# Patient Record
Sex: FEMALE | Race: ASIAN | Employment: OTHER | URBAN - METROPOLITAN AREA
[De-identification: names, ages, dates, MRNs, and addresses within clinical notes are randomized per-mention and may not be internally consistent; named-entity substitution may affect disease eponyms.]

---

## 2017-07-18 ENCOUNTER — HOSPITAL ENCOUNTER (OUTPATIENT)
Dept: MAMMOGRAPHY | Facility: CLINIC | Age: 67
Discharge: HOME | End: 2017-07-18
Attending: OBSTETRICS & GYNECOLOGY | Admitting: OBSTETRICS & GYNECOLOGY
Payer: MEDICARE

## 2017-07-18 DIAGNOSIS — Z12.31 VISIT FOR SCREENING MAMMOGRAM: ICD-10-CM

## 2017-07-18 PROCEDURE — 77063 BREAST TOMOSYNTHESIS BI: CPT

## 2017-07-18 PROCEDURE — G0202 SCR MAMMO BI INCL CAD: HCPCS

## 2017-07-26 ENCOUNTER — DOCUMENTATION ONLY (OUTPATIENT)
Dept: LAB | Facility: CLINIC | Age: 67
End: 2017-07-26

## 2017-07-26 DIAGNOSIS — E55.9 VITAMIN D DEFICIENCY: ICD-10-CM

## 2017-07-26 DIAGNOSIS — E78.5 HYPERLIPIDEMIA LDL GOAL <130: ICD-10-CM

## 2017-07-26 DIAGNOSIS — Z00.00 ROUTINE GENERAL MEDICAL EXAMINATION AT A HEALTH CARE FACILITY: Primary | ICD-10-CM

## 2017-07-26 DIAGNOSIS — R73.9 ELEVATED BLOOD SUGAR: ICD-10-CM

## 2017-07-26 DIAGNOSIS — D56.3 HETEROZYGOUS THALASSEMIA: ICD-10-CM

## 2017-07-26 NOTE — PROGRESS NOTES
Please review, associate diagnosis, and sign pending pre physical lab orders for patient's upcoming 8/2/17 lab appointment.     Thank you,  Lab

## 2017-08-02 DIAGNOSIS — E78.5 HYPERLIPIDEMIA LDL GOAL <130: ICD-10-CM

## 2017-08-02 DIAGNOSIS — D56.3 HETEROZYGOUS THALASSEMIA: ICD-10-CM

## 2017-08-02 DIAGNOSIS — Z00.00 ROUTINE GENERAL MEDICAL EXAMINATION AT A HEALTH CARE FACILITY: ICD-10-CM

## 2017-08-02 DIAGNOSIS — E55.9 VITAMIN D DEFICIENCY: ICD-10-CM

## 2017-08-02 DIAGNOSIS — R73.9 ELEVATED BLOOD SUGAR: ICD-10-CM

## 2017-08-02 LAB
ALBUMIN SERPL-MCNC: 3.9 G/DL (ref 3.4–5)
ALP SERPL-CCNC: 103 U/L (ref 40–150)
ALT SERPL W P-5'-P-CCNC: 36 U/L (ref 0–50)
ANION GAP SERPL CALCULATED.3IONS-SCNC: 8 MMOL/L (ref 3–14)
AST SERPL W P-5'-P-CCNC: 16 U/L (ref 0–45)
BASOPHILS # BLD AUTO: 0 10E9/L (ref 0–0.2)
BASOPHILS NFR BLD AUTO: 0.5 %
BILIRUB SERPL-MCNC: 1.1 MG/DL (ref 0.2–1.3)
BUN SERPL-MCNC: 22 MG/DL (ref 7–30)
CALCIUM SERPL-MCNC: 10.4 MG/DL (ref 8.5–10.1)
CHLORIDE SERPL-SCNC: 111 MMOL/L (ref 94–109)
CHOLEST SERPL-MCNC: 190 MG/DL
CO2 SERPL-SCNC: 26 MMOL/L (ref 20–32)
CREAT SERPL-MCNC: 1.05 MG/DL (ref 0.52–1.04)
DIFFERENTIAL METHOD BLD: ABNORMAL
EOSINOPHIL # BLD AUTO: 0.3 10E9/L (ref 0–0.7)
EOSINOPHIL NFR BLD AUTO: 4.5 %
ERYTHROCYTE [DISTWIDTH] IN BLOOD BY AUTOMATED COUNT: 17.2 % (ref 10–15)
GFR SERPL CREATININE-BSD FRML MDRD: 52 ML/MIN/1.7M2
GLUCOSE SERPL-MCNC: 114 MG/DL (ref 70–99)
HBA1C MFR BLD: 6 % (ref 4.3–6)
HCT VFR BLD AUTO: 39.6 % (ref 35–47)
HDLC SERPL-MCNC: 52 MG/DL
HGB BLD-MCNC: 12.3 G/DL (ref 11.7–15.7)
LDLC SERPL CALC-MCNC: 86 MG/DL
LYMPHOCYTES # BLD AUTO: 1.5 10E9/L (ref 0.8–5.3)
LYMPHOCYTES NFR BLD AUTO: 19.6 %
MCH RBC QN AUTO: 21.8 PG (ref 26.5–33)
MCHC RBC AUTO-ENTMCNC: 31.1 G/DL (ref 31.5–36.5)
MCV RBC AUTO: 70 FL (ref 78–100)
MONOCYTES # BLD AUTO: 0.7 10E9/L (ref 0–1.3)
MONOCYTES NFR BLD AUTO: 9.6 %
NEUTROPHILS # BLD AUTO: 4.9 10E9/L (ref 1.6–8.3)
NEUTROPHILS NFR BLD AUTO: 65.8 %
NONHDLC SERPL-MCNC: 138 MG/DL
PLATELET # BLD AUTO: 308 10E9/L (ref 150–450)
POTASSIUM SERPL-SCNC: 4.3 MMOL/L (ref 3.4–5.3)
PROT SERPL-MCNC: 7.7 G/DL (ref 6.8–8.8)
RBC # BLD AUTO: 5.64 10E12/L (ref 3.8–5.2)
SODIUM SERPL-SCNC: 145 MMOL/L (ref 133–144)
TRIGL SERPL-MCNC: 259 MG/DL
WBC # BLD AUTO: 7.5 10E9/L (ref 4–11)

## 2017-08-02 PROCEDURE — 36415 COLL VENOUS BLD VENIPUNCTURE: CPT | Performed by: INTERNAL MEDICINE

## 2017-08-02 PROCEDURE — 82306 VITAMIN D 25 HYDROXY: CPT | Performed by: INTERNAL MEDICINE

## 2017-08-02 PROCEDURE — 80061 LIPID PANEL: CPT | Performed by: INTERNAL MEDICINE

## 2017-08-02 PROCEDURE — 85025 COMPLETE CBC W/AUTO DIFF WBC: CPT | Performed by: INTERNAL MEDICINE

## 2017-08-02 PROCEDURE — 83036 HEMOGLOBIN GLYCOSYLATED A1C: CPT | Performed by: INTERNAL MEDICINE

## 2017-08-02 PROCEDURE — 80053 COMPREHEN METABOLIC PANEL: CPT | Performed by: INTERNAL MEDICINE

## 2017-08-03 LAB — DEPRECATED CALCIDIOL+CALCIFEROL SERPL-MC: 40 UG/L (ref 20–75)

## 2017-08-08 ENCOUNTER — OFFICE VISIT (OUTPATIENT)
Dept: FAMILY MEDICINE | Facility: CLINIC | Age: 67
End: 2017-08-08
Payer: MEDICARE

## 2017-08-08 VITALS
WEIGHT: 145 LBS | OXYGEN SATURATION: 97 % | TEMPERATURE: 97.8 F | SYSTOLIC BLOOD PRESSURE: 150 MMHG | HEART RATE: 74 BPM | BODY MASS INDEX: 25.69 KG/M2 | DIASTOLIC BLOOD PRESSURE: 94 MMHG | HEIGHT: 63 IN

## 2017-08-08 DIAGNOSIS — R03.0 ELEVATED BLOOD PRESSURE READING WITHOUT DIAGNOSIS OF HYPERTENSION: ICD-10-CM

## 2017-08-08 DIAGNOSIS — F31.9 BIPOLAR AFFECTIVE DISORDER, REMISSION STATUS UNSPECIFIED (H): ICD-10-CM

## 2017-08-08 DIAGNOSIS — D56.3 HETEROZYGOUS THALASSEMIA: ICD-10-CM

## 2017-08-08 DIAGNOSIS — E83.52 SERUM CALCIUM ELEVATED: ICD-10-CM

## 2017-08-08 DIAGNOSIS — R73.9 ELEVATED BLOOD SUGAR: ICD-10-CM

## 2017-08-08 DIAGNOSIS — E78.5 HYPERLIPIDEMIA LDL GOAL <130: ICD-10-CM

## 2017-08-08 DIAGNOSIS — D05.10 DUCTAL CARCINOMA IN SITU (DCIS) OF BREAST, UNSPECIFIED LATERALITY: ICD-10-CM

## 2017-08-08 DIAGNOSIS — Z00.00 ROUTINE GENERAL MEDICAL EXAMINATION AT A HEALTH CARE FACILITY: Primary | ICD-10-CM

## 2017-08-08 DIAGNOSIS — Z23 ENCOUNTER FOR IMMUNIZATION: ICD-10-CM

## 2017-08-08 DIAGNOSIS — E55.9 VITAMIN D DEFICIENCY: ICD-10-CM

## 2017-08-08 DIAGNOSIS — E78.1 HYPERTRIGLYCERIDEMIA: ICD-10-CM

## 2017-08-08 LAB
CA-I SERPL ISE-MCNC: 5.6 MG/DL (ref 4.4–5.2)
LITHIUM SERPL-SCNC: 1.1 MMOL/L (ref 0.6–1.2)

## 2017-08-08 PROCEDURE — 90472 IMMUNIZATION ADMIN EACH ADD: CPT | Performed by: INTERNAL MEDICINE

## 2017-08-08 PROCEDURE — 80178 ASSAY OF LITHIUM: CPT | Performed by: INTERNAL MEDICINE

## 2017-08-08 PROCEDURE — G0438 PPPS, INITIAL VISIT: HCPCS | Performed by: INTERNAL MEDICINE

## 2017-08-08 PROCEDURE — 90732 PPSV23 VACC 2 YRS+ SUBQ/IM: CPT | Performed by: INTERNAL MEDICINE

## 2017-08-08 PROCEDURE — G0009 ADMIN PNEUMOCOCCAL VACCINE: HCPCS | Performed by: INTERNAL MEDICINE

## 2017-08-08 PROCEDURE — 90714 TD VACC NO PRESV 7 YRS+ IM: CPT | Mod: GY | Performed by: INTERNAL MEDICINE

## 2017-08-08 PROCEDURE — 82330 ASSAY OF CALCIUM: CPT | Performed by: INTERNAL MEDICINE

## 2017-08-08 PROCEDURE — 36415 COLL VENOUS BLD VENIPUNCTURE: CPT | Performed by: INTERNAL MEDICINE

## 2017-08-08 RX ORDER — SIMVASTATIN 40 MG
40 TABLET ORAL AT BEDTIME
Qty: 90 TABLET | Refills: 3 | Status: SHIPPED | OUTPATIENT
Start: 2017-08-08 | End: 2018-02-28

## 2017-08-08 NOTE — NURSING NOTE
"Chief Complaint   Patient presents with     Wellness Visit        Initial /90 (BP Location: Right arm, Patient Position: Chair, Cuff Size: Adult Regular)  Pulse 74  Temp 97.8  F (36.6  C) (Tympanic)  Ht 5' 3.25\" (1.607 m)  Wt 145 lb (65.8 kg)  SpO2 97%  Breastfeeding? No  BMI 25.48 kg/m2 Estimated body mass index is 25.48 kg/(m^2) as calculated from the following:    Height as of this encounter: 5' 3.25\" (1.607 m).    Weight as of this encounter: 145 lb (65.8 kg)..    BP completed using cuff size: regular  MEDICATIONS REVIEWED  SOCIAL AND FAMILY HX REVIEWED  Olya Delcid CMA  "

## 2017-08-08 NOTE — MR AVS SNAPSHOT
After Visit Summary   8/8/2017    Diana Santiago    MRN: [id number]           Patient Information     Date Of Birth          1950        Visit Information        Provider Department      8/8/2017 9:30 AM Suleiman Miller MD [facility]        Today's Diagnoses     Bipolar affective disorder, remission status unspecified (H)    -  1    Routine general medical examination at a health care facility        Hypertriglyceridemia        Vitamin D deficiency        Heterozygous thalassemia        Ductal carcinoma in situ (DCIS) of breast, unspecified laterality        Elevated blood sugar        Hyperlipidemia LDL goal <130          Care Instructions    If your blood pressure is staying above 140/90 let me know.    Suleiman Miller M.D.    Preventive Health Recommendations    Female Ages 65 +    Yearly exam:     See your health care provider every year in order to  o Review health changes.   o Discuss preventive care.    o Review your medicines if your doctor has prescribed any.      You no longer need a yearly Pap test unless you've had an abnormal Pap test in the past 10 years. If you have vaginal symptoms, such as bleeding or discharge, be sure to talk with your provider about a Pap test.      Every 1 to 2 years, have a mammogram.  If you are over 69, talk with your health care provider about whether or not you want to continue having screening mammograms.      Every 10 years, have a colonoscopy. Or, have a yearly FIT test (stool test). These exams will check for colon cancer.       Have a cholesterol test every 5 years, or more often if your doctor advises it.       Have a diabetes test (fasting glucose) every three years. If you are at risk for diabetes, you should have this test more often.       At age 65, have a bone density scan (DEXA) to check for osteoporosis (brittle bone disease).    Shots:    Get a flu shot each year.    Get a tetanus shot every 10 years.    Talk to your doctor  about your pneumonia vaccines. There are now two you should receive - Pneumovax (PPSV 23) and Prevnar (PCV 13).    Talk to your doctor about the shingles vaccine.    Talk to your doctor about the hepatitis B vaccine.    Nutrition:     Eat at least 5 servings of fruits and vegetables each day.      Eat whole-grain bread, whole-wheat pasta and brown rice instead of white grains and rice.      Talk to your provider about Calcium and Vitamin D.     Lifestyle    Exercise at least 150 minutes a week (30 minutes a day, 5 days a week). This will help you control your weight and prevent disease.      Limit alcohol to one drink per day.      No smoking.       Wear sunscreen to prevent skin cancer.       See your dentist twice a year for an exam and cleaning.      See your eye doctor every 1 to 2 years to screen for conditions such as glaucoma, macular degeneration and cataracts.          Follow-ups after your visit        Who to contact     If you have questions or need follow up information about today's clinic visit or your schedule please contact [facility] directly at [phone].  Normal or non-critical lab and imaging results will be communicated to you by clypdhart, letter or phone within 4 business days after the clinic has received the results. If you do not hear from us within 7 days, please contact the clinic through Quick TVt or phone. If you have a critical or abnormal lab result, we will notify you by phone as soon as possible.  Submit refill requests through Familonet or call your pharmacy and they will forward the refill request to us. Please allow 3 business days for your refill to be completed.          Additional Information About Your Visit        clypdhart Information     Familonet gives you secure access to your electronic health record. If you see a primary care provider, you can also send messages to your care team and make appointments. If you have questions, please call your primary care clinic.   "If you do not have a primary care provider, please call [phone] and they will assist you.        Care EveryWhere ID     This is your Care EveryWhere ID. This could be used by other organizations to access your [place] medical records  [id number]        Your Vitals Were     Pulse Temperature Height Pulse Oximetry Breastfeeding? BMI (Body Mass Index)    74 97.8  F (36.6  C) (Tympanic) 5' 3.25\" (1.607 m) 97% No 25.48 kg/m2       Blood Pressure from Last 3 Encounters:   08/08/17 (!) 150/94   09/29/16 (!) 157/111   08/19/16 (!) 141/94    Weight from Last 3 Encounters:   08/08/17 145 lb (65.8 kg)   09/29/16 139 lb 12.8 oz (63.4 kg)   08/19/16 141 lb 3.2 oz (64 kg)              Today, you had the following     No orders found for display         Today's Medication Changes          These changes are accurate as of: 8/8/17  9:52 AM.  If you have any questions, ask your nurse or doctor.               These medicines have changed or have updated prescriptions.        Dose/Directions    vitamin D3 1000 UNITS Caps   This may have changed:  how much to take   Used for:  Routine general medical examination at a health care facility   Changed by:  Suleiman Miller MD        Dose:  3 capsule   Take 3 capsules by mouth daily   Quantity:  30 capsule   Refills:  0                Primary Care Provider Office Phone # Fax #    Suleiman Miller [phone]       [facility] [address] [facility] [address]  [place] [address]        Equal Access to Services     [facility] AH: Hadii aad ku hadasho Soomaali, waaxda luqadaha, qaybta kaalmada susi singh. So [facility] [phone].    ATENCIÓN: Si habla español, tiene a christensen disposición servicios gratuitos de asistencia lingüística. Llame al [phone].    We comply with applicable federal civil rights laws and [place] laws. We do not discriminate on the basis of race, color, national origin, age, disability sex, sexual " orientation or gender identity.            Thank you!     Thank you for choosing [facility]  for your care. Our goal is always to provide you with excellent care. Hearing back from our patients is one way we can continue to improve our services. Please take a few minutes to complete the written survey that you may receive in the mail after your visit with us. Thank you!             Your Updated Medication List - Protect others around you: Learn how to safely use, store and throw away your medicines at www.disposemymeds.org.          This list is accurate as of: 8/8/17  9:52 AM.  Always use your most recent med list.                   Brand Name Dispense Instructions for use Diagnosis    amantadine 50 MG/5ML syrup    SYMMETREL     Take 2.5 mLs by mouth daily.        CENTRUM SILVER per tablet      Take 1 tablet by mouth daily.    Routine general medical examination at a health care facility       fexofenadine 180 MG tablet    ALLEGRA    90 tablet    Take 1 tablet by mouth daily.        haloperidol 0.5 MG tablet    HALDOL     Take 1 mg by mouth daily        lithium 300 MG tablet      Take 300 mg by mouth 3 times daily.        simvastatin 40 MG tablet    ZOCOR    90 tablet    Take 1 tablet (40 mg) by mouth At Bedtime    Hypertriglyceridemia       UNABLE TO FIND      MEDICATION NAME: focus select capsule, 2 daily        vitamin D3 1000 UNITS Caps     30 capsule    Take 3 capsules by mouth daily    Routine general medical examination at a health care facility

## 2017-08-08 NOTE — PATIENT INSTRUCTIONS
If your blood pressure is staying above 140/90 let me know.    Suleiman Miller M.D.    Preventive Health Recommendations    Female Ages 65 +    Yearly exam:     See your health care provider every year in order to  o Review health changes.   o Discuss preventive care.    o Review your medicines if your doctor has prescribed any.      You no longer need a yearly Pap test unless you've had an abnormal Pap test in the past 10 years. If you have vaginal symptoms, such as bleeding or discharge, be sure to talk with your provider about a Pap test.      Every 1 to 2 years, have a mammogram.  If you are over 69, talk with your health care provider about whether or not you want to continue having screening mammograms.      Every 10 years, have a colonoscopy. Or, have a yearly FIT test (stool test). These exams will check for colon cancer.       Have a cholesterol test every 5 years, or more often if your doctor advises it.       Have a diabetes test (fasting glucose) every three years. If you are at risk for diabetes, you should have this test more often.       At age 65, have a bone density scan (DEXA) to check for osteoporosis (brittle bone disease).    Shots:    Get a flu shot each year.    Get a tetanus shot every 10 years.    Talk to your doctor about your pneumonia vaccines. There are now two you should receive - Pneumovax (PPSV 23) and Prevnar (PCV 13).    Talk to your doctor about the shingles vaccine.    Talk to your doctor about the hepatitis B vaccine.    Nutrition:     Eat at least 5 servings of fruits and vegetables each day.      Eat whole-grain bread, whole-wheat pasta and brown rice instead of white grains and rice.      Talk to your provider about Calcium and Vitamin D.     Lifestyle    Exercise at least 150 minutes a week (30 minutes a day, 5 days a week). This will help you control your weight and prevent disease.      Limit alcohol to one drink per day.      No smoking.       Wear sunscreen to prevent skin  cancer.       See your dentist twice a year for an exam and cleaning.      See your eye doctor every 1 to 2 years to screen for conditions such as glaucoma, macular degeneration and cataracts.

## 2017-08-08 NOTE — PROGRESS NOTES
SUBJECTIVE:   Diana Santiago is a 66 year old female who presents for Preventive Visit.    The patient feels fine and is walking reg now.  To see gyn soon  Up to date with psyche and no issues there.  Blood pressure higher today, no h/o this but she will monitor it.  Seen by sleep clinic and no emily.               Past Medical History:      Past Medical History:   Diagnosis Date     Bipolar affective disorder (H)     hosp 1993, Dr. Aftab Deal     DCIS (ductal carcinoma in situ) 1996    xrt and lumpectomy x 4     Elevated blood sugar      Fractured femoral neck (H) 1992     Hx of colonoscopy 2010    tics and hem     Hypercholesteremia      Thalassaemia trait      Vitamin D deficiency              Past Surgical History:      Past Surgical History:   Procedure Laterality Date     BREAST SURGERY      lumpectomy x 4     left hand surgery      last 1974             Social History:     Social History     Social History     Marital status: Single     Spouse name: N/A     Number of children: 0     Years of education: N/A     Occupational History     , retired      Social History Main Topics     Smoking status: Never Smoker     Smokeless tobacco: Never Used     Alcohol use No     Drug use: No     Sexual activity: Yes     Partners: Male     Other Topics Concern     Not on file     Social History Narrative             Family History:   reviewed         Allergies:   No Known Allergies          Medications:     Current Outpatient Prescriptions   Medication Sig Dispense Refill     Cholecalciferol (VITAMIN D3) 1000 UNITS CAPS Take 3 capsules by mouth daily 30 capsule      simvastatin (ZOCOR) 40 MG tablet Take 1 tablet (40 mg) by mouth At Bedtime 90 tablet 3     UNABLE TO FIND MEDICATION NAME: focus select capsule, 2 daily       Multiple Vitamins-Minerals (CENTRUM SILVER) per tablet Take 1 tablet by mouth daily.       fexofenadine (ALLEGRA) 180 MG tablet Take 1 tablet by mouth daily. 90 tablet 0      "amantadine (SYMMETREL) 50 MG/5ML solution Take 2.5 mLs by mouth daily.       haloperidol (HALDOL) 0.5 MG tablet Take 1 mg by mouth daily        lithium 300 MG tablet Take 300 mg by mouth 3 times daily.       [DISCONTINUED] simvastatin (ZOCOR) 40 MG tablet Take 1 tablet (40 mg) by mouth At Bedtime 90 tablet 3               Review of Systems:   The 10 point Review of Systems is negative other than noted in the HPI           Physical Exam:   Blood pressure (!) 150/94, pulse 74, temperature 97.8  F (36.6  C), temperature source Tympanic, height 5' 3.25\" (1.607 m), weight 145 lb (65.8 kg), SpO2 97 %, not currently breastfeeding.    Exam:  Constitutional: healthy appearing, alert and in no distress  Heent: Normocephalic. Head without obvious masses or lesions. PERRLDC, EOMI. Mouth exam within normal limits: tongue, mucous membranes, posterior pharynx all normal, no lesions or abnormalities seen.  Tm's and canals within normal limits bilaterally. Neck supple, no nuchal rigidity or masses. No supraclavicular, or cervical adenopathy. Thyroid symmetric, no masses.  Cardiovascular: Regular rate and rhythm, no murmer, rub or gallops.  JVP not elevated, no edema.  Carotids within normal limits bilaterally, no bruits.  Respiratory: Normal respiratory effort.  Lungs clear, normal flow, no wheezing or crackles.  Gastrointestinal: Normal active bowel sounds.   Soft, not tender, no masses, guarding or rebound.  No hepatosplenomegaly.   Musculoskeletal: extremities normal, no gross deformities noted.  Skin: no suspicious lesions or rashes   Neurologic: Mental status within normal limits.  Speech fluent.  No gross motor abnormalities and gait intact.  Psychiatric: mentation appears normal and affect normal.         Data:   Labs reviewed with patient, others to be done today        Assessment:   1. Normal cpx  2. Bipolar, stable, follow up psyche  3. Elevated cholesterol on statin, exercise, diet for this and trig  4. Elevated sugar, " exercise, diet  5. Dcis, no issues  6. heteozygous thal  7. Elevated calcium, doubt significant, follow up today  8. Elevated blood pressure, doubt significant, she will check it reg and call if up  9. Vit d defic, follow up lab fine  10. hcm         Plan:   Up to date mammogram, colon, breast exam  Td and pneumovax today  Exercise, diet  Monitor blood pressure and if up call  Other labs today      Suleiman Miller M.D.                Are you in the first 12 months of your Medicare Part B coverage?  No    Healthy Habits:  Answers for HPI/ROS submitted by the patient on 8/7/2017   Annual Exam:  Getting at least 3 servings of Calcium per day:: Yes  Bi-annual eye exam:: Yes  Dental care twice a year:: Yes  Sleep apnea or symptoms of sleep apnea:: Daytime drowsiness  Diet:: Regular (no restrictions)  Frequency of exercise:: 6-7 days/week  Taking medications regularly:: Yes  Medication side effects:: None  Additional concerns today:: No  PHQ-2 Score: 0  Duration of exercise:: 15-30 minutes    COGNITIVE SCREEN  1) Repeat 3 items (Banana, Sunrise, Chair)    2) Clock draw: NORMAL  3) 3 item recall: Recalls 3 objects  Results: 3 items recalled: COGNITIVE IMPAIRMENT LESS LIKELY    Mini-CogTM Copyright S [employer]. Licensed by the author for use in [facility]; reprinted with permission (nela@.[facility]). All rights reserved.                    Reviewed and updated as needed this visit by clinical staff         Reviewed and updated as needed this visit by Provider      Social History   Substance Use Topics     Smoking status: Never Smoker     Smokeless tobacco: Never Used     Alcohol use No       The patient does not drink >3 drinks per day nor >7 drinks per week.    Today's PHQ-2 Score:   PHQ-2 ( 1999 Pfizer) 8/7/2017 7/22/2014   Q1: Little interest or pleasure in doing things 0 0   Q2: Feeling down, depressed or hopeless 0 0   PHQ-2 Score 0 0   Q1: Little interest or pleasure in doing things Not at all -   Q2: Feeling  "down, depressed or hopeless Not at all -   PHQ-2 Score 0 -         Do you feel safe in your environment - Yes    Do you have a Health Care Directive?: Yes: Patient states has Advance Directive and will bring in a copy to clinic.    Current providers sharing in care for this patient include: Patient Care Team:  Suleiman Miller MD as PCP - General (Internal Medicine)      Hearing impairment: No    Ability to successfully perform activities of daily living: Yes, no assistance needed     Fall risk:  Fallen 2 or more times in the past year?: No  Any fall with injury in the past year?: No      Home safety:  none identified      The following health maintenance items are reviewed in Epic and correct as of today:Health Maintenance   Topic Date Due     DEXA SCAN SCREENING (SYSTEM ASSIGNED)  11/23/2015     TETANUS IMMUNIZATION (SYSTEM ASSIGNED)  06/20/2017     FALL RISK ASSESSMENT  08/02/2017     PNEUMOCOCCAL (2 of 2 - PPSV23) 08/02/2017     NELDA QUESTIONNAIRE 1 YEAR  08/02/2017     PHQ-9 Q1YR  08/02/2017     INFLUENZA VACCINE (SYSTEM ASSIGNED)  09/01/2017     MAMMO SCREEN Q2 YR (SYSTEM ASSIGNED)  07/18/2019     ADVANCE DIRECTIVE PLANNING Q5 YRS  07/31/2020     COLON CANCER SCREEN (SYSTEM ASSIGNED)  10/15/2020     LIPID SCREEN Q5 YR FEMALE (SYSTEM ASSIGNED)  08/02/2022     HEPATITIS C SCREENING  Completed       End of Life Planning:  Patient currently has an advanced directive: yes    COUNSELING:  Reviewed preventive health counseling, as reflected in patient instructions       Regular exercise       Healthy diet/nutrition        Estimated body mass index is 23.98 kg/(m^2) as calculated from the following:    Height as of 9/29/16: 5' 4.02\" (1.626 m).    Weight as of 9/29/16: 139 lb 12.8 oz (63.4 kg).     reports that she has never smoked. She has never used smokeless tobacco.        Appropriate preventive services were discussed with this patient, including applicable screening as appropriate for cardiovascular disease, " diabetes, osteopenia/osteoporosis, and glaucoma.  As appropriate for age/gender, discussed screening for colorectal cancer, prostate cancer, breast cancer, and cervical cancer. Checklist reviewing preventive services available has been given to the patient.    Reviewed patients plan of care and provided an AVS. The Basic Care Plan (routine screening as documented in Health Maintenance) for Diana meets the Care Plan requirement. This Care Plan has been established and reviewed with the Patient.    Counseling Resources:  ATP IV Guidelines  Pooled Cohorts Equation Calculator  Breast Cancer Risk Calculator  FRAX Risk Assessment  ICSI Preventive Guidelines  Dietary Guidelines for Americans, 2010  USDA's MyPlate  ASA Prophylaxis  Lung CA Screening    Suleiman Miller MD  [facility]

## 2017-08-08 NOTE — NURSING NOTE
Td and Pneumovax given today per Dr. Miller.  Olya Delcid CMA  Screening Questionnaire for Adult Immunization    Are you sick today?   No   Do you have allergies to medications, food, a vaccine component or latex?   No   Have you ever had a serious reaction after receiving a vaccination?   No   Do you have a long-term health problem with heart disease, lung disease, asthma, kidney disease, metabolic disease (e.g. diabetes), anemia, or other blood disorder?   No   Do you have cancer, leukemia, HIV/AIDS, or any other immune system problem?   No   In the past 3 months, have you taken medications that affect  your immune system, such as prednisone, other steroids, or anticancer drugs; drugs for the treatment of rheumatoid arthritis, Crohn s disease, or psoriasis; or have you had radiation treatments?   No   Have you had a seizure, or a brain or other nervous system problem?   No   During the past year, have you received a transfusion of blood or blood     products, or been given immune (gamma) globulin or antiviral drug?   No   For women: Are you pregnant or is there a chance you could become        pregnant during the next month?   No   Have you received any vaccinations in the past 4 weeks?   No     Immunization questionnaire answers were all negative.      MNVFC doesn't apply on this patient    Per orders of Dr. Miller, injection of Td and Pneumovax given by Olya Delcid. Patient instructed to remain in clinic for 15 minutes afterwards, and to report any adverse reaction to me immediately.       Screening performed by Olya Delcid on 8/8/2017 at 10:25 AM.

## 2017-08-14 DIAGNOSIS — E83.52 HYPERCALCEMIA: ICD-10-CM

## 2017-08-14 LAB
CALCIUM SERPL-MCNC: 10.1 MG/DL (ref 8.5–10.1)
PTH-INTACT SERPL-MCNC: 77 PG/ML (ref 12–72)

## 2017-08-14 PROCEDURE — 83970 ASSAY OF PARATHORMONE: CPT | Performed by: PHYSICIAN ASSISTANT

## 2017-08-14 PROCEDURE — 82310 ASSAY OF CALCIUM: CPT | Performed by: PHYSICIAN ASSISTANT

## 2017-08-14 PROCEDURE — 36415 COLL VENOUS BLD VENIPUNCTURE: CPT | Performed by: PHYSICIAN ASSISTANT

## 2017-08-15 PROBLEM — E21.3 HYPERPARATHYROIDISM (H): Status: ACTIVE | Noted: 2017-08-01

## 2017-08-15 NOTE — PROGRESS NOTES
Hello again,    Your parathyroid hormone level is on the upper end of normal.  I believe your elevated calcium is due to this and also the lithium.  I am not worried but will want to keep monitoring it.  Over time this can affect your bone density but should really not have a great impact on other things.  There is nothing you need to do about it.    I would ask that we repeat your labs in 6 months so please remember to come back then.  If you have any questions please call me.    Suleiman Miller M.D.

## 2017-10-12 ENCOUNTER — TELEPHONE (OUTPATIENT)
Dept: FAMILY MEDICINE | Facility: CLINIC | Age: 67
End: 2017-10-12

## 2017-10-12 NOTE — TELEPHONE ENCOUNTER
Reason for Call:  Other FYI    Detailed comments: patient wanted to let clinic know that she received her FLU Shot this morning. 10/12/17.        Call taken on 10/12/2017 at 10:31 AM by Leonila Peralta    .

## 2018-01-08 ENCOUNTER — TRANSFERRED RECORDS (OUTPATIENT)
Dept: HEALTH INFORMATION MANAGEMENT | Facility: CLINIC | Age: 68
End: 2018-01-08

## 2018-02-20 ENCOUNTER — TRANSFERRED RECORDS (OUTPATIENT)
Dept: HEALTH INFORMATION MANAGEMENT | Facility: CLINIC | Age: 68
End: 2018-02-20

## 2018-02-25 DIAGNOSIS — E78.1 HYPERTRIGLYCERIDEMIA: ICD-10-CM

## 2018-02-26 NOTE — TELEPHONE ENCOUNTER
"simvastatin (ZOCOR) 40 MG tablet 90 tablet 3 8/8/2017       Last Written Prescription Date:  08/08/2017  Last Fill Quantity: 90,  # refills: 3   Last office visit: 8/8/2017 with prescribing provider:  Angela   Future Office Visit:  unknown  Requested Prescriptions   Pending Prescriptions Disp Refills     simvastatin (ZOCOR) 40 MG tablet [Pharmacy Med Name: SIMVASTATIN  TAB 40MG] 90 tablet 0     Sig: TAKE 1 TABLET AT BEDTIME    Statins Protocol Failed    2/25/2018  6:02 PM       Failed - No positive pregnancy test in past 12 months       Passed - LDL on file in past 12 months    Recent Labs   Lab Test  08/02/17   0730   LDL  86            Passed - No abnormal creatine kinase in past 12 months    No lab results found.         Passed - Recent or future visit with authorizing provider    Patient had office visit in the last year or has a visit in the next 30 days with authorizing provider.  See \"Patient Info\" tab in inbasket, or \"Choose Columns\" in Meds & Orders section of the refill encounter.            Passed - Patient is age 18 or older       Passed - No active pregnancy on record          "

## 2018-02-27 RX ORDER — SIMVASTATIN 40 MG
TABLET ORAL
Refills: 0
Start: 2018-02-27

## 2018-02-28 ENCOUNTER — MYC MEDICAL ADVICE (OUTPATIENT)
Dept: FAMILY MEDICINE | Facility: CLINIC | Age: 68
End: 2018-02-28

## 2018-02-28 DIAGNOSIS — E78.1 HYPERTRIGLYCERIDEMIA: ICD-10-CM

## 2018-02-28 RX ORDER — SIMVASTATIN 40 MG
40 TABLET ORAL AT BEDTIME
Qty: 90 TABLET | Refills: 0 | Status: SHIPPED | OUTPATIENT
Start: 2018-02-28 | End: 2018-06-12

## 2018-03-22 ENCOUNTER — OFFICE VISIT (OUTPATIENT)
Dept: FAMILY MEDICINE | Facility: CLINIC | Age: 68
End: 2018-03-22
Payer: MEDICARE

## 2018-03-22 ENCOUNTER — RADIANT APPOINTMENT (OUTPATIENT)
Dept: GENERAL RADIOLOGY | Facility: CLINIC | Age: 68
End: 2018-03-22
Attending: INTERNAL MEDICINE
Payer: MEDICARE

## 2018-03-22 VITALS
DIASTOLIC BLOOD PRESSURE: 82 MMHG | TEMPERATURE: 97.8 F | HEIGHT: 64 IN | SYSTOLIC BLOOD PRESSURE: 144 MMHG | WEIGHT: 132 LBS | HEART RATE: 87 BPM | BODY MASS INDEX: 22.53 KG/M2 | OXYGEN SATURATION: 97 %

## 2018-03-22 DIAGNOSIS — E83.52 HYPERCALCEMIA: ICD-10-CM

## 2018-03-22 DIAGNOSIS — M25.561 ACUTE PAIN OF RIGHT KNEE: ICD-10-CM

## 2018-03-22 DIAGNOSIS — M25.561 ACUTE PAIN OF RIGHT KNEE: Primary | ICD-10-CM

## 2018-03-22 DIAGNOSIS — E21.3 HYPERPARATHYROIDISM (H): ICD-10-CM

## 2018-03-22 DIAGNOSIS — E55.9 VITAMIN D DEFICIENCY: ICD-10-CM

## 2018-03-22 LAB
CA-I SERPL ISE-MCNC: 5.6 MG/DL (ref 4.4–5.2)
PTH-INTACT SERPL-MCNC: 64 PG/ML (ref 18–80)

## 2018-03-22 PROCEDURE — 82306 VITAMIN D 25 HYDROXY: CPT | Performed by: INTERNAL MEDICINE

## 2018-03-22 PROCEDURE — 73562 X-RAY EXAM OF KNEE 3: CPT | Mod: RT

## 2018-03-22 PROCEDURE — 36415 COLL VENOUS BLD VENIPUNCTURE: CPT | Performed by: INTERNAL MEDICINE

## 2018-03-22 PROCEDURE — 82330 ASSAY OF CALCIUM: CPT | Performed by: INTERNAL MEDICINE

## 2018-03-22 PROCEDURE — 83970 ASSAY OF PARATHORMONE: CPT | Performed by: INTERNAL MEDICINE

## 2018-03-22 PROCEDURE — 99213 OFFICE O/P EST LOW 20 MIN: CPT | Performed by: INTERNAL MEDICINE

## 2018-03-22 NOTE — NURSING NOTE
"Chief Complaint   Patient presents with     Musculoskeletal Problem     Fall       Initial BP (!) 169/104  Pulse 87  Temp 97.8  F (36.6  C) (Oral)  Ht 5' 4\" (1.626 m)  Wt 132 lb (59.9 kg)  SpO2 97%  Breastfeeding? No  BMI 22.66 kg/m2 Estimated body mass index is 22.66 kg/(m^2) as calculated from the following:    Height as of this encounter: 5' 4\" (1.626 m).    Weight as of this encounter: 132 lb (59.9 kg).  Medication Reconciliation: complete   Rachana Cameron CMA      "

## 2018-03-22 NOTE — MR AVS SNAPSHOT
"              After Visit Summary   3/22/2018    Diana Santiago    MRN: [id number]           Patient Information     Date Of Birth          1950        Visit Information        Provider Department      3/22/2018 11:00 AM Suleiman Miller MD [facility]        Today's Diagnoses     Acute pain of right knee    -  1    Hyperparathyroidism (H)        Hypercalcemia        Vitamin D deficiency           Follow-ups after your visit        Who to contact     If you have questions or need follow up information about today's clinic visit or your schedule please contact [facility] directly at [phone].  Normal or non-critical lab and imaging results will be communicated to you by Biorasishart, letter or phone within 4 business days after the clinic has received the results. If you do not hear from us within 7 days, please contact the clinic through Sandvinet or phone. If you have a critical or abnormal lab result, we will notify you by phone as soon as possible.  Submit refill requests through Landingi or call your pharmacy and they will forward the refill request to us. Please allow 3 business days for your refill to be completed.          Additional Information About Your Visit        MyChart Information     Landingi gives you secure access to your electronic health record. If you see a primary care provider, you can also send messages to your care team and make appointments. If you have questions, please call your primary care clinic.  If you do not have a primary care provider, please call [phone] and they will assist you.        Care EveryWhere ID     This is your Care EveryWhere ID. This could be used by other organizations to access your [facility] medical records  [id number]        Your Vitals Were     Pulse Temperature Height Pulse Oximetry Breastfeeding? BMI (Body Mass Index)    87 97.8  F (36.6  C) (Oral) 5' 4\" (1.626 m) 97% No 22.66 kg/m2       Blood Pressure from Last 3 Encounters: "   03/22/18 144/82   08/08/17 (!) 150/94   09/29/16 (!) 157/111    Weight from Last 3 Encounters:   03/22/18 132 lb (59.9 kg)   08/08/17 145 lb (65.8 kg)   09/29/16 139 lb 12.8 oz (63.4 kg)              We Performed the Following     Calcium ionized     Parathyroid Hormone Intact     Vitamin D Deficiency        Primary Care Provider Office Phone # Fax #    Suleiman Miller [phone] [address]  [facility] [address]        Equal Access to Services     [facility]: Hadii aad ku hadasho Sozoila, waaxda luqadaha, qaybta kaalmada adeabbeyyaeh, susi eugene . So [facility] [phone].    ATENCIÓN: Si habla español, tiene a christensen disposición servicios gratuitos de asistencia lingüística. [facility] [phone].    We comply with applicable federal civil rights laws and [place] laws. We do not discriminate on the basis of race, color, national origin, age, disability, sex, sexual orientation, or gender identity.            Thank you!     Thank you for choosing [facility]  for your care. Our goal is always to provide you with excellent care. Hearing back from our patients is one way we can continue to improve our services. Please take a few minutes to complete the written survey that you may receive in the mail after your visit with us. Thank you!             Your Updated Medication List - Protect others around you: Learn how to safely use, store and throw away your medicines at www.disposemymeds.org.          This list is accurate as of 3/22/18 11:40 AM.  Always use your most recent med list.                   Brand Name Dispense Instructions for use Diagnosis    amantadine 50 MG/5ML syrup    SYMMETREL     Take 2.5 mLs by mouth daily.        CENTRUM SILVER per tablet      Take 1 tablet by mouth daily.    Routine general medical examination at a health care facility       fexofenadine 180 MG tablet    ALLEGRA    90 tablet    Take 1 tablet by mouth daily.         haloperidol 0.5 MG tablet    HALDOL     Take 1 mg by mouth daily        lithium 300 MG tablet      Take 300 mg by mouth 3 times daily.        simvastatin 40 MG tablet    ZOCOR    90 tablet    Take 1 tablet (40 mg) by mouth At Bedtime    Hypertriglyceridemia       UNABLE TO FIND      MEDICATION NAME: focus select capsule, 2 daily        vitamin D3 1000 UNITS Caps     30 capsule    Take 3 capsules by mouth daily    Routine general medical examination at a health care facility

## 2018-03-22 NOTE — PROGRESS NOTES
Patient is here for right knee pain.  Last Sunday she tripped and landed on her right knee.  It is been swollen and somewhat painful since, although not terribly painful.  Her left knee is fine.  She has not been putting anything on it and does not use NSAIDs because of her lithium usage.    The patient's blood pressure initially was quite high.  At home she checks it in general he is around 140/90.  Her father has a history of hypertension.  She does not use NSAIDs, alcohol and minimal caffeine.  She does not have headaches, dizziness, chest pain or breathing difficulty.  She does have a history of hypercalcemia.    The patient has had prior high calcium with elevated pth and is due for follow up labs today.    Past Medical History:   Diagnosis Date     Bipolar affective disorder (H)     hosp 1993, Dr. Aftab Deal     DCIS (ductal carcinoma in situ) 1996    xrt and lumpectomy x 4     Elevated blood sugar      Fractured femoral neck (H) 1992     Hx of colonoscopy 2010    tics and hem     Hypercalcemia 08/2017     Hypercholesteremia      Hyperparathyroidism (H) 08/2017     Thalassaemia trait      Vitamin D deficiency      Past Surgical History:   Procedure Laterality Date     BREAST SURGERY      lumpectomy x 4     left hand surgery      last 1974     Social History     Social History     Marital status: Single     Spouse name: N/A     Number of children: 0     Years of education: N/A     Occupational History     , retired      Social History Main Topics     Smoking status: Never Smoker     Smokeless tobacco: Never Used     Alcohol use No     Drug use: No     Sexual activity: Yes     Partners: Male     Other Topics Concern     Not on file     Social History Narrative     Current Outpatient Prescriptions   Medication Sig Dispense Refill     simvastatin (ZOCOR) 40 MG tablet Take 1 tablet (40 mg) by mouth At Bedtime 90 tablet 0     Cholecalciferol (VITAMIN D3) 1000 UNITS CAPS Take 3 capsules by  "mouth daily 30 capsule      UNABLE TO FIND MEDICATION NAME: focus select capsule, 2 daily       Multiple Vitamins-Minerals (CENTRUM SILVER) per tablet Take 1 tablet by mouth daily.       fexofenadine (ALLEGRA) 180 MG tablet Take 1 tablet by mouth daily. 90 tablet 0     amantadine (SYMMETREL) 50 MG/5ML solution Take 2.5 mLs by mouth daily.       haloperidol (HALDOL) 0.5 MG tablet Take 1 mg by mouth daily        lithium 300 MG tablet Take 300 mg by mouth 3 times daily.       No Known Allergies  FAMILY HISTORY NOTED AND REVIEWED    REVIEW OF SYSTEMS: above    PHYSICAL EXAM    /82  Pulse 87  Temp 97.8  F (36.6  C) (Oral)  Ht 5' 4\" (1.626 m)  Wt 132 lb (59.9 kg)  SpO2 97%  Breastfeeding? No  BMI 22.66 kg/m2    Patient appears non toxic  Lungs - clear, normal flow  Cardiovascular - regular rate and rhythm, no murmer, rub or gallop, no jvp or edema, carotids within normal limits, no bruits.  Abdomen - normal active bowel sounds, soft, non tender, no masses, guarding or rebound, no hepatosplenomegaly  Right knee with swelling on top of patella, some bruising as well and bruising prox and distally with slight amt of lower leg edema.  Dec range of motion right knee due to the swelling and some pain, range of motion hard to test    Labs xray neg    ASSESSMENT:  1. Knee pain, contussion, due to fall  2. High blood pressure, she will monitor it  3. Elevated calcium and pth    PLAN:  For the knee use ice and gentle range of motion, call if not resolving soon  Labs  Monitor blood pressure       Suleiman Miller M.D.            "

## 2018-03-23 LAB — DEPRECATED CALCIDIOL+CALCIFEROL SERPL-MC: 46 UG/L (ref 20–75)

## 2018-03-27 NOTE — PROGRESS NOTES
It was a please seeing you.  You should be able to view your labs.    Your vitamin D level is normal but your calcium level remains high.  Your parathyroid hormone level is normal but on the high side.  I suspect the elevated calcium is due to the lithium but I would suggest having you seen by a endocrinologist who specializes in this just to be safe.  We have a very good one here by the name of Dr. Jonathan Razo and if it is ok with you I would like to refer you to him.  Please send me a note back.    Suleiman Miller M.D.

## 2018-05-22 ENCOUNTER — TRANSFERRED RECORDS (OUTPATIENT)
Dept: HEALTH INFORMATION MANAGEMENT | Facility: CLINIC | Age: 68
End: 2018-05-22

## 2018-06-12 DIAGNOSIS — E78.1 HYPERTRIGLYCERIDEMIA: ICD-10-CM

## 2018-06-13 RX ORDER — SIMVASTATIN 40 MG
TABLET ORAL
Qty: 90 TABLET | Refills: 0 | Status: SHIPPED | OUTPATIENT
Start: 2018-06-13 | End: 2018-09-01

## 2018-06-13 NOTE — TELEPHONE ENCOUNTER
"Simvastatin 40 mg    Last Written Prescription Date:  02/28/18  Last Fill Quantity: 90 tablets,  # refills: 0   Last office visit: 3/22/2018 with prescribing provider:  Angela   Future Office Visit:      Requested Prescriptions   Pending Prescriptions Disp Refills     simvastatin (ZOCOR) 40 MG tablet [Pharmacy Med Name: SIMVASTATIN  TAB 40MG] 90 tablet 0     Sig: TAKE 1 TABLET AT BEDTIME    Statins Protocol Passed    6/12/2018  7:24 PM       Passed - LDL on file in past 12 months    Recent Labs   Lab Test  08/02/17   0730   LDL  86            Passed - No abnormal creatine kinase in past 12 months    No lab results found.            Passed - Recent (12 mo) or future (30 days) visit within the authorizing provider's specialty    Patient had office visit in the last 12 months or has a visit in the next 30 days with authorizing provider or within the authorizing provider's specialty.  See \"Patient Info\" tab in inbasket, or \"Choose Columns\" in Meds & Orders section of the refill encounter.           Passed - Patient is age 18 or older       Passed - No active pregnancy on record       Passed - No positive pregnancy test in past 12 months          "

## 2018-06-13 NOTE — TELEPHONE ENCOUNTER
Prescription approved per [facility] [place] [facility].  Sylvia VIERA RN

## 2018-07-24 ENCOUNTER — HOSPITAL ENCOUNTER (OUTPATIENT)
Dept: MAMMOGRAPHY | Facility: CLINIC | Age: 68
Discharge: HOME | End: 2018-07-24
Attending: OBSTETRICS & GYNECOLOGY | Admitting: OBSTETRICS & GYNECOLOGY
Payer: MEDICARE

## 2018-07-24 DIAGNOSIS — Z12.31 VISIT FOR SCREENING MAMMOGRAM: ICD-10-CM

## 2018-07-24 PROCEDURE — 77067 SCR MAMMO BI INCL CAD: CPT

## 2018-08-03 DIAGNOSIS — Z00.00 ROUTINE GENERAL MEDICAL EXAMINATION AT A HEALTH CARE FACILITY: ICD-10-CM

## 2018-08-03 DIAGNOSIS — R73.9 ELEVATED BLOOD SUGAR: ICD-10-CM

## 2018-08-03 DIAGNOSIS — E21.3 HYPERPARATHYROIDISM (H): ICD-10-CM

## 2018-08-03 DIAGNOSIS — E78.5 HYPERLIPIDEMIA LDL GOAL <130: ICD-10-CM

## 2018-08-03 DIAGNOSIS — E83.52 HYPERCALCEMIA: ICD-10-CM

## 2018-08-03 DIAGNOSIS — E55.9 VITAMIN D DEFICIENCY: ICD-10-CM

## 2018-08-03 LAB
ALBUMIN SERPL-MCNC: 3.8 G/DL (ref 3.4–5)
ALP SERPL-CCNC: 115 U/L (ref 40–150)
ALT SERPL W P-5'-P-CCNC: 34 U/L (ref 0–50)
ANION GAP SERPL CALCULATED.3IONS-SCNC: 10 MMOL/L (ref 3–14)
AST SERPL W P-5'-P-CCNC: 15 U/L (ref 0–45)
BILIRUB SERPL-MCNC: 0.7 MG/DL (ref 0.2–1.3)
BUN SERPL-MCNC: 26 MG/DL (ref 7–30)
CALCIUM SERPL-MCNC: 10 MG/DL (ref 8.5–10.1)
CHLORIDE SERPL-SCNC: 116 MMOL/L (ref 94–109)
CHOLEST SERPL-MCNC: 166 MG/DL
CO2 SERPL-SCNC: 24 MMOL/L (ref 20–32)
CREAT SERPL-MCNC: 1.15 MG/DL (ref 0.52–1.04)
ERYTHROCYTE [DISTWIDTH] IN BLOOD BY AUTOMATED COUNT: 18.9 % (ref 10–15)
GFR SERPL CREATININE-BSD FRML MDRD: 47 ML/MIN/1.7M2
GLUCOSE SERPL-MCNC: 118 MG/DL (ref 70–99)
HBA1C MFR BLD: 5.8 % (ref 0–5.6)
HCT VFR BLD AUTO: 38.7 % (ref 35–47)
HDLC SERPL-MCNC: 57 MG/DL
HGB BLD-MCNC: 12 G/DL (ref 11.7–15.7)
LDLC SERPL CALC-MCNC: 81 MG/DL
MCH RBC QN AUTO: 21.4 PG (ref 26.5–33)
MCHC RBC AUTO-ENTMCNC: 31 G/DL (ref 31.5–36.5)
MCV RBC AUTO: 69 FL (ref 78–100)
NONHDLC SERPL-MCNC: 109 MG/DL
PLATELET # BLD AUTO: 298 10E9/L (ref 150–450)
POTASSIUM SERPL-SCNC: 4.2 MMOL/L (ref 3.4–5.3)
PROT SERPL-MCNC: 7.8 G/DL (ref 6.8–8.8)
RBC # BLD AUTO: 5.61 10E12/L (ref 3.8–5.2)
SODIUM SERPL-SCNC: 150 MMOL/L (ref 133–144)
TRIGL SERPL-MCNC: 140 MG/DL
WBC # BLD AUTO: 6.6 10E9/L (ref 4–11)

## 2018-08-03 PROCEDURE — 83036 HEMOGLOBIN GLYCOSYLATED A1C: CPT | Performed by: INTERNAL MEDICINE

## 2018-08-03 PROCEDURE — 82306 VITAMIN D 25 HYDROXY: CPT | Performed by: INTERNAL MEDICINE

## 2018-08-03 PROCEDURE — 36415 COLL VENOUS BLD VENIPUNCTURE: CPT | Performed by: INTERNAL MEDICINE

## 2018-08-03 PROCEDURE — 80061 LIPID PANEL: CPT | Performed by: INTERNAL MEDICINE

## 2018-08-03 PROCEDURE — 85027 COMPLETE CBC AUTOMATED: CPT | Performed by: INTERNAL MEDICINE

## 2018-08-03 PROCEDURE — 80053 COMPREHEN METABOLIC PANEL: CPT | Performed by: INTERNAL MEDICINE

## 2018-08-05 LAB — DEPRECATED CALCIDIOL+CALCIFEROL SERPL-MC: 42 UG/L (ref 20–75)

## 2018-08-15 NOTE — PROGRESS NOTES
So sorry I had to cancel the appointment.  You should be able to view your labs.  There are a few things off but nothing serious and we can go over them at your appointment.  If you have questions let me know.    Suleiman Miller M.D.

## 2018-09-01 DIAGNOSIS — E78.1 HYPERTRIGLYCERIDEMIA: ICD-10-CM

## 2018-09-04 RX ORDER — SIMVASTATIN 40 MG
TABLET ORAL
Qty: 90 TABLET | Refills: 3 | Status: SHIPPED | OUTPATIENT
Start: 2018-09-04 | End: 2018-09-12

## 2018-09-04 NOTE — TELEPHONE ENCOUNTER
"Simvastatin 40 mg    Last Written Prescription Date:  06/13/18  Last Fill Quantity: 90 tablets,  # refills: 0   Last office visit: 3/22/2018 with prescribing provider:  Angela   Future Office Visit:   Next 5 appointments (look out 90 days)     Sep 11, 2018  8:30 AM CDT   PHYSICAL with Suleiman Miller MD   [facility] ([facility]    [address] [facility] [id number] [phone]                 Requested Prescriptions   Pending Prescriptions Disp Refills     simvastatin (ZOCOR) 40 MG tablet [Pharmacy Med Name: SIMVASTATIN  TAB 40MG] 90 tablet 0     Sig: TAKE 1 TABLET AT BEDTIME    Statins Protocol Passed    9/1/2018  5:06 PM       Passed - LDL on file in past 12 months    Recent Labs   Lab Test  08/03/18   0751   LDL  81            Passed - No abnormal creatine kinase in past 12 months    No lab results found.            Passed - Recent (12 mo) or future (30 days) visit within the authorizing provider's specialty    Patient had office visit in the last 12 months or has a visit in the next 30 days with authorizing provider or within the authorizing provider's specialty.  See \"Patient Info\" tab in inbasket, or \"Choose Columns\" in Meds & Orders section of the refill encounter.           Passed - Patient is age 18 or older       Passed - No active pregnancy on record       Passed - No positive pregnancy test in past 12 months          "

## 2018-09-10 ASSESSMENT — ACTIVITIES OF DAILY LIVING (ADL)
I_NEED_ASSISTANCE_FOR_THE_FOLLOWING_DAILY_ACTIVITIES:: NO ASSISTANCE IS NEEDED
CURRENT_FUNCTION: NO ASSISTANCE NEEDED

## 2018-09-12 ENCOUNTER — OFFICE VISIT (OUTPATIENT)
Dept: FAMILY MEDICINE | Facility: CLINIC | Age: 68
End: 2018-09-12
Payer: MEDICARE

## 2018-09-12 VITALS
TEMPERATURE: 99.7 F | HEIGHT: 64 IN | OXYGEN SATURATION: 97 % | DIASTOLIC BLOOD PRESSURE: 92 MMHG | BODY MASS INDEX: 23.05 KG/M2 | WEIGHT: 135 LBS | HEART RATE: 72 BPM | SYSTOLIC BLOOD PRESSURE: 144 MMHG

## 2018-09-12 DIAGNOSIS — E78.5 HYPERLIPIDEMIA LDL GOAL <130: ICD-10-CM

## 2018-09-12 DIAGNOSIS — E78.1 HYPERTRIGLYCERIDEMIA: ICD-10-CM

## 2018-09-12 DIAGNOSIS — E55.9 VITAMIN D DEFICIENCY: ICD-10-CM

## 2018-09-12 DIAGNOSIS — Z00.00 ROUTINE GENERAL MEDICAL EXAMINATION AT A HEALTH CARE FACILITY: Primary | ICD-10-CM

## 2018-09-12 DIAGNOSIS — F31.9 BIPOLAR AFFECTIVE DISORDER, REMISSION STATUS UNSPECIFIED (H): ICD-10-CM

## 2018-09-12 DIAGNOSIS — D05.10 DUCTAL CARCINOMA IN SITU (DCIS) OF BREAST, UNSPECIFIED LATERALITY: ICD-10-CM

## 2018-09-12 DIAGNOSIS — E21.3 HYPERPARATHYROIDISM (H): ICD-10-CM

## 2018-09-12 DIAGNOSIS — E23.2 DIABETES INSIPIDUS (H): ICD-10-CM

## 2018-09-12 DIAGNOSIS — D56.3 HETEROZYGOUS THALASSEMIA: ICD-10-CM

## 2018-09-12 DIAGNOSIS — E83.52 HYPERCALCEMIA: ICD-10-CM

## 2018-09-12 DIAGNOSIS — R73.9 ELEVATED BLOOD SUGAR: ICD-10-CM

## 2018-09-12 DIAGNOSIS — I10 BENIGN ESSENTIAL HYPERTENSION: ICD-10-CM

## 2018-09-12 DIAGNOSIS — E87.0 HYPERNATREMIA: ICD-10-CM

## 2018-09-12 LAB — LITHIUM SERPL-SCNC: 0.93 MMOL/L (ref 0.6–1.2)

## 2018-09-12 PROCEDURE — 99213 OFFICE O/P EST LOW 20 MIN: CPT | Mod: 25 | Performed by: INTERNAL MEDICINE

## 2018-09-12 PROCEDURE — 80178 ASSAY OF LITHIUM: CPT | Performed by: INTERNAL MEDICINE

## 2018-09-12 PROCEDURE — 36415 COLL VENOUS BLD VENIPUNCTURE: CPT | Performed by: INTERNAL MEDICINE

## 2018-09-12 PROCEDURE — G0439 PPPS, SUBSEQ VISIT: HCPCS | Performed by: INTERNAL MEDICINE

## 2018-09-12 RX ORDER — SIMVASTATIN 40 MG
40 TABLET ORAL AT BEDTIME
Qty: 90 TABLET | Refills: 3 | Status: SHIPPED | OUTPATIENT
Start: 2018-09-12 | End: 2019-09-10

## 2018-09-12 RX ORDER — AMLODIPINE BESYLATE 5 MG/1
5 TABLET ORAL DAILY
Qty: 90 TABLET | Refills: 3 | Status: SHIPPED | OUTPATIENT
Start: 2018-09-12 | End: 2019-09-10

## 2018-09-12 ASSESSMENT — ACTIVITIES OF DAILY LIVING (ADL): CURRENT_FUNCTION: NO ASSISTANCE NEEDED

## 2018-09-12 NOTE — PROGRESS NOTES
SUBJECTIVE:   Diana Santiago is a 67 year old female who presents for Preventive Visit.    The patient feels fine and does work out reg, seeing gyn and psyche soon, no bipolar issues. Up to date mammogram.  She feels fine but blood pressure high on her checks, up to 160, father had hypertension.  No other c/o.               Past Medical History:      Past Medical History:   Diagnosis Date     Benign essential hypertension 09/2018    added norvasc 9/18     Bipolar affective disorder (H)     hosp 1993, Dr. Aftab Deal     DCIS (ductal carcinoma in situ) 1996    xrt and lumpectomy x 4     Diabetes insipidus (H) 09/2018    elev sodium, likely due to lithium     Elevated blood sugar      Fractured femoral neck (H) 1992     Hx of colonoscopy 2010    tics and hem     Hypercalcemia 08/2017     Hypercholesteremia      Hyperparathyroidism (H) 08/2017     Thalassaemia trait      Vitamin D deficiency              Past Surgical History:      Past Surgical History:   Procedure Laterality Date     BREAST SURGERY      lumpectomy x 4     left hand surgery      last 1974             Social History:     Social History     Social History     Marital status: Single     Spouse name: N/A     Number of children: 0     Years of education: N/A     Occupational History     , retired      Social History Main Topics     Smoking status: Never Smoker     Smokeless tobacco: Never Used     Alcohol use No     Drug use: No     Sexual activity: Yes     Partners: Male     Other Topics Concern     Not on file     Social History Narrative             Family History:   reviewed         Allergies:   No Known Allergies          Medications:     Current Outpatient Prescriptions   Medication Sig Dispense Refill     amantadine (SYMMETREL) 50 MG/5ML solution Take 2.5 mLs by mouth daily.       amLODIPine (NORVASC) 5 MG tablet Take 1 tablet (5 mg) by mouth daily 90 tablet 3     Cholecalciferol (VITAMIN D3) 1000 UNITS CAPS Take 3 capsules by  "mouth daily 30 capsule      fexofenadine (ALLEGRA) 180 MG tablet Take 1 tablet by mouth daily. 90 tablet 0     haloperidol (HALDOL) 0.5 MG tablet Take 1 mg by mouth daily        lithium 300 MG tablet Take 300 mg by mouth 3 times daily.       Multiple Vitamins-Minerals (CENTRUM SILVER) per tablet Take 1 tablet by mouth daily.       simvastatin (ZOCOR) 40 MG tablet Take 1 tablet (40 mg) by mouth At Bedtime 90 tablet 3     UNABLE TO FIND MEDICATION NAME: focus select capsule, 2 daily       [DISCONTINUED] simvastatin (ZOCOR) 40 MG tablet TAKE 1 TABLET AT BEDTIME 90 tablet 3               Review of Systems:   The 10 point Review of Systems is negative other than noted in the HPI           Physical Exam:   Blood pressure (!) 144/92, pulse 72, temperature 99.7  F (37.6  C), temperature source Oral, height 5' 4\" (1.626 m), weight 135 lb (61.2 kg), SpO2 97 %, not currently breastfeeding.    Exam:  Constitutional: healthy appearing, alert and in no distress  Heent: Normocephalic. Head without obvious masses or lesions. PERRLDC, EOMI. Mouth exam within normal limits: tongue, mucous membranes, posterior pharynx all normal, no lesions or abnormalities seen.  Tm's and canals within normal limits bilaterally. Neck supple, no nuchal rigidity or masses. No supraclavicular, or cervical adenopathy. Thyroid symmetric, no masses.  Cardiovascular: Regular rate and rhythm, no murmer, rub or gallops.  JVP not elevated, no edema.  Carotids within normal limits bilaterally, no bruits.  Respiratory: Normal respiratory effort.  Lungs clear, normal flow, no wheezing or crackles.  Gastrointestinal: Normal active bowel sounds.   Soft, not tender, no masses, guarding or rebound.  No hepatosplenomegaly.   Musculoskeletal: extremities normal, no gross deformities noted.  Skin: no suspicious lesions or rashes   Neurologic: Mental status within normal limits.  Speech fluent.  No gross motor abnormalities and gait intact.  Psychiatric: mentation " appears normal and affect normal.         Data:   Labs reviewed with patient         Assessment:   1. Normal complete physical exam  2. Hypertension, needs treatment, doubt 2nd cause  3. Bipolar, doing fine  4. Probable d.i with elevated sodium  5. Hyperpara, suspect due to lithium  6. Elevated creat, follow up next office visit  7. Dcis, becka  8. Heterozygous thal  9. Elevated sugar, exercise, diet  10. Elevated cholesterol on statin  11. Vit d defic, fine now  12. hcm         Plan:   shingrix and flu at pharm  Up to date mammogram, colon  Exercise, diet  norvasc 5mg  Follow up 4 to 6 weeks, labs then      Suleiman Miller M.D.                Are you in the first 12 months of your Medicare coverage?  No    Physical   Annual:     Getting at least 3 servings of Calcium per day:  Yes    Bi-annual eye exam:  Yes    Dental care twice a year:  Yes    Sleep apnea or symptoms of sleep apnea:  Daytime drowsiness    Frequency of exercise:  6-7 days/week    Duration of exercise:  15-30 minutes    Taking medications regularly:  Yes    Medication side effects:  None    Additional concerns today:  YES    Ability to successfully perform activities of daily living: no assistance needed    Home Safety:  No safety concerns identified    Hearing Impairment: no hearing concerns        Fall risk:  Fallen 2 or more times in the past year?: Yes  Any fall with injury in the past year?: Yes  Timed Up and Go Test (>13.5 is fall risk; contact physician) : 11    COGNITIVE SCREEN  1) Repeat 3 items (Leader, Season, Table)    2) Clock draw: NORMAL  3) 3 item recall: Recalls 3 objects  Results: 3 items recalled: COGNITIVE IMPAIRMENT LESS LIKELY    Mini-CogTM Copyright JUDD Menjivar. Licensed by the author for use in [facility]; reprinted with permission (nela@.[facility]). All rights reserved.        Reviewed and updated as needed this visit by clinical staff         Reviewed and updated as needed this visit by Provider        Social History  "  Substance Use Topics     Smoking status: Never Smoker     Smokeless tobacco: Never Used     Alcohol use No       Alcohol Use 9/10/2018   If you drink alcohol do you typically have greater than 3 drinks per day OR greater than 7 drinks per week? Not Applicable               Today's PHQ-2 Score:   PHQ-2 ( 1999 [employer]) 9/10/2018   Q1: Little interest or pleasure in doing things 0   Q2: Feeling down, depressed or hopeless 0   PHQ-2 Score 0   Q1: Little interest or pleasure in doing things Not at all   Q2: Feeling down, depressed or hopeless Not at all   PHQ-2 Score 0       Do you feel safe in your environment - Yes    Do you have a Health Care Directive?: Yes: Advance Directive has been received and scanned.    Current providers sharing in care for this patient include:   Patient Care Team:  Suleiman Miller MD as PCP - General (Internal Medicine)    The following health maintenance items are reviewed in Epic and correct as of today:  Health Maintenance   Topic Date Due     DEXA SCAN SCREENING (SYSTEM ASSIGNED)  11/23/2015     NELDA QUESTIONNAIRE 1 YEAR  08/02/2017     PHQ-9 Q1YR  08/02/2017     FALL RISK ASSESSMENT  08/08/2018     INFLUENZA VACCINE (1) 09/01/2018     MAMMO SCREEN Q2 YR (SYSTEM ASSIGNED)  07/24/2020     ADVANCE DIRECTIVE PLANNING Q5 YRS  07/31/2020     COLON CANCER SCREEN (SYSTEM ASSIGNED)  10/15/2020     LIPID SCREEN Q5 YR FEMALE (SYSTEM ASSIGNED)  08/03/2023     TETANUS IMMUNIZATION (SYSTEM ASSIGNED)  08/08/2027     PNEUMOCOCCAL  Completed     HEPATITIS C SCREENING  Completed             Review of Systems      OBJECTIVE:   There were no vitals taken for this visit. Estimated body mass index is 22.66 kg/(m^2) as calculated from the following:    Height as of 3/22/18: 5' 4\" (1.626 m).    Weight as of 3/22/18: 132 lb (59.9 kg).  Physical Exam          ASSESSMENT / PLAN:       End of Life Planning:  Patient currently has an advanced directive: yes    COUNSELING:  Reviewed preventive health " "counseling, as reflected in patient instructions       Regular exercise       Healthy diet/nutrition    BP Readings from Last 1 Encounters:   03/22/18 144/82     Estimated body mass index is 22.66 kg/(m^2) as calculated from the following:    Height as of 3/22/18: 5' 4\" (1.626 m).    Weight as of 3/22/18: 132 lb (59.9 kg).           reports that she has never smoked. She has never used smokeless tobacco.      Appropriate preventive services were discussed with this patient, including applicable screening as appropriate for cardiovascular disease, diabetes, osteopenia/osteoporosis, and glaucoma.  As appropriate for age/gender, discussed screening for colorectal cancer, prostate cancer, breast cancer, and cervical cancer. Checklist reviewing preventive services available has been given to the patient.    Reviewed patients plan of care and provided an AVS. The Basic Care Plan (routine screening as documented in Health Maintenance) for Diana meets the Care Plan requirement. This Care Plan has been established and reviewed with the Patient.    Counseling Resources:  ATP IV Guidelines  Pooled Cohorts Equation Calculator  Breast Cancer Risk Calculator  FRAX Risk Assessment  ICSI Preventive Guidelines  Dietary Guidelines for Americans, 2010  [employer]'s MyPlate  ASA Prophylaxis  Lung CA Screening    Suleiman Miller MD  [facility]  Answers for HPI/ROS submitted by the patient on 9/10/2018   PHQ-2 Score: 0    "

## 2018-09-12 NOTE — MR AVS SNAPSHOT
After Visit Summary   9/12/2018    Diana Santiago    MRN: [id number]           Patient Information     Date Of Birth          1950        Visit Information        Provider Department      9/12/2018 8:30 AM Suleiman Miller MD [facility]        Today's Diagnoses     Routine general medical examination at a health care facility    -  1    Bipolar affective disorder, remission status unspecified (H)        Hyperparathyroidism (H)        Heterozygous thalassemia        Ductal carcinoma in situ (DCIS) of breast, unspecified laterality        Elevated blood sugar        Hyperlipidemia LDL goal <130        Vitamin D deficiency        Hypercalcemia        Hypertriglyceridemia        Benign essential hypertension          Care Instructions    Start the new blood pressure medication, amlodipine, take one daily.  I want to see you back in 4 to 6 weeks.    I would recommend getting the new shingles shot called shingrix, but I would do it at your pharmacy as they can check with the insurance company to see if it is paid for.    Suleiman Miller M.D.          Preventive Health Recommendations    Female Ages 65 +    Yearly exam:     See your health care provider every year in order to  o Review health changes.   o Discuss preventive care.    o Review your medicines if your doctor has prescribed any.      You no longer need a yearly Pap test unless you've had an abnormal Pap test in the past 10 years. If you have vaginal symptoms, such as bleeding or discharge, be sure to talk with your provider about a Pap test.      Every 1 to 2 years, have a mammogram.  If you are over 69, talk with your health care provider about whether or not you want to continue having screening mammograms.      Every 10 years, have a colonoscopy. Or, have a yearly FIT test (stool test). These exams will check for colon cancer.       Have a cholesterol test every 5 years, or more often if your doctor advises it.       Have a  diabetes test (fasting glucose) every three years. If you are at risk for diabetes, you should have this test more often.       At age 65, have a bone density scan (DEXA) to check for osteoporosis (brittle bone disease).    Shots:    Get a flu shot each year.    Get a tetanus shot every 10 years.    Talk to your doctor about your pneumonia vaccines. There are now two you should receive - Pneumovax (PPSV 23) and Prevnar (PCV 13).    Talk to your pharmacist about the shingles vaccine.    Talk to your doctor about the hepatitis B vaccine.    Nutrition:     Eat at least 5 servings of fruits and vegetables each day.      Eat whole-grain bread, whole-wheat pasta and brown rice instead of white grains and rice.      Get adequate Calcium and Vitamin D.     Lifestyle    Exercise at least 150 minutes a week (30 minutes a day, 5 days a week). This will help you control your weight and prevent disease.      Limit alcohol to one drink per day.      No smoking.       Wear sunscreen to prevent skin cancer.       See your dentist twice a year for an exam and cleaning.      See your eye doctor every 1 to 2 years to screen for conditions such as glaucoma, macular degeneration and cataracts.    Preventive Health Recommendations    Female Ages 65 +    Yearly exam:     See your health care provider every year in order to  o Review health changes.   o Discuss preventive care.    o Review your medicines if your doctor has prescribed any.      You no longer need a yearly Pap test unless you've had an abnormal Pap test in the past 10 years. If you have vaginal symptoms, such as bleeding or discharge, be sure to talk with your provider about a Pap test.      Every 1 to 2 years, have a mammogram.  If you are over 69, talk with your health care provider about whether or not you want to continue having screening mammograms.      Every 10 years, have a colonoscopy. Or, have a yearly FIT test (stool test). These exams will check for colon cancer.        Have a cholesterol test every 5 years, or more often if your doctor advises it.       Have a diabetes test (fasting glucose) every three years. If you are at risk for diabetes, you should have this test more often.       At age 65, have a bone density scan (DEXA) to check for osteoporosis (brittle bone disease).    Shots:    Get a flu shot each year.    Get a tetanus shot every 10 years.    Talk to your doctor about your pneumonia vaccines. There are now two you should receive - Pneumovax (PPSV 23) and Prevnar (PCV 13).    Talk to your pharmacist about the shingles vaccine.    Talk to your doctor about the hepatitis B vaccine.    Nutrition:     Eat at least 5 servings of fruits and vegetables each day.      Eat whole-grain bread, whole-wheat pasta and brown rice instead of white grains and rice.      Get adequate Calcium and Vitamin D.     Lifestyle    Exercise at least 150 minutes a week (30 minutes a day, 5 days a week). This will help you control your weight and prevent disease.      Limit alcohol to one drink per day.      No smoking.       Wear sunscreen to prevent skin cancer.       See your dentist twice a year for an exam and cleaning.      See your eye doctor every 1 to 2 years to screen for conditions such as glaucoma, macular degeneration and cataracts.          Follow-ups after your visit        Who to contact     If you have questions or need follow up information about today's clinic visit or your schedule please contact [facility] directly at [phone].  Normal or non-critical lab and imaging results will be communicated to you by MyChart, letter or phone within 4 business days after the clinic has received the results. If you do not hear from us within 7 days, please contact the clinic through MyChart or phone. If you have a critical or abnormal lab result, we will notify you by phone as soon as possible.  Submit refill requests through [employer] or call your pharmacy and they will  "forward the refill request to us. Please allow 3 business days for your refill to be completed.          Additional Information About Your Visit        "[employer] Information     [employer] gives you secure access to your electronic health record. If you see a primary care provider, you can also send messages to your care team and make appointments. If you have questions, please call your primary care clinic.  If you do not have a primary care provider, please call [phone] and they will assist you.        Care EveryWhere ID     This is your Care EveryWhere ID. This could be used by other organizations to access your [facility] medical records  [id number]        Your Vitals Were     Pulse Temperature Height Pulse Oximetry BMI (Body Mass Index)       72 99.7  F (37.6  C) (Oral) 5' 4\" (1.626 m) 97% 23.17 kg/m2        Blood Pressure from Last 3 Encounters:   09/12/18 (!) 144/92   03/22/18 144/82   08/08/17 (!) 150/94    Weight from Last 3 Encounters:   09/12/18 135 lb (61.2 kg)   03/22/18 132 lb (59.9 kg)   08/08/17 145 lb (65.8 kg)              Today, you had the following     No orders found for display         Today's Medication Changes          These changes are accurate as of 9/12/18  8:49 AM.  If you have any questions, ask your nurse or doctor.               Start taking these medicines.        Dose/Directions    amLODIPine 5 MG tablet   Commonly known as:  NORVASC   Used for:  Benign essential hypertension   Started by:  Suleiman Miller MD        Dose:  5 mg   Take 1 tablet (5 mg) by mouth daily   Quantity:  90 tablet   Refills:  3         These medicines have changed or have updated prescriptions.        Dose/Directions    simvastatin 40 MG tablet   Commonly known as:  ZOCOR   This may have changed:  See the new instructions.   Used for:  Hypertriglyceridemia   Changed by:  Suleiman Miller MD        Dose:  40 mg   Take 1 tablet (40 mg) by mouth At Bedtime   Quantity:  90 tablet   Refills:  3          "   Where to get your medicines      [facility] [place] [place] - [address] AT [facility] to [facility] Sites  [address], [facility] [address]     Phone:  [phone]     amLODIPine 5 MG tablet    simvastatin 40 MG tablet                Primary Care Provider Office Phone # Fax #    Suleiman Jai Miller [phone] [address] [address]        Equal Access to Services     [facility]: Hadii aad ku hadasho Soomaali, waaxda luqadaha, qaybta kaalmada adeegyada, waxay idiin hayaan adeeg kharario eugene . So [facility] [phone].    ATENCIÓN: Si habla español, tiene a christensen disposición servicios gratuitos de asistencia lingüística. [facility] [phone].    We comply with applicable federal civil rights laws and [place] laws. We do not discriminate on the basis of race, color, national origin, age, disability, sex, sexual orientation, or gender identity.            Thank you!     Thank you for choosing [facility]  for your care. Our goal is always to provide you with excellent care. Hearing back from our patients is one way we can continue to improve our services. Please take a few minutes to complete the written survey that you may receive in the mail after your visit with us. Thank you!             Your Updated Medication List - Protect others around you: Learn how to safely use, store and throw away your medicines at www.disposemymeds.org.          This list is accurate as of 9/12/18  8:49 AM.  Always use your most recent med list.                   Brand Name Dispense Instructions for use Diagnosis    amantadine 50 MG/5ML syrup    SYMMETREL     Take 2.5 mLs by mouth daily.        amLODIPine 5 MG tablet    NORVASC    90 tablet    Take 1 tablet (5 mg) by mouth daily    Benign essential hypertension       CENTRUM SILVER per tablet      Take 1 tablet by mouth daily.    Routine general medical  examination at a health care facility       fexofenadine 180 MG tablet    ALLEGRA    90 tablet    Take 1 tablet by mouth daily.        haloperidol 0.5 MG tablet    HALDOL     Take 1 mg by mouth daily        lithium 300 MG tablet      Take 300 mg by mouth 3 times daily.        simvastatin 40 MG tablet    ZOCOR    90 tablet    Take 1 tablet (40 mg) by mouth At Bedtime    Hypertriglyceridemia       UNABLE TO FIND      MEDICATION NAME: focus select capsule, 2 daily        vitamin D3 1000 units Caps     30 capsule    Take 3 capsules by mouth daily    Routine general medical examination at a health care facility

## 2018-09-12 NOTE — PROGRESS NOTES
"  SUBJECTIVE:   Diana Santiago is a 67 year old female who presents for Preventive Visit.  {PVP to remind patient that this is not necessarily a physical exam; physical exam may or may not be done:439949::\"click delete button to remove this line now\"}  {PVP to inform patient that additional E&M charge may apply, if additional problems addressed:458766::\"click delete button to remove this line now\"}  Are you in the first 12 months of your Medicare Part B coverage?  {No Yes:611481::\"No\"}    Healthy Habits:    Do you get at least three servings of calcium containing foods daily (dairy, green leafy vegetables, etc.)? {YES/NO, DAIRY INTAKE:702703::\"yes\"}    Amount of exercise or daily activities, outside of work: {AMOUNT EXERCISE:352153}    Problems taking medications regularly {Yes /No default:835946::\"No\"}    Medication side effects: {Yes /No default.:432632::\"No\"}    Have you had an eye exam in the past two years? {YESNOBLANK:387452}    Do you see a dentist twice per year? {YESNOBLANK:660664}    Do you have sleep apnea, excessive snoring or daytime drowsiness?{YESNOBLANK:508595}      Ability to successfully perform activities of daily living: {YES/NO (MEDICARE):597935::\"Yes, no assistance needed\"}    Home safety:  {IPPE SAFETY CONCERNS:430280::\"none identified\"}     Hearing impairment: {NO/YES:323820}    Fall risk:  {Document Fall Risk in the Assessments Section of the Navigator:325109}    {If any of the above assessments are answered yes, consider ordering appropriate referrals (Optional):529044::\"click delete button to remove this line now\"}    {AWV Cognitive Screenin}    {Outside tests to abstract? :350750}    {additional problems to add (Optional):421499}    Reviewed and updated as needed this visit by clinical staff         Reviewed and updated as needed this visit by Provider        Social History   Substance Use Topics     Smoking status: Never Smoker     Smokeless tobacco: Never Used     Alcohol use No " "      If you drink alcohol do you typically have >3 drinks per day or >7 drinks per week? {ETOH :051036}                        Today's PHQ-2 Score:   PHQ-2 ( 1999 [employer]) 9/10/2018 3/22/2018   Q1: Little interest or pleasure in doing things 0 0   Q2: Feeling down, depressed or hopeless 0 0   PHQ-2 Score 0 0   Q1: Little interest or pleasure in doing things Not at all -   Q2: Feeling down, depressed or hopeless Not at all -   PHQ-2 Score 0 -     {PHQ-2 LOOK IN ASSESSMENTS (Optional) :122580}  Do you feel safe in your environment - {YES/NO/NA:747511}    Do you have a Health Care Directive?: {HEALTHCARE DIRECTIVE STATUS:989050}    Current providers sharing in care for this patient include:   Patient Care Team:  Suleiman Miller MD as PCP - General (Internal Medicine)    The following health maintenance items are reviewed in Epic and correct as of today:  Health Maintenance   Topic Date Due     DEXA SCAN SCREENING (SYSTEM ASSIGNED)  11/23/2015     NELDA QUESTIONNAIRE 1 YEAR  08/02/2017     PHQ-9 Q1YR  08/02/2017     FALL RISK ASSESSMENT  08/08/2018     INFLUENZA VACCINE (1) 09/01/2018     MAMMO SCREEN Q2 YR (SYSTEM ASSIGNED)  07/24/2020     ADVANCE DIRECTIVE PLANNING Q5 YRS  07/31/2020     COLON CANCER SCREEN (SYSTEM ASSIGNED)  10/15/2020     LIPID SCREEN Q5 YR FEMALE (SYSTEM ASSIGNED)  08/03/2023     TETANUS IMMUNIZATION (SYSTEM ASSIGNED)  08/08/2027     PNEUMOCOCCAL  Completed     HEPATITIS C SCREENING  Completed     {Chronicprobdata (Optional):738274}    {Decision Support (Optional):586394}    ROS:  {ROS COMP:426590}    OBJECTIVE:   There were no vitals taken for this visit. Estimated body mass index is 22.66 kg/(m^2) as calculated from the following:    Height as of 3/22/18: 5' 4\" (1.626 m).    Weight as of 3/22/18: 132 lb (59.9 kg).  EXAM:   {Exam :369381}    {Diagnostic Test Results (Optional):427689::\"Diagnostic Test Results:\",\"none \"}    ASSESSMENT / PLAN:   {Diag Picklist:360586}    End of Life " "Planning:  Patient currently has an advanced directive: { :224425}    COUNSELING:  {Medicare Counselin}    BP Readings from Last 1 Encounters:   18 144/82     Estimated body mass index is 22.66 kg/(m^2) as calculated from the following:    Height as of 3/22/18: 5' 4\" (1.626 m).    Weight as of 3/22/18: 132 lb (59.9 kg).    {BP Counseling- Complete if BP >= 120/80  (Optional):803087}  {Weight Management Plan (ACO) Complete if BMI is abnormal-  Ages 18-64  BMI >24.9.  Age 65+ with BMI <23 or >30 (Optional):130279}     reports that she has never smoked. She has never used smokeless tobacco.  {Tobacco Cessation -- Complete if patient is a smoker (Optional):669877}    Appropriate preventive services were discussed with this patient, including applicable screening as appropriate for cardiovascular disease, diabetes, osteopenia/osteoporosis, and glaucoma.  As appropriate for age/gender, discussed screening for colorectal cancer, prostate cancer, breast cancer, and cervical cancer. Checklist reviewing preventive services available has been given to the patient.    Reviewed patients plan of care and provided an AVS. The {CarePlan:318349} for Diana meets the Care Plan requirement. This Care Plan has been established and reviewed with the {PATIENT, FAMILY MEMBER, CAREGIVER:548941}.    Counseling Resources:  ATP IV Guidelines  Pooled Cohorts Equation Calculator  Breast Cancer Risk Calculator  FRAX Risk Assessment  ICSI Preventive Guidelines  Dietary Guidelines for Americans,   USDA's MyPlate  ASA Prophylaxis  Lung CA Screening    Suleiman Miller MD  [facility]  "

## 2018-09-12 NOTE — PROGRESS NOTES
SUBJECTIVE:   Diana Santiago is a 67 year old female who presents for Preventive Visit.    The patient is doing well and feels fine, to see gyn soon, up to date mammogram.  No chest pain or shortness of breath.  Blood pressure high and on her checks up to 160.  Works out some.  Up to date with psyche and needs lithium level, no issues.                 Past Medical History:      Past Medical History:   Diagnosis Date     Benign essential hypertension 09/2018    added norvasc 9/18     Bipolar affective disorder (H)     hosp 1993, Dr. Aftab Deal     DCIS (ductal carcinoma in situ) 1996    xrt and lumpectomy x 4     Diabetes insipidus (H) 09/2018    elev sodium, likely due to lithium     Elevated blood sugar      Fractured femoral neck (H) 1992     Hx of colonoscopy 2010    tics and hem     Hypercalcemia 08/2017     Hypercholesteremia      Hyperparathyroidism (H) 08/2017     Thalassaemia trait      Vitamin D deficiency              Past Surgical History:      Past Surgical History:   Procedure Laterality Date     BREAST SURGERY      lumpectomy x 4     left hand surgery      last 1974             Social History:     Social History     Social History     Marital status: Single     Spouse name: N/A     Number of children: 0     Years of education: N/A     Occupational History     , retired      Social History Main Topics     Smoking status: Never Smoker     Smokeless tobacco: Never Used     Alcohol use No     Drug use: No     Sexual activity: Yes     Partners: Male     Other Topics Concern     Not on file     Social History Narrative             Family History:   reviewed         Allergies:   No Known Allergies          Medications:     Current Outpatient Prescriptions   Medication Sig Dispense Refill     amantadine (SYMMETREL) 50 MG/5ML solution Take 2.5 mLs by mouth daily.       amLODIPine (NORVASC) 5 MG tablet Take 1 tablet (5 mg) by mouth daily 90 tablet 3     Cholecalciferol (VITAMIN D3) 1000  "UNITS CAPS Take 3 capsules by mouth daily 30 capsule      fexofenadine (ALLEGRA) 180 MG tablet Take 1 tablet by mouth daily. 90 tablet 0     haloperidol (HALDOL) 0.5 MG tablet Take 1 mg by mouth daily        lithium 300 MG tablet Take 300 mg by mouth 3 times daily.       Multiple Vitamins-Minerals (CENTRUM SILVER) per tablet Take 1 tablet by mouth daily.       simvastatin (ZOCOR) 40 MG tablet Take 1 tablet (40 mg) by mouth At Bedtime 90 tablet 3     UNABLE TO FIND MEDICATION NAME: focus select capsule, 2 daily       [DISCONTINUED] simvastatin (ZOCOR) 40 MG tablet TAKE 1 TABLET AT BEDTIME 90 tablet 3               Review of Systems:   The 10 point Review of Systems is negative other than noted in the HPI           Physical Exam:   Blood pressure (!) 144/92, pulse 72, temperature 99.7  F (37.6  C), temperature source Oral, height 5' 4\" (1.626 m), weight 135 lb (61.2 kg), SpO2 97 %, not currently breastfeeding.    Exam:  Constitutional: healthy appearing, alert and in no distress  Heent: Normocephalic. Head without obvious masses or lesions. PERRLDC, EOMI. Mouth exam within normal limits: tongue, mucous membranes, posterior pharynx all normal, no lesions or abnormalities seen.  Tm's and canals within normal limits bilaterally. Neck supple, no nuchal rigidity or masses. No supraclavicular, or cervical adenopathy. Thyroid symmetric, no masses.  Cardiovascular: Regular rate and rhythm, no murmer, rub or gallops.  JVP not elevated, no edema.  Carotids within normal limits bilaterally, no bruits.  Respiratory: Normal respiratory effort.  Lungs clear, normal flow, no wheezing or crackles.  Gastrointestinal: Normal active bowel sounds.   Soft, not tender, no masses, guarding or rebound.  No hepatosplenomegaly.   Musculoskeletal: extremities normal, no gross deformities noted.  Skin: no suspicious lesions or rashes   Neurologic: Mental status within normal limits.  Speech fluent.  No gross motor abnormalities and gait " "intact.  Psychiatric: mentation appears normal and affect normal.         Data:   Labs reviewed with patient         Assessment:   1. Normal complete physical exam  2. Hypertension, needs med, doubt 2nd cause  3. Hyperpara, suspect due to lithium  4. Probable D. I. Due to lithium  5. Elevated sodium  6. Dcis, becka  7. Bipolar, stable, follow up psyche  8. Elevated cholesterol, on statin and fine  9. thal  10. Vit d defic, fine now  11. Elevated sugar, exercise, diet  12. hcm         Plan:   Flu shot at pharm  shingrix at pharm  norvasc 5mg  Follow up 4 to 6 weeks, labs then  Follow up gyn, psyche  Exercise, diet  Up to date mammogram and colon  Call if problems      Suleiman Miller M.D.            Are you in the first 12 months of your Medicare coverage?  {No Yes:272522::\"No\"}    Physical   Annual:     Getting at least 3 servings of Calcium per day:  Yes    Bi-annual eye exam:  Yes    Dental care twice a year:  Yes    Sleep apnea or symptoms of sleep apnea:  Daytime drowsiness    Frequency of exercise:  6-7 days/week    Duration of exercise:  15-30 minutes    Taking medications regularly:  Yes    Medication side effects:  None    Additional concerns today:  YES    Ability to successfully perform activities of daily living: no assistance needed    Home Safety:  No safety concerns identified    Hearing Impairment: no hearing concerns    {Hearing Test Done (Optional):620639}  {Add if <65 person on Medicare  - Required Questions (Optional):412397}  Fall risk:  {Document Fall Risk in the Assessments Section of the Navigator:418739}  {If any of the above assessments are answered yes, consider ordering appropriate referrals (Optional):097749::\"click delete button to remove this line now\"}  {AWV Cognitive Screenin}    Reviewed and updated as needed this visit by clinical staff  Meds  Med Hx         Reviewed and updated as needed this visit by Provider        Social History   Substance Use Topics     Smoking status: " Never Smoker     Smokeless tobacco: Never Used     Alcohol use No       Alcohol Use 9/10/2018   If you drink alcohol do you typically have greater than 3 drinks per day OR greater than 7 drinks per week? Not Applicable   {add AUDIT responses (Optional) (A score of 7 for adult men is an indication of hazardous drinking; a score of 8 or more is an indication of an alcohol use disorder.  A score of 7 or more for adult women is an indication of hazardous drinking or an alchohol use disorder):545951}    {Outside tests to abstract? :887596}    {additional problems to add (Optional):097533}    Today's PHQ-2 Score:   PHQ-2 ( 1999 Pfizer) 9/10/2018   Q1: Little interest or pleasure in doing things 0   Q2: Feeling down, depressed or hopeless 0   PHQ-2 Score 0   Q1: Little interest or pleasure in doing things Not at all   Q2: Feeling down, depressed or hopeless Not at all   PHQ-2 Score 0       Do you feel safe in your environment - {YES/NO/NA:690601}    Do you have a Health Care Directive?: {HEALTHCARE DIRECTIVE STATUS:101612}    Current providers sharing in care for this patient include:   Patient Care Team:  Suleiman Miller MD as PCP - General (Internal Medicine)    The following health maintenance items are reviewed in Epic and correct as of today:  Health Maintenance   Topic Date Due     DEXA SCAN SCREENING (SYSTEM ASSIGNED)  11/23/2015     NELDA QUESTIONNAIRE 1 YEAR  08/02/2017     PHQ-9 Q1YR  08/02/2017     FALL RISK ASSESSMENT  08/08/2018     INFLUENZA VACCINE (1) 09/01/2018     MAMMO SCREEN Q2 YR (SYSTEM ASSIGNED)  07/24/2020     ADVANCE DIRECTIVE PLANNING Q5 YRS  07/31/2020     COLON CANCER SCREEN (SYSTEM ASSIGNED)  10/15/2020     LIPID SCREEN Q5 YR FEMALE (SYSTEM ASSIGNED)  08/03/2023     TETANUS IMMUNIZATION (SYSTEM ASSIGNED)  08/08/2027     PNEUMOCOCCAL  Completed     HEPATITIS C SCREENING  Completed     {Chronicprobdata (Optional):195372}    {Decision Support (Optional):398612}    Review of Systems  {ROS COMP  "(Optional):767934}    OBJECTIVE:   There were no vitals taken for this visit. Estimated body mass index is 22.66 kg/(m^2) as calculated from the following:    Height as of 3/22/18: 5' 4\" (1.626 m).    Weight as of 3/22/18: 132 lb (59.9 kg).  Physical Exam  {Exam (Optional) :095005}    {Diagnostic Test Results (Optional):723015::\"Diagnostic Test Results:\",\"none \"}    ASSESSMENT / PLAN:   {Diag Picklist:828721}    End of Life Planning:  Patient currently has an advanced directive: { :373212}    COUNSELING:  {Medicare Counselin}    BP Readings from Last 1 Encounters:   18 144/82     Estimated body mass index is 22.66 kg/(m^2) as calculated from the following:    Height as of 3/22/18: 5' 4\" (1.626 m).    Weight as of 3/22/18: 132 lb (59.9 kg).    {BP Counseling- Complete if BP >= 120/80  (Optional):192996}  {Weight Management Plan (ACO) Complete if BMI is abnormal-  Ages 18-64  BMI >24.9.  Age 65+ with BMI <23 or >30 (Optional):335065}     reports that she has never smoked. She has never used smokeless tobacco.  {Tobacco Cessation -- Complete if patient is a smoker (Optional):730511}    Appropriate preventive services were discussed with this patient, including applicable screening as appropriate for cardiovascular disease, diabetes, osteopenia/osteoporosis, and glaucoma.  As appropriate for age/gender, discussed screening for colorectal cancer, prostate cancer, breast cancer, and cervical cancer. Checklist reviewing preventive services available has been given to the patient.    Reviewed patients plan of care and provided an AVS. The {CarePlan:539692} for Diana meets the Care Plan requirement. This Care Plan has been established and reviewed with the {PATIENT, FAMILY MEMBER, CAREGIVER:813538}.    Counseling Resources:  ATP IV Guidelines  Pooled Cohorts Equation Calculator  Breast Cancer Risk Calculator  FRAX Risk Assessment  ICSI Preventive Guidelines  Dietary Guidelines for Americans, 2010  USDA's " MyPlate  ASA Prophylaxis  Lung CA Screening    Suleiman Miller MD  [facility]

## 2018-09-12 NOTE — PATIENT INSTRUCTIONS
Start the new blood pressure medication, amlodipine, take one daily.  I want to see you back in 4 to 6 weeks.    I would recommend getting the new shingles shot called shingrix, but I would do it at your pharmacy as they can check with the insurance company to see if it is paid for.    Suleiman Miller M.D.          Preventive Health Recommendations    Female Ages 65 +    Yearly exam:     See your health care provider every year in order to  o Review health changes.   o Discuss preventive care.    o Review your medicines if your doctor has prescribed any.      You no longer need a yearly Pap test unless you've had an abnormal Pap test in the past 10 years. If you have vaginal symptoms, such as bleeding or discharge, be sure to talk with your provider about a Pap test.      Every 1 to 2 years, have a mammogram.  If you are over 69, talk with your health care provider about whether or not you want to continue having screening mammograms.      Every 10 years, have a colonoscopy. Or, have a yearly FIT test (stool test). These exams will check for colon cancer.       Have a cholesterol test every 5 years, or more often if your doctor advises it.       Have a diabetes test (fasting glucose) every three years. If you are at risk for diabetes, you should have this test more often.       At age 65, have a bone density scan (DEXA) to check for osteoporosis (brittle bone disease).    Shots:    Get a flu shot each year.    Get a tetanus shot every 10 years.    Talk to your doctor about your pneumonia vaccines. There are now two you should receive - Pneumovax (PPSV 23) and Prevnar (PCV 13).    Talk to your pharmacist about the shingles vaccine.    Talk to your doctor about the hepatitis B vaccine.    Nutrition:     Eat at least 5 servings of fruits and vegetables each day.      Eat whole-grain bread, whole-wheat pasta and brown rice instead of white grains and rice.      Get adequate Calcium and Vitamin D.     Lifestyle    Exercise  at least 150 minutes a week (30 minutes a day, 5 days a week). This will help you control your weight and prevent disease.      Limit alcohol to one drink per day.      No smoking.       Wear sunscreen to prevent skin cancer.       See your dentist twice a year for an exam and cleaning.      See your eye doctor every 1 to 2 years to screen for conditions such as glaucoma, macular degeneration and cataracts.    Preventive Health Recommendations    Female Ages 65 +    Yearly exam:     See your health care provider every year in order to  o Review health changes.   o Discuss preventive care.    o Review your medicines if your doctor has prescribed any.      You no longer need a yearly Pap test unless you've had an abnormal Pap test in the past 10 years. If you have vaginal symptoms, such as bleeding or discharge, be sure to talk with your provider about a Pap test.      Every 1 to 2 years, have a mammogram.  If you are over 69, talk with your health care provider about whether or not you want to continue having screening mammograms.      Every 10 years, have a colonoscopy. Or, have a yearly FIT test (stool test). These exams will check for colon cancer.       Have a cholesterol test every 5 years, or more often if your doctor advises it.       Have a diabetes test (fasting glucose) every three years. If you are at risk for diabetes, you should have this test more often.       At age 65, have a bone density scan (DEXA) to check for osteoporosis (brittle bone disease).    Shots:    Get a flu shot each year.    Get a tetanus shot every 10 years.    Talk to your doctor about your pneumonia vaccines. There are now two you should receive - Pneumovax (PPSV 23) and Prevnar (PCV 13).    Talk to your pharmacist about the shingles vaccine.    Talk to your doctor about the hepatitis B vaccine.    Nutrition:     Eat at least 5 servings of fruits and vegetables each day.      Eat whole-grain bread, whole-wheat pasta and brown rice  instead of white grains and rice.      Get adequate Calcium and Vitamin D.     Lifestyle    Exercise at least 150 minutes a week (30 minutes a day, 5 days a week). This will help you control your weight and prevent disease.      Limit alcohol to one drink per day.      No smoking.       Wear sunscreen to prevent skin cancer.       See your dentist twice a year for an exam and cleaning.      See your eye doctor every 1 to 2 years to screen for conditions such as glaucoma, macular degeneration and cataracts.

## 2018-09-12 NOTE — PROGRESS NOTES
"  SUBJECTIVE:   Diana Santiago is a 67 year old female who presents for Preventive Visit.  {PVP to remind patient that this is not necessarily a physical exam; physical exam may or may not be done:161846::\"click delete button to remove this line now\"}  {PVP to inform patient that additional E&M charge may apply, if additional problems addressed:765569::\"click delete button to remove this line now\"}  Are you in the first 12 months of your Medicare Part B coverage?  {No Yes:294689::\"No\"}    Healthy Habits:    Do you get at least three servings of calcium containing foods daily (dairy, green leafy vegetables, etc.)? {YES/NO, DAIRY INTAKE:906617::\"yes\"}    Amount of exercise or daily activities, outside of work: {AMOUNT EXERCISE:652179}    Problems taking medications regularly {Yes /No default:801342::\"No\"}    Medication side effects: {Yes /No default.:224923::\"No\"}    Have you had an eye exam in the past two years? {YESNOBLANK:814615}    Do you see a dentist twice per year? {YESNOBLANK:548901}    Do you have sleep apnea, excessive snoring or daytime drowsiness?{YESNOBLANK:895972}      Ability to successfully perform activities of daily living: {YES/NO (MEDICARE):272617::\"Yes, no assistance needed\"}    Home safety:  {IPPE SAFETY CONCERNS:256251::\"none identified\"}     Hearing impairment: {NO/YES:174972}    Fall risk:  {Document Fall Risk in the Assessments Section of the Navigator:626507}    {If any of the above assessments are answered yes, consider ordering appropriate referrals (Optional):712338::\"click delete button to remove this line now\"}    {AWV Cognitive Screenin}    {Outside tests to abstract? :744561}    {additional problems to add (Optional):693382}    Reviewed and updated as needed this visit by clinical staff  Meds  Med Hx         Reviewed and updated as needed this visit by Provider        Social History   Substance Use Topics     Smoking status: Never Smoker     Smokeless tobacco: Never Used     " "Alcohol use No       If you drink alcohol do you typically have >3 drinks per day or >7 drinks per week? {ETOH :489096}                        Today's PHQ-2 Score:   PHQ-2 ( 1999 Pfizer) 9/10/2018 3/22/2018   Q1: Little interest or pleasure in doing things 0 0   Q2: Feeling down, depressed or hopeless 0 0   PHQ-2 Score 0 0   Q1: Little interest or pleasure in doing things Not at all -   Q2: Feeling down, depressed or hopeless Not at all -   PHQ-2 Score 0 -     {PHQ-2 LOOK IN ASSESSMENTS (Optional) :992127}  Do you feel safe in your environment - {YES/NO/NA:797054}    Do you have a Health Care Directive?: {HEALTHCARE DIRECTIVE STATUS:504031}    Current providers sharing in care for this patient include:   Patient Care Team:  Suleiman Miller MD as PCP - General (Internal Medicine)    The following health maintenance items are reviewed in Epic and correct as of today:  Health Maintenance   Topic Date Due     DEXA SCAN SCREENING (SYSTEM ASSIGNED)  11/23/2015     NELDA QUESTIONNAIRE 1 YEAR  08/02/2017     PHQ-9 Q1YR  08/02/2017     FALL RISK ASSESSMENT  08/08/2018     INFLUENZA VACCINE (1) 09/01/2018     MAMMO SCREEN Q2 YR (SYSTEM ASSIGNED)  07/24/2020     ADVANCE DIRECTIVE PLANNING Q5 YRS  07/31/2020     COLON CANCER SCREEN (SYSTEM ASSIGNED)  10/15/2020     LIPID SCREEN Q5 YR FEMALE (SYSTEM ASSIGNED)  08/03/2023     TETANUS IMMUNIZATION (SYSTEM ASSIGNED)  08/08/2027     PNEUMOCOCCAL  Completed     HEPATITIS C SCREENING  Completed     {Chronicprobdata (Optional):274588}    {Decision Support (Optional):346887}    ROS:  {ROS COMP:027006}    OBJECTIVE:   There were no vitals taken for this visit. Estimated body mass index is 22.66 kg/(m^2) as calculated from the following:    Height as of 3/22/18: 5' 4\" (1.626 m).    Weight as of 3/22/18: 132 lb (59.9 kg).  EXAM:   {Exam :480230}    {Diagnostic Test Results (Optional):547063::\"Diagnostic Test Results:\",\"none \"}    ASSESSMENT / PLAN:   {Diag Picklist:473436}    End of " "Life Planning:  Patient currently has an advanced directive: { :497626}    COUNSELING:  {Medicare Counselin}    BP Readings from Last 1 Encounters:   18 144/82     Estimated body mass index is 22.66 kg/(m^2) as calculated from the following:    Height as of 3/22/18: 5' 4\" (1.626 m).    Weight as of 3/22/18: 132 lb (59.9 kg).    {BP Counseling- Complete if BP >= 120/80  (Optional):242064}  {Weight Management Plan (ACO) Complete if BMI is abnormal-  Ages 18-64  BMI >24.9.  Age 65+ with BMI <23 or >30 (Optional):650940}     reports that she has never smoked. She has never used smokeless tobacco.  {Tobacco Cessation -- Complete if patient is a smoker (Optional):762798}    Appropriate preventive services were discussed with this patient, including applicable screening as appropriate for cardiovascular disease, diabetes, osteopenia/osteoporosis, and glaucoma.  As appropriate for age/gender, discussed screening for colorectal cancer, prostate cancer, breast cancer, and cervical cancer. Checklist reviewing preventive services available has been given to the patient.    Reviewed patients plan of care and provided an AVS. The {CarePlan:908761} for Diana meets the Care Plan requirement. This Care Plan has been established and reviewed with the {PATIENT, FAMILY MEMBER, CAREGIVER:938094}.    Counseling Resources:  ATP IV Guidelines  Pooled Cohorts Equation Calculator  Breast Cancer Risk Calculator  FRAX Risk Assessment  ICSI Preventive Guidelines  Dietary Guidelines for Americans, 2010  USDA's MyPlate  ASA Prophylaxis  Lung CA Screening    Suleiman Miller MD  [facility]  "

## 2018-09-13 NOTE — PROGRESS NOTES
It was nice to see you today.  Your lithium level is normal.    If you have any questions please call me.    Suleiman Miller M.D.

## 2018-10-23 ENCOUNTER — OFFICE VISIT (OUTPATIENT)
Dept: FAMILY MEDICINE | Facility: CLINIC | Age: 68
End: 2018-10-23
Payer: MEDICARE

## 2018-10-23 VITALS
SYSTOLIC BLOOD PRESSURE: 126 MMHG | DIASTOLIC BLOOD PRESSURE: 78 MMHG | OXYGEN SATURATION: 96 % | BODY MASS INDEX: 23.05 KG/M2 | HEART RATE: 90 BPM | TEMPERATURE: 98.3 F | WEIGHT: 135 LBS | HEIGHT: 64 IN

## 2018-10-23 DIAGNOSIS — E21.3 HYPERPARATHYROIDISM (H): ICD-10-CM

## 2018-10-23 DIAGNOSIS — E87.0 HYPERNATREMIA: ICD-10-CM

## 2018-10-23 DIAGNOSIS — I10 BENIGN ESSENTIAL HYPERTENSION: Primary | ICD-10-CM

## 2018-10-23 LAB — CA-I SERPL ISE-MCNC: 5.1 MG/DL (ref 4.4–5.2)

## 2018-10-23 PROCEDURE — 36415 COLL VENOUS BLD VENIPUNCTURE: CPT | Performed by: INTERNAL MEDICINE

## 2018-10-23 PROCEDURE — 80048 BASIC METABOLIC PNL TOTAL CA: CPT | Performed by: INTERNAL MEDICINE

## 2018-10-23 PROCEDURE — 82330 ASSAY OF CALCIUM: CPT | Performed by: INTERNAL MEDICINE

## 2018-10-23 PROCEDURE — 99213 OFFICE O/P EST LOW 20 MIN: CPT | Performed by: INTERNAL MEDICINE

## 2018-10-23 NOTE — MR AVS SNAPSHOT
After Visit Summary   10/23/2018    Diana Santiago    MRN: [id number]           Patient Information     Date Of Birth          1950        Visit Information        Provider Department      10/23/2018 2:00 PM Suleiman Miller MD [facility]        Today's Diagnoses     Benign essential hypertension    -  1    Hyperparathyroidism (H)        Hypernatremia          Care Instructions    Please monitor the blood pressure and if it is not around 130/80 let me know.    Suleiman Miller M.D.            Follow-ups after your visit        Follow-up notes from your care team     Return in about 1 year (around 10/23/2019) for Physical Exam.      Your next 10 appointments already scheduled     Oct 23, 2018  2:00 PM CDT   Office Visit with Suleiman Miller MD   [facility] ([facility]    [address] [address] [phone]           Bring a current list of meds and any records pertaining to this visit. For Physicals, please bring immunization records and any forms needing to be filled out. Please arrive 10 minutes early to complete paperwork.              Who to contact     If you have questions or need follow up information about today's clinic visit or your schedule please contact [facility] directly at [phone].  Normal or non-critical lab and imaging results will be communicated to you by [employer], letter or phone within 4 business days after the clinic has received the results. If you do not hear from us within 7 days, please contact the clinic through [employer] or phone. If you have a critical or abnormal lab result, we will notify you by phone as soon as possible.  Submit refill requests through [employer] or call your pharmacy and they will forward the refill request to us. Please allow 3 business days for your refill to be completed.          Additional Information About Your Visit        [employer] Information     [employer] gives you  "secure access to your electronic health record. If you see a primary care provider, you can also send messages to your care team and make appointments. If you have questions, please call your primary care clinic.  If you do not have a primary care provider, please call [phone] and they will assist you.        Care EveryWhere ID     This is your Care EveryWhere ID. This could be used by other organizations to access your [place] medical records  [id number]        Your Vitals Were     Pulse Temperature Height Pulse Oximetry Breastfeeding? BMI (Body Mass Index)    90 98.3  F (36.8  C) (Oral) 5' 4\" (1.626 m) 96% No 23.17 kg/m2       Blood Pressure from Last 3 Encounters:   10/23/18 126/78   09/12/18 (!) 144/92   03/22/18 144/82    Weight from Last 3 Encounters:   10/23/18 135 lb (61.2 kg)   09/12/18 135 lb (61.2 kg)   03/22/18 132 lb (59.9 kg)              We Performed the Following     Basic metabolic panel     Calcium ionized        Primary Care Provider Office Phone # Fax #    Suleiman Miller [phone] [address] [facility] [address]  [place] [address]        Equal Access to Services     [facility]FFER [facility] : Hadii aad ku hadasho Soomaali, waaxda luqadaha, qaybta kaalmada adeegyada, waxay idiin hayreeman netta richardson laharleen . So [facility] [phone].    ATENCIÓN: Si habla español, tiene a christensen disposición servicios gratuitos de asistencia lingüística. Llame al [phone].    We comply with applicable federal civil rights laws and [place] laws. We do not discriminate on the basis of race, color, national origin, age, disability, sex, sexual orientation, or gender identity.            Thank you!     Thank you for choosing [facility]  for your care. Our goal is always to provide you with excellent care. Hearing back from our patients is one way we can continue to improve our services. Please take a few minutes to complete the written survey that you may receive in the mail after your " visit with us. Thank you!             Your Updated Medication List - Protect others around you: Learn how to safely use, store and throw away your medicines at www.disposemymeds.org.          This list is accurate as of 10/23/18  1:57 PM.  Always use your most recent med list.                   Brand Name Dispense Instructions for use Diagnosis    amantadine 50 MG/5ML syrup    SYMMETREL     Take 2.5 mLs by mouth daily.        amLODIPine 5 MG tablet    NORVASC    90 tablet    Take 1 tablet (5 mg) by mouth daily    Benign essential hypertension       CENTRUM SILVER per tablet      Take 1 tablet by mouth daily.    Routine general medical examination at a health care facility       fexofenadine 180 MG tablet    ALLEGRA    90 tablet    Take 1 tablet by mouth daily.        haloperidol 0.5 MG tablet    HALDOL     Take 1 mg by mouth daily        lithium 300 MG tablet      Take 300 mg by mouth 3 times daily.        simvastatin 40 MG tablet    ZOCOR    90 tablet    Take 1 tablet (40 mg) by mouth At Bedtime    Hypertriglyceridemia       UNABLE TO FIND      MEDICATION NAME: focus select capsule, 2 daily        vitamin D3 1000 units Caps     30 capsule    Take 3 capsules by mouth daily    Routine general medical examination at a health care facility

## 2018-10-23 NOTE — PATIENT INSTRUCTIONS
Please monitor the blood pressure and if it is not around 130/80 let me know.    Suleiman Miller M.D.

## 2018-10-23 NOTE — PROGRESS NOTES
The patient is here for follow-up for a couple of issues.    As noted, at the last office visit I had a Norvasc for her hypertension.  She does not smoke or drink significantly, minimal caffeine and no nonsteroidals.  Low-salt diet.  She thinks her father had hypertension.  She has been taking the Norvasc and has not had any side effects.  No headaches or dizziness, chest pain or breathing trouble.  Her blood pressures been variable on her checks.    The patient also has hyperparathyroidism which I believe is likely due to the lithium.  She also has CKD.  Her sodium level is been elevated likely due to diabetes insipidus from her lithium use due to her bipolar disorder.  We will get follow-up labs today.    Past Medical History:   Diagnosis Date     Benign essential hypertension 09/2018    added norvasc 9/18     Bipolar affective disorder (H)     hosp 1993, Dr. Aftab Deal     DCIS (ductal carcinoma in situ) 1996    xrt and lumpectomy x 4     Diabetes insipidus (H) 09/2018    elev sodium, likely due to lithium     Elevated blood sugar      Fractured femoral neck (H) 1992     Hx of colonoscopy 2010    tics and hem     Hypercalcemia 08/2017     Hypercholesteremia      Hyperparathyroidism (H) 08/2017     Thalassaemia trait      Vitamin D deficiency      Past Surgical History:   Procedure Laterality Date     BREAST SURGERY      lumpectomy x 4     left hand surgery      last 1974     Social History     Social History     Marital status: Single     Spouse name: N/A     Number of children: 0     Years of education: N/A     Occupational History     , retired      Social History Main Topics     Smoking status: Never Smoker     Smokeless tobacco: Never Used     Alcohol use No     Drug use: No     Sexual activity: Yes     Partners: Male     Other Topics Concern     Not on file     Social History Narrative     Current Outpatient Prescriptions   Medication Sig Dispense Refill     amantadine (SYMMETREL) 50  "MG/5ML solution Take 2.5 mLs by mouth daily.       amLODIPine (NORVASC) 5 MG tablet Take 1 tablet (5 mg) by mouth daily 90 tablet 3     Cholecalciferol (VITAMIN D3) 1000 UNITS CAPS Take 3 capsules by mouth daily 30 capsule      fexofenadine (ALLEGRA) 180 MG tablet Take 1 tablet by mouth daily. 90 tablet 0     haloperidol (HALDOL) 0.5 MG tablet Take 1 mg by mouth daily        lithium 300 MG tablet Take 300 mg by mouth 3 times daily.       Multiple Vitamins-Minerals (CENTRUM SILVER) per tablet Take 1 tablet by mouth daily.       simvastatin (ZOCOR) 40 MG tablet Take 1 tablet (40 mg) by mouth At Bedtime 90 tablet 3     UNABLE TO FIND MEDICATION NAME: focus select capsule, 2 daily       No Known Allergies  FAMILY HISTORY NOTED AND REVIEWED    REVIEW OF SYSTEMS: above    PHYSICAL EXAM    /78  Pulse 90  Temp 98.3  F (36.8  C) (Oral)  Ht 5' 4\" (1.626 m)  Wt 135 lb (61.2 kg)  SpO2 96%  Breastfeeding? No  BMI 23.17 kg/m2    Patient appears non toxic  cv reglar rate and rhythm     Labs sent    ASSESSMENT:  1. Hypertension, controlled by my readings, her machine did read 10 points higher today.  She will get new batteries and call if remains up  2. Hyperpara, follow up labs, up to date dexa at gyn  3. Elevated sodiium, likely d.i.  Follow up labs    PLAN:  Labs today  Up to date flu shot  Monitor blood pressure and call if up    Suleiman Miller M.D.        "

## 2018-10-24 LAB
ANION GAP SERPL CALCULATED.3IONS-SCNC: 7 MMOL/L (ref 3–14)
BUN SERPL-MCNC: 29 MG/DL (ref 7–30)
CALCIUM SERPL-MCNC: 9.3 MG/DL (ref 8.5–10.1)
CHLORIDE SERPL-SCNC: 110 MMOL/L (ref 94–109)
CO2 SERPL-SCNC: 24 MMOL/L (ref 20–32)
CREAT SERPL-MCNC: 1.12 MG/DL (ref 0.52–1.04)
GFR SERPL CREATININE-BSD FRML MDRD: 48 ML/MIN/1.7M2
GLUCOSE SERPL-MCNC: 156 MG/DL (ref 70–99)
POTASSIUM SERPL-SCNC: 3.8 MMOL/L (ref 3.4–5.3)
SODIUM SERPL-SCNC: 141 MMOL/L (ref 133–144)

## 2018-10-24 NOTE — PROGRESS NOTES
It was a please seeing you.  You should be able to view your labs.    Your labs are all fine, the sodium and calcium are both improved.    If you have any questions please call me.    Suleiman Mliler M.D.

## 2018-10-30 ENCOUNTER — TRANSFERRED RECORDS (OUTPATIENT)
Dept: HEALTH INFORMATION MANAGEMENT | Facility: CLINIC | Age: 68
End: 2018-10-30

## 2018-12-12 ENCOUNTER — TRANSFERRED RECORDS (OUTPATIENT)
Dept: HEALTH INFORMATION MANAGEMENT | Facility: CLINIC | Age: 68
End: 2018-12-12

## 2019-06-12 ENCOUNTER — TELEPHONE (OUTPATIENT)
Dept: FAMILY MEDICINE | Facility: CLINIC | Age: 69
End: 2019-06-12

## 2019-06-12 NOTE — TELEPHONE ENCOUNTER
Reason for Call:  Other     Detailed comments: pt would like to report that her bp has been lowered the past few weeks since she started drinking pure leaf diego hibiscuses tea.  She wanted this documented.    Phone Number Patient can be reached at: Home number on file [phone] (home)    Best Time: any    Can we leave a detailed message on this number? YES    Call taken on 6/12/2019 at 3:01 PM by Jeannine Mchugh

## 2019-09-10 DIAGNOSIS — I10 BENIGN ESSENTIAL HYPERTENSION: ICD-10-CM

## 2019-09-10 DIAGNOSIS — E78.1 HYPERTRIGLYCERIDEMIA: ICD-10-CM

## 2019-09-10 NOTE — TELEPHONE ENCOUNTER
"simvastatin (ZOCOR) 40 MG tablet 90 tablet 3 9/12/2018  No   Sig - Route: Take 1 tablet (40 mg) by mouth At Bedtime        amLODIPine (NORVASC) 5 MG tablet 90 tablet 3 9/12/2018  --   Sig - Route: Take 1 tablet (5 mg) by mouth daily          Last Written Prescription Date:  09/12/2018  Last Fill Quantity: 90,  # refills: 3   Last office visit: 10/23/2018 with prescribing provider:     Future Office Visit:      Requested Prescriptions   Pending Prescriptions Disp Refills     amLODIPine (NORVASC) 5 MG tablet 90 tablet 0     Sig: Take 1 tablet (5 mg) by mouth daily - Due NOW for fasting office visit with Dr Miller, call to schedule       Calcium Channel Blockers Protocol  Failed - 9/10/2019  2:52 PM        Failed - Normal serum creatinine on file in past 12 months     Recent Labs   Lab Test 10/23/18  1403   CR 1.12*             Passed - Blood pressure under 140/90 in past 12 months     BP Readings from Last 3 Encounters:   10/23/18 126/78   09/12/18 (!) 144/92   03/22/18 144/82                 Passed - Recent (12 mo) or future (30 days) visit within the authorizing provider's specialty     Patient had office visit in the last 12 months or has a visit in the next 30 days with authorizing provider or within the authorizing provider's specialty.  See \"Patient Info\" tab in inbasket, or \"Choose Columns\" in Meds & Orders section of the refill encounter.              Passed - Medication is active on med list        Passed - Patient is age 18 or older        Passed - No active pregnancy on record        Passed - No positive pregnancy test in past 12 months        simvastatin (ZOCOR) 40 MG tablet 90 tablet 0     Sig: Take 1 tablet (40 mg) by mouth At Bedtime - Due NOW for fasting office visit with Dr Miller, call to schedule       Statins Protocol Failed - 9/10/2019  2:52 PM        Failed - LDL on file in past 12 months     Recent Labs   Lab Test 08/03/18  0751   LDL 81             Passed - No abnormal creatine kinase in past " "12 months     No lab results found.             Passed - Recent (12 mo) or future (30 days) visit within the authorizing provider's specialty     Patient had office visit in the last 12 months or has a visit in the next 30 days with authorizing provider or within the authorizing provider's specialty.  See \"Patient Info\" tab in inbasket, or \"Choose Columns\" in Meds & Orders section of the refill encounter.              Passed - Medication is active on med list        Passed - Patient is age 18 or older        Passed - No active pregnancy on record        Passed - No positive pregnancy test in past 12 months            "

## 2019-09-11 RX ORDER — AMLODIPINE BESYLATE 5 MG/1
5 TABLET ORAL DAILY
Qty: 30 TABLET | Refills: 0 | Status: SHIPPED | OUTPATIENT
Start: 2019-09-11 | End: 2019-10-18

## 2019-09-11 RX ORDER — SIMVASTATIN 40 MG
40 TABLET ORAL AT BEDTIME
Qty: 30 TABLET | Refills: 0 | Status: SHIPPED | OUTPATIENT
Start: 2019-09-11 | End: 2019-10-18

## 2019-09-11 NOTE — TELEPHONE ENCOUNTER
Medication is being filled for 1 time refill only due to:  Patient needs to be seen because due for fasting physical.   Kendra FERNANDEZ RN

## 2019-10-08 ENCOUNTER — HOSPITAL ENCOUNTER (OUTPATIENT)
Dept: MAMMOGRAPHY | Facility: CLINIC | Age: 69
Discharge: HOME | End: 2019-10-08
Attending: OBSTETRICS & GYNECOLOGY | Admitting: OBSTETRICS & GYNECOLOGY
Payer: MEDICARE

## 2019-10-08 DIAGNOSIS — Z12.31 VISIT FOR SCREENING MAMMOGRAM: ICD-10-CM

## 2019-10-08 PROCEDURE — 77063 BREAST TOMOSYNTHESIS BI: CPT

## 2019-10-10 ENCOUNTER — DOCUMENTATION ONLY (OUTPATIENT)
Dept: LAB | Facility: CLINIC | Age: 69
End: 2019-10-10

## 2019-10-10 DIAGNOSIS — E78.5 HYPERLIPIDEMIA LDL GOAL <130: ICD-10-CM

## 2019-10-10 DIAGNOSIS — Z00.00 ROUTINE GENERAL MEDICAL EXAMINATION AT A HEALTH CARE FACILITY: Primary | ICD-10-CM

## 2019-10-10 DIAGNOSIS — E55.9 VITAMIN D DEFICIENCY: ICD-10-CM

## 2019-10-10 DIAGNOSIS — E21.3 HYPERPARATHYROIDISM (H): ICD-10-CM

## 2019-10-10 DIAGNOSIS — R73.9 ELEVATED BLOOD SUGAR: ICD-10-CM

## 2019-10-10 NOTE — PROGRESS NOTES
Patient has a lab appointment on tuesday, but no orders are in.  Please place orders for lab, if needed.  Thank you lab,        ROS      Physical Exam

## 2019-10-15 DIAGNOSIS — E78.5 HYPERLIPIDEMIA LDL GOAL <130: ICD-10-CM

## 2019-10-15 DIAGNOSIS — Z00.00 ROUTINE GENERAL MEDICAL EXAMINATION AT A HEALTH CARE FACILITY: ICD-10-CM

## 2019-10-15 DIAGNOSIS — R73.9 ELEVATED BLOOD SUGAR: ICD-10-CM

## 2019-10-15 DIAGNOSIS — E55.9 VITAMIN D DEFICIENCY: ICD-10-CM

## 2019-10-15 DIAGNOSIS — E21.3 HYPERPARATHYROIDISM (H): ICD-10-CM

## 2019-10-15 DIAGNOSIS — Z79.899 ENCOUNTER FOR LONG-TERM (CURRENT) USE OF OTHER MEDICATIONS: ICD-10-CM

## 2019-10-15 LAB
ALBUMIN SERPL-MCNC: 3.8 G/DL (ref 3.4–5)
ALP SERPL-CCNC: 117 U/L (ref 40–150)
ALT SERPL W P-5'-P-CCNC: 27 U/L (ref 0–50)
ANION GAP SERPL CALCULATED.3IONS-SCNC: 8 MMOL/L (ref 3–14)
AST SERPL W P-5'-P-CCNC: 13 U/L (ref 0–45)
BASOPHILS # BLD AUTO: 0 10E9/L (ref 0–0.2)
BASOPHILS NFR BLD AUTO: 0.5 %
BILIRUB SERPL-MCNC: 0.9 MG/DL (ref 0.2–1.3)
BUN SERPL-MCNC: 31 MG/DL (ref 7–30)
CALCIUM SERPL-MCNC: 9.8 MG/DL (ref 8.5–10.1)
CHLORIDE SERPL-SCNC: 115 MMOL/L (ref 94–109)
CHOLEST SERPL-MCNC: 163 MG/DL
CO2 SERPL-SCNC: 19 MMOL/L (ref 20–32)
CREAT SERPL-MCNC: 1.16 MG/DL (ref 0.52–1.04)
DIFFERENTIAL METHOD BLD: ABNORMAL
EOSINOPHIL # BLD AUTO: 0.3 10E9/L (ref 0–0.7)
EOSINOPHIL NFR BLD AUTO: 4.3 %
ERYTHROCYTE [DISTWIDTH] IN BLOOD BY AUTOMATED COUNT: 17.8 % (ref 10–15)
GFR SERPL CREATININE-BSD FRML MDRD: 48 ML/MIN/{1.73_M2}
GLUCOSE SERPL-MCNC: 111 MG/DL (ref 70–99)
HBA1C MFR BLD: 5.7 % (ref 0–5.6)
HCT VFR BLD AUTO: 34.6 % (ref 35–47)
HDLC SERPL-MCNC: 58 MG/DL
HGB BLD-MCNC: 10.8 G/DL (ref 11.7–15.7)
LDLC SERPL CALC-MCNC: 70 MG/DL
LYMPHOCYTES # BLD AUTO: 1.3 10E9/L (ref 0.8–5.3)
LYMPHOCYTES NFR BLD AUTO: 16.6 %
MCH RBC QN AUTO: 22.1 PG (ref 26.5–33)
MCHC RBC AUTO-ENTMCNC: 31.2 G/DL (ref 31.5–36.5)
MCV RBC AUTO: 71 FL (ref 78–100)
MONOCYTES # BLD AUTO: 0.7 10E9/L (ref 0–1.3)
MONOCYTES NFR BLD AUTO: 9.2 %
NEUTROPHILS # BLD AUTO: 5.4 10E9/L (ref 1.6–8.3)
NEUTROPHILS NFR BLD AUTO: 69.4 %
NONHDLC SERPL-MCNC: 105 MG/DL
PLATELET # BLD AUTO: 312 10E9/L (ref 150–450)
POTASSIUM SERPL-SCNC: 4 MMOL/L (ref 3.4–5.3)
PROT SERPL-MCNC: 7.5 G/DL (ref 6.8–8.8)
RBC # BLD AUTO: 4.88 10E12/L (ref 3.8–5.2)
SODIUM SERPL-SCNC: 142 MMOL/L (ref 133–144)
TRIGL SERPL-MCNC: 174 MG/DL
WBC # BLD AUTO: 7.7 10E9/L (ref 4–11)

## 2019-10-15 PROCEDURE — 80061 LIPID PANEL: CPT | Performed by: INTERNAL MEDICINE

## 2019-10-15 PROCEDURE — 80053 COMPREHEN METABOLIC PANEL: CPT | Performed by: INTERNAL MEDICINE

## 2019-10-15 PROCEDURE — 85025 COMPLETE CBC W/AUTO DIFF WBC: CPT | Performed by: INTERNAL MEDICINE

## 2019-10-15 PROCEDURE — 83036 HEMOGLOBIN GLYCOSYLATED A1C: CPT | Performed by: INTERNAL MEDICINE

## 2019-10-15 PROCEDURE — 82306 VITAMIN D 25 HYDROXY: CPT | Performed by: INTERNAL MEDICINE

## 2019-10-15 PROCEDURE — 36415 COLL VENOUS BLD VENIPUNCTURE: CPT | Performed by: INTERNAL MEDICINE

## 2019-10-15 PROCEDURE — 80178 ASSAY OF LITHIUM: CPT | Performed by: PSYCHIATRY & NEUROLOGY

## 2019-10-16 DIAGNOSIS — Z79.899 ENCOUNTER FOR LONG-TERM (CURRENT) USE OF OTHER MEDICATIONS: Primary | ICD-10-CM

## 2019-10-16 LAB
DEPRECATED CALCIDIOL+CALCIFEROL SERPL-MC: 41 UG/L (ref 20–75)
LITHIUM SERPL-SCNC: 1.08 MMOL/L (ref 0.6–1.2)

## 2019-10-16 NOTE — ADDENDUM NOTE
Addended by: ELGIN OBRIEN on: 10/16/2019 10:10 AM     Modules accepted: Orders     62y M w/ PMHx JOCELYNN on CPAP at home, presenting with b/l substernal chest pressure w/ associated dizziness, nausea, diaphoresis, and sob that began while patient was at work around 2pm. Patient stated chest pressure began while walking to get lunch. Patient states chest pressure comes on in waves but has been improving since onset. Also endorsing previous hx of b/l chest pressure associated with exertion over the past 10 days. 62y M w/ PMHx JOCELYNN on CPAP at home, presenting with b/l substernal chest pressure w/ associated dizziness, nausea, diaphoresis, and sob that began while patient was at work around 2pm. Patient stated chest pressure began while walking to get lunch. Patient states chest pressure comes on in waves but has been improving since onset. Also endorsing previous hx of b/l chest pressure associated with exertion over the past 10 days, but not at rest, relieves when patient stops and rests. Endorsing relief of chest pressure after Nitro spray by EMS. Denies previous hx MI, headache/vomiting.

## 2019-10-18 ENCOUNTER — OFFICE VISIT (OUTPATIENT)
Dept: FAMILY MEDICINE | Facility: CLINIC | Age: 69
End: 2019-10-18
Payer: MEDICARE

## 2019-10-18 VITALS
DIASTOLIC BLOOD PRESSURE: 70 MMHG | SYSTOLIC BLOOD PRESSURE: 115 MMHG | HEART RATE: 78 BPM | TEMPERATURE: 98.7 F | OXYGEN SATURATION: 99 % | WEIGHT: 140 LBS | HEIGHT: 63 IN | BODY MASS INDEX: 24.8 KG/M2

## 2019-10-18 DIAGNOSIS — E55.9 VITAMIN D DEFICIENCY: ICD-10-CM

## 2019-10-18 DIAGNOSIS — E78.1 HYPERTRIGLYCERIDEMIA: ICD-10-CM

## 2019-10-18 DIAGNOSIS — E21.3 HYPERPARATHYROIDISM (H): ICD-10-CM

## 2019-10-18 DIAGNOSIS — D05.10 DUCTAL CARCINOMA IN SITU (DCIS) OF BREAST, UNSPECIFIED LATERALITY: ICD-10-CM

## 2019-10-18 DIAGNOSIS — E83.52 HYPERCALCEMIA: ICD-10-CM

## 2019-10-18 DIAGNOSIS — D56.3 HETEROZYGOUS THALASSEMIA: ICD-10-CM

## 2019-10-18 DIAGNOSIS — R73.9 ELEVATED BLOOD SUGAR: ICD-10-CM

## 2019-10-18 DIAGNOSIS — R09.81 NASAL CONGESTION: ICD-10-CM

## 2019-10-18 DIAGNOSIS — E78.5 HYPERLIPIDEMIA LDL GOAL <130: ICD-10-CM

## 2019-10-18 DIAGNOSIS — D64.9 LOW HEMOGLOBIN: ICD-10-CM

## 2019-10-18 DIAGNOSIS — I10 BENIGN ESSENTIAL HYPERTENSION: ICD-10-CM

## 2019-10-18 DIAGNOSIS — N18.30 CKD (CHRONIC KIDNEY DISEASE) STAGE 3, GFR 30-59 ML/MIN (H): ICD-10-CM

## 2019-10-18 DIAGNOSIS — F31.9 BIPOLAR AFFECTIVE DISORDER, REMISSION STATUS UNSPECIFIED (H): ICD-10-CM

## 2019-10-18 DIAGNOSIS — Z00.00 ROUTINE GENERAL MEDICAL EXAMINATION AT A HEALTH CARE FACILITY: Primary | ICD-10-CM

## 2019-10-18 DIAGNOSIS — R32 URINARY INCONTINENCE, UNSPECIFIED TYPE: ICD-10-CM

## 2019-10-18 LAB
FERRITIN SERPL-MCNC: 32 NG/ML (ref 8–252)
FOLATE SERPL-MCNC: 59.7 NG/ML
HGB BLD-MCNC: 10.3 G/DL (ref 11.7–15.7)
IRON SATN MFR SERPL: 38 % (ref 15–46)
IRON SERPL-MCNC: 146 UG/DL (ref 35–180)
TIBC SERPL-MCNC: 389 UG/DL (ref 240–430)
VIT B12 SERPL-MCNC: 1439 PG/ML (ref 193–986)

## 2019-10-18 PROCEDURE — 85018 HEMOGLOBIN: CPT | Performed by: INTERNAL MEDICINE

## 2019-10-18 PROCEDURE — 99213 OFFICE O/P EST LOW 20 MIN: CPT | Mod: 25 | Performed by: INTERNAL MEDICINE

## 2019-10-18 PROCEDURE — 82728 ASSAY OF FERRITIN: CPT | Performed by: INTERNAL MEDICINE

## 2019-10-18 PROCEDURE — 82746 ASSAY OF FOLIC ACID SERUM: CPT | Performed by: INTERNAL MEDICINE

## 2019-10-18 PROCEDURE — 36415 COLL VENOUS BLD VENIPUNCTURE: CPT | Performed by: INTERNAL MEDICINE

## 2019-10-18 PROCEDURE — 00000402 ZZHCL STATISTIC TOTAL PROTEIN: Performed by: INTERNAL MEDICINE

## 2019-10-18 PROCEDURE — 83550 IRON BINDING TEST: CPT | Performed by: INTERNAL MEDICINE

## 2019-10-18 PROCEDURE — 84165 PROTEIN E-PHORESIS SERUM: CPT | Performed by: INTERNAL MEDICINE

## 2019-10-18 PROCEDURE — G0439 PPPS, SUBSEQ VISIT: HCPCS | Performed by: INTERNAL MEDICINE

## 2019-10-18 PROCEDURE — 83540 ASSAY OF IRON: CPT | Performed by: INTERNAL MEDICINE

## 2019-10-18 PROCEDURE — 82607 VITAMIN B-12: CPT | Performed by: INTERNAL MEDICINE

## 2019-10-18 RX ORDER — SIMVASTATIN 40 MG
40 TABLET ORAL AT BEDTIME
Qty: 90 TABLET | Refills: 3 | Status: SHIPPED | OUTPATIENT
Start: 2019-10-18 | End: 2020-08-11

## 2019-10-18 RX ORDER — AMLODIPINE BESYLATE 5 MG/1
5 TABLET ORAL DAILY
Qty: 90 TABLET | Refills: 3 | Status: SHIPPED | OUTPATIENT
Start: 2019-10-18 | End: 2020-09-29

## 2019-10-18 ASSESSMENT — MIFFLIN-ST. JEOR: SCORE: 1137.34

## 2019-10-18 ASSESSMENT — ACTIVITIES OF DAILY LIVING (ADL): CURRENT_FUNCTION: NO ASSISTANCE NEEDED

## 2019-10-18 NOTE — PROGRESS NOTES
SUBJECTIVE:   Diana Santiago is a 68 year old female who presents for Preventive Visit.    Overall she is doing well but has a few issues.    She has a mole on her face on the right side.  Is been there for years.  To me it appears to be a benign seborrheic keratosis.    She is had head congestion for years.  Clear drainage.    For years she is had  urinary leakage and urge incontinence.  No vaginal symptoms.  Very bowel incontinence.  It is increased over the last year.  No urinary burning or blood.    She otherwise feels well.  She works out regularly.  Her blood pressure is super on amlodipine.  She is up-to-date in terms of her mammogram.               Past Medical History:      Past Medical History:   Diagnosis Date     Benign essential hypertension 09/2018    added norvasc 9/18     Bipolar affective disorder (H)     hosp 1993, Dr. Aftab Deal     DCIS (ductal carcinoma in situ) 1996    xrt and lumpectomy x 4     Diabetes insipidus (H) 09/2018    elev sodium, likely due to lithium     Elevated blood sugar      Fractured femoral neck (H) 1992     Hx of colonoscopy 2010    tics and hem     Hypercalcemia 08/2017     Hypercholesteremia      Hyperparathyroidism (H) 08/2017     Thalassaemia trait      Vitamin D deficiency              Past Surgical History:      Past Surgical History:   Procedure Laterality Date     BREAST SURGERY      lumpectomy x 4     left hand surgery      last 1974             Social History:     Social History     Socioeconomic History     Marital status: Single     Spouse name: Not on file     Number of children: 0     Years of education: Not on file     Highest education level: Not on file   Occupational History     Occupation: , retired   Social Needs     Financial resource strain: Not on file     Food insecurity:     Worry: Not on file     Inability: Not on file     Transportation needs:     Medical: Not on file     Non-medical: Not on file   Tobacco Use     Smoking  status: Never Smoker     Smokeless tobacco: Never Used   Substance and Sexual Activity     Alcohol use: No     Alcohol/week: 0.0 standard drinks     Drug use: No     Sexual activity: Yes     Partners: Male   Lifestyle     Physical activity:     Days per week: Not on file     Minutes per session: Not on file     Stress: Not on file   Relationships     Social connections:     Talks on phone: Not on file     Gets together: Not on file     Attends Mu-ism service: Not on file     Active member of club or organization: Not on file     Attends meetings of clubs or organizations: Not on file     Relationship status: Not on file     Intimate partner violence:     Fear of current or ex partner: Not on file     Emotionally abused: Not on file     Physically abused: Not on file     Forced sexual activity: Not on file   Other Topics Concern     Not on file   Social History Narrative     Not on file             Family History:   reviewed         Allergies:   No Known Allergies          Medications:     Current Outpatient Medications   Medication Sig Dispense Refill     amantadine (SYMMETREL) 50 MG/5ML solution Take 2.5 mLs by mouth daily.       amLODIPine (NORVASC) 5 MG tablet Take 1 tablet (5 mg) by mouth daily 90 tablet 3     ARIPiprazole, sensor, 2 MG TABS Take 1 tablet by mouth daily       Cholecalciferol (VITAMIN D3) 1000 UNITS CAPS Take 3 capsules by mouth daily 30 capsule      fexofenadine (ALLEGRA) 180 MG tablet Take 1 tablet by mouth daily. 90 tablet 0     lithium 300 MG tablet Take 300 mg by mouth 3 times daily.       Multiple Vitamins-Minerals (CENTRUM SILVER) per tablet Take 1 tablet by mouth daily.       simvastatin (ZOCOR) 40 MG tablet Take 1 tablet (40 mg) by mouth At Bedtime 90 tablet 3     UNABLE TO FIND MEDICATION NAME: focus select capsule, 2 daily                 Review of Systems:   The 10 point Review of Systems is negative other than noted in the HPI           Physical Exam:   Blood pressure 115/70,  "pulse 78, temperature 98.7  F (37.1  C), temperature source Oral, height 1.605 m (5' 3.2\"), weight 63.5 kg (140 lb), SpO2 99 %, not currently breastfeeding.    Exam:  Constitutional: healthy appearing, alert and in no distress  Heent: Normocephalic. Head without obvious masses or lesions. PERRLDC, EOMI. Mouth exam within normal limits: tongue, mucous membranes, posterior pharynx all normal, no lesions or abnormalities seen.  Tm's and canals within normal limits bilaterally. Neck supple, no nuchal rigidity or masses. No supraclavicular, or cervical adenopathy. Thyroid symmetric, no masses.  Cardiovascular: Regular rate and rhythm, no murmer, rub or gallops.  JVP not elevated, no edema.  Carotids within normal limits bilaterally, no bruits.  Respiratory: Normal respiratory effort.  Lungs clear, normal flow, no wheezing or crackles.  Breasts: Normal bilaterally.  No masses or lesions.  Nipples within normal limits.  No axillary lesions or nodes.  My M.A. Was present during this part of the examination.  Gastrointestinal: Normal active bowel sounds.   Soft, not tender, no masses, guarding or rebound.  No hepatosplenomegaly.   Musculoskeletal: extremities normal, no gross deformities noted.  Skin: no suspicious lesions or rashes   Neurologic: Mental status within normal limits.  Speech fluent.  No gross motor abnormalities and gait intact.  Psychiatric: mentation appears normal and affect normal.         Data:   Labs reviewed with patient, others sent        Assessment:   1. Normal complete physical exam  2. Low hemoglobin, check other labs, no gi c/o  3. Nasal daniel, try flonase, to ent if not better soon  4. Urinary incont, to gyn  5. Benign mole  6. Bipolar, stable  7. ckd  8. Hyperpara, fine now  9. Hypernatremia, fine now  10. Dcis, no issues  11. Hypertension, controlled  12. Elevated sugar, exercise, diet  13. thal  14. Vit d defic, fine now  15. hcm         Plan:   Up to date immunizations, mammogram  Exercise, " "diet  Labs today  To try flonase  To gyn      Suleiman Miller M.D.                Are you in the first 12 months of your Medicare coverage?  No    Healthy Habits:     In general, how would you rate your overall health?  Very good    Frequency of exercise:  6-7 days/week    Duration of exercise:: walking daily 20mins.    Do you usually eat at least 4 servings of fruit and vegetables a day, include whole grains    & fiber and avoid regularly eating high fat or \"junk\" foods?  No (2)    Taking medications regularly:  Yes    Barriers to taking medications:  None    Medication side effects:  None    Ability to successfully perform activities of daily living:  No assistance needed    Home Safety:  No safety concerns identified    Hearing Impairment:  No hearing concerns    In the past 6 months, have you been bothered by leaking of urine? Yes    In general, how would you rate your overall mental or emotional health?  Excellent      PHQ-2 Total Score: 0    Additional concerns today:  No       Do you feel safe in your environment? YES    Do you have a Health Care Directive? Yes: Advance Directive has been received and scanned.      Fall risk  Fallen 2 or more times in the past year?: No  Any fall with injury in the past year?: No    Cognitive Screening   1) Repeat 3 items (Leader, Season, Table)    2) Clock draw: NORMAL  3) 3 item recall: Recalls 3 objects  Results: 3 items recalled: COGNITIVE IMPAIRMENT LESS LIKELY    Mini-CogTM Copyright JUDD Menjivar. Licensed by the author for use in [facility]; reprinted with permission (nela@.[email or web address]). All rights reserved.      Do you have sleep apnea, excessive snoring or daytime drowsiness?: no    Reviewed and updated as needed this visit by clinical staff  Tobacco  Allergies  Meds         Reviewed and updated as needed this visit by Provider  Allergies  Meds        Social History     Tobacco Use     Smoking status: Never Smoker     Smokeless tobacco: Never Used   Substance " "Use Topics     Alcohol use: No     Alcohol/week: 0.0 standard drinks     If you drink alcohol do you typically have >3 drinks per day or >7 drinks per week? No    Alcohol Use 10/18/2019   Prescreen: >3 drinks/day or >7 drinks/week? -   Prescreen: >3 drinks/day or >7 drinks/week? No       Current providers sharing in care for this patient include:   Patient Care Team:  Suleiman Miller MD as PCP - General (Internal Medicine)  Suleiman Miller MD as Assigned PCP    The following health maintenance items are reviewed in Epic and correct as of today:  Health Maintenance   Topic Date Due     NELDA ASSESSMENT  08/02/2017     PHQ-9  08/02/2017     INFLUENZA VACCINE (1) 09/01/2019     MEDICARE ANNUAL WELLNESS VISIT  09/12/2019     ADVANCE CARE PLANNING  07/31/2020     COLONOSCOPY  10/15/2020     FALL RISK ASSESSMENT  10/18/2020     MAMMO SCREENING  10/08/2021     LIPID  10/15/2024     DTAP/TDAP/TD IMMUNIZATION (3 - Td) 08/08/2027     DEXA  Completed     HEPATITIS C SCREENING  Completed     PNEUMOCOCCAL IMMUNIZATION 65+ HIGH/HIGHEST RISK  Completed     ZOSTER IMMUNIZATION  Completed     IPV IMMUNIZATION  Aged Out     MENINGITIS IMMUNIZATION  Aged Out           Review of Systems      OBJECTIVE:   /70 (BP Location: Right arm, Patient Position: Sitting, Cuff Size: Adult Regular)   Pulse 78   Temp 98.7  F (37.1  C) (Oral)   Ht 1.605 m (5' 3.2\")   Wt 63.5 kg (140 lb)   SpO2 99%   Breastfeeding? No   BMI 24.64 kg/m   Estimated body mass index is 24.64 kg/m  as calculated from the following:    Height as of this encounter: 1.605 m (5' 3.2\").    Weight as of this encounter: 63.5 kg (140 lb).  Physical Exam          ASSESSMENT / PLAN:       End of Life Planning:  Patient currently has an advanced directive: yes    COUNSELING:  Reviewed preventive health counseling, as reflected in patient instructions       Healthy diet/nutrition       Vision screening    Estimated body mass index is 24.64 kg/m  as calculated " "from the following:    Height as of this encounter: 1.605 m (5' 3.2\").    Weight as of this encounter: 63.5 kg (140 lb).         reports that she has never smoked. She has never used smokeless tobacco.      Appropriate preventive services were discussed with this patient, including applicable screening as appropriate for cardiovascular disease, diabetes, osteopenia/osteoporosis, and glaucoma.  As appropriate for age/gender, discussed screening for colorectal cancer, prostate cancer, breast cancer, and cervical cancer. Checklist reviewing preventive services available has been given to the patient.    Reviewed patients plan of care and provided an AVS. The Basic Care Plan (routine screening as documented in Health Maintenance) for Diana meets the Care Plan requirement. This Care Plan has been established and reviewed with the Patient.    Counseling Resources:  ATP IV Guidelines  Pooled Cohorts Equation Calculator  Breast Cancer Risk Calculator  FRAX Risk Assessment  ICSI Preventive Guidelines  Dietary Guidelines for Americans, 2010  USDA's MyPlate  ASA Prophylaxis  Lung CA Screening    Suleiman Miller MD  [facility]    Identified Health Risks:  "

## 2019-10-18 NOTE — PATIENT INSTRUCTIONS
For the incontinence Dr. Hortensia Ford, gyn, is good.    Try flonase for the nasal congestion    Suleiman Miller M.D.

## 2019-10-21 NOTE — RESULT ENCOUNTER NOTE
It was a please seeing you.  You should be able to view your labs.    Your hemoglobin remains slightly low but the other labs are normal so nothing obvious to cause the low hemoglobin.  I think we should follow this and repeat the hemoglobin in 6 to 8 weeks to be safe.  Please remember to come back and do this then, you do nmot need to fast or see me but call ahead.    If you have any questions please call me.      Suleiman Miller M.D.

## 2019-10-22 LAB
ALBUMIN SERPL ELPH-MCNC: 4.1 G/DL (ref 3.7–5.1)
ALPHA1 GLOB SERPL ELPH-MCNC: 0.3 G/DL (ref 0.2–0.4)
ALPHA2 GLOB SERPL ELPH-MCNC: 0.8 G/DL (ref 0.5–0.9)
B-GLOBULIN SERPL ELPH-MCNC: 0.9 G/DL (ref 0.6–1)
GAMMA GLOB SERPL ELPH-MCNC: 1 G/DL (ref 0.7–1.6)
M PROTEIN SERPL ELPH-MCNC: 0 G/DL
PROT PATTERN SERPL ELPH-IMP: NORMAL

## 2019-11-08 ENCOUNTER — HEALTH MAINTENANCE LETTER (OUTPATIENT)
Age: 69
End: 2019-11-08

## 2020-04-30 DIAGNOSIS — I10 BENIGN ESSENTIAL HYPERTENSION: ICD-10-CM

## 2020-05-01 RX ORDER — AMLODIPINE BESYLATE 5 MG/1
TABLET ORAL
Qty: 30 TABLET | Refills: 0 | OUTPATIENT
Start: 2020-05-01

## 2020-08-09 DIAGNOSIS — E78.1 HYPERTRIGLYCERIDEMIA: ICD-10-CM

## 2020-08-11 RX ORDER — SIMVASTATIN 40 MG
TABLET ORAL
Qty: 30 TABLET | Refills: 0 | Status: SHIPPED | OUTPATIENT
Start: 2020-08-11 | End: 2020-12-21

## 2020-08-11 NOTE — TELEPHONE ENCOUNTER
Medication is being filled for 1 time refill only due to:  Patient needs to be seen because due for fasting physical.

## 2020-09-30 DIAGNOSIS — I10 BENIGN ESSENTIAL HYPERTENSION: ICD-10-CM

## 2020-09-30 RX ORDER — AMLODIPINE BESYLATE 5 MG/1
5 TABLET ORAL DAILY
Qty: 90 TABLET | Refills: 0 | Status: CANCELLED | OUTPATIENT
Start: 2020-09-30

## 2020-09-30 NOTE — TELEPHONE ENCOUNTER
Reason for Call:  Medication or medication refill:    Do you use a [facility]?  Name of the pharmacy and phone number for the current request:   [facility] - [place], [place] - [address] AT PORTAL TO REGISTERED [facility] SITES    Name of the medication requested: amLODIPine (NORVASC) 5 MG tablet    Other request:     Can we leave a detailed message on this number? YES    Phone number patient can be reached at: Home number on file [phone] (home)    Best Time: ANY    Call taken on 9/30/2020 at 4:24 PM by Olya Palmer

## 2020-10-26 DIAGNOSIS — Z79.899 DRUG THERAPY: Primary | ICD-10-CM

## 2020-11-06 ENCOUNTER — HOSPITAL ENCOUNTER (OUTPATIENT)
Dept: MAMMOGRAPHY | Facility: CLINIC | Age: 70
Discharge: HOME | End: 2020-11-06
Attending: INTERNAL MEDICINE | Admitting: INTERNAL MEDICINE
Payer: MEDICARE

## 2020-11-06 DIAGNOSIS — Z12.31 VISIT FOR SCREENING MAMMOGRAM: ICD-10-CM

## 2020-11-06 PROCEDURE — 77067 SCR MAMMO BI INCL CAD: CPT

## 2020-11-22 DIAGNOSIS — I10 BENIGN ESSENTIAL HYPERTENSION: ICD-10-CM

## 2020-11-24 RX ORDER — AMLODIPINE BESYLATE 5 MG/1
TABLET ORAL
Qty: 90 TABLET | Refills: 0 | Status: SHIPPED | OUTPATIENT
Start: 2020-11-24 | End: 2020-12-21

## 2020-12-06 ENCOUNTER — HEALTH MAINTENANCE LETTER (OUTPATIENT)
Age: 70
End: 2020-12-06

## 2020-12-18 NOTE — PROGRESS NOTES
SUBJECTIVE:   Diana Santiago is a 70 year old female who presents for Preventive Visit.    She audra doing well, up to date with gyn, no c/o on review of systems, blood pressure has been super, was working out, less lately.               Past Medical History:      Past Medical History:   Diagnosis Date     Benign essential hypertension 09/2018    added norvasc 9/18     Bipolar affective disorder (H)     hosp 1993, Dr. Aftab Deal     DCIS (ductal carcinoma in situ) 1996    xrt and lumpectomy x 4     Diabetes insipidus (H) 09/2018    elev sodium, likely due to lithium     Elevated blood sugar      Fractured femoral neck (H) 1992     Hx of colonoscopy 2010    tics and hem     Hypercalcemia 08/2017     Hypercholesteremia      Hyperparathyroidism (H) 08/2017     Thalassaemia trait      Vitamin D deficiency              Past Surgical History:      Past Surgical History:   Procedure Laterality Date     BREAST SURGERY      lumpectomy x 4     left hand surgery      last 1974             Social History:     Social History     Socioeconomic History     Marital status: Single     Spouse name: Not on file     Number of children: 0     Years of education: Not on file     Highest education level: Not on file   Occupational History     Occupation: , retired   Social Needs     Financial resource strain: Not on file     Food insecurity     Worry: Not on file     Inability: Not on file     Transportation needs     Medical: Not on file     Non-medical: Not on file   Tobacco Use     Smoking status: Never Smoker     Smokeless tobacco: Never Used   Substance and Sexual Activity     Alcohol use: No     Alcohol/week: 0.0 standard drinks     Drug use: No     Sexual activity: Yes     Partners: Male   Lifestyle     Physical activity     Days per week: Not on file     Minutes per session: Not on file     Stress: Not on file   Relationships     Social connections     Talks on phone: Not on file     Gets together: Not on file  "    Attends [employer] service: Not on file     Active member of club or organization: Not on file     Attends meetings of clubs or organizations: Not on file     Relationship status: Not on file     Intimate partner violence     Fear of current or ex partner: Not on file     Emotionally abused: Not on file     Physically abused: Not on file     Forced sexual activity: Not on file   Other Topics Concern     Not on file   Social History Narrative     Not on file             Family History:   reviewed         Allergies:   No Known Allergies          Medications:     Current Outpatient Medications   Medication Sig Dispense Refill     amLODIPine (NORVASC) 5 MG tablet Take 1 tablet (5 mg) by mouth daily 90 tablet 0     simvastatin (ZOCOR) 40 MG tablet TAKE 1 TABLET AT BEDTIME.  MAKE AN APPOINTMENT FOR    FURTHER REFILLS. 90 tablet 3     amantadine (SYMMETREL) 50 MG/5ML solution Take 2.5 mLs by mouth daily.       ARIPiprazole, sensor, 2 MG TABS Take 1 tablet by mouth daily       Cholecalciferol (VITAMIN D3) 1000 UNITS CAPS Take 3 capsules by mouth daily 30 capsule      fexofenadine (ALLEGRA) 180 MG tablet Take 1 tablet by mouth daily. 90 tablet 0     lithium 300 MG tablet Take 300 mg by mouth 3 times daily.       Multiple Vitamins-Minerals (CENTRUM SILVER) per tablet Take 1 tablet by mouth daily.       UNABLE TO FIND MEDICATION NAME: focus select capsule, 2 daily                 Review of Systems:   The 10 point Review of Systems is negative other than noted in the HPI           Physical Exam:   Blood pressure 132/76, pulse 79, temperature 97  F (36.1  C), temperature source Oral, height 1.626 m (5' 4\"), weight 64 kg (141 lb), SpO2 97 %, not currently breastfeeding.    Exam:  Constitutional: healthy appearing, alert and in no distress  Heent: Normocephalic. Head without obvious masses or lesions. PERRLDC, EOMI. Mouth exam within normal limits: tongue, mucous membranes, posterior pharynx all normal, no lesions or " "abnormalities seen.  Tm's and canals within normal limits bilaterally. Neck supple, no nuchal rigidity or masses. No supraclavicular, or cervical adenopathy. Thyroid symmetric, no masses.  Cardiovascular: Regular rate and rhythm, no murmer, rub or gallops.  JVP not elevated, no edema.  Carotids within normal limits bilaterally, no bruits.  Respiratory: Normal respiratory effort.  Lungs clear, normal flow, no wheezing or crackles.  Gastrointestinal: Normal active bowel sounds.   Soft, not tender, no masses, guarding or rebound.  No hepatosplenomegaly.   Musculoskeletal: extremities normal, no gross deformities noted.  Skin: no suspicious lesions or rashes   Neurologic: Mental status within normal limits.  Speech fluent.  No gross motor abnormalities and gait intact.  Psychiatric: mentation appears normal and affect normal.         Data:   Labs sent        Assessment:   1. Normal complete physical exam  2. Bipolar, good control  3. Ckd, follow up labs  4. Low hemoglobin, thal, follow up labs  5. Elevated cholesterol, on statin  6. Hypertension, controlled  7. Hyperpara, high calcium, follow up labs  8. Low vit d, follow up labs  9. Dcis, becka  10. hcm         Plan:   Exercise, diet  Letter with labs  Up to date immunizations  Routine colon  Up to date mammogram      Suleiman Miller M.D.                Patient has been advised of split billing requirements and indicates understanding: Yes   Are you in the first 12 months of your Medicare coverage?  No    Healthy Habits:    In general, how would you rate your overall health?  Very good    Frequency of exercise:  2-3 days/week    Duration of exercise:  15-30 minutes    Do you usually eat at least 4 servings of fruit and vegetables a day, include whole grains    & fiber and avoid regularly eating high fat or \"junk\" foods?  Yes    Taking medications regularly:  No    Barriers to taking medications:  None    Medication side effects:  None    Ability to successfully perform " activities of daily living:  No assistance needed    Home Safety:  No safety concerns identified    Hearing Impairment:  No hearing concerns    In the past 6 months, have you been bothered by leaking of urine?  No    In general, how would you rate your overall mental or emotional health?  Good      PHQ-2 Total Score:    Additional concerns today:  No    Do you feel safe in your environment? Yes    Have you ever done Advance Care Planning? (For example, a Health Directive, POLST, or a discussion with a medical provider or your loved ones about your wishes): No, advance care planning information given to patient to review.  Patient plans to discuss their wishes with loved ones or provider.        Fall risk       Cognitive Screening   1) Repeat 3 items (Leader, Season, Table)    2) Clock draw: NORMAL  3) 3 item recall: Recalls 3 objects  Results: 3 items recalled: COGNITIVE IMPAIRMENT LESS LIKELY    Mini-CogTM Copyright S [employer]. Licensed by the author for use in [facility]; reprinted with permission (nela@[email or web address]). All rights reserved.      Do you have sleep apnea, excessive snoring or daytime drowsiness?: no    Reviewed and updated as needed this visit by clinical staff                 Reviewed and updated as needed this visit by Provider                Social History     Tobacco Use     Smoking status: Never Smoker     Smokeless tobacco: Never Used   Substance Use Topics     Alcohol use: No     Alcohol/week: 0.0 standard drinks     If you drink alcohol do you typically have >3 drinks per day or >7 drinks per week? No    Alcohol Use 10/18/2019   Prescreen: >3 drinks/day or >7 drinks/week? -   Prescreen: >3 drinks/day or >7 drinks/week? No               Current providers sharing in care for this patient include:   Patient Care Team:  Suleiman Miller MD as PCP - General (Internal Medicine)  Suleiman Miller MD as Assigned PCP    The following health maintenance items are reviewed in Epic and  "correct as of today:  Health Maintenance   Topic Date Due     PHQ-2  01/01/2020     ADVANCE CARE PLANNING  07/31/2020     INFLUENZA VACCINE (1) 09/01/2020     COLORECTAL CANCER SCREENING  10/15/2020     MEDICARE ANNUAL WELLNESS VISIT  10/18/2020     FALL RISK ASSESSMENT  10/18/2020     MAMMO SCREENING  11/06/2022     LIPID  10/15/2024     DTAP/TDAP/TD IMMUNIZATION (3 - Td) 08/08/2027     DEXA  12/12/2033     HEPATITIS C SCREENING  Completed     Pneumococcal Vaccine: Pediatrics (0 to 5 Years) and At-Risk Patients (6 to 64 Years)  Completed     Pneumococcal Vaccine: 65+ Years  Completed     ZOSTER IMMUNIZATION  Completed     IPV IMMUNIZATION  Aged Out     MENINGITIS IMMUNIZATION  Aged Out     HEPATITIS B IMMUNIZATION  Aged Out           Review of Systems      OBJECTIVE:   There were no vitals taken for this visit. Estimated body mass index is 24.64 kg/m  as calculated from the following:    Height as of 10/18/19: 1.605 m (5' 3.2\").    Weight as of 10/18/19: 63.5 kg (140 lb).  Physical Exam          ASSESSMENT / PLAN:       Patient has been advised of split billing requirements and indicates understanding: No  COUNSELING:  Reviewed preventive health counseling, as reflected in patient instructions       Regular exercise       Healthy diet/nutrition    Estimated body mass index is 24.64 kg/m  as calculated from the following:    Height as of 10/18/19: 1.605 m (5' 3.2\").    Weight as of 10/18/19: 63.5 kg (140 lb).        She reports that she has never smoked. She has never used smokeless tobacco.      Appropriate preventive services were discussed with this patient, including applicable screening as appropriate for cardiovascular disease, diabetes, osteopenia/osteoporosis, and glaucoma.  As appropriate for age/gender, discussed screening for colorectal cancer, prostate cancer, breast cancer, and cervical cancer. Checklist reviewing preventive services available has been given to the patient.    Reviewed patients plan of " care and provided an AVS. The Basic Care Plan (routine screening as documented in Health Maintenance) for Diana meets the Care Plan requirement. This Care Plan has been established and reviewed with the Patient.    Counseling Resources:  ATP IV Guidelines  Pooled Cohorts Equation Calculator  Breast Cancer Risk Calculator  Breast Cancer: Medication to Reduce Risk  FRAX Risk Assessment  ICSI Preventive Guidelines  Dietary Guidelines for Americans, 2010  USDA's MyPlate  ASA Prophylaxis  Lung CA Screening    Suleiman Miller MD  [facility]    Identified Health Risks:

## 2020-12-21 ENCOUNTER — OFFICE VISIT (OUTPATIENT)
Dept: FAMILY MEDICINE | Facility: CLINIC | Age: 70
End: 2020-12-21
Payer: MEDICARE

## 2020-12-21 VITALS
BODY MASS INDEX: 24.07 KG/M2 | DIASTOLIC BLOOD PRESSURE: 76 MMHG | OXYGEN SATURATION: 97 % | HEART RATE: 79 BPM | TEMPERATURE: 97 F | WEIGHT: 141 LBS | SYSTOLIC BLOOD PRESSURE: 132 MMHG | HEIGHT: 64 IN

## 2020-12-21 DIAGNOSIS — D56.3 HETEROZYGOUS THALASSEMIA: ICD-10-CM

## 2020-12-21 DIAGNOSIS — E55.9 VITAMIN D DEFICIENCY: ICD-10-CM

## 2020-12-21 DIAGNOSIS — D05.10 DUCTAL CARCINOMA IN SITU (DCIS) OF BREAST, UNSPECIFIED LATERALITY: ICD-10-CM

## 2020-12-21 DIAGNOSIS — Z79.899 DRUG THERAPY: ICD-10-CM

## 2020-12-21 DIAGNOSIS — Z00.00 ROUTINE GENERAL MEDICAL EXAMINATION AT A HEALTH CARE FACILITY: Primary | ICD-10-CM

## 2020-12-21 DIAGNOSIS — E83.52 HYPERCALCEMIA: ICD-10-CM

## 2020-12-21 DIAGNOSIS — R73.9 ELEVATED BLOOD SUGAR: ICD-10-CM

## 2020-12-21 DIAGNOSIS — N18.31 STAGE 3A CHRONIC KIDNEY DISEASE (H): ICD-10-CM

## 2020-12-21 DIAGNOSIS — I10 BENIGN ESSENTIAL HYPERTENSION: ICD-10-CM

## 2020-12-21 DIAGNOSIS — F31.9 BIPOLAR AFFECTIVE DISORDER, REMISSION STATUS UNSPECIFIED (H): ICD-10-CM

## 2020-12-21 DIAGNOSIS — Z12.11 SPECIAL SCREENING FOR MALIGNANT NEOPLASMS, COLON: ICD-10-CM

## 2020-12-21 DIAGNOSIS — E78.5 HYPERLIPIDEMIA LDL GOAL <130: ICD-10-CM

## 2020-12-21 DIAGNOSIS — E21.3 HYPERPARATHYROIDISM (H): ICD-10-CM

## 2020-12-21 DIAGNOSIS — E78.1 HYPERTRIGLYCERIDEMIA: ICD-10-CM

## 2020-12-21 LAB
BASOPHILS # BLD AUTO: 0.1 10E9/L (ref 0–0.2)
BASOPHILS NFR BLD AUTO: 0.5 %
DIFFERENTIAL METHOD BLD: ABNORMAL
EOSINOPHIL # BLD AUTO: 0.3 10E9/L (ref 0–0.7)
EOSINOPHIL NFR BLD AUTO: 3.5 %
ERYTHROCYTE [DISTWIDTH] IN BLOOD BY AUTOMATED COUNT: 16.6 % (ref 10–15)
HBA1C MFR BLD: 6.1 % (ref 0–5.6)
HCT VFR BLD AUTO: 31.6 % (ref 35–47)
HGB BLD-MCNC: 9.7 G/DL (ref 11.7–15.7)
LYMPHOCYTES # BLD AUTO: 1.6 10E9/L (ref 0.8–5.3)
LYMPHOCYTES NFR BLD AUTO: 17.3 %
MCH RBC QN AUTO: 21.9 PG (ref 26.5–33)
MCHC RBC AUTO-ENTMCNC: 30.7 G/DL (ref 31.5–36.5)
MCV RBC AUTO: 72 FL (ref 78–100)
MONOCYTES # BLD AUTO: 0.9 10E9/L (ref 0–1.3)
MONOCYTES NFR BLD AUTO: 9.1 %
NEUTROPHILS # BLD AUTO: 6.5 10E9/L (ref 1.6–8.3)
NEUTROPHILS NFR BLD AUTO: 69.6 %
PLATELET # BLD AUTO: 308 10E9/L (ref 150–450)
RBC # BLD AUTO: 4.42 10E12/L (ref 3.8–5.2)
WBC # BLD AUTO: 9.3 10E9/L (ref 4–11)

## 2020-12-21 PROCEDURE — 84443 ASSAY THYROID STIM HORMONE: CPT | Performed by: INTERNAL MEDICINE

## 2020-12-21 PROCEDURE — 80053 COMPREHEN METABOLIC PANEL: CPT | Performed by: INTERNAL MEDICINE

## 2020-12-21 PROCEDURE — 80178 ASSAY OF LITHIUM: CPT | Performed by: INTERNAL MEDICINE

## 2020-12-21 PROCEDURE — 36415 COLL VENOUS BLD VENIPUNCTURE: CPT | Performed by: INTERNAL MEDICINE

## 2020-12-21 PROCEDURE — 85025 COMPLETE CBC W/AUTO DIFF WBC: CPT | Performed by: INTERNAL MEDICINE

## 2020-12-21 PROCEDURE — 82306 VITAMIN D 25 HYDROXY: CPT | Performed by: INTERNAL MEDICINE

## 2020-12-21 PROCEDURE — G0439 PPPS, SUBSEQ VISIT: HCPCS | Performed by: INTERNAL MEDICINE

## 2020-12-21 PROCEDURE — 80061 LIPID PANEL: CPT | Performed by: INTERNAL MEDICINE

## 2020-12-21 PROCEDURE — 83036 HEMOGLOBIN GLYCOSYLATED A1C: CPT | Performed by: INTERNAL MEDICINE

## 2020-12-21 PROCEDURE — 82330 ASSAY OF CALCIUM: CPT | Performed by: INTERNAL MEDICINE

## 2020-12-21 RX ORDER — SIMVASTATIN 40 MG
TABLET ORAL
Qty: 90 TABLET | Refills: 3 | Status: SHIPPED | OUTPATIENT
Start: 2020-12-21 | End: 2022-02-03

## 2020-12-21 RX ORDER — AMLODIPINE BESYLATE 5 MG/1
5 TABLET ORAL DAILY
Qty: 90 TABLET | Refills: 0 | Status: SHIPPED | OUTPATIENT
Start: 2020-12-21 | End: 2021-06-02

## 2020-12-21 ASSESSMENT — ACTIVITIES OF DAILY LIVING (ADL): CURRENT_FUNCTION: NO ASSISTANCE NEEDED

## 2020-12-21 ASSESSMENT — MIFFLIN-ST. JEOR: SCORE: 1144.57

## 2020-12-22 LAB
ALBUMIN SERPL-MCNC: 3.8 G/DL (ref 3.4–5)
ALP SERPL-CCNC: 121 U/L (ref 40–150)
ALT SERPL W P-5'-P-CCNC: 29 U/L (ref 0–50)
ANION GAP SERPL CALCULATED.3IONS-SCNC: 7 MMOL/L (ref 3–14)
AST SERPL W P-5'-P-CCNC: 16 U/L (ref 0–45)
BILIRUB SERPL-MCNC: 0.5 MG/DL (ref 0.2–1.3)
BUN SERPL-MCNC: 34 MG/DL (ref 7–30)
CA-I SERPL ISE-MCNC: 5.4 MG/DL (ref 4.4–5.2)
CALCIUM SERPL-MCNC: 9.6 MG/DL (ref 8.5–10.1)
CHLORIDE SERPL-SCNC: 114 MMOL/L (ref 94–109)
CHOLEST SERPL-MCNC: 173 MG/DL
CO2 SERPL-SCNC: 19 MMOL/L (ref 20–32)
CREAT SERPL-MCNC: 1.8 MG/DL (ref 0.52–1.04)
DEPRECATED CALCIDIOL+CALCIFEROL SERPL-MC: 42 UG/L (ref 20–75)
GFR SERPL CREATININE-BSD FRML MDRD: 28 ML/MIN/{1.73_M2}
GLUCOSE SERPL-MCNC: 107 MG/DL (ref 70–99)
HDLC SERPL-MCNC: 56 MG/DL
LDLC SERPL CALC-MCNC: 82 MG/DL
LITHIUM SERPL-SCNC: 1 MMOL/L (ref 0.6–1.2)
NONHDLC SERPL-MCNC: 117 MG/DL
POTASSIUM SERPL-SCNC: 3.9 MMOL/L (ref 3.4–5.3)
PROT SERPL-MCNC: 7.5 G/DL (ref 6.8–8.8)
SODIUM SERPL-SCNC: 140 MMOL/L (ref 133–144)
TRIGL SERPL-MCNC: 176 MG/DL
TSH SERPL DL<=0.005 MIU/L-ACNC: 1.53 MU/L (ref 0.4–4)

## 2020-12-27 PROBLEM — N18.31 CHRONIC KIDNEY DISEASE, STAGE 3A (H): Status: ACTIVE | Noted: 2019-10-18

## 2020-12-28 NOTE — RESULT ENCOUNTER NOTE
It was nice to see you at your physical.  I apologize for the delay in reporting the results.  You should be able to view them all.    Your cbc or complete blood count shows the thalessemia which is causing the anemia, but nothing new.  We should check this yearly.  Your chemistry panel shows a slightly high sugar and the second diabetes test called the hemoglobin a1c which looks back to average your sugars over the last 3 months and is slightly high.  Please be sure to exercise and eat a healthy diet for this.    Your total cholesterol is good at 173.  Your hdl or good cholesterol is very good at 56 and the ldl or bad is also very good at 82.    Your thyroid test is normal and the vitamin D level is also normal.    A couple of things that are slightly off are the ionized calcium level and the kidney test or creatinine.  I suspect the latter is just lab variance but we need to follow up on this to make sure.  I would like to have you come back for a non fasting lab in the next 2 to 3 weeks to check the creatinine and also check your urine.  You do not need to see me for this or fast but please schedule a lab appointment on [facility].  The calcium level is high likely due to hyperparathyroidism.  Nothing needs to be done but we should follow this.    As you can see overall the tests are fine but please be sure to follow up in the next 2 to 3 weeks for the repeat labs.    If you have any questions please call me.    Suleiman Miller M.D.

## 2021-01-05 DIAGNOSIS — N18.31 STAGE 3A CHRONIC KIDNEY DISEASE (H): ICD-10-CM

## 2021-01-05 DIAGNOSIS — D56.3 HETEROZYGOUS THALASSEMIA: ICD-10-CM

## 2021-01-05 LAB
ALBUMIN UR-MCNC: 30 MG/DL
APPEARANCE UR: CLEAR
BACTERIA #/AREA URNS HPF: ABNORMAL /HPF
BILIRUB UR QL STRIP: NEGATIVE
COLOR UR AUTO: YELLOW
CREAT SERPL-MCNC: 1.64 MG/DL (ref 0.52–1.04)
GFR SERPL CREATININE-BSD FRML MDRD: 31 ML/MIN/{1.73_M2}
GLUCOSE UR STRIP-MCNC: NEGATIVE MG/DL
HGB BLD-MCNC: 10.4 G/DL (ref 11.7–15.7)
HGB UR QL STRIP: NEGATIVE
KETONES UR STRIP-MCNC: NEGATIVE MG/DL
LEUKOCYTE ESTERASE UR QL STRIP: ABNORMAL
NITRATE UR QL: NEGATIVE
NON-SQ EPI CELLS #/AREA URNS LPF: ABNORMAL /LPF
PH UR STRIP: 6 PH (ref 5–7)
RBC #/AREA URNS AUTO: ABNORMAL /HPF
SOURCE: ABNORMAL
SP GR UR STRIP: 1.01 (ref 1–1.03)
UROBILINOGEN UR STRIP-ACNC: 0.2 EU/DL (ref 0.2–1)
WBC #/AREA URNS AUTO: ABNORMAL /HPF

## 2021-01-05 PROCEDURE — 85018 HEMOGLOBIN: CPT | Performed by: INTERNAL MEDICINE

## 2021-01-05 PROCEDURE — 81001 URINALYSIS AUTO W/SCOPE: CPT | Performed by: INTERNAL MEDICINE

## 2021-01-05 PROCEDURE — 36415 COLL VENOUS BLD VENIPUNCTURE: CPT | Performed by: INTERNAL MEDICINE

## 2021-01-05 PROCEDURE — 82565 ASSAY OF CREATININE: CPT | Performed by: INTERNAL MEDICINE

## 2021-01-07 NOTE — RESULT ENCOUNTER NOTE
As you can see the kidney test and hemoglobin are both improved.  To be safe I would like to repeat them in 4 weeks.  Please schedule that via [facility].  You do not need to fast or see me for this.    If you have any questions please call me.    Suleiman Miller M.D.

## 2021-03-05 ENCOUNTER — IMMUNIZATION (OUTPATIENT)
Dept: NURSING | Facility: CLINIC | Age: 71
End: 2021-03-05
Payer: MEDICARE

## 2021-03-05 PROCEDURE — 91300 PR COVID VAC PFIZER DIL RECON 30 MCG/0.3 ML IM: CPT

## 2021-03-05 PROCEDURE — 0001A PR COVID VAC PFIZER DIL RECON 30 MCG/0.3 ML IM: CPT

## 2021-03-13 DIAGNOSIS — N18.31 STAGE 3A CHRONIC KIDNEY DISEASE (H): ICD-10-CM

## 2021-03-13 DIAGNOSIS — D56.3 HETEROZYGOUS THALASSEMIA: ICD-10-CM

## 2021-03-13 LAB
ALBUMIN UR-MCNC: ABNORMAL MG/DL
APPEARANCE UR: CLEAR
BILIRUB UR QL STRIP: NEGATIVE
COLOR UR AUTO: YELLOW
CREAT SERPL-MCNC: 1.41 MG/DL (ref 0.52–1.04)
GFR SERPL CREATININE-BSD FRML MDRD: 38 ML/MIN/{1.73_M2}
GLUCOSE UR STRIP-MCNC: NEGATIVE MG/DL
HGB UR QL STRIP: NEGATIVE
KETONES UR STRIP-MCNC: NEGATIVE MG/DL
LEUKOCYTE ESTERASE UR QL STRIP: ABNORMAL
NITRATE UR QL: NEGATIVE
PH UR STRIP: 6 PH (ref 5–7)
RBC #/AREA URNS AUTO: NORMAL /HPF
SOURCE: ABNORMAL
SP GR UR STRIP: 1.01 (ref 1–1.03)
UROBILINOGEN UR STRIP-ACNC: 0.2 EU/DL (ref 0.2–1)
WBC #/AREA URNS AUTO: NORMAL /HPF

## 2021-03-13 PROCEDURE — 99000 SPECIMEN HANDLING OFFICE-LAB: CPT | Performed by: INTERNAL MEDICINE

## 2021-03-13 PROCEDURE — 36415 COLL VENOUS BLD VENIPUNCTURE: CPT | Performed by: INTERNAL MEDICINE

## 2021-03-13 PROCEDURE — 83021 HEMOGLOBIN CHROMOTOGRAPHY: CPT | Mod: 90 | Performed by: INTERNAL MEDICINE

## 2021-03-13 PROCEDURE — 82565 ASSAY OF CREATININE: CPT | Performed by: INTERNAL MEDICINE

## 2021-03-13 PROCEDURE — 81001 URINALYSIS AUTO W/SCOPE: CPT | Performed by: INTERNAL MEDICINE

## 2021-03-17 LAB
HGB A1 MFR BLD: 97.4 % (ref 95–97.9)
HGB A2 MFR BLD: 2.3 % (ref 2–3.5)
HGB C MFR BLD: 0 % (ref 0–0)
HGB E MFR BLD: 0 % (ref 0–0)
HGB F MFR BLD: 0.3 % (ref 0–2.1)
HGB FRACT BLD ELPH-IMP: NORMAL
HGB OTHER MFR BLD: 0 % (ref 0–0)
HGB S BLD QL SOLY: NORMAL
HGB S MFR BLD: 0 % (ref 0–0)
PATH INTERP BLD-IMP: NORMAL

## 2021-03-26 ENCOUNTER — IMMUNIZATION (OUTPATIENT)
Dept: NURSING | Facility: CLINIC | Age: 71
End: 2021-03-26
Attending: INTERNAL MEDICINE
Payer: MEDICARE

## 2021-03-26 PROCEDURE — 91300 PR COVID VAC PFIZER DIL RECON 30 MCG/0.3 ML IM: CPT

## 2021-03-26 PROCEDURE — 0002A PR COVID VAC PFIZER DIL RECON 30 MCG/0.3 ML IM: CPT

## 2021-05-30 DIAGNOSIS — I10 BENIGN ESSENTIAL HYPERTENSION: ICD-10-CM

## 2021-06-02 RX ORDER — AMLODIPINE BESYLATE 5 MG/1
TABLET ORAL
Qty: 90 TABLET | Refills: 1 | Status: SHIPPED | OUTPATIENT
Start: 2021-06-02 | End: 2022-02-03

## 2021-06-02 NOTE — TELEPHONE ENCOUNTER
Routing refill request to provider for review/approval because:  Labs out of range:    Creatinine   Date Value Ref Range Status   03/13/2021 1.41 (H) 0.52 - 1.04 mg/dL Final   01/05/2021 1.64 (H) 0.52 - 1.04 mg/dL Final   12/21/2020 1.80 (H) 0.52 - 1.04 mg/dL Final   10/15/2019 1.16 (H) 0.52 - 1.04 mg/dL Final   10/23/2018 1.12 (H) 0.52 - 1.04 mg/dL Final   08/03/2018 1.15 (H) 0.52 - 1.04 mg/dL Final       Brianne Jordan RN

## 2021-06-10 ENCOUNTER — MYC MEDICAL ADVICE (OUTPATIENT)
Dept: FAMILY MEDICINE | Facility: CLINIC | Age: 71
End: 2021-06-10

## 2021-06-10 DIAGNOSIS — M79.621 PAIN OF RIGHT UPPER ARM: Primary | ICD-10-CM

## 2021-06-17 ENCOUNTER — THERAPY VISIT (OUTPATIENT)
Dept: PHYSICAL THERAPY | Facility: CLINIC | Age: 71
End: 2021-06-17
Attending: INTERNAL MEDICINE
Payer: MEDICARE

## 2021-06-17 DIAGNOSIS — M79.621 PAIN OF RIGHT UPPER ARM: ICD-10-CM

## 2021-06-17 DIAGNOSIS — M25.511 CHRONIC RIGHT SHOULDER PAIN: ICD-10-CM

## 2021-06-17 DIAGNOSIS — G89.29 CHRONIC RIGHT SHOULDER PAIN: ICD-10-CM

## 2021-06-17 PROCEDURE — 97161 PT EVAL LOW COMPLEX 20 MIN: CPT | Mod: GP | Performed by: PHYSICAL THERAPIST

## 2021-06-17 PROCEDURE — 97035 APP MDLTY 1+ULTRASOUND EA 15: CPT | Mod: GP | Performed by: PHYSICAL THERAPIST

## 2021-06-17 PROCEDURE — 97110 THERAPEUTIC EXERCISES: CPT | Mod: GP | Performed by: PHYSICAL THERAPIST

## 2021-06-17 NOTE — LETTER
DEPARTMENT OF HEALTH AND HUMAN SERVICES  CENTERS FOR MEDICARE & MEDICAID SERVICES    PLAN/UPDATED PLAN OF PROGRESS FOR OUTPATIENT REHABILITATION      PATIENTS NAME:  Diana Santiago   : 1950    PROVIDER NUMBER:    [id number]    [id number]     PROVIDER NAME: [facility]    MEDICAL RECORD NUMBER: [id number]     START OF CARE DATE:  SOC Date: 21   TYPE:  PT    PRIMARY/TREATMENT DIAGNOSIS: (Pertinent Medical Diagnosis)     Pain of right upper arm  Chronic right shoulder pain    VISITS FROM START OF CARE:  Rxs Used: 1     Physical Therapy Initial Examination/Evaluation  2021  Diana Santiago  is a 70 year old  female referred to physical therapy by Dr. Suleiman Goldsmith for treatment of R shoulder pain.  Pertinent medical history includes: cancer, high blood pressure, incontinence and mental illness.   Red flags:  Pain at rest/night.   Surgeries include:  Cancer surgery.    Current medications:  High blood pressure medication (Anti pschotic).    Current occupation is None.   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     DOI/onset 3-17-21  Mechanism of injury Patient reports onset of R upper arm pain after receiving her first covid vaccination in March.  DOS n/a  Prior treatment none.     Chief Complaint:   Ongoing pain.   Pain location: R deltoid area  Quality: aching  Constant/Intermittent: intermittent  Time of day: no time pattern   Symptoms have remained about the same since onset.    Current pain 3/10.  Pain at best 1/10.  Pain at worst 5/10.    Symptoms aggravated by reaching overhead and behind back. Lifting with R arm    Symptoms improved with rest.     Social history:  Lives alone in an apartment.    Occupation: none.     Patient having difficulty with ADLs: laundry, grocery shopping, dressing.    Patient's goals are to reduce pain.      PATIENTS NAME:  Diana Santiago   : 1950    Patient reports general health as good.  Related medical  history hx of R non surgical sh fx in . Has no use of her L hand due to a C2 fx in   Surgical History:  none.    Imaging: none.    Medications:  none.    Return to MD:  As needed.      Clinical Impression: Patient will hopefully benefit from continued PT intervention for her R shoulder/upper arm pain. PT will include manual therapy, exercise, and ultrasound as indicated.    Subjective:  HPI                 Objective:  Standing Alignment:    Shoulder/UE:  Rounded shoulders  Flexibility/Screens:   Positive screens:  Shoulder  Shoulder Evaluation:  ROM:  AROM:    Flexion:  Left:  140    Right:  135  Abduction:  Left: 145   Right:  145  Internal Rotation:  Right:  35  External Rotation:  Right:  70  Strength:    Flexion: Right: 4/5     Pain:   Abduction:  Right: 4-/5      Pain:+  Internal Rotation:  Right: 4/5     Pain:  External Rotation:   Right:4/5     Pain:    Stability Testing:  normal  Special Tests:    Right shoulder negative for the following special tests:Labral; Impingement and Rotator cuff tear  Palpation:    Right shoulder tenderness present at: Deltoid  Mobility Tests:  normal  General   ROS    Assessment/Plan:    Patient is a 70 year old female with right side shoulder complaints.    Patient has the following significant findings with corresponding treatment plan.                Diagnosis 1:  R shoulder pain  Pain -  US, manual therapy, self management and education  Decreased ROM/flexibility - manual therapy and therapeutic exercise  Decreased strength - therapeutic exercise and therapeutic activities  Impaired muscle performance - neuro re-education  Decreased function - therapeutic activities  PATIENTS NAME:  Diana Santiago   : 1950    Therapy Evaluation Codes:   1) History comprised of:   Personal factors that impact the plan of care:      None.    Comorbidity factors that impact the plan of care are:      None.     Medications impacting care: None.  2) Examination of Body Systems  "comprised of:   Body structures and functions that impact the plan of care:      Shoulder.   Activity limitations that impact the plan of care are:      Dressing and Lifting.  3) Clinical presentation characteristics are:   Stable/Uncomplicated.  4) Decision-Making    Low complexity using standardized patient assessment instrument and/or measureable assessment of functional outcome.  Cumulative Therapy Evaluation is: Low complexity.    Communication ability:  Patient appears to be able to clearly communicate and understand verbal and written communication and follow directions correctly.  Treatment Explanation - The following has been discussed with the patient:   RX ordered/plan of care  Anticipated outcomes  Possible risks and side effects  This patient would benefit from PT intervention to resume normal activities.   Rehab potential is good.  Frequency:  1 X week, once daily  Duration:  for 6 weeks  Discharge Plan:  Achieve all LTG.  Independent in home treatment program.  Reach maximal therapeutic benefit.      Caregiver Signature/Credentials _____________________________ Date ________       Treating Provider: Selina Long PT   I have reviewed and certified the need for these services and plan of treatment while under my care.      PHYSICIAN'S SIGNATURE:   ____________________________________  Date___________                   Suleiman Miller MD    Certification period:  Beginning of Cert date period: 06/17/21 to  End of Cert period date: 09/14/21     Functional Level Progress Report: Please see attached \"Goal Flow sheet for Functional level.\"    ____X____ Continue Services or ________ DC Services                Service dates: From  SOC Date: 06/17/21 date to present                         "

## 2021-06-17 NOTE — PROGRESS NOTES
Physical Therapy Initial Evaluation    Physical Therapy Initial Examination/Evaluation  June 17, 2021    Diana Santiago  is a 70 year old  female referred to physical therapy by Dr. Suleiman Goldsmith for treatment of R shoulder pain.      DOI/onset 3-17-21  Mechanism of injury Patient reports onset of R upper arm pain after receiving her first covid vaccination in March.  DOS n/a  Prior treatment none.     Chief Complaint:   Ongoing pain.   Pain location: R deltoid area  Quality: aching  Constant/Intermittent: intermittent  Time of day: no time pattern   Symptoms have remained about the same since onset.    Current pain 3/10.  Pain at best 1/10.  Pain at worst 5/10.    Symptoms aggravated by reaching overhead and behind back. Lifting with R arm    Symptoms improved with rest.     Social history:  Lives alone in an apartment.    Occupation: none.     Patient having difficulty with ADLs: laundry, grocery shopping, dressing.    Patient's goals are to reduce pain.    Patient reports general health as good.  Related medical history hx of R non surgical sh fx in 2006. Has no use of her L hand due to a C2 fx in 1972  Surgical History:  none.    Imaging: none.    Medications:  none.       Return to MD:  As needed.      Clinical Impression: Patient will hopefully benefit from continued PT intervention for her R shoulder/upper arm pain. PT will include manual therapy, exercise, and ultrasound as indicated.    Subjective:  HPI                    Objective:  Standing Alignment:      Shoulder/UE:  Rounded shoulders                  Flexibility/Screens:   Positive screens:  Shoulder                             Shoulder Evaluation:  ROM:  AROM:    Flexion:  Left:  140    Right:  135    Abduction:  Left: 145   Right:  145    Internal Rotation:  Right:  35  External Rotation:  Right:  70                      Strength:    Flexion: Right: 4/5     Pain:     Abduction:  Right: 4-/5      Pain:+    Internal Rotation:  Right: 4/5      Pain:  External Rotation:   Right:4/5     Pain:            Stability Testing:  normal      Special Tests:        Right shoulder negative for the following special tests:Labral; Impingement and Rotator cuff tear  Palpation:      Right shoulder tenderness present at: Deltoid  Mobility Tests:  normal                                                 General     ROS    Assessment/Plan:    Patient is a 70 year old female with right side shoulder complaints.    Patient has the following significant findings with corresponding treatment plan.                Diagnosis 1:  R shoulder pain  Pain -  US, manual therapy, self management and education  Decreased ROM/flexibility - manual therapy and therapeutic exercise  Decreased strength - therapeutic exercise and therapeutic activities  Impaired muscle performance - neuro re-education  Decreased function - therapeutic activities    Therapy Evaluation Codes:   1) History comprised of:   Personal factors that impact the plan of care:      None.    Comorbidity factors that impact the plan of care are:      None.     Medications impacting care: None.  2) Examination of Body Systems comprised of:   Body structures and functions that impact the plan of care:      Shoulder.   Activity limitations that impact the plan of care are:      Dressing and Lifting.  3) Clinical presentation characteristics are:   Stable/Uncomplicated.  4) Decision-Making    Low complexity using standardized patient assessment instrument and/or measureable assessment of functional outcome.  Cumulative Therapy Evaluation is: Low complexity.    Previous and current functional limitations:  (See Goal Flow Sheet for this information)    Short term and Long term goals: (See Goal Flow Sheet for this information)     Communication ability:  Patient appears to be able to clearly communicate and understand verbal and written communication and follow directions correctly.  Treatment Explanation - The following has been  discussed with the patient:   RX ordered/plan of care  Anticipated outcomes  Possible risks and side effects  This patient would benefit from PT intervention to resume normal activities.   Rehab potential is good.    Frequency:  1 X week, once daily  Duration:  for 6 weeks  Discharge Plan:  Achieve all LTG.  Independent in home treatment program.  Reach maximal therapeutic benefit.    Please refer to the daily flowsheet for treatment today, total treatment time and time spent performing 1:1 timed codes.

## 2021-06-18 NOTE — PROGRESS NOTES
Physical Therapy Initial Evaluation  Subjective:    Patient Health History             Pertinent medical history includes: cancer, high blood pressure, incontinence and mental illness.   Red flags:  Pain at rest/night.     Surgeries include:  Cancer surgery.    Current medications:  High blood pressure medication (Anti pschotic).    Current occupation is None.   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                                    Objective:  System    Physical Exam    General     ROS    Assessment/Plan:

## 2021-06-23 ENCOUNTER — THERAPY VISIT (OUTPATIENT)
Dept: PHYSICAL THERAPY | Facility: CLINIC | Age: 71
End: 2021-06-23
Payer: MEDICARE

## 2021-06-23 DIAGNOSIS — M25.511 CHRONIC RIGHT SHOULDER PAIN: Primary | ICD-10-CM

## 2021-06-23 DIAGNOSIS — G89.29 CHRONIC RIGHT SHOULDER PAIN: Primary | ICD-10-CM

## 2021-06-23 PROCEDURE — 97140 MANUAL THERAPY 1/> REGIONS: CPT | Mod: GP | Performed by: PHYSICAL THERAPIST

## 2021-06-23 PROCEDURE — 97035 APP MDLTY 1+ULTRASOUND EA 15: CPT | Mod: GP | Performed by: PHYSICAL THERAPIST

## 2021-06-23 PROCEDURE — 97110 THERAPEUTIC EXERCISES: CPT | Mod: GP | Performed by: PHYSICAL THERAPIST

## 2021-06-30 ENCOUNTER — THERAPY VISIT (OUTPATIENT)
Dept: PHYSICAL THERAPY | Facility: CLINIC | Age: 71
End: 2021-06-30
Payer: MEDICARE

## 2021-06-30 DIAGNOSIS — M25.511 CHRONIC RIGHT SHOULDER PAIN: ICD-10-CM

## 2021-06-30 DIAGNOSIS — G89.29 CHRONIC RIGHT SHOULDER PAIN: ICD-10-CM

## 2021-06-30 PROCEDURE — 97110 THERAPEUTIC EXERCISES: CPT | Mod: GP | Performed by: PHYSICAL THERAPIST

## 2021-06-30 PROCEDURE — 97035 APP MDLTY 1+ULTRASOUND EA 15: CPT | Mod: GP | Performed by: PHYSICAL THERAPIST

## 2021-06-30 PROCEDURE — 97140 MANUAL THERAPY 1/> REGIONS: CPT | Mod: GP | Performed by: PHYSICAL THERAPIST

## 2021-07-22 ENCOUNTER — THERAPY VISIT (OUTPATIENT)
Dept: PHYSICAL THERAPY | Facility: CLINIC | Age: 71
End: 2021-07-22
Payer: MEDICARE

## 2021-07-22 DIAGNOSIS — G89.29 CHRONIC RIGHT SHOULDER PAIN: ICD-10-CM

## 2021-07-22 DIAGNOSIS — M25.511 CHRONIC RIGHT SHOULDER PAIN: ICD-10-CM

## 2021-07-22 PROCEDURE — 97140 MANUAL THERAPY 1/> REGIONS: CPT | Mod: GP | Performed by: PHYSICAL THERAPIST

## 2021-07-22 PROCEDURE — 97110 THERAPEUTIC EXERCISES: CPT | Mod: GP | Performed by: PHYSICAL THERAPIST

## 2021-07-22 PROCEDURE — 97035 APP MDLTY 1+ULTRASOUND EA 15: CPT | Mod: GP | Performed by: PHYSICAL THERAPIST

## 2021-07-22 NOTE — PROGRESS NOTES
Subjective:  HPI  Physical Exam                    Objective:  System    Physical Exam    General     ROS    Assessment/Plan:    DISCHARGE REPORT    Progress reporting period is from 6-17-21 to 7-22-21.       SUBJECTIVE  Subjective changes noted by patient:   Pt. has made good progress in PT with a moderate decrease in R shoulder pain and improved ROM and strength. Pt. can now perform all of her normal daily activities with minimal pain. Pt. will continue her HEP with no further PT visits planned unless increased problems arise.       Current pain level is 2/10  .     Previous pain level was  5/10  .   Changes in function:  Yes (See Goal flowsheet attached for changes in current functional level)  Adverse reaction to treatment or activity: None    OBJECTIVE  Changes noted in objective findings:  AROM R sh flex 146; abd 150. Strength R sh flex 4+/5; abd 4/5; ER 4+/5     ASSESSMENT/PLAN  Updated problem list and treatment plan: Diagnosis 1:  R shoulder pain  Pain -  US, manual therapy, self management and education  Decreased ROM/flexibility - manual therapy and therapeutic exercise  Decreased strength - therapeutic exercise and therapeutic activities  Impaired muscle performance - neuro re-education  Decreased function - therapeutic activities  STG/LTGs have been met or progress has been made towards goals:  Yes (See Goal flow sheet completed today.)  Assessment of Progress: Patient is meeting short term goals and is progressing towards long term goals.  Self Management Plans:  Patient is independent in a home treatment program.  Patient is independent in self management of symptoms.  I have re-evaluated this patient and find that the nature, scope, duration and intensity of the therapy is appropriate for the medical condition of the patient.  Diana continues to require the following intervention to meet STG and LTG's:  PT intervention is no longer required to meet STG/LTG.    Recommendations:  This patient is ready  to be discharged from therapy and continue their home treatment program.    Please refer to the daily flowsheet for treatment today, total treatment time and time spent performing 1:1 timed codes.

## 2021-09-25 ENCOUNTER — HEALTH MAINTENANCE LETTER (OUTPATIENT)
Age: 71
End: 2021-09-25

## 2021-11-08 ENCOUNTER — APPOINTMENT (OUTPATIENT)
Dept: GENERAL RADIOLOGY | Facility: CLINIC | Age: 71
DRG: 917 | End: 2021-11-08
Attending: EMERGENCY MEDICINE
Payer: MEDICARE

## 2021-11-08 ENCOUNTER — HOSPITAL ENCOUNTER (INPATIENT)
Facility: CLINIC | Age: 71
LOS: 4 days | Discharge: HOME HEALTH | DRG: 917 | End: 2021-11-12
Attending: EMERGENCY MEDICINE | Admitting: STUDENT IN AN ORGANIZED HEALTH CARE EDUCATION/TRAINING PROGRAM
Payer: MEDICARE

## 2021-11-08 DIAGNOSIS — T56.891A LITHIUM TOXICITY, ACCIDENTAL OR UNINTENTIONAL, INITIAL ENCOUNTER: ICD-10-CM

## 2021-11-08 DIAGNOSIS — D64.9 CHRONIC ANEMIA: ICD-10-CM

## 2021-11-08 DIAGNOSIS — N17.9 ACUTE KIDNEY INJURY (H): ICD-10-CM

## 2021-11-08 DIAGNOSIS — F31.9 BIPOLAR AFFECTIVE DISORDER, REMISSION STATUS UNSPECIFIED (H): Primary | ICD-10-CM

## 2021-11-08 DIAGNOSIS — Z76.89 HEALTH CARE HOME: ICD-10-CM

## 2021-11-08 LAB
ALBUMIN UR-MCNC: 10 MG/DL
ANION GAP SERPL CALCULATED.3IONS-SCNC: 5 MMOL/L (ref 3–14)
APPEARANCE UR: CLEAR
BACTERIA #/AREA URNS HPF: ABNORMAL /HPF
BASOPHILS # BLD AUTO: 0.1 10E3/UL (ref 0–0.2)
BASOPHILS NFR BLD AUTO: 1 %
BILIRUB UR QL STRIP: NEGATIVE
BUN SERPL-MCNC: 24 MG/DL (ref 7–30)
CALCIUM SERPL-MCNC: 10.4 MG/DL (ref 8.5–10.1)
CHLORIDE BLD-SCNC: 112 MMOL/L (ref 94–109)
CO2 SERPL-SCNC: 21 MMOL/L (ref 20–32)
COLOR UR AUTO: ABNORMAL
CREAT SERPL-MCNC: 2.22 MG/DL (ref 0.52–1.04)
EOSINOPHIL # BLD AUTO: 0.1 10E3/UL (ref 0–0.7)
EOSINOPHIL NFR BLD AUTO: 1 %
ERYTHROCYTE [DISTWIDTH] IN BLOOD BY AUTOMATED COUNT: 15.4 % (ref 10–15)
GFR SERPL CREATININE-BSD FRML MDRD: 22 ML/MIN/1.73M2
GLUCOSE BLD-MCNC: 113 MG/DL (ref 70–99)
GLUCOSE UR STRIP-MCNC: NEGATIVE MG/DL
HCT VFR BLD AUTO: 33 % (ref 35–47)
HGB BLD-MCNC: 9.2 G/DL (ref 11.7–15.7)
HGB UR QL STRIP: NEGATIVE
IMM GRANULOCYTES # BLD: 0 10E3/UL
IMM GRANULOCYTES NFR BLD: 0 %
KETONES UR STRIP-MCNC: NEGATIVE MG/DL
LEUKOCYTE ESTERASE UR QL STRIP: ABNORMAL
LITHIUM SERPL-SCNC: 1.9 MMOL/L
LYMPHOCYTES # BLD AUTO: 1.1 10E3/UL (ref 0.8–5.3)
LYMPHOCYTES NFR BLD AUTO: 11 %
MCH RBC QN AUTO: 21 PG (ref 26.5–33)
MCHC RBC AUTO-ENTMCNC: 27.9 G/DL (ref 31.5–36.5)
MCV RBC AUTO: 75 FL (ref 78–100)
MONOCYTES # BLD AUTO: 0.7 10E3/UL (ref 0–1.3)
MONOCYTES NFR BLD AUTO: 7 %
NEUTROPHILS # BLD AUTO: 7.8 10E3/UL (ref 1.6–8.3)
NEUTROPHILS NFR BLD AUTO: 80 %
NITRATE UR QL: NEGATIVE
NRBC # BLD AUTO: 0 10E3/UL
NRBC BLD AUTO-RTO: 0 /100
PH UR STRIP: 6 [PH] (ref 5–7)
PLATELET # BLD AUTO: 419 10E3/UL (ref 150–450)
POTASSIUM BLD-SCNC: 4.3 MMOL/L (ref 3.4–5.3)
RBC # BLD AUTO: 4.38 10E6/UL (ref 3.8–5.2)
RBC URINE: 6 /HPF
SARS-COV-2 RNA RESP QL NAA+PROBE: NEGATIVE
SODIUM SERPL-SCNC: 138 MMOL/L (ref 133–144)
SP GR UR STRIP: 1.01 (ref 1–1.03)
SQUAMOUS EPITHELIAL: 11 /HPF
TSH SERPL DL<=0.005 MIU/L-ACNC: 0.81 MU/L (ref 0.4–4)
UROBILINOGEN UR STRIP-MCNC: NORMAL MG/DL
WBC # BLD AUTO: 9.8 10E3/UL (ref 4–11)
WBC URINE: 24 /HPF

## 2021-11-08 PROCEDURE — 80178 ASSAY OF LITHIUM: CPT | Performed by: EMERGENCY MEDICINE

## 2021-11-08 PROCEDURE — 258N000003 HC RX IP 258 OP 636: Performed by: EMERGENCY MEDICINE

## 2021-11-08 PROCEDURE — 120N000001 HC R&B MED SURG/OB

## 2021-11-08 PROCEDURE — C9803 HOPD COVID-19 SPEC COLLECT: HCPCS

## 2021-11-08 PROCEDURE — 82374 ASSAY BLOOD CARBON DIOXIDE: CPT | Performed by: EMERGENCY MEDICINE

## 2021-11-08 PROCEDURE — 93005 ELECTROCARDIOGRAM TRACING: CPT

## 2021-11-08 PROCEDURE — 250N000011 HC RX IP 250 OP 636: Performed by: EMERGENCY MEDICINE

## 2021-11-08 PROCEDURE — 71046 X-RAY EXAM CHEST 2 VIEWS: CPT

## 2021-11-08 PROCEDURE — 96365 THER/PROPH/DIAG IV INF INIT: CPT

## 2021-11-08 PROCEDURE — 87086 URINE CULTURE/COLONY COUNT: CPT | Performed by: EMERGENCY MEDICINE

## 2021-11-08 PROCEDURE — 81001 URINALYSIS AUTO W/SCOPE: CPT | Performed by: EMERGENCY MEDICINE

## 2021-11-08 PROCEDURE — 999N000128 HC STATISTIC PERIPHERAL IV START W/O US GUIDANCE

## 2021-11-08 PROCEDURE — 36415 COLL VENOUS BLD VENIPUNCTURE: CPT | Performed by: EMERGENCY MEDICINE

## 2021-11-08 PROCEDURE — 250N000013 HC RX MED GY IP 250 OP 250 PS 637: Performed by: STUDENT IN AN ORGANIZED HEALTH CARE EDUCATION/TRAINING PROGRAM

## 2021-11-08 PROCEDURE — 96361 HYDRATE IV INFUSION ADD-ON: CPT

## 2021-11-08 PROCEDURE — 84443 ASSAY THYROID STIM HORMONE: CPT | Performed by: EMERGENCY MEDICINE

## 2021-11-08 PROCEDURE — 99223 1ST HOSP IP/OBS HIGH 75: CPT | Mod: AI | Performed by: STUDENT IN AN ORGANIZED HEALTH CARE EDUCATION/TRAINING PROGRAM

## 2021-11-08 PROCEDURE — U0003 INFECTIOUS AGENT DETECTION BY NUCLEIC ACID (DNA OR RNA); SEVERE ACUTE RESPIRATORY SYNDROME CORONAVIRUS 2 (SARS-COV-2) (CORONAVIRUS DISEASE [COVID-19]), AMPLIFIED PROBE TECHNIQUE, MAKING USE OF HIGH THROUGHPUT TECHNOLOGIES AS DESCRIBED BY CMS-2020-01-R: HCPCS | Performed by: EMERGENCY MEDICINE

## 2021-11-08 PROCEDURE — 258N000003 HC RX IP 258 OP 636: Performed by: STUDENT IN AN ORGANIZED HEALTH CARE EDUCATION/TRAINING PROGRAM

## 2021-11-08 PROCEDURE — 85004 AUTOMATED DIFF WBC COUNT: CPT | Performed by: EMERGENCY MEDICINE

## 2021-11-08 PROCEDURE — 99285 EMERGENCY DEPT VISIT HI MDM: CPT | Mod: 25

## 2021-11-08 RX ORDER — AMOXICILLIN 250 MG
2 CAPSULE ORAL 2 TIMES DAILY PRN
Status: DISCONTINUED | OUTPATIENT
Start: 2021-11-08 | End: 2021-11-12 | Stop reason: HOSPADM

## 2021-11-08 RX ORDER — PROCHLORPERAZINE MALEATE 5 MG
5 TABLET ORAL EVERY 6 HOURS PRN
Status: DISCONTINUED | OUTPATIENT
Start: 2021-11-08 | End: 2021-11-12 | Stop reason: HOSPADM

## 2021-11-08 RX ORDER — SODIUM CHLORIDE 9 MG/ML
INJECTION, SOLUTION INTRAVENOUS CONTINUOUS
Status: ACTIVE | OUTPATIENT
Start: 2021-11-08 | End: 2021-11-08

## 2021-11-08 RX ORDER — PROCHLORPERAZINE 25 MG
12.5 SUPPOSITORY, RECTAL RECTAL EVERY 12 HOURS PRN
Status: DISCONTINUED | OUTPATIENT
Start: 2021-11-08 | End: 2021-11-12 | Stop reason: HOSPADM

## 2021-11-08 RX ORDER — BISACODYL 10 MG
10 SUPPOSITORY, RECTAL RECTAL DAILY PRN
Status: DISCONTINUED | OUTPATIENT
Start: 2021-11-08 | End: 2021-11-12 | Stop reason: HOSPADM

## 2021-11-08 RX ORDER — SODIUM CHLORIDE 9 MG/ML
INJECTION, SOLUTION INTRAVENOUS CONTINUOUS
Status: ACTIVE | OUTPATIENT
Start: 2021-11-08 | End: 2021-11-09

## 2021-11-08 RX ORDER — AMANTADINE HYDROCHLORIDE 50 MG/5ML
25 SOLUTION ORAL DAILY
Status: DISCONTINUED | OUTPATIENT
Start: 2021-11-08 | End: 2021-11-12 | Stop reason: HOSPADM

## 2021-11-08 RX ORDER — AMLODIPINE BESYLATE 5 MG/1
5 TABLET ORAL DAILY
Status: DISCONTINUED | OUTPATIENT
Start: 2021-11-08 | End: 2021-11-12 | Stop reason: HOSPADM

## 2021-11-08 RX ORDER — SIMVASTATIN 40 MG
40 TABLET ORAL AT BEDTIME
Status: DISCONTINUED | OUTPATIENT
Start: 2021-11-08 | End: 2021-11-12 | Stop reason: HOSPADM

## 2021-11-08 RX ORDER — ACETAMINOPHEN 650 MG/1
650 SUPPOSITORY RECTAL EVERY 6 HOURS PRN
Status: DISCONTINUED | OUTPATIENT
Start: 2021-11-08 | End: 2021-11-12 | Stop reason: HOSPADM

## 2021-11-08 RX ORDER — POLYETHYLENE GLYCOL 3350 17 G/17G
17 POWDER, FOR SOLUTION ORAL DAILY PRN
Status: DISCONTINUED | OUTPATIENT
Start: 2021-11-08 | End: 2021-11-12 | Stop reason: HOSPADM

## 2021-11-08 RX ORDER — ACETAMINOPHEN 325 MG/1
650 TABLET ORAL EVERY 6 HOURS PRN
Status: DISCONTINUED | OUTPATIENT
Start: 2021-11-08 | End: 2021-11-12 | Stop reason: HOSPADM

## 2021-11-08 RX ORDER — CEFTRIAXONE 2 G/1
2 INJECTION, POWDER, FOR SOLUTION INTRAMUSCULAR; INTRAVENOUS EVERY 24 HOURS
Status: DISCONTINUED | OUTPATIENT
Start: 2021-11-09 | End: 2021-11-11

## 2021-11-08 RX ORDER — ONDANSETRON 2 MG/ML
4 INJECTION INTRAMUSCULAR; INTRAVENOUS EVERY 6 HOURS PRN
Status: DISCONTINUED | OUTPATIENT
Start: 2021-11-08 | End: 2021-11-12 | Stop reason: HOSPADM

## 2021-11-08 RX ORDER — ONDANSETRON 4 MG/1
4 TABLET, ORALLY DISINTEGRATING ORAL EVERY 6 HOURS PRN
Status: DISCONTINUED | OUTPATIENT
Start: 2021-11-08 | End: 2021-11-12 | Stop reason: HOSPADM

## 2021-11-08 RX ORDER — LIDOCAINE 40 MG/G
CREAM TOPICAL
Status: DISCONTINUED | OUTPATIENT
Start: 2021-11-08 | End: 2021-11-12 | Stop reason: HOSPADM

## 2021-11-08 RX ORDER — FEXOFENADINE HCL 180 MG/1
180 TABLET ORAL DAILY
Status: DISCONTINUED | OUTPATIENT
Start: 2021-11-08 | End: 2021-11-12 | Stop reason: HOSPADM

## 2021-11-08 RX ORDER — AMOXICILLIN 250 MG
1 CAPSULE ORAL 2 TIMES DAILY PRN
Status: DISCONTINUED | OUTPATIENT
Start: 2021-11-08 | End: 2021-11-12 | Stop reason: HOSPADM

## 2021-11-08 RX ORDER — MULTIPLE VITAMINS W/ MINERALS TAB 9MG-400MCG
1 TAB ORAL DAILY
Status: DISCONTINUED | OUTPATIENT
Start: 2021-11-08 | End: 2021-11-12 | Stop reason: HOSPADM

## 2021-11-08 RX ORDER — CEFTRIAXONE 1 G/1
1 INJECTION, POWDER, FOR SOLUTION INTRAMUSCULAR; INTRAVENOUS ONCE
Status: COMPLETED | OUTPATIENT
Start: 2021-11-08 | End: 2021-11-08

## 2021-11-08 RX ADMIN — SIMVASTATIN 40 MG: 40 TABLET, FILM COATED ORAL at 21:48

## 2021-11-08 RX ADMIN — ARIPIPRAZOLE 1 MG: 2 TABLET ORAL at 21:48

## 2021-11-08 RX ADMIN — MULTIPLE VITAMINS W/ MINERALS TAB 1 TABLET: TAB at 21:48

## 2021-11-08 RX ADMIN — AMLODIPINE BESYLATE 5 MG: 5 TABLET ORAL at 21:45

## 2021-11-08 RX ADMIN — AMANTADINE HYDROCHLORIDE 25 MG: 50 SOLUTION ORAL at 21:50

## 2021-11-08 RX ADMIN — SODIUM CHLORIDE 1000 ML: 9 INJECTION, SOLUTION INTRAVENOUS at 10:41

## 2021-11-08 RX ADMIN — FEXOFENADINE HYDROCHLORIDE 180 MG: 180 TABLET ORAL at 21:49

## 2021-11-08 RX ADMIN — CEFTRIAXONE SODIUM 1 G: 1 INJECTION, POWDER, FOR SOLUTION INTRAMUSCULAR; INTRAVENOUS at 12:50

## 2021-11-08 RX ADMIN — SODIUM CHLORIDE: 9 INJECTION, SOLUTION INTRAVENOUS at 13:55

## 2021-11-08 RX ADMIN — SODIUM CHLORIDE: 9 INJECTION, SOLUTION INTRAVENOUS at 23:10

## 2021-11-08 ASSESSMENT — ENCOUNTER SYMPTOMS
HEADACHES: 0
APPETITE CHANGE: 0
FEVER: 0
WEAKNESS: 1
VOMITING: 0
SORE THROAT: 0
ABDOMINAL PAIN: 0
DIARRHEA: 1
COUGH: 0
CHILLS: 0

## 2021-11-08 ASSESSMENT — ACTIVITIES OF DAILY LIVING (ADL)
ADLS_ACUITY_SCORE: 8
WEAR_GLASSES_OR_BLIND: YES
CONCENTRATING,_REMEMBERING_OR_MAKING_DECISIONS_DIFFICULTY: YES
EQUIPMENT_CURRENTLY_USED_AT_HOME: WALKER, ROLLING
FALL_HISTORY_WITHIN_LAST_SIX_MONTHS: NO
ADLS_ACUITY_SCORE: 8
TOILETING_ISSUES: YES
ADLS_ACUITY_SCORE: 17
PATIENT_/_FAMILY_COMMUNICATION_STYLE: SPOKEN LANGUAGE (ENGLISH OR BILINGUAL)
ADLS_ACUITY_SCORE: 17
ADLS_ACUITY_SCORE: 8
WHICH_OF_THE_ABOVE_FUNCTIONAL_RISKS_HAD_A_RECENT_ONSET_OR_CHANGE?: AMBULATION
WALKING_OR_CLIMBING_STAIRS_DIFFICULTY: YES
HEARING_DIFFICULTY_OR_DEAF: NO
DIFFICULTY_COMMUNICATING: NO
ADLS_ACUITY_SCORE: 8
DIFFICULTY_EATING/SWALLOWING: NO
ADLS_ACUITY_SCORE: 17
WALKING_OR_CLIMBING_STAIRS: AMBULATION DIFFICULTY, REQUIRES EQUIPMENT
TOILETING_ASSISTANCE: TOILETING DIFFICULTY, REQUIRES EQUIPMENT
DOING_ERRANDS_INDEPENDENTLY_DIFFICULTY: NO
ADLS_ACUITY_SCORE: 17
TOILETING_MANAGEMENT: INCONTINENT
ADLS_ACUITY_SCORE: 8
DRESSING/BATHING_DIFFICULTY: NO
VISION_MANAGEMENT: GLASSES
ADLS_ACUITY_SCORE: 8
ADLS_ACUITY_SCORE: 17

## 2021-11-08 ASSESSMENT — MIFFLIN-ST. JEOR: SCORE: 1115

## 2021-11-08 NOTE — PROGRESS NOTES
RECEIVING UNIT ED HANDOFF REVIEW    ED Nurse Handoff Report was reviewed by: Kasia Corbin RN on November 8, 2021 at 5:04 PM

## 2021-11-08 NOTE — ED NOTES
[facility]  ED Nurse Handoff Report    ED Chief complaint: Generalized Weakness and Altered Mental Status      ED Diagnosis:   Final diagnoses:   Chronic anemia   Acute kidney injury (H)   Lithium toxicity, accidental or unintentional, initial encounter       Code Status: To be addressed by admitting MD.     Allergies: No Known Allergies    Patient Story: Pt reports generalized weakness and confusion for 3 days.   Focused Assessment:  Pt is alert and confused to situation. Elevated creatinine at 2.22 and possible UTI seen in ED. Vitally stable. Able to ambulate with walker independently.     Treatments and/or interventions provided:   Labs Ordered and Resulted from Time of ED Arrival to Time of ED Departure   BASIC METABOLIC PANEL - Abnormal       Result Value    Sodium 138      Potassium 4.3      Chloride 112 (*)     Carbon Dioxide (CO2) 21      Anion Gap 5      Urea Nitrogen 24      Creatinine 2.22 (*)     Calcium 10.4 (*)     Glucose 113 (*)     GFR Estimate 22 (*)    ROUTINE UA WITH MICROSCOPIC REFLEX TO CULTURE - Abnormal    Color Urine Light Yellow      Appearance Urine Clear      Glucose Urine Negative      Bilirubin Urine Negative      Ketones Urine Negative      Specific Gravity Urine 1.006      Blood Urine Negative      pH Urine 6.0      Protein Albumin Urine 10  (*)     Urobilinogen Urine Normal      Nitrite Urine Negative      Leukocyte Esterase Urine Moderate (*)     Bacteria Urine Few (*)     RBC Urine 6 (*)     WBC Urine 24 (*)     Squamous Epithelials Urine 11 (*)    CBC WITH PLATELETS AND DIFFERENTIAL - Abnormal    WBC Count 9.8      RBC Count 4.38      Hemoglobin 9.2 (*)     Hematocrit 33.0 (*)     MCV 75 (*)     MCH 21.0 (*)     MCHC 27.9 (*)     RDW 15.4 (*)     Platelet Count 419      % Neutrophils 80      % Lymphocytes 11      % Monocytes 7      % Eosinophils 1      % Basophils 1      % Immature Granulocytes 0      NRBCs per 100 WBC 0      Absolute Neutrophils 7.8      Absolute  Lymphocytes 1.1      Absolute Monocytes 0.7      Absolute Eosinophils 0.1      Absolute Basophils 0.1      Absolute Immature Granulocytes 0.0      Absolute NRBCs 0.0     LITHIUM LEVEL - Normal    Lithium 1.9     TSH WITH FREE T4 REFLEX - Normal    TSH 0.81     COVID-19 VIRUS (CORONAVIRUS) BY PCR   URINE CULTURE     Medications   sodium chloride 0.9% infusion (has no administration in time range)     Followed by   sodium chloride 0.9% infusion (has no administration in time range)   0.9% sodium chloride BOLUS (0 mLs Intravenous Stopped 11/8/21 1241)   cefTRIAXone (ROCEPHIN) 1 g vial to attach to  mL bag for ADULTS or NS 50 mL bag for PEDS (0 g Intravenous Stopped 11/8/21 1341)       Patient's response to treatments and/or interventions: Tolerated well     To be done/followed up on inpatient unit:  Further evaluation     Does this patient have any cognitive concerns?: Disoriented to situation    Activity level - Baseline/Home:  Independent  Activity Level - Current:   Independent and Walker    Patient's Preferred language: English   Needed?: No    Isolation: None  Infection: Not Applicable  Patient tested for COVID 19 prior to admission: YES  Bariatric?: No    Vital Signs:   Vitals:    11/08/21 1139 11/08/21 1140 11/08/21 1145 11/08/21 1205   BP:  (!) 147/87     Pulse:  67     Resp:       Temp: 98.6  F (37  C)      TempSrc: Oral      SpO2: 97% 98% 96% 95%       Cardiac Rhythm:     Was the PSS-3 completed:   Yes  What interventions are required if any?               Family Comments: Brother at bedside in ED.   OBS brochure/video discussed/provided to patient/family: N/A              Name of person given brochure if not patient: NA              Relationship to patient: NA    For the majority of the shift this patient's behavior was Green.   Behavioral interventions performed were frequent rounding in ED.    ED NURSE PHONE NUMBER: [id number]

## 2021-11-08 NOTE — PHARMACY-ADMISSION MEDICATION HISTORY
Pharmacy Medication History  Admission medication history interview status for the 11/8/2021  admission is complete. See EPIC admission navigator for prior to admission medications     Location of Interview: Patient room  Medication history sources: Patient and Surescripts    Significant changes made to the medication list:  1) Patient states that Abilify 2mg every evening is prescribed for her but she splits medication in half and takes 1mg every evening.     In the past week, patient estimated taking medication this percent of the time: greater than 90%        Medication reconciliation completed by provider prior to medication history? No    Time spent in this activity: 15 minutes    Prior to Admission medications    Medication Sig Last Dose Taking? Auth Provider   amantadine (SYMMETREL) 50 MG/5ML solution Take 2.5 mLs by mouth daily. 11/7/2021 at pm Yes Reported, Patient   amLODIPine (NORVASC) 5 MG tablet TAKE 1 TABLET DAILY 11/7/2021 at pm Yes Suleiman Miller MD   ARIPiprazole, sensor, 2 MG TABS Take 1 mg by mouth daily  11/7/2021 at pm Yes Reported, Patient   Cholecalciferol (VITAMIN D3) 1000 UNITS CAPS Take 3 capsules by mouth daily 11/7/2021 at Unknown time Yes Suleiman Miller MD   fexofenadine (ALLEGRA) 180 MG tablet Take 1 tablet by mouth daily. 11/7/2021 at Unknown time Yes Suleiman Miller MD   lithium 300 MG tablet Take 300 mg by mouth 3 times daily. 11/7/2021 at Unknown time Yes Reported, Patient   Multiple Vitamins-Minerals (CENTRUM SILVER) per tablet Take 1 tablet by mouth daily. 11/7/2021 at Unknown time Yes Reported, Patient   Multiple Vitamins-Minerals (EYE VITAMINS & MINERALS PO) Take 1 tablet by mouth 2 times daily Focus Select 11/7/2021 at Unknown time Yes Unknown, Entered By History   simvastatin (ZOCOR) 40 MG tablet TAKE 1 TABLET AT BEDTIME.  MAKE AN APPOINTMENT FOR    FURTHER REFILLS. 11/7/2021 at pm Yes Suleiman Miller MD       The information provided in this note is  only as accurate as the sources available at the time of update(s)

## 2021-11-08 NOTE — ED PROVIDER NOTES
History   Chief Complaint:  Generalized Weakness and Altered Mental Status       The history is provided by the patient.      Diana Santiago is a 70 year old female with history of bipolar, hypertension, CKD, and diabetes insipidus who presents with generalized weakness and altered mental status. Symptoms began a few days ago and nothing seemed to have triggered this episode. She also mentions associated diarrhea one week ago that has since resolved. She denies falling or injuring herself, headaches, chest pain, abdominal pain, loss of appetite, vomiting, fever, chills, cough, and sore throat. She has not eaten breakfast today. She denies taking new medications and states she is still on lithium, and the dose has remained unchanged. She has not seen her primary care recently. She denies past medical history of UTI (urinary tract infection). She is COVID-19 vaccinated.    Review of Systems   Constitutional: Negative for appetite change, chills and fever.   HENT: Negative for sore throat.    Respiratory: Negative for cough.    Cardiovascular: Negative for chest pain.   Gastrointestinal: Positive for diarrhea (resolved). Negative for abdominal pain and vomiting.   Neurological: Positive for weakness. Negative for headaches.   Psychiatric/Behavioral:        Altered Mental Status   All other systems reviewed and are negative.      Allergies:  No Known Allergies    Medications:  amantadine   amlodipine   Aripiprazole  Vitamin D  Allegra   lithium   simvastatin     Past Medical History:     Hypertension   Bipolar affective disorder   Chronic kidney disease stage 3a   DCIS (ductal carcinoma in situ)   Diabetes insipidus  Fractured femoral neck   Hypercalcemia   Hypercholesteremia   Hyperparathyroidism   Vitamin D deficiency   Thalassanemia trait   Hyperlipidemia   Chronic right shoulder pain      Past Surgical History:    Lumpectomy x4   Left hand surgery     Family History:    Father: hypertension   Brother: Sleep apnea      Social History:  Presents with her brother.  PCP: Suleiman Miller    Physical Exam     Patient Vitals for the past 24 hrs:   BP Temp Temp src Pulse Resp SpO2   11/08/21 1730 -- -- -- -- -- 95 %   11/08/21 1715 -- -- -- -- -- 96 %   11/08/21 1630 -- -- -- -- -- 94 %   11/08/21 1545 -- -- -- -- -- 93 %   11/08/21 1535 -- -- -- -- -- 91 %   11/08/21 1530 -- -- -- -- -- 90 %   11/08/21 1425 -- -- -- -- -- 91 %   11/08/21 1355 -- -- -- -- -- 96 %   11/08/21 1350 139/85 -- -- 69 -- --   11/08/21 1205 -- -- -- -- -- 95 %   11/08/21 1145 -- -- -- -- -- 96 %   11/08/21 1140 (!) 147/87 -- -- 67 -- 98 %   11/08/21 1139 -- 98.6  F (37  C) Oral -- -- 97 %   11/08/21 1043 -- -- -- -- -- 96 %   11/08/21 1008 (!) 143/75 -- -- 72 22 100 %       Physical Exam      Physical Exam   Constitutional:  Patient is oriented to person, place, and time. They appear well-developed and well-nourished. Mild distress secondary to generalized weakness   HENT:   Mouth/Throat:   Oropharynx is clear and moist.   Eyes:    Conjunctivae normal and EOM are normal. Pupils are equal, round, and reactive to light.   Neck:    Normal range of motion.   Cardiovascular: Normal rate, regular rhythm and normal heart sounds.  Exam reveals no gallop and no friction rub.  No murmur heard.  Pulmonary/Chest:  Effort normal and breath sounds normal. Patient has no wheezes. Patient has no rales.   Abdominal:   Soft. Bowel sounds are normal. Patient exhibits no mass. There is no tenderness. There is no rebound and no guarding.   Musculoskeletal:  Normal range of motion. Patient exhibits no edema.   Neurological:   Patient is alert and oriented to person, place, and time. Patient is generally weak. No cranial nerve deficit or sensory deficit. GCS 15  Skin:   Skin is warm and dry. No rash noted. No erythema.   Psychiatric:   Patient has a normal mood flat affect.    Emergency Department Course   ECG:  ECG taken at 1035, ECG read at 1045  Normal sinus rhythm  Normal  ECG  Rate 67 bpm. NJ interval 202 ms. QRS duration 116 ms. QT/QTc 442/467 ms. P-R-T axes 46 43 54.    Imaging:  XR Chest 2 Views   Final Result   IMPRESSION: No acute cardiopulmonary disease.      ABIMAEL ZAPATA MD            SYSTEM ID:  [id number]        Report per radiology    Laboratory:  Labs Ordered and Resulted from Time of ED Arrival to Time of ED Departure   BASIC METABOLIC PANEL - Abnormal       Result Value    Sodium 138      Potassium 4.3      Chloride 112 (*)     Carbon Dioxide (CO2) 21      Anion Gap 5      Urea Nitrogen 24      Creatinine 2.22 (*)     Calcium 10.4 (*)     Glucose 113 (*)     GFR Estimate 22 (*)    ROUTINE UA WITH MICROSCOPIC REFLEX TO CULTURE - Abnormal    Color Urine Light Yellow      Appearance Urine Clear      Glucose Urine Negative      Bilirubin Urine Negative      Ketones Urine Negative      Specific Gravity Urine 1.006      Blood Urine Negative      pH Urine 6.0      Protein Albumin Urine 10  (*)     Urobilinogen Urine Normal      Nitrite Urine Negative      Leukocyte Esterase Urine Moderate (*)     Bacteria Urine Few (*)     RBC Urine 6 (*)     WBC Urine 24 (*)     Squamous Epithelials Urine 11 (*)    CBC WITH PLATELETS AND DIFFERENTIAL - Abnormal    WBC Count 9.8      RBC Count 4.38      Hemoglobin 9.2 (*)     Hematocrit 33.0 (*)     MCV 75 (*)     MCH 21.0 (*)     MCHC 27.9 (*)     RDW 15.4 (*)     Platelet Count 419      % Neutrophils 80      % Lymphocytes 11      % Monocytes 7      % Eosinophils 1      % Basophils 1      % Immature Granulocytes 0      NRBCs per 100 WBC 0      Absolute Neutrophils 7.8      Absolute Lymphocytes 1.1      Absolute Monocytes 0.7      Absolute Eosinophils 0.1      Absolute Basophils 0.1      Absolute Immature Granulocytes 0.0      Absolute NRBCs 0.0     LITHIUM LEVEL - Normal    Lithium 1.9     TSH WITH FREE T4 REFLEX - Normal    TSH 0.81     COVID-19 VIRUS (CORONAVIRUS) BY PCR - Normal    SARS CoV2 PCR Negative     URINE CULTURE      Emergency  Department Course:  Reviewed:  I reviewed nursing notes, vitals and past medical history    Assessments:  1020 I obtained history and examined the patient as noted above.   1025 I spoke privately with her brother and he states she was generally weak. He tried to bring her to Urgent Care but he was promoted to come the ER. To him, she seemed more confused than normal.  1220 I rechecked the patient and explained findings.     Consults:  1237 : I spoke with Dr. Tay of the Hospitalist service from [facility] regarding patient's presentation, findings, and plan of care.    Interventions:  Medications   sodium chloride 0.9% infusion (0 mLs Intravenous ED Infusing on Admission/transfer 11/8/21 1804)     Followed by   sodium chloride 0.9% infusion (has no administration in time range)   lidocaine 1 % 0.1-1 mL (has no administration in time range)   lidocaine (LMX4) cream (has no administration in time range)   sodium chloride (PF) 0.9% PF flush 3 mL (3 mLs Intracatheter Not Given 11/8/21 1000)   sodium chloride (PF) 0.9% PF flush 3 mL (has no administration in time range)   melatonin tablet 1 mg (has no administration in time range)   acetaminophen (TYLENOL) tablet 650 mg (has no administration in time range)     Or   acetaminophen (TYLENOL) Suppository 650 mg (has no administration in time range)   senna-docusate (SENOKOT-S/PERICOLACE) 8.6-50 MG per tablet 1 tablet (has no administration in time range)     Or   senna-docusate (SENOKOT-S/PERICOLACE) 8.6-50 MG per tablet 2 tablet (has no administration in time range)   polyethylene glycol (MIRALAX) Packet 17 g (has no administration in time range)   bisacodyl (DULCOLAX) Suppository 10 mg (has no administration in time range)   prochlorperazine (COMPAZINE) injection 5 mg (has no administration in time range)     Or   prochlorperazine (COMPAZINE) tablet 5 mg (has no administration in time range)     Or   prochlorperazine (COMPAZINE) suppository 12.5 mg (has no  administration in time range)   ondansetron (ZOFRAN-ODT) ODT tab 4 mg (has no administration in time range)     Or   ondansetron (ZOFRAN) injection 4 mg (has no administration in time range)   cefTRIAXone (ROCEPHIN) 2 g vial to attach to  ml bag for ADULTS or NS 50 ml bag for PEDS (has no administration in time range)   0.9% sodium chloride BOLUS (0 mLs Intravenous Stopped 11/8/21 1241)   cefTRIAXone (ROCEPHIN) 1 g vial to attach to  mL bag for ADULTS or NS 50 mL bag for PEDS (0 g Intravenous Stopped 11/8/21 1341)       Disposition:  The patient was admitted to the hospital under the care of Dr. Tay.     Impression & Plan     Medical Decision Making:     Diana Santiago is a 70 year old female presenting to the emergency department with weakness and confusion. On her exam, she had no focal deficits. She was generally weak. No fevers. No rigidity of her extremities. Blood work was obtained as well as urine. Her urine is suggestive of a mild UTI. She also has REI with worsening creatinine at 2.2. Her baseline appears for the most part to be at 1.4 to 1.6. Her lithium level is 1.9 which is potentially toxic and this may be accounting for some confusion as well as the dehydration and UTI. Her TSH is unremarkable. She is chronically anemic. She is receiving IV fluids as well as rocephin. I will admit her to Dr. Tay. COVID-19 screen was performed.         Diagnosis:    ICD-10-CM    1. Chronic anemia  D64.9    2. Acute kidney injury (H)  N17.9    3. Lithium toxicity, accidental or unintentional, initial encounter  T56.891A        Scribe Disclosure:  Tanisha CORONADO, am serving as a scribe at 10:12 AM on 11/8/2021 to document services personally performed by Mariana Kolb MD based on my observations and the provider's statements to me.            Mariana Kolb MD  11/08/21 9568

## 2021-11-08 NOTE — H&P
[facility]    History and Physical - Hospitalist Service       Date of Admission:  11/8/2021    Assessment & Plan      Diana Santiago is a 70 year old female admitted on 11/8/2021. She presents with weakness and fatigue.    Probable chronic lithium toxicity  Acute toxic encephalopathy  Patient presents with history of recent diarrhea, followed by progressive weakness, fatigue, confusion since that time.  On presentation has possible UTI which could attribute to some of her symptoms, but as she is seemingly without new symptoms from her urinary system, this seems less likely as an etiology.  Likely developed REI from diarrhea and has had progressive buildup of her lithium level is now causing symptoms.  *Lithium 1.9 on arrival  -Admit inpatient  -Every morning lithium  -Hold lithium  -IVF with normal saline.  150/h x 10 hours followed by 75 an hour  -Consult to psychiatry for assistance with management of lithium  -Treat possible UTI as below  - PT    UTI, possible  Has chronic urinary urgency and thinks that this may have worsened in the weeks prior to presentation but can't be sure. No other clear symptoms.  - rocephin  - follow urine culture    Bipolar disorder  Has been on lithium for decades, recently started Abilify.  -Consult to psychiatry for assistance with management of lithium  -Hold lithium  -Continue Abilify, amantadine pending pharmacy consult    REI  CKD 3B  Presents with creatinine 2.2 up from baseline of approximately 1.2-1.8.  Likely CKD due to lithium toxicity.  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     The patient's care was discussed with the Bedside Nurse, Patient, Patient's Family and ED MD Team. Discussed with brothlucian Shane via phone.    Salvador Tay MD  [facility]  Securely message with the Vocera Web Console (learn more here)  Text page via [employer] Paging/Directory        ______________________________________________________________________    Chief Complaint   Confusion.    History is obtained from the patient, electronic health record and emergency department physician    History of Present Illness   Diana Santiago is a 70 year old female who has a history of bipolar, diabetes insipidus, chronic lithium use, CKD 3B who presents to the hospital on 11/8/2021 with her brother Acosta.  Reportedly the patient had several weeks of diarrhea and a change in her diet.  Diarrhea self resolved approximately 3 to 10 days prior to presentation.  She reports that since the diarrhea resolved she has had progressive weakness, fatigue, and confusion.  She does report that her diet has been somewhat normal but she thinks that she may have been eating less than normal since the time of the diarrhea.  She reports chronic urinary frequency and incontinence.  She reports that she does not feel like this is changed significantly in the last week though.  She otherwise denies dysuria, fevers, chills, rigors, abdominal pain, nausea, vomiting, suprapubic discomfort, vaginal discomfort, or any other associated urinary symptoms.    Note, patient is somewhat confused and therefore history gathering is somewhat impaired.    Review of Systems    The 10 point Review of Systems is negative other than noted in the HPI or here.     Past Medical History    I have reviewed this patient's medical history and updated it with pertinent  information if needed.   Past Medical History:   Diagnosis Date     Benign essential hypertension 09/2018    added norvasc 9/18     Bipolar affective disorder (H)     hosp 1993, Dr. Aftab Deal     Chronic kidney disease, stage 3a      DCIS (ductal carcinoma in situ) 1996    xrt and lumpectomy x 4     Diabetes insipidus (H) 09/2018    elev sodium, likely due to lithium     Elevated blood sugar      Fractured femoral neck (H) 1992     Hx of colonoscopy 2010    tics and hem     Hypercalcemia 08/2017     Hypercholesteremia      Hyperparathyroidism (H) 08/2017     Thalassaemia trait      Vitamin D deficiency        Past Surgical History   I have reviewed this patient's surgical history and updated it with pertinent information if needed.  Past Surgical History:   Procedure Laterality Date     BREAST SURGERY      lumpectomy x 4     left hand surgery      last 1974       Social History   I have reviewed this patient's social history and updated it with pertinent information if needed.  Social History     Tobacco Use     Smoking status: Never Smoker     Smokeless tobacco: Never Used   Substance Use Topics     Alcohol use: No     Alcohol/week: 0.0 standard drinks     Drug use: No       Family History   I have reviewed this patient's family history and updated it with pertinent information if needed.  Family History   Problem Relation Age of Onset     Hypertension Father      Sleep Apnea Brother      Breast Cancer Maternal Aunt         x 2     Breast Cancer Other         Maternal first cousin       Prior to Admission Medications   Prior to Admission Medications   Prescriptions Last Dose Informant Patient Reported? Taking?   ARIPiprazole, sensor, 2 MG TABS   Yes No   Sig: Take 1 tablet by mouth daily   Cholecalciferol (VITAMIN D3) 1000 UNITS CAPS   Yes No   Sig: Take 3 capsules by mouth daily   Multiple Vitamins-Minerals (CENTRUM SILVER) per tablet   Yes No   Sig: Take 1 tablet by mouth daily.   UNABLE TO FIND   Yes No    Sig: MEDICATION NAME: focus select capsule, 2 daily   amLODIPine (NORVASC) 5 MG tablet   No No   Sig: TAKE 1 TABLET DAILY   amantadine (SYMMETREL) 50 MG/5ML solution   Yes No   Sig: Take 2.5 mLs by mouth daily.   fexofenadine (ALLEGRA) 180 MG tablet   Yes No   Sig: Take 1 tablet by mouth daily.   lithium 300 MG tablet   Yes No   Sig: Take 300 mg by mouth 3 times daily.   simvastatin (ZOCOR) 40 MG tablet   No No   Sig: TAKE 1 TABLET AT BEDTIME.  MAKE AN APPOINTMENT FOR    FURTHER REFILLS.      Facility-Administered Medications: None     Allergies   No Known Allergies    Physical Exam   Vital Signs: Temp: 98.6  F (37  C) Temp src: Oral BP: (!) 147/87 Pulse: 67   Resp: 22 SpO2: 96 % O2 Device: None (Room air)    Weight: 0 lbs 0 oz    Constitutional: Awake, alert, cooperative, no apparent cardiopulmonary distress.  Eyes: Conjunctiva and pupils examined and normal.  HEENT: Moist mucous membranes, normal dentition.  Respiratory: Clear to auscultation bilaterally, no crackles or wheezing.  Cardiovascular: Regular rate and rhythm, normal S1 and S2, and no murmur noted.  GI: Soft, non-distended, non-tender, normal bowel sounds.  Lymph/Hematologic: No anterior cervical or supraclavicular adenopathy.  Skin: No rashes, no cyanosis, no edema noted on exposed skin.  Musculoskeletal: left arm contractured.   Neurologic: Cranial nerves 2-12 grossly intact, normal strength and sensation.  Psychiatric: Alert, oriented to person, place and time, no obvious anxiety or depression. Somewhat slow in response at times. Appears mildly confused, asking similar questions multiple times, repeating story      Data   Data reviewed today: I reviewed all medications, new labs and imaging results over the last 24 hours. I personally reviewed the EKG tracing showing NSR and the chest x-ray image(s) showing clear.    Recent Labs   Lab 11/08/21  1036   WBC 9.8   HGB 9.2*   MCV 75*         POTASSIUM 4.3   CHLORIDE 112*   CO2 21   BUN 24    CR 2.22*   ANIONGAP 5   ISSA 10.4*   *     Recent Results (from the past 24 hour(s))   XR Chest 2 Views    Narrative    CHEST TWO VIEWS   11/8/2021 10:55 AM     HISTORY: Weakness.    COMPARISON: None.      Impression    IMPRESSION: No acute cardiopulmonary disease.    ABIMAEL ZAPATA MD         SYSTEM ID:  [id number]

## 2021-11-08 NOTE — ED TRIAGE NOTES
Pt reports generalized weakness and confusion for 3 days. Denies fevers, dysuria, abdominal pain, chest pain. Pt reports vaccinated for covid.

## 2021-11-08 NOTE — PROGRESS NOTES
RECEIVING UNIT ED HANDOFF REVIEW    ED Nurse Handoff Report was reviewed by: Lissa Black RN on November 8, 2021 at 5:50 PM

## 2021-11-09 ENCOUNTER — APPOINTMENT (OUTPATIENT)
Dept: PHYSICAL THERAPY | Facility: CLINIC | Age: 71
DRG: 917 | End: 2021-11-09
Attending: STUDENT IN AN ORGANIZED HEALTH CARE EDUCATION/TRAINING PROGRAM
Payer: MEDICARE

## 2021-11-09 LAB
ANION GAP SERPL CALCULATED.3IONS-SCNC: 4 MMOL/L (ref 3–14)
BACTERIA UR CULT: NORMAL
BUN SERPL-MCNC: 17 MG/DL (ref 7–30)
CALCIUM SERPL-MCNC: 9.7 MG/DL (ref 8.5–10.1)
CHLORIDE BLD-SCNC: 124 MMOL/L (ref 94–109)
CO2 SERPL-SCNC: 21 MMOL/L (ref 20–32)
CREAT SERPL-MCNC: 1.63 MG/DL (ref 0.52–1.04)
ERYTHROCYTE [DISTWIDTH] IN BLOOD BY AUTOMATED COUNT: 16.1 % (ref 10–15)
GFR SERPL CREATININE-BSD FRML MDRD: 32 ML/MIN/1.73M2
GLUCOSE BLD-MCNC: 113 MG/DL (ref 70–99)
HCT VFR BLD AUTO: 36.8 % (ref 35–47)
HGB BLD-MCNC: 10.2 G/DL (ref 11.7–15.7)
LITHIUM SERPL-SCNC: 1 MMOL/L
MCH RBC QN AUTO: 21.1 PG (ref 26.5–33)
MCHC RBC AUTO-ENTMCNC: 27.7 G/DL (ref 31.5–36.5)
MCV RBC AUTO: 76 FL (ref 78–100)
PLATELET # BLD AUTO: 314 10E3/UL (ref 150–450)
POTASSIUM BLD-SCNC: 4.6 MMOL/L (ref 3.4–5.3)
RBC # BLD AUTO: 4.83 10E6/UL (ref 3.8–5.2)
SODIUM SERPL-SCNC: 149 MMOL/L (ref 133–144)
WBC # BLD AUTO: 9.9 10E3/UL (ref 4–11)

## 2021-11-09 PROCEDURE — 80178 ASSAY OF LITHIUM: CPT | Performed by: STUDENT IN AN ORGANIZED HEALTH CARE EDUCATION/TRAINING PROGRAM

## 2021-11-09 PROCEDURE — 250N000013 HC RX MED GY IP 250 OP 250 PS 637: Performed by: STUDENT IN AN ORGANIZED HEALTH CARE EDUCATION/TRAINING PROGRAM

## 2021-11-09 PROCEDURE — 258N000003 HC RX IP 258 OP 636: Performed by: STUDENT IN AN ORGANIZED HEALTH CARE EDUCATION/TRAINING PROGRAM

## 2021-11-09 PROCEDURE — 99207 PR CONSULT E&M CHANGED TO INITIAL LEVEL: CPT | Performed by: PSYCHIATRY & NEUROLOGY

## 2021-11-09 PROCEDURE — 80048 BASIC METABOLIC PNL TOTAL CA: CPT | Performed by: STUDENT IN AN ORGANIZED HEALTH CARE EDUCATION/TRAINING PROGRAM

## 2021-11-09 PROCEDURE — 85027 COMPLETE CBC AUTOMATED: CPT | Performed by: STUDENT IN AN ORGANIZED HEALTH CARE EDUCATION/TRAINING PROGRAM

## 2021-11-09 PROCEDURE — 97116 GAIT TRAINING THERAPY: CPT | Mod: GP

## 2021-11-09 PROCEDURE — 99221 1ST HOSP IP/OBS SF/LOW 40: CPT | Performed by: PSYCHIATRY & NEUROLOGY

## 2021-11-09 PROCEDURE — 97530 THERAPEUTIC ACTIVITIES: CPT | Mod: GP

## 2021-11-09 PROCEDURE — 99232 SBSQ HOSP IP/OBS MODERATE 35: CPT | Performed by: INTERNAL MEDICINE

## 2021-11-09 PROCEDURE — 97161 PT EVAL LOW COMPLEX 20 MIN: CPT | Mod: GP

## 2021-11-09 PROCEDURE — 36415 COLL VENOUS BLD VENIPUNCTURE: CPT | Performed by: STUDENT IN AN ORGANIZED HEALTH CARE EDUCATION/TRAINING PROGRAM

## 2021-11-09 PROCEDURE — 250N000011 HC RX IP 250 OP 636: Performed by: STUDENT IN AN ORGANIZED HEALTH CARE EDUCATION/TRAINING PROGRAM

## 2021-11-09 PROCEDURE — 120N000001 HC R&B MED SURG/OB

## 2021-11-09 RX ADMIN — CEFTRIAXONE SODIUM 2 G: 2 INJECTION, POWDER, FOR SOLUTION INTRAMUSCULAR; INTRAVENOUS at 12:52

## 2021-11-09 RX ADMIN — SIMVASTATIN 40 MG: 40 TABLET, FILM COATED ORAL at 21:21

## 2021-11-09 RX ADMIN — ARIPIPRAZOLE 1 MG: 2 TABLET ORAL at 21:20

## 2021-11-09 RX ADMIN — AMLODIPINE BESYLATE 5 MG: 5 TABLET ORAL at 21:20

## 2021-11-09 RX ADMIN — MULTIPLE VITAMINS W/ MINERALS TAB 1 TABLET: TAB at 21:21

## 2021-11-09 RX ADMIN — SODIUM CHLORIDE: 9 INJECTION, SOLUTION INTRAVENOUS at 12:53

## 2021-11-09 RX ADMIN — FEXOFENADINE HYDROCHLORIDE 180 MG: 180 TABLET ORAL at 21:21

## 2021-11-09 RX ADMIN — AMANTADINE HYDROCHLORIDE 25 MG: 50 SOLUTION ORAL at 21:22

## 2021-11-09 ASSESSMENT — ACTIVITIES OF DAILY LIVING (ADL)
ADLS_ACUITY_SCORE: 17
ADLS_ACUITY_SCORE: 20
ADLS_ACUITY_SCORE: 17
ADLS_ACUITY_SCORE: 17
ADLS_ACUITY_SCORE: 20
ADLS_ACUITY_SCORE: 19
ADLS_ACUITY_SCORE: 20
ADLS_ACUITY_SCORE: 17
ADLS_ACUITY_SCORE: 20
ADLS_ACUITY_SCORE: 17
ADLS_ACUITY_SCORE: 20
ADLS_ACUITY_SCORE: 17
ADLS_ACUITY_SCORE: 17
ADLS_ACUITY_SCORE: 20
ADLS_ACUITY_SCORE: 17

## 2021-11-09 NOTE — PLAN OF CARE
DATE & TIME: 11/9/2021 [id number]   Cognitive Concerns/ Orientation : Pt A/Ox4, forgetful at times  BEHAVIOR & AGGRESSION TOOL COLOR: Green   ABNL VS/O2: VSS on RA  MOBILITY: Assist x1 with gait belt and walker.  PAIN MANAGMENT: Denies  DIET: Regular, Good appetite    BOWEL/BLADDER: Continent Up Ax1 to BR. Purewick in place overnight only.   ABNL LAB/BG: Cr 1.63. Abnormal UA.   DRAIN/DEVICES: IVF infusing NS 75mL/hr  SKIN: Intact  TESTS/PROCEDURES: Urine culture pending  D/C DATE: Discharge 1-2days per MD  OTHER IMPORTANT INFO: Psych saw today.  Up PT this afternoon. Up in Chair, is able to make needs known. Continues on IV abx for possible UTI. Nephology following. No acute changes during shift.

## 2021-11-09 NOTE — PROGRESS NOTES
[facility]    Hospitalist Progress Note    Interval History   - Patient reports diarrhea has resolved since admission. Feels stronger and appetite improved today. Reports she feels less confused. A&Ox3. Encouraged patient drink more water  - Psychiatry saw patient this morning, their note is still pending  - Possible discharge tomorrow with further improvement    Assessment & Plan   Summary: Diana Santiago is a 70 year old female with PMH bipolar disorder, CKD, diabetes insipidus, hypertension, HLD, who was admitted on 11/8/2021 with acute kidney injury and weakness.    Suspect chronic lithium toxicity  Acute toxic encephalopathy  Patient presents with history of recent diarrhea, followed by progressive weakness, fatigue, confusion since that time.  On presentation has possible UTI which could attribute to some of her symptoms, but as she is seemingly without new symptoms from her urinary system, this seems less likely as an etiology.   Likely developed REI from diarrhea and has had progressive buildup of her lithium level is now causing symptoms.   Lithium 1.9 on arrival, lithium 1.0 on recheck 11/9.  - Every morning lithium  - Hold lithium--resume per Psychiatry  - Reconsult Psychiatry tomorrow  - Continue NS @ 75ml/hr  - Treat possible UTI as below  - PT    UTI, possible  Has chronic urinary urgency and thinks that this may have worsened in the weeks prior to presentation but can't be sure. No other clear symptoms.  - Continue rocephin  - Follow urine culture--still pending    Bipolar disorder  Has been on lithium for decades, recently started Abilify.  - Reconsult psychiatry for assistance with management of lithium  - Hold lithium  - Continue Abilify, amantadine pending pharmacy consult    REI  CKD 3B  Presents with creatinine 2.2 up from baseline of approximately 1.2-1.4.  Likely CKD due to lithium toxicity.  Has follow-up/establish care with nephrology in November Creatinine improve to  1.6 on 11/9.    Nephrogenic DI  Hypernatremia  Related to chronic Lithium use. Sodium increased to 149 on 11/9.  - Continue IVF as above  - Monitor I&O closely    DVT Prophylaxis: Pneumatic Compression Devices  Code Status: Full Code  PT/OT: ordered  Diet: Combination Diet Regular Diet Adult      Disposition: Expected discharge 1-2 days    Jai Neal MD  Text Page  (7am to 6pm)  -Data reviewed today: I reviewed all new labs and imaging results over the last 24 hours.    Physical Exam   Temp: 98.4  F (36.9  C) Temp src: Oral BP: 129/78 Pulse: 58   Resp: 20 SpO2: 100 % O2 Device: None (Room air)    Vitals:    11/08/21 1853   Weight: 61 kg (134 lb 7.7 oz)     Vital Signs with Ranges  Temp:  [98.4  F (36.9  C)-98.6  F (37  C)] 98.4  F (36.9  C)  Pulse:  [58-64] 58  Resp:  [20] 20  BP: (123-145)/(77-90) 129/78  SpO2:  [90 %-100 %] 100 %  I/O last 3 completed shifts:  In: 2595 [P.O.:1000; I.V.:495; IV Piggyback:1100]  Out: 1100 [Urine:1100]  O2 requirements: none    Constitutional: Female in NAD  HEENT: Eyes nonicteric, oral mucosa moist  Cardiovascular: RRR, normal S1/2, no m/r/g  Respiratory: CTAB, no wheezing or crackles  Vascular: Trace LE pitting edema  GI: Normoactive bowel sounds, nontender  Skin/Integumen: No rashes  Neuro/Psych: Odd affect and mood. A&Ox3, moves all extremities    Medications     sodium chloride 75 mL/hr at 11/09/21 1253       amantadine  25 mg Oral Daily     amLODIPine  5 mg Oral Daily     ARIPiprazole  1 mg Oral Daily     cefTRIAXone  2 g Intravenous Q24H     fexofenadine  180 mg Oral Daily     multivitamin w/minerals  1 tablet Oral Daily     simvastatin  40 mg Oral At Bedtime     sodium chloride (PF)  3 mL Intracatheter Q8H       Data   Recent Labs   Lab 11/09/21  1141 11/09/21  0900 11/08/21  1036   WBC  --  9.9 9.8   HGB  --  10.2* 9.2*   MCV  --  76* 75*   PLT  --  314 419   *  --  138   POTASSIUM 4.6  --  4.3   CHLORIDE 124*  --  112*   CO2 21  --  21   BUN 17  --  24   CR  1.63*  --  2.22*   ANIONGAP 4  --  5   ISSA 9.7  --  10.4*   *  --  113*       Imaging:   No results found for this or any previous visit (from the past 24 hour(s)).

## 2021-11-09 NOTE — PLAN OF CARE
DATE & TIME: 1500-1930  Cognitive Concerns/ Orientation : A&Ox4, forgetful   BEHAVIOR & AGGRESSION TOOL COLOR: green   CIWA SCORE: n/a   ABNL VS/O2: VSS on RA   MOBILITY: Ax1 gb/w to BR   PAIN MANAGMENT: Denies pain intervention. Does have chronic LUE pain per pt   DIET: Regular   BOWEL/BLADDER: Incontinent of urine at times. Last BM yesterday   ABNL LAB/BG: Creat 2.22, UA abnormal   DRAIN/DEVICES: NS infusing at 150 mL/hr   TELEMETRY RHYTHM: n/a   SKIN: WNL   TESTS/PROCEDURES: UC pending   D/C DAY/GOALS/PLACE: Pending antibiotic plan and medical improvement.   OTHER IMPORTANT INFO:

## 2021-11-09 NOTE — PLAN OF CARE
DATE & TIME: 11/8/21 3508-5491    Cognitive Concerns/ Orientation : Pt A/Ox4, forgetful   BEHAVIOR & AGGRESSION TOOL COLOR: Green   ABNL VS/O2: VSS on RA  MOBILITY: Assist x1 with gait belt and walker, fall risk  PAIN MANAGMENT: Denies  DIET: Regular  BOWEL/BLADDER: Incontinent of urine at times. Purewick in place overnight.  ABNL LAB/BG: Cr 2.22. Abnormal UA.  DRAIN/DEVICES: IVF infusing  SKIN: Intact  TESTS/PROCEDURES: Urine culture pending  D/C DATE: Discharge pending progress  OTHER IMPORTANT INFO: Psych/PT consult for today.

## 2021-11-09 NOTE — CONSULTS
Consult Date: 11/09/2021    REASON FOR CONSULTATION:  Lithium toxicity.    REFERRING PHYSICIAN:  Dr. Tay    HISTORY OF PRESENT ILLNESS:  Ms. Diana Santiago is a very pleasant 70-year-old single woman, retired, previously was in computer programming, residing in [place], [place].  She has a history of bipolar 1 disorder, multiple prior admissions and 1 prior suicide attempt in 1973.  She has not been hospitalized in decades per her report.  She has been stable from a mental health standpoint for many decades on lithium.  She was admitted to [facility] with concerns about lithium toxicity with cognitive impairments, possibly a UTI, lithium level up to 1.9, as well as progressive weakness, fatigue.  Lithium has been held so far.  Lithium level has now dropped down to 1.0.  Psychiatry is asked to assess in the above setting.    The patient reports this morning that she is feeling substantially better.  She is no longer feeling quite as confused.  She still does feel a bit foggy with recalling details about her mental health history, but does a reasonable job feeling in some gaps today.  She reports that she does have a history of bipolar 1 disorder.  Describes that in the past, she had psychosis symptoms when she was manic with paranoia and also other mich phase symptoms.  When she was younger, she was diagnosed with schizophrenia, but that was later adjusted to bipolar disorder based on her having mood cycling of depression and then psychosis with her manic phases.  Fortunately, she has been quite stable on a regimen of lithium for decades.  She has not been hospitalized perhaps since the 1980s, if not even before that.  Reports that she has not had any psychosis symptoms.  She was started on Abilify in recent years, which has worked well with the lithium in her opinion.  She has not had any overt depression phases or mich phases.  She denies any concerns about her anxiety management including excessive  worries, tension, fear, panic attacks.  She denies any history of OCD-type symptom concerns, eating disorder symptoms, prior learning disorders or ADHD symptoms, prior trauma, abuse or PTSD type symptoms or concerns.    The patient does report that she had about a 1 month history of recent worsening diarrhea.  She was trying to manage it with adjusting her diet, including increasing bananas and eating a lot of applesauce.  She also started drinking a lot of grape juice recently to help with some insomnia she has been having where she wakes up every few hours to go to the bathroom.  She does report that she has had a history of excessive thirst and frequent urination, but that it does seem to be worse recently.  She has wondered if the grape juice as a diuretic might have contributed to her excessive lithium level as well as she did not understand that was diuretic before.  She denies any other medication adjustments recently.  She follows with Dr. Deal in [place] for outpatient psychiatry whom she has worked with for many years.    PAST PSYCHIATRIC HISTORY:  Prior diagnosis of bipolar 1 disorder.  She has been on lithium for decades, very stable with lithium.  Abilify was added a few years ago.  Reports that is a good combination.  Three prior psychiatric admissions, but none perhaps since the 1980s.  The last and only suicide attempt was in 1972 when she jumped from a bridge.    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    PAST SURGICAL HISTORY:  Reviewed from the EMR.    SOCIAL HISTORY:  The patient lives alone in [place] in an apartment complex.  Brother is supportive, helped her come into the hospital yesterday.  Does not have any children.  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  She has never smoked.  No alcohol or drug use.    FAMILY HISTORY:  No family history of psychiatric disorders noted in the EMR.    PRIOR TO ADMISSION MEDICATIONS:    1.  Aripiprazole 2 mg daily.  2.  Cholecalciferol.  3.  Multivitamin.  4.  Amlodipine.  5.  Amantadine.  6.  Fexofenadine.  7.  Lithium 300 mg 3 times a day.  8.  Simvastatin 40 mg daily.    ALLERGIES:  NO KNOWN ALLERGIES.    PHYSICAL EXAMINATION:    VITAL SIGNS:  Blood pressure 129/78, temperature 98.4, pulse 58, respiratory rate 20, SpO2 is 100% on room air.    MENTAL STATUS EXAMINATION:  She is dressed in a hospital gown, seated upright.  Maintains good eye contact.  She is pleasant, cooperative.  No involuntary movements.  No tremulousness.  Motor movements appear grossly without any evidence of unusual muscle tone.  She is slightly obese.  She is in a hospital gown.  Speech is nonpressured, normal volume, tone, and prosody.  Language is without aphasia.  Thought form linear, logical, goal directed.  No flight of ideas.  Not responding to internal stimuli.  Denies perceptual disturbances.  Denies suicidal or homicidal ideation.  Attention and concentration intact.  She is able to maintain complex conversation without difficulty.  Short and long-term memory are slightly impaired, perhaps related to recent lithium toxicity, although it is seemingly much improved from what was described yesterday and the patient subjectively also is feeling much improved from yesterday.  Insight and judgment intact.  She is collaborative and cooperative.  Denies any suicidal or homicidal ideation.    IMPRESSION AND PLAN:  1.  Lithium toxicity.  3.  Bipolar disorder.    FORMULATION:  This is a 70-year-old woman with a history of bipolar 1 disorder, on chronic lithium treatment, who presented with lithium toxicity with a level up to 1.9 in the setting of recent worsening diarrhea and possibly other dietary changes including frequent grape juice consumption  (which can have a diuretic effect) and possible urinary tract infection that might have contributed to a gradual increase in her lithium level.  She is doing much better today compared to yesterday as her lithium has been held and she is getting medical treatment and stabilization.  The patient's bipolar disorder used to be quite severe prior to going on lithium.  Lithium can have substantial risk with attempting to fully taper off and discontinue in terms of risks of worsening mood disordered symptoms.  Ideally, it would be preferable to return to lithium once she is no longer in the toxic range, likely to start her out at a lower dosing with relatively frequent monitoring over the next several months.  She does follow up with Dr. Deal in [place] for outpatient psychiatry and has an appointment coming up within a week or so per her report.    RISK LEVEL:  Risk of intentional harm to self or others is assessed to be low.  She is without suicidal thoughts, thoughts of harming others, and     history of violent behavior toward self or others, other than 1 remote history of a suicide attempt when having decompensated mood episode in 1973.  No agitation, no aggressive behavior.  No thoughts of suicide or thoughts of harming others.  She is future oriented, willing to seek help in a crisis.  Thus risk of violence is assessed to be low.  Access to firearms not reviewed given low risk assessment.    RESULTS REVIEW:  Lithium level 1.0, it was 1.9 yesterday.  Creatinine was 2.22 yesterday, now it is at 1.63.  Sodium is 149 today, was 138 yesterday.  Potassium 4.6.  UA showed 24 WBC, 6 RBC, moderate leukocyte esterase.  Urine culture is in progress.    PLAN:  Continue to hold lithium.  Given her age and recent lithium toxicity, would prefer allowing the level to drift down to the 0.4 to 0.8 range.  Lower levels are typically better tolerated as one ages, so we might not even necessarily need to target a typical lab value  of therapeutic range.  I did leave a message for Dr. Deal to give me a call to review the situation and see if he has any recommendations as well.  I would start back at 150 mg 3 times a day to start and then recheck a level within 3-5 days.  Please reconsult Psychiatry as needed.  If Dr. Deal calls back and has further recommendations, I'll add an addendum to my dictation.    Danielito Alvarez MD        D: 2021   T: 2021   MT: md/SCOOTER    Name:     JUANITO KANMiriam  MRN:      [id number]        Account:      [id number]   :      1950           Consult Date: 2021     Document: [id number]

## 2021-11-09 NOTE — PROGRESS NOTES
11/09/21 1400   Quick Adds   Type of Visit Initial PT Evaluation       Present no   Living Environment   People in home alone   Current Living Arrangements apartment   Home Accessibility no concerns   Transportation Anticipated family or friend will provide   Living Environment Comments Elevator access   Self-Care   Usual Activity Tolerance moderate   Current Activity Tolerance fair   Equipment Currently Used at Home walker, rolling  (4WW)   Activity/Exercise/Self-Care Comment Independent with 4WW at baseline. Orders door dash for food, instacart for groceries. Reports she also drives but has not in a couple weeks. She occasionally goes grocery shopping and holds onto a cart. Her son will pick her up and go out to lunch occasionally.   Disability/Function   Hearing Difficulty or Deaf no   Wear Glasses or Blind yes   Vision Management Glasses   Fall history within last six months no   General Information   Onset of Illness/Injury or Date of Surgery 11/08/21   Referring Physician Salvador Tay MD   Patient/Family Therapy Goals Statement (PT) Return home, PT for leg strengthening   Pertinent History of Current Problem (include personal factors and/or comorbidities that impact the POC) 70 year old female with PMH bipolar disorder, CKD, diabetes insipidus, hypertension, HLD, who was admitted on 11/8/2021 with acute kidney injury and weakness. Chronic lithium toxicity, acute toxic encephalopathy.   Existing Precautions/Restrictions fall   General Observations Pt had some questions for PT regarding leg strengthening.   Cognition   Orientation Status (Cognition) oriented x 4   Affect/Mental Status (Cognition) WFL   Follows Commands (Cognition) WNL   Cognitive Status Comments Repetition of phrases a couple times throughout session, may be due to confusion. Unsure of patient's baseline cognition.   Pain Assessment   Patient Currently in Pain No   Posture    Posture Forward head  position;Protracted shoulders   Range of Motion (ROM)   ROM Comment L hand AROM limitations. Pt reports is from a fall in the 1970s. Minimal use of L hand at baselinee.   Strength   Strength Comments Generalized LE weakness, grossly 4/5   Bed Mobility   Comment (Bed Mobility) Supine<>sit independent   Transfers   Transfer Safety Comments Sit<>stand SBA to 4WW, toilet transfer SBA, use of grab bar   Gait/Stairs (Locomotion)   Comment (Gait/Stairs) Amb in room with 4WW and SBA   Balance   Balance Comments Requires UE support on 4WW for standing balance   Sensory Examination   Sensory Perception Comments Reports numbness to L hand from prior injury   Clinical Impression   Criteria for Skilled Therapeutic Intervention yes, treatment indicated   PT Diagnosis (PT) Impaired mobility   Influenced by the following impairments Generalized weakness, impaired activity tolerance andd balance   Functional limitations due to impairments Decreased functional independence   Clinical Presentation Stable/Uncomplicated   Clinical Presentation Rationale See MR per clinical judgement   Clinical Decision Making (Complexity) low complexity   Therapy Frequency (PT) Daily   Predicted Duration of Therapy Intervention (days/wks) 5 days   Planned Therapy Interventions (PT) balance training;bed mobility training;gait training;home exercise program;neuromuscular re-education;patient/family education;strengthening;transfer training;progressive activity/exercise   Anticipated Equipment Needs at Discharge (PT)   (Has 4WW)   Risk & Benefits of therapy have been explained evaluation/treatment results reviewed;care plan/treatment goals reviewed;risks/benefits reviewed;current/potential barriers reviewed;participants voiced agreement with care plan;participants included;patient   PT Discharge Planning    PT Discharge Recommendation (DC Rec) home with home care physical therapy;Transitional Care Facility   PT Rationale for DC Rec Anticipate patient will  improve to modified independence with 4WW for all mobility and will be able to return to apartment with home health PT to continue improving LE strength and overall safety and independence with mobility. Home health recommended due to requiring an AD and taxing effort to leave her apartment at this time. If she does not progress or regresses and requires assistance for mobility, she may need TCU due to living alone.   PT Brief overview of current status  SBA bed mobility, transfers and ambulation in [place] with 4WW   Total Evaluation Time   Total Evaluation Time (Minutes) 10

## 2021-11-09 NOTE — PROGRESS NOTES
Called brother Acosta and updated him on patient on transfer from ED and gave him her new room number. Answered all questions and he stated he would pick her up when she discharges

## 2021-11-09 NOTE — PROVIDER NOTIFICATION
"MD Notification    Notified Person: MD    Notified Person Name: Maggi     Notification Date/Time: 11/8/21 at 1747    Notification Interaction: GoGuide Messaging     Purpose of Notification: \"618-2 GS - Can you order PTA meds? pt takes all medications in evening except lithium TID. Thx!\"    Orders Received: PTA meds ordered     Comments:    "

## 2021-11-09 NOTE — UTILIZATION REVIEW
Admission Status; Secondary Review Determination       Under the authority of the Utilization Management Committee, the utilization review process indicated a secondary review on the above patient. The review outcome is based on review of the medical records, discussions with staff, and applying clinical experience noted on the date of the review.     (x) Inpatient Status Appropriate - This patient's medical care is consistent with medical management for inpatient care and reasonable inpatient medical practice.     RATIONALE FOR DETERMINATION    70-year-old woman presented with acute encephalopathy and concern for lithium toxicity patient is admitted as inpatient with expected length of stay more than two midnights for the following reason: Very complex past medical history, significant encephalopathy at the time of admission, concern for acute kidney injury, urinary tract infection, in the setting of lithium toxicity which can be contributing to her kidney injury.But also lithium toxicity can be worsened by renal insufficiency.  The expected length of stay at the time of admission was more than 2 nights because of the severity of illness, intensity of service provided, and risk for adverse outcome. Inpatient admission is appropriate.     This document was produced using voice recognition software       The information on this document is developed by the utilization review team in order for the business office to ensure compliance. This only denotes the appropriateness of proper admission status and does not reflect the quality of care rendered.   The definitions of Inpatient Status and Observation Status used in making the determination above are those provided in the CMS Coverage Manual, Chapter 1 and Chapter 6, section 70.4.   Sincerely,   MARGARITA SAMPOSN MD   System Medical Director   Utilization Management   [facility].

## 2021-11-10 ENCOUNTER — APPOINTMENT (OUTPATIENT)
Dept: PHYSICAL THERAPY | Facility: CLINIC | Age: 71
DRG: 917 | End: 2021-11-10
Payer: MEDICARE

## 2021-11-10 LAB
ANION GAP SERPL CALCULATED.3IONS-SCNC: 7 MMOL/L (ref 3–14)
BUN SERPL-MCNC: 16 MG/DL (ref 7–30)
CALCIUM SERPL-MCNC: 9.9 MG/DL (ref 8.5–10.1)
CHLORIDE BLD-SCNC: 119 MMOL/L (ref 94–109)
CO2 SERPL-SCNC: 18 MMOL/L (ref 20–32)
CREAT SERPL-MCNC: 1.54 MG/DL (ref 0.52–1.04)
GFR SERPL CREATININE-BSD FRML MDRD: 34 ML/MIN/1.73M2
GLUCOSE BLD-MCNC: 235 MG/DL (ref 70–99)
GLUCOSE BLDC GLUCOMTR-MCNC: 78 MG/DL (ref 70–99)
POTASSIUM BLD-SCNC: 4.2 MMOL/L (ref 3.4–5.3)
SODIUM SERPL-SCNC: 144 MMOL/L (ref 133–144)

## 2021-11-10 PROCEDURE — 250N000013 HC RX MED GY IP 250 OP 250 PS 637: Performed by: STUDENT IN AN ORGANIZED HEALTH CARE EDUCATION/TRAINING PROGRAM

## 2021-11-10 PROCEDURE — 99232 SBSQ HOSP IP/OBS MODERATE 35: CPT | Performed by: PSYCHIATRY & NEUROLOGY

## 2021-11-10 PROCEDURE — 99233 SBSQ HOSP IP/OBS HIGH 50: CPT | Performed by: INTERNAL MEDICINE

## 2021-11-10 PROCEDURE — 120N000001 HC R&B MED SURG/OB

## 2021-11-10 PROCEDURE — 80048 BASIC METABOLIC PNL TOTAL CA: CPT | Performed by: INTERNAL MEDICINE

## 2021-11-10 PROCEDURE — 97530 THERAPEUTIC ACTIVITIES: CPT | Mod: GP

## 2021-11-10 PROCEDURE — 97116 GAIT TRAINING THERAPY: CPT | Mod: GP

## 2021-11-10 PROCEDURE — 250N000011 HC RX IP 250 OP 636: Performed by: STUDENT IN AN ORGANIZED HEALTH CARE EDUCATION/TRAINING PROGRAM

## 2021-11-10 PROCEDURE — 258N000003 HC RX IP 258 OP 636: Performed by: INTERNAL MEDICINE

## 2021-11-10 PROCEDURE — 36415 COLL VENOUS BLD VENIPUNCTURE: CPT | Performed by: INTERNAL MEDICINE

## 2021-11-10 RX ORDER — DEXTROSE MONOHYDRATE 50 MG/ML
INJECTION, SOLUTION INTRAVENOUS CONTINUOUS
Status: ACTIVE | OUTPATIENT
Start: 2021-11-10 | End: 2021-11-10

## 2021-11-10 RX ORDER — DEXTROSE MONOHYDRATE 50 MG/ML
INJECTION, SOLUTION INTRAVENOUS CONTINUOUS
Status: DISCONTINUED | OUTPATIENT
Start: 2021-11-10 | End: 2021-11-10

## 2021-11-10 RX ADMIN — MULTIPLE VITAMINS W/ MINERALS TAB 1 TABLET: TAB at 21:34

## 2021-11-10 RX ADMIN — AMLODIPINE BESYLATE 5 MG: 5 TABLET ORAL at 21:34

## 2021-11-10 RX ADMIN — FEXOFENADINE HYDROCHLORIDE 180 MG: 180 TABLET ORAL at 21:34

## 2021-11-10 RX ADMIN — SIMVASTATIN 40 MG: 40 TABLET, FILM COATED ORAL at 21:34

## 2021-11-10 RX ADMIN — DEXTROSE MONOHYDRATE: 50 INJECTION, SOLUTION INTRAVENOUS at 08:51

## 2021-11-10 RX ADMIN — AMANTADINE HYDROCHLORIDE 25 MG: 50 SOLUTION ORAL at 21:39

## 2021-11-10 RX ADMIN — CEFTRIAXONE SODIUM 2 G: 2 INJECTION, POWDER, FOR SOLUTION INTRAMUSCULAR; INTRAVENOUS at 14:13

## 2021-11-10 RX ADMIN — ARIPIPRAZOLE 1 MG: 2 TABLET ORAL at 21:34

## 2021-11-10 ASSESSMENT — ACTIVITIES OF DAILY LIVING (ADL)
ADLS_ACUITY_SCORE: 22
ADLS_ACUITY_SCORE: 22
ADLS_ACUITY_SCORE: 20
ADLS_ACUITY_SCORE: 22
ADLS_ACUITY_SCORE: 20
ADLS_ACUITY_SCORE: 22
ADLS_ACUITY_SCORE: 20
ADLS_ACUITY_SCORE: 22
ADLS_ACUITY_SCORE: 20
ADLS_ACUITY_SCORE: 20
ADLS_ACUITY_SCORE: 22
ADLS_ACUITY_SCORE: 22
ADLS_ACUITY_SCORE: 20
ADLS_ACUITY_SCORE: 20
ADLS_ACUITY_SCORE: 22
ADLS_ACUITY_SCORE: 20
ADLS_ACUITY_SCORE: 20

## 2021-11-10 NOTE — PLAN OF CARE
DATE & TIME: 11/9/2021 8924-7321  Cognitive Concerns/ Orientation: A/Ox4, forgetful, pleasant, calm and coop.   BEHAVIOR & AGGRESSION TOOL COLOR: Green            ABNL VS/O2: VSS on RA  MOBILITY: SBA with GB/W, baseline  L hand AROM limitations   PAIN MANAGMENT: Denies  DIET: Regular  BOWEL/BLADDER: Continent - at times incontinent with bladder. Using Purewick overnight only.   ABNL LAB/BG: Na 149, Cr 1.63, Lithium 1.0.  DRAIN/DEVICES: R wrist PIV SL at midnight  SKIN: Intact  TESTS/PROCEDURES: NA  D/C DATE: Discharge 1-2 days  OTHER IMPORTANT INFO: Psych and PT following. Q24h Rocephin for UTI

## 2021-11-10 NOTE — PLAN OF CARE
"DATE & TIME: 11/10/2021 5019-9479  Cognitive Concerns/ Orientation: A/Ox4, forgetful, pleasant, calm and coop.   BEHAVIOR & AGGRESSION TOOL COLOR: Green            ABNL VS/O2: VSS on RA  MOBILITY: SBA with GB/W, baseline  L hand AROM limitations   PAIN MANAGMENT: Denies  DIET: Regular  BOWEL/BLADDER: Continent - at times incontinent with bladder. Using Purewick overnight only.   ABNL LAB/BG: Na 144, Cr 1.54, Lithium 1.0.  DRAIN/DEVICES: R wrist PIV SL at midnight  SKIN: Intact.  Trace periorbital edema noted  TESTS/PROCEDURES: NA  D/C DATE: Discharge 1-2 days  OTHER IMPORTANT INFO: Pt reported she has blurry vision when having a \"bipolar episode\"  States she is having that today.  MD assessed.  Neuros intact.  Cont to monitor.  Psych and PT following. Q24h Rocephin for UTI    "

## 2021-11-10 NOTE — CONSULTS
[facility] Psychiatric Consult Progress Note    Interval History:   Pt seen, chart reviewed, case discussed with nursing staff and treating clinicians.  I met with the patient on station [id number], reviewed Dr. Alvarez's excellent consult note.  Diana has an established bipolar diagnosis with long-term lithium treatment.  She presented with signs of lithium toxicity and I can see from the labs over the last year that she has had declining renal function.  At this juncture I would repeat labs tomorrow morning.  If her kidney function is showing improvement and lithium levels continue to drop then restarting a small dose of lithium 150 mg 3 times daily would be reasonable.  I think she should be observed in the hospital another day because her total body load of lithium is going to be high.  She has not had a level done for almost a year.  There do not appear to be any other aggravating circumstances such as use of a diuretic or an ACE inhibitor.  There may be rationale for changing her primary mood stabilizer at some point, but this needs to be done gradually on an outpatient basis.  She has been on Depakote in the past but did not feel very well on this.  She is still on a small dose of Abilify which may have some protective benefits in terms of relapse.  If we take her off lithium abruptly the risk of relapse is quite high.  Currently she is oriented, pleasant, has a little bit of tremor.  Her mental status is improving.  Her mood is remaining stable.     Review of systems:   10 point Review of Systems completed by Dr. Solares, and is  is negative other than noted in the HPI     Medications:       amantadine  25 mg Oral Daily     amLODIPine  5 mg Oral Daily     ARIPiprazole  1 mg Oral Daily     cefTRIAXone  2 g Intravenous Q24H     fexofenadine  180 mg Oral Daily     multivitamin w/minerals  1 tablet Oral Daily     simvastatin  40 mg Oral At Bedtime     sodium chloride (PF)  3 mL Intracatheter Q8H      acetaminophen **OR** acetaminophen, bisacodyl, lidocaine 4%, lidocaine (buffered or not buffered), melatonin, ondansetron **OR** ondansetron, polyethylene glycol, prochlorperazine **OR** prochlorperazine **OR** prochlorperazine, senna-docusate **OR** senna-docusate, sodium chloride (PF)    Mental Status Examination:     Appearance:  awake, alert, adequately groomed, dressed in hospital scrubs and appeared as age stated  Eye Contact:  good  Speech:  clear, coherent  Language:Normal  Psychomotor Behavior:  no evidence of tardive dyskinesia, dystonia, or tics and Mild tremor  Mood:  good  Affect:  appropriate and in normal range  Thought Process:  logical, linear and goal oriented no loose associations  Thought Content:  no evidence of suicidal ideation or homicidal ideation and no evidence of psychotic thought  Oriented to:  time, person, and place  Attention Span and Concentration:  intact  Recent and Remote Memory:  intact  Fund of Knowledge: appropriate  Muscle Strength and Tone: normal  Gait and Station: Normal  Insight:  good  Judgment:  intact        Labs/Vitals:     Recent Results (from the past 24 hour(s))   CBC with platelets    Collection Time: 11/09/21  9:00 AM   Result Value Ref Range    WBC Count 9.9 4.0 - 11.0 10e3/uL    RBC Count 4.83 3.80 - 5.20 10e6/uL    Hemoglobin 10.2 (L) 11.7 - 15.7 g/dL    Hematocrit 36.8 35.0 - 47.0 %    MCV 76 (L) 78 - 100 fL    MCH 21.1 (L) 26.5 - 33.0 pg    MCHC 27.7 (L) 31.5 - 36.5 g/dL    RDW 16.1 (H) 10.0 - 15.0 %    Platelet Count 314 150 - 450 10e3/uL   Lithium level    Collection Time: 11/09/21 11:41 AM   Result Value Ref Range    Lithium 1.0   mmol/L   Basic metabolic panel    Collection Time: 11/09/21 11:41 AM   Result Value Ref Range    Sodium 149 (H) 133 - 144 mmol/L    Potassium 4.6 3.4 - 5.3 mmol/L    Chloride 124 (H) 94 - 109 mmol/L    Carbon Dioxide (CO2) 21 20 - 32 mmol/L    Anion Gap 4 3 - 14 mmol/L    Urea Nitrogen 17 7 - 30 mg/dL    Creatinine 1.63 (H)  0.52 - 1.04 mg/dL    Calcium 9.7 8.5 - 10.1 mg/dL    Glucose 113 (H) 70 - 99 mg/dL    GFR Estimate 32 (L) >60 mL/min/1.73m2     B/P: 133/75, T: 97.5, P: 61, R: 16    Impression:   Diana is a pleasant 70-year-old woman with known bipolar disorder presenting with lithium toxicity.  This is undoubtedly related to declining renal function which led to a spiral of increasing lithium levels and eventual toxicity/lithium accumulation.  I would suggest at least an additional day of hydration, repeating labs tomorrow morning.  Restarting a small dose of lithium then would be appropriate if the lithium levels continue to drop and renal function improves.  With that said, long-term, she may need to move away from lithium and gradually be transitioned to a different mood stabilizer.  This should be done gradually on an outpatient basis with careful monitoring of lithium levels.      DIagnoses:   1.  Bipolar disorder, type I, in remission  2.  Lithium toxicity related to declining renal function  3.  Chronic kidney disease from long-term lithium exposure         Plan:   1. Written information given on medications. Side effects, risks, benefits reviewed.  2.  We will check a lithium level and basic metabolic panel tomorrow, continue IV hydration, hold lithium  3.  If lab values show improvement then restarting 150 mg of lithium 3 times daily tomorrow would be reasonable with plans to check a level and basic metabolic panel within 1 week  4.  She has outpatient psychiatric follow-up, Dr. Alvarez did try to connect with her outpatient psychiatrist and is waiting for a call back  5.  Long-term it may make sense to transition her to a different primary mood stabilizer depending on the trajectory of her kidney function and lab values in the future      Attestation:  Patient has been seen and evaluated by me,  Suleiman Solares MD

## 2021-11-10 NOTE — PROGRESS NOTES
[facility]    Medicine Progress Note - Hospitalist Service       Date of Admission:  11/8/2021    Assessment & Plan         Diana Santiago is a 70 year old female with PMH bipolar disorder, CKD, diabetes insipidus, hypertension, HLD, who was admitted on 11/8/2021 with acute kidney injury and weakness.     Suspect chronic lithium toxicity  Acute toxic encephalopathy  Patient presents with history of recent diarrhea, followed by progressive weakness, fatigue, confusion since that time.  On presentation has possible UTI which could attribute to some of her symptoms, but as she is seemingly without new symptoms from her urinary system, this seems less likely as an etiology. Likely developed REI from diarrhea and has had progressive buildup of her lithium level is now causing symptoms.  - Lithium 1.9 on arrival, lithium 1.0 on recheck 11/9.  - Lithium on hold  - repeat lithium level and BMP tomorrow  - if  Numbers improving, psych recommends starting Lithium 150mg TID  - appreciate psych consult  - Treat possible UTI as below  - IVF as below     UTI, possible  Has chronic urinary urgency and thinks that this may have worsened in the weeks prior to presentation but can't be sure. No other clear symptoms.  - Continue rocephin  - Follow urine culture--still no growth to date. If continues to be negative, will stop antibiotics tomorrow     Bipolar disorder  Has been on lithium for decades, recently started Abilify.  - appreciate psychiatry assistance with management of lithium  - Hold lithium today, possible resumption tomorrow as above  - Continue Abilify, amantadine     REI  CKD 3B  Presents with creatinine 2.2 up from baseline of approximately 1.2-1.4.  Likely CKD due to lithium toxicity.  Has follow-up/establish care with nephrology in November Creatinine improve to 1.6 on 11/9.  - BMP pending this morning  --Addendum Na 144, cr 1.54. stop IVF after 1 hour, encourage PO hydration     Nephrogenic  DI  Hypernatremia  Related to chronic Lithium use. Sodium increased fr om 138 to 149 on 11/9.  Has been receiving isotonic IVF until last night. Currently off IVF.   - start D5W at 75cc/hr  - bmp pending this morning, may need to adjust fluids if Na higher  - Monitor I&O closely          Diet: Combination Diet Regular Diet Adult    DVT Prophylaxis: Pneumatic Compression Devices  Suresh Catheter: Not present  Central Lines: None  Code Status: Full Code      Disposition Plan   Expected discharge: 11/12/2021   recommended to prior living arrangement once improved renal function, sodium and improving lithium level.     The patient's care was discussed with the Bedside Nurse and Patient.    Katia Armendariz MD  Hospitalist Service  [facility]  Securely message with the Vocera Web Console (learn more here)  Text page via [employer] Paging/Directory        Clinically Significant Risk Factors Present on Admission               ______________________________________________________________________    Interval History   States that she can't read the TV this morning and states that it usually happens with bipolar episode. She is moving all her extremities (has chronic LUE weakness from prior fall.) she is alert and oriented. Denies headache, nausea or vomiting. Diarrhea has resolved. She is afebrile.       Data reviewed today: I reviewed all medications, new labs and imaging results over the last 24 hours. I personally reviewed no images or EKG's today.    Physical Exam   Vital Signs: Temp: 98.3  F (36.8  C) Temp src: Oral BP: 138/82 Pulse: 62   Resp: 16 SpO2: 99 % O2 Device: None (Room air)    Weight: 134 lbs 7.69 oz  General Appearance: Alert, awake and oriented  Respiratory: clear to auscultation bilaterally, no wheezing  Cardiovascular: regular rate and rhythm  GI: soft and non-tender  Skin: warm and dry      Data   Recent Labs   Lab 11/09/21  1141 11/09/21  0900 11/08/21  1036   WBC  --  9.9 9.8    HGB  --  10.2* 9.2*   MCV  --  76* 75*   PLT  --  314 419   *  --  138   POTASSIUM 4.6  --  4.3   CHLORIDE 124*  --  112*   CO2 21  --  21   BUN 17  --  24   CR 1.63*  --  2.22*   ANIONGAP 4  --  5   ISSA 9.7  --  10.4*   *  --  113*     No results found for this or any previous visit (from the past 24 hour(s)).  Medications     D5W 75 mL/hr at 11/10/21 0851       amantadine  25 mg Oral Daily     amLODIPine  5 mg Oral Daily     ARIPiprazole  1 mg Oral Daily     cefTRIAXone  2 g Intravenous Q24H     fexofenadine  180 mg Oral Daily     multivitamin w/minerals  1 tablet Oral Daily     simvastatin  40 mg Oral At Bedtime     sodium chloride (PF)  3 mL Intracatheter Q8H

## 2021-11-10 NOTE — PLAN OF CARE
DATE & TIME: 11/9/2021   Cognitive Concerns/ Orientation:A/Ox4, forgetful, pleasant, calm and coop.   BEHAVIOR & AGGRESSION TOOL COLOR: Green             ABNL VS/O2: VSS on RA  MOBILITY: SBA with GB/walker, amb to BR, chair for dinner.   PAIN MANAGMENT: Denies  DIET: Regular, good appetite    BOWEL/BLADDER: Continent during the day. Using Purewick overnight only.   ABNL LAB/BG: Na 149 Cr 1.63. Lithium 1.0.  DRAIN/DEVICES: IVF infusing NS 75mL/hr  SKIN: Intact  TESTS/PROCEDURES: NA  D/C DATE: Discharge 1-2days per MD note.  OTHER IMPORTANT INFO: Psych saw, see note, re consulted for tomorrow.  UC pending, receiving IV rocephin. PT following.

## 2021-11-11 ENCOUNTER — APPOINTMENT (OUTPATIENT)
Dept: PHYSICAL THERAPY | Facility: CLINIC | Age: 71
DRG: 917 | End: 2021-11-11
Payer: MEDICARE

## 2021-11-11 LAB
ANION GAP SERPL CALCULATED.3IONS-SCNC: 6 MMOL/L (ref 3–14)
BUN SERPL-MCNC: 20 MG/DL (ref 7–30)
CALCIUM SERPL-MCNC: 9.6 MG/DL (ref 8.5–10.1)
CHLORIDE BLD-SCNC: 119 MMOL/L (ref 94–109)
CO2 SERPL-SCNC: 18 MMOL/L (ref 20–32)
CREAT SERPL-MCNC: 1.65 MG/DL (ref 0.52–1.04)
GFR SERPL CREATININE-BSD FRML MDRD: 31 ML/MIN/1.73M2
GLUCOSE BLD-MCNC: 134 MG/DL (ref 70–99)
LITHIUM SERPL-SCNC: 0.5 MMOL/L
POTASSIUM BLD-SCNC: 4.5 MMOL/L (ref 3.4–5.3)
SODIUM SERPL-SCNC: 143 MMOL/L (ref 133–144)

## 2021-11-11 PROCEDURE — 36415 COLL VENOUS BLD VENIPUNCTURE: CPT | Performed by: INTERNAL MEDICINE

## 2021-11-11 PROCEDURE — 97116 GAIT TRAINING THERAPY: CPT | Mod: GP

## 2021-11-11 PROCEDURE — 80178 ASSAY OF LITHIUM: CPT | Performed by: INTERNAL MEDICINE

## 2021-11-11 PROCEDURE — 250N000013 HC RX MED GY IP 250 OP 250 PS 637: Performed by: INTERNAL MEDICINE

## 2021-11-11 PROCEDURE — 99232 SBSQ HOSP IP/OBS MODERATE 35: CPT | Performed by: INTERNAL MEDICINE

## 2021-11-11 PROCEDURE — 120N000001 HC R&B MED SURG/OB

## 2021-11-11 PROCEDURE — 80048 BASIC METABOLIC PNL TOTAL CA: CPT | Performed by: INTERNAL MEDICINE

## 2021-11-11 PROCEDURE — 250N000013 HC RX MED GY IP 250 OP 250 PS 637: Performed by: STUDENT IN AN ORGANIZED HEALTH CARE EDUCATION/TRAINING PROGRAM

## 2021-11-11 PROCEDURE — 36415 COLL VENOUS BLD VENIPUNCTURE: CPT | Performed by: PSYCHIATRY & NEUROLOGY

## 2021-11-11 PROCEDURE — 97110 THERAPEUTIC EXERCISES: CPT | Mod: GP

## 2021-11-11 RX ORDER — LITHIUM CARBONATE 150 MG/1
150 CAPSULE ORAL
Status: DISCONTINUED | OUTPATIENT
Start: 2021-11-11 | End: 2021-11-12 | Stop reason: HOSPADM

## 2021-11-11 RX ADMIN — LITHIUM CARBONATE 150 MG: 150 CAPSULE, GELATIN COATED ORAL at 14:28

## 2021-11-11 RX ADMIN — AMANTADINE HYDROCHLORIDE 25 MG: 50 SOLUTION ORAL at 21:09

## 2021-11-11 RX ADMIN — LITHIUM CARBONATE 150 MG: 150 CAPSULE, GELATIN COATED ORAL at 17:47

## 2021-11-11 RX ADMIN — SIMVASTATIN 40 MG: 40 TABLET, FILM COATED ORAL at 21:09

## 2021-11-11 RX ADMIN — AMLODIPINE BESYLATE 5 MG: 5 TABLET ORAL at 21:08

## 2021-11-11 RX ADMIN — MULTIPLE VITAMINS W/ MINERALS TAB 1 TABLET: TAB at 21:08

## 2021-11-11 RX ADMIN — ARIPIPRAZOLE 1 MG: 2 TABLET ORAL at 21:08

## 2021-11-11 RX ADMIN — FEXOFENADINE HYDROCHLORIDE 180 MG: 180 TABLET ORAL at 21:08

## 2021-11-11 ASSESSMENT — ACTIVITIES OF DAILY LIVING (ADL)
ADLS_ACUITY_SCORE: 22
ADLS_ACUITY_SCORE: 20
ADLS_ACUITY_SCORE: 22
ADLS_ACUITY_SCORE: 20
DEPENDENT_IADLS:: INDEPENDENT
ADLS_ACUITY_SCORE: 20
ADLS_ACUITY_SCORE: 22
ADLS_ACUITY_SCORE: 22
ADLS_ACUITY_SCORE: 20
ADLS_ACUITY_SCORE: 22
ADLS_ACUITY_SCORE: 20

## 2021-11-11 NOTE — DISCHARGE INSTRUCTIONS
A referral has been sent to [facility] for home Physical Therapy.  They will call you for scheduling.  Their phone number is [phone]

## 2021-11-11 NOTE — PLAN OF CARE
DATE & TIME: 11/10/21 [id number]    Cognitive Concerns/ Orientation : Pt A/Ox4   BEHAVIOR & AGGRESSION TOOL COLOR: Green   ABNL VS/O2: VSS on RA  MOBILITY: SBA with gait belt, fall risk  PAIN MANAGMENT: Denies  DIET: Regular  BOWEL/BLADDER: Continent. Purewick at bedtime.  DRAIN/DEVICES: IV SL  SKIN: Intact  D/C DATE: Discharge pending progress  OTHER IMPORTANT INFO: Psych and PT following. Continues on IV Rocephin. Strict I&Os.

## 2021-11-11 NOTE — PROGRESS NOTES
[facility]    Medicine Progress Note - Hospitalist Service       Date of Admission:  11/8/2021    Assessment & Plan         Diana Santiago is a 70 year old female with PMH bipolar disorder, CKD, diabetes insipidus, hypertension, HLD, who was admitted on 11/8/2021 with acute kidney injury and weakness.     Suspect chronic lithium toxicity  Acute toxic encephalopathy-improved  Patient presents with history of recent diarrhea, followed by progressive weakness, fatigue, confusion since that time.  less likely UTI (see below.)Likely developed REI from diarrhea and has had progressive buildup of her lithium level is now causing symptoms.  - Lithium 1.9 on arrival, lithium 0.5 on 11/11  - restarting Lithium 150mg TID per psych recommendations  - appreciate psych consult  - states she has blurry vision and usually happens with her bipolar episode and no other focal neuro symptoms, will monitor with resumption of Lithium. If does not improve, consider further imaging    Bipolar disorder  Has been on lithium for decades, recently started Abilify.  - appreciate psychiatry assistance with management of lithium  - lithium restarted today at 100mg TID as above  - will need lithium check in 1 week  - Continue Abilify, amantadine       Pyuria  Has chronic urinary urgency and thinks that this may have worsened in the weeks prior to presentation but can't be sure. No other clear symptoms.UA with wbc of 24 and has 11 subcutaneous cells. Started on ceftriaxone on admission which was continued. Ceftriaxone stopped and culture showing mixed urogenital kimberley.      REI  CKD 3B  Presents with creatinine 2.2 up from baseline of approximately 1.2-1.4.  Likely CKD due to lithium toxicity.  Has follow-up/establish care with nephrology in November   Creatinine improve to 1.63--1.54-1.65  - off IVF. Encourage PO intake  - check BMP in AM     Nephrogenic DI  Hypernatremia-resolved  Related to chronic Lithium use. Sodium  increased fr om 138 to 149 on 11/9.  Has been receiving isotonic IVF on admission. Also small amount of D5W which has been discontinued.   - Na remains stable in last 24 hrs          Diet: Combination Diet Regular Diet Adult    DVT Prophylaxis: Pneumatic Compression Devices  Suresh Catheter: Not present  Central Lines: None  Code Status: Full Code      Disposition Plan   Expected discharge: 11/12/2021   recommended to prior living arrangement once stable renal function, monitor blurry vision with resumption of lithium.     The patient's care was discussed with the Bedside Nurse, Care Coordinator/ and Patient.    Katia Armendariz MD  Hospitalist Service  [facility]  Securely message with the Vocera Web Console (learn more here)  Text page via [employer] Paging/Directory        Clinically Significant Risk Factors Present on Admission               ______________________________________________________________________    Interval History   Patient reports that her vision is blurry when she looks at the TV (started yesterday) and usually happens with her bipolar episode. Felt depressed yesterday but better today. Denies new weakness, headache, difficulty with swallowing. She is alert and oriented.     Data reviewed today: I reviewed all medications, new labs and imaging results over the last 24 hours. I personally reviewed no images or EKG's today.    Physical Exam   Vital Signs: Temp: 98.1  F (36.7  C) Temp src: Rectal BP: (!) 138/90 Pulse: 67   Resp: 16 SpO2: 99 % O2 Device: None (Room air)    Weight: 134 lbs 7.69 oz  General Appearance: Alert, awake and oriented  Respiratory: clear to auscultation bilaterally, no wheezing  Cardiovascular: regular rate and rhythm  GI: soft and non-tender  Skin: warm and dry      Data   Recent Labs   Lab 11/11/21  1124 11/10/21  0858 11/10/21  0853 11/09/21  1141 11/09/21  0900 11/08/21  1036   WBC  --   --   --   --  9.9 9.8   HGB  --   --   --    --  10.2* 9.2*   MCV  --   --   --   --  76* 75*   PLT  --   --   --   --  314 419     --  144 149*  --  138   POTASSIUM 4.5  --  4.2 4.6  --  4.3   CHLORIDE 119*  --  119* 124*  --  112*   CO2 18*  --  18* 21  --  21   BUN 20  --  16 17  --  24   CR 1.65*  --  1.54* 1.63*  --  2.22*   ANIONGAP 6  --  7 4  --  5   ISSA 9.6  --  9.9 9.7  --  10.4*   * 78 235* 113*  --  113*     No results found for this or any previous visit (from the past 24 hour(s)).  Medications       amantadine  25 mg Oral Daily     amLODIPine  5 mg Oral Daily     ARIPiprazole  1 mg Oral Daily     fexofenadine  180 mg Oral Daily     lithium  150 mg Oral TID w/meals     multivitamin w/minerals  1 tablet Oral Daily     simvastatin  40 mg Oral At Bedtime     sodium chloride (PF)  3 mL Intracatheter Q8H

## 2021-11-11 NOTE — PLAN OF CARE
DATE & TIME: 11/11/21 2323-4001    Cognitive Concerns/ Orientation : Pt A/Ox4   BEHAVIOR & AGGRESSION TOOL COLOR: Green   ABNL VS/O2: VSS on RA  MOBILITY: SBA with gait belt, walker  PAIN MANAGMENT: Denies  DIET: Regular  BOWEL/BLADDER: Continent. Purewick at bedtime.  DRAIN/DEVICES: IV SL  SKIN: Intact  D/C DATE: Discharge pending progress  OTHER IMPORTANT INFO: Psych and PT following. Lithium level 0.5.  Restart Lithium today.  Possible discharge to home tomorrow

## 2021-11-11 NOTE — PLAN OF CARE
DATE & TIME: 11/10/2021 3-11PM  Cognitive Concerns/ Orientation: A/Ox4.   BEHAVIOR & AGGRESSION TOOL COLOR: Green            ABNL VS/O2: VSS on RA  MOBILITY: SBA with GB/W.     PAIN MANAGMENT: Denies  DIET: Regular  BOWEL/BLADDER: Continent. Purewick at bedtime.  ABNL LAB/BG: Cr 1.54  DRAIN/DEVICES: R wrist PIV SL.  SKIN: Intact.  TESTS/PROCEDURES: NA  D/C DATE: Discharge on 11/12/21 once improved renal function, Na and improving lithium level.  OTHER IMPORTANT INFO: Psych and PT following. Q24h Rocephin for UTI.

## 2021-11-11 NOTE — CONSULTS
Care Management Initial Consult  Planning hospital discharge tomorrow.  Patient is feeling her bipolar is acting up as she has the typical symptoms of blurry vision and somewhat depressed mood.  Her Lithium is being restarted today, so vision will hopefully improve.  Pt lives alone and does drive some.  She is using a 4 wheeled with seat walker and it is too heavy for pt to lift, so she has her brother help her with errands.  She goes out to lunch every Saturday with her brother.  She is currently weaker than her baseline, PT is recommended.  Pt is in agreement.  She does not feel she needs a home care nurse.  [facility] [place] has accepted patient.  A PCP follow-up appointment has been scheduled for 11/18/21.  Pt's Psychiatrist is Dr Gallo in [place].  Pt reports she has a follow-up scheduled soon with Dr Gallo.  Pt would like to see a Dietician.  She reports having diarrhea with many foods.  Dietician was paged.        General Information  Assessment completed with: Patient,    Type of CM/SW Visit: Initial Assessment    Primary Care Provider verified and updated as needed: Yes   Readmission within the last 30 days: no previous admission in last 30 days      Reason for Consult: discharge planning  Advance Care Planning: Advance Care Planning Reviewed: present on chart          Communication Assessment  Patient's communication style: spoken language (English or Bilingual)    Hearing Difficulty or Deaf: no   Wear Glasses or Blind: yes    Cognitive  Cognitive/Neuro/Behavioral: WDL                      Living Environment:   People in home: alone     Current living Arrangements: apartment      Able to return to prior arrangements: yes  Living Arrangement Comments: [address] floor    Family/Social Support:  Care provided by: self  Provides care for: no one  Marital Status: Single  Sibling(s)          Description of Support System: Supportive    Support Assessment: Adequate family and caregiver  support    Current Resources:   Patient receiving home care services: No     Community Resources: Other (see comment) (Pt has groceries delivered at times and uses [employer] )  Equipment currently used at home: walker, rolling  Supplies currently used at home: None    Employment/Financial:  Employment Status: retired     Employment/ Comments: Pt had volunteerd at [employer]  Financial Concerns: No concerns identified           Lifestyle & Psychosocial Needs:  Social Determinants of Health     Tobacco Use: Low Risk      Smoking Tobacco Use: Never Smoker     Smokeless Tobacco Use: Never Used   Alcohol Use: Not on file   Financial Resource Strain: Not on file   Food Insecurity: Not on file   Transportation Needs: Not on file   Physical Activity: Not on file   Stress: Not on file   Social Connections: Not on file   Intimate Partner Violence: Not on file   Depression: Not at risk     PHQ-2 Score: 0   Housing Stability: Not on file       Functional Status:  Prior to admission patient needed assistance:   Dependent ADLs:: Independent  Dependent IADLs:: Independent  Assesssment of Functional Status: Not at baseline with mobility    Mental Health Status:  Mental Health Status: Current Concern  Mental Health Management: Medication,Psychiatrist    Chemical Dependency Status:  Chemical Dependency Status: No Current Concerns             Values/Beliefs:  Spiritual, Cultural Beliefs, Latter day Practices, Values that affect care:     JOSE E Hawkins, RN   Inpatient Care management  [phone]

## 2021-11-12 VITALS
RESPIRATION RATE: 16 BRPM | HEART RATE: 73 BPM | SYSTOLIC BLOOD PRESSURE: 136 MMHG | HEIGHT: 64 IN | DIASTOLIC BLOOD PRESSURE: 79 MMHG | OXYGEN SATURATION: 96 % | WEIGHT: 134.48 LBS | BODY MASS INDEX: 22.96 KG/M2 | TEMPERATURE: 98 F

## 2021-11-12 LAB
ANION GAP SERPL CALCULATED.3IONS-SCNC: 4 MMOL/L (ref 3–14)
ATRIAL RATE - MUSE: 67 BPM
BUN SERPL-MCNC: 26 MG/DL (ref 7–30)
CALCIUM SERPL-MCNC: 10.3 MG/DL (ref 8.5–10.1)
CHLORIDE BLD-SCNC: 118 MMOL/L (ref 94–109)
CO2 SERPL-SCNC: 19 MMOL/L (ref 20–32)
CREAT SERPL-MCNC: 1.67 MG/DL (ref 0.52–1.04)
DIASTOLIC BLOOD PRESSURE - MUSE: NORMAL MMHG
GFR SERPL CREATININE-BSD FRML MDRD: 31 ML/MIN/1.73M2
GLUCOSE BLD-MCNC: 106 MG/DL (ref 70–99)
INTERPRETATION ECG - MUSE: NORMAL
P AXIS - MUSE: 46 DEGREES
POTASSIUM BLD-SCNC: 5 MMOL/L (ref 3.4–5.3)
PR INTERVAL - MUSE: 202 MS
QRS DURATION - MUSE: 116 MS
QT - MUSE: 442 MS
QTC - MUSE: 467 MS
R AXIS - MUSE: 43 DEGREES
SODIUM SERPL-SCNC: 141 MMOL/L (ref 133–144)
SYSTOLIC BLOOD PRESSURE - MUSE: NORMAL MMHG
T AXIS - MUSE: 54 DEGREES
VENTRICULAR RATE- MUSE: 67 BPM

## 2021-11-12 PROCEDURE — 99239 HOSP IP/OBS DSCHRG MGMT >30: CPT | Performed by: INTERNAL MEDICINE

## 2021-11-12 PROCEDURE — 36415 COLL VENOUS BLD VENIPUNCTURE: CPT | Performed by: INTERNAL MEDICINE

## 2021-11-12 PROCEDURE — 80048 BASIC METABOLIC PNL TOTAL CA: CPT | Performed by: INTERNAL MEDICINE

## 2021-11-12 PROCEDURE — 250N000013 HC RX MED GY IP 250 OP 250 PS 637: Performed by: INTERNAL MEDICINE

## 2021-11-12 RX ORDER — LITHIUM CARBONATE 150 MG/1
150 CAPSULE ORAL
Qty: 42 CAPSULE | Refills: 0 | Status: SHIPPED | OUTPATIENT
Start: 2021-11-12 | End: 2022-03-11

## 2021-11-12 RX ADMIN — LITHIUM CARBONATE 150 MG: 150 CAPSULE, GELATIN COATED ORAL at 08:03

## 2021-11-12 ASSESSMENT — ACTIVITIES OF DAILY LIVING (ADL)
ADLS_ACUITY_SCORE: 20
ADLS_ACUITY_SCORE: 21
ADLS_ACUITY_SCORE: 20
ADLS_ACUITY_SCORE: 21
ADLS_ACUITY_SCORE: 20
ADLS_ACUITY_SCORE: 21
ADLS_ACUITY_SCORE: 20

## 2021-11-12 NOTE — CONSULTS
"CLINICAL NUTRITION SERVICES - BRIEF NOTE    Consult received for RN Consult - \"patient is having diarrhea wants to know food choices\".     Met with pt at bedside this morning who states she has been having ongoing diarrhea for several months now. She states it is triggered by the following foods: prime rib, beef, tomato sauce, hot dogs, cherries, watermelon, grapes, OJ, grape juice and cheesecake. Also noted that pt takes lithium medication daily at baseline which may be contributing to unclear etiology of ongoing diarrhea.     Discussed use of banatrol supplement that contains soluble fiber and prebiotics which can be purchased outside the hospital as noted pt is discharging today. Additionally provided education on nutrition therapy for diarrhea including intake of soluble fiber and avoiding foods that contain insoluble fiber. Provided a handout on list of foods recommended vs foods not recommended while experiencing diarrhea. All questions were answered and discussed during visit.     No follow up planned, noted pt to be discharged today. Pt can request outpatient RD appointment upon discharge as desired/needed.     Jenn Wadsworth RD, LD  Unit RD Pager: [phone]    "

## 2021-11-12 NOTE — PLAN OF CARE
DATE & TIME: 11/12/2021 4195-0745   Cognitive Concerns/ Orientation : A&O x4, calm and cooperative   BEHAVIOR & AGGRESSION TOOL COLOR: Green  CIWA SCORE: N/A  ABNL VS/O2: VSS on RA  MOBILITY: SBA with gait belt and walker  PAIN MANAGMENT: Denies  DIET: Regular, good appetite   BOWEL/BLADDER: Incontinent at times, having diarrhea  ABNL LAB/BG: Creat 1.67  DRAIN/DEVICES: IV removed (discharging)  TELEMETRY RHYTHM: N/A  SKIN: Pale, bruised, intact  TESTS/PROCEDURES: None  D/C DATE: Discharging today home via brother @ 1100  OTHER IMPORTANT INFO: LS clear, BS active x4. Up to chair for breakfast. Pt reports blurred vision (happens with flare up of mich). L hand contracted from previous fall in past years. Pt reports L arm numbness/hand.

## 2021-11-12 NOTE — PLAN OF CARE
Physical Therapy Discharge Summary    Reason for therapy discharge:    Discharged to home with home therapy.    Progress towards therapy goal(s). See goals on Care Plan in [facility] electronic health record for goal details.  Goals partially met.  Barriers to achieving goals:   discharge from facility.    Therapy recommendation(s):    Continued therapy is recommended.  Rationale/Recommendations:  Patient to begin HH PT upon discharge home to address strength and balance deficits to increase independence with functional mobility tasks and progress activity tolerance.

## 2021-11-12 NOTE — PROGRESS NOTES
DATE & TIME: 11/11/2021 Night    Cognitive Concerns/ Orientation : Alert/Oriented x 4   BEHAVIOR & AGGRESSION TOOL COLOR: Green   ABNL VS/O2: VSS, room air  MOBILITY: Assist-1 gait belt/walker  PAIN MANAGMENT: denies; stretches L hand (slight contraction) when it is in pain  DIET: Regular  BOWEL/BLADDER: Incontinent, Purewick in place (uses overnight/bedtime)  ABNL LAB/BG: Creat 1.65; Lithium 0.5  DRAIN/DEVICES: R PIV saline locked  SKIN: Bruise to R wrist; L hand slightly contracted after surgeries following previous fall  TESTS/PROCEDURES: n/a  D/C DATE: Possibly 11/12 if renal function stable  OTHER IMPORTANT INFO: Psych/PT following; Pt reports blurry vision with bipolar episode per MD note

## 2021-11-12 NOTE — PROGRESS NOTES
Discharge    Patient discharged to Home via car with brother      Discharge instructions and medications given to patient. All questions answered.     Listed belongings gathered and given to patient (including from security/pharmacy). Yes  Care Plan and Patient education resolved: Yes  Prescriptions if needed, hard copies sent with patient  Yes  Medication Bin checked and emptied on discharge Yes  SW/care coordinator/charge RN aware of discharge: Yes

## 2021-11-12 NOTE — DISCHARGE SUMMARY
[facility]  Hospitalist Discharge Summary      Date of Admission:  11/8/2021  Date of Discharge:  11/12/2021  Discharging Provider: Katia Armendariz MD      Discharge Diagnoses   Suspect chronic lithium toxicity  Acute toxic encephalopathy-improved  Bipolar disorder  REI  CKD 3B  Nephrogenic DI  Hypernatremia-resolved    Follow-ups Needed After Discharge   Follow-up Appointments     Follow-up and recommended labs and tests       Follow up with primary care provider, Suleiman Miller, as scheduled. for   hospital follow- up.  The following labs/tests are recommended: Basic   metabolic panel and llithium level within a week at your visit.    Follow up with kidney specialist.   Follow up with your psychiatrist.             Unresulted Labs Ordered in the Past 30 Days of this Admission     No orders found from 10/9/2021 to 11/9/2021.          Discharge Disposition   Discharged to home  Condition at discharge: Stable    Hospital Course         Diana Santiago is a 70 year old female with PMH bipolar disorder, CKD, diabetes insipidus, hypertension, HLD, who was admitted on 11/8/2021 with acute kidney injury and weakness.     Suspect chronic lithium toxicity  Acute toxic encephalopathy-improved  Patient presents with history of recent diarrhea, followed by progressive weakness, fatigue, confusion since that time. less likely UTI (see below.)Likely developed REI from diarrhea and has had progressive buildup of her lithium level is now causing symptoms. Lithium 1.9 on arrival.  Lithium was held on admission. Lithium 0.5 on 11/11   - restarting Lithium 150mg TID per psych recommendations  - will need follow up with her psychiatry for further medication management. This is discussed with patient and states has a follow up appointment scheduled.   -- will need BMP and lithium level with in a week.     Bipolar disorder  Has been on lithium for decades, recently started Abilify. States she has blurry vision  when reading the board intermittently with her bipolar and states she is having that on and off.  She is able to read her menu and fine prints. She states this is typical for her and does not want any further work up. She does not have other focal deficit.    - has been seen by psych as above  - lithium restarted at 150mg TID as above  - will need lithium level check in 1 week  - Continue Abilify, amantadine       Pyuria  Has chronic urinary urgency and thinks that this may have worsened in the weeks prior to presentation but can't be sure. No other clear symptoms.UA with wbc of 24 and has 11 subcutaneous cells. Started on ceftriaxone on admission which was continued. Ceftriaxone stopped and culture showing mixed urogenital kimberley.      REI  CKD 3B  Presents with creatinine 2.2 up from baseline of approximately 1.2-1.4.  Likely CKD due to lithium toxicity.  Has follow-up/establish care with nephrology in December.   Creatinine improved from admission, but remains elevated above baseline and stable at 1.63--1.54-1.65-1.67  - PO intake encouraged  - repeat BMP in 1 week with PCP  - will refer to internal medicine consultants to get her sooner appointment given her renal dysfunction and lithium use    Nephrogenic DI  Hypernatremia-resolved  Related to chronic Lithium use. Sodium increased fr om 138 to 149 on 11/9.  Has been receiving isotonic IVF on admission. Also small amount of D5W which has been discontinued.   - Na remains stable in last 24 hrs     Generalized weakness/physical deconditioning  - PT evaluation completed, will discharge home with home PT      Consultations This Hospital Stay   PHYSICAL THERAPY ADULT IP CONSULT  PSYCHIATRY IP CONSULT  PSYCHIATRY IP CONSULT  CARE MANAGEMENT / SOCIAL WORK IP CONSULT  NUTRITION SERVICES ADULT IP CONSULT    Code Status   Full Code    Time Spent on this Encounter   Katia CORONADO MD, personally saw the patient today and spent 30 minutes discharging this patient.        Katia Armendariz MD  [place] [address] [facility]  [address] [place] [address]  Phone: [phone]  ______________________________________________________________________    Physical Exam   Vital Signs: Temp: 98  F (36.7  C) Temp src: Oral BP: 136/79 Pulse: 73   Resp: 16 SpO2: 96 % O2 Device: None (Room air)    Weight: 134 lbs 7.69 oz  General Appearance: Alert, awake and no apparent distress  Respiratory: clear to auscultation bilaterally, no wheezing  Cardiovascular: regular rate and rhythm  GI: soft and non-tender  Skin: warm and dry         Primary Care Physician   Suleiman Miller    Discharge Orders      Home Care PT Referral for Hospital Discharge      Reason for your hospital stay    You were admitted for decreased kidney function from your baseline likely related to diarrhea. Lithium level was also high in your blood and dose of your lithium is decreased.     Follow-up and recommended labs and tests     Follow up with primary care provider, Suleiman Miller, as scheduled. for hospital follow- up.  The following labs/tests are recommended: Basic metabolic panel and llithium level within a week at your visit.    Follow up with kidney specialist.   Follow up with your psychiatrist.     Activity    Your activity upon discharge: activity as tolerated     MD face to face encounter    Documentation of Face to Face and Certification for Home Health Services    I certify that patient: Diana Santiago is under my care and that I, or a nurse practitioner or physician's assistant working with me, had a face-to-face encounter that meets the physician face-to-face encounter requirements with this patient on: 11/12/2021.    This encounter with the patient was in whole, or in part, for the following medical condition, which is the primary reason for home health care: generalized weakness.    I certify that, based on my findings, the following services are medically necessary home health  services: Physical Therapy.    My clinical findings support the need for the above services because: Physical Therapy Services are needed to assess and treat the following functional impairments: to improve functional mobility due to generalized weakness and deconditioning.    Further, I certify that my clinical findings support that this patient is homebound (i.e. absences from home require considerable and taxing effort and are for medical reasons or [employer] services or infrequently or of short duration when for other reasons) because: needs assistance to leave home due to generalized weakness.    Based on the above findings. I certify that this patient is confined to the home and needs intermittent skilled nursing care, physical therapy and/or speech therapy.  The patient is under my care, and I have initiated the establishment of the plan of care.  This patient will be followed by a physician who will periodically review the plan of care.  Physician/Provider to provide follow up care: Suleiman Miller    Attending [facility] physician (the Medicare certified PEC provider): Katia Armendariz MD  Physician Signature: See electronic signature associated with these discharge orders.  Date: 11/12/2021     Diet    Follow this diet upon discharge: Orders Placed This Encounter      Combination Diet Regular Diet Adult       Significant Results and Procedures   Most Recent 3 CBC's:Recent Labs   Lab Test 11/09/21  0900 11/08/21  1036 01/05/21  0913 12/21/20  1436   WBC 9.9 9.8  --  9.3   HGB 10.2* 9.2* 10.4* 9.7*   MCV 76* 75*  --  72*    419  --  308     Most Recent 3 BMP's:Recent Labs   Lab Test 11/12/21  0726 11/11/21  1124 11/10/21  0858 11/10/21  0853    143  --  144   POTASSIUM 5.0 4.5  --  4.2   CHLORIDE 118* 119*  --  119*   CO2 19* 18*  --  18*   BUN 26 20  --  16   CR 1.67* 1.65*  --  1.54*   ANIONGAP 4 6  --  7   ISSA 10.3* 9.6  --  9.9   * 134* 78 235*   ,   Results for orders placed  or performed during the hospital encounter of 11/08/21   XR Chest 2 Views    Narrative    CHEST TWO VIEWS   11/8/2021 10:55 AM     HISTORY: Weakness.    COMPARISON: None.      Impression    IMPRESSION: No acute cardiopulmonary disease.    ABIMAEL ZPAATA MD         SYSTEM ID:  [id number]       Discharge Medications   Current Discharge Medication List      START taking these medications    Details   lithium (ESKALITH) 150 MG capsule Take 1 capsule (150 mg) by mouth 3 times daily (with meals) for 14 days  Qty: 42 capsule, Refills: 0    Comments: Future refills by PCP Dr. Suleiman Miller with phone number [phone].  Associated Diagnoses: Bipolar affective disorder, remission status unspecified (H)         CONTINUE these medications which have NOT CHANGED    Details   amantadine (SYMMETREL) 50 MG/5ML solution Take 2.5 mLs by mouth daily.      amLODIPine (NORVASC) 5 MG tablet TAKE 1 TABLET DAILY  Qty: 90 tablet, Refills: 1    Associated Diagnoses: Benign essential hypertension      ARIPiprazole, sensor, 2 MG TABS Take 1 mg by mouth daily       fexofenadine (ALLEGRA) 180 MG tablet Take 1 tablet by mouth daily.  Qty: 90 tablet, Refills: 0      !! Multiple Vitamins-Minerals (CENTRUM SILVER) per tablet Take 1 tablet by mouth daily.    Associated Diagnoses: Routine general medical examination at a health care facility      !! Multiple Vitamins-Minerals (EYE VITAMINS & MINERALS PO) Take 1 tablet by mouth 2 times daily Focus Select      simvastatin (ZOCOR) 40 MG tablet TAKE 1 TABLET AT BEDTIME.  MAKE AN APPOINTMENT FOR    FURTHER REFILLS.  Qty: 90 tablet, Refills: 3    Comments: For profile only for future fill  Associated Diagnoses: Hypertriglyceridemia       !! - Potential duplicate medications found. Please discuss with provider.      STOP taking these medications       Cholecalciferol (VITAMIN D3) 1000 UNITS CAPS Comments:   Reason for Stopping:         lithium 300 MG tablet Comments:   Reason for Stopping:             Allergies    No Known Allergies

## 2021-11-12 NOTE — PLAN OF CARE
DATE & TIME: 11/11/21 3-11PM    Cognitive Concerns/ Orientation : Pt A/Ox4   BEHAVIOR & AGGRESSION TOOL COLOR: Green   ABNL VS/O2: VSS on RA  MOBILITY: SBA with gait belt, fall risk  PAIN MANAGMENT: Denies  DIET: Regular  BOWEL/BLADDER:  Incontinent of bladder. Purewick at bedtime. Had bm. Continent of bowel.  DRAIN/DEVICES: IV SL. Iv Rocephin discontinued  SKIN: Right wrist bruise  D/C DATE: Discharge home 11/12/21 once stable renal function. Cr 1.65.  OTHER IMPORTANT INFO: Psych and PT following. Strict I&Os. Lithium restarted today. Patient has blurry vision which usually happens with her bipolar episode; per Dr, monitor with resumption of Lithium.

## 2021-11-15 ENCOUNTER — TELEPHONE (OUTPATIENT)
Dept: FAMILY MEDICINE | Facility: CLINIC | Age: 71
End: 2021-11-15
Payer: MEDICARE

## 2021-11-15 ENCOUNTER — PATIENT OUTREACH (OUTPATIENT)
Dept: FAMILY MEDICINE | Facility: CLINIC | Age: 71
End: 2021-11-15
Payer: MEDICARE

## 2021-11-15 DIAGNOSIS — Z53.9 DIAGNOSIS NOT YET DEFINED: Primary | ICD-10-CM

## 2021-11-15 PROCEDURE — G0180 MD CERTIFICATION HHA PATIENT: HCPCS | Performed by: INTERNAL MEDICINE

## 2021-11-15 NOTE — TELEPHONE ENCOUNTER
What type of discharge? Inpatient  Risk of Hospital admission or ED visit: 57%  Is a TCM episode required? Yes  When should the patient follow up with PCP? 7 days of discharge.    Yani Drew RN  [facility]     Agustin Paris  (RN)  2021 05:55:16

## 2021-11-15 NOTE — TELEPHONE ENCOUNTER
"Patient Contact    Attempt # 1    Was call answered?  Yes     ED/Discharge Protocol    \"Hi, my name is Sylvia Guerrero RN, a registered nurse, and I am calling on behalf of Dr. Miller's office at [facility].  I am calling to follow up and see how things are going for you after your recent visit.\"    \"I see that you were in the (ER/UC/IP) on 11/8-11/12.    How are you doing now that you are home?\" feeling weak, is in a bipolar episode of depression where she is having blurred vision.   In the hospital could read the white board but eyesight was blurred, related to bipolar disorder     Is patient experiencing symptoms that may require a hospital visit?  No     Discharge Instructions    \"Let's review your discharge instructions.  What is/are the follow-up recommendations?  Pt. Response: follow up with psych and PCP     \"Were you instructed to make a follow-up appointment?\"  Pt. Response: Yes.  Has appointment been made?   Yes      \"When you see the provider, I would recommend that you bring your discharge instructions with you.    Medications    \"How many new medications are you on since your hospitalization/ED visit?\"    0-1  \"How many of your current medicines changed (dose, timing, name, etc.) while you were in the hospital/ED visit?\"   0-1  \"Do you have questions about your medications?\"   No  \"Were you newly diagnosed with heart failure, COPD, diabetes or did you have a heart attack?\"   No  For patients on insulin: \"Did you start on insulin in the hospital or did you have your insulin dose changed?\"   No  Post Discharge Medication Reconciliation Status: discharge medications reconciled, continue medications without change.  Was MTM referral placed (*Make sure to put transitions as reason for referral)?   No  Call Summary    \"Do you have any questions or concerns about your condition or care plan at the moment?\"    No  Triage nurse advice given    Patient was in ER 1x in the past year (assess appropriateness of ER " "visits.)      \"If you have questions or things don't continue to improve, we encourage you contact us through the main clinic number,  ([phone]).  Even if the clinic is not open, triage nurses are available 24/7 to help you.     We would like you to know that our clinic has extended hours (provide information).  We also have urgent care (provide details on closest location and hours/contact info)\"  \"Thank you for your time and take care!\"    Sylvia VIERA Triage RN  [facility]       "

## 2021-11-15 NOTE — TELEPHONE ENCOUNTER
FYI PCP:     Home care called for orders:     OT evaluation     PT 1x/3 weeks - then drop down to every other     HHA 1x/week for 4 weeks     Skilled nurse margi (nurse with psych based specialty)     Pt is bipolar and had recent hospitalization related to lithium dosage     Verbal given on home care orders - pt has OV this week with you     Sylvia VIERA, Triage RN  [facility]

## 2021-11-16 ENCOUNTER — PATIENT OUTREACH (OUTPATIENT)
Dept: FAMILY MEDICINE | Facility: CLINIC | Age: 71
End: 2021-11-16
Payer: MEDICARE

## 2021-11-17 ENCOUNTER — MEDICAL CORRESPONDENCE (OUTPATIENT)
Dept: HEALTH INFORMATION MANAGEMENT | Facility: CLINIC | Age: 71
End: 2021-11-17
Payer: MEDICARE

## 2021-11-17 ENCOUNTER — TELEPHONE (OUTPATIENT)
Dept: FAMILY MEDICINE | Facility: CLINIC | Age: 71
End: 2021-11-17
Payer: MEDICARE

## 2021-11-17 NOTE — PROGRESS NOTES
Leigh SOLOMON is a 70 year old who presents for the following health issues     HPI       Hospital Follow-up Visit:    Hospital/Nursing Home/IP Rehab Facility: [facility]  Date of Admission: 11/8/21  Date of Discharge: 11/12/21  Reason(s) for Admission: Suspect chronic lithium toxicity  Acute toxic encephalopathy-improved  Bipolar disorder  REI  CKD 3B  Nephrogenic DI  Hypernatremia-resolved      Was your hospitalization related to COVID-19? No   Problems taking medications regularly:  None  Medication changes since discharge: None  Problems adhering to non-medication therapy:  None    Summary of hospitalization:  [facility] discharge summary reviewed  Diagnostic Tests/Treatments reviewed.  Follow up needed: labs today  Other Healthcare Providers Involved in Patient s Care:         Specialist appointment - psyche and renal  Update since discharge: improved.       Post Discharge Medication Reconciliation: discharge medications reconciled, continue medications without change.  Plan of care communicated with patient              This is a very pleasant 70-year old patient here for hospital follow-up.  This was for lithium toxicity with acute toxic encephalopathy, bipolar disorder, acute kidney injury on top of CKD with nephrogenic diabetes insipidus and hypernatremia.  The patient presented with diarrhea followed by progressive weakness, fatigue, and confusion.  She had buildup of her lithium level with a lithium level of 1.9 on admission.  This was held.  She was seen by psychiatry and it was restarted at 150 mg 3 times daily with close psychiatry follow-up.  She has been on lithium for decades and recently started Abilify.    She did have a possible UTI and was started on ceftriaxone which was continued but culture was negative.    Her creatinine admission was up to 2.2 from her baseline.  It was felt likely due to lithium toxicity.  It improved but still remains  somewhat high on discharge.  She does have chronic kidney disease and in the last year it did go up some so I referred her to renal although she has not seen them.  She does have an appointment tomorrow now.    She had hyponatremia felt likely nephrogenic diabetes insipidus due to lithium use and this did improve.  Her generalized weakness also improved.    The patient is doing better but still somewhat weak.  No more confusion.  No bowel issues.  No chest pain or shortness of breath or urine symptoms.  She saw psychiatry yesterday.  She has follow-up with him as well.    Past Medical History:   Diagnosis Date     Benign essential hypertension 09/2018    added norvasc 9/18     Bipolar affective disorder (H)     hosp 1993, Dr. Aftab Deal     Chronic kidney disease, stage 3a (H)      DCIS (ductal carcinoma in situ) 1996    xrt and lumpectomy x 4     Diabetes insipidus (H) 09/2018    elev sodium, likely due to lithium     Elevated blood sugar      Fractured femoral neck (H) 1992     Hx of colonoscopy 2010    tics and hem     Hypercalcemia 08/2017     Hypercholesteremia      Hyperparathyroidism (H) 08/2017     Lithium toxicity 11/2021    hosp fsd     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    felt due to lithium     Thalassaemia trait      Vitamin D deficiency      Past Surgical History:   Procedure Laterality Date     BREAST SURGERY      lumpectomy x 4     left hand surgery      last 1974     Social History     Socioeconomic History     Marital status: Single     Spouse name: Not on file     Number of children: 0     Years of education: Not on file     Highest education level: Not on file   Occupational History     Occupation: , retired   Tobacco Use     Smoking status: Never Smoker     Smokeless tobacco: Never Used   Substance and Sexual Activity     Alcohol use: No     Alcohol/week: 0.0 standard drinks     Drug use: No     Sexual activity: Yes     Partners: Male   Other Topics Concern      "Not on file   Social History Narrative     Not on file     Social Determinants of Health     Financial Resource Strain: Not on file   Food Insecurity: Not on file   Transportation Needs: Not on file   Physical Activity: Not on file   Stress: Not on file   Social Connections: Not on file   Intimate Partner Violence: Not on file   Housing Stability: Not on file     Current Outpatient Medications   Medication Sig Dispense Refill     amantadine (SYMMETREL) 50 MG/5ML solution Take 2.5 mLs by mouth daily.       amLODIPine (NORVASC) 5 MG tablet TAKE 1 TABLET DAILY 90 tablet 1     ARIPiprazole, sensor, 2 MG TABS Take 1 mg by mouth daily        fexofenadine (ALLEGRA) 180 MG tablet Take 1 tablet by mouth daily. 90 tablet 0     lithium (ESKALITH) 150 MG capsule Take 1 capsule (150 mg) by mouth 3 times daily (with meals) for 14 days 42 capsule 0     Multiple Vitamins-Minerals (CENTRUM SILVER) per tablet Take 1 tablet by mouth daily.       Multiple Vitamins-Minerals (EYE VITAMINS & MINERALS PO) Take 1 tablet by mouth 2 times daily Focus Select       simvastatin (ZOCOR) 40 MG tablet TAKE 1 TABLET AT BEDTIME.  MAKE AN APPOINTMENT FOR    FURTHER REFILLS. 90 tablet 3     No Known Allergies  FAMILY HISTORY NOTED AND REVIEWED    REVIEW OF SYSTEMS: above    PHYSICAL EXAM    /86 (BP Location: Right arm, Patient Position: Chair, Cuff Size: Adult Large)   Pulse 84   Temp 98  F (36.7  C) (Tympanic)   Resp 16   Ht 1.626 m (5' 4\")   Wt 60.8 kg (134 lb)   SpO2 97%   Breastfeeding No   BMI 23.00 kg/m      Patient appears non toxic  Lungs - clear, normal flow  Cardiovascular - regular rate and rhythm, no murmer, rub or gallop, no jvp or edema, carotids within normal limits, no bruits.  Abdomen - normal active bowel sounds, soft, non tender, no masses, guarding or rebound, no hepatosplenomegaly    Labs sent    ASSESSMENT:  1. Lithium toxicity, doing well now  2. Confusion, due to above  3. Orestes, due to above  4. nephro d.i., " resolved, due to lithium  5. Bipolar, stable  6. Ckd, follow up renal  7. Hyperpara, follow up labs    PLAN:  Labs now  Follow up renal and psyche  See me 2 months    Suleiman Miller M.D.      PLAN:        Suleiman Miller M.D.

## 2021-11-17 NOTE — TELEPHONE ENCOUNTER
[facility] Calling to request orders  1 x a week for 4 weeks  1 PRN visit    Writer noted that OV with 12/21/2020  Next OV:  NOV 18 2021 02:30 PM - ED/Hospital Follow Up  [facility] Suleiman Miller MD     Writer gave verbal approval for orders.    Callback: [phone]- okay to leave detailed VM    Thanh Medina RN  [facility]

## 2021-11-18 ENCOUNTER — OFFICE VISIT (OUTPATIENT)
Dept: FAMILY MEDICINE | Facility: CLINIC | Age: 71
End: 2021-11-18
Payer: MEDICARE

## 2021-11-18 VITALS
BODY MASS INDEX: 22.88 KG/M2 | RESPIRATION RATE: 16 BRPM | DIASTOLIC BLOOD PRESSURE: 86 MMHG | OXYGEN SATURATION: 97 % | WEIGHT: 134 LBS | HEART RATE: 84 BPM | SYSTOLIC BLOOD PRESSURE: 136 MMHG | TEMPERATURE: 98 F | HEIGHT: 64 IN

## 2021-11-18 DIAGNOSIS — Z79.899 NEED FOR PROPHYLACTIC CHEMOTHERAPY: Primary | ICD-10-CM

## 2021-11-18 DIAGNOSIS — N17.9 ACUTE KIDNEY INJURY (H): ICD-10-CM

## 2021-11-18 DIAGNOSIS — N25.1 NEPHROGENIC DIABETES INSIPIDUS (H): ICD-10-CM

## 2021-11-18 DIAGNOSIS — N18.31 CHRONIC KIDNEY DISEASE, STAGE 3A (H): ICD-10-CM

## 2021-11-18 DIAGNOSIS — E21.3 HYPERPARATHYROIDISM (H): ICD-10-CM

## 2021-11-18 DIAGNOSIS — T56.891A LITHIUM TOXICITY, ACCIDENTAL OR UNINTENTIONAL, INITIAL ENCOUNTER: Primary | ICD-10-CM

## 2021-11-18 DIAGNOSIS — F31.9 BIPOLAR AFFECTIVE DISORDER, REMISSION STATUS UNSPECIFIED (H): ICD-10-CM

## 2021-11-18 DIAGNOSIS — Z79.899 NEED FOR PROPHYLACTIC CHEMOTHERAPY: ICD-10-CM

## 2021-11-18 LAB
CA-I BLD-MCNC: 5.4 MG/DL (ref 4.4–5.2)
LITHIUM SERPL-SCNC: 0.8 MMOL/L

## 2021-11-18 PROCEDURE — 99495 TRANSJ CARE MGMT MOD F2F 14D: CPT | Performed by: INTERNAL MEDICINE

## 2021-11-18 PROCEDURE — 36415 COLL VENOUS BLD VENIPUNCTURE: CPT | Performed by: INTERNAL MEDICINE

## 2021-11-18 PROCEDURE — 82330 ASSAY OF CALCIUM: CPT | Performed by: INTERNAL MEDICINE

## 2021-11-18 PROCEDURE — 80178 ASSAY OF LITHIUM: CPT | Performed by: INTERNAL MEDICINE

## 2021-11-18 PROCEDURE — 80048 BASIC METABOLIC PNL TOTAL CA: CPT | Performed by: INTERNAL MEDICINE

## 2021-11-18 ASSESSMENT — MIFFLIN-ST. JEOR: SCORE: 1112.82

## 2021-11-19 ENCOUNTER — TELEPHONE (OUTPATIENT)
Dept: FAMILY MEDICINE | Facility: CLINIC | Age: 71
End: 2021-11-19
Payer: MEDICARE

## 2021-11-19 ENCOUNTER — TRANSFERRED RECORDS (OUTPATIENT)
Dept: HEALTH INFORMATION MANAGEMENT | Facility: CLINIC | Age: 71
End: 2021-11-19
Payer: MEDICARE

## 2021-11-19 LAB
ANION GAP SERPL CALCULATED.3IONS-SCNC: 5 MMOL/L (ref 3–14)
BUN SERPL-MCNC: 40 MG/DL (ref 7–30)
CALCIUM SERPL-MCNC: 9.9 MG/DL (ref 8.5–10.1)
CHLORIDE BLD-SCNC: 111 MMOL/L (ref 94–109)
CO2 SERPL-SCNC: 24 MMOL/L (ref 20–32)
CREAT SERPL-MCNC: 2.37 MG/DL (ref 0.52–1.04)
GFR SERPL CREATININE-BSD FRML MDRD: 20 ML/MIN/1.73M2
GLUCOSE BLD-MCNC: 146 MG/DL (ref 70–99)
POTASSIUM BLD-SCNC: 4.6 MMOL/L (ref 3.4–5.3)
SODIUM SERPL-SCNC: 140 MMOL/L (ref 133–144)

## 2021-11-19 NOTE — TELEPHONE ENCOUNTER
Diana, with [facility]  calling for verbal ok for homecare orders.     Requested additional OT orders:   OT  1/wk x 2 weeks  1 every other/wk x 4 weeks      OK'd requested orders.  Orders will be faxed to PCP for review and signature.   Zaira Le RN

## 2021-11-20 PROBLEM — M25.511 CHRONIC RIGHT SHOULDER PAIN: Status: RESOLVED | Noted: 2021-06-17 | Resolved: 2021-11-20

## 2021-11-20 PROBLEM — G89.29 CHRONIC RIGHT SHOULDER PAIN: Status: RESOLVED | Noted: 2021-06-17 | Resolved: 2021-11-20

## 2021-12-02 ENCOUNTER — MEDICAL CORRESPONDENCE (OUTPATIENT)
Dept: HEALTH INFORMATION MANAGEMENT | Facility: CLINIC | Age: 71
End: 2021-12-02
Payer: MEDICARE

## 2021-12-06 ENCOUNTER — TELEPHONE (OUTPATIENT)
Dept: FAMILY MEDICINE | Facility: CLINIC | Age: 71
End: 2021-12-06
Payer: MEDICARE

## 2021-12-06 ENCOUNTER — MEDICAL CORRESPONDENCE (OUTPATIENT)
Dept: HEALTH INFORMATION MANAGEMENT | Facility: CLINIC | Age: 71
End: 2021-12-06
Payer: MEDICARE

## 2021-12-06 NOTE — TELEPHONE ENCOUNTER
Verbal approval given for the homecare request below. Homecare/Hospice agency to fax orders for provider signature.      Continue with skilled nursing.       Gerda Rodriguez RN  -[facility]

## 2021-12-07 ENCOUNTER — HOSPITAL ENCOUNTER (OUTPATIENT)
Dept: ULTRASOUND IMAGING | Facility: CLINIC | Age: 71
Discharge: HOME | End: 2021-12-07
Attending: INTERNAL MEDICINE | Admitting: INTERNAL MEDICINE
Payer: MEDICARE

## 2021-12-07 DIAGNOSIS — N18.32 CHRONIC KIDNEY DISEASE (CKD) STAGE G3B/A1, MODERATELY DECREASED GLOMERULAR FILTRATION RATE (GFR) BETWEEN 30-44 ML/MIN/1.73 SQUARE METER AND ALBUMINURIA CREATININE RATIO LESS THAN 30 MG/G (H): ICD-10-CM

## 2021-12-07 PROCEDURE — 76770 US EXAM ABDO BACK WALL COMP: CPT

## 2021-12-08 ENCOUNTER — MEDICAL CORRESPONDENCE (OUTPATIENT)
Dept: HEALTH INFORMATION MANAGEMENT | Facility: CLINIC | Age: 71
End: 2021-12-08

## 2021-12-14 ENCOUNTER — TELEPHONE (OUTPATIENT)
Dept: FAMILY MEDICINE | Facility: CLINIC | Age: 71
End: 2021-12-14
Payer: MEDICARE

## 2021-12-14 NOTE — TELEPHONE ENCOUNTER
Minda OT at [facility] calling to request verbal orders for home health aide to assist patient with showering.    1x a week for 3 weeks    Callback: [phone]- okay to leave detailed VM    Thanh Medina RN  [facility]

## 2021-12-17 ENCOUNTER — TRANSCRIBE ORDERS (OUTPATIENT)
Dept: PHARMACY | Facility: CLINIC | Age: 71
End: 2021-12-17
Payer: MEDICARE

## 2021-12-17 DIAGNOSIS — N18.32 STAGE 3B CHRONIC KIDNEY DISEASE (H): ICD-10-CM

## 2021-12-17 DIAGNOSIS — N18.9 ANEMIA IN CKD (CHRONIC KIDNEY DISEASE): Primary | ICD-10-CM

## 2021-12-17 DIAGNOSIS — D63.1 ANEMIA IN CKD (CHRONIC KIDNEY DISEASE): Primary | ICD-10-CM

## 2021-12-17 RX ORDER — NALOXONE HYDROCHLORIDE 0.4 MG/ML
0.2 INJECTION, SOLUTION INTRAMUSCULAR; INTRAVENOUS; SUBCUTANEOUS
Status: CANCELLED | OUTPATIENT
Start: 2022-01-07

## 2021-12-17 RX ORDER — DIPHENHYDRAMINE HYDROCHLORIDE 50 MG/ML
50 INJECTION INTRAMUSCULAR; INTRAVENOUS
Status: CANCELLED
Start: 2022-01-07

## 2021-12-17 RX ORDER — MEPERIDINE HYDROCHLORIDE 25 MG/ML
25 INJECTION INTRAMUSCULAR; INTRAVENOUS; SUBCUTANEOUS EVERY 30 MIN PRN
Status: CANCELLED | OUTPATIENT
Start: 2022-01-07

## 2021-12-17 RX ORDER — METHYLPREDNISOLONE SODIUM SUCCINATE 125 MG/2ML
125 INJECTION, POWDER, LYOPHILIZED, FOR SOLUTION INTRAMUSCULAR; INTRAVENOUS
Status: CANCELLED
Start: 2022-01-07

## 2021-12-17 RX ORDER — ALBUTEROL SULFATE 0.83 MG/ML
2.5 SOLUTION RESPIRATORY (INHALATION)
Status: CANCELLED | OUTPATIENT
Start: 2022-01-07

## 2021-12-17 RX ORDER — EPINEPHRINE 1 MG/ML
0.3 INJECTION, SOLUTION INTRAMUSCULAR; SUBCUTANEOUS EVERY 5 MIN PRN
Status: CANCELLED | OUTPATIENT
Start: 2022-01-07

## 2021-12-17 RX ORDER — ALBUTEROL SULFATE 90 UG/1
1-2 AEROSOL, METERED RESPIRATORY (INHALATION)
Status: CANCELLED
Start: 2022-01-07

## 2021-12-21 ENCOUNTER — MEDICAL CORRESPONDENCE (OUTPATIENT)
Dept: HEALTH INFORMATION MANAGEMENT | Facility: CLINIC | Age: 71
End: 2021-12-21
Payer: MEDICARE

## 2021-12-21 ENCOUNTER — MYC MEDICAL ADVICE (OUTPATIENT)
Dept: FAMILY MEDICINE | Facility: CLINIC | Age: 71
End: 2021-12-21
Payer: MEDICARE

## 2021-12-21 ENCOUNTER — TELEPHONE (OUTPATIENT)
Dept: FAMILY MEDICINE | Facility: CLINIC | Age: 71
End: 2021-12-21
Payer: MEDICARE

## 2021-12-21 NOTE — TELEPHONE ENCOUNTER
I am afraid she does not have any qualifying diagnosis that I know of to get her one.    Suleiman Miller M.D.

## 2021-12-21 NOTE — TELEPHONE ENCOUNTER
Reason for Call:  Form, our goal is to have forms completed with 72 hours, however, some forms may require a visit or additional information.    Type of letter, form or note:  handicap    Who is the form from?: Patient    Where did the form come from: form was mailed in    What clinic location was the form placed at?: [facility]    Where the form was placed: PCP's inbox near desk Box/Folder    What number is listed as a contact on the form?: [phone]       Additional comments: Pt mailed in with no indication of how they would like form back (mail, , etc)    Call taken on 12/21/2021 at 8:33 AM by Elaine Mai

## 2021-12-22 NOTE — TELEPHONE ENCOUNTER
S-(situation): Patient has noticed increase swelling in both legs. No redness or pain in extremities.   Her vital signs are good according to the patient. [facility] care is checking her vitals on the home care visits. They will call us if her vital signs are out of range.   No SOB.   Patient is following advice drinking water and   Elevation of her legs.     B-(background): Patient is working with [facility] care  for  post hospital 11/8/2021.     A-(assessment): Continue with elevating extremities and keeping up with fluid intake.     R-(recommendations): Has a follow up with nephrologist in February.     Patient will have [facility] care call us if they have noticed edema or increase swelling in her legs.     Gerda Rodriguez RN  -[facility]

## 2021-12-23 NOTE — TELEPHONE ENCOUNTER
FYI-  Pt reports she used treadmill on March and started having left side hip pain. She took Tylenol and had pain relieve. Hip pain returned and she started using a walker. Notes weakness of her legs. Denies other acute neurological symptoms. Declines appt before 01/03/2021.

## 2021-12-28 NOTE — TELEPHONE ENCOUNTER
Please leave for Mahnaz.  Mahnaz did you fax this or call patient?  There was no copy of it in the accordion file and no documentation.    Rachelle Jaquez MA

## 2022-01-03 ENCOUNTER — MEDICAL CORRESPONDENCE (OUTPATIENT)
Dept: HEALTH INFORMATION MANAGEMENT | Facility: CLINIC | Age: 72
End: 2022-01-03
Payer: MEDICARE

## 2022-01-07 ENCOUNTER — LAB (OUTPATIENT)
Dept: INFUSION THERAPY | Facility: CLINIC | Age: 72
End: 2022-01-07
Attending: INTERNAL MEDICINE
Payer: MEDICARE

## 2022-01-07 VITALS
SYSTOLIC BLOOD PRESSURE: 152 MMHG | OXYGEN SATURATION: 100 % | DIASTOLIC BLOOD PRESSURE: 86 MMHG | TEMPERATURE: 98 F | RESPIRATION RATE: 22 BRPM | HEART RATE: 81 BPM

## 2022-01-07 DIAGNOSIS — N18.32 STAGE 3B CHRONIC KIDNEY DISEASE (H): ICD-10-CM

## 2022-01-07 DIAGNOSIS — D63.1 ANEMIA IN CKD (CHRONIC KIDNEY DISEASE): Primary | ICD-10-CM

## 2022-01-07 DIAGNOSIS — N18.9 ANEMIA IN CKD (CHRONIC KIDNEY DISEASE): Primary | ICD-10-CM

## 2022-01-07 LAB
FERRITIN SERPL-MCNC: 46 NG/ML (ref 8–252)
HCT VFR BLD AUTO: 30.1 % (ref 35–47)
HGB BLD-MCNC: 8.8 G/DL (ref 11.7–15.7)
IRON SATN MFR SERPL: 28 % (ref 15–46)
IRON SERPL-MCNC: 107 UG/DL (ref 35–180)
TIBC SERPL-MCNC: 387 UG/DL (ref 240–430)

## 2022-01-07 PROCEDURE — 85018 HEMOGLOBIN: CPT | Performed by: INTERNAL MEDICINE

## 2022-01-07 PROCEDURE — 36415 COLL VENOUS BLD VENIPUNCTURE: CPT | Performed by: INTERNAL MEDICINE

## 2022-01-07 PROCEDURE — 82728 ASSAY OF FERRITIN: CPT | Performed by: INTERNAL MEDICINE

## 2022-01-07 PROCEDURE — 250N000011 HC RX IP 250 OP 636: Mod: EC | Performed by: INTERNAL MEDICINE

## 2022-01-07 PROCEDURE — 96372 THER/PROPH/DIAG INJ SC/IM: CPT | Performed by: INTERNAL MEDICINE

## 2022-01-07 PROCEDURE — 83550 IRON BINDING TEST: CPT | Performed by: INTERNAL MEDICINE

## 2022-01-07 PROCEDURE — 85014 HEMATOCRIT: CPT | Performed by: INTERNAL MEDICINE

## 2022-01-07 RX ORDER — NALOXONE HYDROCHLORIDE 0.4 MG/ML
0.2 INJECTION, SOLUTION INTRAMUSCULAR; INTRAVENOUS; SUBCUTANEOUS
Status: CANCELLED | OUTPATIENT
Start: 2022-04-01

## 2022-01-07 RX ORDER — ALBUTEROL SULFATE 90 UG/1
1-2 AEROSOL, METERED RESPIRATORY (INHALATION)
Status: CANCELLED
Start: 2022-04-01

## 2022-01-07 RX ORDER — ALBUTEROL SULFATE 0.83 MG/ML
2.5 SOLUTION RESPIRATORY (INHALATION)
Status: CANCELLED | OUTPATIENT
Start: 2022-04-01

## 2022-01-07 RX ORDER — EPINEPHRINE 1 MG/ML
0.3 INJECTION, SOLUTION INTRAMUSCULAR; SUBCUTANEOUS EVERY 5 MIN PRN
Status: CANCELLED | OUTPATIENT
Start: 2022-04-01

## 2022-01-07 RX ORDER — MEPERIDINE HYDROCHLORIDE 25 MG/ML
25 INJECTION INTRAMUSCULAR; INTRAVENOUS; SUBCUTANEOUS EVERY 30 MIN PRN
Status: CANCELLED | OUTPATIENT
Start: 2022-04-01

## 2022-01-07 RX ORDER — DIPHENHYDRAMINE HYDROCHLORIDE 50 MG/ML
50 INJECTION INTRAMUSCULAR; INTRAVENOUS
Status: CANCELLED
Start: 2022-04-01

## 2022-01-07 RX ORDER — METHYLPREDNISOLONE SODIUM SUCCINATE 125 MG/2ML
125 INJECTION, POWDER, LYOPHILIZED, FOR SOLUTION INTRAMUSCULAR; INTRAVENOUS
Status: CANCELLED
Start: 2022-04-01

## 2022-01-07 RX ADMIN — DARBEPOETIN ALFA 100 MCG: 100 INJECTION, SOLUTION INTRAVENOUS; SUBCUTANEOUS at 10:37

## 2022-01-07 ASSESSMENT — PAIN SCALES - GENERAL: PAINLEVEL: SEVERE PAIN (7)

## 2022-01-07 NOTE — PROGRESS NOTES
Medical Assistant Note:  Diana Santiago presents today for blood draw.    Patient seen by provider today: No.   present during visit today: Not Applicable.    Concerns: No Concerns.    Procedure:  Lab draw site: rac, Needle type: bf, Gauge: 23.    Post Assessment:  Labs drawn without difficulty: Yes.    Discharge Plan:  Departure Mode: Ambulatory.    Face to Face Time: 5 min.    Shayy Hoang, CMA

## 2022-01-07 NOTE — PROGRESS NOTES
Infusion Nursing Note:  Diana ROPER Santiago presents today for aranesp.    Patient seen by provider today: No   present during visit today: Not Applicable.    Note: N/A.      Intravenous Access:  No Intravenous access/labs at this visit.    Treatment Conditions:  Lab Results   Component Value Date    HGB 8.8 (L) 01/07/2022    WBC 9.9 11/09/2021    ANEU 6.5 12/21/2020    ANEUTAUTO 7.8 11/08/2021     11/09/2021      Results reviewed, labs MET treatment parameters, ok to proceed with treatment.      Post Infusion Assessment:  Patient tolerated injection without incident.  Site patent and intact, free from redness, edema or discomfort.       Discharge Plan:   AVS to patient via MYCHART.  Patient will return as [facility] in 4 weeks for next appointment.   Patient discharged in stable condition accompanied by: self.  Departure Mode: Ambulatory with walker.      Zion Agarwal RN

## 2022-01-10 ENCOUNTER — MEDICAL CORRESPONDENCE (OUTPATIENT)
Dept: HEALTH INFORMATION MANAGEMENT | Facility: CLINIC | Age: 72
End: 2022-01-10
Payer: MEDICARE

## 2022-01-22 ENCOUNTER — MEDICAL CORRESPONDENCE (OUTPATIENT)
Dept: HEALTH INFORMATION MANAGEMENT | Facility: CLINIC | Age: 72
End: 2022-01-22
Payer: MEDICARE

## 2022-02-04 ENCOUNTER — ALLIED HEALTH/NURSE VISIT (OUTPATIENT)
Dept: INFUSION THERAPY | Facility: CLINIC | Age: 72
End: 2022-02-04
Attending: INTERNAL MEDICINE
Payer: MEDICARE

## 2022-02-04 DIAGNOSIS — N18.32 STAGE 3B CHRONIC KIDNEY DISEASE (H): ICD-10-CM

## 2022-02-04 DIAGNOSIS — D63.1 ANEMIA IN CKD (CHRONIC KIDNEY DISEASE): Primary | ICD-10-CM

## 2022-02-04 DIAGNOSIS — N18.9 ANEMIA IN CKD (CHRONIC KIDNEY DISEASE): Primary | ICD-10-CM

## 2022-02-04 LAB
FERRITIN SERPL-MCNC: 37 NG/ML (ref 8–252)
HCT VFR BLD AUTO: 35.3 % (ref 35–47)
HGB BLD-MCNC: 10 G/DL (ref 11.7–15.7)

## 2022-02-04 PROCEDURE — 85014 HEMATOCRIT: CPT | Performed by: INTERNAL MEDICINE

## 2022-02-04 PROCEDURE — 85018 HEMOGLOBIN: CPT | Performed by: INTERNAL MEDICINE

## 2022-02-04 PROCEDURE — 82728 ASSAY OF FERRITIN: CPT | Performed by: INTERNAL MEDICINE

## 2022-02-04 PROCEDURE — 36415 COLL VENOUS BLD VENIPUNCTURE: CPT | Performed by: INTERNAL MEDICINE

## 2022-02-04 RX ORDER — ALBUTEROL SULFATE 0.83 MG/ML
2.5 SOLUTION RESPIRATORY (INHALATION)
Status: CANCELLED | OUTPATIENT
Start: 2022-07-01

## 2022-02-04 RX ORDER — METHYLPREDNISOLONE SODIUM SUCCINATE 125 MG/2ML
125 INJECTION, POWDER, LYOPHILIZED, FOR SOLUTION INTRAMUSCULAR; INTRAVENOUS
Status: CANCELLED
Start: 2022-07-01

## 2022-02-04 RX ORDER — EPINEPHRINE 1 MG/ML
0.3 INJECTION, SOLUTION INTRAMUSCULAR; SUBCUTANEOUS EVERY 5 MIN PRN
Status: CANCELLED | OUTPATIENT
Start: 2022-04-01

## 2022-02-04 RX ORDER — ALBUTEROL SULFATE 90 UG/1
1-2 AEROSOL, METERED RESPIRATORY (INHALATION)
Status: CANCELLED
Start: 2022-07-01

## 2022-02-04 RX ORDER — ALBUTEROL SULFATE 0.83 MG/ML
2.5 SOLUTION RESPIRATORY (INHALATION)
Status: CANCELLED | OUTPATIENT
Start: 2022-04-01

## 2022-02-04 RX ORDER — DIPHENHYDRAMINE HYDROCHLORIDE 50 MG/ML
50 INJECTION INTRAMUSCULAR; INTRAVENOUS
Status: CANCELLED
Start: 2022-04-01

## 2022-02-04 RX ORDER — MEPERIDINE HYDROCHLORIDE 25 MG/ML
25 INJECTION INTRAMUSCULAR; INTRAVENOUS; SUBCUTANEOUS EVERY 30 MIN PRN
Status: CANCELLED | OUTPATIENT
Start: 2022-04-01

## 2022-02-04 RX ORDER — EPINEPHRINE 1 MG/ML
0.3 INJECTION, SOLUTION INTRAMUSCULAR; SUBCUTANEOUS EVERY 5 MIN PRN
Status: CANCELLED | OUTPATIENT
Start: 2022-07-01

## 2022-02-04 RX ORDER — MEPERIDINE HYDROCHLORIDE 25 MG/ML
25 INJECTION INTRAMUSCULAR; INTRAVENOUS; SUBCUTANEOUS EVERY 30 MIN PRN
Status: CANCELLED | OUTPATIENT
Start: 2022-07-01

## 2022-02-04 RX ORDER — NALOXONE HYDROCHLORIDE 0.4 MG/ML
0.2 INJECTION, SOLUTION INTRAMUSCULAR; INTRAVENOUS; SUBCUTANEOUS
Status: CANCELLED | OUTPATIENT
Start: 2022-04-01

## 2022-02-04 RX ORDER — ALBUTEROL SULFATE 90 UG/1
1-2 AEROSOL, METERED RESPIRATORY (INHALATION)
Status: CANCELLED
Start: 2022-04-01

## 2022-02-04 RX ORDER — METHYLPREDNISOLONE SODIUM SUCCINATE 125 MG/2ML
125 INJECTION, POWDER, LYOPHILIZED, FOR SOLUTION INTRAMUSCULAR; INTRAVENOUS
Status: CANCELLED
Start: 2022-04-01

## 2022-02-04 RX ORDER — DIPHENHYDRAMINE HYDROCHLORIDE 50 MG/ML
50 INJECTION INTRAMUSCULAR; INTRAVENOUS
Status: CANCELLED
Start: 2022-07-01

## 2022-02-04 RX ORDER — NALOXONE HYDROCHLORIDE 0.4 MG/ML
0.2 INJECTION, SOLUTION INTRAMUSCULAR; INTRAVENOUS; SUBCUTANEOUS
Status: CANCELLED | OUTPATIENT
Start: 2022-07-01

## 2022-02-04 NOTE — PROGRESS NOTES
Infusion Nursing Note:  Diana Santiago presents today for Aranesp.    Patient seen by provider today: No   present during visit today: Not Applicable.    Note: Hgb today 10, parameter for Aranesp <10. Called to [facility].  TORB : Shanti Frey RN with Dr. Wilian Centeno  and Aleyda Guerrero RN at 10:40am   -OK to give Aranesp with Hgb of 10    Update at 11am- unable to give Aranesp due to insurance coverage.      Faby Guerrero RN

## 2022-02-04 NOTE — PROGRESS NOTES
Medical Assistant Note:  Diana Santiago presents today for lab draw.    Patient seen by provider today: No.   present during visit today: Not Applicable.    Concerns: No Concerns.    Procedure:  Lab draw site: RAC, Needle type: BF, Gauge: 21. Gauze and coban applied    Post Assessment:  Labs drawn without difficulty: Yes.    Discharge Plan:  Departure Mode: Ambulatory.    Face to Face Time: 5.    Marietta De Luna CMA

## 2022-02-28 ENCOUNTER — NURSE TRIAGE (OUTPATIENT)
Dept: FAMILY MEDICINE | Facility: CLINIC | Age: 72
End: 2022-02-28
Payer: MEDICARE

## 2022-02-28 ENCOUNTER — MYC MEDICAL ADVICE (OUTPATIENT)
Dept: FAMILY MEDICINE | Facility: CLINIC | Age: 72
End: 2022-02-28
Payer: MEDICARE

## 2022-02-28 NOTE — TELEPHONE ENCOUNTER
"Patient called c/o abdominal distension ongoing since last Thur/Friday - noticed it when her elastic on pants slid down \"look pregnant\"     Denies: abdominal pain, bleeding concerns, constipation, fever, nausea, vomiting, breathing concerns, early satiety    Was previously dx with cellulitis -  swelling in feet and legs but that seems to be better, she's been propping up her legs     Whole abdomen from breast downward looks bloated     Does have CKD but reports checking daily weights and weight has been steady     Change in diet? Not really just adding beef a while ago     Recommended visit soon, scheduled for first available with team:     Appointments in Next Year    Mar 02, 2022 10:00 AM  (Arrive by 9:40 AM)  Provider Visit with KRUNAL Caban CNP  [facility] ([facility] ) [phone]   Mar 11, 2022 10:00 AM  Lab Peripheral with SH PIV LAB  [facility] ) [phone]   Mar 11, 2022 10:45 AM  Injection with SH FAST TRACK LAB  [facility] ([facility] ) [phone]   Apr 29, 2022 10:00 AM  (Arrive by 9:40 AM)  Provider Visit with Suleiman Miller MD  [facility] ([facility] ) [phone]        Steve Quevedo RN  [facility]     Reason for Disposition    Nursing judgment    Additional Information    Negative: Nursing judgment    Negative: Information only call; adult is not ill or injured    Negative: Nursing judgment    Negative: Nursing judgment    Negative: Nursing judgment    Negative: Nursing judgment    Negative: Nursing judgment    Negative: Nursing judgment    Negative: Nursing judgment    Negative: Nursing judgment    Negative: Nursing judgment    Protocols used: NO GUIDELINE OR REFERENCE [id number]      "

## 2022-03-02 ENCOUNTER — APPOINTMENT (OUTPATIENT)
Dept: GENERAL RADIOLOGY | Facility: CLINIC | Age: 72
End: 2022-03-02
Attending: EMERGENCY MEDICINE
Payer: MEDICARE

## 2022-03-02 ENCOUNTER — OFFICE VISIT (OUTPATIENT)
Dept: FAMILY MEDICINE | Facility: CLINIC | Age: 72
End: 2022-03-02
Payer: MEDICARE

## 2022-03-02 ENCOUNTER — HOSPITAL ENCOUNTER (EMERGENCY)
Facility: CLINIC | Age: 72
Discharge: HOME | End: 2022-03-02
Attending: EMERGENCY MEDICINE | Admitting: EMERGENCY MEDICINE
Payer: MEDICARE

## 2022-03-02 VITALS
RESPIRATION RATE: 16 BRPM | OXYGEN SATURATION: 98 % | SYSTOLIC BLOOD PRESSURE: 136 MMHG | HEIGHT: 64 IN | DIASTOLIC BLOOD PRESSURE: 85 MMHG | WEIGHT: 146 LBS | BODY MASS INDEX: 24.92 KG/M2 | HEART RATE: 89 BPM | TEMPERATURE: 97.5 F

## 2022-03-02 VITALS
DIASTOLIC BLOOD PRESSURE: 91 MMHG | HEIGHT: 64 IN | HEART RATE: 88 BPM | WEIGHT: 145 LBS | TEMPERATURE: 99.6 F | RESPIRATION RATE: 21 BRPM | SYSTOLIC BLOOD PRESSURE: 143 MMHG | BODY MASS INDEX: 24.75 KG/M2 | OXYGEN SATURATION: 100 %

## 2022-03-02 DIAGNOSIS — N18.4 CHRONIC KIDNEY DISEASE, STAGE 4 (SEVERE) (H): ICD-10-CM

## 2022-03-02 DIAGNOSIS — R14.0 BLOATING: ICD-10-CM

## 2022-03-02 DIAGNOSIS — R60.0 BILATERAL LEG EDEMA: ICD-10-CM

## 2022-03-02 DIAGNOSIS — R06.00 DYSPNEA, UNSPECIFIED TYPE: ICD-10-CM

## 2022-03-02 DIAGNOSIS — R60.0 BILATERAL LOWER EXTREMITY EDEMA: ICD-10-CM

## 2022-03-02 DIAGNOSIS — R14.0 ABDOMINAL DISTENSION: Primary | ICD-10-CM

## 2022-03-02 LAB
ALBUMIN SERPL-MCNC: 3.5 G/DL (ref 3.4–5)
ALBUMIN UR-MCNC: NEGATIVE MG/DL
ALP SERPL-CCNC: 109 U/L (ref 40–150)
ALT SERPL W P-5'-P-CCNC: 24 U/L (ref 0–50)
ANION GAP SERPL CALCULATED.3IONS-SCNC: 5 MMOL/L (ref 3–14)
APPEARANCE UR: CLEAR
AST SERPL W P-5'-P-CCNC: 16 U/L (ref 0–45)
ATRIAL RATE - MUSE: 88 BPM
BASOPHILS # BLD AUTO: 0 10E3/UL (ref 0–0.2)
BASOPHILS NFR BLD AUTO: 1 %
BILIRUB SERPL-MCNC: 0.4 MG/DL (ref 0.2–1.3)
BILIRUB UR QL STRIP: NEGATIVE
BUN SERPL-MCNC: 43 MG/DL (ref 7–30)
CALCIUM SERPL-MCNC: 10.1 MG/DL (ref 8.5–10.1)
CHLORIDE BLD-SCNC: 111 MMOL/L (ref 94–109)
CO2 SERPL-SCNC: 21 MMOL/L (ref 20–32)
COLOR UR AUTO: ABNORMAL
CREAT SERPL-MCNC: 2.11 MG/DL (ref 0.52–1.04)
DIASTOLIC BLOOD PRESSURE - MUSE: NORMAL MMHG
EOSINOPHIL # BLD AUTO: 0.3 10E3/UL (ref 0–0.7)
EOSINOPHIL NFR BLD AUTO: 4 %
ERYTHROCYTE [DISTWIDTH] IN BLOOD BY AUTOMATED COUNT: 14.9 % (ref 10–15)
GFR SERPL CREATININE-BSD FRML MDRD: 24 ML/MIN/1.73M2
GLUCOSE BLD-MCNC: 120 MG/DL (ref 70–99)
GLUCOSE UR STRIP-MCNC: NEGATIVE MG/DL
HCT VFR BLD AUTO: 33 % (ref 35–47)
HGB BLD-MCNC: 9.5 G/DL (ref 11.7–15.7)
HGB UR QL STRIP: NEGATIVE
IMM GRANULOCYTES # BLD: 0 10E3/UL
IMM GRANULOCYTES NFR BLD: 0 %
INTERPRETATION ECG - MUSE: NORMAL
KETONES UR STRIP-MCNC: NEGATIVE MG/DL
LEUKOCYTE ESTERASE UR QL STRIP: NEGATIVE
LITHIUM SERPL-SCNC: 0.8 MMOL/L
LYMPHOCYTES # BLD AUTO: 1.4 10E3/UL (ref 0.8–5.3)
LYMPHOCYTES NFR BLD AUTO: 17 %
MCH RBC QN AUTO: 21.3 PG (ref 26.5–33)
MCHC RBC AUTO-ENTMCNC: 28.8 G/DL (ref 31.5–36.5)
MCV RBC AUTO: 74 FL (ref 78–100)
MONOCYTES # BLD AUTO: 0.7 10E3/UL (ref 0–1.3)
MONOCYTES NFR BLD AUTO: 9 %
MUCOUS THREADS #/AREA URNS LPF: PRESENT /LPF
NEUTROPHILS # BLD AUTO: 5.6 10E3/UL (ref 1.6–8.3)
NEUTROPHILS NFR BLD AUTO: 69 %
NITRATE UR QL: NEGATIVE
NRBC # BLD AUTO: 0 10E3/UL
NRBC BLD AUTO-RTO: 0 /100
NT-PROBNP SERPL-MCNC: 70 PG/ML (ref 0–900)
P AXIS - MUSE: 53 DEGREES
PH UR STRIP: 6.5 [PH] (ref 5–7)
PLATELET # BLD AUTO: 299 10E3/UL (ref 150–450)
POTASSIUM BLD-SCNC: 4.7 MMOL/L (ref 3.4–5.3)
PR INTERVAL - MUSE: 210 MS
PROT SERPL-MCNC: 7.3 G/DL (ref 6.8–8.8)
QRS DURATION - MUSE: 104 MS
QT - MUSE: 378 MS
QTC - MUSE: 457 MS
R AXIS - MUSE: 88 DEGREES
RBC # BLD AUTO: 4.46 10E6/UL (ref 3.8–5.2)
RBC URINE: 0 /HPF
SODIUM SERPL-SCNC: 137 MMOL/L (ref 133–144)
SP GR UR STRIP: 1 (ref 1–1.03)
SYSTOLIC BLOOD PRESSURE - MUSE: NORMAL MMHG
T AXIS - MUSE: 64 DEGREES
TROPONIN I SERPL HS-MCNC: 9 NG/L
UROBILINOGEN UR STRIP-MCNC: NORMAL MG/DL
VENTRICULAR RATE- MUSE: 88 BPM
WBC # BLD AUTO: 8.1 10E3/UL (ref 4–11)
WBC URINE: 0 /HPF

## 2022-03-02 PROCEDURE — 84484 ASSAY OF TROPONIN QUANT: CPT | Performed by: EMERGENCY MEDICINE

## 2022-03-02 PROCEDURE — 80053 COMPREHEN METABOLIC PANEL: CPT | Performed by: EMERGENCY MEDICINE

## 2022-03-02 PROCEDURE — 71045 X-RAY EXAM CHEST 1 VIEW: CPT

## 2022-03-02 PROCEDURE — 93005 ELECTROCARDIOGRAM TRACING: CPT

## 2022-03-02 PROCEDURE — 82040 ASSAY OF SERUM ALBUMIN: CPT | Performed by: EMERGENCY MEDICINE

## 2022-03-02 PROCEDURE — 36415 COLL VENOUS BLD VENIPUNCTURE: CPT | Performed by: EMERGENCY MEDICINE

## 2022-03-02 PROCEDURE — 99213 OFFICE O/P EST LOW 20 MIN: CPT | Performed by: INTERNAL MEDICINE

## 2022-03-02 PROCEDURE — 85004 AUTOMATED DIFF WBC COUNT: CPT | Performed by: EMERGENCY MEDICINE

## 2022-03-02 PROCEDURE — 99285 EMERGENCY DEPT VISIT HI MDM: CPT | Mod: 25

## 2022-03-02 PROCEDURE — 81001 URINALYSIS AUTO W/SCOPE: CPT | Performed by: EMERGENCY MEDICINE

## 2022-03-02 PROCEDURE — 83880 ASSAY OF NATRIURETIC PEPTIDE: CPT | Performed by: EMERGENCY MEDICINE

## 2022-03-02 PROCEDURE — 80178 ASSAY OF LITHIUM: CPT | Performed by: EMERGENCY MEDICINE

## 2022-03-02 RX ORDER — FUROSEMIDE 20 MG
20 TABLET ORAL DAILY
Qty: 4 TABLET | Refills: 0 | Status: SHIPPED | OUTPATIENT
Start: 2022-03-02 | End: 2022-03-08

## 2022-03-02 ASSESSMENT — PAIN SCALES - GENERAL: PAINLEVEL: NO PAIN (0)

## 2022-03-02 NOTE — ED TRIAGE NOTES
Pt was at PCP for bloating of abdomen and has some shortness of breath and bilat lower leg edema to be evaluated by ED.

## 2022-03-02 NOTE — ED PROVIDER NOTES
History     Chief Complaint:  Bloated       HPI   Diana Santiago is a 71 year old female who presents with bilateral leg swelling and abdominal bloating.  Leg swelling started in both legs 2 months ago.  She has tried elevation without clear effect.  Over the past week, she has noticed increased bloating in the abdomen but no pain or vomiting or constipation/diarrhea.  She denies shortness of breath.  No chest pain.  She reports history of CKD and anemia.    ROS:  Review of Systems  A 10 point ROS was obtained and negative except as noted here and in HPI         Allergies:  No Known Allergies     Medications:    furosemide (LASIX) 20 MG tablet  amantadine (SYMMETREL) 50 MG/5ML solution  amLODIPine (NORVASC) 5 MG tablet  ARIPiprazole, sensor, 2 MG TABS  fexofenadine (ALLEGRA) 180 MG tablet  lithium (ESKALITH) 150 MG capsule  Multiple Vitamins-Minerals (CENTRUM SILVER) per tablet  Multiple Vitamins-Minerals (EYE VITAMINS & MINERALS PO)  simvastatin (ZOCOR) 40 MG tablet        Past Medical History:    Past Medical History:   Diagnosis Date     Benign essential hypertension 09/2018     Bipolar affective disorder (H)      Chronic kidney disease, stage 3a (H)      DCIS (ductal carcinoma in situ) 1996     Diabetes insipidus (H) 09/2018     Elevated blood sugar      Fractured femoral neck (H) 1992     Hx of colonoscopy 2010     Hypercalcemia 08/2017     Hypercholesteremia      Hyperparathyroidism (H) 08/2017     Lithium toxicity 11/2021     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     Thalassaemia trait      Vitamin D deficiency      Patient Active Problem List   Diagnosis     Advance Care Planning     DCIS (ductal carcinoma in situ)     Heterozygous thalassemia     Bipolar affective disorder (H)     Elevated blood sugar     Hyperlipidemia LDL goal <130     Vitamin D deficiency     Hypercalcemia     Hyperparathyroidism (H)     Benign essential hypertension     Chronic kidney disease, stage 3a     Chronic anemia     Acute  "kidney injury (H)     Lithium toxicity, accidental or unintentional, initial encounter     Nephrogenic diabetes insipidus (H)     Anemia in CKD (chronic kidney disease)     Stage 3b chronic kidney disease (H)     Chronic kidney disease, stage 4 (severe) (H)        Past Surgical History:    Past Surgical History:   Procedure Laterality Date     BREAST SURGERY      lumpectomy x 4     left hand surgery      last 1974        Family History:    family history includes Breast Cancer in her maternal aunt and another family member; Hypertension in her father; Sleep Apnea in her brother.    Social History:   reports that she has never smoked. She has never used smokeless tobacco. She reports that she does not drink alcohol and does not use drugs.  PCP: Suleiman Miller     Physical Exam     Patient Vitals for the past 24 hrs:   BP Temp Temp src Pulse Resp SpO2 Height Weight   03/02/22 1645 (!) 143/91 -- -- 88 21 100 % -- --   03/02/22 1335 -- -- -- -- -- 98 % -- --   03/02/22 1334 (!) 151/86 99.6  F (37.6  C) Temporal 90 20 -- 1.626 m (5' 4\") 65.8 kg (145 lb)        Physical Exam  VS: Reviewed per above  HENT: normal speech  EYES: sclera anicteric  CV: Rate as noted, regular rhythm.   RESP: Effort normal. Breath sounds are normal bilaterally.  GI: no tenderness/rebound/guarding, not distended.  NEURO: Alert, moving all extremities  MSK: No deformity of the extremities, 2+ symmetric pitting edema in BL lower legs  SKIN: Warm and dry    Emergency Department Course   ECG:  Sinus rhythm with first-degree AV block. Otherwise normal ECG. Ventricular 88 bpm. NY interval 1 to 10 ms. QRS duration 104 ms. QT/QTc 370/457 ms. P-R-T axes: 53, 88, 64. Agree with computer interpretation. No significant change compared to ECG dated 11/8/2021.    Imaging:  XR Chest Port 1 View   Final Result   IMPRESSION: No focal infiltrate, pleural effusion or pneumothorax.   Normal heart size. Retrocardiac rounded density is stable, possibly "   representing a tortuous aorta. Right axillary surgical clips are new.   Stable left breast surgical clips.      TAM PRESTON MD            SYSTEM ID:  [id number]         Report per radiology    Laboratory:  Labs Ordered and Resulted from Time of ED Arrival to Time of ED Departure   COMPREHENSIVE METABOLIC PANEL - Abnormal       Result Value    Sodium 137      Potassium 4.7      Chloride 111 (*)     Carbon Dioxide (CO2) 21      Anion Gap 5      Urea Nitrogen 43 (*)     Creatinine 2.11 (*)     Calcium 10.1      Glucose 120 (*)     Alkaline Phosphatase 109      AST 16      ALT 24      Protein Total 7.3      Albumin 3.5      Bilirubin Total 0.4      GFR Estimate 24 (*)    ROUTINE UA WITH MICROSCOPIC REFLEX TO CULTURE - Abnormal    Color Urine Straw      Appearance Urine Clear      Glucose Urine Negative      Bilirubin Urine Negative      Ketones Urine Negative      Specific Gravity Urine 1.005      Blood Urine Negative      pH Urine 6.5      Protein Albumin Urine Negative      Urobilinogen Urine Normal      Nitrite Urine Negative      Leukocyte Esterase Urine Negative      Mucus Urine Present (*)     RBC Urine 0      WBC Urine 0     CBC WITH PLATELETS AND DIFFERENTIAL - Abnormal    WBC Count 8.1      RBC Count 4.46      Hemoglobin 9.5 (*)     Hematocrit 33.0 (*)     MCV 74 (*)     MCH 21.3 (*)     MCHC 28.8 (*)     RDW 14.9      Platelet Count 299      % Neutrophils 69      % Lymphocytes 17      % Monocytes 9      % Eosinophils 4      % Basophils 1      % Immature Granulocytes 0      NRBCs per 100 WBC 0      Absolute Neutrophils 5.6      Absolute Lymphocytes 1.4      Absolute Monocytes 0.7      Absolute Eosinophils 0.3      Absolute Basophils 0.0      Absolute Immature Granulocytes 0.0      Absolute NRBCs 0.0     TROPONIN I - Normal    Troponin I High Sensitivity 9     NT PROBNP INPATIENT - Normal    N terminal Pro BNP Inpatient 70     LITHIUM LEVEL - Normal    Lithium 0.8          Emergency Department Course:              Reviewed:  I reviewed nursing notes, vitals and past medical history    Assessments:   I obtained history and examined the patient as noted above.    I rechecked the patient and explained findings.     Consults: I spoke with Dr. Izquierdo of nephrology    Interventions:  Medications - No data to display     Disposition:  The patient was discharged to home.     Impression & Plan        Medical Decision Making:  Patient presents to the ER for evaluation of progressive bilateral lower extremity edema as well as abdominal bloating.  On arrival vital signs are reassuring.  Abdominal exam is benign.  She has 2+ pitting edema in the bilateral lower extremities.  Progressive and symmetric nature of swelling is concerning for volume overload.  No obvious signs of heart failure today.  Lower suspicion for occult DVT.  Renal function is slightly worse than recent value although within range of prior tests.  I spoke with her nephrology team who felt it would be reasonable to trial a couple days of diuretic if concern for volume overload on exam.  Patient apparently has history of chronic lithium toxicity although lithium level today is therapeutic.  She will be discharged with a few days of Lasix with plans for close monitoring of renal function.  Return precautions discussed prior to discharge.    Diagnosis:    ICD-10-CM    1. Bloating  R14.0    2. Bilateral lower extremity edema  R60.0         Discharge Medications:  Discharge Medication List as of 3/2/2022  5:33 PM      START taking these medications    Details   furosemide (LASIX) 20 MG tablet Take 1 tablet (20 mg) by mouth daily for 4 days, Disp-4 tablet, R-0, Local Print              3/2/2022   Osmany Gresham MD Lindenbaum, Elan, MD  03/02/22 1910

## 2022-03-02 NOTE — DISCHARGE INSTRUCTIONS
There was concern that you may have increased fluid retention and so we will trial a few days of a water pill that helps you pee out extra fluids.  It is important that you get your kidney function rechecked in a couple days to make sure it is not getting worse.  Return for any new concerns.

## 2022-03-02 NOTE — PROGRESS NOTES
"  Assessment & Plan     Abdominal distension  Bilateral leg edema  Dyspnea, unspecified type  Chronic kidney disease, stage 4 (severe) (H)  Ddx broad including acute CHF, acute kidney failure, liver etiology, abdominal pathology, etc. Discussed. Recommend ER, she is agreeable. She has a family member who will drive her.    Return in about 4 weeks (around 3/30/2022) for with PCP, sooner if symptoms worsen or do not improve.    Lucille Quigley DO  [facility] SARAH Ortiz is a 71 year old who presents for the following health issues     HPI     CC: abd distention  PCP: Angela    Swelling in abd since 6 days ago. Thinks gained about 5 pounds since then.   States when bending has difficulty breathing but otherwise no SOB at rest or with walking. This is new for her, not usually sob. Does have CKD. Recalls end of 2021 was developing bilateral leg edema, states this is getting worse, never resolved. She denies cp. Denies palpitations.        Review of Systems   Constitutional, HEENT, cardiovascular, pulmonary, gi and gu systems are negative, except as otherwise noted.      Objective    /85 (BP Location: Right arm, Patient Position: Chair, Cuff Size: Adult Large)   Pulse 89   Temp 97.5  F (36.4  C) (Tympanic)   Resp 16   Ht 1.626 m (5' 4\")   Wt 66.2 kg (146 lb)   SpO2 98%   Breastfeeding No   BMI 25.06 kg/m    Body mass index is 25.06 kg/m .  Physical Exam     GENERAL APPEARANCE: AAOx3, no distress. Well developed.    RESP: Lungs CTA bilaterally. No w/r/r. No distress     ABD: mild distention, soft, nontender    CV: RRR, S1/S2 present. No m/r/c.     EXT: Bilateral pitting edema (moderate), mild erythema to bilateral LE    PSYCH: appropriate mood and affect.               "

## 2022-03-04 ENCOUNTER — HOSPITAL ENCOUNTER (EMERGENCY)
Facility: CLINIC | Age: 72
Discharge: HOME | End: 2022-03-04
Attending: EMERGENCY MEDICINE | Admitting: EMERGENCY MEDICINE
Payer: MEDICARE

## 2022-03-04 ENCOUNTER — NURSE TRIAGE (OUTPATIENT)
Dept: FAMILY MEDICINE | Facility: CLINIC | Age: 72
End: 2022-03-04
Payer: MEDICARE

## 2022-03-04 ENCOUNTER — APPOINTMENT (OUTPATIENT)
Dept: MRI IMAGING | Facility: CLINIC | Age: 72
End: 2022-03-04
Attending: EMERGENCY MEDICINE
Payer: MEDICARE

## 2022-03-04 VITALS
WEIGHT: 145 LBS | RESPIRATION RATE: 16 BRPM | HEIGHT: 64 IN | BODY MASS INDEX: 24.75 KG/M2 | SYSTOLIC BLOOD PRESSURE: 156 MMHG | OXYGEN SATURATION: 100 % | TEMPERATURE: 98.2 F | HEART RATE: 84 BPM | DIASTOLIC BLOOD PRESSURE: 108 MMHG

## 2022-03-04 DIAGNOSIS — H53.8 BLURRED VISION: ICD-10-CM

## 2022-03-04 DIAGNOSIS — Z78.9 DRUG INTERACTION: ICD-10-CM

## 2022-03-04 DIAGNOSIS — Z79.899 LITHIUM USE: ICD-10-CM

## 2022-03-04 DIAGNOSIS — M79.89 LEG SWELLING: ICD-10-CM

## 2022-03-04 LAB
ANION GAP SERPL CALCULATED.3IONS-SCNC: 4 MMOL/L (ref 3–14)
BASOPHILS # BLD AUTO: 0.1 10E3/UL (ref 0–0.2)
BASOPHILS NFR BLD AUTO: 1 %
BUN SERPL-MCNC: 36 MG/DL (ref 7–30)
CALCIUM SERPL-MCNC: 9.9 MG/DL (ref 8.5–10.1)
CHLORIDE BLD-SCNC: 115 MMOL/L (ref 94–109)
CO2 SERPL-SCNC: 22 MMOL/L (ref 20–32)
CREAT SERPL-MCNC: 1.94 MG/DL (ref 0.52–1.04)
EOSINOPHIL # BLD AUTO: 0.3 10E3/UL (ref 0–0.7)
EOSINOPHIL NFR BLD AUTO: 4 %
ERYTHROCYTE [DISTWIDTH] IN BLOOD BY AUTOMATED COUNT: 14.9 % (ref 10–15)
GFR SERPL CREATININE-BSD FRML MDRD: 27 ML/MIN/1.73M2
GLUCOSE BLD-MCNC: 140 MG/DL (ref 70–99)
HCT VFR BLD AUTO: 31.9 % (ref 35–47)
HGB BLD-MCNC: 9.3 G/DL (ref 11.7–15.7)
IMM GRANULOCYTES # BLD: 0 10E3/UL
IMM GRANULOCYTES NFR BLD: 0 %
LITHIUM SERPL-SCNC: 0.5 MMOL/L
LYMPHOCYTES # BLD AUTO: 1.7 10E3/UL (ref 0.8–5.3)
LYMPHOCYTES NFR BLD AUTO: 23 %
MCH RBC QN AUTO: 21.3 PG (ref 26.5–33)
MCHC RBC AUTO-ENTMCNC: 29.2 G/DL (ref 31.5–36.5)
MCV RBC AUTO: 73 FL (ref 78–100)
MONOCYTES # BLD AUTO: 0.7 10E3/UL (ref 0–1.3)
MONOCYTES NFR BLD AUTO: 10 %
NEUTROPHILS # BLD AUTO: 4.6 10E3/UL (ref 1.6–8.3)
NEUTROPHILS NFR BLD AUTO: 62 %
NRBC # BLD AUTO: 0 10E3/UL
NRBC BLD AUTO-RTO: 0 /100
PLATELET # BLD AUTO: 291 10E3/UL (ref 150–450)
POTASSIUM BLD-SCNC: 4.3 MMOL/L (ref 3.4–5.3)
RBC # BLD AUTO: 4.37 10E6/UL (ref 3.8–5.2)
SODIUM SERPL-SCNC: 141 MMOL/L (ref 133–144)
WBC # BLD AUTO: 7.3 10E3/UL (ref 4–11)

## 2022-03-04 PROCEDURE — 36415 COLL VENOUS BLD VENIPUNCTURE: CPT | Performed by: EMERGENCY MEDICINE

## 2022-03-04 PROCEDURE — 80048 BASIC METABOLIC PNL TOTAL CA: CPT | Performed by: EMERGENCY MEDICINE

## 2022-03-04 PROCEDURE — 70553 MRI BRAIN STEM W/O & W/DYE: CPT | Mod: ME

## 2022-03-04 PROCEDURE — 85025 COMPLETE CBC W/AUTO DIFF WBC: CPT | Performed by: EMERGENCY MEDICINE

## 2022-03-04 PROCEDURE — 255N000002 HC RX 255 OP 636: Performed by: EMERGENCY MEDICINE

## 2022-03-04 PROCEDURE — A9585 GADOBUTROL INJECTION: HCPCS | Performed by: EMERGENCY MEDICINE

## 2022-03-04 PROCEDURE — 99285 EMERGENCY DEPT VISIT HI MDM: CPT | Mod: 25

## 2022-03-04 PROCEDURE — 80178 ASSAY OF LITHIUM: CPT | Performed by: EMERGENCY MEDICINE

## 2022-03-04 RX ORDER — GADOBUTROL 604.72 MG/ML
7 INJECTION INTRAVENOUS ONCE
Status: COMPLETED | OUTPATIENT
Start: 2022-03-04 | End: 2022-03-04

## 2022-03-04 RX ADMIN — GADOBUTROL 7 ML: 604.72 INJECTION INTRAVENOUS at 20:56

## 2022-03-04 ASSESSMENT — ENCOUNTER SYMPTOMS
DIARRHEA: 0
VOMITING: 0
ABDOMINAL PAIN: 0
SHORTNESS OF BREATH: 0
BLOOD IN STOOL: 0
CONSTIPATION: 0
NAUSEA: 0
ABDOMINAL DISTENTION: 1

## 2022-03-04 NOTE — ED TRIAGE NOTES
"Patient comes in with blurred vision that started Tuesday, patient was seen in ED Wednesday for bloating as well. Today patient states her prescribed lithium is contraindicated with the Lasix she was prescribed Wednesday. She thinks this may be contributed to the blurry vision, she endorses that her anxiety has complicated things as well. Patient can read, but things at a distance are \"blurry\".  "

## 2022-03-04 NOTE — TELEPHONE ENCOUNTER
Pt reports she was prescribed Lasix at recent ER visit 03/02/2022 and this is contraindicated with Lithium which she takes. Pt consulted with her sister who is a pharmacologist and her psychiatrist who pointed out interaction. She is now experiencing blurry vision. States she usually gets this due to anxiety but it has worsen today. She also read Lasix can have that side effect. Pt plans to stop both Lithium and Lasix.     Routing second level triage to provider for review. Ok to have pt seen at ER given increased blurry vision?     Pt needs acall back at [phone].      Reason for Disposition    Blurred vision or visual changes and present now and sudden onset or new (e.g., minutes, hours, days) (Exception: seeing floaters / black specks OR previously diagnosed migraine headaches with same symptoms)    Additional Information    Negative: Weakness of the face, arm or leg on one side of the body    Negative: Followed getting substance in the eye    Negative: Foreign body or object is or was lodged in the eye    Negative: Followed an eye injury    Negative: Followed sun lamp or sun exposure (UV keratitis)    Negative: Yellow or green discharge (pus) in the eye    Negative: Pregnant    Negative: Severe eye pain    Negative: Complete loss of vision in 1 or both eyes    Negative: Severe headache    Negative: Double vision    Protocols used: VISION LOSS OR CHANGE-A-OH

## 2022-03-04 NOTE — TELEPHONE ENCOUNTER
Triage left a detailed voice message with providers response. Asked that she call 911 if worsening symptoms.

## 2022-03-05 NOTE — ED PROVIDER NOTES
History   Chief Complaint:  Eye Problem       HPI   Diana Santiago is a 71 year old female with history of hypertension and hyperlipidemia who presents with eye problem. The patient reports that she had an onset of blurred vision today. She states that she was seen here 3/2 for stomach bloating and leg swelling and was prescribed lasix. She states stomach bloating is new but leg swelling started in January. After talking to her psychiatrist she was told that she should stop lasix because of interaction with lithium medicine. She states she is still able to read close up but she has been having blurry vision with things at a distance. Has had this before with anxiety but does not have that feeling in association with current symptoms.  She denies hearing or speech difficulty. She denies any shortness of breath, nausea, vomiting, headache, diarrhea, constipation and blood in stool. She denies chest pain as well.     Review of Systems   Eyes: Positive for visual disturbance.   Respiratory: Negative for shortness of breath.    Cardiovascular: Positive for leg swelling. Negative for chest pain.   Gastrointestinal: Positive for abdominal distention. Negative for abdominal pain, blood in stool, constipation, diarrhea, nausea and vomiting.   All other systems reviewed and are negative.      Allergies:  The patient has no known allergies.     Medications:  Symmetrel   Norvasc   Aripiprazole   Lasix   Eskalith  Zocor     Past Medical History:     Hypertension   Bipolar disorder   CKD   DCIS   Diabetes   Fractured femoral neck   Hyperthyroidism   Lithium toxicity   Thalassaemia trait  Hyperlipidemia   Anemia     Past Surgical History:    Breast surgery lumpectomy   Left hand surgery   Retina surgery     Family History:    Father- hypertension   Brother- sleep apnea     Social History:  The patient presents alone.     Physical Exam     Patient Vitals for the past 24 hrs:   BP Temp Pulse Resp SpO2 Height Weight   03/04/22 2120  "-- -- -- -- 100 % -- --   03/04/22 2115 (!) 156/108 -- 84 -- 96 % -- --   03/04/22 1536 (!) 168/84 98.2  F (36.8  C) 90 16 97 % 1.626 m (5' 4\") 65.8 kg (145 lb)       Physical Exam  Eyes:               Sclera white; Pupils are equal and round and reactive; normal vision in near fields, cannot move either eye fully to the left  ENT:                External ears and nares normal  CV:                  Rate as above with regular rhythm                           Bilateral lower extremity pitting edema  Resp:               Breath sounds clear and equal bilaterally                          Non-labored, no retractions or accessory muscle use  GI:                   Abdomen is soft, non-tender, non-distended                          No rebound tenderness or peritoneal features  MS:                  Moves all extremities  Skin:                Warm and dry  Neuro:             Speech is normal and fluent. No apparent deficit.    Emergency Department Course     Imaging:  MR Brain w/o & w Contrast   Final Result   IMPRESSION:   1.  No acute infarct, mass, mass effect, or hemorrhage.   2.  Mild chronic small vessel ischemia.   3.  Mild atrophy.   4.  Presumed meningioma right parietal region, as described.        Report per radiology    Laboratory:  Labs Ordered and Resulted from Time of ED Arrival to Time of ED Departure   BASIC METABOLIC PANEL - Abnormal       Result Value    Sodium 141      Potassium 4.3      Chloride 115 (*)     Carbon Dioxide (CO2) 22      Anion Gap 4      Urea Nitrogen 36 (*)     Creatinine 1.94 (*)     Calcium 9.9      Glucose 140 (*)     GFR Estimate 27 (*)    CBC WITH PLATELETS AND DIFFERENTIAL - Abnormal    WBC Count 7.3      RBC Count 4.37      Hemoglobin 9.3 (*)     Hematocrit 31.9 (*)     MCV 73 (*)     MCH 21.3 (*)     MCHC 29.2 (*)     RDW 14.9      Platelet Count 291      % Neutrophils 62      % Lymphocytes 23      % Monocytes 10      % Eosinophils 4      % Basophils 1      % Immature Granulocytes 0 "      NRBCs per 100 WBC 0      Absolute Neutrophils 4.6      Absolute Lymphocytes 1.7      Absolute Monocytes 0.7      Absolute Eosinophils 0.3      Absolute Basophils 0.1      Absolute Immature Granulocytes 0.0      Absolute NRBCs 0.0     LITHIUM LEVEL - Normal    Lithium 0.5          Procedures      Emergency Department Course:         Reviewed:  I reviewed nursing notes, vitals and past medical history    Assessments:  1850 I obtained history and examined the patient as noted above.   2127 I rechecked the patient and explained findings.     Disposition:  The patient was discharged to home.     Impression & Plan     CMS Diagnoses: None     Medical Decision Making:  Diana Santiago is a 71 year old female that presents to the ED for evaluation of blurry vision. Worsening blurred vision after potential medication interaction with her lithium is concerning for the possibility of toxic doses of lithium, acute renal injury, and acute pathology in the eye.  She does not have full extraocular movements and is not sure if this is her baseline or not.  MRI was obtained to evaluate for stroke as an etiology of these symptoms.  Meningioma was found which is not the source of symptoms and is a benign process.  Blood work shows no worsening of her underlying chronic kidney disease and her lithium level is not elevated.  She will need follow-up with her regular providers including ophthalmology to discuss these symptoms further, and discuss management of her leg swelling.      Diagnosis:    ICD-10-CM    1. Blurred vision  H53.8    2. Lithium use  Z79.899    3. Leg swelling  M79.89    4. Drug interaction  Z78.9        Scribe Disclosure:  I, Yahaira Radha, am serving as a scribe at 6:37 PM on 3/4/2022 to document services personally performed by Shruti Colunga MD based on my observations and the provider's statements to me.          Shruti Colunga MD  03/05/22 0039

## 2022-03-07 NOTE — PROGRESS NOTES
Leigh Ortiz is a 71 year old who presents for the following health issues     HPI     ED/UC Followup:    Facility:  [facility] Emergency Dept   Date of visit: 3/4/2022  Reason for visit:     Blurred Vision  Lithium use  Leg Swelling  Drug interaction    Current Status:      Lower extremity edema   Diana Santiago was recently seen in the emergency department on 03/02 for evaluation of lower extremity edema, and although she was given a prescription for Lasix, her psychiatrist believes that this may interact with lithium and she was advised by psychiatry to stop Lasix.  She then returned to the emergency department for evaluation for possible side effects from Lasix after 2 doses.  During her emergency room visit her labs were checked and they returned within normal limits.  No evidence of Lithium toxicity.  She still has persistent lower extremity edema and abdominal distension.  She has known chronic kidney disease.  She follows with Dr. Cedeno.  After review with her psychiatrist, given the complications posed by lithium she has since stopped Lithium as of March 4.  Plans to switch to Abilify.    Blurred vision   Has been following in ophthalmology   Meningioma (H)   Noted on MRI - no acute concerns.  Benign essential hypertension   Blood pressure is stable.  Taking amlodipine   Bipolar disorder, currently in remission, most recent episode unspecified (H)   Planning to taper of lithium and will follow up in psychiatry.  Continuing to take abilify   Secondary renal hyperparathyroidism (H)   Has follow up scheduled in nephrology    Review of Systems   Constitutional, HEENT, cardiovascular - as above , pulmonary, GI, , musculoskeletal, neuro, skin, endocrine and psych systems are negative, except as otherwise noted.      Objective    /86 (BP Location: Right arm, Patient Position: Sitting, Cuff Size: Adult Regular)   Pulse 78   Temp 97.1  F (36.2  C) (Temporal)   Resp 20   Wt 65.9 kg (145  lb 3.2 oz)   SpO2 98%   Breastfeeding No   BMI 24.92 kg/m    Body mass index is 24.92 kg/m .  Physical Exam   GENERAL: healthy, alert and no distress  EYES: Eyes grossly normal to inspection,    NECK: no adenopathy, no asymmetry,   RESP: lungs clear to auscultation - no rales, rhonchi or wheezes  CV: regular rate and rhythm, normal S1 S2, no S3 or S4, no murmur,  2+ edema  ABDOMEN: + distension, no masses and bowel sounds normal  MS: no gross musculoskeletal defects noted, no edema  SKIN: no suspicious lesions or rashes  NEURO: Normal strength and tone, mentation intact and speech normal  PSYCH: mentation appears normal, affect normal/bright    Recent Results (from the past 240 hour(s))   EKG 12-lead, tracing only    Collection Time: 03/02/22  3:42 PM   Result Value Ref Range    Systolic Blood Pressure  mmHg    Diastolic Blood Pressure  mmHg    Ventricular Rate 88 BPM    Atrial Rate 88 BPM    OH Interval 210 ms    QRS Duration 104 ms     ms    QTc 457 ms    P Axis 53 degrees    R AXIS 88 degrees    T Axis 64 degrees    Interpretation ECG       Sinus rhythm with 1st degree A-V block  Otherwise normal ECG  When compared with ECG of 08-NOV-2021 10:35,  No significant change was found  Confirmed by GENERATED REPORT, COMPUTER ([id number]),  Danielito Rocha ([id number]) on 3/2/2022 11:59:08 PM     Comprehensive metabolic panel    Collection Time: 03/02/22  3:57 PM   Result Value Ref Range    Sodium 137 133 - 144 mmol/L    Potassium 4.7 3.4 - 5.3 mmol/L    Chloride 111 (H) 94 - 109 mmol/L    Carbon Dioxide (CO2) 21 20 - 32 mmol/L    Anion Gap 5 3 - 14 mmol/L    Urea Nitrogen 43 (H) 7 - 30 mg/dL    Creatinine 2.11 (H) 0.52 - 1.04 mg/dL    Calcium 10.1 8.5 - 10.1 mg/dL    Glucose 120 (H) 70 - 99 mg/dL    Alkaline Phosphatase 109 40 - 150 U/L    AST 16 0 - 45 U/L    ALT 24 0 - 50 U/L    Protein Total 7.3 6.8 - 8.8 g/dL    Albumin 3.5 3.4 - 5.0 g/dL    Bilirubin Total 0.4 0.2 - 1.3 mg/dL    GFR Estimate 24 (L) >60 mL/min/1.73m2    Troponin I    Collection Time: 03/02/22  3:57 PM   Result Value Ref Range    Troponin I High Sensitivity 9 <54 ng/L   Nt probnp inpatient (BNP)    Collection Time: 03/02/22  3:57 PM   Result Value Ref Range    N terminal Pro BNP Inpatient 70 0 - 900 pg/mL   UA with Microscopic reflex to Culture    Collection Time: 03/02/22  3:57 PM    Specimen: Urine, Clean Catch   Result Value Ref Range    Color Urine Straw Colorless, Straw, Light Yellow, Yellow    Appearance Urine Clear Clear    Glucose Urine Negative Negative mg/dL    Bilirubin Urine Negative Negative    Ketones Urine Negative Negative mg/dL    Specific Gravity Urine 1.005 1.003 - 1.035    Blood Urine Negative Negative    pH Urine 6.5 5.0 - 7.0    Protein Albumin Urine Negative Negative mg/dL    Urobilinogen Urine Normal Normal, 2.0 mg/dL    Nitrite Urine Negative Negative    Leukocyte Esterase Urine Negative Negative    Mucus Urine Present (A) None Seen /LPF    RBC Urine 0 <=2 /HPF    WBC Urine 0 <=5 /HPF   CBC with platelets and differential    Collection Time: 03/02/22  3:57 PM   Result Value Ref Range    WBC Count 8.1 4.0 - 11.0 10e3/uL    RBC Count 4.46 3.80 - 5.20 10e6/uL    Hemoglobin 9.5 (L) 11.7 - 15.7 g/dL    Hematocrit 33.0 (L) 35.0 - 47.0 %    MCV 74 (L) 78 - 100 fL    MCH 21.3 (L) 26.5 - 33.0 pg    MCHC 28.8 (L) 31.5 - 36.5 g/dL    RDW 14.9 10.0 - 15.0 %    Platelet Count 299 150 - 450 10e3/uL    % Neutrophils 69 %    % Lymphocytes 17 %    % Monocytes 9 %    % Eosinophils 4 %    % Basophils 1 %    % Immature Granulocytes 0 %    NRBCs per 100 WBC 0 <1 /100    Absolute Neutrophils 5.6 1.6 - 8.3 10e3/uL    Absolute Lymphocytes 1.4 0.8 - 5.3 10e3/uL    Absolute Monocytes 0.7 0.0 - 1.3 10e3/uL    Absolute Eosinophils 0.3 0.0 - 0.7 10e3/uL    Absolute Basophils 0.0 0.0 - 0.2 10e3/uL    Absolute Immature Granulocytes 0.0 <=0.4 10e3/uL    Absolute NRBCs 0.0 10e3/uL   Lithium level    Collection Time: 03/02/22  4:48 PM   Result Value Ref Range    Lithium  0.8   mmol/L   Basic metabolic panel    Collection Time: 03/04/22  7:17 PM   Result Value Ref Range    Sodium 141 133 - 144 mmol/L    Potassium 4.3 3.4 - 5.3 mmol/L    Chloride 115 (H) 94 - 109 mmol/L    Carbon Dioxide (CO2) 22 20 - 32 mmol/L    Anion Gap 4 3 - 14 mmol/L    Urea Nitrogen 36 (H) 7 - 30 mg/dL    Creatinine 1.94 (H) 0.52 - 1.04 mg/dL    Calcium 9.9 8.5 - 10.1 mg/dL    Glucose 140 (H) 70 - 99 mg/dL    GFR Estimate 27 (L) >60 mL/min/1.73m2   Lithium level    Collection Time: 03/04/22  7:17 PM   Result Value Ref Range    Lithium 0.5   mmol/L   CBC with platelets and differential    Collection Time: 03/04/22  7:17 PM   Result Value Ref Range    WBC Count 7.3 4.0 - 11.0 10e3/uL    RBC Count 4.37 3.80 - 5.20 10e6/uL    Hemoglobin 9.3 (L) 11.7 - 15.7 g/dL    Hematocrit 31.9 (L) 35.0 - 47.0 %    MCV 73 (L) 78 - 100 fL    MCH 21.3 (L) 26.5 - 33.0 pg    MCHC 29.2 (L) 31.5 - 36.5 g/dL    RDW 14.9 10.0 - 15.0 %    Platelet Count 291 150 - 450 10e3/uL    % Neutrophils 62 %    % Lymphocytes 23 %    % Monocytes 10 %    % Eosinophils 4 %    % Basophils 1 %    % Immature Granulocytes 0 %    NRBCs per 100 WBC 0 <1 /100    Absolute Neutrophils 4.6 1.6 - 8.3 10e3/uL    Absolute Lymphocytes 1.7 0.8 - 5.3 10e3/uL    Absolute Monocytes 0.7 0.0 - 1.3 10e3/uL    Absolute Eosinophils 0.3 0.0 - 0.7 10e3/uL    Absolute Basophils 0.1 0.0 - 0.2 10e3/uL    Absolute Immature Granulocytes 0.0 <=0.4 10e3/uL    Absolute NRBCs 0.0 10e3/uL   Comprehensive metabolic panel (BMP + Alb, Alk Phos, ALT, AST, Total. Bili, TP)    Collection Time: 03/08/22  5:12 PM   Result Value Ref Range    Sodium 144 133 - 144 mmol/L    Potassium 4.4 3.4 - 5.3 mmol/L    Chloride 116 (H) 94 - 109 mmol/L    Carbon Dioxide (CO2) 18 (L) 20 - 32 mmol/L    Anion Gap 10 3 - 14 mmol/L    Urea Nitrogen 36 (H) 7 - 30 mg/dL    Creatinine 2.06 (H) 0.52 - 1.04 mg/dL    Calcium 10.2 (H) 8.5 - 10.1 mg/dL    Glucose 102 (H) 70 - 99 mg/dL    Alkaline Phosphatase 97 40 - 150 U/L     AST 12 0 - 45 U/L    ALT 27 0 - 50 U/L    Protein Total 7.4 6.8 - 8.8 g/dL    Albumin 3.5 3.4 - 5.0 g/dL    Bilirubin Total 0.4 0.2 - 1.3 mg/dL    GFR Estimate 25 (L) >60 mL/min/1.73m2   Lithium level    Collection Time: 03/08/22  5:12 PM   Result Value Ref Range    Lithium <0.2   mmol/L         Patient Instructions   Lower extremity edema  Comment: Recommend continue to hold Lithium and follow up in nephrology to discuss ulterior medication options to replace amlodipine with ace inhibitor or ARB.  Try to limit sodium to 1500 mg/24 hours.  Consider adding lasix.  Weigh yourself every day.  Discuss with Dr. Cedeno about improving albumim - recheck comprehensive metabolic panel today    (H53.8) Blurred vision  (primary encounter diagnosis)  Comment: Recommend follow up in ophthalmology   Plan:     (D32.9) Meningioma (H)  Comment: Noted on MRI - follow up in neurology [facility] [phone]  Plan:     (I10) Benign essential hypertension  Comment: blood pressure is excellent, but could improve with change in blood pressure medications  Plan:     (F31.70) Bipolar disorder, currently in remission, most recent episode unspecified (H)  Comment: Continue current plan to taper of Lithium and follow up in psychiatry   Plan:     (N25.81) Secondary renal hyperparathyroidism (H)  Comment: Also noted secondary to lithium in the past - calcium reviewed and stable  Plan:

## 2022-03-08 ENCOUNTER — OFFICE VISIT (OUTPATIENT)
Dept: FAMILY MEDICINE | Facility: CLINIC | Age: 72
End: 2022-03-08
Payer: MEDICARE

## 2022-03-08 VITALS
DIASTOLIC BLOOD PRESSURE: 86 MMHG | RESPIRATION RATE: 20 BRPM | HEART RATE: 78 BPM | WEIGHT: 145.2 LBS | OXYGEN SATURATION: 98 % | TEMPERATURE: 97.1 F | SYSTOLIC BLOOD PRESSURE: 134 MMHG | BODY MASS INDEX: 24.92 KG/M2

## 2022-03-08 DIAGNOSIS — R60.0 LOWER EXTREMITY EDEMA: ICD-10-CM

## 2022-03-08 DIAGNOSIS — I10 BENIGN ESSENTIAL HYPERTENSION: ICD-10-CM

## 2022-03-08 DIAGNOSIS — N25.81 SECONDARY RENAL HYPERPARATHYROIDISM (H): ICD-10-CM

## 2022-03-08 DIAGNOSIS — N18.31 CHRONIC KIDNEY DISEASE, STAGE 3A (H): Primary | ICD-10-CM

## 2022-03-08 DIAGNOSIS — H53.8 BLURRED VISION: ICD-10-CM

## 2022-03-08 DIAGNOSIS — D32.9 MENINGIOMA (H): ICD-10-CM

## 2022-03-08 DIAGNOSIS — F31.70 BIPOLAR DISORDER, CURRENTLY IN REMISSION, MOST RECENT EPISODE UNSPECIFIED (H): ICD-10-CM

## 2022-03-08 PROCEDURE — 80053 COMPREHEN METABOLIC PANEL: CPT | Performed by: INTERNAL MEDICINE

## 2022-03-08 PROCEDURE — 36415 COLL VENOUS BLD VENIPUNCTURE: CPT | Performed by: INTERNAL MEDICINE

## 2022-03-08 PROCEDURE — 80178 ASSAY OF LITHIUM: CPT | Performed by: INTERNAL MEDICINE

## 2022-03-08 PROCEDURE — 99214 OFFICE O/P EST MOD 30 MIN: CPT | Performed by: INTERNAL MEDICINE

## 2022-03-08 ASSESSMENT — PAIN SCALES - GENERAL: PAINLEVEL: EXTREME PAIN (8)

## 2022-03-08 NOTE — PATIENT INSTRUCTIONS
Lower extremity edema  Comment: Recommend continue to hold Lithium and follow up in nephrology to discuss ulterior medication options to replace amlodipine with ace inhibitor or ARB.  Try to limit sodium to 1500 mg/24 hours.  Consider adding lasix.  Weigh yourself every day.  Discuss with Dr. Cedeno about improving albumim - recheck comprehensive metabolic panel today    (H53.8) Blurred vision  (primary encounter diagnosis)  Comment: Recommend follow up in ophthalmology   Plan:     (D32.9) Meningioma (H)  Comment: Noted on MRI - follow up in neurology [facility] [phone]  Plan:     (I10) Benign essential hypertension  Comment: blood pressure is excellent, but could improve with change in blood pressure medications  Plan:     (F31.70) Bipolar disorder, currently in remission, most recent episode unspecified (H)  Comment: Continue current plan to taper of Lithium and follow up in psychiatry - check Lithium level  Plan:     (N25.81) Secondary renal hyperparathyroidism (H)  Comment: Also noted secondary to lithium in the past - calcium reviewed and stable  Plan:

## 2022-03-09 LAB
ALBUMIN SERPL-MCNC: 3.5 G/DL (ref 3.4–5)
ALP SERPL-CCNC: 97 U/L (ref 40–150)
ALT SERPL W P-5'-P-CCNC: 27 U/L (ref 0–50)
ANION GAP SERPL CALCULATED.3IONS-SCNC: 10 MMOL/L (ref 3–14)
AST SERPL W P-5'-P-CCNC: 12 U/L (ref 0–45)
BILIRUB SERPL-MCNC: 0.4 MG/DL (ref 0.2–1.3)
BUN SERPL-MCNC: 36 MG/DL (ref 7–30)
CALCIUM SERPL-MCNC: 10.2 MG/DL (ref 8.5–10.1)
CHLORIDE BLD-SCNC: 116 MMOL/L (ref 94–109)
CO2 SERPL-SCNC: 18 MMOL/L (ref 20–32)
CREAT SERPL-MCNC: 2.06 MG/DL (ref 0.52–1.04)
GFR SERPL CREATININE-BSD FRML MDRD: 25 ML/MIN/1.73M2
GLUCOSE BLD-MCNC: 102 MG/DL (ref 70–99)
LITHIUM SERPL-SCNC: <0.2 MMOL/L
POTASSIUM BLD-SCNC: 4.4 MMOL/L (ref 3.4–5.3)
PROT SERPL-MCNC: 7.4 G/DL (ref 6.8–8.8)
SODIUM SERPL-SCNC: 144 MMOL/L (ref 133–144)

## 2022-03-10 ENCOUNTER — TELEPHONE (OUTPATIENT)
Dept: INFUSION THERAPY | Facility: CLINIC | Age: 72
End: 2022-03-10
Payer: MEDICARE

## 2022-03-10 NOTE — PROGRESS NOTES
This RN called patient to let her know that she does not need to come in at 10:00 for labs tomorrow.  Labs from 3/4/22 show she meets criteria for her scheduled aranesp injection and those values can be used for tomorrow's injection.  Patient verbalized understanding and will come at 1045 for her injection as scheduled.

## 2022-03-10 NOTE — RESULT ENCOUNTER NOTE
Hi Diana,    I have had the opportunity to review your recent results and an interpretation is as follows:  Your conference metabolic panel shows stable renal insufficiency with findings of hypercalcemia  Your lithium level is now undetectable, and it would be reasonable to consider a diuretic or making adjustment to take an ACE inhibitor such as lisinopril stopping amlodipine.  Please follow-up with nephrology to discuss this    Sincerely,  Trent Valente MD

## 2022-03-11 ENCOUNTER — ALLIED HEALTH/NURSE VISIT (OUTPATIENT)
Dept: INFUSION THERAPY | Facility: CLINIC | Age: 72
End: 2022-03-11
Attending: INTERNAL MEDICINE
Payer: MEDICARE

## 2022-03-11 VITALS
OXYGEN SATURATION: 100 % | SYSTOLIC BLOOD PRESSURE: 156 MMHG | RESPIRATION RATE: 22 BRPM | HEART RATE: 85 BPM | TEMPERATURE: 97.8 F | DIASTOLIC BLOOD PRESSURE: 96 MMHG

## 2022-03-11 DIAGNOSIS — D63.1 ANEMIA IN CKD (CHRONIC KIDNEY DISEASE): Primary | ICD-10-CM

## 2022-03-11 DIAGNOSIS — N18.9 ANEMIA IN CKD (CHRONIC KIDNEY DISEASE): Primary | ICD-10-CM

## 2022-03-11 DIAGNOSIS — N18.32 STAGE 3B CHRONIC KIDNEY DISEASE (H): ICD-10-CM

## 2022-03-11 PROCEDURE — 96372 THER/PROPH/DIAG INJ SC/IM: CPT | Performed by: INTERNAL MEDICINE

## 2022-03-11 PROCEDURE — 250N000011 HC RX IP 250 OP 636: Mod: EC | Performed by: INTERNAL MEDICINE

## 2022-03-11 RX ORDER — ALBUTEROL SULFATE 90 UG/1
1-2 AEROSOL, METERED RESPIRATORY (INHALATION)
Status: CANCELLED
Start: 2022-07-01

## 2022-03-11 RX ORDER — DIPHENHYDRAMINE HYDROCHLORIDE 50 MG/ML
50 INJECTION INTRAMUSCULAR; INTRAVENOUS
Status: CANCELLED
Start: 2022-07-01

## 2022-03-11 RX ORDER — METHYLPREDNISOLONE SODIUM SUCCINATE 125 MG/2ML
125 INJECTION, POWDER, LYOPHILIZED, FOR SOLUTION INTRAMUSCULAR; INTRAVENOUS
Status: CANCELLED
Start: 2022-07-01

## 2022-03-11 RX ORDER — ALBUTEROL SULFATE 0.83 MG/ML
2.5 SOLUTION RESPIRATORY (INHALATION)
Status: CANCELLED | OUTPATIENT
Start: 2022-07-01

## 2022-03-11 RX ORDER — NALOXONE HYDROCHLORIDE 0.4 MG/ML
0.2 INJECTION, SOLUTION INTRAMUSCULAR; INTRAVENOUS; SUBCUTANEOUS
Status: CANCELLED | OUTPATIENT
Start: 2022-07-01

## 2022-03-11 RX ORDER — EPINEPHRINE 1 MG/ML
0.3 INJECTION, SOLUTION INTRAMUSCULAR; SUBCUTANEOUS EVERY 5 MIN PRN
Status: CANCELLED | OUTPATIENT
Start: 2022-07-01

## 2022-03-11 RX ORDER — MEPERIDINE HYDROCHLORIDE 25 MG/ML
25 INJECTION INTRAMUSCULAR; INTRAVENOUS; SUBCUTANEOUS EVERY 30 MIN PRN
Status: CANCELLED | OUTPATIENT
Start: 2022-07-01

## 2022-03-11 RX ADMIN — DARBEPOETIN ALFA 100 MCG: 100 INJECTION, SOLUTION INTRAVENOUS; SUBCUTANEOUS at 11:05

## 2022-03-11 ASSESSMENT — PAIN SCALES - GENERAL: PAINLEVEL: NO PAIN (0)

## 2022-03-11 NOTE — PROGRESS NOTES
Infusion Nursing Note:  Diana Santiago presents today for [facility]  Patient seen by provider today: No   present during visit today: Not Applicable.    Note: N/A.      Intravenous Access:  No Intravenous access/labs at this visit.    Treatment Conditions:  Lab Results   Component Value Date    HGB 9.3 (L) 03/04/2022    WBC 7.3 03/04/2022    ANEU 6.5 12/21/2020    ANEUTAUTO 4.6 03/04/2022     03/04/2022          Post Infusion Assessment:  Patient tolerated injection without incident.  Site patent and intact, free from redness, edema or discomfort.       Discharge Plan:   AVS to patient via MYCHART.  Patient will return as [facility] for next appointment.   Patient discharged in stable condition accompanied by: self.  Departure Mode: Ambulatory with walker.      Zion Agarwal RN

## 2022-03-12 ENCOUNTER — HEALTH MAINTENANCE LETTER (OUTPATIENT)
Age: 72
End: 2022-03-12

## 2022-03-16 ENCOUNTER — MYC MEDICAL ADVICE (OUTPATIENT)
Dept: FAMILY MEDICINE | Facility: CLINIC | Age: 72
End: 2022-03-16
Payer: MEDICARE

## 2022-03-16 NOTE — TELEPHONE ENCOUNTER
Called and spoke with patient, she wanted to schedule OV.  Scheduled for 3/21/2022.  Advised to be seen earlier if swelling increases, pain, fever, redness I leg.    Yani Drew RN

## 2022-03-17 ENCOUNTER — HOSPITAL ENCOUNTER (EMERGENCY)
Facility: CLINIC | Age: 72
Discharge: HOME | End: 2022-03-17
Attending: EMERGENCY MEDICINE | Admitting: EMERGENCY MEDICINE
Payer: MEDICARE

## 2022-03-17 ENCOUNTER — APPOINTMENT (OUTPATIENT)
Dept: ULTRASOUND IMAGING | Facility: CLINIC | Age: 72
End: 2022-03-17
Attending: EMERGENCY MEDICINE
Payer: MEDICARE

## 2022-03-17 VITALS
RESPIRATION RATE: 22 BRPM | HEART RATE: 92 BPM | OXYGEN SATURATION: 98 % | BODY MASS INDEX: 25.27 KG/M2 | DIASTOLIC BLOOD PRESSURE: 89 MMHG | HEIGHT: 64 IN | SYSTOLIC BLOOD PRESSURE: 165 MMHG | TEMPERATURE: 98.7 F | WEIGHT: 148 LBS

## 2022-03-17 DIAGNOSIS — D64.9 ANEMIA, UNSPECIFIED TYPE: ICD-10-CM

## 2022-03-17 DIAGNOSIS — M71.21 SYNOVIAL CYST OF RIGHT POPLITEAL SPACE: ICD-10-CM

## 2022-03-17 DIAGNOSIS — E88.09 HYPOALBUMINEMIA: ICD-10-CM

## 2022-03-17 DIAGNOSIS — N18.9 CHRONIC KIDNEY DISEASE, UNSPECIFIED CKD STAGE: ICD-10-CM

## 2022-03-17 LAB
ALBUMIN SERPL-MCNC: 3.3 G/DL (ref 3.4–5)
ALP SERPL-CCNC: 106 U/L (ref 40–150)
ALT SERPL W P-5'-P-CCNC: 35 U/L (ref 0–50)
ANION GAP SERPL CALCULATED.3IONS-SCNC: 8 MMOL/L (ref 3–14)
AST SERPL W P-5'-P-CCNC: 24 U/L (ref 0–45)
BASOPHILS # BLD AUTO: 0 10E3/UL (ref 0–0.2)
BASOPHILS NFR BLD AUTO: 1 %
BILIRUB SERPL-MCNC: 0.4 MG/DL (ref 0.2–1.3)
BUN SERPL-MCNC: 48 MG/DL (ref 7–30)
CALCIUM SERPL-MCNC: 9.6 MG/DL (ref 8.5–10.1)
CHLORIDE BLD-SCNC: 114 MMOL/L (ref 94–109)
CO2 SERPL-SCNC: 22 MMOL/L (ref 20–32)
CREAT SERPL-MCNC: 2.24 MG/DL (ref 0.52–1.04)
EOSINOPHIL # BLD AUTO: 0.2 10E3/UL (ref 0–0.7)
EOSINOPHIL NFR BLD AUTO: 4 %
ERYTHROCYTE [DISTWIDTH] IN BLOOD BY AUTOMATED COUNT: 15.9 % (ref 10–15)
GFR SERPL CREATININE-BSD FRML MDRD: 23 ML/MIN/1.73M2
GLUCOSE BLD-MCNC: 135 MG/DL (ref 70–99)
HCT VFR BLD AUTO: 32.9 % (ref 35–47)
HGB BLD-MCNC: 9.3 G/DL (ref 11.7–15.7)
HOLD SPECIMEN: NORMAL
IMM GRANULOCYTES # BLD: 0 10E3/UL
IMM GRANULOCYTES NFR BLD: 1 %
LYMPHOCYTES # BLD AUTO: 1.1 10E3/UL (ref 0.8–5.3)
LYMPHOCYTES NFR BLD AUTO: 21 %
MCH RBC QN AUTO: 20.9 PG (ref 26.5–33)
MCHC RBC AUTO-ENTMCNC: 28.3 G/DL (ref 31.5–36.5)
MCV RBC AUTO: 74 FL (ref 78–100)
MONOCYTES # BLD AUTO: 0.8 10E3/UL (ref 0–1.3)
MONOCYTES NFR BLD AUTO: 14 %
NEUTROPHILS # BLD AUTO: 3.4 10E3/UL (ref 1.6–8.3)
NEUTROPHILS NFR BLD AUTO: 59 %
NRBC # BLD AUTO: 0.1 10E3/UL
NRBC BLD AUTO-RTO: 1 /100
PLATELET # BLD AUTO: 333 10E3/UL (ref 150–450)
POTASSIUM BLD-SCNC: 4.4 MMOL/L (ref 3.4–5.3)
PROT SERPL-MCNC: 7.6 G/DL (ref 6.8–8.8)
RBC # BLD AUTO: 4.46 10E6/UL (ref 3.8–5.2)
SODIUM SERPL-SCNC: 144 MMOL/L (ref 133–144)
WBC # BLD AUTO: 5.6 10E3/UL (ref 4–11)

## 2022-03-17 PROCEDURE — 80053 COMPREHEN METABOLIC PANEL: CPT | Performed by: EMERGENCY MEDICINE

## 2022-03-17 PROCEDURE — 36415 COLL VENOUS BLD VENIPUNCTURE: CPT | Performed by: EMERGENCY MEDICINE

## 2022-03-17 PROCEDURE — 85025 COMPLETE CBC W/AUTO DIFF WBC: CPT | Performed by: EMERGENCY MEDICINE

## 2022-03-17 PROCEDURE — 93971 EXTREMITY STUDY: CPT | Mod: RT

## 2022-03-17 PROCEDURE — 250N000011 HC RX IP 250 OP 636: Performed by: EMERGENCY MEDICINE

## 2022-03-17 PROCEDURE — 99285 EMERGENCY DEPT VISIT HI MDM: CPT | Mod: 25

## 2022-03-17 PROCEDURE — 96374 THER/PROPH/DIAG INJ IV PUSH: CPT

## 2022-03-17 RX ORDER — FUROSEMIDE 20 MG
20 TABLET ORAL DAILY
Qty: 10 TABLET | Refills: 0 | Status: SHIPPED | OUTPATIENT
Start: 2022-03-17 | End: 2022-04-22 | Stop reason: ALTCHOICE

## 2022-03-17 RX ORDER — FUROSEMIDE 10 MG/ML
20 INJECTION INTRAMUSCULAR; INTRAVENOUS ONCE
Status: COMPLETED | OUTPATIENT
Start: 2022-03-17 | End: 2022-03-17

## 2022-03-17 RX ADMIN — FUROSEMIDE 20 MG: 10 INJECTION, SOLUTION INTRAVENOUS at 10:38

## 2022-03-17 ASSESSMENT — ENCOUNTER SYMPTOMS
ABDOMINAL DISTENTION: 1
JOINT SWELLING: 1

## 2022-03-17 NOTE — ED PROVIDER NOTES
"  History     Chief Complaint:  Leg Swelling      HPI   Diana Santiago is a 71 year old female who has a history of CKD, nephrogenic diabetes insipidus, and presents with leg swelling. The patient has dealt with bilateral lower leg swelling since December, but the swelling got up to the level of her right knee last week. She also has a distended abdomen. She denies DVT or CHF history. She states that the swelling is not painful, but that it makes her leg feel sore. She is not on any diuretic currently. She was on lithium, but stopped taking it after an ED doctor prescribed furosemide for her previously.    Review of Systems   Gastrointestinal: Positive for abdominal distention.   Musculoskeletal: Positive for joint swelling (leg swelling).   All other systems reviewed and are negative.    Allergies:  No Known Allergies    Medications:    amantadine  amlodipine   aripiprazole  fexofenadine  simvastatin    Past Medical History:    Hypertension  Bipolar affective disorder  CKD  DCIS  Diabetes insipidus  Elevated blood sugar  Fractured femoral neck  Hypercalcemia  Hypercholesteremia  Hyperparathyroidism  Heterozygous thalassaemia trait  Vitamin D deficiency  Chronic anemia  Acute kidney failure    Past Surgical History:    Breast lumpectomy  Left hand surgery  Tracheostomy closure    Family History:    Father: hypertension  Brother: sleep apnea  Maternal aunt: breast cancer  Other: breast cancer    Social History:  Presents alone    Physical Exam     Patient Vitals for the past 24 hrs:   BP Temp Temp src Pulse Resp SpO2 Height Weight   03/17/22 0905 (!) 165/89 98.7  F (37.1  C) Oral 92 22 98 % 1.626 m (5' 4\") 67.1 kg (148 lb)     Physical Exam  General: Resting comfortably on the gurney  Head:  The scalp, face, and head appear normal  Eyes:  The pupils are equal, round, and reactive to light    There is no nystagmus    Extraocular muscles are intact    Conjunctivae and sclerae are normal  ENT:    The nose is " normal    Pinnae are normal    External acoustic canals are normal    Tympanic membranes are normal    The oropharynx is normal    Uvula is in the midline    There is no peritonsillar abscess  Neck:  Normal range of motion    There is no rigidity noted    There is no midline cervical spine pain/tenderness    Trachea is in the midline    No mass is detected  CV:  Regular rate and underlying rhythm     Normal S1 and S2    No S3 or S4    No pathological murmur detected  Resp:  Lungs are clear    There is no tachypnea    Non-labored    No rales    No wheezing   GI:  Abdomen is soft, there is no rigidity    There is probable anterior abdominal wall edema, consistent with fluid overload    No distension    No tympani    No rebound tenderness     Non-surgical without peritoneal features    Individual Quadrant Assessment:    RUQ:    Normal    Epigastrium:  Normal    LUQ:   Normal    Periumbilical: Normal    RLQ:   Normal    Suprapubic:  Normal    LLQ:   Normal  MS:  Extremities-Legs:    Normal muscular tone    Symmetric motor strength    No major joint effusions    There is asymmetric swelling, the right leg is more swollen than the left as noted below   in the photos. There is no danna erythema consistent with cellulitis. There is good   capillary refill.    No calf tenderness    Extremities-Arms:    Normal motor strength    No asymmetric swelling  Skin:  No rash or acute skin lesions noted  Neuro: Speech is normal and fluent  Psych:  Awake. Alert.  Normal affect.  Appropriate interactions.  Lymph: No anterior or posterior cervical lymphadenopathy noted            Emergency Department Course     Imaging:  US Lower Extremity Venous Duplex Right   Final Result   IMPRESSION: No evidence of deep venous thrombosis.  Probable Baker's   cyst in the right popliteal fossa as above.      MONA QUINONES MD            SYSTEM ID:  [id number]        Laboratory:  Labs Ordered and Resulted from Time of ED Arrival to Time of ED Departure    COMPREHENSIVE METABOLIC PANEL - Abnormal       Result Value    Sodium 144      Potassium 4.4      Chloride 114 (*)     Carbon Dioxide (CO2) 22      Anion Gap 8      Urea Nitrogen 48 (*)     Creatinine 2.24 (*)     Calcium 9.6      Glucose 135 (*)     Alkaline Phosphatase 106      AST 24      ALT 35      Protein Total 7.6      Albumin 3.3 (*)     Bilirubin Total 0.4      GFR Estimate 23 (*)    CBC WITH PLATELETS AND DIFFERENTIAL - Abnormal    WBC Count 5.6      RBC Count 4.46      Hemoglobin 9.3 (*)     Hematocrit 32.9 (*)     MCV 74 (*)     MCH 20.9 (*)     MCHC 28.3 (*)     RDW 15.9 (*)     Platelet Count 333      % Neutrophils 59      % Lymphocytes 21      % Monocytes 14      % Eosinophils 4      % Basophils 1      % Immature Granulocytes 1      NRBCs per 100 WBC 1 (*)     Absolute Neutrophils 3.4      Absolute Lymphocytes 1.1      Absolute Monocytes 0.8      Absolute Eosinophils 0.2      Absolute Basophils 0.0      Absolute Immature Granulocytes 0.0      Absolute NRBCs 0.1       Emergency Department Course:  Reviewed:  I reviewed nursing notes, vitals, past history and care everywhere    Assessments:  0931 I obtained history and examined the patient as noted above.   1026 I rechecked the patient and explained findings.   1052 I rechecked the patient.    Interventions:  1038 Furosemide 20 mg IV    Disposition:  The patient was discharged to home.    Impression & Plan      Medical Decision Making:  This patient presents to the emergency department complaining of a slight increase in right leg swelling.  She is being followed by primary care and nephrology for chronic renal insufficiency.  The patient is not currently on diuretic therapy.  She does have a history of mild anemia and hypoalbuminemia.  She is no longer taking lithium.  Patient underwent evaluation for worsening chronic renal insufficiency.  Her BUN and creatinine are slightly higher than recent laboratories.  She does have an appointment with her  nephrologist next week for follow-up.  She underwent duplex ultrasonography of the right leg to exclude deep venous thrombosis, which was excluded.  An incidental Baker's cyst was noted in the popliteal fossa which may be contributing to the patient's asymmetry.  Patient will be started on low-dose furosemide at this juncture.  I will just give her a 10-day supply.  Given her chronic renal insufficiency I am not going to add in supplemental potassium.  Recheck the labs in 7 to 10 days would be helpful.  The patient will discuss continuation or titration of this her other medications with Dr. Cedeno from nephrology.     The patient's generalized leg edema and mild abdominal edema is likely secondary to the patient's fluid overload, chronic renal insufficiency, and hypoalbuminemia.  No other life-threatening etiologies are detected in the emergency department.  Follow-up with primary care and nephrology as appropriate.      Diagnosis:    ICD-10-CM    1. Chronic kidney disease, unspecified CKD stage  N18.9    2. Anemia, unspecified type  D64.9    3. Hypoalbuminemia  E88.09    4. Synovial cyst of right popliteal space  M71.21      Scribe Disclosure:  Jessie CORONADO, am serving as a scribe at 9:21 AM on 3/17/2022 to document services personally performed by Ron Garza MD based on my observations and the provider's statements to me.      Ron Garza MD  03/17/22 [id number]

## 2022-03-17 NOTE — DISCHARGE INSTRUCTIONS
Follow-up with primary care and renal medicine  Keep your appointment with Dr. Cedeno for next week  Start Lasix 20 mg in the morning  Discussed protein supplementation with your nephrologist  Repeat blood draw in 7 to 10 days to reassess your kidney function

## 2022-03-18 ENCOUNTER — PATIENT OUTREACH (OUTPATIENT)
Dept: CARE COORDINATION | Facility: CLINIC | Age: 72
End: 2022-03-18
Payer: MEDICARE

## 2022-03-18 DIAGNOSIS — Z71.89 OTHER SPECIFIED COUNSELING: ICD-10-CM

## 2022-03-18 NOTE — PROGRESS NOTES
Clinic Care Coordination Contact  [facility]: Post-Discharge Note  SITUATION                                                      Admission:    Admission Date: 03/17/22   Reason for Admission: Chronic kidney disease, unspecified CKD stageAnemia, unspecified typeHypoalbuminemiaSynovial cyst of right popliteal space  Discharge:   Discharge Date: 03/17/22  Discharge Diagnosis: Chronic kidney disease, unspecified CKD stageAnemia, unspecified typeHypoalbuminemiaSynovial cyst of right popliteal space    BACKGROUND                                                      Per hospital discharge summary and inpatient provider notes:  This patient presents to the emergency department complaining of a slight increase in right leg swelling.  She is being followed by primary care and nephrology for chronic renal insufficiency.  The patient is not currently on diuretic therapy.  She does have a history of mild anemia and hypoalbuminemia.  She is no longer taking lithium.  Patient underwent evaluation for worsening chronic renal insufficiency.  Her BUN and creatinine are slightly higher than recent laboratories.  She does have an appointment with her nephrologist next week for follow-up.  She underwent duplex ultrasonography of the right leg to exclude deep venous thrombosis, which was excluded.  An incidental Baker's cyst was noted in the popliteal fossa which may be contributing to the patient's asymmetry.  Patient will be started on low-dose furosemide at this juncture.  I will just give her a 10-day supply.  Given her chronic renal insufficiency I am not going to add in supplemental potassium.  Recheck the labs in 7 to 10 days would be helpful.  The patient will discuss continuation or titration of this her other medications with Dr. Cedeno from nephrology.      The patient's generalized leg edema and mild abdominal edema is likely secondary to the patient's fluid overload, chronic renal insufficiency, and hypoalbuminemia.   No other life-threatening etiologies are detected in the emergency department.  Follow-up with primary care and nephrology as appropriate.    ASSESSMENT      Enrollment  Primary Care Care Coordination Status: Declined    Discharge Assessment  How are you doing now that you are home?: Pt reports she is okay. Plans on following upwith her PCP on Monday. No further questions and concerns.  How are your symptoms? (Red Flag symptoms escalate to triage hotline per guidelines): Improved  Do you feel your condition is stable enough to be safe at home until your provider visit?: Yes  Does the patient have their discharge instructions? : Yes  Does the patient have questions regarding their discharge instructions? : No  Were you started on any new medications or were there changes to any of your previous medications? : Yes  Does the patient have all of their medications?: Yes  Do you have questions regarding any of your medications? : No  Do you have all of your needed medical supplies or equipment (DME)?  (i.e. oxygen tank, CPAP, cane, etc.): Yes  Discharge follow-up appointment scheduled within 14 calendar days? : Yes  Discharge Follow Up Appointment Date: 03/21/22  Discharge Follow Up Appointment Scheduled with?: Primary Care Provider              PLAN                                                       Outpatient Plan:  Follow up with Suleiman Miller MD (Internal Medicine); As needed, If symptoms worsen        Future Appointments   Date Time Provider Department Center   3/21/2022  9:30 AM [facility]   4/29/2022 10:00 AM [facility] Miller [facility]         For any urgent concerns, please contact our 24 hour nurse triage line: [phone] ([phone])         LUCERO Casillas  [phone]  [facility]

## 2022-03-21 ENCOUNTER — OFFICE VISIT (OUTPATIENT)
Dept: FAMILY MEDICINE | Facility: CLINIC | Age: 72
End: 2022-03-21
Payer: MEDICARE

## 2022-03-21 VITALS
WEIGHT: 141 LBS | HEART RATE: 76 BPM | SYSTOLIC BLOOD PRESSURE: 135 MMHG | OXYGEN SATURATION: 98 % | BODY MASS INDEX: 24.07 KG/M2 | TEMPERATURE: 97.1 F | DIASTOLIC BLOOD PRESSURE: 85 MMHG | RESPIRATION RATE: 20 BRPM | HEIGHT: 64 IN

## 2022-03-21 DIAGNOSIS — N18.4 CHRONIC KIDNEY DISEASE, STAGE 4 (SEVERE) (H): Primary | ICD-10-CM

## 2022-03-21 DIAGNOSIS — F31.9 BIPOLAR AFFECTIVE DISORDER, REMISSION STATUS UNSPECIFIED (H): ICD-10-CM

## 2022-03-21 DIAGNOSIS — E21.3 HYPERPARATHYROIDISM (H): ICD-10-CM

## 2022-03-21 DIAGNOSIS — E78.5 HYPERLIPIDEMIA LDL GOAL <130: ICD-10-CM

## 2022-03-21 DIAGNOSIS — I10 ESSENTIAL HYPERTENSION: ICD-10-CM

## 2022-03-21 PROCEDURE — 99214 OFFICE O/P EST MOD 30 MIN: CPT | Performed by: INTERNAL MEDICINE

## 2022-03-21 ASSESSMENT — PAIN SCALES - GENERAL: PAINLEVEL: NO PAIN (0)

## 2022-03-21 NOTE — PROGRESS NOTES
Subjective   Diana is a 71 year old who presents for the following health issues    HPI     Chief Complaint:     Follow up of multiple concerns including stage 4 Chronic kidney disease    HPI:   Patient Diana Santiago is a very pleasant 71 year old female with history of bipolar disorder who presents to Internal Medicine clinic today for follow up of multiple concerns including stage 4 Chronic kidney disease. Regarding the patient's stage 4 CKD, the patient is currently followed by nephrology clinic. No chest pain, headaches, fever or chills at this time.      Current Medications:     Current Outpatient Medications   Medication Sig Dispense Refill     ACE/ARB/ARNI NOT PRESCRIBED (INTENTIONAL) Please choose reason not prescribed from choices below.       amantadine (SYMMETREL) 50 MG/5ML solution Take 2.5 mLs by mouth daily.       amLODIPine (NORVASC) 5 MG tablet TAKE 1 TABLET DAILY 90 tablet 0     ARIPiprazole, sensor, 2 MG TABS Take 10 mg by mouth daily        fexofenadine (ALLEGRA) 180 MG tablet Take 1 tablet by mouth daily. 90 tablet 0     furosemide (LASIX) 20 MG tablet Take 1 tablet (20 mg) by mouth daily for 10 doses 10 tablet 0     Multiple Vitamins-Minerals (CENTRUM SILVER) per tablet Take 1 tablet by mouth daily.       Multiple Vitamins-Minerals (EYE VITAMINS & MINERALS PO) Take 1 tablet by mouth 2 times daily Focus Select       simvastatin (ZOCOR) 40 MG tablet TAKE 1 TABLET AT BEDTIME 90 tablet 0         Allergies:      Allergies   Allergen Reactions     No Known Allergies             Past Medical History:     Past Medical History:   Diagnosis Date     Benign essential hypertension 09/2018    added norvasc 9/18     Bipolar affective disorder (H)     hosp 1993, Dr. Aftab Deal     Chronic kidney disease, stage 3a (H)     seen by renal Dr. 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    felt due to lithium     Thalassaemia trait      Vitamin D deficiency          Past Surgical History:     Past Surgical History:   Procedure Laterality Date     BREAST SURGERY      lumpectomy x 4     left hand surgery      last 1974         Family Medical History:     Family History   Problem Relation Age of Onset     Hypertension Father      Sleep Apnea Brother      Breast Cancer Maternal Aunt         x 2     Breast Cancer Other         Maternal first cousin         Social History:     Social History     Socioeconomic History     Marital status: Single     Spouse name: Not on file     Number of children: 0     Years of education: Not on file     Highest education level: Not on file   Occupational History     Occupation: , retired   Tobacco Use     Smoking status: Never Smoker     Smokeless tobacco: Never Used   Substance and Sexual Activity     Alcohol use: No     Alcohol/week: 0.0 standard drinks     Drug use: No     Sexual activity: Yes     Partners: Male   Other Topics Concern     Not on file   Social History Narrative     Not on file     Social Determinants of Health     Financial Resource Strain: Not on file   Food Insecurity: Not on file   Transportation Needs: Not on file   Physical Activity: Not on file   Stress: Not on file   Social Connections: Not on file   Intimate Partner Violence: Not on file   Housing Stability: Not on file           Review of System:     Constitutional: Negative for fever or chills  Skin: Negative for rashes  Ears/Nose/Throat: Negative for nasal congestion, sore throat  Respiratory: No shortness of breath, dyspnea on exertion, cough, or hemoptysis  Cardiovascular: Negative for  "chest pain  Gastrointestinal: Negative for nausea, vomiting  Genitourinary: positive for CKD, stage 4  Musculoskeletal: Negative for myalgias  Neurologic: Negative for headaches  Psychiatric: positive for bipolar affective disorder  Hematologic/Lymphatic/Immunologic: positive for lymphedema  Endocrine: Negative  Behavioral: Negative for tobacco use       Physical Exam:   /85 (BP Location: Right arm, Patient Position: Sitting, Cuff Size: Adult Regular)   Pulse 76   Temp 97.1  F (36.2  C) (Temporal)   Resp 20   Ht 1.626 m (5' 4\")   Wt 64 kg (141 lb)   SpO2 98%   BMI 24.20 kg/m      GENERAL: chronically ill appearing older female, alert and no acute distress  EYES: eyes grossly normal to inspection, and conjunctivae and sclerae normal  HENT: Normocephalic atraumatic. Nose and mouth without ulcers or lesions  NECK: supple  RESP: lungs clear to auscultation   CV: regular rate and rhythm, normal S1 S2  LYMPH: bilateral lower extremity peripheral lymphedema symptoms present  ABDOMEN: nondistended  MS: no gross musculoskeletal defects noted  SKIN: no suspicious lesions or rashes  NEURO: Alert & Oriented x 3.   PSYCH: mentation appears normal, affect normal        Diagnostic Test Results:     Diagnostic Test Results:  Labs reviewed in Epic    ASSESSMENT/PLAN:       Diana was seen today for follow up.    Diagnoses and all orders for this visit:    Essential hypertension  Hyperparathyroidism (H)  Chronic kidney disease, stage 4 (severe) (H)  - continue nephrology clinic follow up going forward.    Bipolar affective disorder, remission status unspecified (H)  - stable, continue current therapy    Other orders  -     REVIEW OF HEALTH MAINTENANCE PROTOCOL ORDERS    Hyperlipidemia LDL goal <130  - stable, continue current therapy        Follow Up Plan:     Patient is instructed to return to Internal Medicine clinic for follow-up visit in 1 month.        Gayle Hernandez MD  Internal Medicine  [facility]    "

## 2022-04-15 ENCOUNTER — LAB (OUTPATIENT)
Dept: INFUSION THERAPY | Facility: CLINIC | Age: 72
End: 2022-04-15
Attending: INTERNAL MEDICINE
Payer: MEDICARE

## 2022-04-15 ENCOUNTER — LAB (OUTPATIENT)
Dept: INFUSION THERAPY | Facility: CLINIC | Age: 72
End: 2022-04-15
Attending: PSYCHOLOGIST
Payer: MEDICARE

## 2022-04-15 DIAGNOSIS — N18.9 ANEMIA IN CKD (CHRONIC KIDNEY DISEASE): Primary | ICD-10-CM

## 2022-04-15 DIAGNOSIS — D63.1 ANEMIA IN CKD (CHRONIC KIDNEY DISEASE): Primary | ICD-10-CM

## 2022-04-15 DIAGNOSIS — N18.32 STAGE 3B CHRONIC KIDNEY DISEASE (H): ICD-10-CM

## 2022-04-15 LAB
HCT VFR BLD AUTO: 36.5 % (ref 35–47)
HGB BLD-MCNC: 10.6 G/DL (ref 11.7–15.7)

## 2022-04-15 PROCEDURE — 36415 COLL VENOUS BLD VENIPUNCTURE: CPT | Performed by: INTERNAL MEDICINE

## 2022-04-15 PROCEDURE — 85014 HEMATOCRIT: CPT | Performed by: INTERNAL MEDICINE

## 2022-04-15 PROCEDURE — 85018 HEMOGLOBIN: CPT | Performed by: INTERNAL MEDICINE

## 2022-04-15 RX ORDER — ALBUTEROL SULFATE 0.83 MG/ML
2.5 SOLUTION RESPIRATORY (INHALATION)
Status: CANCELLED | OUTPATIENT
Start: 2022-07-01

## 2022-04-15 RX ORDER — METHYLPREDNISOLONE SODIUM SUCCINATE 125 MG/2ML
125 INJECTION, POWDER, LYOPHILIZED, FOR SOLUTION INTRAMUSCULAR; INTRAVENOUS
Status: CANCELLED
Start: 2022-07-01

## 2022-04-15 RX ORDER — EPINEPHRINE 1 MG/ML
0.3 INJECTION, SOLUTION INTRAMUSCULAR; SUBCUTANEOUS EVERY 5 MIN PRN
Status: CANCELLED | OUTPATIENT
Start: 2022-07-01

## 2022-04-15 RX ORDER — MEPERIDINE HYDROCHLORIDE 25 MG/ML
25 INJECTION INTRAMUSCULAR; INTRAVENOUS; SUBCUTANEOUS EVERY 30 MIN PRN
Status: CANCELLED | OUTPATIENT
Start: 2022-07-01

## 2022-04-15 RX ORDER — NALOXONE HYDROCHLORIDE 0.4 MG/ML
0.2 INJECTION, SOLUTION INTRAMUSCULAR; INTRAVENOUS; SUBCUTANEOUS
Status: CANCELLED | OUTPATIENT
Start: 2022-07-01

## 2022-04-15 RX ORDER — ALBUTEROL SULFATE 90 UG/1
1-2 AEROSOL, METERED RESPIRATORY (INHALATION)
Status: CANCELLED
Start: 2022-07-01

## 2022-04-15 RX ORDER — DIPHENHYDRAMINE HYDROCHLORIDE 50 MG/ML
50 INJECTION INTRAMUSCULAR; INTRAVENOUS
Status: CANCELLED
Start: 2022-07-01

## 2022-04-15 NOTE — PROGRESS NOTES
Infusion Nursing Note:  Diana ROPER Santiago presents today for poss aranesp.    Patient seen by provider today: No   present during visit today: Not Applicable.    Note: N/A.      Intravenous Access:  No Intravenous access/labs at this visit.    Treatment Conditions:  Lab Results   Component Value Date    HGB 10.6 (L) 04/15/2022    WBC 5.6 03/17/2022    ANEU 6.5 12/21/2020    ANEUTAUTO 3.4 03/17/2022     03/17/2022      Results reviewed, labs did NOT meet treatment parameters.         Eileen Avalos RN

## 2022-04-15 NOTE — PROGRESS NOTES
Medical Assistant Note:  Diana Santiago presents today for blood draw.    Patient seen by provider today: No.   present during visit today: Not Applicable.    Concerns: No Concerns.    Procedure:  Lab draw site: right hand, Needle type: bf, Gauge: 23.    Post Assessment:  Labs drawn without difficulty: Yes.    Discharge Plan:  Departure Mode: Ambulatory.    Face to Face Time: 5 min.    Shayy Hoang, CMA

## 2022-04-22 ENCOUNTER — OFFICE VISIT (OUTPATIENT)
Dept: FAMILY MEDICINE | Facility: CLINIC | Age: 72
End: 2022-04-22
Payer: MEDICARE

## 2022-04-22 VITALS
OXYGEN SATURATION: 96 % | BODY MASS INDEX: 22.53 KG/M2 | DIASTOLIC BLOOD PRESSURE: 86 MMHG | HEIGHT: 64 IN | WEIGHT: 132 LBS | RESPIRATION RATE: 18 BRPM | HEART RATE: 83 BPM | TEMPERATURE: 97.1 F | SYSTOLIC BLOOD PRESSURE: 131 MMHG

## 2022-04-22 DIAGNOSIS — Z12.11 SCREEN FOR COLON CANCER: ICD-10-CM

## 2022-04-22 DIAGNOSIS — I10 BENIGN ESSENTIAL HYPERTENSION: ICD-10-CM

## 2022-04-22 DIAGNOSIS — F31.9 BIPOLAR AFFECTIVE DISORDER, REMISSION STATUS UNSPECIFIED (H): ICD-10-CM

## 2022-04-22 DIAGNOSIS — Z76.0 ENCOUNTER FOR MEDICATION REFILL: Primary | ICD-10-CM

## 2022-04-22 DIAGNOSIS — E78.1 HYPERTRIGLYCERIDEMIA: ICD-10-CM

## 2022-04-22 DIAGNOSIS — N18.4 CHRONIC KIDNEY DISEASE, STAGE 4 (SEVERE) (H): ICD-10-CM

## 2022-04-22 PROCEDURE — 99214 OFFICE O/P EST MOD 30 MIN: CPT | Performed by: INTERNAL MEDICINE

## 2022-04-22 RX ORDER — SIMVASTATIN 40 MG
TABLET ORAL
Qty: 90 TABLET | Refills: 3 | Status: SHIPPED | OUTPATIENT
Start: 2022-04-22 | End: 2023-04-18

## 2022-04-22 RX ORDER — AMLODIPINE BESYLATE 5 MG/1
5 TABLET ORAL DAILY
Qty: 90 TABLET | Refills: 3 | Status: ON HOLD | OUTPATIENT
Start: 2022-04-22 | End: 2023-02-15

## 2022-04-22 ASSESSMENT — PAIN SCALES - GENERAL: PAINLEVEL: NO PAIN (0)

## 2022-04-22 NOTE — PROGRESS NOTES
Leigh Ortiz is a 71 year old who presents for the following health issues  accompanied by her son.      Chief Complaint:   Medication refills  Follow up of multiple concerns including stage 4 Chronic kidney disease    History of Present Illness       CKD: She is not using over the counter pain medicine.     She eats 4 or more servings of fruits and vegetables daily.She consumes 0 sweetened beverage(s) daily.She exercises with enough effort to increase her heart rate 20 to 29 minutes per day.  She exercises with enough effort to increase her heart rate 6 days per week.   She is taking medications regularly.         Current Medications:     Current Outpatient Medications   Medication Sig Dispense Refill     amantadine (SYMMETREL) 50 MG/5ML syrup Take 2.5 mLs (25 mg) by mouth daily Take 2.5 mLs by mouth daily. 473 mL 11     amLODIPine (NORVASC) 5 MG tablet Take 1 tablet (5 mg) by mouth daily 90 tablet 3     simvastatin (ZOCOR) 40 MG tablet TAKE 1 TABLET AT BEDTIME 90 tablet 3     ACE/ARB/ARNI NOT PRESCRIBED (INTENTIONAL) Please choose reason not prescribed from choices below.       fexofenadine (ALLEGRA) 180 MG tablet Take 1 tablet by mouth daily. 90 tablet 0         Allergies:      Allergies   Allergen Reactions     No Known Allergies             Past Medical History:     Past Medical History:   Diagnosis Date     Benign essential hypertension 09/2018    added norvasc 9/18     Bipolar affective disorder (H)     hosp 1993, Dr. Aftab Deal     Cancer (H) 1996    DCIS, left breast     Chronic kidney disease, stage 3a (H)     seen by renal Dr. 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    felt due to lithium     Thalassaemia trait      Vitamin D deficiency          Past Surgical History:     Past Surgical History:   Procedure Laterality Date     BIOPSY  1994    left breast     BREAST SURGERY      lumpectomy x 4     COLONOSCOPY  2021     EYE SURGERY  2018    retina tear     left hand surgery      last 1974         Family Medical History:     Family History   Problem Relation Age of Onset     Cerebrovascular Disease Mother      Hypertension Father      Sleep Apnea Brother      Breast Cancer Maternal Aunt         x 2     Breast Cancer Other         Maternal Aunt     Hyperlipidemia Niece          Social History:     Social History     Socioeconomic History     Marital status: Single     Spouse name: Not on file     Number of children: 0     Years of education: Not on file     Highest education level: Not on file   Occupational History     Occupation: , retired   Tobacco Use     Smoking status: Never Smoker     Smokeless tobacco: Never Used   Substance and Sexual Activity     Alcohol use: No     Drug use: No     Sexual activity: Not Currently     Partners: Male     Birth control/protection: Post-menopausal, None   Other Topics Concern     Parent/sibling w/ CABG, MI or angioplasty before 65F 55M? No   Social History Narrative     Not on file     Social Determinants of Health     Financial Resource Strain: Not on file   Food Insecurity: Not on file   Transportation Needs: Not on file   Physical Activity: Not on file   Stress: Not on file   Social Connections: Not on file   Intimate Partner Violence: Not on file   Housing Stability: Not on file           Review of System:     Constitutional: Negative for fever or chills  Skin: Negative for rashes  Ears/Nose/Throat: Negative for nasal congestion, sore throat  Respiratory: No shortness  "of breath, dyspnea on exertion, cough, or hemoptysis  Cardiovascular: Negative for chest pain  Gastrointestinal: Negative for nausea, vomiting  Genitourinary: positive for CKD, stage 4  Musculoskeletal: Negative for myalgias  Neurologic: Negative for headaches  Psychiatric: positive for bipolar affective disorder  Hematologic/Lymphatic/Immunologic: positive for lymphedema  Endocrine: Negative  Behavioral: Negative for tobacco use       Physical Exam:   /86 (BP Location: Right arm, Patient Position: Sitting, Cuff Size: Adult Regular)   Pulse 83   Temp 97.1  F (36.2  C) (Temporal)   Resp 18   Ht 1.626 m (5' 4\")   Wt 59.9 kg (132 lb)   SpO2 96%   BMI 22.66 kg/m      GENERAL: chronically ill appearing older female, alert and no acute distress  EYES: eyes grossly normal to inspection, and conjunctivae and sclerae normal  HENT: Normocephalic atraumatic. Nose and mouth without ulcers or lesions  NECK: supple  RESP: lungs clear to auscultation   CV: regular rate and rhythm, normal S1 S2  LYMPH: bilateral lower extremity peripheral lymphedema symptoms present  ABDOMEN: nondistended  MS: no gross musculoskeletal defects noted  SKIN: no suspicious lesions or rashes  NEURO: Alert & Oriented x 3.   PSYCH: mentation appears normal, affect normal        Diagnostic Test Results:     Diagnostic Test Results:  Labs reviewed in Epic    ASSESSMENT/PLAN:       Diana was seen today for follow up.    Diagnoses and all orders for this visit:    Encounter for medication refill  Benign essential hypertension  -     amLODIPine (NORVASC) 5 MG tablet; Take 1 tablet (5 mg) by mouth daily    Hypertriglyceridemia  -     simvastatin (ZOCOR) 40 MG tablet; TAKE 1 TABLET AT BEDTIME    Bipolar affective disorder, remission status unspecified (H)  -     amantadine (SYMMETREL) 50 MG/5ML syrup; Take 2.5 mLs (25 mg) by mouth daily Take 2.5 mLs by mouth daily.    Chronic kidney disease, stage 4 (severe) (H)  - continue nephrology clinic follow " up going forward.    Screen for colon cancer  -     Adult Gastro Ref - Procedure Only; Future      Follow Up Plan:     Patient is instructed to return to Internal Medicine clinic for follow-up visit in 1 month.        Gayle Hernandez MD  Internal Medicine  [facility]

## 2022-04-27 ENCOUNTER — TELEPHONE (OUTPATIENT)
Dept: GASTROENTEROLOGY | Facility: CLINIC | Age: 72
End: 2022-04-27
Payer: MEDICARE

## 2022-04-27 NOTE — TELEPHONE ENCOUNTER
Screening Questions  BlueKIND OF PREP RedLOCATION [review exclusion criteria] GreenSEDATION TYPE  1. Have you had a positive covid test in the last 90 days? N     2. Do you have a legal guardian or medical Power of ?  Are you able to give consent for your medical care?Yes (Sedation review/consideration needed)    3. Are you active on mychart? Yes    4. What insurance is in the chart? BCBS/Medicare     3.   Ordering/Referring Provider: Mary    4. BMI 22.7 [BMI OVER 40-EXTENDED PREP]  If greater than 40 review exclusion criteria [PAC APPT IF @ [facility]]        5.  Respiratory Screening :  [If yes to any of the following HOSPITAL setting only]     Do you use daily home oxygen? N    Do you have mod to severe Obstructive Sleep Apnea? N  [facility]]   Do you have Pulmonary Hypertension? N     Do you have UNCONTROLLED asthma? N        6.   Have you had a heart or lung transplant? N      7.   Are you currently on dialysis? N [ If yes, G-PREP & HOSPITAL setting only]     8.   Do you have chronic kidney disease? Yes [ If yes, G-PREP ]    9.   Have you had a stroke or Transient ischemic attack (TIA - aka  mini stroke ) within 6 months?  N (If yes, please review exclusion criteria)    10.   In the past 6 months, have you had any heart related issues including cardiomyopathy or heart attack? N           If yes, did it require cardiac stenting or other implantable device? N      11.   Do you have any implantable devices in your body (pacemaker, defib, LVAD)? N (If yes, please review exclusion criteria)    12.   Do you take nitroglycerin? N           If yes, how often? NA  (if yes, HOSPITAL setting ONLY)    13.   Are you currently taking any blood thinners? N           [IF YES, INFORM PATIENT TO FOLLOW UP W/ ORDERING PROVIDER FOR BRIDGING INSTRUCTIONS]     14.   Do you have a diagnosis of diabetes? No-but does have diabetes insipidus   [ If yes, G-PREP ]    15.   [FEMALES] Are you currently pregnant?  NA  If yes, how many weeks? NA    16.   Are you taking any prescription pain medications on a routine schedule?  N [ If yes, EXTENDED PREP.] [If yes, MAC]    17.   Do you have any chemical dependencies such as alcohol, street drugs, or methadone?  N [If yes, MAC]    18.   Do you have any history of post-traumatic stress syndrome, severe anxiety or history of psychosis?  N  [If yes, MAC]    19.   Do you transfer independently?  Yes- but does use a walker and may need some help transferring    20.  On a regular basis do you go 3-5 days between bowel movements? N  [ If yes, EXTENDED PREP.]    21.   Preferred LOCAL Pharmacy for Pre Prescription        [id number] [place] [address]      Scheduling Details      Caller : Diana  (Please ask for phone number if not scheduled by patient)    Type of Procedure Scheduled: Colon  Which Colonoscopy Prep was Sent?: Alyse RODRIGEZ CF PATIENTS & GROEN'S PATIENTS NEEDS EXTENDED PREP  Surgeon: Riccardo  Date of Procedure: 6/17/22  Location:       Sedation Type: CS  Conscious Sedation- Needs  for 6 hours after the procedure  MAC/General-Needs  for 24 hours after procedure    Pre-op Required at [facility], [place], [place] and OR for MAC sedation: NA  (advise patient they will need a pre-op prior to procedure -)      Informed patient they will need an adult  Yes  Cannot take any type of public or medical transportation alone    Pre-Procedure Covid test to be completed at [facility] or Externally: [place] 6/13/22    Confirmed Nurse will call to complete assessment Yes    Additional comments:

## 2022-05-13 ENCOUNTER — LAB (OUTPATIENT)
Dept: INFUSION THERAPY | Facility: CLINIC | Age: 72
End: 2022-05-13
Attending: INTERNAL MEDICINE
Payer: MEDICARE

## 2022-05-13 DIAGNOSIS — N18.9 ANEMIA IN CKD (CHRONIC KIDNEY DISEASE): Primary | ICD-10-CM

## 2022-05-13 DIAGNOSIS — N18.32 STAGE 3B CHRONIC KIDNEY DISEASE (H): ICD-10-CM

## 2022-05-13 DIAGNOSIS — D63.1 ANEMIA IN CKD (CHRONIC KIDNEY DISEASE): Primary | ICD-10-CM

## 2022-05-13 LAB
FERRITIN SERPL-MCNC: 51 NG/ML (ref 8–252)
HCT VFR BLD AUTO: 35.2 % (ref 35–47)
HGB BLD-MCNC: 10.4 G/DL (ref 11.7–15.7)
IRON SATN MFR SERPL: 19 % (ref 15–46)
IRON SERPL-MCNC: 62 UG/DL (ref 35–180)
TIBC SERPL-MCNC: 327 UG/DL (ref 240–430)

## 2022-05-13 PROCEDURE — 82728 ASSAY OF FERRITIN: CPT | Performed by: INTERNAL MEDICINE

## 2022-05-13 PROCEDURE — 83550 IRON BINDING TEST: CPT | Performed by: INTERNAL MEDICINE

## 2022-05-13 PROCEDURE — 85018 HEMOGLOBIN: CPT | Performed by: INTERNAL MEDICINE

## 2022-05-13 PROCEDURE — 85014 HEMATOCRIT: CPT | Performed by: INTERNAL MEDICINE

## 2022-05-13 PROCEDURE — 36415 COLL VENOUS BLD VENIPUNCTURE: CPT | Performed by: INTERNAL MEDICINE

## 2022-05-13 RX ORDER — NALOXONE HYDROCHLORIDE 0.4 MG/ML
0.2 INJECTION, SOLUTION INTRAMUSCULAR; INTRAVENOUS; SUBCUTANEOUS
Status: CANCELLED | OUTPATIENT
Start: 2022-07-01

## 2022-05-13 RX ORDER — ALBUTEROL SULFATE 90 UG/1
1-2 AEROSOL, METERED RESPIRATORY (INHALATION)
Status: CANCELLED
Start: 2022-07-01

## 2022-05-13 RX ORDER — METHYLPREDNISOLONE SODIUM SUCCINATE 125 MG/2ML
125 INJECTION, POWDER, LYOPHILIZED, FOR SOLUTION INTRAMUSCULAR; INTRAVENOUS
Status: CANCELLED
Start: 2022-07-01

## 2022-05-13 RX ORDER — MEPERIDINE HYDROCHLORIDE 25 MG/ML
25 INJECTION INTRAMUSCULAR; INTRAVENOUS; SUBCUTANEOUS EVERY 30 MIN PRN
Status: CANCELLED | OUTPATIENT
Start: 2022-07-01

## 2022-05-13 RX ORDER — EPINEPHRINE 1 MG/ML
0.3 INJECTION, SOLUTION INTRAMUSCULAR; SUBCUTANEOUS EVERY 5 MIN PRN
Status: CANCELLED | OUTPATIENT
Start: 2022-07-01

## 2022-05-13 RX ORDER — DIPHENHYDRAMINE HYDROCHLORIDE 50 MG/ML
50 INJECTION INTRAMUSCULAR; INTRAVENOUS
Status: CANCELLED
Start: 2022-07-01

## 2022-05-13 RX ORDER — ALBUTEROL SULFATE 0.83 MG/ML
2.5 SOLUTION RESPIRATORY (INHALATION)
Status: CANCELLED | OUTPATIENT
Start: 2022-07-01

## 2022-05-13 NOTE — PROGRESS NOTES
Medical Assistant Note:  Diana Santiago presents today for blood draw.    Patient seen by provider today: No.   present during visit today: Not Applicable.    Concerns: No Concerns.    Procedure:  Lab draw site: Right Hand, Needle type: BF, Gauge: 23g.    Post Assessment:  Labs drawn without difficulty: Yes.    Discharge Plan:  Departure Mode: Ambulatory.    Face to Face Time: 5.    Rachana Montanez, CMA

## 2022-06-10 ENCOUNTER — ALLIED HEALTH/NURSE VISIT (OUTPATIENT)
Dept: INFUSION THERAPY | Facility: CLINIC | Age: 72
End: 2022-06-10
Attending: NURSE PRACTITIONER
Payer: MEDICARE

## 2022-06-10 VITALS
DIASTOLIC BLOOD PRESSURE: 92 MMHG | HEART RATE: 76 BPM | TEMPERATURE: 98.3 F | RESPIRATION RATE: 18 BRPM | SYSTOLIC BLOOD PRESSURE: 141 MMHG

## 2022-06-10 DIAGNOSIS — N18.32 STAGE 3B CHRONIC KIDNEY DISEASE (H): ICD-10-CM

## 2022-06-10 DIAGNOSIS — D63.1 ANEMIA IN CKD (CHRONIC KIDNEY DISEASE): Primary | ICD-10-CM

## 2022-06-10 DIAGNOSIS — N18.9 ANEMIA IN CKD (CHRONIC KIDNEY DISEASE): Primary | ICD-10-CM

## 2022-06-10 LAB
HCT VFR BLD AUTO: 33 % (ref 35–47)
HGB BLD-MCNC: 9.8 G/DL (ref 11.7–15.7)

## 2022-06-10 PROCEDURE — 85018 HEMOGLOBIN: CPT | Performed by: INTERNAL MEDICINE

## 2022-06-10 PROCEDURE — 36415 COLL VENOUS BLD VENIPUNCTURE: CPT | Performed by: INTERNAL MEDICINE

## 2022-06-10 PROCEDURE — 96372 THER/PROPH/DIAG INJ SC/IM: CPT | Performed by: INTERNAL MEDICINE

## 2022-06-10 PROCEDURE — 250N000011 HC RX IP 250 OP 636: Performed by: INTERNAL MEDICINE

## 2022-06-10 PROCEDURE — 85014 HEMATOCRIT: CPT | Performed by: INTERNAL MEDICINE

## 2022-06-10 RX ORDER — NALOXONE HYDROCHLORIDE 0.4 MG/ML
0.2 INJECTION, SOLUTION INTRAMUSCULAR; INTRAVENOUS; SUBCUTANEOUS
Status: CANCELLED | OUTPATIENT
Start: 2022-07-01

## 2022-06-10 RX ORDER — EPINEPHRINE 1 MG/ML
0.3 INJECTION, SOLUTION INTRAMUSCULAR; SUBCUTANEOUS EVERY 5 MIN PRN
Status: CANCELLED | OUTPATIENT
Start: 2022-07-01

## 2022-06-10 RX ORDER — ALBUTEROL SULFATE 90 UG/1
1-2 AEROSOL, METERED RESPIRATORY (INHALATION)
Status: CANCELLED
Start: 2022-07-01

## 2022-06-10 RX ORDER — ALBUTEROL SULFATE 0.83 MG/ML
2.5 SOLUTION RESPIRATORY (INHALATION)
Status: CANCELLED | OUTPATIENT
Start: 2022-07-01

## 2022-06-10 RX ORDER — HALOPERIDOL 2 MG/1
TABLET ORAL
Status: ON HOLD | COMMUNITY
Start: 2022-05-24 | End: 2022-09-14

## 2022-06-10 RX ORDER — DIPHENHYDRAMINE HYDROCHLORIDE 50 MG/ML
50 INJECTION INTRAMUSCULAR; INTRAVENOUS
Status: CANCELLED
Start: 2022-07-01

## 2022-06-10 RX ORDER — METHYLPREDNISOLONE SODIUM SUCCINATE 125 MG/2ML
125 INJECTION, POWDER, LYOPHILIZED, FOR SOLUTION INTRAMUSCULAR; INTRAVENOUS
Status: CANCELLED
Start: 2022-07-01

## 2022-06-10 RX ORDER — MEPERIDINE HYDROCHLORIDE 25 MG/ML
25 INJECTION INTRAMUSCULAR; INTRAVENOUS; SUBCUTANEOUS EVERY 30 MIN PRN
Status: CANCELLED | OUTPATIENT
Start: 2022-07-01

## 2022-06-10 RX ORDER — LAMOTRIGINE 100 MG/1
200 TABLET ORAL DAILY
Status: ON HOLD | COMMUNITY
Start: 2022-05-24 | End: 2023-02-15

## 2022-06-10 RX ADMIN — DARBEPOETIN ALFA 100 MCG: 100 INJECTION, SOLUTION INTRAVENOUS; SUBCUTANEOUS at 11:57

## 2022-06-10 ASSESSMENT — PAIN SCALES - GENERAL: PAINLEVEL: NO PAIN (0)

## 2022-06-10 NOTE — PROGRESS NOTES
Nursing Note:  Diana Santiago presents today for aranesp.    Patient seen by provider today: No   present during visit today: Not Applicable.    Note: N/A.    Intravenous Access:  No Intravenous access/labs at this visit.    Discharge Plan:   Patient was discharged    Hortensia Cho RN

## 2022-06-13 ENCOUNTER — LAB (OUTPATIENT)
Dept: URGENT CARE | Facility: URGENT CARE | Age: 72
End: 2022-06-13
Payer: MEDICARE

## 2022-06-13 DIAGNOSIS — Z20.822 ENCOUNTER FOR LABORATORY TESTING FOR COVID-19 VIRUS: ICD-10-CM

## 2022-06-13 PROCEDURE — U0003 INFECTIOUS AGENT DETECTION BY NUCLEIC ACID (DNA OR RNA); SEVERE ACUTE RESPIRATORY SYNDROME CORONAVIRUS 2 (SARS-COV-2) (CORONAVIRUS DISEASE [COVID-19]), AMPLIFIED PROBE TECHNIQUE, MAKING USE OF HIGH THROUGHPUT TECHNOLOGIES AS DESCRIBED BY CMS-2020-01-R: HCPCS

## 2022-06-13 PROCEDURE — U0005 INFEC AGEN DETEC AMPLI PROBE: HCPCS

## 2022-06-14 LAB — SARS-COV-2 RNA RESP QL NAA+PROBE: NEGATIVE

## 2022-06-17 ENCOUNTER — HOSPITAL ENCOUNTER (OUTPATIENT)
Facility: CLINIC | Age: 72
Discharge: HOME | End: 2022-06-17
Attending: INTERNAL MEDICINE | Admitting: INTERNAL MEDICINE
Payer: MEDICARE

## 2022-06-17 VITALS
OXYGEN SATURATION: 88 % | HEART RATE: 66 BPM | DIASTOLIC BLOOD PRESSURE: 85 MMHG | SYSTOLIC BLOOD PRESSURE: 129 MMHG | RESPIRATION RATE: 11 BRPM

## 2022-06-17 DIAGNOSIS — Z12.11 ENCOUNTER FOR SCREENING COLONOSCOPY FOR NON-HIGH-RISK PATIENT: Primary | ICD-10-CM

## 2022-06-17 LAB — COLONOSCOPY: NORMAL

## 2022-06-17 PROCEDURE — 99153 MOD SED SAME PHYS/QHP EA: CPT | Performed by: INTERNAL MEDICINE

## 2022-06-17 PROCEDURE — 88305 TISSUE EXAM BY PATHOLOGIST: CPT | Mod: TC | Performed by: INTERNAL MEDICINE

## 2022-06-17 PROCEDURE — 250N000011 HC RX IP 250 OP 636: Performed by: INTERNAL MEDICINE

## 2022-06-17 PROCEDURE — G0500 MOD SEDAT ENDO SERVICE >5YRS: HCPCS | Performed by: INTERNAL MEDICINE

## 2022-06-17 PROCEDURE — 45380 COLONOSCOPY AND BIOPSY: CPT | Mod: PT | Performed by: INTERNAL MEDICINE

## 2022-06-17 RX ORDER — NALOXONE HYDROCHLORIDE 0.4 MG/ML
0.2 INJECTION, SOLUTION INTRAMUSCULAR; INTRAVENOUS; SUBCUTANEOUS
Status: CANCELLED | OUTPATIENT
Start: 2022-06-17

## 2022-06-17 RX ORDER — FLUMAZENIL 0.1 MG/ML
0.2 INJECTION, SOLUTION INTRAVENOUS
Status: DISCONTINUED | OUTPATIENT
Start: 2022-06-17 | End: 2022-06-17 | Stop reason: HOSPADM

## 2022-06-17 RX ORDER — DIPHENHYDRAMINE HYDROCHLORIDE 50 MG/ML
25-50 INJECTION INTRAMUSCULAR; INTRAVENOUS
Status: DISCONTINUED | OUTPATIENT
Start: 2022-06-17 | End: 2022-06-17 | Stop reason: HOSPADM

## 2022-06-17 RX ORDER — NALOXONE HYDROCHLORIDE 0.4 MG/ML
0.4 INJECTION, SOLUTION INTRAMUSCULAR; INTRAVENOUS; SUBCUTANEOUS
Status: CANCELLED | OUTPATIENT
Start: 2022-06-17

## 2022-06-17 RX ORDER — ONDANSETRON 2 MG/ML
4 INJECTION INTRAMUSCULAR; INTRAVENOUS EVERY 6 HOURS PRN
Status: CANCELLED | OUTPATIENT
Start: 2022-06-17

## 2022-06-17 RX ORDER — NALOXONE HYDROCHLORIDE 0.4 MG/ML
0.2 INJECTION, SOLUTION INTRAMUSCULAR; INTRAVENOUS; SUBCUTANEOUS
Status: DISCONTINUED | OUTPATIENT
Start: 2022-06-17 | End: 2022-06-17 | Stop reason: HOSPADM

## 2022-06-17 RX ORDER — PROCHLORPERAZINE MALEATE 5 MG
5 TABLET ORAL EVERY 6 HOURS PRN
Status: CANCELLED | OUTPATIENT
Start: 2022-06-17

## 2022-06-17 RX ORDER — EPINEPHRINE 1 MG/ML
0.1 INJECTION, SOLUTION, CONCENTRATE INTRAVENOUS
Status: DISCONTINUED | OUTPATIENT
Start: 2022-06-17 | End: 2022-06-17 | Stop reason: HOSPADM

## 2022-06-17 RX ORDER — ONDANSETRON 4 MG/1
4 TABLET, ORALLY DISINTEGRATING ORAL EVERY 6 HOURS PRN
Status: CANCELLED | OUTPATIENT
Start: 2022-06-17

## 2022-06-17 RX ORDER — SIMETHICONE 40MG/0.6ML
133 SUSPENSION, DROPS(FINAL DOSAGE FORM)(ML) ORAL
Status: DISCONTINUED | OUTPATIENT
Start: 2022-06-17 | End: 2022-06-17 | Stop reason: HOSPADM

## 2022-06-17 RX ORDER — ATROPINE SULFATE 0.1 MG/ML
1 INJECTION INTRAVENOUS
Status: DISCONTINUED | OUTPATIENT
Start: 2022-06-17 | End: 2022-06-17 | Stop reason: HOSPADM

## 2022-06-17 RX ORDER — FENTANYL CITRATE 50 UG/ML
INJECTION, SOLUTION INTRAMUSCULAR; INTRAVENOUS PRN
Status: COMPLETED | OUTPATIENT
Start: 2022-06-17 | End: 2022-06-17

## 2022-06-17 RX ORDER — FENTANYL CITRATE 50 UG/ML
50-100 INJECTION, SOLUTION INTRAMUSCULAR; INTRAVENOUS EVERY 5 MIN PRN
Status: DISCONTINUED | OUTPATIENT
Start: 2022-06-17 | End: 2022-06-17 | Stop reason: HOSPADM

## 2022-06-17 RX ORDER — ONDANSETRON 2 MG/ML
4 INJECTION INTRAMUSCULAR; INTRAVENOUS
Status: CANCELLED | OUTPATIENT
Start: 2022-06-17

## 2022-06-17 RX ORDER — LIDOCAINE 40 MG/G
CREAM TOPICAL
Status: CANCELLED | OUTPATIENT
Start: 2022-06-17

## 2022-06-17 RX ORDER — NALOXONE HYDROCHLORIDE 0.4 MG/ML
0.4 INJECTION, SOLUTION INTRAMUSCULAR; INTRAVENOUS; SUBCUTANEOUS
Status: DISCONTINUED | OUTPATIENT
Start: 2022-06-17 | End: 2022-06-17 | Stop reason: HOSPADM

## 2022-06-17 RX ORDER — FLUMAZENIL 0.1 MG/ML
0.2 INJECTION, SOLUTION INTRAVENOUS
Status: CANCELLED | OUTPATIENT
Start: 2022-06-17 | End: 2022-06-17

## 2022-06-17 RX ADMIN — MIDAZOLAM 2 MG: 1 INJECTION INTRAMUSCULAR; INTRAVENOUS at 10:30

## 2022-06-17 RX ADMIN — FENTANYL CITRATE 50 MCG: 50 INJECTION, SOLUTION INTRAMUSCULAR; INTRAVENOUS at 10:30

## 2022-06-17 RX ADMIN — MIDAZOLAM 2 MG: 1 INJECTION INTRAMUSCULAR; INTRAVENOUS at 10:37

## 2022-06-17 NOTE — H&P
[facility] Anesthesia Pre-op History and Physical    Diana Santiago MRN# [id number]   Age: 71 year old YOB: 1950      Date of Surgery: today Location [facility]      Date of Exam 6/17/2022 Facility (Same day)       Home clinic: unknown  Primary care provider: Gayle Hernandez         Chief Complaint and/or Reason for Procedure:   No chief complaint on file.           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     Priority: Medium     felt due to lithium       Chronic kidney disease, stage 3a 10/18/2019     Priority: Medium     Benign essential hypertension 09/01/2018     Priority: Medium     added norvasc 9/18       Hypercalcemia 08/01/2017     Priority: Medium     Hyperparathyroidism (H) 08/01/2017     Priority: Medium     Vitamin D deficiency      Priority: Medium     Hyperlipidemia LDL goal <130 07/16/2013     Priority: Medium     Elevated blood sugar      Priority: Medium     DCIS (ductal carcinoma in situ)      Priority: Medium     xrt and lumpectomy x 4       Heterozygous thalassemia      Priority: Medium     Diagnosis updated by automated process. Provider to review and confirm.       Bipolar affective disorder (H)      Priority: Medium     hosp 1993       Advance Care Planning 06/20/2012     Priority: Medium     Advance Care Planning 7/31/2015: Receipt of ACP document:  Received: Health Care Directive which was witnessed or notarized on 10-.  Document  not previously scanned.  Validation form completed and sent with document to be scanned.  Code Status needs to be updated to reflect choices in most recent ACP document. Notification sent to Dr. Miller for followup.  Confirmed/documented designated decision maker(s).  Added by Geraldine Mak                    Medications (include herbals and vitamins):   Any Plavix use in the last 7 days? No     Current Facility-Administered Medications   Medication     0.9% sodium chloride BOLUS     atropine injection 1 mg     benzocaine 20% (HURRICAINE/TOPEX) 20 % spray 0.5 mL     diphenhydrAMINE (BENADRYL) injection 25-50 mg     EPINEPHrine PF (ADRENALIN) injection 0.1 mg     fentaNYL (PF) (SUBLIMAZE) injection  mcg     flumazenil (ROMAZICON) injection 0.2 mg     glucagon injection 0.5 mg     midazolam (VERSED) injection 0.5-2 mg     naloxone (NARCAN) injection 0.2 mg    Or     naloxone (NARCAN) injection 0.4 mg    Or     naloxone (NARCAN) injection 0.2 mg    Or     naloxone (NARCAN) injection 0.4 mg     simethicone (MYLICON) suspension 133 mg     sodium chloride (PF) 0.9% PF flush 3 mL             Allergies:      Allergies   Allergen Reactions     No Known Allergies      Allergy to Latex? No  Allergy to tape?   No  Intolerances: NKDA            Physical Exam:   All vitals have been reviewed  Patient Vitals for the past 8 hrs:   BP Pulse Resp SpO2   06/17/22 1001 123/74 76 16 98 %     No intake/output data recorded.  Airway assessment:   Patient is able to open mouth wide  Patient is able to stick out tongue}              Lab / Radiology Results:             Anesthetic risk and/or ASA classification:       Panchito Gu MD

## 2022-06-22 LAB
PATH REPORT.COMMENTS IMP SPEC: NORMAL
PATH REPORT.COMMENTS IMP SPEC: NORMAL
PATH REPORT.FINAL DX SPEC: NORMAL
PATH REPORT.GROSS SPEC: NORMAL
PATH REPORT.MICROSCOPIC SPEC OTHER STN: NORMAL
PATH REPORT.RELEVANT HX SPEC: NORMAL
PHOTO IMAGE: NORMAL

## 2022-06-22 PROCEDURE — 88305 TISSUE EXAM BY PATHOLOGIST: CPT | Mod: 26

## 2022-07-05 ENCOUNTER — NURSE TRIAGE (OUTPATIENT)
Dept: FAMILY MEDICINE | Facility: CLINIC | Age: 72
End: 2022-07-05

## 2022-07-05 NOTE — TELEPHONE ENCOUNTER
"Nurse Triage SBAR    Is this a 2nd Level Triage? YES, LICENSED PRACTITIONER REVIEW IS REQUIRED    Situation:   Pt twisted right ankle 2 weeks ago 6/22/22.   Reporting pain 4/10 with limited range of motion in her toes; able to move them up, not down.   Baseline neuropathy- pt reported.  No bruising, redness noted.  Able to bear weight.     Background:   pt getting out of vehicle, walking from the drivers side to car door to the drivers side passenger door and twisted ankle.     Assessment:   Pt to be seen for eval of ankel injury, swelling, limited range of motion to toes.     Protocol Recommended Disposition:   See Today In Office    Recommendation:   Pt agrees to be seen in UC: no office appointments available today.      Routed to provider    Does the patient meet one of the following criteria for ADS visit consideration? 16+ years old, with an [facility] PCP     TIP  Providers, please consider if this condition is appropriate for management at one of our Acute and Diagnostic Services sites.     If patient is a good candidate, please use dotphrase <dot>triageresponse and select Refer to ADS to document.      1. MECHANISM: \"How did the injury happen?\" (e.g., twisting injury, direct blow)       She was getting out of car, walking from front to back door. Twisted right ankle. Swollen about the size palm, hasn't.   2. ONSET: \"When did the injury happen?\" (Minutes or hours ago)       2 weeks ago  3. LOCATION: \"Where is the injury located?\"       [employer], parking  4. APPEARANCE of INJURY: \"What does the injury look like?\"       Couldn't raise toes, move toes down, can bare weight, baseline neuropathy (pt reported). Swollen. No bruising, no warmth, no redness.   5. WEIGHT-BEARING: \"Can you put weight on that foot?\" \"Can you walk (four steps or more)?\"        Yes, walker at baseline  6. SIZE: For cuts, bruises, or swelling, ask: \"How large is it?\" (e.g., inches or centimeters;  entire joint)       Distal ankle, " "difficult to get socks, shoes are fitting ok. Swelling is limiting range of motion.   7. PAIN: \"Is there pain?\" If so, ask: \"How bad is the pain?\"    (e.g., Scale 1-10; or mild, moderate, severe)      4/10: no PO management, ice applied: no improvement.  8. TETANUS: For any breaks in the skin, ask: \"When was the last tetanus booster?\"      No,   9. OTHER SYMPTOMS: \"Do you have any other symptoms?\"       Cant move toes up, toes not swollen, only ankel.   10. PREGNANCY: \"Is there any chance you are pregnant?\" \"When was your last menstrual period?\"                 Can we leave a detailed message on this number? YES  Phone number patient can be reached at: Home number on file [phone] (home)        Reason for Disposition    Large swelling or bruise and size > palm of person's hand    Additional Information    Negative: Major bleeding (actively dripping or spurting) that can't be stopped    Negative: Amputation or bone sticking through the skin    Negative: Looks like a dislocated joint (crooked or deformed)    Negative: Serious injury with multiple fractures (broken bones)    Negative: Sounds like a life-threatening emergency to the triager    Negative: Wound looks infected    Negative: Caused by an animal bite    Negative: Puncture wound of foot    Negative: Toe injury is the main symptom    Negative: Cast problems or questions    Negative: Bullet, stabbed by knife or other serious penetrating wound    Negative: Can't stand (bear weight) or walk (e.g., 4 steps)    Negative: Skin is split open or gaping (length > 1/2 inch or 12 mm)    Negative: Bleeding won't stop after 10 minutes of direct pressure (using correct technique)    Negative: Dirt in the wound and not removed after 15 minutes of scrubbing    Negative: Numbness (new loss of sensation) of toe(s)    Negative: Looks infected (e.g., spreading redness, pus, red streak)    Negative: Sounds like a serious injury to the triager    Protocols used: ANKLE AND FOOT " INJURY-A-OH    Cecilia Hu RN

## 2022-07-12 ENCOUNTER — TRANSFERRED RECORDS (OUTPATIENT)
Dept: HEALTH INFORMATION MANAGEMENT | Facility: CLINIC | Age: 72
End: 2022-07-12

## 2022-07-15 ENCOUNTER — LAB (OUTPATIENT)
Dept: INFUSION THERAPY | Facility: CLINIC | Age: 72
End: 2022-07-15
Attending: INTERNAL MEDICINE
Payer: MEDICARE

## 2022-07-15 VITALS
RESPIRATION RATE: 16 BRPM | DIASTOLIC BLOOD PRESSURE: 93 MMHG | OXYGEN SATURATION: 100 % | HEART RATE: 64 BPM | TEMPERATURE: 98.1 F | SYSTOLIC BLOOD PRESSURE: 143 MMHG

## 2022-07-15 DIAGNOSIS — N18.9 ANEMIA IN CKD (CHRONIC KIDNEY DISEASE): Primary | ICD-10-CM

## 2022-07-15 DIAGNOSIS — N18.32 STAGE 3B CHRONIC KIDNEY DISEASE (H): ICD-10-CM

## 2022-07-15 DIAGNOSIS — D63.1 ANEMIA IN CKD (CHRONIC KIDNEY DISEASE): Primary | ICD-10-CM

## 2022-07-15 LAB
FERRITIN SERPL-MCNC: 76 NG/ML (ref 8–252)
HCT VFR BLD AUTO: 33 % (ref 35–47)
HGB BLD-MCNC: 9.9 G/DL (ref 11.7–15.7)

## 2022-07-15 PROCEDURE — 85018 HEMOGLOBIN: CPT | Performed by: INTERNAL MEDICINE

## 2022-07-15 PROCEDURE — 36415 COLL VENOUS BLD VENIPUNCTURE: CPT | Performed by: INTERNAL MEDICINE

## 2022-07-15 PROCEDURE — 96372 THER/PROPH/DIAG INJ SC/IM: CPT | Performed by: INTERNAL MEDICINE

## 2022-07-15 PROCEDURE — 82728 ASSAY OF FERRITIN: CPT | Performed by: INTERNAL MEDICINE

## 2022-07-15 PROCEDURE — 250N000011 HC RX IP 250 OP 636: Mod: EC | Performed by: INTERNAL MEDICINE

## 2022-07-15 PROCEDURE — 85014 HEMATOCRIT: CPT | Performed by: INTERNAL MEDICINE

## 2022-07-15 RX ORDER — NALOXONE HYDROCHLORIDE 0.4 MG/ML
0.2 INJECTION, SOLUTION INTRAMUSCULAR; INTRAVENOUS; SUBCUTANEOUS
Status: CANCELLED | OUTPATIENT
Start: 2022-10-01

## 2022-07-15 RX ORDER — ALBUTEROL SULFATE 90 UG/1
1-2 AEROSOL, METERED RESPIRATORY (INHALATION)
Status: CANCELLED
Start: 2022-10-01

## 2022-07-15 RX ORDER — MEPERIDINE HYDROCHLORIDE 25 MG/ML
25 INJECTION INTRAMUSCULAR; INTRAVENOUS; SUBCUTANEOUS EVERY 30 MIN PRN
Status: CANCELLED | OUTPATIENT
Start: 2022-10-01

## 2022-07-15 RX ORDER — ALBUTEROL SULFATE 0.83 MG/ML
2.5 SOLUTION RESPIRATORY (INHALATION)
Status: CANCELLED | OUTPATIENT
Start: 2022-10-01

## 2022-07-15 RX ORDER — EPINEPHRINE 1 MG/ML
0.3 INJECTION, SOLUTION INTRAMUSCULAR; SUBCUTANEOUS EVERY 5 MIN PRN
Status: CANCELLED | OUTPATIENT
Start: 2022-10-01

## 2022-07-15 RX ORDER — DIPHENHYDRAMINE HYDROCHLORIDE 50 MG/ML
50 INJECTION INTRAMUSCULAR; INTRAVENOUS
Status: CANCELLED
Start: 2022-10-01

## 2022-07-15 RX ORDER — METHYLPREDNISOLONE SODIUM SUCCINATE 125 MG/2ML
125 INJECTION, POWDER, LYOPHILIZED, FOR SOLUTION INTRAMUSCULAR; INTRAVENOUS
Status: CANCELLED
Start: 2022-10-01

## 2022-07-15 RX ADMIN — DARBEPOETIN ALFA 100 MCG: 100 INJECTION, SOLUTION INTRAVENOUS; SUBCUTANEOUS at 11:22

## 2022-07-15 ASSESSMENT — PAIN SCALES - GENERAL: PAINLEVEL: SEVERE PAIN (6)

## 2022-07-15 NOTE — PROGRESS NOTES
Infusion Nursing Note:  Diana ROPER Santiago presents today for Aranesp.    Patient seen by provider today: No   present during visit today: Not Applicable.    Note: N/A.    Intravenous Access:  No Intravenous access/labs at this visit.    Treatment Conditions:  Lab Results   Component Value Date    HGB 9.9 (L) 07/15/2022    WBC 5.6 03/17/2022    ANEU 6.5 12/21/2020    ANEUTAUTO 3.4 03/17/2022     03/17/2022      Results reviewed, labs MET treatment parameters, ok to proceed with treatment.    Post Infusion Assessment:  Patient tolerated injection without incident.  Site patent and intact, free from redness, edema or discomfort.     Discharge Plan:   [facility]  Patient will return in 3 months for next appointment.   Patient discharged in stable condition accompanied by: son.  Departure Mode: wheelchair/personal walker.      Ruthann Bettencourt RN

## 2022-07-17 ENCOUNTER — APPOINTMENT (OUTPATIENT)
Dept: GENERAL RADIOLOGY | Facility: CLINIC | Age: 72
End: 2022-07-17
Attending: EMERGENCY MEDICINE
Payer: MEDICARE

## 2022-07-17 ENCOUNTER — HOSPITAL ENCOUNTER (EMERGENCY)
Facility: CLINIC | Age: 72
Discharge: HOME | End: 2022-07-17
Attending: EMERGENCY MEDICINE | Admitting: EMERGENCY MEDICINE
Payer: MEDICARE

## 2022-07-17 VITALS
HEART RATE: 90 BPM | WEIGHT: 122 LBS | BODY MASS INDEX: 20.83 KG/M2 | HEIGHT: 64 IN | RESPIRATION RATE: 16 BRPM | DIASTOLIC BLOOD PRESSURE: 95 MMHG | SYSTOLIC BLOOD PRESSURE: 141 MMHG | OXYGEN SATURATION: 100 % | TEMPERATURE: 99.4 F

## 2022-07-17 DIAGNOSIS — S43.421A SPRAIN OF RIGHT ROTATOR CUFF CAPSULE, INITIAL ENCOUNTER: ICD-10-CM

## 2022-07-17 PROCEDURE — 73030 X-RAY EXAM OF SHOULDER: CPT | Mod: RT

## 2022-07-17 PROCEDURE — 99283 EMERGENCY DEPT VISIT LOW MDM: CPT

## 2022-07-17 PROCEDURE — 250N000013 HC RX MED GY IP 250 OP 250 PS 637: Performed by: EMERGENCY MEDICINE

## 2022-07-17 RX ORDER — HYDROCODONE BITARTRATE AND ACETAMINOPHEN 5; 325 MG/1; MG/1
1 TABLET ORAL ONCE
Status: COMPLETED | OUTPATIENT
Start: 2022-07-17 | End: 2022-07-17

## 2022-07-17 RX ADMIN — HYDROCODONE BITARTRATE AND ACETAMINOPHEN 1 TABLET: 5; 325 TABLET ORAL at 11:35

## 2022-07-17 ASSESSMENT — ENCOUNTER SYMPTOMS
NECK PAIN: 0
BACK PAIN: 0

## 2022-07-17 NOTE — DISCHARGE INSTRUCTIONS
Follow-up with your orthopedic clinic in 1 week.    Return to the ER for worsening pain, numbness or weakness of the hand, or any new concerns.

## 2022-07-17 NOTE — ED PROVIDER NOTES
History   Chief Complaint:  Shoulder Pain    The history is provided by the patient.      Diana Santiago is a 71 year old female with a history of stage 4 CKD, hypertension, chronic anemia, and hyperlipidemia who presents with her son for shoulder pain. Patient had a fall on 7/14, she was in the kitchen at the time and could not get up due to the fall.  She currently wears a cam boot on the right leg as she was recently diagnosed with a foot drop.  She feels that the use of the boot may have contributed to the fall.  She gets around using a walker. However, her shoulder injury pain has worsened since, prompting her to come in. She last had ibuprofen at 0730. Nonethless, denies back pain and neck pain.    Of note, the patient recently saw an orthopedist for her ankle and was diagnosed with drop foot. Her blood type is (B-).    Review of Systems   Musculoskeletal: Negative for back pain and neck pain.        POS: right shoulder pain   All other systems reviewed and are negative.    Allergies:  Patient denies having any allergies.    Medications:  amantadine  amLODIPine  fexofenadine  haloperidol   lamoTRIgine  simvastatin     Past Medical History:     DCIS   Heterozygous thalassemia  Bipolar affective disorder   Elevated blood sugar  Hyperlipidemia   Vitamin D deficiency  Hypercalcemia  Hyperparathyroidism   Benign essential hypertension  Chronic anemia  Acute kidney injury  Lithium toxicity, accidental or unintentional, initial encounter  Nephrogenic diabetes insipidus   Chronic kidney disease, stage 4     Past Surgical History:    Left breast biopsy  Lumpectomy (x4)  Colonoscopy (x2)  Retina tear surgery  Left hand surgery     Family History:    Cerebrovascular Disease - Mother   Hypertension - Father   Sleep Apnea - Brother    Social History:  Patient presents with son.  Patient presents via private vehicle.    Physical Exam     Patient Vitals for the past 24 hrs:   BP Temp Temp src Pulse Resp SpO2 Height Weight  "  07/17/22 1009 (!) 141/95 99.4  F (37.4  C) Temporal 90 16 100 % 1.626 m (5' 4\") 55.3 kg (122 lb)     Physical Exam  Constitutional:       General: She is not in acute distress.     Appearance: She is not diaphoretic.   HENT:      Head: Atraumatic.      Mouth/Throat:      Pharynx: No oropharyngeal exudate.   Eyes:      General: No scleral icterus.     Pupils: Pupils are equal, round, and reactive to light.   Neck:      Comments: No midline c-spine tenderness  Cardiovascular:      Rate and Rhythm: Normal rate and regular rhythm.      Heart sounds: Normal heart sounds.   Pulmonary:      Effort: No respiratory distress.      Breath sounds: Normal breath sounds.   Abdominal:      General: Bowel sounds are normal.      Palpations: Abdomen is soft.      Tenderness: There is no abdominal tenderness.   Musculoskeletal:         General: No tenderness.      Cervical back: No tenderness.      Comments: Tenderness to the anterior shoulder.  She is able to lift her arm over her head.  No external deformity of the shoulder.  No scapular tenderness.  No upper chest wall tenderness peer. Range limited by pain.  No elbow tenderness in right arm  Right foot in a cam boot.   Skin:     General: Skin is warm.      Capillary Refill: Capillary refill takes less than 2 seconds.      Findings: No rash.   Neurological:      Mental Status: She is oriented to person, place, and time.   Psychiatric:         Mood and Affect: Mood normal.         Behavior: Behavior normal.       Emergency Department Course     Imaging:  XR Shoulder Right G/E 3 Views    (Results Pending)   Negative for fracture    Report per myself    Laboratory:  Labs Ordered and Resulted from Time of ED Arrival to Time of ED Departure - No data to display     Emergency Department Course:     Reviewed:  I reviewed nursing notes, vitals, past medical history, Care Everywhere and [facility]    Assessments:  1119 I obtained history and examined the patient as noted above.   1054 I " rechecked the patient and explained findings.    Interventions:  Medications   HYDROcodone-acetaminophen (NORCO) 5-325 MG per tablet 1 tablet (1 tablet Oral Given 7/17/22 1139)     Disposition:  The patient was discharged to home.     Impression & Plan     Medical Decision Making:  This patient is a 71-year-old woman who presents to the emergency department for evaluation of right shoulder pain following a fall 3 days ago.  Most likely this is a rotator cuff sprain.  X-ray does not show fracture or malalignment.  She does have good range of motion although it is somewhat slow due to pain.  She was placed in a shoulder sling.  She will follow-up with her orthopedic clinic.  We discussed return precautions.    Diagnosis:    ICD-10-CM    1. Sprain of right rotator cuff capsule, initial encounter  S43.421A      Scribe Disclosure:  IIsrael, am serving as a scribe at 11:12 AM on 7/17/2022 to document services personally performed by Madan Eisenberg MD based on my observations and the provider's statements to me.     Madan Eisenberg MD  07/17/22 6809       Madan Eisenberg MD  07/17/22 9918

## 2022-07-17 NOTE — ED TRIAGE NOTES
Pt fell on Thursday and injured right shoulder. Can't lift arm without pain.      Triage Assessment     Row Name 07/17/22 1010       Triage Assessment (Adult)    Airway WDL WDL       Respiratory WDL    Respiratory WDL WDL       Skin Circulation/Temperature WDL    Skin Circulation/Temperature WDL WDL       Cardiac WDL    Cardiac WDL WDL       Peripheral/Neurovascular WDL    Peripheral Neurovascular WDL WDL       Cognitive/Neuro/Behavioral WDL    Cognitive/Neuro/Behavioral WDL WDL

## 2022-07-18 ENCOUNTER — PATIENT OUTREACH (OUTPATIENT)
Dept: FAMILY MEDICINE | Facility: CLINIC | Age: 72
End: 2022-07-18

## 2022-07-18 NOTE — TELEPHONE ENCOUNTER
What type of discharge? Emergency Department  Risk of Hospital admission or ED visit: 84%  Is a TCM episode required? No  When should the patient follow up with PCP? within 30 days of discharge.    Francesca Mathis RN  [facility]

## 2022-07-22 NOTE — TELEPHONE ENCOUNTER
"Pt requesting home care/PCA order to help with personal such as bathing and purewick. Pt was recently in ED d/t shoulder pain/falls, see documentation below. She has f/u with [facility] and PT. She is not getting home care at this time. Offered to schedule a f/u with Dr. Hernandez pt did not want to at this time, her family helps her schedule and go to appointments. Informed her to call back to schedule. Can provider place home care referral? Order pended.     Pt also had question regarding handicap sharee will  . Routing to  to assist with this.     ED/Discharge Protocol    \"Hi, my name is Jeny Rodriguez RN, a registered nurse, and I am calling on behalf of Dr. Hernandez's office at [place].  I am calling to follow up and see how things are going for you after your recent visit.\"    \"I see that you were in the (ER/UC/IP) on 22.    How are you doing now that you are home?\" difficulties getting around    Is patient experiencing symptoms that may require a hospital visit?  No, denies any new or worsening symptoms.     Discharge Instructions    \"Let's review your discharge instructions.  What is/are the follow-up recommendations?  Pt. Response: F/u with neurology and PT.     \"Were you instructed to make a follow-up appointment?\"  Pt. Response: No.   Pt does not want to schedule at this time.     \"When you see the provider, I would recommend that you bring your discharge instructions with you.    Medications    \"How many new medications are you on since your hospitalization/ED visit?\"    0-1  \"How many of your current medicines changed (dose, timing, name, etc.) while you were in the hospital/ED visit?\"   0-1  \"Do you have questions about your medications?\"   No  \"Were you newly diagnosed with heart failure, COPD, diabetes or did you have a heart attack?\"   No  For patients on insulin: \"Did you start on insulin in the hospital or did you have your insulin dose changed?\"   No  Post " "Discharge Medication Reconciliation Status: discharge medications reconciled, continue medications without change.    Was MTM referral placed (*Make sure to put transitions as reason for referral)?   No    Call Summary    \"Do you have any questions or concerns about your condition or care plan at the moment?\"    Yes, asking about personal care. See note above.   Triage nurse advice given: If pt has any new or concerning symptoms to call and speak to nurse.     Patient was in ER 6 times in the past year (assess appropriateness of ER visits.)      \"If you have questions or things don't continue to improve, we encourage you contact us through the main clinic number,  [phone].  Even if the clinic is not open, triage nurses are available 24/7 to help you.     We would like you to know that our clinic has extended hours (provide information).  We also have urgent care (provide details on closest location and hours/contact info)\"      \"Thank you for your time and take care!\"    Jeny JEONG RN  [facility]   "

## 2022-07-26 NOTE — TELEPHONE ENCOUNTER
Connected with Pt. Her only question about the handicap placard was about obtaining a new one as her current one expires 10.31.22. Pt has an OV with [facility] 10.03.22 that I added notes to the visit to fill out form. Pt was content with that decision

## 2022-08-12 ENCOUNTER — LAB (OUTPATIENT)
Dept: INFUSION THERAPY | Facility: CLINIC | Age: 72
End: 2022-08-12
Attending: INTERNAL MEDICINE
Payer: MEDICARE

## 2022-08-12 VITALS
TEMPERATURE: 98.1 F | DIASTOLIC BLOOD PRESSURE: 74 MMHG | RESPIRATION RATE: 16 BRPM | SYSTOLIC BLOOD PRESSURE: 120 MMHG | HEART RATE: 75 BPM

## 2022-08-12 DIAGNOSIS — N18.9 ANEMIA IN CKD (CHRONIC KIDNEY DISEASE): Primary | ICD-10-CM

## 2022-08-12 DIAGNOSIS — D63.1 ANEMIA IN CKD (CHRONIC KIDNEY DISEASE): Primary | ICD-10-CM

## 2022-08-12 DIAGNOSIS — N18.32 STAGE 3B CHRONIC KIDNEY DISEASE (H): ICD-10-CM

## 2022-08-12 LAB
HCT VFR BLD AUTO: 31.8 % (ref 35–47)
HGB BLD-MCNC: 9.2 G/DL (ref 11.7–15.7)

## 2022-08-12 PROCEDURE — 250N000011 HC RX IP 250 OP 636: Performed by: INTERNAL MEDICINE

## 2022-08-12 PROCEDURE — 96372 THER/PROPH/DIAG INJ SC/IM: CPT | Performed by: INTERNAL MEDICINE

## 2022-08-12 PROCEDURE — 36415 COLL VENOUS BLD VENIPUNCTURE: CPT

## 2022-08-12 PROCEDURE — 85014 HEMATOCRIT: CPT | Performed by: INTERNAL MEDICINE

## 2022-08-12 PROCEDURE — 85018 HEMOGLOBIN: CPT | Performed by: INTERNAL MEDICINE

## 2022-08-12 RX ORDER — ALBUTEROL SULFATE 90 UG/1
1-2 AEROSOL, METERED RESPIRATORY (INHALATION)
Status: CANCELLED
Start: 2022-10-01

## 2022-08-12 RX ORDER — DIPHENHYDRAMINE HYDROCHLORIDE 50 MG/ML
50 INJECTION INTRAMUSCULAR; INTRAVENOUS
Status: CANCELLED
Start: 2022-10-01

## 2022-08-12 RX ORDER — METHYLPREDNISOLONE SODIUM SUCCINATE 125 MG/2ML
125 INJECTION, POWDER, LYOPHILIZED, FOR SOLUTION INTRAMUSCULAR; INTRAVENOUS
Status: CANCELLED
Start: 2022-10-01

## 2022-08-12 RX ORDER — EPINEPHRINE 1 MG/ML
0.3 INJECTION, SOLUTION INTRAMUSCULAR; SUBCUTANEOUS EVERY 5 MIN PRN
Status: CANCELLED | OUTPATIENT
Start: 2022-10-01

## 2022-08-12 RX ORDER — NALOXONE HYDROCHLORIDE 0.4 MG/ML
0.2 INJECTION, SOLUTION INTRAMUSCULAR; INTRAVENOUS; SUBCUTANEOUS
Status: CANCELLED | OUTPATIENT
Start: 2022-10-01

## 2022-08-12 RX ORDER — MEPERIDINE HYDROCHLORIDE 25 MG/ML
25 INJECTION INTRAMUSCULAR; INTRAVENOUS; SUBCUTANEOUS EVERY 30 MIN PRN
Status: CANCELLED | OUTPATIENT
Start: 2022-10-01

## 2022-08-12 RX ORDER — ALBUTEROL SULFATE 0.83 MG/ML
2.5 SOLUTION RESPIRATORY (INHALATION)
Status: CANCELLED | OUTPATIENT
Start: 2022-10-01

## 2022-08-12 RX ADMIN — DARBEPOETIN ALFA 100 MCG: 100 INJECTION, SOLUTION INTRAVENOUS; SUBCUTANEOUS at 10:52

## 2022-08-12 ASSESSMENT — PAIN SCALES - GENERAL: PAINLEVEL: NO PAIN (0)

## 2022-08-12 NOTE — PROGRESS NOTES
Infusion Nursing Note:  Diana Santiago presents today for Aranesp injection.    Patient seen by provider today: No   present during visit today: Not Applicable.    Note: N/A.    Intravenous Access:  No Intravenous access/labs at this visit.    Treatment Conditions:  Lab Results   Component Value Date    HGB 9.2 (L) 08/12/2022    WBC 5.6 03/17/2022    ANEU 6.5 12/21/2020    ANEUTAUTO 3.4 03/17/2022     03/17/2022      Results reviewed, labs MET treatment parameters, ok to proceed with treatment.    Post Infusion Assessment:  Patient tolerated injection without incident.     Discharge Plan:   Discharge instructions reviewed with: Patient.  Patient and/or family verbalized understanding of discharge instructions and all questions answered.  Patient discharged in stable condition accompanied by: attendant.  Departure Mode: Wheelchair.      Saadia Cunningham RN

## 2022-08-24 ENCOUNTER — ANESTHESIA EVENT (OUTPATIENT)
Dept: SURGERY | Facility: CLINIC | Age: 72
DRG: 853 | End: 2022-08-24
Payer: MEDICARE

## 2022-08-24 ENCOUNTER — HOSPITAL ENCOUNTER (INPATIENT)
Facility: CLINIC | Age: 72
LOS: 21 days | Discharge: NURSING, REHAB OR LONG-TERM CARE | DRG: 853 | End: 2022-09-14
Attending: EMERGENCY MEDICINE | Admitting: SURGERY
Payer: MEDICARE

## 2022-08-24 ENCOUNTER — APPOINTMENT (OUTPATIENT)
Dept: CT IMAGING | Facility: CLINIC | Age: 72
DRG: 853 | End: 2022-08-24
Attending: EMERGENCY MEDICINE
Payer: MEDICARE

## 2022-08-24 ENCOUNTER — APPOINTMENT (OUTPATIENT)
Dept: GENERAL RADIOLOGY | Facility: CLINIC | Age: 72
DRG: 853 | End: 2022-08-24
Payer: MEDICARE

## 2022-08-24 ENCOUNTER — APPOINTMENT (OUTPATIENT)
Dept: GENERAL RADIOLOGY | Facility: CLINIC | Age: 72
DRG: 853 | End: 2022-08-24
Attending: EMERGENCY MEDICINE
Payer: MEDICARE

## 2022-08-24 ENCOUNTER — ANESTHESIA (OUTPATIENT)
Dept: SURGERY | Facility: CLINIC | Age: 72
DRG: 853 | End: 2022-08-24
Payer: MEDICARE

## 2022-08-24 ENCOUNTER — NURSE TRIAGE (OUTPATIENT)
Dept: NURSING | Facility: CLINIC | Age: 72
End: 2022-08-24

## 2022-08-24 DIAGNOSIS — K25.1 ACUTE GASTRIC ULCER WITH PERFORATION (H): ICD-10-CM

## 2022-08-24 DIAGNOSIS — R65.21 SEPTIC SHOCK (H): ICD-10-CM

## 2022-08-24 DIAGNOSIS — N18.31 CHRONIC KIDNEY DISEASE, STAGE 3A (H): ICD-10-CM

## 2022-08-24 DIAGNOSIS — F31.9 BIPOLAR AFFECTIVE DISORDER, REMISSION STATUS UNSPECIFIED (H): Primary | ICD-10-CM

## 2022-08-24 DIAGNOSIS — R19.8 PERFORATED VISCUS: ICD-10-CM

## 2022-08-24 DIAGNOSIS — A41.9 SEPTIC SHOCK (H): ICD-10-CM

## 2022-08-24 LAB
ABO/RH(D): NORMAL
ALBUMIN SERPL-MCNC: 2.4 G/DL (ref 3.4–5)
ALP SERPL-CCNC: 88 U/L (ref 40–150)
ALT SERPL W P-5'-P-CCNC: 29 U/L (ref 0–50)
ANION GAP SERPL CALCULATED.3IONS-SCNC: 11 MMOL/L (ref 3–14)
ANION GAP SERPL CALCULATED.3IONS-SCNC: 14 MMOL/L (ref 3–14)
ANION GAP SERPL CALCULATED.3IONS-SCNC: 19 MMOL/L (ref 3–14)
ANTIBODY SCREEN: NEGATIVE
AST SERPL W P-5'-P-CCNC: 24 U/L (ref 0–45)
ATRIAL RATE - MUSE: 103 BPM
BASE EXCESS BLDA CALC-SCNC: -11.6 MMOL/L (ref -9–1.8)
BASE EXCESS BLDA CALC-SCNC: -8.8 MMOL/L (ref -9–1.8)
BASOPHILS # BLD MANUAL: 0 10E3/UL (ref 0–0.2)
BASOPHILS NFR BLD MANUAL: 0 %
BILIRUB SERPL-MCNC: 0.9 MG/DL (ref 0.2–1.3)
BUN SERPL-MCNC: 65 MG/DL (ref 7–30)
BUN SERPL-MCNC: 69 MG/DL (ref 7–30)
BUN SERPL-MCNC: 75 MG/DL (ref 7–30)
CALCIUM SERPL-MCNC: 10.4 MG/DL (ref 8.5–10.1)
CALCIUM SERPL-MCNC: 7.6 MG/DL (ref 8.5–10.1)
CALCIUM SERPL-MCNC: 8.1 MG/DL (ref 8.5–10.1)
CHLORIDE BLD-SCNC: 105 MMOL/L (ref 94–109)
CHLORIDE BLD-SCNC: 108 MMOL/L (ref 94–109)
CHLORIDE BLD-SCNC: 111 MMOL/L (ref 94–109)
CO2 SERPL-SCNC: 16 MMOL/L (ref 20–32)
CO2 SERPL-SCNC: 17 MMOL/L (ref 20–32)
CO2 SERPL-SCNC: 7 MMOL/L (ref 20–32)
COHGB MFR BLD: 97 % (ref 92–100)
COHGB MFR BLD: 99 % (ref 92–100)
CPB POCT: NO
CPB POCT: NO
CREAT SERPL-MCNC: 2.87 MG/DL (ref 0.52–1.04)
CREAT SERPL-MCNC: 2.88 MG/DL (ref 0.52–1.04)
CREAT SERPL-MCNC: 3.51 MG/DL (ref 0.52–1.04)
DIASTOLIC BLOOD PRESSURE - MUSE: NORMAL MMHG
ELLIPTOCYTES BLD QL SMEAR: SLIGHT
EOSINOPHIL # BLD MANUAL: 0 10E3/UL (ref 0–0.7)
EOSINOPHIL NFR BLD MANUAL: 0 %
ERYTHROCYTE [DISTWIDTH] IN BLOOD BY AUTOMATED COUNT: 17.6 % (ref 10–15)
ERYTHROCYTE [DISTWIDTH] IN BLOOD BY AUTOMATED COUNT: 17.7 % (ref 10–15)
ERYTHROCYTE [DISTWIDTH] IN BLOOD BY AUTOMATED COUNT: 18.7 % (ref 10–15)
FLUAV RNA SPEC QL NAA+PROBE: NEGATIVE
FLUBV RNA RESP QL NAA+PROBE: NEGATIVE
GFR SERPL CREATININE-BSD FRML MDRD: 13 ML/MIN/1.73M2
GFR SERPL CREATININE-BSD FRML MDRD: 17 ML/MIN/1.73M2
GFR SERPL CREATININE-BSD FRML MDRD: 17 ML/MIN/1.73M2
GLUCOSE BLD-MCNC: 110 MG/DL (ref 70–99)
GLUCOSE BLD-MCNC: 153 MG/DL (ref 70–99)
GLUCOSE BLD-MCNC: 278 MG/DL (ref 70–99)
GLUCOSE BLDC GLUCOMTR-MCNC: 198 MG/DL (ref 70–99)
GLUCOSE BLDC GLUCOMTR-MCNC: 248 MG/DL (ref 70–99)
GLUCOSE BLDC GLUCOMTR-MCNC: 80 MG/DL (ref 70–99)
GLUCOSE BLDC GLUCOMTR-MCNC: 94 MG/DL (ref 70–99)
HCO3 BLD-SCNC: 15 MMOL/L (ref 21–28)
HCO3 BLD-SCNC: 16 MMOL/L (ref 21–28)
HCO3 BLDA-SCNC: 13 MMOL/L (ref 21–28)
HCO3 BLDA-SCNC: 17 MMOL/L (ref 21–28)
HCO3 BLDV-SCNC: 6 MMOL/L (ref 21–28)
HCT VFR BLD AUTO: 25.7 % (ref 35–47)
HCT VFR BLD AUTO: 27.5 % (ref 35–47)
HCT VFR BLD AUTO: 39.8 % (ref 35–47)
HCT VFR BLD CALC: 24 % (ref 35–47)
HCT VFR BLD CALC: 28 % (ref 35–47)
HGB BLD-MCNC: 11.6 G/DL (ref 11.7–15.7)
HGB BLD-MCNC: 7.9 G/DL (ref 11.7–15.7)
HGB BLD-MCNC: 8.2 G/DL (ref 11.7–15.7)
HGB BLD-MCNC: 8.4 G/DL (ref 11.7–15.7)
HGB BLD-MCNC: 9.5 G/DL (ref 11.7–15.7)
HOLD SPECIMEN: NORMAL
HOLD SPECIMEN: NORMAL
INR PPP: 1.13 (ref 0.85–1.15)
INTERPRETATION ECG - MUSE: NORMAL
LACTATE BLD-SCNC: 8.6 MMOL/L
LACTATE SERPL-SCNC: 1.6 MMOL/L (ref 0.7–2)
LACTATE SERPL-SCNC: 2.1 MMOL/L (ref 0.7–2)
LACTATE SERPL-SCNC: 9 MMOL/L (ref 0.7–2)
LIPASE SERPL-CCNC: 511 U/L (ref 73–393)
LYMPHOCYTES # BLD MANUAL: 1.7 10E3/UL (ref 0.8–5.3)
LYMPHOCYTES NFR BLD MANUAL: 46 %
MCH RBC QN AUTO: 20.6 PG (ref 26.5–33)
MCH RBC QN AUTO: 21 PG (ref 26.5–33)
MCH RBC QN AUTO: 21.1 PG (ref 26.5–33)
MCHC RBC AUTO-ENTMCNC: 29.1 G/DL (ref 31.5–36.5)
MCHC RBC AUTO-ENTMCNC: 30.5 G/DL (ref 31.5–36.5)
MCHC RBC AUTO-ENTMCNC: 30.7 G/DL (ref 31.5–36.5)
MCV RBC AUTO: 68 FL (ref 78–100)
MCV RBC AUTO: 69 FL (ref 78–100)
MCV RBC AUTO: 71 FL (ref 78–100)
METAMYELOCYTES # BLD MANUAL: 0.2 10E3/UL
METAMYELOCYTES NFR BLD MANUAL: 6 %
MONOCYTES # BLD MANUAL: 0.1 10E3/UL (ref 0–1.3)
MONOCYTES NFR BLD MANUAL: 2 %
MYELOCYTES # BLD MANUAL: 0.1 10E3/UL
MYELOCYTES NFR BLD MANUAL: 4 %
NEUTROPHILS # BLD MANUAL: 1.5 10E3/UL (ref 1.6–8.3)
NEUTROPHILS NFR BLD MANUAL: 42 %
NRBC # BLD AUTO: 0.1 10E3/UL
NRBC BLD MANUAL-RTO: 3 %
O2/TOTAL GAS SETTING VFR VENT: 30 %
O2/TOTAL GAS SETTING VFR VENT: 50 %
P AXIS - MUSE: 61 DEGREES
PCO2 BLD: 29 MM HG (ref 35–45)
PCO2 BLD: 35 MM HG (ref 35–45)
PCO2 BLDA: 37 MM HG (ref 35–45)
PCO2 BLDA: 37 MM HG (ref 35–45)
PCO2 BLDV: 22 MM HG (ref 40–50)
PH BLD: 7.24 [PH] (ref 7.35–7.45)
PH BLD: 7.35 [PH] (ref 7.35–7.45)
PH BLDA: 7.16 [PH] (ref 7.35–7.45)
PH BLDA: 7.26 [PH] (ref 7.35–7.45)
PH BLDV: 7.06 [PH] (ref 7.32–7.43)
PLAT MORPH BLD: ABNORMAL
PLATELET # BLD AUTO: 300 10E3/UL (ref 150–450)
PLATELET # BLD AUTO: 401 10E3/UL (ref 150–450)
PLATELET # BLD AUTO: 546 10E3/UL (ref 150–450)
PO2 BLD: 75 MM HG (ref 80–105)
PO2 BLD: 89 MM HG (ref 80–105)
PO2 BLDA: 117 MM HG (ref 80–105)
PO2 BLDA: 136 MM HG (ref 80–105)
PO2 BLDV: 105 MM HG (ref 25–47)
POLYCHROMASIA BLD QL SMEAR: SLIGHT
POTASSIUM BLD-SCNC: 4.7 MMOL/L (ref 3.4–5.3)
POTASSIUM BLD-SCNC: 5 MMOL/L (ref 3.4–5.3)
POTASSIUM BLD-SCNC: 5.1 MMOL/L (ref 3.4–5.3)
POTASSIUM BLD-SCNC: 5.5 MMOL/L (ref 3.4–5.3)
POTASSIUM BLD-SCNC: 6.2 MMOL/L (ref 3.4–5.3)
PR INTERVAL - MUSE: 182 MS
PROT SERPL-MCNC: 6.6 G/DL (ref 6.8–8.8)
QRS DURATION - MUSE: 100 MS
QT - MUSE: 332 MS
QTC - MUSE: 434 MS
R AXIS - MUSE: 156 DEGREES
RADIOLOGIST FLAGS: ABNORMAL
RBC # BLD AUTO: 3.76 10E6/UL (ref 3.8–5.2)
RBC # BLD AUTO: 3.99 10E6/UL (ref 3.8–5.2)
RBC # BLD AUTO: 5.63 10E6/UL (ref 3.8–5.2)
RBC MORPH BLD: ABNORMAL
RSV RNA SPEC NAA+PROBE: NEGATIVE
SAO2 % BLDV: 95 % (ref 94–100)
SARS-COV-2 RNA RESP QL NAA+PROBE: NEGATIVE
SODIUM BLD-SCNC: 138 MMOL/L (ref 133–144)
SODIUM BLD-SCNC: 141 MMOL/L (ref 133–144)
SODIUM SERPL-SCNC: 134 MMOL/L (ref 133–144)
SODIUM SERPL-SCNC: 136 MMOL/L (ref 133–144)
SODIUM SERPL-SCNC: 138 MMOL/L (ref 133–144)
SPECIMEN EXPIRATION DATE: NORMAL
SYSTOLIC BLOOD PRESSURE - MUSE: NORMAL MMHG
T AXIS - MUSE: 81 DEGREES
TROPONIN I SERPL HS-MCNC: 25 NG/L
VENTRICULAR RATE- MUSE: 103 BPM
WBC # BLD AUTO: 2.3 10E3/UL (ref 4–11)
WBC # BLD AUTO: 3.2 10E3/UL (ref 4–11)
WBC # BLD AUTO: 3.6 10E3/UL (ref 4–11)

## 2022-08-24 PROCEDURE — 272N000007 HC KIT ART LINE INSERTION

## 2022-08-24 PROCEDURE — 5A1955Z RESPIRATORY VENTILATION, GREATER THAN 96 CONSECUTIVE HOURS: ICD-10-PCS | Performed by: EMERGENCY MEDICINE

## 2022-08-24 PROCEDURE — 999N000157 HC STATISTIC RCP TIME EA 10 MIN

## 2022-08-24 PROCEDURE — 250N000011 HC RX IP 250 OP 636: Performed by: NURSE ANESTHETIST, CERTIFIED REGISTERED

## 2022-08-24 PROCEDURE — 370N000017 HC ANESTHESIA TECHNICAL FEE, PER MIN: Performed by: SURGERY

## 2022-08-24 PROCEDURE — 250N000011 HC RX IP 250 OP 636

## 2022-08-24 PROCEDURE — 99291 CRITICAL CARE FIRST HOUR: CPT | Mod: 24 | Performed by: INTERNAL MEDICINE

## 2022-08-24 PROCEDURE — 36556 INSERT NON-TUNNEL CV CATH: CPT

## 2022-08-24 PROCEDURE — 258N000003 HC RX IP 258 OP 636: Performed by: INTERNAL MEDICINE

## 2022-08-24 PROCEDURE — 0DQ60ZZ REPAIR STOMACH, OPEN APPROACH: ICD-10-PCS | Performed by: SURGERY

## 2022-08-24 PROCEDURE — 85027 COMPLETE CBC AUTOMATED: CPT | Performed by: EMERGENCY MEDICINE

## 2022-08-24 PROCEDURE — 85007 BL SMEAR W/DIFF WBC COUNT: CPT | Performed by: EMERGENCY MEDICINE

## 2022-08-24 PROCEDURE — C9803 HOPD COVID-19 SPEC COLLECT: HCPCS

## 2022-08-24 PROCEDURE — 82803 BLOOD GASES ANY COMBINATION: CPT

## 2022-08-24 PROCEDURE — 360N000077 HC SURGERY LEVEL 4, PER MIN: Performed by: SURGERY

## 2022-08-24 PROCEDURE — 99223 1ST HOSP IP/OBS HIGH 75: CPT | Mod: 57 | Performed by: SURGERY

## 2022-08-24 PROCEDURE — 250N000012 HC RX MED GY IP 250 OP 636 PS 637: Performed by: INTERNAL MEDICINE

## 2022-08-24 PROCEDURE — 250N000011 HC RX IP 250 OP 636: Performed by: INTERNAL MEDICINE

## 2022-08-24 PROCEDURE — 43840 GSTRRPHY SUTR DUOL/GSTR ULCR: CPT | Mod: AS | Performed by: PHYSICIAN ASSISTANT

## 2022-08-24 PROCEDURE — 96368 THER/DIAG CONCURRENT INF: CPT

## 2022-08-24 PROCEDURE — C9113 INJ PANTOPRAZOLE SODIUM, VIA: HCPCS | Performed by: INTERNAL MEDICINE

## 2022-08-24 PROCEDURE — 71250 CT THORAX DX C-: CPT | Mod: MA

## 2022-08-24 PROCEDURE — 86923 COMPATIBILITY TEST ELECTRIC: CPT | Performed by: SURGERY

## 2022-08-24 PROCEDURE — 99292 CRITICAL CARE ADDL 30 MIN: CPT

## 2022-08-24 PROCEDURE — 0DU707Z SUPPLEMENT STOMACH, PYLORUS WITH AUTOLOGOUS TISSUE SUBSTITUTE, OPEN APPROACH: ICD-10-PCS | Performed by: SURGERY

## 2022-08-24 PROCEDURE — 71045 X-RAY EXAM CHEST 1 VIEW: CPT

## 2022-08-24 PROCEDURE — 94002 VENT MGMT INPAT INIT DAY: CPT

## 2022-08-24 PROCEDURE — 200N000001 HC R&B ICU

## 2022-08-24 PROCEDURE — 250N000009 HC RX 250: Performed by: ANESTHESIOLOGY

## 2022-08-24 PROCEDURE — 84484 ASSAY OF TROPONIN QUANT: CPT | Performed by: EMERGENCY MEDICINE

## 2022-08-24 PROCEDURE — 250N000011 HC RX IP 250 OP 636: Performed by: REGISTERED NURSE

## 2022-08-24 PROCEDURE — 250N000013 HC RX MED GY IP 250 OP 250 PS 637

## 2022-08-24 PROCEDURE — 96365 THER/PROPH/DIAG IV INF INIT: CPT

## 2022-08-24 PROCEDURE — 99291 CRITICAL CARE FIRST HOUR: CPT | Mod: 25

## 2022-08-24 PROCEDURE — 83605 ASSAY OF LACTIC ACID: CPT | Performed by: INTERNAL MEDICINE

## 2022-08-24 PROCEDURE — 83605 ASSAY OF LACTIC ACID: CPT

## 2022-08-24 PROCEDURE — 86901 BLOOD TYPING SEROLOGIC RH(D): CPT | Performed by: EMERGENCY MEDICINE

## 2022-08-24 PROCEDURE — 36415 COLL VENOUS BLD VENIPUNCTURE: CPT | Performed by: EMERGENCY MEDICINE

## 2022-08-24 PROCEDURE — 86923 COMPATIBILITY TEST ELECTRIC: CPT

## 2022-08-24 PROCEDURE — 93005 ELECTROCARDIOGRAM TRACING: CPT

## 2022-08-24 PROCEDURE — 999N000065 XR ABDOMEN PORT 1 VIEW

## 2022-08-24 PROCEDURE — P9045 ALBUMIN (HUMAN), 5%, 250 ML: HCPCS | Performed by: NURSE ANESTHETIST, CERTIFIED REGISTERED

## 2022-08-24 PROCEDURE — 250N000009 HC RX 250: Performed by: NURSE ANESTHETIST, CERTIFIED REGISTERED

## 2022-08-24 PROCEDURE — 82803 BLOOD GASES ANY COMBINATION: CPT | Performed by: INTERNAL MEDICINE

## 2022-08-24 PROCEDURE — 999N000065 XR CHEST PORT 1 VIEW

## 2022-08-24 PROCEDURE — 43840 GSTRRPHY SUTR DUOL/GSTR ULCR: CPT | Performed by: SURGERY

## 2022-08-24 PROCEDURE — 272N000001 HC OR GENERAL SUPPLY STERILE: Performed by: SURGERY

## 2022-08-24 PROCEDURE — 258N000003 HC RX IP 258 OP 636: Performed by: EMERGENCY MEDICINE

## 2022-08-24 PROCEDURE — 258N000003 HC RX IP 258 OP 636: Performed by: REGISTERED NURSE

## 2022-08-24 PROCEDURE — 80053 COMPREHEN METABOLIC PANEL: CPT | Performed by: EMERGENCY MEDICINE

## 2022-08-24 PROCEDURE — 87040 BLOOD CULTURE FOR BACTERIA: CPT | Performed by: EMERGENCY MEDICINE

## 2022-08-24 PROCEDURE — 250N000009 HC RX 250: Performed by: INTERNAL MEDICINE

## 2022-08-24 PROCEDURE — 258N000003 HC RX IP 258 OP 636: Performed by: PHYSICIAN ASSISTANT

## 2022-08-24 PROCEDURE — 85610 PROTHROMBIN TIME: CPT | Performed by: SURGERY

## 2022-08-24 PROCEDURE — 3E043XZ INTRODUCTION OF VASOPRESSOR INTO CENTRAL VEIN, PERCUTANEOUS APPROACH: ICD-10-PCS | Performed by: EMERGENCY MEDICINE

## 2022-08-24 PROCEDURE — 85027 COMPLETE CBC AUTOMATED: CPT

## 2022-08-24 PROCEDURE — 250N000009 HC RX 250: Performed by: EMERGENCY MEDICINE

## 2022-08-24 PROCEDURE — 83605 ASSAY OF LACTIC ACID: CPT | Performed by: EMERGENCY MEDICINE

## 2022-08-24 PROCEDURE — 250N000011 HC RX IP 250 OP 636: Performed by: EMERGENCY MEDICINE

## 2022-08-24 PROCEDURE — 258N000003 HC RX IP 258 OP 636: Performed by: NURSE ANESTHETIST, CERTIFIED REGISTERED

## 2022-08-24 PROCEDURE — 84295 ASSAY OF SERUM SODIUM: CPT

## 2022-08-24 PROCEDURE — 83690 ASSAY OF LIPASE: CPT | Performed by: EMERGENCY MEDICINE

## 2022-08-24 PROCEDURE — 250N000025 HC SEVOFLURANE, PER MIN: Performed by: SURGERY

## 2022-08-24 PROCEDURE — 87637 SARSCOV2&INF A&B&RSV AMP PRB: CPT | Performed by: EMERGENCY MEDICINE

## 2022-08-24 PROCEDURE — 80048 BASIC METABOLIC PNL TOTAL CA: CPT | Performed by: INTERNAL MEDICINE

## 2022-08-24 PROCEDURE — 258N000001 HC RX 258: Performed by: INTERNAL MEDICINE

## 2022-08-24 PROCEDURE — 85027 COMPLETE CBC AUTOMATED: CPT | Performed by: INTERNAL MEDICINE

## 2022-08-24 RX ORDER — ACETAMINOPHEN 325 MG/1
650 TABLET ORAL EVERY 4 HOURS PRN
Status: DISCONTINUED | OUTPATIENT
Start: 2022-08-27 | End: 2022-08-25

## 2022-08-24 RX ORDER — CHLORHEXIDINE GLUCONATE ORAL RINSE 1.2 MG/ML
15 SOLUTION DENTAL EVERY 12 HOURS
Status: DISCONTINUED | OUTPATIENT
Start: 2022-08-24 | End: 2022-08-30 | Stop reason: CLARIF

## 2022-08-24 RX ORDER — NICOTINE POLACRILEX 4 MG
15-30 LOZENGE BUCCAL
Status: DISCONTINUED | OUTPATIENT
Start: 2022-08-24 | End: 2022-09-09

## 2022-08-24 RX ORDER — LIDOCAINE HYDROCHLORIDE 10 MG/ML
INJECTION, SOLUTION INFILTRATION; PERINEURAL
Status: COMPLETED | OUTPATIENT
Start: 2022-08-24 | End: 2022-08-24

## 2022-08-24 RX ORDER — PROPOFOL 10 MG/ML
5-75 INJECTION, EMULSION INTRAVENOUS CONTINUOUS
Status: DISCONTINUED | OUTPATIENT
Start: 2022-08-24 | End: 2022-08-31

## 2022-08-24 RX ORDER — DIPHENHYDRAMINE HCL 12.5MG/5ML
12.5 LIQUID (ML) ORAL EVERY 6 HOURS PRN
Status: DISCONTINUED | OUTPATIENT
Start: 2022-08-24 | End: 2022-09-02

## 2022-08-24 RX ORDER — NALOXONE HYDROCHLORIDE 0.4 MG/ML
0.2 INJECTION, SOLUTION INTRAMUSCULAR; INTRAVENOUS; SUBCUTANEOUS
Status: DISCONTINUED | OUTPATIENT
Start: 2022-08-24 | End: 2022-09-14 | Stop reason: HOSPADM

## 2022-08-24 RX ORDER — DIPHENHYDRAMINE HYDROCHLORIDE 50 MG/ML
12.5 INJECTION INTRAMUSCULAR; INTRAVENOUS EVERY 6 HOURS PRN
Status: DISCONTINUED | OUTPATIENT
Start: 2022-08-24 | End: 2022-09-02

## 2022-08-24 RX ORDER — NALOXONE HYDROCHLORIDE 0.4 MG/ML
0.4 INJECTION, SOLUTION INTRAMUSCULAR; INTRAVENOUS; SUBCUTANEOUS
Status: DISCONTINUED | OUTPATIENT
Start: 2022-08-24 | End: 2022-09-14 | Stop reason: HOSPADM

## 2022-08-24 RX ORDER — SODIUM CHLORIDE 9 MG/ML
INJECTION, SOLUTION INTRAVENOUS CONTINUOUS
Status: DISCONTINUED | OUTPATIENT
Start: 2022-08-24 | End: 2022-08-29

## 2022-08-24 RX ORDER — SODIUM CHLORIDE, SODIUM LACTATE, POTASSIUM CHLORIDE, CALCIUM CHLORIDE 600; 310; 30; 20 MG/100ML; MG/100ML; MG/100ML; MG/100ML
INJECTION, SOLUTION INTRAVENOUS CONTINUOUS PRN
Status: DISCONTINUED | OUTPATIENT
Start: 2022-08-24 | End: 2022-08-24

## 2022-08-24 RX ORDER — ACETAMINOPHEN 325 MG/1
975 TABLET ORAL EVERY 8 HOURS
Status: DISCONTINUED | OUTPATIENT
Start: 2022-08-24 | End: 2022-08-25

## 2022-08-24 RX ORDER — FAMOTIDINE 20 MG/1
20 TABLET, FILM COATED ORAL
Status: DISCONTINUED | OUTPATIENT
Start: 2022-08-24 | End: 2022-08-24

## 2022-08-24 RX ORDER — HYDROMORPHONE HCL IN WATER/PF 6 MG/30 ML
0.4 PATIENT CONTROLLED ANALGESIA SYRINGE INTRAVENOUS EVERY 5 MIN PRN
Status: DISCONTINUED | OUTPATIENT
Start: 2022-08-24 | End: 2022-08-24

## 2022-08-24 RX ORDER — LIDOCAINE 40 MG/G
CREAM TOPICAL
Status: DISCONTINUED | OUTPATIENT
Start: 2022-08-24 | End: 2022-09-14 | Stop reason: HOSPADM

## 2022-08-24 RX ORDER — AMOXICILLIN 250 MG
1 CAPSULE ORAL 2 TIMES DAILY
Status: DISCONTINUED | OUTPATIENT
Start: 2022-08-24 | End: 2022-08-25

## 2022-08-24 RX ORDER — SODIUM CHLORIDE 9 MG/ML
INJECTION, SOLUTION INTRAVENOUS CONTINUOUS PRN
Status: DISCONTINUED | OUTPATIENT
Start: 2022-08-24 | End: 2022-08-24

## 2022-08-24 RX ORDER — POLYETHYLENE GLYCOL 3350 17 G/17G
17 POWDER, FOR SOLUTION ORAL DAILY
Status: DISCONTINUED | OUTPATIENT
Start: 2022-08-25 | End: 2022-08-25

## 2022-08-24 RX ORDER — ONDANSETRON 4 MG/1
4 TABLET, ORALLY DISINTEGRATING ORAL EVERY 30 MIN PRN
Status: DISCONTINUED | OUTPATIENT
Start: 2022-08-24 | End: 2022-08-24

## 2022-08-24 RX ORDER — FENTANYL CITRATE-0.9 % NACL/PF 10 MCG/ML
PLASTIC BAG, INJECTION (ML) INTRAVENOUS
Status: COMPLETED
Start: 2022-08-24 | End: 2022-08-24

## 2022-08-24 RX ORDER — FENTANYL CITRATE 50 UG/ML
INJECTION, SOLUTION INTRAMUSCULAR; INTRAVENOUS PRN
Status: DISCONTINUED | OUTPATIENT
Start: 2022-08-24 | End: 2022-08-24

## 2022-08-24 RX ORDER — PIPERACILLIN SODIUM, TAZOBACTAM SODIUM 2; .25 G/10ML; G/10ML
2.25 INJECTION, POWDER, LYOPHILIZED, FOR SOLUTION INTRAVENOUS EVERY 6 HOURS
Status: DISCONTINUED | OUTPATIENT
Start: 2022-08-24 | End: 2022-08-29 | Stop reason: DRUGHIGH

## 2022-08-24 RX ORDER — PROCHLORPERAZINE MALEATE 5 MG
5 TABLET ORAL EVERY 6 HOURS PRN
Status: DISCONTINUED | OUTPATIENT
Start: 2022-08-24 | End: 2022-09-14 | Stop reason: HOSPADM

## 2022-08-24 RX ORDER — ENOXAPARIN SODIUM 100 MG/ML
30 INJECTION SUBCUTANEOUS EVERY 24 HOURS
Status: DISCONTINUED | OUTPATIENT
Start: 2022-08-25 | End: 2022-08-25

## 2022-08-24 RX ORDER — BISACODYL 10 MG
10 SUPPOSITORY, RECTAL RECTAL DAILY PRN
Status: DISCONTINUED | OUTPATIENT
Start: 2022-08-24 | End: 2022-09-14 | Stop reason: HOSPADM

## 2022-08-24 RX ORDER — PROPOFOL 10 MG/ML
INJECTION, EMULSION INTRAVENOUS CONTINUOUS PRN
Status: DISCONTINUED | OUTPATIENT
Start: 2022-08-24 | End: 2022-08-24

## 2022-08-24 RX ORDER — PIPERACILLIN SODIUM, TAZOBACTAM SODIUM 4; .5 G/20ML; G/20ML
4.5 INJECTION, POWDER, LYOPHILIZED, FOR SOLUTION INTRAVENOUS ONCE
Status: COMPLETED | OUTPATIENT
Start: 2022-08-24 | End: 2022-08-24

## 2022-08-24 RX ORDER — NICOTINE POLACRILEX 4 MG
15-30 LOZENGE BUCCAL
Status: DISCONTINUED | OUTPATIENT
Start: 2022-08-24 | End: 2022-08-24

## 2022-08-24 RX ORDER — DEXTROSE MONOHYDRATE 25 G/50ML
25-50 INJECTION, SOLUTION INTRAVENOUS
Status: DISCONTINUED | OUTPATIENT
Start: 2022-08-24 | End: 2022-08-24

## 2022-08-24 RX ORDER — SODIUM CHLORIDE, SODIUM LACTATE, POTASSIUM CHLORIDE, CALCIUM CHLORIDE 600; 310; 30; 20 MG/100ML; MG/100ML; MG/100ML; MG/100ML
INJECTION, SOLUTION INTRAVENOUS CONTINUOUS
Status: DISCONTINUED | OUTPATIENT
Start: 2022-08-24 | End: 2022-08-24

## 2022-08-24 RX ORDER — ONDANSETRON 4 MG/1
4 TABLET, ORALLY DISINTEGRATING ORAL EVERY 6 HOURS PRN
Status: DISCONTINUED | OUTPATIENT
Start: 2022-08-24 | End: 2022-09-14 | Stop reason: HOSPADM

## 2022-08-24 RX ORDER — DEXTROSE MONOHYDRATE 25 G/50ML
25-50 INJECTION, SOLUTION INTRAVENOUS
Status: DISCONTINUED | OUTPATIENT
Start: 2022-08-24 | End: 2022-09-09

## 2022-08-24 RX ORDER — DEXTROSE MONOHYDRATE 25 G/50ML
25 INJECTION, SOLUTION INTRAVENOUS ONCE
Status: COMPLETED | OUTPATIENT
Start: 2022-08-24 | End: 2022-08-24

## 2022-08-24 RX ORDER — NOREPINEPHRINE BITARTRATE 0.02 MG/ML
.01-.6 INJECTION, SOLUTION INTRAVENOUS CONTINUOUS
Status: DISCONTINUED | OUTPATIENT
Start: 2022-08-24 | End: 2022-08-31

## 2022-08-24 RX ORDER — FENTANYL CITRATE 50 UG/ML
INJECTION, SOLUTION INTRAMUSCULAR; INTRAVENOUS
Status: COMPLETED
Start: 2022-08-24 | End: 2022-08-24

## 2022-08-24 RX ORDER — HYDROMORPHONE HCL IN WATER/PF 6 MG/30 ML
0.2 PATIENT CONTROLLED ANALGESIA SYRINGE INTRAVENOUS
Status: DISCONTINUED | OUTPATIENT
Start: 2022-08-24 | End: 2022-09-12

## 2022-08-24 RX ORDER — ETOMIDATE 2 MG/ML
INJECTION INTRAVENOUS PRN
Status: DISCONTINUED | OUTPATIENT
Start: 2022-08-24 | End: 2022-08-24

## 2022-08-24 RX ORDER — ONDANSETRON 2 MG/ML
4 INJECTION INTRAMUSCULAR; INTRAVENOUS EVERY 6 HOURS PRN
Status: DISCONTINUED | OUTPATIENT
Start: 2022-08-24 | End: 2022-09-14 | Stop reason: HOSPADM

## 2022-08-24 RX ORDER — OXYCODONE HYDROCHLORIDE 5 MG/1
5 TABLET ORAL EVERY 4 HOURS PRN
Status: DISCONTINUED | OUTPATIENT
Start: 2022-08-24 | End: 2022-09-14 | Stop reason: HOSPADM

## 2022-08-24 RX ORDER — FENTANYL CITRATE 50 UG/ML
50 INJECTION, SOLUTION INTRAMUSCULAR; INTRAVENOUS EVERY 5 MIN PRN
Status: DISCONTINUED | OUTPATIENT
Start: 2022-08-24 | End: 2022-08-24

## 2022-08-24 RX ORDER — OXYCODONE HYDROCHLORIDE 5 MG/1
5 TABLET ORAL EVERY 4 HOURS PRN
Status: DISCONTINUED | OUTPATIENT
Start: 2022-08-24 | End: 2022-08-24

## 2022-08-24 RX ORDER — ONDANSETRON 2 MG/ML
4 INJECTION INTRAMUSCULAR; INTRAVENOUS EVERY 30 MIN PRN
Status: DISCONTINUED | OUTPATIENT
Start: 2022-08-24 | End: 2022-08-24

## 2022-08-24 RX ORDER — CEFAZOLIN SODIUM 2 G/100ML
2 INJECTION, SOLUTION INTRAVENOUS
Status: CANCELLED | OUTPATIENT
Start: 2022-08-24

## 2022-08-24 RX ORDER — CALCIUM GLUCONATE 20 MG/ML
1 INJECTION, SOLUTION INTRAVENOUS
Status: COMPLETED | OUTPATIENT
Start: 2022-08-24 | End: 2022-08-24

## 2022-08-24 RX ORDER — SODIUM CHLORIDE 9 MG/ML
INJECTION, SOLUTION INTRAVENOUS CONTINUOUS
Status: DISCONTINUED | OUTPATIENT
Start: 2022-08-24 | End: 2022-08-24

## 2022-08-24 RX ORDER — ONDANSETRON 2 MG/ML
INJECTION INTRAMUSCULAR; INTRAVENOUS PRN
Status: DISCONTINUED | OUTPATIENT
Start: 2022-08-24 | End: 2022-08-24

## 2022-08-24 RX ORDER — CEFAZOLIN SODIUM 2 G/100ML
2 INJECTION, SOLUTION INTRAVENOUS SEE ADMIN INSTRUCTIONS
Status: CANCELLED | OUTPATIENT
Start: 2022-08-24

## 2022-08-24 RX ADMIN — CALCIUM GLUCONATE 1 G: 20 INJECTION, SOLUTION INTRAVENOUS at 14:07

## 2022-08-24 RX ADMIN — LIDOCAINE HYDROCHLORIDE 1 ML: 10 INJECTION, SOLUTION INFILTRATION; PERINEURAL at 15:15

## 2022-08-24 RX ADMIN — ONDANSETRON 4 MG: 2 INJECTION INTRAMUSCULAR; INTRAVENOUS at 16:43

## 2022-08-24 RX ADMIN — VASOPRESSIN 2.4 UNITS/HR: 20 INJECTION INTRAVENOUS at 17:59

## 2022-08-24 RX ADMIN — SODIUM CHLORIDE 1000 ML: 9 INJECTION, SOLUTION INTRAVENOUS at 13:17

## 2022-08-24 RX ADMIN — DEXTROSE MONOHYDRATE 300 ML: 100 INJECTION, SOLUTION INTRAVENOUS at 19:39

## 2022-08-24 RX ADMIN — SODIUM BICARBONATE: 84 INJECTION, SOLUTION INTRAVENOUS at 21:02

## 2022-08-24 RX ADMIN — SODIUM CHLORIDE 1000 ML: 9 INJECTION, SOLUTION INTRAVENOUS at 14:08

## 2022-08-24 RX ADMIN — PIPERACILLIN SODIUM AND TAZOBACTAM SODIUM 2.25 G: 2; .25 INJECTION, POWDER, LYOPHILIZED, FOR SOLUTION INTRAVENOUS at 19:39

## 2022-08-24 RX ADMIN — FENTANYL CITRATE 100 MCG: 50 INJECTION, SOLUTION INTRAMUSCULAR; INTRAVENOUS at 14:41

## 2022-08-24 RX ADMIN — PIPERACILLIN AND TAZOBACTAM 4.5 G: 4; .5 INJECTION, POWDER, FOR SOLUTION INTRAVENOUS at 14:01

## 2022-08-24 RX ADMIN — SODIUM CHLORIDE, POTASSIUM CHLORIDE, SODIUM LACTATE AND CALCIUM CHLORIDE: 600; 310; 30; 20 INJECTION, SOLUTION INTRAVENOUS at 15:05

## 2022-08-24 RX ADMIN — Medication 25 MCG/HR: at 20:00

## 2022-08-24 RX ADMIN — PHENYLEPHRINE HYDROCHLORIDE 200 MCG: 10 INJECTION INTRAVENOUS at 15:21

## 2022-08-24 RX ADMIN — SODIUM CHLORIDE: 9 INJECTION, SOLUTION INTRAVENOUS at 15:05

## 2022-08-24 RX ADMIN — CALCIUM GLUCONATE 1 G: 20 INJECTION, SOLUTION INTRAVENOUS at 14:14

## 2022-08-24 RX ADMIN — CHLORHEXIDINE GLUCONATE 15 ML: 1.2 SOLUTION ORAL at 19:39

## 2022-08-24 RX ADMIN — Medication 0.1 MCG/KG/MIN: at 14:09

## 2022-08-24 RX ADMIN — SODIUM CHLORIDE: 9 INJECTION, SOLUTION INTRAVENOUS at 15:08

## 2022-08-24 RX ADMIN — SODIUM CHLORIDE: 9 INJECTION, SOLUTION INTRAVENOUS at 15:21

## 2022-08-24 RX ADMIN — VASOPRESSIN 2 UNITS/HR: 20 INJECTION INTRAVENOUS at 15:35

## 2022-08-24 RX ADMIN — Medication 1 UNITS: at 15:41

## 2022-08-24 RX ADMIN — ALBUMIN HUMAN: 0.05 INJECTION, SOLUTION INTRAVENOUS at 15:24

## 2022-08-24 RX ADMIN — SODIUM CHLORIDE: 9 INJECTION, SOLUTION INTRAVENOUS at 18:35

## 2022-08-24 RX ADMIN — ROCURONIUM BROMIDE 50 MG: 50 INJECTION, SOLUTION INTRAVENOUS at 15:21

## 2022-08-24 RX ADMIN — PANTOPRAZOLE SODIUM 40 MG: 40 INJECTION, POWDER, FOR SOLUTION INTRAVENOUS at 19:00

## 2022-08-24 RX ADMIN — ETOMIDATE 10 MG: 2 INJECTION INTRAVENOUS at 15:21

## 2022-08-24 RX ADMIN — Medication 200 MCG: at 13:57

## 2022-08-24 RX ADMIN — SODIUM CHLORIDE: 9 INJECTION, SOLUTION INTRAVENOUS at 15:07

## 2022-08-24 RX ADMIN — SODIUM CHLORIDE 5.44 UNITS: 9 INJECTION, SOLUTION INTRAVENOUS at 19:00

## 2022-08-24 RX ADMIN — FENTANYL CITRATE 50 MCG: 50 INJECTION, SOLUTION INTRAMUSCULAR; INTRAVENOUS at 15:21

## 2022-08-24 RX ADMIN — SODIUM BICARBONATE 50 MEQ: 84 INJECTION, SOLUTION INTRAVENOUS at 15:50

## 2022-08-24 RX ADMIN — DEXTROSE MONOHYDRATE 25 G: 25 INJECTION, SOLUTION INTRAVENOUS at 18:59

## 2022-08-24 RX ADMIN — Medication 0.14 MCG/KG/MIN: at 18:51

## 2022-08-24 RX ADMIN — ALBUMIN HUMAN: 0.05 INJECTION, SOLUTION INTRAVENOUS at 16:07

## 2022-08-24 RX ADMIN — PROPOFOL 25 MCG/KG/MIN: 10 INJECTION, EMULSION INTRAVENOUS at 16:32

## 2022-08-24 RX ADMIN — SODIUM CHLORIDE, SODIUM LACTATE, POTASSIUM CHLORIDE, CALCIUM CHLORIDE: 600; 310; 30; 20 INJECTION, SOLUTION INTRAVENOUS at 16:18

## 2022-08-24 RX ADMIN — ALBUMIN HUMAN: 0.05 INJECTION, SOLUTION INTRAVENOUS at 15:38

## 2022-08-24 ASSESSMENT — ACTIVITIES OF DAILY LIVING (ADL)
ADLS_ACUITY_SCORE: 35
ADLS_ACUITY_SCORE: 33
ADLS_ACUITY_SCORE: 35
ADLS_ACUITY_SCORE: 41
ADLS_ACUITY_SCORE: 35
ADLS_ACUITY_SCORE: 41

## 2022-08-24 ASSESSMENT — ENCOUNTER SYMPTOMS
ABDOMINAL PAIN: 1
BLOOD IN STOOL: 0
SHORTNESS OF BREATH: 1
ORTHOPNEA: 0
SEIZURES: 0
DYSRHYTHMIAS: 0
DIARRHEA: 0

## 2022-08-24 NOTE — ANESTHESIA PROCEDURE NOTES
Airway         Procedure Start/Stop Times: 8/24/2022 3:24 PM  Staff -        CRNA: Chaz Sarkar APRN CRNA       Performed By: CRNA  Consent for Airway        Urgency: emergent       Consent: The procedure was performed in an emergent situation.  Indications and Patient Condition       Indications for airway management: gina-procedural       Induction type:intravenous       Mask difficulty assessment: 0 - not attempted    Final Airway Details       Final airway type: endotracheal airway       Successful airway: ETT - single  Endotracheal Airway Details        ETT size (mm): 7.0       Cuffed: yes       Successful intubation technique: video laryngoscopy       VL Blade Size: Glidescope 3       Grade View of Cords: 1       Adjucts: stylet       Secured at (cm): 22       Bite block used: None    Post intubation assessment        Placement verified by: capnometry, equal breath sounds and chest rise        Number of attempts at approach: 1       Secured with: silk tape       Ease of procedure: easy       Dentition: Intact and Unchanged    Medication(s) Administered   Medication Administration Time: 8/24/2022 3:24 PM

## 2022-08-24 NOTE — ED NOTES
Rapid Assessment Note    History:   Diana Santiago is a 71 year old female who presents with abdominal pain, shortness of breath and chest discomfort. Onset last night. Associated sweatiness. Last BM was 2 days ago. No fever. No blood thinners. NO black or blood in stool.     Exam:   General:  Alert, interactive  Cardiovascular:  Mottled skin  Lungs:  tahcypneic  Neuro:  Moving all 4 extremities  Skin:  Warm, dry  Psych:  Normal affect    Plan of Care:   I evaluated the patient and developed an initial plan of care. I discussed this plan and explained that I, or one of my partners, would be returning to complete the evaluation. Pt to go to STAB room.    08/24/2022  [employer]    Portions of this medical record were completed by a scribe. UPON MY REVIEW AND AUTHENTICATION BY ELECTRONIC SIGNATURE, this confirms (a) I performed the applicable clinical services, and (b) the record is accurate.        Osmany Gresham MD  08/24/22 1300       Osmany Gresham MD  08/24/22 0335

## 2022-08-24 NOTE — ANESTHESIA PROCEDURE NOTES
Arterial Line Procedure Note    Pre-Procedure   Staff -        Anesthesiologist:  Jd Pond MD       Performed By: Anesthesiologist       Location: pre-op       Pre-Anesthestic Checklist: patient identified, IV checked, site marked, risks and benefits discussed, informed consent, monitors and equipment checked, pre-op evaluation and at physician/surgeon's request  Timeout:       Correct Patient: Yes        Correct Procedure: Yes        Correct Site: Yes        Correct Position: Yes   Line Placement:   This line was placed Pre Induction starting at 8/24/2022 3:15 PM and ending at 8/24/2022 3:18 PM  Procedure   Procedure: arterial line       Laterality: right       Insertion Site: radial (Radial).  Sterile Prep        All elements of maximal sterile barrier technique followed       Patient Prep/Sterile Barriers: hand hygiene, sterile gloves, hat, mask, draped, sterile gown, draped       Skin prep: Chloraprep  Insertion/Injection        Technique: Seldinger Technique and ultrasound guided        1. Ultrasound was used to evaluate the access site.       2. Artery evaluated via ultrasound for patency/adequacy.       3. Using real-time ultrasound the needle/catheter was observed entering the artery/vein.       4. Permanent image was captured and entered into the patient's record.       5. The visualized structures were anatomically normal.       6. There were no apparent abnormal pathologic findings.       Catheter Type/Size: 20 gauge, 1.75 in/4.5 cm quick cath (integral wire)  Narrative        Tegaderm dressing used.       Arterial waveform: Yes        IBP within 10% of NIBP: Yes

## 2022-08-24 NOTE — PROGRESS NOTES
[facility] ICU RESPIRATORY NOTE        Date of Admission: 8/24/2022    Date of Intubation (most recent): 8/24/22    Reason for Mechanical Ventilation: Airway Protection.    Number of Days on Mechanical Ventilation: Day 1.     Met Criteria for Spontaneous Breathing Trial: No.    Reason for No Spontaneous Breathing Trial: Recently Intubated.     Significant Events Today: Intubation, exploratory lap.    ABG Results:   Recent Labs   Lab 08/24/22  1612 08/24/22  1541 08/24/22  1311   PH 7.26* 7.16* 7.06*   PCO2 37 37  --    PO2 136* 117*  --    HCO3 17* 13*  --        Current Vent Settings: Vent Mode: CMV/AC  (Continuous Mandatory Ventilation/ Assist Control)  FiO2 (%): 50 %  Resp Rate (Set): 16 breaths/min  Tidal Volume (Set, mL): 480 mL  PEEP (cm H2O): 5 cmH2O  Resp: 23      Skin Assessment: Clean/Dry/Intact.    Plan: Continue to monitor patient on full ventilatory settings.     Jeb Milner, RT on 8/24/2022 at 5:47 PM

## 2022-08-24 NOTE — ED PROVIDER NOTES
History   Chief Complaint:  Shortness of Breath and Chest Pain    The history is provided by a relative (brother).      Diana Santiago is a 71 year old female with history of hypertension, hyperlipidemia, heterozygous thalassemia, CKD, and diabetes insipidus, who presents with shortness of breath, chest discomfort, and upper abdominal pain, worsening since sudden onset last night. She awoke this morning with considerably worsened symptoms so was driven to ED by her brother. While waiting in triage, she become suddenly hypotensive so was transferred to stabilization room. Presently, she is unable to provide good history herself but notes persisting symptoms as noted above along with constipation (normal for her) and denies diarrhea, bloody stool, or urinary changes. She does have history of CKD from lithium use along with diabetes insipidus. She has other history of bipolar disorder. No past abdominal surgeries. She has no significant cardiac history. The patient lives alone and does not drink alcohol, use drugs, or smoke cigarettes. She is not anticoagulated.    Review of Systems   Respiratory: Positive for shortness of breath.    Cardiovascular: Positive for chest pain.   Gastrointestinal: Positive for abdominal pain (upper). Negative for blood in stool and diarrhea.   Genitourinary: Negative.    All other systems reviewed and are negative.    Allergies:  The patient has no known allergies.     Medications:  Norvasc  Allegra  Haldol  Lamictal  Zocor  Symmetrel    Past Medical History:     Anemia in chronic kidney disease  Bipolar disorder  Hypernatremia  Acute kidney failure   Ductal carcinoma in situ  Heterozygous thalassemia  Bipolar affective disorder  Elevated blood sugar  Hyperlipidemia  Hypertension  Meningioma  Hyperparathyroidism  CKD stage 4  Hypertension  Chronic anemia  Lithium toxicity  Nephrogenic diabetes insipidus     Past Surgical History:    Biopsy  Breast surgery  Colonoscopy x 2  Eye  "surgery  Tracheostomy closure  Left hand surgery     Family History:    Hypertension  Heart disease    Social History:  The patient presents to the ED with her brother.  PCP: Gayle Hernandez     Physical Exam     Patient Vitals for the past 24 hrs:   BP Temp Temp src Pulse Resp SpO2 Height Weight   08/24/22 1455 91/63 -- -- 80 23 (!) 83 % -- --   08/24/22 1450 101/66 -- -- 80 23 100 % -- --   08/24/22 1445 95/58 -- -- 80 20 99 % -- --   08/24/22 1440 98/68 -- -- 80 21 (!) 78 % -- --   08/24/22 1435 -- -- -- 82 (!) 35 (!) 78 % -- --   08/24/22 1425 137/71 -- -- 84 29 99 % -- --   08/24/22 1415 (!) 107/24 -- -- 78 24 (!) 89 % -- --   08/24/22 1410 (!) 70/48 -- -- 81 (!) 37 (!) 86 % -- --   08/24/22 1405 (!) 67/46 -- -- 84 (!) 33 -- -- --   08/24/22 1400 (!) 78/57 -- -- 82 (!) 36 -- -- --   08/24/22 1355 (!) 79/54 -- -- -- -- -- -- --   08/24/22 1340 (!) 69/58 -- -- 87 23 (!) 74 % -- --   08/24/22 1330 (!) 70/50 -- -- 86 30 (!) 71 % -- --   08/24/22 1317 -- -- -- -- -- 92 % -- --   08/24/22 1315 101/53 -- -- 90 28 (!) 82 % -- --   08/24/22 1305 103/71 -- -- 96 -- -- -- --   08/24/22 1300 (!) 66/37 -- -- 90 -- 94 % -- --   08/24/22 1245 (!) 110/95 99  F (37.2  C) Temporal 103 16 93 % 1.626 m (5' 4\") 54.4 kg (120 lb)   08/24/22 1243 (!) 88/70 -- -- 90 -- 97 % -- --     Physical Exam  SKIN: Distal limbs with tactile cool temperature.  Skin is mottled.  No jaundice.  No ecchymoses of the torso.  HEMATOLOGIC/IMMUNOLOGIC/LYMPHATIC: Generalized pallor.  No pitting edema of the extremities.  HENT:  Moist oral mucosa.  No jugular venous distention.  EYES:  Conjunctivae normal.  Anicteric.  CARDIOVASCULAR:  Regular rate and rhythm.  No murmur.  No rub.  Weak distal peripheral pulses, radial and dorsal pedal.  RESPIRATORY:  No respiratory distress, breath sounds equal and normal.  GASTROINTESTINAL: Distended diffusely tender abdomen to palpation and percussion, absent bowel sounds, no palpable mass.  MUSCULOSKELETAL: Normal " body habitus.  NEUROLOGIC:  Alert, conversant.  PSYCHIATRIC:  Normal mood.    Emergency Department Course   ECG  ECG taken at 1248, ECG read at 1256  Sinus tachycardia  Right axis deviation  Pulmonary disease pattern  Abnormal ECG   No significant change as compared to prior, dated 3/2/22.  Rate 103 bpm. SD interval 182 ms. QRS duration 100 ms. QT/QTc 332/434 ms. P-R-T axes 61 156 81.     Imaging:  CT Chest Abdomen Pelvis w/o Contrast   Final Result   IMPRESSION:   1.  Large volume pneumoperitoneum, as seen on same-day chest   radiograph, favor source from the anterior distal stomach/proximal   duodenum. Findings could represent perforated ulcer.   2.  Small volume free fluid without walled-off, drainable collection   at this time.   3.  Small bilateral pleural effusions with bibasilar atelectasis.   4.  Incidental indeterminate renal lesions, recommend further   evaluation with renal protocol CT or MRI on a nonemergent basis.   5.  Intrathoracic goiter with distinct 1.8 cm left thyroid nodule,   recommend further evaluation with ultrasound on a nonemergent basis if   not previously performed.      LEONARDO PENNINGTON MD            SYSTEM ID:  [id number]      XR Chest Port 1 View   Final Result   Abnormal   IMPRESSION: Large volume pneumoperitoneum with resultant elevation of   the diaphragm. Low lung volumes with bibasilar opacities, favor   atelectasis. Cardiac silhouette is largely obscured but appears   similar in size to prior CT. No definite pneumothorax. No acute bony   abnormality.      [Critical Result: Unexplained pneumoperitoneum]      Finding was identified on 8/24/2022 1:29 PM.       Dr. Trinh was contacted by me on 8/24/2022 1:30 PM and verbalized   understanding of the critical result.       LEONARDO PENNINGTON MD            SYSTEM ID:  [id number]        Report per radiology    Laboratory:  Labs Ordered and Resulted from Time of ED Arrival to Time of ED Departure   COMPREHENSIVE METABOLIC PANEL - Abnormal        Result Value    Sodium 134      Potassium 6.2 (*)     Chloride 108      Carbon Dioxide (CO2) 7 (*)     Anion Gap 19 (*)     Urea Nitrogen 75 (*)     Creatinine 3.51 (*)     Calcium 10.4 (*)     Glucose 153 (*)     Alkaline Phosphatase 88      AST 24      ALT 29      Protein Total 6.6 (*)     Albumin 2.4 (*)     Bilirubin Total 0.9      GFR Estimate 13 (*)    LIPASE - Abnormal    Lipase 511 (*)    LACTIC ACID WHOLE BLOOD - Abnormal    Lactic Acid 9.0 (*)    CBC WITH PLATELETS AND DIFFERENTIAL - Abnormal    WBC Count 3.6 (*)     RBC Count 5.63 (*)     Hemoglobin 11.6 (*)     Hematocrit 39.8      MCV 71 (*)     MCH 20.6 (*)     MCHC 29.1 (*)     RDW 18.7 (*)     Platelet Count 546 (*)    ISTAT GASES LACTATE VENOUS POCT - Abnormal    Lactic Acid POCT 8.6 (*)     Bicarbonate Venous POCT 6 (*)     O2 Sat, Venous POCT 95      pCO2V Venous POCT 22 (*)     pH Venous POCT 7.06 (*)     pO2 Venous POCT 105 (*)    DIFFERENTIAL - Abnormal    % Neutrophils 42      % Lymphocytes 46      % Monocytes 2      % Eosinophils 0      % Basophils 0      % Metamyelocytes 6      % Myelocytes 4      NRBCs per 100 WBC 3 (*)     Absolute Neutrophils 1.5 (*)     Absolute Lymphocytes 1.7      Absolute Monocytes 0.1      Absolute Eosinophils 0.0      Absolute Basophils 0.0      Absolute Metamyelocytes 0.2 (*)     Absolute Myelocytes 0.1 (*)     Absolute NRBCs 0.1 (*)     RBC Morphology Confirmed RBC Indices      Platelet Assessment Platelets Clumped (*)     Elliptocytes Slight (*)     Polychromasia Slight (*)    TROPONIN I - Normal    Troponin I High Sensitivity 25     INFLUENZA A/B & SARS-COV2 PCR MULTIPLEX - Normal    Influenza A PCR Negative      Influenza B PCR Negative      RSV PCR Negative      SARS CoV2 PCR Negative     ROUTINE UA WITH MICROSCOPIC REFLEX TO CULTURE   TYPE AND SCREEN, ADULT   BLOOD CULTURE   BLOOD CULTURE   ABO/RH TYPE AND SCREEN     Procedures      Central Line Placement     Procedure:  Central Venous Catheter Insertion      Indication: Vasopressor administration    Consent:  Verbal from Patient    Risk Discussed: Arterial puncture & stroke, incorrect placement, failure to place, bleeding or infection, nerve damage and alternative treatment with no central line    Universal Protocol: Universal protocol was followed and time out conducted just prior to starting procedure, confirming patient identity, site/side, procedure, patient position, and availability of correct equipment and implants.     Anesthesia/Sedation: Lidocaine - 1%    Procedure Detail:    Central line bundle elements were complete including preparatory hand leansing, donning full barrier precautions, use of a full body drape and chlorhxidine gluconate skin prep.   The Left internal femoral vein was selected as the optimal location for patient s condition.   Ultrasound was utilized for guidance: Yes, see separate procedural guidance POCUS note   A finder needle was used to enter the vein, through which a guidewire was inserted. The finder needle was then removed and the tract was dilated.   A triple lumen central venous catheter was inserted over the guidewire without difficulty. The guidewire was removed and the catheter was sutured in place and covered with an appropriate dressing.       Patient Status:  The patient tolerated the procedure well: Yes. There were no complications.    Emergency Department Course:     Reviewed:  I reviewed nursing notes, vitals, past medical history and Care Everywhere.    Assessments:  1305 I obtained history and examined the patient as noted above.   1324 I rechecked the patient and explained findings.  1402 I rechecked the patient.  1340 I rechecked the patient and explained findings. I discussed plan for central line placement and all are in agreement.  1424 I rechecked the patient and performed central line placement (see procedure note above).  1458 I rechecked the patient.    Consults:  1331 I spoke with radiology.  1333 I  consulted with Dr. Vidal, general surgery, regarding the patient's history and presentation here in the emergency department.  1410 I spoke with Dr. Vidal, general surgery, who accepted the patient for admission to OR.    Interventions:  Medications   norepinephrine (LEVOPHED) 4 mg in  mL infusion PREMIX (0.3 mcg/kg/min × 54.4 kg Intravenous Rate/Dose Change 8/24/22 1441)   0.9% sodium chloride BOLUS (1,000 mLs Intravenous New Bag 8/24/22 1317)   piperacillin-tazobactam (ZOSYN) 4.5 g vial to attach to  mL bag (0 g Intravenous Stopped 8/24/22 1442)   0.9% sodium chloride BOLUS (1,000 mLs Intravenous New Bag 8/24/22 1408)   phenylephrine (SOURAV-SYNEPHRINE) 100 mcg/mL injection (200 mcg  Given 8/24/22 1357)   calcium gluconate 1 g in 50 mL sodium chloride intermittent infusion (premix) (1 g Intravenous Given 8/24/22 1414)   fentaNYL (PF) (SUBLIMAZE) 100 MCG/2ML injection (100 mcg  Given 8/24/22 1441)     Disposition:  The patient was transferred to the OR under the care of Dr. Vidal.    Impression & Plan     CMS Diagnoses:   The patient has signs of Severe Sepsis        If one the following conditions is present, a 30 mL/kg bolus is recommended as part of the 6 hour bundle (IBW can be used for BMI >30, or document refusal/contraindication):      1.   Initial hypotension  defined as 2 bps < 90 or map < 65 in the 6hrs before or 3hrs after time zero.     2.  Lactate >4.      The patient has signs of Severe Sepsis as evidenced by:    1. 2 SIRS criteria, AND  2. Suspected infection, AND   3. Organ dysfunction:  SBP <90, MAP < 65, or SBP decrease of >40 from baseline due to infection, Lactic Acidosis with value >2.0 and ARF with Cr >2 due to infection    Time severe sepsis diagnosis confirmed: 1307  08/24/22 as this was the time when Lactate resulted, and the level was > 2.0    3 Hour Severe Sepsis Bundle Completion:    1. Initial Lactic Acid Result:   Recent Labs   Lab Test 08/24/22  1311 08/24/22  1307   LACT  "8.6* 9.0*     2. Blood Cultures before Antibiotics: No, antibiotics were started prior to BCx collection b/c waiting for BCx to be collected would have been detrimental to the patient  3. Broad Spectrum Antibiotics Administered:  yes       Anti-infectives (From admission through now)    Start     Dose/Rate Route Frequency Ordered Stop    08/24/22 1320  piperacillin-tazobactam (ZOSYN) 4.5 g vial to attach to  mL bag        Note to Pharmacy: For [facility], [facility] and [facility] system: For Zosyn-naive patients, use the \"Zosyn initial dose + extended infusion\" order panel.    4.5 g  over 30 Minutes Intravenous ONCE 08/24/22 1317 08/24/22 1442          4. Is initial hypotension present?     Yes. (Definition - 2 SBPs <90, MAP <65, or decrease > 40 from baseline due to infection w/in 3 hrs of each other during the time period of 6 hrs before and 3 hrs after time zero)   Full 30 mL/kg bolus given (see amount below).    BMI Readings from Last 1 Encounters:   08/24/22 20.60 kg/m      30 mL/kg fluids based on weight: 1,630 mL  30 mL/kg fluids based on IBW (must be >= 60 inches tall): 1,640 mL                      Severe Sepsis reassessment:  1. Repeat Lactic Acid Level within 6 hours of time zero: Pending  2. MAP>65 after initial IVF bolus, will continue to monitor fluid status and vital signs    I attest to having performed a repeat sepsis exam and assessment of perfusion at 1440 and the results demonstrate no change.    Medical Decision Making:  This patient presents with intense pain at the epigastrium.  Arrived hypotensive.  Considered a host of etiologies.  Single view chest x-ray revealed significant subdiaphragmatic air.  Concerning for perforated viscus.  Noncontrast CT scan performed given the patient's renal impairment.  This suggestive of a upper GI perforation via ulcer of the stomach or duodenum.  Resuscitative efforts were initiated as soon as the patient was roomed including IV access with IV fluids.  Administered push dose of " Romaine-Synephrine to improve her blood pressure for the CT scan.  Broad-spectrum antibiotic administered with concern of septic shock.  Patient's blood pressure was labile despite initial resuscitative efforts so left femoral central line procedure performed in case the patient requires vasopressor support.  Maintain her airway and breathing not declining during her time in the ED. blood pressure stabilized prior to OR transfer.    Critical Care Time: was 60 minutes for this patient excluding procedures    Diagnosis:    ICD-10-CM    1. Perforated viscus  R19.8 Case Request: Exploratory laparotomy, possible bowel resection, possible colostomy     Case Request: Exploratory laparotomy, possible bowel resection, possible colostomy   2. Septic shock (H)  A41.9     R65.21      Scribe Disclosure:  IHowie Sreenandhreddy, am serving as a scribe at 1:06 PM on 8/24/2022 to document services personally performed by Sathya Trinh MD based on my observations and the provider's statements to me.     Rachle CORONADO, am serving as a scribe at 3:05 PM on 8/24/2022 to document services personally performed by Sathya Trinh MD based on my observations and the provider's statements to me.       Sahtya Trinh MD  08/24/22 0766

## 2022-08-24 NOTE — ANESTHESIA CARE TRANSFER NOTE
Patient: Diana Santiago    Procedure: Procedure(s):  Exploratory laparotomy, REPAIR OF PERFORATED ULCER       Diagnosis: Perforated viscus [R19.8]  Diagnosis Additional Information: No value filed.    Anesthesia Type:   No value filed.     Note:    Oropharynx: endotracheal tube in place and nasal gatric tube in place  Level of Consciousness: iatrogenic sedation      Independent Airway: airway patency satisfactory and stable  Dentition: dentition unchanged  Vital Signs Stable: post-procedure vital signs reviewed and stable  Report to RN Given: handoff report given  Patient transferred to: ICU  Comments: Transferred to ICU, ETT in place, ambu bag with 10L oxygen for transport, all monitors and alarms on for transport, IV/lines patent and drips continued per anesthesia record.  Placed on ventilator per RT in ICU. Placed on ICU monitors per ICU RN, alarms on, VSS.  Report to ICU RN.   ICU Handoff: Call for PAUSE to initiate/utilize ICU HANDOFF, Identified Patient, Identified Responsible Provider, Reviewed the Pertinent Medical History, Discussed Surgical Course, Reviewed Intra-OP Anesthesia Management and Issues during Anesthesia, Set Expectations for Post Procedure Period and Allowed Opportunity for Questions and Acknowledgement of Understanding      Vitals:  Vitals Value Taken Time   /95 08/24/22 1702   Temp     Pulse 80 08/24/22 1705   Resp 15 08/24/22 1705   SpO2 31 % 08/24/22 1702   Vitals shown include unvalidated device data.    Electronically Signed By: KRUNAL Evans CRNA  August 24, 2022  5:06 PM

## 2022-08-24 NOTE — ANESTHESIA PREPROCEDURE EVALUATION
Anesthesia Pre-Procedure Evaluation    Patient: Diana Santiago   MRN: [id number] : 1950        Procedure : Procedure(s):  Exploratory laparotomy, REPAIR OF PERFORATED ULCER          Past Medical History:   Diagnosis Date     Benign essential hypertension 2018    added norvasc      Bipolar affective disorder (H)     hosp 1993, Dr. Aftab Deal     Cancer (H)     DCIS, left breast     Chronic kidney disease, stage 3a (H)     seen by renal Dr. Cedeno in , renal us cysts     DCIS (ductal carcinoma in situ)     xrt and lumpectomy x 4     Depressive disorder 1968     Diabetes (H)     Diabetes insipidus     Diabetes insipidus (H) 2018    elev sodium, likely due to lithium     Elevated blood sugar      Fractured femoral neck (H)      Hx of colonoscopy     tics and hem     Hypercalcemia 2017     Hypercholesteremia      Hyperparathyroidism (H) 2017     Lithium toxicity 2021    hosp fsd     Nephrogenic diabetes insipidus (H) 2021    felt due to lithium     Thalassaemia trait      Vitamin D deficiency       Past Surgical History:   Procedure Laterality Date     BIOPSY      left breast     BREAST SURGERY      lumpectomy x 4     COLONOSCOPY       COLONOSCOPY N/A 2022    Procedure: COLONOSCOPY, WITH POLYPECTOMY AND BIOPSY;  Surgeon: Panchito Gu MD;  Location:  GI     EYE SURGERY  2018    retina tear     left hand surgery      last       Allergies   Allergen Reactions     No Known Allergies       Social History     Tobacco Use     Smoking status: Never Smoker     Smokeless tobacco: Never Used   Substance Use Topics     Alcohol use: No      Wt Readings from Last 1 Encounters:   22 54.4 kg (120 lb)        Anesthesia Evaluation   Pt has had prior anesthetic.     No history of anesthetic complications       ROS/MED HX  ENT/Pulmonary:    (-) sleep apnea and recent URI   Neurologic:    (-) no seizures, no CVA and migraines   Cardiovascular:     (+)  Dyslipidemia hypertension----- (-) CHF, orthopnea/PND and arrhythmias   METS/Exercise Tolerance:     Hematologic: Comments: Thalassemia trait    (+) anemia,     Musculoskeletal:       GI/Hepatic: Comment: Presented to ER with abdominal pain/ hypotension. Initial Lactate 9.     Perforated viscus   (-) GERD   Renal/Genitourinary:     (+) renal disease (stage 4 chronic kidney disease), type: CRI,     Endo: Comment: Nephrogenic Diabetes insipidus  Hyperparathyroidism  hypercalcemia   (-) thyroid disease   Psychiatric/Substance Use:     (+) psychiatric history bipolar     Infectious Disease:       Malignancy:   (+) Malignancy, History of Breast.    Other:            Physical Exam    Airway        Mallampati: II   TM distance: > 3 FB   Neck ROM: full   Mouth opening: > 3 cm    Respiratory Devices and Support         Dental  no notable dental history         Cardiovascular   cardiovascular exam normal       Rhythm and rate: regular and normal     Pulmonary   pulmonary exam normal        breath sounds clear to auscultation           OUTSIDE LABS:  CBC:   Lab Results   Component Value Date    WBC 3.6 (L) 08/24/2022    WBC 5.6 03/17/2022    HGB 8.2 (L) 08/24/2022    HGB 9.5 (L) 08/24/2022    HCT 24 (L) 08/24/2022    HCT 28 (L) 08/24/2022     (H) 08/24/2022     03/17/2022     BMP:   Lab Results   Component Value Date     08/24/2022     08/24/2022    POTASSIUM 4.7 08/24/2022    POTASSIUM 5.0 08/24/2022    CHLORIDE 108 08/24/2022    CHLORIDE 114 (H) 03/17/2022    CO2 7 (LL) 08/24/2022    CO2 22 03/17/2022    BUN 75 (H) 08/24/2022    BUN 48 (H) 03/17/2022    CR 3.51 (H) 08/24/2022    CR 2.24 (H) 03/17/2022    GLC 94 08/24/2022    GLC 80 08/24/2022     COAGS:   Lab Results   Component Value Date    INR 1.13 08/24/2022     POC: No results found for: BGM, HCG, HCGS  HEPATIC:   Lab Results   Component Value Date    ALBUMIN 2.4 (L) 08/24/2022    PROTTOTAL 6.6 (L) 08/24/2022    ALT 29 08/24/2022    AST 24  08/24/2022    ALKPHOS 88 08/24/2022    BILITOTAL 0.9 08/24/2022     OTHER:   Lab Results   Component Value Date    PH 7.26 (L) 08/24/2022    LACT 8.6 (HH) 08/24/2022    A1C 6.1 (H) 12/21/2020    ISSA 10.4 (H) 08/24/2022    LIPASE 511 (H) 08/24/2022    TSH 0.81 11/08/2021       Anesthesia Plan    ASA Status:  4, emergent    NPO Status:  ELEVATED Aspiration Risk/Unknown    Anesthesia Type: General.     - Airway: ETT   Induction: Etomidate.   Maintenance: Balanced.   Techniques and Equipment:     - Airway: Video-Laryngoscope     - Lines/Monitors: Arterial Line     Consents    Anesthesia Plan(s) and associated risks, benefits, and realistic alternatives discussed. Questions answered and patient/representative(s) expressed understanding.    - Discussed:     - Discussed with:  Patient, Other (See Comment) (brother)      - Extended Intubation/Ventilatory Support Discussed: Yes.    Use of blood products discussed: Yes.     - Discussed with: Patient.     - Consented: consented to blood products            Reason for refusal: other.     Postoperative Care    Pain management: IV analgesics.        Comments:    Other Comments: Discussed with patient and brother. Patient would go to ICU intubated/ sedated and placed on mechanical ventilation, Discussed with them patient may need dialysis before leaving the ICU.     Patient picked up ER with 2 AC IV's. Left femoral vein triple lumen cathter with levophed running at 0.28 mcg/ kg/min.             Ron Tariq MD

## 2022-08-24 NOTE — H&P
[facility]  History and Physical   General Surgery  Ron Vidal MD, MD       Diana Santiago MRN# [id number]   YOB: 1950 Age: 71 year old      Date of Admission:  8/24/2022         Assessment and Plan:   Critically ill 71-year-old female with probable perforated viscus.  Expect that a perforated duodenal ulcer is the most likely cause but other possibilities would include occult malignancy, perforated diverticulitis.  Plan for emergent exploratory laparotomy.  Patient is consented for bowel resection and possible colostomy if necessary.  Numerous severe lab derangements will make the procedure risky, but I feel surgery is absolutely mandatory.  The situation and the case were discussed with the patient and her brother and they agree to proceed.  Patient is being resuscitated as well as possible, central venous access is being obtained, electrolytes are being optimized, and we will proceed to soon as possible.         Code Status:   Patient is full code during the procedure         Primary Care Physician:   Gayle Hernandez [phone]         Chief Complaint:   Chest and abdominal pain, shortness of breath    History is obtained from the patient         History of Present Illness:     Diana Santiago is a 71 year old female with history of stage IIIb chronic kidney disease who presents with shortness of breath and chest pain. Patient's brother reports that last night the patient had sudden onset of shortness of breath and upper abdominal pain with chest pain and pressure. She was brought into the ER triage room and she had hypotension. She was then brought into stabilization. Patient's brother denies heart problems, lung problems, and abdominal surgeries.  Patient is a non-smoker, nondrinker.  Brother reports that the patient has bipolar disorder. He reports that the patient had sprained her foot and shoulder from a fall months ago. Patient reports that she has no chest pain  right now but she does feel constipated sometimes and trouble breathing. Patient denies bowel movement, diarrhea, and previous abdominal surgeries.           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             Past Surgical History:   Diana Santiago  has a past surgical history that includes Breast surgery; left hand surgery; biopsy (1994); colonoscopy (2021); Eye surgery (2018); and Colonoscopy (N/A, 6/17/2022).         Home Medications:     Prior to Admission medications    Medication Sig Last Dose Taking? Auth Provider Long Term End Date   ACE/ARB/ARNI NOT PRESCRIBED (INTENTIONAL) Please choose reason not prescribed from choices below.   Gayle Hernandez MD Yes    amantadine (SYMMETREL) 50 MG/5ML syrup Take 2.5 mLs (25 mg) by mouth daily Take 2.5 mLs by mouth daily.  Patient not taking: Reported on 7/15/2022   Gayle Hernandez MD     amLODIPine (NORVASC) 5 MG tablet Take 1 tablet (5 mg) by mouth daily   Gayle Hernandez MD Yes    fexofenadine (ALLEGRA) 180 MG tablet Take 1 tablet by mouth daily.   Suleiman Miller MD     haloperidol (HALDOL) 2 MG tablet TAKE 1 TABLET BY MOUTH EVERYDAY AT BEDTIME   Reported, Patient     lamoTRIgine (LAMICTAL) 100 MG tablet Take 100 mg by mouth daily   Reported, Patient Yes    simvastatin (ZOCOR) 40 MG tablet TAKE 1 TABLET AT BEDTIME   Gayle Hernandez MD Yes             Allergies:     Allergies   Allergen Reactions      "No Known Allergies             Social History:   Diana Santiago  reports that she has never smoked. She has never used smokeless tobacco. She reports that she does not drink alcohol and does not use drugs.            Family History:   Diana Santiago family history includes Breast Cancer in her maternal aunt and another family member; Cerebrovascular Disease in her mother; Hyperlipidemia in her niece; Hypertension in her father; Sleep Apnea in her brother.           Review of Systems:   The 10 point Review of Systems is negative other than noted in the HPI.           Physical Exam:   Blood pressure 103/71, pulse 96, temperature 99  F (37.2  C), temperature source Temporal, resp. rate 16, height 1.626 m (5' 4\"), weight 54.4 kg (120 lb), SpO2 92 %, not currently breastfeeding.  120 lbs 0 oz    Critically ill-appearing female in no distress.  Patient h is very anxious but processes information and answers questions appropriately.  Pupils equal round and reactive to light.   No cervical lymphadenopathy or thyromegaly.   Lung fields clear, breathing tachypneic  Heart rhythm consistent with sinus tachycardia.  Blood pressure has stabilized in the low 110s systolic.  Abdomen mildly firm distended, obvious peritoneal signs.  No obvious surgical scars  Skin cool, mottled.  Delayed capillary refill.           Data:   All new lab and imaging data was reviewed.  Chest x-ray and CT scan confirmed pneumoperitoneum.  CT scan suggests upper GI source.  Recent Labs   Lab Test 08/24/22  1307 08/12/22  1010 04/15/22  1003 03/17/22  0927   WBC 3.6*  --   --  5.6   HGB 11.6* 9.2*   < > 9.3*   MCV 71*  --   --  74*   *  --   --  333    < > = values in this interval not displayed.      Recent Labs   Lab Test 08/24/22  1307 03/17/22  0927    144   POTASSIUM 6.2* 4.4   CHLORIDE 108 114*   CO2 7* 22   BUN 75* 48*   CR 3.51* 2.24*   ANIONGAP 19* 8   ISSA 10.4* 9.6   * 135*                      "

## 2022-08-24 NOTE — H&P
Critical Care  Note      08/24/2022    Name: Diana Santiago MRN#: [id number]   Age: 71 year old YOB: 1950     Hsptl Day# 0  ICU DAY #1    MV DAY #1             Problem List:   Active Problems:    Perforated viscus    Septic shock (H)           Summary/Hospital Course:   Diana Santiago is a 71 year old female with history of hypertension, hyperlipidemia, heterozygous thalassemia, CKD, and diabetes insipidus, who presents with shortness of breath, chest discomfort, and upper abdominal pain, worsening since sudden onset last night. She awoke this morning with considerably worsened symptoms so was driven to ED by her brother. While waiting in triage, she become suddenly hypotensive so was transferred to stabilization room. Presently, she is unable to provide good history herself but notes persisting symptoms as noted above along with constipation (normal for her) and denies diarrhea, bloody stool, or urinary changes. She does have history of CKD from lithium use along with diabetes insipidus. She has other history of bipolar disorder. No past abdominal surgeries. She has no significant cardiac history. The patient lives alone and does not drink alcohol, use drugs, or smoke cigarettes. She is not anticoagulated.  She presented today to Ed with shortness of breath, abdominal pain along with chest pain and hypotension diagnosed to have perorated viscus based on the Xray with air under diaphragm nad CT scan that confirmed the  Findings of pneumoperitoneum , She underwent emergency exploratory laparotomy with finding of perforated peptic ulcer that repaired with my Patch and patient shifted to ICU post op intubated for Monitoring and management of septic shock as she is on dual pressors (norepinephrine 0.28 and vasopressin 4) ans sedated with propofol .         Assessment and plan :     Diana Santiago IS a 71 year old female admitted on 8/24/2022 with diagnosis of perforated viscus ad underwent laparotomy with  repair of perforated peptic ulcer then shifted to ICU post op intubated for monitoring of septic shock   I have personally reviewed the daily labs, imaging studies, cultures and discussed the case with referring physician and consulting physicians.     My assessment and plan by system for this patient is as follows:    Neurology/Psychiatry:   1. Sedated   2. Pain/analgesia  Plan  For neurological monitoring      Cardiovascular:   1.Hemodynamics - on dual pressors norepinephrine 0.28 and vasopressin 4   2.Rhythm - sinus   Plan  For hemodynamic monitoring and titration of pressors to keep MAP > 65 mmHg     Pulmonary/Ventilator Management:   1. Airway intubated  2. Oxygenation/ventilation/mechanics  Vent Mode: CMV/AC  (Continuous Mandatory Ventilation/ Assist Control)  FiO2 (%): 50 %  Resp Rate (Set): 16 breaths/min  Tidal Volume (Set, mL): 480 mL  PEEP (cm H2O): 5 cmH2O  Resp: 23    Plan  -  Wean of ventilator as tolerated    GI and Nutrition :   1. NPO  Plan  -Keep npo and keep drain to low intermittent suction     Renal/Fluids/Electrolytes:   1. Chronic kidney disease   2. Electrolyte abnormality  3. Acid/base status  4. Volume status  Plan  - Monitor urine output   - monitor function and electrolytes as needed with replacement per ICU protocols. - generally avoid nephrotoxic agents such as NSAID, IV contrast unless specifically required  - adjust medications as needed for renal clearance    Infectious Disease:   1. Septic shock   2.WBC: 8.1   3.source control with laparotomy and repair of peroration , blood culture sent   4- on Zosyn  Plan  - wean of pressors as tolerated  - Continue on zosyn   - Follow blood culture     Endocrine:   1. Stress induced hyperglycemia  Plan  - ICU insulin protocol, goal sugar <180    Hematology/Oncology:   1. Hgb 9.5  2. Anemia, no signs, symptoms of active blood loss  3. DAVI drain looks serosanguinous   Plan  - Continue Hgb monitoring      IV/Access:   1. Venous access - 2 peripheral  , 1 central    2. Arterial access - arterial line x1   3.  Plan  - central access required and necessary      ICU Prophylaxis:   1. DVT: mechanical  2. VAP: HOB 30 degrees, chlorhexidine rinse  3. Stress Ulcer: PPI/H2 blocker  4. Restraints: Nonviolent soft two point restraints required and necessary for patient safety and continued cares and good effect as patient continues to pull at necessary lines, tubes despite education and distraction. Will readdress daily.   5. Wound care  -  To be assessed daily   6. Feeding - NPO   7. Family Update: contacted , no answer   8. Disposition - ICU         Key goals for next 24 hours:   1. Titrate norepinephrine and vasopressin to keep MAP> 65 mmHg  2. Correct electrolytes and acidosis  3. Wean of sedation as she tolerates   4. Follow blood culture and continue on antibiotics   5- Monitor Urine output                Medical History:     Past Medical History:   Diagnosis Date     Benign essential hypertension 09/2018    added norvasc 9/18     Bipolar affective disorder (H)     hosp 1993, Dr. Aftab Deal     Cancer (H) 1996    DCIS, left breast     Chronic kidney disease, stage 3a (H)     seen by renal Dr. 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    felt due to lithium     Thalassaemia trait      Vitamin D deficiency      Past Surgical History:   Procedure Laterality Date     BIOPSY  1994    left breast     BREAST SURGERY      lumpectomy x 4     COLONOSCOPY  2021     COLONOSCOPY N/A 6/17/2022     Procedure: COLONOSCOPY, WITH POLYPECTOMY AND BIOPSY;  Surgeon: Panchito Gu MD;  Location:  GI     EYE SURGERY  2018    retina tear     left hand surgery      last 1974     Social History     Socioeconomic History     Marital status: Single     Spouse name: Not on file     Number of children: 0     Years of education: Not on file     Highest education level: Not on file   Occupational History     Occupation: , retired   Tobacco Use     Smoking status: Never Smoker     Smokeless tobacco: Never Used   Substance and Sexual Activity     Alcohol use: No     Drug use: No     Sexual activity: Not Currently     Partners: Male     Birth control/protection: Post-menopausal, None   Other Topics Concern     Parent/sibling w/ CABG, MI or angioplasty before 65F 55M? No   Social History Narrative     Not on file     Social Determinants of Health     Financial Resource Strain: Not on file   Food Insecurity: Not on file   Transportation Needs: Not on file   Physical Activity: Not on file   Stress: Not on file   Social Connections: Not on file   Intimate Partner Violence: Not on file   Housing Stability: Not on file        Allergies   Allergen Reactions     No Known Allergies               Key Medications:       acetaminophen  975 mg Oral Q8H     [START ON 8/25/2022] enoxaparin ANTICOAGULANT  30 mg Subcutaneous Q24H     famotidine  20 mg Oral BID    Or     famotidine  20 mg Intravenous BID     [START ON 8/25/2022] polyethylene glycol  17 g Oral Daily     senna-docusate  1 tablet Oral BID     sodium chloride (PF)  3 mL Intracatheter Q8H       lactated ringers       norepinephrine 0.28 mcg/kg/min (08/24/22 1645)     sodium chloride          Home Meds  No current facility-administered medications on file prior to encounter.  amLODIPine (NORVASC) 5 MG tablet, Take 1 tablet (5 mg) by mouth daily  haloperidol (HALDOL) 2 MG tablet, TAKE 1 TABLET BY MOUTH EVERYDAY AT BEDTIME  simvastatin (ZOCOR) 40 MG tablet, TAKE 1  TABLET AT BEDTIME  ACE/ARB/ARNI NOT PRESCRIBED (INTENTIONAL), Please choose reason not prescribed from choices below.  fexofenadine (ALLEGRA) 180 MG tablet, Take 1 tablet by mouth daily.  lamoTRIgine (LAMICTAL) 100 MG tablet, Take 100 mg by mouth daily               Physical Examination:   Temp:  [99  F (37.2  C)] 99  F (37.2  C)  Pulse:  [] 80  Resp:  [16-37] 23  BP: ()/(24-95) 91/63  FiO2 (%):  [50 %] 50 %  SpO2:  [71 %-100 %] 99 %    Intake/Output Summary (Last 24 hours) at 8/24/2022 1729  Last data filed at 8/24/2022 1639  Gross per 24 hour   Intake 3200 ml   Output --   Net 3200 ml     Wt Readings from Last 4 Encounters:   08/24/22 54.4 kg (120 lb)   07/17/22 55.3 kg (122 lb)   04/22/22 59.9 kg (132 lb)   03/21/22 64 kg (141 lb)     BP - Mean:  [] 74  Vent Mode: CMV/AC  (Continuous Mandatory Ventilation/ Assist Control)  FiO2 (%): 50 %  Resp Rate (Set): 16 breaths/min  Tidal Volume (Set, mL): 480 mL  PEEP (cm H2O): 5 cmH2O  Resp: 23    Recent Labs   Lab 08/24/22  1612 08/24/22  1541 08/24/22  1311   PH 7.26* 7.16* 7.06*   PCO2 37 37  --    PO2 136* 117*  --    HCO3 17* 13*  --        GEN: no acute distress   HEENT: head ncat, sclera anicteric, OP patent, trachea midline   PULM: unlabored synchronous with vent, clear posteriorly   CV/COR: RRR S1S2 no gallop,  No rub, no murmur  ABD: Midline laparotomy wound,soft distended, absent bowel sounds, no mass  EXT:  Warm with palpable pulse   NEURO: sedated  SKIN: ecchymosis on left thigh  LINES: clean, dry intact         Data:   All data and imaging reviewed     ROUTINE ICU LABS (Last four results)  CMP  Recent Labs   Lab 08/24/22  1710 08/24/22  1612 08/24/22  1545 08/24/22  1541 08/24/22  1307   NA  --  141  --  138 134   POTASSIUM  --  4.7  --  5.0 6.2*   CHLORIDE  --   --   --   --  108   CO2  --   --   --   --  7*   ANIONGAP  --   --   --   --  19*   GLC 94  --  80  --  153*   BUN  --   --   --   --  75*   CR  --   --   --   --  3.51*    GFRESTIMATED  --   --   --   --  13*   ISSA  --   --   --   --  10.4*   PROTTOTAL  --   --   --   --  6.6*   ALBUMIN  --   --   --   --  2.4*   BILITOTAL  --   --   --   --  0.9   ALKPHOS  --   --   --   --  88   AST  --   --   --   --  24   ALT  --   --   --   --  29     CBC  Recent Labs   Lab 08/24/22  1612 08/24/22  1541 08/24/22  1307   WBC  --   --  3.6*   RBC  --   --  5.63*   HGB 8.2* 9.5* 11.6*   HCT 24* 28* 39.8   MCV  --   --  71*   MCH  --   --  20.6*   MCHC  --   --  29.1*   RDW  --   --  18.7*   PLT  --   --  546*     INR  Recent Labs   Lab 08/24/22  1311   INR 1.13     Arterial Blood Gas  Recent Labs   Lab 08/24/22  1612 08/24/22  1541 08/24/22  1311   PH 7.26* 7.16* 7.06*   PCO2 37 37  --    PO2 136* 117*  --    HCO3 17* 13*  --        All cultures:  No results for input(s): CULT in the last 168 hours.  Recent Results (from the past 24 hour(s))   XR Chest Port 1 View   Result Value    Radiologist flags Unexplained pneumoperitoneum (AA)    Narrative    XR CHEST PORT 1 VIEW   8/24/2022 1:20 PM     HISTORY: short of breath    COMPARISON: Chest radiograph 3/2/2022      Impression    IMPRESSION: Large volume pneumoperitoneum with resultant elevation of  the diaphragm. Low lung volumes with bibasilar opacities, favor  atelectasis. Cardiac silhouette is largely obscured but appears  similar in size to prior CT. No definite pneumothorax. No acute bony  abnormality.    [Critical Result: Unexplained pneumoperitoneum]    Finding was identified on 8/24/2022 1:29 PM.     Dr. Trinh was contacted by me on 8/24/2022 1:30 PM and verbalized  understanding of the critical result.     LEONARDO PENNINGTON MD         SYSTEM ID:  [id number]   CT Chest Abdomen Pelvis w/o Contrast    Narrative    CT CHEST ABDOMEN PELVIS W/O CONTRAST 8/24/2022 1:57 PM    CLINICAL HISTORY: chest pain, dyspnea, hypotension    TECHNIQUE: CT scan of the chest, abdomen, and pelvis was performed  without IV contrast. Multiplanar reformats were obtained.  Dose  reduction techniques were used.   CONTRAST: None.    COMPARISON: Same-day chest radiograph    FINDINGS:   LUNGS AND PLEURA: Small bilateral pleural effusions, right larger than  left, with associated bibasilar atelectasis. No definite airspace  consolidation.    MEDIASTINUM/AXILLAE: 1 or 2 foci of gas tracking along the  gastroesophageal junction into the mediastinum. No suspicious  lymphadenopathy. Intrathoracic goiter with distinct nodule in the left  upper thyroid measuring up to 1.8 cm (series 2 image 21).    CORONARY ARTERY CALCIFICATION: Severe.    HEPATOBILIARY: Simple-appearing left hepatic cyst, no specific  follow-up recommended. Distended gallbladder with questionable  punctate cholelithiasis. While there is some fluid around the  gallbladder, there is no definite wall thickening or adjacent fat  stranding to indicate acute cholecystitis.     PANCREAS: Normal.    SPLEEN: Normal.    ADRENAL GLANDS: Bilateral adrenal hyperplasia without distinct nodule.    KIDNEYS/BLADDER: No hydronephrosis. Multiple hypodense renal lesions,  most consistent with cysts. However, there are multiple indeterminate  bilateral renal lesions, including relatively hyperdense left upper  pole 1.3 cm lesion (series 2 image 141) and 1.5 cm left lower pole  exophytic soft tissue density lesion (series 2 image 151). Urinary  bladder is decompressed but otherwise unremarkable.    BOWEL: Large volume pneumoperitoneum, as seen on same-day chest  radiograph. There is focal inflammation of the distal stomach and  proximal duodenum with possible anterior wall defect noted at this  level (series 2 image 155). No additional inflamed small bowel or  colon. No evidence of bowel obstruction. Small volume free fluid in  the pelvis without walled off, drainable fluid collection.    LYMPH NODES: Normal.    VASCULATURE: Scattered calcified atherosclerosis. Ectatic distal  thoracic aorta without danna aneurysm.    PELVIC ORGANS: Calcified  uterine leiomyoma.    OTHER: None.    MUSCULOSKELETAL: Old left superior and inferior pubic rami fractures.  No acute bony abnormality.      Impression    IMPRESSION:  1.  Large volume pneumoperitoneum, as seen on same-day chest  radiograph, favor source from the anterior distal stomach/proximal  duodenum. Findings could represent perforated ulcer.  2.  Small volume free fluid without walled-off, drainable collection  at this time.  3.  Small bilateral pleural effusions with bibasilar atelectasis.  4.  Incidental indeterminate renal lesions, recommend further  evaluation with renal protocol CT or MRI on a nonemergent basis.  5.  Intrathoracic goiter with distinct 1.8 cm left thyroid nodule,  recommend further evaluation with ultrasound on a nonemergent basis if  not previously performed.    LEONARDO PENNINGTON MD         SYSTEM ID:  [id number]

## 2022-08-24 NOTE — PROGRESS NOTES
RT called to bedside in the ED due to pt having increased work of breathing. Pt was placed on a 15L oxymask. SpO2 in the low 90's when there is a good waveform. No other RT orders at this time.  8/24/2022  Roula Alonzo, RT

## 2022-08-24 NOTE — PROGRESS NOTES
Critical Care Services Progress Note:  I personally examined and evaluated the patient today. I formulated today s plan with the house staff team or resident(s) and agree with the findings and plan in the associated note (see separately attested resident note).   I have evaluated all laboratory values and imaging studies from the past 24 hours.  Summary of hospital course:   71F pmh of hyperparathyroidism, DI in setting of lithium toxicity, ckd, bipolar d/o, HTN now presented to the ED with abdominal pain and dyspnea, found on imaging to have pneumoperitoneum.  Taken to OR, found to have perf'ed gastric ulcer which was repaired by Jean Paul patch.  Overnight events/pertinent findings today:  Arrives to the ICU intubated and on pressors.  Unable to obtain history directly due to underlying vented status.  Data  Satting acceptably on 50% and peep5.  Requiring levophed 0.20 and vaso at 2.4 to maintian map 65.  Labs (personally reviewed):  HyperK now resolved (6.2-->4.7).  Continues to have metabolic acidosis:  7.26/37-- appears to be lactic acidosis given lactate 8-9.  Acute blood loss anemia appropriate to procedure:  hgb acceptable 8.2; leukopenia to 3.6  Ct ch/abd/pelv:  Large volume pneumoperitoneum  Assessment/plan:  1.  Septic shock:  S/p fluid resus in ED and OR (>3.5 L combined crystalloid and albumin).  Now requiring levophed and vaso to maintain MAPs.  Optimizing pH.    2.  Gastric perforation:  S/p surgical repair. Appreciate surgery team efforts.  Continue zosyn.  3.  REI on ckd:  Monitor creatinine.  Likely nephrology consult in AM.  4.  Hyperkalemia:  Resolved for now.  Monitor.  5.  Metabolic acidosis:  Exogenous bicarb therapy as needed.  Trend.  Dialysis if needed to correct pH.  6.  Acute hypoxemic respiratory failure, vent dependent:  Likely some degree of ards in setting of sepsis.  Lung protective tidal volumes.  Rest per resident note.   I spent a total of 45 minutes (excluding procedure time)  personally providing and directing critical care services at the bedside and on the critical care unit for this patient.     Saud Matias

## 2022-08-24 NOTE — BRIEF OP NOTE
[facility]    Brief Operative Note    Pre-operative diagnosis: Perforated viscus [R19.8]  Post-operative diagnosis Gastric perforation    Procedure:   Exploratory laparotomy, repair of gastric perforation, my patch    Surgeon:       * Ron Vidal MD - Primary     * Del Newton PA-C - Assisting     * Sylvia Harrington PA-C - Assisting    Anesthesia: General     Estimated Blood Loss: 15cc      Findings: As above. Fairly large gina-pyloric anterior gastric perforation with large amount of purulent bilious fluid throughout abdomen. Abdomen copiously irrigated until effluent clear. Perforation repaired with interrupted silk suture and my patch. 15-round DAVI drain placed overlying stomach. No immediate complications. See operative report for full details.      Anticipate patient transfer directly to ICU.      Sylvia Harrington PA-C  [facility]  [phone]

## 2022-08-24 NOTE — ANESTHESIA POSTPROCEDURE EVALUATION
Patient: Diana Santiago    Procedure: Procedure(s):  Exploratory laparotomy, REPAIR OF PERFORATED ULCER       Anesthesia Type:  General    Note:  Disposition: ICU            ICU Sign Out: Anesthesiologist/ICU physician sign out WAS performed   Postop Pain Control: Uneventful            Sign Out: Well controlled pain   PONV: No   Neuro/Psych: Uneventful            Sign Out: Acceptable/Baseline neuro status   Airway/Respiratory: Uneventful            Sign Out: AIRWAY IN SITU/Resp. Support               Airway in situ/Resp. Support: ETT                 Reason: Planned Pre-op   CV/Hemodynamics: Uneventful            Sign Out: Acceptable CV status; No obvious hypovolemia; No obvious fluid overload   Other NRE: NONE   DID A NON-ROUTINE EVENT OCCUR? No    Event details/Postop Comments:  Patient transported to ICU intubated/ sedated and placed on mechanical ventilation.            Last vitals:  Vitals:    08/24/22 1730 08/24/22 1745 08/24/22 1751   BP:      Pulse: 82 81 82   Resp: 16 15 16   Temp:      SpO2: 98% 98% 98%       Electronically Signed By: Ron Tariq MD  August 24, 2022  5:58 PM

## 2022-08-24 NOTE — ED TRIAGE NOTES
Sudden onset of shortness of breath last night, feels everything started after lunch and resolved in the afternoon. Could not sleep due to shortness of breath and chest pressure. Having worsening weakness, had difficulty walking this morning.      Triage Assessment     Row Name 08/24/22 1249       Triage Assessment (Adult)    Airway WDL WDL       Respiratory WDL    Respiratory WDL X   shortness of breath       Skin Circulation/Temperature WDL    Skin Circulation/Temperature WDL WDL       Cardiac WDL    Cardiac WDL X;chest pain       Chest Pain Assessment    Chest Pain Location epigastric       Peripheral/Neurovascular WDL    Peripheral Neurovascular WDL WDL       Cognitive/Neuro/Behavioral WDL    Cognitive/Neuro/Behavioral WDL WDL

## 2022-08-24 NOTE — OP NOTE
General Surgery Operative Note    PREOPERATIVE DIAGNOSIS:  Perforated viscus [R19.8]    POSTOPERATIVE DIAGNOSIS: Perforated peptic ulcer    PROCEDURE:    Exploratory laparotomy, REPAIR OF PERFORATED ULCER    ANESTHESIA:  General.    PREOPERATIVE MEDICATIONS: Zosyn    SURGEON:  Ron Vidal MD    ASSISTANT:  Del Newton PA-C and Sylvia Harrington PA-C.  Assistant was required owing to challenging exposure and need for retraction.    INDICATIONS: Patient presented to the emergency department in severe sepsis.  She was hypotensive and showed signs of a lactic acidosis.  Imaging studies suggested a perforated viscus.  She now presents urgently to the operating room.    PROCEDURE:  The patient was taken to the operating suite and uneventfully endotracheally intubated.  The abdomen was prepped and draped in a sterile fashion.  Surgeon initiated timeout was acknowledged.  We made an upper midline incision and took this down through skin and subcutaneous tissue.  Fascia was divided along the midline.  Upon entering the abdomen there was a brisk rush of air.  We irrigated approximately 1 L of gastric contents out of the abdominal cavity.  We began investigating under the left hepatic lobe and immediately identified gastric contents.  Edilson wound retractor was placed.  After clearing out under the liver we did encounter a perforated peptic ulcer.  This was just at the pylorus.  We irrigated the abdomen with 3 L of warm saline solution until the effluent was clear.  We then proceeded to close the ulcer with several interrupted 2-0 silk sutures.  This was done in a transverse orientation so as not to narrow the duodenal inlet.  Once the ulcer was closed we used a tongue of omentum and created a Jean Paul patch over the repair.  We then placed a 15 round DAVI drain near the repair and irrigated the abdomen once more.  We closed the fascia along the midline with a pair of 2-0 PDS sutures.  Wound was irrigated and the skin was closed  with staples.  At the conclusion of the case, all lap and needle counts were correct.  Given the patient's critical illness, she was taken directly to the intensive care unit following surgery still intubated.      ESTIMATED BLOOD LOSS: 20 mL    INTRAOPERATIVE FINDINGS: Diffuse peritonitis due to 1.5 x 1 cm peptic ulcer    Ron Vidal MD, MD

## 2022-08-24 NOTE — TELEPHONE ENCOUNTER
Covid screening completed    Ate lunch yesterday, big meal.  When went to bed had abdominal pain.  Still some pain.    Having sob.  Last urinated 11 hours ago and cannot go.  Very painful in bladder area.    Walks with walker. Per protocol needs to go to ER.    Will call for ride to ER.    Roula Alejo RN  [facility] Nurse Advisor      Reason for Disposition    Unable to urinate (or only a few drops) > 4 hours and bladder feels very full (e.g., palpable bladder or strong urge to urinate)    Additional Information    Negative: Shock suspected (e.g., cold/pale/clammy skin, too weak to stand, low BP, rapid pulse)    Negative: Sounds like a life-threatening emergency to the triager    Protocols used: URINARY SYMPTOMS-A-[place]

## 2022-08-25 LAB
ALBUMIN SERPL-MCNC: 1.8 G/DL (ref 3.4–5)
ALBUMIN UR-MCNC: 50 MG/DL
ALP SERPL-CCNC: 62 U/L (ref 40–150)
ALT SERPL W P-5'-P-CCNC: 237 U/L (ref 0–50)
AMORPH CRY #/AREA URNS HPF: ABNORMAL /HPF
ANION GAP SERPL CALCULATED.3IONS-SCNC: 10 MMOL/L (ref 3–14)
APPEARANCE UR: ABNORMAL
AST SERPL W P-5'-P-CCNC: 217 U/L (ref 0–45)
BASE EXCESS BLDA CALC-SCNC: -1.6 MMOL/L (ref -9–1.8)
BILIRUB SERPL-MCNC: 0.9 MG/DL (ref 0.2–1.3)
BILIRUB UR QL STRIP: NEGATIVE
BUN SERPL-MCNC: 69 MG/DL (ref 7–30)
CALCIUM SERPL-MCNC: 7.8 MG/DL (ref 8.5–10.1)
CHLORIDE BLD-SCNC: 109 MMOL/L (ref 94–109)
CO2 SERPL-SCNC: 20 MMOL/L (ref 20–32)
COLOR UR AUTO: ABNORMAL
CREAT SERPL-MCNC: 3.11 MG/DL (ref 0.52–1.04)
ERYTHROCYTE [DISTWIDTH] IN BLOOD BY AUTOMATED COUNT: 17.2 % (ref 10–15)
GFR SERPL CREATININE-BSD FRML MDRD: 15 ML/MIN/1.73M2
GLUCOSE BLD-MCNC: 173 MG/DL (ref 70–99)
GLUCOSE BLDC GLUCOMTR-MCNC: 140 MG/DL (ref 70–99)
GLUCOSE BLDC GLUCOMTR-MCNC: 157 MG/DL (ref 70–99)
GLUCOSE BLDC GLUCOMTR-MCNC: 164 MG/DL (ref 70–99)
GLUCOSE BLDC GLUCOMTR-MCNC: 87 MG/DL (ref 70–99)
GLUCOSE BLDC GLUCOMTR-MCNC: 94 MG/DL (ref 70–99)
GLUCOSE UR STRIP-MCNC: NEGATIVE MG/DL
HCO3 BLD-SCNC: 22 MMOL/L (ref 21–28)
HCT VFR BLD AUTO: 25.6 % (ref 35–47)
HGB BLD-MCNC: 8.1 G/DL (ref 11.7–15.7)
HGB UR QL STRIP: ABNORMAL
KETONES UR STRIP-MCNC: NEGATIVE MG/DL
LEUKOCYTE ESTERASE UR QL STRIP: NEGATIVE
MAGNESIUM SERPL-MCNC: 2 MG/DL (ref 1.6–2.3)
MCH RBC QN AUTO: 21 PG (ref 26.5–33)
MCHC RBC AUTO-ENTMCNC: 31.6 G/DL (ref 31.5–36.5)
MCV RBC AUTO: 67 FL (ref 78–100)
MUCOUS THREADS #/AREA URNS LPF: PRESENT /LPF
NITRATE UR QL: NEGATIVE
O2/TOTAL GAS SETTING VFR VENT: 30 %
PCO2 BLD: 31 MM HG (ref 35–45)
PH BLD: 7.46 [PH] (ref 7.35–7.45)
PH UR STRIP: 7 [PH] (ref 5–7)
PHOSPHATE SERPL-MCNC: 4.2 MG/DL (ref 2.5–4.5)
PLATELET # BLD AUTO: 359 10E3/UL (ref 150–450)
PO2 BLD: 69 MM HG (ref 80–105)
POTASSIUM BLD-SCNC: 4.9 MMOL/L (ref 3.4–5.3)
PROT SERPL-MCNC: 4.6 G/DL (ref 6.8–8.8)
RBC # BLD AUTO: 3.85 10E6/UL (ref 3.8–5.2)
RBC URINE: 9 /HPF
SODIUM SERPL-SCNC: 139 MMOL/L (ref 133–144)
SP GR UR STRIP: 1.01 (ref 1–1.03)
SQUAMOUS EPITHELIAL: <1 /HPF
UROBILINOGEN UR STRIP-MCNC: NORMAL MG/DL
WBC # BLD AUTO: 6.5 10E3/UL (ref 4–11)
WBC URINE: 6 /HPF

## 2022-08-25 PROCEDURE — 999N000157 HC STATISTIC RCP TIME EA 10 MIN

## 2022-08-25 PROCEDURE — 84100 ASSAY OF PHOSPHORUS: CPT | Performed by: INTERNAL MEDICINE

## 2022-08-25 PROCEDURE — 80053 COMPREHEN METABOLIC PANEL: CPT | Performed by: INTERNAL MEDICINE

## 2022-08-25 PROCEDURE — 250N000009 HC RX 250: Performed by: PHYSICIAN ASSISTANT

## 2022-08-25 PROCEDURE — 258N000003 HC RX IP 258 OP 636: Performed by: INTERNAL MEDICINE

## 2022-08-25 PROCEDURE — C9113 INJ PANTOPRAZOLE SODIUM, VIA: HCPCS | Performed by: INTERNAL MEDICINE

## 2022-08-25 PROCEDURE — 250N000009 HC RX 250: Performed by: EMERGENCY MEDICINE

## 2022-08-25 PROCEDURE — 250N000011 HC RX IP 250 OP 636: Performed by: INTERNAL MEDICINE

## 2022-08-25 PROCEDURE — 94003 VENT MGMT INPAT SUBQ DAY: CPT

## 2022-08-25 PROCEDURE — 99222 1ST HOSP IP/OBS MODERATE 55: CPT | Performed by: INTERNAL MEDICINE

## 2022-08-25 PROCEDURE — 200N000001 HC R&B ICU

## 2022-08-25 PROCEDURE — 82803 BLOOD GASES ANY COMBINATION: CPT | Performed by: INTERNAL MEDICINE

## 2022-08-25 PROCEDURE — 83735 ASSAY OF MAGNESIUM: CPT | Performed by: INTERNAL MEDICINE

## 2022-08-25 PROCEDURE — 85027 COMPLETE CBC AUTOMATED: CPT | Performed by: INTERNAL MEDICINE

## 2022-08-25 PROCEDURE — 250N000011 HC RX IP 250 OP 636

## 2022-08-25 PROCEDURE — 81003 URINALYSIS AUTO W/O SCOPE: CPT | Performed by: EMERGENCY MEDICINE

## 2022-08-25 PROCEDURE — P9045 ALBUMIN (HUMAN), 5%, 250 ML: HCPCS | Performed by: INTERNAL MEDICINE

## 2022-08-25 PROCEDURE — 99291 CRITICAL CARE FIRST HOUR: CPT | Mod: 24 | Performed by: INTERNAL MEDICINE

## 2022-08-25 PROCEDURE — 250N000013 HC RX MED GY IP 250 OP 250 PS 637

## 2022-08-25 PROCEDURE — 250N000012 HC RX MED GY IP 250 OP 636 PS 637: Performed by: INTERNAL MEDICINE

## 2022-08-25 PROCEDURE — 250N000009 HC RX 250: Performed by: INTERNAL MEDICINE

## 2022-08-25 RX ORDER — PIPERACILLIN SODIUM, TAZOBACTAM SODIUM 2; .25 G/10ML; G/10ML
2.25 INJECTION, POWDER, LYOPHILIZED, FOR SOLUTION INTRAVENOUS EVERY 6 HOURS
Status: DISCONTINUED | OUTPATIENT
Start: 2022-08-25 | End: 2022-08-25

## 2022-08-25 RX ORDER — ACETAMINOPHEN 650 MG/1
650 SUPPOSITORY RECTAL EVERY 6 HOURS PRN
Status: DISCONTINUED | OUTPATIENT
Start: 2022-08-25 | End: 2022-09-14 | Stop reason: HOSPADM

## 2022-08-25 RX ORDER — HEPARIN SODIUM 5000 [USP'U]/.5ML
5000 INJECTION, SOLUTION INTRAVENOUS; SUBCUTANEOUS EVERY 12 HOURS
Status: DISCONTINUED | OUTPATIENT
Start: 2022-08-25 | End: 2022-09-14 | Stop reason: HOSPADM

## 2022-08-25 RX ADMIN — ALBUMIN HUMAN 12.5 G: 0.05 INJECTION, SOLUTION INTRAVENOUS at 19:40

## 2022-08-25 RX ADMIN — ALBUMIN HUMAN 12.5 G: 0.05 INJECTION, SOLUTION INTRAVENOUS at 12:45

## 2022-08-25 RX ADMIN — Medication 0.27 MCG/KG/MIN: at 06:52

## 2022-08-25 RX ADMIN — VASOPRESSIN 2.4 UNITS/HR: 20 INJECTION INTRAVENOUS at 05:36

## 2022-08-25 RX ADMIN — PANTOPRAZOLE SODIUM 40 MG: 40 INJECTION, POWDER, FOR SOLUTION INTRAVENOUS at 16:02

## 2022-08-25 RX ADMIN — PROPOFOL 35 MCG/KG/MIN: 10 INJECTION, EMULSION INTRAVENOUS at 05:57

## 2022-08-25 RX ADMIN — CHLORHEXIDINE GLUCONATE 15 ML: 1.2 SOLUTION ORAL at 08:22

## 2022-08-25 RX ADMIN — PIPERACILLIN SODIUM AND TAZOBACTAM SODIUM 2.25 G: 2; .25 INJECTION, POWDER, LYOPHILIZED, FOR SOLUTION INTRAVENOUS at 01:57

## 2022-08-25 RX ADMIN — VASOPRESSIN 2.4 UNITS/HR: 20 INJECTION INTRAVENOUS at 22:40

## 2022-08-25 RX ADMIN — INSULIN ASPART 1 UNITS: 100 INJECTION, SOLUTION INTRAVENOUS; SUBCUTANEOUS at 12:09

## 2022-08-25 RX ADMIN — PROPOFOL 15 MCG/KG/MIN: 10 INJECTION, EMULSION INTRAVENOUS at 18:18

## 2022-08-25 RX ADMIN — PIPERACILLIN SODIUM AND TAZOBACTAM SODIUM 2.25 G: 2; .25 INJECTION, POWDER, LYOPHILIZED, FOR SOLUTION INTRAVENOUS at 14:21

## 2022-08-25 RX ADMIN — PANTOPRAZOLE SODIUM 40 MG: 40 INJECTION, POWDER, FOR SOLUTION INTRAVENOUS at 06:36

## 2022-08-25 RX ADMIN — PIPERACILLIN SODIUM AND TAZOBACTAM SODIUM 2.25 G: 2; .25 INJECTION, POWDER, LYOPHILIZED, FOR SOLUTION INTRAVENOUS at 19:40

## 2022-08-25 RX ADMIN — CHLORHEXIDINE GLUCONATE 15 ML: 1.2 SOLUTION ORAL at 19:40

## 2022-08-25 RX ADMIN — Medication 0.16 MCG/KG/MIN: at 12:54

## 2022-08-25 RX ADMIN — ACETAMINOPHEN 650 MG: 650 SUPPOSITORY RECTAL at 12:09

## 2022-08-25 RX ADMIN — HEPARIN SODIUM 5000 UNITS: 5000 INJECTION, SOLUTION INTRAVENOUS; SUBCUTANEOUS at 12:09

## 2022-08-25 RX ADMIN — SODIUM BICARBONATE: 84 INJECTION, SOLUTION INTRAVENOUS at 06:09

## 2022-08-25 RX ADMIN — Medication 0.1 MCG/KG/MIN: at 21:47

## 2022-08-25 RX ADMIN — VASOPRESSIN 2.4 UNITS/HR: 20 INJECTION INTRAVENOUS at 13:50

## 2022-08-25 RX ADMIN — PIPERACILLIN SODIUM AND TAZOBACTAM SODIUM 2.25 G: 2; .25 INJECTION, POWDER, LYOPHILIZED, FOR SOLUTION INTRAVENOUS at 08:22

## 2022-08-25 ASSESSMENT — ACTIVITIES OF DAILY LIVING (ADL)
ADLS_ACUITY_SCORE: 45
ADLS_ACUITY_SCORE: 41
FALL_HISTORY_WITHIN_LAST_SIX_MONTHS: YES
CHANGE_IN_FUNCTIONAL_STATUS_SINCE_ONSET_OF_CURRENT_ILLNESS/INJURY: YES
DRESSING/BATHING_DIFFICULTY: YES
ADLS_ACUITY_SCORE: 41
TOILETING_ASSISTANCE: TOILETING DIFFICULTY, REQUIRES EQUIPMENT;TOILETING DIFFICULTY, ASSISTANCE 1 PERSON
TOILETING_ISSUES: YES
WEAR_GLASSES_OR_BLIND: YES
DIFFICULTY_EATING/SWALLOWING: NO
ADLS_ACUITY_SCORE: 45
WALKING_OR_CLIMBING_STAIRS_DIFFICULTY: YES
BATHING: 1-->ASSISTANCE NEEDED
CONCENTRATING,_REMEMBERING_OR_MAKING_DECISIONS_DIFFICULTY: YES
ADLS_ACUITY_SCORE: 41
DRESS: 0-->INDEPENDENT
DOING_ERRANDS_INDEPENDENTLY_DIFFICULTY: NO
VISION_MANAGEMENT: GLASSES
TOILETING: 0-->INDEPENDENT
TRANSFERRING: 0-->INDEPENDENT
ADLS_ACUITY_SCORE: 45
DRESS: 1-->ASSISTANCE (EQUIPMENT/PERSON) NEEDED (NOT DEVELOPMENTALLY APPROPRIATE)
EQUIPMENT_CURRENTLY_USED_AT_HOME: WALKER, ROLLING;COMMODE CHAIR
ADLS_ACUITY_SCORE: 41
TOILETING: 1-->ASSISTANCE (EQUIPMENT/PERSON) NEEDED (NOT DEVELOPMENTALLY APPROPRIATE)
NUMBER_OF_TIMES_PATIENT_HAS_FALLEN_WITHIN_LAST_SIX_MONTHS: 3
ADLS_ACUITY_SCORE: 45
WALKING_OR_CLIMBING_STAIRS: AMBULATION DIFFICULTY, REQUIRES EQUIPMENT;AMBULATION DIFFICULTY, ASSISTANCE 1 PERSON
TRANSFERRING: 0-->INDEPENDENT
ADLS_ACUITY_SCORE: 41
ADLS_ACUITY_SCORE: 41
TOILETING_MANAGEMENT: COMMODE
DRESSING/BATHING: BATHING DIFFICULTY, ASSISTANCE 1 PERSON

## 2022-08-25 NOTE — PROGRESS NOTES
Surgery  Patient continues to be critically ill, showing some signs of overall improvement.  Still intubated on pressors, but requirements are lower.  Actually making urine which is encouraging.  Abdomen is distended, drain output not concerning.  Recommend continue with supportive care and NG tube decompression.  If the patient is eventually extubated in the next day or 2, ideally we could keep the NG tube in place.  I will continue to discuss initiation of enteral feeds with the ICU team.  Vaughn Vidal MD  General Surgery, Office [phone]   3

## 2022-08-25 NOTE — PROGRESS NOTES
"General Surgery Progress Note    Admission Date: 8/24/2022  Today's Date: 8/25/2022         Assessment:      Diana Santiago is a 71 year old female POD 1 s/p exploratory laparotomy and repair of perforated gastric ulcer         Plan:   - Continue excellent ICU cares. Nephrology consult today  - Zosyn for intra-abdominal contamination  - Strict NPO, NG to LIS. Monitor DAVI drain output, strip every shift  - PPI BID  - Remain in bed today, likely OK to get up to chair tomorrow  - Monitor fever curve. Tylenol suppository prn        Interval History:   Temp 101.4 this morning. Pulse WNL, good blood pressure on pressors. Intubated, sedated, low UOP. 350cc out NG tube yesterday.          Physical Exam:   /71   Pulse 78   Temp (!) 101.4  F (38.6  C) (Axillary)   Resp 19   Ht 1.626 m (5' 4\")   Wt 54.4 kg (120 lb)   SpO2 96%   BMI 20.60 kg/m    I/O last 3 completed shifts:  In: 4548.86 [I.V.:4048.86]  Out: 830 [Urine:390; Emesis/NG output:350; Drains:90]  General: intubated, sedated, mechanically ventilated  Respiratory: coarse breath sounds  Abdomen: distended but fairly soft. Midline incision with staples and dressing. DAVI drain with serous fluid in bulb    LABS:  Recent Labs   Lab Test 08/25/22  0604 08/24/22  2234 08/24/22  1804   WBC 6.5 3.2* 2.3*   HGB 8.1* 7.9* 8.4*   MCV 67* 68* 69*    300 401      Recent Labs   Lab Test 08/25/22  0604 08/24/22  2234 08/24/22  1804   POTASSIUM 4.9 5.1 5.5*   CHLORIDE 109 111* 105   CO2 20 16* 17*   BUN 69* 69* 65*   CR 3.11* 2.88* 2.87*   ANIONGAP 10 11 14      Recent Labs   Lab Test 08/25/22  0604 08/24/22  1307 03/17/22  0927   ALBUMIN 1.8* 2.4* 3.3*   BILITOTAL 0.9 0.9 0.4   * 29 35   * 24 24   ALKPHOS 62 88 106      Recent Labs   Lab Test 08/24/22  1307   LIPASE 511*     Recent Labs   Lab Test 08/25/22  1020 03/02/22  1557 11/08/21  1139   PROTEIN 50 * Negative 10 *       -------------------------------    Sylvia Harrington PA-C  [facility]  [phone]

## 2022-08-25 NOTE — PROGRESS NOTES
SPIRITUAL HEALTH SERVICES Progress Note  [facility]  ICU    Visited PT at bedside per [facility] request for  services (routine). PT intubated and not able to communicated verbally, but awake and answered questions by nodding head. Offered compassionate presence, words and gestures for comfort, and prayer.    PT indicates that she wishes to see a  for anointing of the sick if possible..    Follow-up with PT and family while PT remains in hospital.    Suleiman Melgar  Associate

## 2022-08-25 NOTE — PROGRESS NOTES
Critical Care  Note      08/25/2022    Name: Diana Santiago MRN#: [id number]   Age: 71 year old YOB: 1950     Hsptl Day# 2  ICU DAY #2    MV DAY #2             Problem List:   Active Problems:    Perforated viscus    Septic shock (H)           Summary/Hospital Course:   Diana Santiago is a 71 year old female with history of hypertension, hyperlipidemia, heterozygous thalassemia, CKD, and diabetes insipidus, who presents with shortness of breath, chest discomfort, and upper abdominal pain, worsening since sudden onset last night. She awoke this morning with considerably worsened symptoms so was driven to ED by her brother. While waiting in triage, she become suddenly hypotensive so was transferred to stabilization room. Presently, she is unable to provide good history herself but notes persisting symptoms as noted above along with constipation (normal for her) and denies diarrhea, bloody stool, or urinary changes. She does have history of CKD from lithium use along with diabetes insipidus. She has other history of bipolar disorder. No past abdominal surgeries. She has no significant cardiac history. The patient lives alone and does not drink alcohol, use drugs, or smoke cigarettes. She is not anticoagulated.  She presented today to Ed with shortness of breath, abdominal pain along with chest pain and hypotension diagnosed to have perorated viscus based on the Xray with air under diaphragm nad CT scan that confirmed the  Findings of pneumoperitoneum , She underwent emergency exploratory laparotomy with finding of perforated peptic ulcer that repaired with my Patch and patient shifted to ICU post op intubated for Monitoring and management of septic shock as she is on dual pressors (norepinephrine 0.28 and vasopressin 4) and sedated with propofol .         Assessment and plan :     Diana Santiago IS a 71 year old female admitted on 8/24/2022 with diagnosis of perforated viscus ad underwent laparotomy with  repair of perforated peptic ulcer then shifted to ICU post op intubated for monitoring of septic shock   I have personally reviewed the daily labs, imaging studies, cultures and discussed the case with referring physician and consulting physicians.     My assessment and plan by system for this patient is as follows:    Neurology/Psychiatry:   1. Sedated   2. Pain/analgesia  Plan  For neurological monitoring      Cardiovascular:   1.Hemodynamics - on dual pressors norepinephrine 0.18 and vasopressin 2.4  2.Rhythm - sinus no tachcardia  Plan  For hemodynamic monitoring and titration of pressors to keep MAP > 65 mmHg     Pulmonary/Ventilator Management:   1. Airway intubated  2. Oxygenation/ventilation/mechanics  Vent Mode: CMV/AC  (Continuous Mandatory Ventilation/ Assist Control)  FiO2 (%): 30 %  Resp Rate (Set): 20 breaths/min  Tidal Volume (Set, mL): 440 mL  PEEP (cm H2O): 5 cmH2O  Resp: 19    Plan  -  Wean of ventilator as tolerated    GI and Nutrition :   1. NPO  Plan  -Keep npo and keep drain to low intermittent suction     Renal/Fluids/Electrolytes:   1. Chronic kidney disease Cr is going up  3.11  2. Electrolyte abnormality  3. Acid/base status  4. Volume status  Plan  - Monitor urine output around 30 ml/hr   - monitor function and electrolytes as needed with replacement per ICU protocols. - generally avoid nephrotoxic agents such as NSAID, IV contrast unless specifically required  - adjust medications as needed for renal clearance    Infectious Disease:   1. Septic shock   2.WBC: 6.5   3.source control with laparotomy and repair of peroration , blood culture sent no growth after 12 hrs   4- on Zosyn day 2  5- Low grade fever 38.1  Plan  - wean of pressors as tolerated  - Continue on zosyn   - Follow blood culture     Endocrine:   1. Stress induced hyperglycemia  Plan  - ICU insulin protocol, goal sugar <180    Hematology/Oncology:   1. Hgb 8.1   2. Anemia, no signs, symptoms of active blood loss  3. DAVI drain  looks seros today   Plan  - Continue Hgb monitoring      IV/Access:   1. Venous access - 2 peripheral , 1 central    2. Arterial access - arterial line x1   Plan  - central access required and necessary      ICU Prophylaxis:   1. DVT: mechanical  2. VAP: HOB 30 degrees, chlorhexidine rinse  3. Stress Ulcer: PPI/H2 blocker  4. Restraints: Nonviolent soft two point restraints required and necessary for patient safety and continued cares and good effect as patient continues to pull at necessary lines, tubes despite education and distraction. Will readdress daily.   5. Wound care  -  To be assessed daily   6. Feeding - NPO   7. Family Update: to be updated    8. Disposition - ICU         Key goals for next 24 hours:   . Titrate norepinephrine and vasopressin to keep MAP> 65 mmHg  . Wean of sedation as she tolerates   . Follow blood culture and continue on antibiotics   - Monitor Urine output and Cr          Medical History:     Past Medical History:   Diagnosis Date     Benign essential hypertension 09/2018    added norvasc 9/18     Bipolar affective disorder (H)     hosp 1993, Dr. Aftab Deal     Cancer (H) 1996    DCIS, left breast     Chronic kidney disease, stage 3a (H)     seen by renal Dr. 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    felt due to lithium     Thalassaemia trait      Vitamin D deficiency      Past Surgical History:   Procedure Laterality Date     BIOPSY  1994    left breast     BREAST SURGERY      lumpectomy x 4      COLONOSCOPY  2021     COLONOSCOPY N/A 6/17/2022    Procedure: COLONOSCOPY, WITH POLYPECTOMY AND BIOPSY;  Surgeon: Panchito Gu MD;  Location:  GI     EYE SURGERY  2018    retina tear     LAPAROTOMY EXPLORATORY N/A 8/24/2022    Procedure: Exploratory laparotomy, REPAIR OF PERFORATED ULCER;  Surgeon: Ron Vidal MD;  Location:  OR     left hand surgery      last 1974     Social History     Socioeconomic History     Marital status: Single     Spouse name: Not on file     Number of children: 0     Years of education: Not on file     Highest education level: Not on file   Occupational History     Occupation: , retired   Tobacco Use     Smoking status: Never Smoker     Smokeless tobacco: Never Used   Substance and Sexual Activity     Alcohol use: No     Drug use: No     Sexual activity: Not Currently     Partners: Male     Birth control/protection: Post-menopausal, None   Other Topics Concern     Parent/sibling w/ CABG, MI or angioplasty before 65F 55M? No   Social History Narrative     Not on file     Social Determinants of Health     Financial Resource Strain: Not on file   Food Insecurity: Not on file   Transportation Needs: Not on file   Physical Activity: Not on file   Stress: Not on file   Social Connections: Not on file   Intimate Partner Violence: Not on file   Housing Stability: Not on file        Allergies   Allergen Reactions     No Known Allergies               Key Medications:       chlorhexidine  15 mL Mouth/Throat Q12H     enoxaparin ANTICOAGULANT  30 mg Subcutaneous Q24H     insulin aspart  1-6 Units Subcutaneous Q4H     pantoprazole  40 mg Per Feeding Tube BID AC    Or     pantoprazole (PROTONIX) IV  40 mg Intravenous BID AC     piperacillin-tazobactam  2.25 g Intravenous Q6H     sodium chloride (PF)  3 mL Intracatheter Q8H       fentaNYL 50 mcg/hr (08/25/22 0742)     propofol (DIPRIVAN) infusion 30 mcg/kg/min (08/25/22 0815)    And     - MEDICATION INSTRUCTIONS -        norepinephrine 0.18 mcg/kg/min (08/25/22 0904)     sodium bicabonate in 5% dextrose for infusion 125 mL/hr at 08/25/22 0743     sodium chloride 20 mL/hr at 08/24/22 2301     vasopressin 2.4 Units/hr (08/25/22 0743)        Home Meds  No current facility-administered medications on file prior to encounter.  amLODIPine (NORVASC) 5 MG tablet, Take 1 tablet (5 mg) by mouth daily  haloperidol (HALDOL) 2 MG tablet, TAKE 1 TABLET BY MOUTH EVERYDAY AT BEDTIME  simvastatin (ZOCOR) 40 MG tablet, TAKE 1 TABLET AT BEDTIME  ACE/ARB/ARNI NOT PRESCRIBED (INTENTIONAL), Please choose reason not prescribed from choices below.  fexofenadine (ALLEGRA) 180 MG tablet, Take 1 tablet by mouth daily.  lamoTRIgine (LAMICTAL) 100 MG tablet, Take 100 mg by mouth daily               Physical Examination:   Temp:  [96.4  F (35.8  C)-100.5  F (38.1  C)] 100.5  F (38.1  C)  Pulse:  [] 84  Resp:  [14-37] 19  BP: ()/(24-95) 112/79  MAP:  [65 mmHg-110 mmHg] 72 mmHg  Arterial Line BP: ()/(51-87) 96/55  FiO2 (%):  [30 %-50 %] 30 %  SpO2:  [71 %-100 %] 95 %      Intake/Output Summary (Last 24 hours) at 8/25/2022 0917  Last data filed at 8/25/2022 0800  Gross per 24 hour   Intake 6067.81 ml   Output 990 ml   Net 5077.81 ml     Wt Readings from Last 4 Encounters:   08/24/22 54.4 kg (120 lb)   07/17/22 55.3 kg (122 lb)   04/22/22 59.9 kg (132 lb)   03/21/22 64 kg (141 lb)     Arterial Line BP: ()/(51-87) 96/55  MAP:  [65 mmHg-110 mmHg] 72 mmHg  BP - Mean:  [] 91  Vent Mode: CMV/AC  (Continuous Mandatory Ventilation/ Assist Control)  FiO2 (%): 30 %  Resp Rate (Set): 20 breaths/min  Tidal Volume (Set, mL): 440 mL  PEEP (cm H2O): 5 cmH2O  Resp: 19    Recent Labs   Lab 08/24/22  2235 08/24/22  1936 08/24/22  1612 08/24/22  1541   PH 7.35 7.24* 7.26* 7.16*   PCO2 29* 35 37 37   PO2 75* 89 136* 117*   HCO3 16* 15* 17* 13*   O2PER 30 50  --   --        GEN: no acute distress   HEENT: head ncat, sclera anicteric, OP patent, trachea  midline   PULM: unlabored synchronous with vent, clear posteriorly   CV/COR: RRR S1S2 no gallop,  No rub, no murmur  ABD: Midline laparotomy wound,soft distended, absent bowel sounds, no mass  EXT:  Warm with palpable pulse   NEURO: sedated  SKIN: ecchymosis on left thigh  LINES: clean, dry intact         Data:   All data and imaging reviewed     ROUTINE ICU LABS (Last four results)  CMP  Recent Labs   Lab 08/25/22  0607 08/25/22  0604 08/25/22  0208 08/24/22  2238 08/24/22  2234 08/24/22  1935 08/24/22  1804 08/24/22  1710 08/24/22  1612 08/24/22  1541 08/24/22  1307   NA  --  139  --   --  138  --  136  --  141   < > 134   POTASSIUM  --  4.9  --   --  5.1  --  5.5*  --  4.7   < > 6.2*   CHLORIDE  --  109  --   --  111*  --  105  --   --   --  108   CO2  --  20  --   --  16*  --  17*  --   --   --  7*   ANIONGAP  --  10  --   --  11  --  14  --   --   --  19*   * 173* 140* 248* 278*   < > 110*   < >  --    < > 153*   BUN  --  69*  --   --  69*  --  65*  --   --   --  75*   CR  --  3.11*  --   --  2.88*  --  2.87*  --   --   --  3.51*   GFRESTIMATED  --  15*  --   --  17*  --  17*  --   --   --  13*   ISSA  --  7.8*  --   --  7.6*  --  8.1*  --   --   --  10.4*   MAG  --  2.0  --   --   --   --   --   --   --   --   --    PHOS  --  4.2  --   --   --   --   --   --   --   --   --    PROTTOTAL  --  4.6*  --   --   --   --   --   --   --   --  6.6*   ALBUMIN  --  1.8*  --   --   --   --   --   --   --   --  2.4*   BILITOTAL  --  0.9  --   --   --   --   --   --   --   --  0.9   ALKPHOS  --  62  --   --   --   --   --   --   --   --  88   AST  --  217*  --   --   --   --   --   --   --   --  24   ALT  --  237*  --   --   --   --   --   --   --   --  29    < > = values in this interval not displayed.     CBC  Recent Labs   Lab 08/25/22  0604 08/24/22  2234 08/24/22  1804 08/24/22  1612 08/24/22  1541 08/24/22  1307   WBC 6.5 3.2* 2.3*  --   --  3.6*   RBC 3.85 3.76* 3.99  --   --  5.63*   HGB 8.1* 7.9* 8.4* 8.2*    < > 11.6*   HCT 25.6* 25.7* 27.5* 24*   < > 39.8   MCV 67* 68* 69*  --   --  71*   MCH 21.0* 21.0* 21.1*  --   --  20.6*   MCHC 31.6 30.7* 30.5*  --   --  29.1*   RDW 17.2* 17.7* 17.6*  --   --  18.7*    300 401  --   --  546*    < > = values in this interval not displayed.     INR  Recent Labs   Lab 08/24/22  1311   INR 1.13     Arterial Blood Gas  Recent Labs   Lab 08/24/22  2235 08/24/22  1936 08/24/22  1612 08/24/22  1541   PH 7.35 7.24* 7.26* 7.16*   PCO2 29* 35 37 37   PO2 75* 89 136* 117*   HCO3 16* 15* 17* 13*   O2PER 30 50  --   --        All cultures:  No results for input(s): CULT in the last 168 hours.  Recent Results (from the past 24 hour(s))   XR Chest Port 1 View   Result Value    Radiologist flags Unexplained pneumoperitoneum (AA)    Narrative    XR CHEST PORT 1 VIEW   8/24/2022 1:20 PM     HISTORY: short of breath    COMPARISON: Chest radiograph 3/2/2022      Impression    IMPRESSION: Large volume pneumoperitoneum with resultant elevation of  the diaphragm. Low lung volumes with bibasilar opacities, favor  atelectasis. Cardiac silhouette is largely obscured but appears  similar in size to prior CT. No definite pneumothorax. No acute bony  abnormality.    [Critical Result: Unexplained pneumoperitoneum]    Finding was identified on 8/24/2022 1:29 PM.     Dr. Trinh was contacted by me on 8/24/2022 1:30 PM and verbalized  understanding of the critical result.     LEONARDO PENNINGTON MD         SYSTEM ID:  [id number]   CT Chest Abdomen Pelvis w/o Contrast    Narrative    CT CHEST ABDOMEN PELVIS W/O CONTRAST 8/24/2022 1:57 PM    CLINICAL HISTORY: chest pain, dyspnea, hypotension    TECHNIQUE: CT scan of the chest, abdomen, and pelvis was performed  without IV contrast. Multiplanar reformats were obtained. Dose  reduction techniques were used.   CONTRAST: None.    COMPARISON: Same-day chest radiograph    FINDINGS:   LUNGS AND PLEURA: Small bilateral pleural effusions, right larger than  left, with  associated bibasilar atelectasis. No definite airspace  consolidation.    MEDIASTINUM/AXILLAE: 1 or 2 foci of gas tracking along the  gastroesophageal junction into the mediastinum. No suspicious  lymphadenopathy. Intrathoracic goiter with distinct nodule in the left  upper thyroid measuring up to 1.8 cm (series 2 image 21).    CORONARY ARTERY CALCIFICATION: Severe.    HEPATOBILIARY: Simple-appearing left hepatic cyst, no specific  follow-up recommended. Distended gallbladder with questionable  punctate cholelithiasis. While there is some fluid around the  gallbladder, there is no definite wall thickening or adjacent fat  stranding to indicate acute cholecystitis.     PANCREAS: Normal.    SPLEEN: Normal.    ADRENAL GLANDS: Bilateral adrenal hyperplasia without distinct nodule.    KIDNEYS/BLADDER: No hydronephrosis. Multiple hypodense renal lesions,  most consistent with cysts. However, there are multiple indeterminate  bilateral renal lesions, including relatively hyperdense left upper  pole 1.3 cm lesion (series 2 image 141) and 1.5 cm left lower pole  exophytic soft tissue density lesion (series 2 image 151). Urinary  bladder is decompressed but otherwise unremarkable.    BOWEL: Large volume pneumoperitoneum, as seen on same-day chest  radiograph. There is focal inflammation of the distal stomach and  proximal duodenum with possible anterior wall defect noted at this  level (series 2 image 155). No additional inflamed small bowel or  colon. No evidence of bowel obstruction. Small volume free fluid in  the pelvis without walled off, drainable fluid collection.    LYMPH NODES: Normal.    VASCULATURE: Scattered calcified atherosclerosis. Ectatic distal  thoracic aorta without danna aneurysm.    PELVIC ORGANS: Calcified uterine leiomyoma.    OTHER: None.    MUSCULOSKELETAL: Old left superior and inferior pubic rami fractures.  No acute bony abnormality.      Impression    IMPRESSION:  1.  Large volume  pneumoperitoneum, as seen on same-day chest  radiograph, favor source from the anterior distal stomach/proximal  duodenum. Findings could represent perforated ulcer.  2.  Small volume free fluid without walled-off, drainable collection  at this time.  3.  Small bilateral pleural effusions with bibasilar atelectasis.  4.  Incidental indeterminate renal lesions, recommend further  evaluation with renal protocol CT or MRI on a nonemergent basis.  5.  Intrathoracic goiter with distinct 1.8 cm left thyroid nodule,  recommend further evaluation with ultrasound on a nonemergent basis if  not previously performed.    LEONARDO PENNINGTON MD         SYSTEM ID:  [id number]

## 2022-08-25 NOTE — PROGRESS NOTES
Notified provider about indwelling grove catheter discussed removal or continued need.    Did provider choose to remove indwelling grove catheter? No    Provider's grove indication for keeping indwelling grove catheter: Strict 1-2 Hour I & O if external catheters are not an option.    Is there an order for indwelling grove catheter? Yes    *If there is a plan to keep grove catheter in place at discharge daily notification with provider is not necessary.

## 2022-08-25 NOTE — PROVIDER NOTIFICATION
MD Notification    Notified Person Name: Dr Quigley    Notification Date/Time: 8/24 9160     Purpose of Notification: low UO, high BG    Orders Received: CTM UO. Sliding scale insulin ordered for BG.

## 2022-08-25 NOTE — CARE PLAN
Neuro: PERRL, no eye opening but follows commands in RUE. purposeful in all extremities.  CV: SR, MAP goal >65. Doppler pulses. A febrile.  Resp: CMV 30%, PEEP 5. LS diminished.  GI: NPO, NGT to LIS with brown coffee ground output. BS hypoactive.  : grove, low UO - MD aware. BG elevated, sliding scale insulin ordered.  Skin: midline abd incision, R abdomen DAVI drain. Scattered bruises and petechiae.  Lines: L femoral CVC, R radial A line  Access: R PIV Sl  Gtts: propofol, fentanyl, vasopressin, levophed, sodium bicarb, tkos  Other: Brother, Acosta, requesting update from MD in AM with plan for extubation.

## 2022-08-25 NOTE — PROGRESS NOTES
[facility] ICU RESPIRATORY NOTE           Date of Admission: 08/24/2022     Date of Intubation (most recent): 08/24/2022    Reason for Mechanical Ventilation: Airway Protection      Number of Days on Mechanical Ventilation: 1     Met Criteria for Spontaneous Breathing Trial: no     Significant event today :none     ABG Results:   Recent Labs   Lab 08/24/22  2235 08/24/22  1936 08/24/22  1612 08/24/22  1541   PH 7.35 7.24* 7.26* 7.16*   PCO2 29* 35 37 37   PO2 75* 89 136* 117*   HCO3 16* 15* 17* 13*   O2PER 30 50  --   --        Current Vent Settings:  Vent Mode: CMV/AC  (Continuous Mandatory Ventilation/ Assist Control)  FiO2 (%): 30 %  Resp Rate (Set): 20 breaths/min  Tidal Volume (Set, mL): 440 mL  PEEP (cm H2O): 5 cmH2O  Resp: 19       Skin Assessment :Intact. Clean and dry      Plan: Continue full ventilatory support and wean as tolerated.     Julianne Santos, RT

## 2022-08-25 NOTE — PLAN OF CARE
Shift Summary    Neuro: PERRL, off sedation following commands and able to nod yes or no to questions.   CV: SR, MAP goal >65, 1+ pulses in feet, T-Max 38.6, rectal Tylenol given.  Resp: CMV 30%, PEEP 5, RR dropped from 20 to 16, LS clear to diminished, minimal oral and ETT secretions. Attempted spontaneous trial twice, first time she went apneic and second time she had low RR and tidal volumes  GI: NPO with no oral meds, NG to LIS with green bile output, total out minimal to none. BS nearly absent.  : Suresh, urine output 717ml.   Skin: Midline incision dressing remain intact, R DAVI drain to bulb suction output 20ml.  Access: L femoral CVC, R radial art line  Gtts: Propofol weaned off at 1104, attempted to wean vaso but with sedation it was needed, Levo at 0.14mcg/kg/min, Fentanyl at 25mcg/hr.   Other: brother Acosta at bedside this morning, spoke with writer and MD regarding plan of care. He requested assistance with discharge planning, social work consult placed. Also discussed spiritual services and brother stated she would appreciate that so that was ordered and  stopped by.

## 2022-08-25 NOTE — PROGRESS NOTES
Critical Care Services Progress Note:  I personally examined and evaluated the patient today. I formulated today s plan with the house staff team or resident(s) and agree with the findings and plan in the associated note (see separately attested resident note).   I have evaluated all laboratory values and imaging studies from the past 24 hours.  Summary of hospital course:   71F pmh of hyperparathyroidism, DI in setting of lithium toxicity, ckd, bipolar d/o, HTN now presented to the ED with abdominal pain and dyspnea, found on imaging to have pneumoperitoneum.  Taken to OR, found to have perf'ed gastric ulcer which was repaired by Jean Paul patch.  Overnight events/pertinent findings today:  No events overnight.  Responding as expected to pressors and ventilator.    Unable to obtain history directly due to underlying vented status.  Data  Satting acceptably on 30% and peep5.  Requiring levophed 0.18 and vaso at 2.4 to maintian map 65.  Labs (personally reviewed):  Lytes ok; creat remains elevated 3.11, hypoalbuminemia 1.8, mildly elevated transaminases:  237/217.  hgb stable 8.1.  Ph improved to 7.35.  Assessment/plan:  1.  Septic shock:  S/p fluid resus.  Now requiring levophed and vaso to maintain MAPs.    2.  Gastric perforation:  S/p surgical repair. Appreciate surgery team efforts.  Continue zosyn.  3.  REI on ckd:  Monitor creatinine.  Nephrology consult.  4.  Metabolic acidosis:  Bicarb drip.  Trend pH.   5.  Acute hypoxemic respiratory failure, vent dependent:  Likely some degree of ards in setting of sepsis.  Lung protective tidal volumes.  Rest per resident note.   D/w Dr. Sy of nephrology  Clinically Significant Risk Factors Present on Admission             # Hypoalbuminemia: Albumin = 1.8 g/dL (Ref range: 3.4 - 5.0 g/dL) on admission, will monitor as appropriate      # Circulatory Shock: currently requiring pressors for blood pressure support  # Anemia: based on hgb <11           I spent a total of 45  minutes (excluding procedure time) personally providing and directing critical care services at the bedside and on the critical care unit for this patient.     Saud Matias

## 2022-08-25 NOTE — CONSULTS
Consult Date: 08/25/2022    IDENTIFICATION:  A 71-year-old female presented to the emergency department dated 08/24/2022 in the setting of shortness of breath or chest pain.  Evaluation demonstrated perforated viscus, for which she was admitted and required urgent surgery.    IMPRESSION:    1.  CKD stage IV, baseline creatinine typically in the range of 2.0 mg/dL.  Etiology thought secondary to a combination of hypertension and lithium toxicity.  The patient currently follows with Dr. Cedeno, [facility] nephrology.  Last seen as an outpatient dated 08/10/2022.  Creatinine at that time 2.10 mg/dL.  2.  Previous history of documented proteinuria.  3.  History of DI by report.  4.  Acute kidney injury in the setting of septic shock, presumably ATN.  At present, she is nonoliguric.  5.  Presentation with hypotension.  Initial and subsequent ER, blood pressures in the range of 70-80 mmHg systolic.  6.  Postop perforated peptic ulcer repair.  Surgery dated to 08/24/2022.  7.  Septic shock.  8.  Metabolic acidosis normalizing.  9.  Acute hypoxic respiratory failure, currently vent dependent.  10.  Elevated LFTs, suggestive in part some degree of shock liver related to poor perfusion.    DISCUSSION:  A 71-year-old female found to have a perforated gastric ulcer, status post repair.  Clinical course complicated by sepsis syndrome requiring aggressive volume resuscitation, as well as ongoing vasopressor medications to maintain systolic blood pressure.    In this setting, she has acute on chronic renal insufficiency and modest electrolyte abnormalities.  Her pH has improved to 7.35 with resuscitation.  She remains vent dependent.    RECOMMENDATIONS:    1.  Avoid nephrotoxic agents.  This includes IV contrast.  2.  I would not use IV diuretics in the current clinical setting.  3.  Monitor serum bicarbonate and reinstitute bicarbonate therapy as indicated.  4.  5% albumin infusion to maintain renal perfusion.  5.  Monitor serum  sodium.  At the present time, there is no evidence to suggest DI as her serum sodium is less than 140.  She is not polyuric.    HISTORY:  A 71-year-old female with a number of medical problems as we noted above.  These were centered around chronic kidney disease in the setting of lithium toxicity and hypertension.  She follows with an outpatient nephrologist.  She presented to the ED with abdominal pain, shortness of breath and was found on imaging to have pneumoperitoneum.  Ultimately taken to the OR where a perforated gastric ulcer was repaired by Jean Paul patch.    In the setting of his illness.  She has developed septic shock.  Volume resuscitation demonstrates her to be several liters positive.  In addition, she remains on vasoactive and norepinephrine at this time for blood pressure support.  She is intubated and sedated and cannot provide medical history.  Her outpatient nephrology clinic notes were reviewed.  In addition, her current clinical chart was reviewed.  Her most recent laboratory studies demonstrated a creatinine of 3.1 with a baseline thought to be much closer to 2.  Her bicarbonate is improved to a value of 20.  Her sodium, as we noted, is not problematic.    PAST MEDICAL HISTORY:    1.  Stage IV CKD.  2.  Hypertension.  3.  Lithium toxicity.  4.  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  5.  Hyperparathyroidism.  6.  Hypertension.  7.  Bipolar affective disorder.  8.  Anemia of chronic disease.  She appears to have been receiving Aranesp.    ALLERGIES:  NONE.    MEDICATIONS:    1.  Norvasc.  2.  Allegra.  3.  Haldol.  4.  Lamictal.  5.  Zocor.  6.  Zemetril.    FAMILY HISTORY:  Hypertension and heart disease.    SOCIAL HISTORY:  Reviewed in the medical record.  The patient is currently intubated and sedated and cannot answer questions, and as such history directly available from the patient is unavailable.    REVIEW OF SYSTEMS:  Complete 10-point review of systems was not obtained given the  patient's current clinical situation.  She is intubated and sedated.    PHYSICAL EXAMINATION:    VITAL SIGNS:  Afebrile to 101.4, blood pressure in the range of 110 systolic, respiratory rate 20, vented.  GENERAL:  Elderly female, intubated in the ICU, sedated.  HEENT:  Oral ET tube.  CHEST:  With good air movement.  CARDIOVASCULAR:  Regular.  ABDOMEN:  Soft.  EXTREMITIES:  Warm.  GENITOURINARY:  Suresh catheter in place.  NEUROLOGIC:  Appears to move all 4.    LABORATORY DATA:  Current and historic reviewed in detail.    ANI Sy MD        D: 2022   T: 2022   MT: [id number]    Name:     JUANITO KAN  MRN:      [id number]        Account:      [id number]   :      1950           Consult Date: 2022     Document: [id number]

## 2022-08-25 NOTE — PROGRESS NOTES
[facility]    General Surgery  Chart Review Note    No acute events overnight. Patient remains intubated and sedated, on vasopressin and levophed. Afebrile, pulse normal. White count 6.5 today, hemoglobin 8.1 (11.6 preop), platelets 359. Remainder of labs pending. Repeat lactates last night were 1.6 then 2.1.   - Continue ICU cares  - Strict NPO, no exceptions  - NG to LIS  - IV protonix BID  - Zosyn (adjusted renal dosing)  - Lovenox ordered for DVT ppx    Surgical team will see patient later this morning.        Sylvia Harrington PA-C  [facility]  [phone]

## 2022-08-26 LAB
ALBUMIN SERPL-MCNC: 2 G/DL (ref 3.4–5)
ALP SERPL-CCNC: 78 U/L (ref 40–150)
ALT SERPL W P-5'-P-CCNC: 255 U/L (ref 0–50)
ANION GAP SERPL CALCULATED.3IONS-SCNC: 12 MMOL/L (ref 3–14)
AST SERPL W P-5'-P-CCNC: 202 U/L (ref 0–45)
BASE EXCESS BLDA CALC-SCNC: -4.8 MMOL/L (ref -9–1.8)
BILIRUB SERPL-MCNC: 0.7 MG/DL (ref 0.2–1.3)
BLD PROD TYP BPU: NORMAL
BLOOD COMPONENT TYPE: NORMAL
BUN SERPL-MCNC: 69 MG/DL (ref 7–30)
CALCIUM SERPL-MCNC: 8.1 MG/DL (ref 8.5–10.1)
CHLORIDE BLD-SCNC: 111 MMOL/L (ref 94–109)
CO2 SERPL-SCNC: 19 MMOL/L (ref 20–32)
CODING SYSTEM: NORMAL
CREAT SERPL-MCNC: 3.5 MG/DL (ref 0.52–1.04)
CROSSMATCH: NORMAL
ERYTHROCYTE [DISTWIDTH] IN BLOOD BY AUTOMATED COUNT: 17 % (ref 10–15)
ERYTHROCYTE [DISTWIDTH] IN BLOOD BY AUTOMATED COUNT: 18.6 % (ref 10–15)
GFR SERPL CREATININE-BSD FRML MDRD: 13 ML/MIN/1.73M2
GLUCOSE BLD-MCNC: 103 MG/DL (ref 70–99)
GLUCOSE BLDC GLUCOMTR-MCNC: 104 MG/DL (ref 70–99)
GLUCOSE BLDC GLUCOMTR-MCNC: 92 MG/DL (ref 70–99)
GLUCOSE BLDC GLUCOMTR-MCNC: 93 MG/DL (ref 70–99)
GLUCOSE BLDC GLUCOMTR-MCNC: 97 MG/DL (ref 70–99)
GLUCOSE BLDC GLUCOMTR-MCNC: 98 MG/DL (ref 70–99)
GLUCOSE BLDC GLUCOMTR-MCNC: 99 MG/DL (ref 70–99)
HCO3 BLD-SCNC: 20 MMOL/L (ref 21–28)
HCT VFR BLD AUTO: 21.4 % (ref 35–47)
HCT VFR BLD AUTO: 22.2 % (ref 35–47)
HGB BLD-MCNC: 6.7 G/DL (ref 11.7–15.7)
HGB BLD-MCNC: 7.3 G/DL (ref 11.7–15.7)
ISSUE DATE AND TIME: NORMAL
MAGNESIUM SERPL-MCNC: 2.3 MG/DL (ref 1.6–2.3)
MCH RBC QN AUTO: 21.1 PG (ref 26.5–33)
MCH RBC QN AUTO: 22.1 PG (ref 26.5–33)
MCHC RBC AUTO-ENTMCNC: 31.3 G/DL (ref 31.5–36.5)
MCHC RBC AUTO-ENTMCNC: 32.9 G/DL (ref 31.5–36.5)
MCV RBC AUTO: 67 FL (ref 78–100)
MCV RBC AUTO: 67 FL (ref 78–100)
O2/TOTAL GAS SETTING VFR VENT: 30 %
PCO2 BLD: 35 MM HG (ref 35–45)
PH BLD: 7.37 [PH] (ref 7.35–7.45)
PHOSPHATE SERPL-MCNC: 6 MG/DL (ref 2.5–4.5)
PLATELET # BLD AUTO: 182 10E3/UL (ref 150–450)
PLATELET # BLD AUTO: 292 10E3/UL (ref 150–450)
PO2 BLD: 105 MM HG (ref 80–105)
POTASSIUM BLD-SCNC: 4.6 MMOL/L (ref 3.4–5.3)
PROT SERPL-MCNC: 5.1 G/DL (ref 6.8–8.8)
RBC # BLD AUTO: 3.18 10E6/UL (ref 3.8–5.2)
RBC # BLD AUTO: 3.3 10E6/UL (ref 3.8–5.2)
SODIUM SERPL-SCNC: 142 MMOL/L (ref 133–144)
SODIUM UR-SCNC: 54 MMOL/L
UNIT ABO/RH: NORMAL
UNIT NUMBER: NORMAL
UNIT STATUS: NORMAL
UNIT TYPE ISBT: 7300
WBC # BLD AUTO: 13.1 10E3/UL (ref 4–11)
WBC # BLD AUTO: 13.1 10E3/UL (ref 4–11)

## 2022-08-26 PROCEDURE — 84100 ASSAY OF PHOSPHORUS: CPT | Performed by: INTERNAL MEDICINE

## 2022-08-26 PROCEDURE — 99232 SBSQ HOSP IP/OBS MODERATE 35: CPT | Performed by: INTERNAL MEDICINE

## 2022-08-26 PROCEDURE — 250N000013 HC RX MED GY IP 250 OP 250 PS 637

## 2022-08-26 PROCEDURE — 84300 ASSAY OF URINE SODIUM: CPT | Performed by: INTERNAL MEDICINE

## 2022-08-26 PROCEDURE — 200N000001 HC R&B ICU

## 2022-08-26 PROCEDURE — 85018 HEMOGLOBIN: CPT | Performed by: INTERNAL MEDICINE

## 2022-08-26 PROCEDURE — 82803 BLOOD GASES ANY COMBINATION: CPT

## 2022-08-26 PROCEDURE — 250N000011 HC RX IP 250 OP 636: Performed by: INTERNAL MEDICINE

## 2022-08-26 PROCEDURE — P9045 ALBUMIN (HUMAN), 5%, 250 ML: HCPCS | Performed by: INTERNAL MEDICINE

## 2022-08-26 PROCEDURE — 258N000003 HC RX IP 258 OP 636: Performed by: INTERNAL MEDICINE

## 2022-08-26 PROCEDURE — 250N000011 HC RX IP 250 OP 636

## 2022-08-26 PROCEDURE — 250N000009 HC RX 250: Performed by: PHYSICIAN ASSISTANT

## 2022-08-26 PROCEDURE — 250N000009 HC RX 250: Performed by: INTERNAL MEDICINE

## 2022-08-26 PROCEDURE — 80053 COMPREHEN METABOLIC PANEL: CPT | Performed by: INTERNAL MEDICINE

## 2022-08-26 PROCEDURE — 94003 VENT MGMT INPAT SUBQ DAY: CPT

## 2022-08-26 PROCEDURE — 99291 CRITICAL CARE FIRST HOUR: CPT | Mod: 24 | Performed by: INTERNAL MEDICINE

## 2022-08-26 PROCEDURE — C9113 INJ PANTOPRAZOLE SODIUM, VIA: HCPCS | Performed by: INTERNAL MEDICINE

## 2022-08-26 PROCEDURE — 85027 COMPLETE CBC AUTOMATED: CPT

## 2022-08-26 PROCEDURE — 999N000157 HC STATISTIC RCP TIME EA 10 MIN

## 2022-08-26 PROCEDURE — 83735 ASSAY OF MAGNESIUM: CPT | Performed by: INTERNAL MEDICINE

## 2022-08-26 PROCEDURE — P9016 RBC LEUKOCYTES REDUCED: HCPCS

## 2022-08-26 RX ADMIN — ALBUMIN HUMAN 12.5 G: 0.05 INJECTION, SOLUTION INTRAVENOUS at 04:51

## 2022-08-26 RX ADMIN — PIPERACILLIN SODIUM AND TAZOBACTAM SODIUM 2.25 G: 2; .25 INJECTION, POWDER, LYOPHILIZED, FOR SOLUTION INTRAVENOUS at 14:10

## 2022-08-26 RX ADMIN — CHLORHEXIDINE GLUCONATE 15 ML: 1.2 SOLUTION ORAL at 07:40

## 2022-08-26 RX ADMIN — SODIUM BICARBONATE: 84 INJECTION, SOLUTION INTRAVENOUS at 11:20

## 2022-08-26 RX ADMIN — HEPARIN SODIUM 5000 UNITS: 5000 INJECTION, SOLUTION INTRAVENOUS; SUBCUTANEOUS at 01:36

## 2022-08-26 RX ADMIN — PIPERACILLIN SODIUM AND TAZOBACTAM SODIUM 2.25 G: 2; .25 INJECTION, POWDER, LYOPHILIZED, FOR SOLUTION INTRAVENOUS at 07:54

## 2022-08-26 RX ADMIN — PANTOPRAZOLE SODIUM 40 MG: 40 INJECTION, POWDER, FOR SOLUTION INTRAVENOUS at 16:05

## 2022-08-26 RX ADMIN — PIPERACILLIN SODIUM AND TAZOBACTAM SODIUM 2.25 G: 2; .25 INJECTION, POWDER, LYOPHILIZED, FOR SOLUTION INTRAVENOUS at 20:32

## 2022-08-26 RX ADMIN — HEPARIN SODIUM 5000 UNITS: 5000 INJECTION, SOLUTION INTRAVENOUS; SUBCUTANEOUS at 14:04

## 2022-08-26 RX ADMIN — PANTOPRAZOLE SODIUM 40 MG: 40 INJECTION, POWDER, FOR SOLUTION INTRAVENOUS at 07:40

## 2022-08-26 RX ADMIN — PROPOFOL 15 MCG/KG/MIN: 10 INJECTION, EMULSION INTRAVENOUS at 18:10

## 2022-08-26 RX ADMIN — CHLORHEXIDINE GLUCONATE 15 ML: 1.2 SOLUTION ORAL at 20:32

## 2022-08-26 RX ADMIN — VASOPRESSIN 2.4 UNITS/HR: 20 INJECTION INTRAVENOUS at 07:31

## 2022-08-26 RX ADMIN — PIPERACILLIN SODIUM AND TAZOBACTAM SODIUM 2.25 G: 2; .25 INJECTION, POWDER, LYOPHILIZED, FOR SOLUTION INTRAVENOUS at 01:36

## 2022-08-26 RX ADMIN — Medication 0.03 MCG/KG/MIN: at 15:19

## 2022-08-26 RX ADMIN — PROPOFOL 15 MCG/KG/MIN: 10 INJECTION, EMULSION INTRAVENOUS at 04:52

## 2022-08-26 ASSESSMENT — ACTIVITIES OF DAILY LIVING (ADL)
ADLS_ACUITY_SCORE: 41
ADLS_ACUITY_SCORE: 45
ADLS_ACUITY_SCORE: 41
ADLS_ACUITY_SCORE: 45

## 2022-08-26 NOTE — PLAN OF CARE
Neuro: PERRL, no eye opening but follows commands in RUE. purposeful in all extremities.  CV: SR, MAP goal >65. Doppler pulses. A febrile.  Resp:  LS diminished.  GI/: NPO, NGT to LIS with brown coffee ground output. BS hypoactive. Suresh in place with adequate UOP.  Skin: midline abd incision, R abdomen DAVI drain. Scattered bruises and petechiae.  Lines: L femoral CVC, R radial A line  Access: R PIV Sl  Gtts: propofol, fentanyl, vasopressin, levophed, tko's  Labs: Hb 6.7 MD Cho notified at 0540. Patient have no convent for blood transfusion, Per MD will let day shift to obtain consent and transfuse.

## 2022-08-26 NOTE — PROGRESS NOTES
[facility] ICU RESPIRATORY NOTE        Date of Admission: 8/24/2022    Date of Intubation (most recent): 8/24/2022    Reason for Mechanical Ventilation: Airway protection    Number of Days on Mechanical Ventilation: 3    Met Criteria for Spontaneous Breathing Trial: No    Reason for No Spontaneous Breathing Trial: Per MD    Significant Events Today: None    ABG Results:   Recent Labs   Lab 08/26/22  0449 08/25/22  1024 08/24/22  2235 08/24/22  1936   PH 7.37 7.46* 7.35 7.24*   PCO2 35 31* 29* 35   PO2 105 69* 75* 89   HCO3 20* 22 16* 15*   O2PER 30 30 30 50       Current Vent Settings: Vent Mode: CMV/AC  (Continuous Mandatory Ventilation/ Assist Control)  FiO2 (%): 30 %  Resp Rate (Set): 16 breaths/min  Tidal Volume (Set, mL): 440 mL  PEEP (cm H2O): 5 cmH2O  Resp: 15      Plan: Pt. Will remain on full ventilatory support overnight.    Rosey Quiroz RT on 8/26/2022 at 5:38 PM

## 2022-08-26 NOTE — PLAN OF CARE
Shift [id number]- no acute events    Pt opens eyes and follows commands. VSS on 30%/+5. MAP maintained >65 on .1 mcg/kg/min levophed and 2.4 units/hr vaso. Remains strict NPO. UOP 30-75/hr.     Esperanza Cartagena RN on 8/25/2022 at 10:59 PM

## 2022-08-26 NOTE — PROGRESS NOTES
Critical Care Services Progress Note:  I personally examined and evaluated the patient today. I formulated today s plan with the house staff team or resident(s) and agree with the findings and plan in the associated note (see separately attested resident note).   I have evaluated all laboratory values and imaging studies from the past 24 hours.  Summary of hospital course:   71F pmh of hyperparathyroidism, DI in setting of lithium toxicity, ckd, bipolar d/o, HTN now presented to the ED with abdominal pain and dyspnea, found on imaging to have pneumoperitoneum.  Taken to OR, found to have perf'ed gastric ulcer which was repaired by Jean Paul patch.  Overnight events/pertinent findings today:  No events overnight.  Continues to respond as expected to pressors and ventilator.    Unable to obtain history directly due to underlying vented status.  Data  Satting acceptably on 30% and peep5.  Requiring levophed 0.06 and vaso at 2.4 to maintian map 65.  Labs (personally reviewed):  Lytes ok; creat remains elevated 3.50, mildly elevated transaminases:  255/202.  hgb 6.7--transfuse today.    Assessment/plan:  1.  Septic shock:  S/p fluid resus.  Now requiring levophed and vaso to maintain MAPs.    2.  Gastric perforation:  S/p surgical repair. Appreciate surgery team efforts.  Continue zosyn.  3.  REI on ckd:  Monitor creatinine.  Nephrology consult.  4.  Metabolic acidosis: Resolved.  PH now normailzed.  Bicarb drip stopped.  5.  Acute hypoxemic respiratory failure, vent dependent:  Likely some degree of ards in setting of sepsis.  Lung protective tidal volumes.  6.  Acute blood loss anemia, appropriate to procedure:  1 prbc today.  Rest per resident note.   I spent a total of 45 minutes (excluding procedure time) personally providing and directing critical care services at the bedside and on the critical care unit for this patient.     Saud Matias

## 2022-08-26 NOTE — PROGRESS NOTES
[facility] ICU RESPIRATORY NOTE           Date of Admission: 08/24/2022     Date of Intubation (most recent): 08/24/2022    Reason for Mechanical Ventilation: Airway Protection      Number of Days on Mechanical Ventilation: 2     Met Criteria for Spontaneous Breathing Trial: NO     Significant event today :None      ABG Results:   Recent Labs   Lab 08/26/22  0449 08/25/22  1024 08/24/22  2235 08/24/22  1936   PH 7.37 7.46* 7.35 7.24*   PCO2 35 31* 29* 35   PO2 105 69* 75* 89   HCO3 20* 22 16* 15*   O2PER 30 30 30 50       Current Vent Settings:  Vent Mode: CMV/AC  (Continuous Mandatory Ventilation/ Assist Control)  FiO2 (%): 30 %  Resp Rate (Set): 16 breaths/min  Tidal Volume (Set, mL): 440 mL  PEEP (cm H2O): 5 cmH2O  Resp: 16     Skin Assessment :intact. Dry and clean      Plan: Continue full ventilatory support and wean as tolerated.     Julianne Santos, RT

## 2022-08-26 NOTE — PROGRESS NOTES
Renal Medicine Progress Note                                Diana Santiago MRN# [id number]   Age: 71 year old YOB: 1950   Date of Admission: 8/24/2022 Hospital LOS: 2                  Assessment/Plan:     71-year-old female presented to the ED dated 08/24/2022 in the setting of shortness of breath and chest pain.  Evaluation demonstrated perforated viscus, for which she was admitted and required urgent surgery.    Seen regarding acute on chronic renal disease    1.  CKD stage IV   -baseline creatinine typically in the range of 2.0 mg/dL.     -etiology thought secondary to a combination of hypertension and lithium toxicity.     -follows with Dr. Cedeno. Last seen as an outpatient dated 08/10/2022.      -creatinine at that time 2.10 mg/dL.  2.  Previous history of documented proteinuria.  3.  History of DI by report.  4.  Acute kidney injury in the setting of septic shock   -presumably ATN.     -nonoliguric   -creatinine rising   5.  Presentation with hypotension.     -initial and subsequent ER, blood pressures in the range of 70-80 mmHg systolic.   -remains pressor dependent  6.  Postop perforated peptic ulcer repair.     -Surgery dated 08/24/2022.  7.  Septic shock.  8.  Metabolic acidosis  9.  Acute hypoxic respiratory failure, currently vent dependent.  10.  Elevated LFTs,    -suggestive in part some degree of shock liver related to poor perfusion.      Continue albumin as needed for BP support/renal perfusion  Add low dose HCO3-  Document Noemi   Transfuse as indicated     No dialysis indication at this time    Interval History:     Remains intubated and sedated  Failed weaning  FiO2 at 30%    Remains on NE and vaso    UO noted  Creatinine up slightly     ROS:     Intubated and sedated: ROS unable    Medications and Allergies:     Reviewed    Physical Exam:     Vitals were reviewed  Patient Vitals for the past 8 hrs:   BP Temp Temp src Pulse Resp SpO2 Weight   08/26/22 0900 117/71 -- -- 65 16 96 % --    08/26/22 0830 (!) 89/63 -- -- 67 16 95 % --   08/26/22 0800 105/67 100.1  F (37.8  C) Axillary 65 15 95 % --   08/26/22 0730 125/77 -- -- 70 18 95 % --   08/26/22 0645 132/78 -- -- 68 15 95 % --   08/26/22 0630 126/80 -- -- 69 15 95 % --   08/26/22 0615 136/80 -- -- 72 16 94 % --   08/26/22 0600 132/80 -- -- 70 16 94 % --   08/26/22 0545 135/81 -- -- 68 15 94 % --   08/26/22 0530 137/82 -- -- 68 16 94 % --   08/26/22 0515 131/80 -- -- 69 16 93 % --   08/26/22 0500 126/75 -- -- 73 16 94 % --   08/26/22 0445 115/73 -- -- 75 (!) 0 94 % --   08/26/22 0430 (!) 142/91 -- -- 73 8 100 % --   08/26/22 0415 132/79 -- -- 73 10 94 % 60.9 kg (134 lb 4.2 oz)   08/26/22 0400 131/76 98.4  F (36.9  C) Oral 72 (!) 0 94 % --   08/26/22 0345 123/77 -- -- 75 8 94 % --   08/26/22 0332 -- -- -- -- -- 100 % --   08/26/22 0330 118/82 -- -- 77 10 94 % --   08/26/22 0300 132/74 -- -- 71 (!) 6 94 % --   08/26/22 0245 124/77 -- -- 74 9 94 % --   08/26/22 0230 114/76 -- -- 73 16 94 % --   08/26/22 0215 121/79 -- -- 74 15 95 % --   08/26/22 0200 126/76 -- -- 71 16 95 % --   08/26/22 0145 126/76 -- -- 71 15 95 % --     I/O last 3 completed shifts:  In: 3503.59 [I.V.:3003.59]  Out: 1742 [Urine:1617; Emesis/NG output:75; Drains:50]    Vitals:    08/24/22 1245 08/25/22 2127 08/26/22 0415   Weight: 54.4 kg (120 lb) 60 kg (132 lb 4.4 oz) 60.9 kg (134 lb 4.2 oz)         GENERAL:  intubated and sedated  HEENT: ETT  RESP:  clear anteriorly  CV: RRR, normal S1 S2  ABDOMEN: soft  MS: no clubbing, cyanosis   SKIN: clear without significant rashes or lesions  EXT: warm, no edema    Data:     Recent Labs   Lab 08/26/22  0745 08/26/22  0450 08/26/22  0449 08/26/22  0013 08/25/22  0607 08/25/22  0604 08/24/22  2238 08/24/22  2234 08/24/22  1935 08/24/22  1804   NA  --   --  142  --   --  139  --  138  --  136   POTASSIUM  --   --  4.6  --   --  4.9  --  5.1  --  5.5*   CHLORIDE  --   --  111*  --   --  109  --  111*  --  105   CO2  --   --  19*  --   --  20   --  16*  --  17*   ANIONGAP  --   --  12  --   --  10  --  11  --  14   GLC 92 97 103* 104*   < > 173*   < > 278*   < > 110*   BUN  --   --  69*  --   --  69*  --  69*  --  65*   CR  --   --  3.50*  --   --  3.11*  --  2.88*  --  2.87*   GFRESTIMATED  --   --  13*  --   --  15*  --  17*  --  17*   ISSA  --   --  8.1*  --   --  7.8*  --  7.6*  --  8.1*    < > = values in this interval not displayed.         Recent Labs   Lab 08/26/22 0449 08/25/22 0604 08/24/22 2234 08/24/22 1804 08/24/22  1307   CR 3.50* 3.11* 2.88* 2.87* 3.51*     Recent Labs   Lab 08/26/22 0449 08/25/22 0604 08/24/22 2234 08/24/22 1804 08/24/22  1612   POTASSIUM 4.6 4.9 5.1 5.5* 4.7     Recent Labs   Lab 08/26/22 0449 08/25/22 0604 08/24/22  1307   ALBUMIN 2.0* 1.8* 2.4*     Recent Labs   Lab 08/26/22 0449 08/25/22 0604 08/24/22 2234 08/24/22  1804   PHOS 6.0* 4.2  --   --    HGB 6.7* 8.1* 7.9* 8.4*         G Jontahan Payton MD    [facility]  [phone]

## 2022-08-26 NOTE — CONSULTS
Care Management Initial Consult    General Information  Assessment completed with: Patient, Family, Acosta  Type of CM/SW Visit: Initial Assessment    Primary Care Provider verified and updated as needed: No   Readmission within the last 30 days: no previous admission in last 30 days      Reason for Consult: discharge planning  Advance Care Planning: Advance Care Planning Reviewed: present on chart       Communication Assessment  Patient's communication style: spoken language (English or Bilingual)    Hearing Difficulty or Deaf: no   Wear Glasses or Blind: yes    Cognitive  Cognitive/Neuro/Behavioral: .WDL except  Level of Consciousness: sedated  Arousal Level: arouses to voice  Orientation: other (see comments) (tatiana)  Mood/Behavior: behavior appropriate to situation     Speech: endotracheal tube    Living Environment:   People in home: alone     Current living Arrangements: apartment      Able to return to prior arrangements: no     Family/Social Support:  Care provided by: self, friend  Provides care for: no one  Marital Status: Single  Sibling(s)          Description of Support System: Supportive, Involved    Support Assessment: Adequate social supports, Adequate family and caregiver support    Current Resources:   Patient receiving home care services: No     Community Resources:    Equipment currently used at home: walker, rolling, commode chair  Supplies currently used at home:      Employment/Financial:  Employment Status: retired     Financial Concerns:     Lifestyle & Psychosocial Needs:  Social Determinants of Health     Tobacco Use: Low Risk      Smoking Tobacco Use: Never Smoker     Smokeless Tobacco Use: Never Used   Alcohol Use: Not on file   Financial Resource Strain: Not on file   Food Insecurity: Not on file   Transportation Needs: Not on file   Physical Activity: Not on file   Stress: Not on file   Social Connections: Not on file   Intimate Partner Violence: Not on file   Depression: Not at risk      PHQ-2 Score: 0   Housing Stability: Not on file     Functional Status:  Prior to admission patient needed assistance: with incontinence, driving, groceries, transitioning positions.     Mental Health Status:  Chemical Dependency Status:  Values/Beliefs:  Spiritual, Cultural Beliefs, Restoration Practices, Values that affect care:               Additional Information:  Consult for discharge planning. Patient admitted on 8/24/2022 for shortness of breath, chest discomfort, and upper abdominal pain and a tentative discharge date to be determined. Writer met with patient (sedated) and brotherAcosta at bedside and introduced self and role. Writer confirmed patient's primary doctor is Gayle Hernandez.    Patients brotherAcosta requested to speak with a social work. Acosta stated that patient was independent up until about 2 months ago. About 2 months ago, patient tripped and hurt her ankle causing her to wear a foot brace. Then on 7/14/2022 patient fell and hurt her shoulder. Prior to this hospital stay, Acosta (works part time & remote) visited patient multiple times a day to change her depends, deliver food, take her to appointments. Patient also has a friend that is a retired PCA who visits patient Wednesdays and Sundays to provide baths. Acosta had questions about what happens once patient is ready to discharge from hospital. THALIA explained that once patient is stable a physical therapy evaluation will happen and depending on their recommendations patient may need TCU. Acosta is in favor with patient going to TCU. THALIA will continue to follow.     Sherri Ching, MSW, LGSW

## 2022-08-26 NOTE — PROGRESS NOTES
"General Surgery Progress Note    Admission Date: 8/24/2022  Today's Date: 8/26/2022         Assessment:      Diana Santiago is a 71 year old female POD 2 s/p exploratory laparotomy and repair of perforated gastric ulcer    Remains critically ill is improving. Better urine output , pressor requirements decreasing.          Plan:   - Continue ICU cares, plan for transfusion today - monitor hemoglobin  - Zosyn for intra-abdominal contamination  - Strict NPO, NG to LIS. Monitor DAVI drain output, strip every shift  - PPI BID  - OK to get up in chair today from our standpoint  - Monitor fever curve. Tylenol suppository prn        Interval History:   Intermittent fever, 100.1 this morning. Received tylenol x 1 yesterday. 75cc out NG yesterday, brown fluid. Hemoglobin 6.7 this morning, ICU working on obtaining consent for transfusion from patient's brother.           Physical Exam:   /78   Pulse 68   Temp 100.1  F (37.8  C) (Axillary)   Resp 15   Ht 1.626 m (5' 4\")   Wt 60.9 kg (134 lb 4.2 oz)   SpO2 95%   BMI 23.05 kg/m    I/O last 3 completed shifts:  In: 3503.59 [I.V.:3003.59]  Out: 1742 [Urine:1617; Emesis/NG output:75; Drains:50]  General: intubated, sedated, mechanically ventilated  Respiratory: coarse breath sounds  Abdomen: distended but soft. Midline incision with staples, dressing removed today. DAVI drain with serous fluid in bulb. Skin intact without erythema around drain site.    LABS:  Recent Labs   Lab Test 08/26/22 0449 08/25/22  0604 08/24/22  2234   WBC 13.1* 6.5 3.2*   HGB 6.7* 8.1* 7.9*   MCV 67* 67* 68*    359 300      Recent Labs   Lab Test 08/26/22 0449 08/25/22  0604 08/24/22  2234   POTASSIUM 4.6 4.9 5.1   CHLORIDE 111* 109 111*   CO2 19* 20 16*   BUN 69* 69* 69*   CR 3.50* 3.11* 2.88*   ANIONGAP 12 10 11      Recent Labs   Lab Test 08/26/22 0449 08/25/22  0604 08/24/22  1307   ALBUMIN 2.0* 1.8* 2.4*   BILITOTAL 0.7 0.9 0.9   * 237* 29   * 217* 24   ALKPHOS 78 62 88 "      Recent Labs   Lab Test 08/24/22  1307   LIPASE 511*     Recent Labs   Lab Test 08/25/22  1020 03/02/22  1557 11/08/21  1139   PROTEIN 50 * Negative 10 *       -------------------------------    Sylvia Harrington PA-C  [facility]  [phone]

## 2022-08-26 NOTE — PLAN OF CARE
Shift Note:    RASS is -1, on propofol and fentanyl, can nod when asked to do so.  Weakly KEANE.  Did not tolerate vent weaning today, is on CMV 30, 440, 15 and 5.  Minimal secretions.  NG to LIS, green bile output, per surgery pt is strict NPO.  BS faint.  Adequate UOP.  Abd midline incision with staples, CDI.  Minimal DAVI output, serous.  Got 1 unit RBC for Hgb 6.7 this AM, recheck this afternoon 7.3

## 2022-08-26 NOTE — PROGRESS NOTES
Notified provider about indwelling grove catheter discussed removal or continued need.    Did provider choose to remove indwelling grove catheter? No    Provider's grove indication for keeping indwelling grove catheter: Other - Pt Stage 3 renal disease, shocky, on pressors, need grove for Strict I/O.    Is there an order for indwelling grove catheter? Yes    *If there is a plan to keep grove catheter in place at discharge daily notification with provider is not necessary.

## 2022-08-26 NOTE — PROGRESS NOTES
Critical Care  Note      08/26/2022    Name: Diana Santiago MRN#: [id number]   Age: 71 year old YOB: 1950     Hsptl Day# 3  ICU DAY #3    MV DAY #3             Problem List:   Active Problems:    Perforated viscus    Septic shock (H)           Summary/Hospital Course:   Diana Santiago is a 71 year old female with history of hypertension, hyperlipidemia, heterozygous thalassemia, CKD, and diabetes insipidus, who presents with shortness of breath, chest discomfort, and upper abdominal pain, worsening since sudden onset last night. She awoke this morning with considerably worsened symptoms so was driven to ED by her brother. While waiting in triage, she become suddenly hypotensive so was transferred to stabilization room. Presently, she is unable to provide good history herself but notes persisting symptoms as noted above along with constipation (normal for her) and denies diarrhea, bloody stool, or urinary changes. She does have history of CKD from lithium use along with diabetes insipidus. She has other history of bipolar disorder. No past abdominal surgeries. She has no significant cardiac history. The patient lives alone and does not drink alcohol, use drugs, or smoke cigarettes. She is not anticoagulated.  She presented today to Ed with shortness of breath, abdominal pain along with chest pain and hypotension diagnosed to have perorated viscus based on the Xray with air under diaphragm nad CT scan that confirmed the  Findings of pneumoperitoneum , She underwent emergency exploratory laparotomy with finding of perforated peptic ulcer that repaired with my Patch and patient shifted to ICU post op intubated for Monitoring and management of septic shock as she came on dual pressors (norepinephrine and vasopressin ) and sedated with propofol. She required blood transfusion of 1 Unit of PRBCs form Hgb of 6.7. she is followed by General Surgery and nephrology team.         Assessment and plan :     Diana  JACQUELIN Santiago IS a 71 year old female admitted on 8/24/2022 with diagnosis of perforated viscus ad underwent laparotomy with repair of perforated peptic ulcer then shifted to ICU post op intubated for monitoring of septic shock.   I have personally reviewed the daily labs, imaging studies, cultures and discussed the case with referring physician and consulting physicians.     My assessment and plan by system for this patient is as follows:    Neurology/Psychiatry:   1. Lightly sedated, able to open her eyes to command   2. Pain/analgesia  Plan  For neurological monitoring      Cardiovascular:   1.Hemodynamics - on dual pressors norepinephrine and vasopressin   2.Rhythm - sinus no tachcardia  Plan  For hemodynamic monitoring and titration of pressors to keep MAP > 65 mmHg     Pulmonary/Ventilator Management:   1. Airway intubated  2. Oxygenation/ventilation/mechanics  Vent Mode: CMV/AC  (Continuous Mandatory Ventilation/ Assist Control)  FiO2 (%): 30 %  Resp Rate (Set): 16 breaths/min  Tidal Volume (Set, mL): 440 mL  PEEP (cm H2O): 5 cmH2O  Resp: 15    Plan  -  Wean of ventilator as tolerated    GI and Nutrition :   1. NPO   Plan  -Keep npo and keep OGT to low intermittent suction     Renal/Fluids/Electrolytes:   1. Chronic kidney disease Cr is going up  3.5  2. Electrolyte abnormality  3. Acid/base status  4. Volume status  Plan  - Monitor urine output around 60 ml/hr   - monitor function and electrolytes as needed with replacement per ICU protocols. - generally avoid nephrotoxic agents such as NSAID, IV contrast unless specifically required  - adjust medications as needed for renal clearance    Infectious Disease:   1. Septic shock   2.WBC: 13.5 from 6.5   3.source control with laparotomy and repair of peroration , blood culture sent no growth after 1 day   4- on Zosyn day 3  Plan  - wean of pressors as tolerated  - Continue on zosyn   - Follow blood culture     Endocrine:   1. Stress induced hyperglycemia  Plan  - ICU  insulin protocol, goal sugar <180    Hematology/Oncology:   1. Hgb 6.7 from 8.1   2. no signs, symptoms of active blood loss  3. DAVI drain looks seros today   Plan  - transfuse 1  Unit of PRBC and Continue Hgb monitoring      IV/Access:   1. Venous access - 2 peripheral , 1 central    2. Arterial access - arterial line x1   Plan  - central access required and necessary      ICU Prophylaxis:   1. DVT: mechanical  2. VAP: HOB 30 degrees, chlorhexidine rinse  3. Stress Ulcer: PPI/H2 blocker  4. Restraints: Nonviolent soft two point restraints required and necessary for patient safety and continued cares and good effect as patient continues to pull at necessary lines, tubes despite education and distraction. Will readdress daily.   5. Wound care  -  To be assessed daily   6. Feeding - NPO   7. Family Update: Brother updated    8. Disposition - ICU         Key goals for next 24 hours:   . Titrate norepinephrine and vasopressin to keep MAP> 65 mmHg  . Wean of sedation as she tolerates and for possible extubation   . Follow blood culture and continue on antibiotics   - Monitor Urine output and Cr  . Follow General Surgery and Nephrology plan         Medical History:     Past Medical History:   Diagnosis Date     Benign essential hypertension 09/2018    added norvasc 9/18     Bipolar affective disorder (H)     hosp 1993, Dr. Aftab Deal     Cancer (H) 1996    DCIS, left breast     Chronic kidney disease, stage 3a (H)     seen by renal Dr. 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    felt due to lithium     Thalassaemia trait      Vitamin D deficiency      Past Surgical History:   Procedure Laterality Date     BIOPSY  1994    left breast     BREAST SURGERY      lumpectomy x 4     COLONOSCOPY  2021     COLONOSCOPY N/A 6/17/2022    Procedure: COLONOSCOPY, WITH POLYPECTOMY AND BIOPSY;  Surgeon: Panchito Gu MD;  Location:  GI     EYE SURGERY  2018    retina tear     LAPAROTOMY EXPLORATORY N/A 8/24/2022    Procedure: Exploratory laparotomy, REPAIR OF PERFORATED ULCER;  Surgeon: Ron Vidal MD;  Location:  OR     left hand surgery      last 1974     Social History     Socioeconomic History     Marital status: Single     Spouse name: Not on file     Number of children: 0     Years of education: Not on file     Highest education level: Not on file   Occupational History     Occupation: , retired   Tobacco Use     Smoking status: Never Smoker     Smokeless tobacco: Never Used   Substance and Sexual Activity     Alcohol use: No     Drug use: No     Sexual activity: Not Currently     Partners: Male     Birth control/protection: Post-menopausal, None   Other Topics Concern     Parent/sibling w/ CABG, MI or angioplasty before 65F 55M? No   Social History Narrative     Not on file     Social Determinants of Health     Financial Resource Strain: Not on file   Food Insecurity: Not on file   Transportation Needs: Not on file   Physical Activity: Not on file   Stress: Not on file   Social Connections: Not on file   Intimate Partner Violence: Not on file   Housing Stability: Not on file        Allergies   Allergen Reactions     No Known Allergies               Key Medications:       chlorhexidine  15 mL Mouth/Throat Q12H     heparin ANTICOAGULANT  5,000 Units Subcutaneous Q12H     insulin aspart  1-6 Units Subcutaneous Q4H     pantoprazole (PROTONIX) IV  40 mg Intravenous BID AC     piperacillin-tazobactam  2.25 g Intravenous Q6H     sodium  chloride (PF)  3 mL Intracatheter Q8H       IV infusion builder WITH additives 50 mL/hr at 08/26/22 1120     fentaNYL 25 mcg/hr (08/26/22 0600)     propofol (DIPRIVAN) infusion 20 mcg/kg/min (08/26/22 1053)    And     - MEDICATION INSTRUCTIONS -       norepinephrine 0.06 mcg/kg/min (08/26/22 0837)     sodium chloride Stopped (08/26/22 1120)     vasopressin Stopped (08/26/22 1029)        Home Meds  No current facility-administered medications on file prior to encounter.  amLODIPine (NORVASC) 5 MG tablet, Take 1 tablet (5 mg) by mouth daily  haloperidol (HALDOL) 2 MG tablet, TAKE 1 TABLET BY MOUTH EVERYDAY AT BEDTIME  simvastatin (ZOCOR) 40 MG tablet, TAKE 1 TABLET AT BEDTIME  ACE/ARB/ARNI NOT PRESCRIBED (INTENTIONAL), Please choose reason not prescribed from choices below.  fexofenadine (ALLEGRA) 180 MG tablet, Take 1 tablet by mouth daily.  lamoTRIgine (LAMICTAL) 100 MG tablet, Take 100 mg by mouth daily               Physical Examination:   Temp:  [98.4  F (36.9  C)-100.4  F (38  C)] 99.5  F (37.5  C)  Pulse:  [64-89] 70  Resp:  [0-20] 15  BP: ()/() 105/75  MAP:  [49 mmHg-97 mmHg] 71 mmHg  Arterial Line BP: ()/(38-69) 96/54  FiO2 (%):  [30 %] 30 %  SpO2:  [93 %-100 %] 93 %      Intake/Output Summary (Last 24 hours) at 8/26/2022 1236  Last data filed at 8/26/2022 1200  Gross per 24 hour   Intake 1609.03 ml   Output 1927 ml   Net -317.97 ml         Wt Readings from Last 4 Encounters:   08/26/22 60.9 kg (134 lb 4.2 oz)   07/17/22 55.3 kg (122 lb)   04/22/22 59.9 kg (132 lb)   03/21/22 64 kg (141 lb)     Arterial Line BP: ()/(38-69) 96/54  MAP:  [49 mmHg-97 mmHg] 71 mmHg  BP - Mean:  [] 85  Vent Mode: CMV/AC  (Continuous Mandatory Ventilation/ Assist Control)  FiO2 (%): 30 %  Resp Rate (Set): 16 breaths/min  Tidal Volume (Set, mL): 440 mL  PEEP (cm H2O): 5 cmH2O  Resp: 15    Recent Labs   Lab 08/26/22  0449 08/25/22  1024 08/24/22  2235 08/24/22  1936   PH 7.37 7.46* 7.35 7.24*   PCO2 35  31* 29* 35   PO2 105 69* 75* 89   HCO3 20* 22 16* 15*   O2PER 30 30 30 50       GEN: no acute distress   HEENT: head ncat, sclera anicteric, OP patent, trachea midline   PULM: unlabored synchronous with vent, clear posteriorly   CV/COR: RRR S1S2 no gallop,  No rub, no murmur  ABD: Midline laparotomy wound,soft distended, absent bowel sounds, no mass  EXT:  Warm with palpable pulse   NEURO: sedated  SKIN: intact   LINES: clean, dry intact         Data:   All data and imaging reviewed     ROUTINE ICU LABS (Last four results)  CMP  Recent Labs   Lab 08/26/22  0745 08/26/22  0450 08/26/22  0449 08/26/22  0013 08/25/22  0607 08/25/22  0604 08/24/22  2238 08/24/22  2234 08/24/22  1935 08/24/22  1804 08/24/22  1541 08/24/22  1307   NA  --   --  142  --   --  139  --  138  --  136   < > 134   POTASSIUM  --   --  4.6  --   --  4.9  --  5.1  --  5.5*   < > 6.2*   CHLORIDE  --   --  111*  --   --  109  --  111*  --  105  --  108   CO2  --   --  19*  --   --  20  --  16*  --  17*  --  7*   ANIONGAP  --   --  12  --   --  10  --  11  --  14  --  19*   GLC 92 97 103* 104*   < > 173*   < > 278*   < > 110*   < > 153*   BUN  --   --  69*  --   --  69*  --  69*  --  65*  --  75*   CR  --   --  3.50*  --   --  3.11*  --  2.88*  --  2.87*  --  3.51*   GFRESTIMATED  --   --  13*  --   --  15*  --  17*  --  17*  --  13*   ISSA  --   --  8.1*  --   --  7.8*  --  7.6*  --  8.1*  --  10.4*   MAG  --   --  2.3  --   --  2.0  --   --   --   --   --   --    PHOS  --   --  6.0*  --   --  4.2  --   --   --   --   --   --    PROTTOTAL  --   --  5.1*  --   --  4.6*  --   --   --   --   --  6.6*   ALBUMIN  --   --  2.0*  --   --  1.8*  --   --   --   --   --  2.4*   BILITOTAL  --   --  0.7  --   --  0.9  --   --   --   --   --  0.9   ALKPHOS  --   --  78  --   --  62  --   --   --   --   --  88   AST  --   --  202*  --   --  217*  --   --   --   --   --  24   ALT  --   --  255*  --   --  237*  --   --   --   --   --  29    < > = values in this  interval not displayed.     CBC  Recent Labs   Lab 08/26/22  0449 08/25/22  0604 08/24/22  2234 08/24/22  1804   WBC 13.1* 6.5 3.2* 2.3*   RBC 3.18* 3.85 3.76* 3.99   HGB 6.7* 8.1* 7.9* 8.4*   HCT 21.4* 25.6* 25.7* 27.5*   MCV 67* 67* 68* 69*   MCH 21.1* 21.0* 21.0* 21.1*   MCHC 31.3* 31.6 30.7* 30.5*   RDW 17.0* 17.2* 17.7* 17.6*    359 300 401     INR  Recent Labs   Lab 08/24/22  1311   INR 1.13     Arterial Blood Gas  Recent Labs   Lab 08/26/22  0449 08/25/22  1024 08/24/22  2235 08/24/22  1936   PH 7.37 7.46* 7.35 7.24*   PCO2 35 31* 29* 35   PO2 105 69* 75* 89   HCO3 20* 22 16* 15*   O2PER 30 30 30 50       All cultures:  No results for input(s): CULT in the last 168 hours.  Recent Results (from the past 24 hour(s))   XR Chest Port 1 View   Result Value    Radiologist flags Unexplained pneumoperitoneum (AA)    Narrative    XR CHEST PORT 1 VIEW   8/24/2022 1:20 PM     HISTORY: short of breath    COMPARISON: Chest radiograph 3/2/2022      Impression    IMPRESSION: Large volume pneumoperitoneum with resultant elevation of  the diaphragm. Low lung volumes with bibasilar opacities, favor  atelectasis. Cardiac silhouette is largely obscured but appears  similar in size to prior CT. No definite pneumothorax. No acute bony  abnormality.    [Critical Result: Unexplained pneumoperitoneum]    Finding was identified on 8/24/2022 1:29 PM.     Dr. Trinh was contacted by me on 8/24/2022 1:30 PM and verbalized  understanding of the critical result.     LEONARDO PENNINGTON MD         SYSTEM ID:  [id number]   CT Chest Abdomen Pelvis w/o Contrast    Narrative    CT CHEST ABDOMEN PELVIS W/O CONTRAST 8/24/2022 1:57 PM    CLINICAL HISTORY: chest pain, dyspnea, hypotension    TECHNIQUE: CT scan of the chest, abdomen, and pelvis was performed  without IV contrast. Multiplanar reformats were obtained. Dose  reduction techniques were used.   CONTRAST: None.    COMPARISON: Same-day chest radiograph    FINDINGS:   LUNGS AND PLEURA: Small  bilateral pleural effusions, right larger than  left, with associated bibasilar atelectasis. No definite airspace  consolidation.    MEDIASTINUM/AXILLAE: 1 or 2 foci of gas tracking along the  gastroesophageal junction into the mediastinum. No suspicious  lymphadenopathy. Intrathoracic goiter with distinct nodule in the left  upper thyroid measuring up to 1.8 cm (series 2 image 21).    CORONARY ARTERY CALCIFICATION: Severe.    HEPATOBILIARY: Simple-appearing left hepatic cyst, no specific  follow-up recommended. Distended gallbladder with questionable  punctate cholelithiasis. While there is some fluid around the  gallbladder, there is no definite wall thickening or adjacent fat  stranding to indicate acute cholecystitis.     PANCREAS: Normal.    SPLEEN: Normal.    ADRENAL GLANDS: Bilateral adrenal hyperplasia without distinct nodule.    KIDNEYS/BLADDER: No hydronephrosis. Multiple hypodense renal lesions,  most consistent with cysts. However, there are multiple indeterminate  bilateral renal lesions, including relatively hyperdense left upper  pole 1.3 cm lesion (series 2 image 141) and 1.5 cm left lower pole  exophytic soft tissue density lesion (series 2 image 151). Urinary  bladder is decompressed but otherwise unremarkable.    BOWEL: Large volume pneumoperitoneum, as seen on same-day chest  radiograph. There is focal inflammation of the distal stomach and  proximal duodenum with possible anterior wall defect noted at this  level (series 2 image 155). No additional inflamed small bowel or  colon. No evidence of bowel obstruction. Small volume free fluid in  the pelvis without walled off, drainable fluid collection.    LYMPH NODES: Normal.    VASCULATURE: Scattered calcified atherosclerosis. Ectatic distal  thoracic aorta without danna aneurysm.    PELVIC ORGANS: Calcified uterine leiomyoma.    OTHER: None.    MUSCULOSKELETAL: Old left superior and inferior pubic rami fractures.  No acute bony abnormality.       Impression    IMPRESSION:  1.  Large volume pneumoperitoneum, as seen on same-day chest  radiograph, favor source from the anterior distal stomach/proximal  duodenum. Findings could represent perforated ulcer.  2.  Small volume free fluid without walled-off, drainable collection  at this time.  3.  Small bilateral pleural effusions with bibasilar atelectasis.  4.  Incidental indeterminate renal lesions, recommend further  evaluation with renal protocol CT or MRI on a nonemergent basis.  5.  Intrathoracic goiter with distinct 1.8 cm left thyroid nodule,  recommend further evaluation with ultrasound on a nonemergent basis if  not previously performed.    LEONARDO PENNINGTON MD         SYSTEM ID:  [id number]

## 2022-08-27 ENCOUNTER — APPOINTMENT (OUTPATIENT)
Dept: ULTRASOUND IMAGING | Facility: CLINIC | Age: 72
DRG: 853 | End: 2022-08-27
Attending: INTERNAL MEDICINE
Payer: MEDICARE

## 2022-08-27 ENCOUNTER — APPOINTMENT (OUTPATIENT)
Dept: GENERAL RADIOLOGY | Facility: CLINIC | Age: 72
DRG: 853 | End: 2022-08-27
Attending: SURGERY
Payer: MEDICARE

## 2022-08-27 LAB
ALBUMIN SERPL-MCNC: 1.8 G/DL (ref 3.4–5)
ALP SERPL-CCNC: 92 U/L (ref 40–150)
ALT SERPL W P-5'-P-CCNC: 373 U/L (ref 0–50)
ANION GAP SERPL CALCULATED.3IONS-SCNC: 11 MMOL/L (ref 3–14)
AST SERPL W P-5'-P-CCNC: 265 U/L (ref 0–45)
BASE EXCESS BLDA CALC-SCNC: -4.2 MMOL/L (ref -9–1.8)
BASE EXCESS BLDA CALC-SCNC: 2.4 MMOL/L (ref -9–1.8)
BILIRUB SERPL-MCNC: 0.8 MG/DL (ref 0.2–1.3)
BLD PROD TYP BPU: NORMAL
BLOOD COMPONENT TYPE: NORMAL
BUN SERPL-MCNC: 68 MG/DL (ref 7–30)
CALCIUM SERPL-MCNC: 8.5 MG/DL (ref 8.5–10.1)
CHLORIDE BLD-SCNC: 115 MMOL/L (ref 94–109)
CO2 SERPL-SCNC: 24 MMOL/L (ref 20–32)
CODING SYSTEM: NORMAL
CREAT SERPL-MCNC: 3.44 MG/DL (ref 0.52–1.04)
CROSSMATCH: NORMAL
ERYTHROCYTE [DISTWIDTH] IN BLOOD BY AUTOMATED COUNT: 17.5 % (ref 10–15)
ERYTHROCYTE [DISTWIDTH] IN BLOOD BY AUTOMATED COUNT: 21.2 % (ref 10–15)
GFR SERPL CREATININE-BSD FRML MDRD: 14 ML/MIN/1.73M2
GLUCOSE BLD-MCNC: 115 MG/DL (ref 70–99)
GLUCOSE BLDC GLUCOMTR-MCNC: 104 MG/DL (ref 70–99)
GLUCOSE BLDC GLUCOMTR-MCNC: 114 MG/DL (ref 70–99)
GLUCOSE BLDC GLUCOMTR-MCNC: 126 MG/DL (ref 70–99)
GLUCOSE BLDC GLUCOMTR-MCNC: 138 MG/DL (ref 70–99)
GLUCOSE BLDC GLUCOMTR-MCNC: 142 MG/DL (ref 70–99)
GLUCOSE BLDC GLUCOMTR-MCNC: 144 MG/DL (ref 70–99)
HCO3 BLD-SCNC: 18 MMOL/L (ref 21–28)
HCO3 BLD-SCNC: 27 MMOL/L (ref 21–28)
HCT VFR BLD AUTO: 19.3 % (ref 35–47)
HCT VFR BLD AUTO: 26.5 % (ref 35–47)
HGB BLD-MCNC: 6.4 G/DL (ref 11.7–15.7)
HGB BLD-MCNC: 8.7 G/DL (ref 11.7–15.7)
HGB BLD-MCNC: 9 G/DL (ref 11.7–15.7)
ISSUE DATE AND TIME: NORMAL
MAGNESIUM SERPL-MCNC: 2.4 MG/DL (ref 1.6–2.3)
MCH RBC QN AUTO: 22.4 PG (ref 26.5–33)
MCH RBC QN AUTO: 23.1 PG (ref 26.5–33)
MCHC RBC AUTO-ENTMCNC: 32.8 G/DL (ref 31.5–36.5)
MCHC RBC AUTO-ENTMCNC: 33.2 G/DL (ref 31.5–36.5)
MCV RBC AUTO: 68 FL (ref 78–100)
MCV RBC AUTO: 71 FL (ref 78–100)
O2/TOTAL GAS SETTING VFR VENT: 35 %
O2/TOTAL GAS SETTING VFR VENT: 35 %
PCO2 BLD: 23 MM HG (ref 35–45)
PCO2 BLD: 38 MM HG (ref 35–45)
PH BLD: 7.45 [PH] (ref 7.35–7.45)
PH BLD: 7.51 [PH] (ref 7.35–7.45)
PHOSPHATE SERPL-MCNC: 5.3 MG/DL (ref 2.5–4.5)
PLATELET # BLD AUTO: 164 10E3/UL (ref 150–450)
PLATELET # BLD AUTO: 174 10E3/UL (ref 150–450)
PO2 BLD: 102 MM HG (ref 80–105)
PO2 BLD: 93 MM HG (ref 80–105)
POTASSIUM BLD-SCNC: 3.6 MMOL/L (ref 3.4–5.3)
PROT SERPL-MCNC: 5 G/DL (ref 6.8–8.8)
RBC # BLD AUTO: 2.86 10E6/UL (ref 3.8–5.2)
RBC # BLD AUTO: 3.76 10E6/UL (ref 3.8–5.2)
SODIUM SERPL-SCNC: 150 MMOL/L (ref 133–144)
TRIGL SERPL-MCNC: 265 MG/DL
UNIT ABO/RH: NORMAL
UNIT NUMBER: NORMAL
UNIT STATUS: NORMAL
UNIT TYPE ISBT: 7300
WBC # BLD AUTO: 11.3 10E3/UL (ref 4–11)
WBC # BLD AUTO: 13.8 10E3/UL (ref 4–11)

## 2022-08-27 PROCEDURE — 85027 COMPLETE CBC AUTOMATED: CPT | Performed by: INTERNAL MEDICINE

## 2022-08-27 PROCEDURE — 99232 SBSQ HOSP IP/OBS MODERATE 35: CPT | Performed by: INTERNAL MEDICINE

## 2022-08-27 PROCEDURE — 200N000001 HC R&B ICU

## 2022-08-27 PROCEDURE — 94003 VENT MGMT INPAT SUBQ DAY: CPT

## 2022-08-27 PROCEDURE — 84478 ASSAY OF TRIGLYCERIDES: CPT | Performed by: INTERNAL MEDICINE

## 2022-08-27 PROCEDURE — 250N000011 HC RX IP 250 OP 636: Performed by: PHYSICIAN ASSISTANT

## 2022-08-27 PROCEDURE — 258N000003 HC RX IP 258 OP 636: Performed by: INTERNAL MEDICINE

## 2022-08-27 PROCEDURE — 250N000009 HC RX 250: Performed by: INTERNAL MEDICINE

## 2022-08-27 PROCEDURE — 71045 X-RAY EXAM CHEST 1 VIEW: CPT

## 2022-08-27 PROCEDURE — 84100 ASSAY OF PHOSPHORUS: CPT | Performed by: INTERNAL MEDICINE

## 2022-08-27 PROCEDURE — P9016 RBC LEUKOCYTES REDUCED: HCPCS | Performed by: SURGERY

## 2022-08-27 PROCEDURE — 93931 UPPER EXTREMITY STUDY: CPT | Mod: RT

## 2022-08-27 PROCEDURE — 85018 HEMOGLOBIN: CPT | Performed by: INTERNAL MEDICINE

## 2022-08-27 PROCEDURE — 82803 BLOOD GASES ANY COMBINATION: CPT

## 2022-08-27 PROCEDURE — 250N000011 HC RX IP 250 OP 636

## 2022-08-27 PROCEDURE — 93010 ELECTROCARDIOGRAM REPORT: CPT | Performed by: INTERNAL MEDICINE

## 2022-08-27 PROCEDURE — 999N000157 HC STATISTIC RCP TIME EA 10 MIN

## 2022-08-27 PROCEDURE — 99291 CRITICAL CARE FIRST HOUR: CPT | Mod: 24 | Performed by: INTERNAL MEDICINE

## 2022-08-27 PROCEDURE — 250N000013 HC RX MED GY IP 250 OP 250 PS 637

## 2022-08-27 PROCEDURE — 82803 BLOOD GASES ANY COMBINATION: CPT | Performed by: INTERNAL MEDICINE

## 2022-08-27 PROCEDURE — 83735 ASSAY OF MAGNESIUM: CPT | Performed by: INTERNAL MEDICINE

## 2022-08-27 PROCEDURE — C9113 INJ PANTOPRAZOLE SODIUM, VIA: HCPCS | Performed by: INTERNAL MEDICINE

## 2022-08-27 PROCEDURE — 80053 COMPREHEN METABOLIC PANEL: CPT | Performed by: INTERNAL MEDICINE

## 2022-08-27 PROCEDURE — 250N000011 HC RX IP 250 OP 636: Performed by: INTERNAL MEDICINE

## 2022-08-27 PROCEDURE — 93005 ELECTROCARDIOGRAM TRACING: CPT

## 2022-08-27 RX ORDER — DEXTROSE MONOHYDRATE 50 MG/ML
INJECTION, SOLUTION INTRAVENOUS CONTINUOUS
Status: ACTIVE | OUTPATIENT
Start: 2022-08-27 | End: 2022-08-28

## 2022-08-27 RX ADMIN — HEPARIN SODIUM 5000 UNITS: 5000 INJECTION, SOLUTION INTRAVENOUS; SUBCUTANEOUS at 13:24

## 2022-08-27 RX ADMIN — PROPOFOL 35 MCG/KG/MIN: 10 INJECTION, EMULSION INTRAVENOUS at 12:55

## 2022-08-27 RX ADMIN — HEPARIN SODIUM 5000 UNITS: 5000 INJECTION, SOLUTION INTRAVENOUS; SUBCUTANEOUS at 02:06

## 2022-08-27 RX ADMIN — PIPERACILLIN SODIUM AND TAZOBACTAM SODIUM 2.25 G: 2; .25 INJECTION, POWDER, LYOPHILIZED, FOR SOLUTION INTRAVENOUS at 08:25

## 2022-08-27 RX ADMIN — PANTOPRAZOLE SODIUM 40 MG: 40 INJECTION, POWDER, FOR SOLUTION INTRAVENOUS at 08:17

## 2022-08-27 RX ADMIN — SODIUM BICARBONATE: 84 INJECTION, SOLUTION INTRAVENOUS at 07:20

## 2022-08-27 RX ADMIN — CHLORHEXIDINE GLUCONATE 15 ML: 1.2 SOLUTION ORAL at 20:01

## 2022-08-27 RX ADMIN — CHLORHEXIDINE GLUCONATE 15 ML: 1.2 SOLUTION ORAL at 08:17

## 2022-08-27 RX ADMIN — SODIUM CHLORIDE: 9 INJECTION, SOLUTION INTRAVENOUS at 14:00

## 2022-08-27 RX ADMIN — DEXTROSE MONOHYDRATE: 50 INJECTION, SOLUTION INTRAVENOUS at 09:44

## 2022-08-27 RX ADMIN — HYDROMORPHONE HYDROCHLORIDE 0.2 MG: 0.2 INJECTION, SOLUTION INTRAMUSCULAR; INTRAVENOUS; SUBCUTANEOUS at 04:29

## 2022-08-27 RX ADMIN — PIPERACILLIN SODIUM AND TAZOBACTAM SODIUM 2.25 G: 2; .25 INJECTION, POWDER, LYOPHILIZED, FOR SOLUTION INTRAVENOUS at 20:01

## 2022-08-27 RX ADMIN — Medication 75 MCG/HR: at 08:30

## 2022-08-27 RX ADMIN — PIPERACILLIN SODIUM AND TAZOBACTAM SODIUM 2.25 G: 2; .25 INJECTION, POWDER, LYOPHILIZED, FOR SOLUTION INTRAVENOUS at 02:07

## 2022-08-27 RX ADMIN — INSULIN ASPART 1 UNITS: 100 INJECTION, SOLUTION INTRAVENOUS; SUBCUTANEOUS at 12:06

## 2022-08-27 RX ADMIN — PROPOFOL 30 MCG/KG/MIN: 10 INJECTION, EMULSION INTRAVENOUS at 21:30

## 2022-08-27 RX ADMIN — PIPERACILLIN SODIUM AND TAZOBACTAM SODIUM 2.25 G: 2; .25 INJECTION, POWDER, LYOPHILIZED, FOR SOLUTION INTRAVENOUS at 13:26

## 2022-08-27 RX ADMIN — PROPOFOL 35 MCG/KG/MIN: 10 INJECTION, EMULSION INTRAVENOUS at 05:43

## 2022-08-27 RX ADMIN — INSULIN ASPART 1 UNITS: 100 INJECTION, SOLUTION INTRAVENOUS; SUBCUTANEOUS at 16:37

## 2022-08-27 RX ADMIN — PANTOPRAZOLE SODIUM 40 MG: 40 INJECTION, POWDER, FOR SOLUTION INTRAVENOUS at 16:33

## 2022-08-27 ASSESSMENT — ACTIVITIES OF DAILY LIVING (ADL)
ADLS_ACUITY_SCORE: 41
ADLS_ACUITY_SCORE: 41
ADLS_ACUITY_SCORE: 45
ADLS_ACUITY_SCORE: 41
ADLS_ACUITY_SCORE: 45
ADLS_ACUITY_SCORE: 41
ADLS_ACUITY_SCORE: 41
ADLS_ACUITY_SCORE: 45

## 2022-08-27 NOTE — PROGRESS NOTES
Renal Medicine Progress Note                                Diana Santiago MRN# [id number]   Age: 71 year old YOB: 1950   Date of Admission: 8/24/2022 Hospital LOS: 3                  Assessment/Plan:     71-year-old female presented to the ED dated 08/24/2022 in the setting of shortness of breath and chest pain.  Evaluation demonstrated perforated viscus, for which she was admitted and required urgent surgery.    Seen regarding acute on chronic renal disease    1.  CKD stage IV   -baseline creatinine typically in the range of 2.0 mg/dL.     -etiology thought secondary to a combination of hypertension and lithium toxicity.     -follows with Dr. Cedeno. Last seen as an outpatient dated 08/10/2022.      -creatinine at that time 2.10 mg/dL.  2.  Previous history of documented proteinuria.  3.  History of DI by report.  4.  Acute kidney injury in the setting of septic shock   -presumably ATN.     -nonoliguric   -creatinine rising    -Noemi suggestive of ATN  5.  Presentation with hypotension.     -initial and subsequent ER, blood pressures in the range of 70-80 mmHg systolic.   -remains pressor dependent  6.  Postop perforated peptic ulcer repair.     -Surgery dated 08/24/2022.  7.  Septic shock.  8.  Metabolic acidosis  9.  Acute hypoxic respiratory failure, currently vent dependent.  10.  Elevated LFTs,    -suggestive in part some degree of shock liver related to poor perfusion.  11.  Hypernatremia       Continue albumin as needed for BP support/renal perfusion    Stop HCO3-  Increase D5 rate    Follow labs and UO       Interval History:     Remains intubated and sedated  Failed weaning  FiO2 at 30%    Remains on NE and vaso    UO noted  Creatinine down slightly  HCO3- better   Na up     ROS:     Intubated and sedated: ROS unable    Medications and Allergies:     Reviewed    Physical Exam:     Vitals were reviewed  Patient Vitals for the past 8 hrs:   BP Pulse Resp SpO2 Weight   08/27/22 0900 103/70 (!) 49  11 100 % --   08/27/22 0845 -- (!) 49 12 99 % --   08/27/22 0830 -- (!) 48 11 100 % --   08/27/22 0815 -- (!) 47 11 98 % --   08/27/22 0800 102/72 (!) 47 11 98 % --   08/27/22 0745 -- (!) 49 11 96 % --   08/27/22 0730 -- (!) 49 11 97 % --   08/27/22 0715 -- 50 11 96 % --   08/27/22 0700 107/74 (!) 47 11 96 % --   08/27/22 0645 -- 56 10 97 % --   08/27/22 0630 -- 50 11 97 % 56.3 kg (124 lb 1.9 oz)   08/27/22 0615 -- 51 12 96 % --   08/27/22 0600 97/67 53 12 96 % --   08/27/22 0545 -- 54 12 100 % --   08/27/22 0542 -- 61 15 98 % --   08/27/22 0530 -- 56 12 96 % --   08/27/22 0521 90/50 58 12 97 % --   08/27/22 0515 -- 58 12 (!) 87 % --   08/27/22 0500 103/75 53 16 90 % --   08/27/22 0445 -- 52 16 94 % --   08/27/22 0430 110/80 52 16 98 % --   08/27/22 0415 104/74 53 15 96 % --   08/27/22 0400 104/71 52 12 96 % --   08/27/22 0345 111/79 51 16 96 % --   08/27/22 0334 -- -- -- 95 % --   08/27/22 0330 98/70 53 16 95 % --   08/27/22 0315 96/69 54 15 95 % --   08/27/22 0300 103/75 53 16 95 % --   08/27/22 0245 90/61 54 16 92 % --   08/27/22 0230 103/70 55 15 92 % --   08/27/22 0215 -- 59 16 96 % --   08/27/22 0200 -- 57 16 96 % --   08/27/22 0145 -- 59 16 94 % --     I/O last 3 completed shifts:  In: 1640.38 [I.V.:1340.38]  Out: 2687 [Urine:2095; Emesis/NG output:500; Drains:92]    Vitals:    08/24/22 1245 08/25/22 2127 08/26/22 0415 08/27/22 0630   Weight: 54.4 kg (120 lb) 60 kg (132 lb 4.4 oz) 60.9 kg (134 lb 4.2 oz) 56.3 kg (124 lb 1.9 oz)         GENERAL:  intubated and sedated  HEENT: ETT  RESP:  clear anteriorly  CV: RRR, normal S1 S2  ABDOMEN: soft  MS: no clubbing, cyanosis   SKIN: clear without significant rashes or lesions  EXT: warm, no edema    Data:     Recent Labs   Lab 08/27/22  0813 08/27/22  0428 08/27/22  0013 08/26/22  1945 08/26/22  0450 08/26/22  0449 08/25/22  0607 08/25/22  0604 08/24/22  2238 08/24/22  2234   NA  --  150*  --   --   --  142  --  139  --  138   POTASSIUM  --  3.6  --   --   --  4.6   --  4.9  --  5.1   CHLORIDE  --  115*  --   --   --  111*  --  109  --  111*   CO2  --  24  --   --   --  19*  --  20  --  16*   ANIONGAP  --  11  --   --   --  12  --  10  --  11   * 115* 104* 98   < > 103*   < > 173*   < > 278*   BUN  --  68*  --   --   --  69*  --  69*  --  69*   CR  --  3.44*  --   --   --  3.50*  --  3.11*  --  2.88*   GFRESTIMATED  --  14*  --   --   --  13*  --  15*  --  17*   ISSA  --  8.5  --   --   --  8.1*  --  7.8*  --  7.6*    < > = values in this interval not displayed.         Recent Labs   Lab 08/27/22 0428 08/26/22 0449 08/25/22  0604 08/24/22 2234 08/24/22  1804 08/24/22  1307   CR 3.44* 3.50* 3.11* 2.88* 2.87* 3.51*     Recent Labs   Lab 08/27/22 0428 08/26/22 0449 08/25/22  0604 08/24/22  2234 08/24/22  1804   POTASSIUM 3.6 4.6 4.9 5.1 5.5*     Recent Labs   Lab 08/27/22 0428 08/26/22 0449 08/25/22  0604 08/24/22  1307   ALBUMIN 1.8* 2.0* 1.8* 2.4*     Recent Labs   Lab 08/27/22  0808 08/27/22  0428 08/26/22  1516 08/26/22  0449 08/25/22  0604   PHOS  --  5.3*  --  6.0* 4.2   HGB 8.7* 6.4* 7.3* 6.7* 8.1*         G Jonathan Payton MD    [facility] - Nephrology  [phone]

## 2022-08-27 NOTE — PROVIDER NOTIFICATION
During cares this RN found that pt's abdomen seemed rounder and firmer than it did earlier today.  Pt has been dropping her Hgb each day post-op and has required transfusions each day.  Also, pressor and vent requirements have increased some today.  Per MD the fentanyl and propofol had been decreased this morning as the initial plan today was to assess for readiness to extubate.    D/W  ([facility]), he assessed the patient and told this nurse that it felt to him more like guarding d/t pain and not a surgical abdomen.  Incision is CDI.  DAVI output serous, NG output green.  Analgesic infusion rate increased.    Will continue to monitor.

## 2022-08-27 NOTE — PROGRESS NOTES
Notified provider about indwelling grove catheter discussed removal or continued need.    Did provider choose to remove indwelling grove catheter? No    Provider's grove indication for keeping indwelling grove catheter: Other - Stage 3 renal disease, shocky, on pressors, need grove for Strict I/O..    Is there an order for indwelling grove catheter? Yes    *If there is a plan to keep grove catheter in place at discharge daily notification with provider is not necessary.

## 2022-08-27 NOTE — PROGRESS NOTES
[facility] ICU RESPIRATORY NOTE           Date of Admission: 08/24/2022     Date of Intubation (most recent): 08/24/2022    Reason for Mechanical Ventilation: Airway protection     Number of Days on Mechanical Ventilation: 3     Met Criteria for Spontaneous Breathing Trial: No     Significant event today :None     ABG Results:   Recent Labs   Lab 08/27/22  0428 08/26/22  0449 08/25/22  1024 08/24/22  2235   PH 7.51* 7.37 7.46* 7.35   PCO2 23* 35 31* 29*   PO2 102 105 69* 75*   HCO3 18* 20* 22 16*   O2PER 35 30 30 30       Current Vent Settings:  Vent Mode: CMV/AC  (Continuous Mandatory Ventilation/ Assist Control)  FiO2 (%): 30 %  Resp Rate (Set): 16 breaths/min  Tidal Volume (Set, mL): 440 mL  PEEP (cm H2O): 5 cmH2O  Resp: 12     Skin Assessment :intact. Dry and clean      Plan: Continue full ventilatory support and wean as tolerated.     Julianne Santos, RT

## 2022-08-27 NOTE — PLAN OF CARE
Shift Note:     RASS is -1 to -2, on propofol and fentanyl, can nod when asked to do so.  KEANE spontaneously and intermittently to command.  No PSV today, required increase in PEEP and FiO2 to maintain SpO2.  Minimal secretions.  NG to LIS, green bile output, per surgery pt is strict NPO.  BS faint.  Adequate UOP.  Abd midline incision with staples, CDI.  Minimal DAVI output, serous.  Is guarding with abdominal palpation today.   Got 1 unit RBC for Hgb 6.4 this AM, recheck 8.7  More labile with BP and sedation today, up and down on levophed, propofol and fentanyl throughout the day.  Brother Acosta updated at bedside.

## 2022-08-27 NOTE — PROGRESS NOTES
Patient remains intubated and sedated  Minimal responsiveness  Hemodynamically stable now tapered down to very small dose of norepinephrine  Urine output has improved.  Nasogastric tube remains in place with bilious output  Abdomen soft, nondistended no appreciable tenderness, DAVI drain serous  Hemoglobin continues to drift down and had another unit of blood transfusion, given normal DAVI output, no blood in nasogastric tube output, benign abdominal exam doubt that there is ongoing intra-abdominal or visceral blood loss, transfuse as necessary per ICU service    Overall seems to be doing reasonably well and slowly improving  Discussed with ICU attending  They are going to work on weaning sedation and hopefully weaning to extubation  Continue NG and n.p.o. as well as IV antibiotics  General surgery will continue to follow

## 2022-08-27 NOTE — PROGRESS NOTES
"Critical Care Progress Note      08/27/2022    Name: Diana Santiago MRN#: [id number]   Age: 71 year old YOB: 1950     Hsptl Day# 3  ICU DAY #    MV DAY #             Problem List:   Active Problems:    Perforated viscus    Septic shock (H)    1. Septic shock - she remains on only minimal Levophed; on antibiotics (Zosyn), and cultures remain negative. She seems volume replete and will continue to monitor and support. Her HR has been in 50's today but sinus rhythm and now on meds that should affect this; will monitor only.   2. Acute respiratory failure- now down to 35% and +5 PEEP. We will wake up and provide SBT; physiologically she is close to being able to be extubated.   3. Perforated - s/p operative repair and wash out of peritoneum with gastric contents  4. Acute on chronic renal failure- baseline creatinine about 2 and now about 3.4. Dr. Joshi's plans noted, she continues on IV bicarbonate and creatinine hopefully stabilizing. With Na 150 will add 24 hours worth of D5W at 25 mls/hr and monitor Na closely.   5. HTN  6. History bipolar/depression   7. History breast cancer  8. DM - glucoses ok on sliding scale only   9. History of DI - with Na 150 will monitor closely but UO ok  10. History of lithium toxicity   11. Nutrition - last albumin prior to admission 3.3 and will defer \"hard\" decision of nutrition (TPN vs oral/enteral) until Day 7 if needed.   Overall acute and critical with slow steady improvement            Summary/Hospital Course:           Assessment and plan :     Diana Santiago IS a 71 year old female admitted on 8/24/2022 for resolving septic shock, acute respiratory failure, and acute renal failure.   I have personally reviewed the daily labs, imaging studies, cultures and discussed the case with referring physician and consulting physicians.     My assessment and plan by system for this patient is as follows:    Neurology/Psychiatry:   1. Deferred at present     Cardiovascular: "   1.Hemodynamics - remains on small dose levophed and will continue to titrate down as able     Pulmonary/Ventilator Management:   1. Vent 30% , +5 PEEP- will try SBT later today    GI and Nutrition :   1. Defer nutrition at present     Renal/Fluids/Electrolytes:   1. Acute on chronic failure- monitor closely for recovery; on bicarbonate and D5W.    Infectious Disease:   1. On Zosyn    Endocrine:   1. Glucoses ok     Hematology/Oncology:   1. Continue to transfuse to keep Hb > 7 but doubt she is acutely bleeding;      IV/Access:   1. Venous access -   2. Arterial access -   3.  Plan  - central access required and necessary      ICU Prophylaxis:   1. DVT: Hep Subq/ LMWH/mechanical  2. VAP: HOB 30 degrees, chlorhexidine rinse  3. Stress Ulcer: PPI/H2 blocker  4. Restraints: Nonviolent soft two point restraints required and necessary for patient safety and continued cares and good effect as patient continues to pull at necessary lines, tubes despite education and distraction. Will readdress daily.   5. IV Access - central access required and necessary for continued patient cares  6. Feeding - deferred   7. Family Update: will discuss with family when they come in  8. Disposition - ICU care         Key goals for next 24 hours:   1. Titrate down sedatives  2. Breathing trial   3.               Interim History:              Key Medications:       chlorhexidine  15 mL Mouth/Throat Q12H     heparin ANTICOAGULANT  5,000 Units Subcutaneous Q12H     insulin aspart  1-6 Units Subcutaneous Q4H     pantoprazole (PROTONIX) IV  40 mg Intravenous BID AC     piperacillin-tazobactam  2.25 g Intravenous Q6H     sodium chloride (PF)  3 mL Intracatheter Q8H       IV infusion builder WITH additives 50 mL/hr at 08/27/22 0720     D5W       fentaNYL 50 mcg/hr (08/27/22 0842)     propofol (DIPRIVAN) infusion 35 mcg/kg/min (08/27/22 0543)    And     - MEDICATION INSTRUCTIONS -       norepinephrine 0.01 mcg/kg/min (08/27/22 0195)     sodium  chloride Stopped (08/26/22 1120)               Physical Examination:   Temp:  [98.2  F (36.8  C)-99.7  F (37.6  C)] 98.2  F (36.8  C)  Pulse:  [47-70] 49  Resp:  [10-18] 11  BP: ()/(50-80) 103/70  MAP:  [59 mmHg-88 mmHg] 70 mmHg  Arterial Line BP: ()/(45-80) 99/52  FiO2 (%):  [30 %] 30 %  SpO2:  [87 %-100 %] 100 %    Intake/Output Summary (Last 24 hours) at 8/27/2022 0907  Last data filed at 8/27/2022 0800  Gross per 24 hour   Intake 2091.48 ml   Output 2715 ml   Net -623.52 ml     Wt Readings from Last 4 Encounters:   08/27/22 56.3 kg (124 lb 1.9 oz)   07/17/22 55.3 kg (122 lb)   04/22/22 59.9 kg (132 lb)   03/21/22 64 kg (141 lb)     Arterial Line BP: ()/(45-80) 99/52  MAP:  [59 mmHg-88 mmHg] 70 mmHg  BP - Mean:  [65-93] 84  Vent Mode: CMV/AC  (Continuous Mandatory Ventilation/ Assist Control)  FiO2 (%): 30 %  Resp Rate (Set): 12 breaths/min  Tidal Volume (Set, mL): 440 mL  PEEP (cm H2O): 5 cmH2O  Resp: 11    Recent Labs   Lab 08/27/22  0808 08/27/22  0428 08/26/22  0449 08/25/22  1024   PH 7.45 7.51* 7.37 7.46*   PCO2 38 23* 35 31*   PO2 93 102 105 69*   HCO3 27 18* 20* 22   O2PER 35 35 30 30       GEN: no acute distress; comfortable on vent; sedated    HEENT: head ncat, sclera anicteric, OP patent, trachea midline   PULM: unlabored synchronous with vent, clear anteriorly    CV/COR: RRR S1S2 no gallop,  No rub, no murmur  ABD: soft nontender, wound appears clean  EXT:  Minimal edema   warm  NEURO: sedated; moves extremities trace to stimulation   SKIN: no obvious rash; no cyanosis or mottling   LINES: clean, dry intact         Data:   All data and imaging reviewed     ROUTINE ICU LABS (Last four results)  CMP  Recent Labs   Lab 08/27/22  0813 08/27/22  0428 08/27/22  0013 08/26/22  1945 08/26/22  0450 08/26/22  0449 08/25/22  0607 08/25/22  0604 08/24/22  2238 08/24/22  2234 08/24/22  1541 08/24/22  1307   NA  --  150*  --   --   --  142  --  139  --  138   < > 134   POTASSIUM  --  3.6  --   --    --  4.6  --  4.9  --  5.1   < > 6.2*   CHLORIDE  --  115*  --   --   --  111*  --  109  --  111*   < > 108   CO2  --  24  --   --   --  19*  --  20  --  16*   < > 7*   ANIONGAP  --  11  --   --   --  12  --  10  --  11   < > 19*   * 115* 104* 98   < > 103*   < > 173*   < > 278*   < > 153*   BUN  --  68*  --   --   --  69*  --  69*  --  69*   < > 75*   CR  --  3.44*  --   --   --  3.50*  --  3.11*  --  2.88*   < > 3.51*   GFRESTIMATED  --  14*  --   --   --  13*  --  15*  --  17*   < > 13*   ISSA  --  8.5  --   --   --  8.1*  --  7.8*  --  7.6*   < > 10.4*   MAG  --  2.4*  --   --   --  2.3  --  2.0  --   --   --   --    PHOS  --  5.3*  --   --   --  6.0*  --  4.2  --   --   --   --    PROTTOTAL  --  5.0*  --   --   --  5.1*  --  4.6*  --   --   --  6.6*   ALBUMIN  --  1.8*  --   --   --  2.0*  --  1.8*  --   --   --  2.4*   BILITOTAL  --  0.8  --   --   --  0.7  --  0.9  --   --   --  0.9   ALKPHOS  --  92  --   --   --  78  --  62  --   --   --  88   AST  --  265*  --   --   --  202*  --  217*  --   --   --  24   ALT  --  373*  --   --   --  255*  --  237*  --   --   --  29    < > = values in this interval not displayed.     CBC  Recent Labs   Lab 08/27/22  0808 08/27/22  0428 08/26/22  1516 08/26/22  0449   WBC 13.8* 11.3* 13.1* 13.1*   RBC 3.76* 2.86* 3.30* 3.18*   HGB 8.7* 6.4* 7.3* 6.7*   HCT 26.5* 19.3* 22.2* 21.4*   MCV 71* 68* 67* 67*   MCH 23.1* 22.4* 22.1* 21.1*   MCHC 32.8 33.2 32.9 31.3*   RDW 21.2* 17.5* 18.6* 17.0*    164 182 292     INR  Recent Labs   Lab 08/24/22  1311   INR 1.13     Arterial Blood Gas  Recent Labs   Lab 08/27/22  0808 08/27/22  0428 08/26/22  0449 08/25/22  1024   PH 7.45 7.51* 7.37 7.46*   PCO2 38 23* 35 31*   PO2 93 102 105 69*   HCO3 27 18* 20* 22   O2PER 35 35 30 30       All cultures:  No results for input(s): CULT in the last 168 hours.  Recent Results (from the past 24 hour(s))   XR Chest Port 1 View    Narrative    EXAM: XR CHEST PORT 1 VIEW  LOCATION: [facility]  DATE/TIME: 8/27/2022 5:23 AM    INDICATION: For oxygen desaturation  COMPARISON: Prior study dated 8/24/2022      Impression    IMPRESSION: ET tube and NG tube are unchanged in position. The cardiomediastinal silhouette is unchanged. Bibasilar atelectasis is noted with increased retrocardiac opacification concerning for posterior atelectasis and/or airspace disease, alternatively   this could reflect posterior layering effusion       MD Margarita    Billing: This patient is critically ill: Yes. Total critical care time today 40 min.

## 2022-08-27 NOTE — PLAN OF CARE
Levo, propofol, fentanyl, D5W/Bicarbonate  VC-AC 35% PEEP5 Rate 12    Events of shift:  Desaturation, CXR, 1 Unit PRBCs given for Hgb 6.4       Problem: Plan of Care - These are the overarching goals to be used throughout the patient stay.    Goal: Optimal Comfort and Wellbeing  Outcome: Ongoing, Not Progressing  Intervention: Provide Person-Centered Care  Recent Flowsheet Documentation  Taken 8/27/2022 0400 by Abiola Castro RN  Trust Relationship/Rapport:   care explained   reassurance provided  Taken 8/27/2022 0000 by Abiola Castro RN  Trust Relationship/Rapport:   care explained   reassurance provided  Taken 8/26/2022 2000 by Abiola Castro RN  Trust Relationship/Rapport:   care explained   reassurance provided     Problem: Risk for Delirium  Goal: Improved Sleep  Outcome: Ongoing, Not Progressing     Problem: Restraint, Nonbehavioral (Nonviolent)  Goal: Absence of Harm or Injury  Outcome: Ongoing, Not Progressing  Intervention: Protect Dignity, Rights, and Personal Wellbeing  Recent Flowsheet Documentation  Taken 8/27/2022 0400 by Abiola Castro RN  Trust Relationship/Rapport:   care explained   reassurance provided  Taken 8/27/2022 0000 by Abiola Castro RN  Trust Relationship/Rapport:   care explained   reassurance provided  Taken 8/26/2022 2000 by Abiola Castro RN  Trust Relationship/Rapport:   care explained   reassurance provided  Intervention: Protect Skin and Joint Integrity  Recent Flowsheet Documentation  Taken 8/27/2022 0600 by Abiola Castro RN  Body Position:   turned   right  Taken 8/27/2022 0400 by Abiola Castro RN  Body Position:   turned   supine  Taken 8/27/2022 0200 by Abiola Castro RN  Body Position:   turned   left  Taken 8/27/2022 0000 by Abiola Castro RN  Body Position:   turned   right  Taken 8/26/2022 2200 by Abiola Castro RN  Body Position:   turned   left  Taken 8/26/2022 2000 by Abiola Castro RN  Body Position:   turned   right     Problem:  Risk for Delirium  Goal: Optimal Coping  Outcome: Ongoing, Progressing  Goal: Improved Behavioral Control  Outcome: Ongoing, Progressing  Goal: Improved Attention and Thought Clarity  Outcome: Ongoing, Progressing     Problem: Plan of Care - These are the overarching goals to be used throughout the patient stay.    Goal: Absence of Hospital-Acquired Illness or Injury  Intervention: Identify and Manage Fall Risk  Recent Flowsheet Documentation  Taken 8/27/2022 0400 by Abiola Castro RN  Safety Promotion/Fall Prevention:   fall prevention program maintained   clutter free environment maintained   increased rounding and observation   lighting adjusted   room door open   room organization consistent   safety round/check completed  Taken 8/27/2022 0000 by Abiola Castro RN  Safety Promotion/Fall Prevention:   fall prevention program maintained   clutter free environment maintained   increased rounding and observation   lighting adjusted   room door open   room organization consistent   safety round/check completed  Taken 8/26/2022 2000 by Abiola Castro RN  Safety Promotion/Fall Prevention:   fall prevention program maintained   clutter free environment maintained   increased rounding and observation   lighting adjusted   room door open   room organization consistent   safety round/check completed  Intervention: Prevent Skin Injury  Recent Flowsheet Documentation  Taken 8/27/2022 0600 by Abiola Castro RN  Body Position:   turned   right  Taken 8/27/2022 0400 by Abiola Castro, RN  Body Position:   turned   supine  Taken 8/27/2022 0200 by Abiola Castro, RN  Body Position:   turned   left  Taken 8/27/2022 0000 by Abiola Castro RN  Body Position:   turned   right  Taken 8/26/2022 2200 by Abiola Castro RN  Body Position:   turned   left  Taken 8/26/2022 2000 by Abiola Castro RN  Body Position:   turned   right  Intervention: Prevent and Manage VTE (Venous Thromboembolism) Risk  Recent Flowsheet  Documentation  Taken 8/27/2022 0400 by Abiola Castro RN  VTE Prevention/Management: SCDs (sequential compression devices) on  Activity Management: bedrest  Taken 8/27/2022 0000 by Abiola Castro RN  VTE Prevention/Management: SCDs (sequential compression devices) on  Activity Management: bedrest  Taken 8/26/2022 2000 by Abiola Castro RN  VTE Prevention/Management: SCDs (sequential compression devices) on  Activity Management: bedrest   Goal Outcome Evaluation:

## 2022-08-27 NOTE — PROGRESS NOTES
[facility] ICU RESPIRATORY NOTE        Date of Admission: 8/24/2022    Date of Intubation (most recent): 8/24/2022    Reason for Mechanical Ventilation: Airway protection    Number of Days on Mechanical Ventilation: 3    Met Criteria for Spontaneous Breathing Trial: No    Reason for No Spontaneous Breathing Trial: Increased oxygenation and ventilator support needed due to desaturating.    Significant Events Today: None    ABG Results:   Recent Labs   Lab 08/27/22  0808 08/27/22  0428 08/26/22  0449 08/25/22  1024   PH 7.45 7.51* 7.37 7.46*   PCO2 38 23* 35 31*   PO2 93 102 105 69*   HCO3 27 18* 20* 22   O2PER 35 35 30 30       Current Vent Settings: Vent Mode: CMV/AC  (Continuous Mandatory Ventilation/ Assist Control)  FiO2 (%): 75 %  Resp Rate (Set): (S) 16 breaths/min  Tidal Volume (Set, mL): 440 mL  PEEP (cm H2O): (S) 5 cmH2O  Resp: 15      Skin Assessment: Intact    Plan: Continue to monitor overnight.    Sara Alejandra on 8/27/2022 at 4:59 PM

## 2022-08-28 LAB
ALBUMIN SERPL-MCNC: 1.6 G/DL (ref 3.4–5)
ALP SERPL-CCNC: 185 U/L (ref 40–150)
ALT SERPL W P-5'-P-CCNC: 295 U/L (ref 0–50)
ANION GAP SERPL CALCULATED.3IONS-SCNC: 7 MMOL/L (ref 3–14)
AST SERPL W P-5'-P-CCNC: 114 U/L (ref 0–45)
BASE EXCESS BLDA CALC-SCNC: 1.4 MMOL/L (ref -9–1.8)
BILIRUB SERPL-MCNC: 0.8 MG/DL (ref 0.2–1.3)
BUN SERPL-MCNC: 60 MG/DL (ref 7–30)
CALCIUM SERPL-MCNC: 8.2 MG/DL (ref 8.5–10.1)
CHLORIDE BLD-SCNC: 113 MMOL/L (ref 94–109)
CO2 SERPL-SCNC: 25 MMOL/L (ref 20–32)
CREAT SERPL-MCNC: 3.23 MG/DL (ref 0.52–1.04)
ERYTHROCYTE [DISTWIDTH] IN BLOOD BY AUTOMATED COUNT: 20.8 % (ref 10–15)
GFR SERPL CREATININE-BSD FRML MDRD: 15 ML/MIN/1.73M2
GLUCOSE BLD-MCNC: 176 MG/DL (ref 70–99)
GLUCOSE BLDC GLUCOMTR-MCNC: 102 MG/DL (ref 70–99)
GLUCOSE BLDC GLUCOMTR-MCNC: 102 MG/DL (ref 70–99)
GLUCOSE BLDC GLUCOMTR-MCNC: 113 MG/DL (ref 70–99)
GLUCOSE BLDC GLUCOMTR-MCNC: 129 MG/DL (ref 70–99)
GLUCOSE BLDC GLUCOMTR-MCNC: 91 MG/DL (ref 70–99)
HCO3 BLD-SCNC: 24 MMOL/L (ref 21–28)
HCT VFR BLD AUTO: 27.3 % (ref 35–47)
HGB BLD-MCNC: 8.8 G/DL (ref 11.7–15.7)
LACTATE SERPL-SCNC: 1.1 MMOL/L (ref 0.7–2)
MAGNESIUM SERPL-MCNC: 2.3 MG/DL (ref 1.6–2.3)
MCH RBC QN AUTO: 22.7 PG (ref 26.5–33)
MCHC RBC AUTO-ENTMCNC: 32.2 G/DL (ref 31.5–36.5)
MCV RBC AUTO: 71 FL (ref 78–100)
O2/TOTAL GAS SETTING VFR VENT: 50 %
PCO2 BLD: 30 MM HG (ref 35–45)
PH BLD: 7.51 [PH] (ref 7.35–7.45)
PHOSPHATE SERPL-MCNC: 4.1 MG/DL (ref 2.5–4.5)
PLATELET # BLD AUTO: 184 10E3/UL (ref 150–450)
PO2 BLD: 101 MM HG (ref 80–105)
POTASSIUM BLD-SCNC: 3.4 MMOL/L (ref 3.4–5.3)
PROT SERPL-MCNC: 4.9 G/DL (ref 6.8–8.8)
RBC # BLD AUTO: 3.87 10E6/UL (ref 3.8–5.2)
SODIUM SERPL-SCNC: 145 MMOL/L (ref 133–144)
WBC # BLD AUTO: 8.5 10E3/UL (ref 4–11)

## 2022-08-28 PROCEDURE — 258N000003 HC RX IP 258 OP 636: Performed by: INTERNAL MEDICINE

## 2022-08-28 PROCEDURE — C9113 INJ PANTOPRAZOLE SODIUM, VIA: HCPCS | Performed by: INTERNAL MEDICINE

## 2022-08-28 PROCEDURE — 36600 WITHDRAWAL OF ARTERIAL BLOOD: CPT

## 2022-08-28 PROCEDURE — 85027 COMPLETE CBC AUTOMATED: CPT | Performed by: INTERNAL MEDICINE

## 2022-08-28 PROCEDURE — 84100 ASSAY OF PHOSPHORUS: CPT | Performed by: INTERNAL MEDICINE

## 2022-08-28 PROCEDURE — 82803 BLOOD GASES ANY COMBINATION: CPT

## 2022-08-28 PROCEDURE — 250N000013 HC RX MED GY IP 250 OP 250 PS 637

## 2022-08-28 PROCEDURE — 99221 1ST HOSP IP/OBS SF/LOW 40: CPT | Mod: 24 | Performed by: SURGERY

## 2022-08-28 PROCEDURE — 83735 ASSAY OF MAGNESIUM: CPT | Performed by: INTERNAL MEDICINE

## 2022-08-28 PROCEDURE — 99291 CRITICAL CARE FIRST HOUR: CPT | Mod: 24 | Performed by: INTERNAL MEDICINE

## 2022-08-28 PROCEDURE — 250N000011 HC RX IP 250 OP 636: Performed by: INTERNAL MEDICINE

## 2022-08-28 PROCEDURE — 999N000157 HC STATISTIC RCP TIME EA 10 MIN

## 2022-08-28 PROCEDURE — 200N000001 HC R&B ICU

## 2022-08-28 PROCEDURE — 80053 COMPREHEN METABOLIC PANEL: CPT | Performed by: INTERNAL MEDICINE

## 2022-08-28 PROCEDURE — 83605 ASSAY OF LACTIC ACID: CPT | Performed by: INTERNAL MEDICINE

## 2022-08-28 PROCEDURE — 94003 VENT MGMT INPAT SUBQ DAY: CPT

## 2022-08-28 PROCEDURE — 250N000011 HC RX IP 250 OP 636

## 2022-08-28 PROCEDURE — 99232 SBSQ HOSP IP/OBS MODERATE 35: CPT | Performed by: INTERNAL MEDICINE

## 2022-08-28 RX ORDER — DEXTROSE MONOHYDRATE 50 MG/ML
INJECTION, SOLUTION INTRAVENOUS CONTINUOUS
Status: ACTIVE | OUTPATIENT
Start: 2022-08-28 | End: 2022-08-29

## 2022-08-28 RX ADMIN — DEXTROSE MONOHYDRATE: 50 INJECTION, SOLUTION INTRAVENOUS at 20:28

## 2022-08-28 RX ADMIN — PANTOPRAZOLE SODIUM 40 MG: 40 INJECTION, POWDER, FOR SOLUTION INTRAVENOUS at 08:16

## 2022-08-28 RX ADMIN — HEPARIN SODIUM 5000 UNITS: 5000 INJECTION, SOLUTION INTRAVENOUS; SUBCUTANEOUS at 00:38

## 2022-08-28 RX ADMIN — PIPERACILLIN SODIUM AND TAZOBACTAM SODIUM 2.25 G: 2; .25 INJECTION, POWDER, LYOPHILIZED, FOR SOLUTION INTRAVENOUS at 02:58

## 2022-08-28 RX ADMIN — HEPARIN SODIUM 5000 UNITS: 5000 INJECTION, SOLUTION INTRAVENOUS; SUBCUTANEOUS at 12:15

## 2022-08-28 RX ADMIN — PANTOPRAZOLE SODIUM 40 MG: 40 INJECTION, POWDER, FOR SOLUTION INTRAVENOUS at 20:06

## 2022-08-28 RX ADMIN — CHLORHEXIDINE GLUCONATE 15 ML: 1.2 SOLUTION ORAL at 20:05

## 2022-08-28 RX ADMIN — DEXTROSE MONOHYDRATE: 50 INJECTION, SOLUTION INTRAVENOUS at 02:13

## 2022-08-28 RX ADMIN — PIPERACILLIN SODIUM AND TAZOBACTAM SODIUM 2.25 G: 2; .25 INJECTION, POWDER, LYOPHILIZED, FOR SOLUTION INTRAVENOUS at 20:02

## 2022-08-28 RX ADMIN — CHLORHEXIDINE GLUCONATE 15 ML: 1.2 SOLUTION ORAL at 08:16

## 2022-08-28 RX ADMIN — PIPERACILLIN SODIUM AND TAZOBACTAM SODIUM 2.25 G: 2; .25 INJECTION, POWDER, LYOPHILIZED, FOR SOLUTION INTRAVENOUS at 08:16

## 2022-08-28 RX ADMIN — PROPOFOL 30 MCG/KG/MIN: 10 INJECTION, EMULSION INTRAVENOUS at 06:55

## 2022-08-28 RX ADMIN — PIPERACILLIN SODIUM AND TAZOBACTAM SODIUM 2.25 G: 2; .25 INJECTION, POWDER, LYOPHILIZED, FOR SOLUTION INTRAVENOUS at 15:05

## 2022-08-28 ASSESSMENT — ACTIVITIES OF DAILY LIVING (ADL)
ADLS_ACUITY_SCORE: 43
ADLS_ACUITY_SCORE: 43
ADLS_ACUITY_SCORE: 45
ADLS_ACUITY_SCORE: 43
ADLS_ACUITY_SCORE: 47
ADLS_ACUITY_SCORE: 47
ADLS_ACUITY_SCORE: 43

## 2022-08-28 NOTE — PROGRESS NOTES
[facility] ICU RESPIRATORY NOTE        Date of Admission: 8/24/2022    Date of Intubation (most recent): 8/24/22    Reason for Mechanical Ventilation: AW protection    Number of Days on Mechanical Ventilation: 3    Met Criteria for Spontaneous Breathing Trial: No    Reason for No Spontaneous Breathing Trial: Per MD    Significant Events Today: None    ABG Results:   Recent Labs   Lab 08/27/22  0808 08/27/22  0428 08/26/22  0449 08/25/22  1024   PH 7.45 7.51* 7.37 7.46*   PCO2 38 23* 35 31*   PO2 93 102 105 69*   HCO3 27 18* 20* 22   O2PER 35 35 30 30       Current Vent Settings: Vent Mode: CMV/AC  (Continuous Mandatory Ventilation/ Assist Control)  FiO2 (%): 50 %  Resp Rate (Set): 16 breaths/min  Tidal Volume (Set, mL): 440 mL  PEEP (cm H2O): (S) 7 cmH2O  Resp: 15      Plan: We will continue on full ventilatory support     RT Stephen on 8/27/2022 at 10:23 PM

## 2022-08-28 NOTE — PROGRESS NOTES
[facility] ICU RESPIRATORY NOTE        Date of Admission: 8/24/2022    Date of Intubation (most recent): 8/24/2022    Reason for Mechanical Ventilation: Airway Protection.    Number of Days on Mechanical Ventilation: 4    Met Criteria for Spontaneous Breathing Trial: No    Reason for No Spontaneous Breathing Trial: Per MD.    Significant Events Today: None.    ABG Results:   Recent Labs   Lab 08/28/22  0507 08/27/22  0808 08/27/22  0428 08/26/22  0449   PH 7.51* 7.45 7.51* 7.37   PCO2 30* 38 23* 35   PO2 101 93 102 105   HCO3 24 27 18* 20*   O2PER 50 35 35 30       Current Vent Settings: Vent Mode: CMV/AC  (Continuous Mandatory Ventilation/ Assist Control)  FiO2 (%): 50 %  Resp Rate (Set): 16 breaths/min  Tidal Volume (Set, mL): 440 mL  PEEP (cm H2O): 7 cmH2O  Resp: 15      Skin Assessment: Intact.      Simeon Paulino, RT on 8/28/2022 at 5:15 AM

## 2022-08-28 NOTE — PROGRESS NOTES
Patient remains intubated and sedated  Events of past 24 hours reviewed  Some concern about increased abdominal distention and leaking around the DAVI site  Remains on low-dose norepinephrine  Not tachycardic, normotensive  Urine output remains good  Nasogastric tube remains bilious  DVAI remains serous  Abdomen seems about the same as yesterday, soft and not particularly distended  Hemoglobin stable, white blood cell count normalized, creatinine minimally improved, LFTs improving    Assessment/plan: Status post repair of perforated ulcer, postoperative SIRS  No new recommendations from general surgery service  Continue current level of support per ICU team  Continue NG, unable at this point to initiate enteral feedings until resolution of ileus  Will continue to follow

## 2022-08-28 NOTE — CONSULTS
Vascular Surgery Consultation    Diana Santiago MRN# [id number]   Age: 71 year old YOB: 1950     Date of Admission:  8/24/2022    Date of Consult:   08/28/22    Reason for consult: Right radial artery stenosis       Requesting service: ICU; requesting provider: Dr. De Leon                   Assessment and Plan:   72 yo female with perforated peptic ulcer s/p ex-lap jean paul patch repair, now with right radial artery stenosis. Arterial duplex ultrasound shows multiphasic flow in the ulnar artery at the rest, intact palmar arch on exam.   No plans for surgical intervention  Recommend starting ASA81 mg    Discussed with staff, Dr. Royal.    Nabor Frye MD            History of Present Illness:   Ms. Santiago 71-year-old female admitted with a perforated peptic ulcer and intra-abdominal sepsis status post Jean Paul patch repair.  Initiating home right hand was noted to be pale and dusky.  Nurse reports the arterial line in her right radial artery was removed at approximately 20:00 8/27/22 and the right hand color improved. Arterial duplex obtained and showed multiphasic flow in the brachial and ulnar arteries with stenosis of the distal right radial artery and slow, monophasic flow in the radial artery at the wrist.  She remains intubated and sedated, unable to participate in review of systems or an exam.              Past Medical History:     Past Medical History:   Diagnosis Date     Benign essential hypertension 09/2018    added norvasc 9/18     Bipolar affective disorder (H)     hosp 1993, Dr. Aftab Deal     Cancer (H) 1996    DCIS, left breast     Chronic kidney disease, stage 3a (H)     seen by renal Dr. 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    felt due to lithium     Thalassaemia trait      Vitamin D deficiency              Past Surgical History:     Past Surgical History:   Procedure Laterality Date     BIOPSY  1994    left breast     BREAST SURGERY      lumpectomy x 4     COLONOSCOPY  2021     COLONOSCOPY N/A 6/17/2022    Procedure: COLONOSCOPY, WITH POLYPECTOMY AND BIOPSY;  Surgeon: Panchito Gu MD;  Location:  GI     EYE SURGERY  2018    retina tear     LAPAROTOMY EXPLORATORY N/A 8/24/2022    Procedure: Exploratory laparotomy, REPAIR OF PERFORATED ULCER;  Surgeon: Ron Vidal MD;  Location:  OR     left hand surgery      last 1974             Social History:     Social History     Tobacco Use     Smoking status: Never Smoker     Smokeless tobacco: Never Used   Substance Use Topics     Alcohol use: No             Family History:     Family History   Problem Relation Age of Onset     Cerebrovascular Disease Mother      Hypertension Father      Sleep Apnea Brother      Breast Cancer Maternal Aunt         x 2     Breast Cancer Other         Maternal Aunt     Hyperlipidemia Niece                 Allergies:     Allergies   Allergen Reactions     No Known Allergies              Medications:     Current Facility-Administered Medications   Medication     0.9% sodium chloride BOLUS     acetaminophen (TYLENOL) Suppository 650 mg     [Held by provider] bisacodyl (DULCOLAX) suppository 10 mg     chlorhexidine (PERIDEX) 0.12 % solution 15 mL     dextrose 5% infusion     glucose gel 15-30 g    Or     dextrose 50 % injection 25-50 mL    Or     glucagon injection 1 mg     diphenhydrAMINE (BENADRYL) solution 12.5 mg    Or     diphenhydrAMINE (BENADRYL) injection 12.5 mg     fentaNYL (SUBLIMAZE) infusion     heparin ANTICOAGULANT  "injection 5,000 Units     HYDROmorphone (DILAUDID) injection 0.2 mg     insulin aspart (NovoLOG) injection (RAPID ACTING)     lidocaine (LMX4) cream     lidocaine 1 % 0.1-1 mL     magnesium hydroxide (MILK OF MAGNESIA) suspension 30 mL     propofol (DIPRIVAN) infusion    And     propofol (DIPRIVAN) bolus from infusion pump 10 mg    And     Medication Instruction     naloxone (NARCAN) injection 0.2 mg    Or     naloxone (NARCAN) injection 0.4 mg    Or     naloxone (NARCAN) injection 0.2 mg    Or     naloxone (NARCAN) injection 0.4 mg     norepinephrine (LEVOPHED) 4 mg in  mL infusion PREMIX     ondansetron (ZOFRAN ODT) ODT tab 4 mg    Or     ondansetron (ZOFRAN) injection 4 mg     oxyCODONE IR (ROXICODONE) half-tab 2.5 mg    Or     oxyCODONE (ROXICODONE) tablet 5 mg     pantoprazole (PROTONIX) IV push injection 40 mg     piperacillin-tazobactam (ZOSYN) 2.25 g vial to attach to  ml bag     prochlorperazine (COMPAZINE) injection 5 mg    Or     prochlorperazine (COMPAZINE) tablet 5 mg     sodium chloride (PF) 0.9% PF flush 3 mL     sodium chloride (PF) 0.9% PF flush 3 mL     sodium chloride 0.9% infusion               Review of Systems:   Unable to complete in this intubated and sedated patient            Physical Exam:   /85   Pulse (!) 48   Temp 98  F (36.7  C) (Axillary)   Resp 16   Ht 1.626 m (5' 4\")   Wt 56 kg (123 lb 7.3 oz)   SpO2 96%   BMI 21.19 kg/m        Intake/Output Summary (Last 24 hours) at 8/28/2022 1045  Last data filed at 8/28/2022 0800  Gross per 24 hour   Intake 2091.88 ml   Output 3080 ml   Net -988.12 ml       GEN: A&Ox3, no acute distress  NEURO: Sedated, not following commands  NECK: Endotracheal tube in place, no JVD  HEENT: No scleral icterus.  RESP: Mechanically ventilated  CV: RRR on monitor.    ABD: Mild distention, surgical dressing in place  EXTREMITIES: Right hand is cool, pale.  Left hand is warm, pale.  Multiple linear scars on legs a little closest on the " distal forearm.  No wounds on the hands or feet.  PULSES: Multiphasic right brachial and right ulnar Doppler signals.  Monophasic right radial artery signal.  Doppler signal over the palmar arch  Multiphasic left radial and ulnar Doppler signals.  Multiphasic Doppler signals in the bilateral DP and PT arteries.          Data:   All laboratory data reviewed    Results:  BMP  Recent Labs   Lab 08/28/22  0821 08/28/22 0414 08/28/22  0004 08/27/22 2234 08/27/22  0813 08/27/22  0428 08/26/22  0450 08/26/22  0449 08/25/22  0607 08/25/22  0604   NA  --  145*  --   --   --  150*  --  142  --  139   POTASSIUM  --  3.4  --   --   --  3.6  --  4.6  --  4.9   CHLORIDE  --  113*  --   --   --  115*  --  111*  --  109   CO2  --  25  --   --   --  24  --  19*  --  20   BUN  --  60*  --   --   --  68*  --  69*  --  69*   CR  --  3.23*  --   --   --  3.44*  --  3.50*  --  3.11*   * 176* 113* 114*   < > 115*   < > 103*   < > 173*    < > = values in this interval not displayed.     CBC  Recent Labs   Lab 08/28/22 0414 08/27/22 1954 08/27/22  0808 08/27/22 0428 08/26/22  1516   WBC 8.5  --  13.8* 11.3* 13.1*   HGB 8.8* 9.0* 8.7* 6.4* 7.3*     --  174 164 182     LFT  Recent Labs   Lab 08/28/22 0414 08/27/22 0428 08/26/22  0449 08/25/22  0604 08/24/22  1311   * 265* 202* 217*  --    * 373* 255* 237*  --    ALKPHOS 185* 92 78 62  --    BILITOTAL 0.8 0.8 0.7 0.9  --    ALBUMIN 1.6* 1.8* 2.0* 1.8*  --    INR  --   --   --   --  1.13     Recent Labs   Lab 08/28/22  0821 08/28/22  0414 08/28/22  0004 08/27/22  2234 08/27/22 1955 08/27/22  1636   * 176* 113* 114* 138* 142*       Imaging:  US Upper Extremity Arterial Duplex Right    Result Date: 8/27/2022  EXAM: US UPPER EXTREMITY ARTERIAL DUPLEX RIGHT LOCATION: [facility] DATE/TIME: 8/27/2022 11:40 PM INDICATION: absent pulses after arterial line pulled, fingers hand dusky COMPARISON: None. TECHNIQUE: Arterial Duplex  ultrasound of the right arm. Color flow and spectral Doppler with waveform analysis performed. FINDINGS: The waveforms are as follows: RIGHT SUBCLAVIAN ARTERY: 63 cm/s, triphasic flow. RIGHT AXILLARY-BRACHIAL ARTERY:  62 cm/s, triphasic flow RIGHT RADIAL/ULNAR: 19 cm/s, monophasic flow in the distal radial artery     IMPRESSION: 1.  Blood flow is identified throughout the right upper extremity from the shoulder to the wrist, however there are decreased velocities and monophasic flow within the distal radial artery suggesting a hemodynamically significant stenosis.    XR Chest Port 1 View    Result Date: 8/27/2022  EXAM: XR CHEST PORT 1 VIEW LOCATION: [facility] DATE/TIME: 8/27/2022 5:23 AM INDICATION: For oxygen desaturation COMPARISON: Prior study dated 8/24/2022     IMPRESSION: ET tube and NG tube are unchanged in position. The cardiomediastinal silhouette is unchanged. Bibasilar atelectasis is noted with increased retrocardiac opacification concerning for posterior atelectasis and/or airspace disease, alternatively  this could reflect posterior layering effusion    XR Chest Port 1 View    Result Date: 8/24/2022  EXAM: XR CHEST PORT 1 VIEW LOCATION: [facility] DATE/TIME: 8/24/2022 6:03 PM INDICATION: Endotracheal tube positioning COMPARISON: 8/24/2022 at 1315 hours     IMPRESSION: ET tube is in good position projecting 5 cm above bonilla. NG tube in the stomach. Mild mostly linear infiltrate at lung bases suggestive of atelectasis. Right hemidiaphragm is no longer. No persistent pneumoperitoneum evident on this exam. Heart size remains normal.    XR Chest Port 1 View    Result Date: 8/24/2022  XR CHEST PORT 1 VIEW   8/24/2022 1:20 PM HISTORY: short of breath COMPARISON: Chest radiograph 3/2/2022     IMPRESSION: Large volume pneumoperitoneum with resultant elevation of the diaphragm. Low lung volumes with bibasilar opacities, favor atelectasis. Cardiac silhouette  is largely obscured but appears similar in size to prior CT. No definite pneumothorax. No acute bony abnormality. [Critical Result: Unexplained pneumoperitoneum] Finding was identified on 8/24/2022 1:29 PM. Dr. Trinh was contacted by me on 8/24/2022 1:30 PM and verbalized understanding of the critical result. LEONARDO PENNINGTON MD   SYSTEM ID:  [id number]    XR Abdomen Port 1 View    Result Date: 8/24/2022  EXAM: XR ABDOMEN PORT 1 VIEW LOCATION: [facility] DATE/TIME: 8/24/2022 6:02 PM INDICATION: post lapratomy for perforated peptic ulcer COMPARISON: Same day chest radiograph and CT     IMPRESSION: Postsurgical changes of midline laparotomy with surgical drain in place. Nasogastric tube with tip and sidehole overlying the stomach. Bibasilar atelectasis. Bones are unchanged.    CT Chest Abdomen Pelvis w/o Contrast    Result Date: 8/24/2022  CT CHEST ABDOMEN PELVIS W/O CONTRAST 8/24/2022 1:57 PM CLINICAL HISTORY: chest pain, dyspnea, hypotension TECHNIQUE: CT scan of the chest, abdomen, and pelvis was performed without IV contrast. Multiplanar reformats were obtained. Dose reduction techniques were used. CONTRAST: None. COMPARISON: Same-day chest radiograph FINDINGS: LUNGS AND PLEURA: Small bilateral pleural effusions, right larger than left, with associated bibasilar atelectasis. No definite airspace consolidation. MEDIASTINUM/AXILLAE: 1 or 2 foci of gas tracking along the gastroesophageal junction into the mediastinum. No suspicious lymphadenopathy. Intrathoracic goiter with distinct nodule in the left upper thyroid measuring up to 1.8 cm (series 2 image 21). CORONARY ARTERY CALCIFICATION: Severe. HEPATOBILIARY: Simple-appearing left hepatic cyst, no specific follow-up recommended. Distended gallbladder with questionable punctate cholelithiasis. While there is some fluid around the gallbladder, there is no definite wall thickening or adjacent fat stranding to indicate acute cholecystitis.  PANCREAS: Normal. SPLEEN: Normal. ADRENAL GLANDS: Bilateral adrenal hyperplasia without distinct nodule. KIDNEYS/BLADDER: No hydronephrosis. Multiple hypodense renal lesions, most consistent with cysts. However, there are multiple indeterminate bilateral renal lesions, including relatively hyperdense left upper pole 1.3 cm lesion (series 2 image 141) and 1.5 cm left lower pole exophytic soft tissue density lesion (series 2 image 151). Urinary bladder is decompressed but otherwise unremarkable. BOWEL: Large volume pneumoperitoneum, as seen on same-day chest radiograph. There is focal inflammation of the distal stomach and proximal duodenum with possible anterior wall defect noted at this level (series 2 image 155). No additional inflamed small bowel or colon. No evidence of bowel obstruction. Small volume free fluid in the pelvis without walled off, drainable fluid collection. LYMPH NODES: Normal. VASCULATURE: Scattered calcified atherosclerosis. Ectatic distal thoracic aorta without danna aneurysm. PELVIC ORGANS: Calcified uterine leiomyoma. OTHER: None. MUSCULOSKELETAL: Old left superior and inferior pubic rami fractures. No acute bony abnormality.     IMPRESSION: 1.  Large volume pneumoperitoneum, as seen on same-day chest radiograph, favor source from the anterior distal stomach/proximal duodenum. Findings could represent perforated ulcer. 2.  Small volume free fluid without walled-off, drainable collection at this time. 3.  Small bilateral pleural effusions with bibasilar atelectasis. 4.  Incidental indeterminate renal lesions, recommend further evaluation with renal protocol CT or MRI on a nonemergent basis. 5.  Intrathoracic goiter with distinct 1.8 cm left thyroid nodule, recommend further evaluation with ultrasound on a nonemergent basis if not previously performed. LEONARDO PENNINGTON MD   SYSTEM ID:  [id number]    STAFF: Reviewed with Dr. Noel.  Occlusion of the radial artery secondary to arterial line that  has been removed .  And is improved and very viable with good flow from ulnar artery.  No vascular intervention is indicated.  Follow clinically.      Kevin Royal MD

## 2022-08-28 NOTE — PLAN OF CARE
Arterial line removed for occlusion, arterial US performed  Levophed held for majority of shift to current    No vent changes from 7a shift    Copious serous fluid leaking around DAVI tubing, also draining into bulb. Provider aware.        Problem: Plan of Care - These are the overarching goals to be used throughout the patient stay.    Goal: Optimal Comfort and Wellbeing  Outcome: Ongoing, Progressing  Intervention: Provide Person-Centered Care  Recent Flowsheet Documentation  Taken 8/28/2022 0400 by Abiola Castro RN  Trust Relationship/Rapport:   care explained   reassurance provided  Taken 8/28/2022 0000 by Abiola Castro RN  Trust Relationship/Rapport:   care explained   reassurance provided  Taken 8/27/2022 2000 by Abiola Castro RN  Trust Relationship/Rapport:   care explained   reassurance provided     Problem: Risk for Delirium  Goal: Optimal Coping  Outcome: Ongoing, Progressing  Goal: Improved Sleep  Outcome: Ongoing, Progressing     Problem: Restraint, Nonbehavioral (Nonviolent)  Goal: Absence of Harm or Injury  Outcome: Ongoing, Progressing  Intervention: Protect Dignity, Rights, and Personal Wellbeing  Recent Flowsheet Documentation  Taken 8/28/2022 0400 by Abiola Castro RN  Trust Relationship/Rapport:   care explained   reassurance provided  Taken 8/28/2022 0000 by Abiola Castro RN  Trust Relationship/Rapport:   care explained   reassurance provided  Taken 8/27/2022 2000 by Abiola Castro RN  Trust Relationship/Rapport:   care explained   reassurance provided  Intervention: Protect Skin and Joint Integrity  Recent Flowsheet Documentation  Taken 8/28/2022 0600 by Abiola Castro RN  Body Position:   turned   left  Taken 8/28/2022 0400 by Abiola Castro RN  Body Position:   turned   right  Taken 8/28/2022 0200 by Abiola Castro, RN  Body Position:   turned   left  Taken 8/28/2022 0000 by Abiola Castro, RN  Body Position:   turned   right  Taken 8/27/2022 2200 by Matthew  Abiola JACKMAN RN  Body Position:   turned   supine   left  Taken 8/27/2022 2000 by Abiola Castro RN  Body Position:   turned   right     Problem: Plan of Care - These are the overarching goals to be used throughout the patient stay.    Goal: Absence of Hospital-Acquired Illness or Injury  Intervention: Identify and Manage Fall Risk  Recent Flowsheet Documentation  Taken 8/28/2022 0400 by Abiola Castro RN  Safety Promotion/Fall Prevention:   fall prevention program maintained   clutter free environment maintained   increased rounding and observation   lighting adjusted   room door open   room organization consistent   safety round/check completed  Taken 8/28/2022 0000 by Abiola Castro RN  Safety Promotion/Fall Prevention:   fall prevention program maintained   clutter free environment maintained   increased rounding and observation   lighting adjusted   room door open   room organization consistent   safety round/check completed  Taken 8/27/2022 2000 by Abiola Castro RN  Safety Promotion/Fall Prevention:   fall prevention program maintained   clutter free environment maintained   increased rounding and observation   lighting adjusted   room door open   room organization consistent   safety round/check completed  Intervention: Prevent Skin Injury  Recent Flowsheet Documentation  Taken 8/28/2022 0600 by Abiola Castro RN  Body Position:   turned   left  Taken 8/28/2022 0400 by Abiola Castro RN  Body Position:   turned   right  Taken 8/28/2022 0200 by Abiola Castor RN  Body Position:   turned   left  Taken 8/28/2022 0000 by Abiola Castro RN  Body Position:   turned   right  Taken 8/27/2022 2200 by Abiola Castro RN  Body Position:   turned   supine   left  Taken 8/27/2022 2000 by Abiola Castro RN  Body Position:   turned   right  Intervention: Prevent and Manage VTE (Venous Thromboembolism) Risk  Recent Flowsheet Documentation  Taken 8/28/2022 0400 by Abiola Castro RN  VTE  Prevention/Management: SCDs (sequential compression devices) on  Activity Management: bedrest  Taken 8/28/2022 0000 by Abiola Castro RN  VTE Prevention/Management: SCDs (sequential compression devices) on  Activity Management: bedrest  Taken 8/27/2022 2000 by Abiola Castro RN  VTE Prevention/Management: SCDs (sequential compression devices) on  Activity Management: bedrest   Goal Outcome Evaluation:

## 2022-08-28 NOTE — PROGRESS NOTES
[facility] ICU RESPIRATORY NOTE        Date of Admission: 8/24/2022    Date of Intubation (most recent): 8/24/2022    Reason for Mechanical Ventilation: Airway protection    Number of Days on Mechanical Ventilation: 4    Met Criteria for Spontaneous Breathing Trial: No    Reason for No Spontaneous Breathing Trial:  REI    Significant Events Today: None    ABG Results:   Recent Labs   Lab 08/28/22  0507 08/27/22  0808 08/27/22  0428 08/26/22  0449   PH 7.51* 7.45 7.51* 7.37   PCO2 30* 38 23* 35   PO2 101 93 102 105   HCO3 24 27 18* 20*   O2PER 50 35 35 30       Current Vent Settings: Vent Mode: CMV/AC  (Continuous Mandatory Ventilation/ Assist Control)  FiO2 (%): 50 %  Resp Rate (Set): 16 breaths/min  Tidal Volume (Set, mL): 440 mL  PEEP (cm H2O): 7 cmH2O  Resp: 16      Skin Assessment: Intact    Plan: Continue to monitor.    Sara Alejandra on 8/28/2022 at 5:16 PM

## 2022-08-28 NOTE — PLAN OF CARE
End of Shift Nursing Summary    Vitals:  stable, afebrile. No c/o pain  Neuro:  propofol off, following commands, KEANE but weak.   CV:  SB  Pulm: remains on vent, 40% peep 7. Minimal secretions  GI/: no BM but appears to be passing gas. NG to LIS with dark green output. Suresh in place, UOP adequate.  Skin: no new issues, DAVI drain continues to leak large amount serosang fluid around insertion site.  Family: brotherAcosta, at bedside, updated on POC.   POC: continue to leave propofol off if able. PST tomorrow with hopes of extubation in the next few days.      *See EPIC flowsheet for full nursing assessment findings

## 2022-08-28 NOTE — PROGRESS NOTES
Renal Medicine Progress Note                                Diana Santiago MRN# [id number]   Age: 71 year old YOB: 1950   Date of Admission: 8/24/2022 Hospital LOS: 4                  Assessment/Plan:     71-year-old female presented to the ED dated 08/24/2022 in the setting of shortness of breath and chest pain.  Evaluation demonstrated perforated viscus, for which she was admitted and required urgent surgery.    Seen regarding acute on chronic renal disease    1.  CKD stage IV   -baseline creatinine typically in the range of 2.0 mg/dL.     -etiology thought secondary to a combination of hypertension and lithium toxicity.     -follows with Dr. Cedeno. Last seen as an outpatient dated 08/10/2022.      -creatinine at that time 2.10 mg/dL.  2.  Previous history of documented proteinuria.  3.  History of DI by report.  4.  Acute kidney injury in the setting of septic shock   -presumably ATN.     -nonoliguric   -creatinine rising    -Noemi suggestive of ATN  5.  Presentation with hypotension.     -initial and subsequent ER, blood pressures in the range of 70-80 mmHg systolic.   -remains pressor dependent  6.  Postop perforated peptic ulcer repair.     -Surgery dated 08/24/2022.  7.  Septic shock.  8.  Metabolic acidosis  9.  Acute hypoxic respiratory failure, currently vent dependent.  10.  Elevated LFTs,    -suggestive in part some degree of shock liver related to poor perfusion.  11.  Hypernatremia       Continue albumin as needed for BP support/renal perfusion    D5 to 50 ml/hr  Follow labs and UO     Creatinine down slightly again today       Interval History:     Remains intubated and sedated  Failed weaning  FiO2 at 40%    Off NE and vaso    UO noted  Creatinine down slightly  HCO3- better   Na better today      ROS:     Intubated and sedated: ROS unable    Medications and Allergies:     Reviewed    Physical Exam:     Vitals were reviewed  Patient Vitals for the past 8 hrs:   BP Temp Temp src Pulse Resp  SpO2 Weight   08/28/22 0900 (!) 131/90 -- -- 52 12 95 % --   08/28/22 0830 116/81 -- -- 50 16 95 % --   08/28/22 0800 (!) 129/90 98  F (36.7  C) Axillary 53 16 97 % --   08/28/22 0730 (!) 132/95 -- -- 53 15 97 % --   08/28/22 0700 (!) 134/95 -- -- 50 15 97 % --   08/28/22 0600 125/83 -- -- (!) 49 16 96 % --   08/28/22 0545 124/88 -- -- (!) 48 15 96 % --   08/28/22 0530 127/82 -- -- (!) 48 16 96 % --   08/28/22 0515 121/79 -- -- (!) 48 15 96 % --   08/28/22 0500 127/88 -- -- 55 15 98 % --   08/28/22 0453 -- -- -- -- -- 97 % --   08/28/22 0445 125/82 -- -- 50 15 96 % --   08/28/22 0430 123/82 -- -- 51 11 96 % --   08/28/22 0415 122/85 -- -- 51 15 96 % --   08/28/22 0400 121/83 -- -- (!) 48 16 97 % --   08/28/22 0345 119/79 -- -- 50 16 98 % --   08/28/22 0330 132/86 -- -- 51 15 94 % --   08/28/22 0315 138/88 -- -- 53 16 96 % 56 kg (123 lb 7.3 oz)   08/28/22 0300 117/86 -- -- 52 15 96 % --   08/28/22 0245 119/79 -- -- 50 16 97 % --   08/28/22 0230 117/81 -- -- (!) 48 16 98 % --   08/28/22 0215 116/79 -- -- 51 16 98 % --   08/28/22 0200 116/80 -- -- (!) 49 16 98 % --   08/28/22 0145 123/84 -- -- 53 16 98 % --     I/O last 3 completed shifts:  In: 2427.23 [I.V.:1985.23]  Out: 2890 [Urine:2075; Emesis/NG output:150; Drains:665]    Vitals:    08/24/22 1245 08/25/22 2127 08/26/22 0415 08/27/22 0630   Weight: 54.4 kg (120 lb) 60 kg (132 lb 4.4 oz) 60.9 kg (134 lb 4.2 oz) 56.3 kg (124 lb 1.9 oz)    08/28/22 0315   Weight: 56 kg (123 lb 7.3 oz)       GENERAL:  intubated and sedated  HEENT: ETT  RESP:  clear anteriorly  CV: RRR, normal S1 S2  ABDOMEN: soft  MS: no clubbing, cyanosis   SKIN: clear without significant rashes or lesions  EXT: warm, no edema    Data:     Recent Labs   Lab 08/28/22  0821 08/28/22  0414 08/28/22  0004 08/27/22  2234 08/27/22  0813 08/27/22  0428 08/26/22  0450 08/26/22  0449 08/25/22  0607 08/25/22  0604   NA  --  145*  --   --   --  150*  --  142  --  139   POTASSIUM  --  3.4  --   --   --  3.6  --   4.6  --  4.9   CHLORIDE  --  113*  --   --   --  115*  --  111*  --  109   CO2  --  25  --   --   --  24  --  19*  --  20   ANIONGAP  --  7  --   --   --  11  --  12  --  10   * 176* 113* 114*   < > 115*   < > 103*   < > 173*   BUN  --  60*  --   --   --  68*  --  69*  --  69*   CR  --  3.23*  --   --   --  3.44*  --  3.50*  --  3.11*   GFRESTIMATED  --  15*  --   --   --  14*  --  13*  --  15*   ISSA  --  8.2*  --   --   --  8.5  --  8.1*  --  7.8*    < > = values in this interval not displayed.         Recent Labs   Lab 08/28/22 0414 08/27/22 0428 08/26/22 0449 08/25/22  0604 08/24/22  2234 08/24/22  1804 08/24/22  1307   CR 3.23* 3.44* 3.50* 3.11* 2.88* 2.87* 3.51*     Recent Labs   Lab 08/28/22 0414 08/27/22 0428 08/26/22 0449 08/25/22  0604 08/24/22  2234   POTASSIUM 3.4 3.6 4.6 4.9 5.1     Recent Labs   Lab 08/28/22 0414 08/27/22 0428 08/26/22  0449 08/25/22  0604   ALBUMIN 1.6* 1.8* 2.0* 1.8*     Recent Labs   Lab 08/28/22  0414 08/27/22  1954 08/27/22  0808 08/27/22  0428 08/26/22  1516 08/26/22  0449 08/25/22  0604   PHOS 4.1  --   --  5.3*  --  6.0* 4.2   HGB 8.8* 9.0* 8.7* 6.4*   < > 6.7* 8.1*    < > = values in this interval not displayed.         ANI Payton MD    [facility] - Nephrology  [phone]

## 2022-08-28 NOTE — PROGRESS NOTES
"Critical Care Progress Note      08/28/2022    Name: Diana Santiago MRN#: [id number]   Age: 71 year old YOB: 1950     Hsptl Day# 4  ICU DAY #    MV DAY #             Problem List:   Active Problems:    Perforated viscus    Septic shock (H)       1. Septic shock - she is off Levophed; on antibiotics (Zosyn), and cultures are negative. She seems euvolume to overloaded side. Her HR has been in 50-60's and sinus rhythm and no meds that should affect this; will monitor only.   2. Acute respiratory failure- on to 35% and +7 PEEP. Physiologically she still needs this amount of PEEP to continue to oxygenate well. I suspect a mild degree of ALI and will consider diuresing as she continues to stabilize.   3. Perforated - s/p operative repair and wash out of peritoneum with gastric contents  4. Acute on chronic renal failure- baseline creatinine about 2 and now about 3.2. Dr. Joshi's plans noted, she is off IV bicarbonate and creatinine improving only slowly. With Na 145 will continue D5W at 50 mls/hr and monitor Na closely.   5. HTN  6. History bipolar/depression   7. History breast cancer  8. DM - glucoses ok on sliding scale only   9. History of DI - with Na 145 will monitor closely but UO ok  10. History of lithium toxicity   11. Nutrition - last albumin prior to admission 3.3 and will continue to defer \"hard\" decision of nutrition (TPN vs oral/enteral) until Day 7 if needed.  12. Right hand a-line out and skin appears healthy  Overall, she is improved. She was able to follow commands on ventilator today!           Summary/Hospital Course:           Assessment and plan :     Diana Santiago IS a 71 year old female admitted on 8/24/2022 for acute respiratory failure.   I have personally reviewed the daily labs, imaging studies, cultures and discussed the case with referring physician and consulting physicians.     My assessment and plan by system for this patient is as follows:    Neurology/Psychiatry:   1. " Compensated on vent     Cardiovascular:   1.Hemodynamics - off Levophed today; monitor only     Pulmonary/Ventilator Management:   1. Oxygenation now compensated on +7 PEEP and 35%    GI and Nutrition :   1. Deferred at this time     Renal/Fluids/Electrolytes:   1. Creatinine slowly improving down to 3.2, and Na down to 145    Infectious Disease:   1. Continues on current abx    Endocrine:   1. Glucoses ok     Hematology/Oncology:   1. Hb ok at 8.8     IV/Access:   1. Venous access -   2. Arterial access -   3.  Plan  - central access required and necessary      ICU Prophylaxis:   1. DVT: Hep Subq/ LMWH/mechanical  2. VAP: HOB 30 degrees, chlorhexidine rinse  3. Stress Ulcer: PPI/H2 blocker  4. Restraints: Nonviolent soft two point restraints required and necessary for patient safety and continued cares and good effect as patient continues to pull at necessary lines, tubes despite education and distraction. Will readdress daily.   5. IV Access - central access required and necessary for continued patient cares  6. Feeding - deferred   7. Family Update: deferred today  8. Disposition - ICU care         Key goals for next 24 hours:   1. Continue to support and follow   2.  3.               Interim History:              Key Medications:       chlorhexidine  15 mL Mouth/Throat Q12H     heparin ANTICOAGULANT  5,000 Units Subcutaneous Q12H     insulin aspart  1-6 Units Subcutaneous Q4H     pantoprazole (PROTONIX) IV  40 mg Intravenous BID AC     piperacillin-tazobactam  2.25 g Intravenous Q6H     sodium chloride (PF)  3 mL Intracatheter Q8H       D5W 50 mL/hr at 08/28/22 1211     fentaNYL 25 mcg/hr (08/28/22 0800)     propofol (DIPRIVAN) infusion Stopped (08/28/22 1215)    And     - MEDICATION INSTRUCTIONS -       norepinephrine Stopped (08/27/22 2240)     sodium chloride 10 mL/hr at 08/28/22 1600               Physical Examination:   Temp:  [97.3  F (36.3  C)-98.1  F (36.7  C)] 98.1  F (36.7  C)  Pulse:  [45-57]  57  Resp:  [11-20] 16  BP: (105-141)/(70-98) 138/89  MAP:  [61 mmHg-96 mmHg] 96 mmHg  Arterial Line BP: ()/(54-84) 133/84  FiO2 (%):  [50 %] 50 %  SpO2:  [60 %-100 %] 97 %    Intake/Output Summary (Last 24 hours) at 8/28/2022 1652  Last data filed at 8/28/2022 1600  Gross per 24 hour   Intake 2071.04 ml   Output 3500 ml   Net -1428.96 ml     Wt Readings from Last 4 Encounters:   08/28/22 56 kg (123 lb 7.3 oz)   07/17/22 55.3 kg (122 lb)   04/22/22 59.9 kg (132 lb)   03/21/22 64 kg (141 lb)     Arterial Line BP: ()/(54-84) 133/84  MAP:  [61 mmHg-96 mmHg] 96 mmHg  BP - Mean:  [] 105  Vent Mode: CMV/AC  (Continuous Mandatory Ventilation/ Assist Control)  FiO2 (%): 50 %  Resp Rate (Set): 16 breaths/min  Tidal Volume (Set, mL): 440 mL  PEEP (cm H2O): 7 cmH2O  Resp: 16    Recent Labs   Lab 08/28/22  0507 08/27/22  0808 08/27/22  0428 08/26/22  0449   PH 7.51* 7.45 7.51* 7.37   PCO2 30* 38 23* 35   PO2 101 93 102 105   HCO3 24 27 18* 20*   O2PER 50 35 35 30       GEN: no acute distress; comfortable on vent    HEENT: head ncat, sclera anicteric, OP patent, trachea midline   PULM: unlabored synchronous with vent, clear anteriorly    CV/COR: RRR S1S2 no gallop,  No rub, no murmur  ABD: soft mild tenderness wound clean  EXT:  mild edema   Warm; right hand with areas with mild duskiness but mostly healthy warm and perfused.   NEURO: grossly intact; moves extremities weakly to command   SKIN: no obvious rash; no cyanosis or mottling   LINES: clean, dry intact         Data:   All data and imaging reviewed     ROUTINE ICU LABS (Last four results)  CMP  Recent Labs   Lab 08/28/22  1612 08/28/22  1220 08/28/22  0821 08/28/22  0414 08/27/22  0813 08/27/22  0428 08/26/22  0450 08/26/22  0449 08/25/22  0607 08/25/22  0604   NA  --   --   --  145*  --  150*  --  142  --  139   POTASSIUM  --   --   --  3.4  --  3.6  --  4.6  --  4.9   CHLORIDE  --   --   --  113*  --  115*  --  111*  --  109   CO2  --   --   --  25  --   24  --  19*  --  20   ANIONGAP  --   --   --  7  --  11  --  12  --  10   * 91 129* 176*   < > 115*   < > 103*   < > 173*   BUN  --   --   --  60*  --  68*  --  69*  --  69*   CR  --   --   --  3.23*  --  3.44*  --  3.50*  --  3.11*   GFRESTIMATED  --   --   --  15*  --  14*  --  13*  --  15*   ISSA  --   --   --  8.2*  --  8.5  --  8.1*  --  7.8*   MAG  --   --   --  2.3  --  2.4*  --  2.3  --  2.0   PHOS  --   --   --  4.1  --  5.3*  --  6.0*  --  4.2   PROTTOTAL  --   --   --  4.9*  --  5.0*  --  5.1*  --  4.6*   ALBUMIN  --   --   --  1.6*  --  1.8*  --  2.0*  --  1.8*   BILITOTAL  --   --   --  0.8  --  0.8  --  0.7  --  0.9   ALKPHOS  --   --   --  185*  --  92  --  78  --  62   AST  --   --   --  114*  --  265*  --  202*  --  217*   ALT  --   --   --  295*  --  373*  --  255*  --  237*    < > = values in this interval not displayed.     CBC  Recent Labs   Lab 08/28/22  0414 08/27/22  1954 08/27/22  0808 08/27/22  0428 08/26/22  1516   WBC 8.5  --  13.8* 11.3* 13.1*   RBC 3.87  --  3.76* 2.86* 3.30*   HGB 8.8* 9.0* 8.7* 6.4* 7.3*   HCT 27.3*  --  26.5* 19.3* 22.2*   MCV 71*  --  71* 68* 67*   MCH 22.7*  --  23.1* 22.4* 22.1*   MCHC 32.2  --  32.8 33.2 32.9   RDW 20.8*  --  21.2* 17.5* 18.6*     --  174 164 182     INR  Recent Labs   Lab 08/24/22  1311   INR 1.13     Arterial Blood Gas  Recent Labs   Lab 08/28/22  0507 08/27/22  0808 08/27/22  0428 08/26/22  0449   PH 7.51* 7.45 7.51* 7.37   PCO2 30* 38 23* 35   PO2 101 93 102 105   HCO3 24 27 18* 20*   O2PER 50 35 35 30       All cultures:  No results for input(s): CULT in the last 168 hours.  Recent Results (from the past 24 hour(s))   US Upper Extremity Arterial Duplex Right    Narrative    EXAM: US UPPER EXTREMITY ARTERIAL DUPLEX RIGHT  LOCATION: [facility]  DATE/TIME: 8/27/2022 11:40 PM    INDICATION: absent pulses after arterial line pulled, fingers hand dusky  COMPARISON: None.  TECHNIQUE: Arterial Duplex ultrasound  of the right arm. Color flow and spectral Doppler with waveform analysis performed.    FINDINGS:  The waveforms are as follows:    RIGHT SUBCLAVIAN ARTERY: 63 cm/s, triphasic flow.  RIGHT AXILLARY-BRACHIAL ARTERY:  62 cm/s, triphasic flow  RIGHT RADIAL/ULNAR: 19 cm/s, monophasic flow in the distal radial artery      Impression    IMPRESSION:   1.  Blood flow is identified throughout the right upper extremity from the shoulder to the wrist, however there are decreased velocities and monophasic flow within the distal radial artery suggesting a hemodynamically significant stenosis.       MD Margarita    Billing: This patient is critically ill: Yes. Total critical care time today 45 min.

## 2022-08-28 NOTE — PROVIDER NOTIFICATION
Patient index and middle finger became pale, hand dusky, Arterial line removed for suspected occlusion. Provider notified. Cuff BP initiated on side opposite affected, pale fingers became dusky, no radial pulse available by doppler, provider notified, arterial US ordered.        08/27/22 2015   [REMOVED] Arterial Line 08/24/22 Radial   Removal Date/Time: 08/27/22 2022  Placement Date/Time: 08/24/22 (c) 7708   Orientation: Right  Location: (c) Radial  Site Prep: Chlorhexidine  Inserted by: Anesthesiologist  Removal Reason: Site change   Site Assessment WDL Except;Blanching   Line Status Positional;Pulsatile blood flow;Occluded   Art Line Waveform Dampened   Art Line Interventions Leveled;Connections checked and tightened;Flushed per protocol;Line pulled back   Color/Movement/Sensation Cool fingers/toes;Capillary refill greater than 3 sec;Pale fingers/toes;Dusky fingers/toes   Line Necessity Yes, meets criteria   Dressing Type Securing device   Dressing Status Clean, dry, intact   Dressing Intervention Other (Comment)  (A-Line removed for occlusion)

## 2022-08-29 ENCOUNTER — APPOINTMENT (OUTPATIENT)
Dept: GENERAL RADIOLOGY | Facility: CLINIC | Age: 72
DRG: 853 | End: 2022-08-29
Attending: INTERNAL MEDICINE
Payer: MEDICARE

## 2022-08-29 LAB
ALBUMIN SERPL-MCNC: 1.7 G/DL (ref 3.4–5)
ALP SERPL-CCNC: 239 U/L (ref 40–150)
ALT SERPL W P-5'-P-CCNC: 226 U/L (ref 0–50)
ANION GAP SERPL CALCULATED.3IONS-SCNC: 10 MMOL/L (ref 3–14)
AST SERPL W P-5'-P-CCNC: 59 U/L (ref 0–45)
BACTERIA BLD CULT: NO GROWTH
BACTERIA BLD CULT: NO GROWTH
BILIRUB SERPL-MCNC: 0.8 MG/DL (ref 0.2–1.3)
BUN SERPL-MCNC: 63 MG/DL (ref 7–30)
CALCIUM SERPL-MCNC: 8.4 MG/DL (ref 8.5–10.1)
CHLORIDE BLD-SCNC: 116 MMOL/L (ref 94–109)
CO2 SERPL-SCNC: 24 MMOL/L (ref 20–32)
CREAT SERPL-MCNC: 3.13 MG/DL (ref 0.52–1.04)
ERYTHROCYTE [DISTWIDTH] IN BLOOD BY AUTOMATED COUNT: 21.9 % (ref 10–15)
GFR SERPL CREATININE-BSD FRML MDRD: 15 ML/MIN/1.73M2
GLUCOSE BLD-MCNC: 158 MG/DL (ref 70–99)
GLUCOSE BLDC GLUCOMTR-MCNC: 106 MG/DL (ref 70–99)
GLUCOSE BLDC GLUCOMTR-MCNC: 112 MG/DL (ref 70–99)
GLUCOSE BLDC GLUCOMTR-MCNC: 125 MG/DL (ref 70–99)
GLUCOSE BLDC GLUCOMTR-MCNC: 130 MG/DL (ref 70–99)
GLUCOSE BLDC GLUCOMTR-MCNC: 140 MG/DL (ref 70–99)
GLUCOSE BLDC GLUCOMTR-MCNC: 153 MG/DL (ref 70–99)
GLUCOSE BLDC GLUCOMTR-MCNC: 156 MG/DL (ref 70–99)
GLUCOSE BLDC GLUCOMTR-MCNC: 79 MG/DL (ref 70–99)
GLUCOSE BLDC GLUCOMTR-MCNC: 90 MG/DL (ref 70–99)
HCT VFR BLD AUTO: 33.8 % (ref 35–47)
HGB BLD-MCNC: 10.5 G/DL (ref 11.7–15.7)
MAGNESIUM SERPL-MCNC: 2.4 MG/DL (ref 1.6–2.3)
MCH RBC QN AUTO: 22.7 PG (ref 26.5–33)
MCHC RBC AUTO-ENTMCNC: 31.1 G/DL (ref 31.5–36.5)
MCV RBC AUTO: 73 FL (ref 78–100)
PHOSPHATE SERPL-MCNC: 4.9 MG/DL (ref 2.5–4.5)
PLATELET # BLD AUTO: 199 10E3/UL (ref 150–450)
POTASSIUM BLD-SCNC: 4.2 MMOL/L (ref 3.4–5.3)
PROT SERPL-MCNC: 5.3 G/DL (ref 6.8–8.8)
RBC # BLD AUTO: 4.63 10E6/UL (ref 3.8–5.2)
SODIUM SERPL-SCNC: 150 MMOL/L (ref 133–144)
WBC # BLD AUTO: 7.8 10E3/UL (ref 4–11)

## 2022-08-29 PROCEDURE — 999N000127 HC STATISTIC PERIPHERAL IV START W US GUIDANCE

## 2022-08-29 PROCEDURE — 200N000001 HC R&B ICU

## 2022-08-29 PROCEDURE — 85027 COMPLETE CBC AUTOMATED: CPT | Performed by: INTERNAL MEDICINE

## 2022-08-29 PROCEDURE — C9113 INJ PANTOPRAZOLE SODIUM, VIA: HCPCS | Performed by: INTERNAL MEDICINE

## 2022-08-29 PROCEDURE — 999N000157 HC STATISTIC RCP TIME EA 10 MIN

## 2022-08-29 PROCEDURE — 71045 X-RAY EXAM CHEST 1 VIEW: CPT

## 2022-08-29 PROCEDURE — 250N000013 HC RX MED GY IP 250 OP 250 PS 637

## 2022-08-29 PROCEDURE — 250N000011 HC RX IP 250 OP 636: Performed by: INTERNAL MEDICINE

## 2022-08-29 PROCEDURE — 80053 COMPREHEN METABOLIC PANEL: CPT | Performed by: INTERNAL MEDICINE

## 2022-08-29 PROCEDURE — 258N000003 HC RX IP 258 OP 636: Performed by: INTERNAL MEDICINE

## 2022-08-29 PROCEDURE — 94003 VENT MGMT INPAT SUBQ DAY: CPT

## 2022-08-29 PROCEDURE — 99232 SBSQ HOSP IP/OBS MODERATE 35: CPT | Performed by: INTERNAL MEDICINE

## 2022-08-29 PROCEDURE — 99291 CRITICAL CARE FIRST HOUR: CPT | Mod: 24 | Performed by: INTERNAL MEDICINE

## 2022-08-29 PROCEDURE — 999N000009 HC STATISTIC AIRWAY CARE

## 2022-08-29 PROCEDURE — 250N000011 HC RX IP 250 OP 636

## 2022-08-29 PROCEDURE — 84100 ASSAY OF PHOSPHORUS: CPT | Performed by: INTERNAL MEDICINE

## 2022-08-29 PROCEDURE — 250N000011 HC RX IP 250 OP 636: Performed by: PHYSICIAN ASSISTANT

## 2022-08-29 PROCEDURE — 83735 ASSAY OF MAGNESIUM: CPT | Performed by: INTERNAL MEDICINE

## 2022-08-29 RX ORDER — ALBUTEROL SULFATE 0.83 MG/ML
2.5 SOLUTION RESPIRATORY (INHALATION) 4 TIMES DAILY
Status: DISCONTINUED | OUTPATIENT
Start: 2022-08-29 | End: 2022-09-14 | Stop reason: HOSPADM

## 2022-08-29 RX ORDER — ACETYLCYSTEINE 200 MG/ML
2 SOLUTION ORAL; RESPIRATORY (INHALATION) 4 TIMES DAILY
Status: DISCONTINUED | OUTPATIENT
Start: 2022-08-29 | End: 2022-09-01

## 2022-08-29 RX ORDER — DEXTROSE MONOHYDRATE 50 MG/ML
INJECTION, SOLUTION INTRAVENOUS CONTINUOUS
Status: ACTIVE | OUTPATIENT
Start: 2022-08-29 | End: 2022-08-31

## 2022-08-29 RX ORDER — ALBUTEROL SULFATE 0.83 MG/ML
2.5 SOLUTION RESPIRATORY (INHALATION)
Status: DISCONTINUED | OUTPATIENT
Start: 2022-08-29 | End: 2022-09-14 | Stop reason: HOSPADM

## 2022-08-29 RX ORDER — DEXTROSE MONOHYDRATE 50 MG/ML
INJECTION, SOLUTION INTRAVENOUS CONTINUOUS
Status: ACTIVE | OUTPATIENT
Start: 2022-08-29 | End: 2022-08-30

## 2022-08-29 RX ADMIN — HEPARIN SODIUM 5000 UNITS: 5000 INJECTION, SOLUTION INTRAVENOUS; SUBCUTANEOUS at 23:26

## 2022-08-29 RX ADMIN — CHLORHEXIDINE GLUCONATE 15 ML: 1.2 SOLUTION ORAL at 19:26

## 2022-08-29 RX ADMIN — PIPERACILLIN SODIUM AND TAZOBACTAM SODIUM 2.25 G: 2; .25 INJECTION, POWDER, LYOPHILIZED, FOR SOLUTION INTRAVENOUS at 14:54

## 2022-08-29 RX ADMIN — PANTOPRAZOLE SODIUM 40 MG: 40 INJECTION, POWDER, FOR SOLUTION INTRAVENOUS at 19:26

## 2022-08-29 RX ADMIN — INSULIN ASPART 1 UNITS: 100 INJECTION, SOLUTION INTRAVENOUS; SUBCUTANEOUS at 04:14

## 2022-08-29 RX ADMIN — HYDROMORPHONE HYDROCHLORIDE 0.2 MG: 0.2 INJECTION, SOLUTION INTRAMUSCULAR; INTRAVENOUS; SUBCUTANEOUS at 11:46

## 2022-08-29 RX ADMIN — HYDROMORPHONE HYDROCHLORIDE 0.2 MG: 0.2 INJECTION, SOLUTION INTRAMUSCULAR; INTRAVENOUS; SUBCUTANEOUS at 16:22

## 2022-08-29 RX ADMIN — INSULIN ASPART 1 UNITS: 100 INJECTION, SOLUTION INTRAVENOUS; SUBCUTANEOUS at 16:18

## 2022-08-29 RX ADMIN — DEXTROSE MONOHYDRATE: 50 INJECTION, SOLUTION INTRAVENOUS at 11:51

## 2022-08-29 RX ADMIN — HEPARIN SODIUM 5000 UNITS: 5000 INJECTION, SOLUTION INTRAVENOUS; SUBCUTANEOUS at 11:46

## 2022-08-29 RX ADMIN — INSULIN ASPART 1 UNITS: 100 INJECTION, SOLUTION INTRAVENOUS; SUBCUTANEOUS at 23:30

## 2022-08-29 RX ADMIN — PIPERACILLIN SODIUM AND TAZOBACTAM SODIUM 2.25 G: 2; .25 INJECTION, POWDER, LYOPHILIZED, FOR SOLUTION INTRAVENOUS at 19:37

## 2022-08-29 RX ADMIN — HEPARIN SODIUM 5000 UNITS: 5000 INJECTION, SOLUTION INTRAVENOUS; SUBCUTANEOUS at 00:27

## 2022-08-29 RX ADMIN — CHLORHEXIDINE GLUCONATE 15 ML: 1.2 SOLUTION ORAL at 08:08

## 2022-08-29 RX ADMIN — PIPERACILLIN SODIUM AND TAZOBACTAM SODIUM 2.25 G: 2; .25 INJECTION, POWDER, LYOPHILIZED, FOR SOLUTION INTRAVENOUS at 08:08

## 2022-08-29 RX ADMIN — PANTOPRAZOLE SODIUM 40 MG: 40 INJECTION, POWDER, FOR SOLUTION INTRAVENOUS at 08:08

## 2022-08-29 RX ADMIN — PIPERACILLIN SODIUM AND TAZOBACTAM SODIUM 2.25 G: 2; .25 INJECTION, POWDER, LYOPHILIZED, FOR SOLUTION INTRAVENOUS at 01:59

## 2022-08-29 RX ADMIN — DEXTROSE MONOHYDRATE: 50 INJECTION, SOLUTION INTRAVENOUS at 12:17

## 2022-08-29 ASSESSMENT — ACTIVITIES OF DAILY LIVING (ADL)
ADLS_ACUITY_SCORE: 47

## 2022-08-29 NOTE — PROGRESS NOTES
"Critical Care Progress Note      08/29/2022    Name: Diana Santiago MRN#: [id number]   Age: 71 year old YOB: 1950     Hsptl Day# 5  ICU DAY #               Problem List:   Active Problems:    Perforated viscus    Septic shock (H)           Summary/Hospital Course:      71F pmh of hyperparathyroidism, DI in setting of lithium toxicity, ckd, bipolar d/o, HTN now presented to the ED with abdominal pain and dyspnea, found on imaging to have pneumoperitoneum.  Taken to OR, found to have perf'ed gastric ulcer which was repaired by Jean Paul patch.      Assessment and plan :     Diana Santiago IS a 71 year old female admitted on 8/24/2022 for acute respiratory failure.   I have personally reviewed the daily labs, imaging studies, cultures and discussed the case with referring physician and consulting physicians.     My assessment and plan by system for this patient is as follows:    Neurology/Psychiatry:   1. Compensated on vent   2. History bipolar/depression     Cardiovascular:   1.  Septic shock - she is off Levophed; on antibiotics (Zosyn), and cultures are negative. She seems euvolume to overloaded side. Her HR has been in 50-60's and sinus rhythm and no meds that should affect this; will monitor only.      Pulmonary/Ventilator Management:   1. Acute respiratory failure- on to 35% and +7 PEEP. Wean peep today.  Seems to be autodiuresing now.  Will add lasix if needed to maintain diuresis state.    GI and Nutrition :   1. Perforated  ulcer- s/p operative repair and wash out of peritoneum with gastric contents.  Bid PPI.  2. Nutrition - last albumin prior to admission 3.3 and will continue to defer \"hard\" decision of nutrition (TPN vs oral/enteral) until Day 7 if needed.    Renal/Fluids/Electrolytes:   1.  Acute on chronic renal failure- baseline creatinine about 2 and now about 3.1.   2.  HyperNatremia with h/o DI:  up to 150 today; will increase D5W to 75 mls/hr and monitor Na closely.  Will try to " transition meds from NS base to d5 base to reduce solute load.  3. History of lithium toxicity     Infectious Disease:   1. Continues on zosyn.    Endocrine:   1. Glucoses ok     Hematology/Oncology:   1. Hb ok at 10.5    ICU Prophylaxis:   1. DVT: Hep Subq/mechanical  2. VAP: HOB 30 degrees, chlorhexidine rinse  3. Stress Ulcer: PPI  4. Restraints: Nonviolent soft two point restraints required and necessary for patient safety and continued cares and good effect as patient continues to pull at necessary lines, tubes despite education and distraction. Will readdress daily.   5. IV Access - central access required and necessary for continued patient cares  6. Feeding - deferred   7. Disposition - ICU care         Interim History:     No events overnight.  Weaning vent as able.    Unable to obtain history directly due to underlying vented status.           Key Medications:       acetylcysteine  2 mL Nebulization 4x Daily     albuterol  2.5 mg Nebulization 4x Daily     chlorhexidine  15 mL Mouth/Throat Q12H     heparin ANTICOAGULANT  5,000 Units Subcutaneous Q12H     insulin aspart  1-6 Units Subcutaneous Q4H     pantoprazole (PROTONIX) IV  40 mg Intravenous BID AC     piperacillin-tazobactam  2.25 g Intravenous Q6H     sodium chloride (PF)  3 mL Intracatheter Q8H       fentaNYL Stopped (08/29/22 1025)     propofol (DIPRIVAN) infusion Stopped (08/28/22 1215)    And     - MEDICATION INSTRUCTIONS -       norepinephrine Stopped (08/27/22 2240)     sodium chloride 20 mL/hr at 08/29/22 1021               Physical Examination:   Temp:  [96.3  F (35.7  C)-98.1  F (36.7  C)] 98.1  F (36.7  C)  Pulse:  [52-69] 63  Resp:  [9-19] 9  BP: (113-154)/(79-93) 149/92  FiO2 (%):  [40 %-100 %] 40 %  SpO2:  [87 %-100 %] 90 %      Intake/Output Summary (Last 24 hours) at 8/29/2022 1159  Last data filed at 8/29/2022 1000  Gross per 24 hour   Intake 1600.76 ml   Output 3015 ml   Net -1414.24 ml         Wt Readings from Last 4 Encounters:    08/29/22 58.2 kg (128 lb 4.9 oz)   07/17/22 55.3 kg (122 lb)   04/22/22 59.9 kg (132 lb)   03/21/22 64 kg (141 lb)     BP - Mean:  [] 116  Vent Mode: CMV/AC  (Continuous Mandatory Ventilation/ Assist Control)  FiO2 (%): 40 %  Resp Rate (Set): 16 breaths/min  Tidal Volume (Set, mL): 350 mL  PEEP (cm H2O): 7 cmH2O  Resp: 9    Recent Labs   Lab 08/28/22  0507 08/27/22  0808 08/27/22  0428 08/26/22  0449   PH 7.51* 7.45 7.51* 7.37   PCO2 30* 38 23* 35   PO2 101 93 102 105   HCO3 24 27 18* 20*   O2PER 50 35 35 30       GEN: no acute distress; comfortable on vent    HEENT: head ncat, sclera anicteric, OP patent, trachea midline   PULM: unlabored synchronous with vent, clear anteriorly    CV/COR: RRR S1S2 no gallop,  No rub, no murmur  ABD: soft mild tenderness wound clean  EXT:  mild edema   Warm; right hand with areas with mild duskiness but mostly healthy warm and perfused.   NEURO: grossly intact; moves extremities weakly to command   SKIN: no obvious rash; no cyanosis or mottling   LINES: clean, dry intact         Data:   All data and imaging reviewed     ROUTINE ICU LABS (Last four results)  CMP  Recent Labs   Lab 08/29/22  0811 08/29/22  0412 08/29/22  0411 08/29/22  0017 08/28/22  0419 08/28/22  0414 08/27/22  0813 08/27/22  0428 08/26/22  0450 08/26/22  0449   NA  --   --  150*  --   --  145*  --  150*  --  142   POTASSIUM  --   --  4.2  --   --  3.4  --  3.6  --  4.6   CHLORIDE  --   --  116*  --   --  113*  --  115*  --  111*   CO2  --   --  24  --   --  25  --  24  --  19*   ANIONGAP  --   --  10  --   --  7  --  11  --  12   * 156* 158* 90   < > 176*   < > 115*   < > 103*   BUN  --   --  63*  --   --  60*  --  68*  --  69*   CR  --   --  3.13*  --   --  3.23*  --  3.44*  --  3.50*   GFRESTIMATED  --   --  15*  --   --  15*  --  14*  --  13*   ISSA  --   --  8.4*  --   --  8.2*  --  8.5  --  8.1*   MAG  --   --  2.4*  --   --  2.3  --  2.4*  --  2.3   PHOS  --   --  4.9*  --   --  4.1  --  5.3*   --  6.0*   PROTTOTAL  --   --  5.3*  --   --  4.9*  --  5.0*  --  5.1*   ALBUMIN  --   --  1.7*  --   --  1.6*  --  1.8*  --  2.0*   BILITOTAL  --   --  0.8  --   --  0.8  --  0.8  --  0.7   ALKPHOS  --   --  239*  --   --  185*  --  92  --  78   AST  --   --  59*  --   --  114*  --  265*  --  202*   ALT  --   --  226*  --   --  295*  --  373*  --  255*    < > = values in this interval not displayed.     CBC  Recent Labs   Lab 08/29/22  0411 08/28/22  0414 08/27/22 1954 08/27/22  0808 08/27/22  0428   WBC 7.8 8.5  --  13.8* 11.3*   RBC 4.63 3.87  --  3.76* 2.86*   HGB 10.5* 8.8* 9.0* 8.7* 6.4*   HCT 33.8* 27.3*  --  26.5* 19.3*   MCV 73* 71*  --  71* 68*   MCH 22.7* 22.7*  --  23.1* 22.4*   MCHC 31.1* 32.2  --  32.8 33.2   RDW 21.9* 20.8*  --  21.2* 17.5*    184  --  174 164     INR  Recent Labs   Lab 08/24/22  1311   INR 1.13     Arterial Blood Gas  Recent Labs   Lab 08/28/22  0507 08/27/22  0808 08/27/22  0428 08/26/22  0449   PH 7.51* 7.45 7.51* 7.37   PCO2 30* 38 23* 35   PO2 101 93 102 105   HCO3 24 27 18* 20*   O2PER 50 35 35 30       All cultures:  No results for input(s): CULT in the last 168 hours.  Recent Results (from the past 24 hour(s))   US Upper Extremity Arterial Duplex Right    Narrative    EXAM: US UPPER EXTREMITY ARTERIAL DUPLEX RIGHT  LOCATION: [facility]  DATE/TIME: 8/27/2022 11:40 PM    INDICATION: absent pulses after arterial line pulled, fingers hand dusky  COMPARISON: None.  TECHNIQUE: Arterial Duplex ultrasound of the right arm. Color flow and spectral Doppler with waveform analysis performed.    FINDINGS:  The waveforms are as follows:    RIGHT SUBCLAVIAN ARTERY: 63 cm/s, triphasic flow.  RIGHT AXILLARY-BRACHIAL ARTERY:  62 cm/s, triphasic flow  RIGHT RADIAL/ULNAR: 19 cm/s, monophasic flow in the distal radial artery      Impression    IMPRESSION:   1.  Blood flow is identified throughout the right upper extremity from the shoulder to the wrist, however  there are decreased velocities and monophasic flow within the distal radial artery suggesting a hemodynamically significant stenosis.       Billing: This patient is critically ill: Yes. Total critical care time today 40 min.

## 2022-08-29 NOTE — PLAN OF CARE
Goal Outcome Evaluation:    VSS, afebrile, denies pain. KEANE weakly, follows commands. Increased O2 need, see provider notification. Weaning vent as able. 60%/16/350/7+. OG to LIS, good UOP, hard BMx2, decreasing output from DAVI drain.

## 2022-08-29 NOTE — PROGRESS NOTES
Notified by RN of increased O2 needs and need for frequent suctioning of thick, yellow mucous. CXR obtained and reviewed - remains stable from prior. Will start scheduled mucomyst/albuterol nebs for airway clearance and wean vent as able.     Afia De Leon MD  Pulmonary, Allergy, Critical Care, and Sleep Medicine   [facility]   Pager: [phone]

## 2022-08-29 NOTE — PROGRESS NOTES
[facility] ICU RESPIRATORY NOTE        Date of Admission: 8/24/2022    Date of Intubation (most recent):  8/24/22    Reason for Mechanical Ventilation: AW protection    Number of Days on Mechanical Ventilation: 6    Met Criteria for Spontaneous Breathing Trial: No    Reason for No Spontaneous Breathing Trial: Per MD    Significant Events Today: None    ABG Results:   Recent Labs   Lab 08/28/22  0507 08/27/22  0808 08/27/22  0428 08/26/22  0449   PH 7.51* 7.45 7.51* 7.37   PCO2 30* 38 23* 35   PO2 101 93 102 105   HCO3 24 27 18* 20*   O2PER 50 35 35 30       Current Vent Settings: Vent Mode: CMV/AC  (Continuous Mandatory Ventilation/ Assist Control)  FiO2 (%): 40 %  Resp Rate (Set): 16 breaths/min  Tidal Volume (Set, mL): 350 mL  PEEP (cm H2O): 7 cmH2O  Resp: 8      Plan:  Pt to remain on full vent support overnight    Bernardo Caldera, RT on 8/29/2022 at 1:10 PM

## 2022-08-29 NOTE — PROGRESS NOTES
[facility] ICU RESPIRATORY NOTE        Date of Admission: 8/24/2022    Date of Intubation (most recent): 8/24/2022    Reason for Mechanical Ventilation: Airway protection.    Number of Days on Mechanical Ventilation: 5    Met Criteria for Spontaneous Breathing Trial: No.    Reason for No Spontaneous Breathing Trial: Per MD order.    Significant Events Today: none.    ABG Results:   Recent Labs   Lab 08/28/22  0507 08/27/22  0808 08/27/22  0428 08/26/22  0449   PH 7.51* 7.45 7.51* 7.37   PCO2 30* 38 23* 35   PO2 101 93 102 105   HCO3 24 27 18* 20*   O2PER 50 35 35 30       Current Vent Settings: Vent Mode: CMV/AC  (Continuous Mandatory Ventilation/ Assist Control)  FiO2 (%): 70 %  Resp Rate (Set): 16 breaths/min  Tidal Volume (Set, mL): 350 mL  PEEP (cm H2O): (S) 12 cmH2O  Resp: 16      Skin Assessment: intact and clean.  Plan: RT will continue to monitor the patient.    Sergio Lynch, RT on 8/29/2022 at 4:39 AM

## 2022-08-29 NOTE — PROGRESS NOTES
Assessment and Plan:   CKD-4: baseline Cr 2.0. Hx of lithium treatment.  Now with REI, in setting of septic shock. Likely ATN.   I/O yest 2018/3735. UO yest 2400 ml. Wt adm 54.4, wt today 58.2.   On D5W at 100 ml/h.     Labs show Na 150, K 4.2, HCO3 24, Cr improving: 3.44 > 3.23 > 3.13. Alb low at 1.7.     Monitor labs. Agree with D5W infusion for hypernatremia. Renal function is improving, non-oliguric.             Interval History:   71-year-old female presented to the ED dated 08/24/2022 in the setting of shortness of breath and chest pain.  Evaluation demonstrated perforated viscus, for which she was admitted and required urgent surgery. Perforated ulcer repair 8/24/22.   \  Resp failure / mech vent.     BP now stable, off pressors. Anemia: Hgb 10.5.            Review of Systems:   Intubated.           Medications:       acetylcysteine  2 mL Nebulization 4x Daily     albuterol  2.5 mg Nebulization 4x Daily     chlorhexidine  15 mL Mouth/Throat Q12H     heparin ANTICOAGULANT  5,000 Units Subcutaneous Q12H     insulin aspart  1-6 Units Subcutaneous Q4H     pantoprazole (PROTONIX) IV  40 mg Intravenous BID AC     piperacillin-tazobactam  2.25 g Intravenous Q6H     sodium chloride (PF)  3 mL Intracatheter Q8H       D5W 75 mL/hr at 08/29/22 1151     D5W 10 mL/hr at 08/29/22 1217     fentaNYL Stopped (08/29/22 1025)     propofol (DIPRIVAN) infusion Stopped (08/28/22 1215)    And     - MEDICATION INSTRUCTIONS -       norepinephrine Stopped (08/27/22 2240)     Current active medications and PTA medications reviewed, see medication list for details.            Physical Exam:   Vitals were reviewed  Patient Vitals for the past 24 hrs:   BP Temp Temp src Pulse Resp SpO2 Weight   08/29/22 1100 (!) 149/92 -- -- 63 9 90 % --   08/29/22 1000 (!) 141/93 -- -- 65 12 94 % --   08/29/22 0900 (!) 134/91 -- -- 64 12 93 % --   08/29/22 0800 (!) 134/92 98.1  F (36.7  C) Axillary 66 9 94 % --   08/29/22 0700 136/86 -- -- 62 16  95 % --   22 0650 -- -- -- 64 17 97 % --   22 0640 -- -- -- 62 17 97 % --   22 0630 -- -- -- 65 16 95 % --   22 0600 124/89 -- -- 66 17 95 % --   22 0500 (!) 129/93 -- -- 66 16 95 % --   22 0400 125/88 (!) 96.3  F (35.7  C) Axillary 65 17 93 % 58.2 kg (128 lb 4.9 oz)   22 0348 -- -- -- -- -- 96 % --   22 0300 113/79 -- -- 63 16 98 % --   22 0200 (!) 130/92 -- -- 69 17 97 % --   22 0130 -- -- -- 64 17 97 % --   22 0100 (!) 116/92 -- -- 66 16 96 % --   22 0000 129/85 97.8  F (36.6  C) Axillary 68 19 (!) 87 % --   22 2317 -- -- -- -- -- 100 % --   22 2300 (!) 154/90 -- -- 55 16 100 % --   22 2200 139/86 -- -- 57 17 99 % --   22 2100 136/88 -- -- 64 16 90 % --   22 2000 (!) 143/92 97.6  F (36.4  C) Axillary 58 15 97 % --   22 1900 122/80 -- -- 58 15 97 % --   22 1800 136/83 -- -- 61 12 97 % --   22 1700 126/83 -- -- 63 15 97 % --   22 1600 138/89 98.1  F (36.7  C) Axillary 57 16 97 % --   22 1500 (!) 141/90 -- -- 54 16 95 % --   22 1400 (!) 138/90 -- -- 55 15 97 % --   22 1300 (!) 136/91 -- -- 56 16 96 % --       Temp:  [96.3  F (35.7  C)-98.1  F (36.7  C)] 98.1  F (36.7  C)  Pulse:  [54-69] 63  Resp:  [9-19] 9  BP: (113-154)/(79-93) 149/92  FiO2 (%):  [40 %-100 %] 40 %  SpO2:  [87 %-100 %] 90 %    Temperatures:  Current - Temp: 98.1  F (36.7  C); Max - Temp  Av.6  F (36.4  C)  Min: 96.3  F (35.7  C)  Max: 98.1  F (36.7  C)  Respiration range: Resp  Avg: 15.3  Min: 9  Max: 19  Pulse range: Pulse  Av.1  Min: 54  Max: 69  Blood pressure range: Systolic (24hrs), Av , Min:113 , Max:154   ; Diastolic (24hrs), Av, Min:79, Max:93    Pulse oximetry range: SpO2  Av.6 %  Min: 87 %  Max: 100 %    I/O last 3 completed shifts:  In: 1700.96 [I.V.:1700.96]  Out: 3340 [Urine:2350; Emesis/NG output:400; Drains:590]      Intake/Output Summary (Last 24 hours) at 2022  1217  Last data filed at 8/29/2022 1200  Gross per 24 hour   Intake 1543.46 ml   Output 3315 ml   Net -1771.54 ml       Intubated, NG suction in place  Lungs with coarse ant BS, no wheezes or rales  Cor RRR nl S1 S2 no M  LE protective boots. No edema.        Wt Readings from Last 4 Encounters:   08/29/22 58.2 kg (128 lb 4.9 oz)   07/17/22 55.3 kg (122 lb)   04/22/22 59.9 kg (132 lb)   03/21/22 64 kg (141 lb)          Data:          Lab Results   Component Value Date     08/29/2022     08/28/2022     08/27/2022     12/21/2020     10/15/2019     10/23/2018    Lab Results   Component Value Date    CHLORIDE 116 08/29/2022    CHLORIDE 113 08/28/2022    CHLORIDE 115 08/27/2022    CHLORIDE 114 12/21/2020    CHLORIDE 115 10/15/2019    CHLORIDE 110 10/23/2018    Lab Results   Component Value Date    BUN 63 08/29/2022    BUN 60 08/28/2022    BUN 68 08/27/2022    BUN 34 12/21/2020    BUN 31 10/15/2019    BUN 29 10/23/2018      Lab Results   Component Value Date    POTASSIUM 4.2 08/29/2022    POTASSIUM 3.4 08/28/2022    POTASSIUM 3.6 08/27/2022    POTASSIUM 3.9 12/21/2020    POTASSIUM 4.0 10/15/2019    POTASSIUM 3.8 10/23/2018    Lab Results   Component Value Date    CO2 24 08/29/2022    CO2 25 08/28/2022    CO2 24 08/27/2022    CO2 19 12/21/2020    CO2 19 10/15/2019    CO2 24 10/23/2018    Lab Results   Component Value Date    CR 3.13 08/29/2022    CR 3.23 08/28/2022    CR 3.44 08/27/2022    CR 1.41 03/13/2021    CR 1.64 01/05/2021    CR 1.80 12/21/2020        Recent Labs   Lab Test 08/29/22  0411 08/28/22  0414 08/27/22  1954 08/27/22  0808   WBC 7.8 8.5  --  13.8*   HGB 10.5* 8.8* 9.0* 8.7*   HCT 33.8* 27.3*  --  26.5*   MCV 73* 71*  --  71*    184  --  174     Recent Labs   Lab Test 08/29/22 0411 08/28/22 0414 08/27/22  0428   AST 59* 114* 265*   * 295* 373*   ALKPHOS 239* 185* 92   BILITOTAL 0.8 0.8 0.8       Recent Labs   Lab Test 08/29/22 0411 08/28/22  0414  08/27/22 0428   MAG 2.4* 2.3 2.4*     Recent Labs   Lab Test 08/29/22  0411 08/28/22  0414 08/27/22 0428   PHOS 4.9* 4.1 5.3*     Recent Labs   Lab Test 08/29/22  0411 08/28/22  0414 08/27/22 0428   ISSA 8.4* 8.2* 8.5       Lab Results   Component Value Date    ISSA 8.4 (L) 08/29/2022     Lab Results   Component Value Date    WBC 7.8 08/29/2022    HGB 10.5 (L) 08/29/2022    HCT 33.8 (L) 08/29/2022    MCV 73 (L) 08/29/2022     08/29/2022     Lab Results   Component Value Date     (H) 08/29/2022    POTASSIUM 4.2 08/29/2022    CHLORIDE 116 (H) 08/29/2022    CO2 24 08/29/2022     (H) 08/29/2022     Lab Results   Component Value Date    BUN 63 (H) 08/29/2022    CR 3.13 (H) 08/29/2022     Lab Results   Component Value Date    MAG 2.4 (H) 08/29/2022     Lab Results   Component Value Date    PHOS 4.9 (H) 08/29/2022       Creatinine   Date Value Ref Range Status   08/29/2022 3.13 (H) 0.52 - 1.04 mg/dL Final   08/28/2022 3.23 (H) 0.52 - 1.04 mg/dL Final   08/27/2022 3.44 (H) 0.52 - 1.04 mg/dL Final   08/26/2022 3.50 (H) 0.52 - 1.04 mg/dL Final   08/25/2022 3.11 (H) 0.52 - 1.04 mg/dL Final   08/24/2022 2.88 (H) 0.52 - 1.04 mg/dL Final   03/13/2021 1.41 (H) 0.52 - 1.04 mg/dL Final   01/05/2021 1.64 (H) 0.52 - 1.04 mg/dL Final   12/21/2020 1.80 (H) 0.52 - 1.04 mg/dL Final   10/15/2019 1.16 (H) 0.52 - 1.04 mg/dL Final   10/23/2018 1.12 (H) 0.52 - 1.04 mg/dL Final   08/03/2018 1.15 (H) 0.52 - 1.04 mg/dL Final       Attestation:  I have reviewed today's vital signs, notes, medications, labs and imaging.     Cesar Chaudhary MD

## 2022-08-29 NOTE — PROVIDER NOTIFICATION
Notified intensivist of increased PEEP and O2 need, frequent suctioning. CXR and nebs ordered. ABG attempted but failed x2.

## 2022-08-29 NOTE — PLAN OF CARE
Neuro: follows commands weakly. PERRL. Lethargic.   CV: SR/SB. BP stable  Resp: vent settings adjusted, PEEP 6  GI/: grove in place for accurate I&O. +BM, incontinent  Skin: DAVI drain with ostomy bag over for drainage, midline abdominal incision with staples. R hand edema.   Pain/Gtts: L groin CVC. fentanyl stopped and given PRN dilaudid for pain

## 2022-08-29 NOTE — PROGRESS NOTES
"General Surgery Progress Note    Admission Date: 8/24/2022  Today's Date: 8/29/2022         Assessment:      Diana Santiago is a 71 year old female POD 5 s/p exploratory laparotomy and repair of perforated gastric ulcer     Overall improving, making urine, +BMs.         Plan:   - No changes from surgical standpoint today  - Continue ICU cares  - Continue DAVI drain, monitor quality of fluid, utilize ostomy bag to protect skin from leaking fluid  - Await improvement in respiratory function and extubation, hopefully in the next couple days  - Continue NG tube to LIS for now. Depending on timing of extubation could consider enteral feedings soon vs initiation of PO intake after extubation.   - Protonix BID, heparin for DVT ppx, Zosyn for intra-abdominal contamination  - We will continue to follow along          Interval History:   Afebrile, pulse WNL, blood pressures stable currently off pressors. Sedation weaned and patient following commands. Audibly passing some gas per RN and had 2 BMs yesterday. Continues to leak serous fluid in and around DAVI drain. 400cc out NG x 24 hours.  Patient denies any abdominal pain at rest during my visit.           Physical Exam:   BP (!) 134/92   Pulse 66   Temp 98.1  F (36.7  C) (Axillary)   Resp 9   Ht 1.626 m (5' 4\")   Wt 58.2 kg (128 lb 4.9 oz)   SpO2 94%   BMI 22.02 kg/m    I/O last 3 completed shifts:  In: 1700.96 [I.V.:1700.96]  Out: 3340 [Urine:2350; Emesis/NG output:400; Drains:590]  General: intubated, mechanically ventilated, awake, no acute distress. Nods/shakes head appropriately in response to questions  Abdomen: Distended but soft. Some appropriate tenderness throughout. DAVI drain with serous fluid in tubing and bulb, serous fluid leaking at drain site  Incision: midline incision clean and intact with staples, no erythema, no active drainage    LABS:  Recent Labs   Lab Test 08/29/22  0411 08/28/22  0414 08/27/22  1954 08/27/22  0808   WBC 7.8 8.5  --  13.8*   HGB 10.5* " 8.8* 9.0* 8.7*   MCV 73* 71*  --  71*    184  --  174      Recent Labs   Lab Test 08/29/22 0411 08/28/22 0414 08/27/22 0428   POTASSIUM 4.2 3.4 3.6   CHLORIDE 116* 113* 115*   CO2 24 25 24   BUN 63* 60* 68*   CR 3.13* 3.23* 3.44*   ANIONGAP 10 7 11      Recent Labs   Lab Test 08/29/22 0411 08/28/22 0414 08/27/22 0428   ALBUMIN 1.7* 1.6* 1.8*   BILITOTAL 0.8 0.8 0.8   * 295* 373*   AST 59* 114* 265*   ALKPHOS 239* 185* 92   -------------------------------    Sylvia Harrington PA-C  [facility]  [phone]

## 2022-08-30 ENCOUNTER — APPOINTMENT (OUTPATIENT)
Dept: SPEECH THERAPY | Facility: CLINIC | Age: 72
DRG: 853 | End: 2022-08-30
Attending: PHYSICIAN ASSISTANT
Payer: MEDICARE

## 2022-08-30 LAB
ALBUMIN SERPL-MCNC: 1.5 G/DL (ref 3.4–5)
ALP SERPL-CCNC: 219 U/L (ref 40–150)
ALT SERPL W P-5'-P-CCNC: 137 U/L (ref 0–50)
ANION GAP SERPL CALCULATED.3IONS-SCNC: 8 MMOL/L (ref 3–14)
AST SERPL W P-5'-P-CCNC: 26 U/L (ref 0–45)
ATRIAL RATE - MUSE: 50 BPM
BASE EXCESS BLDV CALC-SCNC: 1.5 MMOL/L (ref -7.7–1.9)
BILIRUB SERPL-MCNC: 0.7 MG/DL (ref 0.2–1.3)
BUN SERPL-MCNC: 53 MG/DL (ref 7–30)
CALCIUM SERPL-MCNC: 8.5 MG/DL (ref 8.5–10.1)
CHLORIDE BLD-SCNC: 118 MMOL/L (ref 94–109)
CO2 SERPL-SCNC: 23 MMOL/L (ref 20–32)
CREAT SERPL-MCNC: 2.94 MG/DL (ref 0.52–1.04)
DIASTOLIC BLOOD PRESSURE - MUSE: NORMAL MMHG
ERYTHROCYTE [DISTWIDTH] IN BLOOD BY AUTOMATED COUNT: 21.9 % (ref 10–15)
GFR SERPL CREATININE-BSD FRML MDRD: 16 ML/MIN/1.73M2
GLUCOSE BLD-MCNC: 177 MG/DL (ref 70–99)
GLUCOSE BLDC GLUCOMTR-MCNC: 155 MG/DL (ref 70–99)
GLUCOSE BLDC GLUCOMTR-MCNC: 160 MG/DL (ref 70–99)
GLUCOSE BLDC GLUCOMTR-MCNC: 160 MG/DL (ref 70–99)
GLUCOSE BLDC GLUCOMTR-MCNC: 172 MG/DL (ref 70–99)
GLUCOSE BLDC GLUCOMTR-MCNC: 190 MG/DL (ref 70–99)
HCO3 BLDV-SCNC: 25 MMOL/L (ref 21–28)
HCT VFR BLD AUTO: 33.1 % (ref 35–47)
HGB BLD-MCNC: 10.4 G/DL (ref 11.7–15.7)
INTERPRETATION ECG - MUSE: NORMAL
MAGNESIUM SERPL-MCNC: 2.1 MG/DL (ref 1.6–2.3)
MCH RBC QN AUTO: 22.8 PG (ref 26.5–33)
MCHC RBC AUTO-ENTMCNC: 31.4 G/DL (ref 31.5–36.5)
MCV RBC AUTO: 73 FL (ref 78–100)
O2/TOTAL GAS SETTING VFR VENT: 40 %
P AXIS - MUSE: 75 DEGREES
PCO2 BLDV: 35 MM HG (ref 40–50)
PH BLDV: 7.46 [PH] (ref 7.32–7.43)
PHOSPHATE SERPL-MCNC: 3.8 MG/DL (ref 2.5–4.5)
PLATELET # BLD AUTO: 231 10E3/UL (ref 150–450)
PO2 BLDV: 34 MM HG (ref 25–47)
POTASSIUM BLD-SCNC: 3.4 MMOL/L (ref 3.4–5.3)
PR INTERVAL - MUSE: 220 MS
PROT SERPL-MCNC: 5 G/DL (ref 6.8–8.8)
QRS DURATION - MUSE: 114 MS
QT - MUSE: 504 MS
QTC - MUSE: 459 MS
R AXIS - MUSE: 84 DEGREES
RBC # BLD AUTO: 4.56 10E6/UL (ref 3.8–5.2)
SODIUM SERPL-SCNC: 149 MMOL/L (ref 133–144)
SYSTOLIC BLOOD PRESSURE - MUSE: NORMAL MMHG
T AXIS - MUSE: 73 DEGREES
VENTRICULAR RATE- MUSE: 50 BPM
WBC # BLD AUTO: 8.8 10E3/UL (ref 4–11)

## 2022-08-30 PROCEDURE — 84100 ASSAY OF PHOSPHORUS: CPT | Performed by: INTERNAL MEDICINE

## 2022-08-30 PROCEDURE — 999N000259 HC STATISTIC EXTUBATION

## 2022-08-30 PROCEDURE — 80053 COMPREHEN METABOLIC PANEL: CPT | Performed by: INTERNAL MEDICINE

## 2022-08-30 PROCEDURE — 92610 EVALUATE SWALLOWING FUNCTION: CPT | Mod: GN | Performed by: SPEECH-LANGUAGE PATHOLOGIST

## 2022-08-30 PROCEDURE — 250N000011 HC RX IP 250 OP 636

## 2022-08-30 PROCEDURE — 999N000157 HC STATISTIC RCP TIME EA 10 MIN

## 2022-08-30 PROCEDURE — 250N000011 HC RX IP 250 OP 636: Performed by: INTERNAL MEDICINE

## 2022-08-30 PROCEDURE — 99232 SBSQ HOSP IP/OBS MODERATE 35: CPT | Performed by: INTERNAL MEDICINE

## 2022-08-30 PROCEDURE — 94640 AIRWAY INHALATION TREATMENT: CPT | Mod: 76

## 2022-08-30 PROCEDURE — 250N000009 HC RX 250: Performed by: INTERNAL MEDICINE

## 2022-08-30 PROCEDURE — 999N000253 HC STATISTIC WEANING TRIALS

## 2022-08-30 PROCEDURE — 94640 AIRWAY INHALATION TREATMENT: CPT

## 2022-08-30 PROCEDURE — 94003 VENT MGMT INPAT SUBQ DAY: CPT

## 2022-08-30 PROCEDURE — 83735 ASSAY OF MAGNESIUM: CPT | Performed by: INTERNAL MEDICINE

## 2022-08-30 PROCEDURE — 85027 COMPLETE CBC AUTOMATED: CPT | Performed by: INTERNAL MEDICINE

## 2022-08-30 PROCEDURE — C9113 INJ PANTOPRAZOLE SODIUM, VIA: HCPCS | Performed by: INTERNAL MEDICINE

## 2022-08-30 PROCEDURE — 250N000013 HC RX MED GY IP 250 OP 250 PS 637: Performed by: INTERNAL MEDICINE

## 2022-08-30 PROCEDURE — 82803 BLOOD GASES ANY COMBINATION: CPT | Performed by: INTERNAL MEDICINE

## 2022-08-30 PROCEDURE — 258N000003 HC RX IP 258 OP 636: Performed by: INTERNAL MEDICINE

## 2022-08-30 PROCEDURE — 200N000001 HC R&B ICU

## 2022-08-30 RX ORDER — LAMOTRIGINE 100 MG/1
100 TABLET ORAL DAILY
Status: DISCONTINUED | OUTPATIENT
Start: 2022-08-30 | End: 2022-09-14 | Stop reason: HOSPADM

## 2022-08-30 RX ADMIN — HEPARIN SODIUM 5000 UNITS: 5000 INJECTION, SOLUTION INTRAVENOUS; SUBCUTANEOUS at 11:17

## 2022-08-30 RX ADMIN — ACETYLCYSTEINE 2 ML: 200 SOLUTION ORAL; RESPIRATORY (INHALATION) at 19:40

## 2022-08-30 RX ADMIN — INSULIN ASPART 1 UNITS: 100 INJECTION, SOLUTION INTRAVENOUS; SUBCUTANEOUS at 04:29

## 2022-08-30 RX ADMIN — INSULIN ASPART 1 UNITS: 100 INJECTION, SOLUTION INTRAVENOUS; SUBCUTANEOUS at 07:50

## 2022-08-30 RX ADMIN — DEXTROSE MONOHYDRATE: 50 INJECTION, SOLUTION INTRAVENOUS at 07:51

## 2022-08-30 RX ADMIN — ACETYLCYSTEINE 2 ML: 200 SOLUTION ORAL; RESPIRATORY (INHALATION) at 11:09

## 2022-08-30 RX ADMIN — PIPERACILLIN SODIUM AND TAZOBACTAM SODIUM 2.25 G: 2; .25 INJECTION, POWDER, LYOPHILIZED, FOR SOLUTION INTRAVENOUS at 13:39

## 2022-08-30 RX ADMIN — PANTOPRAZOLE SODIUM 40 MG: 40 INJECTION, POWDER, FOR SOLUTION INTRAVENOUS at 19:44

## 2022-08-30 RX ADMIN — LAMOTRIGINE 100 MG: 100 TABLET ORAL at 11:17

## 2022-08-30 RX ADMIN — ACETYLCYSTEINE 2 ML: 200 SOLUTION ORAL; RESPIRATORY (INHALATION) at 08:03

## 2022-08-30 RX ADMIN — ALBUTEROL SULFATE 2.5 MG: 2.5 SOLUTION RESPIRATORY (INHALATION) at 19:40

## 2022-08-30 RX ADMIN — ALBUTEROL SULFATE 2.5 MG: 2.5 SOLUTION RESPIRATORY (INHALATION) at 16:01

## 2022-08-30 RX ADMIN — PIPERACILLIN SODIUM AND TAZOBACTAM SODIUM 2.25 G: 2; .25 INJECTION, POWDER, LYOPHILIZED, FOR SOLUTION INTRAVENOUS at 02:09

## 2022-08-30 RX ADMIN — ALBUTEROL SULFATE 2.5 MG: 2.5 SOLUTION RESPIRATORY (INHALATION) at 08:03

## 2022-08-30 RX ADMIN — ALBUTEROL SULFATE 2.5 MG: 2.5 SOLUTION RESPIRATORY (INHALATION) at 11:09

## 2022-08-30 RX ADMIN — PIPERACILLIN SODIUM AND TAZOBACTAM SODIUM 2.25 G: 2; .25 INJECTION, POWDER, LYOPHILIZED, FOR SOLUTION INTRAVENOUS at 07:39

## 2022-08-30 RX ADMIN — ACETYLCYSTEINE 2 ML: 200 SOLUTION ORAL; RESPIRATORY (INHALATION) at 16:01

## 2022-08-30 RX ADMIN — PIPERACILLIN SODIUM AND TAZOBACTAM SODIUM 2.25 G: 2; .25 INJECTION, POWDER, LYOPHILIZED, FOR SOLUTION INTRAVENOUS at 19:45

## 2022-08-30 RX ADMIN — PANTOPRAZOLE SODIUM 40 MG: 40 INJECTION, POWDER, FOR SOLUTION INTRAVENOUS at 07:44

## 2022-08-30 ASSESSMENT — ACTIVITIES OF DAILY LIVING (ADL)
ADLS_ACUITY_SCORE: 47

## 2022-08-30 NOTE — PROGRESS NOTES
"Critical Care Progress Note      08/30/2022    Name: Diana Santiago MRN#: [id number]   Age: 71 year old YOB: 1950     Hsptl Day# 6  ICU DAY #               Problem List:   Active Problems:    Perforated viscus    Septic shock (H)           Summary/Hospital Course:      71F pmh of hyperparathyroidism, DI in setting of lithium toxicity, ckd, bipolar d/o, HTN now presented to the ED with abdominal pain and dyspnea, found on imaging to have pneumoperitoneum.  Taken to OR, found to have perf'ed gastric ulcer which was repaired by Jean Paul patch.    No events overnight.  Weaned down to peep5 yesterday.      Unable to obtain history directly due to underlying vented status.        Assessment and plan :     Diana Santiago IS a 71 year old female admitted on 8/24/2022 for acute respiratory failure.   I have personally reviewed the daily labs, imaging studies, cultures and discussed the case with referring physician and consulting physicians.     My assessment and plan by system for this patient is as follows:    Neurology/Psychiatry:   1. Compensated on vent   2. History bipolar/depression     Cardiovascular:   1.  Septic shock - she is off Levophed; on antibiotics (Zosyn), and cultures are negative. She seems euvolume to overloaded side. Her HR has been in 50-60's and sinus rhythm and no meds that should affect this; will monitor only.      Pulmonary/Ventilator Management:   1. Acute respiratory failure- on to 35% and +5 PEEP.  Sbt today and possible extubaiton if passes.    GI and Nutrition :   1. Perforated  ulcer- s/p operative repair and wash out of peritoneum with gastric contents.  Bid PPI.  2. Nutrition - last albumin prior to admission 3.3 and will continue to defer \"hard\" decision of nutrition (TPN vs oral/enteral) until Day 7 if needed.    Renal/Fluids/Electrolytes:   1.  Acute on chronic renal failure- baseline creatinine about 2 and now about 3.1.   2.  HyperNatremia with h/o DI:  Down to 149 today; " continue D5W at 75 mls/hr and monitor Na closely.  Continue to minimize solute load by keeping meds in d5 rather than ns.  3. History of lithium toxicity     Infectious Disease:   1. Continues on zosyn, d6 today.    Endocrine:   1. Hyperglycemia:  Glucoses slightly high--increase to high SSI    Hematology/Oncology:   1. Hb ok at 10.4    ICU Prophylaxis:   1. DVT: Hep Subq/mechanical  2. VAP: HOB 30 degrees, chlorhexidine rinse  3. Stress Ulcer: PPI  4. Restraints: Nonviolent soft two point restraints required and necessary for patient safety and continued cares and good effect as patient continues to pull at necessary lines, tubes despite education and distraction. Will readdress daily.   5. IV Access - central access required and necessary for continued patient cares  6. Feeding - deferred   7. Disposition - ICU care            Key Medications:       acetylcysteine  2 mL Nebulization 4x Daily     albuterol  2.5 mg Nebulization 4x Daily     chlorhexidine  15 mL Mouth/Throat Q12H     heparin ANTICOAGULANT  5,000 Units Subcutaneous Q12H     insulin aspart  1-6 Units Subcutaneous Q4H     lamoTRIgine  100 mg Oral Daily     pantoprazole  40 mg Intravenous BID AC     piperacillin-tazobactam  2.25 g Intravenous Q6H     sodium chloride (PF)  3 mL Intracatheter Q8H       D5W 75 mL/hr at 08/30/22 0751     D5W 10 mL/hr at 08/29/22 1217     fentaNYL Stopped (08/29/22 1025)     propofol Stopped (08/28/22 1215)    And     - MEDICATION INSTRUCTIONS -       norepinephrine Stopped (08/27/22 2240)               Physical Examination:   Temp:  [97.1  F (36.2  C)-98.8  F (37.1  C)] 97.1  F (36.2  C)  Pulse:  [61-87] 86  Resp:  [7-24] 21  BP: (114-149)/(81-97) 118/86  FiO2 (%):  [40 %] 40 %  SpO2:  [90 %-99 %] 97 %        Intake/Output Summary (Last 24 hours) at 8/30/2022 1013  Last data filed at 8/30/2022 1000  Gross per 24 hour   Intake 2334.63 ml   Output 3300 ml   Net -965.37 ml         Wt Readings from Last 4 Encounters:   08/30/22  58 kg (127 lb 13.9 oz)   07/17/22 55.3 kg (122 lb)   04/22/22 59.9 kg (132 lb)   03/21/22 64 kg (141 lb)     BP - Mean:  [] 96  Vent Mode: CPAP/PS  (Continuous positive airway pressure with Pressure Support)  FiO2 (%): 40 %  Resp Rate (Set): 16 breaths/min  Tidal Volume (Set, mL): 440 mL  PEEP (cm H2O): 5 cmH2O  Pressure Support (cm H2O): 5 cmH2O  Resp: 21    Recent Labs   Lab 08/30/22  0427 08/28/22  0507 08/27/22  0808 08/27/22  0428 08/26/22  0449   PH  --  7.51* 7.45 7.51* 7.37   PCO2  --  30* 38 23* 35   PO2  --  101 93 102 105   HCO3  --  24 27 18* 20*   O2PER 40 50 35 35 30       GEN: no acute distress; comfortable on vent    HEENT: head ncat, sclera anicteric, OP patent, trachea midline   PULM: unlabored synchronous with vent, clear anteriorly    CV/COR: RRR S1S2 no gallop,  No rub, no murmur  ABD: soft mild tenderness wound clean  EXT:  mild edema   Warm; right hand with areas with mild duskiness but mostly healthy warm and perfused.   NEURO: grossly intact; moves extremities weakly to command   SKIN: no obvious rash; no cyanosis or mottling   LINES: clean, dry intact         Data:   All data and imaging reviewed     ROUTINE ICU LABS (Last four results)  CMP  Recent Labs   Lab 08/30/22  0750 08/30/22  0426 08/30/22  0422 08/29/22  2329 08/29/22  0412 08/29/22  0411 08/28/22  0419 08/28/22  0414 08/27/22  0813 08/27/22  0428   NA  --   --  149*  --   --  150*  --  145*  --  150*   POTASSIUM  --   --  3.4  --   --  4.2  --  3.4  --  3.6   CHLORIDE  --   --  118*  --   --  116*  --  113*  --  115*   CO2  --   --  23  --   --  24  --  25  --  24   ANIONGAP  --   --  8  --   --  10  --  7  --  11   * 160* 177* 153*   < > 158*   < > 176*   < > 115*   BUN  --   --  53*  --   --  63*  --  60*  --  68*   CR  --   --  2.94*  --   --  3.13*  --  3.23*  --  3.44*   GFRESTIMATED  --   --  16*  --   --  15*  --  15*  --  14*   ISSA  --   --  8.5  --   --  8.4*  --  8.2*  --  8.5   MAG  --   --  2.1  --   --   2.4*  --  2.3  --  2.4*   PHOS  --   --  3.8  --   --  4.9*  --  4.1  --  5.3*   PROTTOTAL  --   --  5.0*  --   --  5.3*  --  4.9*  --  5.0*   ALBUMIN  --   --  1.5*  --   --  1.7*  --  1.6*  --  1.8*   BILITOTAL  --   --  0.7  --   --  0.8  --  0.8  --  0.8   ALKPHOS  --   --  219*  --   --  239*  --  185*  --  92   AST  --   --  26  --   --  59*  --  114*  --  265*   ALT  --   --  137*  --   --  226*  --  295*  --  373*    < > = values in this interval not displayed.     CBC  Recent Labs   Lab 08/30/22 0422 08/29/22 0411 08/28/22 0414 08/27/22  1954 08/27/22  0808   WBC 8.8 7.8 8.5  --  13.8*   RBC 4.56 4.63 3.87  --  3.76*   HGB 10.4* 10.5* 8.8* 9.0* 8.7*   HCT 33.1* 33.8* 27.3*  --  26.5*   MCV 73* 73* 71*  --  71*   MCH 22.8* 22.7* 22.7*  --  23.1*   MCHC 31.4* 31.1* 32.2  --  32.8   RDW 21.9* 21.9* 20.8*  --  21.2*    199 184  --  174     INR  Recent Labs   Lab 08/24/22  1311   INR 1.13     Arterial Blood Gas  Recent Labs   Lab 08/30/22  0427 08/28/22  0507 08/27/22  0808 08/27/22  0428 08/26/22  0449   PH  --  7.51* 7.45 7.51* 7.37   PCO2  --  30* 38 23* 35   PO2  --  101 93 102 105   HCO3  --  24 27 18* 20*   O2PER 40 50 35 35 30       All cultures:  No results for input(s): CULT in the last 168 hours.  Recent Results (from the past 24 hour(s))   US Upper Extremity Arterial Duplex Right    Narrative    EXAM: US UPPER EXTREMITY ARTERIAL DUPLEX RIGHT  LOCATION: [facility]  DATE/TIME: 8/27/2022 11:40 PM    INDICATION: absent pulses after arterial line pulled, fingers hand dusky  COMPARISON: None.  TECHNIQUE: Arterial Duplex ultrasound of the right arm. Color flow and spectral Doppler with waveform analysis performed.    FINDINGS:  The waveforms are as follows:    RIGHT SUBCLAVIAN ARTERY: 63 cm/s, triphasic flow.  RIGHT AXILLARY-BRACHIAL ARTERY:  62 cm/s, triphasic flow  RIGHT RADIAL/ULNAR: 19 cm/s, monophasic flow in the distal radial artery      Impression    IMPRESSION:    1.  Blood flow is identified throughout the right upper extremity from the shoulder to the wrist, however there are decreased velocities and monophasic flow within the distal radial artery suggesting a hemodynamically significant stenosis.       Billing: This patient is critically ill: Yes. Total critical care time today 30 min.

## 2022-08-30 NOTE — PROGRESS NOTES
[facility] ICU RESPIRATORY NOTE        Date of Admission: 8/24/2022    Date of Intubation (most recent): 8/24/2022    Reason for Mechanical Ventilation: AW protection    Number of Days on Mechanical Ventilation: 7    Met Criteria for Spontaneous Breathing Trial: No    Reason for No Spontaneous Breathing Trial: Per MD    ABG Results:   Recent Labs   Lab 08/30/22  0427 08/28/22  0507 08/27/22  0808 08/27/22  0428 08/26/22  0449   PH  --  7.51* 7.45 7.51* 7.37   PCO2  --  30* 38 23* 35   PO2  --  101 93 102 105   HCO3  --  24 27 18* 20*   O2PER 40 50 35 35 30       Current Vent Settings: Vent Mode: CMV/AC  (Continuous Mandatory Ventilation/ Assist Control)  FiO2 (%): 40 %  Resp Rate (Set): 16 breaths/min  Tidal Volume (Set, mL): 440 mL  PEEP (cm H2O): 5 cmH2O  Resp: 16      Shivam Nichols, RT on 8/30/2022 at 6:10 AM

## 2022-08-30 NOTE — PLAN OF CARE
Goal Outcome Evaluation:    ICU End of Shift Summary. See flowsheets for vital signs and detailed assessment.    Changes this shift: Na to 149 this AM.  Denies Pain.  Saturations maintained overnight on FiO2 of 40% and Peep of 6.      Plan:  Continue supportive cares, monitor sodium levels, and continue weaning as able.

## 2022-08-30 NOTE — PROGRESS NOTES
Extubation Note    Successful completion of SBT (Yes or No):Yes  Extubation time:0743    Patient assessment:  Lung sounds:Diminished  Stridor Present (Yes or No):No  Patient tolerance:Good    Oxygen device:  4L Oxymask  SpO2:94%    Plan:Will cont to monitor.  8/30/2022  Roula Alonzo RT

## 2022-08-30 NOTE — PROGRESS NOTES
08/30/22 1025   General Information   Onset of Illness/Injury or Date of Surgery 08/24/22   Patient/Family Therapy Goal Statement (SLP) Water!   Pertinent History of Current Problem 71F pmh of hyperparathyroidism, DI in setting of lithium toxicity, ckd, bipolar d/o, HTN now presented to the ED with abdominal pain and dyspnea, found on imaging to have pneumoperitoneum.  Taken to OR, found to have perf'ed gastric ulcer which was repaired by Jean Paul patch.   General Observations Pt alert, clear vocal quality; pt denied hx of dysphagia or any current throat pain. Strong cough and pt clearing thick secretions well.   Past History of Dysphagia No documented or reported history   Type of Evaluation   Type of Evaluation Swallow Evaluation   Oral Motor   Structural Abnormalities   (upper lip swollen/tender)   Mucosal Quality dry;sticky   Vocal Quality/Secretion Management (Oral Motor)   Vocal Quality (Oral Motor) hoarse   Secretion Management (Oral Motor) WNL   General Swallowing Observations   Respiratory Support (General Swallowing Observations) nasal cannula   Current Diet/Method of Nutritional Intake (General Swallowing Observations, NIS) clear liquid diet;thin liquids (level 0)   Swallowing Evaluation Clinical swallow evaluation   Clinical Swallow Evaluation   Feeding Assistance dependent   Clinical Swallow Evaluation Textures Trialed thin liquids   Clinical Swallow Eval: Thin Liquid Texture Trial   Mode of Presentation, Thin Liquids straw   Volume of Liquid or Food Presented ice chips x5 and 8oz thin water by straw   Oral Phase of Swallow WFL   Pharyngeal Phase of Swallow intact   Diagnostic Statement WFL; no overt s/sx of aspiration; clear liquids only per MD   Esophageal Phase of Swallow   Esophageal comments pt denied history   Swallowing Recommendations   Diet Consistency Recommendations thin liquids (level 0);clear liquid diet   Supervision Level for Intake 1:1 supervision needed   Mode of Delivery  Recommendations bolus size, small;slow rate of intake   Clinical Impression   Risks & Benefits of therapy have been explained evaluation/treatment results reviewed;patient;care plan/treatment goals reviewed;participants voiced agreement with care plan;participants included   Clinical Impression Comments No appreciable dysphagia on limited evaluation. Sips and ice chips per Surgery. No oral deficits, good timing and no overt s/sx of aspiration.   SLP Discharge Planning   SLP Discharge Recommendation home   SLP Rationale for DC Rec Expect pt to meet goals during hospitalization   SLP Brief overview of current status  No appreciable dysphagia with ice chips and thin water; continue clears per Surgery and ADAT; plan 1x follow up when diet advanced    Total Evaluation Time   Total Evaluation Time (Minutes) 10   SLP Goals   Therapy Frequency (SLP Eval) 3 times/wk   Psychosocial Support   Trust Relationship/Rapport care explained

## 2022-08-30 NOTE — PROGRESS NOTES
"General Surgery Progress Note    Admission Date: 8/24/2022  Today's Date: 8/30/2022         Assessment:      Diana Santiago is a 71 year old female POD 6 s/p exploratory laparotomy and repair of perforated gastric ulcer     Extubated this morning, overall improving. Creatinine decreasing, white count normalized, hemoglobin stable. Having BMs, denies pain. Low NG output.         Plan:   - Remove NG and initiate PO intake today. Start slowly with ice chips then sips of water. Will place SLP consult given recent intubation and higher aspiration risk. Would not advance beyond clear liquid diet today. Discussed with Dr. Vidal  - Continue DAVI drain, monitor quality of fluid as we start oral intake.   - Protonix BID, heparin for DVT ppx, Zosyn for intra-abdominal contamination (complete 7-10 days of antibiotics)  - Continue medical cares per primary team, anticipate transfer out of ICU soon        Interval History:   Afebrile, vital stable. Extubated this morning, now on 5lpm via oxymask. Diana overall feels well, she is very thirsty and requesting something to drink. We reviewed OR findings from last week and our plan for the next couple days. She denies any pain or nausea currently.           Physical Exam:   BP (!) 139/95   Pulse 87   Temp 97.1  F (36.2  C) (Axillary)   Resp 24   Ht 1.626 m (5' 4\")   Wt 58 kg (127 lb 13.9 oz)   SpO2 95%   BMI 21.95 kg/m    I/O last 3 completed shifts:  In: 2176.63 [I.V.:2176.63]  Out: 3475 [Urine:2330; Emesis/NG output:700; Drains:445]  General: NAD, awake and alert. Answers questions appropriately  Respiratory: non-labored breathing, oxymask in place  Abdomen: somewhat distended but soft and minimally tender to palpation. DAVI drain in place with serous output  Incision: clean, dry, and intact with staples. No erythema or drainage    LABS:  Recent Labs   Lab Test 08/30/22  0422 08/29/22  0411 08/28/22  0414   WBC 8.8 7.8 8.5   HGB 10.4* 10.5* 8.8*   MCV 73* 73* 71*    199 184 "      Recent Labs   Lab Test 08/30/22 0422 08/29/22 0411 08/28/22 0414   POTASSIUM 3.4 4.2 3.4   CHLORIDE 118* 116* 113*   CO2 23 24 25   BUN 53* 63* 60*   CR 2.94* 3.13* 3.23*   ANIONGAP 8 10 7      Recent Labs   Lab Test 08/30/22 0422 08/29/22 0411 08/28/22 0414   ALBUMIN 1.5* 1.7* 1.6*   BILITOTAL 0.7 0.8 0.8   * 226* 295*   AST 26 59* 114*   ALKPHOS 219* 239* 185*            -------------------------------    Sylvia Harrington PA-C  [facility]  [phone]

## 2022-08-30 NOTE — PLAN OF CARE
Care provided from [id number]    No significant changes. Patient follows commands intermittently. Remains on full vent support. DAVI draining.

## 2022-08-30 NOTE — PLAN OF CARE
Neuro: intact, A&O. Weak. PERRL  CV: SR, BP stable  Resp: extubated at 0745, on room air now   GI/: grove removed. multiple BMs.  Skin: femoral CVC removed.   Pain/Gtts: denies pain. DAVI drain intact.   Family: son updated.  POC:  Tolerating clear liquid diet.

## 2022-08-30 NOTE — PROGRESS NOTES
Intensivist addendum:  Pt passed sbt and extubated.  Cleared by surgery for clear diet today pending SLP evaluation.  Restarting home lamictal pending ability to take PO.

## 2022-08-30 NOTE — PROGRESS NOTES
Assessment and Plan:   CKD-4: Hx of Li+. Baseline Cr 2.0. Hx pf DI.   REI: likely ATN associated with sepsis.    I/O 1885/1925, Wt up about 4 kg since adm.   On D5W at about 85 ml/h.   Na 149, K 3.4, HCO3 23. Cr:  3.23 > 3.13 > 2.94.   Alb 1.5. LFT improving.     Now taking po. Cont IVF one more day.   Monitor labs.               Interval History:   Perforated ulcer with surgical repair. On zosyn.   Anemia  Hypotension:  Off pressors.       Resp failure: now extubated.             Review of Systems:   Starting to take po. No pain or SOB.           Medications:       acetylcysteine  2 mL Nebulization 4x Daily     albuterol  2.5 mg Nebulization 4x Daily     chlorhexidine  15 mL Mouth/Throat Q12H     heparin ANTICOAGULANT  5,000 Units Subcutaneous Q12H     insulin aspart  1-12 Units Subcutaneous Q4H     lamoTRIgine  100 mg Oral Daily     pantoprazole  40 mg Intravenous BID AC     piperacillin-tazobactam  2.25 g Intravenous Q6H     sodium chloride (PF)  3 mL Intracatheter Q8H       D5W 75 mL/hr at 08/30/22 0751     D5W 10 mL/hr at 08/29/22 1217     fentaNYL Stopped (08/29/22 1025)     propofol Stopped (08/28/22 1215)    And     - MEDICATION INSTRUCTIONS -       norepinephrine Stopped (08/27/22 2240)     Current active medications and PTA medications reviewed, see medication list for details.            Physical Exam:   Vitals were reviewed  Patient Vitals for the past 24 hrs:   BP Temp Temp src Pulse Resp SpO2 Weight   08/30/22 1109 -- -- -- -- -- 98 % --   08/30/22 1100 128/88 -- -- 73 16 94 % --   08/30/22 1000 124/87 -- -- 80 17 95 % --   08/30/22 0900 118/86 -- -- 86 21 97 % --   08/30/22 0803 -- -- -- -- -- 95 % --   08/30/22 0800 (!) 139/95 97.1  F (36.2  C) Axillary 87 24 93 % --   08/30/22 0743 -- -- -- -- -- 94 % --   08/30/22 0725 -- -- -- -- -- 94 % --   08/30/22 0600 (!) 140/90 -- -- 77 16 94 % --   08/30/22 0500 121/82 -- -- 66 16 94 % --   08/30/22 0400 126/81 97.2  F (36.2  C) Axillary 62 16 95 %  58 kg (127 lb 13.9 oz)   22 0300 121/84 -- -- 64 16 94 % --   22 0200 (!) 129/91 -- -- 68 16 96 % --   22 0100 126/82 -- -- 61 16 96 % --   22 0000 (!) 135/91 98.7  F (37.1  C) Axillary 66 16 98 % --   22 2100 (!) 143/94 -- -- 68 13 98 % --   22 2000 (!) 147/93 98.1  F (36.7  C) Axillary 61 (!) 7 99 % --   22 1946 -- -- -- -- -- 97 % --   22 1900 114/81 -- -- 64 16 94 % --   22 1800 (!) 138/93 -- -- 65 9 96 % --   22 1700 117/81 -- -- 67 16 92 % --   22 1600 119/82 97.1  F (36.2  C) Axillary 63 8 96 % --   22 1500 124/88 -- -- 70 9 94 % --   22 1400 (!) 131/97 -- -- 78 10 95 % --   22 1300 (!) 126/91 -- -- 69 10 94 % --   22 1200 (!) 136/93 98.8  F (37.1  C) Axillary 70 8 97 % --       Temp:  [97.1  F (36.2  C)-98.8  F (37.1  C)] 97.1  F (36.2  C)  Pulse:  [61-87] 73  Resp:  [7-24] 16  BP: (114-147)/(81-97) 128/88  FiO2 (%):  [40 %] 40 %  SpO2:  [92 %-99 %] 98 %    Temperatures:  Current - Temp: 97.1  F (36.2  C); Max - Temp  Av.8  F (36.6  C)  Min: 97.1  F (36.2  C)  Max: 98.8  F (37.1  C)  Respiration range: Resp  Av.1  Min: 7  Max: 24  Pulse range: Pulse  Av.8  Min: 61  Max: 87  Blood pressure range: Systolic (24hrs), Av , Min:114 , Max:147   ; Diastolic (24hrs), Av, Min:81, Max:97    Pulse oximetry range: SpO2  Av.3 %  Min: 92 %  Max: 99 %    I/O last 3 completed shifts:  In: 2176.63 [I.V.:2176.63]  Out: 3475 [Urine:2330; Emesis/NG output:700; Drains:445]      Intake/Output Summary (Last 24 hours) at 2022 1127  Last data filed at 2022 1000  Gross per 24 hour   Intake 2334.63 ml   Output 3350 ml   Net -1015.37 ml       Alert and responsive  Lungs with clear BS  Cor RRR nl S1 S2 no M  LE no edema         Wt Readings from Last 4 Encounters:   22 58 kg (127 lb 13.9 oz)   22 55.3 kg (122 lb)   22 59.9 kg (132 lb)   22 64 kg (141 lb)          Data:          Lab  Results   Component Value Date     08/30/2022     08/29/2022     08/28/2022     12/21/2020     10/15/2019     10/23/2018    Lab Results   Component Value Date    CHLORIDE 118 08/30/2022    CHLORIDE 116 08/29/2022    CHLORIDE 113 08/28/2022    CHLORIDE 114 12/21/2020    CHLORIDE 115 10/15/2019    CHLORIDE 110 10/23/2018    Lab Results   Component Value Date    BUN 53 08/30/2022    BUN 63 08/29/2022    BUN 60 08/28/2022    BUN 34 12/21/2020    BUN 31 10/15/2019    BUN 29 10/23/2018      Lab Results   Component Value Date    POTASSIUM 3.4 08/30/2022    POTASSIUM 4.2 08/29/2022    POTASSIUM 3.4 08/28/2022    POTASSIUM 3.9 12/21/2020    POTASSIUM 4.0 10/15/2019    POTASSIUM 3.8 10/23/2018    Lab Results   Component Value Date    CO2 23 08/30/2022    CO2 24 08/29/2022    CO2 25 08/28/2022    CO2 19 12/21/2020    CO2 19 10/15/2019    CO2 24 10/23/2018    Lab Results   Component Value Date    CR 2.94 08/30/2022    CR 3.13 08/29/2022    CR 3.23 08/28/2022    CR 1.41 03/13/2021    CR 1.64 01/05/2021    CR 1.80 12/21/2020        Recent Labs   Lab Test 08/30/22  0422 08/29/22  0411 08/28/22  0414   WBC 8.8 7.8 8.5   HGB 10.4* 10.5* 8.8*   HCT 33.1* 33.8* 27.3*   MCV 73* 73* 71*    199 184     Recent Labs   Lab Test 08/30/22  0422 08/29/22  0411 08/28/22  0414   AST 26 59* 114*   * 226* 295*   ALKPHOS 219* 239* 185*   BILITOTAL 0.7 0.8 0.8       Recent Labs   Lab Test 08/30/22  0422 08/29/22  0411 08/28/22  0414   MAG 2.1 2.4* 2.3     Recent Labs   Lab Test 08/30/22  0422 08/29/22  0411 08/28/22  0414   PHOS 3.8 4.9* 4.1     Recent Labs   Lab Test 08/30/22  0422 08/29/22  0411 08/28/22  0414   ISSA 8.5 8.4* 8.2*       Lab Results   Component Value Date    ISSA 8.5 08/30/2022     Lab Results   Component Value Date    WBC 8.8 08/30/2022    HGB 10.4 (L) 08/30/2022    HCT 33.1 (L) 08/30/2022    MCV 73 (L) 08/30/2022     08/30/2022     Lab Results   Component Value Date      (H) 08/30/2022    POTASSIUM 3.4 08/30/2022    CHLORIDE 118 (H) 08/30/2022    CO2 23 08/30/2022     (H) 08/30/2022     Lab Results   Component Value Date    BUN 53 (H) 08/30/2022    CR 2.94 (H) 08/30/2022     Lab Results   Component Value Date    MAG 2.1 08/30/2022     Lab Results   Component Value Date    PHOS 3.8 08/30/2022       Creatinine   Date Value Ref Range Status   08/30/2022 2.94 (H) 0.52 - 1.04 mg/dL Final   08/29/2022 3.13 (H) 0.52 - 1.04 mg/dL Final   08/28/2022 3.23 (H) 0.52 - 1.04 mg/dL Final   08/27/2022 3.44 (H) 0.52 - 1.04 mg/dL Final   08/26/2022 3.50 (H) 0.52 - 1.04 mg/dL Final   08/25/2022 3.11 (H) 0.52 - 1.04 mg/dL Final   03/13/2021 1.41 (H) 0.52 - 1.04 mg/dL Final   01/05/2021 1.64 (H) 0.52 - 1.04 mg/dL Final   12/21/2020 1.80 (H) 0.52 - 1.04 mg/dL Final   10/15/2019 1.16 (H) 0.52 - 1.04 mg/dL Final   10/23/2018 1.12 (H) 0.52 - 1.04 mg/dL Final   08/03/2018 1.15 (H) 0.52 - 1.04 mg/dL Final       Attestation:  I have reviewed today's vital signs, notes, medications, labs and imaging.     Cesar Chaudhary MD

## 2022-08-31 ENCOUNTER — APPOINTMENT (OUTPATIENT)
Dept: PHYSICAL THERAPY | Facility: CLINIC | Age: 72
DRG: 853 | End: 2022-08-31
Attending: INTERNAL MEDICINE
Payer: MEDICARE

## 2022-08-31 ENCOUNTER — APPOINTMENT (OUTPATIENT)
Dept: OCCUPATIONAL THERAPY | Facility: CLINIC | Age: 72
DRG: 853 | End: 2022-08-31
Attending: INTERNAL MEDICINE
Payer: MEDICARE

## 2022-08-31 ENCOUNTER — APPOINTMENT (OUTPATIENT)
Dept: GENERAL RADIOLOGY | Facility: CLINIC | Age: 72
DRG: 853 | End: 2022-08-31
Attending: PHYSICIAN ASSISTANT
Payer: MEDICARE

## 2022-08-31 LAB
ALBUMIN SERPL-MCNC: 1.6 G/DL (ref 3.4–5)
ALP SERPL-CCNC: 242 U/L (ref 40–150)
ALT SERPL W P-5'-P-CCNC: 108 U/L (ref 0–50)
ANION GAP SERPL CALCULATED.3IONS-SCNC: 9 MMOL/L (ref 3–14)
AST SERPL W P-5'-P-CCNC: 26 U/L (ref 0–45)
BILIRUB SERPL-MCNC: 0.5 MG/DL (ref 0.2–1.3)
BUN SERPL-MCNC: 47 MG/DL (ref 7–30)
CALCIUM SERPL-MCNC: 8.6 MG/DL (ref 8.5–10.1)
CHLORIDE BLD-SCNC: 116 MMOL/L (ref 94–109)
CO2 SERPL-SCNC: 22 MMOL/L (ref 20–32)
CREAT SERPL-MCNC: 2.55 MG/DL (ref 0.52–1.04)
ERYTHROCYTE [DISTWIDTH] IN BLOOD BY AUTOMATED COUNT: 22.2 % (ref 10–15)
GFR SERPL CREATININE-BSD FRML MDRD: 19 ML/MIN/1.73M2
GLUCOSE BLD-MCNC: 121 MG/DL (ref 70–99)
GLUCOSE BLDC GLUCOMTR-MCNC: 114 MG/DL (ref 70–99)
GLUCOSE BLDC GLUCOMTR-MCNC: 128 MG/DL (ref 70–99)
GLUCOSE BLDC GLUCOMTR-MCNC: 128 MG/DL (ref 70–99)
GLUCOSE BLDC GLUCOMTR-MCNC: 132 MG/DL (ref 70–99)
GLUCOSE BLDC GLUCOMTR-MCNC: 136 MG/DL (ref 70–99)
HCT VFR BLD AUTO: 33.8 % (ref 35–47)
HGB BLD-MCNC: 10.8 G/DL (ref 11.7–15.7)
MAGNESIUM SERPL-MCNC: 2.1 MG/DL (ref 1.6–2.3)
MCH RBC QN AUTO: 23.1 PG (ref 26.5–33)
MCHC RBC AUTO-ENTMCNC: 32 G/DL (ref 31.5–36.5)
MCV RBC AUTO: 72 FL (ref 78–100)
PHOSPHATE SERPL-MCNC: 4 MG/DL (ref 2.5–4.5)
PLATELET # BLD AUTO: 249 10E3/UL (ref 150–450)
POTASSIUM BLD-SCNC: 3.6 MMOL/L (ref 3.4–5.3)
PROT SERPL-MCNC: 5.2 G/DL (ref 6.8–8.8)
RBC # BLD AUTO: 4.67 10E6/UL (ref 3.8–5.2)
SODIUM SERPL-SCNC: 147 MMOL/L (ref 133–144)
TRIGL SERPL-MCNC: 234 MG/DL
WBC # BLD AUTO: 9.2 10E3/UL (ref 4–11)

## 2022-08-31 PROCEDURE — 120N000001 HC R&B MED SURG/OB

## 2022-08-31 PROCEDURE — 80053 COMPREHEN METABOLIC PANEL: CPT | Performed by: INTERNAL MEDICINE

## 2022-08-31 PROCEDURE — 3E0436Z INTRODUCTION OF NUTRITIONAL SUBSTANCE INTO CENTRAL VEIN, PERCUTANEOUS APPROACH: ICD-10-PCS | Performed by: STUDENT IN AN ORGANIZED HEALTH CARE EDUCATION/TRAINING PROGRAM

## 2022-08-31 PROCEDURE — 36415 COLL VENOUS BLD VENIPUNCTURE: CPT | Performed by: INTERNAL MEDICINE

## 2022-08-31 PROCEDURE — 99233 SBSQ HOSP IP/OBS HIGH 50: CPT | Performed by: STUDENT IN AN ORGANIZED HEALTH CARE EDUCATION/TRAINING PROGRAM

## 2022-08-31 PROCEDURE — 97530 THERAPEUTIC ACTIVITIES: CPT | Mod: GP

## 2022-08-31 PROCEDURE — 250N000011 HC RX IP 250 OP 636: Performed by: INTERNAL MEDICINE

## 2022-08-31 PROCEDURE — 250N000009 HC RX 250: Performed by: STUDENT IN AN ORGANIZED HEALTH CARE EDUCATION/TRAINING PROGRAM

## 2022-08-31 PROCEDURE — 83735 ASSAY OF MAGNESIUM: CPT | Performed by: INTERNAL MEDICINE

## 2022-08-31 PROCEDURE — 255N000002 HC RX 255 OP 636: Performed by: INTERNAL MEDICINE

## 2022-08-31 PROCEDURE — 258N000003 HC RX IP 258 OP 636: Performed by: INTERNAL MEDICINE

## 2022-08-31 PROCEDURE — 272N000451 HC KIT SHRLOCK 5FR POWER PICC DOUBLE LUMEN

## 2022-08-31 PROCEDURE — 99232 SBSQ HOSP IP/OBS MODERATE 35: CPT | Performed by: INTERNAL MEDICINE

## 2022-08-31 PROCEDURE — 250N000011 HC RX IP 250 OP 636

## 2022-08-31 PROCEDURE — 85027 COMPLETE CBC AUTOMATED: CPT | Performed by: INTERNAL MEDICINE

## 2022-08-31 PROCEDURE — 94640 AIRWAY INHALATION TREATMENT: CPT | Mod: 76

## 2022-08-31 PROCEDURE — 999N000157 HC STATISTIC RCP TIME EA 10 MIN

## 2022-08-31 PROCEDURE — 84100 ASSAY OF PHOSPHORUS: CPT | Performed by: INTERNAL MEDICINE

## 2022-08-31 PROCEDURE — 97530 THERAPEUTIC ACTIVITIES: CPT | Mod: GO | Performed by: OCCUPATIONAL THERAPIST

## 2022-08-31 PROCEDURE — 84478 ASSAY OF TRIGLYCERIDES: CPT | Performed by: STUDENT IN AN ORGANIZED HEALTH CARE EDUCATION/TRAINING PROGRAM

## 2022-08-31 PROCEDURE — 36569 INSJ PICC 5 YR+ W/O IMAGING: CPT

## 2022-08-31 PROCEDURE — C9113 INJ PANTOPRAZOLE SODIUM, VIA: HCPCS | Performed by: INTERNAL MEDICINE

## 2022-08-31 PROCEDURE — 250N000011 HC RX IP 250 OP 636: Performed by: PHYSICIAN ASSISTANT

## 2022-08-31 PROCEDURE — 74240 X-RAY XM UPR GI TRC 1CNTRST: CPT

## 2022-08-31 PROCEDURE — 250N000013 HC RX MED GY IP 250 OP 250 PS 637: Performed by: INTERNAL MEDICINE

## 2022-08-31 PROCEDURE — 97161 PT EVAL LOW COMPLEX 20 MIN: CPT | Mod: GP

## 2022-08-31 PROCEDURE — 250N000009 HC RX 250: Performed by: INTERNAL MEDICINE

## 2022-08-31 PROCEDURE — 94640 AIRWAY INHALATION TREATMENT: CPT

## 2022-08-31 PROCEDURE — 97166 OT EVAL MOD COMPLEX 45 MIN: CPT | Mod: GO | Performed by: OCCUPATIONAL THERAPIST

## 2022-08-31 RX ORDER — LIDOCAINE 40 MG/G
CREAM TOPICAL
Status: DISCONTINUED | OUTPATIENT
Start: 2022-08-31 | End: 2022-09-01

## 2022-08-31 RX ORDER — NITROGLYCERIN 0.4 MG/1
0.4 TABLET SUBLINGUAL EVERY 5 MIN PRN
Status: DISCONTINUED | OUTPATIENT
Start: 2022-08-31 | End: 2022-09-01

## 2022-08-31 RX ADMIN — ALBUTEROL SULFATE 2.5 MG: 2.5 SOLUTION RESPIRATORY (INHALATION) at 20:51

## 2022-08-31 RX ADMIN — PANTOPRAZOLE SODIUM 40 MG: 40 INJECTION, POWDER, FOR SOLUTION INTRAVENOUS at 08:45

## 2022-08-31 RX ADMIN — LAMOTRIGINE 100 MG: 100 TABLET ORAL at 15:09

## 2022-08-31 RX ADMIN — PIPERACILLIN SODIUM AND TAZOBACTAM SODIUM 2.25 G: 2; .25 INJECTION, POWDER, LYOPHILIZED, FOR SOLUTION INTRAVENOUS at 15:07

## 2022-08-31 RX ADMIN — DEXTROSE MONOHYDRATE: 50 INJECTION, SOLUTION INTRAVENOUS at 12:43

## 2022-08-31 RX ADMIN — ACETYLCYSTEINE 2 ML: 200 SOLUTION ORAL; RESPIRATORY (INHALATION) at 11:13

## 2022-08-31 RX ADMIN — I.V. FAT EMULSION 250 ML: 20 EMULSION INTRAVENOUS at 20:26

## 2022-08-31 RX ADMIN — IOHEXOL 100 ML: 240 INJECTION, SOLUTION INTRATHECAL; INTRAVASCULAR; INTRAVENOUS; ORAL at 14:49

## 2022-08-31 RX ADMIN — PIPERACILLIN SODIUM AND TAZOBACTAM SODIUM 2.25 G: 2; .25 INJECTION, POWDER, LYOPHILIZED, FOR SOLUTION INTRAVENOUS at 01:09

## 2022-08-31 RX ADMIN — PANTOPRAZOLE SODIUM 40 MG: 40 INJECTION, POWDER, FOR SOLUTION INTRAVENOUS at 20:36

## 2022-08-31 RX ADMIN — ALBUTEROL SULFATE 2.5 MG: 2.5 SOLUTION RESPIRATORY (INHALATION) at 08:25

## 2022-08-31 RX ADMIN — HEPARIN SODIUM 5000 UNITS: 5000 INJECTION, SOLUTION INTRAVENOUS; SUBCUTANEOUS at 13:06

## 2022-08-31 RX ADMIN — PIPERACILLIN SODIUM AND TAZOBACTAM SODIUM 2.25 G: 2; .25 INJECTION, POWDER, LYOPHILIZED, FOR SOLUTION INTRAVENOUS at 21:07

## 2022-08-31 RX ADMIN — HEPARIN SODIUM 5000 UNITS: 5000 INJECTION, SOLUTION INTRAVENOUS; SUBCUTANEOUS at 00:38

## 2022-08-31 RX ADMIN — ALBUTEROL SULFATE 2.5 MG: 2.5 SOLUTION RESPIRATORY (INHALATION) at 15:35

## 2022-08-31 RX ADMIN — LIDOCAINE HYDROCHLORIDE ANHYDROUS 0.5 ML: 10 INJECTION, SOLUTION INFILTRATION at 17:32

## 2022-08-31 RX ADMIN — ACETYLCYSTEINE 2 ML: 200 SOLUTION ORAL; RESPIRATORY (INHALATION) at 20:52

## 2022-08-31 RX ADMIN — ONDANSETRON 4 MG: 2 INJECTION INTRAMUSCULAR; INTRAVENOUS at 00:48

## 2022-08-31 RX ADMIN — ACETYLCYSTEINE 2 ML: 200 SOLUTION ORAL; RESPIRATORY (INHALATION) at 08:25

## 2022-08-31 RX ADMIN — ASCORBIC ACID, VITAMIN A PALMITATE, CHOLECALCIFEROL, THIAMINE HYDROCHLORIDE, RIBOFLAVIN-5 PHOSPHATE SODIUM, PYRIDOXINE HYDROCHLORIDE, NIACINAMIDE, DEXPANTHENOL, ALPHA-TOCOPHEROL ACETATE, VITAMIN K1, FOLIC ACID, BIOTIN, CYANOCOBALAMIN: 200; 3300; 200; 6; 3.6; 6; 40; 15; 10; 150; 600; 60; 5 INJECTION, SOLUTION INTRAVENOUS at 20:19

## 2022-08-31 RX ADMIN — ALBUTEROL SULFATE 2.5 MG: 2.5 SOLUTION RESPIRATORY (INHALATION) at 11:12

## 2022-08-31 RX ADMIN — ACETYLCYSTEINE 2 ML: 200 SOLUTION ORAL; RESPIRATORY (INHALATION) at 15:35

## 2022-08-31 RX ADMIN — PIPERACILLIN SODIUM AND TAZOBACTAM SODIUM 2.25 G: 2; .25 INJECTION, POWDER, LYOPHILIZED, FOR SOLUTION INTRAVENOUS at 08:45

## 2022-08-31 ASSESSMENT — ACTIVITIES OF DAILY LIVING (ADL)
ADLS_ACUITY_SCORE: 47
ADLS_ACUITY_SCORE: 43
ADLS_ACUITY_SCORE: 47
ADLS_ACUITY_SCORE: 43
ADLS_ACUITY_SCORE: 43
ADLS_ACUITY_SCORE: 47
ADLS_ACUITY_SCORE: 47
ADLS_ACUITY_SCORE: 43
ADLS_ACUITY_SCORE: 47
ADLS_ACUITY_SCORE: 43

## 2022-08-31 NOTE — PROGRESS NOTES
[facility]    Medicine Progress Note - Hospitalist Service    Date of Admission:  8/24/2022    Assessment & Plan          71 year old who presented with perforated gastric ulcer. S/p repair on 8/24/22. Initially intubated and on pressors. Transfer to floor on 8/31.     Perforated gastric ulcer  Septic shock, resolved  Acute respiratory failure requiring intubation, resolved  S/p repair of perforated gastric ulcer and peritoneal washout 8/24/22  * intubated on pressors initially. Off levophed 8/27 and extubated 8/30.  - consult zosyn  - appreciate surgery assistance   - advance diet per surgery  - PPI BID  - start TPN 8/31 due to poor PO    Sinus bradycardia  - no culprit meds  - tele for now  - monitor     Acute on chronic renal failure  ATN  - baseline creatinine about 2   - monitor IVF  - appreciate nephrology    HyperNatremia   h/o DI   - D5 at 75/hr  - give meds in D5W rather than NS as able    History of lithium toxicity      Hyperglycemia  - high SSI     Acute blood loss anemia  * several transfusions in ICU  - monitor    History bipolar/depression   - continue lamictal         Diet: Clear Liquid Diet    DVT Prophylaxis: Pneumatic Compression Devices  Suresh Catheter: Not present  Central Lines: None  Cardiac Monitoring: ACTIVE order. Indication: ICU  Code Status: Full Code      Disposition Plan      Expected Discharge Date: 09/02/2022                The patient's care was discussed with the Bedside Nurse, Patient, surgery Consultant and ICU Team.    Salvador Tay MD  Hospitalist Service  [facility]  Securely message with the Vocera Web Console (learn more here)  Text page via [employer] Paging/Directory         Clinically Significant Risk Factors Present on Admission                      ______________________________________________________________________    Interval History    Patient feeling well. Flatus and BMs in last 24 hours. Feels hungry. Did have  some emesis with clears yesterday. Now NPO for upper GI series per surgery. Likely will be slow advancement of nutrition. No f/c/r. No cp/sob. No new sores, rash, joint pain.     Data reviewed today: I reviewed all medications, new labs and imaging results over the last 24 hours. I personally reviewed no images or EKG's today.    Physical Exam   Vital Signs: Temp: 97.8  F (36.6  C) Temp src: Axillary BP: 120/83 Pulse: 71   Resp: 17 SpO2: 95 % O2 Device: None (Room air) Oxygen Delivery: 2 LPM  Weight: 130 lbs 1.14 oz  Constitutional: Awake, alert, cooperative, no apparent cardiopulmonary distress.  Eyes: Conjunctiva and pupils examined and normal.  HEENT: Moist mucous membranes, normal dentition.  Respiratory: Clear to auscultation bilaterally, no crackles or wheezing.  Cardiovascular: Regular rate and rhythm, normal S1 and S2, and no murmur noted.  GI: mild distension, mild ttp. Incisions cdi. DAVI in place.   Lymph/Hematologic: No anterior cervical or supraclavicular adenopathy.  Skin: No rashes, no cyanosis, no edema noted on exposed skin.  Musculoskeletal: No joint swelling, erythema or tenderness. No gross bony abnormalities  Neurologic: Cranial nerves 2-12 grossly intact, normal strength and sensation.  Psychiatric: Alert, oriented to person, place and time, no obvious anxiety or depression.      Data   Recent Labs   Lab 08/31/22  0536 08/31/22  0449 08/31/22  0037 08/30/22  0426 08/30/22  0422 08/29/22  0412 08/29/22  0411 08/24/22  1541 08/24/22  1311 08/24/22  1307   WBC 9.2  --   --   --  8.8  --  7.8   < >  --  3.6*   HGB 10.8*  --   --   --  10.4*  --  10.5*   < >  --  11.6*   MCV 72*  --   --   --  73*  --  73*   < >  --  71*     --   --   --  231  --  199   < >  --  546*   INR  --   --   --   --   --   --   --   --  1.13  --    *  --   --   --  149*  --  150*   < >  --  134   POTASSIUM 3.6  --   --   --  3.4  --  4.2   < >  --  6.2*   CHLORIDE 116*  --   --   --  118*  --  116*   < >  --  108    CO2 22  --   --   --  23  --  24   < >  --  7*   BUN 47*  --   --   --  53*  --  63*   < >  --  75*   CR 2.55*  --   --   --  2.94*  --  3.13*   < >  --  3.51*   ANIONGAP 9  --   --   --  8  --  10   < >  --  19*   ISSA 8.6  --   --   --  8.5  --  8.4*   < >  --  10.4*   * 128* 132*   < > 177*   < > 158*   < >  --  153*   ALBUMIN 1.6*  --   --   --  1.5*  --  1.7*   < >  --  2.4*   PROTTOTAL 5.2*  --   --   --  5.0*  --  5.3*   < >  --  6.6*   BILITOTAL 0.5  --   --   --  0.7  --  0.8   < >  --  0.9   ALKPHOS 242*  --   --   --  219*  --  239*   < >  --  88   *  --   --   --  137*  --  226*   < >  --  29   AST 26  --   --   --  26  --  59*   < >  --  24   LIPASE  --   --   --   --   --   --   --   --   --  511*    < > = values in this interval not displayed.     No results found for this or any previous visit (from the past 24 hour(s)).  Medications     D5W 75 mL/hr at 08/31/22 0938       acetylcysteine  2 mL Nebulization 4x Daily     albuterol  2.5 mg Nebulization 4x Daily     heparin ANTICOAGULANT  5,000 Units Subcutaneous Q12H     insulin aspart  1-12 Units Subcutaneous Q4H     lamoTRIgine  100 mg Oral Daily     pantoprazole  40 mg Intravenous BID AC     piperacillin-tazobactam  2.25 g Intravenous Q6H     sodium chloride (PF)  3 mL Intracatheter Q8H

## 2022-08-31 NOTE — PROCEDURES
[facility]    Double Lumen PICC Placement    Date/Time: 8/31/2022 6:18 PM  Performed by: Chandni Sal RN  Authorized by: Salvador Tay MD   Indications: vascular access      UNIVERSAL PROTOCOL   Site Marked: Yes  Prior Images Obtained and Reviewed:  Yes  Required items: Required blood products, implants, devices and special equipment available    Patient identity confirmed:  Verbally with patient and arm band  NA - No sedation, light sedation, or local anesthesia  Confirmation Checklist:  Patient's identity using two indicators, relevant allergies, procedure was appropriate and matched the consent or emergent situation and correct equipment/implants were available  Time out: Immediately prior to the procedure a time out was called    Universal Protocol: the Joint Commission Universal Protocol was followed    Preparation: Patient was prepped and draped in usual sterile fashion       ANESTHESIA    Anesthesia: Local infiltration  Local Anesthetic:  Lidocaine 1% without epinephrine  Anesthetic Total (mL):  0.5      SEDATION    Patient Sedated: No        Preparation: skin prepped with 2% chlorhexidine  Skin prep agent: skin prep agent completely dried prior to procedure  Sterile barriers: maximum sterile barriers were used: cap, mask, sterile gown, sterile gloves, and large sterile sheet  Hand hygiene: hand hygiene performed prior to central venous catheter insertion  Type of line used: Power PICC  Catheter type: double lumen  Lumen type: valved  Catheter size: 5 Fr  Brand: Bard  Lot number: [id number]  Placement method: MST, tip navigation system and ultrasound (3CG)  Number of attempts: 1  Difficulty threading catheter: yes (Comments below)  Successful placement: yes  Orientation: right    Location: brachial vein (lateral)  Arm circumference: adults 10 cm  Extremity circumference: 22  Visible catheter length: 2  Internal length: 38 cm  Total catheter length: 40  Dressing and  securement: alcohol impregnated caps, chlorhexidine disc applied, sterile dressing applied and securement device  Post procedure assessment: blood return through all ports and placement verified by 3CG technology  PROCEDURE   Patient Tolerance:  Patient tolerated the procedure well with no immediate complicationsDescribe Procedure: This VAT RN with difficulty threading PICC through lateral brachial vein.  Other VAT RN applies other technique with multiple attempts at threading, threading successful.  Patient tolerates procedure well.

## 2022-08-31 NOTE — PLAN OF CARE
Goal Outcome Evaluation:    Plan of Care Reviewed With: patient     Overall Patient Progress: improving    Outcome Evaluation: TPN/Lipid orders written with plans to start tonight.    Dana Lee RD, LD, CNSC   Clinical Dietitian - [facility]

## 2022-08-31 NOTE — CONSULTS
"CLINICAL NUTRITION SERVICES  -  ASSESSMENT NOTE      Recommendations Ordered by Registered Dietitian (RD): Step #1:  D15 AA5 at 45 mL/hr +  Lipid 250 mL 20% 5x per week= 1124 kcal (20 kcal/kg), 54 g protein (1.0 g/kg), 162 g CHO  Decrease D5 IVF to 30 mL/hr= 36 g CHO, 122 kcal   Total = 1246 kcal (22 kcal/kg), 198 g CHO (GIR 2.5)    After 24 hours, increase to goal   Step #2:  D15 AA5 at 65 mL/hr + Lipid 250 mL 20% 5x per week= 1465 kcal (26 kcal/kg), 78 g protein (1.4 g/kg), 234 g CHO   Decrease D5 IVF to 10 mL/hr= 12 g CHO, 41 kcal   Total = 1506 kcal (27 kcal/kg), 246 g CHO (GIR 3.1)   Malnutrition: % Weight Loss:  > 10% in 6 months (severe malnutrition)  % Intake:  </= 50% for >/= 5 days (severe malnutrition)  Subcutaneous Fat Loss:  Orbital region moderate depletion, Upper arm region severe depletion and Thoracic region severe depletion  Muscle Loss:  Temporal region moderate depletion, Clavicle bone region moderate-severe depletion, Acromion bone region severe depletion, Dorsal hand region moderate depletion, Patellar region moderate-severe depletion and Posterior calf region severe depletion  Fluid Retention:  Mild as above     Malnutrition Diagnosis: Severe malnutrition  In Context of:  Acute illness or injury  Chronic illness or disease        REASON FOR ASSESSMENT  Diana Santiago is a 71 year old female seen by Registered Dietitian for Pharmacy/Nutrition to Start and Manage PN      NUTRITION HISTORY  - Information obtained from patient.  She notes that she typically eats pretty well at baseline and was eating OK prior to admit.  She does take Premier Protein at home -- tries for 1-2 per day.  Patient has had some weight loss but \"it just deepthi happened\" (doesn't necessarily think it's related to reduced intake).        CURRENT NUTRITION ORDERS  Diet Order:     NPO   Day #8 NPO/Clear Liquid     Current Intake/Tolerance:  N/A      NUTRITION FOCUSED PHYSICAL ASSESSMENT FOR DIAGNOSING MALNUTRITION)  Yes          " "     Observed:    Muscle wasting (refer to documentation in Malnutrition section) and Subcutaneous fat loss (refer to documentation in Malnutrition section)    Obtained from Chart/Interdisciplinary Team:  Edema 1-2+ (generalized, face, extremities)    ANTHROPOMETRICS  Height: 5' 4\"  Weight: 56 kg (123#)(8/28)  Body mass index is 21.1 kg/m   Weight Status:  Normal BMI  IBW: 54.5 kg   % IBW: 103%  Weight History:   Wt Readings from Last 10 Encounters:   08/31/22 59 kg (130 lb 1.1 oz)   07/17/22 55.3 kg (122 lb)   04/22/22 59.9 kg (132 lb)   03/21/22 64 kg (141 lb)   03/17/22 67.1 kg (148 lb)   03/08/22 65.9 kg (145 lb 3.2 oz)   03/04/22 65.8 kg (145 lb)   03/02/22 65.8 kg (145 lb)   03/02/22 66.2 kg (146 lb)   11/18/21 60.8 kg (134 lb)     Patient notes that weight has dropped a little over 20# in the last 5-6 months (14%)    LABS  K/Mg/Phos normal   Na 147 (H) - Currently with D5 IVF at 75 mL/hr    MEDICATIONS  D5 at 75 mL/hr= 90 g CHO, 306 kcal       ASSESSED NUTRITION NEEDS PER APPROVED PRACTICE GUIDELINES:    Dosing Weight 56 kg   Estimated Energy Needs: 8738-1361 kcals (25-30 Kcal/Kg)  Justification: maintenance  Estimated Protein Needs: 65-85 grams protein (1.2-1.5 g pro/Kg)  Justification: post-op and hypercatabolism with acute illness  Estimated Fluid Needs: 2332-9907 mL (1 mL/Kcal)  Justification: maintenance    MALNUTRITION:  % Weight Loss:  > 10% in 6 months (severe malnutrition)  % Intake:  </= 50% for >/= 5 days (severe malnutrition)  Subcutaneous Fat Loss:  Orbital region moderate depletion, Upper arm region severe depletion and Thoracic region severe depletion  Muscle Loss:  Temporal region moderate depletion, Clavicle bone region moderate-severe depletion, Acromion bone region severe depletion, Dorsal hand region moderate depletion, Patellar region moderate-severe depletion and Posterior calf region severe depletion  Fluid Retention:  Mild as above     Malnutrition Diagnosis: Severe malnutrition  In " Context of:  Acute illness or injury  Chronic illness or disease    NUTRITION DIAGNOSIS:  Inadequate protein-energy intake related to NPO with plan to start TPN tonight as evidenced by meeting 0% protein and 20% energy needs from Propofol       NUTRITION INTERVENTIONS  Recommendations / Nutrition Prescription  Step #1:  D15 AA5 at 45 mL/hr +  Lipid 250 mL 20% 5x per week= 1124 kcal (20 kcal/kg), 54 g protein (1.0 g/kg), 162 g CHO  Decrease D5 IVF to 30 mL/hr= 36 g CHO, 122 kcal   Total = 1246 kcal (22 kcal/kg), 198 g CHO (GIR 2.5)    After 24 hours, increase to goal   Step #2:  D15 AA5 at 65 mL/hr + Lipid 250 mL 20% 5x per week= 1465 kcal (26 kcal/kg), 78 g protein (1.4 g/kg), 234 g CHO   Decrease D5 IVF to 10 mL/hr= 12 g CHO, 41 kcal   Total = 1506 kcal (27 kcal/kg), 246 g CHO (GIR 3.1)    Implementation  Nutrition education: Not appropriate at this time due to patient condition  PN Composition, PN Schedule:  Ordered as above   Collaboration and Referral of Nutrition care:  Patient discussed today during interdisciplinary bedside rounds    Nutrition Goals  TPN/Lipid will meet % needs while NPO     MONITORING AND EVALUATION:  Progress towards goals will be monitored and evaluated per protocol and Practice Guidelines    Dana Lee RD, YASSINE, [facility]   Clinical Dietitian - [facility]     Addendum --> Patient does not have a central line for TPN (confirmed this with bedside RN) - web paged Dr. Tay for orders for central line placement    Dana Lee RD, YASSINE, [facility]   Clinical Dietitian - [facility]

## 2022-08-31 NOTE — PROGRESS NOTES
08/31/22 1000   Quick Adds   Type of Visit Initial PT Evaluation   Living Environment   People in Home alone   Current Living Arrangements apartment   Home Accessibility no concerns   Transportation Anticipated family or friend will provide   Self-Care   Usual Activity Tolerance moderate   Current Activity Tolerance fair   Regular Exercise No   Equipment Currently Used at Home walker, rolling;commode chair   Fall history within last six months yes   Number of times patient has fallen within last six months 3   Activity/Exercise/Self-Care Comment Pt reports increased difficulty caring for herself during past several months.   General Information   Onset of Illness/Injury or Date of Surgery 08/24/22   Referring Physician Dr. Matias   Patient/Family Therapy Goals Statement (PT) To walk   Pertinent History of Current Problem (include personal factors and/or comorbidities that impact the POC) Pt is a 71 year old female POD #7 ex-lap   Existing Precautions/Restrictions fall   Cognition   Affect/Mental Status (Cognition) WFL   Orientation Status (Cognition) oriented x 4   Pain Assessment   Patient Currently in Pain No   Range of Motion (ROM)   ROM Comment Limited ROM bilateral shoulders   Strength (Manual Muscle Testing)   Strength Comments Right ankle 1/5, all other LE strength grossly 3+/5   Bed Mobility   Comment, (Bed Mobility) Mod A   Transfers   Comment, (Transfers) Mod A   Gait/Stairs (Locomotion)   Comment, (Gait/Stairs) NT   Balance   Balance Comments Fair in sitting, poor in standing   Clinical Impression   Criteria for Skilled Therapeutic Intervention Yes, treatment indicated   PT Diagnosis (PT) Impaired ambulation   Influenced by the following impairments Decreased strength, decreased endurance, decreased balance   Functional limitations due to impairments Difficulty with bed mobility, transfers, ambulation   Clinical Presentation (PT Evaluation Complexity) Stable/Uncomplicated   Clinical Presentation  Rationale VSS, pain controlled   Clinical Decision Making (Complexity) low complexity   Planned Therapy Interventions (PT) balance training;bed mobility training;gait training;strengthening;patient/family education;transfer training   Risk & Benefits of therapy have been explained evaluation/treatment results reviewed;care plan/treatment goals reviewed;risks/benefits reviewed;current/potential barriers reviewed;participants voiced agreement with care plan;participants included;patient   PT Discharge Planning   PT Discharge Recommendation (DC Rec) Transitional Care Facility   PT Rationale for DC Rec At baseline, pt lives alone in an apartment, uses 4ww for ambulation. This date, pt is well below baseline and would be a considerable fall risk if she returned to home environment. Pt currently requires assist of 1-2 with all mobility, is unable to ambulate and has impaired activity tolerance. Pt will benefit from continued therapy at [facility] to address impairments and increase mobility and functional independence prior to returning home.   Total Evaluation Time   Total Evaluation Time (Minutes) 10   Physical Therapy Goals   PT Frequency 5x/week   PT Predicted Duration/Target Date for Goal Attainment 09/07/22   PT Goals Bed Mobility;Transfers;Gait   PT: Bed Mobility Supervision/stand-by assist;Rolling;Supine to/from sit   PT: Transfers Minimal assist;Sit to/from stand;Bed to/from chair;Assistive device   PT: Gait Minimal assist;Rolling walker;50 feet

## 2022-08-31 NOTE — PROGRESS NOTES
[facility]    General Surgery  Chart Review Note    XR Upper GI complete. No radiologist interpretation yet, but I am able to see some of the images. Repaired perforation appears intact without leak and contrast passes through, but stomach and bowel appear quite dilated. Patient likely has somewhat of an ongoing ileus. She has been having loose stools. She is afebrile with minimal abdominal pain, I am not concerned for c diff at this time.    - Continue NPO for now, OK for some ice chips and sips of water tonight  - We will re-evaluate the patient tomorrow and consider trial of clear liquids depending on how she does tonight  - TPN being initiated  - Continue DAVI drain        BREEZY ArroyoC  [facility]  [phone]

## 2022-08-31 NOTE — PLAN OF CARE
BP softer when up in chair, otherwise VSS. Denies pain. DAVI in place, patent. Purewick in place with good UOP. Pt transferring to IMC unit with all belongings and medications. Report given to receiving RN, Dorothy. Plan for PICC placement ant TPN to start tonight. Waiting for esophageal XRay. Ice chips only until after imaging.

## 2022-08-31 NOTE — PLAN OF CARE
Neuro: Alert and oriented X4, forgetful, flat affect. Generalized weakness and deconditioning.   CV: NSR  Respiratory: Stable on room air overnight.  GI/: Voiding well via purewick. 2 loose incontinent stools over the night.   Skin: Midline abdominal incision and DAVI drain. No other concerns.  Activity: Bedrest  Diet: Clear liquid diet. One large episode of emesis around 0100. Zofran effective in relieving nausea.   Drips: None  Plan: Possible transfer out of ICU?

## 2022-08-31 NOTE — PROGRESS NOTES
"General Surgery Progress Note    Admission Date: 8/24/2022  Today's Date: 8/31/2022         Assessment:      Diana Santiago is a 71 year old female POD 7 s/p exploratory laparotomy and repair of perforated gastric ulcer     - Extubated 8/30  - White count remains normal, creatinine continues to improve, hgb stable  - Emesis x 1 overnight after trial of clear liquids. Having BMs         Plan:   - Obtain upper GI this morning given nausea with emesis overnight  - NPO for imaging study, OK for ice chips. I will follow-up on the result, hopefully resume clear liquids today if UGI is within normal limits  - SLP consult complete, no dysphagia / diet limitations from their standpoint. They will see patient again once diet advanced  - Continue DAVI drain, do not remove. Monitor fluid. Output decreasing (patient previously diuresing)  - Protonix BID, heparin for DVT ppx, continue Zosyn for intra-abdominal contamination  - Medical cares per primary team, possible transfer out of ICU today         Interval History:   Afebrile, VSS on room air. Having loose stools. Took in some clear liquids yesterday and developed nausea, vomited x 1 overnight. Now feels better, no nausea. Denies abdominal pain. DAVI drain output decreasing.            Physical Exam:   /83   Pulse 71   Temp 97.8  F (36.6  C) (Axillary)   Resp 17   Ht 1.626 m (5' 4\")   Wt 59 kg (130 lb 1.1 oz)   SpO2 95%   BMI 22.33 kg/m    I/O last 3 completed shifts:  In: 2120 [P.O.:880; I.V.:1240]  Out: 2260 [Urine:2075; Drains:185]  General: NAD, pleasant, alert and awake. Receiving neb treatment during my visit. Answers questions appropriately  Abdomen: slightly distended but soft with only mild tenderness to palpation. DAVI drain in place with serous fluid in tubing and bag  Incision: clean, dry, and intact with staples. No erythema or drainage    LABS:  Recent Labs   Lab Test 08/31/22  0536 08/30/22  0422 08/29/22  0411   WBC 9.2 8.8 7.8   HGB 10.8* 10.4* 10.5* "   MCV 72* 73* 73*    231 199      Recent Labs   Lab Test 08/31/22  0536 08/30/22  0422 08/29/22  0411   POTASSIUM 3.6 3.4 4.2   CHLORIDE 116* 118* 116*   CO2 22 23 24   BUN 47* 53* 63*   CR 2.55* 2.94* 3.13*   ANIONGAP 9 8 10      Recent Labs   Lab Test 08/31/22  0536 08/30/22  0422 08/29/22 0411   ALBUMIN 1.6* 1.5* 1.7*   BILITOTAL 0.5 0.7 0.8   * 137* 226*   AST 26 26 59*   ALKPHOS 242* 219* 239*     -------------------------------    Sylvia Harrington PA-C  [facility]  [phone]

## 2022-08-31 NOTE — PLAN OF CARE
Goal Outcome Evaluation:    Plan of Care Reviewed With: patient, sibling        Orientations: AOx4  Diet: NPO, possible clear liquids tomorrow due to possible ileus   Vitals/Pain: Vitally stable, pt has tenderness at PICC line site   Tele: Sinus rhythm  Lines/Drains: Two left PIV, IV fluids at 100 mL/hr, DAVI drain in upper right quadrant, PICC line being placed   Skin/Wounds: Midline incision on abdomen, edema right hand, left hand weakness, right foot droop  GI/: Pure wick, incontinent of stool  Labs: Monitoring blood glucose Q4, increased albumin levels TPN to start tonight   Ambulation/Assist: Assist of 2  Plan: Improving nutritional status and encouraging physical therapy

## 2022-08-31 NOTE — PROGRESS NOTES
08/31/22 1516   Quick Adds   Type of Visit Initial Occupational Therapy Evaluation   Living Environment   People in Home alone   Current Living Arrangements apartment   Home Accessibility no concerns  (4th floor, elevator in building)   Transportation Anticipated family or friend will provide   Self-Care   Usual Activity Tolerance moderate   Current Activity Tolerance fair   Regular Exercise No   Equipment Currently Used at Home walker, rolling;commode chair;other (see comments)  (transfer wheelchair)   Fall history within last six months yes   Number of times patient has fallen within last six months 3   Activity/Exercise/Self-Care Comment pt has had increased weakness with more difficulaty caring for herself in the last few months.  Brother has been helping out more and more   General Information   Onset of Illness/Injury or Date of Surgery 08/31/22   Referring Physician Jose Corona   Patient/Family Therapy Goal Statement (OT) to return home, get stronger and recover from right shoulder and ankle injuries   Additional Occupational Profile Info/Pertinent History of Current Problem Pt is a 71 year old female admitted from home with weakness and falls, increased debility.  Also underwent laparoscopy and repair of a perforated gastric ulcer.  Has a significant abdominal incision.  Pt has a sprained riight ankle and sore right shoulder resulting from her falls.  Has right foot drop  and has an AFO to use on that ankle   Existing Precautions/Restrictions fall;abdominal;brace worn when out of bed  (AFO right ankle)   General Observations and Info pt in bed, just back from Xray.  Brother present during session, yonatan san background   Cognitive Status Examination   Orientation Status orientation to person, place and time   Cognitive Status Comments pt appears motivated, has supportive brother   Visual Perception   Visual Impairment/Limitations corrective lenses full-time   Pain Assessment   Patient Currently in  Pain Yes, see Vital Sign flowsheet   Range of Motion Comprehensive   General Range of Motion no range of motion deficits identified   Comment, General Range of Motion Pt has limited AROM of right shoulder.  Close to full range with AAROM, complaining of right shoulder pain with movement   Strength Comprehensive (MMT)   General Manual Muscle Testing (MMT) Assessment upper extremity strength deficits identified   Comment, General Manual Muscle Testing (MMT) Assessment pt very weak.  Biceps strength the strongest in both hands.  Gross grasp very weak in both hands   Coordination   Upper Extremity Coordination Left UE impaired   Coordination Comments pt can move fingers but left hand is somewhat closed.  Pt states she is right handed   Bed Mobility   Bed Mobility rolling right;rolling left   Rolling Left [place] (Bed Mobility) dependent (less than 25% patient effort)   Rolling Right [place] (Bed Mobility) dependent (less than 25% patient effort)   Comment (Bed Mobility) pt barely able to initiate roll goint either direction.  Has had prolonged bed rest   Clinical Impression   Criteria for Skilled Therapeutic Interventions Met (OT) Yes, treatment indicated   OT Diagnosis decresed independence and endurance for ADLS   OT Problem List-Impairments impacting ADL problems related to;balance;activity tolerance impaired;range of motion (ROM);strength;pain;post-surgical precautions   Assessment of Occupational Performance 5 or more Performance Deficits   Identified Performance Deficits decreased independence in dressing, bathing, functional mobility, household chores, leisure activities   Planned Therapy Interventions (OT) ADL retraining;ROM;strengthening;home program guidelines;progressive activity/exercise   Clinical Decision Making Complexity (OT) moderate complexity   Anticipated Equipment Needs Upon Discharge (OT) dressing equipment;raised toilet seat   Risk & Benefits of therapy have been explained  evaluation/treatment results reviewed;care plan/treatment goals reviewed;patient;sibling   OT Discharge Planning   OT Discharge Recommendation (DC Rec) Transitional Care Facility   OT Rationale for DC Rec Pt is currently extremely weak and significantly below her usuaal baseline.  Is too weak to be able to return home at this time.  Would benefit from skilled OT heres and at TCU to regain endurance and independence for her daily routine   OT Brief overview of current status Mod/Max A for bed mobility, TB dressing, bathroom transfers   Total Evaluation Time (Minutes)   Total Evaluation Time (Minutes) 15

## 2022-09-01 ENCOUNTER — APPOINTMENT (OUTPATIENT)
Dept: PHYSICAL THERAPY | Facility: CLINIC | Age: 72
DRG: 853 | End: 2022-09-01
Payer: MEDICARE

## 2022-09-01 ENCOUNTER — APPOINTMENT (OUTPATIENT)
Dept: OCCUPATIONAL THERAPY | Facility: CLINIC | Age: 72
DRG: 853 | End: 2022-09-01
Payer: MEDICARE

## 2022-09-01 LAB
ALBUMIN SERPL-MCNC: 1.7 G/DL (ref 3.4–5)
ALP SERPL-CCNC: 237 U/L (ref 40–150)
ALT SERPL W P-5'-P-CCNC: 84 U/L (ref 0–50)
ANION GAP SERPL CALCULATED.3IONS-SCNC: 8 MMOL/L (ref 3–14)
AST SERPL W P-5'-P-CCNC: 20 U/L (ref 0–45)
BILIRUB DIRECT SERPL-MCNC: 0.2 MG/DL (ref 0–0.2)
BILIRUB SERPL-MCNC: 0.3 MG/DL (ref 0.2–1.3)
BUN SERPL-MCNC: 45 MG/DL (ref 7–30)
CALCIUM SERPL-MCNC: 8.9 MG/DL (ref 8.5–10.1)
CHLORIDE BLD-SCNC: 119 MMOL/L (ref 94–109)
CO2 SERPL-SCNC: 23 MMOL/L (ref 20–32)
CREAT SERPL-MCNC: 2.44 MG/DL (ref 0.52–1.04)
ERYTHROCYTE [DISTWIDTH] IN BLOOD BY AUTOMATED COUNT: 22.7 % (ref 10–15)
GFR SERPL CREATININE-BSD FRML MDRD: 21 ML/MIN/1.73M2
GLUCOSE BLD-MCNC: 179 MG/DL (ref 70–99)
GLUCOSE BLDC GLUCOMTR-MCNC: 112 MG/DL (ref 70–99)
GLUCOSE BLDC GLUCOMTR-MCNC: 113 MG/DL (ref 70–99)
GLUCOSE BLDC GLUCOMTR-MCNC: 156 MG/DL (ref 70–99)
GLUCOSE BLDC GLUCOMTR-MCNC: 160 MG/DL (ref 70–99)
GLUCOSE BLDC GLUCOMTR-MCNC: 167 MG/DL (ref 70–99)
GLUCOSE BLDC GLUCOMTR-MCNC: 181 MG/DL (ref 70–99)
HCT VFR BLD AUTO: 33.8 % (ref 35–47)
HGB BLD-MCNC: 10.5 G/DL (ref 11.7–15.7)
INR PPP: 1.21 (ref 0.85–1.15)
MAGNESIUM SERPL-MCNC: 2.6 MG/DL (ref 1.6–2.3)
MCH RBC QN AUTO: 23.1 PG (ref 26.5–33)
MCHC RBC AUTO-ENTMCNC: 31.1 G/DL (ref 31.5–36.5)
MCV RBC AUTO: 74 FL (ref 78–100)
PHOSPHATE SERPL-MCNC: 4.2 MG/DL (ref 2.5–4.5)
PLATELET # BLD AUTO: 341 10E3/UL (ref 150–450)
POTASSIUM BLD-SCNC: 4.4 MMOL/L (ref 3.4–5.3)
PROT SERPL-MCNC: 5.7 G/DL (ref 6.8–8.8)
RBC # BLD AUTO: 4.55 10E6/UL (ref 3.8–5.2)
SODIUM SERPL-SCNC: 150 MMOL/L (ref 133–144)
WBC # BLD AUTO: 11.4 10E3/UL (ref 4–11)

## 2022-09-01 PROCEDURE — 250N000009 HC RX 250: Performed by: INTERNAL MEDICINE

## 2022-09-01 PROCEDURE — 94640 AIRWAY INHALATION TREATMENT: CPT | Mod: 76

## 2022-09-01 PROCEDURE — 999N000157 HC STATISTIC RCP TIME EA 10 MIN

## 2022-09-01 PROCEDURE — 82248 BILIRUBIN DIRECT: CPT | Performed by: STUDENT IN AN ORGANIZED HEALTH CARE EDUCATION/TRAINING PROGRAM

## 2022-09-01 PROCEDURE — 250N000009 HC RX 250: Performed by: STUDENT IN AN ORGANIZED HEALTH CARE EDUCATION/TRAINING PROGRAM

## 2022-09-01 PROCEDURE — 97116 GAIT TRAINING THERAPY: CPT | Mod: GP

## 2022-09-01 PROCEDURE — 97530 THERAPEUTIC ACTIVITIES: CPT | Mod: GO | Performed by: OCCUPATIONAL THERAPIST

## 2022-09-01 PROCEDURE — 85610 PROTHROMBIN TIME: CPT | Performed by: STUDENT IN AN ORGANIZED HEALTH CARE EDUCATION/TRAINING PROGRAM

## 2022-09-01 PROCEDURE — 250N000011 HC RX IP 250 OP 636: Performed by: INTERNAL MEDICINE

## 2022-09-01 PROCEDURE — 250N000011 HC RX IP 250 OP 636

## 2022-09-01 PROCEDURE — 120N000001 HC R&B MED SURG/OB

## 2022-09-01 PROCEDURE — 258N000003 HC RX IP 258 OP 636: Performed by: INTERNAL MEDICINE

## 2022-09-01 PROCEDURE — 85027 COMPLETE CBC AUTOMATED: CPT | Performed by: INTERNAL MEDICINE

## 2022-09-01 PROCEDURE — 94640 AIRWAY INHALATION TREATMENT: CPT

## 2022-09-01 PROCEDURE — 84100 ASSAY OF PHOSPHORUS: CPT | Performed by: INTERNAL MEDICINE

## 2022-09-01 PROCEDURE — 99232 SBSQ HOSP IP/OBS MODERATE 35: CPT | Performed by: INTERNAL MEDICINE

## 2022-09-01 PROCEDURE — 82947 ASSAY GLUCOSE BLOOD QUANT: CPT | Performed by: INTERNAL MEDICINE

## 2022-09-01 PROCEDURE — 99233 SBSQ HOSP IP/OBS HIGH 50: CPT | Performed by: INTERNAL MEDICINE

## 2022-09-01 PROCEDURE — 250N000013 HC RX MED GY IP 250 OP 250 PS 637: Performed by: INTERNAL MEDICINE

## 2022-09-01 PROCEDURE — 83735 ASSAY OF MAGNESIUM: CPT | Performed by: INTERNAL MEDICINE

## 2022-09-01 PROCEDURE — 84155 ASSAY OF PROTEIN SERUM: CPT | Performed by: INTERNAL MEDICINE

## 2022-09-01 PROCEDURE — 97530 THERAPEUTIC ACTIVITIES: CPT | Mod: GP

## 2022-09-01 PROCEDURE — C9113 INJ PANTOPRAZOLE SODIUM, VIA: HCPCS | Performed by: INTERNAL MEDICINE

## 2022-09-01 PROCEDURE — 999N000190 HC STATISTIC VAT ROUNDS

## 2022-09-01 RX ORDER — ACETYLCYSTEINE 200 MG/ML
2 SOLUTION ORAL; RESPIRATORY (INHALATION) 4 TIMES DAILY PRN
Status: DISCONTINUED | OUTPATIENT
Start: 2022-09-01 | End: 2022-09-12

## 2022-09-01 RX ORDER — DEXTROSE MONOHYDRATE 50 MG/ML
INJECTION, SOLUTION INTRAVENOUS CONTINUOUS
Status: ACTIVE | OUTPATIENT
Start: 2022-09-01 | End: 2022-09-01

## 2022-09-01 RX ADMIN — PIPERACILLIN SODIUM AND TAZOBACTAM SODIUM 2.25 G: 2; .25 INJECTION, POWDER, LYOPHILIZED, FOR SOLUTION INTRAVENOUS at 03:05

## 2022-09-01 RX ADMIN — MAGNESIUM SULFATE HEPTAHYDRATE: 500 INJECTION, SOLUTION INTRAMUSCULAR; INTRAVENOUS at 20:09

## 2022-09-01 RX ADMIN — HEPARIN SODIUM 5000 UNITS: 5000 INJECTION, SOLUTION INTRAVENOUS; SUBCUTANEOUS at 14:13

## 2022-09-01 RX ADMIN — ALBUTEROL SULFATE 2.5 MG: 2.5 SOLUTION RESPIRATORY (INHALATION) at 08:08

## 2022-09-01 RX ADMIN — ALBUTEROL SULFATE 2.5 MG: 2.5 SOLUTION RESPIRATORY (INHALATION) at 15:41

## 2022-09-01 RX ADMIN — PANTOPRAZOLE SODIUM 40 MG: 40 INJECTION, POWDER, FOR SOLUTION INTRAVENOUS at 20:34

## 2022-09-01 RX ADMIN — HEPARIN SODIUM 5000 UNITS: 5000 INJECTION, SOLUTION INTRAVENOUS; SUBCUTANEOUS at 01:41

## 2022-09-01 RX ADMIN — PANTOPRAZOLE SODIUM 40 MG: 40 INJECTION, POWDER, FOR SOLUTION INTRAVENOUS at 08:48

## 2022-09-01 RX ADMIN — DEXTROSE MONOHYDRATE: 50 INJECTION, SOLUTION INTRAVENOUS at 14:13

## 2022-09-01 RX ADMIN — PIPERACILLIN SODIUM AND TAZOBACTAM SODIUM 2.25 G: 2; .25 INJECTION, POWDER, LYOPHILIZED, FOR SOLUTION INTRAVENOUS at 20:22

## 2022-09-01 RX ADMIN — I.V. FAT EMULSION 250 ML: 20 EMULSION INTRAVENOUS at 20:17

## 2022-09-01 RX ADMIN — LAMOTRIGINE 100 MG: 100 TABLET ORAL at 08:48

## 2022-09-01 RX ADMIN — PIPERACILLIN SODIUM AND TAZOBACTAM SODIUM 2.25 G: 2; .25 INJECTION, POWDER, LYOPHILIZED, FOR SOLUTION INTRAVENOUS at 08:48

## 2022-09-01 RX ADMIN — ALBUTEROL SULFATE 2.5 MG: 2.5 SOLUTION RESPIRATORY (INHALATION) at 20:34

## 2022-09-01 RX ADMIN — ALBUTEROL SULFATE 2.5 MG: 2.5 SOLUTION RESPIRATORY (INHALATION) at 11:13

## 2022-09-01 RX ADMIN — PIPERACILLIN SODIUM AND TAZOBACTAM SODIUM 2.25 G: 2; .25 INJECTION, POWDER, LYOPHILIZED, FOR SOLUTION INTRAVENOUS at 14:13

## 2022-09-01 ASSESSMENT — ACTIVITIES OF DAILY LIVING (ADL)
ADLS_ACUITY_SCORE: 43
ADLS_ACUITY_SCORE: 47
ADLS_ACUITY_SCORE: 43
ADLS_ACUITY_SCORE: 47
ADLS_ACUITY_SCORE: 43

## 2022-09-01 NOTE — PHARMACY-CONSULT NOTE
TPN formula evaluated based on today s labs results.    The following changes have been made:  Protein: increased to 78 g/day.  Dextrose: increased to 234 g/day.   Sodium:  decreased  to 15 mEq/day  .  Potassium: decreased  to 20 mEq/day  .  Calcium:  decreased  to 3 mEq/day  .  Magnesium: decreased  to 3 mEq/day  .  Phosphorus: removed .  Chloride:Acetate ratio: changed to maximum acetate.    Pharmacy will continue to follow and adjust as appropriate.    Radha Salazar, GilbertD

## 2022-09-01 NOTE — PROVIDER NOTIFICATION
Provider notified via [facility]COM:     Pts started on TPN & lipids. BPs have started to elevate. Last /103. What do you recommend?    Provider:     To be expected, Continue to monitor.

## 2022-09-01 NOTE — PLAN OF CARE
Pt is A&Ox4, Ax2 w/ GB and walker, still mostly NPO but allowed to have sips of water and at max 2 oz of apple juice every couple hours. No jello/apple sauce/pudding even though Pt states the provider said she could. Has had edema in R arm over night and throughout the day, encouraged active ROM and elevation. Blood return noted in lumens of PICC. D5 running at 125mL/hr. Upper lip swelling. Staples in midline abd incision. Very large BM this AM- green and watery, running off sides of bed. Mepi placed on coccyx. DAVI drain emptied for 30mL.

## 2022-09-01 NOTE — PROGRESS NOTES
Assessment and Plan:   CKD-4: baseline Cr around 2.0.     Hypernatremia: Will give one liter of D5W. NPO. DI from Li+ treatment. Na: 147 > 150.     REI: Resolving.  K 4.4, HCO3 23, Cr 2.94 > 2.55 > 2.44. UO 1850 ml yest. Neg fluid balance.             Interval History:   S/P repair of perf gastric ulcer. Ileus post-op. Still NPO. On TPN. I discussed TPN formular with pharmacist.     Anemia: Hgb 10.5.     TPN: formula reviewed.                          Review of Systems:   NPO. No abd pain. No N or V.           Medications:       acetylcysteine  2 mL Nebulization 4x Daily     albuterol  2.5 mg Nebulization 4x Daily     heparin ANTICOAGULANT  5,000 Units Subcutaneous Q12H     insulin aspart  1-12 Units Subcutaneous Q4H     lamoTRIgine  100 mg Oral Daily     lipids  250 mL Intravenous Once per day on Mon Tue Wed Thu Fri     pantoprazole  40 mg Intravenous BID AC     piperacillin-tazobactam  2.25 g Intravenous Q6H     sodium chloride (PF)  10-40 mL Intracatheter Q7 Days     sodium chloride (PF)  3 mL Intracatheter Q8H     sodium chloride (PF)  3 mL Intracatheter Q8H       parenteral nutrition - Clinimix E 45 mL/hr at 08/31/22 2019     Current active medications and PTA medications reviewed, see medication list for details.            Physical Exam:   Vitals were reviewed  Patient Vitals for the past 24 hrs:   BP Temp Temp src Pulse Resp SpO2   09/01/22 0900 -- -- -- 88 10 98 %   09/01/22 0808 -- -- -- -- -- 97 %   09/01/22 0600 (!) 149/101 -- -- 82 18 96 %   09/01/22 0500 (!) 142/103 -- -- 76 12 96 %   09/01/22 0400 (!) 143/91 -- -- 77 13 95 %   09/01/22 0200 124/83 -- -- 83 18 93 %   09/01/22 0000 125/77 -- -- 85 15 95 %   08/31/22 2200 111/80 -- -- 96 17 91 %   08/31/22 2006 -- 98.8  F (37.1  C) Axillary -- -- --   08/31/22 2000 96/72 -- -- 90 20 94 %   08/31/22 1555 -- 98.2  F (36.8  C) Axillary -- -- --   08/31/22 1514 (!) 136/97 -- -- 85 -- 95 %   08/31/22 1342 -- 98  F (36.7  C) Axillary -- -- --    22 1312 -- -- -- 87 20 96 %       Temp:  [98  F (36.7  C)-98.8  F (37.1  C)] 98.8  F (37.1  C)  Pulse:  [76-96] 88  Resp:  [10-20] 10  BP: ()/() 149/101  SpO2:  [91 %-98 %] 98 %    Temperatures:  Current - Temp: 98.8  F (37.1  C); Max - Temp  Av.3  F (36.8  C)  Min: 98  F (36.7  C)  Max: 98.8  F (37.1  C)  Respiration range: Resp  Avg: 15.9  Min: 10  Max: 20  Pulse range: Pulse  Av.9  Min: 76  Max: 96  Blood pressure range: Systolic (24hrs), Av , Min:96 , Max:149   ; Diastolic (24hrs), Av, Min:72, Max:103    Pulse oximetry range: SpO2  Av.1 %  Min: 91 %  Max: 98 %    I/O last 3 completed shifts:  In: 795.91 [I.V.:795.91]  Out: 1750 [Urine:1650; Drains:100]      Intake/Output Summary (Last 24 hours) at 2022 1044  Last data filed at 2022 0643  Gross per 24 hour   Intake 632.08 ml   Output 1170 ml   Net -537.92 ml       Alert and responsive  Lungs with clear BS  Cor RRR nl S1 S2 no M  LE no edema       Wt Readings from Last 4 Encounters:   22 59 kg (130 lb 1.1 oz)   22 55.3 kg (122 lb)   22 59.9 kg (132 lb)   22 64 kg (141 lb)          Data:          Lab Results   Component Value Date     2022     2022     2022     2020     10/15/2019     10/23/2018    Lab Results   Component Value Date    CHLORIDE 119 2022    CHLORIDE 116 2022    CHLORIDE 118 2022    CHLORIDE 114 2020    CHLORIDE 115 10/15/2019    CHLORIDE 110 10/23/2018    Lab Results   Component Value Date    BUN 45 2022    BUN 47 2022    BUN 53 2022    BUN 34 2020    BUN 31 10/15/2019    BUN 29 10/23/2018      Lab Results   Component Value Date    POTASSIUM 4.4 2022    POTASSIUM 3.6 2022    POTASSIUM 3.4 2022    POTASSIUM 3.9 2020    POTASSIUM 4.0 10/15/2019    POTASSIUM 3.8 10/23/2018    Lab Results   Component Value Date    CO2 23 2022    CO2 22  08/31/2022    CO2 23 08/30/2022    CO2 19 12/21/2020    CO2 19 10/15/2019    CO2 24 10/23/2018    Lab Results   Component Value Date    CR 2.44 09/01/2022    CR 2.55 08/31/2022    CR 2.94 08/30/2022    CR 1.41 03/13/2021    CR 1.64 01/05/2021    CR 1.80 12/21/2020        Recent Labs   Lab Test 09/01/22  0628 08/31/22  0536 08/30/22  0422   WBC 11.4* 9.2 8.8   HGB 10.5* 10.8* 10.4*   HCT 33.8* 33.8* 33.1*   MCV 74* 72* 73*    249 231     Recent Labs   Lab Test 09/01/22 0628 08/31/22  0536 08/30/22  0422   AST 20 26 26   ALT 84* 108* 137*   ALKPHOS 237* 242* 219*   BILITOTAL 0.3 0.5 0.7       Recent Labs   Lab Test 09/01/22 0628 08/31/22  0536 08/30/22  0422   MAG 2.6* 2.1 2.1     Recent Labs   Lab Test 09/01/22  0628 08/31/22  0536 08/30/22  0422   PHOS 4.2 4.0 3.8     Recent Labs   Lab Test 09/01/22 0628 08/31/22  0536 08/30/22  0422   ISSA 8.9 8.6 8.5       Lab Results   Component Value Date    ISSA 8.9 09/01/2022     Lab Results   Component Value Date    WBC 11.4 (H) 09/01/2022    HGB 10.5 (L) 09/01/2022    HCT 33.8 (L) 09/01/2022    MCV 74 (L) 09/01/2022     09/01/2022     Lab Results   Component Value Date     (H) 09/01/2022    POTASSIUM 4.4 09/01/2022    CHLORIDE 119 (H) 09/01/2022    CO2 23 09/01/2022     (H) 09/01/2022     Lab Results   Component Value Date    BUN 45 (H) 09/01/2022    CR 2.44 (H) 09/01/2022     Lab Results   Component Value Date    MAG 2.6 (H) 09/01/2022     Lab Results   Component Value Date    PHOS 4.2 09/01/2022       Creatinine   Date Value Ref Range Status   09/01/2022 2.44 (H) 0.52 - 1.04 mg/dL Final   08/31/2022 2.55 (H) 0.52 - 1.04 mg/dL Final   08/30/2022 2.94 (H) 0.52 - 1.04 mg/dL Final   08/29/2022 3.13 (H) 0.52 - 1.04 mg/dL Final   08/28/2022 3.23 (H) 0.52 - 1.04 mg/dL Final   08/27/2022 3.44 (H) 0.52 - 1.04 mg/dL Final   03/13/2021 1.41 (H) 0.52 - 1.04 mg/dL Final   01/05/2021 1.64 (H) 0.52 - 1.04 mg/dL Final   12/21/2020 1.80 (H) 0.52 - 1.04 mg/dL  Final   10/15/2019 1.16 (H) 0.52 - 1.04 mg/dL Final   10/23/2018 1.12 (H) 0.52 - 1.04 mg/dL Final   08/03/2018 1.15 (H) 0.52 - 1.04 mg/dL Final       Attestation:  I have reviewed today's vital signs, notes, medications, labs and imaging.     Cesar Chaudhary MD

## 2022-09-01 NOTE — PLAN OF CARE
Pt. Alert and oriented x4. Elevated BPs, otherwise Vital signs stable on 1 LPM. Assist of 2 w a lift. Lung sounds diminished. Bowel sounds audible. x1 loose BM on shift, voiding adequately via Purewick. Abd incision, OPA. Edema noted on R. Arm. DAVI drain, minimal output. L. Hand weakness. Denies pain or nausea. NPO due to possible ileus. Started on TPN & Lipids @ HS, will be possibly switched to clear liquids upon improvement. Tele: NSR.

## 2022-09-01 NOTE — PROGRESS NOTES
[facility]    Internal Medicine Hospitalist Progress Note  09/01/2022  I evaluated patient on the above date.    Artemio Chiu Jr., MD  [phone] (p)  Text Page  Vocera        Assessment & Plan New actions/orders today (09/01/2022) are underlined.    71 year old female with history including HTN, HLD, heterozygous thalassemia, CKD, and diabetes insipidus, who presented 8/24/2022 abdominal pain and dyspnea and found with signs of a perforated viscus with septic shock. Underwent emergent ex-lap and repair of perforated ulcer on 8/24/2022.    On initial evaluation 8/24, was afebrile, hypotensive, tachycardic; WBC 3.6, hgb 11.6; cr 3.51, potassium 6.2, bicarb 7; lactate 8.6, LFT's showed normal ALT, AST, ALP, TBili. CT CAP 8/24 showed large volume pneumoperitoneum, favored source from the anterior distal stomach/proximal duodenum; small volume free fluid without walled-off, drainable collection; small bilateral pleural effusions with bibasilar atelectasis; incidental indeterminate renal lesions; intrathoracic goiter with distinct 1.8 cm left thyroid nodule.    Required pressors in the ED. Surgery contacted and performed repair of perforated gastric ulcer emergently 8/24/2022. Weaned off pressors 8/27. Extubated 8/30. Transferred to the IM Hospitalist team 8/31/2022.      Perforated gastric ulcer - s/p ex-lap, repair of perforated gastric ulcer and peritoneal washout 8/24/2022.  Post-op ileus.  Septic shock secondary to above, resolved.  Lactic acidosis due to above, resolved.  * Initial presentation as above. Started on pip-tazo 8/24. BC's 8/24 NGTD. Started on IV pantoprazole on admit.  * Tried clear liquids 8/30, but with N/V.  * Loose stools noted 8/31. Started on TPN 8/31.  * Started on minimal clear liquids 9/1.  - Post-op management per Surgery.  - Continue TPN.  - Continue minimal clear liquids; advance diet as able per Surgery.  - Continue pip-tazo (started 8/31).  - Continue IV  pantoprazole.  - Continue PRN acetaminophen, PRN oxycodone, PRN IV hydromorphone; minimize opioids as able.  - Continue to increase activity as able.    Acute kidney injury (suspect ATN) on CKD.  Metabolic acidosis, suspect due to perforated viscus with lactic acidosis.  * Baseline creatinine about 2.  * Cr 3.51 with potassium 6.2 and bicarb 7 on admit.  * Nephrology consulted 8/25.  Recent Labs   Lab 09/01/22  0628 08/31/22  0536 08/30/22  0422 08/29/22  0411 08/28/22  0414 08/27/22  0428   CR 2.44* 2.55* 2.94* 3.13* 3.23* 3.44*   - Continue IVF's.  - Monitor BMP.  - Avoid nephrotoxic medications.    Acute respiratory failure requiring intubation secondary to above, resolved.  * Extubated 8/30.  * Off O2 9/1.  - Monitor clinically.    Hypernatremia.  H/o DI (history of lithium toxicity).  * Sodium normal on admit.  * Sodium increased starting 8/27. IVF's adjusted.  Recent Labs   Lab 09/01/22  0628 08/31/22  0536 08/30/22  0422 08/29/22  0411 08/28/22  0414 08/27/22  0428   * 147* 149* 150* 145* 150*   - D5 at 150 ml/hr (increased 9/1).  - Monitor sodium.  - Give IV meds in D5W rather than NS as able.     Hyperglycemia.  H/o IGT based on hgb A1C.  * Hgb A1C 6.1 12/2020.  * Pt with hyperglycemia this hospitalization.  Recent Labs   Lab 09/01/22  1320 09/01/22  0831 09/01/22  0628 09/01/22  0414 09/01/22  0100 08/31/22 2008   * 112* 179* 160* 167* 136*   - Continue high ISS for now.     Acute blood loss anemia due to perforated ulcer, surgery.  Hereditary thalassemia.  * Hgb 11.6 on admit 8/24.  * Required several transfusions in ICU.  Recent Labs   Lab 09/01/22  0628 08/31/22  0536 08/30/22  0422 08/29/22  0411 08/28/22  0414 08/27/22 1954   HGB 10.5* 10.8* 10.4* 10.5* 8.8* 9.0*   - Monitor CBC.  - Consider prbc transfusion if hgb </= 7.0 or if significant bleeding with hemodynamic instability or if symptomatic.    Sinus bradycardia.  * Noted with sinus bradycardia 8/31. No culprit meds noted.  -  "Continue to monitor on telemetry.    Hypertension (benign essential).  [PTA: amlodipine 5 mg daily.]  * Amlodipine held on admit given above issues.  - Continue to hold PTA amlodipine for now.  - Monitor BP's.    DLD.  - Continue to hold PTA simvastatin.    Bipolar disorder.  Depression/anxiety.   [PTA: haloperidol 1 mg at bedtime; lamotrigine 100 mg daily.]  - Continue lamotrigine.   - Continue to hold PTA haloperidol.    Weakness and physical deconditioning due to multiple acute medical issues.  - Continue PT and OT.      Clinically Significant Risk Factors Present on Admission                        COVID-19 testing.  COVID-19 PCR Results    COVID-19 PCR Results 11/8/21 6/13/22 8/24/22   SARS CoV2 PCR Negative Negative Negative      Comments are available for some flowsheets but are not being displayed.         COVID-19 Antibody Results, Testing for Immunity    COVID-19 Antibody Results, Testing for Immunity   No data to display.             Diet: parenteral nutrition - Clinimix E  parenteral nutrition - ADULT compounded formula  Clear Liquid Diet    Prophylaxis: PCD's, ambulation. Heparin subcutaneous.  Suresh Catheter: Not present  Central Lines: PRESENT  PICC Double Lumen 08/31/22 Right Brachial vein lateral-Site Assessment: WDL  Code Status: Full Code    Disposition Plan   Expected discharge: 2-3d recommended to prior living arrangement pending above.  Entered: Artemio Chiu MD 09/01/2022, 2:27 PM         Interval History   Doing OK. Tired now.  Tolerating clears.  +flatus and BM's the last few days.    -Data reviewed today: I reviewed all new labs and imaging over the last 24 hours. I personally reviewed no images or EKG's today.    Physical Exam    , Blood pressure (!) 149/101, pulse 76, temperature 98  F (36.7  C), temperature source Oral, resp. rate 10, height 1.626 m (5' 4\"), weight 59 kg (130 lb 1.1 oz), SpO2 97 %, not currently breastfeeding. O2 Device: None (Room air) Oxygen Delivery: 1 " LPM  Vitals:    08/29/22 0400 08/30/22 0400 08/31/22 0200   Weight: 58.2 kg (128 lb 4.9 oz) 58 kg (127 lb 13.9 oz) 59 kg (130 lb 1.1 oz)     Vital Signs with Ranges  Temp:  [98  F (36.7  C)-98.8  F (37.1  C)] 98  F (36.7  C)  Pulse:  [76-96] 76  Resp:  [10-20] 10  BP: ()/() 149/101  SpO2:  [91 %-98 %] 97 %  Patient Vitals for the past 24 hrs:   BP Temp Temp src Pulse Resp SpO2   09/01/22 1411 -- 98  F (36.7  C) Oral -- -- --   09/01/22 1100 -- -- -- 76 10 97 %   09/01/22 0900 -- -- -- 88 10 98 %   09/01/22 0808 -- -- -- -- -- 97 %   09/01/22 0600 (!) 149/101 -- -- 82 18 96 %   09/01/22 0500 (!) 142/103 -- -- 76 12 96 %   09/01/22 0400 (!) 143/91 -- -- 77 13 95 %   09/01/22 0200 124/83 -- -- 83 18 93 %   09/01/22 0000 125/77 -- -- 85 15 95 %   08/31/22 2200 111/80 -- -- 96 17 91 %   08/31/22 2006 -- 98.8  F (37.1  C) Axillary -- -- --   08/31/22 2000 96/72 -- -- 90 20 94 %   08/31/22 1555 -- 98.2  F (36.8  C) Axillary -- -- --   08/31/22 1514 (!) 136/97 -- -- 85 -- 95 %     I/O's Last 24 hours  I/O last 3 completed shifts:  In: 795.91 [I.V.:795.91]  Out: 1750 [Urine:1650; Drains:100]    Constitutional: Awake, alert, pleasant.  Respiratory: Diminished in bases. No crackles or wheezes.  Cardiovascular: RRR, no m/r/g.  GI: Mild distension, nt to light touch, few BS.  Skin/Integumen:   Other:        Data   Recent Labs   Lab 09/01/22  1320 09/01/22  0831 09/01/22  0628 08/31/22  1248 08/31/22  0536 08/30/22  0426 08/30/22  0422   WBC  --   --  11.4*  --  9.2  --  8.8   HGB  --   --  10.5*  --  10.8*  --  10.4*   MCV  --   --  74*  --  72*  --  73*   PLT  --   --  341  --  249  --  231   INR  --   --  1.21*  --   --   --   --    NA  --   --  150*  --  147*  --  149*   POTASSIUM  --   --  4.4  --  3.6  --  3.4   CHLORIDE  --   --  119*  --  116*  --  118*   CO2  --   --  23  --  22  --  23   BUN  --   --  45*  --  47*  --  53*   CR  --   --  2.44*  --  2.55*  --  2.94*   ANIONGAP  --   --  8  --  9  --  8   ISSA   --   --  8.9  --  8.6  --  8.5   * 112* 179*   < > 121*   < > 177*   ALBUMIN  --   --  1.7*  --  1.6*  --  1.5*   PROTTOTAL  --   --  5.7*  --  5.2*  --  5.0*   BILITOTAL  --   --  0.3  --  0.5  --  0.7   ALKPHOS  --   --  237*  --  242*  --  219*   ALT  --   --  84*  --  108*  --  137*   AST  --   --  20  --  26  --  26    < > = values in this interval not displayed.     Recent Labs   Lab Test 09/01/22  1320 09/01/22  0831 09/01/22  0628 09/01/22  0414 09/01/22  0100   * 112* 179* 160* 167*     Recent Labs   Lab 09/01/22  0628 08/31/22  0536 08/30/22  0422 08/29/22  0411 08/28/22  0638 08/28/22  0414 08/27/22  0808   WBC 11.4* 9.2 8.8 7.8  --  8.5 13.8*   LACT  --   --   --   --  1.1  --   --          Recent Results (from the past 24 hour(s))   XR Upper GI Water Soluble    Narrative    UPPER GI  8/31/2022 2:48 PM     HISTORY: Status post repair perforated gastric ulcer.    COMPARISON: None.    FLUOROSCOPY TIME: 0.1 minutes.    SPOT FILMS: 8    TECHNIQUE: Double contrast upper GI.    FINDINGS:  A single contrast upper GI was performed and the esophagus,  stomach, and duodenum are unremarkable. No extravasation.      Impression    IMPRESSION: No leak demonstrated status post ulcer repair.    QUE RENEE MD         SYSTEM ID:  [id number]       Medications   All medications were reviewed.    D5W 125 mL/hr at 09/01/22 1413     parenteral nutrition - ADULT compounded formula       parenteral nutrition - Clinimix E 45 mL/hr at 08/31/22 2019       albuterol  2.5 mg Nebulization 4x Daily     heparin ANTICOAGULANT  5,000 Units Subcutaneous Q12H     insulin aspart  1-12 Units Subcutaneous Q4H     lamoTRIgine  100 mg Oral Daily     lipids  250 mL Intravenous Once per day on Mon Tue Wed Thu Fri     pantoprazole  40 mg Intravenous BID AC     piperacillin-tazobactam  2.25 g Intravenous Q6H     sodium chloride (PF)  10-40 mL Intracatheter Q7 Days     sodium chloride (PF)  3 mL Intracatheter Q8H     sodium chloride  (PF)  3 mL Intracatheter Q8H     acetaminophen, acetylcysteine, albuterol, [Held by provider] bisacodyl, glucose **OR** dextrose **OR** glucagon, diphenhydrAMINE **OR** diphenhydrAMINE, HYDROmorphone, lidocaine 4%, lidocaine 4%, lidocaine (buffered or not buffered), lidocaine (buffered or not buffered), lidocaine (buffered or not buffered), magnesium hydroxide, naloxone **OR** naloxone **OR** naloxone **OR** naloxone, nitroGLYcerin, ondansetron **OR** ondansetron, oxyCODONE **OR** oxyCODONE, prochlorperazine **OR** prochlorperazine, sodium chloride (PF), sodium chloride (PF), sodium chloride (PF), sodium chloride (PF), sodium chloride (PF)

## 2022-09-01 NOTE — PROGRESS NOTES
Neb alexa well patient has dry cough. Mucomyst not indicated at this time. Patient on room air.  Sofia Thapa, RT

## 2022-09-01 NOTE — PROGRESS NOTES
"General Surgery Progress Note    Admission Date: 8/24/2022  Today's Date: 9/1/2022         Assessment:      Diana Santiago is a 71 year old female POD 8 s/p exploratory laparotomy and repair of perforated gastric ulcer     - Extubated 8/30  - Likely somewhat of an ongoing ileus, upper GI 8/31 without leak or outlet obstruction  - TPN initiated 8/31  - Mild increase in white count today         Plan:   - OK for minimal clear liquids - ice chips, sips of water and apple juice. Otherwise recommend minimal PO intake until bloating/distention improves  - SLP consult complete, no dysphagia / diet limitations from their standpoint. They will see patient again once diet advanced  - Increase mobility as much as possible, PT/OT following. This will encourage bowel function  - Continue DAVI drain, do not remove. Monitor fluid. Output decreasing  - Protonix BID, heparin for DVT ppx, continue Zosyn for intra-abdominal contamination  - Recheck white count tomorrow  - Medical cares per primary team        Interval History:   Afebrile overnight, vitals stable. Diana says today that her abdomen feels tight, perhaps a bit more bloated than previously. She is hiccupping and belching some today. Denies true nausea, no emesis. Really wants something to eat/drink - requests apple juice, jello. Having some loose stools still. Denies abdominal pain.          Physical Exam:   BP (!) 149/101 (BP Location: Left arm)   Pulse 76   Temp 98.8  F (37.1  C) (Axillary)   Resp 10   Ht 1.626 m (5' 4\")   Wt 59 kg (130 lb 1.1 oz)   SpO2 97%   BMI 22.33 kg/m    I/O last 3 completed shifts:  In: 795.91 [I.V.:795.91]  Out: 1750 [Urine:1650; Drains:100]  General: NAD, pleasant, alert and awake. Answers questions appropriately  Abdomen: distended but fairly soft, only mild tenderness to palpation. DAVI drain in place with serous fluid in tubing and bag  Incision: clean, dry, and intact with staples. No erythema or drainage    LABS:  Recent Labs   Lab Test " 09/01/22 0628 08/31/22  0536 08/30/22  0422   WBC 11.4* 9.2 8.8   HGB 10.5* 10.8* 10.4*   MCV 74* 72* 73*    249 231      Recent Labs   Lab Test 09/01/22 0628 08/31/22  0536 08/30/22  0422   POTASSIUM 4.4 3.6 3.4   CHLORIDE 119* 116* 118*   CO2 23 22 23   BUN 45* 47* 53*   CR 2.44* 2.55* 2.94*   ANIONGAP 8 9 8      Recent Labs   Lab Test 09/01/22 0628 08/31/22 0536 08/30/22  0422   ALBUMIN 1.7* 1.6* 1.5*   BILITOTAL 0.3 0.5 0.7   ALT 84* 108* 137*   AST 20 26 26   ALKPHOS 237* 242* 219*            -------------------------------    Sylvia Harrington PA-C  [facility]  [phone]

## 2022-09-02 ENCOUNTER — APPOINTMENT (OUTPATIENT)
Dept: PHYSICAL THERAPY | Facility: CLINIC | Age: 72
DRG: 853 | End: 2022-09-02
Payer: MEDICARE

## 2022-09-02 LAB
ANION GAP SERPL CALCULATED.3IONS-SCNC: 9 MMOL/L (ref 3–14)
BASOPHILS # BLD AUTO: 0 10E3/UL (ref 0–0.2)
BASOPHILS NFR BLD AUTO: 0 %
BUN SERPL-MCNC: 45 MG/DL (ref 7–30)
CALCIUM SERPL-MCNC: 9.4 MG/DL (ref 8.5–10.1)
CHLORIDE BLD-SCNC: 115 MMOL/L (ref 94–109)
CO2 SERPL-SCNC: 21 MMOL/L (ref 20–32)
CREAT SERPL-MCNC: 2.1 MG/DL (ref 0.52–1.04)
EOSINOPHIL # BLD AUTO: 0.5 10E3/UL (ref 0–0.7)
EOSINOPHIL NFR BLD AUTO: 4 %
ERYTHROCYTE [DISTWIDTH] IN BLOOD BY AUTOMATED COUNT: 23.1 % (ref 10–15)
GFR SERPL CREATININE-BSD FRML MDRD: 25 ML/MIN/1.73M2
GLUCOSE BLD-MCNC: 189 MG/DL (ref 70–99)
GLUCOSE BLDC GLUCOMTR-MCNC: 186 MG/DL (ref 70–99)
GLUCOSE BLDC GLUCOMTR-MCNC: 189 MG/DL (ref 70–99)
GLUCOSE BLDC GLUCOMTR-MCNC: 204 MG/DL (ref 70–99)
GLUCOSE BLDC GLUCOMTR-MCNC: 215 MG/DL (ref 70–99)
GLUCOSE BLDC GLUCOMTR-MCNC: 241 MG/DL (ref 70–99)
GLUCOSE BLDC GLUCOMTR-MCNC: 313 MG/DL (ref 70–99)
HCT VFR BLD AUTO: 33.6 % (ref 35–47)
HGB BLD-MCNC: 10.5 G/DL (ref 11.7–15.7)
IMM GRANULOCYTES # BLD: 0.2 10E3/UL
IMM GRANULOCYTES NFR BLD: 1 %
LYMPHOCYTES # BLD AUTO: 1 10E3/UL (ref 0.8–5.3)
LYMPHOCYTES NFR BLD AUTO: 8 %
MAGNESIUM SERPL-MCNC: 2.3 MG/DL (ref 1.6–2.3)
MCH RBC QN AUTO: 23.1 PG (ref 26.5–33)
MCHC RBC AUTO-ENTMCNC: 31.3 G/DL (ref 31.5–36.5)
MCV RBC AUTO: 74 FL (ref 78–100)
MONOCYTES # BLD AUTO: 1.1 10E3/UL (ref 0–1.3)
MONOCYTES NFR BLD AUTO: 9 %
NEUTROPHILS # BLD AUTO: 10.1 10E3/UL (ref 1.6–8.3)
NEUTROPHILS NFR BLD AUTO: 78 %
NRBC # BLD AUTO: 0 10E3/UL
NRBC BLD AUTO-RTO: 0 /100
PHOSPHATE SERPL-MCNC: 2.7 MG/DL (ref 2.5–4.5)
PLATELET # BLD AUTO: 401 10E3/UL (ref 150–450)
POTASSIUM BLD-SCNC: 3.9 MMOL/L (ref 3.4–5.3)
RBC # BLD AUTO: 4.55 10E6/UL (ref 3.8–5.2)
SODIUM SERPL-SCNC: 145 MMOL/L (ref 133–144)
WBC # BLD AUTO: 12.8 10E3/UL (ref 4–11)

## 2022-09-02 PROCEDURE — 97116 GAIT TRAINING THERAPY: CPT | Mod: GP | Performed by: PHYSICAL THERAPIST

## 2022-09-02 PROCEDURE — 85025 COMPLETE CBC W/AUTO DIFF WBC: CPT | Performed by: INTERNAL MEDICINE

## 2022-09-02 PROCEDURE — 84100 ASSAY OF PHOSPHORUS: CPT | Performed by: INTERNAL MEDICINE

## 2022-09-02 PROCEDURE — 250N000009 HC RX 250: Performed by: INTERNAL MEDICINE

## 2022-09-02 PROCEDURE — 250N000009 HC RX 250: Performed by: STUDENT IN AN ORGANIZED HEALTH CARE EDUCATION/TRAINING PROGRAM

## 2022-09-02 PROCEDURE — 250N000011 HC RX IP 250 OP 636

## 2022-09-02 PROCEDURE — 83735 ASSAY OF MAGNESIUM: CPT | Performed by: INTERNAL MEDICINE

## 2022-09-02 PROCEDURE — 97530 THERAPEUTIC ACTIVITIES: CPT | Mod: GP | Performed by: PHYSICAL THERAPIST

## 2022-09-02 PROCEDURE — 999N000190 HC STATISTIC VAT ROUNDS

## 2022-09-02 PROCEDURE — 258N000003 HC RX IP 258 OP 636: Performed by: INTERNAL MEDICINE

## 2022-09-02 PROCEDURE — 120N000001 HC R&B MED SURG/OB

## 2022-09-02 PROCEDURE — 82310 ASSAY OF CALCIUM: CPT | Performed by: STUDENT IN AN ORGANIZED HEALTH CARE EDUCATION/TRAINING PROGRAM

## 2022-09-02 PROCEDURE — C9113 INJ PANTOPRAZOLE SODIUM, VIA: HCPCS | Performed by: INTERNAL MEDICINE

## 2022-09-02 PROCEDURE — 99233 SBSQ HOSP IP/OBS HIGH 50: CPT | Performed by: INTERNAL MEDICINE

## 2022-09-02 PROCEDURE — 250N000011 HC RX IP 250 OP 636: Performed by: INTERNAL MEDICINE

## 2022-09-02 PROCEDURE — 99232 SBSQ HOSP IP/OBS MODERATE 35: CPT | Performed by: INTERNAL MEDICINE

## 2022-09-02 PROCEDURE — 250N000013 HC RX MED GY IP 250 OP 250 PS 637: Performed by: INTERNAL MEDICINE

## 2022-09-02 RX ORDER — DIPHENHYDRAMINE HCL 12.5MG/5ML
25 LIQUID (ML) ORAL EVERY 6 HOURS PRN
Status: DISCONTINUED | OUTPATIENT
Start: 2022-09-02 | End: 2022-09-12

## 2022-09-02 RX ORDER — AMLODIPINE BESYLATE 5 MG/1
5 TABLET ORAL DAILY
Status: DISCONTINUED | OUTPATIENT
Start: 2022-09-02 | End: 2022-09-14 | Stop reason: HOSPADM

## 2022-09-02 RX ADMIN — PIPERACILLIN SODIUM AND TAZOBACTAM SODIUM 2.25 G: 2; .25 INJECTION, POWDER, LYOPHILIZED, FOR SOLUTION INTRAVENOUS at 21:17

## 2022-09-02 RX ADMIN — ALBUTEROL SULFATE 2.5 MG: 2.5 SOLUTION RESPIRATORY (INHALATION) at 15:28

## 2022-09-02 RX ADMIN — I.V. FAT EMULSION 250 ML: 20 EMULSION INTRAVENOUS at 20:09

## 2022-09-02 RX ADMIN — HEPARIN SODIUM 5000 UNITS: 5000 INJECTION, SOLUTION INTRAVENOUS; SUBCUTANEOUS at 00:28

## 2022-09-02 RX ADMIN — HEPARIN SODIUM 5000 UNITS: 5000 INJECTION, SOLUTION INTRAVENOUS; SUBCUTANEOUS at 12:30

## 2022-09-02 RX ADMIN — LAMOTRIGINE 100 MG: 100 TABLET ORAL at 08:36

## 2022-09-02 RX ADMIN — IRON SUCROSE 200 MG: 20 INJECTION, SOLUTION INTRAVENOUS at 15:11

## 2022-09-02 RX ADMIN — AMLODIPINE BESYLATE 5 MG: 5 TABLET ORAL at 16:59

## 2022-09-02 RX ADMIN — PIPERACILLIN SODIUM AND TAZOBACTAM SODIUM 2.25 G: 2; .25 INJECTION, POWDER, LYOPHILIZED, FOR SOLUTION INTRAVENOUS at 08:42

## 2022-09-02 RX ADMIN — ALBUTEROL SULFATE 2.5 MG: 2.5 SOLUTION RESPIRATORY (INHALATION) at 19:54

## 2022-09-02 RX ADMIN — ALBUTEROL SULFATE 2.5 MG: 2.5 SOLUTION RESPIRATORY (INHALATION) at 07:19

## 2022-09-02 RX ADMIN — ALBUTEROL SULFATE 2.5 MG: 2.5 SOLUTION RESPIRATORY (INHALATION) at 11:11

## 2022-09-02 RX ADMIN — PANTOPRAZOLE SODIUM 40 MG: 40 INJECTION, POWDER, FOR SOLUTION INTRAVENOUS at 08:36

## 2022-09-02 RX ADMIN — PIPERACILLIN SODIUM AND TAZOBACTAM SODIUM 2.25 G: 2; .25 INJECTION, POWDER, LYOPHILIZED, FOR SOLUTION INTRAVENOUS at 01:29

## 2022-09-02 RX ADMIN — PANTOPRAZOLE SODIUM 40 MG: 40 INJECTION, POWDER, FOR SOLUTION INTRAVENOUS at 20:09

## 2022-09-02 RX ADMIN — PIPERACILLIN SODIUM AND TAZOBACTAM SODIUM 2.25 G: 2; .25 INJECTION, POWDER, LYOPHILIZED, FOR SOLUTION INTRAVENOUS at 15:11

## 2022-09-02 RX ADMIN — MAGNESIUM SULFATE HEPTAHYDRATE: 500 INJECTION, SOLUTION INTRAMUSCULAR; INTRAVENOUS at 20:10

## 2022-09-02 ASSESSMENT — ACTIVITIES OF DAILY LIVING (ADL)
ADLS_ACUITY_SCORE: 47
ADLS_ACUITY_SCORE: 41
ADLS_ACUITY_SCORE: 41
ADLS_ACUITY_SCORE: 45
ADLS_ACUITY_SCORE: 47
ADLS_ACUITY_SCORE: 45
ADLS_ACUITY_SCORE: 41
ADLS_ACUITY_SCORE: 45
ADLS_ACUITY_SCORE: 47
ADLS_ACUITY_SCORE: 45

## 2022-09-02 NOTE — PLAN OF CARE
Pt. Alert and oriented x4. Vital signs stable on RA. Assist of 1 gb/walker, pt spent a couple hours sitting up in chair today. Tolerating clear liquid diet. Lung sounds dim but clear. Bowel sounds active, + flatus. BM x2 today, adeuqate urine output. Midline abd incision CDI and well approximated. DAVI drain to RLQ with minimal output. Denies pain. Denies nausea. TPN running in PICC line.

## 2022-09-02 NOTE — PROGRESS NOTES
Assessment and Plan:   REI: Cr improving, now 2.10 which is basically baseline.     Hypernatremia: Na improved at 145. Taking po and TPN formula in hypotonic with Na 15 meq. DI due to Li+.     CKD-4: baseline Cr 2.0.             Interval History:   Anemia: Hgb 10.5, MCV 74. Fe stores borderline. IV venofer.   TPN: formula reviewed. No Ca, Low K and Na, 5 mmol phos.                 Review of Systems:   Taking clear liquids without problems. No N or V. No SOB.           Medications:       albuterol  2.5 mg Nebulization 4x Daily     heparin ANTICOAGULANT  5,000 Units Subcutaneous Q12H     insulin aspart  1-12 Units Subcutaneous Q4H     lamoTRIgine  100 mg Oral Daily     lipids  250 mL Intravenous Once per day on Mon Tue Wed Thu Fri     pantoprazole  40 mg Intravenous BID AC     piperacillin-tazobactam  2.25 g Intravenous Q6H     sodium chloride (PF)  10-40 mL Intracatheter Q7 Days     sodium chloride (PF)  3 mL Intracatheter Q8H       parenteral nutrition - ADULT compounded formula       parenteral nutrition - ADULT compounded formula 65 mL/hr at 09/01/22 2009     Current active medications and PTA medications reviewed, see medication list for details.            Physical Exam:   Vitals were reviewed  Patient Vitals for the past 24 hrs:   BP Temp Temp src Pulse Resp SpO2 Weight   09/02/22 1111 -- -- -- -- -- 98 % --   09/02/22 0817 (!) 165/105 97.8  F (36.6  C) Oral 89 16 98 % --   09/02/22 0800 -- -- -- -- 18 -- --   09/02/22 0719 -- -- -- -- -- 95 % --   09/02/22 0033 110/77 97.3  F (36.3  C) Oral 90 16 97 % 55.5 kg (122 lb 5.7 oz)   09/01/22 1954 (!) 149/96 97.4  F (36.3  C) Oral -- 16 -- --   09/01/22 1610 -- 97.6  F (36.4  C) Axillary -- -- -- --   09/01/22 1541 -- -- -- -- -- 98 % --   09/01/22 1411 -- 98  F (36.7  C) Oral -- -- -- --   09/01/22 1300 -- -- -- 85 15 97 % --       Temp:  [97.3  F (36.3  C)-98  F (36.7  C)] 97.8  F (36.6  C)  Pulse:  [85-90] 89  Resp:  [15-18] 16  BP: (110-165)/()  165/105  SpO2:  [95 %-98 %] 98 %    Temperatures:  Current - Temp: 97.8  F (36.6  C); Max - Temp  Av.6  F (36.4  C)  Min: 97.3  F (36.3  C)  Max: 98  F (36.7  C)  Respiration range: Resp  Av.2  Min: 15  Max: 18  Pulse range: Pulse  Av  Min: 85  Max: 90  Blood pressure range: Systolic (24hrs), Av , Min:110 , Max:165   ; Diastolic (24hrs), Av, Min:77, Max:105    Pulse oximetry range: SpO2  Av.2 %  Min: 95 %  Max: 98 %    I/O last 3 completed shifts:  In: 572 [P.O.:569; I.V.:3]  Out: 2685 [Urine:2600; Drains:85]      Intake/Output Summary (Last 24 hours) at 2022 1229  Last data filed at 2022 0800  Gross per 24 hour   Intake 932 ml   Output 2685 ml   Net -1753 ml       Alert and responsive  Resting comfortably in bed.       Wt Readings from Last 4 Encounters:   22 55.5 kg (122 lb 5.7 oz)   22 55.3 kg (122 lb)   22 59.9 kg (132 lb)   22 64 kg (141 lb)          Data:          Lab Results   Component Value Date     2022     2022     2022     2020     10/15/2019     10/23/2018    Lab Results   Component Value Date    CHLORIDE 115 2022    CHLORIDE 119 2022    CHLORIDE 116 2022    CHLORIDE 114 2020    CHLORIDE 115 10/15/2019    CHLORIDE 110 10/23/2018    Lab Results   Component Value Date    BUN 45 2022    BUN 45 2022    BUN 47 2022    BUN 34 2020    BUN 31 10/15/2019    BUN 29 10/23/2018      Lab Results   Component Value Date    POTASSIUM 3.9 2022    POTASSIUM 4.4 2022    POTASSIUM 3.6 2022    POTASSIUM 3.9 2020    POTASSIUM 4.0 10/15/2019    POTASSIUM 3.8 10/23/2018    Lab Results   Component Value Date    CO2 21 2022    CO2 23 2022    CO2 22 2022    CO2 19 2020    CO2 19 10/15/2019    CO2 24 10/23/2018    Lab Results   Component Value Date    CR 2.10 2022    CR 2.44 2022    CR 2.55 2022     CR 1.41 03/13/2021    CR 1.64 01/05/2021    CR 1.80 12/21/2020        Recent Labs   Lab Test 09/02/22  0549 09/01/22  0628 08/31/22  0536   WBC 12.8* 11.4* 9.2   HGB 10.5* 10.5* 10.8*   HCT 33.6* 33.8* 33.8*   MCV 74* 74* 72*    341 249     Recent Labs   Lab Test 09/01/22  0628 08/31/22  0536 08/30/22  0422   AST 20 26 26   ALT 84* 108* 137*   ALKPHOS 237* 242* 219*   BILITOTAL 0.3 0.5 0.7       Recent Labs   Lab Test 09/02/22  0549 09/01/22 0628 08/31/22  0536   MAG 2.3 2.6* 2.1     Recent Labs   Lab Test 09/02/22  0549 09/01/22 0628 08/31/22  0536   PHOS 2.7 4.2 4.0     Recent Labs   Lab Test 09/02/22  0549 09/01/22  0628 08/31/22  0536   ISSA 9.4 8.9 8.6       Lab Results   Component Value Date    ISSA 9.4 09/02/2022     Lab Results   Component Value Date    WBC 12.8 (H) 09/02/2022    HGB 10.5 (L) 09/02/2022    HCT 33.6 (L) 09/02/2022    MCV 74 (L) 09/02/2022     09/02/2022     Lab Results   Component Value Date     (H) 09/02/2022    POTASSIUM 3.9 09/02/2022    CHLORIDE 115 (H) 09/02/2022    CO2 21 09/02/2022     (H) 09/02/2022     Lab Results   Component Value Date    BUN 45 (H) 09/02/2022    CR 2.10 (H) 09/02/2022     Lab Results   Component Value Date    MAG 2.3 09/02/2022     Lab Results   Component Value Date    PHOS 2.7 09/02/2022       Creatinine   Date Value Ref Range Status   09/02/2022 2.10 (H) 0.52 - 1.04 mg/dL Final   09/01/2022 2.44 (H) 0.52 - 1.04 mg/dL Final   08/31/2022 2.55 (H) 0.52 - 1.04 mg/dL Final   08/30/2022 2.94 (H) 0.52 - 1.04 mg/dL Final   08/29/2022 3.13 (H) 0.52 - 1.04 mg/dL Final   08/28/2022 3.23 (H) 0.52 - 1.04 mg/dL Final   03/13/2021 1.41 (H) 0.52 - 1.04 mg/dL Final   01/05/2021 1.64 (H) 0.52 - 1.04 mg/dL Final   12/21/2020 1.80 (H) 0.52 - 1.04 mg/dL Final   10/15/2019 1.16 (H) 0.52 - 1.04 mg/dL Final   10/23/2018 1.12 (H) 0.52 - 1.04 mg/dL Final   08/03/2018 1.15 (H) 0.52 - 1.04 mg/dL Final       Attestation:  I have reviewed today's vital signs, notes,  medications, labs and imaging.     Cesar Chauhdary MD

## 2022-09-02 NOTE — PLAN OF CARE
([id number]) Pt. Alert and oriented x4. Elevated BPs, otherwise Vital signs stable on RA. Assist of 2 w/ GB & Walker. Lung sounds diminished. Bowel sounds audible. No BM on shift, voiding adequately via Purewick. Abd incision, OPA. Edema noted on R. Arm. DAVI drain, minimal output. L. Hand weakness. Denies pain or nausea. Clear liquid diet w/ only apple juice, crushed ice & sips of water. No jello or apple sauce. TPN & Lipids infusing.

## 2022-09-02 NOTE — PROGRESS NOTES
"General Surgery Progress Note    Admission Date: 8/24/2022  Today's Date: 9/2/2022         Assessment:      Diana Santiago is a 71 year old female POD 9 s/p exploratory laparotomy and repair of perforated gastric ulcer     - Extubated 8/30  - Ileus improving. Upper GI 8/31 without leak or outlet obstruction  - TPN initiated 8/31  - Slight increasing leukocytosis x 2 days  - Rash across torso noted today         Plan:   - Work on more clear liquids today (trying some jello this morning).  - If Diana does well with clear liquids today, plan to advance to full liquid diet tomorrow. Will remain on full liquid diet x 2 weeks  - Benadryl for rash. No new medications from our standpoint recently other than TPN. Will have pharmacist and hospitalist weigh in    - Increase mobility as much as possible, PT/OT following. This will encourage bowel function  - Continue DAVI drain as we advance diet, do not remove yet. Monitor fluid. Output appropriate  - Protonix BID, heparin for DVT ppx  - On Zosyn for intra-abdominal contamination. Continue to monitor leukocytosis, abdominal pain. Patient could be in the window for a developing postop intra-abdominal abscess, but her clinical exam is not at all consistent with this  - Recheck white count tomorrow  - Medical cares per primary team        Interval History:   Afebrile, vitals stable on room air except for some intermittent hypertension. Diana tells me that the belching and hiccups are better today. She took in small amounts of water, ice chips, and apple juice yesterday. No nausea or pain with intake, but she feels full quickly. Eager to try some jello today.          Physical Exam:   BP (!) 165/105 (BP Location: Left arm)   Pulse 89   Temp 97.8  F (36.6  C) (Oral)   Resp 16   Ht 1.626 m (5' 4\")   Wt 55.5 kg (122 lb 5.7 oz)   SpO2 98%   BMI 21.00 kg/m    I/O last 3 completed shifts:  In: 572 [P.O.:569; I.V.:3]  Out: 2685 [Urine:2600; Drains:85]  General: NAD, pleasant, " alert and awake. Answers questions appropriately  Abdomen: distended but soft, continues to improve. Minimal tenderness to palpation throughout. DAVI drain in place with serous fluid in tubing and bag  Skin: scattered macular rash across torso including abdomen and back, does not spread down to legs. Slightly pruritic per patient  Incision: clean, dry, and intact with staples. No erythema or drainage    LABS:  Recent Labs   Lab Test 09/02/22  0549 09/01/22  0628 08/31/22  0536   WBC 12.8* 11.4* 9.2   HGB 10.5* 10.5* 10.8*   MCV 74* 74* 72*    341 249      Recent Labs   Lab Test 09/02/22  0549 09/01/22  0628 08/31/22  0536   POTASSIUM 3.9 4.4 3.6   CHLORIDE 115* 119* 116*   CO2 21 23 22   BUN 45* 45* 47*   CR 2.10* 2.44* 2.55*   ANIONGAP 9 8 9      Recent Labs   Lab Test 09/01/22  0628 08/31/22  0536 08/30/22  0422   ALBUMIN 1.7* 1.6* 1.5*   BILITOTAL 0.3 0.5 0.7   ALT 84* 108* 137*   AST 20 26 26   ALKPHOS 237* 242* 219*      Recent Labs   Lab Test 08/24/22  1307   LIPASE 511*     Recent Labs   Lab Test 08/25/22  1020 03/02/22  1557 11/08/21  1139   PROTEIN 50 * Negative 10 *       -------------------------------    Sylvia Harrington PA-C  [facility]  [phone]

## 2022-09-02 NOTE — PROGRESS NOTES
[facility]    Internal Medicine Hospitalist Progress Note  09/02/2022  I evaluated patient on the above date.    Artemio Chiu Jr., MD  [phone] (p)  Text Page  Vocera        Assessment & Plan New actions/orders today (09/02/2022) are underlined.    71 year old female with history including HTN, HLD, heterozygous thalassemia, CKD, and diabetes insipidus, who presented 8/24/2022 abdominal pain and dyspnea and found with signs of a perforated viscus with septic shock. Underwent emergent ex-lap and repair of perforated ulcer on 8/24/2022.    On initial evaluation 8/24, was afebrile, hypotensive, tachycardic; WBC 3.6, hgb 11.6; cr 3.51, potassium 6.2, bicarb 7; lactate 8.6, LFT's showed normal ALT, AST, ALP, TBili. CT CAP 8/24 showed large volume pneumoperitoneum, favored source from the anterior distal stomach/proximal duodenum; small volume free fluid without walled-off, drainable collection; small bilateral pleural effusions with bibasilar atelectasis; incidental indeterminate renal lesions; intrathoracic goiter with distinct 1.8 cm left thyroid nodule.    Required pressors in the ED. Surgery contacted and performed repair of perforated gastric ulcer emergently 8/24/2022. Weaned off pressors 8/27. Extubated 8/30. Transferred to the IM Hospitalist team 8/31/2022.      Perforated gastric ulcer - s/p ex-lap, repair of perforated gastric ulcer and peritoneal washout 8/24/2022.  Post-op ileus.  Septic shock secondary to above, resolved.  Lactic acidosis due to above, resolved.  * Initial presentation as above. Started on pip-tazo 8/24. BC's 8/24 NGTD. Started on IV pantoprazole on admit.  * Tried clear liquids 8/30, but with N/V.  * Loose stools noted 8/31. Started on TPN 8/31.  * Started on minimal clear liquids 9/1.  - Post-op management per Surgery.  - Continue TPN.  - Continue clear liquids; advance diet as able per Surgery.  - Continue pip-tazo (started 8/31).  - Continue IV  pantoprazole.  - Continue PRN acetaminophen, PRN oxycodone, PRN IV hydromorphone; minimize opioids as able.  - Continue to increase activity as able.    Acute kidney injury (suspect ATN) on CKD.  Metabolic acidosis, suspect due to perforated viscus with lactic acidosis.  * Baseline creatinine about 2.  * Cr 3.51 with potassium 6.2 and bicarb 7 on admit.  * Nephrology consulted 8/25.  Recent Labs   Lab 09/02/22  0549 09/01/22  0628 08/31/22  0536 08/30/22  0422 08/29/22  0411 08/28/22  0414   CR 2.10* 2.44* 2.55* 2.94* 3.13* 3.23*   - Continue IVF's.  - Monitor BMP.  - Avoid nephrotoxic medications.    Acute respiratory failure requiring intubation secondary to above, resolved.  * Extubated 8/30.  * Off O2 9/1.  - Monitor clinically.    Hypernatremia.  H/o DI (history of lithium toxicity).  * Sodium normal on admit.  * Sodium increased starting 8/27. Treated with IVF's including D5W.  Recent Labs   Lab 09/02/22  0549 09/01/22  0628 08/31/22  0536 08/30/22  0422 08/29/22  0411 08/28/22  0414   * 150* 147* 149* 150* 145*   - Adjust TPN as needed.  - Monitor sodium.  - Give IV meds in D5W rather than NS as able.     Hyperglycemia.  H/o IGT based on hgb A1C.  * Hgb A1C 6.1 12/2020.  * Pt with hyperglycemia this hospitalization.  Recent Labs   Lab 09/02/22  1232 09/02/22  0910 09/02/22  0549 09/02/22  0427 09/02/22  0022 09/01/22  2105   * 189* 189* 204* 186* 181*   - Continue high ISS for now.     Acute blood loss anemia due to perforated ulcer, surgery.  Hereditary thalassemia.  * Hgb 11.6 on admit 8/24.  * Required several transfusions in ICU.  Recent Labs   Lab 09/02/22  0549 09/01/22  0628 08/31/22  0536 08/30/22  0422 08/29/22  0411 08/28/22  0414   HGB 10.5* 10.5* 10.8* 10.4* 10.5* 8.8*   - IV iron ordered 9/2.  - Monitor CBC.  - Consider prbc transfusion if hgb </= 7.0 or if significant bleeding with hemodynamic instability or if symptomatic.    Rash, unclear etiology, question drug reaction.  * On  9/2, noted with light erythematous rash on torso/back. Endorsed some itching when asked.  - No clear culprit meds; no new scheduled meds (though TPN is new).  - Order PRN diphenhydramine for now.  - Monitor.    Sinus bradycardia.  * Noted with sinus bradycardia 8/31. No culprit meds noted.  - Continue to monitor on telemetry.    Hypertension (benign essential).  [PTA: amlodipine 5 mg daily.]  * Amlodipine held on admit given above issues.  - Restart amlodipine 5 mg daily.  - Monitor BP's.    DLD.  - Continue to hold PTA simvastatin.    Bipolar disorder.  Depression/anxiety.   [PTA: haloperidol 1 mg at bedtime; lamotrigine 100 mg daily.]  - Continue lamotrigine.   - Continue to hold PTA haloperidol.    Weakness and physical deconditioning due to multiple acute medical issues.  - Continue PT and OT.      Clinically Significant Risk Factors Present on Admission                        COVID-19 testing.  COVID-19 PCR Results    COVID-19 PCR Results 11/8/21 6/13/22 8/24/22   SARS CoV2 PCR Negative Negative Negative      Comments are available for some flowsheets but are not being displayed.         COVID-19 Antibody Results, Testing for Immunity    COVID-19 Antibody Results, Testing for Immunity   No data to display.             Diet: parenteral nutrition - ADULT compounded formula  Clear Liquid Diet  parenteral nutrition - ADULT compounded formula    Prophylaxis: PCD's, ambulation. Heparin subcutaneous.  Suresh Catheter: Not present  Central Lines: PRESENT  PICC Double Lumen 08/31/22 Right Brachial vein lateral-Site Assessment: WDL  Code Status: Full Code    Disposition Plan   Expected discharge: 2-3d recommended to prior living arrangement pending above.  Entered: Artemio Chiu MD 09/02/2022, 2:47 PM         Interval History   Doing better.  Feels stronger.  Tolerating clears.  +flatus and loose stools.    -Data reviewed today: I reviewed all new labs and imaging over the last 24 hours. I personally reviewed no  "images or EKG's today.    Physical Exam    , Blood pressure (!) 140/101, pulse 98, temperature 98  F (36.7  C), temperature source Oral, resp. rate 16, height 1.626 m (5' 4\"), weight 55.5 kg (122 lb 5.7 oz), SpO2 98 %, not currently breastfeeding. O2 Device: None (Room air)    Vitals:    08/30/22 0400 08/31/22 0200 09/02/22 0033   Weight: 58 kg (127 lb 13.9 oz) 59 kg (130 lb 1.1 oz) 55.5 kg (122 lb 5.7 oz)     Vital Signs with Ranges  Temp:  [97.3  F (36.3  C)-98  F (36.7  C)] 98  F (36.7  C)  Pulse:  [89-98] 98  Resp:  [16-18] 16  BP: (110-165)/() 140/101  SpO2:  [95 %-98 %] 98 %  Patient Vitals for the past 24 hrs:   BP Temp Temp src Pulse Resp SpO2 Weight   09/02/22 1237 (!) 140/101 98  F (36.7  C) Oral 98 16 98 % --   09/02/22 1111 -- -- -- -- -- 98 % --   09/02/22 0817 (!) 165/105 97.8  F (36.6  C) Oral 89 16 98 % --   09/02/22 0800 -- -- -- -- 18 -- --   09/02/22 0719 -- -- -- -- -- 95 % --   09/02/22 0033 110/77 97.3  F (36.3  C) Oral 90 16 97 % 55.5 kg (122 lb 5.7 oz)   09/01/22 1954 (!) 149/96 97.4  F (36.3  C) Oral -- 16 -- --   09/01/22 1610 -- 97.6  F (36.4  C) Axillary -- -- -- --   09/01/22 1541 -- -- -- -- -- 98 % --     I/O's Last 24 hours  I/O last 3 completed shifts:  In: 572 [P.O.:569; I.V.:3]  Out: 2685 [Urine:2600; Drains:85]    Constitutional: Awake, alert, pleasant.  Respiratory: Diminished in bases. No crackles or wheezes.  Cardiovascular: RRR, no m/r/g.  GI: Mild distension, nt to light touch, more BS.  Skin/Integumen: Light, erythematous rash on abdomen, back.  Other:        Data   Recent Labs   Lab 09/02/22  1232 09/02/22  0910 09/02/22  0549 09/01/22  0831 09/01/22  0628 08/31/22  1248 08/31/22  0536   WBC  --   --  12.8*  --  11.4*  --  9.2   HGB  --   --  10.5*  --  10.5*  --  10.8*   MCV  --   --  74*  --  74*  --  72*   PLT  --   --  401  --  341  --  249   INR  --   --   --   --  1.21*  --   --    NA  --   --  145*  --  150*  --  147*   POTASSIUM  --   --  3.9  --  4.4  --  3.6 "   CHLORIDE  --   --  115*  --  119*  --  116*   CO2  --   --  21  --  23  --  22   BUN  --   --  45*  --  45*  --  47*   CR  --   --  2.10*  --  2.44*  --  2.55*   ANIONGAP  --   --  9  --  8  --  9   ISSA  --   --  9.4  --  8.9  --  8.6   * 189* 189*   < > 179*   < > 121*   ALBUMIN  --   --   --   --  1.7*  --  1.6*   PROTTOTAL  --   --   --   --  5.7*  --  5.2*   BILITOTAL  --   --   --   --  0.3  --  0.5   ALKPHOS  --   --   --   --  237*  --  242*   ALT  --   --   --   --  84*  --  108*   AST  --   --   --   --  20  --  26    < > = values in this interval not displayed.     Recent Labs   Lab Test 09/02/22  1232 09/02/22  0910 09/02/22  0549 09/02/22  0427 09/02/22  0022   * 189* 189* 204* 186*     Recent Labs   Lab 09/02/22  0549 09/01/22  0628 08/31/22  0536 08/30/22  0422 08/29/22  0411 08/28/22  0638 08/28/22  0414   WBC 12.8* 11.4* 9.2 8.8 7.8  --  8.5   LACT  --   --   --   --   --  1.1  --          No results found for this or any previous visit (from the past 24 hour(s)).    Medications   All medications were reviewed.    parenteral nutrition - ADULT compounded formula       parenteral nutrition - ADULT compounded formula 65 mL/hr at 09/01/22 2009       albuterol  2.5 mg Nebulization 4x Daily     heparin ANTICOAGULANT  5,000 Units Subcutaneous Q12H     insulin aspart  1-12 Units Subcutaneous Q4H     iron sucrose  200 mg Intravenous Daily     lamoTRIgine  100 mg Oral Daily     lipids  250 mL Intravenous Once per day on Mon Tue Wed Thu Fri     pantoprazole  40 mg Intravenous BID AC     piperacillin-tazobactam  2.25 g Intravenous Q6H     sodium chloride (PF)  10-40 mL Intracatheter Q7 Days     sodium chloride (PF)  3 mL Intracatheter Q8H     acetaminophen, acetylcysteine, albuterol, [Held by provider] bisacodyl, glucose **OR** dextrose **OR** glucagon, diphenhydrAMINE **OR** diphenhydrAMINE, HYDROmorphone, lidocaine 4%, lidocaine (buffered or not buffered), lidocaine (buffered or not buffered),  magnesium hydroxide, naloxone **OR** naloxone **OR** naloxone **OR** naloxone, ondansetron **OR** ondansetron, oxyCODONE **OR** oxyCODONE, prochlorperazine **OR** prochlorperazine, sodium chloride (PF), sodium chloride (PF), sodium chloride (PF), sodium chloride (PF)

## 2022-09-02 NOTE — PLAN OF CARE
Goal Outcome Evaluation:    Plan of Care Reviewed With: patient     Overall Patient Progress: no change    SUMMARY:  perforated viscus with septic shock, S/P ex lap and repair of perforated gastric ulcer 08/24/22   PMHx: HTN, CKD, DM type 2     Pt is A&Ox4. VSS on RA. Ax2 walker/gaitbelt. L hand weakness. Lung sounds diminished. Clear liquid diet- only sips of apple juice, ice chips, and sips of water. Edema noted to R hand, upper lip. Large abdominal incision with staples- WNL, ULISSES. DAVI drain- small output- 15ml. PICC line in R arm- infusing TPN and lipids. PIVx2 SL in left arm. intermittent IV zosyn Q6h. BG- 186, 204. Purewick in place- good output. Large loose/soft BM. Discharge pending improvement, OT recommending TCU

## 2022-09-02 NOTE — PHARMACY-CONSULT NOTE
TPN formula evaluated based on today s labs results.    The following changes have been made:  Calcium:  removed .  Phosphorus: added 5mM/day .    Pharmacy will continue to follow and adjust as appropriate.    Radha Salazar, GilbertD

## 2022-09-03 ENCOUNTER — APPOINTMENT (OUTPATIENT)
Dept: GENERAL RADIOLOGY | Facility: CLINIC | Age: 72
DRG: 853 | End: 2022-09-03
Attending: STUDENT IN AN ORGANIZED HEALTH CARE EDUCATION/TRAINING PROGRAM
Payer: MEDICARE

## 2022-09-03 LAB
ANION GAP SERPL CALCULATED.3IONS-SCNC: 7 MMOL/L (ref 3–14)
BASOPHILS # BLD AUTO: 0.1 10E3/UL (ref 0–0.2)
BASOPHILS NFR BLD AUTO: 0 %
BUN SERPL-MCNC: 43 MG/DL (ref 7–30)
CALCIUM SERPL-MCNC: 9.3 MG/DL (ref 8.5–10.1)
CHLORIDE BLD-SCNC: 115 MMOL/L (ref 94–109)
CO2 SERPL-SCNC: 21 MMOL/L (ref 20–32)
CREAT SERPL-MCNC: 1.79 MG/DL (ref 0.52–1.04)
EOSINOPHIL # BLD AUTO: 0.4 10E3/UL (ref 0–0.7)
EOSINOPHIL NFR BLD AUTO: 3 %
ERYTHROCYTE [DISTWIDTH] IN BLOOD BY AUTOMATED COUNT: 23.3 % (ref 10–15)
GFR SERPL CREATININE-BSD FRML MDRD: 30 ML/MIN/1.73M2
GLUCOSE BLD-MCNC: 157 MG/DL (ref 70–99)
GLUCOSE BLDC GLUCOMTR-MCNC: 131 MG/DL (ref 70–99)
GLUCOSE BLDC GLUCOMTR-MCNC: 160 MG/DL (ref 70–99)
GLUCOSE BLDC GLUCOMTR-MCNC: 178 MG/DL (ref 70–99)
GLUCOSE BLDC GLUCOMTR-MCNC: 179 MG/DL (ref 70–99)
GLUCOSE BLDC GLUCOMTR-MCNC: 187 MG/DL (ref 70–99)
GLUCOSE BLDC GLUCOMTR-MCNC: 198 MG/DL (ref 70–99)
HCT VFR BLD AUTO: 32.7 % (ref 35–47)
HGB BLD-MCNC: 10.2 G/DL (ref 11.7–15.7)
IMM GRANULOCYTES # BLD: 0.2 10E3/UL
IMM GRANULOCYTES NFR BLD: 2 %
LYMPHOCYTES # BLD AUTO: 0.9 10E3/UL (ref 0.8–5.3)
LYMPHOCYTES NFR BLD AUTO: 6 %
MAGNESIUM SERPL-MCNC: 2.1 MG/DL (ref 1.6–2.3)
MCH RBC QN AUTO: 22.7 PG (ref 26.5–33)
MCHC RBC AUTO-ENTMCNC: 31.2 G/DL (ref 31.5–36.5)
MCV RBC AUTO: 73 FL (ref 78–100)
MONOCYTES # BLD AUTO: 1.2 10E3/UL (ref 0–1.3)
MONOCYTES NFR BLD AUTO: 9 %
NEUTROPHILS # BLD AUTO: 11.1 10E3/UL (ref 1.6–8.3)
NEUTROPHILS NFR BLD AUTO: 80 %
NRBC # BLD AUTO: 0 10E3/UL
NRBC BLD AUTO-RTO: 0 /100
PHOSPHATE SERPL-MCNC: 1.7 MG/DL (ref 2.5–4.5)
PLATELET # BLD AUTO: 503 10E3/UL (ref 150–450)
POTASSIUM BLD-SCNC: 3.9 MMOL/L (ref 3.4–5.3)
RBC # BLD AUTO: 4.5 10E6/UL (ref 3.8–5.2)
SODIUM SERPL-SCNC: 143 MMOL/L (ref 133–144)
WBC # BLD AUTO: 13.9 10E3/UL (ref 4–11)

## 2022-09-03 PROCEDURE — 250N000013 HC RX MED GY IP 250 OP 250 PS 637: Performed by: INTERNAL MEDICINE

## 2022-09-03 PROCEDURE — 83735 ASSAY OF MAGNESIUM: CPT | Performed by: STUDENT IN AN ORGANIZED HEALTH CARE EDUCATION/TRAINING PROGRAM

## 2022-09-03 PROCEDURE — 258N000003 HC RX IP 258 OP 636: Performed by: INTERNAL MEDICINE

## 2022-09-03 PROCEDURE — 85025 COMPLETE CBC W/AUTO DIFF WBC: CPT | Performed by: INTERNAL MEDICINE

## 2022-09-03 PROCEDURE — 999N000065 XR CHEST PORT 1 VIEW

## 2022-09-03 PROCEDURE — 250N000009 HC RX 250: Performed by: INTERNAL MEDICINE

## 2022-09-03 PROCEDURE — 94640 AIRWAY INHALATION TREATMENT: CPT | Mod: 76

## 2022-09-03 PROCEDURE — 250N000011 HC RX IP 250 OP 636: Performed by: INTERNAL MEDICINE

## 2022-09-03 PROCEDURE — 99232 SBSQ HOSP IP/OBS MODERATE 35: CPT | Performed by: INTERNAL MEDICINE

## 2022-09-03 PROCEDURE — 84100 ASSAY OF PHOSPHORUS: CPT | Performed by: STUDENT IN AN ORGANIZED HEALTH CARE EDUCATION/TRAINING PROGRAM

## 2022-09-03 PROCEDURE — 80048 BASIC METABOLIC PNL TOTAL CA: CPT | Performed by: STUDENT IN AN ORGANIZED HEALTH CARE EDUCATION/TRAINING PROGRAM

## 2022-09-03 PROCEDURE — 999N000040 HC STATISTIC CONSULT NO CHARGE VASC ACCESS

## 2022-09-03 PROCEDURE — 120N000001 HC R&B MED SURG/OB

## 2022-09-03 PROCEDURE — 250N000009 HC RX 250

## 2022-09-03 PROCEDURE — 999N000157 HC STATISTIC RCP TIME EA 10 MIN

## 2022-09-03 PROCEDURE — C9113 INJ PANTOPRAZOLE SODIUM, VIA: HCPCS | Performed by: INTERNAL MEDICINE

## 2022-09-03 PROCEDURE — 250N000011 HC RX IP 250 OP 636

## 2022-09-03 RX ORDER — HALOPERIDOL 1 MG/1
1 TABLET ORAL AT BEDTIME
Status: DISCONTINUED | OUTPATIENT
Start: 2022-09-03 | End: 2022-09-14 | Stop reason: HOSPADM

## 2022-09-03 RX ORDER — WATER 10 ML/10ML
INJECTION INTRAMUSCULAR; INTRAVENOUS; SUBCUTANEOUS
Status: COMPLETED
Start: 2022-09-03 | End: 2022-09-03

## 2022-09-03 RX ADMIN — ALTEPLASE 2 MG: 2.2 INJECTION, POWDER, LYOPHILIZED, FOR SOLUTION INTRAVENOUS at 04:26

## 2022-09-03 RX ADMIN — ACETYLCYSTEINE 2 ML: 200 SOLUTION ORAL; RESPIRATORY (INHALATION) at 08:07

## 2022-09-03 RX ADMIN — ALBUTEROL SULFATE 2.5 MG: 2.5 SOLUTION RESPIRATORY (INHALATION) at 08:07

## 2022-09-03 RX ADMIN — HEPARIN SODIUM 5000 UNITS: 5000 INJECTION, SOLUTION INTRAVENOUS; SUBCUTANEOUS at 12:20

## 2022-09-03 RX ADMIN — ALBUTEROL SULFATE 2.5 MG: 2.5 SOLUTION RESPIRATORY (INHALATION) at 11:20

## 2022-09-03 RX ADMIN — PANTOPRAZOLE SODIUM 40 MG: 40 INJECTION, POWDER, FOR SOLUTION INTRAVENOUS at 08:50

## 2022-09-03 RX ADMIN — AMLODIPINE BESYLATE 5 MG: 5 TABLET ORAL at 08:51

## 2022-09-03 RX ADMIN — HEPARIN SODIUM 5000 UNITS: 5000 INJECTION, SOLUTION INTRAVENOUS; SUBCUTANEOUS at 00:44

## 2022-09-03 RX ADMIN — ALBUTEROL SULFATE 2.5 MG: 2.5 SOLUTION RESPIRATORY (INHALATION) at 19:05

## 2022-09-03 RX ADMIN — PIPERACILLIN SODIUM AND TAZOBACTAM SODIUM 2.25 G: 2; .25 INJECTION, POWDER, LYOPHILIZED, FOR SOLUTION INTRAVENOUS at 02:35

## 2022-09-03 RX ADMIN — PIPERACILLIN SODIUM AND TAZOBACTAM SODIUM 2.25 G: 2; .25 INJECTION, POWDER, LYOPHILIZED, FOR SOLUTION INTRAVENOUS at 20:48

## 2022-09-03 RX ADMIN — PIPERACILLIN SODIUM AND TAZOBACTAM SODIUM 2.25 G: 2; .25 INJECTION, POWDER, LYOPHILIZED, FOR SOLUTION INTRAVENOUS at 09:01

## 2022-09-03 RX ADMIN — IRON SUCROSE 200 MG: 20 INJECTION, SOLUTION INTRAVENOUS at 09:01

## 2022-09-03 RX ADMIN — PIPERACILLIN SODIUM AND TAZOBACTAM SODIUM 2.25 G: 2; .25 INJECTION, POWDER, LYOPHILIZED, FOR SOLUTION INTRAVENOUS at 14:19

## 2022-09-03 RX ADMIN — WATER: 1 INJECTION INTRAMUSCULAR; INTRAVENOUS; SUBCUTANEOUS at 17:04

## 2022-09-03 RX ADMIN — LAMOTRIGINE 100 MG: 100 TABLET ORAL at 08:51

## 2022-09-03 RX ADMIN — HALOPERIDOL 1 MG: 1 TABLET ORAL at 21:21

## 2022-09-03 RX ADMIN — ALBUTEROL SULFATE 2.5 MG: 2.5 SOLUTION RESPIRATORY (INHALATION) at 14:27

## 2022-09-03 RX ADMIN — ALTEPLASE 2 MG: 2.2 INJECTION, POWDER, LYOPHILIZED, FOR SOLUTION INTRAVENOUS at 15:59

## 2022-09-03 RX ADMIN — MAGNESIUM SULFATE HEPTAHYDRATE: 500 INJECTION, SOLUTION INTRAMUSCULAR; INTRAVENOUS at 21:40

## 2022-09-03 RX ADMIN — PANTOPRAZOLE SODIUM 40 MG: 40 INJECTION, POWDER, FOR SOLUTION INTRAVENOUS at 20:26

## 2022-09-03 RX ADMIN — ALTEPLASE 2 MG: 2.2 INJECTION, POWDER, LYOPHILIZED, FOR SOLUTION INTRAVENOUS at 02:35

## 2022-09-03 ASSESSMENT — ACTIVITIES OF DAILY LIVING (ADL)
ADLS_ACUITY_SCORE: 41
ADLS_ACUITY_SCORE: 48
ADLS_ACUITY_SCORE: 48
ADLS_ACUITY_SCORE: 44
ADLS_ACUITY_SCORE: 50
ADLS_ACUITY_SCORE: 44
ADLS_ACUITY_SCORE: 41
ADLS_ACUITY_SCORE: 48
ADLS_ACUITY_SCORE: 41
ADLS_ACUITY_SCORE: 41
ADLS_ACUITY_SCORE: 48
ADLS_ACUITY_SCORE: 45

## 2022-09-03 NOTE — PLAN OF CARE
Goal Outcome Evaluation:    Stable. RA. Tolerating Clear liquids. Up in chair today via A/1 W/ GB. Purewick changed. Small BM smear. PICC infusing TPN, sluggish, continue Heat packs 4xs a day. Attempting to get TPA ordered to unclog ordered from provider, see provider notification note. Pts brother visited tonight. Plan pending progress, PT recommending TCU, will need a SW/CC consult to help facilitate discharge. Pt curious about when her PTA haldol will be restarted.

## 2022-09-03 NOTE — PLAN OF CARE
A&Ox4. VSS on RA. Denies chest pain and SOB. Denies pain. Staples to midline incision-ULISSES. DAVI drain patent, site CDI. Denies pain. Hypoactive BS. Reported passing gas. Abdomen mildly distended. No nausea. Tolerating clears. PICC infusing TPN at 65ml/hr. Incontinent of urine, using external catheter. Up Ax2 walker+GB. Generalized weakness to all extremities. Up in chair for 2 hours. Continue to monitor.

## 2022-09-03 NOTE — PROGRESS NOTES
Received a page for PICC not flushing well and no blood return. VAT RN changed dressing noted site WNL. Attempted to flush lumens with great resistance. Advised Alteplase. Alteplase placed in red lumen and we will reassess.

## 2022-09-03 NOTE — PROVIDER NOTIFICATION
Dr Apple murillo paged for    PICC line hard to flush and no blood return from both lumen. needs TPA    Comments:  Vascular/iv team also paged for advice/trouble shoot picc line that keeps clotting despite alteplase  (last tpa done this morning at 0400).

## 2022-09-03 NOTE — PROGRESS NOTES
Double lumen PICC line: Purple lumen was infusing TPN and hard to flush.   Red lumen unable to flush and occluded.  Obtained order for alteplase. iv nurse attempted Alteplase but unable to draw back or flush in around 1800.    X-ray completed to verify picc line placement (per order from vascula/iv  nurse).   IV nurse paged again around 1845 as X-ray shows possible kink at the ac site.     Comments: TPN paused at 1600. Floor pharmacist notified.

## 2022-09-03 NOTE — PHARMACY-CONSULT NOTE
TPN formula evaluated based on today s labs results.    The following changes have been made:  Phosphorus: increased to 10 mM daily    Pharmacy will continue to follow and adjust as appropriate.  Sandra Lynch, PharmD

## 2022-09-03 NOTE — PROVIDER NOTIFICATION
MD Notification    Notified Person: PA    Notified Person Name: LESLIE Dias PA    Notification Date/Time: September 2, 2022 8:30    Notification Interaction: AMCOME- Providers Pager not listed or available. Reached out to Captain for direction. He gave a phone number left   at 2100. No call back.     Purpose of Notification:PICC needing TPA    Orders Received: pending    Comments:

## 2022-09-03 NOTE — PROGRESS NOTES
Assessment and Plan:   REI: DI with hypernatremia. 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   Cr improving, now 1.79. Na now 143. K 3.9, HCO3 21. Phos slightly low at 1.7. Mg 2.1.   TPN running . No maintenance IVF.     Suggest checking labs 3 X per week while inpatient  Follow up with Dr. Cedeno, [facility], [phone], in 2 weeks after discharge.               Interval History:   Anemia: Hgb stable. On IV venofer.   S/P perf gastric ulcer with surgical repair.      On TPN.    Hypertension: on amlodipine.              Review of Systems:   Denines pain. No N, V. No SOB.     She is taking clear liquids and drinking water well.           Medications:       albuterol  2.5 mg Nebulization 4x Daily     amLODIPine  5 mg Oral Daily     heparin ANTICOAGULANT  5,000 Units Subcutaneous Q12H     insulin aspart  1-12 Units Subcutaneous Q4H     iron sucrose  200 mg Intravenous Daily     lamoTRIgine  100 mg Oral Daily     lipids  250 mL Intravenous Once per day on Mon Tue Wed Thu Fri     pantoprazole  40 mg Intravenous BID AC     piperacillin-tazobactam  2.25 g Intravenous Q6H     sodium chloride (PF)  10-40 mL Intracatheter Q8H     sodium chloride (PF)  3 mL Intracatheter Q8H       parenteral nutrition - ADULT compounded formula 65 mL/hr at 09/02/22 2010     Current active medications and PTA medications reviewed, see medication list for details.            Physical Exam:   Vitals were reviewed  Patient Vitals for the past 24 hrs:   BP Temp Temp src Pulse Resp SpO2 Weight   09/03/22 0807 -- -- -- -- -- 98 % --   09/03/22 0700 125/87 98.3  F (36.8  C) Oral 92 18 97 % --   09/03/22 0612 -- -- -- -- -- -- 55.1 kg (121 lb 7.6 oz)   09/03/22 0353 118/89 98.4  F (36.9  C) Oral 93 18 97 % --   09/03/22 0030 136/89 98.6  F (37  C) Oral 97 16 99 % --   09/02/22 1954 -- -- -- -- -- 98 % --   09/02/22 1940 129/80 98.5  F (36.9  C) Oral 99 16 95 % --   09/02/22 1549 -- -- -- -- -- 99 % --   09/02/22 1528 (!) 161/104 97.6  F (36.4  C)  Oral 108 16 96 % --   22 1237 (!) 140/101 98  F (36.7  C) Oral 98 16 98 % --   22 1200 -- -- -- -- 16 -- --   22 1111 -- -- -- -- -- 98 % --       Temp:  [97.6  F (36.4  C)-98.6  F (37  C)] 98.3  F (36.8  C)  Pulse:  [] 92  Resp:  [16-18] 18  BP: (118-161)/() 125/87  SpO2:  [95 %-99 %] 98 %    Temperatures:  Current - Temp: 98.3  F (36.8  C); Max - Temp  Av.2  F (36.8  C)  Min: 97.6  F (36.4  C)  Max: 98.6  F (37  C)  Respiration range: Resp  Av.6  Min: 16  Max: 18  Pulse range: Pulse  Av.8  Min: 92  Max: 108  Blood pressure range: Systolic (24hrs), Av , Min:118 , Max:161   ; Diastolic (24hrs), Av, Min:80, Max:104    Pulse oximetry range: SpO2  Av.5 %  Min: 95 %  Max: 99 %    I/O last 3 completed shifts:  In: 1405 [P.O.:1405]  Out: 1140 [Urine:1100; Drains:40]      Intake/Output Summary (Last 24 hours) at 9/3/2022 1000  Last data filed at 9/3/2022 0700  Gross per 24 hour   Intake 1045 ml   Output 1940 ml   Net -895 ml       Alert, responsive  No distress       Wt Readings from Last 4 Encounters:   22 55.1 kg (121 lb 7.6 oz)   22 55.3 kg (122 lb)   22 59.9 kg (132 lb)   22 64 kg (141 lb)          Data:          Lab Results   Component Value Date     2022     2022     2022     2020     10/15/2019     10/23/2018    Lab Results   Component Value Date    CHLORIDE 115 2022    CHLORIDE 115 2022    CHLORIDE 119 2022    CHLORIDE 114 2020    CHLORIDE 115 10/15/2019    CHLORIDE 110 10/23/2018    Lab Results   Component Value Date    BUN 43 2022    BUN 45 2022    BUN 45 2022    BUN 34 2020    BUN 31 10/15/2019    BUN 29 10/23/2018      Lab Results   Component Value Date    POTASSIUM 3.9 2022    POTASSIUM 3.9 2022    POTASSIUM 4.4 2022    POTASSIUM 3.9 2020    POTASSIUM 4.0 10/15/2019    POTASSIUM 3.8 10/23/2018     Lab Results   Component Value Date    CO2 21 09/03/2022    CO2 21 09/02/2022    CO2 23 09/01/2022    CO2 19 12/21/2020    CO2 19 10/15/2019    CO2 24 10/23/2018    Lab Results   Component Value Date    CR 1.79 09/03/2022    CR 2.10 09/02/2022    CR 2.44 09/01/2022    CR 1.41 03/13/2021    CR 1.64 01/05/2021    CR 1.80 12/21/2020        Recent Labs   Lab Test 09/03/22  0558 09/02/22  0549 09/01/22  0628   WBC 13.9* 12.8* 11.4*   HGB 10.2* 10.5* 10.5*   HCT 32.7* 33.6* 33.8*   MCV 73* 74* 74*   * 401 341     Recent Labs   Lab Test 09/01/22  0628 08/31/22  0536 08/30/22  0422   AST 20 26 26   ALT 84* 108* 137*   ALKPHOS 237* 242* 219*   BILITOTAL 0.3 0.5 0.7       Recent Labs   Lab Test 09/03/22  0558 09/02/22  0549 09/01/22  0628   MAG 2.1 2.3 2.6*     Recent Labs   Lab Test 09/03/22  0558 09/02/22  0549 09/01/22  0628   PHOS 1.7* 2.7 4.2     Recent Labs   Lab Test 09/03/22  0558 09/02/22  0549 09/01/22  0628   ISSA 9.3 9.4 8.9       Lab Results   Component Value Date    ISSA 9.3 09/03/2022     Lab Results   Component Value Date    WBC 13.9 (H) 09/03/2022    HGB 10.2 (L) 09/03/2022    HCT 32.7 (L) 09/03/2022    MCV 73 (L) 09/03/2022     (H) 09/03/2022     Lab Results   Component Value Date     09/03/2022    POTASSIUM 3.9 09/03/2022    CHLORIDE 115 (H) 09/03/2022    CO2 21 09/03/2022     (H) 09/03/2022     Lab Results   Component Value Date    BUN 43 (H) 09/03/2022    CR 1.79 (H) 09/03/2022     Lab Results   Component Value Date    MAG 2.1 09/03/2022     Lab Results   Component Value Date    PHOS 1.7 (L) 09/03/2022       Creatinine   Date Value Ref Range Status   09/03/2022 1.79 (H) 0.52 - 1.04 mg/dL Final   09/02/2022 2.10 (H) 0.52 - 1.04 mg/dL Final   09/01/2022 2.44 (H) 0.52 - 1.04 mg/dL Final   08/31/2022 2.55 (H) 0.52 - 1.04 mg/dL Final   08/30/2022 2.94 (H) 0.52 - 1.04 mg/dL Final   08/29/2022 3.13 (H) 0.52 - 1.04 mg/dL Final   03/13/2021 1.41 (H) 0.52 - 1.04 mg/dL Final   01/05/2021 1.64  (H) 0.52 - 1.04 mg/dL Final   12/21/2020 1.80 (H) 0.52 - 1.04 mg/dL Final   10/15/2019 1.16 (H) 0.52 - 1.04 mg/dL Final   10/23/2018 1.12 (H) 0.52 - 1.04 mg/dL Final   08/03/2018 1.15 (H) 0.52 - 1.04 mg/dL Final       Attestation:  I have reviewed today's vital signs, notes, medications, labs and imaging.     Cesar Chaudhary MD

## 2022-09-03 NOTE — PROGRESS NOTES
[facility]    Internal Medicine Hospitalist Progress Note  09/03/2022  I evaluated patient on the above date.    Artemio Chiu Jr., MD  [phone] (p)  Text Page  Vocera        Assessment & Plan New actions/orders today (09/03/2022) are underlined.    71 year old female with history including HTN, HLD, heterozygous thalassemia, CKD, and diabetes insipidus, who presented 8/24/2022 abdominal pain and dyspnea and found with signs of a perforated viscus with septic shock. Underwent emergent ex-lap and repair of perforated ulcer on 8/24/2022.    On initial evaluation 8/24, was afebrile, hypotensive, tachycardic; WBC 3.6, hgb 11.6; cr 3.51, potassium 6.2, bicarb 7; lactate 8.6, LFT's showed normal ALT, AST, ALP, TBili. CT CAP 8/24 showed large volume pneumoperitoneum, favored source from the anterior distal stomach/proximal duodenum; small volume free fluid without walled-off, drainable collection; small bilateral pleural effusions with bibasilar atelectasis; incidental indeterminate renal lesions; intrathoracic goiter with distinct 1.8 cm left thyroid nodule.    Required pressors in the ED. Surgery contacted and performed repair of perforated gastric ulcer emergently 8/24/2022. Weaned off pressors 8/27. Extubated 8/30. Transferred to the IM Hospitalist team 8/31/2022.      Perforated gastric ulcer - s/p ex-lap, repair of perforated gastric ulcer and peritoneal washout 8/24/2022.  Post-op ileus.  Septic shock secondary to above, resolved.  Lactic acidosis due to above, resolved.  * Initial presentation as above. Started on pip-tazo 8/24. BC's 8/24 NGTD. Started on IV pantoprazole on admit.  * Tried clear liquids 8/30, but with N/V.  * Loose stools noted 8/31. Started on TPN 8/31.  * Started on minimal clear liquids 9/1.  - Post-op management per Surgery.  - Continue TPN.  - Continue clear liquids; advance diet as able per Surgery.  - Continue pip-tazo (started 8/31).  - Continue IV  pantoprazole.  - Continue PRN acetaminophen, PRN oxycodone, PRN IV hydromorphone; minimize opioids as able.  - Continue to increase activity as able; increase frequency of ambulation as able.    Acute kidney injury (suspect ATN) on CKD.  Metabolic acidosis, suspect due to perforated viscus with lactic acidosis.  * Baseline creatinine about 2.  * Cr 3.51 with potassium 6.2 and bicarb 7 on admit.  * Nephrology consulted 8/25.  * Cr improved with IVF's.  Recent Labs   Lab 09/03/22  0558 09/02/22  0549 09/01/22  0628 08/31/22  0536 08/30/22  0422 08/29/22  0411   CR 1.79* 2.10* 2.44* 2.55* 2.94* 3.13*   - Monitor BMP.  - Avoid nephrotoxic medications.    Acute respiratory failure requiring intubation secondary to above, resolved.  * Extubated 8/30.  * Off O2 9/1.  - Monitor clinically.    Hypernatremia.  H/o DI (history of lithium toxicity).  * Sodium normal on admit.  * Sodium increased starting 8/27. Treated with IVF's including D5W.  * Sodium normalized 9/3.  Recent Labs   Lab 09/03/22  0558 09/02/22  0549 09/01/22  0628 08/31/22  0536 08/30/22  0422 08/29/22  0411    145* 150* 147* 149* 150*   - Adjust TPN as needed.  - Monitor sodium.  - Give IV meds in D5W rather than NS as able.     Hyperglycemia.  H/o IGT based on hgb A1C.  * Hgb A1C 6.1 12/2020.  * Pt with hyperglycemia this hospitalization.  Recent Labs   Lab 09/03/22  0834 09/03/22  0558 09/03/22  0339 09/03/22  0036 09/02/22  2231 09/02/22  1647   * 157* 198* 178* 215* 313*   - Continue high ISS for now.     Acute blood loss anemia due to perforated ulcer, surgery.  Hereditary thalassemia.  * Hgb 11.6 on admit 8/24.  * Required several transfusions in ICU.  * IV iron ordered 9/2  Recent Labs   Lab 09/03/22  0558 09/02/22  0549 09/01/22  0628 08/31/22  0536 08/30/22  0422 08/29/22  0411   HGB 10.2* 10.5* 10.5* 10.8* 10.4* 10.5*   - Continue IV iron sucrose (started 9/2) - stop after 9/6.  - Monitor CBC.  - Consider prbc transfusion if hgb </= 7.0  or if significant bleeding with hemodynamic instability or if symptomatic.    Rash, unclear etiology, question drug reaction.  * On 9/2, noted with light erythematous rash on torso/back. Endorsed some itching when asked.  * No clear culprit meds; no new scheduled meds (though TPN was new).  - Continue PRN diphenhydramine.  - Monitor.    Sinus bradycardia.  * Noted with sinus bradycardia 8/31. No culprit meds noted.  - Continue to monitor on telemetry.    Hypertension (benign essential).  [PTA: amlodipine 5 mg daily.]  * Amlodipine held on admit given above issues.  * Restarted amlodipine 9/2.  - Continue amlodipine 5 mg daily.  - Monitor BP's.    DLD.  - Continue to hold PTA simvastatin.    Bipolar disorder.  Depression/anxiety.   [PTA: haloperidol 1 mg at bedtime; lamotrigine 100 mg daily.]  - Continue lamotrigine.   - Restart haloperidol tonight 9/3.  - Continue to hold PTA haloperidol.    Weakness and physical deconditioning due to multiple acute medical issues.  - Continue PT and OT.      Clinically Significant Risk Factors Present on Admission                        COVID-19 testing.  COVID-19 PCR Results    COVID-19 PCR Results 11/8/21 6/13/22 8/24/22   SARS CoV2 PCR Negative Negative Negative      Comments are available for some flowsheets but are not being displayed.         COVID-19 Antibody Results, Testing for Immunity    COVID-19 Antibody Results, Testing for Immunity   No data to display.             Diet: Clear Liquid Diet  parenteral nutrition - ADULT compounded formula    Prophylaxis: PCD's, ambulation. Heparin subcutaneous.  Suresh Catheter: Not present  Central Lines: PRESENT  PICC Double Lumen 08/31/22 Right Brachial vein lateral-Site Assessment: WDL  Code Status: Full Code    Disposition Plan   Expected discharge: 2-3d recommended to prior living arrangement pending above.  Entered: Artemio Chiu MD 09/03/2022, 11:29 AM     Communication.  - I d/w pt's brother 9/3.      Interval History  "  Feeling stronger.  Notes abdomen feels tight around the incision.  Tolerating clears. Having loose/liquid BM's.    -Data reviewed today: I reviewed all new labs and imaging over the last 24 hours. I personally reviewed no images or EKG's today.    Physical Exam    , Blood pressure 125/87, pulse 92, temperature 98.3  F (36.8  C), temperature source Oral, resp. rate 18, height 1.626 m (5' 4\"), weight 55.1 kg (121 lb 7.6 oz), SpO2 95 %, not currently breastfeeding. O2 Device: None (Room air)    Vitals:    08/31/22 0200 09/02/22 0033 09/03/22 0612   Weight: 59 kg (130 lb 1.1 oz) 55.5 kg (122 lb 5.7 oz) 55.1 kg (121 lb 7.6 oz)     Vital Signs with Ranges  Temp:  [97.6  F (36.4  C)-98.6  F (37  C)] 98.3  F (36.8  C)  Pulse:  [] 92  Resp:  [16-18] 18  BP: (118-161)/() 125/87  SpO2:  [95 %-99 %] 95 %  Patient Vitals for the past 24 hrs:   BP Temp Temp src Pulse Resp SpO2 Weight   09/03/22 1120 -- -- -- -- -- 95 % --   09/03/22 0807 -- -- -- -- -- 98 % --   09/03/22 0700 125/87 98.3  F (36.8  C) Oral 92 18 97 % --   09/03/22 0612 -- -- -- -- -- -- 55.1 kg (121 lb 7.6 oz)   09/03/22 0353 118/89 98.4  F (36.9  C) Oral 93 18 97 % --   09/03/22 0030 136/89 98.6  F (37  C) Oral 97 16 99 % --   09/02/22 1954 -- -- -- -- -- 98 % --   09/02/22 1940 129/80 98.5  F (36.9  C) Oral 99 16 95 % --   09/02/22 1549 -- -- -- -- -- 99 % --   09/02/22 1528 (!) 161/104 97.6  F (36.4  C) Oral 108 16 96 % --   09/02/22 1237 (!) 140/101 98  F (36.7  C) Oral 98 16 98 % --   09/02/22 1200 -- -- -- -- 16 -- --     I/O's Last 24 hours  I/O last 3 completed shifts:  In: 1405 [P.O.:1405]  Out: 1140 [Urine:1100; Drains:40]    Constitutional: Awake, alert, pleasant, conversant.  Respiratory: Diminished in bases. No crackles or wheezes.  Cardiovascular: RRR, no m/r/g.  GI: Distension, nt to light touch, few BS.  Skin/Integumen:   Other:        Data   Recent Labs   Lab 09/03/22  0834 09/03/22  0558 09/03/22  0339 09/02/22  0910 09/02/22  0549 " 09/01/22  0831 09/01/22  0628 08/31/22  1248 08/31/22  0536   WBC  --  13.9*  --   --  12.8*  --  11.4*  --  9.2   HGB  --  10.2*  --   --  10.5*  --  10.5*  --  10.8*   MCV  --  73*  --   --  74*  --  74*  --  72*   PLT  --  503*  --   --  401  --  341  --  249   INR  --   --   --   --   --   --  1.21*  --   --    NA  --  143  --   --  145*  --  150*  --  147*   POTASSIUM  --  3.9  --   --  3.9  --  4.4  --  3.6   CHLORIDE  --  115*  --   --  115*  --  119*  --  116*   CO2  --  21  --   --  21  --  23  --  22   BUN  --  43*  --   --  45*  --  45*  --  47*   CR  --  1.79*  --   --  2.10*  --  2.44*  --  2.55*   ANIONGAP  --  7  --   --  9  --  8  --  9   ISSA  --  9.3  --   --  9.4  --  8.9  --  8.6   * 157* 198*   < > 189*   < > 179*   < > 121*   ALBUMIN  --   --   --   --   --   --  1.7*  --  1.6*   PROTTOTAL  --   --   --   --   --   --  5.7*  --  5.2*   BILITOTAL  --   --   --   --   --   --  0.3  --  0.5   ALKPHOS  --   --   --   --   --   --  237*  --  242*   ALT  --   --   --   --   --   --  84*  --  108*   AST  --   --   --   --   --   --  20  --  26    < > = values in this interval not displayed.     Recent Labs   Lab Test 09/03/22  0834 09/03/22  0558 09/03/22  0339 09/03/22  0036 09/02/22 2231   * 157* 198* 178* 215*     Recent Labs   Lab 09/03/22  0558 09/02/22  0549 09/01/22  0628 08/31/22  0536 08/30/22  0422 08/29/22  0411 08/28/22  0638   WBC 13.9* 12.8* 11.4* 9.2 8.8 7.8  --    LACT  --   --   --   --   --   --  1.1         No results found for this or any previous visit (from the past 24 hour(s)).    Medications   All medications were reviewed.    parenteral nutrition - ADULT compounded formula 65 mL/hr at 09/03/22 1017       albuterol  2.5 mg Nebulization 4x Daily     amLODIPine  5 mg Oral Daily     heparin ANTICOAGULANT  5,000 Units Subcutaneous Q12H     insulin aspart  1-12 Units Subcutaneous Q4H     iron sucrose  200 mg Intravenous Daily     lamoTRIgine  100 mg Oral Daily      lipids  250 mL Intravenous Once per day on Mon Tue Wed Thu Fri     pantoprazole  40 mg Intravenous BID AC     piperacillin-tazobactam  2.25 g Intravenous Q6H     sodium chloride (PF)  10-40 mL Intracatheter Q8H     sodium chloride (PF)  3 mL Intracatheter Q8H     acetaminophen, acetylcysteine, albuterol, [Held by provider] bisacodyl, glucose **OR** dextrose **OR** glucagon, diphenhydrAMINE **OR** [DISCONTINUED] diphenhydrAMINE, HYDROmorphone, lidocaine 4%, lidocaine (buffered or not buffered), lidocaine (buffered or not buffered), magnesium hydroxide, naloxone **OR** naloxone **OR** naloxone **OR** naloxone, ondansetron **OR** ondansetron, oxyCODONE **OR** oxyCODONE, prochlorperazine **OR** prochlorperazine, sodium chloride (PF), sodium chloride (PF), sodium chloride (PF), sodium chloride (PF)

## 2022-09-03 NOTE — PROGRESS NOTES
Pt remains on RA 95%. BS clear. Nebs given as scheduled. RT will continue to follow.     Niki Soliz, RCA  9/3/2022

## 2022-09-03 NOTE — PROGRESS NOTES
"General Surgery Progress Note    Assessment and Plan:  Diana Santiago is a 71 year old female POD 10 s/p exploratory laparotomy and repair of perforated gastric ulcer  - Advance to fulls  - Continue trending WBC and electrolytes  - suppository PRN for constipation    Interval Hx:   abdominal pain well controlled, passing gas, denies nausea but has little appetite  Meds reviewed.    Exam:  Vitals: Blood pressure 126/82, pulse 89, temperature 98.2  F (36.8  C), temperature source Oral, resp. rate 18, height 1.626 m (5' 4\"), weight 55.1 kg (121 lb 7.6 oz), SpO2 100 %, not currently breastfeeding.  Temperature Temp  Av.3  F (36.8  C)  Min: 97.8  F (36.6  C)  Max: 98.6  F (37  C)   I/O last 3 completed shifts:  In: 980 [P.O.:980]  Out: 2550 [Urine:2500; Drains:50]    Gen: Awake and in no apparent distress.  Heart: RRR  Lungs: No increased work in breathing.  Abd: soft, appropriately tender, mildly distended, incision well approximated, drain with serous output    Data:    Recent Labs   Lab Test 22  0558 22  0549 22  0628   WBC 13.9* 12.8* 11.4*   HGB 10.2* 10.5* 10.5*   HCT 32.7* 33.6* 33.8*   * 401 341      Recent Labs   Lab Test 22  0558 22  0549 22  0628    145* 150*   POTASSIUM 3.9 3.9 4.4   CHLORIDE 115* 115* 119*   CO2 21 21 23   BUN 43* 45* 45*   CR 1.79* 2.10* 2.44*     Recent Labs   Lab Test 22  0628 22  0536 22  0422 22  0604 22  1307   BILITOTAL 0.3 0.5 0.7   < > 0.9   DBIL 0.2  --   --   --   --    ALT 84* 108* 137*   < > 29   AST 20 26 26   < > 24   ALKPHOS 237* 242* 219*   < > 88   LIPASE  --   --   --   --  511*    < > = values in this interval not displayed.     Lenora Arnold MD      "

## 2022-09-03 NOTE — PLAN OF CARE
Cognition/Mentation: A/O x4    Neuro/CMS: baseline numbness to fingers    VS: stable, RA    Respiratory: LS clear, respirations regular, infrequent nonproductive cough    GI/: external catheter in place, continent and incontinent of stool, passing flatus    Activity: shifts weight independently    Pain: denies    Drips: continuous TPN/lipids infusing    Drains/Tubes: PICC, purple lumen very difficult to flush, improved after tpa, DAVI to bulb suction    Skin: mild rash to back, scattered bruising    Disposition: pending

## 2022-09-04 LAB
ANION GAP SERPL CALCULATED.3IONS-SCNC: 7 MMOL/L (ref 3–14)
BASOPHILS # BLD AUTO: 0.1 10E3/UL (ref 0–0.2)
BASOPHILS NFR BLD AUTO: 0 %
BUN SERPL-MCNC: 49 MG/DL (ref 7–30)
CALCIUM SERPL-MCNC: 9.1 MG/DL (ref 8.5–10.1)
CHLORIDE BLD-SCNC: 116 MMOL/L (ref 94–109)
CO2 SERPL-SCNC: 20 MMOL/L (ref 20–32)
CREAT SERPL-MCNC: 1.76 MG/DL (ref 0.52–1.04)
EOSINOPHIL # BLD AUTO: 0.4 10E3/UL (ref 0–0.7)
EOSINOPHIL NFR BLD AUTO: 3 %
ERYTHROCYTE [DISTWIDTH] IN BLOOD BY AUTOMATED COUNT: 23.1 % (ref 10–15)
GFR SERPL CREATININE-BSD FRML MDRD: 30 ML/MIN/1.73M2
GLUCOSE BLD-MCNC: 155 MG/DL (ref 70–99)
GLUCOSE BLDC GLUCOMTR-MCNC: 162 MG/DL (ref 70–99)
GLUCOSE BLDC GLUCOMTR-MCNC: 170 MG/DL (ref 70–99)
GLUCOSE BLDC GLUCOMTR-MCNC: 182 MG/DL (ref 70–99)
GLUCOSE BLDC GLUCOMTR-MCNC: 184 MG/DL (ref 70–99)
GLUCOSE BLDC GLUCOMTR-MCNC: 204 MG/DL (ref 70–99)
GLUCOSE BLDC GLUCOMTR-MCNC: 216 MG/DL (ref 70–99)
HCT VFR BLD AUTO: 31.4 % (ref 35–47)
HGB BLD-MCNC: 9.7 G/DL (ref 11.7–15.7)
IMM GRANULOCYTES # BLD: 0.3 10E3/UL
IMM GRANULOCYTES NFR BLD: 2 %
LYMPHOCYTES # BLD AUTO: 1 10E3/UL (ref 0.8–5.3)
LYMPHOCYTES NFR BLD AUTO: 8 %
MCH RBC QN AUTO: 23 PG (ref 26.5–33)
MCHC RBC AUTO-ENTMCNC: 30.9 G/DL (ref 31.5–36.5)
MCV RBC AUTO: 75 FL (ref 78–100)
MONOCYTES # BLD AUTO: 1.2 10E3/UL (ref 0–1.3)
MONOCYTES NFR BLD AUTO: 10 %
NEUTROPHILS # BLD AUTO: 9.4 10E3/UL (ref 1.6–8.3)
NEUTROPHILS NFR BLD AUTO: 77 %
NRBC # BLD AUTO: 0 10E3/UL
NRBC BLD AUTO-RTO: 0 /100
PHOSPHATE SERPL-MCNC: 2.3 MG/DL (ref 2.5–4.5)
PLATELET # BLD AUTO: 514 10E3/UL (ref 150–450)
POTASSIUM BLD-SCNC: 3.8 MMOL/L (ref 3.4–5.3)
RBC # BLD AUTO: 4.21 10E6/UL (ref 3.8–5.2)
SODIUM SERPL-SCNC: 143 MMOL/L (ref 133–144)
WBC # BLD AUTO: 12.2 10E3/UL (ref 4–11)

## 2022-09-04 PROCEDURE — 258N000003 HC RX IP 258 OP 636: Performed by: INTERNAL MEDICINE

## 2022-09-04 PROCEDURE — 250N000013 HC RX MED GY IP 250 OP 250 PS 637: Performed by: INTERNAL MEDICINE

## 2022-09-04 PROCEDURE — 250N000011 HC RX IP 250 OP 636

## 2022-09-04 PROCEDURE — 250N000011 HC RX IP 250 OP 636: Performed by: INTERNAL MEDICINE

## 2022-09-04 PROCEDURE — 94640 AIRWAY INHALATION TREATMENT: CPT | Mod: 76

## 2022-09-04 PROCEDURE — 999N000190 HC STATISTIC VAT ROUNDS

## 2022-09-04 PROCEDURE — 99232 SBSQ HOSP IP/OBS MODERATE 35: CPT | Performed by: INTERNAL MEDICINE

## 2022-09-04 PROCEDURE — 999N000157 HC STATISTIC RCP TIME EA 10 MIN

## 2022-09-04 PROCEDURE — 80048 BASIC METABOLIC PNL TOTAL CA: CPT | Performed by: INTERNAL MEDICINE

## 2022-09-04 PROCEDURE — 999N000040 HC STATISTIC CONSULT NO CHARGE VASC ACCESS

## 2022-09-04 PROCEDURE — 250N000009 HC RX 250: Performed by: INTERNAL MEDICINE

## 2022-09-04 PROCEDURE — 84100 ASSAY OF PHOSPHORUS: CPT | Performed by: INTERNAL MEDICINE

## 2022-09-04 PROCEDURE — C9113 INJ PANTOPRAZOLE SODIUM, VIA: HCPCS | Performed by: INTERNAL MEDICINE

## 2022-09-04 PROCEDURE — 120N000001 HC R&B MED SURG/OB

## 2022-09-04 PROCEDURE — 85004 AUTOMATED DIFF WBC COUNT: CPT | Performed by: INTERNAL MEDICINE

## 2022-09-04 RX ORDER — PIPERACILLIN SODIUM, TAZOBACTAM SODIUM 3; .375 G/15ML; G/15ML
3.38 INJECTION, POWDER, LYOPHILIZED, FOR SOLUTION INTRAVENOUS EVERY 6 HOURS
Status: DISCONTINUED | OUTPATIENT
Start: 2022-09-04 | End: 2022-09-07

## 2022-09-04 RX ADMIN — HALOPERIDOL 1 MG: 1 TABLET ORAL at 23:31

## 2022-09-04 RX ADMIN — PANTOPRAZOLE SODIUM 40 MG: 40 INJECTION, POWDER, FOR SOLUTION INTRAVENOUS at 08:24

## 2022-09-04 RX ADMIN — ALBUTEROL SULFATE 2.5 MG: 2.5 SOLUTION RESPIRATORY (INHALATION) at 15:40

## 2022-09-04 RX ADMIN — SODIUM PHOSPHATE, MONOBASIC, MONOHYDRATE 9 MMOL: 276; 142 INJECTION, SOLUTION INTRAVENOUS at 13:14

## 2022-09-04 RX ADMIN — IRON SUCROSE 200 MG: 20 INJECTION, SOLUTION INTRAVENOUS at 08:33

## 2022-09-04 RX ADMIN — PANTOPRAZOLE SODIUM 40 MG: 40 INJECTION, POWDER, FOR SOLUTION INTRAVENOUS at 20:09

## 2022-09-04 RX ADMIN — HEPARIN SODIUM 5000 UNITS: 5000 INJECTION, SOLUTION INTRAVENOUS; SUBCUTANEOUS at 23:31

## 2022-09-04 RX ADMIN — PIPERACILLIN SODIUM AND TAZOBACTAM SODIUM 2.25 G: 2; .25 INJECTION, POWDER, LYOPHILIZED, FOR SOLUTION INTRAVENOUS at 08:24

## 2022-09-04 RX ADMIN — LAMOTRIGINE 100 MG: 100 TABLET ORAL at 08:24

## 2022-09-04 RX ADMIN — PIPERACILLIN SODIUM AND TAZOBACTAM SODIUM 2.25 G: 2; .25 INJECTION, POWDER, LYOPHILIZED, FOR SOLUTION INTRAVENOUS at 01:33

## 2022-09-04 RX ADMIN — PIPERACILLIN AND TAZOBACTAM 3.38 G: 3; .375 INJECTION, POWDER, FOR SOLUTION INTRAVENOUS at 20:09

## 2022-09-04 RX ADMIN — ALBUTEROL SULFATE 2.5 MG: 2.5 SOLUTION RESPIRATORY (INHALATION) at 19:26

## 2022-09-04 RX ADMIN — PIPERACILLIN AND TAZOBACTAM 3.38 G: 3; .375 INJECTION, POWDER, FOR SOLUTION INTRAVENOUS at 14:36

## 2022-09-04 RX ADMIN — ALBUTEROL SULFATE 2.5 MG: 2.5 SOLUTION RESPIRATORY (INHALATION) at 07:27

## 2022-09-04 RX ADMIN — AMLODIPINE BESYLATE 5 MG: 5 TABLET ORAL at 08:24

## 2022-09-04 RX ADMIN — HEPARIN SODIUM 5000 UNITS: 5000 INJECTION, SOLUTION INTRAVENOUS; SUBCUTANEOUS at 00:42

## 2022-09-04 RX ADMIN — ALBUTEROL SULFATE 2.5 MG: 2.5 SOLUTION RESPIRATORY (INHALATION) at 10:33

## 2022-09-04 RX ADMIN — HEPARIN SODIUM 5000 UNITS: 5000 INJECTION, SOLUTION INTRAVENOUS; SUBCUTANEOUS at 13:15

## 2022-09-04 ASSESSMENT — ACTIVITIES OF DAILY LIVING (ADL)
ADLS_ACUITY_SCORE: 50

## 2022-09-04 NOTE — PROGRESS NOTES
[facility]    Internal Medicine Hospitalist Progress Note  09/04/2022  I evaluated patient on the above date.    Artemio Chiu Jr., MD  [phone] (p)  Text Page  Vocera        Assessment & Plan New actions/orders today (09/04/2022) are underlined.    71 year old female with history including HTN, HLD, heterozygous thalassemia, CKD, and diabetes insipidus, who presented 8/24/2022 abdominal pain and dyspnea and found with signs of a perforated viscus with septic shock. Underwent emergent ex-lap and repair of perforated ulcer on 8/24/2022.    On initial evaluation 8/24, was afebrile, hypotensive, tachycardic; WBC 3.6, hgb 11.6; cr 3.51, potassium 6.2, bicarb 7; lactate 8.6, LFT's showed normal ALT, AST, ALP, TBili. CT CAP 8/24 showed large volume pneumoperitoneum, favored source from the anterior distal stomach/proximal duodenum; small volume free fluid without walled-off, drainable collection; small bilateral pleural effusions with bibasilar atelectasis; incidental indeterminate renal lesions; intrathoracic goiter with distinct 1.8 cm left thyroid nodule.    Required pressors in the ED. Surgery contacted and performed repair of perforated gastric ulcer emergently 8/24/2022. Weaned off pressors 8/27. Extubated 8/30. Transferred to the IM Hospitalist team 8/31/2022.      Perforated gastric ulcer - s/p ex-lap, repair of perforated gastric ulcer and peritoneal washout 8/24/2022.  Post-op ileus.  Septic shock secondary to above, resolved.  Lactic acidosis due to above, resolved.  * Initial presentation as above. Started on pip-tazo 8/24. BC's 8/24 NGTD. Started on IV pantoprazole on admit.  * Tried clear liquids 8/30, but with N/V.  * Loose stools noted 8/31. Started on TPN 8/31.  * Started on minimal clear liquids 9/1.  * Advanced to full liquids 9/3.  Recent Labs   Lab 09/04/22  0552 09/03/22  0558 09/02/22  0549 09/01/22  0628 08/31/22  0536 08/30/22  0422 08/29/22  0411   WBC 12.2* 13.9*  12.8* 11.4* 9.2 8.8 7.8   - Post-op management per Surgery.  - Continue TPN.  - Continue full liquids; advance diet as able per Surgery.  - Continue pip-tazo (started 8/31).  - Continue IV pantoprazole.  - Continue PRN acetaminophen, PRN oxycodone, PRN IV hydromorphone; minimize opioids as able.  - Continue to increase activity as able.    Acute kidney injury (suspect ATN) on CKD.  Metabolic acidosis, suspect due to perforated viscus with lactic acidosis.  * Baseline creatinine about 2.  * Cr 3.51 with potassium 6.2 and bicarb 7 on admit.  * Nephrology consulted 8/25.  * Cr improved with IVF's, subsequently stopped 9/1.  Recent Labs   Lab 09/04/22  0552 09/03/22  0558 09/02/22  0549 09/01/22  0628 08/31/22  0536 08/30/22  0422   CR 1.76* 1.79* 2.10* 2.44* 2.55* 2.94*   - Monitor BMP.  - Avoid nephrotoxic medications.    Acute respiratory failure requiring intubation secondary to above, resolved.  * Extubated 8/30.  * Off O2 9/1.  - Monitor clinically.    Hypernatremia.  H/o DI (history of lithium toxicity).  * Sodium normal on admit.  * Sodium increased starting 8/27. Treated with IVF's including D5W.  * D5W stopped 9/1.  * Sodium normalized 9/3.  Recent Labs   Lab 09/04/22  0552 09/03/22  0558 09/02/22  0549 09/01/22  0628 08/31/22  0536 08/30/22  0422    143 145* 150* 147* 149*   - Adjust TPN as needed.  - Monitor sodium.  - Give IV meds in D5W rather than NS as able.     Hyperglycemia.  H/o IGT based on hgb A1C.  * Hgb A1C 6.1 12/2020.  * Pt with hyperglycemia this hospitalization.  Recent Labs   Lab 09/04/22  0839 09/04/22  0552 09/04/22  0410 09/04/22  0034 09/03/22 2020 09/03/22  1613   * 155* 170* 182* 131* 160*   - Continue high ISS for now.     Acute blood loss anemia due to perforated ulcer, surgery.  Hereditary thalassemia.  * Hgb 11.6 on admit 8/24.  * Required several transfusions in ICU.  * IV iron ordered 9/2  Recent Labs   Lab 09/04/22  0552 09/03/22  0558 09/02/22  0549 09/01/22  0628  08/31/22  0536 08/30/22  0422   HGB 9.7* 10.2* 10.5* 10.5* 10.8* 10.4*   - Continue IV iron sucrose (started 9/2) - stop after 9/6.  - Monitor CBC.  - Consider prbc transfusion if hgb </= 7.0 or if significant bleeding with hemodynamic instability or if symptomatic.    Rash, unclear etiology, question drug reaction.  * On 9/2, noted with light erythematous rash on torso/back. Endorsed some itching when asked.  * No clear culprit meds; no new scheduled meds (though TPN was new).  - Continue PRN diphenhydramine.  - Monitor.    Sinus bradycardia.  * Noted with sinus bradycardia 8/31. No culprit meds noted.  - Continue to monitor on telemetry.    Hypertension (benign essential).  [PTA: amlodipine 5 mg daily.]  * Amlodipine held on admit given above issues.  * Restarted amlodipine 9/2.  - Continue amlodipine 5 mg daily.  - Monitor BP's.    DLD.  - Continue to hold PTA simvastatin.    Bipolar disorder.  Depression/anxiety.   [PTA: haloperidol 1 mg at bedtime; lamotrigine 100 mg daily.]  * Lamotrigine restarted 8/30.  * Restarted haloperidol 9/3.  - Continue lamotrigine, haloperidol.    Weakness and physical deconditioning due to multiple acute medical issues.  - Continue PT and OT.  - May need TCU.      Clinically Significant Risk Factors Present on Admission                        COVID-19 testing.  COVID-19 PCR Results    COVID-19 PCR Results 11/8/21 6/13/22 8/24/22   SARS CoV2 PCR Negative Negative Negative      Comments are available for some flowsheets but are not being displayed.         COVID-19 Antibody Results, Testing for Immunity    COVID-19 Antibody Results, Testing for Immunity   No data to display.             Diet: parenteral nutrition - ADULT compounded formula  Full Liquid Diet  parenteral nutrition - ADULT compounded formula    Prophylaxis: PCD's, ambulation. Heparin subcutaneous.  Suresh Catheter: Not present  Central Lines: PRESENT  PICC Double Lumen 08/31/22 Right Brachial vein lateral-Site Assessment:  "WDL  Code Status: Full Code    Disposition Plan   Expected discharge: 2-3d recommended to home vs TCU pending above.  Entered: Artemio Chiu MD 09/04/2022, 11:56 AM     Communication.  - I d/w pt's brother 9/3.      Interval History   Doing OK.  Tolerating full liquids.  Having loose/liquid BM's.    -Data reviewed today: I reviewed all new labs and imaging over the last 24 hours. I personally reviewed no images or EKG's today.    Physical Exam    , Blood pressure 125/85, pulse 84, temperature 97.9  F (36.6  C), temperature source Oral, resp. rate 18, height 1.626 m (5' 4\"), weight 55.1 kg (121 lb 7.6 oz), SpO2 98 %, not currently breastfeeding. O2 Device: None (Room air)    Vitals:    09/02/22 0033 09/03/22 0612 09/04/22 0500   Weight: 55.5 kg (122 lb 5.7 oz) 55.1 kg (121 lb 7.6 oz) 55.1 kg (121 lb 7.6 oz)     Vital Signs with Ranges  Temp:  [97.3  F (36.3  C)-98.2  F (36.8  C)] 97.9  F (36.6  C)  Pulse:  [70-95] 84  Resp:  [16-18] 18  BP: (112-132)/(82-85) 125/85  SpO2:  [95 %-100 %] 98 %  Patient Vitals for the past 24 hrs:   BP Temp Temp src Pulse Resp SpO2 Weight   09/04/22 0822 125/85 97.9  F (36.6  C) Oral 84 18 98 % --   09/04/22 0500 -- -- -- -- -- -- 55.1 kg (121 lb 7.6 oz)   09/04/22 0000 112/82 97.3  F (36.3  C) Oral 70 16 96 % --   09/03/22 2017 128/85 97.3  F (36.3  C) Oral 88 18 95 % --   09/03/22 1905 -- -- -- -- -- 97 % --   09/03/22 1620 -- 98.2  F (36.8  C) Oral -- -- -- --   09/03/22 1607 126/82 -- -- 89 18 100 % --   09/03/22 1427 -- -- -- -- -- 98 % --   09/03/22 1211 132/83 97.8  F (36.6  C) Oral 95 18 95 % --     I/O's Last 24 hours  I/O last 3 completed shifts:  In: 590 [P.O.:590]  Out: 2830 [Urine:2800; Drains:30]    Constitutional: Awake, alert, pleasant, conversant.  Respiratory: Diminished in bases. No crackles or wheezes.  Cardiovascular: RRR, no m/r/g.  GI: Softer, nt to light touch, more BS.  Skin/Integumen:   Other:        Data   Recent Labs   Lab 09/04/22  0839 09/04/22  0552 " 09/04/22  0410 09/03/22  0834 09/03/22  0558 09/02/22  0910 09/02/22  0549 09/01/22  0831 09/01/22  0628 08/31/22  1248 08/31/22  0536   WBC  --  12.2*  --   --  13.9*  --  12.8*  --  11.4*  --  9.2   HGB  --  9.7*  --   --  10.2*  --  10.5*  --  10.5*  --  10.8*   MCV  --  75*  --   --  73*  --  74*  --  74*  --  72*   PLT  --  514*  --   --  503*  --  401  --  341  --  249   INR  --   --   --   --   --   --   --   --  1.21*  --   --    NA  --  143  --   --  143  --  145*  --  150*  --  147*   POTASSIUM  --  3.8  --   --  3.9  --  3.9  --  4.4  --  3.6   CHLORIDE  --  116*  --   --  115*  --  115*  --  119*  --  116*   CO2  --  20  --   --  21  --  21  --  23  --  22   BUN  --  49*  --   --  43*  --  45*  --  45*  --  47*   CR  --  1.76*  --   --  1.79*  --  2.10*  --  2.44*  --  2.55*   ANIONGAP  --  7  --   --  7  --  9  --  8  --  9   ISSA  --  9.1  --   --  9.3  --  9.4  --  8.9  --  8.6   * 155* 170*   < > 157*   < > 189*   < > 179*   < > 121*   ALBUMIN  --   --   --   --   --   --   --   --  1.7*  --  1.6*   PROTTOTAL  --   --   --   --   --   --   --   --  5.7*  --  5.2*   BILITOTAL  --   --   --   --   --   --   --   --  0.3  --  0.5   ALKPHOS  --   --   --   --   --   --   --   --  237*  --  242*   ALT  --   --   --   --   --   --   --   --  84*  --  108*   AST  --   --   --   --   --   --   --   --  20  --  26    < > = values in this interval not displayed.     Recent Labs   Lab Test 09/04/22  0839 09/04/22  0552 09/04/22  0410 09/04/22  0034 09/03/22  2020   * 155* 170* 182* 131*     Recent Labs   Lab 09/04/22  0552 09/03/22  0558 09/02/22  0549 09/01/22  0628 08/31/22  0536 08/30/22  0422   WBC 12.2* 13.9* 12.8* 11.4* 9.2 8.8         Recent Results (from the past 24 hour(s))   XR Chest Port 1 View    Narrative    EXAM: XR CHEST PORT 1 VIEW  LOCATION: [facility]  DATE/TIME: 9/3/2022 6:30 PM    INDICATION: PICC catheter placement.  COMPARISON: 08/29/2022.       Impression    IMPRESSION: Interval placement of right upper extremity PICC catheter, tip overlying the distal superior vena cava. A kink is present just proximal to the insertion site. Findings discussed verbally via telephone with PICC RN at 7:05 PM on 09/03/2022.    Mild bibasilar atelectasis and possibly small bilateral pleural effusions. No pneumothorax.    Unchanged mild cardiomegaly. Atherosclerotic ossifications of the thoracic aorta.    Cutaneous staple line overlies the central abdomen.       Medications   All medications were reviewed.    parenteral nutrition - ADULT compounded formula       parenteral nutrition - ADULT compounded formula 65 mL/hr at 09/04/22 0825       albuterol  2.5 mg Nebulization 4x Daily     amLODIPine  5 mg Oral Daily     haloperidol  1 mg Oral At Bedtime     heparin ANTICOAGULANT  5,000 Units Subcutaneous Q12H     insulin aspart  1-12 Units Subcutaneous Q4H     iron sucrose  200 mg Intravenous Daily     lamoTRIgine  100 mg Oral Daily     lipids  250 mL Intravenous Once per day on Mon Tue Wed Thu Fri     pantoprazole  40 mg Intravenous BID AC     piperacillin-tazobactam  3.375 g Intravenous Q6H     sodium chloride (PF)  10-40 mL Intracatheter Q8H     sodium chloride (PF)  3 mL Intracatheter Q8H     acetaminophen, acetylcysteine, albuterol, [Held by provider] bisacodyl, glucose **OR** dextrose **OR** glucagon, diphenhydrAMINE **OR** [DISCONTINUED] diphenhydrAMINE, HYDROmorphone, lidocaine 4%, lidocaine (buffered or not buffered), magnesium hydroxide, naloxone **OR** naloxone **OR** naloxone **OR** naloxone, ondansetron **OR** ondansetron, oxyCODONE **OR** oxyCODONE, prochlorperazine **OR** prochlorperazine, sodium chloride (PF), sodium chloride (PF), sodium chloride (PF)

## 2022-09-04 NOTE — PROGRESS NOTES
Obtained an x-ray obtained to confirm placement and a kink was noted near insertion site. RN spoke with radiologist. He directed RN to retract PICC 2 cm. Immediate blood return was noted. Flushes with ease.

## 2022-09-04 NOTE — PLAN OF CARE
Reason for Admission:  Septic shock from Perforated Viscus. POD#10 for exp lap. Post op Ileus.     Cognitive/Mentation: A&Ox4. Calm and cooperative. Able to make needs known  Neuro/CMS: generalized weakness. Baseline left sided weakness and R arm weakness.   VS: Stable on room air.   Tele: NA  GI: Post op ileus resolving. +BS. Passing gas. Had 3 large loose/watery stools. Incontinent of stool.  : incontinent of urine, using external catheter-good urine output.   Pulmonary: l/s clear. Denies SOB.   Cardiovascular: HR regular. Denies chest pain.  Pain: Minimal incisional pain-declined intervention  Drains: Purewick. PICC line patent, infusing TPN. 2 PIV to left-SL. DAVI drain patent, had 15ml serous output.   Skin: midline abdominal incision-ULISSES. Left groin site-ULISSES. Scattered bruising.   Activity: Up Ax1-2 w/ walker+GB to BSC. Up in chair this shift.   Diet: advanced to [facility] soft diet. Tolerating well. Adequate appetite.      Therapies recs:  TCU or home with HC and therapy.  Discharge: pending progress.     Other info: Phos 2.3-replaced and recheck tomorrow. PT/OT/SLP following.

## 2022-09-04 NOTE — PLAN OF CARE
Orientations: AOX4, baseline numbness to fingers  Vitals/Pain: VSS on RA, denies pain, SOB  Respiratory:  Ls clr, denies SOB, infrequent nonproductive cough .   Tele: NA  Lines/Drains: PICC, infusing TPN at 65ml/hr.  Skin/Wounds: Staples to midline incisions, ULISSES. DAVI to bulb suction, drains site CDI.   GI/: voiding via pure wick, loose stool this shift. BS+, passing james. Denies nausea.   Labs: Abnormal/Trends, Electrolyte Replacement  Ambulation/Assist:  Up AX2 GB and walker.shift weight independently.    Plan: possible TCU placement.

## 2022-09-05 LAB
ALBUMIN SERPL-MCNC: 1.9 G/DL (ref 3.4–5)
ALP SERPL-CCNC: 267 U/L (ref 40–150)
ALT SERPL W P-5'-P-CCNC: 95 U/L (ref 0–50)
ANION GAP SERPL CALCULATED.3IONS-SCNC: 7 MMOL/L (ref 3–14)
AST SERPL W P-5'-P-CCNC: 44 U/L (ref 0–45)
BASOPHILS # BLD AUTO: 0.1 10E3/UL (ref 0–0.2)
BASOPHILS NFR BLD AUTO: 1 %
BILIRUB SERPL-MCNC: 0.5 MG/DL (ref 0.2–1.3)
BUN SERPL-MCNC: 55 MG/DL (ref 7–30)
CALCIUM SERPL-MCNC: 9.3 MG/DL (ref 8.5–10.1)
CHLORIDE BLD-SCNC: 118 MMOL/L (ref 94–109)
CO2 SERPL-SCNC: 18 MMOL/L (ref 20–32)
CREAT SERPL-MCNC: 1.8 MG/DL (ref 0.52–1.04)
EOSINOPHIL # BLD AUTO: 0.4 10E3/UL (ref 0–0.7)
EOSINOPHIL NFR BLD AUTO: 3 %
ERYTHROCYTE [DISTWIDTH] IN BLOOD BY AUTOMATED COUNT: 23.9 % (ref 10–15)
GFR SERPL CREATININE-BSD FRML MDRD: 30 ML/MIN/1.73M2
GLUCOSE BLD-MCNC: 150 MG/DL (ref 70–99)
GLUCOSE BLDC GLUCOMTR-MCNC: 147 MG/DL (ref 70–99)
GLUCOSE BLDC GLUCOMTR-MCNC: 151 MG/DL (ref 70–99)
GLUCOSE BLDC GLUCOMTR-MCNC: 166 MG/DL (ref 70–99)
GLUCOSE BLDC GLUCOMTR-MCNC: 170 MG/DL (ref 70–99)
GLUCOSE BLDC GLUCOMTR-MCNC: 172 MG/DL (ref 70–99)
HCT VFR BLD AUTO: 29.6 % (ref 35–47)
HGB BLD-MCNC: 9.3 G/DL (ref 11.7–15.7)
IMM GRANULOCYTES # BLD: 0.5 10E3/UL
IMM GRANULOCYTES NFR BLD: 4 %
INR PPP: 1.06 (ref 0.85–1.15)
LYMPHOCYTES # BLD AUTO: 1.1 10E3/UL (ref 0.8–5.3)
LYMPHOCYTES NFR BLD AUTO: 9 %
MAGNESIUM SERPL-MCNC: 2.1 MG/DL (ref 1.6–2.3)
MCH RBC QN AUTO: 22.9 PG (ref 26.5–33)
MCHC RBC AUTO-ENTMCNC: 31.4 G/DL (ref 31.5–36.5)
MCV RBC AUTO: 73 FL (ref 78–100)
MONOCYTES # BLD AUTO: 1.4 10E3/UL (ref 0–1.3)
MONOCYTES NFR BLD AUTO: 11 %
NEUTROPHILS # BLD AUTO: 8.8 10E3/UL (ref 1.6–8.3)
NEUTROPHILS NFR BLD AUTO: 72 %
NRBC # BLD AUTO: 0 10E3/UL
NRBC BLD AUTO-RTO: 0 /100
PHOSPHATE SERPL-MCNC: 2.7 MG/DL (ref 2.5–4.5)
PLATELET # BLD AUTO: 547 10E3/UL (ref 150–450)
POTASSIUM BLD-SCNC: 4.2 MMOL/L (ref 3.4–5.3)
PROT SERPL-MCNC: 5.5 G/DL (ref 6.8–8.8)
RBC # BLD AUTO: 4.06 10E6/UL (ref 3.8–5.2)
SODIUM SERPL-SCNC: 143 MMOL/L (ref 133–144)
TRIGL SERPL-MCNC: 266 MG/DL
WBC # BLD AUTO: 12.3 10E3/UL (ref 4–11)

## 2022-09-05 PROCEDURE — 258N000003 HC RX IP 258 OP 636: Performed by: INTERNAL MEDICINE

## 2022-09-05 PROCEDURE — 83735 ASSAY OF MAGNESIUM: CPT | Performed by: STUDENT IN AN ORGANIZED HEALTH CARE EDUCATION/TRAINING PROGRAM

## 2022-09-05 PROCEDURE — 85004 AUTOMATED DIFF WBC COUNT: CPT | Performed by: INTERNAL MEDICINE

## 2022-09-05 PROCEDURE — 94640 AIRWAY INHALATION TREATMENT: CPT

## 2022-09-05 PROCEDURE — 80053 COMPREHEN METABOLIC PANEL: CPT | Performed by: STUDENT IN AN ORGANIZED HEALTH CARE EDUCATION/TRAINING PROGRAM

## 2022-09-05 PROCEDURE — 250N000011 HC RX IP 250 OP 636

## 2022-09-05 PROCEDURE — 250N000009 HC RX 250: Performed by: STUDENT IN AN ORGANIZED HEALTH CARE EDUCATION/TRAINING PROGRAM

## 2022-09-05 PROCEDURE — 250N000011 HC RX IP 250 OP 636: Performed by: INTERNAL MEDICINE

## 2022-09-05 PROCEDURE — 250N000009 HC RX 250: Performed by: INTERNAL MEDICINE

## 2022-09-05 PROCEDURE — C9113 INJ PANTOPRAZOLE SODIUM, VIA: HCPCS | Performed by: INTERNAL MEDICINE

## 2022-09-05 PROCEDURE — 999N000190 HC STATISTIC VAT ROUNDS

## 2022-09-05 PROCEDURE — 94640 AIRWAY INHALATION TREATMENT: CPT | Mod: 76

## 2022-09-05 PROCEDURE — 84478 ASSAY OF TRIGLYCERIDES: CPT | Performed by: STUDENT IN AN ORGANIZED HEALTH CARE EDUCATION/TRAINING PROGRAM

## 2022-09-05 PROCEDURE — 250N000013 HC RX MED GY IP 250 OP 250 PS 637: Performed by: INTERNAL MEDICINE

## 2022-09-05 PROCEDURE — 80069 RENAL FUNCTION PANEL: CPT | Performed by: STUDENT IN AN ORGANIZED HEALTH CARE EDUCATION/TRAINING PROGRAM

## 2022-09-05 PROCEDURE — 99233 SBSQ HOSP IP/OBS HIGH 50: CPT | Performed by: INTERNAL MEDICINE

## 2022-09-05 PROCEDURE — 999N000157 HC STATISTIC RCP TIME EA 10 MIN

## 2022-09-05 PROCEDURE — 85610 PROTHROMBIN TIME: CPT | Performed by: STUDENT IN AN ORGANIZED HEALTH CARE EDUCATION/TRAINING PROGRAM

## 2022-09-05 PROCEDURE — 120N000001 HC R&B MED SURG/OB

## 2022-09-05 PROCEDURE — 84100 ASSAY OF PHOSPHORUS: CPT | Performed by: STUDENT IN AN ORGANIZED HEALTH CARE EDUCATION/TRAINING PROGRAM

## 2022-09-05 RX ADMIN — PANTOPRAZOLE SODIUM 40 MG: 40 INJECTION, POWDER, FOR SOLUTION INTRAVENOUS at 20:44

## 2022-09-05 RX ADMIN — IRON SUCROSE 200 MG: 20 INJECTION, SOLUTION INTRAVENOUS at 09:16

## 2022-09-05 RX ADMIN — AMLODIPINE BESYLATE 5 MG: 5 TABLET ORAL at 08:39

## 2022-09-05 RX ADMIN — ALBUTEROL SULFATE 2.5 MG: 2.5 SOLUTION RESPIRATORY (INHALATION) at 15:19

## 2022-09-05 RX ADMIN — I.V. FAT EMULSION 250 ML: 20 EMULSION INTRAVENOUS at 21:59

## 2022-09-05 RX ADMIN — PIPERACILLIN AND TAZOBACTAM 3.38 G: 3; .375 INJECTION, POWDER, FOR SOLUTION INTRAVENOUS at 08:36

## 2022-09-05 RX ADMIN — LAMOTRIGINE 100 MG: 100 TABLET ORAL at 08:39

## 2022-09-05 RX ADMIN — HEPARIN SODIUM 5000 UNITS: 5000 INJECTION, SOLUTION INTRAVENOUS; SUBCUTANEOUS at 23:38

## 2022-09-05 RX ADMIN — PIPERACILLIN AND TAZOBACTAM 3.38 G: 3; .375 INJECTION, POWDER, FOR SOLUTION INTRAVENOUS at 02:29

## 2022-09-05 RX ADMIN — PANTOPRAZOLE SODIUM 40 MG: 40 INJECTION, POWDER, FOR SOLUTION INTRAVENOUS at 08:39

## 2022-09-05 RX ADMIN — PIPERACILLIN AND TAZOBACTAM 3.38 G: 3; .375 INJECTION, POWDER, FOR SOLUTION INTRAVENOUS at 20:44

## 2022-09-05 RX ADMIN — PIPERACILLIN AND TAZOBACTAM 3.38 G: 3; .375 INJECTION, POWDER, FOR SOLUTION INTRAVENOUS at 14:04

## 2022-09-05 RX ADMIN — HALOPERIDOL 1 MG: 1 TABLET ORAL at 22:43

## 2022-09-05 RX ADMIN — MAGNESIUM SULFATE HEPTAHYDRATE: 500 INJECTION, SOLUTION INTRAMUSCULAR; INTRAVENOUS at 21:33

## 2022-09-05 RX ADMIN — ALBUTEROL SULFATE 2.5 MG: 2.5 SOLUTION RESPIRATORY (INHALATION) at 07:35

## 2022-09-05 RX ADMIN — HEPARIN SODIUM 5000 UNITS: 5000 INJECTION, SOLUTION INTRAVENOUS; SUBCUTANEOUS at 11:33

## 2022-09-05 ASSESSMENT — ACTIVITIES OF DAILY LIVING (ADL)
ADLS_ACUITY_SCORE: 46
ADLS_ACUITY_SCORE: 42
ADLS_ACUITY_SCORE: 46
ADLS_ACUITY_SCORE: 50
ADLS_ACUITY_SCORE: 46
ADLS_ACUITY_SCORE: 46
ADLS_ACUITY_SCORE: 50
ADLS_ACUITY_SCORE: 42
ADLS_ACUITY_SCORE: 50
ADLS_ACUITY_SCORE: 50
ADLS_ACUITY_SCORE: 46
ADLS_ACUITY_SCORE: 50

## 2022-09-05 NOTE — PLAN OF CARE
Goal Outcome Evaluation:    Overall Patient Progress: improving    Orientations: AOX4, calm, and pleasant   Neuro/CMS: baseline left sided weakness and R arm weakness.baseline numbness to fingers.   Vitals/Pain: VSS on RA, denies pain.  Tele: N/A  Respiratory: LS clr,  diminished at bases, denies SOB, infrequent nonproductive cough.   Lines/Drains: PICC R arm positional  infusing TPN @ 65ml/hr, x2 PIV  Left arm.  Skin/Wounds:  Staples to midline incisions, ULISSES. Left groin site-ULISSES, scattered bruising    GI/: Incontinent  of bowel and bladder, x1 loose stool this shift, adequate urine output via external cath. BS +. Flatus +, abd soft,   Diet: mechanical soft diet  Ambulation/Assist: AX2 GB w/walker, weight shifting in bed.  Sleep Quality: no problem identifed  Plan: possible TCU placement TBD

## 2022-09-05 NOTE — PROGRESS NOTES
[facility]    Internal Medicine Hospitalist Progress Note  09/05/2022  I evaluated patient on the above date.    Artemio Chiu Jr., MD  [phone] (p)  Text Page  Vocera        Assessment & Plan New actions/orders today (09/05/2022) are underlined.    71 year old female with history including HTN, HLD, heterozygous thalassemia, CKD, and diabetes insipidus, who presented 8/24/2022 abdominal pain and dyspnea and found with signs of a perforated viscus with septic shock. Underwent emergent ex-lap and repair of perforated ulcer on 8/24/2022.    On initial evaluation 8/24, was afebrile, hypotensive, tachycardic; WBC 3.6, hgb 11.6; cr 3.51, potassium 6.2, bicarb 7; lactate 8.6, LFT's showed normal ALT, AST, ALP, TBili. CT CAP 8/24 showed large volume pneumoperitoneum, favored source from the anterior distal stomach/proximal duodenum; small volume free fluid without walled-off, drainable collection; small bilateral pleural effusions with bibasilar atelectasis; incidental indeterminate renal lesions; intrathoracic goiter with distinct 1.8 cm left thyroid nodule.    Required pressors in the ED. Surgery contacted and performed repair of perforated gastric ulcer emergently 8/24/2022. Weaned off pressors 8/27. Extubated 8/30. Transferred to the IM Hospitalist team 8/31/2022.      Perforated gastric ulcer - s/p ex-lap, repair of perforated gastric ulcer and peritoneal washout 8/24/2022.  Post-op ileus.  Septic shock secondary to above, resolved.  Lactic acidosis due to above, resolved.  * Initial presentation as above. Started on pip-tazo 8/24. BC's 8/24 NGTD. Started on IV pantoprazole on admit.  * Tried clear liquids 8/30, but with N/V.  * Loose stools noted 8/31. Started on TPN 8/31.  * Started on minimal clear liquids 9/1.  * Advanced to full liquids 9/3.  * Advanced to regular diet 9/5.  Recent Labs   Lab 09/05/22  0543 09/04/22  0552 09/03/22  0558 09/02/22  0549 09/01/22  0628 08/31/22  0536  08/30/22  0422   WBC 12.3* 12.2* 13.9* 12.8* 11.4* 9.2 8.8   - Post-op management per Surgery.  - Continue regular diet.  - Continue TPN; anticipate discharge in next 1-2d if tolerates diet.  - Continue pip-tazo (started 8/24) for now, consider discontinuing in next 1-2 days if WBC stable.  - Continue IV pantoprazole.  - Continue PRN acetaminophen, PRN oxycodone, PRN IV hydromorphone; minimize opioids as able.  - Continue to increase activity as able.    Severe malnutrition related to acute and chronic medical issues.  * Noted by Nutrition; see their documentation.  - Continue diet as tolerated as above.  - Continue TPN; anticipate discharge in next 1-2d if tolerates diet.  - Continue supplements.    Acute kidney injury (suspect ATN) on CKD.  Metabolic acidosis, suspect due to perforated viscus with lactic acidosis.  * Baseline creatinine about 2.  * Cr 3.51 with potassium 6.2 and bicarb 7 on admit.  * Nephrology consulted 8/25.  * Cr improved to baseline with IVF's, subsequently stopped 9/1.  Recent Labs   Lab 09/05/22  0543 09/04/22  0552 09/03/22  0558 09/02/22  0549 09/01/22  0628 08/31/22  0536   CR 1.80* 1.76* 1.79* 2.10* 2.44* 2.55*   - Monitor BMP periodically.  - Avoid nephrotoxic medications.    Acute respiratory failure requiring intubation secondary to above, resolved.  * Extubated 8/30.  * Off O2 9/1.  - Monitor clinically.    Hypernatremia, resolved.  H/o DI (history of lithium toxicity).  * Sodium normal on admit.  * Sodium increased starting 8/27. Treated with IVF's including D5W.  * D5W stopped 9/1.  * Sodium normalized 9/3.     Hyperglycemia.  H/o IGT based on hgb A1C.  * Hgb A1C 6.1 12/2020.  * Pt with hyperglycemia this hospitalization.  Recent Labs   Lab 09/05/22  0543 09/05/22  0404 09/05/22  0017 09/04/22 2027 09/04/22  1603 09/04/22  1157   * 147* 151* 216* 184* 204*   - Continue ISS.     Acute blood loss anemia due to perforated ulcer, surgery.  Hereditary thalassemia.  * Hgb 11.6 on  admit 8/24.  * Required several transfusions in ICU.  * IV iron ordered 9/2  Recent Labs   Lab 09/05/22  0543 09/04/22  0552 09/03/22  0558 09/02/22  0549 09/01/22  0628 08/31/22  0536   HGB 9.3* 9.7* 10.2* 10.5* 10.5* 10.8*   - Continue IV iron sucrose (started 9/2) - stop after 9/6.  - Monitor CBC.  - Consider prbc transfusion if hgb </= 7.0 or if significant bleeding with hemodynamic instability or if symptomatic.    Thrombocytosis, suspect reactive.  Recent Labs   Lab 09/05/22  0543 09/04/22  0552 09/03/22  0558 09/02/22  0549 09/01/22  0628 08/31/22  0536   * 514* 503* [phone]   - Treat other issues as noted.  - Monitor CBC.    Rash, unclear etiology, question drug reaction.  * On 9/2, noted with light erythematous rash on torso/back. Endorsed some itching when asked.  * No clear culprit meds; no new scheduled meds (though TPN was new).  * Rash improved 9/4.  - Continue PRN diphenhydramine.  - Continue to monitor.    Sinus bradycardia.  * Noted with sinus bradycardia 8/31. No culprit meds noted.  * On 9/5, has been stable.  - Monitor vitals.    Hypertension (benign essential).  [PTA: amlodipine 5 mg daily.]  * Amlodipine held on admit given above issues.  * Restarted amlodipine 9/2.  - Continue amlodipine 5 mg daily.  - Monitor BP's.    DLD.  - Resume PTA simvastatin at discharge.    Bipolar disorder.  Depression/anxiety.   [PTA: haloperidol 1 mg at bedtime; lamotrigine 100 mg daily.]  * Lamotrigine restarted 8/30.  * Restarted haloperidol 9/3.  - Continue lamotrigine, haloperidol.    Weakness and physical deconditioning due to multiple acute medical issues.  - Continue PT and OT.  - Likely needs TCU.      Clinically Significant Risk Factors Present on Admission                        COVID-19 testing.  COVID-19 PCR Results    COVID-19 PCR Results 11/8/21 6/13/22 8/24/22   SARS CoV2 PCR Negative Negative Negative      Comments are available for some flowsheets but are not being displayed.        "  COVID-19 Antibody Results, Testing for Immunity    COVID-19 Antibody Results, Testing for Immunity   No data to display.             Diet: parenteral nutrition - ADULT compounded formula  Mechanical/Dental Soft Diet    Prophylaxis: PCD's, ambulation. Heparin subcutaneous.  Suresh Catheter: Not present  Central Lines: PRESENT  PICC Double Lumen 08/31/22 Right Brachial vein lateral-Site Assessment: WDL  Code Status: Full Code    Disposition Plan   Expected discharge: 2-3d recommended to home vs TCU pending above.  Entered: Artemio Chiu MD 09/05/2022, 8:22 AM     Communication.  - I d/w pt's brother 9/3.      Interval History   Doing OK.  Just advanced to regular diet this am.  Abdomen feels less tight.  Still with loose stools.    -Data reviewed today: I reviewed all new labs and imaging over the last 24 hours. I personally reviewed no images or EKG's today.    Physical Exam    , Blood pressure 110/76, pulse 73, temperature 98.3  F (36.8  C), temperature source Oral, resp. rate 16, height 1.626 m (5' 4\"), weight 54.5 kg (120 lb 2.4 oz), SpO2 98 %, not currently breastfeeding. O2 Device: None (Room air)    Vitals:    09/03/22 0612 09/04/22 0500 09/05/22 0525   Weight: 55.1 kg (121 lb 7.6 oz) 55.1 kg (121 lb 7.6 oz) 54.5 kg (120 lb 2.4 oz)     Vital Signs with Ranges  Temp:  [97.3  F (36.3  C)-98.3  F (36.8  C)] 98.3  F (36.8  C)  Pulse:  [] 73  Resp:  [16-18] 16  BP: ()/(60-85) 110/76  SpO2:  [96 %-98 %] 98 %  Patient Vitals for the past 24 hrs:   BP Temp Temp src Pulse Resp SpO2 Weight   09/05/22 0730 110/76 98.3  F (36.8  C) Oral 73 16 -- --   09/05/22 0525 107/73 97.3  F (36.3  C) Oral 81 16 98 % 54.5 kg (120 lb 2.4 oz)   09/04/22 2100 99/60 97.3  F (36.3  C) Oral 104 18 96 % --   09/04/22 1926 -- -- -- -- -- 96 % --   09/04/22 1557 117/77 -- -- 97 16 97 % --   09/04/22 1323 119/85 97.7  F (36.5  C) Oral 96 18 97 % --     I/O's Last 24 hours  I/O last 3 completed shifts:  In: 900 " [P.O.:900]  Out: 2775 [Urine:2750; Drains:25]    Constitutional: Awake, alert, pleasant..  Respiratory: Diminished in bases. No crackles or wheezes.  Cardiovascular: RRR, no m/r/g.  GI: Mild distension, nt to light touch, +BS.  Skin/Integumen:   Other:        Data   Recent Labs   Lab 09/05/22  0543 09/05/22  0404 09/05/22  0017 09/04/22  0839 09/04/22  0552 09/03/22  0834 09/03/22  0558 09/01/22  0831 09/01/22  0628   WBC 12.3*  --   --   --  12.2*  --  13.9*   < > 11.4*   HGB 9.3*  --   --   --  9.7*  --  10.2*   < > 10.5*   MCV 73*  --   --   --  75*  --  73*   < > 74*   *  --   --   --  514*  --  503*   < > 341   INR 1.06  --   --   --   --   --   --   --  1.21*     --   --   --  143  --  143   < > 150*   POTASSIUM 4.2  --   --   --  3.8  --  3.9   < > 4.4   CHLORIDE 118*  --   --   --  116*  --  115*   < > 119*   CO2 18*  --   --   --  20  --  21   < > 23   BUN 55*  --   --   --  49*  --  43*   < > 45*   CR 1.80*  --   --   --  1.76*  --  1.79*   < > 2.44*   ANIONGAP 7  --   --   --  7  --  7   < > 8   ISSA 9.3  --   --   --  9.1  --  9.3   < > 8.9   * 147* 151*   < > 155*   < > 157*   < > 179*   ALBUMIN 1.9*  --   --   --   --   --   --   --  1.7*   PROTTOTAL 5.5*  --   --   --   --   --   --   --  5.7*   BILITOTAL 0.5  --   --   --   --   --   --   --  0.3   ALKPHOS 267*  --   --   --   --   --   --   --  237*   ALT 95*  --   --   --   --   --   --   --  84*   AST 44  --   --   --   --   --   --   --  20    < > = values in this interval not displayed.     Recent Labs   Lab Test 09/05/22  0543 09/05/22  0404 09/05/22  0017 09/04/22 2027 09/04/22  1603   * 147* 151* 216* 184*     Recent Labs   Lab 09/05/22  0543 09/04/22  0552 09/03/22  0558 09/02/22  0549 09/01/22  0628 08/31/22  0536   WBC 12.3* 12.2* 13.9* 12.8* 11.4* 9.2         No results found for this or any previous visit (from the past 24 hour(s)).    Medications   All medications were reviewed.    parenteral nutrition - ADULT  compounded formula 65 mL/hr at 09/04/22 2151       albuterol  2.5 mg Nebulization 4x Daily     amLODIPine  5 mg Oral Daily     haloperidol  1 mg Oral At Bedtime     heparin ANTICOAGULANT  5,000 Units Subcutaneous Q12H     insulin aspart  1-12 Units Subcutaneous Q4H     iron sucrose  200 mg Intravenous Daily     lamoTRIgine  100 mg Oral Daily     lipids  250 mL Intravenous Once per day on Mon Tue Wed Thu Fri     pantoprazole  40 mg Intravenous BID AC     piperacillin-tazobactam  3.375 g Intravenous Q6H     sodium chloride (PF)  10-40 mL Intracatheter Q8H     sodium chloride (PF)  3 mL Intracatheter Q8H     acetaminophen, acetylcysteine, albuterol, [Held by provider] bisacodyl, glucose **OR** dextrose **OR** glucagon, diphenhydrAMINE **OR** [DISCONTINUED] diphenhydrAMINE, HYDROmorphone, lidocaine 4%, lidocaine (buffered or not buffered), magnesium hydroxide, naloxone **OR** naloxone **OR** naloxone **OR** naloxone, ondansetron **OR** ondansetron, oxyCODONE **OR** oxyCODONE, prochlorperazine **OR** prochlorperazine, sodium chloride (PF), sodium chloride (PF), sodium chloride (PF)

## 2022-09-05 NOTE — PLAN OF CARE
Pt A&Ox 4. Lungs coarse. Numbness and tingling present both toes and fingers. (Baseline). VSS on RA. Repo Q2h. Up with lift. Denies pain. Incontinent bowels and bladder. Injection bruises on the L.hand. redness on the R hand, radial pulse present. Pt indicated she sometimes gets. DAVI, low output. TP running. Large BM. Ate 50% of the food.  Continue to monitor.

## 2022-09-05 NOTE — PROGRESS NOTES
Neb treatments given to the patient as order. Breath sounds are clear and diminished. The patient is on RA.    Sergio Lynch, RT  9/5/2022

## 2022-09-06 ENCOUNTER — APPOINTMENT (OUTPATIENT)
Dept: PHYSICAL THERAPY | Facility: CLINIC | Age: 72
DRG: 853 | End: 2022-09-06
Payer: MEDICARE

## 2022-09-06 ENCOUNTER — APPOINTMENT (OUTPATIENT)
Dept: OCCUPATIONAL THERAPY | Facility: CLINIC | Age: 72
DRG: 853 | End: 2022-09-06
Payer: MEDICARE

## 2022-09-06 ENCOUNTER — APPOINTMENT (OUTPATIENT)
Dept: SPEECH THERAPY | Facility: CLINIC | Age: 72
DRG: 853 | End: 2022-09-06
Payer: MEDICARE

## 2022-09-06 LAB
BASOPHILS # BLD MANUAL: 0.3 10E3/UL (ref 0–0.2)
BASOPHILS NFR BLD MANUAL: 2 %
ELLIPTOCYTES BLD QL SMEAR: SLIGHT
EOSINOPHIL # BLD MANUAL: 0.6 10E3/UL (ref 0–0.7)
EOSINOPHIL NFR BLD MANUAL: 5 %
ERYTHROCYTE [DISTWIDTH] IN BLOOD BY AUTOMATED COUNT: 24.5 % (ref 10–15)
FRAGMENTS BLD QL SMEAR: SLIGHT
GLUCOSE BLDC GLUCOMTR-MCNC: 159 MG/DL (ref 70–99)
GLUCOSE BLDC GLUCOMTR-MCNC: 162 MG/DL (ref 70–99)
GLUCOSE BLDC GLUCOMTR-MCNC: 171 MG/DL (ref 70–99)
GLUCOSE BLDC GLUCOMTR-MCNC: 184 MG/DL (ref 70–99)
GLUCOSE BLDC GLUCOMTR-MCNC: 193 MG/DL (ref 70–99)
GLUCOSE BLDC GLUCOMTR-MCNC: 201 MG/DL (ref 70–99)
HCT VFR BLD AUTO: 30.8 % (ref 35–47)
HGB BLD-MCNC: 9.3 G/DL (ref 11.7–15.7)
LYMPHOCYTES # BLD MANUAL: 1.4 10E3/UL (ref 0.8–5.3)
LYMPHOCYTES NFR BLD MANUAL: 11 %
MAGNESIUM SERPL-MCNC: 2 MG/DL (ref 1.6–2.3)
MCH RBC QN AUTO: 22.9 PG (ref 26.5–33)
MCHC RBC AUTO-ENTMCNC: 30.2 G/DL (ref 31.5–36.5)
MCV RBC AUTO: 76 FL (ref 78–100)
MONOCYTES # BLD MANUAL: 0.8 10E3/UL (ref 0–1.3)
MONOCYTES NFR BLD MANUAL: 6 %
NEUTROPHILS # BLD MANUAL: 9.8 10E3/UL (ref 1.6–8.3)
NEUTROPHILS NFR BLD MANUAL: 76 %
PHOSPHATE SERPL-MCNC: 2.1 MG/DL (ref 2.5–4.5)
PLAT MORPH BLD: ABNORMAL
PLATELET # BLD AUTO: 565 10E3/UL (ref 150–450)
POTASSIUM BLD-SCNC: 4.9 MMOL/L (ref 3.4–5.3)
RBC # BLD AUTO: 4.07 10E6/UL (ref 3.8–5.2)
RBC MORPH BLD: ABNORMAL
TARGETS BLD QL SMEAR: SLIGHT
WBC # BLD AUTO: 12.9 10E3/UL (ref 4–11)

## 2022-09-06 PROCEDURE — 250N000011 HC RX IP 250 OP 636

## 2022-09-06 PROCEDURE — 97530 THERAPEUTIC ACTIVITIES: CPT | Mod: GP

## 2022-09-06 PROCEDURE — 250N000011 HC RX IP 250 OP 636: Performed by: INTERNAL MEDICINE

## 2022-09-06 PROCEDURE — 999N000190 HC STATISTIC VAT ROUNDS

## 2022-09-06 PROCEDURE — 99233 SBSQ HOSP IP/OBS HIGH 50: CPT | Performed by: INTERNAL MEDICINE

## 2022-09-06 PROCEDURE — 85027 COMPLETE CBC AUTOMATED: CPT | Performed by: INTERNAL MEDICINE

## 2022-09-06 PROCEDURE — 250N000009 HC RX 250: Performed by: INTERNAL MEDICINE

## 2022-09-06 PROCEDURE — 999N000157 HC STATISTIC RCP TIME EA 10 MIN

## 2022-09-06 PROCEDURE — 85007 BL SMEAR W/DIFF WBC COUNT: CPT | Performed by: INTERNAL MEDICINE

## 2022-09-06 PROCEDURE — 97116 GAIT TRAINING THERAPY: CPT | Mod: GP

## 2022-09-06 PROCEDURE — 97535 SELF CARE MNGMENT TRAINING: CPT | Mod: GO | Performed by: OCCUPATIONAL THERAPIST

## 2022-09-06 PROCEDURE — 92526 ORAL FUNCTION THERAPY: CPT | Mod: GN

## 2022-09-06 PROCEDURE — 83735 ASSAY OF MAGNESIUM: CPT | Performed by: HOSPITALIST

## 2022-09-06 PROCEDURE — 120N000001 HC R&B MED SURG/OB

## 2022-09-06 PROCEDURE — 250N000013 HC RX MED GY IP 250 OP 250 PS 637: Performed by: INTERNAL MEDICINE

## 2022-09-06 PROCEDURE — 94640 AIRWAY INHALATION TREATMENT: CPT

## 2022-09-06 PROCEDURE — 258N000003 HC RX IP 258 OP 636: Performed by: INTERNAL MEDICINE

## 2022-09-06 PROCEDURE — 94640 AIRWAY INHALATION TREATMENT: CPT | Mod: 76

## 2022-09-06 PROCEDURE — 84100 ASSAY OF PHOSPHORUS: CPT | Performed by: HOSPITALIST

## 2022-09-06 PROCEDURE — C9113 INJ PANTOPRAZOLE SODIUM, VIA: HCPCS | Performed by: INTERNAL MEDICINE

## 2022-09-06 PROCEDURE — 84132 ASSAY OF SERUM POTASSIUM: CPT | Performed by: HOSPITALIST

## 2022-09-06 RX ORDER — FUROSEMIDE 10 MG/ML
20 INJECTION INTRAMUSCULAR; INTRAVENOUS ONCE
Status: COMPLETED | OUTPATIENT
Start: 2022-09-06 | End: 2022-09-06

## 2022-09-06 RX ADMIN — PIPERACILLIN AND TAZOBACTAM 3.38 G: 3; .375 INJECTION, POWDER, FOR SOLUTION INTRAVENOUS at 01:55

## 2022-09-06 RX ADMIN — PIPERACILLIN AND TAZOBACTAM 3.38 G: 3; .375 INJECTION, POWDER, FOR SOLUTION INTRAVENOUS at 08:04

## 2022-09-06 RX ADMIN — SODIUM PHOSPHATE, MONOBASIC, MONOHYDRATE 9 MMOL: 276; 142 INJECTION, SOLUTION INTRAVENOUS at 08:47

## 2022-09-06 RX ADMIN — HALOPERIDOL 1 MG: 1 TABLET ORAL at 22:06

## 2022-09-06 RX ADMIN — ALBUTEROL SULFATE 2.5 MG: 2.5 SOLUTION RESPIRATORY (INHALATION) at 07:59

## 2022-09-06 RX ADMIN — HEPARIN SODIUM 5000 UNITS: 5000 INJECTION, SOLUTION INTRAVENOUS; SUBCUTANEOUS at 13:00

## 2022-09-06 RX ADMIN — PIPERACILLIN AND TAZOBACTAM 3.38 G: 3; .375 INJECTION, POWDER, FOR SOLUTION INTRAVENOUS at 20:30

## 2022-09-06 RX ADMIN — PIPERACILLIN AND TAZOBACTAM 3.38 G: 3; .375 INJECTION, POWDER, FOR SOLUTION INTRAVENOUS at 14:45

## 2022-09-06 RX ADMIN — ALBUTEROL SULFATE 2.5 MG: 2.5 SOLUTION RESPIRATORY (INHALATION) at 16:13

## 2022-09-06 RX ADMIN — FUROSEMIDE 20 MG: 10 INJECTION, SOLUTION INTRAMUSCULAR; INTRAVENOUS at 10:41

## 2022-09-06 RX ADMIN — ALBUTEROL SULFATE 2.5 MG: 2.5 SOLUTION RESPIRATORY (INHALATION) at 11:29

## 2022-09-06 RX ADMIN — MAGNESIUM SULFATE HEPTAHYDRATE: 500 INJECTION, SOLUTION INTRAMUSCULAR; INTRAVENOUS at 20:31

## 2022-09-06 RX ADMIN — PANTOPRAZOLE SODIUM 40 MG: 40 INJECTION, POWDER, FOR SOLUTION INTRAVENOUS at 08:12

## 2022-09-06 RX ADMIN — LAMOTRIGINE 100 MG: 100 TABLET ORAL at 08:19

## 2022-09-06 RX ADMIN — PANTOPRAZOLE SODIUM 40 MG: 40 INJECTION, POWDER, FOR SOLUTION INTRAVENOUS at 20:30

## 2022-09-06 RX ADMIN — AMLODIPINE BESYLATE 5 MG: 5 TABLET ORAL at 08:19

## 2022-09-06 ASSESSMENT — ACTIVITIES OF DAILY LIVING (ADL)
ADLS_ACUITY_SCORE: 46
ADLS_ACUITY_SCORE: 42
ADLS_ACUITY_SCORE: 46
ADLS_ACUITY_SCORE: 42
ADLS_ACUITY_SCORE: 46

## 2022-09-06 NOTE — PLAN OF CARE
Goal Outcome Evaluation:    Plan of Care Reviewed With: other (see comments) (Discussed with Pharmacist this morning.)     Overall Patient Progress: improving    Outcome Evaluation: Diet has been advanced to Mech soft with varying intakes (%). Pt has been advised to go slow with oral. Since  (H) yesterday and pt now eating, will discontinue Lipids but continue TPN at 65 mL/hr. Added Ensure Max supplement in pm.

## 2022-09-06 NOTE — PROGRESS NOTES
CLINICAL NUTRITION SERVICES - REASSESSMENT NOTE      Recommendations Ordered by Registered Dietitian (RD):   Continue TPN D15 AA5 at 65 mL/hr, but stop Lipids = 1108 kcals (20 kcal/kg and 79% energy needs), 78 gm pro (1.4 gm/kg), 234 gm CHO    Add vanilla Ensure Max (similar product to Premier Protein which pt takes at home) at 2 pm   Future/Additional Recommendations:   Recommend taper TPN as oral intake increases.   Malnutrition: (8/31)   % Weight Loss:  > 10% in 6 months (severe malnutrition)  % Intake:  </= 50% for >/= 5 days (severe malnutrition)  Subcutaneous Fat Loss:  Orbital region moderate depletion, Upper arm region severe depletion and Thoracic region severe depletion  Muscle Loss:  Temporal region moderate depletion, Clavicle bone region moderate-severe depletion, Acromion bone region severe depletion, Dorsal hand region moderate depletion, Patellar region moderate-severe depletion and Posterior calf region severe depletion  Fluid Retention:  Mild as above      Malnutrition Diagnosis: Severe malnutrition  In Context of:  Acute illness or injury  Chronic illness or disease       EVALUATION OF PROGRESS TOWARD GOALS   Diet:  Mechanical soft    Nutrition Support:  TPN was started on 8/31 at 45 mL/hr and increased on 9/1 to goal as below:    Nutrition Support Parenteral:  Type of Access: PICC  Parenteral Frequency:  Continuous  Parenteral Regimen: D15 AA5 at 65 mL/hr + Lipid 250 mL 20% 5x per week  Total Parenteral Provisions: 1465 kcal (26 kcal/kg), 78 g protein (1.4 g/kg), 234 g CHO     The D5 containing IVF was discontinued on 9/1.    Intake/Tolerance (Oral):    Per nursing flow sheets, patient consumed 100% all 3 meals on 9/4 and 25% all 3 meals on 9/5.   Yesterday 200 mL oral, 22 mg Drain.  Stool Pattern:  9/4:  x2  9/5:  x3  9/6:  x2 thus far    Intake/Tolerance (TPN):    (H) on 9/5 9/6:  K 4.9 (NL), Mg 2 (NL), Phos 2.1 (L) --> IV bump given.  BGM: 166, 170, 172, 184, 193, 159 - Pt on High  Resistant sliding scale insulin.  I/O incomplete. Wt: 54.5 kg (down 1.5 kg from 8/28). Pt with 1+ generalized edema.      ASSESSED NUTRITION NEEDS PER APPROVED PRACTICE GUIDELINES:     Dosing Weight 56 kg (8/28)  Estimated Energy Needs: 0558-3765 kcals (25-30 Kcal/Kg)  Justification: maintenance  Estimated Protein Needs: 65-85 grams protein (1.2-1.5 g pro/Kg)  Justification: post-op and hypercatabolism with acute illness    NEW FINDINGS:   Per provider, hoping to discharge to home vs TCU in the next 2-3 days.    Previous Goals (8/31):   TPN/Lipid will meet % needs while NPO   Evaluation: Met, but now diet has been advanced.    Previous Nutrition Diagnosis (8/31):   Inadequate protein-energy intake related to NPO with plan to start TPN tonight as evidenced by meeting 0% protein and 20% energy needs from Propofol   Evaluation:  Resolved      CURRENT NUTRITION DIAGNOSIS  Inadequate oral intake related to variable appetite as evidenced by pt eating % meals over past 2 days, and continued PN reliance.    INTERVENTIONS  Recommendations / Nutrition Prescription  Continue TPN D15 AA5 at 65 mL/hr, but stop Lipids = 1108 kcals (20 kcal/kg and 79% energy needs), 78 gm pro (1.4 gm/kg), 234 gm CHO    Add vanilla Ensure Max (similar product to Premier Protein which pt takes at home) at 2 pm    Recommend taper TPN as oral intake increases.    Implementation  PN Composition and Medical Food Supplement - Above ordered in Epic  Collaboration and Referral of Nutrition care - Discussed with Pharmacist this morning.    Goals  Patient will transition from TPN --> oral intake in the next 48-72 hrs.    MONITORING AND EVALUATION:  Progress towards goals will be monitored and evaluated per protocol and Practice Guidelines    Kathie Torres, RD, LD, CNSC

## 2022-09-06 NOTE — PROGRESS NOTES
Length of stay: 8/24/22 Day 13  CODE: Full  Primary Problem: Perforated viscus  Summary: Loose stool x 2 this shift. PICC only purple lumen is working. Sodium phosphate running. TPN.     Feeding/Fluids: soft diet, thin liquids, pills whole     Analgesia/ Anticoagulation: Denies     Skin: Open to air abdominal incision. DAVI drain bruising scattered    Telemetry & Rhythm: WNL     Emotional Needs/ Neuro status: AxOx4 calm and cooperative    Resp Needs/Weaning: Lungs clear on room air     HOB/ Activity: Assist of 2 with walker and gait belt     Urologic & Bowel: incontinent of stool purwick in place     Glycemic Control: Q4h     Invasive Lines: PICC 2 lumen 1 PIV     Discharge: When medically stable

## 2022-09-06 NOTE — PROGRESS NOTES
[facility]    General Surgery  Chart Review Note      No acute changes since yesterday. Tolerating soft diet, going slowly. + bowel function. Stable leukocytosis at 12.9, remains on Zosyn.   - Continue current cares  - Surgical team will see patient this afternoon        Sylvia Harrington PA-C  [facility]  [phone]

## 2022-09-06 NOTE — PROGRESS NOTES
[facility]    Internal Medicine Hospitalist Progress Note  09/06/2022  I evaluated patient on the above date.        Assessment & Plan     71 year old female with history including HTN, HLD, heterozygous thalassemia, CKD, and diabetes insipidus, who presented 8/24/2022 abdominal pain and dyspnea and found with signs of a perforated viscus with septic shock. Underwent emergent ex-lap and repair of perforated ulcer on 8/24/2022.    On initial evaluation 8/24, was afebrile, hypotensive, tachycardic; WBC 3.6, hgb 11.6; cr 3.51, potassium 6.2, bicarb 7; lactate 8.6, LFT's showed normal ALT, AST, ALP, TBili. CT CAP 8/24 showed large volume pneumoperitoneum, favored source from the anterior distal stomach/proximal duodenum; small volume free fluid without walled-off, drainable collection; small bilateral pleural effusions with bibasilar atelectasis; incidental indeterminate renal lesions; intrathoracic goiter with distinct 1.8 cm left thyroid nodule.    Required pressors in the ED. Surgery contacted and performed repair of perforated gastric ulcer emergently 8/24/2022. Weaned off pressors 8/27. Extubated 8/30. Transferred to the IM Hospitalist team 8/31/2022.      Perforated gastric ulcer - s/p ex-lap, repair of perforated gastric ulcer and peritoneal washout 8/24/2022.  Post-op ileus.  Septic shock secondary to above, resolved.  Lactic acidosis due to above, resolved.  * Initial presentation as above. Started on pip-tazo 8/24. BC's 8/24 NGTD. Started on IV pantoprazole on admit.  * Tried clear liquids 8/30, but with N/V.  * Loose stools noted 8/31. Started on TPN 8/31.  * Started on minimal clear liquids 9/1.  * Advanced to full liquids 9/3.  * Advanced to regular diet 9/5.  Recent Labs   Lab 09/06/22  0533 09/05/22  0543 09/04/22  0552 09/03/22  0558 09/02/22  0549 09/01/22  0628 08/31/22  0536   WBC 12.9* 12.3* 12.2* 13.9* 12.8* 11.4* 9.2   - Post-op management per Surgery.  - Continue  Mechanical soft diet   - Continue TPN; anticipate discharge in next 1-2d if tolerates diet.  - Continue pip-tazo (started 8/24) for now, consider discontinuing in next 1-2 days if WBC stable.  - Continue IV pantoprazole.  - Continue PRN acetaminophen, PRN oxycodone, PRN IV hydromorphone; minimize opioids as able.  - Continue to increase activity as able.    Severe malnutrition related to acute and chronic medical issues.  * Noted by Nutrition; see their documentation.  - Continue diet as tolerated as above.  - Continue TPN; anticipate discharge in next 1-2d if tolerates diet.  - Continue supplements.    Acute kidney injury (suspect ATN) on CKD.  Metabolic acidosis, suspect due to perforated viscus with lactic acidosis.  * Baseline creatinine about 2.  * Cr 3.51 with potassium 6.2 and bicarb 7 on admit.  * Nephrology consulted 8/25.  * Cr improved to baseline with IVF's, subsequently stopped 9/1.  Recent Labs   Lab 09/05/22  0543 09/04/22  0552 09/03/22  0558 09/02/22  0549 09/01/22  0628 08/31/22  0536   CR 1.80* 1.76* 1.79* 2.10* 2.44* 2.55*   - Monitor BMP periodically.  - Avoid nephrotoxic medications.    Acute respiratory failure requiring intubation secondary to above, resolved.  * Extubated 8/30.  * Off O2 9/1.  - Monitor clinically.    Hypernatremia, resolved.  H/o DI (history of lithium toxicity).  * Sodium normal on admit.  * Sodium increased starting 8/27. Treated with IVF's including D5W.  * D5W stopped 9/1.  * Sodium normalized 9/3.     Hyperglycemia.  H/o IGT based on hgb A1C.  * Hgb A1C 6.1 12/2020.  * Pt with hyperglycemia this hospitalization.  Recent Labs   Lab 09/06/22  0830 09/06/22  0400 09/06/22  0039 09/05/22  2144 09/05/22  1638 09/05/22  1206   * 193* 184* 172* 170* 166*   - Continue ISS.     Acute blood loss anemia due to perforated ulcer, surgery.  Hereditary thalassemia.  * Hgb 11.6 on admit 8/24.  * Required several transfusions in ICU.  * IV iron ordered 9/2  Recent Labs   Lab  09/06/22  0533 09/05/22  0543 09/04/22  0552 09/03/22  0558 09/02/22  0549 09/01/22  0628   HGB 9.3* 9.3* 9.7* 10.2* 10.5* 10.5*   - Continue IV iron sucrose (started 9/2) - stop after 9/6.  - Monitor CBC.  - Consider prbc transfusion if hgb </= 7.0 or if significant bleeding with hemodynamic instability or if symptomatic.    Thrombocytosis, suspect reactive.  Recent Labs   Lab 09/06/22  0533 09/05/22  0543 09/04/22  0552 09/03/22  0558 09/02/22  0549 09/01/22  0628   * 547* 514* 503* 401 341   - Treat other issues as noted.  - Monitor CBC.    Rash, unclear etiology, question drug reaction.  * On 9/2, noted with light erythematous rash on torso/back. Endorsed some itching when asked.  * No clear culprit meds; no new scheduled meds (though TPN was new).  * Rash improved 9/4.  - Continue PRN diphenhydramine.  - Continue to monitor.    Sinus bradycardia.  * Noted with sinus bradycardia 8/31. No culprit meds noted.  * On 9/5, has been stable.  - Monitor vitals.    Hypertension (benign essential).  [PTA: amlodipine 5 mg daily.]  * Amlodipine held on admit given above issues.  * Restarted amlodipine 9/2.  - Continue amlodipine 5 mg daily.  - Monitor BP's.    DLD.  - Resume PTA simvastatin at discharge.    Bipolar disorder.  Depression/anxiety.   [PTA: haloperidol 1 mg at bedtime; lamotrigine 100 mg daily.]  * Lamotrigine restarted 8/30.  * Restarted haloperidol 9/3.  - Continue lamotrigine, haloperidol.    Weakness and physical deconditioning due to multiple acute medical issues.  - Continue PT and OT.  - Likely needs TCU.      Clinically Significant Risk Factors Present on Admission                        COVID-19 testing.  COVID-19 PCR Results    COVID-19 PCR Results 11/8/21 6/13/22 8/24/22   SARS CoV2 PCR Negative Negative Negative      Comments are available for some flowsheets but are not being displayed.         COVID-19 Antibody Results, Testing for Immunity    COVID-19 Antibody Results, Testing for Immunity  "  No data to display.             Diet: Mechanical/Dental Soft Diet  parenteral nutrition - ADULT compounded formula    Prophylaxis: PCD's, ambulation. Heparin subcutaneous.  Suresh Catheter: Not present  Central Lines: PRESENT  PICC Double Lumen 08/31/22 Right Brachial vein lateral-Site Assessment: WDL  Code Status: Full Code    Disposition Plan   Expected discharge: 2-3d recommended to home vs TCU pending above.      Entered: Sonia Chua MD 09/06/2022, 10:08 AM      Discussed with bedside RN and patient today       Interval History      Patient is new to me today. Chart reviewed   Doing OK.  Eating breakfast.   Abdomen feels little  Tight. Passing flatus   Still with loose stools.TPN running   No other acute issues since yesterday     -Data reviewed today: I reviewed all new labs and imaging over the last 24 hours. I personally reviewed no images or EKG's today.    Physical Exam    , Blood pressure 128/85, pulse 56, temperature 97.5  F (36.4  C), temperature source Axillary, resp. rate 16, height 1.626 m (5' 4\"), weight 54.5 kg (120 lb 2.4 oz), SpO2 99 %, not currently breastfeeding. O2 Device: None (Room air)    Vitals:    09/03/22 0612 09/04/22 0500 09/05/22 0525   Weight: 55.1 kg (121 lb 7.6 oz) 55.1 kg (121 lb 7.6 oz) 54.5 kg (120 lb 2.4 oz)     Vital Signs with Ranges  Temp:  [97.5  F (36.4  C)-98.5  F (36.9  C)] 97.5  F (36.4  C)  Pulse:  [] 56  Resp:  [16] 16  BP: (113-128)/(73-85) 128/85  SpO2:  [97 %-99 %] 99 %  Patient Vitals for the past 24 hrs:   BP Temp Temp src Pulse Resp SpO2   09/06/22 0752 128/85 97.5  F (36.4  C) Axillary 56 16 --   09/06/22 0000 115/73 98.2  F (36.8  C) Oral 98 16 99 %   09/05/22 1952 113/73 98.5  F (36.9  C) Oral 101 16 97 %   09/05/22 1531 114/74 98.1  F (36.7  C) Oral 103 16 97 %     I/O's Last 24 hours  I/O last 3 completed shifts:  In: 240 [P.O.:240]  Out: 1822 [Urine:1800; Drains:22]    Constitutional: Awake, alert, pleasant..  Respiratory: Diminished in bases. No " crackles or wheezes.  Cardiovascular: RRR, no m/r/g.  GI: Mild distension, nt to light touch, +BS.  Skin/Integumen:   Other:        Data   Recent Labs   Lab 09/06/22  0830 09/06/22  0533 09/06/22  0400 09/06/22  0039 09/05/22  1206 09/05/22  0543 09/04/22  0839 09/04/22  0552 09/03/22  0834 09/03/22  0558 09/01/22  0831 09/01/22  0628   WBC  --  12.9*  --   --   --  12.3*  --  12.2*  --  13.9*   < > 11.4*   HGB  --  9.3*  --   --   --  9.3*  --  9.7*  --  10.2*   < > 10.5*   MCV  --  76*  --   --   --  73*  --  75*  --  73*   < > 74*   PLT  --  565*  --   --   --  547*  --  514*  --  503*   < > 341   INR  --   --   --   --   --  1.06  --   --   --   --   --  1.21*   NA  --   --   --   --   --  143  --  143  --  143   < > 150*   POTASSIUM  --  4.9  --   --   --  4.2  --  3.8  --  3.9   < > 4.4   CHLORIDE  --   --   --   --   --  118*  --  116*  --  115*   < > 119*   CO2  --   --   --   --   --  18*  --  20  --  21   < > 23   BUN  --   --   --   --   --  55*  --  49*  --  43*   < > 45*   CR  --   --   --   --   --  1.80*  --  1.76*  --  1.79*   < > 2.44*   ANIONGAP  --   --   --   --   --  7  --  7  --  7   < > 8   ISSA  --   --   --   --   --  9.3  --  9.1  --  9.3   < > 8.9   *  --  193* 184*   < > 150*   < > 155*   < > 157*   < > 179*   ALBUMIN  --   --   --   --   --  1.9*  --   --   --   --   --  1.7*   PROTTOTAL  --   --   --   --   --  5.5*  --   --   --   --   --  5.7*   BILITOTAL  --   --   --   --   --  0.5  --   --   --   --   --  0.3   ALKPHOS  --   --   --   --   --  267*  --   --   --   --   --  237*   ALT  --   --   --   --   --  95*  --   --   --   --   --  84*   AST  --   --   --   --   --  44  --   --   --   --   --  20    < > = values in this interval not displayed.     Recent Labs   Lab Test 09/06/22  0830 09/06/22  0400 09/06/22  0039 09/05/22  2144 09/05/22  1638   * 193* 184* 172* 170*     Recent Labs   Lab 09/06/22  0533 09/05/22  0543 09/04/22  0552 09/03/22  0558 09/02/22  0549  09/01/22  0628   WBC 12.9* 12.3* 12.2* 13.9* 12.8* 11.4*         No results found for this or any previous visit (from the past 24 hour(s)).    Medications   All medications were reviewed.    parenteral nutrition - ADULT compounded formula 65 mL/hr at 09/05/22 2133       albuterol  2.5 mg Nebulization 4x Daily     amLODIPine  5 mg Oral Daily     furosemide  20 mg Intravenous Once     haloperidol  1 mg Oral At Bedtime     heparin ANTICOAGULANT  5,000 Units Subcutaneous Q12H     insulin aspart  1-12 Units Subcutaneous Q4H     lamoTRIgine  100 mg Oral Daily     lipids  250 mL Intravenous Once per day on Mon Tue Wed Thu Fri     pantoprazole  40 mg Intravenous BID AC     piperacillin-tazobactam  3.375 g Intravenous Q6H     sodium chloride (PF)  10-40 mL Intracatheter Q8H     sodium chloride (PF)  3 mL Intracatheter Q8H     sodium phosphate  9 mmol Intravenous Once     acetaminophen, acetylcysteine, albuterol, [Held by provider] bisacodyl, glucose **OR** dextrose **OR** glucagon, diphenhydrAMINE **OR** [DISCONTINUED] diphenhydrAMINE, HYDROmorphone, lidocaine 4%, lidocaine (buffered or not buffered), magnesium hydroxide, naloxone **OR** naloxone **OR** naloxone **OR** naloxone, ondansetron **OR** ondansetron, oxyCODONE **OR** oxyCODONE, prochlorperazine **OR** prochlorperazine, sodium chloride (PF), sodium chloride (PF), sodium chloride (PF)

## 2022-09-06 NOTE — PROGRESS NOTES
"General Surgery Progress Note    Admission Date: 8/24/2022  Today's Date: 9/6/2022         Assessment:      Diana Santiago is a 71 year old female POD 13 s/p exploratory laparotomy and repair of perforated gastric ulcer  - Extubated 8/30. Upper GI 8/31 without leak or outlet obstruction. Ileus gradually improving         Plan:   - Soft diet, go slowly with intake  - Continue TPN until PO intake improves  - PT and OT following, continue to work on increasing mobility  - Leukocytosis 12.9, has been slightly elevated for several days. Likely multifactorial given numerous medical issues. Patient continues on Zosyn. Could have intra-abdominal abscess secondary to contamination from perforation, but she does not have any significant abdominal pain. Will discuss with Dr. Vidal.   - Continue DAVI drain for now, plan to remove prior to discharge  - Protonix BID, heparin for DVT ppx  - Remove staples tomorrow  - Medical cares and eventual discharge per primary team        Interval History:   Afebrile, vitals stable with occasional mild tachycardia. Angel Luis tells me that her abdomen feels less tight today. She is having some belching/hiccups at times, but improved from a few days ago. No real abdominal pain. Taking in small amounts of soft diet without nausea, feels full easily.           Physical Exam:   /85 (BP Location: Left arm)   Pulse 56   Temp 97.5  F (36.4  C) (Axillary)   Resp 16   Ht 1.626 m (5' 4\")   Wt 54.5 kg (120 lb 2.4 oz)   SpO2 99%   BMI 20.62 kg/m    I/O last 3 completed shifts:  In: 240 [P.O.:240]  Out: 1822 [Urine:1800; Drains:22]  General: NAD, pleasant, alert and awake. Answers questions appropriately  Abdomen: soft, mildly distended, no real tenderness to palpation throughout. DAVI drain in place with scant serous fluid  Incision: clean, dry, and intact with staples. No erythema or drainage    LABS:  Recent Labs   Lab Test 09/06/22  0533 09/05/22  0543 09/04/22  0552   WBC 12.9* 12.3* 12.2*   HGB " 9.3* 9.3* 9.7*   MCV 76* 73* 75*   * 547* 514*      Recent Labs   Lab Test 09/06/22  0533 09/05/22  0543 09/04/22  0552 09/03/22  0558   POTASSIUM 4.9 4.2 3.8 3.9   CHLORIDE  --  118* 116* 115*   CO2  --  18* 20 21   BUN  --  55* 49* 43*   CR  --  1.80* 1.76* 1.79*   ANIONGAP  --  7 7 7      Recent Labs   Lab Test 09/05/22  0543 09/01/22  0628 08/31/22  0536   ALBUMIN 1.9* 1.7* 1.6*   BILITOTAL 0.5 0.3 0.5   ALT 95* 84* 108*   AST 44 20 26   ALKPHOS 267* 237* 242*      -------------------------------    Sylvia Harrington PA-C  [facility]  [phone]

## 2022-09-06 NOTE — PLAN OF CARE
Speech Language Therapy Discharge Summary    Reason for therapy discharge:    All goals and outcomes met, no further needs identified.    Progress towards therapy goal(s). See goals on Care Plan in [facility] electronic health record for goal details.  Goals met    Therapy recommendation(s):    No further therapy is recommended. Oropharyngeal swallow appears WFL for regular diet and thin liquids. Pt is reporting a tightness in her chest when swallowing and I recommended she discuss this further with surgery team and/or GI.

## 2022-09-06 NOTE — PLAN OF CARE
Goal Outcome Evaluation:    Orientations: AOX4, calm and pleasant   Vitals/Pain: VSS on RA, denies pain. Baseline left sided weakness and R arm weakness. Baseline numbness to left hand fingers.   Tele: NA.  Lungs: LS clr, denies SOB, infrequent nonproductive cough.   Lines/Drains:  PICC R arm positional infusing TPN @65ml/hr, Lipids 2.8ml/hr. x2 PIV left arm SL. pure wick . DAVI drain patent w/serous output.   Skin/Wounds: staples to midline incisions, ULISSES. Left groin site dressing CDI. Scattered bruising. DAVI drain site CDI.blanchable redness to sacrum mepilax applied.   GI/: incontinent of bowel and bladder, x2 loose stools this shift. Voiding adequately via urinal. BS+, abd soft, some tenderness,nondistended.   Ambulation/Assist: AX2 w/GB and walker, needs prompts for turn and repositioning.   Diet: mechanical soft diet.   Plan: TCU

## 2022-09-07 ENCOUNTER — APPOINTMENT (OUTPATIENT)
Dept: OCCUPATIONAL THERAPY | Facility: CLINIC | Age: 72
DRG: 853 | End: 2022-09-07
Payer: MEDICARE

## 2022-09-07 ENCOUNTER — APPOINTMENT (OUTPATIENT)
Dept: PHYSICAL THERAPY | Facility: CLINIC | Age: 72
DRG: 853 | End: 2022-09-07
Payer: MEDICARE

## 2022-09-07 LAB
ANION GAP SERPL CALCULATED.3IONS-SCNC: 9 MMOL/L (ref 3–14)
BUN SERPL-MCNC: 65 MG/DL (ref 7–30)
CALCIUM SERPL-MCNC: 9.7 MG/DL (ref 8.5–10.1)
CHLORIDE BLD-SCNC: 116 MMOL/L (ref 94–109)
CO2 SERPL-SCNC: 17 MMOL/L (ref 20–32)
CREAT SERPL-MCNC: 1.77 MG/DL (ref 0.52–1.04)
ERYTHROCYTE [DISTWIDTH] IN BLOOD BY AUTOMATED COUNT: 24.4 % (ref 10–15)
GFR SERPL CREATININE-BSD FRML MDRD: 30 ML/MIN/1.73M2
GLUCOSE BLD-MCNC: 162 MG/DL (ref 70–99)
GLUCOSE BLDC GLUCOMTR-MCNC: 132 MG/DL (ref 70–99)
GLUCOSE BLDC GLUCOMTR-MCNC: 144 MG/DL (ref 70–99)
GLUCOSE BLDC GLUCOMTR-MCNC: 152 MG/DL (ref 70–99)
GLUCOSE BLDC GLUCOMTR-MCNC: 161 MG/DL (ref 70–99)
GLUCOSE BLDC GLUCOMTR-MCNC: 169 MG/DL (ref 70–99)
GLUCOSE BLDC GLUCOMTR-MCNC: 239 MG/DL (ref 70–99)
HCT VFR BLD AUTO: 30.4 % (ref 35–47)
HGB BLD-MCNC: 9.3 G/DL (ref 11.7–15.7)
LACTATE SERPL-SCNC: 1.1 MMOL/L (ref 0.7–2)
MAGNESIUM SERPL-MCNC: 1.9 MG/DL (ref 1.6–2.3)
MCH RBC QN AUTO: 22.7 PG (ref 26.5–33)
MCHC RBC AUTO-ENTMCNC: 30.6 G/DL (ref 31.5–36.5)
MCV RBC AUTO: 74 FL (ref 78–100)
PHOSPHATE SERPL-MCNC: 3 MG/DL (ref 2.5–4.5)
PLATELET # BLD AUTO: 587 10E3/UL (ref 150–450)
POTASSIUM BLD-SCNC: 5.1 MMOL/L (ref 3.4–5.3)
RBC # BLD AUTO: 4.1 10E6/UL (ref 3.8–5.2)
SODIUM SERPL-SCNC: 142 MMOL/L (ref 133–144)
WBC # BLD AUTO: 13.3 10E3/UL (ref 4–11)

## 2022-09-07 PROCEDURE — 97530 THERAPEUTIC ACTIVITIES: CPT | Mod: GP

## 2022-09-07 PROCEDURE — 120N000001 HC R&B MED SURG/OB

## 2022-09-07 PROCEDURE — 250N000013 HC RX MED GY IP 250 OP 250 PS 637: Performed by: INTERNAL MEDICINE

## 2022-09-07 PROCEDURE — C9113 INJ PANTOPRAZOLE SODIUM, VIA: HCPCS | Performed by: INTERNAL MEDICINE

## 2022-09-07 PROCEDURE — 94640 AIRWAY INHALATION TREATMENT: CPT

## 2022-09-07 PROCEDURE — 83735 ASSAY OF MAGNESIUM: CPT | Performed by: INTERNAL MEDICINE

## 2022-09-07 PROCEDURE — 94640 AIRWAY INHALATION TREATMENT: CPT | Mod: 76

## 2022-09-07 PROCEDURE — 97116 GAIT TRAINING THERAPY: CPT | Mod: GP

## 2022-09-07 PROCEDURE — 84100 ASSAY OF PHOSPHORUS: CPT | Performed by: INTERNAL MEDICINE

## 2022-09-07 PROCEDURE — 99233 SBSQ HOSP IP/OBS HIGH 50: CPT | Performed by: HOSPITALIST

## 2022-09-07 PROCEDURE — 999N000190 HC STATISTIC VAT ROUNDS

## 2022-09-07 PROCEDURE — 250N000009 HC RX 250: Performed by: INTERNAL MEDICINE

## 2022-09-07 PROCEDURE — 80048 BASIC METABOLIC PNL TOTAL CA: CPT | Performed by: INTERNAL MEDICINE

## 2022-09-07 PROCEDURE — 250N000011 HC RX IP 250 OP 636: Performed by: INTERNAL MEDICINE

## 2022-09-07 PROCEDURE — 250N000011 HC RX IP 250 OP 636

## 2022-09-07 PROCEDURE — 36415 COLL VENOUS BLD VENIPUNCTURE: CPT | Performed by: INTERNAL MEDICINE

## 2022-09-07 PROCEDURE — 250N000009 HC RX 250: Performed by: HOSPITALIST

## 2022-09-07 PROCEDURE — 85027 COMPLETE CBC AUTOMATED: CPT | Performed by: INTERNAL MEDICINE

## 2022-09-07 PROCEDURE — 83605 ASSAY OF LACTIC ACID: CPT | Performed by: HOSPITALIST

## 2022-09-07 PROCEDURE — 97535 SELF CARE MNGMENT TRAINING: CPT | Mod: GO | Performed by: OCCUPATIONAL THERAPIST

## 2022-09-07 RX ADMIN — PIPERACILLIN AND TAZOBACTAM 3.38 G: 3; .375 INJECTION, POWDER, FOR SOLUTION INTRAVENOUS at 09:37

## 2022-09-07 RX ADMIN — PIPERACILLIN AND TAZOBACTAM 3.38 G: 3; .375 INJECTION, POWDER, FOR SOLUTION INTRAVENOUS at 03:01

## 2022-09-07 RX ADMIN — PIPERACILLIN AND TAZOBACTAM 3.38 G: 3; .375 INJECTION, POWDER, FOR SOLUTION INTRAVENOUS at 14:28

## 2022-09-07 RX ADMIN — HALOPERIDOL 1 MG: 1 TABLET ORAL at 21:54

## 2022-09-07 RX ADMIN — PANTOPRAZOLE SODIUM 40 MG: 40 INJECTION, POWDER, FOR SOLUTION INTRAVENOUS at 21:54

## 2022-09-07 RX ADMIN — HEPARIN SODIUM 5000 UNITS: 5000 INJECTION, SOLUTION INTRAVENOUS; SUBCUTANEOUS at 13:14

## 2022-09-07 RX ADMIN — ALBUTEROL SULFATE 2.5 MG: 2.5 SOLUTION RESPIRATORY (INHALATION) at 09:28

## 2022-09-07 RX ADMIN — PANTOPRAZOLE SODIUM 40 MG: 40 INJECTION, POWDER, FOR SOLUTION INTRAVENOUS at 09:37

## 2022-09-07 RX ADMIN — HEPARIN SODIUM 5000 UNITS: 5000 INJECTION, SOLUTION INTRAVENOUS; SUBCUTANEOUS at 00:53

## 2022-09-07 RX ADMIN — ALBUTEROL SULFATE 2.5 MG: 2.5 SOLUTION RESPIRATORY (INHALATION) at 14:48

## 2022-09-07 RX ADMIN — ALBUTEROL SULFATE 2.5 MG: 2.5 SOLUTION RESPIRATORY (INHALATION) at 19:27

## 2022-09-07 RX ADMIN — MAGNESIUM SULFATE HEPTAHYDRATE: 500 INJECTION, SOLUTION INTRAMUSCULAR; INTRAVENOUS at 21:18

## 2022-09-07 RX ADMIN — LAMOTRIGINE 100 MG: 100 TABLET ORAL at 09:37

## 2022-09-07 RX ADMIN — AMLODIPINE BESYLATE 5 MG: 5 TABLET ORAL at 09:38

## 2022-09-07 ASSESSMENT — ACTIVITIES OF DAILY LIVING (ADL)
ADLS_ACUITY_SCORE: 42
ADLS_ACUITY_SCORE: 42
ADLS_ACUITY_SCORE: 46
ADLS_ACUITY_SCORE: 42
ADLS_ACUITY_SCORE: 48
ADLS_ACUITY_SCORE: 42
ADLS_ACUITY_SCORE: 42
ADLS_ACUITY_SCORE: 48
ADLS_ACUITY_SCORE: 42
ADLS_ACUITY_SCORE: 42

## 2022-09-07 NOTE — PLAN OF CARE
Goal Outcome Evaluation:    Plan of Care Reviewed With: patient     Overall Patient Progress: improving    [id number] A&Ox4.  AVSS, RA.  Denies need for pain meds.  LS clear.  IS/Aerobika encouraged.  BS active, tolerating dental soft diet, appetite fair, (+)BM this shift.  DAVI to bulb suction, site leaking, 2 pc ostomy appliance in place, serous drainage, stripped x 1 this shift.  Midline abdominal incision with staples CDI.  Up with assist of 2, GB & walker.  Voided per BSC when up on chair and purewick when on bed.  Double lumen PICC line with dressing CDI, TPN infusing thru purple lumen, red lumen occluded.

## 2022-09-07 NOTE — PROGRESS NOTES
"General Surgery Progress Note    Admission Date: 8/24/2022  Today's Date: 9/7/2022         Assessment:      Diana Santiago is a 71 year old female POD 14 s/p exploratory laparotomy and repair of perforated gastric ulcer         Plan:   - Soft diet, go slowly with intake. Encourage nutrition shakes as tolerated  - Leukocytosis 13.3 today, patient remains afebrile with no abdominal pain. Tolerating oral intake appropriately and having bowel function. No indication to scan abdomen at this point. Defer to hospitalist regarding workup for ongoing mild leukocytosis. Zosyn no longer needed for intra-abdominal contamination (14 days out)    - Wean TPN as appropriate, dietician following  - PT and OT following, continue to work on increasing mobility  - Remove DAVI drain prior to discharge  - Remove staples today  - Protonix BID, heparin for DVT ppx  - Medical cares and eventual discharge per primary team    No further recommendations from surgical standpoint. We will follow peripherally while patient remains in hospital. Please call with any questions        Interval History:   Afebrile, vitals stable. Diana still feels that her abdomen is a bit distended, but overall it is much improved and she is tolerating intake of soft diet. Going slowly. No nausea. Passing gas and having BMs.           Physical Exam:   /79   Pulse 84   Temp 97.7  F (36.5  C) (Oral)   Resp 16   Ht 1.626 m (5' 4\")   Wt 54 kg (119 lb 0.8 oz)   SpO2 98%   BMI 20.43 kg/m    I/O last 3 completed shifts:  In: 963 [P.O.:960; I.V.:3]  Out: 2860 [Urine:2850; Drains:10]  General: NAD, pleasant, alert and awake. Answers questions appropriately  Abdomen: soft, mildly distended, no tenderness to light or deep palpation throughout. DAVI drain in place with scant serous fluid  Incision: clean, dry, and intact with staples. No erythema or drainage    LABS:  Recent Labs   Lab Test 09/07/22  0336 09/06/22  0533 09/05/22  0543   WBC 13.3* 12.9* 12.3*   HGB 9.3* " 9.3* 9.3*   MCV 74* 76* 73*   * 565* 547*      Recent Labs   Lab Test 09/07/22  0336 09/06/22  0533 09/05/22  0543 09/04/22  0552   POTASSIUM 5.1 4.9 4.2 3.8   CHLORIDE 116*  --  118* 116*   CO2 17*  --  18* 20   BUN 65*  --  55* 49*   CR 1.77*  --  1.80* 1.76*   ANIONGAP 9  --  7 7      Recent Labs   Lab Test 09/05/22  0543 09/01/22  0628 08/31/22  0536   ALBUMIN 1.9* 1.7* 1.6*   BILITOTAL 0.5 0.3 0.5   ALT 95* 84* 108*   AST 44 20 26   ALKPHOS 267* 237* 242*      Recent Labs   Lab Test 08/24/22  1307   LIPASE 511*     Recent Labs   Lab Test 08/25/22  1020 03/02/22  1557 11/08/21  1139   PROTEIN 50 * Negative 10 *       -------------------------------    Sylvia Harrington PA-C  [facility]  [phone]

## 2022-09-07 NOTE — PLAN OF CARE
Pt A&Ox4, VSS on RA sating >90%. LS clear. BS +, x3 small soft/watery BM, using purwick while in bed. Having adequate urine output. Pt denies pain. Pt tolerating mechanical soft diet with adequate oral intake. X1 SBA with GB and walker. Midline abd staples removed, open to air with no drainage. R side DAVI drain in place to bulb suction having little output this shift, serous drainage. TPN running at 65 ml/hr in PICC purple lumen, red lumen saline locked, no blood return noted. TPN wean pending calorie counting. Continue plan of care.     Goal Outcome Evaluation:  Plan of Care Reviewed With: patient, family   Overall Patient Progress: improving  Outcome Evaluation: Pt tolerating [facility] soft diet. Ate 100% breakfast. Did not eat lunch and 100% dinner. L side weakness at baseline, requires assistance with tray set up. TPN continues to run at 65 ml/hr.

## 2022-09-07 NOTE — PLAN OF CARE
[id number]: Oriented x 4. Sleepy but easily arousable to verbal stimuli. Denies pain. Abd incision CDI. DAVI drain with 0 output for the shift. , covered with insulin. TPN running at 65 ml. Swallows pill whole one at a time. Will cont to monitor.

## 2022-09-07 NOTE — PLAN OF CARE
Cognition/Mentation: sleepy but easy to wake, oriented x4    Neuro/CMS: baseline numbness to left fingers    VS: stable, RA    Respiratory: LS clear    GI/: purewick in place, no BM overnight    Activity: weight shifting overnight    Pain: Denies    Drips: Continuous TPN infusing @ 65cc/hr, intermittent IV abx    Drains/Tubes: double-lumen PICC - red lumen occluded, purple lumen flushes but no blood return; PIV L AC; purewick; DAVI to bulb suction w/ 10cc serous output    Skin: scattered bruising, abdominal incision ULISSES    Labs: WBC 13.3, lactic 1.1    Other: sepsis protocol fired - VS unchanged and lactic 1.1    Disposition: pending

## 2022-09-07 NOTE — CONSULTS
"Duplicate CM consult noted. Please see initial consult filed 8/26/22 at 1:23 PM by Sherri VÁSQUEZ.  She has documented pt brother \"Acosta is in favor with patient going to TCU.\"  CM team is continuing to follow for discharge planning. Hospitalist notes indicate expected discharge in 2-3 days, and \"Likely needs TCU.\" Gen Surg will be following peripherally and notes \"- Medical cares and eventual discharge per primary team.\"    "

## 2022-09-07 NOTE — PROGRESS NOTES
[facility]  Hospitalist Progress Note        Constantine Britton MD   09/07/2022        Interval History:        - reports feeling fine, no acute issues overnight; tolerating PO well  - surgery following, to continue TPN until PO intake improves; mild persistent leukocytosis, to continue DAVI drain  - surgery plans to remove staples 9/7  - Nutrition following to wean TPN; on mechanical dental soft diet   - remains afebrile but with mild persistent leukocytosis wbc 12-13; normal lactate; has completed 14 days of Zosyn, will discontinue 9/7/22  - renal function improving, Cr peak 3.5--trended down--1.77  - Hb stable around 9-10         Assessment and Plan:        Diana Santiago is a 71 year old female with PMH of HTN, HLD, heterozygous thalassemia, CKD, and diabetes insipidus, who presented 8/24/2022 with abdominal pain and dyspnea, noted with perforated viscus with septic shock. Underwent emergent ex-lap and repair of perforated ulcer on 8/24/2022. Initially required pressors and was admitted to ICU, Weaned off pressors 8/27. Extubated 8/30. Transferred to the  Hospitalist team 8/31/2022.    CT on admission 8/24 with large volume pneumoperitoneum, favored source from the anterior distal stomach/proximal duodenum; small volume free fluid without walled-off, drainable collection; small bilateral pleural effusions with bibasilar atelectasis; incidental indeterminate renal lesions; intrathoracic goiter with distinct 1.8 cm left thyroid nodule.     Perforated gastric ulcer - s/p ex-lap, repair of perforated gastric ulcer and peritoneal washout 8/24/2022.  Post-op ileus.  Septic shock secondary to above, resolved.  Lactic acidosis due to above, resolved.  Acute respiratory failure requiring intubation secondary to above, resolved, extubated 8/30  Severe malnutrition related to acute and chronic medical issues.  - presentation and CT on admission as noted above  - evaluated by surgery and s/p emergent ex lap  and beta perforated gastric ulcer 8/24  - was started on TPN 8/31; now tolerating PO   - surgery following, removed staples 9/7, plan to remove drain prior to discharge   - Nutrition following to wean TPN; on mechanical dental soft diet   - remains afebrile but with mild persistent leukocytosis wbc 12-13; normal lactate; has completed 14 days of Zosyn, will discontinue 9/7/22  - Continue IV pantoprazole.  - Continue PRN acetaminophen, PRN oxycodone, PRN IV hydromorphone; minimize opioids as able.      Acute kidney injury (suspect ATN) on CKD.  Metabolic acidosis, suspect due to perforated viscus with lactic acidosis.  * Baseline creatinine about 2.  - renal function improving, Cr peak 3.5--trended down--1.77  - was evaluated by nephrology; IVF d/emmanuel 9/1  - monitor BMP periodically     Hypernatremia, resolved.  H/o DI (history of lithium toxicity).  * Sodium normal on admit.  * Sodium increased starting 8/27. Treated with IVF's including D5W.  * D5W stopped 9/1.  * Sodium normalized 9/3.     Hyperglycemia.  H/o IGT based on hgb A1C.  * Hgb A1C 6.1 12/2020.  * Pt with hyperglycemia this hospitalization.  - Continue ISS.     Acute blood loss anemia due to perforated ulcer, surgery.  Hereditary thalassemia.  * Hgb 11.6 on admit 8/24.  * Required several transfusions in ICU.  * IV iron ordered 9/2--9/5 (received 4 doses),   -  Hb stable around 9-10  - Consider prbc transfusion if hgb </= 7.0 or if significant bleeding with hemodynamic instability or if symptomatic.     Thrombocytosis, suspect reactive.  - noted platelets trending up to 500s  - Treat other issues as noted.  - Monitor CBC.     Rash, unclear etiology, question drug reaction.  * On 9/2, noted with light erythematous rash on torso/back with some itching  * No clear culprit meds; no new scheduled meds (though TPN was new).  * Rash improved 9/4.  - Continue PRN diphenhydramine.  - Continue to monitor.     Sinus bradycardia--resolved   Hypertension (benign  "essential).  [PTA: amlodipine 5 mg daily.]  * resumed PTA Amlodipine 9/2; BP stable     DLD.  - Resume PTA simvastatin at discharge.     Bipolar disorder.  Depression/anxiety.   [PTA: haloperidol 1 mg at bedtime; lamotrigine 100 mg daily.]  * Lamotrigine restarted 8/30.  * Restarted haloperidol 9/3.  - Continue lamotrigine, haloperidol.     Weakness and physical deconditioning due to multiple acute medical issues.  - Continue PT and OT; needs TCU    Clinically Significant Risk Factors Present on Admission                       Diet: Mechanical/Dental Soft Diet  Snacks/Supplements Adult: Ensure Max Protein (bariatric); Between Meals  parenteral nutrition - ADULT compounded formula  parenteral nutrition - ADULT compounded formula  Calorie Counts      Prophylaxis: PCD's, ambulation. Heparin subcutaneous.    Central Lines: PRESENT  PICC Double Lumen 08/31/22 Right Brachial vein lateral-Site Assessment: WDL  Code Status: Full Code    Disposition:  Seems medically stable for discharge, pending weaning off of TPN and placement to TCU  - needs drain removal by surgery prior to discharge     Page Me (7 am to 6 pm)              Physical Exam:      Blood pressure (!) 135/98, pulse 99, temperature 97.8  F (36.6  C), temperature source Oral, resp. rate 16, height 1.626 m (5' 4\"), weight 54 kg (119 lb 0.8 oz), SpO2 98 %, not currently breastfeeding.  Vitals:    09/04/22 0500 09/05/22 0525 09/07/22 0500   Weight: 55.1 kg (121 lb 7.6 oz) 54.5 kg (120 lb 2.4 oz) 54 kg (119 lb 0.8 oz)     Vital Signs with Ranges  Temp:  [97.5  F (36.4  C)-98.3  F (36.8  C)] 97.8  F (36.6  C)  Pulse:  [] 99  Resp:  [16] 16  BP: (114-158)/(69-98) 135/98  SpO2:  [93 %-99 %] 98 %  I/O's Last 24 hours  I/O last 3 completed shifts:  In: 963 [P.O.:960; I.V.:3]  Out: 2860 [Urine:2850; Drains:10]    Constitutional: Alert, awake and oriented X 3; resting comfortably in no apparent distress   HEENT: Pupils equal and reactive to light and accomodation, " neck supple    Oral cavity: Moist mucosa   Cardiovascular: Normal s1 s2, regular rate and rhythm, no murmur   Lungs: B/l clear to auscultation, no wheezes or crepitations   Abdomen: Soft, nt, nd, no guarding, rigidity or rebound; BS +; midline stapled wound looks clean; drain in place   LE : No edema   Musculoskeletal: Power 5/5 in all extremities   Neuro: No focal neurological deficits noted   Psychiatry: normal mood and affect                Medications:          albuterol  2.5 mg Nebulization 4x Daily     amLODIPine  5 mg Oral Daily     haloperidol  1 mg Oral At Bedtime     heparin ANTICOAGULANT  5,000 Units Subcutaneous Q12H     insulin aspart  1-12 Units Subcutaneous Q4H     lamoTRIgine  100 mg Oral Daily     pantoprazole  40 mg Intravenous BID AC     piperacillin-tazobactam  3.375 g Intravenous Q6H     sodium chloride (PF)  10-40 mL Intracatheter Q8H     sodium chloride (PF)  3 mL Intracatheter Q8H     PRN Meds: acetaminophen, acetylcysteine, albuterol, [Held by provider] bisacodyl, glucose **OR** dextrose **OR** glucagon, diphenhydrAMINE **OR** [DISCONTINUED] diphenhydrAMINE, HYDROmorphone, lidocaine 4%, lidocaine (buffered or not buffered), magnesium hydroxide, naloxone **OR** naloxone **OR** naloxone **OR** naloxone, ondansetron **OR** ondansetron, oxyCODONE **OR** oxyCODONE, prochlorperazine **OR** prochlorperazine, sodium chloride (PF), sodium chloride (PF), sodium chloride (PF)         Data:      All new lab and imaging data was reviewed.   Recent Labs   Lab Test 09/07/22  0336 09/06/22  0533 09/05/22  0543 09/02/22  0549 09/01/22  0628 08/24/22  1541 08/24/22  1311   WBC 13.3* 12.9* 12.3*   < > 11.4*   < >  --    HGB 9.3* 9.3* 9.3*   < > 10.5*   < >  --    MCV 74* 76* 73*   < > 74*   < >  --    * 565* 547*   < > 341   < >  --    INR  --   --  1.06  --  1.21*  --  1.13    < > = values in this interval not displayed.      Recent Labs   Lab Test 09/07/22  0336 09/07/22  0327 09/07/22  0048  09/06/22  0830 09/06/22  0533 09/05/22  1206 09/05/22  0543 09/04/22  0839 09/04/22  0552     --   --   --   --   --  143  --  143   POTASSIUM 5.1  --   --   --  4.9  --  4.2  --  3.8   CHLORIDE 116*  --   --   --   --   --  118*  --  116*   CO2 17*  --   --   --   --   --  18*  --  20   BUN 65*  --   --   --   --   --  55*  --  49*   CR 1.77*  --   --   --   --   --  1.80*  --  1.76*   ANIONGAP 9  --   --   --   --   --  7  --  7   ISSA 9.7  --   --   --   --   --  9.3  --  9.1   * 144* 152*   < >  --    < > 150*   < > 155*    < > = values in this interval not displayed.     No lab results found.    Invalid input(s): TROP, TROPONINIES

## 2022-09-08 ENCOUNTER — APPOINTMENT (OUTPATIENT)
Dept: PHYSICAL THERAPY | Facility: CLINIC | Age: 72
DRG: 853 | End: 2022-09-08
Payer: MEDICARE

## 2022-09-08 ENCOUNTER — APPOINTMENT (OUTPATIENT)
Dept: OCCUPATIONAL THERAPY | Facility: CLINIC | Age: 72
DRG: 853 | End: 2022-09-08
Payer: MEDICARE

## 2022-09-08 LAB
BASOPHILS # BLD MANUAL: 0.3 10E3/UL (ref 0–0.2)
BASOPHILS NFR BLD MANUAL: 2 %
ELLIPTOCYTES BLD QL SMEAR: SLIGHT
EOSINOPHIL # BLD MANUAL: 0.4 10E3/UL (ref 0–0.7)
EOSINOPHIL NFR BLD MANUAL: 3 %
ERYTHROCYTE [DISTWIDTH] IN BLOOD BY AUTOMATED COUNT: 25.2 % (ref 10–15)
FRAGMENTS BLD QL SMEAR: SLIGHT
GLUCOSE BLDC GLUCOMTR-MCNC: 116 MG/DL (ref 70–99)
GLUCOSE BLDC GLUCOMTR-MCNC: 152 MG/DL (ref 70–99)
GLUCOSE BLDC GLUCOMTR-MCNC: 160 MG/DL (ref 70–99)
GLUCOSE BLDC GLUCOMTR-MCNC: 167 MG/DL (ref 70–99)
GLUCOSE BLDC GLUCOMTR-MCNC: 173 MG/DL (ref 70–99)
GLUCOSE BLDC GLUCOMTR-MCNC: 197 MG/DL (ref 70–99)
HCT VFR BLD AUTO: 30.4 % (ref 35–47)
HGB BLD-MCNC: 9.3 G/DL (ref 11.7–15.7)
LACTATE SERPL-SCNC: 1.4 MMOL/L (ref 0.7–2)
LACTATE SERPL-SCNC: 2.6 MMOL/L (ref 0.7–2)
LYMPHOCYTES # BLD MANUAL: 1.3 10E3/UL (ref 0.8–5.3)
LYMPHOCYTES NFR BLD MANUAL: 10 %
MAGNESIUM SERPL-MCNC: 1.9 MG/DL (ref 1.6–2.3)
MCH RBC QN AUTO: 22.4 PG (ref 26.5–33)
MCHC RBC AUTO-ENTMCNC: 30.6 G/DL (ref 31.5–36.5)
MCV RBC AUTO: 73 FL (ref 78–100)
METAMYELOCYTES # BLD MANUAL: 0.1 10E3/UL
METAMYELOCYTES NFR BLD MANUAL: 1 %
MONOCYTES # BLD MANUAL: 0.8 10E3/UL (ref 0–1.3)
MONOCYTES NFR BLD MANUAL: 6 %
NEUTROPHILS # BLD MANUAL: 10.1 10E3/UL (ref 1.6–8.3)
NEUTROPHILS NFR BLD MANUAL: 78 %
PHOSPHATE SERPL-MCNC: 3.8 MG/DL (ref 2.5–4.5)
PLAT MORPH BLD: ABNORMAL
PLATELET # BLD AUTO: 579 10E3/UL (ref 150–450)
POTASSIUM BLD-SCNC: 4.8 MMOL/L (ref 3.4–5.3)
RBC # BLD AUTO: 4.16 10E6/UL (ref 3.8–5.2)
RBC MORPH BLD: ABNORMAL
TARGETS BLD QL SMEAR: SLIGHT
WBC # BLD AUTO: 12.9 10E3/UL (ref 4–11)

## 2022-09-08 PROCEDURE — 83735 ASSAY OF MAGNESIUM: CPT | Performed by: HOSPITALIST

## 2022-09-08 PROCEDURE — 999N000190 HC STATISTIC VAT ROUNDS

## 2022-09-08 PROCEDURE — C9113 INJ PANTOPRAZOLE SODIUM, VIA: HCPCS | Performed by: INTERNAL MEDICINE

## 2022-09-08 PROCEDURE — 85007 BL SMEAR W/DIFF WBC COUNT: CPT | Performed by: HOSPITALIST

## 2022-09-08 PROCEDURE — 120N000001 HC R&B MED SURG/OB

## 2022-09-08 PROCEDURE — 250N000011 HC RX IP 250 OP 636: Performed by: INTERNAL MEDICINE

## 2022-09-08 PROCEDURE — 97116 GAIT TRAINING THERAPY: CPT | Mod: GP

## 2022-09-08 PROCEDURE — 97535 SELF CARE MNGMENT TRAINING: CPT | Mod: GO

## 2022-09-08 PROCEDURE — 36415 COLL VENOUS BLD VENIPUNCTURE: CPT | Performed by: HOSPITALIST

## 2022-09-08 PROCEDURE — 250N000009 HC RX 250: Performed by: INTERNAL MEDICINE

## 2022-09-08 PROCEDURE — 97530 THERAPEUTIC ACTIVITIES: CPT | Mod: GP

## 2022-09-08 PROCEDURE — 99233 SBSQ HOSP IP/OBS HIGH 50: CPT | Performed by: HOSPITALIST

## 2022-09-08 PROCEDURE — 999N000040 HC STATISTIC CONSULT NO CHARGE VASC ACCESS

## 2022-09-08 PROCEDURE — 94640 AIRWAY INHALATION TREATMENT: CPT | Mod: 76

## 2022-09-08 PROCEDURE — 84100 ASSAY OF PHOSPHORUS: CPT | Performed by: HOSPITALIST

## 2022-09-08 PROCEDURE — 250N000011 HC RX IP 250 OP 636

## 2022-09-08 PROCEDURE — 83605 ASSAY OF LACTIC ACID: CPT | Performed by: HOSPITALIST

## 2022-09-08 PROCEDURE — 85027 COMPLETE CBC AUTOMATED: CPT | Performed by: HOSPITALIST

## 2022-09-08 PROCEDURE — 84132 ASSAY OF SERUM POTASSIUM: CPT | Performed by: HOSPITALIST

## 2022-09-08 PROCEDURE — 250N000013 HC RX MED GY IP 250 OP 250 PS 637: Performed by: INTERNAL MEDICINE

## 2022-09-08 PROCEDURE — 258N000003 HC RX IP 258 OP 636: Performed by: HOSPITALIST

## 2022-09-08 PROCEDURE — 94640 AIRWAY INHALATION TREATMENT: CPT

## 2022-09-08 RX ADMIN — LAMOTRIGINE 100 MG: 100 TABLET ORAL at 09:59

## 2022-09-08 RX ADMIN — ALBUTEROL SULFATE 2.5 MG: 2.5 SOLUTION RESPIRATORY (INHALATION) at 11:40

## 2022-09-08 RX ADMIN — PANTOPRAZOLE SODIUM 40 MG: 40 INJECTION, POWDER, FOR SOLUTION INTRAVENOUS at 09:43

## 2022-09-08 RX ADMIN — SODIUM CHLORIDE 500 ML: 9 INJECTION, SOLUTION INTRAVENOUS at 10:30

## 2022-09-08 RX ADMIN — ALBUTEROL SULFATE 2.5 MG: 2.5 SOLUTION RESPIRATORY (INHALATION) at 07:44

## 2022-09-08 RX ADMIN — HALOPERIDOL 1 MG: 1 TABLET ORAL at 21:44

## 2022-09-08 RX ADMIN — PANTOPRAZOLE SODIUM 40 MG: 40 INJECTION, POWDER, FOR SOLUTION INTRAVENOUS at 19:33

## 2022-09-08 RX ADMIN — AMLODIPINE BESYLATE 5 MG: 5 TABLET ORAL at 09:59

## 2022-09-08 RX ADMIN — HEPARIN SODIUM 5000 UNITS: 5000 INJECTION, SOLUTION INTRAVENOUS; SUBCUTANEOUS at 11:42

## 2022-09-08 RX ADMIN — HEPARIN SODIUM 5000 UNITS: 5000 INJECTION, SOLUTION INTRAVENOUS; SUBCUTANEOUS at 00:59

## 2022-09-08 ASSESSMENT — ACTIVITIES OF DAILY LIVING (ADL)
ADLS_ACUITY_SCORE: 42
ADLS_ACUITY_SCORE: 40
ADLS_ACUITY_SCORE: 42
ADLS_ACUITY_SCORE: 40
ADLS_ACUITY_SCORE: 40
ADLS_ACUITY_SCORE: 42

## 2022-09-08 NOTE — PROGRESS NOTES
Sepsis Evaluation Progress Note    I was called to see Diana Santiago due to abnormal vital signs triggering the Sepsis SIRS screening alert. She is known to have an infection.     Physical Exam   Vital Signs:  Temp: 97.8  F (36.6  C) Temp src: Oral BP: 130/83 Pulse: 98   Resp: 18 SpO2: 98 % O2 Device: None (Room air)       General: in no acute distress  Mental Status: baseline mental status.       Data   Lactic Acid   Date Value Ref Range Status   09/08/2022 2.6 (H) 0.7 - 2.0 mmol/L Final   09/07/2022 1.1 0.7 - 2.0 mmol/L Final       Assessment & Plan   NO EVIDENCE OF SEPSIS at this time.  Vital sign, physical exam, and lab findings are due to likely dehydration.    Disposition: The patient will remain on the current unit. We will continue to monitor this patient closely..  Constantine Britton MD    Sepsis Criteria   Sepsis: 2+ SIRS criteria due to infection  Severe Sepsis: Sepsis AND 1+ new sign of acute organ dysfunction (Note: lactate >2 or acute encephalopathy each qualify as organ dysfunction)  Septic Shock: Sepsis AND hypotension despite volume resuscitation with 30 ml/kg crystalloid or lactate >=4  Note: HYPOTENSION is defined as 2 BP readings measured 3 hrs apart that have a SBP <90, MAP <65, or decrease >40 mmHg, occurring 6 hrs before or after t-zero

## 2022-09-08 NOTE — PLAN OF CARE
Pt A&Ox4, VSS on RA sating >90%. LS clear. BS +, Pt had multiple Bms this shifts, having adequate urine output. Purwick in place while in bed. Pt denies pain. Pt tolerating mechanical soft diet with adequate oral intake. X1 SBA with GB and walker. DAVI drain in place to bulb suction with minimal serous output. Continue plan of care.     Goal Outcome Evaluation:  Plan of Care Reviewed With: patient   Overall Patient Progress: improving  Outcome Evaluation: TPN discontinued this evening, PICC line removed. Sepsis protocol fired, lactic 2.6, IV fluid bolus given, lactic now 1.4. Pt having adequate oral intake with each meal.

## 2022-09-08 NOTE — PROGRESS NOTES
CALORIE COUNT    Approximate Oral Intake for:   9/7   Calories:  1530  Protein:  57    TPN D15 AA5 at 65 mL/hr (no Lipids) = 1108 kcals (20 kcal/kg and 79% energy needs), 78 gm pro (1.4 gm/kg), 234 gm CHO     Total (TPN + oral):  2638 kcals (47 kcal/kg and 157% energy needs), 135 gm pro (2.4 gm/kg and 159% pro needs)    ASSESSED NUTRITION NEEDS PER APPROVED PRACTICE GUIDELINES:     Dosing Weight 56 kg (8/28)  Estimated Energy Needs: 7585-5588 kcals (25-30 Kcal/Kg)  Justification: maintenance  Estimated Protein Needs: 65-85 grams protein (1.2-1.5 g pro/Kg)  Justification: post-op and hypercatabolism with acute illness    Summary:   This morning, pt consumed 75% breakfast (560 kcals, 20 gm pro).   K/Mg/Phos today WNL.  BGM: 239, 169, 132, 173, 167 - intermittent high.    Intervention:  Parenteral Nutrition --> Will discontinue TPN after this bag completed (8 pm).  Will continue calorie counts.    Kathie Torres, RD, LD, CNSC

## 2022-09-08 NOTE — PROGRESS NOTES
[facility]  Hospitalist Progress Note        Constantine Britton MD   09/08/2022        Interval History:        - no acute issues overnight; ambulating well; denies any active complaints; staples removed 9/7; surgery plans drain removal prior to discharge   - tolerating PO well; nutrition following and plan to discontinue TPN 9/8  - sepsis protocol fired this morning with lactate of 2.6; patient clinically stable and no concern for ongoing infection; antibiotics with zosyn d/emmanuel 9/7 (completed 14 days)-- will order 500 ml NS fluid bolus and repeat lactate in 2 hrs         Assessment and Plan:        Diana Santiago is a 71 year old female with PMH of HTN, HLD, heterozygous thalassemia, CKD, and diabetes insipidus, who presented 8/24/2022 with abdominal pain and dyspnea, noted with perforated viscus with septic shock. Underwent emergent ex-lap and repair of perforated ulcer on 8/24/2022. Initially required pressors and was admitted to ICU, Weaned off pressors 8/27. Extubated 8/30. Transferred to the  Hospitalist team 8/31/2022.    CT on admission 8/24 with large volume pneumoperitoneum, favored source from the anterior distal stomach/proximal duodenum; small volume free fluid without walled-off, drainable collection; small bilateral pleural effusions with bibasilar atelectasis; incidental indeterminate renal lesions; intrathoracic goiter with distinct 1.8 cm left thyroid nodule.     Perforated gastric ulcer - s/p ex-lap, repair of perforated gastric ulcer and peritoneal washout 8/24/2022.  Post-op ileus.  Septic shock secondary to above, resolved.  Lactic acidosis due to above, resolved.  Acute respiratory failure requiring intubation secondary to above, resolved, extubated 8/30  Severe malnutrition related to acute and chronic medical issues.  - presentation and CT on admission as noted above  - evaluated by surgery and s/p emergent ex lap and beta perforated gastric ulcer 8/24  - was started on TPN  8/31; now tolerating PO; Nutrition following and plan to discontinue TPN 9/8   - surgery following, removed staples 9/7, plan to remove drain prior to discharge     - remains afebrile but with mild persistent leukocytosis wbc 12-13; lactate normalized but sepsis protocol fired 9/8 am with lactate of 2.6; patient clinically stable and no concern for ongoing infection; antibiotics with zosyn d/emmanuel 9/7 (completed 14 days)-- will order 500 ml NS fluid bolus and repeat lactate in 2 hrs  - Continue IV pantoprazole.  - Continue PRN acetaminophen, PRN oxycodone, PRN IV hydromorphone; minimize opioids as able.      Acute kidney injury (suspect ATN) on CKD.  Metabolic acidosis, suspect due to perforated viscus with lactic acidosis.  * Baseline creatinine about 2.  - renal function improving, Cr peak 3.5--trended down--1.77  - was evaluated by nephrology; IVF d/emmanuel 9/1  - monitor BMP periodically     Hypernatremia, resolved.  H/o DI (history of lithium toxicity).  * Sodium normal on admit.  * Sodium increased starting 8/27. Treated with IVF's including D5W.  * D5W stopped 9/1.  * Sodium normalized 9/3.     Hyperglycemia.  H/o IGT based on hgb A1C.  * Hgb A1C 6.1 12/2020.  * Pt with hyperglycemia this hospitalization.  - hopefully better BG control with discontinuation of TPN  - will switch sliding scale insulin to mealtime TID     Acute blood loss anemia due to perforated ulcer, surgery.  Hereditary thalassemia.  * Hgb 11.6 on admit 8/24.  * Required several transfusions in ICU.  * IV iron ordered 9/2--9/5 (received 4 doses),   -  Hb stable around 9-10  - Consider prbc transfusion if hgb </= 7.0 or if significant bleeding with hemodynamic instability or if symptomatic.     Thrombocytosis, suspect reactive.  - noted platelets trending up to 500s, stabilizing  - Treat other issues as noted.  - Monitor CBC.     Rash, unclear etiology, question drug reaction.  * On 9/2, noted with light erythematous rash on torso/back with some  "itching  * No clear culprit meds; no new scheduled meds (though TPN was new).  * Rash improved 9/4.  - Continue PRN diphenhydramine.  - Continue to monitor.     Sinus bradycardia--resolved   Hypertension (benign essential).  [PTA: amlodipine 5 mg daily.]  * resumed PTA Amlodipine 9/2; BP stable     DLD.  - Resume PTA simvastatin at discharge.     Bipolar disorder.  Depression/anxiety.   [PTA: haloperidol 1 mg at bedtime; lamotrigine 100 mg daily.]  * Lamotrigine restarted 8/30.  * Restarted haloperidol 9/3.  - Continue lamotrigine, haloperidol.     Weakness and physical deconditioning due to multiple acute medical issues.  - Continue PT and OT; needs TCU    Clinically Significant Risk Factors Present on Admission                       Diet: Mechanical/Dental Soft Diet  Snacks/Supplements Adult: Ensure Max Protein (bariatric); Between Meals  parenteral nutrition - ADULT compounded formula  Calorie Counts      Prophylaxis: PCD's, ambulation. Heparin subcutaneous.    Central Lines: PRESENT  PICC Double Lumen 08/31/22 Right Brachial vein lateral-Site Assessment: WDL  Code Status: Full Code    Disposition:  Seems medically stable for discharge, pending weaning off of TPN and placement to TCU  - needs drain removal by surgery prior to discharge     Page Me (7 am to 6 pm)    Care plan discussed with patient and nursing              Physical Exam:      Blood pressure 129/87, pulse 101, temperature 98.3  F (36.8  C), temperature source Axillary, resp. rate 16, height 1.626 m (5' 4\"), weight 54 kg (119 lb 0.8 oz), SpO2 96 %, not currently breastfeeding.  Vitals:    09/04/22 0500 09/05/22 0525 09/07/22 0500   Weight: 55.1 kg (121 lb 7.6 oz) 54.5 kg (120 lb 2.4 oz) 54 kg (119 lb 0.8 oz)     Vital Signs with Ranges  Temp:  [97.7  F (36.5  C)-98.3  F (36.8  C)] 98.3  F (36.8  C)  Pulse:  [] 101  Resp:  [16] 16  BP: (110-148)/() 129/87  SpO2:  [96 %-99 %] 96 %  I/O's Last 24 hours  I/O last 3 completed shifts:  In: " 1380 [P.O.:1380]  Out: 2370 [Urine:2350; Drains:20]    Constitutional: Alert, awake and oriented X 3; resting comfortably in no apparent distress   HEENT: Pupils equal and reactive to light and accomodation, neck supple    Oral cavity: Moist mucosa   Cardiovascular: Normal s1 s2, regular rate and rhythm, no murmur   Lungs: B/l clear to auscultation, no wheezes or crepitations   Abdomen: Soft, nt, nd, no guarding, rigidity or rebound; BS +; midline wound looks clean; drain in place   LE : No edema   Musculoskeletal: Power 5/5 in all extremities   Neuro: No focal neurological deficits noted   Psychiatry: normal mood and affect                Medications:          albuterol  2.5 mg Nebulization 4x Daily     amLODIPine  5 mg Oral Daily     haloperidol  1 mg Oral At Bedtime     heparin ANTICOAGULANT  5,000 Units Subcutaneous Q12H     insulin aspart  1-12 Units Subcutaneous Q4H     lamoTRIgine  100 mg Oral Daily     pantoprazole  40 mg Intravenous BID AC     sodium chloride (PF)  10-40 mL Intracatheter Q8H     sodium chloride (PF)  3 mL Intracatheter Q8H     PRN Meds: acetaminophen, acetylcysteine, albuterol, [Held by provider] bisacodyl, glucose **OR** dextrose **OR** glucagon, diphenhydrAMINE **OR** [DISCONTINUED] diphenhydrAMINE, HYDROmorphone, lidocaine 4%, lidocaine (buffered or not buffered), magnesium hydroxide, naloxone **OR** naloxone **OR** naloxone **OR** naloxone, ondansetron **OR** ondansetron, oxyCODONE **OR** oxyCODONE, prochlorperazine **OR** prochlorperazine, sodium chloride (PF), sodium chloride (PF), sodium chloride (PF)         Data:      All new lab and imaging data was reviewed.   Recent Labs   Lab Test 09/08/22  0520 09/07/22  0336 09/06/22  0533 09/05/22  0543 09/02/22  0549 09/01/22  0628 08/24/22  1541 08/24/22  1311   WBC 12.9* 13.3* 12.9* 12.3*   < > 11.4*   < >  --    HGB 9.3* 9.3* 9.3* 9.3*   < > 10.5*   < >  --    MCV 73* 74* 76* 73*   < > 74*   < >  --    * 587* 565* 547*   < > 341    < >  --    INR  --   --   --  1.06  --  1.21*  --  1.13    < > = values in this interval not displayed.      Recent Labs   Lab Test 09/08/22  0804 09/08/22  0520 09/08/22  0420 09/08/22  0021 09/07/22  0845 09/07/22  0336 09/06/22  0830 09/06/22  0533 09/05/22  1206 09/05/22  0543 09/04/22  0839 09/04/22  0552   NA  --   --   --   --   --  142  --   --   --  143  --  143   POTASSIUM  --  4.8  --   --   --  5.1  --  4.9  --  4.2  --  3.8   CHLORIDE  --   --   --   --   --  116*  --   --   --  118*  --  116*   CO2  --   --   --   --   --  17*  --   --   --  18*  --  20   BUN  --   --   --   --   --  65*  --   --   --  55*  --  49*   CR  --   --   --   --   --  1.77*  --   --   --  1.80*  --  1.76*   ANIONGAP  --   --   --   --   --  9  --   --   --  7  --  7   ISSA  --   --   --   --   --  9.7  --   --   --  9.3  --  9.1   *  --  173* 152*   < > 162*   < >  --    < > 150*   < > 155*    < > = values in this interval not displayed.     No lab results found.    Invalid input(s): TROP, TROPONINIES

## 2022-09-08 NOTE — PROVIDER NOTIFICATION
MD Notification  Notified Person: MD  Notified Person Name: Constantine Britton   Notification Date/Time: 9/8 at 1009   Notification Interaction: Clinic[employer] messaging   Purpose of Notification: sepsis protocol fired, lactic came back at 2.6  Orders Received: 500 ml bolus and a repeat lactic acid 2 hours after bolus is complete

## 2022-09-08 NOTE — PLAN OF CARE
Goal Outcome Evaluation:    Plan of Care Reviewed With: patient     Overall Patient Progress: no change    Outcome Evaluation: No acute events overnight    Cognition/Mentation: A&Ox4 . Neuro/CMS: Intact     VS: stable, RA     Respiratory: LS clear     GI/: purewick in place- adequate UOP, bmx1     Activity: SBA with walker and GB     Pain: Denies     Drips: Continuous TPN infusing @ 65cc/hr thru purple lumen, red lumen occluded.      Drains/Tubes: Espinoza to bulb suction. Stripped 1x this shift. Double Lumen PICC line.     Skin: scattered bruising, abdominal incision( staples removed).

## 2022-09-09 ENCOUNTER — APPOINTMENT (OUTPATIENT)
Dept: PHYSICAL THERAPY | Facility: CLINIC | Age: 72
DRG: 853 | End: 2022-09-09
Payer: MEDICARE

## 2022-09-09 ENCOUNTER — APPOINTMENT (OUTPATIENT)
Dept: OCCUPATIONAL THERAPY | Facility: CLINIC | Age: 72
DRG: 853 | End: 2022-09-09
Payer: MEDICARE

## 2022-09-09 LAB
ANION GAP SERPL CALCULATED.3IONS-SCNC: 6 MMOL/L (ref 3–14)
BASOPHILS # BLD MANUAL: 0 10E3/UL (ref 0–0.2)
BASOPHILS NFR BLD MANUAL: 0 %
BUN SERPL-MCNC: 78 MG/DL (ref 7–30)
CALCIUM SERPL-MCNC: 10.3 MG/DL (ref 8.5–10.1)
CHLORIDE BLD-SCNC: 118 MMOL/L (ref 94–109)
CO2 SERPL-SCNC: 16 MMOL/L (ref 20–32)
CREAT SERPL-MCNC: 1.62 MG/DL (ref 0.52–1.04)
ELLIPTOCYTES BLD QL SMEAR: SLIGHT
EOSINOPHIL # BLD MANUAL: 0.4 10E3/UL (ref 0–0.7)
EOSINOPHIL NFR BLD MANUAL: 3 %
ERYTHROCYTE [DISTWIDTH] IN BLOOD BY AUTOMATED COUNT: 24.8 % (ref 10–15)
GFR SERPL CREATININE-BSD FRML MDRD: 34 ML/MIN/1.73M2
GLUCOSE BLD-MCNC: 117 MG/DL (ref 70–99)
GLUCOSE BLDC GLUCOMTR-MCNC: 103 MG/DL (ref 70–99)
GLUCOSE BLDC GLUCOMTR-MCNC: 112 MG/DL (ref 70–99)
GLUCOSE BLDC GLUCOMTR-MCNC: 117 MG/DL (ref 70–99)
GLUCOSE BLDC GLUCOMTR-MCNC: 120 MG/DL (ref 70–99)
GLUCOSE BLDC GLUCOMTR-MCNC: 97 MG/DL (ref 70–99)
HCT VFR BLD AUTO: 31.1 % (ref 35–47)
HGB BLD-MCNC: 9.6 G/DL (ref 11.7–15.7)
LYMPHOCYTES # BLD MANUAL: 1.8 10E3/UL (ref 0.8–5.3)
LYMPHOCYTES NFR BLD MANUAL: 14 %
MAGNESIUM SERPL-MCNC: 2 MG/DL (ref 1.6–2.3)
MCH RBC QN AUTO: 23.1 PG (ref 26.5–33)
MCHC RBC AUTO-ENTMCNC: 30.9 G/DL (ref 31.5–36.5)
MCV RBC AUTO: 75 FL (ref 78–100)
METAMYELOCYTES # BLD MANUAL: 0.3 10E3/UL
METAMYELOCYTES NFR BLD MANUAL: 2 %
MONOCYTES # BLD MANUAL: 0.4 10E3/UL (ref 0–1.3)
MONOCYTES NFR BLD MANUAL: 3 %
MYELOCYTES # BLD MANUAL: 0.3 10E3/UL
MYELOCYTES NFR BLD MANUAL: 2 %
NEUTROPHILS # BLD MANUAL: 9.6 10E3/UL (ref 1.6–8.3)
NEUTROPHILS NFR BLD MANUAL: 76 %
NRBC # BLD AUTO: 0.1 10E3/UL
NRBC BLD MANUAL-RTO: 1 %
PHOSPHATE SERPL-MCNC: 4.5 MG/DL (ref 2.5–4.5)
PLAT MORPH BLD: ABNORMAL
PLATELET # BLD AUTO: 581 10E3/UL (ref 150–450)
POLYCHROMASIA BLD QL SMEAR: SLIGHT
POTASSIUM BLD-SCNC: 5.6 MMOL/L (ref 3.4–5.3)
POTASSIUM BLD-SCNC: 5.7 MMOL/L (ref 3.4–5.3)
POTASSIUM BLD-SCNC: 6 MMOL/L (ref 3.4–5.3)
RBC # BLD AUTO: 4.16 10E6/UL (ref 3.8–5.2)
RBC MORPH BLD: ABNORMAL
SODIUM SERPL-SCNC: 140 MMOL/L (ref 133–144)
TARGETS BLD QL SMEAR: SLIGHT
WBC # BLD AUTO: 12.6 10E3/UL (ref 4–11)

## 2022-09-09 PROCEDURE — 83735 ASSAY OF MAGNESIUM: CPT | Performed by: HOSPITALIST

## 2022-09-09 PROCEDURE — 94640 AIRWAY INHALATION TREATMENT: CPT

## 2022-09-09 PROCEDURE — 36415 COLL VENOUS BLD VENIPUNCTURE: CPT | Performed by: INTERNAL MEDICINE

## 2022-09-09 PROCEDURE — 80048 BASIC METABOLIC PNL TOTAL CA: CPT | Performed by: HOSPITALIST

## 2022-09-09 PROCEDURE — 84100 ASSAY OF PHOSPHORUS: CPT | Performed by: HOSPITALIST

## 2022-09-09 PROCEDURE — 97116 GAIT TRAINING THERAPY: CPT | Mod: GP

## 2022-09-09 PROCEDURE — 250N000013 HC RX MED GY IP 250 OP 250 PS 637: Performed by: INTERNAL MEDICINE

## 2022-09-09 PROCEDURE — 94640 AIRWAY INHALATION TREATMENT: CPT | Mod: 76

## 2022-09-09 PROCEDURE — 36415 COLL VENOUS BLD VENIPUNCTURE: CPT | Performed by: HOSPITALIST

## 2022-09-09 PROCEDURE — 97530 THERAPEUTIC ACTIVITIES: CPT | Mod: GP

## 2022-09-09 PROCEDURE — 999N000157 HC STATISTIC RCP TIME EA 10 MIN

## 2022-09-09 PROCEDURE — 250N000009 HC RX 250: Performed by: INTERNAL MEDICINE

## 2022-09-09 PROCEDURE — 84132 ASSAY OF SERUM POTASSIUM: CPT | Performed by: INTERNAL MEDICINE

## 2022-09-09 PROCEDURE — 120N000001 HC R&B MED SURG/OB

## 2022-09-09 PROCEDURE — 85007 BL SMEAR W/DIFF WBC COUNT: CPT | Performed by: HOSPITALIST

## 2022-09-09 PROCEDURE — 250N000011 HC RX IP 250 OP 636

## 2022-09-09 PROCEDURE — 97530 THERAPEUTIC ACTIVITIES: CPT | Mod: GO

## 2022-09-09 PROCEDURE — 97535 SELF CARE MNGMENT TRAINING: CPT | Mod: GO

## 2022-09-09 PROCEDURE — 250N000011 HC RX IP 250 OP 636: Performed by: INTERNAL MEDICINE

## 2022-09-09 PROCEDURE — 99233 SBSQ HOSP IP/OBS HIGH 50: CPT | Performed by: INTERNAL MEDICINE

## 2022-09-09 PROCEDURE — 85027 COMPLETE CBC AUTOMATED: CPT | Performed by: HOSPITALIST

## 2022-09-09 PROCEDURE — C9113 INJ PANTOPRAZOLE SODIUM, VIA: HCPCS | Performed by: INTERNAL MEDICINE

## 2022-09-09 RX ORDER — DEXTROSE MONOHYDRATE 25 G/50ML
25-50 INJECTION, SOLUTION INTRAVENOUS
Status: DISCONTINUED | OUTPATIENT
Start: 2022-09-09 | End: 2022-09-14 | Stop reason: HOSPADM

## 2022-09-09 RX ORDER — NICOTINE POLACRILEX 4 MG
15-30 LOZENGE BUCCAL
Status: DISCONTINUED | OUTPATIENT
Start: 2022-09-09 | End: 2022-09-14 | Stop reason: HOSPADM

## 2022-09-09 RX ADMIN — LAMOTRIGINE 100 MG: 100 TABLET ORAL at 09:16

## 2022-09-09 RX ADMIN — HEPARIN SODIUM 5000 UNITS: 5000 INJECTION, SOLUTION INTRAVENOUS; SUBCUTANEOUS at 00:02

## 2022-09-09 RX ADMIN — ALBUTEROL SULFATE 2.5 MG: 2.5 SOLUTION RESPIRATORY (INHALATION) at 11:20

## 2022-09-09 RX ADMIN — HEPARIN SODIUM 5000 UNITS: 5000 INJECTION, SOLUTION INTRAVENOUS; SUBCUTANEOUS at 13:08

## 2022-09-09 RX ADMIN — PANTOPRAZOLE SODIUM 40 MG: 40 INJECTION, POWDER, FOR SOLUTION INTRAVENOUS at 22:13

## 2022-09-09 RX ADMIN — PANTOPRAZOLE SODIUM 40 MG: 40 INJECTION, POWDER, FOR SOLUTION INTRAVENOUS at 09:15

## 2022-09-09 RX ADMIN — HALOPERIDOL 1 MG: 1 TABLET ORAL at 22:08

## 2022-09-09 RX ADMIN — SODIUM ZIRCONIUM CYCLOSILICATE 5 G: 5 POWDER, FOR SUSPENSION ORAL at 17:46

## 2022-09-09 RX ADMIN — ALBUTEROL SULFATE 2.5 MG: 2.5 SOLUTION RESPIRATORY (INHALATION) at 15:45

## 2022-09-09 RX ADMIN — ALBUTEROL SULFATE 2.5 MG: 2.5 SOLUTION RESPIRATORY (INHALATION) at 07:19

## 2022-09-09 RX ADMIN — AMLODIPINE BESYLATE 5 MG: 5 TABLET ORAL at 09:16

## 2022-09-09 ASSESSMENT — ACTIVITIES OF DAILY LIVING (ADL)
ADLS_ACUITY_SCORE: 42
ADLS_ACUITY_SCORE: 41
ADLS_ACUITY_SCORE: 42
ADLS_ACUITY_SCORE: 41

## 2022-09-09 NOTE — PROGRESS NOTES
Surgery  Patient is doing well.  Staples have been removed.  Abdomen is soft, nontender.  Incision appears to be healing well.  Drain output has been serous, I think it can be removed.  Continue with diet as scheduled, await TCU placement.  Call if there are any questions over the weekend.  Vaughn Vidal MD  General Surgery, Office [phone]

## 2022-09-09 NOTE — PROGRESS NOTES
CALORIE COUNT      Approximate Oral Intake for:  9/8  Calories: 1889 kcal  Protein: 77 grams      Number of Meals Recorded: 3    Number of Snacks Recorded: 1 supplement      Estimated Needs:    Energy Needs: 9098-1855 kcals (25-30 Kcal/Kg): maintenance   Protein Needs: 65-85 grams protein (1.2-1.5 g pro/Kg): post-op and hypercatabolism with acute illness       Summary:     Pt consumed 100% of energy and protein needs yesterday  PO intake continues to improve  3-day summary follow    Shawnee Eisenberg RD, LD

## 2022-09-09 NOTE — PROGRESS NOTES
Pt on RA all day. SpO2 % on RA. LS clear with slight diminishment and mild coarse crackles in the bases. All nebs given as ordered - slight increase in aeration post-neb tx.     Rich Marroquin, CRT

## 2022-09-09 NOTE — PROGRESS NOTES
Care Management Follow Up    Length of Stay (days): 16    Expected Discharge Date: 09/10/2022     Concerns to be Addressed:     discharge planning  Patient plan of care discussed at interdisciplinary rounds: Yes    Anticipated Discharge Disposition: Transitional Care     Anticipated Discharge Services:    Anticipated Discharge DME:      Patient/family educated on Medicare website which has current facility and service quality ratings: yes  Education Provided on the Discharge Plan:  yes  Patient/Family in Agreement with the Plan: yes    Referrals Placed by CM/SW: Post Acute Facilities  Private pay costs discussed: Not applicable    Additional Information:  SW reviewed chart. PT/OT recommend TCU. SW met with pt. Her brother Acosta was on the phone. Both are in agreement. [facility], [facility], [facility]. Discussed Medicare.gov. SW following.       CHRISTIAN Jimenez, LGSW  [phone] Desk phone  [phone] Cell/text (Preferred)  [facility]

## 2022-09-09 NOTE — PROGRESS NOTES
[facility]    Medicine Progress Note - Hospitalist Service    Date of Admission:  8/24/2022    Assessment & Plan          Diana Santiago is a 71 year old female with PMH of HTN, HLD, heterozygous thalassemia, CKD, and diabetes insipidus, who presented 8/24/2022 with abdominal pain and dyspnea, noted with perforated viscus with septic shock. Underwent emergent ex-lap and repair of perforated ulcer on 8/24/2022. Initially required pressors and was admitted to ICU, Weaned off pressors 8/27. Extubated 8/30. Transferred to the IM Hospitalist team 8/31/2022.     CT on admission 8/24 with large volume pneumoperitoneum, favored source from the anterior distal stomach/proximal duodenum; small volume free fluid without walled-off, drainable collection; small bilateral pleural effusions with bibasilar atelectasis; incidental indeterminate renal lesions; intrathoracic goiter with distinct 1.8 cm left thyroid nodule.     Perforated gastric ulcer - s/p ex-lap, repair of perforated gastric ulcer and peritoneal washout 8/24/2022.  Post-op ileus.  Septic shock secondary to above, resolved.  Lactic acidosis due to above, resolved.  Acute respiratory failure requiring intubation secondary to above, resolved, extubated 8/30  Severe malnutrition related to acute and chronic medical issues.  - presentation and CT on admission as noted above  - evaluated by surgery and s/p emergent ex lap and beta perforated gastric ulcer 8/24  - was started on TPN 8/31; now tolerating PO; Nutrition following and discontinued TPN 9/8   - surgery following, removed staples 9/7,  Drain removal 9/9  - remains afebrile but with mild persistent leukocytosis wbc 12-13; lactate normalized but sepsis protocol fired 9/8 am with lactate of 2.6; patient clinically stable and no concern for ongoing infection; antibiotics with zosyn d/emmanuel 9/7 (completed 14 days)  - Continue IV pantoprazole.  - Continue PRN acetaminophen, PRN oxycodone, PRN IV  hydromorphone; minimize opioids as able.     Acute kidney injury (suspect ATN) on CKD.  Metabolic acidosis, suspect due to perforated viscus with lactic acidosis.  * Baseline creatinine about 2.  - renal function improving, Cr peak 3.5--trended down--1.62  - was evaluated by nephrology; IVF d/emmanuel 9/1  - monitor BMP periodically      Hypernatremia, resolved.  H/o DI (history of lithium toxicity).  * Sodium normal on admit.  * Sodium increased starting 8/27. Treated with IVF's including D5W.  * D5W stopped 9/1.  * Sodium normalized 9/3.     Hyperkalemia 9/9: started on hyperkalemia protocol   -We will give trial of Lokelma    Hyperglycemia.  H/o IGT based on hgb A1C.  * Hgb A1C 6.1 12/2020.  * Pt with hyperglycemia this hospitalization.  - better BG control with discontinuation of TPN  -  sliding scale insulin to mealtime TID     Acute blood loss anemia due to perforated ulcer, surgery.  Hereditary thalassemia.  * Hgb 11.6 on admit 8/24.  * Required several transfusions in ICU.  * IV iron ordered 9/2--9/5 (received 4 doses),   -  Hb stable around 9-10  - Consider prbc transfusion if hgb </= 7.0 or if significant bleeding with hemodynamic instability or if symptomatic.     Thrombocytosis, suspect reactive.  - noted platelets trending up to 500s, stabilizing  - Treat other issues as noted.  - Monitor CBC.     Rash, unclear etiology, question drug reaction.  * On 9/2, noted with light erythematous rash on torso/back with some itching  * No clear culprit meds; no new scheduled meds (though TPN was new).  * Rash improved 9/4.  - Continue PRN diphenhydramine.  - Continue to monitor.     Sinus bradycardia--resolved   Hypertension (benign essential).  [PTA: amlodipine 5 mg daily.]  * resumed PTA Amlodipine 9/2; BP stable     DLD.  - Resume PTA simvastatin at discharge.     Bipolar disorder.  Depression/anxiety.   [PTA: haloperidol 1 mg at bedtime; lamotrigine 100 mg daily.]  * Lamotrigine restarted 8/30.  * Restarted  "haloperidol 9/3.  - Continue lamotrigine, haloperidol.     Weakness and physical deconditioning due to multiple acute medical issues.  - Continue PT and OT; needs TCU     Diet: Mechanical/Dental Soft Diet  Snacks/Supplements Adult: Ensure Max Protein (bariatric); Between Meals  Calorie Counts    DVT Prophylaxis: Heparin SQ  Suresh Catheter: Not present  Central Lines: None  Cardiac Monitoring: None  Code Status: Full Code      Disposition Plan      Expected Discharge Date: 09/10/2022,  3:00 PM      Discharge Comments: 8/31 out of ICU  9/2- TCU at D/C  9/4 Ileus Resolving  9/6 awaiting diet toleration, TPN + mech soft diet  9/7 monitoring for PO        The patient's care was discussed with the Bedside Nurse and Patient.    Christian Little MD, MD  Hospitalist Service  [facility]  Securely message with the Vocera Web Console (learn more here)  Text page via [employer] Paging/Directory         Clinically Significant Risk Factors Present on Admission                      ______________________________________________________________________    Interval History   Feels better.  Denies abdomen pain/chest pain.  Had drain removed today  4 point ROS done and negative unless mentioned     Data reviewed today: I reviewed all medications, new labs and imaging results over the last 24 hours. I personally reviewed no images or EKG's today.    Physical Exam   /69 (BP Location: Left leg)   Pulse 91   Temp 97.9  F (36.6  C) (Oral)   Resp 16   Ht 1.626 m (5' 4\")   Wt 51.6 kg (113 lb 12.8 oz)   SpO2 99%   BMI 19.53 kg/m    Gen- pleasant lying in bed  HEENT- NAD, JOHN  Neck- supple, no JVD elevation, no thyromegaly  CVS- I+II+ no m/r/g  RS- CTAB  Abdo- soft, no tenderness . Further as per Sx team  Ext- no edema     Data    BMPRecent Labs   Lab 09/09/22  1215 09/09/22  1151 09/09/22  1144 09/09/22  0816 09/09/22  0557 09/09/22  0208 09/08/22  0804 09/08/22  0520 09/07/22  0845 09/07/22  0336 " 09/05/22  1206 09/05/22  0543 09/04/22  0839 09/04/22  0552   NA  --   --   --   --  140  --   --   --   --  142  --  143  --  143   POTASSIUM 5.7*  --  6.0*  --  5.6*  --   --  4.8  --  5.1   < > 4.2  --  3.8   CHLORIDE  --   --   --   --  118*  --   --   --   --  116*  --  118*  --  116*   ISSA  --   --   --   --  10.3*  --   --   --   --  9.7  --  9.3  --  9.1   CO2  --   --   --   --  16*  --   --   --   --  17*  --  18*  --  20   BUN  --   --   --   --  78*  --   --   --   --  65*  --  55*  --  49*   CR  --   --   --   --  1.62*  --   --   --   --  1.77*  --  1.80*  --  1.76*   GLC  --  97  --  117* 117* 120*   < >  --    < > 162*   < > 150*   < > 155*    < > = values in this interval not displayed.     CBC  Recent Labs   Lab 09/09/22  0557 09/08/22  0520 09/07/22  0336 09/06/22  0533   WBC 12.6* 12.9* 13.3* 12.9*   RBC 4.16 4.16 4.10 4.07   HGB 9.6* 9.3* 9.3* 9.3*   HCT 31.1* 30.4* 30.4* 30.8*   MCV 75* 73* 74* 76*   MCH 23.1* 22.4* 22.7* 22.9*   MCHC 30.9* 30.6* 30.6* 30.2*   RDW 24.8* 25.2* 24.4* 24.5*   * 579* 587* 565*     INR  Recent Labs   Lab 09/05/22  0543   INR 1.06     LFTs  Recent Labs   Lab 09/05/22  0543   ALKPHOS 267*   AST 44   ALT 95*   BILITOTAL 0.5   PROTTOTAL 5.5*   ALBUMIN 1.9*      PANCNo lab results found in last 7 days.    No results found for this or any previous visit (from the past 24 hour(s)).

## 2022-09-09 NOTE — PLAN OF CARE
[id number].    AxOx4.     VSS on RA. Assist x1 with GB/walker. Mechanical soft diet. Purewick in place with adequate output. No BM this shift.    PIV x1 SL.    DAVI drain in place; ostomy appliance covering site due to leaks.  10ml serous drainage out this shift.     Midline abdominal incision WDL.     Continue plan of care.    Awaiting TCU placement.

## 2022-09-10 LAB
ANION GAP SERPL CALCULATED.3IONS-SCNC: 7 MMOL/L (ref 3–14)
BUN SERPL-MCNC: 69 MG/DL (ref 7–30)
CALCIUM SERPL-MCNC: 9.6 MG/DL (ref 8.5–10.1)
CHLORIDE BLD-SCNC: 117 MMOL/L (ref 94–109)
CO2 SERPL-SCNC: 17 MMOL/L (ref 20–32)
CREAT SERPL-MCNC: 1.65 MG/DL (ref 0.52–1.04)
ERYTHROCYTE [DISTWIDTH] IN BLOOD BY AUTOMATED COUNT: 25.7 % (ref 10–15)
GFR SERPL CREATININE-BSD FRML MDRD: 33 ML/MIN/1.73M2
GLUCOSE BLD-MCNC: 117 MG/DL (ref 70–99)
GLUCOSE BLDC GLUCOMTR-MCNC: 109 MG/DL (ref 70–99)
GLUCOSE BLDC GLUCOMTR-MCNC: 112 MG/DL (ref 70–99)
GLUCOSE BLDC GLUCOMTR-MCNC: 115 MG/DL (ref 70–99)
GLUCOSE BLDC GLUCOMTR-MCNC: 117 MG/DL (ref 70–99)
GLUCOSE BLDC GLUCOMTR-MCNC: 182 MG/DL (ref 70–99)
HCT VFR BLD AUTO: 27.5 % (ref 35–47)
HGB BLD-MCNC: 8.8 G/DL (ref 11.7–15.7)
MAGNESIUM SERPL-MCNC: 2.2 MG/DL (ref 1.6–2.3)
MCH RBC QN AUTO: 23.3 PG (ref 26.5–33)
MCHC RBC AUTO-ENTMCNC: 32 G/DL (ref 31.5–36.5)
MCV RBC AUTO: 73 FL (ref 78–100)
PHOSPHATE SERPL-MCNC: 5.7 MG/DL (ref 2.5–4.5)
PLATELET # BLD AUTO: 521 10E3/UL (ref 150–450)
POTASSIUM BLD-SCNC: 5.1 MMOL/L (ref 3.4–5.3)
POTASSIUM BLD-SCNC: 5.3 MMOL/L (ref 3.4–5.3)
RBC # BLD AUTO: 3.77 10E6/UL (ref 3.8–5.2)
SODIUM SERPL-SCNC: 141 MMOL/L (ref 133–144)
WBC # BLD AUTO: 10.4 10E3/UL (ref 4–11)

## 2022-09-10 PROCEDURE — 83735 ASSAY OF MAGNESIUM: CPT | Performed by: INTERNAL MEDICINE

## 2022-09-10 PROCEDURE — 84100 ASSAY OF PHOSPHORUS: CPT | Performed by: INTERNAL MEDICINE

## 2022-09-10 PROCEDURE — 250N000013 HC RX MED GY IP 250 OP 250 PS 637: Performed by: INTERNAL MEDICINE

## 2022-09-10 PROCEDURE — 36415 COLL VENOUS BLD VENIPUNCTURE: CPT | Performed by: INTERNAL MEDICINE

## 2022-09-10 PROCEDURE — 250N000011 HC RX IP 250 OP 636

## 2022-09-10 PROCEDURE — 99207 PR NO CHARGE LOS: CPT | Performed by: NURSE PRACTITIONER

## 2022-09-10 PROCEDURE — 94640 AIRWAY INHALATION TREATMENT: CPT | Mod: 76

## 2022-09-10 PROCEDURE — 84132 ASSAY OF SERUM POTASSIUM: CPT | Performed by: NURSE PRACTITIONER

## 2022-09-10 PROCEDURE — 94640 AIRWAY INHALATION TREATMENT: CPT

## 2022-09-10 PROCEDURE — 85027 COMPLETE CBC AUTOMATED: CPT | Performed by: INTERNAL MEDICINE

## 2022-09-10 PROCEDURE — 999N000157 HC STATISTIC RCP TIME EA 10 MIN

## 2022-09-10 PROCEDURE — 82310 ASSAY OF CALCIUM: CPT | Performed by: INTERNAL MEDICINE

## 2022-09-10 PROCEDURE — 36415 COLL VENOUS BLD VENIPUNCTURE: CPT | Performed by: NURSE PRACTITIONER

## 2022-09-10 PROCEDURE — 250N000011 HC RX IP 250 OP 636: Performed by: INTERNAL MEDICINE

## 2022-09-10 PROCEDURE — 120N000001 HC R&B MED SURG/OB

## 2022-09-10 PROCEDURE — 250N000009 HC RX 250: Performed by: INTERNAL MEDICINE

## 2022-09-10 PROCEDURE — C9113 INJ PANTOPRAZOLE SODIUM, VIA: HCPCS | Performed by: INTERNAL MEDICINE

## 2022-09-10 RX ADMIN — ALBUTEROL SULFATE 2.5 MG: 2.5 SOLUTION RESPIRATORY (INHALATION) at 19:06

## 2022-09-10 RX ADMIN — PANTOPRAZOLE SODIUM 40 MG: 40 INJECTION, POWDER, FOR SOLUTION INTRAVENOUS at 08:36

## 2022-09-10 RX ADMIN — LAMOTRIGINE 100 MG: 100 TABLET ORAL at 08:36

## 2022-09-10 RX ADMIN — PANTOPRAZOLE SODIUM 40 MG: 40 INJECTION, POWDER, FOR SOLUTION INTRAVENOUS at 20:07

## 2022-09-10 RX ADMIN — HEPARIN SODIUM 5000 UNITS: 5000 INJECTION, SOLUTION INTRAVENOUS; SUBCUTANEOUS at 01:10

## 2022-09-10 RX ADMIN — AMLODIPINE BESYLATE 5 MG: 5 TABLET ORAL at 08:36

## 2022-09-10 RX ADMIN — ALBUTEROL SULFATE 2.5 MG: 2.5 SOLUTION RESPIRATORY (INHALATION) at 07:18

## 2022-09-10 RX ADMIN — ALBUTEROL SULFATE 2.5 MG: 2.5 SOLUTION RESPIRATORY (INHALATION) at 15:16

## 2022-09-10 RX ADMIN — HEPARIN SODIUM 5000 UNITS: 5000 INJECTION, SOLUTION INTRAVENOUS; SUBCUTANEOUS at 12:05

## 2022-09-10 RX ADMIN — HALOPERIDOL 1 MG: 1 TABLET ORAL at 22:39

## 2022-09-10 ASSESSMENT — ACTIVITIES OF DAILY LIVING (ADL)
ADLS_ACUITY_SCORE: 41
ADLS_ACUITY_SCORE: 43
ADLS_ACUITY_SCORE: 41
ADLS_ACUITY_SCORE: 43

## 2022-09-10 NOTE — PLAN OF CARE
AXO 4. VSS on RA. Assist of 1 wGBW. Tolerating regular diet. Lung Sounds clear, + flatus, BM + during shift, adequate urine output via purwick. Skin bruises throughout with baseline numbness. Left upper extremities deformation at baseline. Denies pain. Denies nausea. Awaiting TCU placement. Continue plan of care.

## 2022-09-10 NOTE — PLAN OF CARE
Goal Outcome Evaluation:        Alert and oriented x4. Vital signs stable on RA. Assist of 2, GB/W. Tolerating mechanical soft diet. Lung sounds clear, equal bilaterally. + flatus, BM +. Adequate urine output, purewick in place. Scattered bruises, blanchable redness to coccyx. Pain managed with rest. Denies nausea. Pt fell to knees while transferring with aide to the bathroom. GB/W shoes and brace were utilized, no injuries at this time, continue to monitor.

## 2022-09-10 NOTE — PROGRESS NOTES
CLINICAL NUTRITION SERVICES - REASSESSMENT NOTE      Recommendations Ordered by Registered Dietitian (RD):   Continue Ensure Max supplement for nutrition push.  Will discontinue calorie counts (as needs met).   Malnutrition:  (8/31)   % Weight Loss:  > 10% in 6 months (severe malnutrition)  % Intake:  </= 50% for >/= 5 days (severe malnutrition)  Subcutaneous Fat Loss:  Orbital region moderate depletion, Upper arm region severe depletion and Thoracic region severe depletion  Muscle Loss:  Temporal region moderate depletion, Clavicle bone region moderate-severe depletion, Acromion bone region severe depletion, Dorsal hand region moderate depletion, Patellar region moderate-severe depletion and Posterior calf region severe depletion  Fluid Retention:  Mild as above      Malnutrition Diagnosis: Severe malnutrition  In Context of:  Acute illness or injury  Chronic illness or disease       EVALUATION OF PROGRESS TOWARD GOALS   Diet:  Mechanical Soft + vanilla Ensure Max at 2 pm    Nutrition Support: TPN was discontinued 9/8.    Intake/Tolerance: Patient has been on a calorie count over the past 3 days.  CALORIE COUNT  Approximate Oral Intake for:   9/9   Calories:  1885  Protein:  85  (Above included 3 meals + 1 supplement)    Summary:   Average oral intake past 3 days (9/7-9/9):  1770 kcals (34 kcal/kg), 73 gm pro (1.4 gm/kg)     ASSESSED NUTRITION NEEDS PER APPROVED PRACTICE GUIDELINES: (Modified using lower DW)     Dosing Weight:  51.6 kg (9/10)  Estimated Energy Needs: 3732-5277 kcals (30-35 Kcal/Kg)  Justification: repletion  Estimated Protein Needs: 62-77 grams protein (1.2-1.5 g pro/Kg)  Justification: post-op and hypercatabolism with acute illness    NEW FINDINGS:   Stool Pattern:  9/7:  x3  9/8:  x4  9/9:  None recorded  9/10:  x1  Wt: 51.6 kg (down 4.4 kg since 8/28). BMI: 19.53 (95% IBW).   BUN 69 (H), Cr 1.6 (H), Phos 5.7 (H).  BGM: 120, 117, 97, 103, 112, 117 - acceptable.  Patient had an assisted fall this  morning.  Plan TCU at time of discharge.    Previous Goals (9/6):   Patient will transition from TPN --> oral intake in the next 48-72 hrs.  Evaluation: Met    Previous Nutrition Diagnosis (9/6):   Inadequate oral intake related to variable appetite as evidenced by pt eating % meals over past 2 days, and continued PN reliance.  Evaluation:  Resolved      CURRENT NUTRITION DIAGNOSIS  Unintended weight loss related to likely decreased appetite PTA and now patient transitioned from TPN --> oral intake s/p major surgery as evidenced by 14% weight loss 6 months PTA and now 8% weight loss since admission.    INTERVENTIONS  Recommendations / Nutrition Prescription  Continue Ensure Max supplement for nutrition push.  Will discontinue calorie counts (as needs met).    Implementation  None at this time    Goals  Patient will consume > 75% meals TID and supplement.  No further weight loss below 52 kg.    MONITORING AND EVALUATION:  Progress towards goals will be monitored and evaluated per protocol and Practice Guidelines    Kathie Torres, RD, LD, CNSC

## 2022-09-10 NOTE — PLAN OF CARE
Goal Outcome Evaluation:          Overall Patient Progress: improving    Outcome Evaluation: Calorie count summary completed, and estimated energy and protein needs met over past 3 days. Will continue the Ensure Max supplemnet for nutrition push. Will monitor weight trends (continued downward trend noted).

## 2022-09-10 NOTE — PROGRESS NOTES
[facility]  Hospitalist Progress Note  Suleiman Jha MD  09/10/2022    Assessment & Plan      Diana Santiago is a 71 year old female with PMH of HTN, HLD, heterozygous thalassemia, CKD, and diabetes insipidus, who presented 8/24/2022 with abdominal pain and dyspnea, noted with perforated viscus with septic shock. Underwent emergent ex-lap and repair of perforated ulcer on 8/24/2022. Initially required pressors and was admitted to ICU, Weaned off pressors 8/27. Extubated 8/30. Transferred to the  Hospitalist team 8/31/2022.     CT on admission 8/24 with large volume pneumoperitoneum, favored source from the anterior distal stomach/proximal duodenum; small volume free fluid without walled-off, drainable collection; small bilateral pleural effusions with bibasilar atelectasis; incidental indeterminate renal lesions; intrathoracic goiter with distinct 1.8 cm left thyroid nodule.     Perforated gastric ulcer - s/p ex-lap, repair of perforated gastric ulcer and peritoneal washout 8/24/2022.  Post-op ileus.  Septic shock secondary to above, resolved.  Lactic acidosis due to above, resolved.  Acute respiratory failure requiring intubation secondary to above, resolved, extubated 8/30  Severe malnutrition related to acute and chronic medical issues.  - presentation and CT on admission as noted above  - evaluated by surgery and s/p emergent ex lap and beta perforated gastric ulcer 8/24  - was started on TPN 8/31; now tolerating PO; Nutrition following and discontinued TPN 9/8   - surgery following, removed staples 9/7,  Drain removal 9/9  - remains afebrile but with mild persistent leukocytosis wbc 12-13; lactate normalized but sepsis protocol fired 9/8 am with lactate of 2.6; patient clinically stable and no concern for ongoing infection; antibiotics with zosyn d/emmanuel 9/7 (completed 14 days)  - Continue IV pantoprazole.  - Continue PRN acetaminophen, PRN oxycodone, PRN IV hydromorphone; minimize  opioids as able.     Acute kidney injury (suspect ATN) on CKD.  Metabolic acidosis, suspect due to perforated viscus with lactic acidosis.  * Baseline creatinine about 2.  - renal function improving, Cr peak 3.5--trended down--1.62  - was evaluated by nephrology; IVF d/emmanuel 9/1  - monitor BMP periodically      Hypernatremia, resolved.  H/o DI (history of lithium toxicity).  * Sodium normal on admit.  * Sodium increased starting 8/27. Treated with IVF's including D5W.  * D5W stopped 9/1.  * Sodium normalized 9/3.     Hyperkalemia 9/9: started on hyperkalemia protocol   -We will give trial of [facility]     Hyperglycemia.  H/o IGT based on hgb A1C.  * Hgb A1C 6.1 12/2020.  * Pt with hyperglycemia this hospitalization.  - better BG control with discontinuation of TPN  -  sliding scale insulin to mealtime TID     Acute blood loss anemia due to perforated ulcer, surgery.  Hereditary thalassemia.  * Hgb 11.6 on admit 8/24.  * Required several transfusions in ICU.  * IV iron ordered 9/2--9/5 (received 4 doses),   -  Hb stable around 9-10  - Consider prbc transfusion if hgb </= 7.0 or if significant bleeding with hemodynamic instability or if symptomatic.     Thrombocytosis, suspect reactive.  - noted platelets trending up to 500s, stabilizing  - Treat other issues as noted.  - Monitor CBC.     Rash, unclear etiology, question drug reaction.  * On 9/2, noted with light erythematous rash on torso/back with some itching  * No clear culprit meds; no new scheduled meds (though TPN was new).  * Rash improved 9/4.  - Continue PRN diphenhydramine.  - Continue to monitor.     Sinus bradycardia--resolved   Hypertension (benign essential).  [PTA: amlodipine 5 mg daily.]  * resumed PTA Amlodipine 9/2; BP stable     DLD.  - Resume PTA simvastatin at discharge.     Bipolar disorder.  Depression/anxiety.   [PTA: haloperidol 1 mg at bedtime; lamotrigine 100 mg daily.]  * Lamotrigine restarted 8/30.  * Restarted haloperidol 9/3.  - Continue  "lamotrigine, haloperidol.     Weakness and physical deconditioning due to multiple acute medical issues.  - Continue PT and OT; needs TCU  - pending placement        Diet: Mechanical/Dental Soft Diet  Snacks/Supplements Adult: Ensure Max Protein (bariatric); Between Meals  Calorie Counts    DVT Prophylaxis: Heparin SQ  Suresh Catheter: Not present  Central Lines: None  Cardiac Monitoring: None  Code Status: Full Code          Disposition Plan        Expected Discharge Date: 09/10/2022,  3:00 PM      Discharge Comments: 8/31 out of ICU  9/2- TCU at D/C  9/4 Ileus Resolving  9/6 awaiting diet toleration, TPN + mech soft diet  9/7 monitoring for PO             Interval History   -- chart reviewed  -- had controlled fall overnight and seen by house ONUR  -- vitals, labs stable  -- pending placement    -Data reviewed today: I reviewed all new labs and imaging over the last 24 hours. I personally reviewed no images or EKG's today.    Physical Exam    , Blood pressure 126/81, pulse 87, temperature 97.6  F (36.4  C), temperature source Axillary, resp. rate 16, height 1.626 m (5' 4\"), weight 51.6 kg (113 lb 12.1 oz), SpO2 96 %, not currently breastfeeding.  Vitals:    09/07/22 0500 09/09/22 0600 09/10/22 0500   Weight: 54 kg (119 lb 0.8 oz) 51.6 kg (113 lb 12.8 oz) 51.6 kg (113 lb 12.1 oz)     Vital Signs with Ranges  Temp:  [97.6  F (36.4  C)-98.4  F (36.9  C)] 97.6  F (36.4  C)  Pulse:  [82-98] 87  Resp:  [16] 16  BP: (113-140)/(79-87) 126/81  SpO2:  [96 %-100 %] 96 %  I/O's Last 24 hours  I/O last 3 completed shifts:  In: 460 [P.O.:460]  Out: 1500 [Urine:1500]    Constitutional: Awake, alert, cooperative, no apparent distress  Respiratory: Clear to auscultation bilaterally, no crackles or wheezing  Cardiovascular: Regular rate and rhythm, normal S1 and S2, and no murmur noted  GI: Normal bowel sounds, soft, non-distended, non-tender  Skin/Integumen: No rashes, no cyanosis, no edema  Other:      Medications   All medications " were reviewed.      albuterol  2.5 mg Nebulization 4x Daily     amLODIPine  5 mg Oral Daily     haloperidol  1 mg Oral At Bedtime     heparin ANTICOAGULANT  5,000 Units Subcutaneous Q12H     insulin aspart  1-7 Units Subcutaneous TID AC     insulin aspart  1-5 Units Subcutaneous At Bedtime     lamoTRIgine  100 mg Oral Daily     pantoprazole  40 mg Intravenous BID AC     sodium chloride (PF)  10-40 mL Intracatheter Q8H     sodium chloride (PF)  3 mL Intracatheter Q8H        Data   Recent Labs   Lab 09/10/22  1203 09/10/22  0954 09/10/22  0613 09/10/22  0245 09/10/22  0039 09/09/22  1633 09/09/22  1215 09/09/22  0816 09/09/22  0557 09/08/22  0804 09/08/22  0520 09/07/22  0845 09/07/22  0336 09/05/22  1206 09/05/22  0543   WBC  --   --  10.4  --   --   --   --   --  12.6*  --  12.9*  --  13.3*   < > 12.3*   HGB  --   --  8.8*  --   --   --   --   --  9.6*  --  9.3*  --  9.3*   < > 9.3*   MCV  --   --  73*  --   --   --   --   --  75*  --  73*  --  74*   < > 73*   PLT  --   --  521*  --   --   --   --   --  581*  --  579*  --  587*   < > 547*   INR  --   --   --   --   --   --   --   --   --   --   --   --   --   --  1.06   NA  --   --  141  --   --   --   --   --  140  --   --   --  142  --  143   POTASSIUM  --   --  5.1  --  5.3  --  5.7*   < > 5.6*  --  4.8  --  5.1   < > 4.2   CHLORIDE  --   --  117*  --   --   --   --   --  118*  --   --   --  116*  --  118*   CO2  --   --  17*  --   --   --   --   --  16*  --   --   --  17*  --  18*   BUN  --   --  69*  --   --   --   --   --  78*  --   --   --  65*  --  55*   CR  --   --  1.65*  --   --   --   --   --  1.62*  --   --   --  1.77*  --  1.80*   ANIONGAP  --   --  7  --   --   --   --   --  6  --   --   --  9  --  7   ISSA  --   --  9.6  --   --   --   --   --  10.3*  --   --   --  9.7  --  9.3   * 112* 117*   < >  --    < >  --    < > 117*   < >  --    < > 162*   < > 150*   ALBUMIN  --   --   --   --   --   --   --   --   --   --   --   --   --   --  1.9*    PROTTOTAL  --   --   --   --   --   --   --   --   --   --   --   --   --   --  5.5*   BILITOTAL  --   --   --   --   --   --   --   --   --   --   --   --   --   --  0.5   ALKPHOS  --   --   --   --   --   --   --   --   --   --   --   --   --   --  267*   ALT  --   --   --   --   --   --   --   --   --   --   --   --   --   --  95*   AST  --   --   --   --   --   --   --   --   --   --   --   --   --   --  44    < > = values in this interval not displayed.       No results found for this or any previous visit (from the past 24 hour(s)).    Suleiman Jha MD  Text Page  (7am to 6pm)

## 2022-09-10 NOTE — PROGRESS NOTES
Care Management Follow Up    Length of Stay (days): 17    Expected Discharge Date: 09/12/2022     Concerns to be Addressed:       Patient plan of care discussed at interdisciplinary rounds: Yes    Anticipated Discharge Disposition: Transitional Care     Anticipated Discharge Services:    Anticipated Discharge DME:      Patient/family educated on Medicare website which has current facility and service quality ratings: yes  Education Provided on the Discharge Plan:    Patient/Family in Agreement with the Plan: yes    Referrals Placed by CM/SW: Post Acute Facilities  Private pay costs discussed: Not applicable    Additional Information:    Per chart review the following referrals were sent yesterday, 9/9, and remain pending:    Nayla Fabian to continue to follow.      Afia Fallon, DENZELSW

## 2022-09-10 NOTE — PROGRESS NOTES
"House ONUR brief note:    Paged by nursing at 0218 noting pt had an assisted fall, requesting evaluation.  Upon arrival, pt sitting upright on chair, awake, alert, in no overt distress.  Nursing notes pt had requested to use the restroom.  Nursing began to assist pt to restroom with assistance of walker, gait belt, and pt's own shoes; however, en route, pt's BLE \"gave out\" with nursing subsequently lowering pt to her knees.  Pt and nursing confirm pt did not strike head.  Pt initially noted 5/10 bilateral knee pain, L>R; however, at this time, pt notes minimal pain, defers need for pain medication.  Pt's L knee with noted abrasion/ecchymosis that appear recent.  Pt has other scattered ecchymoses throughout skin of various healing stages.  Pt notes last fall ~ 1 month ago.  Of note, pt reports no presyncopal symptoms prior to the fall.  Continue fall precautions and contact house ONUR if any acute/worsening pain is noted. Pt has PT/OT ordered, recommending TCU.      KRUNAL Wiley, CNP  House ONUR    No charge.  "

## 2022-09-10 NOTE — PLAN OF CARE
AXO 4. VSS on RA. Assist of 2w/gbw. Tolerating regular diet. Lung Sounds clear, + flatus, BM +, good urine output via purwick. Skin bruised throughout with right knee as well d/t fall. Denies pain meds. Denies nausea. Awaiting TCU placement.

## 2022-09-11 LAB
GLUCOSE BLDC GLUCOMTR-MCNC: 100 MG/DL (ref 70–99)
GLUCOSE BLDC GLUCOMTR-MCNC: 120 MG/DL (ref 70–99)
GLUCOSE BLDC GLUCOMTR-MCNC: 130 MG/DL (ref 70–99)
GLUCOSE BLDC GLUCOMTR-MCNC: 134 MG/DL (ref 70–99)
GLUCOSE BLDC GLUCOMTR-MCNC: 137 MG/DL (ref 70–99)
MAGNESIUM SERPL-MCNC: 2.5 MG/DL (ref 1.6–2.3)
PHOSPHATE SERPL-MCNC: 5.3 MG/DL (ref 2.5–4.5)
POTASSIUM BLD-SCNC: 5 MMOL/L (ref 3.4–5.3)

## 2022-09-11 PROCEDURE — 94640 AIRWAY INHALATION TREATMENT: CPT

## 2022-09-11 PROCEDURE — 36415 COLL VENOUS BLD VENIPUNCTURE: CPT | Performed by: INTERNAL MEDICINE

## 2022-09-11 PROCEDURE — 99231 SBSQ HOSP IP/OBS SF/LOW 25: CPT | Performed by: INTERNAL MEDICINE

## 2022-09-11 PROCEDURE — 999N000157 HC STATISTIC RCP TIME EA 10 MIN

## 2022-09-11 PROCEDURE — 250N000011 HC RX IP 250 OP 636

## 2022-09-11 PROCEDURE — 250N000011 HC RX IP 250 OP 636: Performed by: INTERNAL MEDICINE

## 2022-09-11 PROCEDURE — 120N000001 HC R&B MED SURG/OB

## 2022-09-11 PROCEDURE — 250N000013 HC RX MED GY IP 250 OP 250 PS 637: Performed by: INTERNAL MEDICINE

## 2022-09-11 PROCEDURE — 83735 ASSAY OF MAGNESIUM: CPT | Performed by: INTERNAL MEDICINE

## 2022-09-11 PROCEDURE — C9113 INJ PANTOPRAZOLE SODIUM, VIA: HCPCS | Performed by: INTERNAL MEDICINE

## 2022-09-11 PROCEDURE — 84100 ASSAY OF PHOSPHORUS: CPT | Performed by: INTERNAL MEDICINE

## 2022-09-11 PROCEDURE — 94640 AIRWAY INHALATION TREATMENT: CPT | Mod: 76

## 2022-09-11 PROCEDURE — 84132 ASSAY OF SERUM POTASSIUM: CPT | Performed by: INTERNAL MEDICINE

## 2022-09-11 PROCEDURE — 250N000009 HC RX 250: Performed by: INTERNAL MEDICINE

## 2022-09-11 RX ADMIN — HEPARIN SODIUM 5000 UNITS: 5000 INJECTION, SOLUTION INTRAVENOUS; SUBCUTANEOUS at 23:53

## 2022-09-11 RX ADMIN — ALBUTEROL SULFATE 2.5 MG: 2.5 SOLUTION RESPIRATORY (INHALATION) at 15:05

## 2022-09-11 RX ADMIN — PANTOPRAZOLE SODIUM 40 MG: 40 INJECTION, POWDER, FOR SOLUTION INTRAVENOUS at 07:54

## 2022-09-11 RX ADMIN — PANTOPRAZOLE SODIUM 40 MG: 40 INJECTION, POWDER, FOR SOLUTION INTRAVENOUS at 19:31

## 2022-09-11 RX ADMIN — HEPARIN SODIUM 5000 UNITS: 5000 INJECTION, SOLUTION INTRAVENOUS; SUBCUTANEOUS at 00:10

## 2022-09-11 RX ADMIN — ALBUTEROL SULFATE 2.5 MG: 2.5 SOLUTION RESPIRATORY (INHALATION) at 08:02

## 2022-09-11 RX ADMIN — LAMOTRIGINE 100 MG: 100 TABLET ORAL at 07:56

## 2022-09-11 RX ADMIN — ALBUTEROL SULFATE 2.5 MG: 2.5 SOLUTION RESPIRATORY (INHALATION) at 19:04

## 2022-09-11 RX ADMIN — HALOPERIDOL 1 MG: 1 TABLET ORAL at 21:08

## 2022-09-11 RX ADMIN — AMLODIPINE BESYLATE 5 MG: 5 TABLET ORAL at 07:56

## 2022-09-11 RX ADMIN — HEPARIN SODIUM 5000 UNITS: 5000 INJECTION, SOLUTION INTRAVENOUS; SUBCUTANEOUS at 12:06

## 2022-09-11 RX ADMIN — ALBUTEROL SULFATE 2.5 MG: 2.5 SOLUTION RESPIRATORY (INHALATION) at 10:51

## 2022-09-11 ASSESSMENT — ACTIVITIES OF DAILY LIVING (ADL)
ADLS_ACUITY_SCORE: 47
ADLS_ACUITY_SCORE: 43
ADLS_ACUITY_SCORE: 47
ADLS_ACUITY_SCORE: 43
ADLS_ACUITY_SCORE: 47
ADLS_ACUITY_SCORE: 43

## 2022-09-11 NOTE — PLAN OF CARE
Goal Outcome Evaluation:        Alert and oriented x4. Vital signs stable on RA. Assist of 2, GB/W. Brace to R ankle when ambulating. Tolerating mechanical soft diet. Lung sounds clear, equal bilaterally. + flatus, BM - this shift. Adequate urine output, purewick in place. Scattered bruises, blanchable redness to coccyx. Denies pain and nausea. L PIV SL.

## 2022-09-11 NOTE — PROGRESS NOTES
[id number]    Orientations: A&Ox4  Vitals/Pain: VSS on RA. Denies pain.  Lines/Drains: PIV SL.  Skin/Wounds: Old DAVI site, removed dressing and replaced with band aid, could be left ULISSES.  GI/: Continent of B&B this shift, loose stool x2.  Labs: BG checks, no coverage needed.  Ambulation/Assist: Ax1 when standing/pivoting, Ax2 for safety when walking. L side weakness baseline.  Plan: Discharge pending placement.

## 2022-09-11 NOTE — PROGRESS NOTES
Pt is currently on RA with SpO2 of upper 90's. BS clear. Pt received neb tx as ordered.    Will continue to monitor  Allyson Cooper RT assistant

## 2022-09-11 NOTE — PROGRESS NOTES
[facility]  Hospitalist Progress Note  Suleiman Jha MD  09/11/2022    Assessment & Plan      Diana Santiago is a 71 year old female with PMH of HTN, HLD, heterozygous thalassemia, CKD, and diabetes insipidus, who presented 8/24/2022 with abdominal pain and dyspnea, noted with perforated viscus with septic shock. Underwent emergent ex-lap and repair of perforated ulcer on 8/24/2022. Initially required pressors and was admitted to ICU, Weaned off pressors 8/27. Extubated 8/30. Transferred to the  Hospitalist team 8/31/2022.     CT on admission 8/24 with large volume pneumoperitoneum, favored source from the anterior distal stomach/proximal duodenum; small volume free fluid without walled-off, drainable collection; small bilateral pleural effusions with bibasilar atelectasis; incidental indeterminate renal lesions; intrathoracic goiter with distinct 1.8 cm left thyroid nodule.     Perforated gastric ulcer - s/p ex-lap, repair of perforated gastric ulcer and peritoneal washout 8/24/2022.  Post-op ileus.  Septic shock secondary to above, resolved.  Lactic acidosis due to above, resolved.  Acute respiratory failure requiring intubation secondary to above, resolved, extubated 8/30  Severe malnutrition related to acute and chronic medical issues.  - presentation and CT on admission as noted above  - evaluated by surgery and s/p emergent ex lap and beta perforated gastric ulcer 8/24  - was started on TPN 8/31; now tolerating PO; Nutrition following and discontinued TPN 9/8   - surgery following, removed staples 9/7,  Drain removal 9/9  - remains afebrile but with mild persistent leukocytosis wbc 12-13; lactate normalized but sepsis protocol fired 9/8 am with lactate of 2.6; patient clinically stable and no concern for ongoing infection; antibiotics with zosyn d/emmanuel 9/7 (completed 14 days)  - Continue IV pantoprazole.  - Continue PRN acetaminophen, PRN oxycodone, PRN IV hydromorphone; minimize  opioids as able.     Acute kidney injury (suspect ATN) on CKD.  Metabolic acidosis, suspect due to perforated viscus with lactic acidosis.  * Baseline creatinine about 2.  - renal function improving, Cr peak 3.5--trended down--1.62  - was evaluated by nephrology; IVF d/emmanuel 9/1  - monitor BMP periodically      Hypernatremia, resolved.  H/o DI (history of lithium toxicity).  * Sodium normal on admit.  * Sodium increased starting 8/27. Treated with IVF's including D5W.  * D5W stopped 9/1.  * Sodium normalized 9/3.     Hyperkalemia 9/9: started on hyperkalemia protocol   -We will give trial of [facility]     Hyperglycemia.  H/o IGT based on hgb A1C.  * Hgb A1C 6.1 12/2020.  * Pt with hyperglycemia this hospitalization.  - better BG control with discontinuation of TPN  -  sliding scale insulin to mealtime TID     Acute blood loss anemia due to perforated ulcer, surgery.  Hereditary thalassemia.  * Hgb 11.6 on admit 8/24.  * Required several transfusions in ICU.  * IV iron ordered 9/2--9/5 (received 4 doses),   -  Hb stable around 9-10  - Consider prbc transfusion if hgb </= 7.0 or if significant bleeding with hemodynamic instability or if symptomatic.     Thrombocytosis, suspect reactive.  - noted platelets trending up to 500s, stabilizing  - Treat other issues as noted.  - Monitor CBC.     Rash, unclear etiology, question drug reaction.  * On 9/2, noted with light erythematous rash on torso/back with some itching  * No clear culprit meds; no new scheduled meds (though TPN was new).  * Rash improved 9/4.  - Continue PRN diphenhydramine.  - Continue to monitor.     Sinus bradycardia--resolved   Hypertension (benign essential).  [PTA: amlodipine 5 mg daily.]  * resumed PTA Amlodipine 9/2; BP stable     DLD.  - Resume PTA simvastatin at discharge.     Bipolar disorder.  Depression/anxiety.   [PTA: haloperidol 1 mg at bedtime; lamotrigine 100 mg daily.]  * Lamotrigine restarted 8/30.  * Restarted haloperidol 9/3.  - Continue  "lamotrigine, haloperidol.     Weakness and physical deconditioning due to multiple acute medical issues.  - Continue PT and OT; needs TCU  - pending placement     Diet: Mechanical/Dental Soft Diet  Snacks/Supplements Adult: Ensure Max Protein (bariatric); Between Meals  Calorie Counts    DVT Prophylaxis: Heparin SQ  Suresh Catheter: Not present  Central Lines: None  Cardiac Monitoring: None  Code Status: Full Code      Disposition Plan    Expected Discharge Date: 09/10/2022,  3:00 PM      Discharge Comments: 8/31 out of ICU  - awaiting TCU placement          Interval History   -- no acute overnight events  -- SW notes reviewed, no beds today  -- noted elevated Phos and Mg levels    -Data reviewed today: I reviewed all new labs and imaging over the last 24 hours. I personally reviewed no images or EKG's today.    Physical Exam    , Blood pressure (!) 132/97, pulse 90, temperature 98  F (36.7  C), temperature source Oral, resp. rate 16, height 1.626 m (5' 4\"), weight 51.7 kg (113 lb 15.7 oz), SpO2 98 %, not currently breastfeeding.  Vitals:    09/09/22 0600 09/10/22 0500 09/11/22 0501   Weight: 51.6 kg (113 lb 12.8 oz) 51.6 kg (113 lb 12.1 oz) 51.7 kg (113 lb 15.7 oz)     Vital Signs with Ranges  Temp:  [97.6  F (36.4  C)-98  F (36.7  C)] 98  F (36.7  C)  Pulse:  [85-99] 90  Resp:  [16] 16  BP: (104-132)/(64-97) 132/97  SpO2:  [96 %-99 %] 98 %  I/O's Last 24 hours  I/O last 3 completed shifts:  In: 180 [P.O.:180]  Out: 1950 [Urine:1950]    Constitutional: Awake, alert, cooperative, no apparent distress  Respiratory: Clear to auscultation bilaterally, no crackles or wheezing  Cardiovascular: Regular rate and rhythm, normal S1 and S2, and no murmur noted  GI: Normal bowel sounds, soft, non-distended, non-tender  Skin/Integumen: No rashes, no cyanosis, no edema  Other:      Medications   All medications were reviewed.      albuterol  2.5 mg Nebulization 4x Daily     amLODIPine  5 mg Oral Daily     haloperidol  1 mg Oral " At Bedtime     heparin ANTICOAGULANT  5,000 Units Subcutaneous Q12H     insulin aspart  1-7 Units Subcutaneous TID AC     insulin aspart  1-5 Units Subcutaneous At Bedtime     lamoTRIgine  100 mg Oral Daily     pantoprazole  40 mg Intravenous BID AC     sodium chloride (PF)  10-40 mL Intracatheter Q8H     sodium chloride (PF)  3 mL Intracatheter Q8H        Data   Recent Labs   Lab 09/11/22  1144 09/11/22  0748 09/11/22  0557 09/11/22  0213 09/10/22  0954 09/10/22  0613 09/10/22  0245 09/10/22  0039 09/09/22  0816 09/09/22  0557 09/08/22  0804 09/08/22  0520 09/07/22  0845 09/07/22  0336 09/05/22  1206 09/05/22  0543   WBC  --   --   --   --   --  10.4  --   --   --  12.6*  --  12.9*  --  13.3*   < > 12.3*   HGB  --   --   --   --   --  8.8*  --   --   --  9.6*  --  9.3*  --  9.3*   < > 9.3*   MCV  --   --   --   --   --  73*  --   --   --  75*  --  73*  --  74*   < > 73*   PLT  --   --   --   --   --  521*  --   --   --  581*  --  579*  --  587*   < > 547*   INR  --   --   --   --   --   --   --   --   --   --   --   --   --   --   --  1.06   NA  --   --   --   --   --  141  --   --   --  140  --   --   --  142  --  143   POTASSIUM  --   --  5.0  --   --  5.1  --  5.3   < > 5.6*  --  4.8  --  5.1   < > 4.2   CHLORIDE  --   --   --   --   --  117*  --   --   --  118*  --   --   --  116*  --  118*   CO2  --   --   --   --   --  17*  --   --   --  16*  --   --   --  17*  --  18*   BUN  --   --   --   --   --  69*  --   --   --  78*  --   --   --  65*  --  55*   CR  --   --   --   --   --  1.65*  --   --   --  1.62*  --   --   --  1.77*  --  1.80*   ANIONGAP  --   --   --   --   --  7  --   --   --  6  --   --   --  9  --  7   ISSA  --   --   --   --   --  9.6  --   --   --  10.3*  --   --   --  9.7  --  9.3   * 134*  --  130*   < > 117*   < >  --    < > 117*   < >  --    < > 162*   < > 150*   ALBUMIN  --   --   --   --   --   --   --   --   --   --   --   --   --   --   --  1.9*   PROTTOTAL  --   --   --   --    --   --   --   --   --   --   --   --   --   --   --  5.5*   BILITOTAL  --   --   --   --   --   --   --   --   --   --   --   --   --   --   --  0.5   ALKPHOS  --   --   --   --   --   --   --   --   --   --   --   --   --   --   --  267*   ALT  --   --   --   --   --   --   --   --   --   --   --   --   --   --   --  95*   AST  --   --   --   --   --   --   --   --   --   --   --   --   --   --   --  44    < > = values in this interval not displayed.       No results found for this or any previous visit (from the past 24 hour(s)).    Suleiman Jha MD  Text Page  (7am to 6pm)

## 2022-09-11 NOTE — PROGRESS NOTES
Care Management Follow Up    Length of Stay (days): 18    Expected Discharge Date: 09/12/2022     Concerns to be Addressed:       Patient plan of care discussed at interdisciplinary rounds: Yes    Anticipated Discharge Disposition: Transitional Care     Anticipated Discharge Services:    Anticipated Discharge DME:      Patient/family educated on Medicare website which has current facility and service quality ratings: yes  Education Provided on the Discharge Plan:    Patient/Family in Agreement with the Plan: yes    Referrals Placed by CM/SW: Post Acute Facilities  Private pay costs discussed: Not applicable    Additional Information:    Sw called  and left  given that admissions may be open today; waiting on return call back.  Sw connected with  and no admissions today and clinical acceptances will need to be confirmed Monday, 9/12.        DENZEL WilburnSW

## 2022-09-12 ENCOUNTER — APPOINTMENT (OUTPATIENT)
Dept: PHYSICAL THERAPY | Facility: CLINIC | Age: 72
DRG: 853 | End: 2022-09-12
Payer: MEDICARE

## 2022-09-12 ENCOUNTER — APPOINTMENT (OUTPATIENT)
Dept: OCCUPATIONAL THERAPY | Facility: CLINIC | Age: 72
DRG: 853 | End: 2022-09-12
Payer: MEDICARE

## 2022-09-12 LAB
ALBUMIN SERPL-MCNC: 2.4 G/DL (ref 3.4–5)
ALBUMIN UR-MCNC: NEGATIVE MG/DL
ALP SERPL-CCNC: 330 U/L (ref 40–150)
ALT SERPL W P-5'-P-CCNC: 77 U/L (ref 0–50)
ANION GAP SERPL CALCULATED.3IONS-SCNC: 9 MMOL/L (ref 3–14)
APPEARANCE UR: CLEAR
AST SERPL W P-5'-P-CCNC: 27 U/L (ref 0–45)
BILIRUB SERPL-MCNC: 0.3 MG/DL (ref 0.2–1.3)
BILIRUB UR QL STRIP: NEGATIVE
BUN SERPL-MCNC: 61 MG/DL (ref 7–30)
CALCIUM SERPL-MCNC: 9.8 MG/DL (ref 8.5–10.1)
CHLORIDE BLD-SCNC: 113 MMOL/L (ref 94–109)
CO2 SERPL-SCNC: 18 MMOL/L (ref 20–32)
COLOR UR AUTO: NORMAL
CREAT SERPL-MCNC: 1.5 MG/DL (ref 0.52–1.04)
GFR SERPL CREATININE-BSD FRML MDRD: 37 ML/MIN/1.73M2
GLUCOSE BLD-MCNC: 127 MG/DL (ref 70–99)
GLUCOSE BLDC GLUCOMTR-MCNC: 125 MG/DL (ref 70–99)
GLUCOSE BLDC GLUCOMTR-MCNC: 130 MG/DL (ref 70–99)
GLUCOSE BLDC GLUCOMTR-MCNC: 143 MG/DL (ref 70–99)
GLUCOSE BLDC GLUCOMTR-MCNC: 92 MG/DL (ref 70–99)
GLUCOSE UR STRIP-MCNC: NEGATIVE MG/DL
HGB UR QL STRIP: NEGATIVE
INR PPP: 0.93 (ref 0.85–1.15)
KETONES UR STRIP-MCNC: NEGATIVE MG/DL
LEUKOCYTE ESTERASE UR QL STRIP: NEGATIVE
MAGNESIUM SERPL-MCNC: 2.4 MG/DL (ref 1.6–2.3)
NITRATE UR QL: NEGATIVE
PH UR STRIP: 5 [PH] (ref 5–7)
PHOSPHATE SERPL-MCNC: 5 MG/DL (ref 2.5–4.5)
PLATELET # BLD AUTO: 482 10E3/UL (ref 150–450)
POTASSIUM BLD-SCNC: 5.1 MMOL/L (ref 3.4–5.3)
PROT SERPL-MCNC: 6.6 G/DL (ref 6.8–8.8)
SODIUM SERPL-SCNC: 140 MMOL/L (ref 133–144)
SP GR UR STRIP: 1.01 (ref 1–1.03)
TRIGL SERPL-MCNC: 231 MG/DL
UROBILINOGEN UR STRIP-MCNC: NORMAL MG/DL

## 2022-09-12 PROCEDURE — 94640 AIRWAY INHALATION TREATMENT: CPT | Mod: 76

## 2022-09-12 PROCEDURE — 84100 ASSAY OF PHOSPHORUS: CPT | Performed by: STUDENT IN AN ORGANIZED HEALTH CARE EDUCATION/TRAINING PROGRAM

## 2022-09-12 PROCEDURE — 84478 ASSAY OF TRIGLYCERIDES: CPT | Performed by: STUDENT IN AN ORGANIZED HEALTH CARE EDUCATION/TRAINING PROGRAM

## 2022-09-12 PROCEDURE — 250N000013 HC RX MED GY IP 250 OP 250 PS 637: Performed by: INTERNAL MEDICINE

## 2022-09-12 PROCEDURE — 250N000011 HC RX IP 250 OP 636: Performed by: INTERNAL MEDICINE

## 2022-09-12 PROCEDURE — 83735 ASSAY OF MAGNESIUM: CPT | Performed by: STUDENT IN AN ORGANIZED HEALTH CARE EDUCATION/TRAINING PROGRAM

## 2022-09-12 PROCEDURE — 250N000009 HC RX 250: Performed by: INTERNAL MEDICINE

## 2022-09-12 PROCEDURE — 97116 GAIT TRAINING THERAPY: CPT | Mod: GP

## 2022-09-12 PROCEDURE — 80053 COMPREHEN METABOLIC PANEL: CPT | Performed by: STUDENT IN AN ORGANIZED HEALTH CARE EDUCATION/TRAINING PROGRAM

## 2022-09-12 PROCEDURE — 250N000011 HC RX IP 250 OP 636

## 2022-09-12 PROCEDURE — 81003 URINALYSIS AUTO W/O SCOPE: CPT | Performed by: INTERNAL MEDICINE

## 2022-09-12 PROCEDURE — C9113 INJ PANTOPRAZOLE SODIUM, VIA: HCPCS | Performed by: INTERNAL MEDICINE

## 2022-09-12 PROCEDURE — 97535 SELF CARE MNGMENT TRAINING: CPT | Mod: GO

## 2022-09-12 PROCEDURE — 97530 THERAPEUTIC ACTIVITIES: CPT | Mod: GO

## 2022-09-12 PROCEDURE — 99232 SBSQ HOSP IP/OBS MODERATE 35: CPT | Performed by: INTERNAL MEDICINE

## 2022-09-12 PROCEDURE — 94640 AIRWAY INHALATION TREATMENT: CPT

## 2022-09-12 PROCEDURE — 97530 THERAPEUTIC ACTIVITIES: CPT | Mod: GP

## 2022-09-12 PROCEDURE — 36415 COLL VENOUS BLD VENIPUNCTURE: CPT | Performed by: STUDENT IN AN ORGANIZED HEALTH CARE EDUCATION/TRAINING PROGRAM

## 2022-09-12 PROCEDURE — 120N000001 HC R&B MED SURG/OB

## 2022-09-12 PROCEDURE — 85610 PROTHROMBIN TIME: CPT | Performed by: STUDENT IN AN ORGANIZED HEALTH CARE EDUCATION/TRAINING PROGRAM

## 2022-09-12 PROCEDURE — 85049 AUTOMATED PLATELET COUNT: CPT

## 2022-09-12 PROCEDURE — 999N000157 HC STATISTIC RCP TIME EA 10 MIN

## 2022-09-12 RX ADMIN — HALOPERIDOL 1 MG: 1 TABLET ORAL at 21:32

## 2022-09-12 RX ADMIN — ALBUTEROL SULFATE 2.5 MG: 2.5 SOLUTION RESPIRATORY (INHALATION) at 15:21

## 2022-09-12 RX ADMIN — ALBUTEROL SULFATE 2.5 MG: 2.5 SOLUTION RESPIRATORY (INHALATION) at 20:41

## 2022-09-12 RX ADMIN — HEPARIN SODIUM 5000 UNITS: 5000 INJECTION, SOLUTION INTRAVENOUS; SUBCUTANEOUS at 12:25

## 2022-09-12 RX ADMIN — AMLODIPINE BESYLATE 5 MG: 5 TABLET ORAL at 08:20

## 2022-09-12 RX ADMIN — LAMOTRIGINE 100 MG: 100 TABLET ORAL at 08:16

## 2022-09-12 RX ADMIN — ALBUTEROL SULFATE 2.5 MG: 2.5 SOLUTION RESPIRATORY (INHALATION) at 12:12

## 2022-09-12 RX ADMIN — ALBUTEROL SULFATE 2.5 MG: 2.5 SOLUTION RESPIRATORY (INHALATION) at 07:13

## 2022-09-12 RX ADMIN — PANTOPRAZOLE SODIUM 40 MG: 40 INJECTION, POWDER, FOR SOLUTION INTRAVENOUS at 08:17

## 2022-09-12 RX ADMIN — PANTOPRAZOLE SODIUM 40 MG: 40 INJECTION, POWDER, FOR SOLUTION INTRAVENOUS at 21:32

## 2022-09-12 ASSESSMENT — ACTIVITIES OF DAILY LIVING (ADL)
ADLS_ACUITY_SCORE: 43

## 2022-09-12 NOTE — PROGRESS NOTES
Care Management Follow Up    Length of Stay (days): 19    Expected Discharge Date: 09/12/2022     Concerns to be Addressed:       Patient plan of care discussed at interdisciplinary rounds: Yes    Anticipated Discharge Disposition: Transitional Care     Anticipated Discharge Services:    Anticipated Discharge DME:      Patient/family educated on Medicare website which has current facility and service quality ratings: yes  Education Provided on the Discharge Plan:    Patient/Family in Agreement with the Plan: yes    Referrals Placed by CM/SW: Post Acute Facilities  Private pay costs discussed: Not applicable    Additional Information:  SW followed up on existing TCU referrals and sent additional referrals. Patients current barrier  appears to be Haloperidol medication.     [facility]    [facility]    SW will continue to follow.    Sherri Ching, MSW, LGSW

## 2022-09-12 NOTE — PLAN OF CARE
Pt  A&Ox 4. VSS on RA. Tolerating mec/dental diet. Pure wick in place. Fall precaution in place. Had medium/brown formed BM this shift.  Old DAVI site CDI, midline incision ULISSES. Up with assist 1 to w with GBW. Denies pain. Scattered bruises. Mepilex applied on sacrum for redness. BG check of 130. Awaiting TCU placement.

## 2022-09-12 NOTE — PLAN OF CARE
Problem: Plan of Care - These are the overarching goals to be used throughout the patient stay.    Goal: Plan of Care Review/Shift Note  Description: The Plan of Care Review/Shift note should be completed every shift.  The Outcome Evaluation is a brief statement about your assessment that the patient is improving, declining, or no change.  This information will be displayed automatically on your shift note.  Outcome: Ongoing, Progressing  Flowsheets (Taken 9/12/2022 1556)  Plan of Care Reviewed With: patient     Problem: Fall Injury Risk  Goal: Absence of Fall and Fall-Related Injury  Outcome: Ongoing, Progressing  Intervention: Identify and Manage Contributors  Recent Flowsheet Documentation  Taken 9/12/2022 0800 by Roula Noble RN  Medication Review/Management: medications reviewed  Intervention: Promote Injury-Free Environment  Recent Flowsheet Documentation  Taken 9/12/2022 0800 by Roula Noble RN  Safety Promotion/Fall Prevention:   activity supervised   assistive device/personal items within reach   bed alarm on   clutter free environment maintained   fall prevention program maintained   nonskid shoes/slippers when out of bed   patient and family education   safety round/check completed     Problem: Plan of Care - These are the overarching goals to be used throughout the patient stay.    Goal: Absence of Hospital-Acquired Illness or Injury  Intervention: Identify and Manage Fall Risk  Recent Flowsheet Documentation  Taken 9/12/2022 0800 by Roula Noble RN  Safety Promotion/Fall Prevention:   activity supervised   assistive device/personal items within reach   bed alarm on   clutter free environment maintained   fall prevention program maintained   nonskid shoes/slippers when out of bed   patient and family education   safety round/check completed  Intervention: Prevent Skin Injury  Recent Flowsheet Documentation  Taken 9/12/2022 0800 by Roula Noble RN  Body Position: position changed  independently  Intervention: Prevent and Manage VTE (Venous Thromboembolism) Risk  Recent Flowsheet Documentation  Taken 9/12/2022 0800 by Roula Noble RN  Activity Management: up to bedside commode  Goal: Optimal Comfort and Wellbeing  Intervention: Provide Person-Centered Care  Recent Flowsheet Documentation  Taken 9/12/2022 0800 by Roula Noble RN  Trust Relationship/Rapport: care explained     Problem: Restraint, Nonbehavioral (Nonviolent)  Goal: Absence of Harm or Injury  Intervention: Protect Dignity, Rights, and Personal Wellbeing  Recent Flowsheet Documentation  Taken 9/12/2022 0800 by Roula Noble RN  Trust Relationship/Rapport: care explained  Intervention: Protect Skin and Joint Integrity  Recent Flowsheet Documentation  Taken 9/12/2022 0800 by Roula Noble RN  Body Position: position changed independently     Problem: Pain Acute  Goal: Acceptable Pain Control and Functional Ability  Intervention: Prevent or Manage Pain  Recent Flowsheet Documentation  Taken 9/12/2022 0800 by Roula Noble RN  Medication Review/Management: medications reviewed   Goal Outcome Evaluation:    Plan of Care Reviewed With: patient

## 2022-09-12 NOTE — PROGRESS NOTES
Patient A&O x4 vitals stable.On room air tolerating well. Patient remains on fall precautions bed alarm on. Midline incision ULISSES, with old DAVI site clean and dry.Right foot drop noted. Patient denies pain or discomfort at this time. Eating breakfast tolerating well. Blood glucose 127 per am labs.No insulin required. Scattered bruising noted to upper extremities.

## 2022-09-12 NOTE — PROGRESS NOTES
SPIRITUAL HEALTH SERVICES Progress Note  [facility]  General Surgery    Saw pt Diana Santiago per follow-up from previous visit. Offered emotional support for ongoing recovery and prayer.    Illness Narrative - Diana indicates that she is much improved since transferring from the ICU and feels ready to discharge to rehab.    Distress - Diana did not identify any specific areas of Episcopalian, spiritual, or existential distress.    Coping - Diana states that she is grateful for her recovery and the care she has received. She indicates that she feels it is best for her to transfer to a rehab facility in order to speed her recovery.    Meaning-Making - NA. Not discussed during this visit.    Plan - No follow-up as PT expects to discharge in the next 24 hours.    Suleiman Melgar  Associate   [phone]

## 2022-09-12 NOTE — PLAN OF CARE
Goal Outcome Evaluation:  9/11/22  [id number]  Pt A&Ox4. VSS on RA. A1-2 GBW. Tolerating Mec/dental diet. Purewick in place. Old Espinoza site CDI, midline incision ULISSES. Scattered bruises. Redness to sacrum, mepilex applied. Plan: Continue to monitor. Discharge pending TCU plcm.

## 2022-09-12 NOTE — PROGRESS NOTES
[facility]  Hospitalist Progress Note  Suleiman Jha MD  09/12/2022    Assessment & Plan      Diana Santiago is a 71 year old female with PMH of HTN, HLD, heterozygous thalassemia, CKD, and diabetes insipidus, who presented 8/24/2022 with abdominal pain and dyspnea, noted with perforated viscus with septic shock. Underwent emergent ex-lap and repair of perforated ulcer on 8/24/2022. Initially required pressors and was admitted to ICU, Weaned off pressors 8/27. Extubated 8/30. Transferred to the  Hospitalist team 8/31/2022.     CT on admission 8/24 with large volume pneumoperitoneum, favored source from the anterior distal stomach/proximal duodenum; small volume free fluid without walled-off, drainable collection; small bilateral pleural effusions with bibasilar atelectasis; incidental indeterminate renal lesions; intrathoracic goiter with distinct 1.8 cm left thyroid nodule.     Perforated gastric ulcer - s/p ex-lap, repair of perforated gastric ulcer and peritoneal washout 8/24/2022.  Post-op ileus.  Septic shock secondary to above, resolved.  Lactic acidosis due to above, resolved.  Acute respiratory failure requiring intubation secondary to above, resolved, extubated 8/30  Severe malnutrition related to acute and chronic medical issues.  - presentation and CT on admission as noted above  - evaluated by surgery and s/p emergent ex lap and beta perforated gastric ulcer 8/24  - was started on TPN 8/31; now tolerating PO; Nutrition following and discontinued TPN 9/8   - surgery following, removed staples 9/7,  Drain removal 9/9  - remains afebrile but with mild persistent leukocytosis wbc 12-13; lactate normalized but sepsis protocol fired 9/8 am with lactate of 2.6; patient clinically stable and no concern for ongoing infection; antibiotics with zosyn d/emmanuel 9/7 (completed 14 days)  - Continue IV pantoprazole.  - Continue PRN acetaminophen, PRN oxycodone, PRN IV hydromorphone; minimize  opioids as able.     Acute kidney injury (suspect ATN) on CKD.  Metabolic acidosis, suspect due to perforated viscus with lactic acidosis but maybe due to RTA  Hyperkalemia  * Baseline creatinine about 2.  - renal function improving, Cr peak 3.5--trended down--1.62 >> 1.50  - was on trial of lokelma  - was evaluated by nephrology; IVF d/emmanuel 9/1  - check UA  - may need urinary alkalanization.     Hypernatremia, resolved.  H/o DI (history of lithium toxicity).  * Sodium normal on admit.  * Sodium increased starting 8/27. Treated with IVF's including D5W.  * D5W stopped 9/1.  * Sodium normalized 9/3.     Hyperkalemia 9/9: started on hyperkalemia protocol   -We will give trial of Lokelma  -K borderline elevated     Hyperglycemia.  H/o IGT based on hgb A1C.  * Hgb A1C 6.1 12/2020.  * Pt with hyperglycemia this hospitalization.  - better BG control with discontinuation of TPN  -  sliding scale insulin to mealtime TID     Acute blood loss anemia due to perforated ulcer, surgery.  Hereditary thalassemia.  * Hgb 11.6 on admit 8/24.  * Required several transfusions in ICU.  * IV iron ordered 9/2--9/5 (received 4 doses),   -  Hb stable around 9-10  - Consider prbc transfusion if hgb </= 7.0 or if significant bleeding with hemodynamic instability or if symptomatic.     Thrombocytosis, suspect reactive.  - noted platelets trending up to 500s, stabilizing  - Treat other issues as noted.  - Monitor CBC.     Rash, unclear etiology, question drug reaction.  * On 9/2, noted with light erythematous rash on torso/back with some itching  * No clear culprit meds; no new scheduled meds (though TPN was new).  * Rash improved 9/4.  - Continue PRN diphenhydramine.  - Continue to monitor.     Sinus bradycardia--resolved   Hypertension (benign essential).  [PTA: amlodipine 5 mg daily.]  * resumed PTA Amlodipine 9/2; BP stable     DLD.  - Resume PTA simvastatin at discharge.     Bipolar disorder.  Depression/anxiety.   [PTA: haloperidol 1 mg at  "bedtime; lamotrigine 100 mg daily.]  * Lamotrigine restarted 8/30.  * Restarted haloperidol 9/3.  - Continue lamotrigine, haloperidol.     Weakness and physical deconditioning due to multiple acute medical issues.  - Continue PT and OT; needs TCU  - pending placement     Diet: Mechanical/Dental Soft Diet  Snacks/Supplements Adult: Ensure Max Protein (bariatric); Between Meals  Calorie Counts    DVT Prophylaxis: Heparin SQ  Suresh Catheter: Not present  Central Lines: None  Cardiac Monitoring: None  Code Status: Full Code      Disposition Plan    Expected Discharge Date: 09/13/2022,  3:00 PM        Discharge Comments: 8/31 out of ICU  - awaiting TCU placement          Interval History   -- no acute overnight events  -- K creeping up and persistent mild acidosis       -Data reviewed today: I reviewed all new labs and imaging over the last 24 hours. I personally reviewed no images or EKG's today.    Physical Exam    , Blood pressure (!) 125/94, pulse 100, temperature 98.4  F (36.9  C), temperature source Oral, resp. rate 16, height 1.626 m (5' 4\"), weight 51.7 kg (113 lb 15.7 oz), SpO2 97 %, not currently breastfeeding.  Vitals:    09/09/22 0600 09/10/22 0500 09/11/22 0501   Weight: 51.6 kg (113 lb 12.8 oz) 51.6 kg (113 lb 12.1 oz) 51.7 kg (113 lb 15.7 oz)     Vital Signs with Ranges  Temp:  [98.2  F (36.8  C)-98.5  F (36.9  C)] 98.4  F (36.9  C)  Pulse:  [] 100  Resp:  [16] 16  BP: (120-125)/(84-94) 125/94  SpO2:  [95 %-100 %] 97 %  I/O's Last 24 hours  I/O last 3 completed shifts:  In: 2120 [P.O.:2120]  Out: 700 [Urine:700]    Constitutional: Awake, alert, cooperative, no apparent distress  Respiratory: Clear to auscultation bilaterally, no crackles or wheezing  Cardiovascular: Regular rate and rhythm, normal S1 and S2   GI: Normal bowel sounds, soft, non-distended, non-tender  Skin/Integumen: No rashes, no cyanosis, no edema  Other:      Medications   All medications were reviewed.      albuterol  2.5 mg " Nebulization 4x Daily     amLODIPine  5 mg Oral Daily     haloperidol  1 mg Oral At Bedtime     heparin ANTICOAGULANT  5,000 Units Subcutaneous Q12H     insulin aspart  1-7 Units Subcutaneous TID AC     insulin aspart  1-5 Units Subcutaneous At Bedtime     lamoTRIgine  100 mg Oral Daily     pantoprazole  40 mg Intravenous BID AC     sodium chloride (PF)  10-40 mL Intracatheter Q8H     sodium chloride (PF)  3 mL Intracatheter Q8H        Data   Recent Labs   Lab 09/12/22  1621 09/12/22  1124 09/12/22  0625 09/11/22  0748 09/11/22  0557 09/10/22  0954 09/10/22  0613 09/09/22  0816 09/09/22  0557 09/08/22  0804 09/08/22  0520   WBC  --   --   --   --   --   --  10.4  --  12.6*  --  12.9*   HGB  --   --   --   --   --   --  8.8*  --  9.6*  --  9.3*   MCV  --   --   --   --   --   --  73*  --  75*  --  73*   PLT  --   --  482*  --   --   --  521*  --  581*  --  579*   INR  --   --  0.93  --   --   --   --   --   --   --   --    NA  --   --  140  --   --   --  141  --  140  --   --    POTASSIUM  --   --  5.1  --  5.0  --  5.1   < > 5.6*  --  4.8   CHLORIDE  --   --  113*  --   --   --  117*  --  118*  --   --    CO2  --   --  18*  --   --   --  17*  --  16*  --   --    BUN  --   --  61*  --   --   --  69*  --  78*  --   --    CR  --   --  1.50*  --   --   --  1.65*  --  1.62*  --   --    ANIONGAP  --   --  9  --   --   --  7  --  6  --   --    ISSA  --   --  9.8  --   --   --  9.6  --  10.3*  --   --    * 92 127*   < >  --    < > 117*   < > 117*   < >  --    ALBUMIN  --   --  2.4*  --   --   --   --   --   --   --   --    PROTTOTAL  --   --  6.6*  --   --   --   --   --   --   --   --    BILITOTAL  --   --  0.3  --   --   --   --   --   --   --   --    ALKPHOS  --   --  330*  --   --   --   --   --   --   --   --    ALT  --   --  77*  --   --   --   --   --   --   --   --    AST  --   --  27  --   --   --   --   --   --   --   --     < > = values in this interval not displayed.       No results found for this or any  previous visit (from the past 24 hour(s)).    Suleiman Jha MD  Text Page  (7am to 6pm)

## 2022-09-13 ENCOUNTER — APPOINTMENT (OUTPATIENT)
Dept: PHYSICAL THERAPY | Facility: CLINIC | Age: 72
DRG: 853 | End: 2022-09-13
Payer: MEDICARE

## 2022-09-13 ENCOUNTER — APPOINTMENT (OUTPATIENT)
Dept: OCCUPATIONAL THERAPY | Facility: CLINIC | Age: 72
DRG: 853 | End: 2022-09-13
Payer: MEDICARE

## 2022-09-13 LAB
ANION GAP SERPL CALCULATED.3IONS-SCNC: 9 MMOL/L (ref 3–14)
BUN SERPL-MCNC: 55 MG/DL (ref 7–30)
CALCIUM SERPL-MCNC: 10.3 MG/DL (ref 8.5–10.1)
CHLORIDE BLD-SCNC: 113 MMOL/L (ref 94–109)
CO2 SERPL-SCNC: 16 MMOL/L (ref 20–32)
CREAT SERPL-MCNC: 1.53 MG/DL (ref 0.52–1.04)
ERYTHROCYTE [DISTWIDTH] IN BLOOD BY AUTOMATED COUNT: 25.2 % (ref 10–15)
GFR SERPL CREATININE-BSD FRML MDRD: 36 ML/MIN/1.73M2
GLUCOSE BLD-MCNC: 129 MG/DL (ref 70–99)
GLUCOSE BLDC GLUCOMTR-MCNC: 105 MG/DL (ref 70–99)
GLUCOSE BLDC GLUCOMTR-MCNC: 111 MG/DL (ref 70–99)
GLUCOSE BLDC GLUCOMTR-MCNC: 133 MG/DL (ref 70–99)
GLUCOSE BLDC GLUCOMTR-MCNC: 92 MG/DL (ref 70–99)
HCT VFR BLD AUTO: 31.2 % (ref 35–47)
HGB BLD-MCNC: 9.8 G/DL (ref 11.7–15.7)
MCH RBC QN AUTO: 23.1 PG (ref 26.5–33)
MCHC RBC AUTO-ENTMCNC: 31.4 G/DL (ref 31.5–36.5)
MCV RBC AUTO: 73 FL (ref 78–100)
PLATELET # BLD AUTO: 489 10E3/UL (ref 150–450)
POTASSIUM BLD-SCNC: 5 MMOL/L (ref 3.4–5.3)
RBC # BLD AUTO: 4.25 10E6/UL (ref 3.8–5.2)
SODIUM SERPL-SCNC: 138 MMOL/L (ref 133–144)
WBC # BLD AUTO: 7.1 10E3/UL (ref 4–11)

## 2022-09-13 PROCEDURE — 97535 SELF CARE MNGMENT TRAINING: CPT | Mod: GO

## 2022-09-13 PROCEDURE — 250N000013 HC RX MED GY IP 250 OP 250 PS 637: Performed by: INTERNAL MEDICINE

## 2022-09-13 PROCEDURE — 97110 THERAPEUTIC EXERCISES: CPT | Mod: GP

## 2022-09-13 PROCEDURE — 36415 COLL VENOUS BLD VENIPUNCTURE: CPT | Performed by: INTERNAL MEDICINE

## 2022-09-13 PROCEDURE — 999N000157 HC STATISTIC RCP TIME EA 10 MIN

## 2022-09-13 PROCEDURE — 94640 AIRWAY INHALATION TREATMENT: CPT

## 2022-09-13 PROCEDURE — 250N000011 HC RX IP 250 OP 636: Performed by: INTERNAL MEDICINE

## 2022-09-13 PROCEDURE — 250N000009 HC RX 250: Performed by: INTERNAL MEDICINE

## 2022-09-13 PROCEDURE — 99232 SBSQ HOSP IP/OBS MODERATE 35: CPT | Performed by: INTERNAL MEDICINE

## 2022-09-13 PROCEDURE — C9113 INJ PANTOPRAZOLE SODIUM, VIA: HCPCS | Performed by: INTERNAL MEDICINE

## 2022-09-13 PROCEDURE — 97116 GAIT TRAINING THERAPY: CPT | Mod: GP

## 2022-09-13 PROCEDURE — 82310 ASSAY OF CALCIUM: CPT | Performed by: INTERNAL MEDICINE

## 2022-09-13 PROCEDURE — 97530 THERAPEUTIC ACTIVITIES: CPT | Mod: GO

## 2022-09-13 PROCEDURE — 120N000001 HC R&B MED SURG/OB

## 2022-09-13 PROCEDURE — 94640 AIRWAY INHALATION TREATMENT: CPT | Mod: 76

## 2022-09-13 PROCEDURE — 250N000011 HC RX IP 250 OP 636

## 2022-09-13 PROCEDURE — 85027 COMPLETE CBC AUTOMATED: CPT | Performed by: INTERNAL MEDICINE

## 2022-09-13 RX ORDER — CITRIC ACID/SODIUM CITRATE 334-500MG
15 SOLUTION, ORAL ORAL 2 TIMES DAILY
Status: DISCONTINUED | OUTPATIENT
Start: 2022-09-13 | End: 2022-09-14 | Stop reason: HOSPADM

## 2022-09-13 RX ADMIN — HEPARIN SODIUM 5000 UNITS: 5000 INJECTION, SOLUTION INTRAVENOUS; SUBCUTANEOUS at 01:30

## 2022-09-13 RX ADMIN — HEPARIN SODIUM 5000 UNITS: 5000 INJECTION, SOLUTION INTRAVENOUS; SUBCUTANEOUS at 11:53

## 2022-09-13 RX ADMIN — SODIUM CITRATE AND CITRIC ACID MONOHYDRATE 15 ML: 500; 334 SOLUTION ORAL at 21:05

## 2022-09-13 RX ADMIN — HALOPERIDOL 1 MG: 1 TABLET ORAL at 21:04

## 2022-09-13 RX ADMIN — ALBUTEROL SULFATE 2.5 MG: 2.5 SOLUTION RESPIRATORY (INHALATION) at 07:46

## 2022-09-13 RX ADMIN — AMLODIPINE BESYLATE 5 MG: 5 TABLET ORAL at 08:39

## 2022-09-13 RX ADMIN — ALBUTEROL SULFATE 2.5 MG: 2.5 SOLUTION RESPIRATORY (INHALATION) at 19:12

## 2022-09-13 RX ADMIN — PANTOPRAZOLE SODIUM 40 MG: 40 INJECTION, POWDER, FOR SOLUTION INTRAVENOUS at 08:38

## 2022-09-13 RX ADMIN — ALBUTEROL SULFATE 2.5 MG: 2.5 SOLUTION RESPIRATORY (INHALATION) at 15:14

## 2022-09-13 RX ADMIN — PANTOPRAZOLE SODIUM 40 MG: 40 INJECTION, POWDER, FOR SOLUTION INTRAVENOUS at 21:04

## 2022-09-13 RX ADMIN — ALBUTEROL SULFATE 2.5 MG: 2.5 SOLUTION RESPIRATORY (INHALATION) at 11:07

## 2022-09-13 RX ADMIN — LAMOTRIGINE 100 MG: 100 TABLET ORAL at 08:38

## 2022-09-13 ASSESSMENT — ACTIVITIES OF DAILY LIVING (ADL)
ADLS_ACUITY_SCORE: 43

## 2022-09-13 NOTE — PLAN OF CARE
Problem: Pain Acute  Goal: Acceptable Pain Control and Functional Ability  Outcome: Ongoing, Progressing  Intervention: Prevent or Manage Pain  Recent Flowsheet Documentation  Taken 9/12/2022 2000 by Ricardo Alarcon RN  Medication Review/Management: medications reviewed     Problem: Infection  Goal: Absence of Infection Signs and Symptoms  Outcome: Ongoing, Progressing     Problem: Fall Injury Risk  Goal: Absence of Fall and Fall-Related Injury  Outcome: Ongoing, Progressing  Intervention: Identify and Manage Contributors  Recent Flowsheet Documentation  Taken 9/12/2022 2000 by Ricardo Alarcon, RN  Medication Review/Management: medications reviewed  Intervention: Promote Injury-Free Environment  Recent Flowsheet Documentation  Taken 9/12/2022 2000 by Ricardo Alarcon, RN  Safety Promotion/Fall Prevention:   assistive device/personal items within reach   clutter free environment maintained   fall prevention program maintained   mobility aid in reach   nonskid shoes/slippers when out of bed   patient and family education   supervised activity   Goal Outcome Evaluation:

## 2022-09-13 NOTE — PROGRESS NOTES
Patient awake and alert, sitting up in chair with chair alarm attached,patient tolerating well. Denies pain or discomfort at this time.Abdominal incision dry and intact, open to air. Patient assisted up to bedside commode with use of gait belt and walker.Small soft bm noted. Patient assisted back to chair tolerated well. No treatment required for AM blood glucose. Will continue to monitor.

## 2022-09-13 NOTE — PROGRESS NOTES
Care Management Follow Up    Length of Stay (days): 20    Expected Discharge Date: 09/13/2022     Concerns to be Addressed:       Patient plan of care discussed at interdisciplinary rounds: Yes    Anticipated Discharge Disposition: Transitional Care     Anticipated Discharge Services:    Anticipated Discharge DME:      Patient/family educated on Medicare website which has current facility and service quality ratings: yes  Education Provided on the Discharge Plan:    Patient/Family in Agreement with the Plan: yes    Referrals Placed by CM/SW: Post Acute Facilities  Private pay costs discussed: Not applicable    Additional Information:  TCU f/u:  [facility]: Referral under review   [facility]: Attempted to call twice, no answer  [facility]: Declined due to no beds   [facility]: Declined due to having an IV med over night.   [facility]: VM left with admissions   [facility]: Can accept pt   [facility]: Resent referral   [facility]: VM left with admissions   [facility]: VM left with admissions   [facility]:Referral under review   [facility]: Can admit pt tomorrow    SW spoke to pt and she would like to attend [facility]. Pt is also agreeable to wheelchair transport, SW went over cost with pt. THALIA called Abiola in admissions at [facility] and accepted the bed. [facility] has no specific time for admissions tomorrow. THALIA updated [facility] admissions via VM. THALIA set up a discharge wheelchair ride for 9/14 @11:30. THALIA updated Abiola in admissions at [facility]. THALIA updated pt who is agreeable to discharge plan.     PAS-RR    D: Per DHS regulation, THALIA completed and submitted PAS-RR to [place] [employer] Direct Connect via the Senior [employer].  PAS-RR confirmation # is : [id number]    I: THALIA spoke with pt and they are aware a PAS-RR has been submitted.  THALIA reviewed with pt that they may be contacted for a follow up appointment within 10 days of hospital discharge if their SNF stay is < 30 days.  Contact information  for [employer] was also provided.    A: Pt verbalized understanding.    P: Further questions may be directed to [employer] at #[phone], option #4 for PAS-RR staff.  THALIA incorrectly entered pt's name on PAS. THALIA faxed it to [facility] and then called about the mistake. THALIA informed Abiola in admissions that the PAS is incorrect. THALIA updated PAS with [employer] who will make the correction on their end. PAS on chart.      LUCERO Schmitt

## 2022-09-13 NOTE — PROGRESS NOTES
[facility]  Hospitalist Progress Note  Suleiman Jha MD  09/13/2022    Assessment & Plan      Diana Santiago is a 71 year old female with PMH of HTN, HLD, heterozygous thalassemia, CKD, and diabetes insipidus, who presented 8/24/2022 with abdominal pain and dyspnea, noted with perforated viscus with septic shock. Underwent emergent ex-lap and repair of perforated ulcer on 8/24/2022. Initially required pressors and was admitted to ICU, Weaned off pressors 8/27. Extubated 8/30. Transferred to the  Hospitalist team 8/31/2022.     CT on admission 8/24 with large volume pneumoperitoneum, favored source from the anterior distal stomach/proximal duodenum; small volume free fluid without walled-off, drainable collection; small bilateral pleural effusions with bibasilar atelectasis; incidental indeterminate renal lesions; intrathoracic goiter with distinct 1.8 cm left thyroid nodule.     Perforated gastric ulcer - s/p ex-lap, repair of perforated gastric ulcer and peritoneal washout 8/24/2022.  Post-op ileus.  Septic shock secondary to above, resolved.  Lactic acidosis due to above, resolved.  Acute respiratory failure requiring intubation secondary to above, resolved, extubated 8/30  Severe malnutrition related to acute and chronic medical issues.  - presentation and CT on admission as noted above  - evaluated by surgery and s/p emergent ex lap and beta perforated gastric ulcer 8/24  - was started on TPN 8/31; now tolerating PO; Nutrition following and discontinued TPN 9/8   - surgery following, removed staples 9/7,  Drain removal 9/9  - remains afebrile but with mild persistent leukocytosis wbc 12-13; lactate normalized but sepsis protocol fired 9/8 am with lactate of 2.6; patient clinically stable and no concern for ongoing infection; antibiotics with zosyn d/emmanuel 9/7 (completed 14 days)  - Continue IV pantoprazole.  - Continue PRN acetaminophen, PRN oxycodone, PRN IV hydromorphone; minimize  opioids as able.     Acute kidney injury (suspect ATN) on CKD.  Metabolic acidosis, suspect due to perforated viscus with lactic acidosis  Hyperkalemia  * Baseline creatinine about 2.  - renal function improving, Cr peak 3.5--trended down--1.62 >> 1.50  - was briefly on trial of lokelma  - was evaluated by nephrology; IVF d/emmanuel 9/1  -  UA is normal     Hypernatremia, resolved.  H/o DI (history of lithium toxicity).  * Sodium normal on admit.  * Sodium increased starting 8/27. Treated with IVF's including D5W.  * D5W stopped 9/1.  * Sodium normalized 9/3.     Hyperkalemia 9/9: started on hyperkalemia protocol   -Was on trial Lokelma  -K borderline elevated  -trial of bicitra     Hyperglycemia.  H/o IGT based on hgb A1C.  * Hgb A1C 6.1 12/2020.  * Pt with hyperglycemia this hospitalization.  - better BG control with discontinuation of TPN  -  sliding scale insulin to mealtime TID     Acute blood loss anemia due to perforated ulcer, surgery.  Hereditary thalassemia.  * Hgb 11.6 on admit 8/24.  * Required several transfusions in ICU.  * IV iron ordered 9/2--9/5 (received 4 doses),   -  Hb stable around 9-10  - Consider prbc transfusion if hgb </= 7.0 or if significant bleeding with hemodynamic instability or if symptomatic.     Thrombocytosis, suspect reactive.  - noted platelets trending up to 500s, stabilizing  - Treat other issues as noted.  - Monitor CBC.     Rash, unclear etiology, question drug reaction.  * On 9/2, noted with light erythematous rash on torso/back with some itching  * No clear culprit meds; no new scheduled meds (though TPN was new).  * Rash improved 9/4.  - Continue PRN diphenhydramine.  - Continue to monitor.     Sinus bradycardia--resolved   Hypertension (benign essential).  [PTA: amlodipine 5 mg daily.]  * resumed PTA Amlodipine 9/2; BP stable     DLD.  - Resume PTA simvastatin at discharge.     Bipolar disorder.  Depression/anxiety.   [PTA: haloperidol 1 mg at bedtime; lamotrigine 100 mg  "daily.]  * Lamotrigine restarted 8/30.  * Restarted haloperidol 9/3.  - Continue lamotrigine, haloperidol.     Weakness and physical deconditioning due to multiple acute medical issues.  - Continue PT and OT; needs TCU  - pending placement     Diet: Mechanical/Dental Soft Diet  Snacks/Supplements Adult: Ensure Max Protein (bariatric); Between Meals  Calorie Counts    DVT Prophylaxis: Heparin SQ  Suresh Catheter: Not present  Central Lines: None  Cardiac Monitoring: None  Code Status: Full Code      Disposition Plan    Expected Discharge Date: 09/14/2022,  3:00 PM        Discharge Comments: 8/31 out of ICU  - bed found at TCU for 9/14          Interval History   -- no acute overnight events  -- K creeping up and persistent mild acidosis       -Data reviewed today: I reviewed all new labs and imaging over the last 24 hours. I personally reviewed no images or EKG's today.    Physical Exam    , Blood pressure 125/89, pulse 95, temperature 97.9  F (36.6  C), temperature source Oral, resp. rate 16, height 1.626 m (5' 4\"), weight 51.7 kg (113 lb 15.7 oz), SpO2 96 %, not currently breastfeeding.  Vitals:    09/09/22 0600 09/10/22 0500 09/11/22 0501   Weight: 51.6 kg (113 lb 12.8 oz) 51.6 kg (113 lb 12.1 oz) 51.7 kg (113 lb 15.7 oz)     Vital Signs with Ranges  Temp:  [97.9  F (36.6  C)-98.4  F (36.9  C)] 97.9  F (36.6  C)  Pulse:  [] 95  Resp:  [16] 16  BP: (118-125)/(82-94) 125/89  SpO2:  [96 %-98 %] 96 %  I/O's Last 24 hours  I/O last 3 completed shifts:  In: 600 [P.O.:600]  Out: 200 [Urine:200]    Constitutional: Awake, alert, cooperative, no apparent distress  Respiratory: Clear to auscultation bilaterally, no crackles or wheezing  Cardiovascular: Regular rate and rhythm, normal S1 and S2   GI: Normal bowel sounds, soft, non-distended, non-tender  Skin/Integumen: No rashes, no cyanosis, no edema  Other:      Medications   All medications were reviewed.      albuterol  2.5 mg Nebulization 4x Daily     amLODIPine  5 " mg Oral Daily     haloperidol  1 mg Oral At Bedtime     heparin ANTICOAGULANT  5,000 Units Subcutaneous Q12H     insulin aspart  1-7 Units Subcutaneous TID AC     insulin aspart  1-5 Units Subcutaneous At Bedtime     lamoTRIgine  100 mg Oral Daily     pantoprazole  40 mg Intravenous BID AC     sodium chloride (PF)  10-40 mL Intracatheter Q8H     sodium chloride (PF)  3 mL Intracatheter Q8H        Data   Recent Labs   Lab 09/13/22  1141 09/13/22  0719 09/13/22  0716 09/12/22  1124 09/12/22  0625 09/11/22  0748 09/11/22  0557 09/10/22  0954 09/10/22  0613 09/09/22  0816 09/09/22  0557   WBC  --   --  7.1  --   --   --   --   --  10.4  --  12.6*   HGB  --   --  9.8*  --   --   --   --   --  8.8*  --  9.6*   MCV  --   --  73*  --   --   --   --   --  73*  --  75*   PLT  --   --  489*  --  482*  --   --   --  521*  --  581*   INR  --   --   --   --  0.93  --   --   --   --   --   --    NA  --   --  138  --  140  --   --   --  141  --  140   POTASSIUM  --   --  5.0  --  5.1  --  5.0  --  5.1   < > 5.6*   CHLORIDE  --   --  113*  --  113*  --   --   --  117*  --  118*   CO2  --   --  16*  --  18*  --   --   --  17*  --  16*   BUN  --   --  55*  --  61*  --   --   --  69*  --  78*   CR  --   --  1.53*  --  1.50*  --   --   --  1.65*  --  1.62*   ANIONGAP  --   --  9  --  9  --   --   --  7  --  6   ISSA  --   --  10.3*  --  9.8  --   --   --  9.6  --  10.3*   GLC 92 133* 129*   < > 127*   < >  --    < > 117*   < > 117*   ALBUMIN  --   --   --   --  2.4*  --   --   --   --   --   --    PROTTOTAL  --   --   --   --  6.6*  --   --   --   --   --   --    BILITOTAL  --   --   --   --  0.3  --   --   --   --   --   --    ALKPHOS  --   --   --   --  330*  --   --   --   --   --   --    ALT  --   --   --   --  77*  --   --   --   --   --   --    AST  --   --   --   --  27  --   --   --   --   --   --     < > = values in this interval not displayed.       No results found for this or any previous visit (from the past 24  hour(s)).    Suleiman Jha MD  Text Page  (7am to 6pm)

## 2022-09-14 VITALS
OXYGEN SATURATION: 100 % | BODY MASS INDEX: 19.46 KG/M2 | SYSTOLIC BLOOD PRESSURE: 129 MMHG | TEMPERATURE: 97.9 F | RESPIRATION RATE: 16 BRPM | HEART RATE: 90 BPM | DIASTOLIC BLOOD PRESSURE: 91 MMHG | HEIGHT: 64 IN | WEIGHT: 113.98 LBS

## 2022-09-14 LAB
ANION GAP SERPL CALCULATED.3IONS-SCNC: 6 MMOL/L (ref 3–14)
BUN SERPL-MCNC: 51 MG/DL (ref 7–30)
CALCIUM SERPL-MCNC: 10.1 MG/DL (ref 8.5–10.1)
CHLORIDE BLD-SCNC: 112 MMOL/L (ref 94–109)
CO2 SERPL-SCNC: 20 MMOL/L (ref 20–32)
CREAT SERPL-MCNC: 1.61 MG/DL (ref 0.52–1.04)
GFR SERPL CREATININE-BSD FRML MDRD: 34 ML/MIN/1.73M2
GLUCOSE BLD-MCNC: 117 MG/DL (ref 70–99)
GLUCOSE BLDC GLUCOMTR-MCNC: 115 MG/DL (ref 70–99)
GLUCOSE BLDC GLUCOMTR-MCNC: 80 MG/DL (ref 70–99)
POTASSIUM BLD-SCNC: 5 MMOL/L (ref 3.4–5.3)
SODIUM SERPL-SCNC: 138 MMOL/L (ref 133–144)

## 2022-09-14 PROCEDURE — C9113 INJ PANTOPRAZOLE SODIUM, VIA: HCPCS | Performed by: INTERNAL MEDICINE

## 2022-09-14 PROCEDURE — 250N000011 HC RX IP 250 OP 636: Performed by: INTERNAL MEDICINE

## 2022-09-14 PROCEDURE — 250N000011 HC RX IP 250 OP 636

## 2022-09-14 PROCEDURE — 250N000009 HC RX 250: Performed by: INTERNAL MEDICINE

## 2022-09-14 PROCEDURE — 999N000157 HC STATISTIC RCP TIME EA 10 MIN

## 2022-09-14 PROCEDURE — 94640 AIRWAY INHALATION TREATMENT: CPT | Mod: 76

## 2022-09-14 PROCEDURE — 250N000013 HC RX MED GY IP 250 OP 250 PS 637: Performed by: INTERNAL MEDICINE

## 2022-09-14 PROCEDURE — 36415 COLL VENOUS BLD VENIPUNCTURE: CPT | Performed by: INTERNAL MEDICINE

## 2022-09-14 PROCEDURE — 80048 BASIC METABOLIC PNL TOTAL CA: CPT | Performed by: INTERNAL MEDICINE

## 2022-09-14 PROCEDURE — 94640 AIRWAY INHALATION TREATMENT: CPT

## 2022-09-14 RX ORDER — CITRIC ACID/SODIUM CITRATE 334-500MG
15 SOLUTION, ORAL ORAL 2 TIMES DAILY
DISCHARGE
Start: 2022-09-14 | End: 2022-10-24

## 2022-09-14 RX ORDER — PANTOPRAZOLE SODIUM 40 MG/1
40 TABLET, DELAYED RELEASE ORAL DAILY
DISCHARGE
Start: 2022-09-14 | End: 2022-10-24

## 2022-09-14 RX ORDER — HALOPERIDOL 1 MG/1
1 TABLET ORAL AT BEDTIME
DISCHARGE
Start: 2022-09-14 | End: 2023-01-28

## 2022-09-14 RX ADMIN — LAMOTRIGINE 100 MG: 100 TABLET ORAL at 08:08

## 2022-09-14 RX ADMIN — SODIUM CITRATE AND CITRIC ACID MONOHYDRATE 15 ML: 500; 334 SOLUTION ORAL at 10:50

## 2022-09-14 RX ADMIN — PANTOPRAZOLE SODIUM 40 MG: 40 INJECTION, POWDER, FOR SOLUTION INTRAVENOUS at 08:08

## 2022-09-14 RX ADMIN — ALBUTEROL SULFATE 2.5 MG: 2.5 SOLUTION RESPIRATORY (INHALATION) at 07:43

## 2022-09-14 RX ADMIN — AMLODIPINE BESYLATE 5 MG: 5 TABLET ORAL at 08:08

## 2022-09-14 RX ADMIN — HEPARIN SODIUM 5000 UNITS: 5000 INJECTION, SOLUTION INTRAVENOUS; SUBCUTANEOUS at 01:03

## 2022-09-14 ASSESSMENT — ACTIVITIES OF DAILY LIVING (ADL)
ADLS_ACUITY_SCORE: 43

## 2022-09-14 NOTE — PLAN OF CARE
Physical Therapy Discharge Summary    Reason for therapy discharge:    Discharged to transitional care facility.    Progress towards therapy goal(s). See goals on Care Plan in [facility] electronic health record for goal details.  Goals partially met.  Barriers to achieving goals:   discharge from facility.    Therapy recommendation(s):    Continued therapy is recommended.  Rationale/Recommendations: Pt is progressing activity tolerance however would benefit from continued therapy at TCU to address impairments and increase mobility and functional independence prior to returning home. Would need 24/7 supervision, A x 1 for safe transfers/gait, and HHPT if were to return home which is not currently recommended given high fall risk and difficulty caring for herself at home.

## 2022-09-14 NOTE — CONSULTS
Care Management Discharge Note    Discharge Date: 09/14/2022       Discharge Disposition: Transitional Care    Discharge Services: Transportation Services    Discharge DME:      Discharge Transportation: family or friend will provide    Private pay costs discussed: transportation costs    PAS Confirmation Code: [id number]  Patient/family educated on Medicare website which has current facility and service quality ratings: yes    Education Provided on the Discharge Plan:    Persons Notified of Discharge Plans: patient, care team  Patient/Family in Agreement with the Plan: yes    Handoff Referral Completed: yes    Additional Information:  Received discharge orders. Patient discharging to [facility] today - orders faxed. Writer spoke with Abiola in admissions at  who confirmed plan and fax number. PAS completed and on front of chart. Patient discharging at 1130 via [facility] w/c. Patient in agreement with discharge plans.    CHRISTIAN Mendoza, SW    [facility]

## 2022-09-14 NOTE — PLAN OF CARE
Occupational Therapy Discharge Summary    Reason for therapy discharge:    Discharged to transitional care facility.    Progress towards therapy goal(s). See goals on Care Plan in [facility] electronic health record for goal details.  Goals partially met.  Barriers to achieving goals:   discharge from facility.    Therapy recommendation(s):    Continued therapy is recommended.  Rationale/Recommendations:  Pt functioning below baseline and will benefit from continued skilled OT to maximize safety and independence.    **Pt not seen by writer on this date, note written based on previous treating OT's note and recommendations.

## 2022-09-15 ENCOUNTER — PATIENT OUTREACH (OUTPATIENT)
Dept: CARE COORDINATION | Facility: CLINIC | Age: 72
End: 2022-09-15

## 2022-09-15 ENCOUNTER — TRANSITIONAL CARE UNIT VISIT (OUTPATIENT)
Dept: GERIATRICS | Facility: CLINIC | Age: 72
End: 2022-09-15
Payer: MEDICARE

## 2022-09-15 VITALS
HEIGHT: 64 IN | HEART RATE: 75 BPM | TEMPERATURE: 97.9 F | OXYGEN SATURATION: 97 % | BODY MASS INDEX: 19.33 KG/M2 | DIASTOLIC BLOOD PRESSURE: 64 MMHG | RESPIRATION RATE: 18 BRPM | SYSTOLIC BLOOD PRESSURE: 130 MMHG | WEIGHT: 113.2 LBS

## 2022-09-15 DIAGNOSIS — N18.9 ACUTE KIDNEY INJURY SUPERIMPOSED ON CHRONIC KIDNEY DISEASE (H): ICD-10-CM

## 2022-09-15 DIAGNOSIS — F41.8 DEPRESSION WITH ANXIETY: ICD-10-CM

## 2022-09-15 DIAGNOSIS — R53.81 PHYSICAL DECONDITIONING: ICD-10-CM

## 2022-09-15 DIAGNOSIS — N17.9 ACUTE KIDNEY INJURY SUPERIMPOSED ON CHRONIC KIDNEY DISEASE (H): ICD-10-CM

## 2022-09-15 DIAGNOSIS — M79.622 PAIN OF LEFT UPPER ARM: ICD-10-CM

## 2022-09-15 DIAGNOSIS — F31.9 BIPOLAR AFFECTIVE DISORDER, REMISSION STATUS UNSPECIFIED (H): ICD-10-CM

## 2022-09-15 DIAGNOSIS — E43 SEVERE MALNUTRITION (H): ICD-10-CM

## 2022-09-15 DIAGNOSIS — I10 BENIGN ESSENTIAL HYPERTENSION: ICD-10-CM

## 2022-09-15 DIAGNOSIS — E87.5 HYPERKALEMIA: ICD-10-CM

## 2022-09-15 DIAGNOSIS — M62.81 GENERALIZED MUSCLE WEAKNESS: ICD-10-CM

## 2022-09-15 DIAGNOSIS — E78.5 HYPERLIPIDEMIA LDL GOAL <100: ICD-10-CM

## 2022-09-15 DIAGNOSIS — D75.839 THROMBOCYTOSIS: ICD-10-CM

## 2022-09-15 DIAGNOSIS — R73.9 HYPERGLYCEMIA: ICD-10-CM

## 2022-09-15 DIAGNOSIS — R19.8 PERFORATED VISCUS: Primary | ICD-10-CM

## 2022-09-15 DIAGNOSIS — E87.0 HYPERNATREMIA: ICD-10-CM

## 2022-09-15 DIAGNOSIS — D62 ANEMIA DUE TO BLOOD LOSS, ACUTE: ICD-10-CM

## 2022-09-15 DIAGNOSIS — E87.20 METABOLIC ACIDOSIS: ICD-10-CM

## 2022-09-15 PROCEDURE — 99310 SBSQ NF CARE HIGH MDM 45: CPT

## 2022-09-15 NOTE — PROGRESS NOTES
[facility] GERIATRICS    PRIMARY CARE PROVIDER AND CLINIC:  Gayle Hernandez MD, [address] / [place] [address]  Chief Complaint   Patient presents with     [facility] Medical Record Number:  [id number]  Place of Service where encounter took place:  [facility] ([facility]) [[id number]]    HPI:  Diana Santiago  is a 71 year old  (1950), admitted to the above facility from  [facility]. Hospital stay 8/24/22 through 9/13/22.     Patient presented to the hospital on 8/24 with abdominal pain and dyspnea.  She was found to have perforated viscus with septic shock now s/p emergent ex-lap and repair of perforated ulcer.  Hospital course complicated by septic shock, lactic acidosis, and acute respiratory failure requiring intubation all resolved now.  Also, hospital stay significant for REI, metabolic acidosis, hyperkalemia, hypernatremia, hyperglycemia, acute blood loss anemia, thrombocytosis, and sinus bradycardia all stable now.    She is sitting up in wheelchair today.  Alert and awake.  Midline abdominal incision clean and intact.  No signs of infection.  She denies abdominal pain, constipation, nausea or vomiting.  Tolerating ordered diet.  Last bowel movement this morning. C/o chronic left arm pain.  Hemodynamically stable.  Denies headache, dizziness, chest pain or shortness of breath.  Working with rehab and tolerating well.  Staff without acute concerns.    CODE STATUS/ADVANCE DIRECTIVES DISCUSSION:  Full Code  CPR/Full code   ALLERGIES:   Allergies   Allergen Reactions     No Known Allergies       PAST MEDICAL HISTORY:   Past Medical History:   Diagnosis Date     Benign essential hypertension 09/2018    added norvasc 9/18     Bipolar affective disorder (H)     hosp 1993, Dr. Aftab Deal     Cancer (H) 1996    DCIS, left breast     Chronic kidney disease, stage 3a (H)     seen by renal Dr. 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  FAMILY HISTORY: family history includes Breast Cancer in her maternal aunt and another family member; Cerebrovascular Disease in her mother; Hyperlipidemia in her niece; Hypertension in her father; Sleep Apnea in her brother.  SOCIAL HISTORY:   reports that she has never smoked. She has never used smokeless tobacco. She reports that she does not drink alcohol and does not use drugs.  Patient's living condition: lives alone    Post Discharge Medication Reconciliation Status:   Post Medication Reconciliation Status: Discharge medications reconciled, continue medications without change    Current Outpatient Medications   Medication Sig     ACE/ARB/ARNI NOT PRESCRIBED (INTENTIONAL) Please choose reason not prescribed from choices below.     amLODIPine (NORVASC) 5 MG tablet Take 1 tablet (5 mg) by mouth daily     fexofenadine (ALLEGRA) 180 MG tablet Take 1 tablet by mouth daily.     haloperidol (HALDOL) 1 MG tablet Take 1 tablet (1 mg) by mouth At Bedtime     lamoTRIgine (LAMICTAL) 100 MG tablet Take 100 mg by mouth daily     pantoprazole (PROTONIX) 40 MG EC tablet Take 1 tablet (40 mg) by mouth daily     simvastatin (ZOCOR) 40 MG tablet TAKE 1  "TABLET AT BEDTIME     sodium citrate-citric acid (BICITRA) 500-334 MG/5ML solution Take 15 mLs by mouth 2 times daily     No current facility-administered medications for this visit.       ROS:  10 point ROS of systems including Constitutional, Eyes, Respiratory, Cardiovascular, Gastroenterology, Genitourinary, Integumentary, Musculoskeletal, Psychiatric were all negative except for pertinent positives noted in my HPI.    Vitals:  /64   Pulse 75   Temp 97.9  F (36.6  C)   Resp 18   Ht 1.626 m (5' 4\")   Wt 51.3 kg (113 lb 3.2 oz)   SpO2 97%   BMI 19.43 kg/m       Exam:  GENERAL APPEARANCE: Well groomed, appropriately dressed  HEENT-EARS: No discharge/drainage  HEENT-NECK: No lymphadenopathy  HEENT-EYES: PERRLA positive, no drainage/discharge  HEENT-NOSE/MOUTH/THROAT: No nasal drainage or erythema, mucous membranes moist, no throat erythema   RESPIRATORY: Clear to auscultation, even and unlabored, symmetrical chest wall expansion  CARDIOVASCULAR: RRR, no peripheral edema, S1/S2 normal, pulses WDL  GASTROINTESTINAL: Soft, nontender, nondistended, bowel sounds present x4 quadrants  MUSCULOSKELETAL: No joint deformities  INTEGUMENTARY: Intact   NEUROLOGICAL: Alert and oriented  PSYCHOLOGICAL: Cooperative,calm     Lab/Diagnostic data:  Last Comprehensive Metabolic Panel:  Sodium   Date Value Ref Range Status   09/14/2022 138 133 - 144 mmol/L Final   12/21/2020 140 133 - 144 mmol/L Final     Potassium   Date Value Ref Range Status   09/14/2022 5.0 3.4 - 5.3 mmol/L Final   12/21/2020 3.9 3.4 - 5.3 mmol/L Final     Chloride   Date Value Ref Range Status   09/14/2022 112 (H) 94 - 109 mmol/L Final   12/21/2020 114 (H) 94 - 109 mmol/L Final     Carbon Dioxide   Date Value Ref Range Status   12/21/2020 19 (L) 20 - 32 mmol/L Final     Carbon Dioxide (CO2)   Date Value Ref Range Status   09/14/2022 20 20 - 32 mmol/L Final     Anion Gap   Date Value Ref Range Status   09/14/2022 6 3 - 14 mmol/L Final   12/21/2020 7 3 - " 14 mmol/L Final     Glucose   Date Value Ref Range Status   09/14/2022 117 (H) 70 - 99 mg/dL Final   12/21/2020 107 (H) 70 - 99 mg/dL Final     GLUCOSE BY METER POCT   Date Value Ref Range Status   09/14/2022 80 70 - 99 mg/dL Final     Urea Nitrogen   Date Value Ref Range Status   09/14/2022 51 (H) 7 - 30 mg/dL Final   12/21/2020 34 (H) 7 - 30 mg/dL Final     Creatinine   Date Value Ref Range Status   09/14/2022 1.61 (H) 0.52 - 1.04 mg/dL Final   03/13/2021 1.41 (H) 0.52 - 1.04 mg/dL Final     GFR Estimate   Date Value Ref Range Status   09/14/2022 34 (L) >60 mL/min/1.73m2 Final     Comment:     Effective December 21, 2021 eGFRcr in adults is calculated using the 2021 CKD-EPI creatinine equation which includes age and gender (Azra et al., NEJ, DOI: 10.1056/BUYOvq0579651)   03/13/2021 38 (L) >60 mL/min/[1.73_m2] Final     Comment:     Non  GFR Calc  Starting 12/18/2018, serum creatinine based estimated GFR (eGFR) will be   calculated using the Chronic Kidney Disease Epidemiology Collaboration   (CKD-EPI) equation.       Calcium   Date Value Ref Range Status   09/14/2022 10.1 8.5 - 10.1 mg/dL Final   12/21/2020 9.6 8.5 - 10.1 mg/dL Final     CBC RESULTS: Recent Labs   Lab Test 09/13/22  0716   WBC 7.1   RBC 4.25   HGB 9.8*   HCT 31.2*   MCV 73*   MCH 23.1*   MCHC 31.4*   RDW 25.2*   *       ASSESSMENT/PLAN:   Perforated gastric ulcer  -S/p ex-lap, repair of perforated gastric ulcer and peritoneal washout on 8/24, septic shock, lactic acidosis, postop ileus, and acute respiratory failure requiring intubation all resolved, tolerating ordered diet, no abdominal pain, constipation, nausea or vomiting  -Continue current care, surgery follow-up, monitor for GI symptoms    Malnutrition  -Severe malnutrition related to acute on chronic illnesses requiring TPN inpatient, she is tolerating soft diet now, no GI symptoms  -Continue current care, dietary supplements ,RD follow-up, monitor intake and  weight    REI on CKD  Metabolic acidosis  Hyperkalemia  Hypernatremia  Hyperglycemia  -Stable  -BMP, BG daily, monitor for signs of worsening renal function    Acute blood loss anemia  -Stable, required several transfusions inpatient, Hgb 9.8 9/13  -CBC, monitor for signs of bleeding    Thrombocytosis  -Suspect reactive,  9/13  -Repeat CBC    Hypertension, chronic  -BP stable  -Continue current care, BMP, monitor BP    Hyperlipidemia, chronic  -Continue current care, periodic lipid panel    Bipolar disorder  Depression and anxiety  -Mood stable  -Continue current care, outpatient psych follow-up, monitor for changes in mood and behavior    Left arm pain, chronic, related to past injury per report   -Continue current care, monitor pain    Generalized weakness  Physical deconditioning  -PT/OT, fall precaution       Orders:  BMP and CBC    Total time spent with patient visit at the skilled nursing facility was 40 min including patient visit and review of past records. Greater than 50% of total time spent with counseling and coordinating care due to reviewing plan of care with patient, reviewing hospital discharge papers, medications reconciliation, ordering labs, discussions regarding advance care planning, TCU expectations, and management of acute and chronic illnesses.     Electronically signed by:  Zackery Maxwell,ARELI,APRN,AGNP-BC.           The above note was completed in part using Dragon voice recognition software. Although reviewed after completion, some word and grammatical errors may occur. Please contact the author of this note with any questions.

## 2022-09-15 NOTE — DISCHARGE SUMMARY
[facility]  Discharge Summary        Diana Santiago MRN# [id number]   YOB: 1950 Age: 71 year old     Date of Admission:  8/24/2022  Date of Discharge:  9/15/2022  Admitting Physician:  Ron Vidal MD  Discharge Physician: Suleiman Jha MD  Discharging Service: Hospitalist     Primary Provider:  Gayle Hernandez  Primary Care Physician Phone Number: [phone]         Discharge Diagnoses/Problem Oriented Hospital Course (Providers):    Diana Santiago was admitted on 8/24/2022 by Ron Vidal MD and I would refer you to their history and physical.  The following problems were addressed during her hospitalization:    Diana Santiago is a 71 year old female with PMH of HTN, HLD, heterozygous thalassemia, CKD, and diabetes insipidus, who presented 8/24/2022 with abdominal pain and dyspnea, noted with perforated viscus with septic shock. Underwent emergent ex-lap and repair of perforated ulcer on 8/24/2022. Initially required pressors and was admitted to ICU, Weaned off pressors 8/27. Extubated 8/30. Transferred to the  Hospitalist team 8/31/2022.     CT on admission 8/24 with large volume pneumoperitoneum, favored source from the anterior distal stomach/proximal duodenum; small volume free fluid without walled-off, drainable collection; small bilateral pleural effusions with bibasilar atelectasis; incidental indeterminate renal lesions; intrathoracic goiter with distinct 1.8 cm left thyroid nodule.     Perforated gastric ulcer - s/p ex-lap, repair of perforated gastric ulcer and peritoneal washout 8/24/2022.  Post-op ileus.  Septic shock secondary to above, resolved.  Lactic acidosis due to above, resolved.  Acute respiratory failure requiring intubation secondary to above, resolved, extubated 8/30  Severe malnutrition related to acute and chronic medical issues.  - presentation and CT on admission as noted above  - evaluated by surgery and s/p emergent ex lap and  beta perforated gastric ulcer 8/24  - was started on TPN 8/31; now tolerating PO; Nutrition following and discontinued TPN 9/8   - surgery following, removed staples 9/7,  Drain removal 9/9  - remains afebrile but with mild persistent leukocytosis wbc 12-13; lactate normalized but sepsis protocol fired 9/8 am with lactate of 2.6; patient clinically stable and no concern for ongoing infection; antibiotics with zosyn d/emmanuel 9/7 (completed 14 days)  - Continue IV pantoprazole.  - Continue PRN acetaminophen, PRN oxycodone, PRN IV hydromorphone; minimize opioids as able.     Acute kidney injury (suspect ATN) on CKD.  Metabolic acidosis, suspect due to perforated viscus with lactic acidosis  Hyperkalemia  * Baseline creatinine about 2.  - renal function improving, Cr peak 3.5--trended down--1.62 >> 1.50  - was briefly on trial of lokelma  - was evaluated by nephrology; IVF d/emmanuel 9/1  -  UA is normal     Hypernatremia, resolved.  H/o DI (history of lithium toxicity).  * Sodium normal on admit.  * Sodium increased starting 8/27. Treated with IVF's including D5W.  * D5W stopped 9/1.  * Sodium normalized 9/3.     Hyperkalemia 9/9: started on hyperkalemia protocol   -Was on trial Lokelma  -K borderline elevated  -trial of bicitra     Hyperglycemia.  H/o IGT based on hgb A1C.  * Hgb A1C 6.1 12/2020.  * Pt with hyperglycemia this hospitalization.  - better BG control with discontinuation of TPN  -  sliding scale insulin to mealtime TID     Acute blood loss anemia due to perforated ulcer, surgery.  Hereditary thalassemia.  * Hgb 11.6 on admit 8/24.  * Required several transfusions in ICU.  * IV iron ordered 9/2--9/5 (received 4 doses),   -  Hb stable around 9-10  - Consider prbc transfusion if hgb </= 7.0 or if significant bleeding with hemodynamic instability or if symptomatic.     Thrombocytosis, suspect reactive.  - noted platelets trending up to 500s, stabilizing  - Treat other issues as noted.  - Monitor CBC.     Rash,  unclear etiology, question drug reaction.  * On 9/2, noted with light erythematous rash on torso/back with some itching  * No clear culprit meds; no new scheduled meds (though TPN was new).  * Rash improved 9/4.  - Continue PRN diphenhydramine.  - Continue to monitor.     Sinus bradycardia--resolved   Hypertension (benign essential).  [PTA: amlodipine 5 mg daily.]  * resumed PTA Amlodipine 9/2; BP stable     DLD.  - Resume PTA simvastatin at discharge.     Bipolar disorder.  Depression/anxiety.   [PTA: haloperidol 1 mg at bedtime; lamotrigine 100 mg daily.]  * Lamotrigine restarted 8/30.  * Restarted haloperidol 9/3.  - Continue lamotrigine, haloperidol.     Weakness and physical deconditioning due to multiple acute medical issues.  - Continue PT and OT; needs TCU  - pending placement     Diet: Mechanical/Dental Soft Diet  Snacks/Supplements Adult: Ensure Max Protein (bariatric); Between Meals  Calorie Counts    DVT Prophylaxis: Heparin SQ  Suresh Catheter: Not present  Central Lines: None  Cardiac Monitoring: None  Code Status: Full Code       Disposition Plan     Expected Discharge Date: 09/14/2022,  3:00 PM        Discharge Comments: 8/31 out of ICU  - bed found at TCU for 9/14               Code Status:      Full Code         Important Results:      See below         Pending Results:        Unresulted Labs Ordered in the Past 30 Days of this Admission     No orders found from 7/25/2022 to 8/25/2022.               Discharge Instructions and Follow-Up:      Follow-up Appointments     Follow Up and recommended labs and tests      Follow up with primary care provider after discharge from TCU                  Discharge Disposition:      Discharged to rehabilitation facility         Discharge Medications:        Discharge Medication List as of 9/14/2022 11:24 AM      START taking these medications    Details   pantoprazole (PROTONIX) 40 MG EC tablet Take 1 tablet (40 mg) by mouth daily, Transitional      sodium  citrate-citric acid (BICITRA) 500-334 MG/5ML solution Take 15 mLs by mouth 2 times daily, Transitional         CONTINUE these medications which have CHANGED    Details   haloperidol (HALDOL) 1 MG tablet Take 1 tablet (1 mg) by mouth At Bedtime, Transitional         CONTINUE these medications which have NOT CHANGED    Details   amLODIPine (NORVASC) 5 MG tablet Take 1 tablet (5 mg) by mouth daily, Disp-90 tablet, R-3, E-Prescribe      simvastatin (ZOCOR) 40 MG tablet TAKE 1 TABLET AT BEDTIME, Disp-90 tablet, R-3, E-Prescribe      ACE/ARB/ARNI NOT PRESCRIBED (INTENTIONAL) Reason ACE/ARB was Not Prescribed: Worsening renal function on ACE/ARB therapyNo Print Out      fexofenadine (ALLEGRA) 180 MG tablet Take 1 tablet by mouth daily., Disp-90 tablet, R-0, Historical      lamoTRIgine (LAMICTAL) 100 MG tablet Take 100 mg by mouth daily, Historical                  Allergies:         Allergies   Allergen Reactions     No Known Allergies             Consultations This Hospital Stay:      Consultation during this admission received from vascular surgery and nephrology          Discharge Orders for Skilled Facility (from Discharge Orders):        After Care Instructions     Activity - Up with nursing assistance      Diet      Follow this diet upon discharge: Orders Placed This Encounter      Snacks/Supplements Adult: Ensure Max Protein (bariatric); Between Meals      Calorie Counts      Mechanical/Dental Soft Diet         General info for SNF      Length of Stay Estimate: Short Term Care: Estimated # of Days <30  Condition at Discharge: Improving  Level of care:skilled   Rehabilitation Potential: Good  Admission H&P remains valid and up-to-date: Yes  Recent Chemotherapy: N/A  Use Nursing Home Standing Orders: Yes         Mantoux instructions      Give two-step Mantoux (PPD) Per Facility Policy Yes                    Rehab orders for Skilled Facility (from Discharge Orders):      Referrals     Future Labs/Procedures    Primary  Care - Care Coordination Referral     Process Instructions:    Services are provided by a Care Coordinator for people with complex needs   such as: medical, social, or financial troubles.  The Care Coordinator   works with the patient and their Primary Care Provider to determine health   goals, obtain resources, achieve outcomes, and develop care plans that   help coordinate the patient's care.     Comments:         Occupational Therapy Adult Consult     Comments:    Evaluate and treat as clinically indicated.    Reason: perf     Physical Therapy Adult Consult     Comments:    Evaluate and treat as clinically indicated.    Reason:  Perf              Discharge Time:      Less than 30 minutes.        Image Results From This Hospital Stay (For Non-EPIC Providers):        Results for orders placed or performed during the hospital encounter of 08/24/22   XR Chest Port 1 View     Value    Radiologist flags Unexplained pneumoperitoneum (AA)    Narrative    XR CHEST PORT 1 VIEW   8/24/2022 1:20 PM     HISTORY: short of breath    COMPARISON: Chest radiograph 3/2/2022      Impression    IMPRESSION: Large volume pneumoperitoneum with resultant elevation of  the diaphragm. Low lung volumes with bibasilar opacities, favor  atelectasis. Cardiac silhouette is largely obscured but appears  similar in size to prior CT. No definite pneumothorax. No acute bony  abnormality.    [Critical Result: Unexplained pneumoperitoneum]    Finding was identified on 8/24/2022 1:29 PM.     Dr. Trinh was contacted by me on 8/24/2022 1:30 PM and verbalized  understanding of the critical result.     LEONARDO PENNINGTON MD         SYSTEM ID:  [id number]   CT Chest Abdomen Pelvis w/o Contrast    Narrative    CT CHEST ABDOMEN PELVIS W/O CONTRAST 8/24/2022 1:57 PM    CLINICAL HISTORY: chest pain, dyspnea, hypotension    TECHNIQUE: CT scan of the chest, abdomen, and pelvis was performed  without IV contrast. Multiplanar reformats were obtained. Dose  reduction  techniques were used.   CONTRAST: None.    COMPARISON: Same-day chest radiograph    FINDINGS:   LUNGS AND PLEURA: Small bilateral pleural effusions, right larger than  left, with associated bibasilar atelectasis. No definite airspace  consolidation.    MEDIASTINUM/AXILLAE: 1 or 2 foci of gas tracking along the  gastroesophageal junction into the mediastinum. No suspicious  lymphadenopathy. Intrathoracic goiter with distinct nodule in the left  upper thyroid measuring up to 1.8 cm (series 2 image 21).    CORONARY ARTERY CALCIFICATION: Severe.    HEPATOBILIARY: Simple-appearing left hepatic cyst, no specific  follow-up recommended. Distended gallbladder with questionable  punctate cholelithiasis. While there is some fluid around the  gallbladder, there is no definite wall thickening or adjacent fat  stranding to indicate acute cholecystitis.     PANCREAS: Normal.    SPLEEN: Normal.    ADRENAL GLANDS: Bilateral adrenal hyperplasia without distinct nodule.    KIDNEYS/BLADDER: No hydronephrosis. Multiple hypodense renal lesions,  most consistent with cysts. However, there are multiple indeterminate  bilateral renal lesions, including relatively hyperdense left upper  pole 1.3 cm lesion (series 2 image 141) and 1.5 cm left lower pole  exophytic soft tissue density lesion (series 2 image 151). Urinary  bladder is decompressed but otherwise unremarkable.    BOWEL: Large volume pneumoperitoneum, as seen on same-day chest  radiograph. There is focal inflammation of the distal stomach and  proximal duodenum with possible anterior wall defect noted at this  level (series 2 image 155). No additional inflamed small bowel or  colon. No evidence of bowel obstruction. Small volume free fluid in  the pelvis without walled off, drainable fluid collection.    LYMPH NODES: Normal.    VASCULATURE: Scattered calcified atherosclerosis. Ectatic distal  thoracic aorta without danna aneurysm.    PELVIC ORGANS: Calcified uterine  leiomyoma.    OTHER: None.    MUSCULOSKELETAL: Old left superior and inferior pubic rami fractures.  No acute bony abnormality.      Impression    IMPRESSION:  1.  Large volume pneumoperitoneum, as seen on same-day chest  radiograph, favor source from the anterior distal stomach/proximal  duodenum. Findings could represent perforated ulcer.  2.  Small volume free fluid without walled-off, drainable collection  at this time.  3.  Small bilateral pleural effusions with bibasilar atelectasis.  4.  Incidental indeterminate renal lesions, recommend further  evaluation with renal protocol CT or MRI on a nonemergent basis.  5.  Intrathoracic goiter with distinct 1.8 cm left thyroid nodule,  recommend further evaluation with ultrasound on a nonemergent basis if  not previously performed.    LEONARDO PENNINGTON MD         SYSTEM ID:  [id number]   XR Chest Port 1 View    Narrative    EXAM: XR CHEST PORT 1 VIEW  LOCATION: [facility]  DATE/TIME: 8/24/2022 6:03 PM    INDICATION: Endotracheal tube positioning  COMPARISON: 8/24/2022 at 1315 hours      Impression    IMPRESSION: ET tube is in good position projecting 5 cm above bonilla. NG tube in the stomach. Mild mostly linear infiltrate at lung bases suggestive of atelectasis. Right hemidiaphragm is no longer. No persistent pneumoperitoneum evident on this exam.   Heart size remains normal.   XR Abdomen Port 1 View    Narrative    EXAM: XR ABDOMEN PORT 1 VIEW  LOCATION: [facility]  DATE/TIME: 8/24/2022 6:02 PM    INDICATION: post lapratomy for perforated peptic ulcer  COMPARISON: Same day chest radiograph and CT      Impression    IMPRESSION: Postsurgical changes of midline laparotomy with surgical drain in place. Nasogastric tube with tip and sidehole overlying the stomach. Bibasilar atelectasis. Bones are unchanged.   XR Chest Port 1 View    Narrative    EXAM: XR CHEST PORT 1 VIEW  LOCATION: [facility]  HOSPITAL  DATE/TIME: 8/27/2022 5:23 AM    INDICATION: For oxygen desaturation  COMPARISON: Prior study dated 8/24/2022      Impression    IMPRESSION: ET tube and NG tube are unchanged in position. The cardiomediastinal silhouette is unchanged. Bibasilar atelectasis is noted with increased retrocardiac opacification concerning for posterior atelectasis and/or airspace disease, alternatively   this could reflect posterior layering effusion   US Upper Extremity Arterial Duplex Right    Narrative    EXAM: US UPPER EXTREMITY ARTERIAL DUPLEX RIGHT  LOCATION: [facility]  DATE/TIME: 8/27/2022 11:40 PM    INDICATION: absent pulses after arterial line pulled, fingers hand dusky  COMPARISON: None.  TECHNIQUE: Arterial Duplex ultrasound of the right arm. Color flow and spectral Doppler with waveform analysis performed.    FINDINGS:  The waveforms are as follows:    RIGHT SUBCLAVIAN ARTERY: 63 cm/s, triphasic flow.  RIGHT AXILLARY-BRACHIAL ARTERY:  62 cm/s, triphasic flow  RIGHT RADIAL/ULNAR: 19 cm/s, monophasic flow in the distal radial artery      Impression    IMPRESSION:   1.  Blood flow is identified throughout the right upper extremity from the shoulder to the wrist, however there are decreased velocities and monophasic flow within the distal radial artery suggesting a hemodynamically significant stenosis.   XR Chest Port 1 View    Narrative    EXAM: XR CHEST PORT 1 VIEW  LOCATION: [facility]  DATE/TIME: 8/29/2022 1:30 AM    INDICATION: Increased O2 need  COMPARISON: 8/27/2022      Impression    IMPRESSION: Minimal change. ET tube and NG tube remain in place. Soft tissue fullness persists right side of thoracic inlet favored to represent a right-sided goiter. Heart size normal. Heart shifted into the left chest related to atelectasis/volume loss   at left lower lobe which may have worsened. Left hemidiaphragm remains obscured. Left upper lobe and right lung are clear.  Surgical clips project over both sides of the chest.   XR Upper GI Water Soluble    Narrative    UPPER GI  8/31/2022 2:48 PM     HISTORY: Status post repair perforated gastric ulcer.    COMPARISON: None.    FLUOROSCOPY TIME: 0.1 minutes.    SPOT FILMS: 8    TECHNIQUE: Double contrast upper GI.    FINDINGS:  A single contrast upper GI was performed and the esophagus,  stomach, and duodenum are unremarkable. No extravasation.      Impression    IMPRESSION: No leak demonstrated status post ulcer repair.    QUE RENEE MD         SYSTEM ID:  [id number]   XR Chest Port 1 View    Narrative    EXAM: XR CHEST PORT 1 VIEW  LOCATION: [facility]  DATE/TIME: 9/3/2022 6:30 PM    INDICATION: PICC catheter placement.  COMPARISON: 08/29/2022.      Impression    IMPRESSION: Interval placement of right upper extremity PICC catheter, tip overlying the distal superior vena cava. A kink is present just proximal to the insertion site. Findings discussed verbally via telephone with PICC RN at 7:05 PM on 09/03/2022.    Mild bibasilar atelectasis and possibly small bilateral pleural effusions. No pneumothorax.    Unchanged mild cardiomegaly. Atherosclerotic ossifications of the thoracic aorta.    Cutaneous staple line overlies the central abdomen.           Most Recent Lab Results In EPIC (For Non-EPIC Providers):    Most Recent 3 CBC's:  Recent Labs   Lab Test 09/13/22  0716 09/12/22  0625 09/10/22  0613 09/09/22  0557   WBC 7.1  --  10.4 12.6*   HGB 9.8*  --  8.8* 9.6*   MCV 73*  --  73* 75*   * 482* 521* 581*      Most Recent 3 BMP's:  Recent Labs   Lab Test 09/14/22  1139 09/14/22  0732 09/14/22  0715 09/13/22  0719 09/13/22  0716 09/12/22  1124 09/12/22  0625   NA  --   --  138  --  138  --  140   POTASSIUM  --   --  5.0  --  5.0  --  5.1   CHLORIDE  --   --  112*  --  113*  --  113*   CO2  --   --  20  --  16*  --  18*   BUN  --   --  51*  --  55*  --  61*   CR  --   --  1.61*  --  1.53*  --  1.50*    ANIONGAP  --   --  6  --  9  --  9   ISSA  --   --  10.1  --  10.3*  --  9.8   GLC 80 115* 117*   < > 129*   < > 127*    < > = values in this interval not displayed.     Most Recent 3 Troponin's:No lab results found.    Invalid input(s): TROP, TROPONINIES  Most Recent 3 INR's:  Recent Labs   Lab Test 09/12/22  0625 09/05/22  0543 09/01/22  0628   INR 0.93 1.06 1.21*     Most Recent 2 LFT's:  Recent Labs   Lab Test 09/12/22  0625 09/05/22  0543   AST 27 44   ALT 77* 95*   ALKPHOS 330* 267*   BILITOTAL 0.3 0.5     Most Recent Cholesterol Panel:  Recent Labs   Lab Test 09/12/22  0625 08/27/22  0428 12/21/20  1436   CHOL  --   --  173   LDL  --   --  82   HDL  --   --  56   TRIG 231*   < > 176*    < > = values in this interval not displayed.     Most Recent 6 Bacteria Isolates From Any Culture (See EPIC Reports for Culture Details):No lab results found.  Most Recent TSH, T4 and HgbA1c:  Recent Labs   Lab Test 11/08/21  1036 12/21/20  1436   TSH 0.81 1.53   A1C  --  6.1*

## 2022-09-15 NOTE — LETTER
[facility]   To:   [facility]          Please give to facility    From:   THALIA Montano Care Coordinator [facility]     Patient Name:  Diana Santiago  YOB: 1950    Admit date: 9/14/2022      *Information Needed:  Please contact me when the patient will discharge (or if they will move to long term care)- include the discharge date, disposition, and main diagnosis   - If the patient is discharged with home care services, please provide the name of the agency    Also- Please inform me if a care conference is being held.   Phone, Fax or Email with information       Thank You,   TIFFANY Montano, MSW  Clinic Care Coordinator  [facility] [place], [place], and [place]  Ph: [phone]  [email or web address]

## 2022-09-15 NOTE — LETTER
9/15/2022        RE: Diana Santiago  [address]  [place] [address]        [facility] GERIATRICS    PRIMARY CARE PROVIDER AND CLINIC:  Gayle Hernandez MD, [address] / [place] [address]  Chief Complaint   Patient presents with     [facility] Medical Record Number:  [id number]  Place of Service where encounter took place:  [facility]ly [facility], later [facility] ([facility]) [[id number]]    HPI:  Diana Santiago  is a 71 year old  (1950), admitted to the above facility from  [facility]. Hospital stay 8/24/22 through 9/13/22.     Patient presented to the hospital on 8/24 with abdominal pain and dyspnea.  She was found to have perforated viscus with septic shock now s/p emergent ex-lap and repair of perforated ulcer.  Hospital course complicated by septic shock, lactic acidosis, and acute respiratory failure requiring intubation all resolved now.  Also, hospital stay significant for REI, metabolic acidosis, hyperkalemia, hypernatremia, hyperglycemia, acute blood loss anemia, thrombocytosis, and sinus bradycardia all stable now.    She is sitting up in wheelchair today.  Alert and awake.  Midline abdominal incision clean and intact.  No signs of infection.  She denies abdominal pain, constipation, nausea or vomiting.  Tolerating ordered diet.  Last bowel movement this morning. C/o chronic left arm pain.  Hemodynamically stable.  Denies headache, dizziness, chest pain or shortness of breath.  Working with rehab and tolerating well.  Staff without acute concerns.    CODE STATUS/ADVANCE DIRECTIVES DISCUSSION:  Full Code  CPR/Full code   ALLERGIES:   Allergies   Allergen Reactions     No Known Allergies       PAST MEDICAL HISTORY:   Past Medical History:   Diagnosis Date     Benign essential hypertension 09/2018    added norvasc 9/18     Bipolar affective disorder (H)     hosp 1993, Dr. Aftab Deal     Cancer (H) 1996    DCIS, left breast     Chronic kidney  disease, stage 3a (H)     seen by renal Dr. 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  FAMILY HISTORY: family history includes Breast Cancer in her maternal aunt and another family member; Cerebrovascular Disease in her mother; Hyperlipidemia in her niece; Hypertension in her father; Sleep Apnea in her brother.  SOCIAL HISTORY:   reports that she has never smoked. She has never used smokeless tobacco. She reports that she does not drink alcohol and does not use drugs.  Patient's living condition: lives alone    Post Discharge Medication Reconciliation Status:   Post Medication Reconciliation Status: Discharge medications reconciled, continue medications without change    Current Outpatient Medications   Medication Sig     ACE/ARB/ARNI NOT PRESCRIBED (INTENTIONAL) Please choose reason not prescribed from choices below.     amLODIPine (NORVASC) 5 MG tablet Take 1 tablet (5 mg) by mouth daily     fexofenadine (ALLEGRA) 180 MG tablet Take 1 tablet by mouth daily.     haloperidol (HALDOL) 1 MG tablet Take 1 tablet (1 mg) by mouth At Bedtime     lamoTRIgine (LAMICTAL) 100 MG tablet Take 100 mg by mouth daily     pantoprazole (PROTONIX) 40 MG EC  "tablet Take 1 tablet (40 mg) by mouth daily     simvastatin (ZOCOR) 40 MG tablet TAKE 1 TABLET AT BEDTIME     sodium citrate-citric acid (BICITRA) 500-334 MG/5ML solution Take 15 mLs by mouth 2 times daily     No current facility-administered medications for this visit.       ROS:  10 point ROS of systems including Constitutional, Eyes, Respiratory, Cardiovascular, Gastroenterology, Genitourinary, Integumentary, Musculoskeletal, Psychiatric were all negative except for pertinent positives noted in my HPI.    Vitals:  /64   Pulse 75   Temp 97.9  F (36.6  C)   Resp 18   Ht 1.626 m (5' 4\")   Wt 51.3 kg (113 lb 3.2 oz)   SpO2 97%   BMI 19.43 kg/m       Exam:  GENERAL APPEARANCE: Well groomed, appropriately dressed  HEENT-EARS: No discharge/drainage  HEENT-NECK: No lymphadenopathy  HEENT-EYES: PERRLA positive, no drainage/discharge  HEENT-NOSE/MOUTH/THROAT: No nasal drainage or erythema, mucous membranes moist, no throat erythema   RESPIRATORY: Clear to auscultation, even and unlabored, symmetrical chest wall expansion  CARDIOVASCULAR: RRR, no peripheral edema, S1/S2 normal, pulses WDL  GASTROINTESTINAL: Soft, nontender, nondistended, bowel sounds present x4 quadrants  MUSCULOSKELETAL: No joint deformities  INTEGUMENTARY: Intact   NEUROLOGICAL: Alert and oriented  PSYCHOLOGICAL: Cooperative,calm     Lab/Diagnostic data:  Last Comprehensive Metabolic Panel:  Sodium   Date Value Ref Range Status   09/14/2022 138 133 - 144 mmol/L Final   12/21/2020 140 133 - 144 mmol/L Final     Potassium   Date Value Ref Range Status   09/14/2022 5.0 3.4 - 5.3 mmol/L Final   12/21/2020 3.9 3.4 - 5.3 mmol/L Final     Chloride   Date Value Ref Range Status   09/14/2022 112 (H) 94 - 109 mmol/L Final   12/21/2020 114 (H) 94 - 109 mmol/L Final     Carbon Dioxide   Date Value Ref Range Status   12/21/2020 19 (L) 20 - 32 mmol/L Final     Carbon Dioxide (CO2)   Date Value Ref Range Status   09/14/2022 20 20 - 32 mmol/L Final     Anion " Gap   Date Value Ref Range Status   09/14/2022 6 3 - 14 mmol/L Final   12/21/2020 7 3 - 14 mmol/L Final     Glucose   Date Value Ref Range Status   09/14/2022 117 (H) 70 - 99 mg/dL Final   12/21/2020 107 (H) 70 - 99 mg/dL Final     GLUCOSE BY METER POCT   Date Value Ref Range Status   09/14/2022 80 70 - 99 mg/dL Final     Urea Nitrogen   Date Value Ref Range Status   09/14/2022 51 (H) 7 - 30 mg/dL Final   12/21/2020 34 (H) 7 - 30 mg/dL Final     Creatinine   Date Value Ref Range Status   09/14/2022 1.61 (H) 0.52 - 1.04 mg/dL Final   03/13/2021 1.41 (H) 0.52 - 1.04 mg/dL Final     GFR Estimate   Date Value Ref Range Status   09/14/2022 34 (L) >60 mL/min/1.73m2 Final     Comment:     Effective December 21, 2021 eGFRcr in adults is calculated using the 2021 CKD-EPI creatinine equation which includes age and gender (Azra et al., NEJ, DOI: 10.1056/WDWDmg6997884)   03/13/2021 38 (L) >60 mL/min/[1.73_m2] Final     Comment:     Non  GFR Calc  Starting 12/18/2018, serum creatinine based estimated GFR (eGFR) will be   calculated using the Chronic Kidney Disease Epidemiology Collaboration   (CKD-EPI) equation.       Calcium   Date Value Ref Range Status   09/14/2022 10.1 8.5 - 10.1 mg/dL Final   12/21/2020 9.6 8.5 - 10.1 mg/dL Final     CBC RESULTS: Recent Labs   Lab Test 09/13/22  0716   WBC 7.1   RBC 4.25   HGB 9.8*   HCT 31.2*   MCV 73*   MCH 23.1*   MCHC 31.4*   RDW 25.2*   *       ASSESSMENT/PLAN:   Perforated gastric ulcer  -S/p ex-lap, repair of perforated gastric ulcer and peritoneal washout on 8/24, septic shock, lactic acidosis, postop ileus, and acute respiratory failure requiring intubation all resolved, tolerating ordered diet, no abdominal pain, constipation, nausea or vomiting  -Continue current care, surgery follow-up, monitor for GI symptoms    Malnutrition  -Severe malnutrition related to acute on chronic illnesses requiring TPN inpatient, she is tolerating soft diet now, no GI  symptoms  -Continue current care, dietary supplements ,RD follow-up, monitor intake and weight    REI on CKD  Metabolic acidosis  Hyperkalemia  Hypernatremia  Hyperglycemia  -Stable  -BMP, BG daily, monitor for signs of worsening renal function    Acute blood loss anemia  -Stable, required several transfusions inpatient, Hgb 9.8 9/13  -CBC, monitor for signs of bleeding    Thrombocytosis  -Suspect reactive,  9/13  -Repeat CBC    Hypertension, chronic  -BP stable  -Continue current care, BMP, monitor BP    Hyperlipidemia, chronic  -Continue current care, periodic lipid panel    Bipolar disorder  Depression and anxiety  -Mood stable  -Continue current care, outpatient psych follow-up, monitor for changes in mood and behavior    Left arm pain, chronic, related to past injury per report   -Continue current care, monitor pain    Generalized weakness  Physical deconditioning  -PT/OT, fall precaution       Orders:  BMP and CBC    Total time spent with patient visit at the skilled nursing facility was 40 min including patient visit and review of past records. Greater than 50% of total time spent with counseling and coordinating care due to reviewing plan of care with patient, reviewing hospital discharge papers, medications reconciliation, ordering labs, discussions regarding advance care planning, TCU expectations, and management of acute and chronic illnesses.     Electronically signed by:  Zackery Maxwell DNP,KRUNAL,FLYP-BC.           The above note was completed in part using Dragon voice recognition software. Although reviewed after completion, some word and grammatical errors may occur. Please contact the author of this note with any questions.                         Sincerely,        KRUNAL Thurston CNP

## 2022-09-15 NOTE — PROGRESS NOTES
Clinic Care Coordination Contact  Care Coordination Transition Communication         Clinical Data: [facility]  Discharge Summary        Diana Santiago MRN# [id number]   YOB: 1950 Age: 71 year old      Date of Admission:             8/24/2022  Date of Discharge:              9/15/2022  Admitting Physician:                   Ron Vidal MD  Discharge Physician:            Suleiman Jha MD  Discharging Service:            Hospitalist     Primary Provider:  Gayle Hernandez  Primary Care Physician Phone Number: [phone]         Discharge Diagnoses/Problem Oriented Hospital Course (Providers):         Perforated gastric ulcer - s/p ex-lap, repair of perforated gastric ulcer and peritoneal washout 8/24/2022.  Post-op ileus.  Septic shock secondary to above, resolved.  Lactic acidosis due to above, resolved.  Acute respiratory failure requiring intubation secondary to above, resolved, extubated 8/30  Severe malnutrition related to acute and chronic medical issues.    Transition to Facility:              Facility Name: [facility]              Contact name and phone number/fax:  [phone]    Plan: RN/SW Care Coordinator will await notification from facility staff informing RN/SW Care Coordinator of patient's discharge plans/needs. RN/SW Care Coordinator will review chart and outreach to facility staff every 4 weeks and as needed. Fax sent to TCU with my contact information requesting notification upon discharge.    Triny Newton [facility]  Clinic Care Coordinator  [facility]  [phone]  [email or web address]

## 2022-09-15 NOTE — PROGRESS NOTES
[facility]    Background: Transitional Care Management program auto-identified and prompting a chart review by [facility] Resource Center team.    Assessment: Upon chart review, CCR Team member will cancel/close this episode of Transitional Care Management program due to reason below:    Patient discharged to TCU/ARU/LTACH and is established within [facility] Primary [facility]. Referral created for Primary Care-Care Coordination program.    Plan: Transitional Care Management episode closed per reason above.      Teri Staples MA  Connected Care Resource [place], [facility]    *Connected Care Resource Team does NOT follow patient ongoing. Referrals are identified based on internal discharge reports and the outreach is to ensure patient has an understanding of their discharge instructions.

## 2022-09-22 ENCOUNTER — TRANSITIONAL CARE UNIT VISIT (OUTPATIENT)
Dept: GERIATRICS | Facility: CLINIC | Age: 72
End: 2022-09-22
Payer: MEDICARE

## 2022-09-22 VITALS
SYSTOLIC BLOOD PRESSURE: 136 MMHG | BODY MASS INDEX: 19.33 KG/M2 | HEIGHT: 64 IN | TEMPERATURE: 97.2 F | RESPIRATION RATE: 18 BRPM | HEART RATE: 86 BPM | OXYGEN SATURATION: 96 % | WEIGHT: 113.2 LBS | DIASTOLIC BLOOD PRESSURE: 88 MMHG

## 2022-09-22 DIAGNOSIS — R19.5 LOOSE STOOLS: Primary | ICD-10-CM

## 2022-09-22 DIAGNOSIS — R19.8 PERFORATED VISCUS: ICD-10-CM

## 2022-09-22 PROCEDURE — 99309 SBSQ NF CARE MODERATE MDM 30: CPT

## 2022-09-22 NOTE — LETTER
"    9/22/2022        RE: Diana Santiago  [address]  [place] [address]        [facility] GERIATRICS    Chief Complaint   Patient presents with     RECHECK     HPI:  Diana Santiago is a 71 year old  (1950), who is being seen today for an episodic care visit at: [facility] ([facility]) [[id number]]. Today's concern is: loose stools     Patient presented to the hospital on 8/24 with abdominal pain and dyspnea.  She was found to have perforated viscus with septic shock now s/p emergent ex-lap and repair of perforated ulcer.  Hospital course complicated by septic shock, lactic acidosis, and acute respiratory failure requiring intubation all resolved now.  Also, hospital stay significant for REI, metabolic acidosis, hyperkalemia, hypernatremia, hyperglycemia, acute blood loss anemia, thrombocytosis, and sinus bradycardia all stable now.    She is being seen today for routine U follow-up visit.  Had 2 BMs today and they were loose.  Denies abdominal pain, cramps, nausea or vomiting.  Tolerating soft diet.  Hemodynamically stable.  Denies headache, dizziness, chest pain or shortness of breath.  Ambulating with rehab.  Staff without acute concerns.    Allergies, and PMH/PSH reviewed in [employer] today.  REVIEW OF SYSTEMS:  4 point ROS including Respiratory, CV, GI and , other than that noted in the HPI,  is negative    Objective:   /88   Pulse 86   Temp 97.2  F (36.2  C)   Resp 18   Ht 1.626 m (5' 4\")   Wt 51.3 kg (113 lb 3.2 oz)   SpO2 96%   BMI 19.43 kg/m       Exam:  GENERAL APPEARANCE: No acute distress, well groomed, appropriately dressed  CARDIOVASCULAR: RRR, no peripheral edema, S1/S2 normal   GASTROINTESTINAL: Soft, nontender, nondistended, bowel sounds present x4 quadrants, midline incision clean dry and intact  MUSCULOSKELETAL: Left arm contracture at baseline  NEUROLOGICAL: Alert and oriented  PSYCHOLOGICAL: Cooperative,calm     Recent labs in [facility] reviewed by me today. "     Assessment/Plan:  Loose stools  -Reports of loose stools  -Consider Imodium with diarrhea, stool sample r/o C. difficile, avoid stool softeners, monitor for diarrhea    Perforated gastric ulcer  -S/p ex-lap, repair of perforated gastric ulcer and peritoneal washout on 8/24, no abdominal pain, nausea or vomiting, midline incision open to air and intact  -Continue current care, surgery follow-up, monitor for GI symptoms     Post Medication Reconciliation Status: Patient was not discharged from an inpatient facility or TCU      Electronically signed by:  Zackery Maxwell DNP,KRUNAL,FLYP-BC.           The above note was completed in part using Dragon voice recognition software. Although reviewed after completion, some word and grammatical errors may occur. Please contact the author of this note with any questions.               Sincerely,        KRUNAL Thurston CNP

## 2022-09-29 ENCOUNTER — TRANSITIONAL CARE UNIT VISIT (OUTPATIENT)
Dept: GERIATRICS | Facility: CLINIC | Age: 72
End: 2022-09-29
Payer: MEDICARE

## 2022-09-29 VITALS
DIASTOLIC BLOOD PRESSURE: 84 MMHG | BODY MASS INDEX: 19.05 KG/M2 | OXYGEN SATURATION: 99 % | RESPIRATION RATE: 20 BRPM | HEART RATE: 70 BPM | WEIGHT: 111.6 LBS | HEIGHT: 64 IN | TEMPERATURE: 97.3 F | SYSTOLIC BLOOD PRESSURE: 148 MMHG

## 2022-09-29 DIAGNOSIS — R19.8 PERFORATED VISCUS: Primary | ICD-10-CM

## 2022-09-29 DIAGNOSIS — I10 BENIGN ESSENTIAL HYPERTENSION: ICD-10-CM

## 2022-09-29 DIAGNOSIS — D62 ANEMIA DUE TO BLOOD LOSS, ACUTE: ICD-10-CM

## 2022-09-29 DIAGNOSIS — N17.9 ACUTE KIDNEY INJURY SUPERIMPOSED ON CHRONIC KIDNEY DISEASE (H): ICD-10-CM

## 2022-09-29 DIAGNOSIS — N18.9 ACUTE KIDNEY INJURY SUPERIMPOSED ON CHRONIC KIDNEY DISEASE (H): ICD-10-CM

## 2022-09-29 DIAGNOSIS — E43 SEVERE MALNUTRITION (H): ICD-10-CM

## 2022-09-29 PROCEDURE — 99305 1ST NF CARE MODERATE MDM 35: CPT | Performed by: INTERNAL MEDICINE

## 2022-09-29 NOTE — LETTER
"    9/29/2022        RE: Diana Santiago  [address]  [place] [address]        [facility] GERIATRICS    PRIMARY CARE PROVIDER AND CLINIC:  Gayle Hernandez MD, [address] / [place] [address]  Chief Complaint   Patient presents with     Hospital F/U      [place] Medical Record Number:  [id number]  Place of Service where encounter took place:  [facility] ([facility]) [[id number]]    Diana Santiago  is a 71 year old  (1950), admitted to the above facility from  [facility]. Hospital stay 8/24/22 through 9/14/22..     Hospital course was reviewed by me, is as per the hospital discharge summary and nurse practitioner note.    Patient was hospitalized for the evaluation of abdominal pain and dyspnea, was found to have a perforated viscus with septic shock.  She underwent emergent exploratory laparotomy with repair of a perforated peptic ulcer.  Course was complicated by septic shock, lactic acidosis and acute respiratory failure requiring intubation.  She also developed acute kidney injury on chronic kidney disease acute blood loss anemia hyperkalemia and metabolic acidosis.  These medical issues all resolved during her hospital stay.    Patient states she is feeling well overall.  She has very minimal abdominal pain.  She does note urinary and bowel urgency with occasional loose stools.  Appetite has been good.  She denies nausea or vomiting, fevers, chills, dysuria.  She states she her breathing \"feels shallow\" at times.  She denies cough, chest pain.    CODE STATUS/ADVANCE DIRECTIVES DISCUSSION:  Full Code  CPR/Full code   ALLERGIES:   Allergies   Allergen Reactions     No Known Allergies       PAST MEDICAL HISTORY:   Past Medical History:   Diagnosis Date     Benign essential hypertension 09/2018    added norvasc 9/18     Bipolar affective disorder (H)     hosp 1993, Dr. Aftab Deal     Cancer (H) 1996    DCIS, left breast     Chronic kidney disease, " stage 3a (H)     seen by renal Dr. 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  FAMILY HISTORY: family history includes Breast Cancer in her maternal aunt and another family member; Cerebrovascular Disease in her mother; Hyperlipidemia in her niece; Hypertension in her father; Sleep Apnea in her brother.  SOCIAL HISTORY:   reports that she has never smoked. She has never used smokeless tobacco. She reports that she does not drink alcohol and does not use drugs.  Patient's living condition: lives alone    Medications were reviewed by me today.       Current Outpatient Medications   Medication Sig     ACE/ARB/ARNI NOT PRESCRIBED (INTENTIONAL) Please choose reason not prescribed from choices below.     amLODIPine (NORVASC) 5 MG tablet Take 1 tablet (5 mg) by mouth daily     fexofenadine (ALLEGRA) 180 MG tablet Take 1 tablet by mouth daily.     haloperidol (HALDOL) 1 MG tablet Take 1 tablet (1 mg) by mouth At Bedtime     lamoTRIgine (LAMICTAL) 100 MG tablet Take 100 mg by mouth daily     pantoprazole (PROTONIX) 40 MG EC tablet Take 1 tablet (40 mg) by mouth daily     simvastatin (ZOCOR) 40 MG tablet TAKE 1 TABLET AT BEDTIME     sodium  "citrate-citric acid (BICITRA) 500-334 MG/5ML solution Take 15 mLs by mouth 2 times daily     No current facility-administered medications for this visit.       ROS:  10 point ROS of systems including Constitutional, Eyes, Respiratory, Cardiovascular, Gastroenterology, Genitourinary, Integumentary, Musculoskeletal, Psychiatric were all negative except for pertinent positives noted in my HPI.    Vitals:  BP (!) 148/84   Pulse 70   Temp 97.3  F (36.3  C)   Resp 20   Ht 1.626 m (5' 4\")   Wt 50.6 kg (111 lb 9.6 oz)   SpO2 99%   BMI 19.16 kg/m    Exam:  Very pleasant, well-appearing female, sitting in a chair in her room.  HEENT: Pharynx benign, oral mucosa moist  No eye redness or drainage  Neck: No adenopathy  Lungs clear bilaterally, respiratory rate 12, unlabored  CV regular rhythm  Abdomen soft, nondistended, nontender.  Midline surgical scar intact without drainage or redness  Extremities without edema.    Lab/Diagnostic data:    Most Recent 3 CBC's:Recent Labs   Lab Test 09/13/22  0716 09/12/22  0625 09/10/22  0613 09/09/22  0557   WBC 7.1  --  10.4 12.6*   HGB 9.8*  --  8.8* 9.6*   MCV 73*  --  73* 75*   * 482* 521* 581*     Most Recent 3 BMP's:Recent Labs   Lab Test 09/14/22  1139 09/14/22  0732 09/14/22  0715 09/13/22  0719 09/13/22  0716 09/12/22  1124 09/12/22  0625   NA  --   --  138  --  138  --  140   POTASSIUM  --   --  5.0  --  5.0  --  5.1   CHLORIDE  --   --  112*  --  113*  --  113*   CO2  --   --  20  --  16*  --  18*   BUN  --   --  51*  --  55*  --  61*   CR  --   --  1.61*  --  1.53*  --  1.50*   ANIONGAP  --   --  6  --  9  --  9   ISSA  --   --  10.1  --  10.3*  --  9.8   GLC 80 115* 117*   < > 129*   < > 127*    < > = values in this interval not displayed.     Most Recent 2 LFT's:Recent Labs   Lab Test 09/12/22  0625 09/05/22  0543   AST 27 44   ALT 77* 95*   ALKPHOS 330* 267*   BILITOTAL 0.3 0.5       ASSESSMENT/PLAN:    Perforated gastric ulcer, status post repair with peritoneal " washout with hospital course complicated by lactic acidosis, ileus, acute respiratory failure.  Overall status stable.  Unclear cause for perforation, as I do not believe patient was taking NSAIDs.  Patient notes urinary and bowel urgency without consistent change in bowel habits.  I suspect this is simply related to her surgery and should improve in time.  Intake has been good.  Plan: Monitor GI status.  General surgery follow-up.  Continue Protonix.  Therapies.    Vague complaint of breathing issues, described as shallow breathing.  Patient appears comfortable.  Pulmonary exam unremarkable  Plan: Monitor respiratory status    Malnutrition related to acute on chronic illness, requiring TPN in the inpatient setting.  Diet improved.  Plan: Monitor nutritional status    Chronic kidney disease likely secondary to past lithium use with acute kidney injury in the setting of sepsis, complicated by hyperkalemia.  Baseline creatinine 2.0  Plan: Monitor electrolytes and renal function.  Continue Bicitra    Acute blood loss anemia  Stable  Plan: Monitor hemoglobin    Hypertension, stable on amlodipine  Plan: Monitor blood pressure    Bipolar disorder  Stable on Haldol and lamotrigine  Plan: Continue current medications, monitor mental status      Dandy Dupont MD                  Sincerely,        Dandy Dupont MD

## 2022-10-05 ENCOUNTER — LAB REQUISITION (OUTPATIENT)
Dept: LAB | Facility: CLINIC | Age: 72
End: 2022-10-05
Payer: MEDICARE

## 2022-10-05 DIAGNOSIS — N18.9 CHRONIC KIDNEY DISEASE, UNSPECIFIED: ICD-10-CM

## 2022-10-06 ENCOUNTER — TRANSITIONAL CARE UNIT VISIT (OUTPATIENT)
Dept: GERIATRICS | Facility: CLINIC | Age: 72
End: 2022-10-06
Payer: MEDICARE

## 2022-10-06 VITALS
HEIGHT: 64 IN | RESPIRATION RATE: 18 BRPM | HEART RATE: 81 BPM | SYSTOLIC BLOOD PRESSURE: 154 MMHG | TEMPERATURE: 97.7 F | WEIGHT: 113.2 LBS | BODY MASS INDEX: 19.33 KG/M2 | OXYGEN SATURATION: 100 % | DIASTOLIC BLOOD PRESSURE: 84 MMHG

## 2022-10-06 DIAGNOSIS — R19.8 PERFORATED VISCUS: Primary | ICD-10-CM

## 2022-10-06 DIAGNOSIS — R19.5 LOOSE STOOLS: ICD-10-CM

## 2022-10-06 LAB
ANION GAP SERPL CALCULATED.3IONS-SCNC: 11 MMOL/L (ref 7–15)
BASOPHILS # BLD AUTO: 0.1 10E3/UL (ref 0–0.2)
BASOPHILS NFR BLD AUTO: 1 %
BUN SERPL-MCNC: 35 MG/DL (ref 8–23)
CALCIUM SERPL-MCNC: 10.4 MG/DL (ref 8.8–10.2)
CHLORIDE SERPL-SCNC: 110 MMOL/L (ref 98–107)
CREAT SERPL-MCNC: 2.23 MG/DL (ref 0.51–0.95)
DEPRECATED HCO3 PLAS-SCNC: 20 MMOL/L (ref 22–29)
EOSINOPHIL # BLD AUTO: 0.5 10E3/UL (ref 0–0.7)
EOSINOPHIL NFR BLD AUTO: 8 %
ERYTHROCYTE [DISTWIDTH] IN BLOOD BY AUTOMATED COUNT: 21.1 % (ref 10–15)
GFR SERPL CREATININE-BSD FRML MDRD: 23 ML/MIN/1.73M2
GLUCOSE SERPL-MCNC: 93 MG/DL (ref 70–99)
HCT VFR BLD AUTO: 31.9 % (ref 35–47)
HGB BLD-MCNC: 9.6 G/DL (ref 11.7–15.7)
IMM GRANULOCYTES # BLD: 0 10E3/UL
IMM GRANULOCYTES NFR BLD: 0 %
LYMPHOCYTES # BLD AUTO: 1.1 10E3/UL (ref 0.8–5.3)
LYMPHOCYTES NFR BLD AUTO: 17 %
MCH RBC QN AUTO: 22.2 PG (ref 26.5–33)
MCHC RBC AUTO-ENTMCNC: 30.1 G/DL (ref 31.5–36.5)
MCV RBC AUTO: 74 FL (ref 78–100)
MONOCYTES # BLD AUTO: 0.8 10E3/UL (ref 0–1.3)
MONOCYTES NFR BLD AUTO: 12 %
NEUTROPHILS # BLD AUTO: 3.9 10E3/UL (ref 1.6–8.3)
NEUTROPHILS NFR BLD AUTO: 62 %
NRBC # BLD AUTO: 0 10E3/UL
NRBC BLD AUTO-RTO: 0 /100
PLATELET # BLD AUTO: 348 10E3/UL (ref 150–450)
POTASSIUM SERPL-SCNC: 3.9 MMOL/L (ref 3.4–5.3)
RBC # BLD AUTO: 4.32 10E6/UL (ref 3.8–5.2)
SODIUM SERPL-SCNC: 141 MMOL/L (ref 136–145)
WBC # BLD AUTO: 6.4 10E3/UL (ref 4–11)

## 2022-10-06 PROCEDURE — 36415 COLL VENOUS BLD VENIPUNCTURE: CPT

## 2022-10-06 PROCEDURE — 85025 COMPLETE CBC W/AUTO DIFF WBC: CPT

## 2022-10-06 PROCEDURE — 99309 SBSQ NF CARE MODERATE MDM 30: CPT

## 2022-10-06 PROCEDURE — P9604 ONE-WAY ALLOW PRORATED TRIP: HCPCS

## 2022-10-06 PROCEDURE — 80048 BASIC METABOLIC PNL TOTAL CA: CPT

## 2022-10-06 NOTE — PROGRESS NOTES
"[facility] GERIATRICS    Chief Complaint   Patient presents with     RECHECK     HPI:  Diana Santiago is a 71 year old  (1950), who is being seen today for an episodic care visit at: [facility] ([facility]) [[id number]]. Today's concern is: Routine TCU follow-up visit    Patient presented to the hospital on 8/24 with abdominal pain and dyspnea. Was found to have perforated viscus with septic shock now s/p emergent ex-lap and repair of perforated ulcer.  Hospital course complicated by septic shock, lactic acidosis, and acute respiratory failure requiring intubation all resolved now.  Also, hospital stay significant for REI, metabolic acidosis, hyperkalemia, hypernatremia, hyperglycemia, acute blood loss anemia, thrombocytosis, and sinus bradycardia all stable now.    She is being seen today for routine TCU follow-up visit.  Has had urinary and bowel urgency with loose stools at times.  Loose stools improved per patient report though continues to have urgency occasionally.  Denies abdominal pain, nausea or vomiting.  Tolerating ordered diet.  Wondering when her diet can be advanced to regular.  Vitals stable.  Denies headache, dizziness, chest pain or shortness of breath.    Allergies, and PMH/PSH reviewed in [facility] today.  REVIEW OF SYSTEMS:  4 point ROS including Respiratory, CV, GI and , other than that noted in the HPI,  is negative    Objective:   BP (!) 154/84   Pulse 81   Temp 97.7  F (36.5  C)   Resp 18   Ht 1.626 m (5' 4\")   Wt 51.3 kg (113 lb 3.2 oz)   SpO2 100%   BMI 19.43 kg/m       Exam:  GENERAL APPEARANCE: NAD, well groomed, appropriately dressed  CARDIOVASCULAR: RRR, no peripheral edema, S1/S2 normal   GASTROINTESTINAL: Soft, nontender, nondistended, bowel sounds present x4 quadrants, midline incision healing and intact  MUSCULOSKELETAL: Left arm contracture at baseline  NEUROLOGICAL: Alert and oriented  PSYCHOLOGICAL: Cooperative,calm      Recent labs in EPIC reviewed by me " today.     Assessment/Plan:  Perforated gastric ulcer  -S/p ex-lap, repair of perforated gastric ulcer and peritoneal washout on 8/24, no abdominal pain, nausea or vomiting, midline incision open to air and intact  -Continue current care, surgery follow-up, monitor for GI symptoms    Loose stools  -Loose stools improved, continues to have occasional urgency likely related to her surgery  -Consider Imodium with diarrhea, stool sample r/o C-diff, avoid stool softeners, monitor for diarrhea     Post Medication Reconciliation Status: Medication reconciliation previously completed during another office visit        Electronically signed by:    Zackery Maxwell,ARELI,APRN,FLYP-BC.           The above note was completed in part using Dragon voice recognition software. Although reviewed after completion, some word and grammatical errors may occur. Please contact the author of this note with any questions.

## 2022-10-06 NOTE — LETTER
"    10/6/2022        RE: Diana Santiago  [address]  [place] [address]        [facility] GERIATRICS    Chief Complaint   Patient presents with     RECHECK     HPI:  Diana Santiago is a 71 year old  (1950), who is being seen today for an episodic care visit at: [facility] ([facility]) [[id number]]. Today's concern is: Routine TCU follow-up visit    Patient presented to the hospital on 8/24 with abdominal pain and dyspnea. Was found to have perforated viscus with septic shock now s/p emergent ex-lap and repair of perforated ulcer.  Hospital course complicated by septic shock, lactic acidosis, and acute respiratory failure requiring intubation all resolved now.  Also, hospital stay significant for REI, metabolic acidosis, hyperkalemia, hypernatremia, hyperglycemia, acute blood loss anemia, thrombocytosis, and sinus bradycardia all stable now.    She is being seen today for routine TCU follow-up visit.  Has had urinary and bowel urgency with loose stools at times.  Loose stools improved per patient report though continues to have urgency occasionally.  Denies abdominal pain, nausea or vomiting.  Tolerating ordered diet.  Wondering when her diet can be advanced to regular.  Vitals stable.  Denies headache, dizziness, chest pain or shortness of breath.    Allergies, and PMH/PSH reviewed in [facility] today.  REVIEW OF SYSTEMS:  4 point ROS including Respiratory, CV, GI and , other than that noted in the HPI,  is negative    Objective:   BP (!) 154/84   Pulse 81   Temp 97.7  F (36.5  C)   Resp 18   Ht 1.626 m (5' 4\")   Wt 51.3 kg (113 lb 3.2 oz)   SpO2 100%   BMI 19.43 kg/m       Exam:  GENERAL APPEARANCE: NAD, well groomed, appropriately dressed  CARDIOVASCULAR: RRR, no peripheral edema, S1/S2 normal   GASTROINTESTINAL: Soft, nontender, nondistended, bowel sounds present x4 quadrants, midline incision healing and intact  MUSCULOSKELETAL: Left arm contracture at baseline  NEUROLOGICAL: " Alert and oriented  PSYCHOLOGICAL: Cooperative,calm      Recent labs in EPIC reviewed by me today.     Assessment/Plan:  Perforated gastric ulcer  -S/p ex-lap, repair of perforated gastric ulcer and peritoneal washout on 8/24, no abdominal pain, nausea or vomiting, midline incision open to air and intact  -Continue current care, surgery follow-up, monitor for GI symptoms    Loose stools  -Loose stools improved, continues to have occasional urgency likely related to her surgery  -Consider Imodium with diarrhea, stool sample r/o C-diff, avoid stool softeners, monitor for diarrhea     Post Medication Reconciliation Status: Medication reconciliation previously completed during another office visit        Electronically signed by:    Zackery Maxwell DNP,KRUNAL,FLYP-BC.           The above note was completed in part using Dragon voice recognition software. Although reviewed after completion, some word and grammatical errors may occur. Please contact the author of this note with any questions.               Sincerely,        KRUNAL Thurston CNP

## 2022-10-18 ENCOUNTER — TELEPHONE (OUTPATIENT)
Dept: FAMILY MEDICINE | Facility: CLINIC | Age: 72
End: 2022-10-18

## 2022-10-18 ENCOUNTER — DISCHARGE SUMMARY NURSING HOME (OUTPATIENT)
Dept: GERIATRICS | Facility: CLINIC | Age: 72
End: 2022-10-18
Payer: MEDICARE

## 2022-10-18 VITALS
DIASTOLIC BLOOD PRESSURE: 89 MMHG | WEIGHT: 113.2 LBS | OXYGEN SATURATION: 98 % | HEART RATE: 85 BPM | TEMPERATURE: 98.1 F | RESPIRATION RATE: 18 BRPM | HEIGHT: 64 IN | BODY MASS INDEX: 19.33 KG/M2 | SYSTOLIC BLOOD PRESSURE: 127 MMHG

## 2022-10-18 DIAGNOSIS — I10 BENIGN ESSENTIAL HYPERTENSION: ICD-10-CM

## 2022-10-18 DIAGNOSIS — E46 PROTEIN-CALORIE MALNUTRITION, UNSPECIFIED SEVERITY (H): ICD-10-CM

## 2022-10-18 DIAGNOSIS — K21.00 GASTROESOPHAGEAL REFLUX DISEASE WITH ESOPHAGITIS, UNSPECIFIED WHETHER HEMORRHAGE: ICD-10-CM

## 2022-10-18 DIAGNOSIS — D75.839 THROMBOCYTOSIS: ICD-10-CM

## 2022-10-18 DIAGNOSIS — E87.20 METABOLIC ACIDOSIS: ICD-10-CM

## 2022-10-18 DIAGNOSIS — M79.622 PAIN OF LEFT UPPER ARM: ICD-10-CM

## 2022-10-18 DIAGNOSIS — R73.9 HYPERGLYCEMIA: ICD-10-CM

## 2022-10-18 DIAGNOSIS — D62 ANEMIA DUE TO BLOOD LOSS, ACUTE: ICD-10-CM

## 2022-10-18 DIAGNOSIS — F31.9 BIPOLAR AFFECTIVE DISORDER, REMISSION STATUS UNSPECIFIED (H): ICD-10-CM

## 2022-10-18 DIAGNOSIS — E87.0 HYPERNATREMIA: ICD-10-CM

## 2022-10-18 DIAGNOSIS — E78.5 HYPERLIPIDEMIA LDL GOAL <100: ICD-10-CM

## 2022-10-18 DIAGNOSIS — M62.81 GENERALIZED MUSCLE WEAKNESS: ICD-10-CM

## 2022-10-18 DIAGNOSIS — R19.8 PERFORATED VISCUS: Primary | ICD-10-CM

## 2022-10-18 DIAGNOSIS — E87.5 HYPERKALEMIA: ICD-10-CM

## 2022-10-18 DIAGNOSIS — F41.8 DEPRESSION WITH ANXIETY: ICD-10-CM

## 2022-10-18 DIAGNOSIS — R53.81 PHYSICAL DECONDITIONING: ICD-10-CM

## 2022-10-18 DIAGNOSIS — N18.9 ACUTE KIDNEY INJURY SUPERIMPOSED ON CHRONIC KIDNEY DISEASE (H): ICD-10-CM

## 2022-10-18 DIAGNOSIS — N17.9 ACUTE KIDNEY INJURY SUPERIMPOSED ON CHRONIC KIDNEY DISEASE (H): ICD-10-CM

## 2022-10-18 PROCEDURE — 99316 NF DSCHRG MGMT 30 MIN+: CPT

## 2022-10-18 NOTE — PROGRESS NOTES
[facility] GERIATRICS DISCHARGE SUMMARY  PATIENT'S NAME: Diana Santiago  YOB: 1950  MEDICAL RECORD NUMBER:  [id number]  Place of Service where encounter took place:  [facility] ([facility]) [[id number]]    PRIMARY CARE PROVIDER AND CLINIC RESPONSIBLE AFTER TRANSFER:   Gayle Hernandez MD, [address] / [place] [address]    [facility] Provider     Transferring providers: Zackery Maxwell DNP; Dandy Dupont MD  Recent Hospitalization/ED:  [facility] stay 8/24/22 to 9/14/22.  Date of SNF Admission: September 14, 2022  Date of SNF (anticipated) Discharge: October 19, 2022  Discharged to: Previous independent home  Cognitive Scores:  SBT 0/28; CPT 5.1/5.6  Physical Function: Ambulated up to 400-750 feet  DME: No new DME needed    CODE STATUS/ADVANCE DIRECTIVES DISCUSSION:  Full Code   ALLERGIES: No known allergies    NURSING FACILITY COURSE   Medication Changes/Rationale:     None    Summary of nursing facility stay:   Patient presented to the hospital on 8/24 with abdominal pain and dyspnea.  She was found to have perforated viscus with septic shock now s/p emergent ex-lap and repair of perforated ulcer.  Hospital course complicated by septic shock, lactic acidosis, and acute respiratory failure requiring intubation all resolved now.  Also, hospital stay significant for REI, metabolic acidosis, hyperkalemia, hypernatremia, hyperglycemia, acute blood loss anemia, thrombocytosis, and sinus bradycardia all resolved.    She is being seen today for discharge orders.  Alert and oriented.  Sitting up and having lunch.  Denies pain or discomfort.  Has had  urinary and bowel urgency with occasional loose stools resolved now.  Tolerating soft diet.  Denies abdominal pain, nausea or vomiting. Able to ambulate up to 750 feet.  No falls or injuries in TCU.  Hemodynamically stable.  Denies headache, dizziness, chest pain or shortness of breath.  She is discharging home with  orders in place for PT, OT, nursing, and HHA services.  Patient and staff without acute concerns.    Assessment and Plan:  Perforated gastric ulcer  -S/p ex-lap, repair of perforated gastric ulcer and peritoneal washout on 8/24, tolerating ordered diet, no GI symptoms    -Continue current care, surgery follow-up, monitor for GI symptoms     Malnutrition  -Malnutrition related to acute on chronic illnesses requiring TPN inpatient, she is tolerating soft diet now, no GI symptoms, current BMI 19.4  -Continue current care, dietary supplements ,RD follow-up, monitor intake and weight     REI on CKD  Metabolic acidosis  Hyperkalemia  Hypernatremia  Hyperglycemia  -Resolved   -Follow labs, PCP follow up      Acute blood loss anemia  -Stable, required several transfusions inpatient, Hgb 9.6 on 10/4  -Follow CBC, monitor for signs of bleeding     Thrombocytosis  -Suspect reactive,  on 10/6  -Follow CBC     Hypertension, chronic  -BP stable  -Continue amlodipine, BMP, monitor BP     Hyperlipidemia, chronic  -Continue statin, periodic lipid panel     Bipolar disorder  Depression and anxiety  -Mood stable  -Continue lamictal and haloperidol, outpatient psych follow-up, monitor for changes in mood and behavior     Left arm pain, chronic, related to past injury per report   -Continue current care, monitor pain    GERD  -Continue PPI     Generalized weakness  Physical deconditioning  -PT/OT, fall precaution    Discharge Medications:  Post Medication Reconciliation Status: Medication reconciliation previously completed during another office visit     Current Outpatient Medications   Medication Sig Dispense Refill     amLODIPine (NORVASC) 5 MG tablet Take 1 tablet (5 mg) by mouth daily 90 tablet 3     fexofenadine (ALLEGRA) 180 MG tablet Take 1 tablet by mouth daily. 90 tablet 0     haloperidol (HALDOL) 1 MG tablet Take 1 tablet (1 mg) by mouth At Bedtime       lamoTRIgine (LAMICTAL) 100 MG tablet Take 100 mg by mouth daily    "    pantoprazole (PROTONIX) 40 MG EC tablet Take 1 tablet (40 mg) by mouth daily       simvastatin (ZOCOR) 40 MG tablet TAKE 1 TABLET AT BEDTIME 90 tablet 3     sodium citrate-citric acid (BICITRA) 500-334 MG/5ML solution Take 15 mLs by mouth 2 times daily       ACE/ARB/ARNI NOT PRESCRIBED (INTENTIONAL) Please choose reason not prescribed from choices below.        Controlled medications:   not applicable/none     Past Medical History:   Past Medical History:   Diagnosis Date     Benign essential hypertension 09/2018    added norvasc 9/18     Bipolar affective disorder (H)     hosp 1993, Dr. Aftab Deal     Cancer (H) 1996    DCIS, left breast     Chronic kidney disease, stage 3a (H)     seen by renal Dr. 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     Exam:  GENERAL APPEARANCE: No acute distress, well groomed, appropriately dressed  CARDIOVASCULAR: RRR, no peripheral edema, S1/S2 normal   GASTROINTESTINAL: Soft, nontender, nondistended, bowel sounds positive all quadrants, midline incision healing and intact  MUSCULOSKELETAL: Able to move all extremities   NEUROLOGICAL: Alert and oriented  PSYCHOLOGICAL: Cooperative,calm    SNF labs: Labs done in SNF are in " [facility]. Please refer to them using [employer] Everywhere.    DISCHARGE PLAN:    Follow up labs: Defer to PCP      Medical Follow Up:     Follow up with primary care provider in 3-5 days  Surgery follow-up       Discharge Services: Home Care:  Occupational Therapy, Physical Therapy, Registered Nurse and Home Health Aide    Discharge Instructions Verbalized to Patient at Discharge:     Continue to follow your diet:  Soft.     TOTAL DISCHARGE TIME:   Greater than 30 minutes  Electronically signed by:  Zackery Maxwell,DNP,APRN,AGNP-BC.        Documentation of Face to Face and Certification for Home Health Services    I certify that patient Diana Santiago is under my care and that I, or a nurse practitioner or physician's assistant working with me, had a face-to-face encounter that meets the physician face-to-face encounter requirements with this patient on: 10/18/2022.    This encounter with the patient was in whole, or in part, for the following medical condition, which is the primary reason for home health care:   -Perforated gastric ulcer  -S/p ex-lap and perforated gastric ulcer and peritoneal washout  -Generalized weakness  -Physical deconditioning    I certify that, based on my findings, the following services are medically necessary home health services: Nursing, Occupational Therapy, Physical Therapy and HHA.    My clinical findings support the need for the above services because: Nurse is needed to assess labs, changes in medications or other medical regimen; Home health aide is needed for assistance with ADLs; Occupational Therapy Services are needed to assess and treat cognitive ability and address ADL safety due to recent surgery and generalized weakness; Physical Therapy Services are needed to assess and treat the following functional impairments: physical deconditioning, generalized weakness, fall risk, and use of walker.        Further, I certify that my clinical findings support that this patient is  homebound (i.e. absences from home require considerable and taxing effort and are for medical reasons or [employer] services or infrequently or of short duration when for other reasons) because: Requires assistance of another person or specialized equipment to access medical services because patient unable to exit home safely on own due to: Recent abdominal surgery, generalized weakness, physical deconditioning, and fall risk.      Based on the above findings, I certify that this patient is confined to the home and needs intermittent skilled nursing care, HHA, physical therapy, and occupational therapy. The patient is under my care, and I have initiated the establishment of the plan of care.  This patient will be followed by a physician who will periodically review the plan of care.     Physician/Provider to provide follow up care: Gayle Hernandez    Attending hospital physician (the Medicare certified [facility] provider): Deion Dupont  Physician Signature: See electronic signature associated with these discharge orders.  Date: 10/18/2022          The above note was completed in part using Dragon voice recognition software. Although reviewed after completion, some word and grammatical errors may occur. Please contact the author of this note with any questions.

## 2022-10-18 NOTE — LETTER
10/18/2022        RE: Diana Santiago  [address]  [place] [address]        [facility] GERIATRICS DISCHARGE SUMMARY  PATIENT'S NAME: Diana Santiago  YOB: 1950  MEDICAL RECORD NUMBER:  [id number]  Place of Service where encounter took place:  [facility] ([facility]) [[id number]]    PRIMARY CARE PROVIDER AND CLINIC RESPONSIBLE AFTER TRANSFER:   Gayle Hernandez MD, [address] / [place] [address]    [facility] Provider     Transferring providers: Zackery Maxwell DNP; Dandy Dupont MD  Recent Hospitalization/ED:  [facility] Hospital stay 8/24/22 to 9/14/22.  Date of SNF Admission: September 14, 2022  Date of SNF (anticipated) Discharge: October 19, 2022  Discharged to: Previous independent home  Cognitive Scores:  SBT 0/28; CPT 5.1/5.6  Physical Function: Ambulated up to 400-750 feet  DME: No new DME needed    CODE STATUS/ADVANCE DIRECTIVES DISCUSSION:  Full Code   ALLERGIES: No known allergies    NURSING FACILITY COURSE   Medication Changes/Rationale:     None    Summary of nursing facility stay:   Patient presented to the hospital on 8/24 with abdominal pain and dyspnea.  She was found to have perforated viscus with septic shock now s/p emergent ex-lap and repair of perforated ulcer.  Hospital course complicated by septic shock, lactic acidosis, and acute respiratory failure requiring intubation all resolved now.  Also, hospital stay significant for REI, metabolic acidosis, hyperkalemia, hypernatremia, hyperglycemia, acute blood loss anemia, thrombocytosis, and sinus bradycardia all resolved.    She is being seen today for discharge orders.  Alert and oriented.  Sitting up and having lunch.  Denies pain or discomfort.  Has had  urinary and bowel urgency with occasional loose stools resolved now.  Tolerating soft diet.  Denies abdominal pain, nausea or vomiting. Able to ambulate up to 750 feet.  No falls or injuries in TCU.  Hemodynamically stable.   Denies headache, dizziness, chest pain or shortness of breath.  She is discharging home with orders in place for PT, OT, nursing, and HHA services.  Patient and staff without acute concerns.    Assessment and Plan:  Perforated gastric ulcer  -S/p ex-lap, repair of perforated gastric ulcer and peritoneal washout on 8/24, tolerating ordered diet, no GI symptoms    -Continue current care, surgery follow-up, monitor for GI symptoms     Malnutrition  -Malnutrition related to acute on chronic illnesses requiring TPN inpatient, she is tolerating soft diet now, no GI symptoms, current BMI 19.4  -Continue current care, dietary supplements ,RD follow-up, monitor intake and weight     REI on CKD  Metabolic acidosis  Hyperkalemia  Hypernatremia  Hyperglycemia  -Resolved   -Follow labs, PCP follow up      Acute blood loss anemia  -Stable, required several transfusions inpatient, Hgb 9.6 on 10/4  -Follow CBC, monitor for signs of bleeding     Thrombocytosis  -Suspect reactive,  on 10/6  -Follow CBC     Hypertension, chronic  -BP stable  -Continue amlodipine, BMP, monitor BP     Hyperlipidemia, chronic  -Continue statin, periodic lipid panel     Bipolar disorder  Depression and anxiety  -Mood stable  -Continue lamictal and haloperidol, outpatient psych follow-up, monitor for changes in mood and behavior     Left arm pain, chronic, related to past injury per report   -Continue current care, monitor pain    GERD  -Continue PPI     Generalized weakness  Physical deconditioning  -PT/OT, fall precaution    Discharge Medications:  Post Medication Reconciliation Status: Medication reconciliation previously completed during another office visit     Current Outpatient Medications   Medication Sig Dispense Refill     amLODIPine (NORVASC) 5 MG tablet Take 1 tablet (5 mg) by mouth daily 90 tablet 3     fexofenadine (ALLEGRA) 180 MG tablet Take 1 tablet by mouth daily. 90 tablet 0     haloperidol (HALDOL) 1 MG tablet Take 1 tablet (1  "mg) by mouth At Bedtime       lamoTRIgine (LAMICTAL) 100 MG tablet Take 100 mg by mouth daily       pantoprazole (PROTONIX) 40 MG EC tablet Take 1 tablet (40 mg) by mouth daily       simvastatin (ZOCOR) 40 MG tablet TAKE 1 TABLET AT BEDTIME 90 tablet 3     sodium citrate-citric acid (BICITRA) 500-334 MG/5ML solution Take 15 mLs by mouth 2 times daily       ACE/ARB/ARNI NOT PRESCRIBED (INTENTIONAL) Please choose reason not prescribed from choices below.        Controlled medications:   not applicable/none     Past Medical History:   Past Medical History:   Diagnosis Date     Benign essential hypertension 09/2018    added norvasc 9/18     Bipolar affective disorder (H)     hosp 1993, Dr. Aftab Deal     Cancer (H) 1996    DCIS, left breast     Chronic kidney disease, stage 3a (H)     seen by renal Dr. 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     Exam:  GENERAL APPEARANCE: No acute distress, well groomed, appropriately dressed  CARDIOVASCULAR: RRR, no peripheral edema, S1/S2 normal   GASTROINTESTINAL: Soft, nontender, nondistended, bowel sounds positive all quadrants, midline incision healing and intact  MUSCULOSKELETAL: Able to move all extremities   NEUROLOGICAL: " Alert and oriented  PSYCHOLOGICAL: Cooperative,calm    SNF labs: Labs done in SNF are in [facility] EPIC. Please refer to them using EPIC/Care Everywhere.    DISCHARGE PLAN:    Follow up labs: Defer to PCP      Medical Follow Up:     Follow up with primary care provider in 3-5 days  Surgery follow-up       Discharge Services: Home Care:  Occupational Therapy, Physical Therapy, Registered Nurse and Home Health Aide    Discharge Instructions Verbalized to Patient at Discharge:     Continue to follow your diet:  Soft.     TOTAL DISCHARGE TIME:   Greater than 30 minutes  Electronically signed by:  Zackery Maxwell,ARELI,APRN,AGNP-BC.        Documentation of Face to Face and Certification for Home Health Services    I certify that patient Diana Santiago is under my care and that I, or a nurse practitioner or physician's assistant working with me, had a face-to-face encounter that meets the physician face-to-face encounter requirements with this patient on: 10/18/2022.    This encounter with the patient was in whole, or in part, for the following medical condition, which is the primary reason for home health care:   -Perforated gastric ulcer  -S/p ex-lap and perforated gastric ulcer and peritoneal washout  -Generalized weakness  -Physical deconditioning    I certify that, based on my findings, the following services are medically necessary home health services: Nursing, Occupational Therapy, Physical Therapy and HHA.    My clinical findings support the need for the above services because: Nurse is needed to assess labs, changes in medications or other medical regimen; Home health aide is needed for assistance with ADLs; Occupational Therapy Services are needed to assess and treat cognitive ability and address ADL safety due to recent surgery and generalized weakness; Physical Therapy Services are needed to assess and treat the following functional impairments: physical deconditioning, generalized weakness, fall risk, and use of  walker.        Further, I certify that my clinical findings support that this patient is homebound (i.e. absences from home require considerable and taxing effort and are for medical reasons or [employer] services or infrequently or of short duration when for other reasons) because: Requires assistance of another person or specialized equipment to access medical services because patient unable to exit home safely on own due to: Recent abdominal surgery, generalized weakness, physical deconditioning, and fall risk.      Based on the above findings, I certify that this patient is confined to the home and needs intermittent skilled nursing care, HHA, physical therapy, and occupational therapy. The patient is under my care, and I have initiated the establishment of the plan of care.  This patient will be followed by a physician who will periodically review the plan of care.     Physician/Provider to provide follow up care: Gayle Hernandez    Attending hospital physician (the Medicare certified [facility] provider): Deion Dupont  Physician Signature: See electronic signature associated with these discharge orders.  Date: 10/18/2022          The above note was completed in part using Dragon voice recognition software. Although reviewed after completion, some word and grammatical errors may occur. Please contact the author of this note with any questions.                     Sincerely,        KRUNAL Thurston CNP

## 2022-10-18 NOTE — TELEPHONE ENCOUNTER
Reason for Call:  Appointment Request    Patient requesting this type of appt:  Hospital/ED Follow-Up     Requested provider: Gayle Hernandez    Reason patient unable to be scheduled: Not within requested timeframe    When does patient want to be seen/preferred time: 1-2 days    Comments: Discharged from TCU today and needs an appt 3 - 5 days    Could we send this information to you in [facility] or would you prefer to receive a phone call?:   Patient would prefer a phone call   Okay to leave a detailed message?: Yes at Cell number on file:    Telephone Information:   Mobile [phone]       Call taken on 10/18/2022 at 2:57 PM by Natalie Tay

## 2022-10-20 ENCOUNTER — OFFICE VISIT (OUTPATIENT)
Dept: FAMILY MEDICINE | Facility: CLINIC | Age: 72
End: 2022-10-20
Payer: MEDICARE

## 2022-10-20 ENCOUNTER — TELEPHONE (OUTPATIENT)
Dept: FAMILY MEDICINE | Facility: CLINIC | Age: 72
End: 2022-10-20

## 2022-10-20 VITALS
BODY MASS INDEX: 19.29 KG/M2 | HEIGHT: 64 IN | OXYGEN SATURATION: 98 % | DIASTOLIC BLOOD PRESSURE: 76 MMHG | SYSTOLIC BLOOD PRESSURE: 113 MMHG | HEART RATE: 90 BPM | WEIGHT: 113 LBS | RESPIRATION RATE: 16 BRPM | TEMPERATURE: 97.7 F

## 2022-10-20 DIAGNOSIS — E78.5 HYPERLIPIDEMIA LDL GOAL <130: ICD-10-CM

## 2022-10-20 DIAGNOSIS — N18.4 CHRONIC KIDNEY DISEASE, STAGE 4 (SEVERE) (H): ICD-10-CM

## 2022-10-20 DIAGNOSIS — I10 ESSENTIAL HYPERTENSION: ICD-10-CM

## 2022-10-20 DIAGNOSIS — Z12.31 VISIT FOR SCREENING MAMMOGRAM: ICD-10-CM

## 2022-10-20 DIAGNOSIS — K59.00 CONSTIPATION, UNSPECIFIED CONSTIPATION TYPE: Primary | ICD-10-CM

## 2022-10-20 DIAGNOSIS — F31.9 BIPOLAR AFFECTIVE DISORDER, REMISSION STATUS UNSPECIFIED (H): ICD-10-CM

## 2022-10-20 PROCEDURE — 99214 OFFICE O/P EST MOD 30 MIN: CPT | Performed by: INTERNAL MEDICINE

## 2022-10-20 RX ORDER — POLYETHYLENE GLYCOL 3350 17 G/17G
1 POWDER, FOR SOLUTION ORAL DAILY
Qty: 578 G | Refills: 3 | Status: SHIPPED | OUTPATIENT
Start: 2022-10-20 | End: 2023-05-12

## 2022-10-20 ASSESSMENT — PAIN SCALES - GENERAL: PAINLEVEL: NO PAIN (0)

## 2022-10-20 NOTE — PROGRESS NOTES
Leigh Ortiz is a 71 year old accompanied by her sister and brother, presenting for the following health issues:    Follow up on multiple concerns including hypertension, constipation, bipolar affective disorder    History of Present Illness       Reason for visit:  Follow-up from discharge from [facility]    She eats 2-3 servings of fruits and vegetables daily.She consumes 0 sweetened beverage(s) daily.She exercises with enough effort to increase her heart rate 9 or less minutes per day.  She exercises with enough effort to increase her heart rate 3 or less days per week.   She is taking medications regularly.       Current Medications:     Current Outpatient Medications   Medication Sig Dispense Refill     ACE/ARB/ARNI NOT PRESCRIBED (INTENTIONAL) Please choose reason not prescribed from choices below.       amLODIPine (NORVASC) 5 MG tablet Take 1 tablet (5 mg) by mouth daily 90 tablet 3     fexofenadine (ALLEGRA) 180 MG tablet Take 1 tablet by mouth daily. 90 tablet 0     haloperidol (HALDOL) 1 MG tablet Take 1 tablet (1 mg) by mouth At Bedtime       lamoTRIgine (LAMICTAL) 100 MG tablet Take 100 mg by mouth daily       pantoprazole (PROTONIX) 40 MG EC tablet Take 1 tablet (40 mg) by mouth daily       simvastatin (ZOCOR) 40 MG tablet TAKE 1 TABLET AT BEDTIME 90 tablet 3     sodium citrate-citric acid (BICITRA) 500-334 MG/5ML solution Take 15 mLs by mouth 2 times daily           Allergies:      Allergies   Allergen Reactions     No Known Allergies             Past Medical History:     Past Medical History:   Diagnosis Date     Benign essential hypertension 09/2018    added norvasc 9/18     Bipolar affective disorder (H)     hosp 1993, Dr. Aftab Deal     Cancer (H) 1996    DCIS, left breast     Chronic kidney disease, stage 3a (H)     seen by renal Dr. 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    felt due to lithium     Thalassaemia trait      Vitamin D deficiency          Past Surgical History:     Past Surgical History:   Procedure Laterality Date     BIOPSY  1994    left breast     BREAST SURGERY      lumpectomy x 4     COLONOSCOPY  2021     COLONOSCOPY N/A 6/17/2022    Procedure: COLONOSCOPY, WITH POLYPECTOMY AND BIOPSY;  Surgeon: Panchito Gu MD;  Location:  GI     EYE SURGERY  2018    retina tear     LAPAROTOMY EXPLORATORY N/A 8/24/2022    Procedure: Exploratory laparotomy, REPAIR OF PERFORATED ULCER;  Surgeon: Ron Vidal MD;  Location:  OR     left hand surgery      last 1974         Family Medical History:     Family History   Problem Relation Age of Onset     Cerebrovascular Disease Mother      Hypertension Father      Sleep Apnea Brother      Breast Cancer Maternal Aunt         x 2     Breast Cancer Other         Maternal Aunt     Hyperlipidemia Niece          Social History:     Social History     Socioeconomic History     Marital status: Single     Spouse name: Not on file     Number of children: 0     Years of education: Not on file     Highest education level: Not on file   Occupational History     Occupation: , retired   Tobacco Use     Smoking status: Never     Smokeless tobacco: Never   Substance and Sexual Activity     Alcohol use: No     Drug use: No     Sexual activity: Not Currently     Partners: Male     Birth control/protection: Post-menopausal, None   Other Topics Concern     Parent/sibling w/ CABG, MI or angioplasty before 65F 55M? No   Social History  "Narrative     Not on file     Social Determinants of Health     Financial Resource Strain: Not on file   Food Insecurity: Not on file   Transportation Needs: Not on file   Physical Activity: Not on file   Stress: Not on file   Social Connections: Not on file   Intimate Partner Violence: Not on file   Housing Stability: Not on file           Review of System:     Constitutional: Negative for fever or chills  Skin: Negative for rashes  Ears/Nose/Throat: Negative for nasal congestion, sore throat  Respiratory: No shortness of breath, dyspnea on exertion, cough, or hemoptysis  Cardiovascular: Negative for chest pain  Gastrointestinal: Negative for nausea, vomiting  Genitourinary: Negative for dysuria, hematuria  Musculoskeletal: Negative for myalgias  Neurologic: Negative for headaches  Psychiatric: positive for bipolar affective disorder  Hematologic/Lymphatic/Immunologic: Negative  Endocrine: Negative  Behavioral: Negative for tobacco use       Physical Exam:   /76 (BP Location: Left arm, Patient Position: Sitting, Cuff Size: Adult Regular)   Pulse 90   Temp 97.7  F (36.5  C) (Temporal)   Resp 16   Ht 1.626 m (5' 4\")   Wt 51.3 kg (113 lb)   SpO2 98%   BMI 19.40 kg/m      GENERAL: alert and no distress  EYES: eyes grossly normal to inspection, and conjunctivae and sclerae normal  HENT: Normocephalic atraumatic. Nose and mouth without ulcers or lesions  NECK: supple  RESP: lungs clear to auscultation   CV: regular rate and rhythm, normal S1 S2  LYMPH: no peripheral edema   ABDOMEN: nondistended  MS: patient is utilizing a wheelchair to help with transportation  SKIN: no suspicious lesions or rashes  NEURO: Alert & Oriented x 3.   PSYCH: mentation appears normal, affect normal        Diagnostic Test Results:     Diagnostic Test Results:  Labs reviewed in Epic    ASSESSMENT/PLAN:       Diana was seen today for recheck.    Diagnoses and all orders for this visit:    Constipation, unspecified constipation " type  - Miralax medication prescribed    Chronic kidney disease, stage 4 (severe) (H)  - stable, continue current therapy    Hyperlipidemia LDL goal <130  - stable, continue current therapy    Visit for screening mammogram  -     MA SCREENING DIGITAL BILAT - Future  (s+30); Future    Essential hypertension  - stable, continue current therapy    Bipolar affective disorder, remission status unspecified (H)  - stable, continue current therapy          Follow Up Plan:     Patient is instructed to return to Internal Medicine clinic for follow-up visit in 1 month.        Gayle Hernandez MD  Internal Medicine  [facility]

## 2022-10-20 NOTE — TELEPHONE ENCOUNTER
[facility] Calling to request delay in nursing SOC until 10/26 due to that being the first available time to start homecare    Lucio Ornelas RN

## 2022-10-24 ENCOUNTER — TELEPHONE (OUTPATIENT)
Dept: FAMILY MEDICINE | Facility: CLINIC | Age: 72
End: 2022-10-24

## 2022-10-24 DIAGNOSIS — N18.31 CHRONIC KIDNEY DISEASE, STAGE 3A (H): ICD-10-CM

## 2022-10-24 DIAGNOSIS — K25.1 ACUTE GASTRIC ULCER WITH PERFORATION (H): ICD-10-CM

## 2022-10-24 RX ORDER — PANTOPRAZOLE SODIUM 40 MG/1
40 TABLET, DELAYED RELEASE ORAL DAILY
Qty: 90 TABLET | Refills: 0 | Status: SHIPPED | OUTPATIENT
Start: 2022-10-24 | End: 2023-01-18

## 2022-10-24 RX ORDER — CITRIC ACID/SODIUM CITRATE 334-500MG
15 SOLUTION, ORAL ORAL 2 TIMES DAILY
Qty: 473 ML | Refills: 3 | Status: SHIPPED | OUTPATIENT
Start: 2022-10-24 | End: 2022-11-23

## 2022-10-24 NOTE — TELEPHONE ENCOUNTER
MTM referral from: Transitions of Care (recent hospital discharge or ED visit)    MTM referral outreach attempt #2 on October 24, 2022 at 2:21 PM      Outcome: Patient not reachable after several attempts, will route to [facility] Pharmacist/Provider as an FYI.  [facility] scheduling number is [phone].  Thank you for the referral.    Lisa Cho [facility]

## 2022-10-24 NOTE — TELEPHONE ENCOUNTER
Patient discharged 10/19/22 from [facility] and requesting refills for medications.     Routing refill request to provider for review/approval because:  Last signed by Hospitalist              Can we leave a detailed message on this number? YES  Phone number patient can be reached at: Home number on file [phone] (home)    Diane Benoit, RN  [facility] Triage

## 2022-10-25 ENCOUNTER — PATIENT OUTREACH (OUTPATIENT)
Dept: CARE COORDINATION | Facility: CLINIC | Age: 72
End: 2022-10-25

## 2022-10-25 NOTE — PROGRESS NOTES
Clinic Care Coordination Contact    Situation: Patient chart reviewed by care coordinator.    Background: Pt was admitted at [facility] 8/24 to 9/14 due to Perforated gastric ulcer. Pt was at [facility] TCU and discharged on 10/19.    Assessment:  [facility] will not notified of TCU discharge and have not contacted pt. Per chart review, pt had follow up appointment with PCP on 10/20/2022 and discussed discharge plan.    Plan/Recommendations: No outreaches will be made at this time unless a new referral is made or a change in the pt's status occurs.     Triny Newton [facility]  Clinic Care Coordinator  [facility]  [phone]  [email or web address]

## 2022-10-26 ENCOUNTER — TELEPHONE (OUTPATIENT)
Dept: FAMILY MEDICINE | Facility: CLINIC | Age: 72
End: 2022-10-26

## 2022-10-26 ENCOUNTER — MEDICAL CORRESPONDENCE (OUTPATIENT)
Dept: HEALTH INFORMATION MANAGEMENT | Facility: CLINIC | Age: 72
End: 2022-10-26

## 2022-10-26 NOTE — TELEPHONE ENCOUNTER
Order/Referral Request    Who is requesting: Kristen with [facility]    Orders being requested: Skilled Nursing-1x/5w for perforated gastric ulcer    Home Health Aid-2x/4w  -Eval and Treat  Miralax daily as needed     Potential med interaction between amLODIPine (NORVASC) 5 MG tablet and simvastatin (ZOCOR) 40 MG tablet  Risk of myopathies and rhacdomyolysis          Reason service is needed/diagnosis:     When are orders needed by: ASAP    Has this been discussed with Provider: No    Does patient have a preference on a Group/Provider/Facility? [facility]    Does patient have an appointment scheduled?: No    Where to send orders: Verbal    Could we send this information to you in [facility] or would you prefer to receive a phone call?:   No preference   Okay to leave a detailed message?: Yes at Other phone number:  [facility]- [phone]

## 2022-10-26 NOTE — TELEPHONE ENCOUNTER
Called [facility],  Verbal approval for orders below given      Thanh Medina RN  [facility]

## 2022-10-26 NOTE — TELEPHONE ENCOUNTER
Patient Contact     Attempt #: 1     Was call answered?  No.  Busy signal. Triage to re-call.     Thanh Medina RN  [facility]

## 2022-10-27 ENCOUNTER — VIRTUAL VISIT (OUTPATIENT)
Dept: PHARMACY | Facility: PHYSICIAN GROUP | Age: 72
End: 2022-10-27
Payer: COMMERCIAL

## 2022-10-27 DIAGNOSIS — K59.00 CONSTIPATION, UNSPECIFIED CONSTIPATION TYPE: ICD-10-CM

## 2022-10-27 DIAGNOSIS — F41.9 ANXIETY: ICD-10-CM

## 2022-10-27 DIAGNOSIS — E78.5 HYPERLIPIDEMIA LDL GOAL <130: ICD-10-CM

## 2022-10-27 DIAGNOSIS — J30.2 SEASONAL ALLERGIC RHINITIS, UNSPECIFIED TRIGGER: ICD-10-CM

## 2022-10-27 DIAGNOSIS — N18.31 CHRONIC KIDNEY DISEASE, STAGE 3A (H): ICD-10-CM

## 2022-10-27 DIAGNOSIS — F31.9 BIPOLAR AFFECTIVE DISORDER, REMISSION STATUS UNSPECIFIED (H): ICD-10-CM

## 2022-10-27 DIAGNOSIS — F32.A DEPRESSION, UNSPECIFIED DEPRESSION TYPE: ICD-10-CM

## 2022-10-27 DIAGNOSIS — I10 BENIGN ESSENTIAL HYPERTENSION: Primary | ICD-10-CM

## 2022-10-27 PROCEDURE — 99607 MTMS BY PHARM ADDL 15 MIN: CPT

## 2022-10-27 PROCEDURE — 99605 MTMS BY PHARM NP 15 MIN: CPT

## 2022-10-27 NOTE — PROGRESS NOTES
Medication Therapy Management (MTM) Encounter    ASSESSMENT:                            Medication Adherence/Access: No issues identified    Hypertension: Stable. Patient is meeting blood pressure goal of < 130/80mmHg.    Hyperlipidemia: Patient would benefit from decreasing the dose of simvastatin due to an interaction present with amlodipine (the maximum recommended dose of simvastatin in patients taking amlodipine is 20 mg daily).     CKD: Stable. Plan in place with nephrology.     Bipolar Affective Disorder/Depression/Anxiety: Stable. Patient is followed by psychiatry with a follow-up visit on Monday, 10/31/2022.    Allergic Rhinitis: Stable.    Constipation: Stable.    PLAN:                            The following recommendations are being made directly to Dr. Gayle Hernandez MD:  1. Consider a reduction in simvastatin dose to 20 mg daily. The maximum recommended daily dose of simvastatin in patients taking amlodipine is 20 mg due to an increased risk for myopathy and rhabdomyolysis.     The following recommendations are being made directly to the patient:  1. Follow up with psychiatry as planned to evaluate your current doses of medications and to see if they should be increased to where they were prior to your hospital admission.     Follow-up: As needed.     SUBJECTIVE/OBJECTIVE:                          Diana Santiago is a 71 year old female called for a transitions of care visit. She was discharged from [facility] on 10/19/2022 for perforated gastric ulcer.      Reason for visit: Transitions of care visit, TCU discharge.     Allergies/ADRs: Reviewed in chart  Past Medical History: Reviewed in chart  Tobacco: She reports that she has never smoked. She has never used smokeless tobacco.  Alcohol: none  Caffeine: none.    Medication Adherence/Access: no issues reported  Patient uses pill box(es).  Patient takes medications 2 time(s) per day.   Per patient, misses medication 0 times per week.    Medication barriers: none.   The patient fills medications at [facility]: NO, fills medications at [facility] [employer] in [place].    Hypertension: Current medications include amlodipine 5 mg daily.  Patient does self-monitor blood pressure occasionally. Home BP monitoring in range of 120's systolic over 80's diastolic.  Patient reports no current medication side effects.  BP Readings from Last 3 Encounters:   10/20/22 113/76   10/18/22 127/89   10/06/22 (!) 154/84     Hyperlipidemia: Current therapy includes simvastatin 40 mg daily.  Patient reports no significant myalgias or other side effects.  The 10-year ASCVD risk score (Slava RUBIO, et al., 2019) is: 10.6%    Values used to calculate the score:      Age: 71 years      Sex: Female      Is Non- : No      Diabetic: No      Tobacco smoker: No      Systolic Blood Pressure: 113 mmHg      Is BP treated: Yes      HDL Cholesterol: 56 mg/dL      Total Cholesterol: 173 mg/dL  Recent Labs   Lab Test 09/12/22  0625 09/05/22  0543 08/27/22  0428 12/21/20  1436 10/15/19  0743 07/26/16  0748 07/21/15  0723   CHOL  --   --   --  173 163   < > 167   HDL  --   --   --  56 58   < > 51   LDL  --   --   --  82 70   < > 91   TRIG 231* 266*   < > 176* 174*   < > 127   CHOLHDLRATIO  --   --   --   --   --   --  3.3    < > = values in this interval not displayed.     GERD: Current medications include: Protonix (pantoprazole) 40 mg once daily. Patient reports no current symptoms.  Patient feels that current regimen is effective.    CKD: Current medications include sodium citrate-citric acid (Bicitra) 500-334 mg/5 mL 15 mL by mouth twice daily. Patient reports no adverse effects.     Bipolar Affective Disorder/Depression/Anxiety:  Current medications include: haloperidol 1 mg at bedtime, lamotrigine 150 mg daily. Patient reports that her doses of these medications before going into the hospital were haloperidol 2 mg at bedtime, lamotrigine 200 mg daily. Patient reports  that depression symptoms are unchanged. Current depression symptoms include: wanting to sleep, not wanting to complete normal activities. Patient reports no thoughts of self-harm or suicide. Patient follows with psychiatry and plans to call them regarding her current doses and if she should increase to her previous doses or not at this time.    Allergic Rhinitis: Current medications include fexofenadine 180 mg once daily. Primary symptoms are runny nose.  Primary triggers are unknown to the patient. Patient feels that current therapy is effective.     Constipation: Current medications include polyethylene glycol 17 grams by mouth daily. Patient reports no issues with constipation.     Today's Vitals: There were no vitals taken for this visit.  ----------------  Post Discharge Medication Reconciliation Status: discharge medications reconciled, continue medications without change.    I spent 20 minutes with this patient today. I offer these suggestions for consideration by Dr. Gayle Hernandez MD. A copy of the visit note was provided to the patient's provider(s).    The patient was sent via [employer] a summary of these recommendations.     Merlin Mar, PharmD  Medication Therapy Management Pharmacist  [facility]  Office phone: [phone]    Telemedicine Visit Details  Type of service:  Telephone visit  Start Time: 9:00 AM  End Time: 9:20 AM  Originating Location (pt. Location): Home      Distant Location (provider location):  On-site  Provider has received verbal consent for a visit from the patient? Yes     Medication Therapy Recommendations  Hyperlipidemia LDL goal <130    Current Medication: simvastatin (ZOCOR) 40 MG tablet   Rationale: Medication interaction - Dosage too high - Safety   Recommendation: Decrease Dose - simvastatin 20 MG tablet   Status: Contact Provider - Awaiting Response

## 2022-10-27 NOTE — Clinical Note
Jerri Hernandez,   Please see my recommendations from my transitions of care MTM visit with Diana today. If you have any questions, please do not hesitate to reach out!  Merlin Mar, PharmD Medication Therapy Management Pharmacist [facility] Office phone: [phone]

## 2022-10-27 NOTE — TELEPHONE ENCOUNTER
PCP please see TC message below, home care calling     1. wondering Mirilax can be ordered as PRN, current script is for scheduled miralax.        2. Also please advise on Potential med interaction between amLODIPine (NORVASC) 5 MG tablet and simvastatin (ZOCOR) 40 MG tablet Risk of myopathies and rhacdomyolysis.      Thanh Medina RN  [facility]

## 2022-10-27 NOTE — PATIENT INSTRUCTIONS
"Recommendations from today's MTM visit:                                                    MTM (medication therapy management) is a service provided by a clinical pharmacist designed to help you get the most of out of your medicines.   Today we reviewed what your medicines are for, how to know if they are working, that your medicines are safe and how to make your medicine regimen as easy as possible.      1. Follow up with psychiatry as planned to evaluate your current doses of medications and to see if they should be increased to where they were prior to your hospital admission.     The following recommendations are being made directly to Dr. Gayle Hernandez MD:  1. Consider a reduction in simvastatin dose to 20 mg daily. The maximum recommended daily dose of simvastatin in patients taking amlodipine is 20 mg due to an increased risk for myopathy and rhabdomyolysis.     Follow-up: As needed.    It was great speaking with you today.  I value your experience and would be very thankful for your time in providing feedback in our clinic survey. In the next few days, you may receive an email or text message from [employer] with a link to a survey related to your  clinical pharmacist.\"     To schedule another MTM appointment, please call the clinic directly or you may call the MTM scheduling line at [phone] or toll-free at [phone].     My Clinical Pharmacist's contact information:                                                      Please feel free to contact me with any questions or concerns you have.      Merlin Mar, PharmD  Medication Therapy Management Pharmacist  [facility]  Office phone: [phone]   "

## 2022-10-28 NOTE — TELEPHONE ENCOUNTER
Tali- OT from [employer]  called requesting OT-   Starting the week of 10/31/22  2x/wk for 1 wk   1x/wk for 3 wks   Address ADL and lymp management     Verbal ok given per policy.     Tali is also requesting an add on dietician eval   - for simple meal prep and general nutrition     Routing to PCP to review and advise.     Please call Tali back with PCPs recommendations.     Can we leave a detailed message on this number? YES  Phone number patient can be reached at: [phone]    Radha Lozano, RN  [facility] Triage

## 2022-10-28 NOTE — TELEPHONE ENCOUNTER
Krista from [facility] called to request   PT 2w4 1w2 for balance strength     Verbal order given    Lucio Ornelas RN

## 2022-11-03 ENCOUNTER — TELEPHONE (OUTPATIENT)
Dept: FAMILY MEDICINE | Facility: CLINIC | Age: 72
End: 2022-11-03

## 2022-11-03 NOTE — TELEPHONE ENCOUNTER
Dietician from home care called to let Dr. Hernandez know that patient declined to schedule a dietician visit from the referral. The patient stated they have dose dietician visits in the past and did not find them helpful she did not want to schedule    Routing as IRISH Orneals RN

## 2022-11-07 ENCOUNTER — HOSPITAL ENCOUNTER (OUTPATIENT)
Dept: MAMMOGRAPHY | Facility: CLINIC | Age: 72
Discharge: HOME | End: 2022-11-07
Attending: INTERNAL MEDICINE | Admitting: INTERNAL MEDICINE
Payer: MEDICARE

## 2022-11-07 DIAGNOSIS — Z12.31 VISIT FOR SCREENING MAMMOGRAM: ICD-10-CM

## 2022-11-07 PROCEDURE — 77067 SCR MAMMO BI INCL CAD: CPT

## 2022-11-16 ENCOUNTER — TELEPHONE (OUTPATIENT)
Dept: FAMILY MEDICINE | Facility: CLINIC | Age: 72
End: 2022-11-16

## 2022-11-16 NOTE — TELEPHONE ENCOUNTER
The Home Care/Assisted Living/Nursing Facility is calling regarding an established patient.  Has the patient seen Home Care in the past or is currently residing in Assisted Living or Nursing Facility? Yes.     Shakira calling from [email or web address] requesting the following orders that are within the Home Care, Assisted Living or Nursing Home Eval and Treatment standing order and can be signed as standing order signature required by RN.    Preferred Call Back Number: [phone], confidential     [facility] continuation of care. 2W2W, 1W1W. Verbal approval given.     Any additional Orders:  Are there any orders requested, not stated above, that are outside of the standing order and must be routed to a licensed practitioner for approval?    No    Writer has verified [employer] will send fax to have orders signed.    Jeny JEONG RN  [facility]

## 2022-11-18 ENCOUNTER — MEDICAL CORRESPONDENCE (OUTPATIENT)
Dept: HEALTH INFORMATION MANAGEMENT | Facility: CLINIC | Age: 72
End: 2022-11-18

## 2022-11-21 ENCOUNTER — TELEPHONE (OUTPATIENT)
Dept: FAMILY MEDICINE | Facility: CLINIC | Age: 72
End: 2022-11-21

## 2022-11-21 NOTE — TELEPHONE ENCOUNTER
ALESSANDRA Velasco from [employer] called to report pt has a fall yesterday. She missed the commode. No injuries or concerns.

## 2022-11-23 DIAGNOSIS — N18.31 CHRONIC KIDNEY DISEASE, STAGE 3A (H): ICD-10-CM

## 2022-11-23 RX ORDER — CITRIC ACID/SODIUM CITRATE 334-500MG
15 SOLUTION, ORAL ORAL 2 TIMES DAILY
Qty: 473 ML | Refills: 3 | Status: ON HOLD | OUTPATIENT
Start: 2022-11-23 | End: 2023-02-15

## 2022-11-23 NOTE — TELEPHONE ENCOUNTER
Routing refill request to provider for review/approval because:  Drug not on the [facility] refill protocol   Brianne Jordan RN

## 2022-11-30 ENCOUNTER — MEDICAL CORRESPONDENCE (OUTPATIENT)
Dept: HEALTH INFORMATION MANAGEMENT | Facility: CLINIC | Age: 72
End: 2022-11-30

## 2022-12-19 ENCOUNTER — LAB (OUTPATIENT)
Dept: INFUSION THERAPY | Facility: CLINIC | Age: 72
End: 2022-12-19
Attending: PHYSICIAN ASSISTANT
Payer: MEDICARE

## 2022-12-19 ENCOUNTER — INFUSION THERAPY VISIT (OUTPATIENT)
Dept: INFUSION THERAPY | Facility: CLINIC | Age: 72
End: 2022-12-19
Attending: NURSE PRACTITIONER
Payer: MEDICARE

## 2022-12-19 VITALS
OXYGEN SATURATION: 100 % | RESPIRATION RATE: 16 BRPM | DIASTOLIC BLOOD PRESSURE: 83 MMHG | HEART RATE: 84 BPM | SYSTOLIC BLOOD PRESSURE: 124 MMHG | TEMPERATURE: 97.8 F

## 2022-12-19 DIAGNOSIS — N18.9 ANEMIA IN CKD (CHRONIC KIDNEY DISEASE): Primary | ICD-10-CM

## 2022-12-19 DIAGNOSIS — N18.32 STAGE 3B CHRONIC KIDNEY DISEASE (H): ICD-10-CM

## 2022-12-19 DIAGNOSIS — D63.1 ANEMIA IN CKD (CHRONIC KIDNEY DISEASE): Primary | ICD-10-CM

## 2022-12-19 LAB
FERRITIN SERPL-MCNC: 328 NG/ML (ref 8–252)
HGB BLD-MCNC: 9.3 G/DL (ref 11.7–15.7)
IRON SATN MFR SERPL: 25 % (ref 15–46)
IRON SERPL-MCNC: 73 UG/DL (ref 35–180)
TIBC SERPL-MCNC: 293 UG/DL (ref 240–430)

## 2022-12-19 PROCEDURE — 82728 ASSAY OF FERRITIN: CPT | Performed by: PHYSICIAN ASSISTANT

## 2022-12-19 PROCEDURE — 96372 THER/PROPH/DIAG INJ SC/IM: CPT | Performed by: PHYSICIAN ASSISTANT

## 2022-12-19 PROCEDURE — 83550 IRON BINDING TEST: CPT | Performed by: PHYSICIAN ASSISTANT

## 2022-12-19 PROCEDURE — 36415 COLL VENOUS BLD VENIPUNCTURE: CPT | Performed by: PHYSICIAN ASSISTANT

## 2022-12-19 PROCEDURE — 250N000011 HC RX IP 250 OP 636: Performed by: PHYSICIAN ASSISTANT

## 2022-12-19 PROCEDURE — 85018 HEMOGLOBIN: CPT | Performed by: PHYSICIAN ASSISTANT

## 2022-12-19 RX ORDER — METHYLPREDNISOLONE SODIUM SUCCINATE 125 MG/2ML
125 INJECTION, POWDER, LYOPHILIZED, FOR SOLUTION INTRAMUSCULAR; INTRAVENOUS
Status: CANCELLED
Start: 2023-01-18

## 2022-12-19 RX ORDER — ALBUTEROL SULFATE 90 UG/1
1-2 AEROSOL, METERED RESPIRATORY (INHALATION)
Status: CANCELLED
Start: 2022-12-30

## 2022-12-19 RX ORDER — ALBUTEROL SULFATE 90 UG/1
1-2 AEROSOL, METERED RESPIRATORY (INHALATION)
Status: CANCELLED
Start: 2023-01-18

## 2022-12-19 RX ORDER — MEPERIDINE HYDROCHLORIDE 25 MG/ML
25 INJECTION INTRAMUSCULAR; INTRAVENOUS; SUBCUTANEOUS EVERY 30 MIN PRN
Status: CANCELLED | OUTPATIENT
Start: 2022-12-30

## 2022-12-19 RX ORDER — NALOXONE HYDROCHLORIDE 0.4 MG/ML
0.2 INJECTION, SOLUTION INTRAMUSCULAR; INTRAVENOUS; SUBCUTANEOUS
Status: CANCELLED | OUTPATIENT
Start: 2023-01-18

## 2022-12-19 RX ORDER — DIPHENHYDRAMINE HYDROCHLORIDE 50 MG/ML
50 INJECTION INTRAMUSCULAR; INTRAVENOUS
Status: CANCELLED
Start: 2023-01-18

## 2022-12-19 RX ORDER — ALBUTEROL SULFATE 0.83 MG/ML
2.5 SOLUTION RESPIRATORY (INHALATION)
Status: CANCELLED | OUTPATIENT
Start: 2022-12-30

## 2022-12-19 RX ORDER — METHYLPREDNISOLONE SODIUM SUCCINATE 125 MG/2ML
125 INJECTION, POWDER, LYOPHILIZED, FOR SOLUTION INTRAMUSCULAR; INTRAVENOUS
Status: CANCELLED
Start: 2022-12-30

## 2022-12-19 RX ORDER — EPINEPHRINE 1 MG/ML
0.3 INJECTION, SOLUTION INTRAMUSCULAR; SUBCUTANEOUS EVERY 5 MIN PRN
Status: CANCELLED | OUTPATIENT
Start: 2023-01-18

## 2022-12-19 RX ORDER — EPINEPHRINE 1 MG/ML
0.3 INJECTION, SOLUTION INTRAMUSCULAR; SUBCUTANEOUS EVERY 5 MIN PRN
Status: CANCELLED | OUTPATIENT
Start: 2022-12-30

## 2022-12-19 RX ORDER — ALBUTEROL SULFATE 0.83 MG/ML
2.5 SOLUTION RESPIRATORY (INHALATION)
Status: CANCELLED | OUTPATIENT
Start: 2023-01-18

## 2022-12-19 RX ORDER — NALOXONE HYDROCHLORIDE 0.4 MG/ML
0.2 INJECTION, SOLUTION INTRAMUSCULAR; INTRAVENOUS; SUBCUTANEOUS
Status: CANCELLED | OUTPATIENT
Start: 2022-12-30

## 2022-12-19 RX ORDER — DIPHENHYDRAMINE HYDROCHLORIDE 50 MG/ML
50 INJECTION INTRAMUSCULAR; INTRAVENOUS
Status: CANCELLED
Start: 2022-12-30

## 2022-12-19 RX ORDER — MEPERIDINE HYDROCHLORIDE 25 MG/ML
25 INJECTION INTRAMUSCULAR; INTRAVENOUS; SUBCUTANEOUS EVERY 30 MIN PRN
Status: CANCELLED | OUTPATIENT
Start: 2023-01-18

## 2022-12-19 RX ADMIN — DARBEPOETIN ALFA 100 MCG: 100 INJECTION, SOLUTION INTRAVENOUS; SUBCUTANEOUS at 14:13

## 2022-12-19 NOTE — PROGRESS NOTES
Infusion Nursing Note:  Diana Santiago presents today for Aranesp.    Patient seen by provider today: No   present during visit today: Not Applicable.    Note: Pt reports no new concerns today.    Intravenous Access:  No Intravenous access/labs at this visit.    Treatment Conditions:  Lab Results   Component Value Date    HGB 9.3 (L) 12/19/2022    WBC 6.4 10/06/2022    ANEU 9.6 (H) 09/09/2022    ANEUTAUTO 3.9 10/06/2022     10/06/2022      Results reviewed, labs MET treatment parameters, ok to proceed with treatment.    Post Infusion Assessment:  Patient tolerated injection without incident.  Blood return noted pre and post infusion.  Site patent and intact, free from redness, edema or discomfort.  No evidence of extravasations.  Access discontinued per protocol.     Discharge Plan:   Patient declined prescription refills.  Discharge instructions reviewed with: Patient.  Patient and/or family verbalized understanding of discharge instructions and all questions answered.  AVS to patient via [employer].  Patient will return 1/16/23. for next appointment.   Patient discharged in stable condition accompanied by: son.  Departure Mode: Ambulatory+ walker.      Heena Hazel, RN

## 2022-12-19 NOTE — PROGRESS NOTES
Medical Assistant Note:  Diana Santiago presents today for lab draw.    Patient seen by provider today: No.   present during visit today: Not Applicable.    Concerns: No Concerns.    Procedure:  Lab draw site: RAC, Needle type: BF, Gauge: 23.  Gauze and coban applied  Post Assessment:  Labs drawn without difficulty: Yes.    Discharge Plan:  Departure Mode: Ambulatory.    Face to Face Time: 5.    Marietta De Luna CMA

## 2023-01-07 ENCOUNTER — HEALTH MAINTENANCE LETTER (OUTPATIENT)
Age: 73
End: 2023-01-07

## 2023-01-16 ENCOUNTER — LAB (OUTPATIENT)
Dept: INFUSION THERAPY | Facility: CLINIC | Age: 73
End: 2023-01-16
Attending: INTERNAL MEDICINE
Payer: MEDICARE

## 2023-01-16 DIAGNOSIS — N18.32 STAGE 3B CHRONIC KIDNEY DISEASE (H): ICD-10-CM

## 2023-01-16 DIAGNOSIS — N18.9 ANEMIA IN CKD (CHRONIC KIDNEY DISEASE): Primary | ICD-10-CM

## 2023-01-16 DIAGNOSIS — D63.1 ANEMIA IN CKD (CHRONIC KIDNEY DISEASE): Primary | ICD-10-CM

## 2023-01-16 LAB
FERRITIN SERPL-MCNC: 210 NG/ML (ref 8–252)
HGB BLD-MCNC: 10.2 G/DL (ref 11.7–15.7)
IRON SATN MFR SERPL: 16 % (ref 15–46)
IRON SERPL-MCNC: 50 UG/DL (ref 35–180)
TIBC SERPL-MCNC: 318 UG/DL (ref 240–430)

## 2023-01-16 PROCEDURE — 82728 ASSAY OF FERRITIN: CPT | Performed by: PHYSICIAN ASSISTANT

## 2023-01-16 PROCEDURE — 85018 HEMOGLOBIN: CPT | Performed by: PHYSICIAN ASSISTANT

## 2023-01-16 PROCEDURE — 36415 COLL VENOUS BLD VENIPUNCTURE: CPT | Performed by: PHYSICIAN ASSISTANT

## 2023-01-16 PROCEDURE — 83550 IRON BINDING TEST: CPT | Performed by: PHYSICIAN ASSISTANT

## 2023-01-16 RX ORDER — NALOXONE HYDROCHLORIDE 0.4 MG/ML
0.2 INJECTION, SOLUTION INTRAMUSCULAR; INTRAVENOUS; SUBCUTANEOUS
Status: CANCELLED | OUTPATIENT
Start: 2023-01-18

## 2023-01-16 RX ORDER — EPINEPHRINE 1 MG/ML
0.3 INJECTION, SOLUTION INTRAMUSCULAR; SUBCUTANEOUS EVERY 5 MIN PRN
Status: CANCELLED | OUTPATIENT
Start: 2023-01-18

## 2023-01-16 RX ORDER — METHYLPREDNISOLONE SODIUM SUCCINATE 125 MG/2ML
125 INJECTION, POWDER, LYOPHILIZED, FOR SOLUTION INTRAMUSCULAR; INTRAVENOUS
Status: CANCELLED
Start: 2023-01-18

## 2023-01-16 RX ORDER — DIPHENHYDRAMINE HYDROCHLORIDE 50 MG/ML
50 INJECTION INTRAMUSCULAR; INTRAVENOUS
Status: CANCELLED
Start: 2023-01-18

## 2023-01-16 RX ORDER — ALBUTEROL SULFATE 90 UG/1
1-2 AEROSOL, METERED RESPIRATORY (INHALATION)
Status: CANCELLED
Start: 2023-01-18

## 2023-01-16 RX ORDER — MEPERIDINE HYDROCHLORIDE 25 MG/ML
25 INJECTION INTRAMUSCULAR; INTRAVENOUS; SUBCUTANEOUS EVERY 30 MIN PRN
Status: CANCELLED | OUTPATIENT
Start: 2023-01-18

## 2023-01-16 RX ORDER — ALBUTEROL SULFATE 0.83 MG/ML
2.5 SOLUTION RESPIRATORY (INHALATION)
Status: CANCELLED | OUTPATIENT
Start: 2023-01-18

## 2023-01-18 DIAGNOSIS — K25.1 ACUTE GASTRIC ULCER WITH PERFORATION (H): ICD-10-CM

## 2023-01-18 RX ORDER — PANTOPRAZOLE SODIUM 40 MG/1
TABLET, DELAYED RELEASE ORAL
Qty: 90 TABLET | Refills: 1 | Status: SHIPPED | OUTPATIENT
Start: 2023-01-18 | End: 2023-04-17

## 2023-01-24 ENCOUNTER — HOSPITAL ENCOUNTER (OUTPATIENT)
Facility: CLINIC | Age: 73
Setting detail: OBSERVATION
Discharge: HOME | End: 2023-01-25
Attending: PHYSICIAN ASSISTANT | Admitting: PHYSICIAN ASSISTANT
Payer: MEDICARE

## 2023-01-24 DIAGNOSIS — R45.851 SUICIDAL IDEATION: ICD-10-CM

## 2023-01-24 DIAGNOSIS — F31.30 BIPOLAR DISORDER CURRENT EPISODE DEPRESSED (H): ICD-10-CM

## 2023-01-24 LAB — SARS-COV-2 RNA RESP QL NAA+PROBE: NEGATIVE

## 2023-01-24 PROCEDURE — G0378 HOSPITAL OBSERVATION PER HR: HCPCS

## 2023-01-24 PROCEDURE — 90791 PSYCH DIAGNOSTIC EVALUATION: CPT

## 2023-01-24 PROCEDURE — U0005 INFEC AGEN DETEC AMPLI PROBE: HCPCS | Performed by: PHYSICIAN ASSISTANT

## 2023-01-24 PROCEDURE — 250N000013 HC RX MED GY IP 250 OP 250 PS 637: Performed by: STUDENT IN AN ORGANIZED HEALTH CARE EDUCATION/TRAINING PROGRAM

## 2023-01-24 PROCEDURE — 99285 EMERGENCY DEPT VISIT HI MDM: CPT | Mod: 25

## 2023-01-24 PROCEDURE — 99222 1ST HOSP IP/OBS MODERATE 55: CPT | Mod: AI | Performed by: STUDENT IN AN ORGANIZED HEALTH CARE EDUCATION/TRAINING PROGRAM

## 2023-01-24 RX ORDER — CITRIC ACID/SODIUM CITRATE 334-500MG
15 SOLUTION, ORAL ORAL 2 TIMES DAILY
Status: DISCONTINUED | OUTPATIENT
Start: 2023-01-24 | End: 2023-01-25 | Stop reason: HOSPADM

## 2023-01-24 RX ORDER — ONDANSETRON 4 MG/1
4 TABLET, ORALLY DISINTEGRATING ORAL EVERY 6 HOURS PRN
Status: DISCONTINUED | OUTPATIENT
Start: 2023-01-24 | End: 2023-01-25 | Stop reason: HOSPADM

## 2023-01-24 RX ORDER — HALOPERIDOL 2 MG/1
2 TABLET ORAL AT BEDTIME
Status: DISCONTINUED | OUTPATIENT
Start: 2023-01-24 | End: 2023-01-25 | Stop reason: HOSPADM

## 2023-01-24 RX ORDER — TRAZODONE HYDROCHLORIDE 50 MG/1
50 TABLET, FILM COATED ORAL
Status: DISCONTINUED | OUTPATIENT
Start: 2023-01-24 | End: 2023-01-25 | Stop reason: HOSPADM

## 2023-01-24 RX ORDER — FEXOFENADINE HCL 180 MG/1
180 TABLET ORAL DAILY
Status: DISCONTINUED | OUTPATIENT
Start: 2023-01-24 | End: 2023-01-25 | Stop reason: HOSPADM

## 2023-01-24 RX ORDER — LAMOTRIGINE 100 MG/1
200 TABLET ORAL DAILY
Status: DISCONTINUED | OUTPATIENT
Start: 2023-01-24 | End: 2023-01-25 | Stop reason: HOSPADM

## 2023-01-24 RX ORDER — SIMVASTATIN 40 MG
40 TABLET ORAL AT BEDTIME
Status: DISCONTINUED | OUTPATIENT
Start: 2023-01-24 | End: 2023-01-25 | Stop reason: HOSPADM

## 2023-01-24 RX ORDER — LANOLIN ALCOHOL/MO/W.PET/CERES
3 CREAM (GRAM) TOPICAL
Status: DISCONTINUED | OUTPATIENT
Start: 2023-01-24 | End: 2023-01-25 | Stop reason: HOSPADM

## 2023-01-24 RX ORDER — PANTOPRAZOLE SODIUM 40 MG/1
40 TABLET, DELAYED RELEASE ORAL DAILY
Status: DISCONTINUED | OUTPATIENT
Start: 2023-01-25 | End: 2023-01-25 | Stop reason: HOSPADM

## 2023-01-24 RX ORDER — HYDROXYZINE HYDROCHLORIDE 25 MG/1
25 TABLET, FILM COATED ORAL EVERY 6 HOURS PRN
Status: DISCONTINUED | OUTPATIENT
Start: 2023-01-24 | End: 2023-01-25 | Stop reason: HOSPADM

## 2023-01-24 RX ORDER — AMLODIPINE BESYLATE 5 MG/1
5 TABLET ORAL DAILY
Status: DISCONTINUED | OUTPATIENT
Start: 2023-01-24 | End: 2023-01-25 | Stop reason: HOSPADM

## 2023-01-24 RX ORDER — QUETIAPINE FUMARATE 25 MG/1
25 TABLET, FILM COATED ORAL 3 TIMES DAILY PRN
Status: DISCONTINUED | OUTPATIENT
Start: 2023-01-24 | End: 2023-01-25 | Stop reason: HOSPADM

## 2023-01-24 RX ORDER — IBUPROFEN 600 MG/1
600 TABLET, FILM COATED ORAL EVERY 6 HOURS PRN
Status: DISCONTINUED | OUTPATIENT
Start: 2023-01-24 | End: 2023-01-25 | Stop reason: HOSPADM

## 2023-01-24 RX ADMIN — HALOPERIDOL 2 MG: 2 TABLET ORAL at 23:22

## 2023-01-24 RX ADMIN — FEXOFENADINE HCL 180 MG: 180 TABLET ORAL at 23:22

## 2023-01-24 RX ADMIN — AMLODIPINE BESYLATE 5 MG: 5 TABLET ORAL at 23:22

## 2023-01-24 RX ADMIN — LAMOTRIGINE 200 MG: 100 TABLET ORAL at 23:22

## 2023-01-24 RX ADMIN — SODIUM CITRATE AND CITRIC ACID MONOHYDRATE 15 ML: 500; 334 SOLUTION ORAL at 23:21

## 2023-01-24 RX ADMIN — SIMVASTATIN 40 MG: 40 TABLET, FILM COATED ORAL at 23:22

## 2023-01-24 ASSESSMENT — COLUMBIA-SUICIDE SEVERITY RATING SCALE - C-SSRS
2. HAVE YOU ACTUALLY HAD ANY THOUGHTS OF KILLING YOURSELF?: THOUGHTS OF KILLING SELF
TOTAL  NUMBER OF ACTUAL ATTEMPTS PAST 3 MONTHS: 1
4. HAVE YOU HAD THESE THOUGHTS AND HAD SOME INTENTION OF ACTING ON THEM?: YES
LETHALITY/MEDICAL DAMAGE CODE MOST LETHAL ACTUAL ATTEMPT: MINOR PHYSICAL DAMAGE
2. HAVE YOU ACTUALLY HAD ANY THOUGHTS OF KILLING YOURSELF?: YES
TOTAL  NUMBER OF ACTUAL ATTEMPTS LIFETIME: 2
TOTAL  NUMBER OF ABORTED OR SELF INTERRUPTED ATTEMPTS LIFETIME: YES
TOTAL  NUMBER OF INTERRUPTED ATTEMPTS LIFETIME: NO
2. HAVE YOU ACTUALLY HAD ANY THOUGHTS OF KILLING YOURSELF?: YES
MOST RECENT DATE: 66498
LETHALITY/MEDICAL DAMAGE CODE MOST RECENT POTENTIAL ATTEMPT: BEHAVIOR LIKELY TO RESULT IN INJURY BUT NOT LIKELY TO CAUSE DEATH
FIRST ATTEMPT DATE: 48013
ATTEMPT PAST THREE MONTHS: YES
LETHALITY/MEDICAL DAMAGE CODE FIRST POTENTIAL ATTEMPT: BEHAVIOR LIKELY TO RESULT IN INJURY BUT NOT LIKELY TO CAUSE DEATH
LETHALITY/MEDICAL DAMAGE CODE FIRST ACTUAL ATTEMPT: MINOR PHYSICAL DAMAGE
1. IN THE PAST MONTH, HAVE YOU WISHED YOU WERE DEAD OR WISHED YOU COULD GO TO SLEEP AND NOT WAKE UP?: YES
6. HAVE YOU EVER DONE ANYTHING, STARTED TO DO ANYTHING, OR PREPARED TO DO ANYTHING TO END YOUR LIFE?: NO
4. HAVE YOU HAD THESE THOUGHTS AND HAD SOME INTENTION OF ACTING ON THEM?: YES
TOTAL  NUMBER OF ABORTED OR SELF INTERRUPTED ATTEMPTS PAST 3 MONTHS: YES
ATTEMPT LIFETIME: YES
LETHALITY/MEDICAL DAMAGE CODE MOST LETHAL POTENTIAL ATTEMPT: BEHAVIOR LIKELY TO RESULT IN INJURY BUT NOT LIKELY TO CAUSE DEATH
1. HAVE YOU WISHED YOU WERE DEAD OR WISHED YOU COULD GO TO SLEEP AND NOT WAKE UP?: YES
REASONS FOR IDEATION PAST MONTH: MOSTLY TO END OR STOP THE PAIN (YOU COULDN'T GO ON LIVING WITH THE PAIN OR HOW YOU WERE FEELING)
5. HAVE YOU STARTED TO WORK OUT OR WORKED OUT THE DETAILS OF HOW TO KILL YOURSELF? DO YOU INTEND TO CARRY OUT THIS PLAN?: YES
TOTAL  NUMBER OF ABORTED OR SELF INTERRUPTED ATTEMPTS SINCE LAST CONTACT: 1
REASONS FOR IDEATION LIFETIME: MOSTLY TO END OR STOP THE PAIN (YOU COULDN'T GO ON LIVING WITH THE PAIN OR HOW YOU WERE FEELING)
MOST LETHAL DATE: 48013
5. HAVE YOU STARTED TO WORK OUT OR WORKED OUT THE DETAILS OF HOW TO KILL YOURSELF? DO YOU INTEND TO CARRY OUT THIS PLAN?: YES
3. HAVE YOU BEEN THINKING ABOUT HOW YOU MIGHT KILL YOURSELF?: YES
LETHALITY/MEDICAL DAMAGE CODE MOST RECENT ACTUAL ATTEMPT: NO PHYSICAL DAMAGE OR VERY MINOR PHYSICAL DAMAGE
TOTAL  NUMBER OF ABORTED OR SELF INTERRUPTED ATTEMPTS LIFETIME: 1

## 2023-01-24 ASSESSMENT — ACTIVITIES OF DAILY LIVING (ADL)
ADLS_ACUITY_SCORE: 35

## 2023-01-24 ASSESSMENT — ENCOUNTER SYMPTOMS
FEVER: 0
SHORTNESS OF BREATH: 0
CHILLS: 0

## 2023-01-24 NOTE — ED TRIAGE NOTES
Patient here by EMS from her apartment. Patient suicidal. She went down to the pool this AM and jumped in. Per EMS when they arrived patient was holding on to the edge of the pool. Patient voluntary.

## 2023-01-24 NOTE — ED NOTES
Patient reports S/I due to CKD and medical issues. Today had virtual visit with therapist and informed them of plan to drown herself in pool. They advised to go to ED or call 911. Patient instead went and jumped into indoor poor in clothing. Was seen by staff holding onto edge of pool. Denies water inhalation.     Changed into clean/dry clothes upon arrival to ED and provided warm blankets. No cough or respiratory symptoms noted.    Patient contracts for safety in ED and has family with her.

## 2023-01-24 NOTE — ED PROVIDER NOTES
History     Chief Complaint:  Suicidal     The history is provided by the patient and a relative.      Diana Santiago is a 72 year old female with a history of stage IV CKD, breast cancer, hypertension, hyperlipidemia who presents with suicidal ideation. She reports that earlier today, she was having some suicidal ideation and subsequently entered an indoor pool and thought about drowning herself. She stopped herself at this point and ultimately did not inhale any water. Presently, she denies feeling chilled. She reports that her suicidal ideation originates from her history of medical problems including CKD and perforated viscus. She does note that she has a history of suicide attempts many years ago, with her suicidal ideation also being derived from her medical problems at that time. Her kidney function is checked regularly, and she receives regular blood transfusions for anemia. No recent fevers or shortness of breath.     Independent Historian: Brother supplemented some history; patient is primary historian.     Review of External Notes: none     ROS:  Review of Systems   Constitutional: Negative for chills and fever.   Respiratory: Negative for shortness of breath.    Psychiatric/Behavioral: Positive for suicidal ideas.   All other systems reviewed and are negative.    Allergies:  The patient has no known allergies.     Medications:    Amlodipine  Allegra  Haldol  Lamictal  Protonix  Zocor  Bictra    Past Medical History:    Hypernatremia  Hypertension    Bipolar affective disorder  CKD, stage 4  Ductal carcinoma in situ   Depression   Fractured femoral neck  Hypercalcemia   Hyperlipidemia   Hyperparathyroidism  Lithium toxicity   Nephrogenic diabetes insipidus  Vitamin D deficiency   Heterozygous thalassemia  Chronic anemia  Acute kidney injury  Perforated viscus  Septic shock  Meningioma  Right foot drop    Past Surgical History:    Breast biopsy, left  Breast lumpectomy x4  Eye surgery  Exploratory  "laparotomy   Hand surgery, left  Tracheostomy closure     Family History:    Mother: cerebrovascular disease, hypertension, heart disease   Father: hypertension   Brother: sleep apnea    Social History:  The patient presents to the ED with her brother.  The patient presents to the ED via EMS.   PCP: Gayle Hernandez     Physical Exam     Patient Vitals for the past 24 hrs:   BP Temp Temp src Pulse Resp SpO2 Height Weight   01/24/23 1623 -- 97.6  F (36.4  C) Temporal -- -- -- -- --   01/24/23 1421 (!) 145/85 (!) 96.6  F (35.9  C) -- 74 14 100 % 1.626 m (5' 4\") 54.4 kg (120 lb)      Physical Exam  General: Well appearing, pleasant female, resting on exam chair  HEENT: No evidence of trauma.  Conjunctive are clear.  Extraocular eye movements intact.  Neck range of motion intact.  Nose and throat clear.  Respiratory: Good breath sounds bilaterally  Cardiovascular: Normal rate and rhythm   Gastrointestinal: Soft, nondistended.  Musculoskeletal: Atraumatic  Skin: Exposed skin clear.  Neurologic: Alert.  Psych:  Patient is cooperative, with normal affect.     Emergency Department Course     Laboratory:  Labs Ordered and Resulted from Time of ED Arrival to Time of ED Departure - No data to display     Emergency Department Course & Assessments:  PSS-3    Date and Time Over the past 2 weeks have you felt down, depressed, or hopeless? Over the past 2 weeks have you had thoughts of killing yourself? Have you ever attempted to kill yourself? When did this last happen? User   01/24/23 5038 yes yes yes -- LDJ        Suicide assessment completed by mental health (D.E.C., LCSW, etc.)    Interventions:  Medications - No data to display     Independent Interpretation (X-rays, CTs, rhythm strip):  none     Assessments:  1612 I obtained history and performed an examination of the patient.      Social Determinants of Health affecting care:  Ethnicity, age    Disposition:  The patient was transferred to [facility].     Impression & Plan  "     Medical Decision Making:  Diana Santiago is a 72 year old female with a history of anemia and CKD, presents to the ER for evaluation after a suicide attempt where she was going to drown herself.  See HPI.  Her vitals are unremarkable.  She is quite cooperative.  She called 911 after she stopped herself before inhaling any water.  Her lungs are clear.  I feel that she is safe and stable for further evaluation by the mental health team.  She is comfortable with the plan and is here with her brother.  She gets routine labs for her anemia and renal insufficiency therefore will hold off on checking these today.    Diagnosis:    ICD-10-CM    1. Suicide attempt (H)  T14.91XA           Scribe Disclosure:  I, Betzaida Martinez, am serving as a scribe at 4:11 PM on 1/24/2023 to document services personally performed by Zak San PA-C based on my observations and the provider's statements to me.     1/24/2023   Zak San PA-C Cyr, Matthew R, PA-C  01/24/23 9370

## 2023-01-24 NOTE — ED NOTES
Pt able to ambulate down the blake independently with a walker    [facility] Medicine Daily Progress Note    Date of Service  10/22/2019    Chief Complaint  58 y.o. male admitted 10/20/2019 with chest pain and lightheadedness.     Interval Problem Update  Feels well but wants to eat. Plan for cath today around noon. No overnight events.     Consultants/Specialty  Cardiology    Code Status  Full    Disposition  Anticipate in the next 1-2 days.    Review of Systems  Review of Systems   Constitutional: Positive for malaise/fatigue. Negative for chills and fever.   HENT: Negative for congestion.    Respiratory: Negative for cough and shortness of breath.    Cardiovascular: Positive for chest pain (improved). Negative for palpitations and leg swelling.   Gastrointestinal: Negative for abdominal pain, nausea and vomiting.   Genitourinary: Negative for dysuria and flank pain.   Musculoskeletal: Negative for falls.   Neurological: Negative for focal weakness, loss of consciousness and headaches.   Psychiatric/Behavioral: Negative.    All other systems reviewed and are negative.       Physical Exam  Temp:  [36.2 °C (97.2 °F)-37.1 °C (98.8 °F)] 36.4 °C (97.6 °F)  Pulse:  [65-76] 71  Resp:  [15-18] 18  BP: (110-177)/(66-83) 110/66  SpO2:  [96 %-100 %] 96 %    Physical Exam   Constitutional: He is oriented to person, place, and time. He appears well-developed. No distress.   HENT:   Head: Normocephalic.   Mouth/Throat: Oropharynx is clear and moist.   Eyes: Pupils are equal, round, and reactive to light. EOM are normal. No scleral icterus.   Neck: Normal range of motion. Neck supple. No tracheal deviation present.   Cardiovascular: Normal rate, regular rhythm and intact distal pulses.   Pulmonary/Chest: Effort normal and breath sounds normal. No respiratory distress. He has no rales.   Abdominal: Soft. Bowel sounds are normal. He exhibits no distension. There is no tenderness. There is no guarding.   Musculoskeletal: He exhibits no tenderness.   Neurological: He is alert and oriented to person,  place, and time.   Skin: Skin is warm and dry.   Psychiatric: He has a normal mood and affect. His speech is normal and behavior is normal.   Vitals reviewed.      Fluids  No intake or output data in the 24 hours ending 10/22/19 0816    Laboratory  Recent Labs     10/20/19  2328 10/22/19  0156 10/22/19  0223   WBC 6.4 5.1 5.1   RBC 3.97* 4.05* 4.18*   HEMOGLOBIN 12.0* 12.4* 12.3*   HEMATOCRIT 36.4* 36.6* 38.4*   MCV 91.7 90.4 91.9   MCH 30.2 30.6 29.4   MCHC 33.0* 33.9 32.0*   RDW 44.6 43.3 43.8   PLATELETCT 221 220 229   MPV 10.4 10.2 10.2     Recent Labs     10/20/19  2328 10/22/19  0156 10/22/19  0223   SODIUM 136 135 137   POTASSIUM 4.1 3.8 3.7   CHLORIDE 103 104 104   CO2 23 25 27   GLUCOSE 270* 194* 128*   BUN 35* 27* 26*   CREATININE 1.34 1.36 1.33   CALCIUM 9.9 9.2 9.3     Recent Labs     10/20/19  2328 10/22/19  0223   APTT 30.8 30.6   INR 0.96 1.04         Recent Labs     10/22/19  0156   TRIGLYCERIDE 93   HDL 46   LDL 72       Imaging  NM-CARDIAC STRESS TEST   Final Result      EC-ECHOCARDIOGRAM COMPLETE W/O CONT   Final Result      DX-CHEST-PORTABLE (1 VIEW)   Final Result         1.  No acute cardiopulmonary disease.   2.  Atherosclerosis      CL-LEFT HEART CATHETERIZATION WITH POSSIBLE INTERVENTION    (Results Pending)        Assessment/Plan  Chest pain- (present on admission)  Assessment & Plan  Concern for ACS. Abnormal stress test  - s/p cardiac cath today  - cardiology following, appreciate recs  - continue ASA and plavix  - monitor on telemetry    Postconcussion syndrome- (present on admission)  Assessment & Plan  After a bird bath falling on him  - outpatient evaluation by neurology    Dizziness- (present on admission)  Assessment & Plan  Rule out cardiogenic causes. Echo with normal EF and moderate LVH. Severe mitral annular calcification.   - Continuous cardiac monitoring on telemetry  - echo pending  - orthostatic vs pending    PVD (peripheral vascular disease) (HCC)- (present on  admission)  Assessment & Plan  - continue with ASA and statin    Type 1 diabetes mellitus with diabetic chronic kidney disease (HCC)- (present on admission)  Assessment & Plan  Uncontrolled with hyperglycemia. A1C 7.2%  - DM diet and hypoglycemia protocol  - sensitive sliding scale    CKD (chronic kidney disease) stage 3, GFR 30-59 ml/min Dr. Pascual- (present on admission)  Assessment & Plan  Cr at baseline  - Avoiding nephrotoxics: NSAIDs etc  - Monitor BMP      Essential hypertension- (present on admission)  Assessment & Plan  Normotensive.  - continue losartan and metop       VTE prophylaxis: SCDs

## 2023-01-25 VITALS
WEIGHT: 120 LBS | HEART RATE: 78 BPM | DIASTOLIC BLOOD PRESSURE: 84 MMHG | SYSTOLIC BLOOD PRESSURE: 139 MMHG | HEIGHT: 64 IN | BODY MASS INDEX: 20.49 KG/M2 | TEMPERATURE: 97.7 F | RESPIRATION RATE: 16 BRPM | OXYGEN SATURATION: 98 %

## 2023-01-25 PROCEDURE — G0378 HOSPITAL OBSERVATION PER HR: HCPCS

## 2023-01-25 PROCEDURE — 99207 PR NO CHARGE LOS: CPT | Performed by: PSYCHIATRY & NEUROLOGY

## 2023-01-25 PROCEDURE — 250N000013 HC RX MED GY IP 250 OP 250 PS 637: Performed by: STUDENT IN AN ORGANIZED HEALTH CARE EDUCATION/TRAINING PROGRAM

## 2023-01-25 RX ADMIN — SODIUM CITRATE AND CITRIC ACID MONOHYDRATE 15 ML: 500; 334 SOLUTION ORAL at 07:48

## 2023-01-25 RX ADMIN — PANTOPRAZOLE SODIUM 40 MG: 40 TABLET, DELAYED RELEASE ORAL at 07:49

## 2023-01-25 ASSESSMENT — ACTIVITIES OF DAILY LIVING (ADL)
ADLS_ACUITY_SCORE: 35

## 2023-01-25 ASSESSMENT — COLUMBIA-SUICIDE SEVERITY RATING SCALE - C-SSRS
2. HAVE YOU ACTUALLY HAD ANY THOUGHTS OF KILLING YOURSELF?: NO
ATTEMPT SINCE LAST CONTACT: NO
TOTAL  NUMBER OF ABORTED OR SELF INTERRUPTED ATTEMPTS SINCE LAST CONTACT: NO
TOTAL  NUMBER OF INTERRUPTED ATTEMPTS SINCE LAST CONTACT: NO
LETHALITY/MEDICAL DAMAGE CODE MOST LETHAL POTENTIAL ATTEMPT: BEHAVIOR LIKELY TO RESULT IN INJURY BUT NOT LIKELY TO CAUSE DEATH
SUICIDE, SINCE LAST CONTACT: NO
6. HAVE YOU EVER DONE ANYTHING, STARTED TO DO ANYTHING, OR PREPARED TO DO ANYTHING TO END YOUR LIFE?: NO
MOST LETHAL DATE: 48013
LETHALITY/MEDICAL DAMAGE CODE MOST LETHAL ACTUAL ATTEMPT: MINOR PHYSICAL DAMAGE
1. SINCE LAST CONTACT, HAVE YOU WISHED YOU WERE DEAD OR WISHED YOU COULD GO TO SLEEP AND NOT WAKE UP?: NO

## 2023-01-25 NOTE — CONSULTS
Diagnostic Evaluation Consultation  Crisis Assessment    Patient was assessed: In Person  [facility]  Was a release of information signed: Yes. Providers included on the release: Dr Gayle Hernandez (primary physicial), Dr. Aftab Gallo (psychiatrist)      Referral Data and Chief Complaint  Diana Santiago is a 72 year old, who uses she/her pronouns, and presents to the ED via EMS. Patient is referred to the ED by community provider(s). Patient is presenting to the ED for the following concerns: suicidal ideation.      Informed Consent and Assessment Methods     Patient is her own guardian. Writer met with patient and explained the crisis assessment process, including applicable information disclosures and limits to confidentiality, assessed understanding of the process, and obtained consent to proceed with the assessment. Patient was observed to be able to participate in the assessment as evidenced by patient presented as fully oriented and lucid and gave verbal permission to complete this assessment. Assessment methods included conducting a formal interview with patient, review of medical records, collaboration with medical staff, and obtaining relevant collateral information from family and community providers when available..     Over the course of this crisis assessment provided reassurance, offered validation, engaged patient in problem solving and disposition planning, worked with patient on safety and aftercare planning and provided psychoeducation. Patient's response to interventions was patient remained engaged with writer the entire interview, answering all questions.     Summary of Patient Situation       Patient reports that lately she has been feeling more depressed with life, related to her medical conditions and feeling lonely, and today she had a thought to drown herself, and physically went into a pool. She reports at this point she reached out to her son and  "psychiatrist for help, and her psychiatrist encouraged her to come to the ED. Patient called 911 and EMS brought her to [facility] ED.    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 She reports she typically kalee well with her medical issues and mental health, and that the past couple of days she has felt more depressed and has experienced SI. She reports yesterday she informed her sister of her SI, and today she informed her brother and psychiatrist.     Pt acknowledges she experiences intermittent SI. She reports in 1972 she was initially diagnosed with schizophrenia, and in the early 1970s she had made 2 suicide attempts, the last of which was in 1972. She reports that since the 1970s her SI has consisted of a general feeling of hopelessness and despair related to her medical conditions and quality of life, and that she has not had thoughts of a plan or intent in over 50 years. She reports the past 2 days have been \"more dark\" for her, leading to the episode in the pool today. Patient reports she wants help and has no intent to kill herself.    Patient reports that loneliness, isolation and lack of means to volunteer have resulted in decreased depression. Patient's brother adds that she has lost the ability to drive in the past year, and had to transition off of Lithium due to issues related to her kidney function. Patient reports this lead to her thinking about death with method and she entered pool with thoughts of drowning, and chose to stop herself and reach out for help, resulting in her coming to the ED.     Brief Psychosocial History   - Current living situation: In own apartment   - Brief family history: Brother is local and very supportive - she sees him 3 times a week. Sister lives in [place] and is also supportive and visits patient in [place]  - School/ Work Functioning: Retired, not in school  - Employment " and income source - financial concerns: Social Security income, reports no financial concerns.   -  status: No  - Hobbies: reading, listening to music, being with friends   - Supports: Brother and sister   - Relevant legal issues: none  - Cultural, Hoahaoism, or spiritual influences on mental health care: Pt reports she is Caholic, and her chanel is important to her, but she is not currently going to any Protestant.     Significant Clinical History       Patient reports she struggled with depressive symptoms starting in the early 1970s and she made 2 suicide attempts at that time. Patient reports she was diagnosed with schizophrenia at that time and was hospitalized multiple times.    Pt reports shortly after this her dx was changed to Bipolar disorder and her medications changed to include Lithium. She reports for decades following this change she continued to struggle with some periods of depression and intermittent SI, with no other suicide plan, intent or attempts.    Patient reports she has been able to maintain stability for most of the past 50 years utilizing outpatient psychiatry and therapy services. 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     Patient reports she has been progressively struggling with multiple medical issues, including kidney issues, is no longer able to drive, and more recently has had issues with he eyesight. She reports awareness that her mood has been more depressed. Her brother notes in collateral that he believes her mood is more depressed since her medications where changed about a year ago, when Lithium was discontinued. Her brother also notes that she is no longer able to drive, and feels more isolated, and struggles to read do to diminishing eyesight.     Patient reports she has several tx in place including psychiatry, therapy, OT and PT for her physical issues.       Collateral  Information    The following information was received from Acosta Santiago whose relationship to the patient is her brother. Information was obtained via phone. Their phone number is [phone] and they last had contact with patient on earlier this week.    What happened today: Acosta reports pt has been more depressed past year, following discontinuing Lithium due to kidney issue. He reports losing the ability to drive have been very hard for her, that she is typically more social and is not more isolative. He reports pt called him today to tell him that she went into a pool with thoughts of suicide.       What is different about patient's functioning: More depressed over the past year, coinciding with discontinuing Lithium, being unable to drive, and struggling to read due to eyesight issues.     Concern about alcohol/drug use: No     What do you think the patient needs: Find the right medication regiment. Get new glasses to be able to read again.     Has patient made comments about wanting to kill themselves/others:  Yes today patient called Acosta and told him she is having SI and went into a pool with thoughts of drowning.      If d/c is recommended, can they take part in safety/aftercare planning: Yes he will continue to see her multiple times a week and states she can stay with him if needed.      Risk Assessment      Anderson Suicide Severity Rating Scale Full Clinical Version:  Suicidal Ideation  1. Wish to be Dead (Lifetime): Yes  Wish to be Dead Description (Lifetime): general thoughts of wishing  1. Wish to be Dead (Past 1 Month): Yes  Wish to be Dead Description (Past 1 Month): general thoughts of wishing she were dead  2. Non-Specific Active Suicidal Thoughts (Lifetime): Yes  Non-Specific Active Suicidal Thought Description (Lifetime): thoughts of killing self  2. Non-Specific Active Suicidal Thoughts (Past 1 Month): Yes  Non-Specific Active Suicidal Thought Description (Past 1 Month): thoughts of  killinbg self  3. Active Suicidal Ideation with any Methods (Not Plan) Without Intent to Act (Lifetime): Yes  Active Suicidal Ideation with any Methods (Not Plan) Description (Lifetime): thoughts of overdosing  3. Active Suicidal Ideation with any Methods (Not Plan) Without Intent to Act (Past 1 Month): Yes  Active Suicidal Ideation with any Methods (Not Plan) Description (Past 1 Month): thoughts of drowning  4. Active Suicidal Ideation with Some Intent to Act, Without Specific Plan (Lifetime): Yes  Active Suicidal Ideation with Some Intent to Act, Without Specific Plan Description (Lifetime): 1972 wanted to die with intent  4. Active Suicidal Ideation with Some Intent to Act, Without Specific Plan (Past 1 Month): Yes  Active Suicidal Ideation with Some Intent to Act, Without Specific Plan Description (Past 1 Month): drowning today  5. Active Suicidal Ideation with Specific Plan and Intent (Lifetime): Yes  Active Suicidal Ideation with Specific Plan and Intent Description (Lifetime): two episodes in 1972, and today  5. Active Suicidal Ideation with Specific Plan and Intent (Past 1 Month): Yes  Active Suicidal Ideation with Specific Plan and Intent Description (Past 1 Month): drowning today  Intensity of Ideation  Most Severe Ideation Rating (Lifetime): 5  Description of Most Severe Ideation (Lifetime): overdosing 1972  Most Severe Ideation Rating (Past 1 Month): 5  Description of Most Severe Ideation (Past 1 Month): drowning today  Frequency (Lifetime): Less than once a week  Frequency (Past 1 Month): Less than once a week  Duration (Lifetime): Less than 1 hour/some of the time  Duration (Past 1 Month): Less than 1 hour/some of the time  Controllability (Lifetime): Can control thoughts with a lot of difficulty  Controllability (Past 1 Month): Can control thoughts with a lot of difficulty  Deterrents (Lifetime): Uncertain that deterrents stopped you  Deterrents (Past 1 Month): Uncertain that deterrents stopped  "you  Reasons for Ideation (Lifetime): Mostly to end or stop the pain (You couldn't go on living with the pain or how you were feeling)  Reasons for Ideation (Past 1 Month): Mostly to end or stop the pain (You couldn't go on living with the pain or how you were feeling)  Suicidal Behavior  Actual Attempt (Lifetime): Yes  Total Number of Actual Attempts (Lifetime): 2  Actual Attempt Description (Lifetime): overdosing attempt  Actual Attempt (Past 3 Months): Yes  Total Number of Actual Attempts (Past 3 Months): 1  Actual Attempt Description (Past 3 Months): drowning  Has subject engaged in non-suicidal self-injurious behavior? (Lifetime): No  Interrupted Attempts (Lifetime): No  Aborted or Self-Interrupted Attempt (Lifetime): Yes  Total Number of Aborted or Self-Interrupted Attempts (Lifetime): 1  Aborted or Self-Interrupted Attempt Description (Lifetime): drowning, stopped self, called others for help  Aborted or Self-Interrupted Attempt (Past 3 Months): Yes  Total Number of Aborted or Self-Interrupted Attempts (Past 3 Months): 1  Aborted or Self-Interrupted Attempt Description (Past 3 Months): Drowning, stopped self and reached out for help  Preparatory Acts or Behavior (Lifetime): No  C-SSRS Risk (Lifetime/Recent)  Calculated C-SSRS Risk Score (Lifetime/Recent): High Risk       Actual/Potential Lethality (Most Lethal Attempt)  Most Lethal Attempt Date: 06/15/72  Actual Lethality/Medical Damage Code (Most Lethal Attempt): Minor physical damage  Potential Lethality Code (Most Lethal Attempt): Behavior likely to result in injury but not likely to cause death       Validity of evaluation is impacted by presenting factors during interview patient reported she believes she is \"slipping\" cognitively which may impact data disclosed.   Comments regarding subjective versus objective responses to [place] tool: Pt presents as flat and depressed and is open about SI. Pt scores as High Risk for suicide and this seems clinically " accurate.   Environmental or Psychosocial Events: barriers to accessing healthcare, helplessness/hopelessness, other life stressors and worsening chronic illness  Chronic Risk Factors: history of suicide attempts (attempts in 1972 and again today), history of psychiatric hospitalization and serious, persistent mental illness   Warning Signs: seeking access to means to hurt or kill self, hopelessness, pain (new or worsening), withdrawing from friends, family, and society and dramatic changes in mood  Protective Factors: strong bond to family unit, community support, or employment, lives in a responsibly safe and stable environment, good treatment engagement, supportive ongoing medical and mental health care relationships, help seeking and cultural, spiritual , or Confucianism beliefs associated with meaning and value in life  Interpretation of Risk Scoring, Risk Mitigation Interventions and Safety Plan:  Patient has good treatment team in place and follows through with recommended treatments. Patient reports isolation and may benefit from elderly supportive services. Patient will get new glasses tomorrow and should be able to read again. Patient has had significant losses due to medical conditions, negatively impacting her mental health. May be able to add more supportive services to assist her. Patient presents as High Risk and does reach out for help when she feels suicidal.        Does the patient have thoughts of harming others? No     Is the patient engaging in sexually inappropriate behavior?  no        Current Substance Abuse     Is there recent substance abuse? no     Was a urine drug screen or blood alcohol level obtained: No       Mental Status Exam     Affect: Flat   Appearance: Appropriate    Attention Span/Concentration: Attentive  Eye Contact: Engaged   Fund of Knowledge: Appropriate    Language /Speech Content: Fluent   Language /Speech Volume: Normal    Language /Speech Rate/Productions: Minimally  Responsive    Recent Memory: Intact   Remote Memory: Intact   Mood: Depressed    Orientation to Person: Yes    Orientation to Place: Yes   Orientation to Time of Day: Yes    Orientation to Date: Yes    Situation (Do they understand why they are here?): Yes    Psychomotor Behavior: Underactive and Other: underactive, likely due to multiple medical conditions    Thought Content: Suicidal   Thought Form: Intact      History of commitment: No     Medication    Psychotropic medications: Yes. Pt is currently taking (see addendum). Medication compliant: Yes. Recent medication changes: No  Medication changes made in the last two weeks: No    Addendum:    Hospital Medications             amLODIPine (NORVASC) tablet 5 mg 5 mg, Oral, DAILY    fexofenadine (ALLEGRA) tablet 180 mg 180 mg, Oral, DAILY    haloperidol (HALDOL) tablet 2 mg 2 mg, Oral, AT BEDTIME    hydrOXYzine (ATARAX) tablet 25 mg 25 mg, Oral, EVERY 6 HOURS PRN    ibuprofen (ADVIL/MOTRIN) tablet 600 mg 600 mg, Oral, EVERY 6 HOURS PRN, Use second for mild pain if ordered with acetaminophen. Recommend alternating acetaminophen (if ordered) with ibuprofen.  Give with food.    lamoTRIgine (LaMICtal) tablet 200 mg 200 mg, Oral, DAILY    melatonin tablet 3 mg 3 mg, Oral, AT BEDTIME PRN, Offer first for sleep.    ondansetron (ZOFRAN ODT) ODT tab 4 mg 4 mg, Oral, EVERY 6 HOURS PRN, With dry hands, peel back foil backing and gently remove tablet. Do not push oral disintegrating tablet through foil backing. Administer immediately on tongue and oral disintegrating tablet dissolves in seconds, then swallow with saliva. Liquid not required.    pantoprazole (PROTONIX) EC tablet 40 mg 40 mg, Oral, DAILY, DO NOT CRUSH.    QUEtiapine (SEROquel) tablet 25 mg 25 mg, Oral, 3 TIMES DAILY PRN    simvastatin (ZOCOR) tablet 40 mg 40 mg, Oral, AT BEDTIME    sodium citrate-citric acid (BICITRA) solution 15 mL 15 mLs, Oral, 2 TIMES DAILY    traZODone (DESYREL) tablet 50 mg 50 mg, Oral, AT  BEDTIME PRN, Offer if unable to sleep 30 minutes after melatonin administration.        Outpatient Medications             amLODIPine (NORVASC) 5 MG tablet Take 1 tablet (5 mg) by mouth daily    fexofenadine (ALLEGRA) 180 MG tablet Take 1 tablet by mouth daily.    haloperidol (HALDOL) 1 MG tablet Take 1 tablet (1 mg) by mouth At BedtimePatient taking differently: Take 2 mg by mouth At Bedtime    lamoTRIgine (LAMICTAL) 100 MG tablet Take 200 mg by mouth daily    pantoprazole (PROTONIX) 40 MG EC tablet TAKE 1 TABLET BY MOUTH EVERY DAY    simvastatin (ZOCOR) 40 MG tablet TAKE 1 TABLET AT BEDTIME    sodium citrate-citric acid (BICITRA) 500-334 MG/5ML solution TAKE 15 MLS BY MOUTH 2 TIMES DAILY    ACE/ARB/ARNI NOT PRESCRIBED (INTENTIONAL) Please choose reason not prescribed from choices below.    polyethylene glycol (MIRALAX) 17 GM/Dose powder Take 17 g (1 capful) by mouth daily          Current Care Team    Primary Care Provider: Yes. Name: Dr Gayle Hernandez. Location: [facility]. Date of last visit: this month. Frequency: as needed. Perceived helpfulness: helpful.  Psychiatrist: Yes. Name: Aftab Gallo. Location: [place]. Date of last visit: today. Frequency: monthly or more. Perceived helpfulness: helpful.  Therapist: No  : No     CTSS or ARMHS: No  ACT Team: No  Other: No      Diagnosis    Other Unspecified and Specified Bipolar and Related Disorder 296.80 (F31.9) Unspecified Bipolar and Related Disorder        Clinical Summary and Substantiation of Recommendations      Patient reports in recent months she has been feeling progressively more depressed. She reports that things she enjoys in life - driving to be social in community and reading, have not been available to her. She reports she lost ability to drive due to foot issue, and her eyesight has been diminishing so it is difficult to read, her primary hobby.  Patient is struggling with multiple medical conditions and uses a walker, and is not  "able to use steps. Patient reports she feels isolated and lonely, and increasingly depressed. Patient reports she has struggled with mental health for 50+ years and had been coping well and even thriving for decades, until medical conditions have diminished her abilities and opportunities. Patient conveys all of this information while presenting with flat affect, depressed mood and slowed, monotone speech. Patient presents as lucid although she reports she feels as if she is cognitively \"slipping\" and not as mentally clear as she had been most of her life.  Patient reports her depressed mood has been intensifying and the past 2 days she has been having SI with a thought to drown herself. She reports yesterday she informed her sister of her thoughts about death.  Patient reports today she entered a pool with thoughts to drown herself. Patient reports she decided to stop this plan and she contacted her psychiatrist and brother and informed them of her episode in the pool. She reports her psychiatrist asked her to call 911 and to come to the ED, and patient complied.   Per patient's brother/collateral, he reports he has noticed a decline in patient mood, becoming steadily more depressed since she was taken off of Lithium a year ago, due to kidney issues. He reports that before this she was more bright and animated and social, and has been increasingly more isolated and depressed. See collateral data for more information.  Patient initially reported she is feeling safe and wanted to return home tonight. However, while meeting with attending provider, China Glass, patient decided to remain on [facility] tonight to provide more support, following suicide attempt today in the pool.   Per attending provider, patient is not open to medication adjustment at [facility].   This writer spoke with patient and her brother about potential elderly services available through [facility], and patient reports she is interested in " this, and information on [facility].   Patient was informed that [facility] is not open until 8am tomorrow. Patient requests that information on [facility], Elder[facility] and [facility] be put into AVS prior to her discharge.  Patient's brother was informed she is staying overnight. He reports if pt is discharged tomorrow he can come to pick her up.   Due to patient's recent suidical ideation with plan and aborted attempt, patient will remain on [facility] Unit overnight on observation status, to allow for some more time in a safe environment following this episode in the pool.  Attending provider consulted with this writer and are in agreement with this plan, as is patient. Patient's brother was informed.     Disposition    Recommended disposition: Other: pt will remain on [facility] Unit on observation status overnight due to recent suicide attempt and will be reassessed by attending provider and LM in AM regarding level of care needed going forward.        Reviewed case and recommendations with attending provider. Attending Name: China Glsas       Attending concurs with disposition: Yes       Patient concurs with disposition: Yes       Guardian concurs with disposition: NA      Final disposition: Other: pt will remain on Em[facility] Unit on observation status overnight due to recent suicide attempt and will be reassessed by attending provider and LMHP in AM regarding level of care needed going forward..         Was lethal means counseling provided as a part of aftercare planning? No  Patient was informed that access to lethal means education will be discussed prior to d/c;       Assessment Details    Patient interview started at: 7:20pm and completed at: 8:20pm.     Total duration spent on the patient case in minutes: 1.50 hrs      CPT code(s) utilized: 41290 - Psychotherapy for Crisis - 60 (30-74*) min and 69710 - Psychotherapy for Crisis (Each additional 30 minutes) - 30 min        Roney Picektt MA, LMFT,  Psychotherapist  [facility] - T[facility] Services  Callback: [phone]

## 2023-01-25 NOTE — ED NOTES
Patient in agreement with plan to stay overnight on observation and likely discharge home tomorrow. Brothlucian Shane updated on plan of care per patient request. Pt denies SI currently. Pt ate 100% of dinner tonight. Pt requires assistance x 1 to get in and out of recliner chair, and to ambulate to the bathroom. Fall risk band in place. Nursing will continue to monitor.

## 2023-01-25 NOTE — PLAN OF CARE
"Diana ROPER Jack  January 24, 2023  Plan of Care Hand-off Note     Patient Care Path: Observation    Plan for Care:       Patient reports in recent months she has been feeling progressively more depressed. She reports that things she enjoys in life - driving to be social in community and reading, have not been available to her. She reports she lost ability to drive due to foot issue, and her eyesight has been diminishing so it is difficult to read, her primary hobby.    Patient is struggling with multiple medical conditions and uses a walker, and is not able to use steps. Patient reports she feels isolated and lonely, and increasingly depressed. Patient reports she has struggled with mental health for 50+ years and had been coping well and even thriving for decades, until medical conditions have diminished her abilities and opportunities. Patient conveys all of this information while presenting with flat affect, depressed mood and slowed, monotone speech. Patient presents as lucid although she reports she feels as if she is cognitively \"slipping\" and not as mentally clear as she had been most of her life.    Patient reports her depressed mood has been intensifying and the past 2 days she has been having SI with a thought to drown herself. She reports yesterday she informed her sister of her thoughts about death.  Patient reports today she entered a pool with thoughts to drown herself. Patient reports she decided to stop this plan and she contacted her psychiatrist and brother and informed them of her episode in the pool. She reports her psychiatrist asked her to call 911 and to come to the ED, and patient complied.     Per patient's brother/collateral, he reports he has noticed a decline in patient mood, becoming steadily more depressed since she was taken off of Lithium a year ago, due to kidney issues. He reports that before this she was more bright and animated and social, and has been increasingly more isolated and " depressed. See collateral data for more information  .  Patient initially reported she is feeling safe and wanted to return home tonight. However, while meeting with attending provider, China Glass, patient decided to remain on [facility] tonight to provide more support, following suicide attempt today in the pool.     Per attending provider, patient is not open to medication adjustment at [facility].     This writer spoke with patient and her brother about potential elderly services available through [facility], and patient reports she is interested in this, and information on [facility].     Patient was informed that [facility] is not open until 8am tomorrow. Patient requests that information on [facility], [facility] and [facility] be put into AVS prior to her discharge  .  Patient's brother was informed she is staying overnight. He reports if pt is discharged tomorrow he can come to pick her up.     Due to patient's recent suidical ideation with plan and aborted attempt, patient will remain on [facility] Unit overnight on observation status, to allow for some more time in a safe environment following this episode in the pool.    Attending provider consulted with this writer and are in agreement with this plan, as is patient. Patient's brother was informed.     Critical Safety Issues: Patient has history of SI dating back over 50 years. Patient reports 2 suicide attempts in 1972 and intermittent SI since that time, with no thoughts of plan or attempt since 1972. She reports medical conditions have limited her access to things she enjoys in life and she has been more depressed as of late. Today she had thoughts of drowning and went to extent of entering a pool. She aborted this attempt and reached out for help to [facility].    Patient to remain on observation status on [facility] overnight due to SI and recent attempt and will be reassessed in AM by attending provider regarding level of care needed going forward.      Overview:  This patient is a child/adolescent: No    This patient has additional special visitor precautions: No    Legal Status: Voluntary    Contacts:   Acosta Santiago, patient brother, [phone]      Updated RN and Attending Provider regarding plan of care.    Roney Pickett     Yes

## 2023-01-25 NOTE — ED PROVIDER NOTES
[facility] Unit - Psychiatric Observation Discharge Summary  [facility] Emergency Department  Discharge Date: 1/25/2023    Diana Santiago MRN: [id number]   Age: 72 year old YOB: 1950     Brief HPI & Initial ED Course     Chief Complaint   Patient presents with     Suicidal     HPI  Diana Santiago is a 72 year old female with history notable for bipolar disorder and multiple chronic medical conditions, including chronic kidney disease, who is currently under observation status on the [facility] unit after reporting suicidal thoughts to her outpatient psychiatrist.  Records indicate that the patient was at a swimming pool, entering the water while experiencing suicidal ideation with consideration to drown herself.  She did not attempt to do so, exiting the water, and later reporting the incident to her outpatient psychiatrist.  Since her arrival to the emergency department, she has been denying suicidal ideation.  On her initial evaluation with [facility] yesterday, her prior to admission medications were restarted.  Overnight, there were no acute issues.  On reassessment today, the patient continues to report that the suicidal ideation which she had previously experienced has subsided.  She is content with her current medications however states that her psychiatrist had previously mentioned the option of adding Dexedrine to her current medications to aid in treating vegetative symptoms.  She would prefer to wait and meet with her psychiatrist to discuss that option and possibly others before committing to a medication change.  There was no indication of psychosis or homicidal thoughts.  She inquired regarding loneliness and any resources or recommendations on how to better structure and utilize her time throughout the day.  She does mention that she met with the therapist yesterday evening and they discussed community programs that she may take advantage of for more structured and therapeutic time.  She  "interprets readiness to discharge home today.        Physical Examination   BP: 139/84  Pulse: 78  Temp: 97.7  F (36.5  C)  Resp: 16  Height: 162.6 cm (5' 4\")  Weight: 54.4 kg (120 lb)  SpO2: 98 %    Physical Exam  General: Appears stated age.   Neuro: Alert and fully oriented. Extremities appear to demonstrate normal strength on visual inspection.   Integumentary/Skin: no rash visualized, normal color    Psychiatric Examination   Appearance: awake, alert  Attitude:  cooperative  Eye Contact:  fair  Mood:  better  Affect:  intensity is blunted  Speech:  clear, coherent  Psychomotor Behavior:  no evidence of tardive dyskinesia, dystonia, or tics  Thought Process:  logical and linear  Associations:  no loose associations  Thought Content:  no evidence of suicidal ideation or homicidal ideation and no evidence of psychotic thought  Insight:  fair  Judgement:  fair  Oriented to:  time, person, and place  Attention Span and Concentration:  fair  Recent and Remote Memory:  fair  Language: able to name/identify objects without impairment  Fund of Knowledge: intact with awareness of current and past events    Results        Labs Ordered and Resulted from Time of ED Arrival to Time of ED Departure   COVID-19 VIRUS (CORONAVIRUS) BY PCR - Normal       Result Value    SARS CoV2 PCR Negative         Observation Course   The patient was found to have a psychiatric condition that would benefit from an observation stay in the emergency department for further psychiatric stabilization and/or coordination of a safe disposition. The plan upon observation admission included serial assessments of psychiatric condition, potential administration of medications if indicated, further disposition pending the patient's psychiatric course during the monitoring period.     Serial assessments of the patient's psychiatric condition were performed. Nursing notes were reviewed. During the observation period, the patient did not require medications " for agitation, and did not require restraints/seclusion for patient and/or provider safety.     After a period of working with the treatment team on the [facility] unit, the patient's mental state improved to allow a safe transition to outpatient care. After counseling on the diagnosis, work-up, and treatment plan, the patient was discharged. Close follow-up with a psychiatrist and/or therapist was recommended and community psychiatric resources were provided. Patient is to return to the ED if any urgent or potentially life-threatening concerns.     Discharge Diagnoses:   Final diagnoses:   Bipolar disorder current episode depressed (H)   Suicidal ideation       Treatment Plan:  -Continue Haldol and Lamictal for mood stabilization.  She will follow up with her outpatient psychiatrist to discuss any additional augmentation strategies which could help address residual depressive symptoms.  She prefers to continue that conversation with her established psychiatrist as opposed to starting a new medication today.  -Community resources for drop-in centers and IOP  -Resume outpatient medication management appointments  -Discharge home today.      At the time of discharge, the patient's acute suicide risk was determined to be low due to the following factors: Reduction in the intensity of mood/anxiety symptoms that preceded the admission, denial of suicidal thoughts, denies feeling helpless or helpless, not currently under the influence of alcohol or illicit substances, denies experiencing command hallucinations, no immediate access to firearms. The patient's acute risk could be higher if noncompliant with their treatment plan, medications, follow-up appointments or using illicit substances or alcohol. Protective factors include: social supports, stable housing    I spent more than 30 minutes on discharge day activities.    --  David Lim MD  [facility] EMERGENCY DEPT  [facility] Unit  1/25/2023      Raphael  David FERNANDEZ MD  01/25/23 1144

## 2023-01-25 NOTE — PROGRESS NOTES
72 year old female with history of bipolar disorder, depression, and stage 4 CKD received from ED due to suicidal ideation. Patient reports that she began to feel suicidal during her therapy session today while discussing her medical issues. Pt had a plan to drown herself, and did jump in to the pool in her apartment building. After jumping in the pool, patient grabbed onto the side of the pool and asked for help. EMS was called and brought patient to ER. Pt reports that she does not feel suicidal currently, and she only begins to feel suicidal when she starts to think about her medical problems. Pt denies any other recent mental health concerns. Pt lives independently currently and receives support from her brother Acosta (emergency contact). Pt denies any recent alcohol or drug use.    Nursing and risk assessments completed. Assessments reviewed with [facility] and physician. Admission information reviewed with patient. Patient given a tour of [facility] and instructions on using the facility. Questions regarding [facility] addressed. Pt safety search completed.

## 2023-01-25 NOTE — ED NOTES
[facility] Crisis Reassessment      Diana Santiago was reassessed to determine disposition and readiness for discharge. Pt was first seen on 1/24/2023 by ROSINA Cedeño; see the initial assessment note for details.      Patient Presentation    Initial ED presentation details:    Patient reports that lately she has been feeling more depressed with life, related to her medical conditions and feeling lonely, and today she had a thought to drown herself, and physically went into a pool. She reports at this point she reached out to her son and psychiatrist for help, and her psychiatrist encouraged her to come to the ED. Patient called 911 and EMS brought her to [facility] ED.    Current patient presentation:   Pt is observed to be in her chair, eating breakfast this morning. Pt is engaged in the milieu    Changes observed since initial assessment: Pt engages with writer and [facility] staff appropriately. She is open about her distress and what led to her suicide attempt yesterday. She is able to verbalize changes in her depression and SI, noting she has a 'better outlook' and feels additional resources will help reduce her depression, loneliness, and suicidal thoughts. Pt denies any SI since being at [facility], and denies any SIB or HI history.       Risk of Harm  [place] Suicide Severity Rating Scale Full Clinical Version:1/24/2023  Suicidal Ideation  1. Wish to be Dead (Lifetime): Yes  Wish to be Dead Description (Lifetime): general thoughts of wishing  1. Wish to be Dead (Past 1 Month): Yes  Wish to be Dead Description (Past 1 Month): general thoughts of wishing she were dead  2. Non-Specific Active Suicidal Thoughts (Lifetime): Yes  Non-Specific Active Suicidal Thought Description (Lifetime): thoughts of killing self  2. Non-Specific Active Suicidal Thoughts (Past 1 Month): Yes  Non-Specific Active Suicidal Thought Description (Past 1 Month): thoughts of killinbg self  3. Active Suicidal Ideation with any Methods (Not  Plan) Without Intent to Act (Lifetime): Yes  Active Suicidal Ideation with any Methods (Not Plan) Description (Lifetime): thoughts of overdosing  3. Active Suicidal Ideation with any Methods (Not Plan) Without Intent to Act (Past 1 Month): Yes  Active Suicidal Ideation with any Methods (Not Plan) Description (Past 1 Month): thoughts of drowning  4. Active Suicidal Ideation with Some Intent to Act, Without Specific Plan (Lifetime): Yes  Active Suicidal Ideation with Some Intent to Act, Without Specific Plan Description (Lifetime): 1972 wanted to die with intent  4. Active Suicidal Ideation with Some Intent to Act, Without Specific Plan (Past 1 Month): Yes  Active Suicidal Ideation with Some Intent to Act, Without Specific Plan Description (Past 1 Month): drowning today  5. Active Suicidal Ideation with Specific Plan and Intent (Lifetime): Yes  Active Suicidal Ideation with Specific Plan and Intent Description (Lifetime): two episodes in 1972, and today  5. Active Suicidal Ideation with Specific Plan and Intent (Past 1 Month): Yes  Active Suicidal Ideation with Specific Plan and Intent Description (Past 1 Month): drowning today  Intensity of Ideation  Most Severe Ideation Rating (Lifetime): 5  Description of Most Severe Ideation (Lifetime): overdosing 1972  Most Severe Ideation Rating (Past 1 Month): 5  Description of Most Severe Ideation (Past 1 Month): drowning today  Frequency (Lifetime): Less than once a week  Frequency (Past 1 Month): Less than once a week  Duration (Lifetime): Less than 1 hour/some of the time  Duration (Past 1 Month): Less than 1 hour/some of the time  Controllability (Lifetime): Can control thoughts with a lot of difficulty  Controllability (Past 1 Month): Can control thoughts with a lot of difficulty  Deterrents (Lifetime): Uncertain that deterrents stopped you  Deterrents (Past 1 Month): Uncertain that deterrents stopped you  Reasons for Ideation (Lifetime): Mostly to end or stop the pain  (You couldn't go on living with the pain or how you were feeling)  Reasons for Ideation (Past 1 Month): Mostly to end or stop the pain (You couldn't go on living with the pain or how you were feeling)  Suicidal Behavior  Actual Attempt (Lifetime): Yes  Total Number of Actual Attempts (Lifetime): 2  Actual Attempt Description (Lifetime): overdosing attempt  Actual Attempt (Past 3 Months): Yes  Total Number of Actual Attempts (Past 3 Months): 1  Actual Attempt Description (Past 3 Months): drowning  Has subject engaged in non-suicidal self-injurious behavior? (Lifetime): No  Interrupted Attempts (Lifetime): No  Aborted or Self-Interrupted Attempt (Lifetime): Yes  Total Number of Aborted or Self-Interrupted Attempts (Lifetime): 1  Aborted or Self-Interrupted Attempt Description (Lifetime): drowning, stopped self, called others for help  Aborted or Self-Interrupted Attempt (Past 3 Months): Yes  Total Number of Aborted or Self-Interrupted Attempts (Past 3 Months): 1  Aborted or Self-Interrupted Attempt Description (Past 3 Months): Drowning, stopped self and reached out for help  Preparatory Acts or Behavior (Lifetime): No  C-SSRS Risk (Lifetime/Recent)  Calculated C-SSRS Risk Score (Lifetime/Recent): High Risk    [place] Suicide Severity Rating Scale Since Last Contact: 1/25/2023  Suicidal Ideation (Since Last Contact)  1. Wish to be Dead (Since Last Contact): No  2. Non-Specific Active Suicidal Thoughts (Since Last Contact): No  Suicidal Behavior (Since Last Contact)  Actual Attempt (Since Last Contact): No  Has subject engaged in non-suicidal self-injurious behavior? (Since Last Contact): No  Interrupted Attempts (Since Last Contact): No  Aborted or Self-Interrupted Attempt (Since Last Contact): No  Preparatory Acts or Behavior (Since Last Contact): No  Suicide (Since Last Contact): No  Actual/Potential Lethality (Most Lethal Attempt)  Most Lethal Attempt Date: 06/15/72  Actual Lethality/Medical Damage Code (Most  Lethal Attempt): Minor physical damage  Potential Lethality Code (Most Lethal Attempt): Behavior likely to result in injury but not likely to cause death  C-SSRS Risk (Since Last Contact)  Calculated C-SSRS Risk Score (Since Last Contact): No Risk Indicated    Validity of evaluation is not impacted by presenting factors during interview.   Comments regarding subjective versus objective responses to Belt tool: Pt presents as organized and clear in thinking this morning; does not report any safety concerns, congruent with C-SSRS responses.  Environmental or Psychosocial Events: social isolation and other: Pt was on Lithium for many years, and had to d/c this medications because of health side effects.   Chronic Risk Factors: history of suicide attempts (2), history of psychiatric hospitalization, chronic health problems and serious, persistent mental illness   Warning Signs: seeking access to means to hurt or kill self, talking or writing about death, dying, or suicide, hopelessness, withdrawing from friends, family, and society and recent discharges from emergency department or inpatient psychiatric care  Protective Factors: strong bond to family unit, community support, or employment, responsibilities and duties to others, including pets and children, lives in a responsibly safe and stable environment, good treatment engagement, sense of importance of health and wellness, able to access care without barriers, help seeking, sense of belonging and reality testing ability  Interpretation of Risk Scoring, Risk Mitigation Interventions and Safety Plan:  Pt is able to engage in safety planning, and denies any current imminent safety concerns. Pt notes persistent, passive SI is often present, but denies any intent to act on these thoughts. Pt has been connected with additional community resources and supports to help mitigate stressors of feeling depressed, lonely, and sad.    Does the patient have thoughts of harming  others? No    Mental Status Exam   Affect: Constricted   Appearance: Appropriate    Attention Span/Concentration: Attentive?    Eye Contact: Engaged   Fund of Knowledge: Appropriate    Language /Speech Content: Fluent   Language /Speech Volume: Soft and Normal    Language /Speech Rate/Productions: Normal    Recent Memory: Intact   Remote Memory: Intact   Mood: Depressed    Orientation to Person: Yes    Orientation to Place: Yes   Orientation to Time of Day: Yes    Orientation to Date: Yes    Situation (Do they understand why they are here?): Yes    Psychomotor Behavior: Normal    Thought Content: Clear   Thought Form: Intact       Additional Collateral Information   Writer contacted Pt's brother Acosta ([phone]) to review aftercare resources and discharge plans. Pt's brother stated he feels safe with Pt discharging and did not have any safety concerns. Writer reviewed recommendations to have Pt access [facility] resources through [facility], as well as getting connected with [facility]. Pt's brother was receptive and agreeable to resources and plans.      Therapeutic Intervention  The following therapeutic methodologies were employed when working with the patient: Establishing rapport, Active listening, Identify additional supports and alternative coping skills, Establish a discharge plan and Safety planning. Patient response to intervention: engaged, responsive, and open.    Diagnosis:   Other Unspecified and Specified Bipolar and Related Disorder 296.80 (F31.9) Unspecified Bipolar and Related Disorder        Clinical Substantiation of Recommendations  Pt appears appropriate to discharge as she no longer endorses imminent safety concerns. Pt does note a baseline of passive SI, but denies any plans or intent and feels her level of SI is manageable in a [facility]. Pt has been given additional community resources to reduce feelings of depression, sadness and loneliness. Pt is well supported with outpatient individual  therapy and psychiatry, and feels she has a strong and supportive relationship with her brother and sister.     Plan:    Disposition  Recommended disposition: Individual Therapy, Medication Management and Other: [facility]      Reviewed case and recommendations with attending provider. Attending Name: Raphael      Attending concurs with disposition: Yes      Patient concurs with disposition: Yes      Final disposition: Individual therapy  and Medication management.         Assessment Details  Total duration spent on the patient case in minutes: 1.25 hrs     CPT code(s) utilized: 95506 - Psychotherapy (with patient) - 30 (16-37*) min       ROSINA SAMANIEGO, [facility]  Callback: [phone]      Aftercare Plan  Follow-up appointments and resources:  1. Outpatient therapy with Jd Unger ([facility]) on 1/31/2023  2. Outpatient Psychiatry with Aftab Gallo on 2/2/2023  3. Contact: [facility] at [phone]   * Will help get started with Elderly Waiver  4. Connect with [facility]:    [email or web address]   Administration / Case Management   [address] [facility] [place] [address]   [email or web address]   [address] [place] [address]   [phone]     [facility]   [address] [address]   [phone]       If I am feeling unsafe or I am in a crisis, I will:   Contact my established care providers   Call the National Suicide Prevention Lifeline: 988  Go to the nearest emergency room   Call 911     Warning signs that I or other people might notice when a crisis is developing for me: I will call my brother and let him know    Things I am able to do on my own to cope or help me feel better: Listen to music or podcasts, play word games, talk with brother or sister     Changes I can make to support my mental health and wellness: Join and attend [facility] to increase social connections     People in my life that I can ask for help: Phoebeer  "sister, therapist, psychiatrist     Your [facility] has a mental health crisis team you can call 24/7: [facility] Mobile Crisis  [phone]       Crisis Lines  Crisis Text Line  Text 881966  You will be connected with a trained live crisis counselor to provide support.    Por kaitlin, texto  TACOS a 781507 o texto a 442-AYUDAME en WhatsApp    [employer] (LGBTQ Youth Crisis Line)  9.758.622.8456  text START to 433-894      Feedtrace  Fast Tracker  Linking people to mental health and substance use disorder resources  StrongLoop.CertiRx     [place] Mental Health Warm Line  Peer to peer support  Monday thru Saturday, 12 pm to 10 pm  265.411.5455 or 8.156.280.1442  Text \"Support\" to 42448    National [place] on Mental Illness (DEEPA)  114.905.7381 or 1.888.DEEPA.HELPS      Mental Health Apps  My3  https://Cyanogen.org/    VirtualHopeBox  https://Hoodsorg/apps/virtual-hope-box/      Additional Information  Today you were seen by a licensed mental health professional through Triage and Transition services, [facility] ([facility])  for a crisis assessment in the Emergency Department at [facility].  It is recommended that you follow up with your established providers (psychiatrist, mental health therapist, and/or primary care doctor - as relevant) as soon as possible. Coordinators from [facility] will be calling you in the next 24-48 hours to ensure that you have the resources you need.  You can also contact [facility] coordinators directly at [phone]. You may have been scheduled for or offered an appointment with a mental health provider. [facility] maintains an extensive network of licensed behavioral health providers to connect patients with the services they need.  We do not charge providers a fee to participate in our referral network.  We match patients with providers based on a patient's specific needs, insurance coverage, and location.  Our first effort will be to refer " you to a provider within your care system, and will utilize providers outside your care system as needed.

## 2023-01-25 NOTE — ED PROVIDER NOTES
"EmPATH Unit - Initial Psychiatric Observation Note  [facility] Emergency Department  Observation Initiation Date: Jan 24, 2023    Diana Santiago MRN: [id number]   Age: 72 year old YOB: 1950     History     Chief Complaint   Patient presents with     Suicidal     HPI  Diana Santiago is a 72 year old female with a past history notable for CKD, HTN, BrCA, HLD, bipolar disorder who presents with depression post suicide attempt earlier today. She has a hx of several suicide attempts in the 1970s.  She was previously stable for many decades on lithium but after this was stopped in 2021 due to renal issues, pt noticed depression problems start. She has been thinking about suicide regularly for several months now... \"not that I was planning to do it.\" She says that today she was thinking about how it would be easier for everyone if she was dead and she decided to get into the pool to drown herself. Once she got into the pool, she thought about how she can swim and that it's not easy to drown herself because she would float. She talked with her psychiatrist Dr. Meyers, who recommended she present to [facility].   She denies SI now. She declines to consider medicine adjustments tonight and wishes to discharge home. She has been taking lamotrigine and haloperidol for some time and thinks lamotrigine is helpful but isn't sure about Haldol. She has tried aripiprazole and sertraline in the past but both caused blurry vision.     Past Medical History  Past Medical History:   Diagnosis Date     Benign essential hypertension 09/2018    added norvasc 9/18     Bipolar affective disorder (H)     hosp 1993, Dr. Aftab Deal     Cancer (H) 1996    DCIS, left breast     Chronic kidney disease, stage 3a (H)     seen by renal Dr. 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    felt due to lithium     Thalassaemia trait      Vitamin D deficiency      Past Surgical History:   Procedure Laterality Date     BIOPSY  1994    left breast     BREAST SURGERY      lumpectomy x 4     COLONOSCOPY  2021     COLONOSCOPY N/A 6/17/2022    Procedure: COLONOSCOPY, WITH POLYPECTOMY AND BIOPSY;  Surgeon: Panchito Gu MD;  Location:  GI     EYE SURGERY  2018    retina tear     LAPAROTOMY EXPLORATORY N/A 8/24/2022    Procedure: Exploratory laparotomy, REPAIR OF PERFORATED ULCER;  Surgeon: Ron Vidal MD;  Location:  OR     left hand surgery      last 1974     amLODIPine (NORVASC) 5 MG tablet  fexofenadine (ALLEGRA) 180 MG tablet  haloperidol (HALDOL) 1 MG tablet  lamoTRIgine (LAMICTAL) 100 MG tablet  pantoprazole (PROTONIX) 40 MG EC tablet  simvastatin (ZOCOR) 40 MG tablet  sodium citrate-citric acid (BICITRA) 500-334 MG/5ML solution  ACE/ARB/ARNI NOT PRESCRIBED (INTENTIONAL)  polyethylene glycol (MIRALAX) 17 GM/Dose powder      Allergies   Allergen Reactions     No Known Allergies      Family History  Family History   Problem Relation Age of Onset     Cerebrovascular Disease Mother      Hypertension Father      Sleep Apnea Brother      Breast Cancer Maternal Aunt         x 2     Breast Cancer Other         Maternal Aunt     Hyperlipidemia Niece      Social History   Social History     Tobacco Use     Smoking status: Never     Smokeless tobacco: Never   Substance Use Topics     Alcohol use: No     Drug use: No      Past medical history, past surgical history, medications, allergies, family history, and social history were reviewed with the patient. No additional  "pertinent items.       Review of Systems  A medically appropriate review of systems was performed with pertinent positives and negatives noted in the HPI, and all other systems negative.    Physical Examination   BP: (!) 145/85  Pulse: 74  Temp: (!) 96.6  F (35.9  C)  Resp: 14  Height: 162.6 cm (5' 4\")  Weight: 54.4 kg (120 lb)  SpO2: 100 %    Physical Exam  General: Appears stated age.   Neuro: Alert and fully oriented. Extremities appear to demonstrate normal strength on visual inspection.   Integumentary/Skin: no rash visualized, normal color    Psychiatric Examination   Appearance: awake, alert, adequately groomed, dressed in hospital scrubs and appeared as age stated  Attitude:  cooperative  Eye Contact:  good  Mood:  depressed  Affect:  Blunted, nearly flat  Speech:  clear, coherent  Psychomotor Behavior:  no evidence of tardive dyskinesia, dystonia, or tics  Thought Process:  linear, logical, somewhat concrete  Associations:  no loose associations  Thought Content:  no evidence of suicidal ideation or homicidal ideation and no evidence of psychotic thought  Insight:  good  Judgement:  intact  Oriented to:  time, person, and place  Attention Span and Concentration:  intact  Recent and Remote Memory:  intact  Language: able to name/identify objects without impairment  Fund of Knowledge: intact with awareness of current and past events    ED Course        Labs Ordered and Resulted from Time of ED Arrival to Time of ED Departure   COVID-19 VIRUS (CORONAVIRUS) BY PCR - Normal       Result Value    SARS CoV2 PCR Negative         Assessments & Plan (with Medical Decision Making)   Patient presenting with depression and recent SI with suicide attempt today - self-aborted drowning attempt. Pt has felt increasingly depressed for several months with near daily SI thoughts. She rather impulsively got into the pool with the intent to drown herself but self-aborted. She wishes to discharge home. She lives alone and says " she wouldn't attempt again and can keep herself safe. She is amenable to staying on obs overnight and declines any medicine changes here as she would like to discuss with her outpt psychiatrist instead. Nursing notes reviewed noting no acute issues.     I have reviewed the assessment completed by the [facility].     During the observation period, the patient did not require medications for agitation, and did not require restraints/seclusion for patient and/or provider safety.     The patient was found to have a psychiatric condition that would benefit from an observation stay in the emergency department for further psychiatric stabilization and/or coordination of a safe disposition. The observation plan includes serial assessments of psychiatric condition, potential administration of medications if indicated, further disposition pending the patient's psychiatric course during the monitoring period.     Preliminary diagnosis:    ICD-10-CM    1. Suicide attempt (H)  T14.91XA       2. Bipolar disorder current episode depressed (H)  F31.30            Treatment Plan:  -continue lamotrigine 200mg daily  -continue haloperidol 2mg nightly  -continue home medicines  -PRNs available  -obs voluntary tonight; anticipate discharge tomorrow 1/25/23  -f/u with psychiatrist next week for med changes; consider switch from haldol to quetiapine for bipolar depression    --  Deng Glass NP   [facility] EMERGENCY DEPT  EmPATH Unit  1/24/2023        Deng Glass NP  01/24/23 [id number]

## 2023-01-25 NOTE — ED NOTES
Patient is eating her lunch and then the writer will call her brother to bring her clothes and take her home. Discharge instructions reviewed with patient including follow-up care plan.  Reviewed safety plan and outpatient resources.Denies suicide ideation.

## 2023-01-25 NOTE — ED NOTES
"Patient denies suicidal ideation this am.  She was wondering what time she could discharge.  When asked about her mood this am she said \"tired\".  She states her brother would take her home.  She had breakfast and went to the bathroom.  She does need help as she has limited movement of her left arm, has a brace on the left leg and uses a walker.  "

## 2023-01-25 NOTE — DISCHARGE INSTRUCTIONS
Aftercare Plan  Follow-up appointments and resources:  1. Outpatient therapy with Jd Unger ([facility]) on 1/31/2023  2. Outpatient Psychiatry with Aftab Gallo on 2/2/2023  3. Contact: [facility] at [phone]              * Will help get started with Elderly Waiver  4. Connect with [facility]:               [email or web address]              Administration / Case Management              [address] [address]              [email or web address]                 [facility]              [address] [place] [address]              [phone]                 [facility]              [address] [place] [address]              [phone]                   If I am feeling unsafe or I am in a crisis, I will:   Contact my established care providers   Call the National Suicide Prevention Lifeline: 988  Go to the nearest emergency room   Call 911      Warning signs that I or other people might notice when a crisis is developing for me: I will call my brother and let him know     Things I am able to do on my own to cope or help me feel better: Listen to music or podcasts, play word games, talk with brother or sister      Changes I can make to support my mental health and wellness: Join and attend [facility] to increase social connections      People in my life that I can ask for help: Brother, sister, therapist, psychiatrist      Your [facility] has a mental health crisis team you can call 24/7: [facility] Mobile Crisis  [phone]         Crisis Lines  Crisis Text Line  Text 078856  You will be connected with a trained live crisis counselor to provide support.     Por espanol, texto  TACOS a 953967 o texto a 442-AYUDAME en WhatsApp     [employer] (LGBTQ Youth Crisis Line)  8.069.678.7511  text START to 087-266        Community Resources  Fast Tracker  Linking people to mental health and substance use disorder resources  PerfectSearchn.org     "  [facility] Warm Line  Peer to peer support  Monday thru Saturday, 12 pm to 10 pm  898.807.4787 or 6.113.918.2765  Text \"Support\" to 67721     National [place] on Mental Illness (DEEPA)  489.421.3317 or 1.888.DEEPA.HELPS        Mental Health Apps  My3  https://Financeit.admetricks/     VirtualHopeBox  https://ICEdot/apps/virtual-hope-box/        Additional Information  Today you were seen by a licensed mental health professional through Triage and Transition services, [facility] ([facility])  for a crisis assessment in the Emergency Department at [facility].  It is recommended that you follow up with your established providers (psychiatrist, mental health therapist, and/or primary care doctor - as relevant) as soon as possible. Coordinators from [facility] will be calling you in the next 24-48 hours to ensure that you have the resources you need.  You can also contact [facility] coordinators directly at [phone]. You may have been scheduled for or offered an appointment with a mental health provider. [facility] maintains an extensive network of licensed behavioral health providers to connect patients with the services they need.  We do not charge providers a fee to participate in our referral network.  We match patients with providers based on a patient's specific needs, insurance coverage, and location.  Our first effort will be to refer you to a provider within your care system, and will utilize providers outside your care system as needed.       "

## 2023-01-26 ENCOUNTER — PATIENT OUTREACH (OUTPATIENT)
Dept: CARE COORDINATION | Facility: CLINIC | Age: 73
End: 2023-01-26
Payer: MEDICARE

## 2023-01-26 NOTE — PROGRESS NOTES
Clinic Care Coordination Contact  Community Health Worker Initial Outreach    CHW Initial Information Gathering:  Referral Source: ED Follow-Up  Preferred Hospital: [facility] [place]  [phone]  Preferred Urgent Care: [facility] [place], [phone]  Current living arrangement:: I live alone  Type of residence:: Apartment  Supplies Currently Used at Home:  (Brace on left arm)  Equipment Currently Used at Home: walker, vishal  Informal Support system:: Family  No PCP office visit in Past Year: Yes  Transportation means:: Family (Currently not able to drive)  CHW Additional Questions  If ED/Hospital discharge, follow-up appointment scheduled as recommended?: Yes  Medication changes made following ED/Hospital discharge?: Yes, patient to be scheduled with CC RN/SW within approx 1 business day    Patient accepts CC: Yes. Patient would like help with transportation, navigating process for possibly getting a waiver. Also, Mental Health support     Addendum 1/27/23-Email sent to patient.  Noe Ortiz,  I have you scheduled for a phone visit with a  next Wed, Feb 1st @ 10:15am to discuss transportation resources, county resources and anything else that you may think is helpful.    I did send [employer] an email asking if they provided transportation or had resources. I have not heard back yet.    I have attached a [employer] application above.    Neighbor to Neighbor Companions  [facility] [employer] [facility] [place] (lsn.org)  This program is something you would have to pay for, but they would be able to assist with transportation and many other things if needed.    Let me know if you have questions! Hope you have a wonderful weekend. Take Care!  -Etelvina JACKMAN [facility]: Post-Discharge Note  SITUATION                                                      Addendum:  Sent message to [employer] [place] to see if they offered any assistance with transportation.      Admission:    Admission  Date: 01/24/23   Reason for Admission: Suicidal  Discharge:   Discharge Date: 01/25/23  Discharge Diagnosis: Suicidal    BACKGROUND                                                    Diana Santiago is a 72 year old female with a history of stage IV CKD, breast cancer, hypertension, hyperlipidemia who presents with suicidal ideation. She reports that earlier today, she was having some suicidal ideation and subsequently entered an indoor pool and thought about drowning herself. She stopped herself at this point and ultimately did not inhale any water. Presently, she denies feeling chilled. She reports that her suicidal ideation originates from her history of medical problems including CKD and perforated viscus. She does note that she has a history of suicide attempts many years ago, with her suicidal ideation also being derived from her medical problems at that time. Her kidney function is checked regularly, and she receives regular blood transfusions for anemia. No recent fevers or shortness of breath.      Independent Historian: Brother supplemented some history; patient is primary historian.      Review of External Notes: none      ROS:  Review of Systems   Constitutional: Negative for chills and fever.   Respiratory: Negative for shortness of breath.    Psychiatric/Behavioral: Positive for suicidal ideas.   All other systems reviewed and are negative.     Allergies:  The patient has no known allergies.      Medications:    Amlodipine  Allegra  Haldol  Lamictal  Protonix  Zocor  Bictra     Past Medical History:    Hypernatremia  Hypertension     Bipolar affective disorder  CKD, stage 4  Ductal carcinoma in situ           Depression   Fractured femoral neck  Hypercalcemia              Hyperlipidemia   Hyperparathyroidism  Lithium toxicity              Nephrogenic diabetes insipidus  Vitamin D deficiency     Heterozygous thalassemia  Chronic anemia  Acute kidney injury  Perforated viscus  Septic  shock  Meningioma  Right foot drop     Past Surgical History:    Breast biopsy, left  Breast lumpectomy x4  Eye surgery  Exploratory laparotomy   Hand surgery, left  Tracheostomy closure      Family History:    Mother: cerebrovascular disease, hypertension, heart disease   Father: hypertension   Brother: sleep apnea     Social History:  The patient presents to the ED with her brother.  The patient presents to the ED via EMS.   PCP: Gayle Hernandez     ASSESSMENT           Discharge Assessment  How are you doing now that you are home?: Patient shares that she is doing well. Reviewed AVS together and went over how to call [facility] to complete intake. Patient asks if there would be resources for transportation to/from [facility] where she would be going for activities. Writer let's pt know they can certainly look into this and email her anything that may be helpful. Confirmed email [email or web address]. Writer offered Care Coordination and explained program. Patient is interested an thinks this would be helpful. Thanks writer for the call.  How are your symptoms? (Red Flag symptoms escalate to triage hotline per guidelines): Improved  Do you feel your condition is stable enough to be safe at home until your provider visit?: Yes  Does the patient have their discharge instructions? : Yes  Does the patient have questions regarding their discharge instructions? : No  Were you started on any new medications or were there changes to any of your previous medications? : Yes  Does the patient have all of their medications?: Yes  Do you have questions regarding any of your medications? : No  Do you have all of your needed medical supplies or equipment (DME)?  (i.e. oxygen tank, CPAP, cane, etc.): Yes  Discharge follow-up appointment scheduled within 14 calendar days? : Yes  Discharge Follow Up Appointment Date: 01/26/23  Discharge Follow Up Appointment Scheduled with?: Specialty Care Provider (Therapist)    Post-op (CHW CTA  Only)  If the patient had a surgery or procedure, do they have any questions for a nurse?: No    Post-op (Clinicians Only)  Did the patient have surgery or a procedure: No  Fever: No  Chills: No      PLAN                                                      Outpatient Plan:  Follow-up appointments and resources:  1. Outpatient therapy with Jd Unger ([facility]) on 1/31/2023  2. Outpatient Psychiatry with Aftab Gallo on 2/2/2023  3. Contact: [facility] at [phone]  * Will help get started with Elderly Waiver  4. Connect with [facility]:  [email or web address]  Administration / Case Management  [address] [place] [address]  [email or web address]      Future Appointments   Date Time Provider Department Center   2/13/2023  1:15 PM SH PIV LAB [facility]   2/13/2023  2:15 PM SH FAST TRACK LAB [facility]   3/13/2023  1:15 PM SH PIV LAB [facility]   3/13/2023  2:15 PM SH FAST TRACK LAB [facility]         For any urgent concerns, please contact our 24 hour nurse triage line: [phone] ([phone])         LUCERO London

## 2023-01-28 ENCOUNTER — HOSPITAL ENCOUNTER (INPATIENT)
Facility: CLINIC | Age: 73
LOS: 25 days | Discharge: NURSING, REHAB OR LONG-TERM CARE | DRG: 917 | End: 2023-02-23
Attending: EMERGENCY MEDICINE | Admitting: STUDENT IN AN ORGANIZED HEALTH CARE EDUCATION/TRAINING PROGRAM
Payer: MEDICARE

## 2023-01-28 DIAGNOSIS — E23.2 DIABETES INSIPIDUS (H): ICD-10-CM

## 2023-01-28 DIAGNOSIS — T50.902A OVERDOSE, INTENTIONAL SELF-HARM, INITIAL ENCOUNTER (H): Primary | ICD-10-CM

## 2023-01-28 DIAGNOSIS — F31.9 BIPOLAR AFFECTIVE DISORDER, REMISSION STATUS UNSPECIFIED (H): ICD-10-CM

## 2023-01-28 LAB
ALBUMIN SERPL BCG-MCNC: 3.8 G/DL (ref 3.5–5.2)
ALP SERPL-CCNC: 160 U/L (ref 35–104)
ALT SERPL W P-5'-P-CCNC: 36 U/L (ref 10–35)
ANION GAP SERPL CALCULATED.3IONS-SCNC: 14 MMOL/L (ref 7–15)
APAP SERPL-MCNC: <5 UG/ML (ref 10–30)
AST SERPL W P-5'-P-CCNC: 25 U/L (ref 10–35)
ATRIAL RATE - MUSE: 68 BPM
ATRIAL RATE - MUSE: 71 BPM
BASOPHILS # BLD AUTO: 0 10E3/UL (ref 0–0.2)
BASOPHILS NFR BLD AUTO: 0 %
BILIRUB SERPL-MCNC: 0.3 MG/DL
BUN SERPL-MCNC: 66.6 MG/DL (ref 8–23)
CALCIUM SERPL-MCNC: 9.9 MG/DL (ref 8.8–10.2)
CHLORIDE SERPL-SCNC: 97 MMOL/L (ref 98–107)
CREAT SERPL-MCNC: 1.8 MG/DL (ref 0.51–0.95)
DEPRECATED HCO3 PLAS-SCNC: 22 MMOL/L (ref 22–29)
DIASTOLIC BLOOD PRESSURE - MUSE: NORMAL MMHG
DIASTOLIC BLOOD PRESSURE - MUSE: NORMAL MMHG
EOSINOPHIL # BLD AUTO: 0.1 10E3/UL (ref 0–0.7)
EOSINOPHIL NFR BLD AUTO: 1 %
ERYTHROCYTE [DISTWIDTH] IN BLOOD BY AUTOMATED COUNT: 14.7 % (ref 10–15)
ETHANOL SERPL-MCNC: <0.01 G/DL
GFR SERPL CREATININE-BSD FRML MDRD: 29 ML/MIN/1.73M2
GLUCOSE SERPL-MCNC: 148 MG/DL (ref 70–99)
HCT VFR BLD AUTO: 32.7 % (ref 35–47)
HGB BLD-MCNC: 9.9 G/DL (ref 11.7–15.7)
IMM GRANULOCYTES # BLD: 0 10E3/UL
IMM GRANULOCYTES NFR BLD: 0 %
INTERPRETATION ECG - MUSE: NORMAL
INTERPRETATION ECG - MUSE: NORMAL
LYMPHOCYTES # BLD AUTO: 0.8 10E3/UL (ref 0.8–5.3)
LYMPHOCYTES NFR BLD AUTO: 9 %
MAGNESIUM SERPL-MCNC: 2.4 MG/DL (ref 1.7–2.3)
MCH RBC QN AUTO: 21.4 PG (ref 26.5–33)
MCHC RBC AUTO-ENTMCNC: 30.3 G/DL (ref 31.5–36.5)
MCV RBC AUTO: 71 FL (ref 78–100)
MONOCYTES # BLD AUTO: 0.7 10E3/UL (ref 0–1.3)
MONOCYTES NFR BLD AUTO: 8 %
NEUTROPHILS # BLD AUTO: 7.7 10E3/UL (ref 1.6–8.3)
NEUTROPHILS NFR BLD AUTO: 82 %
NRBC # BLD AUTO: 0 10E3/UL
NRBC BLD AUTO-RTO: 0 /100
P AXIS - MUSE: 67 DEGREES
P AXIS - MUSE: 68 DEGREES
PLATELET # BLD AUTO: 300 10E3/UL (ref 150–450)
POTASSIUM SERPL-SCNC: 4.6 MMOL/L (ref 3.4–5.3)
PR INTERVAL - MUSE: 224 MS
PR INTERVAL - MUSE: 258 MS
PROT SERPL-MCNC: 6.9 G/DL (ref 6.4–8.3)
QRS DURATION - MUSE: 108 MS
QRS DURATION - MUSE: 118 MS
QT - MUSE: 470 MS
QT - MUSE: 476 MS
QTC - MUSE: 506 MS
QTC - MUSE: 510 MS
R AXIS - MUSE: 100 DEGREES
R AXIS - MUSE: 95 DEGREES
RBC # BLD AUTO: 4.63 10E6/UL (ref 3.8–5.2)
SALICYLATES SERPL-MCNC: <0.5 MG/DL
SODIUM SERPL-SCNC: 133 MMOL/L (ref 136–145)
SYSTOLIC BLOOD PRESSURE - MUSE: NORMAL MMHG
SYSTOLIC BLOOD PRESSURE - MUSE: NORMAL MMHG
T AXIS - MUSE: 77 DEGREES
T AXIS - MUSE: 88 DEGREES
VENTRICULAR RATE- MUSE: 68 BPM
VENTRICULAR RATE- MUSE: 71 BPM
WBC # BLD AUTO: 9.4 10E3/UL (ref 4–11)

## 2023-01-28 PROCEDURE — 272N000007 HC KIT ART LINE INSERTION

## 2023-01-28 PROCEDURE — 3E043XZ INTRODUCTION OF VASOPRESSOR INTO CENTRAL VEIN, PERCUTANEOUS APPROACH: ICD-10-PCS | Performed by: EMERGENCY MEDICINE

## 2023-01-28 PROCEDURE — 80053 COMPREHEN METABOLIC PANEL: CPT | Performed by: EMERGENCY MEDICINE

## 2023-01-28 PROCEDURE — 96360 HYDRATION IV INFUSION INIT: CPT

## 2023-01-28 PROCEDURE — 99291 CRITICAL CARE FIRST HOUR: CPT | Mod: 25

## 2023-01-28 PROCEDURE — 99292 CRITICAL CARE ADDL 30 MIN: CPT

## 2023-01-28 PROCEDURE — 36556 INSERT NON-TUNNEL CV CATH: CPT

## 2023-01-28 PROCEDURE — 83735 ASSAY OF MAGNESIUM: CPT | Performed by: EMERGENCY MEDICINE

## 2023-01-28 PROCEDURE — 36415 COLL VENOUS BLD VENIPUNCTURE: CPT | Performed by: EMERGENCY MEDICINE

## 2023-01-28 PROCEDURE — 258N000003 HC RX IP 258 OP 636: Performed by: EMERGENCY MEDICINE

## 2023-01-28 PROCEDURE — 82077 ASSAY SPEC XCP UR&BREATH IA: CPT | Performed by: EMERGENCY MEDICINE

## 2023-01-28 PROCEDURE — 250N000011 HC RX IP 250 OP 636: Performed by: EMERGENCY MEDICINE

## 2023-01-28 PROCEDURE — 258N000003 HC RX IP 258 OP 636: Performed by: STUDENT IN AN ORGANIZED HEALTH CARE EDUCATION/TRAINING PROGRAM

## 2023-01-28 PROCEDURE — 99223 1ST HOSP IP/OBS HIGH 75: CPT | Mod: AI | Performed by: STUDENT IN AN ORGANIZED HEALTH CARE EDUCATION/TRAINING PROGRAM

## 2023-01-28 PROCEDURE — 80143 DRUG ASSAY ACETAMINOPHEN: CPT | Performed by: EMERGENCY MEDICINE

## 2023-01-28 PROCEDURE — 96361 HYDRATE IV INFUSION ADD-ON: CPT

## 2023-01-28 PROCEDURE — 85025 COMPLETE CBC W/AUTO DIFF WBC: CPT | Performed by: EMERGENCY MEDICINE

## 2023-01-28 PROCEDURE — 93005 ELECTROCARDIOGRAM TRACING: CPT

## 2023-01-28 PROCEDURE — 93005 ELECTROCARDIOGRAM TRACING: CPT | Mod: 76

## 2023-01-28 PROCEDURE — 80179 DRUG ASSAY SALICYLATE: CPT | Performed by: EMERGENCY MEDICINE

## 2023-01-28 PROCEDURE — G0378 HOSPITAL OBSERVATION PER HR: HCPCS

## 2023-01-28 RX ORDER — SERTRALINE HYDROCHLORIDE 25 MG/1
25 TABLET, FILM COATED ORAL DAILY
Status: ON HOLD | COMMUNITY
End: 2023-02-07

## 2023-01-28 RX ORDER — CALCIUM GLUCONATE 94 MG/ML
3 INJECTION, SOLUTION INTRAVENOUS ONCE
Status: DISCONTINUED | OUTPATIENT
Start: 2023-01-28 | End: 2023-01-28

## 2023-01-28 RX ORDER — SODIUM CHLORIDE 9 MG/ML
INJECTION, SOLUTION INTRAVENOUS CONTINUOUS
Status: DISCONTINUED | OUTPATIENT
Start: 2023-01-28 | End: 2023-01-30

## 2023-01-28 RX ORDER — HALOPERIDOL 2 MG/1
2 TABLET ORAL AT BEDTIME
Status: ON HOLD | COMMUNITY
End: 2023-02-15

## 2023-01-28 RX ORDER — DEXTROAMPHETAMINE SULFATE 5 MG/1
5 TABLET ORAL DAILY
Status: ON HOLD | COMMUNITY
End: 2023-02-15

## 2023-01-28 RX ADMIN — SODIUM CHLORIDE 1000 ML: 9 INJECTION, SOLUTION INTRAVENOUS at 19:32

## 2023-01-28 RX ADMIN — CALCIUM GLUCONATE 3 G: 98 INJECTION, SOLUTION INTRAVENOUS at 23:45

## 2023-01-28 RX ADMIN — SODIUM CHLORIDE: 9 INJECTION, SOLUTION INTRAVENOUS at 23:45

## 2023-01-28 ASSESSMENT — ACTIVITIES OF DAILY LIVING (ADL)
ADLS_ACUITY_SCORE: 35
ADLS_ACUITY_SCORE: 35

## 2023-01-28 ASSESSMENT — ENCOUNTER SYMPTOMS: VOMITING: 1

## 2023-01-29 ENCOUNTER — APPOINTMENT (OUTPATIENT)
Dept: GENERAL RADIOLOGY | Facility: CLINIC | Age: 73
DRG: 917 | End: 2023-01-29
Attending: INTERNAL MEDICINE
Payer: MEDICARE

## 2023-01-29 ENCOUNTER — APPOINTMENT (OUTPATIENT)
Dept: CARDIOLOGY | Facility: CLINIC | Age: 73
DRG: 917 | End: 2023-01-29
Attending: INTERNAL MEDICINE
Payer: MEDICARE

## 2023-01-29 PROBLEM — T50.902A OVERDOSE, INTENTIONAL SELF-HARM, INITIAL ENCOUNTER (H): Status: ACTIVE | Noted: 2023-01-29

## 2023-01-29 LAB
ALBUMIN SERPL BCG-MCNC: 3.3 G/DL (ref 3.5–5.2)
ALBUMIN SERPL BCG-MCNC: 3.3 G/DL (ref 3.5–5.2)
ALBUMIN UR-MCNC: NEGATIVE MG/DL
ALLEN'S TEST: ABNORMAL
ALP SERPL-CCNC: 137 U/L (ref 35–104)
ALP SERPL-CCNC: 144 U/L (ref 35–104)
ALT SERPL W P-5'-P-CCNC: 28 U/L (ref 10–35)
ALT SERPL W P-5'-P-CCNC: 28 U/L (ref 10–35)
AMPHETAMINES UR QL SCN: ABNORMAL
ANION GAP SERPL CALCULATED.3IONS-SCNC: 11 MMOL/L (ref 7–15)
ANION GAP SERPL CALCULATED.3IONS-SCNC: 13 MMOL/L (ref 7–15)
ANION GAP SERPL CALCULATED.3IONS-SCNC: 13 MMOL/L (ref 7–15)
ANION GAP SERPL CALCULATED.3IONS-SCNC: 14 MMOL/L (ref 7–15)
APPEARANCE UR: CLEAR
AST SERPL W P-5'-P-CCNC: 16 U/L (ref 10–35)
AST SERPL W P-5'-P-CCNC: 22 U/L (ref 10–35)
BARBITURATES UR QL SCN: ABNORMAL
BASE EXCESS BLDA CALC-SCNC: -6.1 MMOL/L (ref -9–1.8)
BASE EXCESS BLDA CALC-SCNC: -6.3 MMOL/L (ref -9–1.8)
BASE EXCESS BLDA CALC-SCNC: -7.5 MMOL/L (ref -9–1.8)
BENZODIAZ UR QL SCN: ABNORMAL
BILIRUB SERPL-MCNC: 0.3 MG/DL
BILIRUB SERPL-MCNC: 0.3 MG/DL
BILIRUB UR QL STRIP: NEGATIVE
BUN SERPL-MCNC: 51.7 MG/DL (ref 8–23)
BUN SERPL-MCNC: 56.2 MG/DL (ref 8–23)
BUN SERPL-MCNC: 60.9 MG/DL (ref 8–23)
BUN SERPL-MCNC: 62.9 MG/DL (ref 8–23)
BZE UR QL SCN: ABNORMAL
CA-I BLD-MCNC: 5.7 MG/DL (ref 4.4–5.2)
CA-I BLD-MCNC: 5.8 MG/DL (ref 4.4–5.2)
CA-I BLD-MCNC: 5.8 MG/DL (ref 4.4–5.2)
CA-I BLD-MCNC: 5.9 MG/DL (ref 4.4–5.2)
CA-I BLD-MCNC: 6 MG/DL (ref 4.4–5.2)
CA-I BLD-MCNC: 6 MG/DL (ref 4.4–5.2)
CALCIUM SERPL-MCNC: 10.9 MG/DL (ref 8.8–10.2)
CALCIUM SERPL-MCNC: 10.9 MG/DL (ref 8.8–10.2)
CALCIUM SERPL-MCNC: 12.1 MG/DL (ref 8.8–10.2)
CALCIUM SERPL-MCNC: 12.1 MG/DL (ref 8.8–10.2)
CANNABINOIDS UR QL SCN: ABNORMAL
CHLORIDE SERPL-SCNC: 101 MMOL/L (ref 98–107)
CHLORIDE SERPL-SCNC: 102 MMOL/L (ref 98–107)
CHLORIDE SERPL-SCNC: 105 MMOL/L (ref 98–107)
CHLORIDE SERPL-SCNC: 107 MMOL/L (ref 98–107)
COLOR UR AUTO: NORMAL
CREAT SERPL-MCNC: 1.73 MG/DL (ref 0.51–0.95)
CREAT SERPL-MCNC: 1.79 MG/DL (ref 0.51–0.95)
CREAT SERPL-MCNC: 1.8 MG/DL (ref 0.51–0.95)
CREAT SERPL-MCNC: 1.85 MG/DL (ref 0.51–0.95)
DEPRECATED HCO3 PLAS-SCNC: 18 MMOL/L (ref 22–29)
DEPRECATED HCO3 PLAS-SCNC: 19 MMOL/L (ref 22–29)
DEPRECATED HCO3 PLAS-SCNC: 20 MMOL/L (ref 22–29)
DEPRECATED HCO3 PLAS-SCNC: 21 MMOL/L (ref 22–29)
ERYTHROCYTE [DISTWIDTH] IN BLOOD BY AUTOMATED COUNT: 14.7 % (ref 10–15)
GFR SERPL CREATININE-BSD FRML MDRD: 28 ML/MIN/1.73M2
GFR SERPL CREATININE-BSD FRML MDRD: 29 ML/MIN/1.73M2
GFR SERPL CREATININE-BSD FRML MDRD: 30 ML/MIN/1.73M2
GFR SERPL CREATININE-BSD FRML MDRD: 31 ML/MIN/1.73M2
GLUCOSE BLDC GLUCOMTR-MCNC: 125 MG/DL (ref 70–99)
GLUCOSE BLDC GLUCOMTR-MCNC: 137 MG/DL (ref 70–99)
GLUCOSE BLDC GLUCOMTR-MCNC: 157 MG/DL (ref 70–99)
GLUCOSE SERPL-MCNC: 125 MG/DL (ref 70–99)
GLUCOSE SERPL-MCNC: 126 MG/DL (ref 70–99)
GLUCOSE SERPL-MCNC: 143 MG/DL (ref 70–99)
GLUCOSE SERPL-MCNC: 148 MG/DL (ref 70–99)
GLUCOSE UR STRIP-MCNC: NEGATIVE MG/DL
HCO3 BLD-SCNC: 17 MMOL/L (ref 21–28)
HCO3 BLD-SCNC: 18 MMOL/L (ref 21–28)
HCO3 BLD-SCNC: 19 MMOL/L (ref 21–28)
HCT VFR BLD AUTO: 29.5 % (ref 35–47)
HGB BLD-MCNC: 9.1 G/DL (ref 11.7–15.7)
HGB UR QL STRIP: NEGATIVE
KETONES UR STRIP-MCNC: NEGATIVE MG/DL
LACTATE SERPL-SCNC: 2 MMOL/L (ref 0.7–2)
LACTATE SERPL-SCNC: 2.2 MMOL/L (ref 0.7–2)
LACTATE SERPL-SCNC: 2.7 MMOL/L (ref 0.7–2)
LACTATE SERPL-SCNC: 2.9 MMOL/L (ref 0.7–2)
LEUKOCYTE ESTERASE UR QL STRIP: NEGATIVE
LVEF ECHO: NORMAL
MAGNESIUM SERPL-MCNC: 2 MG/DL (ref 1.7–2.3)
MAGNESIUM SERPL-MCNC: 2.1 MG/DL (ref 1.7–2.3)
MAGNESIUM SERPL-MCNC: 2.3 MG/DL (ref 1.7–2.3)
MCH RBC QN AUTO: 21.7 PG (ref 26.5–33)
MCHC RBC AUTO-ENTMCNC: 30.8 G/DL (ref 31.5–36.5)
MCV RBC AUTO: 70 FL (ref 78–100)
NITRATE UR QL: NEGATIVE
O2/TOTAL GAS SETTING VFR VENT: 4 %
O2/TOTAL GAS SETTING VFR VENT: 5 %
O2/TOTAL GAS SETTING VFR VENT: 57 %
OPIATES UR QL SCN: ABNORMAL
PCO2 BLD: 30 MM HG (ref 35–45)
PCO2 BLD: 32 MM HG (ref 35–45)
PCO2 BLD: 35 MM HG (ref 35–45)
PCP QUAL URINE (ROCHE): ABNORMAL
PH BLD: 7.34 [PH] (ref 7.35–7.45)
PH BLD: 7.36 [PH] (ref 7.35–7.45)
PH BLD: 7.37 [PH] (ref 7.35–7.45)
PH UR STRIP: 5 [PH] (ref 5–7)
PHOSPHATE SERPL-MCNC: 4.3 MG/DL (ref 2.5–4.5)
PLATELET # BLD AUTO: 279 10E3/UL (ref 150–450)
PO2 BLD: 103 MM HG (ref 80–105)
PO2 BLD: 68 MM HG (ref 80–105)
PO2 BLD: 82 MM HG (ref 80–105)
POTASSIUM SERPL-SCNC: 4.7 MMOL/L (ref 3.4–5.3)
POTASSIUM SERPL-SCNC: 4.7 MMOL/L (ref 3.4–5.3)
POTASSIUM SERPL-SCNC: 4.8 MMOL/L (ref 3.4–5.3)
POTASSIUM SERPL-SCNC: 4.8 MMOL/L (ref 3.4–5.3)
PROT SERPL-MCNC: 5.8 G/DL (ref 6.4–8.3)
PROT SERPL-MCNC: 5.8 G/DL (ref 6.4–8.3)
RBC # BLD AUTO: 4.19 10E6/UL (ref 3.8–5.2)
SODIUM SERPL-SCNC: 135 MMOL/L (ref 136–145)
SODIUM SERPL-SCNC: 135 MMOL/L (ref 136–145)
SODIUM SERPL-SCNC: 137 MMOL/L (ref 136–145)
SODIUM SERPL-SCNC: 137 MMOL/L (ref 136–145)
SP GR UR STRIP: 1.01 (ref 1–1.03)
UROBILINOGEN UR STRIP-MCNC: NORMAL MG/DL
WBC # BLD AUTO: 6.1 10E3/UL (ref 4–11)

## 2023-01-29 PROCEDURE — 93325 DOPPLER ECHO COLOR FLOW MAPG: CPT | Mod: 26 | Performed by: INTERNAL MEDICINE

## 2023-01-29 PROCEDURE — 82803 BLOOD GASES ANY COMBINATION: CPT | Performed by: ANESTHESIOLOGY

## 2023-01-29 PROCEDURE — 99418 PROLNG IP/OBS E/M EA 15 MIN: CPT | Performed by: INTERNAL MEDICINE

## 2023-01-29 PROCEDURE — 84100 ASSAY OF PHOSPHORUS: CPT | Performed by: ANESTHESIOLOGY

## 2023-01-29 PROCEDURE — 99291 CRITICAL CARE FIRST HOUR: CPT | Performed by: ANESTHESIOLOGY

## 2023-01-29 PROCEDURE — 36415 COLL VENOUS BLD VENIPUNCTURE: CPT | Performed by: INTERNAL MEDICINE

## 2023-01-29 PROCEDURE — 93321 DOPPLER ECHO F-UP/LMTD STD: CPT | Mod: 26 | Performed by: INTERNAL MEDICINE

## 2023-01-29 PROCEDURE — 258N000003 HC RX IP 258 OP 636: Performed by: ANESTHESIOLOGY

## 2023-01-29 PROCEDURE — 96375 TX/PRO/DX INJ NEW DRUG ADDON: CPT

## 2023-01-29 PROCEDURE — 36415 COLL VENOUS BLD VENIPUNCTURE: CPT | Performed by: HOSPITALIST

## 2023-01-29 PROCEDURE — 80048 BASIC METABOLIC PNL TOTAL CA: CPT | Performed by: ANESTHESIOLOGY

## 2023-01-29 PROCEDURE — 36569 INSJ PICC 5 YR+ W/O IMAGING: CPT

## 2023-01-29 PROCEDURE — 36415 COLL VENOUS BLD VENIPUNCTURE: CPT | Performed by: ANESTHESIOLOGY

## 2023-01-29 PROCEDURE — 83605 ASSAY OF LACTIC ACID: CPT | Performed by: ANESTHESIOLOGY

## 2023-01-29 PROCEDURE — 250N000009 HC RX 250: Performed by: INTERNAL MEDICINE

## 2023-01-29 PROCEDURE — G0378 HOSPITAL OBSERVATION PER HR: HCPCS

## 2023-01-29 PROCEDURE — 999N000208 ECHOCARDIOGRAM LIMITED

## 2023-01-29 PROCEDURE — 36569 INSJ PICC 5 YR+ W/O IMAGING: CPT | Mod: 52

## 2023-01-29 PROCEDURE — 80053 COMPREHEN METABOLIC PANEL: CPT | Performed by: STUDENT IN AN ORGANIZED HEALTH CARE EDUCATION/TRAINING PROGRAM

## 2023-01-29 PROCEDURE — 82330 ASSAY OF CALCIUM: CPT | Performed by: ANESTHESIOLOGY

## 2023-01-29 PROCEDURE — 258N000003 HC RX IP 258 OP 636: Performed by: EMERGENCY MEDICINE

## 2023-01-29 PROCEDURE — 96361 HYDRATE IV INFUSION ADD-ON: CPT

## 2023-01-29 PROCEDURE — 96365 THER/PROPH/DIAG IV INF INIT: CPT

## 2023-01-29 PROCEDURE — 93308 TTE F-UP OR LMTD: CPT | Mod: 26 | Performed by: INTERNAL MEDICINE

## 2023-01-29 PROCEDURE — 82330 ASSAY OF CALCIUM: CPT | Performed by: EMERGENCY MEDICINE

## 2023-01-29 PROCEDURE — 84450 TRANSFERASE (AST) (SGOT): CPT | Performed by: ANESTHESIOLOGY

## 2023-01-29 PROCEDURE — 80307 DRUG TEST PRSMV CHEM ANLYZR: CPT | Performed by: HOSPITALIST

## 2023-01-29 PROCEDURE — 36415 COLL VENOUS BLD VENIPUNCTURE: CPT | Performed by: STUDENT IN AN ORGANIZED HEALTH CARE EDUCATION/TRAINING PROGRAM

## 2023-01-29 PROCEDURE — 272N000452 HC KIT SHRLOCK 5FR POWER PICC TRIPLE LUMEN

## 2023-01-29 PROCEDURE — 71045 X-RAY EXAM CHEST 1 VIEW: CPT

## 2023-01-29 PROCEDURE — 99233 SBSQ HOSP IP/OBS HIGH 50: CPT | Performed by: INTERNAL MEDICINE

## 2023-01-29 PROCEDURE — 83735 ASSAY OF MAGNESIUM: CPT | Performed by: ANESTHESIOLOGY

## 2023-01-29 PROCEDURE — 250N000011 HC RX IP 250 OP 636: Performed by: HOSPITALIST

## 2023-01-29 PROCEDURE — 93321 DOPPLER ECHO F-UP/LMTD STD: CPT

## 2023-01-29 PROCEDURE — 85014 HEMATOCRIT: CPT | Performed by: STUDENT IN AN ORGANIZED HEALTH CARE EDUCATION/TRAINING PROGRAM

## 2023-01-29 PROCEDURE — 82330 ASSAY OF CALCIUM: CPT | Performed by: INTERNAL MEDICINE

## 2023-01-29 PROCEDURE — P9045 ALBUMIN (HUMAN), 5%, 250 ML: HCPCS | Performed by: ANESTHESIOLOGY

## 2023-01-29 PROCEDURE — 83605 ASSAY OF LACTIC ACID: CPT | Performed by: HOSPITALIST

## 2023-01-29 PROCEDURE — 250N000011 HC RX IP 250 OP 636: Performed by: EMERGENCY MEDICINE

## 2023-01-29 PROCEDURE — 96366 THER/PROPH/DIAG IV INF ADDON: CPT

## 2023-01-29 PROCEDURE — 255N000002 HC RX 255 OP 636: Performed by: EMERGENCY MEDICINE

## 2023-01-29 PROCEDURE — 258N000003 HC RX IP 258 OP 636: Performed by: HOSPITALIST

## 2023-01-29 PROCEDURE — 250N000011 HC RX IP 250 OP 636

## 2023-01-29 PROCEDURE — 84155 ASSAY OF PROTEIN SERUM: CPT | Performed by: ANESTHESIOLOGY

## 2023-01-29 PROCEDURE — 81003 URINALYSIS AUTO W/O SCOPE: CPT | Performed by: HOSPITALIST

## 2023-01-29 PROCEDURE — 250N000011 HC RX IP 250 OP 636: Performed by: ANESTHESIOLOGY

## 2023-01-29 PROCEDURE — 200N000001 HC R&B ICU

## 2023-01-29 RX ORDER — POTASSIUM CHLORIDE 1500 MG/1
20-40 TABLET, EXTENDED RELEASE ORAL
Status: DISCONTINUED | OUTPATIENT
Start: 2023-01-29 | End: 2023-01-30

## 2023-01-29 RX ORDER — DEXTROSE MONOHYDRATE 25 G/50ML
25 INJECTION, SOLUTION INTRAVENOUS
Status: DISCONTINUED | OUTPATIENT
Start: 2023-01-29 | End: 2023-01-29

## 2023-01-29 RX ORDER — DEXTROSE MONOHYDRATE 25 G/50ML
25 INJECTION, SOLUTION INTRAVENOUS EVERY 30 MIN PRN
Status: DISCONTINUED | OUTPATIENT
Start: 2023-01-29 | End: 2023-01-30

## 2023-01-29 RX ORDER — DEXTROSE MONOHYDRATE 25 G/50ML
25 INJECTION, SOLUTION INTRAVENOUS EVERY 30 MIN PRN
Status: DISCONTINUED | OUTPATIENT
Start: 2023-01-29 | End: 2023-01-29

## 2023-01-29 RX ORDER — DEXTROSE MONOHYDRATE 25 G/50ML
25-50 INJECTION, SOLUTION INTRAVENOUS
Status: DISCONTINUED | OUTPATIENT
Start: 2023-01-29 | End: 2023-02-10

## 2023-01-29 RX ORDER — BISACODYL 10 MG
10 SUPPOSITORY, RECTAL RECTAL DAILY PRN
Status: DISCONTINUED | OUTPATIENT
Start: 2023-01-29 | End: 2023-02-23 | Stop reason: HOSPADM

## 2023-01-29 RX ORDER — EPINEPHRINE IN 0.9 % SOD CHLOR 5 MG/250ML
.01-.3 PLASTIC BAG, INJECTION (ML) INTRAVENOUS CONTINUOUS
Status: DISCONTINUED | OUTPATIENT
Start: 2023-01-29 | End: 2023-02-01

## 2023-01-29 RX ORDER — NALOXONE HYDROCHLORIDE 0.4 MG/ML
0.2 INJECTION, SOLUTION INTRAMUSCULAR; INTRAVENOUS; SUBCUTANEOUS
Status: DISCONTINUED | OUTPATIENT
Start: 2023-01-29 | End: 2023-02-23 | Stop reason: HOSPADM

## 2023-01-29 RX ORDER — NALOXONE HYDROCHLORIDE 0.4 MG/ML
0.4 INJECTION, SOLUTION INTRAMUSCULAR; INTRAVENOUS; SUBCUTANEOUS
Status: DISCONTINUED | OUTPATIENT
Start: 2023-01-29 | End: 2023-02-23 | Stop reason: HOSPADM

## 2023-01-29 RX ORDER — ONDANSETRON 4 MG/1
4 TABLET, ORALLY DISINTEGRATING ORAL EVERY 6 HOURS PRN
Status: DISCONTINUED | OUTPATIENT
Start: 2023-01-29 | End: 2023-02-23 | Stop reason: HOSPADM

## 2023-01-29 RX ORDER — MAGNESIUM SULFATE HEPTAHYDRATE 40 MG/ML
4 INJECTION, SOLUTION INTRAVENOUS DAILY PRN
Status: DISCONTINUED | OUTPATIENT
Start: 2023-01-29 | End: 2023-01-30

## 2023-01-29 RX ORDER — SODIUM CHLORIDE, SODIUM LACTATE, POTASSIUM CHLORIDE, CALCIUM CHLORIDE 600; 310; 30; 20 MG/100ML; MG/100ML; MG/100ML; MG/100ML
INJECTION, SOLUTION INTRAVENOUS CONTINUOUS
Status: DISCONTINUED | OUTPATIENT
Start: 2023-01-29 | End: 2023-01-30

## 2023-01-29 RX ORDER — DEXTROSE MONOHYDRATE 25 G/50ML
25-50 INJECTION, SOLUTION INTRAVENOUS
Status: DISCONTINUED | OUTPATIENT
Start: 2023-01-29 | End: 2023-01-29

## 2023-01-29 RX ORDER — POTASSIUM CHLORIDE 29.8 MG/ML
20 INJECTION INTRAVENOUS
Status: DISCONTINUED | OUTPATIENT
Start: 2023-01-29 | End: 2023-01-30

## 2023-01-29 RX ORDER — MAGNESIUM SULFATE HEPTAHYDRATE 40 MG/ML
2 INJECTION, SOLUTION INTRAVENOUS DAILY PRN
Status: DISCONTINUED | OUTPATIENT
Start: 2023-01-29 | End: 2023-01-30

## 2023-01-29 RX ORDER — POTASSIUM CHLORIDE 1.5 G/1.58G
20-40 POWDER, FOR SOLUTION ORAL
Status: DISCONTINUED | OUTPATIENT
Start: 2023-01-29 | End: 2023-01-30

## 2023-01-29 RX ORDER — ENOXAPARIN SODIUM 100 MG/ML
30 INJECTION SUBCUTANEOUS EVERY 24 HOURS
Status: DISCONTINUED | OUTPATIENT
Start: 2023-01-29 | End: 2023-01-30

## 2023-01-29 RX ORDER — POTASSIUM CHLORIDE 7.45 MG/ML
10 INJECTION INTRAVENOUS
Status: DISCONTINUED | OUTPATIENT
Start: 2023-01-29 | End: 2023-01-30

## 2023-01-29 RX ORDER — DEXTROSE MONOHYDRATE 100 MG/ML
INJECTION, SOLUTION INTRAVENOUS CONTINUOUS
Status: DISCONTINUED | OUTPATIENT
Start: 2023-01-29 | End: 2023-01-29

## 2023-01-29 RX ORDER — DEXTROSE MONOHYDRATE 25 G/50ML
100 INJECTION, SOLUTION INTRAVENOUS ONCE
Status: DISCONTINUED | OUTPATIENT
Start: 2023-01-29 | End: 2023-02-01

## 2023-01-29 RX ORDER — ATROPINE SULFATE 0.1 MG/ML
0.5 INJECTION INTRAVENOUS ONCE
Status: COMPLETED | OUTPATIENT
Start: 2023-01-29 | End: 2023-01-29

## 2023-01-29 RX ORDER — LIDOCAINE 40 MG/G
CREAM TOPICAL
Status: DISCONTINUED | OUTPATIENT
Start: 2023-01-29 | End: 2023-01-30

## 2023-01-29 RX ORDER — DEXTROSE MONOHYDRATE 100 MG/ML
INJECTION, SOLUTION INTRAVENOUS CONTINUOUS
Status: DISCONTINUED | OUTPATIENT
Start: 2023-01-29 | End: 2023-01-30

## 2023-01-29 RX ORDER — ACETAMINOPHEN 325 MG/10.15ML
650 LIQUID ORAL EVERY 4 HOURS PRN
Status: DISCONTINUED | OUTPATIENT
Start: 2023-01-29 | End: 2023-02-23 | Stop reason: HOSPADM

## 2023-01-29 RX ORDER — ACETAMINOPHEN 325 MG/1
650 TABLET ORAL EVERY 4 HOURS PRN
Status: DISCONTINUED | OUTPATIENT
Start: 2023-01-29 | End: 2023-02-23 | Stop reason: HOSPADM

## 2023-01-29 RX ORDER — AMOXICILLIN 250 MG
1 CAPSULE ORAL 2 TIMES DAILY PRN
Status: DISCONTINUED | OUTPATIENT
Start: 2023-01-29 | End: 2023-01-31

## 2023-01-29 RX ORDER — HYDROMORPHONE HYDROCHLORIDE 1 MG/ML
.5-1 INJECTION, SOLUTION INTRAMUSCULAR; INTRAVENOUS; SUBCUTANEOUS
Status: DISCONTINUED | OUTPATIENT
Start: 2023-01-29 | End: 2023-01-30

## 2023-01-29 RX ORDER — NICOTINE POLACRILEX 4 MG
15-30 LOZENGE BUCCAL
Status: DISCONTINUED | OUTPATIENT
Start: 2023-01-29 | End: 2023-01-29

## 2023-01-29 RX ORDER — CALCIUM GLUCONATE 20 MG/ML
1 INJECTION, SOLUTION INTRAVENOUS ONCE
Status: DISCONTINUED | OUTPATIENT
Start: 2023-01-29 | End: 2023-01-29

## 2023-01-29 RX ORDER — ROPIVACAINE IN 0.9% SOD CHL/PF 0.1 %
.03-.125 PLASTIC BAG, INJECTION (ML) EPIDURAL CONTINUOUS
Status: DISCONTINUED | OUTPATIENT
Start: 2023-01-29 | End: 2023-01-29

## 2023-01-29 RX ORDER — ATROPINE SULFATE 0.1 MG/ML
INJECTION INTRAVENOUS
Status: DISCONTINUED
Start: 2023-01-29 | End: 2023-01-29 | Stop reason: HOSPADM

## 2023-01-29 RX ORDER — AMOXICILLIN 250 MG
2 CAPSULE ORAL 2 TIMES DAILY PRN
Status: DISCONTINUED | OUTPATIENT
Start: 2023-01-29 | End: 2023-01-31

## 2023-01-29 RX ORDER — ONDANSETRON 2 MG/ML
4 INJECTION INTRAMUSCULAR; INTRAVENOUS EVERY 6 HOURS PRN
Status: DISCONTINUED | OUTPATIENT
Start: 2023-01-29 | End: 2023-02-23 | Stop reason: HOSPADM

## 2023-01-29 RX ORDER — NOREPINEPHRINE BITARTRATE 0.02 MG/ML
.01-.6 INJECTION, SOLUTION INTRAVENOUS CONTINUOUS
Status: DISCONTINUED | OUTPATIENT
Start: 2023-01-29 | End: 2023-02-01

## 2023-01-29 RX ORDER — NICOTINE POLACRILEX 4 MG
15-30 LOZENGE BUCCAL
Status: DISCONTINUED | OUTPATIENT
Start: 2023-01-29 | End: 2023-02-10

## 2023-01-29 RX ORDER — DEXTROSE MONOHYDRATE 25 G/50ML
25 INJECTION, SOLUTION INTRAVENOUS
Status: DISCONTINUED | OUTPATIENT
Start: 2023-01-29 | End: 2023-01-30

## 2023-01-29 RX ADMIN — ALBUMIN HUMAN 12.5 G: 0.05 INJECTION, SOLUTION INTRAVENOUS at 18:41

## 2023-01-29 RX ADMIN — SODIUM CHLORIDE 1000 ML: 9 INJECTION, SOLUTION INTRAVENOUS at 06:37

## 2023-01-29 RX ADMIN — ENOXAPARIN SODIUM 30 MG: 30 INJECTION SUBCUTANEOUS at 16:25

## 2023-01-29 RX ADMIN — SODIUM CHLORIDE, POTASSIUM CHLORIDE, SODIUM LACTATE AND CALCIUM CHLORIDE: 600; 310; 30; 20 INJECTION, SOLUTION INTRAVENOUS at 20:39

## 2023-01-29 RX ADMIN — ATROPINE SULFATE 0.5 MG: 0.1 INJECTION INTRAVENOUS at 10:56

## 2023-01-29 RX ADMIN — MAGNESIUM SULFATE HEPTAHYDRATE 1 G: 500 INJECTION, SOLUTION INTRAMUSCULAR; INTRAVENOUS at 20:20

## 2023-01-29 RX ADMIN — SODIUM CHLORIDE 1000 ML: 9 INJECTION, SOLUTION INTRAVENOUS at 07:43

## 2023-01-29 RX ADMIN — EPINEPHRINE 0.01 MCG/KG/MIN: 1 INJECTION INTRAMUSCULAR; INTRAVENOUS; SUBCUTANEOUS at 14:18

## 2023-01-29 RX ADMIN — HUMAN ALBUMIN MICROSPHERES AND PERFLUTREN 9 ML: 10; .22 INJECTION, SOLUTION INTRAVENOUS at 10:07

## 2023-01-29 RX ADMIN — SODIUM CHLORIDE, POTASSIUM CHLORIDE, SODIUM LACTATE AND CALCIUM CHLORIDE: 600; 310; 30; 20 INJECTION, SOLUTION INTRAVENOUS at 13:37

## 2023-01-29 RX ADMIN — Medication 0.03 MCG/KG/MIN: at 09:10

## 2023-01-29 RX ADMIN — CALCIUM GLUCONATE 3 G: 98 INJECTION, SOLUTION INTRAVENOUS at 02:01

## 2023-01-29 ASSESSMENT — ACTIVITIES OF DAILY LIVING (ADL)
ADLS_ACUITY_SCORE: 39
ADLS_ACUITY_SCORE: 35
ADLS_ACUITY_SCORE: 39
ADLS_ACUITY_SCORE: 35
ADLS_ACUITY_SCORE: 39
ADLS_ACUITY_SCORE: 35

## 2023-01-29 NOTE — ED NOTES
"Pt bed is ready, called  placed to [facility], informed that they can not take pt \"until in the morning\" MD and ED charge nurse notified.   "

## 2023-01-29 NOTE — PLAN OF CARE
Neuro: patient alert and oriented X3, pupils 3 mm brisk and equal.  Lun L NC fine crackles  GI: NG LIS, no nausea  :  urine output ok  Cardiac: SB and occasional junctional rhythm and infrequent PVCs  BP and cardiac output drop with junctional rhythm

## 2023-01-29 NOTE — PROVIDER NOTIFICATION
Notified provider about indwelling grove catheter discussed removal or continued need.    Did provider choose to remove indwelling grove catheter? No    Provider's grove indication for keeping indwelling grove catheter: Retention.    Is there an order for indwelling grove catheter? Yes    *If there is a plan to keep grove catheter in place at discharge daily notification with provider is not necessary.

## 2023-01-29 NOTE — PROGRESS NOTES
[facility]    Hospitalist Progress Note    Assessment & Plan   Diana Santiago is a 72 year old female with past medical history significant for bipolar disorder, anxiety/depression, prior suicidal ideation, HTN, HLD, CKD stage III, admitted on 1/28/2023 with suicide attempt by intentional polysubstance overdose.      Suicide attempt  Intentional polysubstance overdose (Lamictal, Amlodipine, Haloperidol, dextroamphetamine).   Hx of depression/anxiety, bipolar disorder, suicidal ideation.  Hypotension  Shock-possibly distributive  Bradycardia  Pt presents to the ED after intentional ingestion of approximately 100 tablets of her prescribed medications in attempt at suicide. She reports taking a combination off lamotrigine, amlodipine, haloperidol and dextroamphetamine. She is unclear about how much of each medication she took. Salicylate and acetaminophen levels are negative. In the ED following admission she was hemodynamically stable, though with borderline low blood pressures. She is somnolent, but wakes to voice.   Poison center contacted in the ED and recommended admission for cardiac monitoring (specifically QT monitoring) and seizure monitoring. QTc currently 510.  QTC improved on 1/29  * Of note the patient was just in the ED/EMPATH unit on 1/24 for evaluation of suicidal ideation.    - Not yet on 72 hour hold, pt agreeable to staying in the hospital, but would be hold-able if she were attempt to leave.   - Psychiatry consultation in AM   Patient is admitted to [facility] floor by my colleague for drug overdose, overnight due to placement issues she remained in the emergency department and became more hypotensive, by 6 AM she was requiring IV fluid bolus to maintain her MAP above 60, after 3 L of fluid bolus  she still continued to be bradycardic with hypotension.  Discussed with intensivist and placed her on Levophed peripheral IV bolus, we also contacted poison control, they recommended  that this could be the peak of amlodipine and even though its not a calcium channel blocker that tend to cause bradycardia due to high dose it might cause those side effects.  The recommendations included to keep the calcium levels up, she had already received multiple calcium gluconate bolus and her ionized calcium was above normal, we deferred second dose of calcium gluconate due to that.  Patient was started on Levophed through peripheral IV, PICC line orders have been placed, discussed with intensivist and patient will be admitted to the ICU, this was discussed in detail with patient's bedside nurse, pharmacist, intensivist and the charge nurse in the  ER.  Stat echocardiogram was obtained to evaluate if she would need high-dose insulin glucose regimen but her echo did not show any systolic dysfunction, so that was deferred, patient will be cared by the intensivist for now.  I remained primary until patient was transferred to the ICU, currently she is in room 365.  Microcytic Anemia  Hemoglobin is 9.9 with MCV of 71 on admission. This appears to be her baseline.   - Monitor hemoglobin   - Outpatient evaluation     CKD stage III   Creatinine is 1.80 on admission. Her baseline appears to be around 1.5-1.6.   - Continue IV fluids and monitor renal function.  - Avoid nephrotoxins    -Renal function appears to be baseline     Mild hyponatremia   Sodium is 133 on admission.   Resolved with hydration         DVT Prophylaxis: as per icu protocol  Code Status: Full Code     80 MINUTES SPENT BY ME on the date of service doing chart review, history, exam, documentation & further activities per the note.  Disposition: Expected discharge dependence on improvement of blood pressures and heart rate  Clinically Significant Risk Factors Present on Admission           # Hypercalcemia: Highest Ca = 12.1 mg/dL in last 2 days, will monitor as appropriate        # Hypertension: home medication list includes antihypertensive(s)               Garima Castillo MD  Text Page   (7am to 6pm)    Interval History   Patient is admitted to [facility] floor by my colleague for drug overdose, overnight due to placement issues she remained in the emergency department and became more hypotensive, by 6 AM she was requiring IV fluid bolus to maintain her MAP above 60, after 3 L of fluid bolus  she still continued to be bradycardic with hypotension.  Discussed with intensivist and placed her on Levophed peripheral IV bolus, we also contacted poison control, they recommended that this could be the peak of amlodipine and even though its not a calcium channel blocker that tend to cause bradycardia due to high dose it might cause those side effects.  The recommendations included to keep the calcium levels up, she had already received multiple calcium gluconate bolus and her ionized calcium was above normal, we deferred second dose of calcium gluconate due to that.  Patient was started on Levophed through peripheral IV, PICC line orders have been placed, discussed with intensivist and patient will be admitted to the ICU, this was discussed in detail with patient's bedside nurse, pharmacist, intensivist and the charge nurse in the  ER.  Stat echocardiogram was obtained to evaluate if she would need high-dose insulin glucose regimen but her echo did not show any systolic dysfunction, so that was deferred, patient will be cared by the intensivist for now.  I remained primary until patient was transferred to the ICU, currently she is in room 365.    -Data reviewed today: I reviewed all new labs and imaging results over the last 24 hours.     Physical Exam     Vital Signs with Ranges  Temp:  [98.4  F (36.9  C)] 98.4  F (36.9  C)  Pulse:  [47-73] 51  Resp:  [9-23] 17  BP: ()/(49-79) 76/51  SpO2:  [89 %-100 %] 89 %  I/O last 3 completed shifts:  In: 195 [P.O.:195]  Out: -     Constitutional: Sleepy  Respiratory: Clear to auscultation bilaterally, no crackles or  wheezing  Cardiovascular: Regular rate and rhythm, normal S1 and S2, and no murmur noted  GI: Normal bowel sounds, soft, non-distended, non-tender  Skin/Integumen: No rashes, no cyanosis, 1+ edema noted lower extremities  Neuro : moving all 4 extremities, patient is very lethargic/sleepy    Medications     norepinephrine       sodium chloride 150 mL/hr at 01/29/23 0840         Data   Recent Labs   Lab 01/29/23 0630 01/28/23 1928   WBC 6.1 9.4   HGB 9.1* 9.9*   MCV 70* 71*    300   * 133*   POTASSIUM 4.7 4.6   CHLORIDE 101 97*   CO2 21* 22   BUN 60.9* 66.6*   CR 1.79* 1.80*   ANIONGAP 13 14   ISSA 12.1* 9.9   * 148*   ALBUMIN  --  3.8   PROTTOTAL  --  6.9   BILITOTAL  --  0.3   ALKPHOS  --  160*   ALT  --  36*   AST  --  25     Recent Labs   Lab 01/29/23 0630 01/28/23 1928   * 148*       Imaging:   No results found for this or any previous visit (from the past 24 hour(s)).

## 2023-01-29 NOTE — ED NOTES
Spoke with poison control regarding patient. Patient is more alert but increasingly hypotensive with a heart rate trending downward. Per poison control, they recommend 3-6 grams calcium gluconate to counteract the effects of the amlodipine at this time. Poison control states they will reassess in the next couple of hours. MD updated

## 2023-01-29 NOTE — H&P
PCritical Care  Note      01/29/2023    Name: Diana Santiago MRN#: [id number]   Age: 72 year old YOB: 1950     [facility] Day# 0  ICU DAY # 0                 Problem List:   Principal Problem:    Overdose, intentional self-harm, initial encounter (H)           Summary/Hospital Course:     72-year-old female admitted to the ICU with intentional overdose of amlodipine, Haldol, dextroamphetamine and Lamictal.  She was initially managed in the emergency room where she had a right groin central line and arterial line placed for administration of norepinephrine.  Her physical exam was consistent with distributive shock and bradycardia.  Her echo at that time showed an EF of 60% so insulin therapy was deferred at this time.  Patient does have history of suicidal ideations.  At this time when I examined her she is awake alert and offers no complaints.      Assessment and plan :     Diana Santiago IS a 72 year old female admitted on 1/28/2023 for hemodynamic management of intentional polypharmacy overdose..   I have personally reviewed the daily labs, imaging studies, cultures and discussed the case with referring physician and consulting physicians.     My assessment and plan by system for this patient is as follows:    Neurology/Psychiatry:   1.  Suicidal ideation  2.  Intentional polypharmacy overdose    Plan  Appreciate poison control input regarding to management of polypharmacy overdose.  Patient will need psych consult prior to discharge.    Cardiovascular:   1.Hemodynamics -distributive shock causing hypotension   2.Rhythm -sinus bradycardia  3. Ischemia -no signs of ischemia  Plan  Patient will remain on norepinephrine to treat distributive shock at this time.  If patient continues to deteriorate or becomes profoundly bradycardic will need insulin protocol.  Echo this morning shows EF of 55 to 65% so appropriate inotropic effect.  We will monitor the patient with Flowtrack technology not to follow cardiac  output.      Pulmonary/Ventilator Management:   1. Airway patent native airway  2. Oxygenation/ventilation/mechanics on supplemental oxygen  Plan  -Wean FiO2 as tolerated    GI and Nutrition :   1.  N.p.o. for now    Plan  -We will leave patient n.p.o. for now given critical illness.  If she continues to progress as expected could consider advancing diet later today    Renal/Fluids/Electrolytes:   1.  Patient has a history of stage III chronic kidney disease with elevated creatinine at baseline, her creatinine is 1.8 so she may have an element of acute kidney injury secondary to hypotension  2.  Hyperkalemia secondary to calcium treatment of polysubstance overdose  3.  Lactic acid slightly elevated we will continue to trend  4.  Appears euvolemic  Plan  -We will monitor the patient for worsening kidney injury follow creatinine follow urine output  -Will trend lactic acid given elevation  - monitor function and electrolytes as needed with replacement per ICU protocols. - generally avoid nephrotoxic agents such as NSAID, IV contrast unless specifically required  - adjust medications as needed for renal clearance  - follow I/O's as appropriate.    Infectious Disease:   1.  No active infectious disease issues at this time    Plan  -Continue to monitor     Endocrine:   1.  Concern for stress-induced hyperglycemia      Plan  - ICU insulin protocol, goal sugar <180      Hematology/Oncology:   1.  Chronic anemia present on admission  2.   3.  Plan  -No indication for transfusion at this time we will continue to monitor     IV/Access:   1. Venous access -   2. Arterial access -   3.  Plan  - central access required and necessary      ICU Prophylaxis:   1. DVT: Lovenox mechanical  2. VAP: Not indicated  3. Stress Ulcer: Protonix  4. Restraints: No restraints needed  5. Wound care  -local  6. Feeding -n.p.o. for now  7. Family Update: Discussed with patient need to remain n.p.o.  8. Disposition -ICU        Key goals for next  24 hours:   1.  Wean norepinephrine as tolerated  2.  Monitor cardiac function  3.  Continue to follow urine output and renal function               Medical History:     Past Medical History:   Diagnosis Date     Benign essential hypertension 09/2018    added norvasc 9/18     Bipolar affective disorder (H)     hosp 1993, Dr. Aftab Deal     Cancer (H) 1996    DCIS, left breast     Chronic kidney disease, stage 3a (H)     seen by renal Dr. 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    felt due to lithium     Thalassaemia trait      Vitamin D deficiency      Past Surgical History:   Procedure Laterality Date     BIOPSY  1994    left breast     BREAST SURGERY      lumpectomy x 4     COLONOSCOPY  2021     COLONOSCOPY N/A 6/17/2022    Procedure: COLONOSCOPY, WITH POLYPECTOMY AND BIOPSY;  Surgeon: Panchito Gu MD;  Location:  GI     EYE SURGERY  2018    retina tear     LAPAROTOMY EXPLORATORY N/A 8/24/2022    Procedure: Exploratory laparotomy, REPAIR OF PERFORATED ULCER;  Surgeon: Ron Vidal MD;  Location:  OR     left hand surgery      last 1974     Social History     Socioeconomic History     Marital status: Single     Spouse name: Not on file     Number of children: 0     Years of education: Not on file     Highest education level: Not on file   Occupational History     Occupation: , retired   Tobacco Use     Smoking status: Never     Smokeless tobacco: Never   Substance and Sexual Activity     Alcohol use:  No     Drug use: No     Sexual activity: Not Currently     Partners: Male     Birth control/protection: Post-menopausal, None   Other Topics Concern     Parent/sibling w/ CABG, MI or angioplasty before 65F 55M? No   Social History Narrative     Not on file     Social Determinants of Health     Financial Resource Strain: Not on file   Food Insecurity: Not on file   Transportation Needs: Not on file   Physical Activity: Not on file   Stress: Not on file   Social Connections: Not on file   Intimate Partner Violence: Not on file   Housing Stability: Not on file        Allergies   Allergen Reactions     No Known Allergies               Key Medications:       calcium gluconate  3 g Intravenous Once     dextrose  100 mL Intravenous Once     enoxaparin ANTICOAGULANT  30 mg Subcutaneous Q24H     sodium chloride (PF)  10 mL Intracatheter Q8H     sodium chloride (PF)  10 mL Intravenous Once     sodium chloride (PF)  10-40 mL Intracatheter Q8H       dextrose 10%       EPINEPHrine       insulin regular 10 units/mL 250 mL INFUSION (CCB or beta-blocker overdose)       lactated ringers 150 mL/hr at 01/29/23 1337     norepinephrine 0.06 mcg/kg/min (01/29/23 1334)     sodium chloride 150 mL/hr at 01/29/23 0840     vasopressin          Home Meds  No current facility-administered medications on file prior to encounter.  amLODIPine (NORVASC) 5 MG tablet, Take 1 tablet (5 mg) by mouth daily  dextroamphetamine (DEXTROSTAT) 5 MG tablet, Take 5 mg by mouth daily  fexofenadine (ALLEGRA) 180 MG tablet, Take 1 tablet by mouth daily.  haloperidol (HALDOL) 2 MG tablet, Take 2 mg by mouth At Bedtime  lamoTRIgine (LAMICTAL) 100 MG tablet, Take 200 mg by mouth daily  pantoprazole (PROTONIX) 40 MG EC tablet, TAKE 1 TABLET BY MOUTH EVERY DAY  sertraline (ZOLOFT) 25 MG tablet, Take 25 mg by mouth daily  simvastatin (ZOCOR) 40 MG tablet, TAKE 1 TABLET AT BEDTIME  sodium citrate-citric acid (BICITRA) 500-334 MG/5ML solution, TAKE 15 MLS BY MOUTH 2 TIMES  DAILY  ACE/ARB/ARNI NOT PRESCRIBED (INTENTIONAL), Please choose reason not prescribed from choices below.  polyethylene glycol (MIRALAX) 17 GM/Dose powder, Take 17 g (1 capful) by mouth daily               Physical Examination:   Temp:  [92.8  F (33.8  C)-98.4  F (36.9  C)] 93.2  F (34  C)  Pulse:  [46-73] 48  Resp:  [0-55] 16  BP: ()/(49-79) 93/54  MAP:  [67 mmHg-77 mmHg] 75 mmHg  Arterial Line BP: (102-114)/(49-58) 113/56  SpO2:  [89 %-100 %] 98 %    Intake/Output Summary (Last 24 hours) at 1/29/2023 1341  Last data filed at 1/29/2023 1200  Gross per 24 hour   Intake 1640.83 ml   Output 800 ml   Net 840.83 ml     Wt Readings from Last 4 Encounters:   01/29/23 58.6 kg (129 lb 3 oz)   01/24/23 54.4 kg (120 lb)   10/20/22 51.3 kg (113 lb)   10/18/22 51.3 kg (113 lb 3.2 oz)     Arterial Line BP: (102-114)/(49-58) 113/56  MAP:  [67 mmHg-77 mmHg] 75 mmHg  BP - Mean:  [58-91] 68  Resp: 16    No lab results found in last 7 days.    GEN: no acute distress   HEENT: head ncat, sclera anicteric, OP patent, trachea midline   PULM clear spontaneous breath sounds  CV/COR: Bradycardia but regular S1S2 no gallop,  No rub, no murmur  ABD: soft nontender, hypoactive bowel sounds, no mass  EXT:  Edema   warm  NEURO: Alert arousable will follow commands in all 4 extremities  SKIN: no obvious rash  LINES: clean, dry intact         Data:   All data and imaging reviewed     ROUTINE ICU LABS (Last four results)  CMP  Recent Labs   Lab 01/29/23  1236 01/29/23  1147 01/29/23  0630 01/28/23  1928   NA  --  137 135*  135* 133*   POTASSIUM  --  4.8 4.8  4.7 4.6   CHLORIDE  --  107 102  101 97*   CO2  --  19* 20*  21* 22   ANIONGAP  --  11 13  13 14   * 148* 126*  125* 148*   BUN  --  56.2* 62.9*  60.9* 66.6*   CR  --  1.73* 1.80*  1.79* 1.80*   GFRESTIMATED  --  31* 29*  30* 29*   ISSA  --  10.9* 12.1*  12.1* 9.9   MAG  --  2.1 2.3 2.4*   PROTTOTAL  --   --  5.8* 6.9   ALBUMIN  --   --  3.3* 3.8   BILITOTAL  --   --  0.3  0.3   ALKPHOS  --   --  137* 160*   AST  --   --  22 25   ALT  --   --  28 36*     CBC  Recent Labs   Lab 23  0630 23  1928   WBC 6.1 9.4   RBC 4.19 4.63   HGB 9.1* 9.9*   HCT 29.5* 32.7*   MCV 70* 71*   MCH 21.7* 21.4*   MCHC 30.8* 30.3*   RDW 14.7 14.7    300     INRNo lab results found in last 7 days.  Arterial Blood GasNo lab results found in last 7 days.    All cultures:  No results for input(s): CULT in the last 168 hours.  Recent Results (from the past 24 hour(s))   Echo Limited   Result Value    LVEF  55-60%    Narrative    [id number]  [id number]  [id number]  [id number]^RALPH^DENIS     [facility]  Echocardiography Laboratory  [address] [place] [address]     Name: JUANITO KAN  MRN: [id number]  : 1950  Study Date: 2023 09:42 AM  Age: 72 yrs  Gender: Female  Patient Location: [facility]  Reason For Study: Dizziness  Ordering Physician: DENIS ROSAS  Referring Physician: DENIS ROSAS  Performed By: Mary Ellen Batista     HR: 45  BP: 88/57 mmHg  ______________________________________________________________________________  Procedure  Limited Portable Echo Adult. Definity (NDC #57479-770) given intravenously.     ______________________________________________________________________________  Interpretation Summary     Left ventricular systolic function is normal.  The visual ejection fraction is 55-60%.  The left ventricle is normal in size.  The right ventricle is normal in structure, function and size.  The left atrium is mildly dilated.  There is mild (1+) mitral regurgitation.  The rhythm was sinus bradycardia.  No old studies available for comparison  ______________________________________________________________________________  Left Ventricle  The left ventricle is normal in size. There is normal left ventricular wall  thickness. Left ventricular systolic function is normal. The visual ejection  fraction is 55-60%. Left ventricular diastolic  function is indeterminate. No  regional wall motion abnormalities noted. There is no thrombus seen in the  left ventricle.     Right Ventricle  The right ventricle is normal in structure, function and size. There is no  mass or thrombus in the right ventricle.     Atria  The left atrium is mildly dilated. Right atrial size is normal. There is no  atrial shunt seen. The left atrial appendage is not well visualized.     Mitral Valve  The mitral valve leaflets appear normal. There is no evidence of stenosis,  fluttering, or prolapse. There is mild (1+) mitral regurgitation. There is no  mitral valve stenosis.     Tricuspid Valve  Normal tricuspid valve. There is mild (1+) tricuspid regurgitation. Right  ventricular systolic pressure could not be approximated due to inadequate  tricuspid regurgitation. There is no tricuspid stenosis.     Aortic Valve  The aortic valve is trileaflet. No aortic regurgitation is present. No aortic  stenosis is present.     Pulmonic Valve  Normal pulmonic valve. There is mild (1+) pulmonic valvular regurgitation.  There is no pulmonic valvular stenosis.     Vessels  Borderline aortic root dilatation. Normal size ascending aorta. The inferior  vena cava is normal. The pulmonary artery is normal size.     Pericardium  The pericardium appears normal. There is no pleural effusion.     Rhythm  The rhythm was sinus bradycardia.     ______________________________________________________________________________  Report approved by: Dr. Ron Kramer 01/29/2023 10:24 AM     ______________________________________________________________________________            Billing: This patient is critically ill: yes. Total critical care time today 34 min.            Patient continued tyra, will start epi and place NGT tube.  Additional ten minutes of ccm time total for 44minutes

## 2023-01-29 NOTE — SIGNIFICANT EVENT
Significant Event Note    Time of event: 7:07 AM January 29, 2023    Description of event:  Reassessed at the bedside again for further hypotension. Down to 70 systolic. IV bolus running. Placed in Trendelenberg. Remains fully alert and oriented and denies any symptoms. Pulse 49, remains sinus bradycardia. She has significant retention over 1000 ml urine.    While I am in the room, BP back up to 81 systolic.    Plan:  Catheterizing urine. Will send UA, UDS. STAT Lactate ordered. TTE pending. Further IV bolus ordered. Will transition to IMC status.    Discussed with: bedside nurse    Antonio Craig MD

## 2023-01-29 NOTE — PROGRESS NOTES
Attempted to place picc in right arm, accessed vein x2 but unable to thread catheter past shoulder area. ER provider will place another central line rather than another picc attempt at this time.

## 2023-01-29 NOTE — ED NOTES
Pt awake denied pain.  BP 83/54, HR 54. No urine output .IVF @ 150/hr.  Pt placed in Trendelenburg position.  Text message sent to the Hospitalist.

## 2023-01-29 NOTE — ED NOTES
"Pt much more alert than when she initially arrived to ER. Patient states, \" I feel better now\".   "

## 2023-01-29 NOTE — PROGRESS NOTES
Brief ICU note    Chart reviewed, case discussed with hospitalist MD.    72-year-old woman with intentional polypharmacy overdose including calcium channel blocker.  Now with progressively low blood pressure status post fluids.  Presently getting peripheral Levophed and hospitalist MD has placed an order for a PICC.  Goal MAP over 65 with Levophed.    Based on response to IV Levophed we can consider further advanced therapies.    Appropriate for ICU.  We will consult.  Charge nurse updated to work on getting a bed.    Chaz Edmond MD

## 2023-01-29 NOTE — ED NOTES
Bed: ED04  Expected date:   Expected time:   Means of arrival:   Comments:  Luba 3 72F suicide attempt overdose

## 2023-01-29 NOTE — PHARMACY
Poison Control Consult    PharmD called Poison Control regarding amlodipine, haldol, dextropamphetamine, lamotrigine overdose.    Poison Control had the following recommendations:    1. Watch the QTc for haldol, if continues to trend above 500, replace magnesium and potassium to keep the serum K >4 and serum Mg >2   2.  For amlodpine overdose with crashing BP/HR: Get a STAT cardiac ultrasound to assess inotropy.  Start norepinephrine for support, but if poor inotropy on ultrasound, will need support with high dose insulin and dextrose.  (Start insulin 1 unit/kg bolus, followed by 1 unit/kg/hr drip and titrate the insulin up q 15 minutes for support; Prior to insulin give 50g of D50 if the BS is lower than 200 and start D20 or D50 drip per protocol).  Also can give another 3g of calcium gluconate now.     Poison will continue to follow.  Mariana Al ED PharmD

## 2023-01-29 NOTE — PHARMACY-ADMISSION MEDICATION HISTORY
Pharmacy Medication History  Admission medication history interview status for the 1/28/2023  admission is complete. See EPIC admission navigator for prior to admission medications     Location of Interview: Outside patient room but on unit  Medication history sources: completed per most recent Surescripts and Care Everywhere.     Significant changes made to the medication list:  Added: dextroamphetamine, sertraline    In the past week, patient estimated taking medication this percent of the time: greater than 90%    Medication Affordability:  Not including over the counter (OTC) medications, was there a time in the past 12 months when you did not take your medications as prescribed because of cost?: Unable to Assess    Additional medication history information:   none    Medication reconciliation completed by provider prior to medication history? No    Time spent in this activity: 20 minutes    Prior to Admission medications    Medication Sig Last Dose Taking? Auth Provider Long Term End Date   amLODIPine (NORVASC) 5 MG tablet Take 1 tablet (5 mg) by mouth daily 1/28/2023 Yes Gayle Hernandez MD Yes    dextroamphetamine (DEXTROSTAT) 5 MG tablet Take 5 mg by mouth daily 1/28/2023 Yes Unknown, Entered By History     fexofenadine (ALLEGRA) 180 MG tablet Take 1 tablet by mouth daily. Unknown Yes Suleiman Miller MD     haloperidol (HALDOL) 2 MG tablet Take 2 mg by mouth At Bedtime 1/28/2023 Yes Unknown, Entered By History No    lamoTRIgine (LAMICTAL) 100 MG tablet Take 200 mg by mouth daily 1/28/2023 Yes Reported, Patient Yes    pantoprazole (PROTONIX) 40 MG EC tablet TAKE 1 TABLET BY MOUTH EVERY DAY Unknown Yes Gayle Hernandez MD     sertraline (ZOLOFT) 25 MG tablet Take 25 mg by mouth daily Unknown Yes Unknown, Entered By History Yes    simvastatin (ZOCOR) 40 MG tablet TAKE 1 TABLET AT BEDTIME Unknown Yes Gayle Hernandez MD Yes    sodium citrate-citric acid (BICITRA) 500-334 MG/5ML solution TAKE 15  MLS BY MOUTH 2 TIMES DAILY Unknown Yes Gayle Hernandez MD     ACE/ARB/ARNI NOT PRESCRIBED (INTENTIONAL) Please choose reason not prescribed from choices below.   Gayle Hernandez MD Yes    polyethylene glycol (MIRALAX) 17 GM/Dose powder Take 17 g (1 capful) by mouth daily PRN  Gayle Hernandez MD         The information provided in this note is only as accurate as the sources available at the time of update(s)

## 2023-01-29 NOTE — SIGNIFICANT EVENT
Significant Event Note    Time of event: 6:36 AM January 29, 2023    Description of event:  Assessed at the bedside for hypotension. 80/51 currently, HR high 40 to low 50's overnight. She has been and remains fully awake, alert and oriented, without any chest pain, dyspnea or dizziness. EKG was repeated and showed sinus bradycardia with improved QTc. Given Ca Gluconate overnight as per Poison Control recommendations.    Plan:  One liter IVF bolus now, continue aggressive infusion and resuscitation as we continue to monitor closely this AM. I have also ordered TTE today for further evaluation.    Discussed with: bedside nurse    Antonio Craig MD

## 2023-01-29 NOTE — ED PROVIDER NOTES
History   Chief Complaint:  Drug Overdose     The history is provided by the patient and the EMS personnel.      Diana Santiago is a 72 year old female with a history of bipolar disorder, hypertension, hyperlipidemia and diabetes insipidus who presents via EMS after a drug overdose. EMS reports that the patient took around 100 pills combined of lamotrigine 200 mg (filled on 11/16/22 with 90 pills), amlodipine besylate 5 mg (filled on 09/12/19 with 30 pills), dextroamphetamine 5 mg (filled on 01/24/23 30 pills) and haloperidol 2 mg (filled on 01/18/23 with 30 pills) around 1530. Patient confirms this and reports that this was an attempt to hurt herself. She has harmed herself in the past. EMS notes that the patient had one episode of vomiting with no noticeable pill fragments. Patient lives alone. Patient is nonspecific about the amount of each different medications she took.    Independent Historian:   EMS    Review of External Notes: I reviewed EMPATH note from 01/24/23.    ROS:  Review of Systems   Gastrointestinal: Positive for vomiting.   All other systems reviewed and are negative.    Allergies:  No Known Allergies     Medications:    Amlodipine  Fexofenadine   Haloperidol  Lamotrigine   Pantoprazole  Miralax  Simvastatin    Past Medical History:    Hypertension  Bipolar affective disorder   Cancer  CKD, stage 4  Acute kidney failure  Anemia   DCIS  Depression   Diabetes insipidus   Fractured femoral neck  Hypercalcemia  Hyperlipidemia  Hyperparathyroidism   Lithium toxicity  Thalassemia trait  Vitamin D deficiency     Past Surgical History:    Left breast biopsy  Lumpectomy x4  Retinal tear procedure  Exploratory laparotomy  Left hand surgery    Tracheostomy closure    Family History:    Mother - cerebrovascular disease, hypertension  Father - hypertension    Social History:  Presents alone  Presents via EMS  Lives alone  PCP: Gayle Hernandez     Physical Exam     Patient Vitals for the past 24 hrs:   BP  Temp Temp src Pulse Resp SpO2   01/28/23 2045 91/58 98.4  F (36.9  C) Temporal 71 15 97 %   01/28/23 2030 91/59 -- -- 70 18 99 %   01/28/23 2015 92/60 -- -- 73 19 100 %   01/28/23 2000 92/58 -- -- 73 15 99 %   01/28/23 1945 91/57 -- -- 72 23 100 %   01/28/23 1930 93/59 -- -- 73 20 100 %   01/28/23 1919 90/58 -- Temporal -- -- --   01/28/23 1917 -- -- -- 66 19 100 %        Physical Exam  Vitals reviewed.   Constitutional:       Comments: Frail-appearing at times tremulous.   HENT:      Head: Normocephalic.      Right Ear: Tympanic membrane normal.      Left Ear: Tympanic membrane normal.      Nose: Nose normal.      Mouth/Throat:      Mouth: Mucous membranes are moist.   Eyes:      Pupils: Pupils are equal, round, and reactive to light.   Cardiovascular:      Rate and Rhythm: Normal rate.   Pulmonary:      Effort: Pulmonary effort is normal.   Abdominal:      General: Abdomen is flat.      Palpations: Abdomen is soft.   Skin:     General: Skin is warm.      Capillary Refill: Capillary refill takes less than 2 seconds.   Neurological:      General: No focal deficit present.      Mental Status: She is alert. She is disoriented.   Psychiatric:      Comments: Intentionally took pills as a history of depression.  Suicidal.           Emergency Department Course   ECG #1  ECG results from 01/28/23 @ 1934   EKG 12 lead     Value    Systolic Blood Pressure     Diastolic Blood Pressure     Ventricular Rate 71    Atrial Rate 71    FL Interval 224    QRS Duration 118        QTc 510    P Axis 67    R AXIS 95    T Axis 77    Interpretation ECG      Sinus rhythm with 1st degree A-V block  Rightward axis  Non-specific intra-ventricular conduction delay  Prolonged QT  Read by me @ 1942       ECG #2  ECG taken at 2105  Sinus rhythm with 1st degree AV block  Rightward axis  Nonspecific ST abnormality   Prolonged QT   Rate 68 bpm. FL interval 258 ms. QRS duration 108 ms. QT/QTc 476/506 ms. P-R-T axes 68 100 88.      Laboratory:  Labs Ordered and Resulted from Time of ED Arrival to Time of ED Departure   COMPREHENSIVE METABOLIC PANEL - Abnormal       Result Value    Sodium 133 (*)     Potassium 4.6      Chloride 97 (*)     Carbon Dioxide (CO2) 22      Anion Gap 14      Urea Nitrogen 66.6 (*)     Creatinine 1.80 (*)     Calcium 9.9      Glucose 148 (*)     Alkaline Phosphatase 160 (*)     AST 25      ALT 36 (*)     Protein Total 6.9      Albumin 3.8      Bilirubin Total 0.3      GFR Estimate 29 (*)    ACETAMINOPHEN LEVEL - Abnormal    Acetaminophen <5.0 (*)    MAGNESIUM - Abnormal    Magnesium 2.4 (*)    CBC WITH PLATELETS AND DIFFERENTIAL - Abnormal    WBC Count 9.4      RBC Count 4.63      Hemoglobin 9.9 (*)     Hematocrit 32.7 (*)     MCV 71 (*)     MCH 21.4 (*)     MCHC 30.3 (*)     RDW 14.7      Platelet Count 300      % Neutrophils 82      % Lymphocytes 9      % Monocytes 8      % Eosinophils 1      % Basophils 0      % Immature Granulocytes 0      NRBCs per 100 WBC 0      Absolute Neutrophils 7.7      Absolute Lymphocytes 0.8      Absolute Monocytes 0.7      Absolute Eosinophils 0.1      Absolute Basophils 0.0      Absolute Immature Granulocytes 0.0      Absolute NRBCs 0.0     ETHYL ALCOHOL LEVEL - Normal    Alcohol ethyl <0.01     SALICYLATE LEVEL - Normal    Salicylate <0.5        Emergency Department Course & Assessments:  PSS-3    Date and Time Over the past 2 weeks have you felt down, depressed, or hopeless? Over the past 2 weeks have you had thoughts of killing yourself? Have you ever attempted to kill yourself? When did this last happen? User   01/28/23 1927 yes yes yes within the last 24 hours (including today) [facility]      C-SSRS ([place])    Date and Time Q1 Wished to be Dead (Past Month) Q2 Suicidal Thoughts (Past Month) Q3 Suicidal Thought Method Q4 Suicidal Intent without Specific Plan Q5 Suicide Intent with Specific Plan Q6 Suicide Behavior (Lifetime) Within the Past 3 Months? RETIRED: Level of Risk per  Screen Screening Not Complete User   01/28/23 1927 yes yes yes no yes yes -- -- -- EAS               Interventions:  Medications   0.9% sodium chloride BOLUS (1,000 mLs Intravenous New Bag 1/28/23 1932)      Independent Interpretation (X-rays, CTs, rhythm strip):      Consultations/Discussion of Management or Tests:  1922 I spoke to poison control regarding this patient's ingestion.  2053 I spoke to Dr. Gracia, hospitalist, who accepts admission.     Social Determinants of Health affecting care:      Assessments:  1919 I obtained history and examined the patient, as above.  1932 I rechecked the patient and updated them on poison control's suggestions.   2024 I rechecked the patient. Brother is now in the room and I spoke with him.    Disposition:  The patient was admitted to the hospital under the care of Dr. Gracia.     Impression & Plan    Medical Decision Making:  Patient presents with intentional overdose.  Multiple medications.  Patient is somnolent on arrival but eyes open to voice.  Clinical concerns are for QT prolongation.  Patient will be admitted medically as according to toxicology needs extended period of observation procedures and complications secondary to overdose.  Care was discussed with the hospitalist and was admitted in guarded condition due to extensive and fell over ingestion.  Patient continued to awaken to voice and move extremities to command and did not require airway monitoring was kept on a monitor for QT prolongation which did not progress while in the emergency room.    Diagnosis:    ICD-10-CM    1. Overdose, intentional self-harm, initial encounter (H)  T50.902A            Scribe Disclosure:  I, Jaime Cox, am serving as a scribe at 7:19 PM on 1/28/2023 to document services personally performed by Trent Yeung MD based on my observations and the provider's statements to me.     1/28/2023   Trent Yeung MD Goodman, Brian Samuel, MD  03/02/23 0114

## 2023-01-29 NOTE — ED NOTES
[facility]  ED Nurse Handoff Report    ED Chief complaint: Drug Overdose      ED Diagnosis:   Final diagnoses:   Overdose, intentional self-harm, initial encounter (H)       Code Status: Full Code    Allergies:   Allergies   Allergen Reactions     No Known Allergies        Patient Story: Pt presents via EMS. Per report, patient took roughly 100 tablets of 4 of her home medications to intentionally overdose. Vomitted x1 with EMS. Soft blood pressures, 91/55. Pt arouses to repeated stimulation.     Focused Assessment:  Drug overdose. EMS reports that the patient took around 100 pills combined of lamotrigine 200 mg (filled on 11/16/22 with 90 pills), amlodipine besylate 5 mg (filled on 09/12/19 with 30 pills), dextroamphetamine 5 mg (filled on 01/24/23 30 pills) and haloperidol 2 mg (filled on 01/18/23 with 30 pills) around 1530. Patient confirms this and reports that this was an attempt to hurt herself. She has harmed herself in the past. EMS notes that the patient had one episode of vomiting with no noticeable pill fragments. Patient lives alone. Patient is nonspecific about the amount of each different medications she took.    Treatments and/or interventions provided:   Labs Ordered and Resulted from Time of ED Arrival to Time of ED Departure   COMPREHENSIVE METABOLIC PANEL - Abnormal       Result Value    Sodium 133 (*)     Potassium 4.6      Chloride 97 (*)     Carbon Dioxide (CO2) 22      Anion Gap 14      Urea Nitrogen 66.6 (*)     Creatinine 1.80 (*)     Calcium 9.9      Glucose 148 (*)     Alkaline Phosphatase 160 (*)     AST 25      ALT 36 (*)     Protein Total 6.9      Albumin 3.8      Bilirubin Total 0.3      GFR Estimate 29 (*)    ACETAMINOPHEN LEVEL - Abnormal    Acetaminophen <5.0 (*)    MAGNESIUM - Abnormal    Magnesium 2.4 (*)    CBC WITH PLATELETS AND DIFFERENTIAL - Abnormal    WBC Count 9.4      RBC Count 4.63      Hemoglobin 9.9 (*)     Hematocrit 32.7 (*)     MCV 71 (*)     MCH  21.4 (*)     MCHC 30.3 (*)     RDW 14.7      Platelet Count 300      % Neutrophils 82      % Lymphocytes 9      % Monocytes 8      % Eosinophils 1      % Basophils 0      % Immature Granulocytes 0      NRBCs per 100 WBC 0      Absolute Neutrophils 7.7      Absolute Lymphocytes 0.8      Absolute Monocytes 0.7      Absolute Eosinophils 0.1      Absolute Basophils 0.0      Absolute Immature Granulocytes 0.0      Absolute NRBCs 0.0     ETHYL ALCOHOL LEVEL - Normal    Alcohol ethyl <0.01     SALICYLATE LEVEL - Normal    Salicylate <0.5       No orders to display       To be done/followed up on inpatient unit:  Seizure precautions    Does this patient have any cognitive concerns?: Alcohol/Drugs temporary cognitive concerns    Activity level - Baseline/Home:  Independent  Activity Level - Current:   Unknown    Patient's Preferred language: English   Needed?: No    Isolation: None  Infection: Not Applicable  Patient tested for COVID 19 prior to admission: NO  Bariatric?: No    Vital Signs:   Vitals:    01/28/23 2000 01/28/23 2015 01/28/23 2030 01/28/23 2045   BP: 92/58 92/60 91/59 91/58   Pulse: 73 73 70 71   Resp: 15 19 18 15   Temp:    98.4  F (36.9  C)   TempSrc:    Temporal   SpO2: 99% 100% 99% 97%       Cardiac Rhythm:     Was the PSS-3 completed:   Yes  Family Comments: Son was updated and at the bedside in ER  OBS brochure/video discussed/provided to patient/family: No    For the majority of the shift this patient's behavior was Green.   Behavioral interventions performed were Care explained.    ED NURSE PHONE NUMBER: [phone]

## 2023-01-29 NOTE — ED NOTES
Poison control called for updates. They recommended another dose of Calcium gluconate and if the pressure keep dropping to perform a bedside ultrasound of the heart. ED MD and hospitalistg made aware.

## 2023-01-29 NOTE — ED PROVIDER NOTES
Sign Out Note    I took over care of this patient from Dr. Mas    Briefly, patient presented to the ED for: intentional drug overdose    Plan at time of sign out:  boarding in ED after admission to Hospitalist service    Events during my shift: At 0935, patient's ED nurse notified me that the patient's hemodynamic status is worsening.  ICU has been contacted, along with the admitting hospitalist service.  The PICC team is coming to perform a PICC line.  I was told that my bedside assistance was not needed at that time as the patient's clinical worsening was being addressed by the relevant physicians and other hospital staff.    At 1040, the patient's ED nurse notified me that attempts by the PICC team to place a PICC line were unsuccessful.  I made the decision to place a central line and arterial line, and the patient was moved to stabilization room 2.  I personally placed these lines between 1058 and 1120, see procedure notes below.  Throughout this time, I spoke with the patient's nurse, pharmacy, etc., regarding adjustments to pressors, communication with Poison Control Center, and others.  Patient's nurse tells me that a bed is available in the ICU and she will be taken upstairs promptly.    Procedures    Femoral Arterial Line Procedure Note     Physician: Jose Oliver MD  Indication:  Hypotension.  Need for real time blood pressure monitoring  Consent:  Emergent implied  Location: Right femoral Artery    The patient's right groin was prepped with Chloro-prep.  A sterile field was created. The femoral artery was palpated and visualized on ultrasound.      The needle was used to enter the artery.  Arterial blood returned in the syringe.  A wire was placed via Seldinger technique.  The arterial catheter was placed over the wire and the wire was removed.  The transducer line was connected and arterial waveform was confirmed.    The line was sewn into place.  A sterile dressing was applied.      There  were no apparent complications to the procedure.       ED Bedside Limited Ultrasound  Performed by: Jose Oliver MD  Body area scanned: R groin soft tissue and vessels  Indication: arterial line procedural guidance  Findings/Interpretation: R femoral vein and femoral artery visualized.  Femoral artery cannulated under direct real-time US guidance, and line confirmed to be intra-luminal in both transverse and longitudinal views.  Key images were not digitally archived in the [facility] radiology system as an order was not placed before the procedure under these emergent circumstances.    Narrative: Femoral Central Line Placement   Performed by: Jose Oliver MD    PROCEDURE:  Femoral Central Line Placement with Ultrasound Guidance.    Indications: Vascular access and Fluid administration    Consent:  Emergent implied, brief verbal from patient    Timeout:  Universal protocol was followed. TIME OUT conducted just prior to starting procedure confirmed patient identity, site/side, procedure, patient position, and availability of correct equipment and implants.?  Yes    Procedure note:  Right Femoral approach was selected and the right femoral area was prepped, cleansed and draped in a sterile fashion.  Mask, gown and gloves were used per sterile protocol.   Vascular probe with ultrasound was used in a sterile fashion for guidence.  Introducer needle was then used to gain access to the central venous circulation.  Using Seldinger technique the Triple lumen catheter was placed.  Catheter port(s) were aspirated and flushed.  Central line was sutured in place and sterile dressing including Biopatch was applied.       Patient Status:  Patient tolerated the procedure moderately well. There were no apparent complications.    ED Bedside Limited Ultrasound  Performed by: Jose Oliver MD  Body area scanned: R groin soft tissue and vessels  Indication: central line procedural guidance  Findings/Interpretation: R femoral  vein and femoral artery visualized.  Femoral vein cannulated under direct real-time US guidance, and line confirmed to be intra-luminal in both transverse and longitudinal views.  Key images were not digitally archived in the [facility] radiology system as an order was not placed before the procedure under these emergent circumstances.    Critical Care:  I personally spent 18 minutes of critical care time on this case, in addition to the procedures above.      MD Melody Oliva Jeffrey Alan, MD  01/29/23 113

## 2023-01-29 NOTE — H&P
[facility]    History and Physical - Hospitalist Service       Date of Admission:  1/28/2023    Assessment & Plan      Diana Santiago is a 72 year old female with past medical history significant for bipolar disorder, anxiety/depression, prior suicidal ideation, HTN, HLD, CKD stage III, admitted on 1/28/2023 with suicide attempt by intentional polysubstance overdose.     Suicide attempt  Intentional polysubstance overdose (Lamictal, Amlodipine, Haloperidol, dextroamphetamine).   Hx of depression/anxiety, bipolar disorder, suicidal ideation.   Pt presents to the ED after intentional ingestion of approximately 100 tablets of her prescribed medications in attempt at suicide. She reports taking a combination off lamotrigine, amlodipine, haloperidol and dextroamphetamine. She is unclear about how much of each medication she took. Salicylate and acetaminophen levels are negative.   In the ED she is hemodynamically stable, though with borderline low blood pressures. She is somnolent, but wakes to voice.   Poison center contacted in the ED and recommended admission for cardiac monitoring (specifically QT monitoring) and seizure monitoring. QTc currently 510.   * Of note the patient was just in the ED/EMPATH unit on 1/24 for evaluation of suicidal ideation.   - Admit to observation   - Cardiac monitoring   - Daily EKGs   - Seizure precautions   - Suicide precautions   - Continue IV fluids   - Hold PTA medications   - Not yet on 72 hour hold, pt agreeable to staying in the hospital, but would be hold-able if she were attempt to leave.   - Psychiatry consultation in AM    Microcytic Anemia  Hemoglobin is 9.9 with MCV of 71 on admission. This appears to be her baseline.   - Monitor hemoglobin   - Outpatient evaluation     CKD stage III   Creatinine is 1.80 on admission. Her baseline appears to be around 1.5-1.6.   - Continue IV fluids and monitor renal function.  - Avoid nephrotoxins      Mild  "hyponatremia   Sodium is 133 on admission.   - Continue fluids and monitor sodium       Diet: Regular diet   DVT Prophylaxis: Low Risk/Ambulatory with no VTE prophylaxis indicated  Suresh Catheter: Not present  Lines: None     Cardiac Monitoring: ACTIVE order. Indication: QTc prolonging medication (48 hours)  Code Status: Full Code       Disposition Plan      Expected Discharge Date: 01/31/2023              Olena Gracia MD  Hospitalist Service  [facility]  Securely message with [employer] (more info)  Text page via [employer] Paging/Directory     ______________________________________________________________________    Chief Complaint   Suicide attempt     History is obtained from the patient    History of Present Illness   Diana Santiago is a 72 year old female who presents to the ED after a suicide attempt by polysubstance overdose.     The patient was just in the ED/Empath unit on 1/24 for reporting suicidal thoughts to her outpatient psychiatrist. She reported to her psychiatrist that she was contemplating drowning herself while at a pool earlier, but did not attempt to do so. When she arrived to the ED she was denying suicidal ideation. The patient was monitored for a period of time but was ultimately discharged home.     Today, the patient reported that she intentionally took about 100 tablets of a combination of her prescribed medications in attempt to kill herself. The medications she reported taking included lamotrigine, amlodipine, dextroamphetamine and haloperidol. She is unclear about how many pills of each that she took.  When asked why she tried to harm herself she states that she \"just can't keep up with people.\" She did not elaborate any further.       Past Medical History    Past Medical History:   Diagnosis Date     Benign essential hypertension 09/2018    added norvasc 9/18     Bipolar affective disorder (H)     hosp 1993, Dr. Aftab Deal     Cancer (H) 1996    DCIS, left " breast     Chronic kidney disease, stage 3a (H)     seen by renal Dr. 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    felt due to lithium     Thalassaemia trait      Vitamin D deficiency        Past Surgical History   Past Surgical History:   Procedure Laterality Date     BIOPSY  1994    left breast     BREAST SURGERY      lumpectomy x 4     COLONOSCOPY  2021     COLONOSCOPY N/A 6/17/2022    Procedure: COLONOSCOPY, WITH POLYPECTOMY AND BIOPSY;  Surgeon: Panchito Gu MD;  Location:  GI     EYE SURGERY  2018    retina tear     LAPAROTOMY EXPLORATORY N/A 8/24/2022    Procedure: Exploratory laparotomy, REPAIR OF PERFORATED ULCER;  Surgeon: Ron Vidal MD;  Location:  OR     left hand surgery      last 1974       Prior to Admission Medications   Prior to Admission Medications   Prescriptions Last Dose Informant Patient Reported? Taking?   ACE/ARB/ARNI NOT PRESCRIBED (INTENTIONAL)   No No   Sig: Please choose reason not prescribed from choices below.   amLODIPine (NORVASC) 5 MG tablet   No No   Sig: Take 1 tablet (5 mg) by mouth daily   fexofenadine (ALLEGRA) 180 MG tablet   Yes No   Sig: Take 1 tablet by mouth daily.   haloperidol (HALDOL) 1 MG tablet   No No   Sig: Take 1 tablet (1 mg) by mouth At Bedtime   Patient taking differently: Take 2 mg by mouth At Bedtime   lamoTRIgine (LAMICTAL) 100 MG tablet   Yes No   Sig: Take 200 mg by mouth daily   pantoprazole (PROTONIX) 40 MG EC tablet   No No   Sig: TAKE 1 TABLET BY MOUTH EVERY DAY    polyethylene glycol (MIRALAX) 17 GM/Dose powder   No No   Sig: Take 17 g (1 capful) by mouth daily   simvastatin (ZOCOR) 40 MG tablet   No No   Sig: TAKE 1 TABLET AT BEDTIME   sodium citrate-citric acid (BICITRA) 500-334 MG/5ML solution   No No   Sig: TAKE 15 MLS BY MOUTH 2 TIMES DAILY      Facility-Administered Medications: None        Review of Systems    The 10 point Review of Systems is negative other than noted in the HPI or here.       Physical Exam   Vital Signs: Temp: 98.4  F (36.9  C) Temp src: Temporal BP: 91/58 Pulse: 71   Resp: 15 SpO2: 97 % O2 Device: None (Room air)    Weight: 0 lbs 0 oz    Constitutional: Somnolent but wakes to voice, falls to sleep readily  Eyes: Conjunctiva and pupils examined and normal.  HEENT: Dry mucous membranes, normal dentition.  Respiratory: Clear to auscultation bilaterally, no crackles or wheezing.  Cardiovascular: Regular rate and rhythm, normal S1 and S2, and no murmur noted.  GI: Soft, non-distended, non-tender, normal bowel sounds. Midline abdominal incision  Skin: No rashes, no cyanosis, no edema.  Musculoskeletal: No joint swelling, erythema or tenderness.  Neurologic: Cranial nerves 2-12 intact, normal strength and sensation.  Psychiatric: Alert, oriented to person, place and time      Medical Decision Making       90 MINUTES SPENT BY ME on the date of service doing chart review, history, exam, documentation & further activities per the note.      Data     I have personally reviewed the following data over the past 24 hrs:    9.4  \   9.9 (L)   / 300     133 (L) 97 (L) 66.6 (H) /  148 (H)   4.6 22 1.80 (H) \       ALT: 36 (H) AST: 25 AP: 160 (H) TBILI: 0.3   ALB: 3.8 TOT PROTEIN: 6.9 LIPASE: N/A       Imaging results reviewed over the past 24 hrs:   No results found for this or any previous visit (from the past 24 hour(s)).

## 2023-01-30 ENCOUNTER — APPOINTMENT (OUTPATIENT)
Dept: ULTRASOUND IMAGING | Facility: CLINIC | Age: 73
DRG: 917 | End: 2023-01-30
Attending: INTERNAL MEDICINE
Payer: MEDICARE

## 2023-01-30 ENCOUNTER — APPOINTMENT (OUTPATIENT)
Dept: GENERAL RADIOLOGY | Facility: CLINIC | Age: 73
DRG: 917 | End: 2023-01-30
Payer: MEDICARE

## 2023-01-30 ENCOUNTER — APPOINTMENT (OUTPATIENT)
Dept: GENERAL RADIOLOGY | Facility: CLINIC | Age: 73
DRG: 917 | End: 2023-01-30
Attending: INTERNAL MEDICINE
Payer: MEDICARE

## 2023-01-30 LAB
ALBUMIN SERPL BCG-MCNC: 3.2 G/DL (ref 3.5–5.2)
ALLEN'S TEST: ABNORMAL
ALP SERPL-CCNC: 150 U/L (ref 35–104)
ALT SERPL W P-5'-P-CCNC: 25 U/L (ref 10–35)
ANION GAP SERPL CALCULATED.3IONS-SCNC: 10 MMOL/L (ref 7–15)
ANION GAP SERPL CALCULATED.3IONS-SCNC: 10 MMOL/L (ref 7–15)
ANION GAP SERPL CALCULATED.3IONS-SCNC: 14 MMOL/L (ref 7–15)
ANION GAP SERPL CALCULATED.3IONS-SCNC: 15 MMOL/L (ref 7–15)
ANION GAP SERPL CALCULATED.3IONS-SCNC: 16 MMOL/L (ref 7–15)
ANION GAP SERPL CALCULATED.3IONS-SCNC: 17 MMOL/L (ref 7–15)
ANION GAP SERPL CALCULATED.3IONS-SCNC: 9 MMOL/L (ref 7–15)
AST SERPL W P-5'-P-CCNC: 20 U/L (ref 10–35)
ATRIAL RATE - MUSE: 52 BPM
ATRIAL RATE - MUSE: 57 BPM
BASE EXCESS BLDA CALC-SCNC: -14 MMOL/L (ref -9–1.8)
BASE EXCESS BLDA CALC-SCNC: -5.7 MMOL/L (ref -9–1.8)
BASE EXCESS BLDA CALC-SCNC: -6.4 MMOL/L (ref -9–1.8)
BASE EXCESS BLDA CALC-SCNC: -6.6 MMOL/L (ref -9–1.8)
BASE EXCESS BLDA CALC-SCNC: -8.9 MMOL/L (ref -9–1.8)
BASE EXCESS BLDA CALC-SCNC: -9.4 MMOL/L (ref -9–1.8)
BILIRUB SERPL-MCNC: 0.5 MG/DL
BUN SERPL-MCNC: 46.4 MG/DL (ref 8–23)
BUN SERPL-MCNC: 46.4 MG/DL (ref 8–23)
BUN SERPL-MCNC: 47 MG/DL (ref 8–23)
BUN SERPL-MCNC: 47.1 MG/DL (ref 8–23)
BUN SERPL-MCNC: 48.2 MG/DL (ref 8–23)
BUN SERPL-MCNC: 49 MG/DL (ref 8–23)
BUN SERPL-MCNC: 49.3 MG/DL (ref 8–23)
BUN SERPL-MCNC: 50.3 MG/DL (ref 8–23)
BUN SERPL-MCNC: 51.4 MG/DL (ref 8–23)
CA-I BLD-MCNC: 5.6 MG/DL (ref 4.4–5.2)
CA-I BLD-MCNC: 5.7 MG/DL (ref 4.4–5.2)
CALCIUM SERPL-MCNC: 10 MG/DL (ref 8.8–10.2)
CALCIUM SERPL-MCNC: 10.2 MG/DL (ref 8.8–10.2)
CALCIUM SERPL-MCNC: 10.3 MG/DL (ref 8.8–10.2)
CALCIUM SERPL-MCNC: 10.5 MG/DL (ref 8.8–10.2)
CALCIUM SERPL-MCNC: 9.5 MG/DL (ref 8.8–10.2)
CALCIUM SERPL-MCNC: 9.6 MG/DL (ref 8.8–10.2)
CALCIUM SERPL-MCNC: 9.8 MG/DL (ref 8.8–10.2)
CALCIUM SERPL-MCNC: 9.8 MG/DL (ref 8.8–10.2)
CHLORIDE SERPL-SCNC: 104 MMOL/L (ref 98–107)
CHLORIDE SERPL-SCNC: 108 MMOL/L (ref 98–107)
CHLORIDE SERPL-SCNC: 109 MMOL/L (ref 98–107)
CHLORIDE SERPL-SCNC: 110 MMOL/L (ref 98–107)
CHLORIDE SERPL-SCNC: 110 MMOL/L (ref 98–107)
CHLORIDE SERPL-SCNC: 111 MMOL/L (ref 98–107)
CHLORIDE SERPL-SCNC: 113 MMOL/L (ref 98–107)
CREAT SERPL-MCNC: 2.02 MG/DL (ref 0.51–0.95)
CREAT SERPL-MCNC: 2.08 MG/DL (ref 0.51–0.95)
CREAT SERPL-MCNC: 2.08 MG/DL (ref 0.51–0.95)
CREAT SERPL-MCNC: 2.1 MG/DL (ref 0.51–0.95)
CREAT SERPL-MCNC: 2.12 MG/DL (ref 0.51–0.95)
CREAT SERPL-MCNC: 2.22 MG/DL (ref 0.51–0.95)
CREAT SERPL-MCNC: 2.41 MG/DL (ref 0.51–0.95)
CREAT SERPL-MCNC: 2.52 MG/DL (ref 0.51–0.95)
CREAT SERPL-MCNC: 2.55 MG/DL (ref 0.51–0.95)
DEPRECATED HCO3 PLAS-SCNC: 15 MMOL/L (ref 22–29)
DEPRECATED HCO3 PLAS-SCNC: 16 MMOL/L (ref 22–29)
DEPRECATED HCO3 PLAS-SCNC: 16 MMOL/L (ref 22–29)
DEPRECATED HCO3 PLAS-SCNC: 17 MMOL/L (ref 22–29)
DEPRECATED HCO3 PLAS-SCNC: 18 MMOL/L (ref 22–29)
DEPRECATED HCO3 PLAS-SCNC: 18 MMOL/L (ref 22–29)
DIASTOLIC BLOOD PRESSURE - MUSE: NORMAL MMHG
DIASTOLIC BLOOD PRESSURE - MUSE: NORMAL MMHG
ERYTHROCYTE [DISTWIDTH] IN BLOOD BY AUTOMATED COUNT: 15 % (ref 10–15)
GFR SERPL CREATININE-BSD FRML MDRD: 19 ML/MIN/1.73M2
GFR SERPL CREATININE-BSD FRML MDRD: 20 ML/MIN/1.73M2
GFR SERPL CREATININE-BSD FRML MDRD: 21 ML/MIN/1.73M2
GFR SERPL CREATININE-BSD FRML MDRD: 23 ML/MIN/1.73M2
GFR SERPL CREATININE-BSD FRML MDRD: 24 ML/MIN/1.73M2
GFR SERPL CREATININE-BSD FRML MDRD: 24 ML/MIN/1.73M2
GFR SERPL CREATININE-BSD FRML MDRD: 25 ML/MIN/1.73M2
GFR SERPL CREATININE-BSD FRML MDRD: 25 ML/MIN/1.73M2
GFR SERPL CREATININE-BSD FRML MDRD: 26 ML/MIN/1.73M2
GLUCOSE BLDC GLUCOMTR-MCNC: 122 MG/DL (ref 70–99)
GLUCOSE BLDC GLUCOMTR-MCNC: 126 MG/DL (ref 70–99)
GLUCOSE BLDC GLUCOMTR-MCNC: 132 MG/DL (ref 70–99)
GLUCOSE BLDC GLUCOMTR-MCNC: 132 MG/DL (ref 70–99)
GLUCOSE BLDC GLUCOMTR-MCNC: 137 MG/DL (ref 70–99)
GLUCOSE BLDC GLUCOMTR-MCNC: 138 MG/DL (ref 70–99)
GLUCOSE BLDC GLUCOMTR-MCNC: 138 MG/DL (ref 70–99)
GLUCOSE BLDC GLUCOMTR-MCNC: 141 MG/DL (ref 70–99)
GLUCOSE BLDC GLUCOMTR-MCNC: 141 MG/DL (ref 70–99)
GLUCOSE BLDC GLUCOMTR-MCNC: 143 MG/DL (ref 70–99)
GLUCOSE BLDC GLUCOMTR-MCNC: 143 MG/DL (ref 70–99)
GLUCOSE BLDC GLUCOMTR-MCNC: 145 MG/DL (ref 70–99)
GLUCOSE BLDC GLUCOMTR-MCNC: 146 MG/DL (ref 70–99)
GLUCOSE BLDC GLUCOMTR-MCNC: 148 MG/DL (ref 70–99)
GLUCOSE BLDC GLUCOMTR-MCNC: 148 MG/DL (ref 70–99)
GLUCOSE BLDC GLUCOMTR-MCNC: 152 MG/DL (ref 70–99)
GLUCOSE BLDC GLUCOMTR-MCNC: 153 MG/DL (ref 70–99)
GLUCOSE BLDC GLUCOMTR-MCNC: 154 MG/DL (ref 70–99)
GLUCOSE BLDC GLUCOMTR-MCNC: 160 MG/DL (ref 70–99)
GLUCOSE BLDC GLUCOMTR-MCNC: 162 MG/DL (ref 70–99)
GLUCOSE BLDC GLUCOMTR-MCNC: 172 MG/DL (ref 70–99)
GLUCOSE BLDC GLUCOMTR-MCNC: 174 MG/DL (ref 70–99)
GLUCOSE BLDC GLUCOMTR-MCNC: 174 MG/DL (ref 70–99)
GLUCOSE BLDC GLUCOMTR-MCNC: 178 MG/DL (ref 70–99)
GLUCOSE BLDC GLUCOMTR-MCNC: 182 MG/DL (ref 70–99)
GLUCOSE BLDC GLUCOMTR-MCNC: 183 MG/DL (ref 70–99)
GLUCOSE BLDC GLUCOMTR-MCNC: 184 MG/DL (ref 70–99)
GLUCOSE BLDC GLUCOMTR-MCNC: 186 MG/DL (ref 70–99)
GLUCOSE BLDC GLUCOMTR-MCNC: 191 MG/DL (ref 70–99)
GLUCOSE BLDC GLUCOMTR-MCNC: 196 MG/DL (ref 70–99)
GLUCOSE BLDC GLUCOMTR-MCNC: 196 MG/DL (ref 70–99)
GLUCOSE BLDC GLUCOMTR-MCNC: 199 MG/DL (ref 70–99)
GLUCOSE BLDC GLUCOMTR-MCNC: 200 MG/DL (ref 70–99)
GLUCOSE BLDC GLUCOMTR-MCNC: 202 MG/DL (ref 70–99)
GLUCOSE BLDC GLUCOMTR-MCNC: 203 MG/DL (ref 70–99)
GLUCOSE BLDC GLUCOMTR-MCNC: 204 MG/DL (ref 70–99)
GLUCOSE BLDC GLUCOMTR-MCNC: 204 MG/DL (ref 70–99)
GLUCOSE BLDC GLUCOMTR-MCNC: 210 MG/DL (ref 70–99)
GLUCOSE BLDC GLUCOMTR-MCNC: 215 MG/DL (ref 70–99)
GLUCOSE BLDC GLUCOMTR-MCNC: 219 MG/DL (ref 70–99)
GLUCOSE BLDC GLUCOMTR-MCNC: 226 MG/DL (ref 70–99)
GLUCOSE BLDC GLUCOMTR-MCNC: 226 MG/DL (ref 70–99)
GLUCOSE BLDC GLUCOMTR-MCNC: 227 MG/DL (ref 70–99)
GLUCOSE BLDC GLUCOMTR-MCNC: 230 MG/DL (ref 70–99)
GLUCOSE BLDC GLUCOMTR-MCNC: 230 MG/DL (ref 70–99)
GLUCOSE BLDC GLUCOMTR-MCNC: 234 MG/DL (ref 70–99)
GLUCOSE BLDC GLUCOMTR-MCNC: 235 MG/DL (ref 70–99)
GLUCOSE BLDC GLUCOMTR-MCNC: 235 MG/DL (ref 70–99)
GLUCOSE BLDC GLUCOMTR-MCNC: 236 MG/DL (ref 70–99)
GLUCOSE BLDC GLUCOMTR-MCNC: 242 MG/DL (ref 70–99)
GLUCOSE BLDC GLUCOMTR-MCNC: 249 MG/DL (ref 70–99)
GLUCOSE BLDC GLUCOMTR-MCNC: 255 MG/DL (ref 70–99)
GLUCOSE BLDC GLUCOMTR-MCNC: 257 MG/DL (ref 70–99)
GLUCOSE BLDC GLUCOMTR-MCNC: 263 MG/DL (ref 70–99)
GLUCOSE SERPL-MCNC: 116 MG/DL (ref 70–99)
GLUCOSE SERPL-MCNC: 150 MG/DL (ref 70–99)
GLUCOSE SERPL-MCNC: 162 MG/DL (ref 70–99)
GLUCOSE SERPL-MCNC: 169 MG/DL (ref 70–99)
GLUCOSE SERPL-MCNC: 188 MG/DL (ref 70–99)
GLUCOSE SERPL-MCNC: 198 MG/DL (ref 70–99)
GLUCOSE SERPL-MCNC: 200 MG/DL (ref 70–99)
GLUCOSE SERPL-MCNC: 238 MG/DL (ref 70–99)
GLUCOSE SERPL-MCNC: 240 MG/DL (ref 70–99)
HCO3 BLD-SCNC: 14 MMOL/L (ref 21–28)
HCO3 BLD-SCNC: 16 MMOL/L (ref 21–28)
HCO3 BLD-SCNC: 17 MMOL/L (ref 21–28)
HCO3 BLD-SCNC: 18 MMOL/L (ref 21–28)
HCO3 BLD-SCNC: 19 MMOL/L (ref 21–28)
HCO3 BLD-SCNC: 19 MMOL/L (ref 21–28)
HCT VFR BLD AUTO: 28 % (ref 35–47)
HGB BLD-MCNC: 8.5 G/DL (ref 11.7–15.7)
INTERPRETATION ECG - MUSE: NORMAL
INTERPRETATION ECG - MUSE: NORMAL
LACTATE SERPL-SCNC: 1.9 MMOL/L (ref 0.7–2)
LACTATE SERPL-SCNC: 1.9 MMOL/L (ref 0.7–2)
LACTATE SERPL-SCNC: 2.9 MMOL/L (ref 0.7–2)
LACTATE SERPL-SCNC: 3.5 MMOL/L (ref 0.7–2)
MAGNESIUM SERPL-MCNC: 2.2 MG/DL (ref 1.7–2.3)
MAGNESIUM SERPL-MCNC: 2.3 MG/DL (ref 1.7–2.3)
MAGNESIUM SERPL-MCNC: 2.3 MG/DL (ref 1.7–2.3)
MAGNESIUM SERPL-MCNC: 2.4 MG/DL (ref 1.7–2.3)
MCH RBC QN AUTO: 21.5 PG (ref 26.5–33)
MCHC RBC AUTO-ENTMCNC: 30.4 G/DL (ref 31.5–36.5)
MCV RBC AUTO: 71 FL (ref 78–100)
O2/TOTAL GAS SETTING VFR VENT: 100 %
O2/TOTAL GAS SETTING VFR VENT: 50 %
O2/TOTAL GAS SETTING VFR VENT: 60 %
O2/TOTAL GAS SETTING VFR VENT: 80 %
OSMOLALITY UR: 326 MMOL/KG (ref 100–1200)
OXYHGB MFR BLD: 91 % (ref 92–100)
OXYHGB MFR BLD: 95 % (ref 92–100)
P AXIS - MUSE: NORMAL DEGREES
P AXIS - MUSE: NORMAL DEGREES
PCO2 BLD: 28 MM HG (ref 35–45)
PCO2 BLD: 31 MM HG (ref 35–45)
PCO2 BLD: 31 MM HG (ref 35–45)
PCO2 BLD: 32 MM HG (ref 35–45)
PCO2 BLD: 44 MM HG (ref 35–45)
PCO2 BLD: 51 MM HG (ref 35–45)
PH BLD: 7.12 [PH] (ref 7.35–7.45)
PH BLD: 7.18 [PH] (ref 7.35–7.45)
PH BLD: 7.31 [PH] (ref 7.35–7.45)
PH BLD: 7.37 [PH] (ref 7.35–7.45)
PH BLD: 7.38 [PH] (ref 7.35–7.45)
PH BLD: 7.4 [PH] (ref 7.35–7.45)
PHOSPHATE SERPL-MCNC: 0.7 MG/DL (ref 2.5–4.5)
PHOSPHATE SERPL-MCNC: 3 MG/DL (ref 2.5–4.5)
PHOSPHATE SERPL-MCNC: 3.5 MG/DL (ref 2.5–4.5)
PHOSPHATE SERPL-MCNC: 4 MG/DL (ref 2.5–4.5)
PHOSPHATE SERPL-MCNC: 4.1 MG/DL (ref 2.5–4.5)
PLATELET # BLD AUTO: 307 10E3/UL (ref 150–450)
PO2 BLD: 199 MM HG (ref 80–105)
PO2 BLD: 58 MM HG (ref 80–105)
PO2 BLD: 72 MM HG (ref 80–105)
PO2 BLD: 80 MM HG (ref 80–105)
PO2 BLD: 86 MM HG (ref 80–105)
PO2 BLD: 95 MM HG (ref 80–105)
POTASSIUM SERPL-SCNC: 3 MMOL/L (ref 3.4–5.3)
POTASSIUM SERPL-SCNC: 3.1 MMOL/L (ref 3.4–5.3)
POTASSIUM SERPL-SCNC: 3.1 MMOL/L (ref 3.4–5.3)
POTASSIUM SERPL-SCNC: 3.8 MMOL/L (ref 3.4–5.3)
POTASSIUM SERPL-SCNC: 4 MMOL/L (ref 3.4–5.3)
POTASSIUM SERPL-SCNC: 4.4 MMOL/L (ref 3.4–5.3)
POTASSIUM SERPL-SCNC: 4.7 MMOL/L (ref 3.4–5.3)
POTASSIUM SERPL-SCNC: 4.8 MMOL/L (ref 3.4–5.3)
POTASSIUM SERPL-SCNC: 5.3 MMOL/L (ref 3.4–5.3)
PR INTERVAL - MUSE: 192 MS
PR INTERVAL - MUSE: 222 MS
PROCALCITONIN SERPL IA-MCNC: 0.25 NG/ML
PROT SERPL-MCNC: 5.5 G/DL (ref 6.4–8.3)
QRS DURATION - MUSE: 106 MS
QRS DURATION - MUSE: 116 MS
QT - MUSE: 480 MS
QT - MUSE: 488 MS
QTC - MUSE: 453 MS
QTC - MUSE: 467 MS
R AXIS - MUSE: 108 DEGREES
R AXIS - MUSE: 97 DEGREES
RBC # BLD AUTO: 3.96 10E6/UL (ref 3.8–5.2)
SODIUM SERPL-SCNC: 137 MMOL/L (ref 136–145)
SODIUM SERPL-SCNC: 138 MMOL/L (ref 136–145)
SODIUM SERPL-SCNC: 139 MMOL/L (ref 136–145)
SODIUM SERPL-SCNC: 140 MMOL/L (ref 136–145)
SODIUM SERPL-SCNC: 142 MMOL/L (ref 136–145)
SODIUM SERPL-SCNC: 142 MMOL/L (ref 136–145)
SODIUM SERPL-SCNC: 143 MMOL/L (ref 136–145)
SODIUM UR-SCNC: 21 MMOL/L
SYSTOLIC BLOOD PRESSURE - MUSE: NORMAL MMHG
SYSTOLIC BLOOD PRESSURE - MUSE: NORMAL MMHG
T AXIS - MUSE: 69 DEGREES
T AXIS - MUSE: 91 DEGREES
VENTRICULAR RATE- MUSE: 52 BPM
VENTRICULAR RATE- MUSE: 57 BPM
WBC # BLD AUTO: 9.5 10E3/UL (ref 4–11)

## 2023-01-30 PROCEDURE — 82803 BLOOD GASES ANY COMBINATION: CPT | Performed by: STUDENT IN AN ORGANIZED HEALTH CARE EDUCATION/TRAINING PROGRAM

## 2023-01-30 PROCEDURE — 93005 ELECTROCARDIOGRAM TRACING: CPT

## 2023-01-30 PROCEDURE — 250N000012 HC RX MED GY IP 250 OP 636 PS 637: Performed by: STUDENT IN AN ORGANIZED HEALTH CARE EDUCATION/TRAINING PROGRAM

## 2023-01-30 PROCEDURE — 84300 ASSAY OF URINE SODIUM: CPT | Performed by: STUDENT IN AN ORGANIZED HEALTH CARE EDUCATION/TRAINING PROGRAM

## 2023-01-30 PROCEDURE — C9113 INJ PANTOPRAZOLE SODIUM, VIA: HCPCS | Performed by: ANESTHESIOLOGY

## 2023-01-30 PROCEDURE — 5A09357 ASSISTANCE WITH RESPIRATORY VENTILATION, LESS THAN 24 CONSECUTIVE HOURS, CONTINUOUS POSITIVE AIRWAY PRESSURE: ICD-10-PCS | Performed by: INTERNAL MEDICINE

## 2023-01-30 PROCEDURE — 99207 PR NO BILLABLE SERVICE THIS VISIT: CPT | Performed by: STUDENT IN AN ORGANIZED HEALTH CARE EDUCATION/TRAINING PROGRAM

## 2023-01-30 PROCEDURE — 83605 ASSAY OF LACTIC ACID: CPT | Performed by: STUDENT IN AN ORGANIZED HEALTH CARE EDUCATION/TRAINING PROGRAM

## 2023-01-30 PROCEDURE — 82310 ASSAY OF CALCIUM: CPT | Performed by: ANESTHESIOLOGY

## 2023-01-30 PROCEDURE — 87641 MR-STAPH DNA AMP PROBE: CPT | Performed by: STUDENT IN AN ORGANIZED HEALTH CARE EDUCATION/TRAINING PROGRAM

## 2023-01-30 PROCEDURE — 85027 COMPLETE CBC AUTOMATED: CPT | Performed by: INTERNAL MEDICINE

## 2023-01-30 PROCEDURE — 250N000009 HC RX 250: Performed by: ANESTHESIOLOGY

## 2023-01-30 PROCEDURE — 250N000009 HC RX 250: Performed by: STUDENT IN AN ORGANIZED HEALTH CARE EDUCATION/TRAINING PROGRAM

## 2023-01-30 PROCEDURE — 250N000013 HC RX MED GY IP 250 OP 250 PS 637: Performed by: STUDENT IN AN ORGANIZED HEALTH CARE EDUCATION/TRAINING PROGRAM

## 2023-01-30 PROCEDURE — 87086 URINE CULTURE/COLONY COUNT: CPT | Performed by: INTERNAL MEDICINE

## 2023-01-30 PROCEDURE — 82374 ASSAY BLOOD CARBON DIOXIDE: CPT | Performed by: STUDENT IN AN ORGANIZED HEALTH CARE EDUCATION/TRAINING PROGRAM

## 2023-01-30 PROCEDURE — 36415 COLL VENOUS BLD VENIPUNCTURE: CPT | Performed by: INTERNAL MEDICINE

## 2023-01-30 PROCEDURE — 84100 ASSAY OF PHOSPHORUS: CPT | Performed by: INTERNAL MEDICINE

## 2023-01-30 PROCEDURE — 83605 ASSAY OF LACTIC ACID: CPT | Performed by: INTERNAL MEDICINE

## 2023-01-30 PROCEDURE — 94002 VENT MGMT INPAT INIT DAY: CPT

## 2023-01-30 PROCEDURE — 83735 ASSAY OF MAGNESIUM: CPT | Performed by: ANESTHESIOLOGY

## 2023-01-30 PROCEDURE — 84100 ASSAY OF PHOSPHORUS: CPT | Performed by: ANESTHESIOLOGY

## 2023-01-30 PROCEDURE — 83735 ASSAY OF MAGNESIUM: CPT | Performed by: STUDENT IN AN ORGANIZED HEALTH CARE EDUCATION/TRAINING PROGRAM

## 2023-01-30 PROCEDURE — 258N000001 HC RX 258: Performed by: STUDENT IN AN ORGANIZED HEALTH CARE EDUCATION/TRAINING PROGRAM

## 2023-01-30 PROCEDURE — 258N000003 HC RX IP 258 OP 636: Performed by: EMERGENCY MEDICINE

## 2023-01-30 PROCEDURE — 999N000065 XR ABDOMEN PORT 1 VIEW

## 2023-01-30 PROCEDURE — 71045 X-RAY EXAM CHEST 1 VIEW: CPT

## 2023-01-30 PROCEDURE — 87040 BLOOD CULTURE FOR BACTERIA: CPT | Performed by: INTERNAL MEDICINE

## 2023-01-30 PROCEDURE — 93970 EXTREMITY STUDY: CPT

## 2023-01-30 PROCEDURE — 250N000011 HC RX IP 250 OP 636: Performed by: EMERGENCY MEDICINE

## 2023-01-30 PROCEDURE — 82330 ASSAY OF CALCIUM: CPT | Performed by: ANESTHESIOLOGY

## 2023-01-30 PROCEDURE — 250N000009 HC RX 250

## 2023-01-30 PROCEDURE — 99291 CRITICAL CARE FIRST HOUR: CPT | Mod: 25 | Performed by: INTERNAL MEDICINE

## 2023-01-30 PROCEDURE — 5A1945Z RESPIRATORY VENTILATION, 24-96 CONSECUTIVE HOURS: ICD-10-PCS | Performed by: STUDENT IN AN ORGANIZED HEALTH CARE EDUCATION/TRAINING PROGRAM

## 2023-01-30 PROCEDURE — 258N000003 HC RX IP 258 OP 636: Performed by: ANESTHESIOLOGY

## 2023-01-30 PROCEDURE — 250N000011 HC RX IP 250 OP 636: Performed by: STUDENT IN AN ORGANIZED HEALTH CARE EDUCATION/TRAINING PROGRAM

## 2023-01-30 PROCEDURE — 93010 ELECTROCARDIOGRAM REPORT: CPT | Mod: 76 | Performed by: INTERNAL MEDICINE

## 2023-01-30 PROCEDURE — 999N000065 XR CHEST PORT 1 VIEW

## 2023-01-30 PROCEDURE — 84145 PROCALCITONIN (PCT): CPT | Performed by: INTERNAL MEDICINE

## 2023-01-30 PROCEDURE — 999N000009 HC STATISTIC AIRWAY CARE

## 2023-01-30 PROCEDURE — 999N000128 HC STATISTIC PERIPHERAL IV START W/O US GUIDANCE

## 2023-01-30 PROCEDURE — 80175 DRUG SCREEN QUAN LAMOTRIGINE: CPT | Performed by: INTERNAL MEDICINE

## 2023-01-30 PROCEDURE — 83935 ASSAY OF URINE OSMOLALITY: CPT | Performed by: STUDENT IN AN ORGANIZED HEALTH CARE EDUCATION/TRAINING PROGRAM

## 2023-01-30 PROCEDURE — 99207 PR NO CHARGE LOS: CPT | Performed by: INTERNAL MEDICINE

## 2023-01-30 PROCEDURE — 82803 BLOOD GASES ANY COMBINATION: CPT | Performed by: INTERNAL MEDICINE

## 2023-01-30 PROCEDURE — 84100 ASSAY OF PHOSPHORUS: CPT | Performed by: STUDENT IN AN ORGANIZED HEALTH CARE EDUCATION/TRAINING PROGRAM

## 2023-01-30 PROCEDURE — 258N000003 HC RX IP 258 OP 636: Performed by: STUDENT IN AN ORGANIZED HEALTH CARE EDUCATION/TRAINING PROGRAM

## 2023-01-30 PROCEDURE — 80053 COMPREHEN METABOLIC PANEL: CPT | Performed by: INTERNAL MEDICINE

## 2023-01-30 PROCEDURE — 82330 ASSAY OF CALCIUM: CPT | Performed by: INTERNAL MEDICINE

## 2023-01-30 PROCEDURE — 82805 BLOOD GASES W/O2 SATURATION: CPT | Performed by: STUDENT IN AN ORGANIZED HEALTH CARE EDUCATION/TRAINING PROGRAM

## 2023-01-30 PROCEDURE — 999N000157 HC STATISTIC RCP TIME EA 10 MIN

## 2023-01-30 PROCEDURE — 200N000001 HC R&B ICU

## 2023-01-30 PROCEDURE — 31500 INSERT EMERGENCY AIRWAY: CPT | Mod: GC | Performed by: STUDENT IN AN ORGANIZED HEALTH CARE EDUCATION/TRAINING PROGRAM

## 2023-01-30 PROCEDURE — 94660 CPAP INITIATION&MGMT: CPT

## 2023-01-30 PROCEDURE — 250N000011 HC RX IP 250 OP 636: Performed by: ANESTHESIOLOGY

## 2023-01-30 RX ORDER — FUROSEMIDE 10 MG/ML
80 INJECTION INTRAMUSCULAR; INTRAVENOUS ONCE
Status: COMPLETED | OUTPATIENT
Start: 2023-01-30 | End: 2023-01-30

## 2023-01-30 RX ORDER — POTASSIUM CHLORIDE 1.5 G/1.58G
20-40 POWDER, FOR SOLUTION ORAL
Status: DISCONTINUED | OUTPATIENT
Start: 2023-01-30 | End: 2023-01-31

## 2023-01-30 RX ORDER — HEPARIN SODIUM 5000 [USP'U]/.5ML
5000 INJECTION, SOLUTION INTRAVENOUS; SUBCUTANEOUS EVERY 8 HOURS
Status: DISCONTINUED | OUTPATIENT
Start: 2023-01-30 | End: 2023-02-06

## 2023-01-30 RX ORDER — ETOMIDATE 2 MG/ML
15 INJECTION INTRAVENOUS ONCE
Status: COMPLETED | OUTPATIENT
Start: 2023-01-30 | End: 2023-01-30

## 2023-01-30 RX ORDER — CHLORHEXIDINE GLUCONATE ORAL RINSE 1.2 MG/ML
15 SOLUTION DENTAL EVERY 12 HOURS
Status: DISCONTINUED | OUTPATIENT
Start: 2023-01-30 | End: 2023-02-03

## 2023-01-30 RX ORDER — POTASSIUM CHLORIDE 1500 MG/1
20-40 TABLET, EXTENDED RELEASE ORAL
Status: DISCONTINUED | OUTPATIENT
Start: 2023-01-30 | End: 2023-01-31

## 2023-01-30 RX ORDER — DEXTROSE 50 G/100ML
INJECTION, SOLUTION INTRAVENOUS CONTINUOUS
Status: DISCONTINUED | OUTPATIENT
Start: 2023-01-30 | End: 2023-01-31

## 2023-01-30 RX ORDER — MAGNESIUM SULFATE HEPTAHYDRATE 40 MG/ML
4 INJECTION, SOLUTION INTRAVENOUS DAILY PRN
Status: DISCONTINUED | OUTPATIENT
Start: 2023-01-30 | End: 2023-02-05

## 2023-01-30 RX ORDER — POTASSIUM CHLORIDE 7.45 MG/ML
10 INJECTION INTRAVENOUS
Status: DISCONTINUED | OUTPATIENT
Start: 2023-01-30 | End: 2023-01-31

## 2023-01-30 RX ORDER — ATROPINE SULFATE 0.1 MG/ML
INJECTION INTRAVENOUS
Status: DISCONTINUED
Start: 2023-01-30 | End: 2023-01-30 | Stop reason: WASHOUT

## 2023-01-30 RX ORDER — POTASSIUM CHLORIDE 29.8 MG/ML
20 INJECTION INTRAVENOUS
Status: DISCONTINUED | OUTPATIENT
Start: 2023-01-30 | End: 2023-01-31

## 2023-01-30 RX ORDER — DEXTROSE MONOHYDRATE 25 G/50ML
25 INJECTION, SOLUTION INTRAVENOUS
Status: DISCONTINUED | OUTPATIENT
Start: 2023-01-30 | End: 2023-02-05

## 2023-01-30 RX ORDER — MAGNESIUM SULFATE HEPTAHYDRATE 40 MG/ML
2 INJECTION, SOLUTION INTRAVENOUS DAILY PRN
Status: DISCONTINUED | OUTPATIENT
Start: 2023-01-30 | End: 2023-02-05

## 2023-01-30 RX ORDER — PIPERACILLIN SODIUM, TAZOBACTAM SODIUM 2; .25 G/10ML; G/10ML
2.25 INJECTION, POWDER, LYOPHILIZED, FOR SOLUTION INTRAVENOUS EVERY 6 HOURS
Status: DISCONTINUED | OUTPATIENT
Start: 2023-01-31 | End: 2023-02-01

## 2023-01-30 RX ORDER — FUROSEMIDE 10 MG/ML
120 INJECTION INTRAMUSCULAR; INTRAVENOUS ONCE
Status: COMPLETED | OUTPATIENT
Start: 2023-01-30 | End: 2023-01-30

## 2023-01-30 RX ORDER — LIDOCAINE 40 MG/G
CREAM TOPICAL
Status: DISCONTINUED | OUTPATIENT
Start: 2023-01-30 | End: 2023-02-05

## 2023-01-30 RX ORDER — HYDROMORPHONE HCL IN WATER/PF 6 MG/30 ML
0.2 PATIENT CONTROLLED ANALGESIA SYRINGE INTRAVENOUS
Status: DISCONTINUED | OUTPATIENT
Start: 2023-01-30 | End: 2023-02-23 | Stop reason: HOSPADM

## 2023-01-30 RX ORDER — DEXMEDETOMIDINE HYDROCHLORIDE 4 UG/ML
.1-.8 INJECTION, SOLUTION INTRAVENOUS CONTINUOUS
Status: DISCONTINUED | OUTPATIENT
Start: 2023-01-30 | End: 2023-01-30

## 2023-01-30 RX ORDER — NITROGLYCERIN 0.4 MG/1
0.4 TABLET SUBLINGUAL EVERY 5 MIN PRN
Status: DISCONTINUED | OUTPATIENT
Start: 2023-01-30 | End: 2023-01-30

## 2023-01-30 RX ORDER — DEXTROSE MONOHYDRATE 25 G/50ML
25 INJECTION, SOLUTION INTRAVENOUS EVERY 30 MIN PRN
Status: DISCONTINUED | OUTPATIENT
Start: 2023-01-30 | End: 2023-02-05

## 2023-01-30 RX ORDER — PIPERACILLIN SODIUM, TAZOBACTAM SODIUM 2; .25 G/10ML; G/10ML
2.25 INJECTION, POWDER, LYOPHILIZED, FOR SOLUTION INTRAVENOUS EVERY 6 HOURS
Status: DISCONTINUED | OUTPATIENT
Start: 2023-01-30 | End: 2023-01-30

## 2023-01-30 RX ORDER — POTASSIUM CHLORIDE 20MEQ/15ML
10 LIQUID (ML) ORAL ONCE
Status: DISCONTINUED | OUTPATIENT
Start: 2023-01-30 | End: 2023-01-30 | Stop reason: DRUGHIGH

## 2023-01-30 RX ORDER — MIDAZOLAM HCL IN 0.9 % NACL/PF 1 MG/ML
1-8 PLASTIC BAG, INJECTION (ML) INTRAVENOUS CONTINUOUS
Status: DISCONTINUED | OUTPATIENT
Start: 2023-01-30 | End: 2023-02-01

## 2023-01-30 RX ORDER — PIPERACILLIN SODIUM, TAZOBACTAM SODIUM 3; .375 G/15ML; G/15ML
3.38 INJECTION, POWDER, LYOPHILIZED, FOR SOLUTION INTRAVENOUS EVERY 6 HOURS
Status: DISCONTINUED | OUTPATIENT
Start: 2023-01-30 | End: 2023-01-30

## 2023-01-30 RX ORDER — POLYETHYLENE GLYCOL 3350 17 G/17G
17 POWDER, FOR SOLUTION ORAL DAILY
Status: DISCONTINUED | OUTPATIENT
Start: 2023-01-30 | End: 2023-01-31

## 2023-01-30 RX ADMIN — SODIUM CHLORIDE, POTASSIUM CHLORIDE, SODIUM LACTATE AND CALCIUM CHLORIDE: 600; 310; 30; 20 INJECTION, SOLUTION INTRAVENOUS at 03:28

## 2023-01-30 RX ADMIN — POTASSIUM CHLORIDE 20 MEQ: 1.5 POWDER, FOR SOLUTION ORAL at 22:07

## 2023-01-30 RX ADMIN — SODIUM CHLORIDE 1 UNITS/KG/HR: 9 INJECTION, SOLUTION INTRAVENOUS at 10:45

## 2023-01-30 RX ADMIN — PIPERACILLIN AND TAZOBACTAM 3.38 G: 3; .375 INJECTION, POWDER, FOR SOLUTION INTRAVENOUS at 18:30

## 2023-01-30 RX ADMIN — Medication 0.35 MCG/KG/MIN: at 22:15

## 2023-01-30 RX ADMIN — MAGNESIUM SULFATE HEPTAHYDRATE 1 G: 500 INJECTION, SOLUTION INTRAMUSCULAR; INTRAVENOUS at 16:41

## 2023-01-30 RX ADMIN — FUROSEMIDE 120 MG: 10 INJECTION, SOLUTION INTRAMUSCULAR; INTRAVENOUS at 16:59

## 2023-01-30 RX ADMIN — FUROSEMIDE 80 MG: 10 INJECTION, SOLUTION INTRAMUSCULAR; INTRAVENOUS at 11:19

## 2023-01-30 RX ADMIN — Medication 0.3 MCG/KG/MIN: at 18:15

## 2023-01-30 RX ADMIN — DEXTROSE MONOHYDRATE: 25 INJECTION, SOLUTION INTRAVENOUS at 09:13

## 2023-01-30 RX ADMIN — Medication 0.06 MCG/KG/MIN: at 02:47

## 2023-01-30 RX ADMIN — MAGNESIUM SULFATE HEPTAHYDRATE 1 G: 500 INJECTION, SOLUTION INTRAMUSCULAR; INTRAVENOUS at 20:51

## 2023-01-30 RX ADMIN — EPINEPHRINE 0.15 MCG/KG/MIN: 1 INJECTION INTRAMUSCULAR; INTRAVENOUS; SUBCUTANEOUS at 18:09

## 2023-01-30 RX ADMIN — Medication 50 MCG/HR: at 18:25

## 2023-01-30 RX ADMIN — CHLORHEXIDINE GLUCONATE 15 ML: 1.2 SOLUTION ORAL at 20:03

## 2023-01-30 RX ADMIN — POTASSIUM PHOSPHATE, MONOBASIC AND POTASSIUM PHOSPHATE, DIBASIC 25 MMOL: 224; 236 INJECTION, SOLUTION, CONCENTRATE INTRAVENOUS at 23:45

## 2023-01-30 RX ADMIN — SODIUM CHLORIDE, POTASSIUM CHLORIDE, SODIUM LACTATE AND CALCIUM CHLORIDE: 600; 310; 30; 20 INJECTION, SOLUTION INTRAVENOUS at 09:49

## 2023-01-30 RX ADMIN — HEPARIN SODIUM 5000 UNITS: 5000 INJECTION, SOLUTION INTRAVENOUS; SUBCUTANEOUS at 11:20

## 2023-01-30 RX ADMIN — MIDAZOLAM HYDROCHLORIDE 2 MG/HR: 1 INJECTION, SOLUTION INTRAVENOUS at 16:26

## 2023-01-30 RX ADMIN — PANTOPRAZOLE SODIUM 40 MG: 40 INJECTION, POWDER, FOR SOLUTION INTRAVENOUS at 08:30

## 2023-01-30 RX ADMIN — POTASSIUM CHLORIDE 20 MEQ: 1.5 POWDER, FOR SOLUTION ORAL at 16:07

## 2023-01-30 RX ADMIN — ETOMIDATE 15 MG: 2 INJECTION, SOLUTION INTRAVENOUS at 13:40

## 2023-01-30 RX ADMIN — ROCURONIUM BROMIDE 50 MG: 50 INJECTION, SOLUTION INTRAVENOUS at 13:40

## 2023-01-30 RX ADMIN — MAGNESIUM SULFATE HEPTAHYDRATE 1 G: 500 INJECTION, SOLUTION INTRAMUSCULAR; INTRAVENOUS at 07:05

## 2023-01-30 RX ADMIN — POTASSIUM CHLORIDE 40 MEQ: 1.5 POWDER, FOR SOLUTION ORAL at 20:03

## 2023-01-30 RX ADMIN — HEPARIN SODIUM 5000 UNITS: 5000 INJECTION, SOLUTION INTRAVENOUS; SUBCUTANEOUS at 20:03

## 2023-01-30 RX ADMIN — SODIUM BICARBONATE 50 MEQ: 84 INJECTION, SOLUTION INTRAVENOUS at 16:21

## 2023-01-30 RX ADMIN — SODIUM CHLORIDE 10 UNITS/KG/HR: 9 INJECTION, SOLUTION INTRAVENOUS at 20:50

## 2023-01-30 RX ADMIN — SODIUM BICARBONATE 50 MEQ: 84 INJECTION, SOLUTION INTRAVENOUS at 16:28

## 2023-01-30 RX ADMIN — Medication 50 MEQ: at 16:21

## 2023-01-30 ASSESSMENT — ACTIVITIES OF DAILY LIVING (ADL)
ADLS_ACUITY_SCORE: 43
ADLS_ACUITY_SCORE: 43
ADLS_ACUITY_SCORE: 39
ADLS_ACUITY_SCORE: 47
ADLS_ACUITY_SCORE: 39
ADLS_ACUITY_SCORE: 39

## 2023-01-30 NOTE — CONSULTS
"[facility] Consult Crisis Assessment   Triage and Transition - Consult and Liaison   [phone]    1/28/2023  Diana Santiago   1950     Started at: 5:08 pm   Completed at: 5:25 pm  Patient was assessed via in-person.  Duration of face to face time with patient in minutes: .50 hrs  CPT code(s) utilized: 63901 - Psychotherapy for Crisis - 60 (30-74*) min    Disposition    Please enter another psychiatry if further visits are needed. Patients are not followed by Psychiatry C&L Service unless otherwise indicated.     Recommended disposition: Individual Therapy and Medication Management    Clinical Summary and Substantiation of Recommendations    Patient somewhat somnolent during session although able to engage. She reports regret in attempting to end her life and requesting help soon after taking medications. Patient appears to be future oriented, discussing desire to join drop in center at [facility]. She is denying suicidal ideation and states she no longer wants to die. We briefly discussed possible transfer to [facility], patient indicated she did not feel that would be helpful. I stated we would likely meet with her again when more medically stable to discuss further how she is doing. Right now sitter is in room due to patient's medical state and need for pressers. Will continue this until further evaluation is done.      Referral Data and Chief Complaint  Diana is a 72 year old, who uses she/her pronouns, and presents to the ED via EMS. Patient is referred by self. Patient has been admitted to the hospital for the following concerns: intentional overdose.      Summary of Patient Situation  Patient seen laying in hospital bed, she was resting when I arrived. She appears somewhat somnolent but is able to engage in assessment. Patient indicates she has been thinking about suicide for a while now. She states depression increased after a fall in June where she lost mobility and was \"house bound\". She indicates has had " suicidal thoughts for most of her life but they have increased recently. She reports this attempt was not planned and was more so impulsive. She reports shortly after taking the medications she called her brother for help. In looking back she is glad she is was not successful and regrets decision. She denies suicidal ideation today and reports she would not do that again. She reports she feels safe in hospital and would not harm herself her. She states she does not think inpatient hospitalization would be helpful at this time.   Patient and I discussed coping skills and current level of supports. She has siblings, therapist who she has only had one session with and psychiatrist. She reports she is working with  through her therapist's clinic to help secure [employer] so that she could possibly go to drop in center at [facility] but she worries if this is handicap accessible.     Brief Psychosocial History  Current living situation: In own apartment   - Brief family history: Brother is local and very supportive - she sees him 3 times a week. Sister lives in [place] and is also supportive and visits patient in [place]  - School/ Work Functioning: Retired, not in school  - Employment and income source - financial concerns: Social Security income, reports no financial concerns.   - [place] status: No  - Hobbies: reading, listening to music, being with friends   - Supports: Brother and sister   - Relevant legal issues: none  - Cultural, Jewish, or spiritual influences on mental health care: Pt reports she is Caholic, and her chanel is important to her, but she is not currently going to any Religion.     Significant Clinical History  Patient reports she struggled with depressive symptoms starting in the early 1970s and she made 2 suicide attempts at that time. Patient reports she was diagnosed with schizophrenia at that time and was hospitalized multiple times.      Pt reports shortly after this her dx was changed  to Bipolar disorder and her medications changed to include Lithium. She reports for decades following this change she continued to struggle with some periods of depression and intermittent SI, with no other suicide plan, intent or attempts.     Patient reports she has been able to maintain stability for most of the past 50 years utilizing outpatient psychiatry and therapy services. 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      Patient reports she has been progressively struggling with multiple medical issues, including kidney issues, is no longer able to drive, and more recently has had issues with he eyesight. She reports awareness that her mood has been more depressed. Her brother notes in collateral that he believes her mood is more depressed since her medications where changed about a year ago, when Lithium was discontinued. Patient reports Lithium was discontinued due to concern of her kidneys. Her brother also notes that she is no longer able to drive, and feels more isolated, and struggles to read do to diminishing eyesight.      Patient reports she has several tx in place including psychiatry, therapy, OT and PT for her physical issues.      Patient just seen at [facility] for increased suicidal thoughts/plan on 1/25-1/26.        Current Substance Abuse     Is there recent substance abuse? no     Was a urine drug screen or blood alcohol level obtained: Yes COLTEN 0 and amphetamines present in drug screen       Mental Status Exam   Affect: Flat   Appearance: Appropriate and Other: laying down in hospital bed ,    Attention Span/Concentration: Attentive  Eye Contact: Engaged   Fund of Knowledge: Appropriate    Language /Speech Content: Fluent   Language /Speech Volume: Normal    Language /Speech Rate/Productions: Normal    Recent Memory: Intact   Remote Memory: Intact   Mood: Normal    Orientation to Person: Yes     Orientation to Place: Yes   Orientation to Time of Day: Yes    Orientation to Date: Yes    Situation (Do they understand why they are here?): Yes    Psychomotor Behavior: Normal    Thought Content: Clear   Thought Form: Intact      History of commitment: No         Medication    Psychotropic medications: yes  Current Facility-Administered Medications   Medication     acetaminophen (TYLENOL) tablet 650 mg    Or     acetaminophen (TYLENOL) solution 650 mg     bisacodyl (DULCOLAX) suppository 10 mg     calcium gluconate 3 g in sodium chloride 0.9 % 100 mL intermittent infusion     calcium gluconate 3 g in sodium chloride 0.9 % 100 mL intermittent infusion     dextrose 10% infusion     dextrose 50 % injection 100 mL     dextrose 50 % injection 25 g     dextrose 50 % injection 25 g     glucose gel 15-30 g    Or     dextrose 50 % injection 25-50 mL    Or     glucagon injection 1 mg     enoxaparin ANTICOAGULANT (LOVENOX) injection 30 mg     EPINEPHrine (ADRENALIN) 5 mg in  mL infusion     HYDROmorphone (PF) (DILAUDID) injection 0.5-1 mg     insulin (regular) (NovoLIN-R, HumuLIN-R) bolus from infusion pump 54.4 Units     insulin regular 10 units/mL 250 mL INFUSION (CCB or beta-blocker overdose)     lactated ringers infusion     lidocaine (LMX4) cream     lidocaine 1 % 0.1-1 mL     lidocaine 1 % 0.1-5 mL     magnesium hydroxide (MILK OF MAGNESIA) suspension 30 mL     magnesium sulfate 1 g in sodium chloride 0.9 % 100 mL intermittent infusion     magnesium sulfate 2 g in water intermittent infusion     magnesium sulfate 3 g in NS intermittent infusion     magnesium sulfate 4 g in 100 mL sterile water (premade)     naloxone (NARCAN) injection 0.2 mg    Or     naloxone (NARCAN) injection 0.4 mg    Or     naloxone (NARCAN) injection 0.2 mg    Or     naloxone (NARCAN) injection 0.4 mg     norepinephrine (LEVOPHED) 4 mg in  mL infusion PREMIX     ondansetron (ZOFRAN ODT) ODT tab 4 mg    Or     ondansetron (ZOFRAN)  injection 4 mg     [START ON 1/30/2023] pantoprazole (PROTONIX) IV push injection 40 mg     potassium chloride (KLOR-CON) Packet 20-40 mEq     potassium chloride 10 mEq in 100 mL sterile water infusion     potassium chloride 20 mEq in 50 mL intermittent infusion     potassium chloride ER (KLOR-CON M) CR tablet 20-40 mEq     potassium phosphate 10 mmol in D5W 250 mL intermittent infusion     potassium phosphate 15 mmol in D5W 250 mL intermittent infusion     potassium phosphate 20 mmol in D5W 250 mL intermittent infusion     potassium phosphate 20 mmol in D5W 500 mL intermittent infusion     potassium phosphate 25 mmol in D5W 500 mL intermittent infusion     senna-docusate (SENOKOT-S/PERICOLACE) 8.6-50 MG per tablet 1 tablet    Or     senna-docusate (SENOKOT-S/PERICOLACE) 8.6-50 MG per tablet 2 tablet     sodium chloride (PF) 0.9% PF flush 10 mL     sodium chloride (PF) 0.9% PF flush 10-20 mL     sodium chloride (PF) 0.9% PF flush 10-20 mL     sodium chloride (PF) 0.9% PF flush 10-40 mL     sodium chloride (PF) 0.9% PF flush 10-40 mL     sodium chloride 0.9% infusion     vasopressin 0.2 units/mL in NS (PITRESSIN) standard conc infusion     Medication changes made in the last two weeks: No    Current Care Team    Primary Care Provider: Yes. Name: Dr Gayle Hernandez. Location: [facility]. Date of last visit: this month. Frequency: as needed. Perceived helpfulness: helpful.  Psychiatrist: Yes. Name: Aftab Gallo. Location: [facility]. Date of last visit: today. Frequency: monthly or more. Perceived helpfulness: helpful.  Therapist: No  : No  CTSS or ARMHS: No  ACT Team: No  Other: No    Diagnosis    (F31.9) Unspecified Bipolar and Related Disorder          Anabell Heard [facility],   Triage and Transition - Consult and Liaison   [phone]

## 2023-01-30 NOTE — PROGRESS NOTES
Pt was intubated with 7.5 ETT secured at 22cm at the lip.  Postive ETCO2 and bilateral LS present.  Pt placed CMV 20 400 100% p10.  Will continue to follow.  Bernardo Caldera, RT  1/30/2023

## 2023-01-30 NOTE — PROGRESS NOTES
Patient alert and oriented overnight, on pressors to maintain MAP >65, initially in sinus rhythm. Around 2300 developed junctional beats, sometimes as frequent as bigeminy, pressor needs quickly increased as did oxygen needs, MD notified, labs drawn, chest xray obtained, around 0000 pressor needs dramatically decreased and remained lower for the rest of shift, oxygen slowly titrated from 15L oxymask to 6L oxymask. Around 0300 oxygen needs again increased to 15L oxymask and were unable to be titrated back down before shift end. Low urine output overnight, MD aware. Patient at times complains of hand pain, declines medication, pain passes quickly and is chronic for her. NG removed per MD for congestion, patient sates that helped a little, coughing up small amounts of thin secretions, has no complaints of SOB and lung sounds diminished overnight, plan for HFNC if O2 sats continue to dip. Magnesium replaced per orders.

## 2023-01-30 NOTE — PROGRESS NOTES
Pt stating she is now having some shortness of breath. Unable to keep sats > 90 %. Dr. CAYDEN Motley aware. Bipap placed on pt.

## 2023-01-30 NOTE — PROGRESS NOTES
Lactate rising  Still requiring pressors  CO/CI acceptable  Will bolus    Antonio Ramirez MD  Critical Care Medicine

## 2023-01-30 NOTE — PROGRESS NOTES
Critical Care  Note      01/30/2023    Name: Diana Santiago MRN#: [id number]   Age: 72 year old YOB: 1950          Problem List:   Principal Problem:    Overdose, intentional self-harm, initial encounter (H)      Assessment and plan :     Diana Santiago IS a 72 year old female admitted on 1/28/2023 for hemodynamic management of intentional polypharmacy overdose of amlodipine, adderall, haldol, lamictal. Requiring pressors, started on high insulin gtt CCB toxicity    My assessment and plan by system for this patient is as follows:    Neurology/Psychiatry:   1.  Suicidal ideation  2.  Intentional polypharmacy overdose    Plan  Appreciate poison control input regarding to management of polypharmacy overdose.  Patient will need psych consult prior to discharge.    Cardiovascular:   1.Hemodynamics -distributive shock causing hypotension   2.Rhythm -sinus bradycardia w/ junctional  Plan  - discussed with poison control again and recommended high dose insulin gtt while low CO and requiring vasopressors. Will continue until off pressors      Pulmonary/Ventilator Management:   #Acute hypoxic and hypoxemic respiratory failure  Plan  - currently in no distress though low saturations on HFNC--mouth breathing. Increased to BiPAP without success in hypoxia, now intubated    GI and Nutrition :   - N.p.o. for now     Renal/Fluids/Electrolytes:   1.  Patient has a history of stage III chronic kidney disease with elevated creatinine at baseline, her creatinine is 1.8 so she may have an element of acute kidney injury secondary to hypotension  2.  Hyperkalemia secondary to calcium treatment of polysubstance overdose  3.  Lactic acid now normalized  4.  Appears euvolemic  5. Metabolic acidosis  Plan  -We will monitor the patient for worsening kidney injury follow creatinine follow urine output  - monitor function and electrolytes as needed with replacement per ICU protocols. - generally avoid nephrotoxic agents such as  NSAID, IV contrast unless specifically required  - follow I/O's as appropriate.    Infectious Disease:   1.  No active infectious disease issues at this time  - Continue to monitor     Endocrine:   - high dose insulin gtt 1-10u/kg/hr + D50 infusion    Hematology/Oncology:   1.  Chronic anemia present on admission  - no acute issues    LDA:   1. Venous access - R femoral CVC   2. Arterial access - R femoral  3. Suresh 1/29  Plan  - Suresh and access required/necessary      ICU Prophylaxis:   1. DVT: Lovenox mechanical  2. VAP: Not indicated  3. Stress Ulcer: Protonix  4. Restraints: No restraints needed  5. Wound care  -local  6. Feeding -n.p.o. for now  7. Disposition -ICU    Mauricio Motley MD  Surgical Critical Care Fellow      Key goals for next 24 hours:   - starting high dose insulin gtt per poison control recommendation  - monitoring respiratory status closely          Physical Examination:   Temp:  [92.8  F (33.8  C)-99.5  F (37.5  C)] 97.8  F (36.6  C)  Pulse:  [46-77] 56  Resp:  [0-55] 14  BP: ()/(52-70) 92/52  MAP:  [59 mmHg-185 mmHg] 70 mmHg  Arterial Line BP: (0-194)/(0-182) 109/52  FiO2 (%):  [90 %-100 %] 90 %  SpO2:  [83 %-100 %] 91 %    Intake/Output Summary (Last 24 hours) at 1/29/2023 1341  Last data filed at 1/29/2023 1200  Gross per 24 hour   Intake 1640.83 ml   Output 800 ml   Net 840.83 ml     Wt Readings from Last 4 Encounters:   01/30/23 58.5 kg (128 lb 15.5 oz)   01/24/23 54.4 kg (120 lb)   10/20/22 51.3 kg (113 lb)   10/18/22 51.3 kg (113 lb 3.2 oz)     Arterial Line BP: (0-194)/(0-182) 109/52  MAP:  [59 mmHg-185 mmHg] 70 mmHg  BP - Mean:  [60-82] 82  FiO2 (%): 90 %  Resp: 14    Recent Labs   Lab 01/30/23  0439 01/29/23  2350 01/29/23  1832 01/29/23  1339   PH 7.37 7.36 7.37 7.34*   PCO2 31* 30* 32* 35   PO2 72* 82 68* 103   HCO3 18* 17* 18* 19*   O2PER 60 57 5 4       GEN: no acute distress, answering questions  NEURO: Alert arousable will follow commands in all 4 extremities  PULM clear  spontaneous breath sounds, no acute distress, mouth-breathing  CV/COR: Bradycardia but regular w/ intermittent PVCs  ABD: soft, non-distended  EXT:  Edema   warm  SKIN: no obvious rashes         Data:   All data and imaging reviewed     ROUTINE ICU LABS (Last four results)  CMP  Recent Labs   Lab 01/30/23  0741 01/30/23  0545 01/30/23  0417 01/30/23  0030 01/29/23  1948 01/29/23  1831 01/29/23  1236 01/29/23  1147 01/29/23  0630 01/28/23  1928   NA  --  140  --  137  --  137  --  137 135*  135* 133*   POTASSIUM  --  5.3  --  4.7  --  4.7  --  4.8 4.8  4.7 4.6   CHLORIDE  --  109*  --  104  --  105  --  107 102  101 97*   CO2  --  17*  --  17*  --  18*  --  19* 20*  21* 22   ANIONGAP  --  14  --  16*  --  14  --  11 13  13 14   * 169* 146* 188*   < > 143*   < > 148* 126*  125* 148*   BUN  --  49.0*  --  51.4*  --  51.7*  --  56.2* 62.9*  60.9* 66.6*   CR  --  2.08*  --  2.02*  --  1.85*  --  1.73* 1.80*  1.79* 1.80*   GFRESTIMATED  --  25*  --  26*  --  28*  --  31* 29*  30* 29*   ISSA  --  10.2  10.2  --  10.5*  --  10.9*  --  10.9* 12.1*  12.1* 9.9   MAG  --  2.2  --   --   --  2.0  --  2.1 2.3 2.4*   PHOS  --  4.1  --   --   --   --   --  4.3  --   --    PROTTOTAL  --  5.5*  --   --   --  5.8*  --   --  5.8* 6.9   ALBUMIN  --  3.2*  --   --   --  3.3*  --   --  3.3* 3.8   BILITOTAL  --  0.5  --   --   --  0.3  --   --  0.3 0.3   ALKPHOS  --  150*  --   --   --  144*  --   --  137* 160*   AST  --  20  --   --   --  16  --   --  22 25   ALT  --  25  --   --   --  28  --   --  28 36*    < > = values in this interval not displayed.     CBC  Recent Labs   Lab 01/30/23  0030 01/29/23  0630 01/28/23  1928   WBC 9.5 6.1 9.4   RBC 3.96 4.19 4.63   HGB 8.5* 9.1* 9.9*   HCT 28.0* 29.5* 32.7*   MCV 71* 70* 71*   MCH 21.5* 21.7* 21.4*   MCHC 30.4* 30.8* 30.3*   RDW 15.0 14.7 14.7    279 300     INRNo lab results found in last 7 days.  Arterial Blood Gas  Recent Labs   Lab 01/30/23  7249 01/29/23  0963  23  1832 23  1339   PH 7.37 7.36 7.37 7.34*   PCO2 31* 30* 32* 35   PO2 72* 82 68* 103   HCO3 18* 17* 18* 19*   O2PER 60 57 5 4       Recent Results (from the past 24 hour(s))   Echo Limited   Result Value    LVEF  55-60%    Narrative    [id number]  [id number]  [id number]  [id number]^RALPH^DENIS     [facility]  Echocardiography Laboratory  [address] [place] [address]     Name: JUANITO KAN  MRN: [id number]  : 1950  Study Date: 2023 09:42 AM  Age: 72 yrs  Gender: Female  Patient Location: [facility]  Reason For Study: Dizziness  Ordering Physician: DENIS ROSAS  Referring Physician: DENIS ROSAS  Performed By: Mary Ellen Batista     HR: 45  BP: 88/57 mmHg  ______________________________________________________________________________  Procedure  Limited Portable Echo Adult. Definity (NDC #68366-228) given intravenously.     ______________________________________________________________________________  Interpretation Summary     Left ventricular systolic function is normal.  The visual ejection fraction is 55-60%.  The left ventricle is normal in size.  The right ventricle is normal in structure, function and size.  The left atrium is mildly dilated.  There is mild (1+) mitral regurgitation.  The rhythm was sinus bradycardia.  No old studies available for comparison  ______________________________________________________________________________  Left Ventricle  The left ventricle is normal in size. There is normal left ventricular wall  thickness. Left ventricular systolic function is normal. The visual ejection  fraction is 55-60%. Left ventricular diastolic function is indeterminate. No  regional wall motion abnormalities noted. There is no thrombus seen in the  left ventricle.     Right Ventricle  The right ventricle is normal in structure, function and size. There is no  mass or thrombus in the right ventricle.     Atria  The left atrium is mildly dilated. Right  atrial size is normal. There is no  atrial shunt seen. The left atrial appendage is not well visualized.     Mitral Valve  The mitral valve leaflets appear normal. There is no evidence of stenosis,  fluttering, or prolapse. There is mild (1+) mitral regurgitation. There is no  mitral valve stenosis.     Tricuspid Valve  Normal tricuspid valve. There is mild (1+) tricuspid regurgitation. Right  ventricular systolic pressure could not be approximated due to inadequate  tricuspid regurgitation. There is no tricuspid stenosis.     Aortic Valve  The aortic valve is trileaflet. No aortic regurgitation is present. No aortic  stenosis is present.     Pulmonic Valve  Normal pulmonic valve. There is mild (1+) pulmonic valvular regurgitation.  There is no pulmonic valvular stenosis.     Vessels  Borderline aortic root dilatation. Normal size ascending aorta. The inferior  vena cava is normal. The pulmonary artery is normal size.     Pericardium  The pericardium appears normal. There is no pleural effusion.     Rhythm  The rhythm was sinus bradycardia.     ______________________________________________________________________________  Report approved by: Dr. Ron Kramer 01/29/2023 10:24 AM     ______________________________________________________________________________         Solaraze Counseling:  I discussed with the patient the risks of Solaraze including but not limited to erythema, scaling, itching, weeping, crusting, and pain. Humira Pregnancy And Lactation Text: This medication is Pregnancy Category B and is considered safe during pregnancy. It is unknown if this medication is excreted in breast milk. Simponi Counseling:  I discussed with the patient the risks of golimumab including but not limited to myelosuppression, immunosuppression, autoimmune hepatitis, demyelinating diseases, lymphoma, and serious infections.  The patient understands that monitoring is required including a PPD at baseline and must alert us or the primary physician if symptoms of infection or other concerning signs are noted. Hydroxychloroquine Counseling:  I discussed with the patient that a baseline ophthalmologic exam is needed at the start of therapy and every year thereafter while on therapy. A CBC may also be warranted for monitoring.  The side effects of this medication were discussed with the patient, including but not limited to agranulocytosis, aplastic anemia, seizures, rashes, retinopathy, and liver toxicity. Patient instructed to call the office should any adverse effect occur.  The patient verbalized understanding of the proper use and possible adverse effects of Plaquenil.  All the patient's questions and concerns were addressed. Topical Sulfur Applications Pregnancy And Lactation Text: This medication is Pregnancy Category C and has an unknown safety profile during pregnancy. It is unknown if this topical medication is excreted in breast milk. 5-Fu Counseling: 5-Fluorouracil Counseling:  I discussed with the patient the risks of 5-fluorouracil including but not limited to erythema, scaling, itching, weeping, crusting, and pain. Tetracycline Pregnancy And Lactation Text: This medication is Pregnancy Category D and not consider safe during pregnancy. It is also excreted in breast milk. Methotrexate Counseling:  Patient counseled regarding adverse effects of methotrexate including but not limited to nausea, vomiting, abnormalities in liver function tests. Patients may develop mouth sores, rash, diarrhea, and abnormalities in blood counts. The patient understands that monitoring is required including LFT's and blood counts.  There is a rare possibility of scarring of the liver and lung problems that can occur when taking methotrexate. Persistent nausea, loss of appetite, pale stools, dark urine, cough, and shortness of breath should be reported immediately. Patient advised to discontinue methotrexate treatment at least three months before attempting to become pregnant.  I discussed the need for folate supplements while taking methotrexate.  These supplements can decrease side effects during methotrexate treatment. The patient verbalized understanding of the proper use and possible adverse effects of methotrexate.  All of the patient's questions and concerns were addressed. Hydroquinone Pregnancy And Lactation Text: This medication has not been assigned a Pregnancy Risk Category but animal studies failed to show danger with the topical medication. It is unknown if the medication is excreted in breast milk. Ketoconazole Pregnancy And Lactation Text: This medication is Pregnancy Category C and it isn't know if it is safe during pregnancy. It is also excreted in breast milk and breast feeding isn't recommended. Isotretinoin Counseling: Patient should get monthly blood tests, not donate blood, not drive at night if vision affected, not share medication, and not undergo elective surgery for 6 months after tx completed. Side effects reviewed, pt to contact office should one occur. Cimzia Counseling:  I discussed with the patient the risks of Cimzia including but not limited to immunosuppression, allergic reactions and infections.  The patient understands that monitoring is required including a PPD at baseline and must alert us or the primary physician if symptoms of infection or other concerning signs are noted. Valtrex Counseling: I discussed with the patient the risks of valacyclovir including but not limited to kidney damage, nausea, vomiting and severe allergy.  The patient understands that if the infection seems to be worsening or is not improving, they are to call. Xolair Pregnancy And Lactation Text: This medication is Pregnancy Category B and is considered safe during pregnancy. This medication is excreted in breast milk. Hydroxyzine Counseling: Patient advised that the medication is sedating and not to drive a car after taking this medication.  Patient informed of potential adverse effects including but not limited to dry mouth, urinary retention, and blurry vision.  The patient verbalized understanding of the proper use and possible adverse effects of hydroxyzine.  All of the patient's questions and concerns were addressed. Cyclosporine Pregnancy And Lactation Text: This medication is Pregnancy Category C and it isn't know if it is safe during pregnancy. This medication is excreted in breast milk. Hydroquinone Counseling:  Patient advised that medication may result in skin irritation, lightening (hypopigmentation), dryness, and burning.  In the event of skin irritation, the patient was advised to reduce the amount of the drug applied or use it less frequently.  Rarely, spots that are treated with hydroquinone can become darker (pseudoochronosis).  Should this occur, patient instructed to stop medication and call the office. The patient verbalized understanding of the proper use and possible adverse effects of hydroquinone.  All of the patient's questions and concerns were addressed. Tetracycline Counseling: Patient counseled regarding possible photosensitivity and increased risk for sunburn.  Patient instructed to avoid sunlight, if possible.  When exposed to sunlight, patients should wear protective clothing, sunglasses, and sunscreen.  The patient was instructed to call the office immediately if the following severe adverse effects occur:  hearing changes, easy bruising/bleeding, severe headache, or vision changes.  The patient verbalized understanding of the proper use and possible adverse effects of tetracycline.  All of the patient's questions and concerns were addressed. Patient understands to avoid pregnancy while on therapy due to potential birth defects. Oxybutynin Pregnancy And Lactation Text: This medication is Pregnancy Category B and is considered safe during pregnancy. It is unknown if it is excreted in breast milk. Metronidazole Pregnancy And Lactation Text: This medication is Pregnancy Category B and considered safe during pregnancy.  It is also excreted in breast milk. Colchicine Pregnancy And Lactation Text: This medication is Pregnancy Category C and isn't considered safe during pregnancy. It is excreted in breast milk. Cephalexin Counseling: I counseled the patient regarding use of cephalexin as an antibiotic for prophylactic and/or therapeutic purposes. Cephalexin (commonly prescribed under brand name Keflex) is a cephalosporin antibiotic which is active against numerous classes of bacteria, including most skin bacteria. Side effects may include nausea, diarrhea, gastrointestinal upset, rash, hives, yeast infections, and in rare cases, hepatitis, kidney disease, seizures, fever, confusion, neurologic symptoms, and others. Patients with severe allergies to penicillin medications are cautioned that there is about a 10% incidence of cross-reactivity with cephalosporins. When possible, patients with penicillin allergies should use alternatives to cephalosporins for antibiotic therapy. Methotrexate Pregnancy And Lactation Text: This medication is Pregnancy Category X and is known to cause fetal harm. This medication is excreted in breast milk. Cimzia Pregnancy And Lactation Text: This medication crosses the placenta but can be considered safe in certain situations. Cimzia may be excreted in breast milk. Imiquimod Counseling:  I discussed with the patient the risks of imiquimod including but not limited to erythema, scaling, itching, weeping, crusting, and pain.  Patient understands that the inflammatory response to imiquimod is variable from person to person and was educated regarded proper titration schedule.  If flu-like symptoms develop, patient knows to discontinue the medication and contact us. Ilumya Counseling: I discussed with the patient the risks of tildrakizumab including but not limited to immunosuppression, malignancy, posterior leukoencephalopathy syndrome, and serious infections.  The patient understands that monitoring is required including a PPD at baseline and must alert us or the primary physician if symptoms of infection or other concerning signs are noted. Dapsone Counseling: I discussed with the patient the risks of dapsone including but not limited to hemolytic anemia, agranulocytosis, rashes, methemoglobinemia, kidney failure, peripheral neuropathy, headaches, GI upset, and liver toxicity.  Patients who start dapsone require monitoring including baseline LFTs and weekly CBCs for the first month, then every month thereafter.  The patient verbalized understanding of the proper use and possible adverse effects of dapsone.  All of the patient's questions and concerns were addressed. Solaraze Pregnancy And Lactation Text: This medication is Pregnancy Category B and is considered safe. There is some data to suggest avoiding during the third trimester. It is unknown if this medication is excreted in breast milk. Azathioprine Counseling:  I discussed with the patient the risks of azathioprine including but not limited to myelosuppression, immunosuppression, hepatotoxicity, lymphoma, and infections.  The patient understands that monitoring is required including baseline LFTs, Creatinine, possible TPMP genotyping and weekly CBCs for the first month and then every 2 weeks thereafter.  The patient verbalized understanding of the proper use and possible adverse effects of azathioprine.  All of the patient's questions and concerns were addressed. Isotretinoin Pregnancy And Lactation Text: This medication is Pregnancy Category X and is considered extremely dangerous during pregnancy. It is unknown if it is excreted in breast milk. 5-Fu Pregnancy And Lactation Text: This medication is Pregnancy Category X and contraindicated in pregnancy and in women who may become pregnant. It is unknown if this medication is excreted in breast milk. Hydroxyzine Pregnancy And Lactation Text: This medication is not safe during pregnancy and should not be taken. It is also excreted in breast milk and breast feeding isn't recommended. Minocycline Counseling: Patient advised regarding possible photosensitivity and discoloration of the teeth, skin, lips, tongue and gums.  Patient instructed to avoid sunlight, if possible.  When exposed to sunlight, patients should wear protective clothing, sunglasses, and sunscreen.  The patient was instructed to call the office immediately if the following severe adverse effects occur:  hearing changes, easy bruising/bleeding, severe headache, or vision changes.  The patient verbalized understanding of the proper use and possible adverse effects of minocycline.  All of the patient's questions and concerns were addressed. Simponi Pregnancy And Lactation Text: The risk during pregnancy and breastfeeding is uncertain with this medication. Hydroxychloroquine Pregnancy And Lactation Text: This medication has been shown to cause fetal harm but it isn't assigned a Pregnancy Risk Category. There are small amounts excreted in breast milk. Cephalexin Pregnancy And Lactation Text: This medication is Pregnancy Category B and considered safe during pregnancy.  It is also excreted in breast milk but can be used safely for shorter doses. Valtrex Pregnancy And Lactation Text: this medication is Pregnancy Category B and is considered safe during pregnancy. This medication is not directly found in breast milk but it's metabolite acyclovir is present. Zyclara Counseling:  I discussed with the patient the risks of imiquimod including but not limited to erythema, scaling, itching, weeping, crusting, and pain.  Patient understands that the inflammatory response to imiquimod is variable from person to person and was educated regarded proper titration schedule.  If flu-like symptoms develop, patient knows to discontinue the medication and contact us. Birth Control Pills Counseling: Birth Control Pill Counseling: I discussed with the patient the potential side effects of OCPs including but not limited to increased risk of stroke, heart attack, thrombophlebitis, deep venous thrombosis, hepatic adenomas, breast changes, GI upset, headaches, and depression.  The patient verbalized understanding of the proper use and possible adverse effects of OCPs. All of the patient's questions and concerns were addressed. Use Enhanced Medication Counseling?: No Albendazole Counseling:  I discussed with the patient the risks of albendazole including but not limited to cytopenia, kidney damage, nausea/vomiting and severe allergy.  The patient understands that this medication is being used in an off-label manner. Drysol Counseling:  I discussed with the patient the risks of drysol/aluminum chloride including but not limited to skin rash, itching, irritation, burning. Azathioprine Pregnancy And Lactation Text: This medication is Pregnancy Category D and isn't considered safe during pregnancy. It is unknown if this medication is excreted in breast milk. Cosentyx Counseling:  I discussed with the patient the risks of Cosentyx including but not limited to worsening of Crohn's disease, immunosuppression, allergic reactions and infections.  The patient understands that monitoring is required including a PPD at baseline and must alert us or the primary physician if symptoms of infection or other concerning signs are noted. Prednisone Counseling:  I discussed with the patient the risks of prolonged use of prednisone including but not limited to weight gain, insomnia, osteoporosis, mood changes, diabetes, susceptibility to infection, glaucoma and high blood pressure.  In cases where prednisone use is prolonged, patients should be monitored with blood pressure checks, serum glucose levels and an eye exam.  Additionally, the patient may need to be placed on GI prophylaxis, PCP prophylaxis, and calcium and vitamin D supplementation and/or a bisphosphonate.  The patient verbalized understanding of the proper use and the possible adverse effects of prednisone.  All of the patient's questions and concerns were addressed. Spironolactone Counseling: Patient advised regarding risks of diarrhea, abdominal pain, hyperkalemia, birth defects (for female patients), liver toxicity and renal toxicity. The patient may need blood work to monitor liver and kidney function and potassium levels while on therapy. The patient verbalized understanding of the proper use and possible adverse effects of spironolactone.  All of the patient's questions and concerns were addressed. Fluconazole Counseling:  Patient counseled regarding adverse effects of fluconazole including but not limited to headache, diarrhea, nausea, upset stomach, liver function test abnormalities, taste disturbance, and stomach pain.  There is a rare possibility of liver failure that can occur when taking fluconazole.  The patient understands that monitoring of LFTs and kidney function test may be required, especially at baseline. The patient verbalized understanding of the proper use and possible adverse effects of fluconazole.  All of the patient's questions and concerns were addressed. Quinolones Counseling:  I discussed with the patient the risks of fluoroquinolones including but not limited to GI upset, allergic reaction, drug rash, diarrhea, dizziness, photosensitivity, yeast infections, liver function test abnormalities, tendonitis/tendon rupture. Terbinafine Pregnancy And Lactation Text: This medication is Pregnancy Category B and is considered safe during pregnancy. It is also excreted in breast milk and breast feeding isn't recommended. Clindamycin Pregnancy And Lactation Text: This medication can be used in pregnancy if certain situations. Clindamycin is also present in breast milk. High Dose Vitamin A Counseling: Side effects reviewed, pt to contact office should one occur. Clindamycin Counseling: I counseled the patient regarding use of clindamycin as an antibiotic for prophylactic and/or therapeutic purposes. Clindamycin is active against numerous classes of bacteria, including skin bacteria. Side effects may include nausea, diarrhea, gastrointestinal upset, rash, hives, yeast infections, and in rare cases, colitis. Zyclara Pregnancy And Lactation Text: This medication is Pregnancy Category C. It is unknown if this medication is excreted in breast milk. Detail Level: Zone Skyrizi Counseling: I discussed with the patient the risks of risankizumab-rzaa including but not limited to immunosuppression, and serious infections.  The patient understands that monitoring is required including a PPD at baseline and must alert us or the primary physician if symptoms of infection or other concerning signs are noted. Terbinafine Counseling: Patient counseling regarding adverse effects of terbinafine including but not limited to headache, diarrhea, rash, upset stomach, liver function test abnormalities, itching, taste/smell disturbance, nausea, abdominal pain, and flatulence.  There is a rare possibility of liver failure that can occur when taking terbinafine.  The patient understands that a baseline LFT and kidney function test may be required. The patient verbalized understanding of the proper use and possible adverse effects of terbinafine.  All of the patient's questions and concerns were addressed. Birth Control Pills Pregnancy And Lactation Text: This medication should be avoided if pregnant and for the first 30 days post-partum. Dapsone Pregnancy And Lactation Text: This medication is Pregnancy Category C and is not considered safe during pregnancy or breast feeding. Topical Retinoid counseling:  Patient advised to apply a pea-sized amount only at bedtime and wait 30 minutes after washing their face before applying.  If too drying, patient may add a non-comedogenic moisturizer. The patient verbalized understanding of the proper use and possible adverse effects of retinoids.  All of the patient's questions and concerns were addressed. Nsaids Counseling: NSAID Counseling: I discussed with the patient that NSAIDs should be taken with food. Prolonged use of NSAIDs can result in the development of stomach ulcers.  Patient advised to stop taking NSAIDs if abdominal pain occurs.  The patient verbalized understanding of the proper use and possible adverse effects of NSAIDs.  All of the patient's questions and concerns were addressed. High Dose Vitamin A Pregnancy And Lactation Text: High dose vitamin A therapy is contraindicated during pregnancy and breast feeding. Tremfya Counseling: I discussed with the patient the risks of guselkumab including but not limited to immunosuppression, serious infections, worsening of inflammatory bowel disease and drug reactions.  The patient understands that monitoring is required including a PPD at baseline and must alert us or the primary physician if symptoms of infection or other concerning signs are noted. Doxycycline Counseling:  Patient counseled regarding possible photosensitivity and increased risk for sunburn.  Patient instructed to avoid sunlight, if possible.  When exposed to sunlight, patients should wear protective clothing, sunglasses, and sunscreen.  The patient was instructed to call the office immediately if the following severe adverse effects occur:  hearing changes, easy bruising/bleeding, severe headache, or vision changes.  The patient verbalized understanding of the proper use and possible adverse effects of doxycycline.  All of the patient's questions and concerns were addressed. Cellcept Counseling:  I discussed with the patient the risks of mycophenolate mofetil including but not limited to infection/immunosuppression, GI upset, hypokalemia, hypercholesterolemia, bone marrow suppression, lymphoproliferative disorders, malignancy, GI ulceration/bleed/perforation, colitis, interstitial lung disease, kidney failure, progressive multifocal leukoencephalopathy, and birth defects.  The patient understands that monitoring is required including a baseline creatinine and regular CBC testing. In addition, patient must alert us immediately if symptoms of infection or other concerning signs are noted. Albendazole Pregnancy And Lactation Text: This medication is Pregnancy Category C and it isn't known if it is safe during pregnancy. It is also excreted in breast milk. Quinolones Pregnancy And Lactation Text: This medication is Pregnancy Category C and it isn't know if it is safe during pregnancy. It is also excreted in breast milk. Odomzo Counseling- I discussed with the patient the risks of Odomzo including but not limited to nausea, vomiting, diarrhea, constipation, weight loss, changes in the sense of taste, decreased appetite, muscle spasms, and hair loss.  The patient verbalized understanding of the proper use and possible adverse effects of Odomzo.  All of the patient's questions and concerns were addressed. Spironolactone Pregnancy And Lactation Text: This medication can cause feminization of the male fetus and should be avoided during pregnancy. The active metabolite is also found in breast milk. Erivedge Counseling- I discussed with the patient the risks of Erivedge including but not limited to nausea, vomiting, diarrhea, constipation, weight loss, changes in the sense of taste, decreased appetite, muscle spasms, and hair loss.  The patient verbalized understanding of the proper use and possible adverse effects of Erivedge.  All of the patient's questions and concerns were addressed. Infliximab Counseling:  I discussed with the patient the risks of infliximab including but not limited to myelosuppression, immunosuppression, autoimmune hepatitis, demyelinating diseases, lymphoma, and serious infections.  The patient understands that monitoring is required including a PPD at baseline and must alert us or the primary physician if symptoms of infection or other concerning signs are noted. Nsaids Pregnancy And Lactation Text: These medications are considered safe up to 30 weeks gestation. It is excreted in breast milk. Drysol Pregnancy And Lactation Text: This medication is considered safe during pregnancy and breast feeding. Minoxidil Counseling: Minoxidil is a topical medication which can increase blood flow where it is applied. It is uncertain how this medication increases hair growth. Side effects are uncommon and include stinging and allergic reactions. Odomzo Pregnancy And Lactation Text: This medication is Pregnancy Category X and is absolutely contraindicated during pregnancy. It is unknown if it is excreted in breast milk. Griseofulvin Counseling:  I discussed with the patient the risks of griseofulvin including but not limited to photosensitivity, cytopenia, liver damage, nausea/vomiting and severe allergy.  The patient understands that this medication is best absorbed when taken with a fatty meal (e.g., ice cream or french fries). Doxycycline Pregnancy And Lactation Text: This medication is Pregnancy Category D and not consider safe during pregnancy. It is also excreted in breast milk but is considered safe for shorter treatment courses. Azithromycin Counseling:  I discussed with the patient the risks of azithromycin including but not limited to GI upset, allergic reaction, drug rash, diarrhea, and yeast infections. SSKI Counseling:  I discussed with the patient the risks of SSKI including but not limited to thyroid abnormalities, metallic taste, GI upset, fever, headache, acne, arthralgias, paraesthesias, lymphadenopathy, easy bleeding, arrhythmias, and allergic reaction. Picato Counseling:  I discussed with the patient the risks of Picato including but not limited to erythema, scaling, itching, weeping, crusting, and pain. Gabapentin Counseling: I discussed with the patient the risks of gabapentin including but not limited to dizziness, somnolence, fatigue and ataxia. Rifampin Counseling: I discussed with the patient the risks of rifampin including but not limited to liver damage, kidney damage, red-orange body fluids, nausea/vomiting and severe allergy. Arava Counseling:  Patient counseled regarding adverse effects of Arava including but not limited to nausea, vomiting, abnormalities in liver function tests. Patients may develop mouth sores, rash, diarrhea, and abnormalities in blood counts. The patient understands that monitoring is required including LFTs and blood counts.  There is a rare possibility of scarring of the liver and lung problems that can occur when taking methotrexate. Persistent nausea, loss of appetite, pale stools, dark urine, cough, and shortness of breath should be reported immediately. Patient advised to discontinue Arava treatment and consult with a physician prior to attempting conception. The patient will have to undergo a treatment to eliminate Arava from the body prior to conception. Dupixent Counseling: I discussed with the patient the risks of dupilumab including but not limited to eye infection and irritation, cold sores, injection site reactions, worsening of asthma, allergic reactions and increased risk of parasitic infection.  Live vaccines should be avoided while taking dupilumab. Dupilumab will also interact with certain medications such as warfarin and cyclosporine. The patient understands that monitoring is required and they must alert us or the primary physician if symptoms of infection or other concerning signs are noted. Cimetidine Counseling:  I discussed with the patient the risks of Cimetidine including but not limited to gynecomastia, headache, diarrhea, nausea, drowsiness, arrhythmias, pancreatitis, skin rashes, psychosis, bone marrow suppression and kidney toxicity. Tazorac Counseling:  Patient advised that medication is irritating and drying.  Patient may need to apply sparingly and wash off after an hour before eventually leaving it on overnight.  The patient verbalized understanding of the proper use and possible adverse effects of tazorac.  All of the patient's questions and concerns were addressed. Stelara Counseling:  I discussed with the patient the risks of ustekinumab including but not limited to immunosuppression, malignancy, posterior leukoencephalopathy syndrome, and serious infections.  The patient understands that monitoring is required including a PPD at baseline and must alert us or the primary physician if symptoms of infection or other concerning signs are noted. Ivermectin Counseling:  Patient instructed to take medication on an empty stomach with a full glass of water.  Patient informed of potential adverse effects including but not limited to nausea, diarrhea, dizziness, itching, and swelling of the extremities or lymph nodes.  The patient verbalized understanding of the proper use and possible adverse effects of ivermectin.  All of the patient's questions and concerns were addressed. Elidel Counseling: Patient may experience a mild burning sensation during topical application. Elidel is not approved in children less than 2 years of age. There have been case reports of hematologic and skin malignancies in patients using topical calcineurin inhibitors although causality is questionable. Rifampin Pregnancy And Lactation Text: This medication is Pregnancy Category C and it isn't know if it is safe during pregnancy. It is also excreted in breast milk and should not be used if you are breast feeding. Azithromycin Pregnancy And Lactation Text: This medication is considered safe during pregnancy and is also secreted in breast milk. Otezla Counseling: The side effects of Otezla were discussed with the patient, including but not limited to worsening or new depression, weight loss, diarrhea, nausea, upper respiratory tract infection, and headache. Patient instructed to call the office should any adverse effect occur.  The patient verbalized understanding of the proper use and possible adverse effects of Otezla.  All the patient's questions and concerns were addressed. Cyclophosphamide Counseling:  I discussed with the patient the risks of cyclophosphamide including but not limited to hair loss, hormonal abnormalities, decreased fertility, abdominal pain, diarrhea, nausea and vomiting, bone marrow suppression and infection. The patient understands that monitoring is required while taking this medication. Eucrisa Counseling: Patient may experience a mild burning sensation during topical application. Eucrisa is not approved in children less than 2 years of age. Taltz Counseling: I discussed with the patient the risks of ixekizumab including but not limited to immunosuppression, serious infections, worsening of inflammatory bowel disease and drug reactions.  The patient understands that monitoring is required including a PPD at baseline and must alert us or the primary physician if symptoms of infection or other concerning signs are noted. Clofazimine Counseling:  I discussed with the patient the risks of clofazimine including but not limited to skin and eye pigmentation, liver damage, nausea/vomiting, gastrointestinal bleeding and allergy. Xeljanz Counseling: I discussed with the patient the risks of Xeljanz therapy including increased risk of infection, liver issues, headache, diarrhea, or cold symptoms. Live vaccines should be avoided. They were instructed to call if they have any problems. Sski Pregnancy And Lactation Text: This medication is Pregnancy Category D and isn't considered safe during pregnancy. It is excreted in breast milk. Tazorac Pregnancy And Lactation Text: This medication is not safe during pregnancy. It is unknown if this medication is excreted in breast milk. Rituxan Counseling:  I discussed with the patient the risks of Rituxan infusions. Side effects can include infusion reactions, severe drug rashes including mucocutaneous reactions, reactivation of latent hepatitis and other infections and rarely progressive multifocal leukoencephalopathy.  All of the patient's questions and concerns were addressed. Acitretin Counseling:  I discussed with the patient the risks of acitretin including but not limited to hair loss, dry lips/skin/eyes, liver damage, hyperlipidemia, depression/suicidal ideation, photosensitivity.  Serious rare side effects can include but are not limited to pancreatitis, pseudotumor cerebri, bony changes, clot formation/stroke/heart attack.  Patient understands that alcohol is contraindicated since it can result in liver toxicity and significantly prolong the elimination of the drug by many years. Erythromycin Counseling:  I discussed with the patient the risks of erythromycin including but not limited to GI upset, allergic reaction, drug rash, diarrhea, increase in liver enzymes, and yeast infections. Dupixent Pregnancy And Lactation Text: This medication likely crosses the placenta but the risk for the fetus is uncertain. This medication is excreted in breast milk. Griseofulvin Pregnancy And Lactation Text: This medication is Pregnancy Category X and is known to cause serious birth defects. It is unknown if this medication is excreted in breast milk but breast feeding should be avoided. Benzoyl Peroxide Counseling: Patient counseled that medicine may cause skin irritation and bleach clothing.  In the event of skin irritation, the patient was advised to reduce the amount of the drug applied or use it less frequently.   The patient verbalized understanding of the proper use and possible adverse effects of benzoyl peroxide.  All of the patient's questions and concerns were addressed. Thalidomide Counseling: I discussed with the patient the risks of thalidomide including but not limited to birth defects, anxiety, weakness, chest pain, dizziness, cough and severe allergy. Glycopyrrolate Counseling:  I discussed with the patient the risks of glycopyrrolate including but not limited to skin rash, drowsiness, dry mouth, difficulty urinating, and blurred vision. Xelzeinaz Pregnancy And Lactation Text: This medication is Pregnancy Category D and is not considered safe during pregnancy.  The risk during breast feeding is also uncertain. Erythromycin Pregnancy And Lactation Text: This medication is Pregnancy Category B and is considered safe during pregnancy. It is also excreted in breast milk. Carac Counseling:  I discussed with the patient the risks of Carac including but not limited to erythema, scaling, itching, weeping, crusting, and pain. Siliq Counseling:  I discussed with the patient the risks of Siliq including but not limited to new or worsening depression, suicidal thoughts and behavior, immunosuppression, malignancy, posterior leukoencephalopathy syndrome, and serious infections.  The patient understands that monitoring is required including a PPD at baseline and must alert us or the primary physician if symptoms of infection or other concerning signs are noted. There is also a special program designed to monitor depression which is required with Siliq. Doxepin Pregnancy And Lactation Text: This medication is Pregnancy Category C and it isn't known if it is safe during pregnancy. It is also excreted in breast milk and breast feeding isn't recommended. Otezla Pregnancy And Lactation Text: This medication is Pregnancy Category C and it isn't known if it is safe during pregnancy. It is unknown if it is excreted in breast milk. Doxepin Counseling:  Patient advised that the medication is sedating and not to drive a car after taking this medication. Patient informed of potential adverse effects including but not limited to dry mouth, urinary retention, and blurry vision.  The patient verbalized understanding of the proper use and possible adverse effects of doxepin.  All of the patient's questions and concerns were addressed. Enbrel Counseling:  I discussed with the patient the risks of etanercept including but not limited to myelosuppression, immunosuppression, autoimmune hepatitis, demyelinating diseases, lymphoma, and infections.  The patient understands that monitoring is required including a PPD at baseline and must alert us or the primary physician if symptoms of infection or other concerning signs are noted. Benzoyl Peroxide Pregnancy And Lactation Text: This medication is Pregnancy Category C. It is unknown if benzoyl peroxide is excreted in breast milk. Topical Clindamycin Counseling: Patient counseled that this medication may cause skin irritation or allergic reactions.  In the event of skin irritation, the patient was advised to reduce the amount of the drug applied or use it less frequently.   The patient verbalized understanding of the proper use and possible adverse effects of clindamycin.  All of the patient's questions and concerns were addressed. Rituxan Pregnancy And Lactation Text: This medication is Pregnancy Category C and it isn't know if it is safe during pregnancy. It is unknown if this medication is excreted in breast milk but similar antibodies are known to be excreted. Cyclophosphamide Pregnancy And Lactation Text: This medication is Pregnancy Category D and it isn't considered safe during pregnancy. This medication is excreted in breast milk. Sarecycline Counseling: Patient advised regarding possible photosensitivity and discoloration of the teeth, skin, lips, tongue and gums.  Patient instructed to avoid sunlight, if possible.  When exposed to sunlight, patients should wear protective clothing, sunglasses, and sunscreen.  The patient was instructed to call the office immediately if the following severe adverse effects occur:  hearing changes, easy bruising/bleeding, severe headache, or vision changes.  The patient verbalized understanding of the proper use and possible adverse effects of sarecycline.  All of the patient's questions and concerns were addressed. Itraconazole Counseling:  I discussed with the patient the risks of itraconazole including but not limited to liver damage, nausea/vomiting, neuropathy, and severe allergy.  The patient understands that this medication is best absorbed when taken with acidic beverages such as non-diet cola or ginger ale.  The patient understands that monitoring is required including baseline LFTs and repeat LFTs at intervals.  The patient understands that they are to contact us or the primary physician if concerning signs are noted. Acitretin Pregnancy And Lactation Text: This medication is Pregnancy Category X and should not be given to women who are pregnant or may become pregnant in the future. This medication is excreted in breast milk. Bactrim Counseling:  I discussed with the patient the risks of sulfa antibiotics including but not limited to GI upset, allergic reaction, drug rash, diarrhea, dizziness, photosensitivity, and yeast infections.  Rarely, more serious reactions can occur including but not limited to aplastic anemia, agranulocytosis, methemoglobinemia, blood dyscrasias, liver or kidney failure, lung infiltrates or desquamative/blistering drug rashes. Topical Sulfur Applications Counseling: Topical Sulfur Counseling: Patient counseled that this medication may cause skin irritation or allergic reactions.  In the event of skin irritation, the patient was advised to reduce the amount of the drug applied or use it less frequently.   The patient verbalized understanding of the proper use and possible adverse effects of topical sulfur application.  All of the patient's questions and concerns were addressed. Oxybutynin Counseling:  I discussed with the patient the risks of oxybutynin including but not limited to skin rash, drowsiness, dry mouth, difficulty urinating, and blurred vision. Colchicine Counseling:  Patient counseled regarding adverse effects including but not limited to stomach upset (nausea, vomiting, stomach pain, or diarrhea).  Patient instructed to limit alcohol consumption while taking this medication.  Colchicine may reduce blood counts especially with prolonged use.  The patient understands that monitoring of kidney function and blood counts may be required, especially at baseline. The patient verbalized understanding of the proper use and possible adverse effects of colchicine.  All of the patient's questions and concerns were addressed. Humira Counseling:  I discussed with the patient the risks of adalimumab including but not limited to myelosuppression, immunosuppression, autoimmune hepatitis, demyelinating diseases, lymphoma, and serious infections.  The patient understands that monitoring is required including a PPD at baseline and must alert us or the primary physician if symptoms of infection or other concerning signs are noted. Ketoconazole Counseling:   Patient counseled regarding improving absorption with orange juice.  Adverse effects include but are not limited to breast enlargement, headache, diarrhea, nausea, upset stomach, liver function test abnormalities, taste disturbance, and stomach pain.  There is a rare possibility of liver failure that can occur when taking ketoconazole. The patient understands that monitoring of LFTs may be required, especially at baseline. The patient verbalized understanding of the proper use and possible adverse effects of ketoconazole.  All of the patient's questions and concerns were addressed. Bexarotene Pregnancy And Lactation Text: This medication is Pregnancy Category X and should not be given to women who are pregnant or may become pregnant. This medication should not be used if you are breast feeding. Protopic Pregnancy And Lactation Text: This medication is Pregnancy Category C. It is unknown if this medication is excreted in breast milk when applied topically. Bactrim Pregnancy And Lactation Text: This medication is Pregnancy Category D and is known to cause fetal risk.  It is also excreted in breast milk. Bexarotene Counseling:  I discussed with the patient the risks of bexarotene including but not limited to hair loss, dry lips/skin/eyes, liver abnormalities, hyperlipidemia, pancreatitis, depression/suicidal ideation, photosensitivity, drug rash/allergic reactions, hypothyroidism, anemia, leukopenia, infection, cataracts, and teratogenicity.  Patient understands that they will need regular blood tests to check lipid profile, liver function tests, white blood cell count, thyroid function tests and pregnancy test if applicable. Protopic Counseling: Patient may experience a mild burning sensation during topical application. Protopic is not approved in children less than 2 years of age. There have been case reports of hematologic and skin malignancies in patients using topical calcineurin inhibitors although causality is questionable. Xolair Counseling:  Patient informed of potential adverse effects including but not limited to fever, muscle aches, rash and allergic reactions.  The patient verbalized understanding of the proper use and possible adverse effects of Xolair.  All of the patient's questions and concerns were addressed. Metronidazole Counseling:  I discussed with the patient the risks of metronidazole including but not limited to seizures, nausea/vomiting, a metallic taste in the mouth, nausea/vomiting and severe allergy. Glycopyrrolate Pregnancy And Lactation Text: This medication is Pregnancy Category B and is considered safe during pregnancy. It is unknown if it is excreted breast milk. Cyclosporine Counseling:  I discussed with the patient the risks of cyclosporine including but not limited to hypertension, gingival hyperplasia,myelosuppression, immunosuppression, liver damage, kidney damage, neurotoxicity, lymphoma, and serious infections. The patient understands that monitoring is required including baseline blood pressure, CBC, CMP, lipid panel and uric acid, and then 1-2 times monthly CMP and blood pressure.

## 2023-01-30 NOTE — PROCEDURES
[facility]    Intubation    Date/Time: 1/30/2023 1:48 PM  Performed by: Mauricio Motley MD  Authorized by: Mauricio Motley MD   Indications: respiratory failure,  hypoxemia and  respiratory distress  Intubation method: video-assisted      UNIVERSAL PROTOCOL   Site Marked: NA  Prior Images Obtained and Reviewed:  Yes  Required items: Required blood products, implants, devices and special equipment available    Patient identity confirmed:  Arm band and verbally with patient  Patient was reevaluated immediately before administering moderate or deep sedation or anesthesia  Confirmation Checklist:  Correct equipment/implants were available  Time out: Immediately prior to the procedure a time out was called    Universal Protocol: the Joint Commission Universal Protocol was followed      Patient status: paralyzed (RSI)  Preoxygenation: BVM  Paralytic: rocuronium (50)  Sedatives: etomidate (15)  Laryngoscope size: Mac 3  Tube size: 7.5 mm  Tube type: cuffed  Number of attempts: 1  Cords visualized: yes  Post-procedure assessment: chest rise,  colorimetric ETCO2 and tube exhalation  Breath sounds: equal  Cuff inflated: yes  ETT to teeth: 22 cm  Tube secured with: ETT franks        PROCEDURE    Patient Tolerance:  Patient tolerated the procedure well with no immediate complications  Length of time physician/provider present for 1:1 monitoring during sedation: 0    Mauricio Motley MD  Surgical Critical Care Fellow

## 2023-01-30 NOTE — PROGRESS NOTES
ICU note    Worsening hypoxemia requiring FiO2 still at 100, PEEP 14. CXR with diffuse pulmonary edema. POCUS reveals very dilated LA, RV, RA, IVC. LV with EF ~%. Severe MR and TR. All suggesting LV diastolic dysfunction. Will work to aggressively diurese, giving 120 of lasix now.    Mauricio Motley MD  Surgical Critical Care Fellow

## 2023-01-30 NOTE — PROGRESS NOTES
Patient continues to require pressor support, hospital medicine will sign off, when patient is off pressors and ready to transfer out of the ICU please contact us for transfer.

## 2023-01-30 NOTE — PROGRESS NOTES
.Notified provider about indwelling grove catheter discussed removal or continued need.    Did provider choose to remove indwelling grove catheter? No    Provider's grove indication for keeping indwelling grove catheter: Retention.    Is there an order for indwelling grove catheter? Yes      *If there is a plan to keep grove catheter in place at discharge daily notification with provider is not necessary.

## 2023-01-30 NOTE — PROGRESS NOTES
Cross-Cover Note    Called by nurse to assess patient for increased congestion. Patient denied rhinorrhea and had normal breath sounds. No fever or white count. Patient had 250ml of output from NG tube since placement. NG tube was removed to help with congestion.    Later assessed patient for increasing oxygen and pressor needs. Patient was increased from 2L to 12L oxymask and hypotensive. Per poison control and the half-lives of the medications she overdosed on, patient has cleared the peak so this isn't an expected progression. CXR, ABG, and EKG were collected with no significant abnormalities. Patient improved without further intervention.    Later called for further increasing O2 requirements. Patient had improved to 6L oxymask but began  desating to 84% around 0300, thus requiring an increase to 15L oxymask. Patient was assessed and did not appear to be in respiratory distress. Repeat ABG was collected showing slight decrease in PaO2. Patient has remained afebrile and without a white count. No signs of aspiration. Will continue to monitor.    Discussed with Dr. Sewell.      Nabila Borja MS4  [facility]

## 2023-01-30 NOTE — PROGRESS NOTES
ICU note  1/30    Discussed with brother, Acosta, given low O2 despite BiPAP and progressive work of breathing, would likely require ventilator. He reports having conversation with patient after the SA in which patient voiced wanting full cares in agreement with brother. This is consistent with discussions with her this morning. Will proceed with intubation if needed.    Mauricio Motley MD  Surgical Critical Care Fellow

## 2023-01-31 ENCOUNTER — APPOINTMENT (OUTPATIENT)
Dept: GENERAL RADIOLOGY | Facility: CLINIC | Age: 73
DRG: 917 | End: 2023-01-31
Payer: MEDICARE

## 2023-01-31 ENCOUNTER — APPOINTMENT (OUTPATIENT)
Dept: CARDIOLOGY | Facility: CLINIC | Age: 73
DRG: 917 | End: 2023-01-31
Attending: INTERNAL MEDICINE
Payer: MEDICARE

## 2023-01-31 LAB
ALBUMIN SERPL BCG-MCNC: 2.5 G/DL (ref 3.5–5.2)
ALBUMIN SERPL BCG-MCNC: 2.6 G/DL (ref 3.5–5.2)
ALBUMIN SERPL BCG-MCNC: 2.7 G/DL (ref 3.5–5.2)
ALBUMIN SERPL BCG-MCNC: 2.7 G/DL (ref 3.5–5.2)
ALLEN'S TEST: ABNORMAL
ALP SERPL-CCNC: 129 U/L (ref 35–104)
ALT SERPL W P-5'-P-CCNC: <5 U/L (ref 10–35)
ANION GAP SERPL CALCULATED.3IONS-SCNC: 10 MMOL/L (ref 7–15)
ANION GAP SERPL CALCULATED.3IONS-SCNC: 10 MMOL/L (ref 7–15)
ANION GAP SERPL CALCULATED.3IONS-SCNC: 11 MMOL/L (ref 7–15)
ANION GAP SERPL CALCULATED.3IONS-SCNC: 12 MMOL/L (ref 7–15)
ANION GAP SERPL CALCULATED.3IONS-SCNC: 12 MMOL/L (ref 7–15)
ANION GAP SERPL CALCULATED.3IONS-SCNC: 15 MMOL/L (ref 7–15)
ANION GAP SERPL CALCULATED.3IONS-SCNC: 8 MMOL/L (ref 7–15)
ANION GAP SERPL CALCULATED.3IONS-SCNC: 9 MMOL/L (ref 7–15)
AST SERPL W P-5'-P-CCNC: 21 U/L (ref 10–35)
ATRIAL RATE - MUSE: 115 BPM
BASE EXCESS BLDA CALC-SCNC: -4.7 MMOL/L (ref -9–1.8)
BASE EXCESS BLDA CALC-SCNC: -4.8 MMOL/L (ref -9–1.8)
BASE EXCESS BLDA CALC-SCNC: -5.1 MMOL/L (ref -9–1.8)
BASE EXCESS BLDA CALC-SCNC: -5.9 MMOL/L (ref -9–1.8)
BILIRUB SERPL-MCNC: 0.5 MG/DL
BUN SERPL-MCNC: 32.2 MG/DL (ref 8–23)
BUN SERPL-MCNC: 32.8 MG/DL (ref 8–23)
BUN SERPL-MCNC: 32.8 MG/DL (ref 8–23)
BUN SERPL-MCNC: 33.3 MG/DL (ref 8–23)
BUN SERPL-MCNC: 36.1 MG/DL (ref 8–23)
BUN SERPL-MCNC: 37.6 MG/DL (ref 8–23)
BUN SERPL-MCNC: 41.7 MG/DL (ref 8–23)
BUN SERPL-MCNC: 42.3 MG/DL (ref 8–23)
BUN SERPL-MCNC: 42.7 MG/DL (ref 8–23)
BUN SERPL-MCNC: 45.8 MG/DL (ref 8–23)
CA-I BLD-MCNC: 5.4 MG/DL (ref 4.4–5.2)
CA-I BLD-MCNC: 5.4 MG/DL (ref 4.4–5.2)
CALCIUM SERPL-MCNC: 8.8 MG/DL (ref 8.8–10.2)
CALCIUM SERPL-MCNC: 8.8 MG/DL (ref 8.8–10.2)
CALCIUM SERPL-MCNC: 9 MG/DL (ref 8.8–10.2)
CALCIUM SERPL-MCNC: 9 MG/DL (ref 8.8–10.2)
CALCIUM SERPL-MCNC: 9.2 MG/DL (ref 8.8–10.2)
CALCIUM SERPL-MCNC: 9.3 MG/DL (ref 8.8–10.2)
CALCIUM SERPL-MCNC: 9.3 MG/DL (ref 8.8–10.2)
CALCIUM SERPL-MCNC: 9.4 MG/DL (ref 8.8–10.2)
CALCIUM SERPL-MCNC: 9.4 MG/DL (ref 8.8–10.2)
CALCIUM SERPL-MCNC: 9.8 MG/DL (ref 8.8–10.2)
CHLORIDE SERPL-SCNC: 109 MMOL/L (ref 98–107)
CHLORIDE SERPL-SCNC: 110 MMOL/L (ref 98–107)
CHLORIDE SERPL-SCNC: 111 MMOL/L (ref 98–107)
CHLORIDE SERPL-SCNC: 112 MMOL/L (ref 98–107)
CHLORIDE SERPL-SCNC: 114 MMOL/L (ref 98–107)
CHLORIDE SERPL-SCNC: 115 MMOL/L (ref 98–107)
CHLORIDE SERPL-SCNC: 116 MMOL/L (ref 98–107)
CORTIS SERPL-MCNC: 14.5 UG/DL
CORTIS SERPL-MCNC: 22.3 UG/DL
CREAT SERPL-MCNC: 2.42 MG/DL (ref 0.51–0.95)
CREAT SERPL-MCNC: 2.42 MG/DL (ref 0.51–0.95)
CREAT SERPL-MCNC: 2.45 MG/DL (ref 0.51–0.95)
CREAT SERPL-MCNC: 2.47 MG/DL (ref 0.51–0.95)
CREAT SERPL-MCNC: 2.49 MG/DL (ref 0.51–0.95)
CREAT SERPL-MCNC: 2.5 MG/DL (ref 0.51–0.95)
CREAT SERPL-MCNC: 2.51 MG/DL (ref 0.51–0.95)
CREAT SERPL-MCNC: 2.53 MG/DL (ref 0.51–0.95)
DEPRECATED HCO3 PLAS-SCNC: 18 MMOL/L (ref 22–29)
DEPRECATED HCO3 PLAS-SCNC: 19 MMOL/L (ref 22–29)
DEPRECATED HCO3 PLAS-SCNC: 19 MMOL/L (ref 22–29)
DEPRECATED HCO3 PLAS-SCNC: 20 MMOL/L (ref 22–29)
DEPRECATED HCO3 PLAS-SCNC: 21 MMOL/L (ref 22–29)
DIASTOLIC BLOOD PRESSURE - MUSE: NORMAL MMHG
ERYTHROCYTE [DISTWIDTH] IN BLOOD BY AUTOMATED COUNT: 14.8 % (ref 10–15)
ERYTHROCYTE [DISTWIDTH] IN BLOOD BY AUTOMATED COUNT: 15 % (ref 10–15)
GFR SERPL CREATININE-BSD FRML MDRD: 20 ML/MIN/1.73M2
GFR SERPL CREATININE-BSD FRML MDRD: 21 ML/MIN/1.73M2
GFR SERPL CREATININE-BSD FRML MDRD: 21 ML/MIN/1.73M2
GLUCOSE BLDC GLUCOMTR-MCNC: 120 MG/DL (ref 70–99)
GLUCOSE BLDC GLUCOMTR-MCNC: 126 MG/DL (ref 70–99)
GLUCOSE BLDC GLUCOMTR-MCNC: 128 MG/DL (ref 70–99)
GLUCOSE BLDC GLUCOMTR-MCNC: 138 MG/DL (ref 70–99)
GLUCOSE BLDC GLUCOMTR-MCNC: 139 MG/DL (ref 70–99)
GLUCOSE BLDC GLUCOMTR-MCNC: 140 MG/DL (ref 70–99)
GLUCOSE BLDC GLUCOMTR-MCNC: 142 MG/DL (ref 70–99)
GLUCOSE BLDC GLUCOMTR-MCNC: 144 MG/DL (ref 70–99)
GLUCOSE BLDC GLUCOMTR-MCNC: 144 MG/DL (ref 70–99)
GLUCOSE BLDC GLUCOMTR-MCNC: 146 MG/DL (ref 70–99)
GLUCOSE BLDC GLUCOMTR-MCNC: 147 MG/DL (ref 70–99)
GLUCOSE BLDC GLUCOMTR-MCNC: 150 MG/DL (ref 70–99)
GLUCOSE BLDC GLUCOMTR-MCNC: 151 MG/DL (ref 70–99)
GLUCOSE BLDC GLUCOMTR-MCNC: 152 MG/DL (ref 70–99)
GLUCOSE BLDC GLUCOMTR-MCNC: 155 MG/DL (ref 70–99)
GLUCOSE BLDC GLUCOMTR-MCNC: 155 MG/DL (ref 70–99)
GLUCOSE BLDC GLUCOMTR-MCNC: 156 MG/DL (ref 70–99)
GLUCOSE BLDC GLUCOMTR-MCNC: 159 MG/DL (ref 70–99)
GLUCOSE BLDC GLUCOMTR-MCNC: 159 MG/DL (ref 70–99)
GLUCOSE BLDC GLUCOMTR-MCNC: 166 MG/DL (ref 70–99)
GLUCOSE BLDC GLUCOMTR-MCNC: 167 MG/DL (ref 70–99)
GLUCOSE BLDC GLUCOMTR-MCNC: 168 MG/DL (ref 70–99)
GLUCOSE BLDC GLUCOMTR-MCNC: 170 MG/DL (ref 70–99)
GLUCOSE BLDC GLUCOMTR-MCNC: 173 MG/DL (ref 70–99)
GLUCOSE BLDC GLUCOMTR-MCNC: 175 MG/DL (ref 70–99)
GLUCOSE BLDC GLUCOMTR-MCNC: 177 MG/DL (ref 70–99)
GLUCOSE BLDC GLUCOMTR-MCNC: 179 MG/DL (ref 70–99)
GLUCOSE BLDC GLUCOMTR-MCNC: 180 MG/DL (ref 70–99)
GLUCOSE BLDC GLUCOMTR-MCNC: 182 MG/DL (ref 70–99)
GLUCOSE BLDC GLUCOMTR-MCNC: 184 MG/DL (ref 70–99)
GLUCOSE BLDC GLUCOMTR-MCNC: 185 MG/DL (ref 70–99)
GLUCOSE BLDC GLUCOMTR-MCNC: 188 MG/DL (ref 70–99)
GLUCOSE BLDC GLUCOMTR-MCNC: 190 MG/DL (ref 70–99)
GLUCOSE BLDC GLUCOMTR-MCNC: 195 MG/DL (ref 70–99)
GLUCOSE BLDC GLUCOMTR-MCNC: 196 MG/DL (ref 70–99)
GLUCOSE BLDC GLUCOMTR-MCNC: 209 MG/DL (ref 70–99)
GLUCOSE BLDC GLUCOMTR-MCNC: 210 MG/DL (ref 70–99)
GLUCOSE BLDC GLUCOMTR-MCNC: 212 MG/DL (ref 70–99)
GLUCOSE BLDC GLUCOMTR-MCNC: 217 MG/DL (ref 70–99)
GLUCOSE BLDC GLUCOMTR-MCNC: 218 MG/DL (ref 70–99)
GLUCOSE BLDC GLUCOMTR-MCNC: 229 MG/DL (ref 70–99)
GLUCOSE BLDC GLUCOMTR-MCNC: 232 MG/DL (ref 70–99)
GLUCOSE BLDC GLUCOMTR-MCNC: 240 MG/DL (ref 70–99)
GLUCOSE BLDC GLUCOMTR-MCNC: 245 MG/DL (ref 70–99)
GLUCOSE SERPL-MCNC: 148 MG/DL (ref 70–99)
GLUCOSE SERPL-MCNC: 149 MG/DL (ref 70–99)
GLUCOSE SERPL-MCNC: 150 MG/DL (ref 70–99)
GLUCOSE SERPL-MCNC: 160 MG/DL (ref 70–99)
GLUCOSE SERPL-MCNC: 164 MG/DL (ref 70–99)
GLUCOSE SERPL-MCNC: 171 MG/DL (ref 70–99)
GLUCOSE SERPL-MCNC: 172 MG/DL (ref 70–99)
GLUCOSE SERPL-MCNC: 172 MG/DL (ref 70–99)
GLUCOSE SERPL-MCNC: 177 MG/DL (ref 70–99)
GLUCOSE SERPL-MCNC: 196 MG/DL (ref 70–99)
HCO3 BLD-SCNC: 19 MMOL/L (ref 21–28)
HCO3 BLD-SCNC: 20 MMOL/L (ref 21–28)
HCT VFR BLD AUTO: 23.4 % (ref 35–47)
HCT VFR BLD AUTO: 25.7 % (ref 35–47)
HGB BLD-MCNC: 7.3 G/DL (ref 11.7–15.7)
HGB BLD-MCNC: 7.7 G/DL (ref 11.7–15.7)
INTERPRETATION ECG - MUSE: NORMAL
LACTATE SERPL-SCNC: 2 MMOL/L (ref 0.7–2)
LAMOTRIGINE SERPL-MCNC: 8.1 UG/ML
LVEF ECHO: NORMAL
MAGNESIUM SERPL-MCNC: 2.1 MG/DL (ref 1.7–2.3)
MAGNESIUM SERPL-MCNC: 2.2 MG/DL (ref 1.7–2.3)
MAGNESIUM SERPL-MCNC: 2.3 MG/DL (ref 1.7–2.3)
MCH RBC QN AUTO: 21.3 PG (ref 26.5–33)
MCH RBC QN AUTO: 21.8 PG (ref 26.5–33)
MCHC RBC AUTO-ENTMCNC: 30 G/DL (ref 31.5–36.5)
MCHC RBC AUTO-ENTMCNC: 31.2 G/DL (ref 31.5–36.5)
MCV RBC AUTO: 68 FL (ref 78–100)
MCV RBC AUTO: 73 FL (ref 78–100)
MRSA DNA SPEC QL NAA+PROBE: NEGATIVE
O2/TOTAL GAS SETTING VFR VENT: 30 %
O2/TOTAL GAS SETTING VFR VENT: 40 %
O2/TOTAL GAS SETTING VFR VENT: 50 %
O2/TOTAL GAS SETTING VFR VENT: 80 %
P AXIS - MUSE: NORMAL DEGREES
PCO2 BLD: 33 MM HG (ref 35–45)
PCO2 BLD: 33 MM HG (ref 35–45)
PCO2 BLD: 35 MM HG (ref 35–45)
PCO2 BLD: 38 MM HG (ref 35–45)
PH BLD: 7.32 [PH] (ref 7.35–7.45)
PH BLD: 7.37 [PH] (ref 7.35–7.45)
PH BLD: 7.38 [PH] (ref 7.35–7.45)
PH BLD: 7.38 [PH] (ref 7.35–7.45)
PHOSPHATE SERPL-MCNC: 2.1 MG/DL (ref 2.5–4.5)
PHOSPHATE SERPL-MCNC: 2.2 MG/DL (ref 2.5–4.5)
PHOSPHATE SERPL-MCNC: 2.2 MG/DL (ref 2.5–4.5)
PHOSPHATE SERPL-MCNC: 2.6 MG/DL (ref 2.5–4.5)
PHOSPHATE SERPL-MCNC: 3 MG/DL (ref 2.5–4.5)
PHOSPHATE SERPL-MCNC: 3.2 MG/DL (ref 2.5–4.5)
PLATELET # BLD AUTO: 234 10E3/UL (ref 150–450)
PLATELET # BLD AUTO: 243 10E3/UL (ref 150–450)
PO2 BLD: 236 MM HG (ref 80–105)
PO2 BLD: 67 MM HG (ref 80–105)
PO2 BLD: 68 MM HG (ref 80–105)
PO2 BLD: 69 MM HG (ref 80–105)
POTASSIUM SERPL-SCNC: 3.2 MMOL/L (ref 3.4–5.3)
POTASSIUM SERPL-SCNC: 4.1 MMOL/L (ref 3.4–5.3)
POTASSIUM SERPL-SCNC: 4.2 MMOL/L (ref 3.4–5.3)
POTASSIUM SERPL-SCNC: 4.3 MMOL/L (ref 3.4–5.3)
POTASSIUM SERPL-SCNC: 4.4 MMOL/L (ref 3.4–5.3)
POTASSIUM SERPL-SCNC: 4.4 MMOL/L (ref 3.4–5.3)
POTASSIUM SERPL-SCNC: 4.5 MMOL/L (ref 3.4–5.3)
POTASSIUM SERPL-SCNC: 4.6 MMOL/L (ref 3.4–5.3)
PR INTERVAL - MUSE: NORMAL MS
PROT SERPL-MCNC: 4.4 G/DL (ref 6.4–8.3)
QRS DURATION - MUSE: 116 MS
QT - MUSE: 386 MS
QTC - MUSE: 413 MS
R AXIS - MUSE: 102 DEGREES
RBC # BLD AUTO: 3.42 10E6/UL (ref 3.8–5.2)
RBC # BLD AUTO: 3.54 10E6/UL (ref 3.8–5.2)
SA TARGET DNA: NEGATIVE
SODIUM SERPL-SCNC: 141 MMOL/L (ref 136–145)
SODIUM SERPL-SCNC: 141 MMOL/L (ref 136–145)
SODIUM SERPL-SCNC: 142 MMOL/L (ref 136–145)
SODIUM SERPL-SCNC: 143 MMOL/L (ref 136–145)
SODIUM SERPL-SCNC: 143 MMOL/L (ref 136–145)
SODIUM SERPL-SCNC: 144 MMOL/L (ref 136–145)
SODIUM SERPL-SCNC: 145 MMOL/L (ref 136–145)
SODIUM SERPL-SCNC: 146 MMOL/L (ref 136–145)
SYSTOLIC BLOOD PRESSURE - MUSE: NORMAL MMHG
T AXIS - MUSE: 25 DEGREES
VENTRICULAR RATE- MUSE: 69 BPM
WBC # BLD AUTO: 11.8 10E3/UL (ref 4–11)
WBC # BLD AUTO: 7.3 10E3/UL (ref 4–11)

## 2023-01-31 PROCEDURE — 250N000011 HC RX IP 250 OP 636: Performed by: STUDENT IN AN ORGANIZED HEALTH CARE EDUCATION/TRAINING PROGRAM

## 2023-01-31 PROCEDURE — 999N000157 HC STATISTIC RCP TIME EA 10 MIN

## 2023-01-31 PROCEDURE — 258N000003 HC RX IP 258 OP 636: Performed by: STUDENT IN AN ORGANIZED HEALTH CARE EDUCATION/TRAINING PROGRAM

## 2023-01-31 PROCEDURE — 82803 BLOOD GASES ANY COMBINATION: CPT | Performed by: STUDENT IN AN ORGANIZED HEALTH CARE EDUCATION/TRAINING PROGRAM

## 2023-01-31 PROCEDURE — 85027 COMPLETE CBC AUTOMATED: CPT | Performed by: INTERNAL MEDICINE

## 2023-01-31 PROCEDURE — 36620 INSERTION CATHETER ARTERY: CPT | Mod: GC | Performed by: STUDENT IN AN ORGANIZED HEALTH CARE EDUCATION/TRAINING PROGRAM

## 2023-01-31 PROCEDURE — 250N000009 HC RX 250: Performed by: INTERNAL MEDICINE

## 2023-01-31 PROCEDURE — 258N000003 HC RX IP 258 OP 636: Performed by: INTERNAL MEDICINE

## 2023-01-31 PROCEDURE — 83605 ASSAY OF LACTIC ACID: CPT | Performed by: STUDENT IN AN ORGANIZED HEALTH CARE EDUCATION/TRAINING PROGRAM

## 2023-01-31 PROCEDURE — 87205 SMEAR GRAM STAIN: CPT | Performed by: INTERNAL MEDICINE

## 2023-01-31 PROCEDURE — 999N000065 XR CHEST PORT 1 VIEW

## 2023-01-31 PROCEDURE — 84100 ASSAY OF PHOSPHORUS: CPT | Performed by: INTERNAL MEDICINE

## 2023-01-31 PROCEDURE — 82533 TOTAL CORTISOL: CPT | Performed by: INTERNAL MEDICINE

## 2023-01-31 PROCEDURE — 250N000009 HC RX 250: Performed by: STUDENT IN AN ORGANIZED HEALTH CARE EDUCATION/TRAINING PROGRAM

## 2023-01-31 PROCEDURE — 82330 ASSAY OF CALCIUM: CPT | Performed by: ANESTHESIOLOGY

## 2023-01-31 PROCEDURE — 82310 ASSAY OF CALCIUM: CPT | Performed by: ANESTHESIOLOGY

## 2023-01-31 PROCEDURE — 250N000013 HC RX MED GY IP 250 OP 250 PS 637: Performed by: STUDENT IN AN ORGANIZED HEALTH CARE EDUCATION/TRAINING PROGRAM

## 2023-01-31 PROCEDURE — 99291 CRITICAL CARE FIRST HOUR: CPT | Mod: 25 | Performed by: INTERNAL MEDICINE

## 2023-01-31 PROCEDURE — 93306 TTE W/DOPPLER COMPLETE: CPT | Mod: 26 | Performed by: INTERNAL MEDICINE

## 2023-01-31 PROCEDURE — C9113 INJ PANTOPRAZOLE SODIUM, VIA: HCPCS | Performed by: ANESTHESIOLOGY

## 2023-01-31 PROCEDURE — 93306 TTE W/DOPPLER COMPLETE: CPT

## 2023-01-31 PROCEDURE — 82803 BLOOD GASES ANY COMBINATION: CPT | Performed by: INTERNAL MEDICINE

## 2023-01-31 PROCEDURE — 83735 ASSAY OF MAGNESIUM: CPT | Performed by: ANESTHESIOLOGY

## 2023-01-31 PROCEDURE — 84100 ASSAY OF PHOSPHORUS: CPT | Performed by: STUDENT IN AN ORGANIZED HEALTH CARE EDUCATION/TRAINING PROGRAM

## 2023-01-31 PROCEDURE — 82533 TOTAL CORTISOL: CPT | Performed by: STUDENT IN AN ORGANIZED HEALTH CARE EDUCATION/TRAINING PROGRAM

## 2023-01-31 PROCEDURE — 250N000009 HC RX 250: Performed by: ANESTHESIOLOGY

## 2023-01-31 PROCEDURE — 250N000012 HC RX MED GY IP 250 OP 636 PS 637: Performed by: STUDENT IN AN ORGANIZED HEALTH CARE EDUCATION/TRAINING PROGRAM

## 2023-01-31 PROCEDURE — 258N000001 HC RX 258: Performed by: STUDENT IN AN ORGANIZED HEALTH CARE EDUCATION/TRAINING PROGRAM

## 2023-01-31 PROCEDURE — 36556 INSERT NON-TUNNEL CV CATH: CPT | Mod: GC | Performed by: STUDENT IN AN ORGANIZED HEALTH CARE EDUCATION/TRAINING PROGRAM

## 2023-01-31 PROCEDURE — 82374 ASSAY BLOOD CARBON DIOXIDE: CPT | Performed by: ANESTHESIOLOGY

## 2023-01-31 PROCEDURE — 83735 ASSAY OF MAGNESIUM: CPT | Performed by: STUDENT IN AN ORGANIZED HEALTH CARE EDUCATION/TRAINING PROGRAM

## 2023-01-31 PROCEDURE — 250N000011 HC RX IP 250 OP 636: Performed by: ANESTHESIOLOGY

## 2023-01-31 PROCEDURE — 83735 ASSAY OF MAGNESIUM: CPT | Performed by: INTERNAL MEDICINE

## 2023-01-31 PROCEDURE — 84132 ASSAY OF SERUM POTASSIUM: CPT | Performed by: STUDENT IN AN ORGANIZED HEALTH CARE EDUCATION/TRAINING PROGRAM

## 2023-01-31 PROCEDURE — 94003 VENT MGMT INPAT SUBQ DAY: CPT

## 2023-01-31 PROCEDURE — 258N000001 HC RX 258: Performed by: INTERNAL MEDICINE

## 2023-01-31 PROCEDURE — 999N000065 XR ABDOMEN PORT 1 VIEW

## 2023-01-31 PROCEDURE — 200N000001 HC R&B ICU

## 2023-01-31 RX ORDER — LIDOCAINE HYDROCHLORIDE 20 MG/ML
JELLY TOPICAL ONCE
Status: COMPLETED | OUTPATIENT
Start: 2023-01-31 | End: 2023-01-31

## 2023-01-31 RX ORDER — DEXTROSE MONOHYDRATE 100 MG/ML
INJECTION, SOLUTION INTRAVENOUS CONTINUOUS PRN
Status: DISCONTINUED | OUTPATIENT
Start: 2023-01-31 | End: 2023-02-10

## 2023-01-31 RX ORDER — POLYETHYLENE GLYCOL 3350 17 G/17G
17 POWDER, FOR SOLUTION ORAL 2 TIMES DAILY
Status: DISCONTINUED | OUTPATIENT
Start: 2023-01-31 | End: 2023-02-05

## 2023-01-31 RX ORDER — METOCLOPRAMIDE HYDROCHLORIDE 5 MG/ML
10 INJECTION INTRAMUSCULAR; INTRAVENOUS ONCE
Status: COMPLETED | OUTPATIENT
Start: 2023-01-31 | End: 2023-01-31

## 2023-01-31 RX ORDER — AMOXICILLIN 250 MG
1 CAPSULE ORAL AT BEDTIME
Status: DISCONTINUED | OUTPATIENT
Start: 2023-01-31 | End: 2023-02-05

## 2023-01-31 RX ORDER — SODIUM CHLORIDE 9 MG/ML
INJECTION, SOLUTION INTRAVENOUS CONTINUOUS
Status: DISCONTINUED | OUTPATIENT
Start: 2023-01-31 | End: 2023-02-08

## 2023-01-31 RX ORDER — GUAIFENESIN 600 MG/1
15 TABLET, EXTENDED RELEASE ORAL DAILY
Status: DISCONTINUED | OUTPATIENT
Start: 2023-01-31 | End: 2023-02-03 | Stop reason: ALTCHOICE

## 2023-01-31 RX ADMIN — SENNOSIDES AND DOCUSATE SODIUM 1 TABLET: 50; 8.6 TABLET ORAL at 22:09

## 2023-01-31 RX ADMIN — WATER: 1000 INJECTION, SOLUTION INTRAVENOUS at 02:15

## 2023-01-31 RX ADMIN — WATER: 1000 INJECTION, SOLUTION INTRAVENOUS at 22:30

## 2023-01-31 RX ADMIN — PIPERACILLIN AND TAZOBACTAM 2.25 G: 2; .25 INJECTION, POWDER, FOR SOLUTION INTRAVENOUS at 12:04

## 2023-01-31 RX ADMIN — POTASSIUM & SODIUM PHOSPHATES POWDER PACK 280-160-250 MG 1 PACKET: 280-160-250 PACK at 09:48

## 2023-01-31 RX ADMIN — PIPERACILLIN AND TAZOBACTAM 2.25 G: 2; .25 INJECTION, POWDER, FOR SOLUTION INTRAVENOUS at 21:23

## 2023-01-31 RX ADMIN — METOCLOPRAMIDE 10 MG: 5 INJECTION, SOLUTION INTRAMUSCULAR; INTRAVENOUS at 12:59

## 2023-01-31 RX ADMIN — Medication 0.16 MCG/KG/MIN: at 04:53

## 2023-01-31 RX ADMIN — POLYETHYLENE GLYCOL 3350 17 G: 17 POWDER, FOR SOLUTION ORAL at 20:11

## 2023-01-31 RX ADMIN — SODIUM CHLORIDE 10 UNITS/KG/HR: 9 INJECTION, SOLUTION INTRAVENOUS at 01:41

## 2023-01-31 RX ADMIN — HEPARIN SODIUM 5000 UNITS: 5000 INJECTION, SOLUTION INTRAVENOUS; SUBCUTANEOUS at 20:11

## 2023-01-31 RX ADMIN — SODIUM CHLORIDE 8 UNITS/KG/HR: 9 INJECTION, SOLUTION INTRAVENOUS at 13:20

## 2023-01-31 RX ADMIN — WATER: 1000 INJECTION, SOLUTION INTRAVENOUS at 14:25

## 2023-01-31 RX ADMIN — SODIUM CHLORIDE: 9 INJECTION, SOLUTION INTRAVENOUS at 21:33

## 2023-01-31 RX ADMIN — VASOPRESSIN 0.5 UNITS/HR: 20 INJECTION, SOLUTION INTRAMUSCULAR; SUBCUTANEOUS at 21:43

## 2023-01-31 RX ADMIN — CHLORHEXIDINE GLUCONATE 15 ML: 1.2 SOLUTION ORAL at 08:19

## 2023-01-31 RX ADMIN — Medication 0.19 MCG/KG/MIN: at 19:05

## 2023-01-31 RX ADMIN — MIDAZOLAM HYDROCHLORIDE 5 MG/HR: 1 INJECTION, SOLUTION INTRAVENOUS at 09:34

## 2023-01-31 RX ADMIN — POTASSIUM CHLORIDE 20 MEQ: 1.5 POWDER, FOR SOLUTION ORAL at 02:55

## 2023-01-31 RX ADMIN — PANTOPRAZOLE SODIUM 40 MG: 40 INJECTION, POWDER, FOR SOLUTION INTRAVENOUS at 08:19

## 2023-01-31 RX ADMIN — MAGNESIUM SULFATE HEPTAHYDRATE 1 G: 500 INJECTION, SOLUTION INTRAMUSCULAR; INTRAVENOUS at 23:26

## 2023-01-31 RX ADMIN — POTASSIUM CHLORIDE 40 MEQ: 1.5 POWDER, FOR SOLUTION ORAL at 00:48

## 2023-01-31 RX ADMIN — MULTIVITAMIN 15 ML: LIQUID ORAL at 15:35

## 2023-01-31 RX ADMIN — POTASSIUM & SODIUM PHOSPHATES POWDER PACK 280-160-250 MG 1 PACKET: 280-160-250 PACK at 14:07

## 2023-01-31 RX ADMIN — HYDROCORTISONE SODIUM SUCCINATE 50 MG: 100 INJECTION, POWDER, FOR SOLUTION INTRAMUSCULAR; INTRAVENOUS at 15:37

## 2023-01-31 RX ADMIN — PIPERACILLIN AND TAZOBACTAM 2.25 G: 2; .25 INJECTION, POWDER, FOR SOLUTION INTRAVENOUS at 05:50

## 2023-01-31 RX ADMIN — HEPARIN SODIUM 5000 UNITS: 5000 INJECTION, SOLUTION INTRAVENOUS; SUBCUTANEOUS at 03:15

## 2023-01-31 RX ADMIN — CHLORHEXIDINE GLUCONATE 15 ML: 1.2 SOLUTION ORAL at 20:11

## 2023-01-31 RX ADMIN — SODIUM CHLORIDE, POTASSIUM CHLORIDE, SODIUM LACTATE AND CALCIUM CHLORIDE 500 ML: 600; 310; 30; 20 INJECTION, SOLUTION INTRAVENOUS at 02:46

## 2023-01-31 RX ADMIN — SODIUM CHLORIDE 10 UNITS/KG/HR: 9 INJECTION, SOLUTION INTRAVENOUS at 03:34

## 2023-01-31 RX ADMIN — PIPERACILLIN AND TAZOBACTAM 2.25 G: 2; .25 INJECTION, POWDER, FOR SOLUTION INTRAVENOUS at 00:27

## 2023-01-31 RX ADMIN — HEPARIN SODIUM 5000 UNITS: 5000 INJECTION, SOLUTION INTRAVENOUS; SUBCUTANEOUS at 12:11

## 2023-01-31 RX ADMIN — SODIUM CHLORIDE, POTASSIUM CHLORIDE, SODIUM LACTATE AND CALCIUM CHLORIDE 500 ML: 600; 310; 30; 20 INJECTION, SOLUTION INTRAVENOUS at 00:07

## 2023-01-31 RX ADMIN — SODIUM CHLORIDE: 9 INJECTION, SOLUTION INTRAVENOUS at 09:11

## 2023-01-31 RX ADMIN — Medication 0.19 MCG/KG/MIN: at 12:51

## 2023-01-31 RX ADMIN — LIDOCAINE HYDROCHLORIDE: 20 JELLY TOPICAL at 13:01

## 2023-01-31 RX ADMIN — MAGNESIUM SULFATE HEPTAHYDRATE 1 G: 500 INJECTION, SOLUTION INTRAMUSCULAR; INTRAVENOUS at 05:07

## 2023-01-31 RX ADMIN — SODIUM CHLORIDE 10 UNITS/KG/HR: 9 INJECTION, SOLUTION INTRAVENOUS at 08:43

## 2023-01-31 RX ADMIN — POLYETHYLENE GLYCOL 3350 17 G: 17 POWDER, FOR SOLUTION ORAL at 09:48

## 2023-01-31 RX ADMIN — WATER: 1000 INJECTION, SOLUTION INTRAVENOUS at 09:07

## 2023-01-31 RX ADMIN — SODIUM CHLORIDE 1000 ML: 9 INJECTION, SOLUTION INTRAVENOUS at 17:22

## 2023-01-31 ASSESSMENT — ACTIVITIES OF DAILY LIVING (ADL)
ADLS_ACUITY_SCORE: 47
ADLS_ACUITY_SCORE: 53
ADLS_ACUITY_SCORE: 47
ADLS_ACUITY_SCORE: 49
ADLS_ACUITY_SCORE: 47
ADLS_ACUITY_SCORE: 49
ADLS_ACUITY_SCORE: 49
ADLS_ACUITY_SCORE: 47
ADLS_ACUITY_SCORE: 49
ADLS_ACUITY_SCORE: 47

## 2023-01-31 NOTE — PROGRESS NOTES
ICU Note    Repeat POC Cardiac US with decreased size of IVC at 1.7 with <50% collapse. RV, RA, LA still moderately enlarged. LV with EF ~80-85% and appears to have improved filling. Will continue diuresis though appears to have improved cardiac function compared to POCUS yesterday afternoon.     Mauricio Motley MD  Surgical Critical Care Fellow

## 2023-01-31 NOTE — PROGRESS NOTES
Brief Critical Care Note    Repeat ABG following vent changes and HCO3 x 2: 7.18/51/86/19. Lactic acid up to 3.5. Pt currently on PC-AC; Pi 20 PEEP 14 RR 28, with this observed Vt ~350.     Pt remains agitated and dyssynchronous with ventilator, frequently coughing.     Will aim to more deeply sedate patient and change to VC-AC in order to more fully control minute ventilation.     - continue midazolam gtt  - add fentanyl gtt   - vent settings: Vt 550 (10cc/kg) RR 28 PEEP 12  - peak pressures on new settings 30-40, but acceptable Pplat  - repeat ABG in 1 hour  - although likely aspiration pneumonitis, will add pip-tazo given decompensation     Additional 50 minutes of critical care time spent by myself w/ Dr. Motley, in addition to 45 minutes spent by Dr. Dallas earlier today, total time 95 minutes.    Ron Villalobos MD  Pulmonary Disease and Critical Care Medicine  [facility]

## 2023-01-31 NOTE — PLAN OF CARE
Physical Therapy and Occupational Therapy: Order completed at this time as pt not  yet medically stable or able to participate in progressive functional mobility and ADL goals. Please re-order once pt able to participate. Thank you.

## 2023-01-31 NOTE — PROCEDURES
[facility]    Central line    Date/Time: 1/31/2023 11:53 AM  Performed by: Mauricio Motley MD  Authorized by: Mauricio Motley MD   Indications: vascular access      UNIVERSAL PROTOCOL   Site Marked: NA  Prior Images Obtained and Reviewed:  NA  Required items: Required blood products, implants, devices and special equipment available    Patient identity confirmed:  Arm band  Patient was reevaluated immediately before administering moderate or deep sedation or anesthesia  Confirmation Checklist:  Patient's identity using two indicators, relevant allergies, procedure was appropriate and matched the consent or emergent situation and correct equipment/implants were available  Time out: Immediately prior to the procedure a time out was called    Universal Protocol: the Joint Commission Universal Protocol was followed    Preparation: Patient was prepped and draped in usual sterile fashion      Preparation: skin prepped with 2% chlorhexidine  Skin prep agent dried: skin prep agent completely dried prior to procedure  Sterile barriers: all five maximum sterile barriers used - cap, mask, sterile gown, sterile gloves, and large sterile sheet  Hand hygiene: hand hygiene performed prior to central venous catheter insertion  Catheter type: triple lumen  Catheter size: 7 Fr  Pre-procedure: landmarks identified  Number of attempts: 3  Post-procedure: line sutured and dressing applied  Assessment: blood return through all ports and free fluid flow      PROCEDURE  Describe Procedure: Unable to pass wire deeply despite adequate return of venous blood flow on initial attempt. Second attempt with dark but pulsatile return of blood flow, did not pass wire. Held pressure to area for 15 min. Third attempt under ultrasound guidance--small hematoma superficially surrounding small branch of subclavian vein (likely the first attempt and thus inability to pass wire). No further bleeding or hematoma around artery on  ultrasound. Vein accessed under direct US guidance with passage of wire.  Patient Tolerance:  Patient tolerated the procedure well with no immediate complications  Length of time physician/provider present for 1:1 monitoring during sedation: 0    Mauricio Motley MD  Surgical Critical Care Fellow

## 2023-01-31 NOTE — PROGRESS NOTES
Cross-Cover Note    Subjective    Diana Santiago is a 71 yo female with a PMH of bipolar disorder, anxiety/depression, and CKD stage III who was admitted to the ICU on 1/28 after suicide attempt by intentional polysubstance overdose (amlodipine, lamictal, haloperidol, and dextroamphetamine). Overnight, intensivist team was notified by nurse of challenges managing blood sugars as patient is on max insulin drip. Dextrose infusion has been titrated. Goal BSG range was widened to 140-220. Patient continues to remain hypotensive despite significant pressor increases. Patient appears well-perfused and bedside ultrasound indicates adequate cardiac function. Patient has had little UOP (835 ml total since 7am) despite 200mg of lasix today.    Objective  Constitutional: Patient is intubated and sedated, does not respond to touch or voice  HEENT: ET tube in place  Respiratory: Clear to auscultation bilaterally, mechanically ventilated, no wheezes, crackles, or rales  Cardiovascular: Regular rate and rhythm, normal S1 and S2, bounding carotid pulse with loud heart sounds  Extremities: Warm and with palpable pulses in BUE  Neurologic: Sedated    Assessment/Plan  Diana Santiago is a 71 yo female with a PMH of bipolar disorder, anxiety/depression, and CKD stage III who was admitted to the ICU on 1/28 after suicide attempt by intentional polysubstance overdose (amlodipine, lamictal, haloperidol, and dextroamphetamine). Patient initially was managing well with pressors, but gradually developed increasing oxygen and pressor requirements. Patient was intubated for acute hypoxic respiratory failure and insulin therapy was initiated to help with cardiac inotropy. Throughout the night, patient has been weaned down on pressors with multiple fluid boluses. She has maintained adequate UOP and respiratory status.    Neuro/Psych:  # Pain/sedation  - Currently sedated with fentanyl and versed, wean as able, RAAS goal of 0 to -1    # Intentional  overdose  Patient intentionally ingested a total of 100 pills combined of lamotrigine 200 mg (filled on 11/16/22 with 90 pills), amlodipine besylate 5 mg (filled on 09/12/19 with 30 pills), dextroamphetamine 5 mg (filled on 01/24/23 30 pills) and haloperidol 2 mg (filled on 01/18/23 with 30 pills).  - Poison control is following; appreciate recommendations-may ask about considering lipid emulsion therapy if patient stops responding to current interventions  - Continue pressor support and insulin infusion  - Psych consult once patient has stabilized  - Fluid boluses PRN    # Bipolar disorder  - Holding PTA regimen in setting of overdose    # Anxiety/depression  - Holding PTA regimen in setting of overdose    Pulm:  # Acute hypoxic respiratory failure  Cause not fully clear-likely ARDS from overdose vs. Pulmonary edema vs. Aspiration. Expect patient to respond to diuretics if respiratory failure was due to pulmonary edema. Aspiration also seems less likely as patient had no clear aspiration event and overall maintained oriented mental status prior to intubation.  - Continue ventilator, wean as able  - Empiric zosyn for potential aspiration    CV:  # Shock  Likely distributive shock from CCB overdose causing hypotension.   - Continue epinephrine and norepinephrine for blood pressure support, wean as able  - Insulin drip to help with low CO-will continue until off pressors  - Fluid bolus PRN to help with hypotension (appears heart is functioning adequately via bedside echo and patient had no response to diuretic)    # HTN  - Holding PTA antihypertensives in the setting of shock    # HLD   - Holding PTA simvastatin    FEN/GI:  # Nutrition  - NPO-defer to day team regarding need for tube feeds    # Constipation  - Continue PTA miralax    Renal:  # REI on CKD  Creatinine has increased to 2.53, likely indicating prerenal REI process given hypotension.  - Monitor I/Os and daily BMP to trend Cr    # Electrolyte abnormalities:  hypokalemia, metabolic acidosis, hypophosphatemia  - Monitor K+ and phos while on insulin drip-replete as indicated per replacement protocol    Heme:  # Anemia  Patient's Hgb is 8.5 (decreased from 9.9 on admission)  - No obvious signs of bleeding, continue to monitor  - Daily CBC    Musculoskeletal:  # Deconditioning  - PT/OT consult once stable    Endocrine:  # Insulin therapy  - Continue dextrose 50% infusion  - Blood glucose goal between 140-220    ID:  # ? Of aspiration  Patient with increasing oxygen requirements, eventually requiring intubation. Question of aspiration given patient's overdose and altered mental status.  - Continue empiric zosyn  - Collect sputum culture    ICU:  DVT prophylaxis: SubQ heparin  GI prophylaxis: Pantoprazole  Code status: Full code    Nabila Borja MS4  [facility]

## 2023-01-31 NOTE — CONSULTS
"CLINICAL NUTRITION SERVICES  -  ASSESSMENT NOTE      Recommendations Ordered by Registered Dietitian (RD): Vital HP at 10 mL/hr= 240 kcal, 21 g protein (0.4 g/kg), 27 g CHO, 0 g fiber, 201 mL H2O  Total with D50 = 1056 kcal (18 kcal/kg)  Flushes 60 mL every 4 hours  Certavite daily     If patient continues on D50, recommend eventual TF goal of Vital HP at 35 mL/hr= 840 kcal, 73 g protein (1.2 g/kg), 93 g CHO, 0 g fiber, 702 mL H2O  Total = 1656 kcal (28 kcal/kg)   Malnutrition: % Weight Loss:  None noted  % Intake:  </= 50% for >/= 5 days (severe malnutrition) (will meet 2/1)  Subcutaneous Fat Loss:  None observed  Muscle Loss:  None observed  Fluid Retention:  Mild but likely not nutritionally significant     Malnutrition Diagnosis: Patient does not meet two of the above criteria necessary for diagnosing malnutrition        REASON FOR ASSESSMENT  Diana Santiago is a 72 year old female seen by Registered Dietitian for Provider Order - Registered Dietitian to Assess and Order TF per Medical Nutrition Therapy Protocol      NUTRITION HISTORY  - Information obtained from Epic.  Patient is known to us from a recent admit (August 2022) - nutrition history obtained at that time was as follows ~  - Information obtained from patient.  She notes that she typically eats pretty well at baseline and was eating OK prior to admit.  She does take Premier Protein at home -- tries for 1-2 per day.  Patient has had some weight loss but \"it just deepthi happened\" (doesn't necessarily think it's related to reduced intake).      Patient was on TPN that admission for a little over one week but was eventually transitioned to a mechanical soft diet with Ensure Max supplements.       CURRENT NUTRITION ORDERS  Diet Order:     NPO   Asked to see patient for TF initiation     Current Intake/Tolerance:  Patient was on a regular diet for only one day before becoming NPO -- intake not recorded   Today is day #4 likely insufficient intake " "      NUTRITION FOCUSED PHYSICAL ASSESSMENT FOR DIAGNOSING MALNUTRITION)  Yes            Observed:    No nutrition-related physical findings observed    Obtained from Chart/Interdisciplinary Team:  Edema moderate in hand and arm -- likely not nutritionally significant     ANTHROPOMETRICS  Height: 5'4\"  Weight: 58.5 kg (129#)(1/30)  Body mass index is 22.1 kg/m   Weight Status:  Normal BMI  IBW: 54.5 kg   % IBW: 107%  Weight History:   Wt Readings from Last 10 Encounters:   01/31/23 62.3 kg (137 lb 5.6 oz)   01/24/23 54.4 kg (120 lb)   10/20/22 51.3 kg (113 lb)   10/18/22 51.3 kg (113 lb 3.2 oz)   10/06/22 51.3 kg (113 lb 3.2 oz)   09/29/22 50.6 kg (111 lb 9.6 oz)   09/22/22 51.3 kg (113 lb 3.2 oz)   09/15/22 51.3 kg (113 lb 3.2 oz)   09/11/22 51.7 kg (113 lb 15.7 oz)   07/17/22 55.3 kg (122 lb)         LABS  Phos 2.1 (L) - getting replacement     MEDICATIONS  Neutraphos  Insulin drip  Pressor x 1  D50 at 20 mL/hr= 240 g CHO and 816 kcal        ASSESSED NUTRITION NEEDS PER APPROVED PRACTICE GUIDELINES:    Dosing Weight 58.5 kg   Estimated Energy Needs: 3005-3994 kcals (25-30 Kcal/Kg)  Justification: maintenance  Estimated Protein Needs: 70-90 grams protein (1.2-1.5 g pro/Kg)  Justification: hypercatabolism with critical illness  Estimated Fluid Needs: 1331-8856 mL (1 mL/Kcal)  Justification: maintenance    MALNUTRITION:  % Weight Loss:  None noted  % Intake:  </= 50% for >/= 5 days (severe malnutrition) (will meet 2/1)  Subcutaneous Fat Loss:  None observed  Muscle Loss:  None observed  Fluid Retention:  Mild but likely not nutritionally significant     Malnutrition Diagnosis: Patient does not meet two of the above criteria necessary for diagnosing malnutrition    NUTRITION DIAGNOSIS:  Inadequate protein-energy intake related to NPO with plans to start TF today as evidenced by meeting 0% protein and 50% energy needs from D50 IVF       NUTRITION INTERVENTIONS  Recommendations / Nutrition Prescription  Vital HP at 10 " mL/hr= 240 kcal, 21 g protein (0.4 g/kg), 27 g CHO, 0 g fiber, 201 mL H2O  Total with D50 = 1056 kcal (18 kcal/kg)  Flushes 60 mL every 4 hours  Certavite daily     If patient continues on D50, recommend eventual TF goal of Vital HP at 35 mL/hr= 840 kcal, 73 g protein (1.2 g/kg), 93 g CHO, 0 g fiber, 702 mL H2O  Total = 1656 kcal (28 kcal/kg)    Implementation  Nutrition education: Not appropriate at this time due to patient condition  EN Composition, EN Schedule, Feeding Tube Flush, Multivitamin/Mineral:  Orders written to begin TF at 10 mL/hr, flushes and Certavite as above  Collaboration and Referral of Nutrition care:  Patient discussed today during interdisciplinary bedside rounds    Nutrition Goals  TF + D50 IVF will meet needs within the next 3-4 days     MONITORING AND EVALUATION:  Progress towards goals will be monitored and evaluated per protocol and Practice Guidelines    Dana Lee RD, LD, CNSC   Clinical Dietitian - [facility]

## 2023-01-31 NOTE — PLAN OF CARE
Respiratory status progressed from high flow NC to bipap, ultimetely to intubation approx 1340. Respiratory status slightly improved this evening with FiO2 decreased to 80%. Will recheck abg at 1930. Bicarb given for ph 7.12. presser requirements increased this evening.  High dose Insulin gtt increased to obtain map >65 and/or systolic >90.  Electrolytes monitored and replaced as necessary. Brother called and updated by MD. Vieyra to monitor.   Problem: Overdose  Goal: Fluid Balance  Outcome: Not Progressing     Problem: Overdose  Goal: Effective Tissue Perfusion  Outcome: Not Progressing     Problem: Overdose  Goal: Effective Oxygenation and Ventilation  Outcome: Not Progressing

## 2023-01-31 NOTE — PROGRESS NOTES
SPIRITUAL HEALTH SERVICES Progress Note  [facility]  ICU    PT requested anointing by Alexander perez (01/30/31) prior to being extubated and sedated. Submitted request to Fr. Huang who anointed the patient.    Suleiman Melgar  Associate   [phone] (Pager)

## 2023-01-31 NOTE — PLAN OF CARE
Goal Outcome Evaluation:    Neuro: Pupils reactive, w/d to pain. Sedated with versed and fentanyl to meet RASS goal and for vent synchrony.   CV/VS: Accelerated junctional. Epi, levo, and insulin titrated per order and intensivist to meet MAP >65 and SBP >90. CO and CI improvement noted throughout shift.   Respiratory: Vent changes per intensivist from ABG results, 40%/24/420/10+. No secretions via ETT to obtain sputum cx.   GI/: Excellent UOP following fluid boluses, no BM.   Skin: Intact, bruise noted to RA. Protective mepilex on sacrum and near line sites.  Pain: Fentanyl gtt infusing, CPOT negative, appears comfortable.  Labs: Pt receiving K+ replacement x2, mag replacement x2, and phos replacement x1 for critical level of 0.7, recheck at 0745. BG checked Q30min per intensivist.   Activity: Bedrest, repositioned Q2H.   Diet: NPO, meds vis OG.   Plan/Changes:  Received total 1L LR boluses via pressure bag. BG goal changed to 140-220 per intensivist. May need cardiology consult. Updated poison control. Intensivist aware of all critical labs per protocol. See results tab, flowsheets, and MAR for further details.

## 2023-01-31 NOTE — PROGRESS NOTES
[facility] ICU RESPIRATORY NOTE        Date of Admission: 1/28/2023    Date of Intubation (most recent): 1/30/2023    Reason for Mechanical Ventilation: Airway protection.    Number of Days on Mechanical Ventilation: 2    Met Criteria for Spontaneous Breathing Trial: No    Reason for No Spontaneous Breathing Trial: Per MD.    Significant Events Today: None.    ABG Results:   Recent Labs   Lab 01/31/23  0232 01/31/23  0001 01/30/23  1935 01/30/23  1710   PH 7.38 7.38 7.40 7.18*   PCO2 33* 33* 28* 51*   PO2 67* 236* 199* 86   HCO3 20* 19* 17* 19*   O2PER 40 80 80 100       Current Vent Settings: Vent Mode: CMV/AC  (Continuous Mandatory Ventilation/ Assist Control)  FiO2 (%): 40 %  Resp Rate (Set): 24 breaths/min  Tidal Volume (Set, mL): 420 mL  PEEP (cm H2O): 10 cmH2O  Resp: 26      Skin Assessment: Clean and intact.    Plan: Continue full vent support and wean as tolerated.     Simeon Paulino, RT on 1/31/2023 at 4:42 AM

## 2023-01-31 NOTE — PROCEDURES
[facility]    Arterial line placement    Date/Time: 1/31/2023 11:50 AM  Performed by: Mauricio Motley MD  Authorized by: Mauricio Motley MD       UNIVERSAL PROTOCOL   Site Marked: NA  Prior Images Obtained and Reviewed:  NA  Required items: Required blood products, implants, devices and special equipment available    Patient identity confirmed:  Arm band  Patient was reevaluated immediately before administering moderate or deep sedation or anesthesia  Confirmation Checklist:  Correct equipment/implants were available and patient's identity using two indicators  Universal Protocol: the Joint Commission Universal Protocol was followed    Indication: Hypotension respiratory failure hemodynamic monitoring  Location:  Right radial      PROCEDURE DETAILS  Jonathon's Test Normal?: Jonathon's test not abnormal  Needle Gauge:  20  Seldinger technique: Seldinger technique used    Number of Attempts:  1  Post-procedure:  Line sutured and dressing applied  CMS: unchanged    PROCEDURE    Patient Tolerance:  Patient tolerated the procedure well with no immediate complications  Length of time physician/provider present for 1:1 monitoring during sedation: 0      Mauricio Motley MD  Surgical Critical Care Fellow

## 2023-01-31 NOTE — PROGRESS NOTES
Critical Care Note  1/31/23    Name: Diana Santiago MRN#: [id number]   Age: 72 year old YOB: 1950          Problem List:   Principal Problem:    Overdose, intentional self-harm, initial encounter (H)      Assessment and plan :     Diana Santiago IS a 72 year old female admitted on 1/28/2023 for hemodynamic management of intentional polypharmacy overdose of amlodipine, adderall, haldol, lamictal. Requiring pressors, started on high insulin gtt d/t CCB toxicity    My assessment and plan by system for this patient is as follows:    Neurology/Psychiatry:   #Suicide attempt  #Intentional polypharmacy overdose  - mental health/psych when able    Cardiovascular:   #distributive shock causing hypotension   #sinus bradycardia w/ junctional  #diastolic ventricular dysfunction  - poison control recommended to continue high dose insulin gtt until off vasopressors  - Cardiac POCUS 1/30 w/ very dilated LA, RV, RA, IVC and hyperdynamic LV (EF ~90-95%), suggesting diastolic dysfunction    Pulmonary/Ventilator Management:   #Acute hypoxic and hypoxemic respiratory failure  #pulmonary edema 2/2 LV dysfunction  -intubated 1/30 for hypoxic and hypoxemic respiratory failure    GI and Nutrition :   - will place feeding tube and initiate tube feeds  - bowel reg increased today    Renal/Fluids/Electrolytes:   #CKDIII (baseline ~Cr 1.8)  #lactic acidosis  #Metabolic acidosis  Plan  -We will monitor the patient for worsening kidney injury follow creatinine follow urine output  - monitor function and electrolytes as needed with replacement per ICU protocols. - generally avoid nephrotoxic agents such as NSAID, IV contrast unless specifically required  - follow I/O's as appropriate.    Infectious Disease:   - zosyn added overnight for empiric coverage given decompensation  - awaiting cultures    Endocrine:   - high dose insulin gtt 1-10u/kg/hr + D50 infusion    Hematology/Oncology:   #Chronic anemia present on admission  - no acute  issues, continue to monitor    LDA:   1. Venous access - R femoral CVC 1/29  2. Arterial access - R femoral 1/29  3. Suresh 1/29  Plan  - Suresh and access required/necessary      ICU Prophylaxis:   1. DVT: heparin, mechanical  2. VAP: rinse + PPI + HOB>30  3. Stress Ulcer: Protonix  4. Restraints: yes, needed due to anesthesia  5. Wound care  -local  6. Feeding - will start TFs  7. Disposition -ICU    Mauricio Motley MD  Surgical Critical Care Fellow      Key goals for next 24 hours:   - continue diuresis  - repeat POCUS          Physical Examination:   Temp:  [98.1  F (36.7  C)-99.5  F (37.5  C)] 98.5  F (36.9  C)  Pulse:  [48-97] 83  Resp:  [0-62] 26  BP: (117-130)/(61-71) 117/61  MAP:  [55 mmHg-233 mmHg] 77 mmHg  Arterial Line BP: ()/(39-95) 90/59  FiO2 (%):  [30 %-100 %] 40 %  SpO2:  [85 %-100 %] 100 %    Intake/Output Summary (Last 24 hours) at 1/29/2023 1341  Last data filed at 1/29/2023 1200  Gross per 24 hour   Intake 1640.83 ml   Output 800 ml   Net 840.83 ml     Wt Readings from Last 4 Encounters:   01/31/23 62.3 kg (137 lb 5.6 oz)   01/24/23 54.4 kg (120 lb)   10/20/22 51.3 kg (113 lb)   10/18/22 51.3 kg (113 lb 3.2 oz)     Arterial Line BP: ()/(39-95) 90/59  MAP:  [55 mmHg-233 mmHg] 77 mmHg  BP - Mean:  [79-89] 79  Vent Mode: CMV/AC  (Continuous Mandatory Ventilation/ Assist Control)  FiO2 (%): 40 %  Resp Rate (Set): 24 breaths/min  Tidal Volume (Set, mL): 420 mL  PEEP (cm H2O): 10 cmH2O  Resp: 26    Recent Labs   Lab 01/31/23  0232 01/31/23  0001 01/30/23  1935 01/30/23  1710   PH 7.38 7.38 7.40 7.18*   PCO2 33* 33* 28* 51*   PO2 67* 236* 199* 86   HCO3 20* 19* 17* 19*   O2PER 40 80 80 100       GEN: sedated, no acute distress  NEURO: sedated, moving LLE spontaneously  PULM clear bilaterally  CV/COR: Bradycardia but regular w/ intermittent PVCs  ABD: soft, non-distended  EXT:  Minimal edema, warm  SKIN: no obvious rashes         Data:   All data and imaging reviewed     ROUTINE ICU LABS (Last  four results)  CMP  Recent Labs   Lab 01/31/23  0733 01/31/23  0701 01/31/23  0633 01/31/23  0601 01/31/23  0530 01/31/23  0501 01/31/23  0432 01/31/23  0401 01/31/23  0033 01/31/23  0001 01/30/23  2239 01/30/23  2203 01/30/23  2127 01/30/23  2059 01/30/23  1901 01/30/23  1856 01/30/23  1451 01/30/23  1449 01/30/23  1106 01/30/23  1055 01/30/23  0741 01/30/23  0545 01/29/23  1948 01/29/23  1831 01/29/23  1147 01/29/23  0630   NA  --   --   --   --   --  143  --  141  --  142  --  142  --  143  --  142  --  138  --  139   < > 140   < > 137   < > 135*  135*   POTASSIUM  --   --   --   --   --  4.6  --  4.5  --  3.2*  --  3.1*  --  3.0*  --  3.1*  --  3.8  --  4.0   < > 5.3   < > 4.7   < > 4.8  4.7   CHLORIDE  --   --   --   --   --  111*  --  110*  --  109*  --  109*  --  110*  --  109*  --  108*  --  113*   < > 109*   < > 105   < > 102  101   CO2  --   --   --   --   --  20*  --  19*  --  18*  --  17*  --  17*  --  16*  --  15*  --  16*   < > 17*   < > 18*   < > 20*  21*   ANIONGAP  --   --   --   --   --  12  --  12  --  15  --  16*  --  16*  --  17*  --  15  --  10   < > 14   < > 14   < > 13  13   * 126* 128* 147*   < > 148*   < > 164*   < > 150*   < > 116*   < > 150*   < > 200*   < > 240*   < > 162*   < > 169*   < > 143*   < > 126*  125*   BUN  --   --   --   --   --  42.3*  --  42.7*  --  45.8*  --  46.4*  --  48.2*  --  47.1*  --  46.4*  --  47.0*   < > 49.0*   < > 51.7*   < > 62.9*  60.9*   CR  --   --   --   --   --  2.51*  --  2.45*  --  2.53*  --  2.52*  --  2.55*  --  2.41*  --  2.22*  --  2.08*   < > 2.08*   < > 1.85*   < > 1.80*  1.79*   GFRESTIMATED  --   --   --   --   --  20*  --  20*  --  20*  --  20*  --  19*  --  21*  --  23*  --  25*   < > 25*   < > 28*   < > 29*  30*   ISSA  --   --   --   --   --  9.4  --  9.2  --  9.8  --  10.0  --  9.8  --  9.6  --  9.5  --  9.8   < > 10.2  10.2   < > 10.9*   < > 12.1*  12.1*   MAG  --   --   --   --   --   --   --  2.1  --   --   --   --   --   2.3  --  2.2  --  2.2  --  2.2   < > 2.2  --  2.0   < > 2.3   PHOS  --   --   --   --   --   --   --  2.2*  --   --   --  0.7*  --   --   --   --   --  3.5  --  3.0   < > 4.1  --   --    < >  --    PROTTOTAL  --   --   --   --   --   --   --  4.4*  --   --   --   --   --   --   --   --   --   --   --   --   --  5.5*  --  5.8*  --  5.8*   ALBUMIN  --   --   --   --   --   --   --  2.5*  --   --   --   --   --   --   --   --   --   --   --   --   --  3.2*  --  3.3*  --  3.3*   BILITOTAL  --   --   --   --   --   --   --  0.5  --   --   --   --   --   --   --   --   --   --   --   --   --  0.5  --  0.3  --  0.3   ALKPHOS  --   --   --   --   --   --   --  129*  --   --   --   --   --   --   --   --   --   --   --   --   --  150*  --  144*  --  137*   AST  --   --   --   --   --   --   --  21  --   --   --   --   --   --   --   --   --   --   --   --   --  20  --  16  --  22   ALT  --   --   --   --   --   --   --  <5*  --   --   --   --   --   --   --   --   --   --   --   --   --  25  --  28  --  28    < > = values in this interval not displayed.     CBC  Recent Labs   Lab 01/31/23  0401 01/30/23  0030 01/29/23  0630 01/28/23  4989   WBC 7.3 9.5 6.1 9.4   RBC 3.42* 3.96 4.19 4.63   HGB 7.3* 8.5* 9.1* 9.9*   HCT 23.4* 28.0* 29.5* 32.7*   MCV 68* 71* 70* 71*   MCH 21.3* 21.5* 21.7* 21.4*   MCHC 31.2* 30.4* 30.8* 30.3*   RDW 14.8 15.0 14.7 14.7    307 279 300     INRNo lab results found in last 7 days.  Arterial Blood Gas  Recent Labs   Lab 01/31/23  0232 01/31/23  0001 01/30/23  1935 01/30/23  1710   PH 7.38 7.38 7.40 7.18*   PCO2 33* 33* 28* 51*   PO2 67* 236* 199* 86   HCO3 20* 19* 17* 19*   O2PER 40 80 80 100

## 2023-02-01 ENCOUNTER — APPOINTMENT (OUTPATIENT)
Dept: GENERAL RADIOLOGY | Facility: CLINIC | Age: 73
DRG: 917 | End: 2023-02-01
Attending: INTERNAL MEDICINE
Payer: MEDICARE

## 2023-02-01 LAB
ALBUMIN SERPL BCG-MCNC: 2.3 G/DL (ref 3.5–5.2)
ALBUMIN SERPL BCG-MCNC: 2.3 G/DL (ref 3.5–5.2)
ALBUMIN SERPL BCG-MCNC: 2.4 G/DL (ref 3.5–5.2)
ALBUMIN SERPL BCG-MCNC: 2.5 G/DL (ref 3.5–5.2)
ALBUMIN SERPL BCG-MCNC: 2.5 G/DL (ref 3.5–5.2)
ALLEN'S TEST: ABNORMAL
ANION GAP SERPL CALCULATED.3IONS-SCNC: 10 MMOL/L (ref 7–15)
ANION GAP SERPL CALCULATED.3IONS-SCNC: 11 MMOL/L (ref 7–15)
ANION GAP SERPL CALCULATED.3IONS-SCNC: 8 MMOL/L (ref 7–15)
ANION GAP SERPL CALCULATED.3IONS-SCNC: 8 MMOL/L (ref 7–15)
ANION GAP SERPL CALCULATED.3IONS-SCNC: 9 MMOL/L (ref 7–15)
ATRIAL RATE - MUSE: 125 BPM
BACTERIA UR CULT: NO GROWTH
BASE EXCESS BLDA CALC-SCNC: -2.5 MMOL/L (ref -9–1.8)
BASE EXCESS BLDA CALC-SCNC: -6.8 MMOL/L (ref -9–1.8)
BASE EXCESS BLDA CALC-SCNC: -7.6 MMOL/L (ref -9–1.8)
BUN SERPL-MCNC: 30 MG/DL (ref 8–23)
BUN SERPL-MCNC: 30.8 MG/DL (ref 8–23)
BUN SERPL-MCNC: 30.8 MG/DL (ref 8–23)
BUN SERPL-MCNC: 31.1 MG/DL (ref 8–23)
BUN SERPL-MCNC: 31.4 MG/DL (ref 8–23)
CA-I BLD-MCNC: 5.4 MG/DL (ref 4.4–5.2)
CALCIUM SERPL-MCNC: 8.7 MG/DL (ref 8.8–10.2)
CALCIUM SERPL-MCNC: 8.8 MG/DL (ref 8.8–10.2)
CALCIUM SERPL-MCNC: 9.3 MG/DL (ref 8.8–10.2)
CHLORIDE SERPL-SCNC: 111 MMOL/L (ref 98–107)
CHLORIDE SERPL-SCNC: 113 MMOL/L (ref 98–107)
CHLORIDE SERPL-SCNC: 114 MMOL/L (ref 98–107)
CHLORIDE SERPL-SCNC: 114 MMOL/L (ref 98–107)
CHLORIDE SERPL-SCNC: 116 MMOL/L (ref 98–107)
CREAT SERPL-MCNC: 2.45 MG/DL (ref 0.51–0.95)
CREAT SERPL-MCNC: 2.48 MG/DL (ref 0.51–0.95)
CREAT SERPL-MCNC: 2.51 MG/DL (ref 0.51–0.95)
CREAT SERPL-MCNC: 2.52 MG/DL (ref 0.51–0.95)
CREAT SERPL-MCNC: 2.66 MG/DL (ref 0.51–0.95)
DEPRECATED HCO3 PLAS-SCNC: 20 MMOL/L (ref 22–29)
DEPRECATED HCO3 PLAS-SCNC: 21 MMOL/L (ref 22–29)
DEPRECATED HCO3 PLAS-SCNC: 22 MMOL/L (ref 22–29)
DIASTOLIC BLOOD PRESSURE - MUSE: NORMAL MMHG
ERYTHROCYTE [DISTWIDTH] IN BLOOD BY AUTOMATED COUNT: 15.2 % (ref 10–15)
ERYTHROCYTE [DISTWIDTH] IN BLOOD BY AUTOMATED COUNT: 15.4 % (ref 10–15)
GFR SERPL CREATININE-BSD FRML MDRD: 18 ML/MIN/1.73M2
GFR SERPL CREATININE-BSD FRML MDRD: 20 ML/MIN/1.73M2
GLUCOSE BLDC GLUCOMTR-MCNC: 102 MG/DL (ref 70–99)
GLUCOSE BLDC GLUCOMTR-MCNC: 108 MG/DL (ref 70–99)
GLUCOSE BLDC GLUCOMTR-MCNC: 108 MG/DL (ref 70–99)
GLUCOSE BLDC GLUCOMTR-MCNC: 109 MG/DL (ref 70–99)
GLUCOSE BLDC GLUCOMTR-MCNC: 110 MG/DL (ref 70–99)
GLUCOSE BLDC GLUCOMTR-MCNC: 110 MG/DL (ref 70–99)
GLUCOSE BLDC GLUCOMTR-MCNC: 117 MG/DL (ref 70–99)
GLUCOSE BLDC GLUCOMTR-MCNC: 119 MG/DL (ref 70–99)
GLUCOSE BLDC GLUCOMTR-MCNC: 122 MG/DL (ref 70–99)
GLUCOSE BLDC GLUCOMTR-MCNC: 132 MG/DL (ref 70–99)
GLUCOSE BLDC GLUCOMTR-MCNC: 134 MG/DL (ref 70–99)
GLUCOSE BLDC GLUCOMTR-MCNC: 138 MG/DL (ref 70–99)
GLUCOSE BLDC GLUCOMTR-MCNC: 140 MG/DL (ref 70–99)
GLUCOSE BLDC GLUCOMTR-MCNC: 144 MG/DL (ref 70–99)
GLUCOSE BLDC GLUCOMTR-MCNC: 147 MG/DL (ref 70–99)
GLUCOSE BLDC GLUCOMTR-MCNC: 148 MG/DL (ref 70–99)
GLUCOSE BLDC GLUCOMTR-MCNC: 148 MG/DL (ref 70–99)
GLUCOSE BLDC GLUCOMTR-MCNC: 151 MG/DL (ref 70–99)
GLUCOSE BLDC GLUCOMTR-MCNC: 152 MG/DL (ref 70–99)
GLUCOSE BLDC GLUCOMTR-MCNC: 153 MG/DL (ref 70–99)
GLUCOSE BLDC GLUCOMTR-MCNC: 156 MG/DL (ref 70–99)
GLUCOSE BLDC GLUCOMTR-MCNC: 161 MG/DL (ref 70–99)
GLUCOSE BLDC GLUCOMTR-MCNC: 165 MG/DL (ref 70–99)
GLUCOSE BLDC GLUCOMTR-MCNC: 170 MG/DL (ref 70–99)
GLUCOSE BLDC GLUCOMTR-MCNC: 171 MG/DL (ref 70–99)
GLUCOSE BLDC GLUCOMTR-MCNC: 184 MG/DL (ref 70–99)
GLUCOSE BLDC GLUCOMTR-MCNC: 188 MG/DL (ref 70–99)
GLUCOSE BLDC GLUCOMTR-MCNC: 195 MG/DL (ref 70–99)
GLUCOSE BLDC GLUCOMTR-MCNC: 201 MG/DL (ref 70–99)
GLUCOSE BLDC GLUCOMTR-MCNC: 212 MG/DL (ref 70–99)
GLUCOSE BLDC GLUCOMTR-MCNC: 27 MG/DL (ref 70–99)
GLUCOSE BLDC GLUCOMTR-MCNC: 50 MG/DL (ref 70–99)
GLUCOSE BLDC GLUCOMTR-MCNC: 75 MG/DL (ref 70–99)
GLUCOSE BLDC GLUCOMTR-MCNC: 97 MG/DL (ref 70–99)
GLUCOSE BLDC GLUCOMTR-MCNC: 99 MG/DL (ref 70–99)
GLUCOSE SERPL-MCNC: 113 MG/DL (ref 70–99)
GLUCOSE SERPL-MCNC: 148 MG/DL (ref 70–99)
GLUCOSE SERPL-MCNC: 158 MG/DL (ref 70–99)
GLUCOSE SERPL-MCNC: 171 MG/DL (ref 70–99)
GLUCOSE SERPL-MCNC: 214 MG/DL (ref 70–99)
HCO3 BLD-SCNC: 20 MMOL/L (ref 21–28)
HCO3 BLD-SCNC: 21 MMOL/L (ref 21–28)
HCO3 BLD-SCNC: 23 MMOL/L (ref 21–28)
HCT VFR BLD AUTO: 24 % (ref 35–47)
HCT VFR BLD AUTO: 24.7 % (ref 35–47)
HGB BLD-MCNC: 7.1 G/DL (ref 11.7–15.7)
HGB BLD-MCNC: 7.1 G/DL (ref 11.7–15.7)
INTERPRETATION ECG - MUSE: NORMAL
MAGNESIUM SERPL-MCNC: 2.3 MG/DL (ref 1.7–2.3)
MAGNESIUM SERPL-MCNC: 2.4 MG/DL (ref 1.7–2.3)
MCH RBC QN AUTO: 21.5 PG (ref 26.5–33)
MCH RBC QN AUTO: 21.5 PG (ref 26.5–33)
MCHC RBC AUTO-ENTMCNC: 28.7 G/DL (ref 31.5–36.5)
MCHC RBC AUTO-ENTMCNC: 29.6 G/DL (ref 31.5–36.5)
MCV RBC AUTO: 73 FL (ref 78–100)
MCV RBC AUTO: 75 FL (ref 78–100)
O2/TOTAL GAS SETTING VFR VENT: 35 %
O2/TOTAL GAS SETTING VFR VENT: 50 %
O2/TOTAL GAS SETTING VFR VENT: 50 %
P AXIS - MUSE: NORMAL DEGREES
PCO2 BLD: 40 MM HG (ref 35–45)
PCO2 BLD: 49 MM HG (ref 35–45)
PCO2 BLD: 58 MM HG (ref 35–45)
PH BLD: 7.16 [PH] (ref 7.35–7.45)
PH BLD: 7.23 [PH] (ref 7.35–7.45)
PH BLD: 7.36 [PH] (ref 7.35–7.45)
PHOSPHATE SERPL-MCNC: 1.7 MG/DL (ref 2.5–4.5)
PHOSPHATE SERPL-MCNC: 3.1 MG/DL (ref 2.5–4.5)
PHOSPHATE SERPL-MCNC: 3.1 MG/DL (ref 2.5–4.5)
PHOSPHATE SERPL-MCNC: 3.2 MG/DL (ref 2.5–4.5)
PHOSPHATE SERPL-MCNC: 3.3 MG/DL (ref 2.5–4.5)
PLATELET # BLD AUTO: 215 10E3/UL (ref 150–450)
PLATELET # BLD AUTO: 244 10E3/UL (ref 150–450)
PO2 BLD: 140 MM HG (ref 80–105)
PO2 BLD: 149 MM HG (ref 80–105)
PO2 BLD: 99 MM HG (ref 80–105)
POTASSIUM SERPL-SCNC: 3.2 MMOL/L (ref 3.4–5.3)
POTASSIUM SERPL-SCNC: 4.1 MMOL/L (ref 3.4–5.3)
POTASSIUM SERPL-SCNC: 4.3 MMOL/L (ref 3.4–5.3)
PR INTERVAL - MUSE: NORMAL MS
QRS DURATION - MUSE: 102 MS
QT - MUSE: 332 MS
QTC - MUSE: 353 MS
R AXIS - MUSE: 105 DEGREES
RBC # BLD AUTO: 3.3 10E6/UL (ref 3.8–5.2)
RBC # BLD AUTO: 3.31 10E6/UL (ref 3.8–5.2)
SODIUM SERPL-SCNC: 140 MMOL/L (ref 136–145)
SODIUM SERPL-SCNC: 142 MMOL/L (ref 136–145)
SODIUM SERPL-SCNC: 143 MMOL/L (ref 136–145)
SODIUM SERPL-SCNC: 144 MMOL/L (ref 136–145)
SODIUM SERPL-SCNC: 148 MMOL/L (ref 136–145)
SYSTOLIC BLOOD PRESSURE - MUSE: NORMAL MMHG
T AXIS - MUSE: 44 DEGREES
VENTRICULAR RATE- MUSE: 68 BPM
WBC # BLD AUTO: 11.9 10E3/UL (ref 4–11)
WBC # BLD AUTO: 9.7 10E3/UL (ref 4–11)

## 2023-02-01 PROCEDURE — 999N000157 HC STATISTIC RCP TIME EA 10 MIN

## 2023-02-01 PROCEDURE — 250N000011 HC RX IP 250 OP 636: Performed by: STUDENT IN AN ORGANIZED HEALTH CARE EDUCATION/TRAINING PROGRAM

## 2023-02-01 PROCEDURE — 250N000009 HC RX 250: Performed by: STUDENT IN AN ORGANIZED HEALTH CARE EDUCATION/TRAINING PROGRAM

## 2023-02-01 PROCEDURE — 258N000001 HC RX 258: Performed by: STUDENT IN AN ORGANIZED HEALTH CARE EDUCATION/TRAINING PROGRAM

## 2023-02-01 PROCEDURE — 82803 BLOOD GASES ANY COMBINATION: CPT | Performed by: INTERNAL MEDICINE

## 2023-02-01 PROCEDURE — C9113 INJ PANTOPRAZOLE SODIUM, VIA: HCPCS | Performed by: ANESTHESIOLOGY

## 2023-02-01 PROCEDURE — 258N000003 HC RX IP 258 OP 636: Performed by: STUDENT IN AN ORGANIZED HEALTH CARE EDUCATION/TRAINING PROGRAM

## 2023-02-01 PROCEDURE — 250N000013 HC RX MED GY IP 250 OP 250 PS 637: Performed by: STUDENT IN AN ORGANIZED HEALTH CARE EDUCATION/TRAINING PROGRAM

## 2023-02-01 PROCEDURE — 250N000011 HC RX IP 250 OP 636: Performed by: ANESTHESIOLOGY

## 2023-02-01 PROCEDURE — 99291 CRITICAL CARE FIRST HOUR: CPT | Mod: GC | Performed by: INTERNAL MEDICINE

## 2023-02-01 PROCEDURE — 250N000013 HC RX MED GY IP 250 OP 250 PS 637: Performed by: INTERNAL MEDICINE

## 2023-02-01 PROCEDURE — 82330 ASSAY OF CALCIUM: CPT | Performed by: INTERNAL MEDICINE

## 2023-02-01 PROCEDURE — 71045 X-RAY EXAM CHEST 1 VIEW: CPT

## 2023-02-01 PROCEDURE — 83735 ASSAY OF MAGNESIUM: CPT | Performed by: STUDENT IN AN ORGANIZED HEALTH CARE EDUCATION/TRAINING PROGRAM

## 2023-02-01 PROCEDURE — 84100 ASSAY OF PHOSPHORUS: CPT | Performed by: STUDENT IN AN ORGANIZED HEALTH CARE EDUCATION/TRAINING PROGRAM

## 2023-02-01 PROCEDURE — 250N000009 HC RX 250: Performed by: INTERNAL MEDICINE

## 2023-02-01 PROCEDURE — 85027 COMPLETE CBC AUTOMATED: CPT | Performed by: INTERNAL MEDICINE

## 2023-02-01 PROCEDURE — 999N000009 HC STATISTIC AIRWAY CARE

## 2023-02-01 PROCEDURE — 82803 BLOOD GASES ANY COMBINATION: CPT | Performed by: STUDENT IN AN ORGANIZED HEALTH CARE EDUCATION/TRAINING PROGRAM

## 2023-02-01 PROCEDURE — 258N000001 HC RX 258: Performed by: INTERNAL MEDICINE

## 2023-02-01 PROCEDURE — 250N000009 HC RX 250: Performed by: ANESTHESIOLOGY

## 2023-02-01 PROCEDURE — 250N000013 HC RX MED GY IP 250 OP 250 PS 637: Performed by: ANESTHESIOLOGY

## 2023-02-01 PROCEDURE — 83735 ASSAY OF MAGNESIUM: CPT | Performed by: ANESTHESIOLOGY

## 2023-02-01 PROCEDURE — 258N000003 HC RX IP 258 OP 636: Performed by: INTERNAL MEDICINE

## 2023-02-01 PROCEDURE — 200N000001 HC R&B ICU

## 2023-02-01 PROCEDURE — 84100 ASSAY OF PHOSPHORUS: CPT | Performed by: INTERNAL MEDICINE

## 2023-02-01 PROCEDURE — 94003 VENT MGMT INPAT SUBQ DAY: CPT

## 2023-02-01 RX ORDER — NOREPINEPHRINE BITARTRATE 0.06 MG/ML
.01-.6 INJECTION, SOLUTION INTRAVENOUS CONTINUOUS
Status: DISCONTINUED | OUTPATIENT
Start: 2023-02-01 | End: 2023-02-03

## 2023-02-01 RX ORDER — FUROSEMIDE 10 MG/ML
40 INJECTION INTRAMUSCULAR; INTRAVENOUS EVERY 8 HOURS
Status: DISCONTINUED | OUTPATIENT
Start: 2023-02-01 | End: 2023-02-01

## 2023-02-01 RX ORDER — DEXMEDETOMIDINE HYDROCHLORIDE 4 UG/ML
.1-1.2 INJECTION, SOLUTION INTRAVENOUS CONTINUOUS
Status: DISCONTINUED | OUTPATIENT
Start: 2023-02-01 | End: 2023-02-04

## 2023-02-01 RX ORDER — POTASSIUM CHLORIDE 20MEQ/15ML
20 LIQUID (ML) ORAL ONCE
Status: COMPLETED | OUTPATIENT
Start: 2023-02-01 | End: 2023-02-01

## 2023-02-01 RX ADMIN — MULTIVITAMIN 15 ML: LIQUID ORAL at 07:50

## 2023-02-01 RX ADMIN — ACETAMINOPHEN 650 MG: 325 SUSPENSION ORAL at 20:01

## 2023-02-01 RX ADMIN — MAGNESIUM SULFATE HEPTAHYDRATE 1 G: 500 INJECTION, SOLUTION INTRAMUSCULAR; INTRAVENOUS at 06:23

## 2023-02-01 RX ADMIN — POLYETHYLENE GLYCOL 3350 17 G: 17 POWDER, FOR SOLUTION ORAL at 20:01

## 2023-02-01 RX ADMIN — SENNOSIDES AND DOCUSATE SODIUM 1 TABLET: 50; 8.6 TABLET ORAL at 21:33

## 2023-02-01 RX ADMIN — CHLORHEXIDINE GLUCONATE 15 ML: 1.2 SOLUTION ORAL at 19:54

## 2023-02-01 RX ADMIN — HEPARIN SODIUM 5000 UNITS: 5000 INJECTION, SOLUTION INTRAVENOUS; SUBCUTANEOUS at 03:27

## 2023-02-01 RX ADMIN — POTASSIUM PHOSPHATE, MONOBASIC AND POTASSIUM PHOSPHATE, DIBASIC 9 MMOL: 224; 236 INJECTION, SOLUTION, CONCENTRATE INTRAVENOUS at 21:52

## 2023-02-01 RX ADMIN — CHLORHEXIDINE GLUCONATE 15 ML: 1.2 SOLUTION ORAL at 07:50

## 2023-02-01 RX ADMIN — POTASSIUM CHLORIDE 20 MEQ: 20 SOLUTION ORAL at 21:33

## 2023-02-01 RX ADMIN — Medication 0.07 MCG/KG/MIN: at 10:34

## 2023-02-01 RX ADMIN — HEPARIN SODIUM 5000 UNITS: 5000 INJECTION, SOLUTION INTRAVENOUS; SUBCUTANEOUS at 11:20

## 2023-02-01 RX ADMIN — Medication 100 MCG/HR: at 06:17

## 2023-02-01 RX ADMIN — HEPARIN SODIUM 5000 UNITS: 5000 INJECTION, SOLUTION INTRAVENOUS; SUBCUTANEOUS at 19:54

## 2023-02-01 RX ADMIN — DEXTROSE MONOHYDRATE 25 G: 25 INJECTION, SOLUTION INTRAVENOUS at 09:54

## 2023-02-01 RX ADMIN — FUROSEMIDE 40 MG: 10 INJECTION, SOLUTION INTRAMUSCULAR; INTRAVENOUS at 08:32

## 2023-02-01 RX ADMIN — VASOPRESSIN 1.5 UNITS/HR: 20 INJECTION, SOLUTION INTRAMUSCULAR; SUBCUTANEOUS at 10:32

## 2023-02-01 RX ADMIN — WATER: 1000 INJECTION, SOLUTION INTRAVENOUS at 07:05

## 2023-02-01 RX ADMIN — MIDAZOLAM HYDROCHLORIDE 4 MG/HR: 1 INJECTION, SOLUTION INTRAVENOUS at 03:49

## 2023-02-01 RX ADMIN — PIPERACILLIN AND TAZOBACTAM 2.25 G: 2; .25 INJECTION, POWDER, FOR SOLUTION INTRAVENOUS at 07:50

## 2023-02-01 RX ADMIN — PANTOPRAZOLE SODIUM 40 MG: 40 INJECTION, POWDER, FOR SOLUTION INTRAVENOUS at 07:50

## 2023-02-01 RX ADMIN — FUROSEMIDE 40 MG: 10 INJECTION, SOLUTION INTRAMUSCULAR; INTRAVENOUS at 16:15

## 2023-02-01 RX ADMIN — DEXMEDETOMIDINE HYDROCHLORIDE 0.2 MCG/KG/HR: 400 INJECTION INTRAVENOUS at 14:43

## 2023-02-01 RX ADMIN — DEXTROSE MONOHYDRATE 25 G: 25 INJECTION, SOLUTION INTRAVENOUS at 10:42

## 2023-02-01 RX ADMIN — Medication 0.14 MCG/KG/MIN: at 01:28

## 2023-02-01 RX ADMIN — WATER: 1000 INJECTION, SOLUTION INTRAVENOUS at 17:31

## 2023-02-01 RX ADMIN — PIPERACILLIN AND TAZOBACTAM 2.25 G: 2; .25 INJECTION, POWDER, FOR SOLUTION INTRAVENOUS at 02:38

## 2023-02-01 RX ADMIN — POLYETHYLENE GLYCOL 3350 17 G: 17 POWDER, FOR SOLUTION ORAL at 07:50

## 2023-02-01 ASSESSMENT — ACTIVITIES OF DAILY LIVING (ADL)
ADLS_ACUITY_SCORE: 49
ADLS_ACUITY_SCORE: 53
ADLS_ACUITY_SCORE: 53
ADLS_ACUITY_SCORE: 49
ADLS_ACUITY_SCORE: 53
ADLS_ACUITY_SCORE: 53
ADLS_ACUITY_SCORE: 49
ADLS_ACUITY_SCORE: 49
ADLS_ACUITY_SCORE: 53
ADLS_ACUITY_SCORE: 53
ADLS_ACUITY_SCORE: 49
ADLS_ACUITY_SCORE: 53

## 2023-02-01 NOTE — PROGRESS NOTES
[facility] ICU RESPIRATORY NOTE        Date of Admission: 1/28/2023    Date of Intubation (most recent): 01/30/2023    Reason for Mechanical Ventilation: Airway Protection    Number of Days on Mechanical Ventilation: 3    Met Criteria for Spontaneous Breathing Trial: No    Reason for No Spontaneous Breathing Trial: Per MD    Significant Events Today: None    ABG Results:   Recent Labs   Lab 02/01/23  1003 02/01/23  0743 01/31/23  1405 01/31/23  0804   PH 7.23* 7.16* 7.32* 7.37   PCO2 49* 58* 38 35   PO2 149* 99 69* 68*   HCO3 20* 21 20* 20*   O2PER 50 50 50 30       Current Vent Settings: Vent Mode: SIMV  (Synchronized Intermittent Mandatory Ventilation)  FiO2 (%): 35 %  Resp Rate (Set): 26 breaths/min  Tidal Volume (Set, mL): 20761 mL  PEEP (cm H2O): 8 cmH2O  Pressure Support (cm H2O): 7 cmH2O  Inspiratory Pressure (cm H2O) (Drager Anita): 23  Resp: 26      Plan: Remain on full ventilatory support until appropriate to wean     Oniel Mak RT on 2/1/2023 at 5:32 PM

## 2023-02-01 NOTE — PROGRESS NOTES
"Poison control updated- \"Magdaleno\". No new recommendations agrees with plan of care. Did state that it is common to need the d50% on for another day even after stopping the high dose insulin gtt but to continue with goals of care.   "

## 2023-02-01 NOTE — PROGRESS NOTES
Current condition (current mood & behavior): sedated/intubated  Sitter present: yes  Every 15 minute documentation by NA/RN completed for Shift: no  Room safety Suicide Checklist completed in Epic: no  Patient's color of severity (suicide scale): RED  Order for psych consult placed (if appropriate): no  Suicide care plan added: yes    As remains intubated and continuous 1:1 RN supervision provided. Safety precautions followed per policy.    Foster Bucio RN

## 2023-02-01 NOTE — PLAN OF CARE
Pt remains sedated and intubated. Tele- NSR 80-90's. Remains on levophed gtt, BP tolerating decrease in insulin gtt. Electrolytes WNL. BG remains stable on insulin and D50 gtt's, weaning off as able. Keofeed placed, TF initiated.  at bedside today. Pt's brother updated on POC at bedside, supportive.  Melanie Hassan RN

## 2023-02-01 NOTE — PLAN OF CARE
Goal Outcome Evaluation:    Neuro: Unchanged, PERRL, w/d to pain in all extremities. Versed to meet RASS -3/-4.   CV/VS: SR 80-90's, rare PVCs noted. Titrating pressors to meet MAP >65 and SBP >90.   Respiratory: SIMV 50%/22/8/9+. Lungs coarse/dim, minimal secretions.   GI/: Adequate UOP, no BM.   Skin: Intact, bruising to rt arm and hand noted.   Pain:  Fentanyl gtt, CPOT negative.   Labs: Received 1g mag x2 from CCB overdose order set. No other electrolyte replacement needed.  Activity:  Bedrest, repositioned Q2H.   Diet: Tolerating trickle feeds and flushes.   Plan/Changes: Stopped high dose insulin gtt, started vaso. Continue Q2H labs, Q30min BG checks and titratable D50 gtt per current order per intensivist.

## 2023-02-01 NOTE — PLAN OF CARE
Moves LUE spontaneously- pretty weak and lethargic- grimaces with movements/cpot scored and fentanyl gtt used. Other sedation off. Not fc. NSR with bp in goals-vasopressin weaned off and levo gtt still on. PP+ skin warm. Coarse lung sounds- vent changes made by abg trends. O2 needs seem to be less-ventilation needs up from this am. BS closely monitored today given events-see other note. BM today x1 medium soft. Skin hygiene measures applied. Restraints applied for safety given pulling at arms. UOP with great uop 1.5L+ with 1 lasix dose given. Family came briefly at bedside and received update then left. Care explained throughout the shift. Up to chair x1 today.

## 2023-02-01 NOTE — PROGRESS NOTES
Discussed with dr. Motley about trailing off d 50% gtt. Was told orders will be updated on labs and do blood glucose in an hour after stopping d 50% because patient was normoglycemic prior to insulin gtt being started. Recheck glucose was 27. Dr. Motley notified after 50 mL bolus from d50% bag was given to patient (approved by RX as alternate for amp of d50. Recheck glucose came up but still not in range- Dr. Motley renewed orders and blood glucose goals. Will recheck blood glucose every 30 mins until in range now that d50% infusion was restarted. Will monitor and report back if given judgement alternate therapy seems appropriate.   Discussed removing orogastric tube given other nasogastric tube in place- discontinued.   Provider notified of critical blood glucoses within 15 mins of reading. See titrations to gtts and boluses given.

## 2023-02-01 NOTE — PROGRESS NOTES
Critical Care Note  2/1/23    Name: Diana Santiago MRN#: [id number]   Age: 72 year old YOB: 1950          Problem List:   Principal Problem:    Overdose, intentional self-harm, initial encounter (H)      Assessment and plan :     Diana Santiago IS a 72 year old female admitted on 1/28/2023 for hemodynamic management of intentional polypharmacy overdose of amlodipine, adderall, haldol, lamictal. Requiring pressors, started on high insulin gtt d/t CCB toxicity    Neurology/Psychiatry:   #Suicide attempt  #Intentional polypharmacy overdose  - mental health/psych when able    Cardiovascular:   #distributive shock causing hypotension   #sinus bradycardia w/ junctional, resolved  #diastolic ventricular dysfunction, improved  #Mitral valve regurgitation, mod/severe  - Cardiac POCUS 1/30 w/ very dilated LA, RV, RA, IVC and hyperdynamic LV (EF ~90-95%), suggesting diastolic dysfunction  - ECHO 1/31 w/ hyperdynamic LV and severe MVR(new). Will monitor  - weaning pressors, now off high dose insulin gtt    Pulmonary/Ventilator Management:   #Acute hypoxic and hypoxemic respiratory failure  #pulmonary edema 2/2 LV/MV dysfunction  -intubated 1/30 for hypoxic and hypoxemic respiratory failure  - will work on sedation weaning and subsequent vent weaning    GI and Nutrition :   #severe protein-calorie malnutrition  - TFs to goal today  - bowel reg increased today    Renal/Fluids/Electrolytes:   #REI on CKDIII (baseline ~Cr 1.8)  #lactic acidosis, resolved  #Metabolic acidosis, resolved  #hypervolemia   -We will monitor the patient for worsening kidney injury follow creatinine follow urine output  - Lasix 40mg x3. Goal net negative. Will aggressively diurese today given severe pulmonary edema and MV regurg  - follow I/O's as appropriate.    Infectious Disease:   - zosyn to stop today given no s/s of infectious process  - awaiting cultures, NGTD    Endocrine:   - stopped high dose insulin gtt    Hematology/Oncology:    #Chronic anemia present on admission  - no acute issues, continue to monitor    LDA:   1. Venous access - R femoral CVC 1/29  2. Arterial access - R femoral 1/29  3. Suresh 1/29  Plan  - Suresh and access required/necessary      ICU Prophylaxis:   1. DVT: heparin, mechanical  2. VAP: rinse + PPI + HOB>30  3. Stress Ulcer: Protonix  4. Restraints: yes, needed due to anesthesia  5. Wound care: local  6. Feeding - advancing TFs to goal  7. Disposition: ICU    Mauricio Motley MD  Surgical Critical Care Fellow      Key goals for next 24 hours:   - aggressive diurese  - weaning sedation pending ABG          Physical Examination:   Temp:  [97.9  F (36.6  C)-100.3  F (37.9  C)] 98.9  F (37.2  C)  Pulse:  [80-93] 88  Resp:  [20-24] 22  BP: ()/(59-69) 96/59  MAP:  [58 mmHg-76 mmHg] 69 mmHg  Arterial Line BP: ()/(42-71) 116/49  FiO2 (%):  [30 %-60 %] 50 %  SpO2:  [83 %-99 %] 92 %    Intake/Output Summary (Last 24 hours) at 1/29/2023 1341  Last data filed at 1/29/2023 1200  Gross per 24 hour   Intake 1640.83 ml   Output 800 ml   Net 840.83 ml     Wt Readings from Last 4 Encounters:   02/01/23 63.5 kg (139 lb 15.9 oz)   01/24/23 54.4 kg (120 lb)   10/20/22 51.3 kg (113 lb)   10/18/22 51.3 kg (113 lb 3.2 oz)     Arterial Line BP: ()/(42-71) 116/49  MAP:  [58 mmHg-76 mmHg] 69 mmHg  BP - Mean:  [70-83] 70  Vent Mode: PCV PLUS  (Pressure Control Ventilation Plus (added secondary Mode) (PC SIMV+)  FiO2 (%): 50 %  Resp Rate (Set): 22 breaths/min  Tidal Volume (Set, mL): 420 mL  PEEP (cm H2O): 9 cmH2O  Pressure Support (cm H2O): 8 cmH2O  Inspiratory Pressure (cm H2O) (Drager Anita): 20  Resp: 22    Recent Labs   Lab 01/31/23  1405 01/31/23  0804 01/31/23  0232 01/31/23  0001   PH 7.32* 7.37 7.38 7.38   PCO2 38 35 33* 33*   PO2 69* 68* 67* 236*   HCO3 20* 20* 20* 19*   O2PER 50 30 40 80       GEN: sedated, no acute distress  NEURO: sedated, not following commands  PULM clear bilaterally  CV/COR: NSR  ABD: soft,  non-distended  EXT:  No appreciable edema, warm  SKIN: no obvious rashes         Data:   All data and imaging reviewed     ROUTINE ICU LABS (Last four results)  CMP  Recent Labs   Lab 02/01/23  0739 02/01/23  0659 02/01/23  0629 02/01/23  0530 02/01/23  0501 02/01/23  0331 02/01/23  0300 02/01/23  0131 02/01/23  0059 01/31/23  2131 01/31/23  2100 01/31/23  1901 01/31/23  1858 01/31/23  0432 01/31/23  0401 01/30/23  0741 01/30/23  0545 01/29/23  1948 01/29/23  1831 01/29/23  1147 01/29/23  0630   NA  --  143  --   --  144  --  140  --  142   < > 142  142  --  143   < > 141   < > 140   < > 137   < > 135*  135*   POTASSIUM  --  4.1  --   --  4.1  --  4.1  --  4.3   < > 4.4  4.4  --  4.3   < > 4.5   < > 5.3   < > 4.7   < > 4.8  4.7   CHLORIDE  --  114*  --   --  113*  --  111*  --  114*   < > 114*  114*  --  114*   < > 110*   < > 109*   < > 105   < > 102  101   CO2  --  21*  --   --  20*  --  20*  --  20*   < > 20*  20*  --  21*   < > 19*   < > 17*   < > 18*   < > 20*  21*   ANIONGAP  --  8  --   --  11  --  9  --  8   < > 8  8  --  8   < > 12   < > 14   < > 14   < > 13  13   * 148*  158* 153*   < > 148*   < > 171*   < > 212*  214*   < > 172*  172*   < > 149*   < > 164*   < > 169*   < > 143*   < > 126*  125*   BUN  --  30.8*  --   --  30.8*  --  31.1*  --  31.4*   < > 32.8*  32.8*  --  33.3*   < > 42.7*   < > 49.0*   < > 51.7*   < > 62.9*  60.9*   CR  --  2.52*  --   --  2.51*  --  2.45*  --  2.48*   < > 2.49*  2.49*  --  2.49*   < > 2.45*   < > 2.08*   < > 1.85*   < > 1.80*  1.79*   GFRESTIMATED  --  20*  --   --  20*  --  20*  --  20*   < > 20*  20*  --  20*   < > 20*   < > 25*   < > 28*   < > 29*  30*   ISSA  --  8.8  --   --  8.8  --  8.7*  --  8.8   < > 9.0  9.0  --  8.8   < > 9.2   < > 10.2  10.2   < > 10.9*   < > 12.1*  12.1*   MAG  --   --   --   --  2.3  --   --   --  2.4*  --  2.1  --  2.1   < > 2.1   < > 2.2  --  2.0   < > 2.3   PHOS  --  3.1  --   --  3.2  --  3.1  --  3.3   < >  3.2  3.2  --  3.0   < > 2.2*   < > 4.1  --   --    < >  --    PROTTOTAL  --   --   --   --   --   --   --   --   --   --   --   --   --   --  4.4*  --  5.5*  --  5.8*  --  5.8*   ALBUMIN  --  2.3*  --   --  2.4*  --  2.5*  --  2.5*   < > 2.6*  --  2.6*   < > 2.5*  --  3.2*  --  3.3*  --  3.3*   BILITOTAL  --   --   --   --   --   --   --   --   --   --   --   --   --   --  0.5  --  0.5  --  0.3  --  0.3   ALKPHOS  --   --   --   --   --   --   --   --   --   --   --   --   --   --  129*  --  150*  --  144*  --  137*   AST  --   --   --   --   --   --   --   --   --   --   --   --   --   --  21  --  20  --  16  --  22   ALT  --   --   --   --   --   --   --   --   --   --   --   --   --   --  <5*  --  25  --  28  --  28    < > = values in this interval not displayed.     CBC  Recent Labs   Lab 02/01/23  0501 01/31/23  1858 01/31/23  0401 01/30/23  0030   WBC 11.9* 11.8* 7.3 9.5   RBC 3.31* 3.54* 3.42* 3.96   HGB 7.1* 7.7* 7.3* 8.5*   HCT 24.7* 25.7* 23.4* 28.0*   MCV 75* 73* 68* 71*   MCH 21.5* 21.8* 21.3* 21.5*   MCHC 28.7* 30.0* 31.2* 30.4*   RDW 15.4* 15.0 14.8 15.0    243 234 307     INRNo lab results found in last 7 days.  Arterial Blood Gas  Recent Labs   Lab 01/31/23  1405 01/31/23  0804 01/31/23  0232 01/31/23  0001   PH 7.32* 7.37 7.38 7.38   PCO2 38 35 33* 33*   PO2 69* 68* 67* 236*   HCO3 20* 20* 20* 19*   O2PER 50 30 40 80

## 2023-02-02 ENCOUNTER — PATIENT OUTREACH (OUTPATIENT)
Dept: CARE COORDINATION | Facility: CLINIC | Age: 73
End: 2023-02-02
Payer: MEDICARE

## 2023-02-02 ENCOUNTER — APPOINTMENT (OUTPATIENT)
Dept: GENERAL RADIOLOGY | Facility: CLINIC | Age: 73
DRG: 917 | End: 2023-02-02
Payer: MEDICARE

## 2023-02-02 LAB
ALLEN'S TEST: ABNORMAL
ANION GAP SERPL CALCULATED.3IONS-SCNC: 11 MMOL/L (ref 7–15)
ANION GAP SERPL CALCULATED.3IONS-SCNC: 8 MMOL/L (ref 7–15)
ANION GAP SERPL CALCULATED.3IONS-SCNC: 8 MMOL/L (ref 7–15)
ANION GAP SERPL CALCULATED.3IONS-SCNC: 9 MMOL/L (ref 7–15)
ANION GAP SERPL CALCULATED.3IONS-SCNC: 9 MMOL/L (ref 7–15)
BACTERIA SPT CULT: NORMAL
BASE EXCESS BLDA CALC-SCNC: -2.7 MMOL/L (ref -9–1.8)
BUN SERPL-MCNC: 25.9 MG/DL (ref 8–23)
BUN SERPL-MCNC: 26.1 MG/DL (ref 8–23)
BUN SERPL-MCNC: 27.5 MG/DL (ref 8–23)
BUN SERPL-MCNC: 28 MG/DL (ref 8–23)
BUN SERPL-MCNC: 28.3 MG/DL (ref 8–23)
CALCIUM SERPL-MCNC: 9.4 MG/DL (ref 8.8–10.2)
CALCIUM SERPL-MCNC: 9.5 MG/DL (ref 8.8–10.2)
CALCIUM SERPL-MCNC: 9.6 MG/DL (ref 8.8–10.2)
CALCIUM SERPL-MCNC: 9.6 MG/DL (ref 8.8–10.2)
CALCIUM SERPL-MCNC: 9.7 MG/DL (ref 8.8–10.2)
CHLORIDE SERPL-SCNC: 112 MMOL/L (ref 98–107)
CHLORIDE SERPL-SCNC: 114 MMOL/L (ref 98–107)
CHLORIDE SERPL-SCNC: 115 MMOL/L (ref 98–107)
CHLORIDE SERPL-SCNC: 116 MMOL/L (ref 98–107)
CHLORIDE SERPL-SCNC: 118 MMOL/L (ref 98–107)
CREAT SERPL-MCNC: 2.53 MG/DL (ref 0.51–0.95)
CREAT SERPL-MCNC: 2.54 MG/DL (ref 0.51–0.95)
CREAT SERPL-MCNC: 2.56 MG/DL (ref 0.51–0.95)
CREAT SERPL-MCNC: 2.56 MG/DL (ref 0.51–0.95)
CREAT SERPL-MCNC: 2.62 MG/DL (ref 0.51–0.95)
DEPRECATED HCO3 PLAS-SCNC: 22 MMOL/L (ref 22–29)
DEPRECATED HCO3 PLAS-SCNC: 22 MMOL/L (ref 22–29)
DEPRECATED HCO3 PLAS-SCNC: 23 MMOL/L (ref 22–29)
ERYTHROCYTE [DISTWIDTH] IN BLOOD BY AUTOMATED COUNT: 15.2 % (ref 10–15)
GFR SERPL CREATININE-BSD FRML MDRD: 19 ML/MIN/1.73M2
GFR SERPL CREATININE-BSD FRML MDRD: 20 ML/MIN/1.73M2
GLUCOSE BLDC GLUCOMTR-MCNC: 103 MG/DL (ref 70–99)
GLUCOSE BLDC GLUCOMTR-MCNC: 104 MG/DL (ref 70–99)
GLUCOSE BLDC GLUCOMTR-MCNC: 106 MG/DL (ref 70–99)
GLUCOSE BLDC GLUCOMTR-MCNC: 107 MG/DL (ref 70–99)
GLUCOSE BLDC GLUCOMTR-MCNC: 111 MG/DL (ref 70–99)
GLUCOSE BLDC GLUCOMTR-MCNC: 119 MG/DL (ref 70–99)
GLUCOSE BLDC GLUCOMTR-MCNC: 119 MG/DL (ref 70–99)
GLUCOSE BLDC GLUCOMTR-MCNC: 120 MG/DL (ref 70–99)
GLUCOSE BLDC GLUCOMTR-MCNC: 121 MG/DL (ref 70–99)
GLUCOSE BLDC GLUCOMTR-MCNC: 125 MG/DL (ref 70–99)
GLUCOSE BLDC GLUCOMTR-MCNC: 126 MG/DL (ref 70–99)
GLUCOSE BLDC GLUCOMTR-MCNC: 126 MG/DL (ref 70–99)
GLUCOSE BLDC GLUCOMTR-MCNC: 127 MG/DL (ref 70–99)
GLUCOSE BLDC GLUCOMTR-MCNC: 128 MG/DL (ref 70–99)
GLUCOSE BLDC GLUCOMTR-MCNC: 129 MG/DL (ref 70–99)
GLUCOSE BLDC GLUCOMTR-MCNC: 130 MG/DL (ref 70–99)
GLUCOSE BLDC GLUCOMTR-MCNC: 130 MG/DL (ref 70–99)
GLUCOSE BLDC GLUCOMTR-MCNC: 131 MG/DL (ref 70–99)
GLUCOSE BLDC GLUCOMTR-MCNC: 131 MG/DL (ref 70–99)
GLUCOSE BLDC GLUCOMTR-MCNC: 133 MG/DL (ref 70–99)
GLUCOSE BLDC GLUCOMTR-MCNC: 133 MG/DL (ref 70–99)
GLUCOSE BLDC GLUCOMTR-MCNC: 134 MG/DL (ref 70–99)
GLUCOSE BLDC GLUCOMTR-MCNC: 134 MG/DL (ref 70–99)
GLUCOSE BLDC GLUCOMTR-MCNC: 135 MG/DL (ref 70–99)
GLUCOSE BLDC GLUCOMTR-MCNC: 136 MG/DL (ref 70–99)
GLUCOSE BLDC GLUCOMTR-MCNC: 137 MG/DL (ref 70–99)
GLUCOSE BLDC GLUCOMTR-MCNC: 138 MG/DL (ref 70–99)
GLUCOSE BLDC GLUCOMTR-MCNC: 138 MG/DL (ref 70–99)
GLUCOSE BLDC GLUCOMTR-MCNC: 146 MG/DL (ref 70–99)
GLUCOSE BLDC GLUCOMTR-MCNC: 148 MG/DL (ref 70–99)
GLUCOSE BLDC GLUCOMTR-MCNC: 150 MG/DL (ref 70–99)
GLUCOSE BLDC GLUCOMTR-MCNC: 150 MG/DL (ref 70–99)
GLUCOSE BLDC GLUCOMTR-MCNC: 152 MG/DL (ref 70–99)
GLUCOSE BLDC GLUCOMTR-MCNC: 155 MG/DL (ref 70–99)
GLUCOSE BLDC GLUCOMTR-MCNC: 159 MG/DL (ref 70–99)
GLUCOSE SERPL-MCNC: 114 MG/DL (ref 70–99)
GLUCOSE SERPL-MCNC: 141 MG/DL (ref 70–99)
GLUCOSE SERPL-MCNC: 146 MG/DL (ref 70–99)
GLUCOSE SERPL-MCNC: 147 MG/DL (ref 70–99)
GLUCOSE SERPL-MCNC: 152 MG/DL (ref 70–99)
GRAM STAIN RESULT: NORMAL
GRAM STAIN RESULT: NORMAL
HCO3 BLD-SCNC: 23 MMOL/L (ref 21–28)
HCT VFR BLD AUTO: 23.7 % (ref 35–47)
HGB BLD-MCNC: 7.3 G/DL (ref 11.7–15.7)
MAGNESIUM SERPL-MCNC: 2.3 MG/DL (ref 1.7–2.3)
MCH RBC QN AUTO: 21.7 PG (ref 26.5–33)
MCHC RBC AUTO-ENTMCNC: 30.8 G/DL (ref 31.5–36.5)
MCV RBC AUTO: 71 FL (ref 78–100)
O2/TOTAL GAS SETTING VFR VENT: 35 %
OSMOLALITY SERPL: 315 MMOL/KG (ref 280–301)
OSMOLALITY UR: 182 MMOL/KG (ref 100–1200)
PCO2 BLD: 40 MM HG (ref 35–45)
PH BLD: 7.36 [PH] (ref 7.35–7.45)
PHOSPHATE SERPL-MCNC: 2 MG/DL (ref 2.5–4.5)
PHOSPHATE SERPL-MCNC: 2.5 MG/DL (ref 2.5–4.5)
PLATELET # BLD AUTO: 245 10E3/UL (ref 150–450)
PO2 BLD: 145 MM HG (ref 80–105)
POTASSIUM SERPL-SCNC: 3.1 MMOL/L (ref 3.4–5.3)
POTASSIUM SERPL-SCNC: 3.1 MMOL/L (ref 3.4–5.3)
POTASSIUM SERPL-SCNC: 3.2 MMOL/L (ref 3.4–5.3)
POTASSIUM SERPL-SCNC: 3.3 MMOL/L (ref 3.4–5.3)
POTASSIUM SERPL-SCNC: 3.4 MMOL/L (ref 3.4–5.3)
POTASSIUM SERPL-SCNC: 4 MMOL/L (ref 3.4–5.3)
RBC # BLD AUTO: 3.36 10E6/UL (ref 3.8–5.2)
SODIUM SERPL-SCNC: 144 MMOL/L (ref 136–145)
SODIUM SERPL-SCNC: 146 MMOL/L (ref 136–145)
SODIUM SERPL-SCNC: 147 MMOL/L (ref 136–145)
SODIUM SERPL-SCNC: 148 MMOL/L (ref 136–145)
SODIUM SERPL-SCNC: 148 MMOL/L (ref 136–145)
SODIUM UR-SCNC: 69 MMOL/L
WBC # BLD AUTO: 10.8 10E3/UL (ref 4–11)

## 2023-02-02 PROCEDURE — 99222 1ST HOSP IP/OBS MODERATE 55: CPT | Performed by: INTERNAL MEDICINE

## 2023-02-02 PROCEDURE — 200N000001 HC R&B ICU

## 2023-02-02 PROCEDURE — 83930 ASSAY OF BLOOD OSMOLALITY: CPT | Performed by: INTERNAL MEDICINE

## 2023-02-02 PROCEDURE — 999N000259 HC STATISTIC EXTUBATION

## 2023-02-02 PROCEDURE — 250N000013 HC RX MED GY IP 250 OP 250 PS 637: Performed by: ANESTHESIOLOGY

## 2023-02-02 PROCEDURE — 84300 ASSAY OF URINE SODIUM: CPT | Performed by: INTERNAL MEDICINE

## 2023-02-02 PROCEDURE — 85027 COMPLETE CBC AUTOMATED: CPT | Performed by: INTERNAL MEDICINE

## 2023-02-02 PROCEDURE — 250N000013 HC RX MED GY IP 250 OP 250 PS 637: Performed by: INTERNAL MEDICINE

## 2023-02-02 PROCEDURE — 250N000013 HC RX MED GY IP 250 OP 250 PS 637: Performed by: STUDENT IN AN ORGANIZED HEALTH CARE EDUCATION/TRAINING PROGRAM

## 2023-02-02 PROCEDURE — 258N000001 HC RX 258: Performed by: STUDENT IN AN ORGANIZED HEALTH CARE EDUCATION/TRAINING PROGRAM

## 2023-02-02 PROCEDURE — 99291 CRITICAL CARE FIRST HOUR: CPT | Mod: GC | Performed by: INTERNAL MEDICINE

## 2023-02-02 PROCEDURE — 82803 BLOOD GASES ANY COMBINATION: CPT | Performed by: INTERNAL MEDICINE

## 2023-02-02 PROCEDURE — 250N000009 HC RX 250: Performed by: STUDENT IN AN ORGANIZED HEALTH CARE EDUCATION/TRAINING PROGRAM

## 2023-02-02 PROCEDURE — 94660 CPAP INITIATION&MGMT: CPT

## 2023-02-02 PROCEDURE — 94003 VENT MGMT INPAT SUBQ DAY: CPT

## 2023-02-02 PROCEDURE — 258N000001 HC RX 258: Performed by: INTERNAL MEDICINE

## 2023-02-02 PROCEDURE — 82310 ASSAY OF CALCIUM: CPT | Performed by: INTERNAL MEDICINE

## 2023-02-02 PROCEDURE — 250N000011 HC RX IP 250 OP 636: Performed by: ANESTHESIOLOGY

## 2023-02-02 PROCEDURE — 258N000003 HC RX IP 258 OP 636: Performed by: INTERNAL MEDICINE

## 2023-02-02 PROCEDURE — 84100 ASSAY OF PHOSPHORUS: CPT | Performed by: INTERNAL MEDICINE

## 2023-02-02 PROCEDURE — 83735 ASSAY OF MAGNESIUM: CPT | Performed by: STUDENT IN AN ORGANIZED HEALTH CARE EDUCATION/TRAINING PROGRAM

## 2023-02-02 PROCEDURE — 250N000009 HC RX 250: Performed by: INTERNAL MEDICINE

## 2023-02-02 PROCEDURE — 80048 BASIC METABOLIC PNL TOTAL CA: CPT | Performed by: STUDENT IN AN ORGANIZED HEALTH CARE EDUCATION/TRAINING PROGRAM

## 2023-02-02 PROCEDURE — 250N000011 HC RX IP 250 OP 636: Performed by: STUDENT IN AN ORGANIZED HEALTH CARE EDUCATION/TRAINING PROGRAM

## 2023-02-02 PROCEDURE — C9113 INJ PANTOPRAZOLE SODIUM, VIA: HCPCS | Performed by: ANESTHESIOLOGY

## 2023-02-02 PROCEDURE — 999N000157 HC STATISTIC RCP TIME EA 10 MIN

## 2023-02-02 PROCEDURE — 84100 ASSAY OF PHOSPHORUS: CPT | Performed by: STUDENT IN AN ORGANIZED HEALTH CARE EDUCATION/TRAINING PROGRAM

## 2023-02-02 PROCEDURE — 80048 BASIC METABOLIC PNL TOTAL CA: CPT | Performed by: INTERNAL MEDICINE

## 2023-02-02 PROCEDURE — 258N000003 HC RX IP 258 OP 636: Performed by: STUDENT IN AN ORGANIZED HEALTH CARE EDUCATION/TRAINING PROGRAM

## 2023-02-02 PROCEDURE — 71045 X-RAY EXAM CHEST 1 VIEW: CPT

## 2023-02-02 PROCEDURE — 83935 ASSAY OF URINE OSMOLALITY: CPT | Performed by: INTERNAL MEDICINE

## 2023-02-02 RX ORDER — DEXTROSE MONOHYDRATE 50 MG/ML
INJECTION, SOLUTION INTRAVENOUS CONTINUOUS
Status: DISCONTINUED | OUTPATIENT
Start: 2023-02-02 | End: 2023-02-02

## 2023-02-02 RX ORDER — POTASSIUM CHLORIDE 29.8 MG/ML
20 INJECTION INTRAVENOUS ONCE
Status: COMPLETED | OUTPATIENT
Start: 2023-02-02 | End: 2023-02-02

## 2023-02-02 RX ORDER — POTASSIUM CHLORIDE 20MEQ/15ML
20 LIQUID (ML) ORAL ONCE
Status: COMPLETED | OUTPATIENT
Start: 2023-02-02 | End: 2023-02-02

## 2023-02-02 RX ADMIN — HEPARIN SODIUM 5000 UNITS: 5000 INJECTION, SOLUTION INTRAVENOUS; SUBCUTANEOUS at 11:08

## 2023-02-02 RX ADMIN — ONDANSETRON 4 MG: 2 INJECTION INTRAMUSCULAR; INTRAVENOUS at 19:28

## 2023-02-02 RX ADMIN — DEXMEDETOMIDINE HYDROCHLORIDE 0.7 MCG/KG/HR: 400 INJECTION INTRAVENOUS at 00:43

## 2023-02-02 RX ADMIN — WATER: 1000 INJECTION, SOLUTION INTRAVENOUS at 01:22

## 2023-02-02 RX ADMIN — SODIUM PHOSPHATE, MONOBASIC, MONOHYDRATE AND SODIUM PHOSPHATE, DIBASIC, ANHYDROUS 9 MMOL: 142; 276 INJECTION, SOLUTION INTRAVENOUS at 14:04

## 2023-02-02 RX ADMIN — CHLORHEXIDINE GLUCONATE 15 ML: 1.2 SOLUTION ORAL at 19:29

## 2023-02-02 RX ADMIN — HYDROMORPHONE HYDROCHLORIDE 0.2 MG: 0.2 INJECTION, SOLUTION INTRAMUSCULAR; INTRAVENOUS; SUBCUTANEOUS at 19:28

## 2023-02-02 RX ADMIN — POTASSIUM CHLORIDE 20 MEQ: 29.8 INJECTION, SOLUTION INTRAVENOUS at 11:01

## 2023-02-02 RX ADMIN — DEXMEDETOMIDINE HYDROCHLORIDE 0.6 MCG/KG/HR: 400 INJECTION INTRAVENOUS at 10:52

## 2023-02-02 RX ADMIN — HEPARIN SODIUM 5000 UNITS: 5000 INJECTION, SOLUTION INTRAVENOUS; SUBCUTANEOUS at 03:09

## 2023-02-02 RX ADMIN — MULTIVITAMIN 15 ML: LIQUID ORAL at 08:03

## 2023-02-02 RX ADMIN — PANTOPRAZOLE SODIUM 40 MG: 40 INJECTION, POWDER, FOR SOLUTION INTRAVENOUS at 08:03

## 2023-02-02 RX ADMIN — SODIUM PHOSPHATE, MONOBASIC, MONOHYDRATE AND SODIUM PHOSPHATE, DIBASIC, ANHYDROUS 9 MMOL: 142; 276 INJECTION, SOLUTION INTRAVENOUS at 04:52

## 2023-02-02 RX ADMIN — ACETAMINOPHEN 650 MG: 325 SUSPENSION ORAL at 15:03

## 2023-02-02 RX ADMIN — POTASSIUM CHLORIDE 20 MEQ: 20 SOLUTION ORAL at 03:09

## 2023-02-02 RX ADMIN — CHLORHEXIDINE GLUCONATE 15 ML: 1.2 SOLUTION ORAL at 08:03

## 2023-02-02 RX ADMIN — DEXTROSE MONOHYDRATE: 50 INJECTION, SOLUTION INTRAVENOUS at 06:03

## 2023-02-02 RX ADMIN — HEPARIN SODIUM 5000 UNITS: 5000 INJECTION, SOLUTION INTRAVENOUS; SUBCUTANEOUS at 19:29

## 2023-02-02 RX ADMIN — WATER: 1000 INJECTION, SOLUTION INTRAVENOUS at 17:45

## 2023-02-02 RX ADMIN — WATER: 1000 INJECTION, SOLUTION INTRAVENOUS at 10:13

## 2023-02-02 RX ADMIN — POTASSIUM CHLORIDE 20 MEQ: 29.8 INJECTION, SOLUTION INTRAVENOUS at 19:29

## 2023-02-02 ASSESSMENT — ACTIVITIES OF DAILY LIVING (ADL)
ADLS_ACUITY_SCORE: 51
ADLS_ACUITY_SCORE: 55
ADLS_ACUITY_SCORE: 53
ADLS_ACUITY_SCORE: 49
ADLS_ACUITY_SCORE: 53
ADLS_ACUITY_SCORE: 49
ADLS_ACUITY_SCORE: 51
ADLS_ACUITY_SCORE: 49
ADLS_ACUITY_SCORE: 53
ADLS_ACUITY_SCORE: 49

## 2023-02-02 NOTE — PLAN OF CARE
Update: Pre-extubation pt was SR and slightly hypotensive. Pt extubated at 1305, after which became progressively more tachycardic (to 130s) and hypertensive with incr WOB and gradually incr O2 needs. Placed on bipap @1620, after which WOB, SpO2, and tachycardia improved.    Neuro: Restless/confused/lethargic. Dex weaned off @1700. Purposefully moving all 4 extremities, intermittently following commands.    Fever/Pain: Tskk=744.5F. C/o throat pain, given PRN tylenol x1.   CV: SR/ST, QJ=705c. Occasional PVCs. Levo weaned off @1305. +2 gen edema, +3 edema in R arm.  Pulm: Lung sounds coarse/diminished in bases. Placed on bi-pap @45%/12/6. Moderate thin dark red secretions, difficulty clearing secretions d/t very weak cough, deep suctioned x1.    : Suresh for strict I&Os. Good urine output.  Potassium/Phosphate replaced x1, recheck processing.   GI: Tube feed running at goal.  Loose stools, stool softeners held. Glu levels maintained 110-180 on D50 gtt @55mL/hr.    Skin: Moisture/contact rash on buttocks/gina area. Pink rash in L elbow at previous IV site.   Plan: Continue to promote pulmonary hygiene. PRN pain management. Family at bedside updated on POC.           Goal Outcome Evaluation:      Plan of Care Reviewed With: patient, sibling    Overall Patient Progress: improvingOverall Patient Progress: improving

## 2023-02-02 NOTE — CONSULTS
[facility]    Nephrology Consultation     Date of Admission:  1/28/2023    Assessment & Plan     Diana Santiago is a 72 year old female who was admitted on 1/28/2023.     Assessment:  1) Hypernatremia with polyuria    Admission sodium 133. Initially also not polyuric suggesting no underlying abnormalities but suspect still has underlying chronic underlying partial DI with history of large fluid intake, urinary frequency and nocturia 8-10 times per night. This fits with prior long term lithium use.    Additionally urine osm 1/30 at 326 reflects presence of ADH and a concentrated urine. Does have history 9/22 of being intubated and developing hypernatremia. Suggests when having lack of access to free water, develops hypernatremia and thus likely has some underlying partial DI which she is easily able to drink fluids to thirst and compensate. Unable to obtain history as intubated regarding home habits of amount daily fluid intake.     Therapies include:   Received 4 L NS 1/28-1/31 1/21 urine output 5.6L, 2/1 5.6L, today 3.4.     2/2 labs:  serum osm 315  Urine osm 182  Urine sodium 69    Today with little free water intake (60cc intermittently with tube feeds and D20 drip). Being on ventilator and no access to free water, has high insensible losses. Polyuria may be some post-ATN diuresis but low urine osms 182 reflecting significant free water losses.    2) Chronic Kidney Disease stage IIIb/IV   No prior proteinuria, likely some tubulointerstitial disease from prior chronic  Lithium use and hx HTN. Followed with [facility]. Dr. Cedeno    3) Acute Kidney Injury, non-oliguric.     Possible some ATN with hypotension, vasopressor needs. GFR stable      4) Hypokalemia, related to high urine flow    5) anemia:     Getting aranesp, resumed 10/22. 100mcg given 12/19/22. Hx iron deficiency and prior IV iron.       Plan/Recs:  1) needs increase in free water replacement, just changed to 200cc q 4 hrs  (additional 840cc/day).  2) If difficult to replace free water, will start ddavp  3) avoidance of contrast, nephrotoxins as able  4) longer term, should address risks/benefits of resuming lithium use.       Kana Miller DO  [facility] - Nephrology  [phone]  --------------------------------------------------------------------------------------------  Reason for Consult     I was asked to see the patient for hypernatremia, possible diabetes insipidus    History is obtained from the patient and chart review.      History of Present Illness     Diana Santiago is a 72 year old female who presented 1/28 with attempted [id number] overdose. History of lithium use, taken off around 4/22 with concerns about being started on lasix therapy.     Past Medical History   I have reviewed this patient's medical history and updated it with pertinent information if needed.   Past Medical History:   Diagnosis Date     Benign essential hypertension 09/2018    added norvasc 9/18     Bipolar affective disorder (H)     hosp 1993, Dr. Aftab Deal     Cancer (H) 1996    DCIS, left breast     Chronic kidney disease, stage 3a (H)     seen by renal Dr. 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  Past Surgical History:   Procedure Laterality Date      BIOPSY  1994    left breast     BREAST SURGERY      lumpectomy x 4     COLONOSCOPY  2021     COLONOSCOPY N/A 6/17/2022    Procedure: COLONOSCOPY, WITH POLYPECTOMY AND BIOPSY;  Surgeon: Panchito Gu MD;  Location:  GI     EYE SURGERY  2018    retina tear     LAPAROTOMY EXPLORATORY N/A 8/24/2022    Procedure: Exploratory laparotomy, REPAIR OF PERFORATED ULCER;  Surgeon: Ron Vidal MD;  Location:  OR     left hand surgery      last 1974       Prior to Admission Medications   Prior to Admission Medications   Prescriptions Last Dose Informant Patient Reported? Taking?   ACE/ARB/ARNI NOT PRESCRIBED (INTENTIONAL)   No No   Sig: Please choose reason not prescribed from choices below.   amLODIPine (NORVASC) 5 MG tablet 1/28/2023  No Yes   Sig: Take 1 tablet (5 mg) by mouth daily   dextroamphetamine (DEXTROSTAT) 5 MG tablet 1/28/2023  Yes Yes   Sig: Take 5 mg by mouth daily   fexofenadine (ALLEGRA) 180 MG tablet Unknown  Yes Yes   Sig: Take 1 tablet by mouth daily.   haloperidol (HALDOL) 2 MG tablet 1/28/2023  Yes Yes   Sig: Take 2 mg by mouth At Bedtime   lamoTRIgine (LAMICTAL) 100 MG tablet 1/28/2023  Yes Yes   Sig: Take 200 mg by mouth daily   pantoprazole (PROTONIX) 40 MG EC tablet Unknown  No Yes   Sig: TAKE 1 TABLET BY MOUTH EVERY DAY   polyethylene glycol (MIRALAX) 17 GM/Dose powder PRN  No No   Sig: Take 17 g (1 capful) by mouth daily   sertraline (ZOLOFT) 25 MG tablet Unknown  Yes Yes   Sig: Take 25 mg by mouth daily   simvastatin (ZOCOR) 40 MG tablet Unknown  No Yes   Sig: TAKE 1 TABLET AT BEDTIME   sodium citrate-citric acid (BICITRA) 500-334 MG/5ML solution Unknown  No Yes   Sig: TAKE 15 MLS BY MOUTH 2 TIMES DAILY      Facility-Administered Medications: None     Allergies   Allergies   Allergen Reactions     No Known Allergies        Social History   I have reviewed this patient's social history and updated it with pertinent information if needed. Diana Santiago  reports that she has never  smoked. She has never used smokeless tobacco. She reports that she does not drink alcohol and does not use drugs.    Family History   I have reviewed this patient's family history and updated it with pertinent information if needed.   Family History   Problem Relation Age of Onset     Cerebrovascular Disease Mother      Hypertension Father      Sleep Apnea Brother      Breast Cancer Maternal Aunt         x 2     Breast Cancer Other         Maternal Aunt     Hyperlipidemia Niece        Review of Systems   The 10 point Review of Systems is negative other than noted in the HPI.     Physical Exam   Temp: 99.5  F (37.5  C) Temp src: Axillary BP: 111/74 Pulse: 90   Resp: 10 SpO2: 96 % O2 Device: Mechanical Ventilator    Vital Signs with Ranges  Temp:  [97.7  F (36.5  C)-101.2  F (38.4  C)] 99.5  F (37.5  C)  Pulse:  [] 90  Resp:  [10-41] 10  BP: ()/(51-74) 111/74  MAP:  [59 mmHg-83 mmHg] 70 mmHg  Arterial Line BP: ()/(42-60) 111/53  FiO2 (%):  [35 %-40 %] 35 %  SpO2:  [89 %-100 %] 96 %  135 lbs 12.85 oz    GENERAL: intubated  HEENT:  Normocephalic. No gross abnormalities.  CV: RRR, no murmurs, no clicks, gallops, or rubs, no edema  RESP: Clear bilaterally with good efforts  GI: Abdomen soft/nt/nd, BS normal. No masses, organomegaly  MUSCULOSKELETAL: extremities nl - no gross deformities noted  SKIN: no suspicious lesions or rashes, dry to touch  NEURO:  unable  PSYCH: unable    Data   BMP  Recent Labs   Lab 02/02/23  1024 02/02/23  0958 02/02/23  0933 02/02/23  0909 02/02/23  0818 02/02/23  0756 02/02/23  0448 02/02/23  0348 02/02/23  0210 02/02/23  0147 02/01/23  2032 02/01/23  1930 02/01/23  0739 02/01/23  0659   NA  --   --   --   --   --  148*  --  148*  --   --   --  148*  --  143   POTASSIUM  --   --   --   --   --  3.2*  --  3.3*  --  3.1*  --  3.2*  --  4.1   CHLORIDE  --   --   --   --   --  118*  --  115*  --   --   --  116*  --  114*   ISSA  --   --   --   --   --  9.6  --  9.5  --   --   --   9.3  --  8.8   CO2  --   --   --   --   --  22  --  22  --   --   --  22  --  21*   BUN  --   --   --   --   --  28.3*  --  28.0*  --   --   --  30.0*  --  30.8*   CR  --   --   --   --   --  2.56*  --  2.62*  --   --   --  2.66*  --  2.52*   * 119* 125* 126*   < > 147*   < > 146*   < >  --    < > 113*   < > 148*  158*    < > = values in this interval not displayed.     [email or web address](phos:4)  CBC)  Recent Labs   Lab 02/02/23  0348 02/01/23  1930 02/01/23  0501 01/31/23  1858   WBC 10.8 9.7 11.9* 11.8*   HGB 7.3* 7.1* 7.1* 7.7*   HCT 23.7* 24.0* 24.7* 25.7*   MCV 71* 73* 75* 73*    244 215 243     Recent Labs   Lab 01/31/23  0401   AST 21   ALT <5*   ALKPHOS 129*   BILITOTAL 0.5     No lab results found in last 7 days.  No results found for: D2VIT, D3VIT, DTOT  Recent Labs   Lab 02/02/23  0348   HGB 7.3*   HCT 23.7*   MCV 71*     No results for input(s): PTHI in the last 168 hours.

## 2023-02-02 NOTE — PROGRESS NOTES
Critical Care Note  2/2/23    Name: Diana Santiago MRN#: [id number]   Age: 72 year old YOB: 1950          Problem List:   Principal Problem:    Overdose, intentional self-harm, initial encounter (H)      Assessment and plan :     Diana Santiago IS a 72 year old female admitted on 1/28/2023 for hemodynamic management of intentional polypharmacy overdose of amlodipine, adderall, haldol, lamictal. Requiring pressors, started on high insulin gtt d/t CCB toxicity    Neurology/Psychiatry:   #Suicide attempt  #Intentional polypharmacy overdose  #acute encephalopathy  - mental health/psych when able  - precedex  - fent gtt weaning    Cardiovascular:   #distributive shock causing hypotension   #sinus bradycardia w/ junctional, resolved  #diastolic ventricular dysfunction, improved  #Mitral valve regurgitation, mod/severe  - Cardiac POCUS 1/30 w/ very dilated LA, RV, RA, IVC and hyperdynamic LV (EF ~90-95%), suggesting diastolic dysfunction  - ECHO 1/31 w/ hyperdynamic LV and severe MVR(new). Will monitor  - weaning NE, off high dose insulin gtt since 1/31    Pulmonary/Ventilator Management:   #Acute hypoxic and hypoxemic respiratory failure  #pulmonary edema 2/2 LV/MV dysfunction  - intubated 1/30 for hypoxic and hypoxemic respiratory failure  - weaning to extubate today    GI and Nutrition :   #severe protein-calorie malnutrition  - TFs at goal  - bowel reg    Renal/Fluids/Electrolytes:   #REI on CKDIII (baseline ~Cr 1.8)  #lactic acidosis, resolved  #Metabolic acidosis, resolved  #hypervolemia   - continuing to diurese as able, tolerated yesterday  - follow I/O's as appropriate    Infectious Disease:   - zosyn to stop today given no s/s of infectious process  - awaiting cultures, NGTD    Endocrine:   #hypoglycemia  - off high dose insulin gtt protocol  - continuing D5 infusion for hypoglycemia    Hematology/Oncology:   #Chronic anemia present on admission  - no acute issues, continue to monitor    LDA:   1.  Venous access - R subclavian CVC 1/31  2. Arterial access - R radial 1/31  3. Suresh 1/29  Plan  - Suresh and access required/necessary      ICU Prophylaxis:   1. DVT: heparin, mechanical  2. VAP: rinse + PPI + HOB>30  3. Stress Ulcer: Protonix  4. Restraints: yes, needed due to anesthesia  5. Wound care: local  6. Feeding - TFs at goal  7. Disposition: ICU    Mauricio Motley MD  Surgical Critical Care Fellow      Key goals for next 24 hours:   - gentle diuresis and weaning to extubation          Physical Examination:   Temp:  [97.7  F (36.5  C)-101.2  F (38.4  C)] 97.7  F (36.5  C)  Pulse:  [] 85  Resp:  [10-41] 26  BP: ()/(62-64) 89/62  MAP:  [59 mmHg-83 mmHg] 76 mmHg  Arterial Line BP: ()/(42-60) 124/57  FiO2 (%):  [35 %-50 %] 35 %  SpO2:  [89 %-100 %] 98 %    Intake/Output Summary (Last 24 hours) at 1/29/2023 1341  Last data filed at 1/29/2023 1200  Gross per 24 hour   Intake 1640.83 ml   Output 800 ml   Net 840.83 ml     Wt Readings from Last 4 Encounters:   02/02/23 61.6 kg (135 lb 12.9 oz)   01/24/23 54.4 kg (120 lb)   10/20/22 51.3 kg (113 lb)   10/18/22 51.3 kg (113 lb 3.2 oz)     Arterial Line BP: ()/(42-60) 124/57  MAP:  [59 mmHg-83 mmHg] 76 mmHg  BP - Mean:  [72-78] 72  Vent Mode: SIMV  (Synchronized Intermittent Mandatory Ventilation)  FiO2 (%): 35 %  Resp Rate (Set): 26 breaths/min  Tidal Volume (Set, mL): 420 mL  PEEP (cm H2O): 8 cmH2O  Pressure Support (cm H2O): 7 cmH2O  Inspiratory Pressure (cm H2O) (Drager Anita): 23  Resp: 26    Recent Labs   Lab 02/02/23  0352 02/01/23  1930 02/01/23  1003 02/01/23  0743   PH 7.36 7.36 7.23* 7.16*   PCO2 40 40 49* 58*   PO2 145* 140* 149* 99   HCO3 23 23 20* 21   O2PER 35 35 50 50       GEN: minimally opens eyes to voice.   NEURO: minimally follows commands, no focal deficits appreciated  PULM clear bilaterally  CV: NSR  ABD: soft, non-distended, not appreciably tender  EXT:  No appreciable edema, warm  SKIN: no obvious rashes         Data:    All data and imaging reviewed     ROUTINE ICU LABS (Last four results)  CMP  Recent Labs   Lab 02/02/23  0703 02/02/23  0645 02/02/23  0611 02/02/23  0538 02/02/23  0448 02/02/23  0348 02/02/23  0210 02/02/23  0147 02/01/23  2032 02/01/23  1930 02/01/23  0739 02/01/23  0659 02/01/23  0530 02/01/23  0501 02/01/23  0331 02/01/23  0300 02/01/23  0131 02/01/23  0059 01/31/23  2131 01/31/23  2100 01/31/23  0432 01/31/23  0401 01/30/23  0741 01/30/23  0545 01/29/23  1948 01/29/23  1831 01/29/23  1147 01/29/23  0630   NA  --   --   --   --   --  148*  --   --   --  148*  --  143  --  144  --  140  --  142   < > 142  142   < > 141   < > 140   < > 137   < > 135*  135*   POTASSIUM  --   --   --   --   --  3.3*  --  3.1*  --  3.2*  --  4.1  --  4.1  --  4.1  --  4.3   < > 4.4  4.4   < > 4.5   < > 5.3   < > 4.7   < > 4.8  4.7   CHLORIDE  --   --   --   --   --  115*  --   --   --  116*  --  114*  --  113*  --  111*  --  114*   < > 114*  114*   < > 110*   < > 109*   < > 105   < > 102  101   CO2  --   --   --   --   --  22  --   --   --  22  --  21*  --  20*  --  20*  --  20*   < > 20*  20*   < > 19*   < > 17*   < > 18*   < > 20*  21*   ANIONGAP  --   --   --   --   --  11  --   --   --  10  --  8  --  11  --  9  --  8   < > 8  8   < > 12   < > 14   < > 14   < > 13  13   * 150* 131* 119*   < > 146*   < >  --    < > 113*   < > 148*  158*   < > 148*   < > 171*   < > 212*  214*   < > 172*  172*   < > 164*   < > 169*   < > 143*   < > 126*  125*   BUN  --   --   --   --   --  28.0*  --   --   --  30.0*  --  30.8*  --  30.8*  --  31.1*  --  31.4*   < > 32.8*  32.8*   < > 42.7*   < > 49.0*   < > 51.7*   < > 62.9*  60.9*   CR  --   --   --   --   --  2.62*  --   --   --  2.66*  --  2.52*  --  2.51*  --  2.45*  --  2.48*   < > 2.49*  2.49*   < > 2.45*   < > 2.08*   < > 1.85*   < > 1.80*  1.79*   GFRESTIMATED  --   --   --   --   --  19*  --   --   --  18*  --  20*  --  20*  --  20*  --  20*   < > 20*  20*   < >  20*   < > 25*   < > 28*   < > 29*  30*   ISSA  --   --   --   --   --  9.5  --   --   --  9.3  --  8.8  --  8.8  --  8.7*  --  8.8   < > 9.0  9.0   < > 9.2   < > 10.2  10.2   < > 10.9*   < > 12.1*  12.1*   MAG  --   --   --   --   --  2.3  --   --   --   --   --   --   --  2.3  --   --   --  2.4*  --  2.1   < > 2.1   < > 2.2  --  2.0   < > 2.3   PHOS  --   --   --   --   --  2.0*  --   --   --  1.7*  --  3.1  --  3.2  --  3.1  --  3.3   < > 3.2  3.2   < > 2.2*   < > 4.1  --   --    < >  --    PROTTOTAL  --   --   --   --   --   --   --   --   --   --   --   --   --   --   --   --   --   --   --   --   --  4.4*  --  5.5*  --  5.8*  --  5.8*   ALBUMIN  --   --   --   --   --   --   --   --   --  2.3*  --  2.3*  --  2.4*  --  2.5*  --  2.5*   < > 2.6*   < > 2.5*  --  3.2*  --  3.3*  --  3.3*   BILITOTAL  --   --   --   --   --   --   --   --   --   --   --   --   --   --   --   --   --   --   --   --   --  0.5  --  0.5  --  0.3  --  0.3   ALKPHOS  --   --   --   --   --   --   --   --   --   --   --   --   --   --   --   --   --   --   --   --   --  129*  --  150*  --  144*  --  137*   AST  --   --   --   --   --   --   --   --   --   --   --   --   --   --   --   --   --   --   --   --   --  21  --  20  --  16  --  22   ALT  --   --   --   --   --   --   --   --   --   --   --   --   --   --   --   --   --   --   --   --   --  <5*  --  25  --  28  --  28    < > = values in this interval not displayed.     CBC  Recent Labs   Lab 02/02/23  0348 02/01/23  1930 02/01/23  0501 01/31/23  1858   WBC 10.8 9.7 11.9* 11.8*   RBC 3.36* 3.30* 3.31* 3.54*   HGB 7.3* 7.1* 7.1* 7.7*   HCT 23.7* 24.0* 24.7* 25.7*   MCV 71* 73* 75* 73*   MCH 21.7* 21.5* 21.5* 21.8*   MCHC 30.8* 29.6* 28.7* 30.0*   RDW 15.2* 15.2* 15.4* 15.0    244 215 243

## 2023-02-02 NOTE — PROVIDER NOTIFICATION
Discussed blood tinged sputum with dr. Quigley. Increasing blood tinged sputum via ETT and subglottic. Continuing to monitor per discussion.

## 2023-02-02 NOTE — PROGRESS NOTES
Patient was extubated successfully @ 1305 pm. Breath sound were clear bilaterally and pt is currently  on 4 LPM oxymask with SpO2 96%.    Will cont to monitor.    William Lei, RT

## 2023-02-02 NOTE — PROGRESS NOTES
Extubation Note    Successful completion of SBT: yes  Extubation time: 1305    Patient assessment:  Lung sounds: clear bilateraly  Stridor Present: no  Patient tolerance: tolerated well    Oxygen device:  Oxymask  Liter flow: 5 LPM    SpO2: 97%    Plan: RT will continue to assess and monitor.    William Lei, RT

## 2023-02-02 NOTE — PROGRESS NOTES
Clinic Care Coordination Contact    Situation: Patient chart reviewed by care coordinator.    Background: Pt was admitted to hospital on day of our scheduled meeting 2/1/23.  Pt will be transferred to a mental health unit when she is medically stable.      Assessment: N/A    Plan/Recommendations: Please consult Care Coordination once care plan is known.   Dolores Hernandez  [facility]  Clinic Care Coordinator  [facility]  [phone]  [email or web address]

## 2023-02-02 NOTE — PROGRESS NOTES
Neuro: follows commands but difficult to calm or redirect when agitated. Dex titrated to keep RASS goal 0 to -1, however, patient was agitated, fighting vent and attempting to pull ETT at 0.4 dex. Up to 0.6 with fentanyl at 50 mcg/hr due to patient pain levels.intermittently wakes up agitated on 0.6 of dex but will calm down and return to resting shortly after. Repositioning for comfort and skin protection    CV: sinus tach while awake to Sinus rhythm at rest. Occasional PVCs. titrating levo to keep MAP>65.    Resp: coarse lungs, blood tinged secretions ETT and Subglottic. toleraing vent. Occasional dyssynchrony.     : urine output remains 400-900, ckear pale yellow    GI small formed BM. Stool softener and laxative administered    Right wrist and Left wrist restraints continued 2/2/2023    Clinical Justification: Pulling lines, pulling tubes, and pulling equipment  Less Restrictive Alternative: 1:1 patient care, Repositioning, Re-evaluate equipment, Disguise equipment, Pain management, De-escalation, Reorientation  Attending Physician Notified: MD ordered restraint,     New orders placed No  Length of Order: 1 Day      Juana Musa RN

## 2023-02-03 ENCOUNTER — APPOINTMENT (OUTPATIENT)
Dept: ULTRASOUND IMAGING | Facility: CLINIC | Age: 73
DRG: 917 | End: 2023-02-03
Attending: INTERNAL MEDICINE
Payer: MEDICARE

## 2023-02-03 ENCOUNTER — APPOINTMENT (OUTPATIENT)
Dept: PHYSICAL THERAPY | Facility: CLINIC | Age: 73
DRG: 917 | End: 2023-02-03
Payer: MEDICARE

## 2023-02-03 LAB
ANION GAP SERPL CALCULATED.3IONS-SCNC: 5 MMOL/L (ref 7–15)
ANION GAP SERPL CALCULATED.3IONS-SCNC: 6 MMOL/L (ref 7–15)
ANION GAP SERPL CALCULATED.3IONS-SCNC: 7 MMOL/L (ref 7–15)
ANION GAP SERPL CALCULATED.3IONS-SCNC: 8 MMOL/L (ref 7–15)
ANION GAP SERPL CALCULATED.3IONS-SCNC: 8 MMOL/L (ref 7–15)
ANION GAP SERPL CALCULATED.3IONS-SCNC: 9 MMOL/L (ref 7–15)
BUN SERPL-MCNC: 25.3 MG/DL (ref 8–23)
BUN SERPL-MCNC: 26.1 MG/DL (ref 8–23)
BUN SERPL-MCNC: 31.8 MG/DL (ref 8–23)
BUN SERPL-MCNC: 34.8 MG/DL (ref 8–23)
BUN SERPL-MCNC: 35.8 MG/DL (ref 8–23)
BUN SERPL-MCNC: 36.7 MG/DL (ref 8–23)
CALCIUM SERPL-MCNC: 10.2 MG/DL (ref 8.8–10.2)
CALCIUM SERPL-MCNC: 10.3 MG/DL (ref 8.8–10.2)
CALCIUM SERPL-MCNC: 9.6 MG/DL (ref 8.8–10.2)
CALCIUM SERPL-MCNC: 9.7 MG/DL (ref 8.8–10.2)
CALCIUM SERPL-MCNC: 9.8 MG/DL (ref 8.8–10.2)
CALCIUM SERPL-MCNC: 9.9 MG/DL (ref 8.8–10.2)
CHLORIDE SERPL-SCNC: 112 MMOL/L (ref 98–107)
CHLORIDE SERPL-SCNC: 117 MMOL/L (ref 98–107)
CHLORIDE SERPL-SCNC: 118 MMOL/L (ref 98–107)
CHLORIDE SERPL-SCNC: 118 MMOL/L (ref 98–107)
CHLORIDE SERPL-SCNC: 119 MMOL/L (ref 98–107)
CHLORIDE SERPL-SCNC: 122 MMOL/L (ref 98–107)
CREAT SERPL-MCNC: 2.42 MG/DL (ref 0.51–0.95)
CREAT SERPL-MCNC: 2.49 MG/DL (ref 0.51–0.95)
CREAT SERPL-MCNC: 2.51 MG/DL (ref 0.51–0.95)
CREAT SERPL-MCNC: 2.51 MG/DL (ref 0.51–0.95)
CREAT SERPL-MCNC: 2.52 MG/DL (ref 0.51–0.95)
CREAT SERPL-MCNC: 2.52 MG/DL (ref 0.51–0.95)
DEPRECATED HCO3 PLAS-SCNC: 24 MMOL/L (ref 22–29)
DEPRECATED HCO3 PLAS-SCNC: 25 MMOL/L (ref 22–29)
DEPRECATED HCO3 PLAS-SCNC: 25 MMOL/L (ref 22–29)
DEPRECATED HCO3 PLAS-SCNC: 26 MMOL/L (ref 22–29)
ERYTHROCYTE [DISTWIDTH] IN BLOOD BY AUTOMATED COUNT: 15.5 % (ref 10–15)
GFR SERPL CREATININE-BSD FRML MDRD: 20 ML/MIN/1.73M2
GFR SERPL CREATININE-BSD FRML MDRD: 21 ML/MIN/1.73M2
GLUCOSE BLDC GLUCOMTR-MCNC: 100 MG/DL (ref 70–99)
GLUCOSE BLDC GLUCOMTR-MCNC: 106 MG/DL (ref 70–99)
GLUCOSE BLDC GLUCOMTR-MCNC: 112 MG/DL (ref 70–99)
GLUCOSE BLDC GLUCOMTR-MCNC: 117 MG/DL (ref 70–99)
GLUCOSE BLDC GLUCOMTR-MCNC: 120 MG/DL (ref 70–99)
GLUCOSE BLDC GLUCOMTR-MCNC: 120 MG/DL (ref 70–99)
GLUCOSE BLDC GLUCOMTR-MCNC: 124 MG/DL (ref 70–99)
GLUCOSE BLDC GLUCOMTR-MCNC: 126 MG/DL (ref 70–99)
GLUCOSE BLDC GLUCOMTR-MCNC: 127 MG/DL (ref 70–99)
GLUCOSE BLDC GLUCOMTR-MCNC: 130 MG/DL (ref 70–99)
GLUCOSE BLDC GLUCOMTR-MCNC: 133 MG/DL (ref 70–99)
GLUCOSE BLDC GLUCOMTR-MCNC: 147 MG/DL (ref 70–99)
GLUCOSE BLDC GLUCOMTR-MCNC: 160 MG/DL (ref 70–99)
GLUCOSE SERPL-MCNC: 107 MG/DL (ref 70–99)
GLUCOSE SERPL-MCNC: 112 MG/DL (ref 70–99)
GLUCOSE SERPL-MCNC: 113 MG/DL (ref 70–99)
GLUCOSE SERPL-MCNC: 121 MG/DL (ref 70–99)
GLUCOSE SERPL-MCNC: 121 MG/DL (ref 70–99)
GLUCOSE SERPL-MCNC: 179 MG/DL (ref 70–99)
HCT VFR BLD AUTO: 24.5 % (ref 35–47)
HGB BLD-MCNC: 7.2 G/DL (ref 11.7–15.7)
MAGNESIUM SERPL-MCNC: 2.3 MG/DL (ref 1.7–2.3)
MCH RBC QN AUTO: 21.5 PG (ref 26.5–33)
MCHC RBC AUTO-ENTMCNC: 29.4 G/DL (ref 31.5–36.5)
MCV RBC AUTO: 73 FL (ref 78–100)
PHOSPHATE SERPL-MCNC: 3.2 MG/DL (ref 2.5–4.5)
PLATELET # BLD AUTO: 270 10E3/UL (ref 150–450)
POTASSIUM SERPL-SCNC: 4 MMOL/L (ref 3.4–5.3)
POTASSIUM SERPL-SCNC: 4.3 MMOL/L (ref 3.4–5.3)
POTASSIUM SERPL-SCNC: 4.8 MMOL/L (ref 3.4–5.3)
POTASSIUM SERPL-SCNC: 4.9 MMOL/L (ref 3.4–5.3)
POTASSIUM SERPL-SCNC: 5 MMOL/L (ref 3.4–5.3)
POTASSIUM SERPL-SCNC: 5 MMOL/L (ref 3.4–5.3)
RBC # BLD AUTO: 3.35 10E6/UL (ref 3.8–5.2)
SODIUM SERPL-SCNC: 145 MMOL/L (ref 136–145)
SODIUM SERPL-SCNC: 149 MMOL/L (ref 136–145)
SODIUM SERPL-SCNC: 150 MMOL/L (ref 136–145)
SODIUM SERPL-SCNC: 151 MMOL/L (ref 136–145)
SODIUM SERPL-SCNC: 152 MMOL/L (ref 136–145)
SODIUM SERPL-SCNC: 154 MMOL/L (ref 136–145)
WBC # BLD AUTO: 9.1 10E3/UL (ref 4–11)

## 2023-02-03 PROCEDURE — 258N000003 HC RX IP 258 OP 636: Performed by: INTERNAL MEDICINE

## 2023-02-03 PROCEDURE — 258N000001 HC RX 258: Performed by: STUDENT IN AN ORGANIZED HEALTH CARE EDUCATION/TRAINING PROGRAM

## 2023-02-03 PROCEDURE — 93971 EXTREMITY STUDY: CPT | Mod: RT

## 2023-02-03 PROCEDURE — 250N000013 HC RX MED GY IP 250 OP 250 PS 637: Performed by: ANESTHESIOLOGY

## 2023-02-03 PROCEDURE — 250N000011 HC RX IP 250 OP 636: Performed by: STUDENT IN AN ORGANIZED HEALTH CARE EDUCATION/TRAINING PROGRAM

## 2023-02-03 PROCEDURE — 97162 PT EVAL MOD COMPLEX 30 MIN: CPT | Mod: GP

## 2023-02-03 PROCEDURE — 999N000157 HC STATISTIC RCP TIME EA 10 MIN

## 2023-02-03 PROCEDURE — 250N000013 HC RX MED GY IP 250 OP 250 PS 637: Performed by: STUDENT IN AN ORGANIZED HEALTH CARE EDUCATION/TRAINING PROGRAM

## 2023-02-03 PROCEDURE — 250N000009 HC RX 250: Performed by: STUDENT IN AN ORGANIZED HEALTH CARE EDUCATION/TRAINING PROGRAM

## 2023-02-03 PROCEDURE — 84100 ASSAY OF PHOSPHORUS: CPT | Performed by: INTERNAL MEDICINE

## 2023-02-03 PROCEDURE — 200N000001 HC R&B ICU

## 2023-02-03 PROCEDURE — 258N000003 HC RX IP 258 OP 636: Performed by: STUDENT IN AN ORGANIZED HEALTH CARE EDUCATION/TRAINING PROGRAM

## 2023-02-03 PROCEDURE — 80048 BASIC METABOLIC PNL TOTAL CA: CPT | Performed by: INTERNAL MEDICINE

## 2023-02-03 PROCEDURE — 250N000011 HC RX IP 250 OP 636: Performed by: ANESTHESIOLOGY

## 2023-02-03 PROCEDURE — C9113 INJ PANTOPRAZOLE SODIUM, VIA: HCPCS | Performed by: ANESTHESIOLOGY

## 2023-02-03 PROCEDURE — 99291 CRITICAL CARE FIRST HOUR: CPT | Mod: GC | Performed by: INTERNAL MEDICINE

## 2023-02-03 PROCEDURE — 83735 ASSAY OF MAGNESIUM: CPT | Performed by: INTERNAL MEDICINE

## 2023-02-03 PROCEDURE — 85027 COMPLETE CBC AUTOMATED: CPT | Performed by: INTERNAL MEDICINE

## 2023-02-03 PROCEDURE — 97530 THERAPEUTIC ACTIVITIES: CPT | Mod: GP

## 2023-02-03 PROCEDURE — 82310 ASSAY OF CALCIUM: CPT | Performed by: INTERNAL MEDICINE

## 2023-02-03 RX ORDER — DEXTROSE MONOHYDRATE 50 MG/ML
INJECTION, SOLUTION INTRAVENOUS CONTINUOUS
Status: DISCONTINUED | OUTPATIENT
Start: 2023-02-03 | End: 2023-02-05

## 2023-02-03 RX ORDER — DEXTROSE MONOHYDRATE 100 MG/ML
INJECTION, SOLUTION INTRAVENOUS CONTINUOUS
Status: DISCONTINUED | OUTPATIENT
Start: 2023-02-03 | End: 2023-02-05

## 2023-02-03 RX ADMIN — CHLORHEXIDINE GLUCONATE 15 ML: 1.2 SOLUTION ORAL at 08:29

## 2023-02-03 RX ADMIN — WATER: 1000 INJECTION, SOLUTION INTRAVENOUS at 01:35

## 2023-02-03 RX ADMIN — HEPARIN SODIUM 5000 UNITS: 5000 INJECTION, SOLUTION INTRAVENOUS; SUBCUTANEOUS at 19:45

## 2023-02-03 RX ADMIN — SODIUM CHLORIDE: 9 INJECTION, SOLUTION INTRAVENOUS at 08:30

## 2023-02-03 RX ADMIN — HYDROMORPHONE HYDROCHLORIDE 0.2 MG: 0.2 INJECTION, SOLUTION INTRAMUSCULAR; INTRAVENOUS; SUBCUTANEOUS at 04:08

## 2023-02-03 RX ADMIN — ACETAMINOPHEN 650 MG: 325 SUSPENSION ORAL at 21:23

## 2023-02-03 RX ADMIN — HEPARIN SODIUM 5000 UNITS: 5000 INJECTION, SOLUTION INTRAVENOUS; SUBCUTANEOUS at 04:02

## 2023-02-03 RX ADMIN — MULTIVITAMIN 15 ML: LIQUID ORAL at 08:29

## 2023-02-03 RX ADMIN — HEPARIN SODIUM 5000 UNITS: 5000 INJECTION, SOLUTION INTRAVENOUS; SUBCUTANEOUS at 12:40

## 2023-02-03 RX ADMIN — PANTOPRAZOLE SODIUM 40 MG: 40 INJECTION, POWDER, FOR SOLUTION INTRAVENOUS at 08:30

## 2023-02-03 RX ADMIN — POLYETHYLENE GLYCOL 3350 17 G: 17 POWDER, FOR SOLUTION ORAL at 08:30

## 2023-02-03 RX ADMIN — DEXTROSE MONOHYDRATE: 50 INJECTION, SOLUTION INTRAVENOUS at 18:34

## 2023-02-03 RX ADMIN — ACETAMINOPHEN 650 MG: 325 SUSPENSION ORAL at 06:46

## 2023-02-03 ASSESSMENT — ACTIVITIES OF DAILY LIVING (ADL)
ADLS_ACUITY_SCORE: 51
ADLS_ACUITY_SCORE: 53
ADLS_ACUITY_SCORE: 55
ADLS_ACUITY_SCORE: 51
ADLS_ACUITY_SCORE: 53
ADLS_ACUITY_SCORE: 51
ADLS_ACUITY_SCORE: 51
ADLS_ACUITY_SCORE: 53
ADLS_ACUITY_SCORE: 51
ADLS_ACUITY_SCORE: 53

## 2023-02-03 NOTE — PROGRESS NOTES
CLINICAL NUTRITION SERVICES - REASSESSMENT NOTE      Recommendations Ordered by Registered Dietitian (RD):   Change TF now to Osmolite 1.5 at 35 mL/hr; after 12 hrs increase to goal 45 mL/hr = 1620 kcals (28 kcal/kg), 68 gm pro (1.2 gm/kg), 220 gm CHO, 823 mL H20, no fiber.    Will cancel Liquid MVI-M as will no longer be needed   Malnutrition: (1/31)   % Weight Loss:  None noted  % Intake:  </= 50% for >/= 5 days (severe malnutrition) (will meet 2/1)  Subcutaneous Fat Loss:  None observed  Muscle Loss:  None observed  Fluid Retention:  Mild but likely not nutritionally significant      Malnutrition Diagnosis: Patient does not meet two of the above criteria necessary for diagnosing malnutrition       EVALUATION OF PROGRESS TOWARD GOALS   Diet:  NPO on vent    Nutrition Support: TF was started on 1/31 at 10 mL/hr and increased on 2/1 as below:    Nutrition Support Enteral:  Type of Feeding Tube: NG  Enteral Frequency:  Continuous  Enteral Regimen: Vital HP at 35 mL/hr  Total Enteral Provisions: 840 kcal (14 kcal/kg and 57% energy needs), 73 g protein (1.2 g/kg), 93 g CHO, 0 g fiber, 702 mL H2O  Free Water Flush: 200 mL every 2 hours  Liquid MVI-M via FT    The dextrose containing IVF's are currently off.  IVF D10 to run 1-100 mL/hr depending on BG levels to keep BG > 90.     Intake/Tolerance:    Stool x2 yesterday and x3 thus far today. Pt on MIralax and Senokot for bowel program.  Na 149 (H), BUN 26 (H), Cr 2.5 (H), K 4.3 (NL), Mg 2.3 (NL), Phos 3.2 (NL) - Pt with CKD-3  BGM: 106, 130 - acceptable.  I/O: 4369/4800. Wt: 61.6 kg (up 3.1 kg since admit).      ASSESSED NUTRITION NEEDS PER APPROVED PRACTICE GUIDELINES: (Modified protein range lower)     Dosing Weight:  58.5 kg (1/30)  Estimated Energy Needs: 2029-9883 kcals (25-30 Kcal/Kg)  Justification: maintenance  Estimated Protein Needs: 58-76 grams protein (1-1.3 g pro/Kg)  Justification: hypercatabolism with critical illness and CKD    NEW FINDINGS:    1/30:  Intubated   1/30:  AXR = FT coiled in the left abdomen presumably in the stomach. NG tube tip in the left abdomen in similar position.  1/31:  Begin TF via NG    Previous Goals (1/31):   TF + D50 IVF will meet needs within the next 3-4 days   Evaluation: Not met    Previous Nutrition Diagnosis (1/31):   Inadequate protein-energy intake related to NPO with plans to start TF today as evidenced by meeting 0% protein and 50% energy needs from D50 IVF   Evaluation: Improving      CURRENT NUTRITION DIAGNOSIS  Inadequate energy intake related to hypocaloric TF running at low rate and dextrose containing IVF currently off as evidenced by TF meeting 57% energy needs.    INTERVENTIONS  Recommendations / Nutrition Prescription  Change TF now to Osmolite 1.5 at 35 mL/hr; after 12 hrs increase to goal 45 mL/hr = 1620 kcals (28 kcal/kg), 68 gm pro (1.2 gm/kg), 220 gm CHO, 823 mL H20, no fiber.    Will cancel Liquid MVI-M as will no longer be needed    Implementation  Collaboration and Referral of Nutrition care - Pt was discussed during ICU interdisciplinary rounds this morning.  EN Composition, EN Schedule, and Multivitamin/Mineral - Modified TF orders in Epic as above.    Goals  TF Osmolite 1.5 at 45 mL/hr will meet % estimated needs.    MONITORING AND EVALUATION:  Progress towards goals will be monitored and evaluated per protocol and Practice Guidelines    Kathie Torres, RD, LD, CNSC

## 2023-02-03 NOTE — PROGRESS NOTES
Brief Nephrology note:  Chart reviewed.  Remains polyuric, 5.1L urine output 2/2, 2.3L so far today.   GFR stable  Sodium 149, free water replacement approriately doubled this morning. Continue serial labs. Extubated and once able to be cleared to have PO intake, she should be able drink to her thirst and correct hypernatremia on own.   No new input from nephrology today,  Kana Miller, DO

## 2023-02-03 NOTE — PLAN OF CARE
Oxygen: BiPAP 45% FiO2  Drips: D50 20mL/h, (Precedex now off)  Events: Assessed bedside by MD Dann for questions about R side edema as well as arterial line function. Arterial line removed per order, US ordered, glucose checks liberalized to Q2H.  Pain managed with PRN dilaudid  Afebrile for shift  Large liquid BM x1  Pt slept between care, follows commands  Please refer to flow sheets for detailed assessment

## 2023-02-03 NOTE — PROGRESS NOTES
Critical Care Note  2/3/23    Name: Diana Santiago MRN#: [id number]   Age: 72 year old YOB: 1950          Problem List:   Principal Problem:    Overdose, intentional self-harm, initial encounter (H)      Assessment and plan :     Diana Santiago IS a 72 year old female admitted on 1/28/2023 for hemodynamic management of intentional polypharmacy overdose of amlodipine, adderall, haldol, lamictal. Requiring pressors, started on high insulin gtt d/t CCB toxicity    Neurology/Psychiatry:   #Suicide attempt  #Intentional polypharmacy overdose  #acute encephalopathy  - mental health/psych when able  - precedex weaning    Cardiovascular:   #distributive shock causing hypotension   #sinus bradycardia w/ junctional, resolved  #diastolic ventricular dysfunction, improved  #Mitral valve regurgitation, mod/severe  - Cardiac POCUS 1/30 w/ very dilated LA, RV, RA, IVC and hyperdynamic LV (EF ~90-95%), suggesting diastolic dysfunction  - ECHO 1/31 w/ hyperdynamic LV and severe MVR(new). 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 Venous access - R subclavian CVC 1/31  2. Arterial access - R radial 1/31  3. Suresh 1/29  Plan  - Suresh and access required/necessary      ICU Prophylaxis:   1. DVT: heparin, mechanical  2. HOB>30  3. Stress Ulcer: Protonix  4. Restraints: yes, needed due to BiPAP  5. Wound care: local  6. Feeding - TFs at goal  7. Disposition: ICU    Mauricio Motley MD  Surgical Critical Care Fellow      Key goals for next 24 hours:   - increase FWF          Physical Examination:   Temp:  [98.8  F (37.1  C)-100.5  F (38.1  C)] 100.5  F (38.1  C)  Pulse:  [] 98  Resp:  [10-35] 24  BP: ()/(51-87) 106/75  MAP:  [59 mmHg-115 mmHg] 94 mmHg  Arterial Line BP: ()/(44-79) 153/69  FiO2 (%):  [35 %-45 %] 45 %  SpO2:  [91 %-100 %] 98 %    Intake/Output Summary (Last 24 hours) at 1/29/2023 1341  Last data filed at 1/29/2023 1200  Gross per 24 hour   Intake 1640.83 ml   Output 800 ml   Net 840.83 ml     Wt Readings from Last 4 Encounters:   02/02/23 61.6 kg (135 lb 12.9 oz)   01/24/23 54.4 kg (120 lb)   10/20/22 51.3 kg (113 lb)   10/18/22 51.3 kg (113 lb 3.2 oz)     Arterial Line BP: ()/(44-79) 153/69  MAP:  [59 mmHg-115 mmHg] 94 mmHg  BP - Mean:  [] 90  Vent Mode: CPAP/PS  (Continuous positive airway pressure with Pressure Support)  FiO2 (%): 45 %  Resp Rate (Set): 26 breaths/min  Tidal Volume (Set, mL): 420 mL  PEEP (cm H2O): 8 cmH2O  Pressure Support (cm H2O): 7 cmH2O  Resp: 24    Recent Labs   Lab 02/02/23  0352 02/01/23  1930 02/01/23  1003 02/01/23  0743   PH 7.36 7.36 7.23* 7.16*   PCO2 40 40 49* 58*   PO2 145* 140* 149* 99   HCO3 23 23 20* 21   O2PER 35 35 50 50       GEN: minimally opens eyes to voice, answers questions appropriately  NEURO: follows commands, no focal deficits appreciated  PULM clear bilaterally on BiPAP, mildly labored breathing, unchanged  CV: NSR  ABD: soft, non-distended, not appreciably tender  EXT:  No appreciable  edema, warm. RUE with some edema, warm  SKIN: no obvious rashes         Data:   All data and imaging reviewed     ROUTINE ICU LABS (Last four results)  CMP  Recent Labs   Lab 02/03/23  0639 02/03/23  0518 02/03/23  0517 02/03/23  0405 02/03/23  0218 02/03/23  0217 02/02/23  2211 02/02/23  2208 02/02/23  1758 02/02/23  1754 02/02/23  1048 02/02/23  1025 02/02/23  0448 02/02/23  0348 02/01/23  2032 02/01/23  1930 02/01/23  0739 02/01/23  0659 02/01/23  0530 02/01/23  0501 02/01/23  0331 02/01/23  0300 02/01/23  0131 02/01/23  0059 01/31/23  0432 01/31/23  0401 01/30/23  0741 01/30/23  0545 01/29/23  1948 01/29/23  1831 01/29/23  1147 01/29/23  0630   NA  --  149*  --   --   --  145  --  144  --  146*  --  147*   < > 148*  --  148*  --  143  --  144  --  140  --  142   < > 141   < > 140   < > 137   < > 135*  135*   POTASSIUM  --  4.3  --   --   --  4.0  --  4.0  --  3.4  --  3.1*   < > 3.3*   < > 3.2*  --  4.1  --  4.1  --  4.1  --  4.3   < > 4.5   < > 5.3   < > 4.7   < > 4.8  4.7   CHLORIDE  --  117*  --   --   --  112*  --  112*  --  114*  --  116*   < > 115*  --  116*  --  114*  --  113*  --  111*  --  114*   < > 110*   < > 109*   < > 105   < > 102  101   CO2  --  26  --   --   --  25  --  23  --  23  --  23   < > 22  --  22  --  21*  --  20*  --  20*  --  20*   < > 19*   < > 17*   < > 18*   < > 20*  21*   ANIONGAP  --  6*  --   --   --  8  --  9  --  9  --  8   < > 11  --  10  --  8  --  11  --  9  --  8   < > 12   < > 14   < > 14   < > 13  13   * 112* 112* 120*   < > 121*   < > 152*   < > 141*   < > 114*   < > 146*   < > 113*   < > 148*  158*   < > 148*   < > 171*   < > 212*  214*   < > 164*   < > 169*   < > 143*   < > 126*  125*   BUN  --  26.1*  --   --   --  25.3*  --  26.1*  --  25.9*  --  27.5*   < > 28.0*  --  30.0*  --  30.8*  --  30.8*  --  31.1*  --  31.4*   < > 42.7*   < > 49.0*   < > 51.7*   < > 62.9*  60.9*   CR  --  2.51*  --   --   --  2.52*  --  2.53*  --  2.54*  --  2.56*   < >  2.62*  --  2.66*  --  2.52*  --  2.51*  --  2.45*  --  2.48*   < > 2.45*   < > 2.08*   < > 1.85*   < > 1.80*  1.79*   GFRESTIMATED  --  20*  --   --   --  20*  --  20*  --  19*  --  19*   < > 19*  --  18*  --  20*  --  20*  --  20*  --  20*   < > 20*   < > 25*   < > 28*   < > 29*  30*   ISSA  --  9.6  --   --   --  9.8  --  9.7  --  9.4  --  9.6   < > 9.5  --  9.3  --  8.8  --  8.8  --  8.7*  --  8.8   < > 9.2   < > 10.2  10.2   < > 10.9*   < > 12.1*  12.1*   MAG  --   --   --   --   --  2.3  --   --   --   --   --   --   --  2.3  --   --   --   --   --  2.3  --   --   --  2.4*   < > 2.1   < > 2.2  --  2.0   < > 2.3   PHOS  --   --   --   --   --  3.2  --   --   --   --   --  2.5  --  2.0*  --  1.7*  --  3.1  --  3.2  --  3.1  --  3.3   < > 2.2*   < > 4.1  --   --    < >  --    PROTTOTAL  --   --   --   --   --   --   --   --   --   --   --   --   --   --   --   --   --   --   --   --   --   --   --   --   --  4.4*  --  5.5*  --  5.8*  --  5.8*   ALBUMIN  --   --   --   --   --   --   --   --   --   --   --   --   --   --   --  2.3*  --  2.3*  --  2.4*  --  2.5*  --  2.5*   < > 2.5*  --  3.2*  --  3.3*  --  3.3*   BILITOTAL  --   --   --   --   --   --   --   --   --   --   --   --   --   --   --   --   --   --   --   --   --   --   --   --   --  0.5  --  0.5  --  0.3  --  0.3   ALKPHOS  --   --   --   --   --   --   --   --   --   --   --   --   --   --   --   --   --   --   --   --   --   --   --   --   --  129*  --  150*  --  144*  --  137*   AST  --   --   --   --   --   --   --   --   --   --   --   --   --   --   --   --   --   --   --   --   --   --   --   --   --  21  --  20  --  16  --  22   ALT  --   --   --   --   --   --   --   --   --   --   --   --   --   --   --   --   --   --   --   --   --   --   --   --   --  <5*  --  25  --  28  --  28    < > = values in this interval not displayed.     CBC  Recent Labs   Lab 02/03/23  0518 02/02/23  0348 02/01/23  1930 02/01/23  0501   WBC 9.1 10.8 9.7 11.9*    RBC 3.35* 3.36* 3.30* 3.31*   HGB 7.2* 7.3* 7.1* 7.1*   HCT 24.5* 23.7* 24.0* 24.7*   MCV 73* 71* 73* 75*   MCH 21.5* 21.7* 21.5* 21.5*   MCHC 29.4* 30.8* 29.6* 28.7*   RDW 15.5* 15.2* 15.2* 15.4*    245 244 215

## 2023-02-03 NOTE — PROGRESS NOTES
"   02/03/23 1044   Appointment Info   Signing Clinician's Name / Credentials (PT) Brittany Dressler, DPRAHEEM   Living Environment   Living Environment Comments Unable to discern wtih pt wearning BiPAP, TBD. Following care transition notes.   Self-Care   Current Activity Tolerance poor   Activity/Exercise/Self-Care Comment Pt reports \"no\" when asked if she stands (twice), but then states she stands wtih a walker to get between surfaces. Chart indicates vishal-walker. L wrist wtih multiple surgical scars, mm wasting from disuse - pt reports L hand non-functional.   General Information   Onset of Illness/Injury or Date of Surgery 01/30/23   Referring Physician Mauricio Motley MD   Patient/Family Therapy Goals Statement (PT) Pt unable to state (BiPAP).   Pertinent History of Current Problem (include personal factors and/or comorbidities that impact the POC) OD suicidal attempt, encephalopathy, shock with hypotension, mitral regurg, resp failure - intub with intermittent BiPAP use (FIO2 40%), pulm edema, REI on CKD3, anemia, malnutrition, deconditioning.   Existing Precautions/Restrictions fall;oxygen therapy device and L/min   General Observations Caution for L wrist and hand, fragile.   Cognition   Follows Commands (Cognition) follows one-step commands   Pain Assessment   Patient Currently in Pain No   Posture    Posture Comments Impaired controlled mobility.   Range of Motion (ROM)   ROM Comment R gastrocs at most neutlra DF with knee ext, WFL L.   Strength (Manual Muscle Testing)   Strength Comments Knee ext MMT 4+B.   Bed Mobility   Comment, (Bed Mobility) Pt unable to sit up with prompting given weakness, see rx for intervention.   Transfers   Comment, (Transfers) See rx.   Gait/Stairs (Locomotion)   Distance in Feet Non-amb.   Balance   Balance Comments Isa sit balance posterior leaning.   Coordination   Coordination Comments WFL B LEs, impaired L UE.   Muscle Tone   Muscle Tone Comments WFL B LEs, impaired L UE. "   Clinical Impression   Criteria for Skilled Therapeutic Intervention Yes, treatment indicated   PT Diagnosis (PT) Impaired mobility   Influenced by the following impairments Impaired strength, balance, activity tolerance, ROM, tone.   Functional limitations due to impairments Impaired mobility & living   Clinical Presentation (PT Evaluation Complexity) Evolving/Changing   Clinical Presentation Rationale Prof discretion   Clinical Decision Making (Complexity) moderate complexity   Planned Therapy Interventions (PT) balance training;bed mobility training;gait training;home exercise program;motor coordination training;neuromuscular re-education;patient/family education;postural re-education;ROM (range of motion);stair training;strengthening;stretching;transfer training;progressive activity/exercise;risk factor education;home program guidelines   Anticipated Equipment Needs at Discharge (PT) gait belt;hospital bed;walker, vishal  (belkis stedy)   Risk & Benefits of therapy have been explained evaluation/treatment results reviewed;care plan/treatment goals reviewed;risks/benefits reviewed;current/potential barriers reviewed;participants included;patient   PT Total Evaluation Time   PT Eval, Moderate Complexity Minutes (24568) 5   Physical Therapy Goals   PT Frequency 5x/week   PT Predicted Duration/Target Date for Goal Attainment 02/17/23   PT Goals Bed Mobility;Transfers;Gait;PT Goal 1   PT: Bed Mobility Supervision/stand-by assist;Supine to/from sit;Rolling   PT: Transfers Supervision/stand-by assist;Sit to/from stand;Bed to/from chair;Assistive device   PT: Gait Supervision/stand-by assist;10 feet;Assistive device   PT: Goal 1 SBA EOB sit balance without UE assist.   Interventions   Interventions Quick Adds Therapeutic Activity;Neuromuscular Re-ed;Therapeutic Procedure   Therapeutic Procedure/Exercise   Treatment Detail/Skilled Intervention Seated LAQs   Therapeutic Activity   Therapeutic Activities: dynamic activities  to improve functional performance Minutes (20404) 30   Symptoms Noted During/After Treatment Fatigue   Treatment Detail/Skilled Intervention Pt agreeable to PT: MaxAx2 supine-sit with repo sheet, pt helping stabilize, trying to reach wtih R UE, passive LE management and heavy power/balance assist. Sit balance per below. Vitals stable. MinAx2 stand B HHA, truncal stab and knee spotting; pt soiled with RN clean-up during MinAx2 3min stand with L knee buckling Shayna with pt fatigued, Shayna stand-sit. TotalAx2 sit-supine (close to EOB), scoot, repo. Finished in bed alarm armed.   Neuromuscular Re-education   Treatment Detail/Skilled Intervention Seated balance Shayna iniitally weaning to R HHA then CGA wtih technique cues/faciltiation.   PT Discharge Planning   PT Plan PT: pre-gait in Hortensia Stedy vs amb (PT in front for L knee stab, R HHA, truncal stab with close WC follow, vs rail)   PT Discharge Recommendation (DC Rec) Transitional Care Facility  (Planning IP psych)   PT Rationale for DC Rec Pt below pivot with AD baseline (unclear level of assist, support, living env). Pt current needing Hortensia Stedy.   PT Brief overview of current status MaxAx2 bed mob, Shayna sit balance, MinAx2 stand 3min for clean-up.   Total Session Time   Timed Code Treatment Minutes 30   Total Session Time (sum of timed and untimed services) 35

## 2023-02-04 ENCOUNTER — APPOINTMENT (OUTPATIENT)
Dept: SPEECH THERAPY | Facility: CLINIC | Age: 73
DRG: 917 | End: 2023-02-04
Attending: SURGERY
Payer: MEDICARE

## 2023-02-04 ENCOUNTER — APPOINTMENT (OUTPATIENT)
Dept: OCCUPATIONAL THERAPY | Facility: CLINIC | Age: 73
DRG: 917 | End: 2023-02-04
Payer: MEDICARE

## 2023-02-04 LAB
ALBUMIN SERPL BCG-MCNC: 2.6 G/DL (ref 3.5–5.2)
ANION GAP SERPL CALCULATED.3IONS-SCNC: 12 MMOL/L (ref 7–15)
ANION GAP SERPL CALCULATED.3IONS-SCNC: 7 MMOL/L (ref 7–15)
ANION GAP SERPL CALCULATED.3IONS-SCNC: 8 MMOL/L (ref 7–15)
BACTERIA BLD CULT: NO GROWTH
BACTERIA BLD CULT: NO GROWTH
BUN SERPL-MCNC: 36.1 MG/DL (ref 8–23)
BUN SERPL-MCNC: 36.4 MG/DL (ref 8–23)
BUN SERPL-MCNC: 36.4 MG/DL (ref 8–23)
BUN SERPL-MCNC: 36.6 MG/DL (ref 8–23)
BUN SERPL-MCNC: 36.7 MG/DL (ref 8–23)
BUN SERPL-MCNC: 39 MG/DL (ref 8–23)
BUN SERPL-MCNC: 39 MG/DL (ref 8–23)
CALCIUM SERPL-MCNC: 9 MG/DL (ref 8.8–10.2)
CALCIUM SERPL-MCNC: 9.1 MG/DL (ref 8.8–10.2)
CALCIUM SERPL-MCNC: 9.2 MG/DL (ref 8.8–10.2)
CALCIUM SERPL-MCNC: 9.6 MG/DL (ref 8.8–10.2)
CALCIUM SERPL-MCNC: 9.7 MG/DL (ref 8.8–10.2)
CALCIUM SERPL-MCNC: 9.8 MG/DL (ref 8.8–10.2)
CALCIUM SERPL-MCNC: 9.8 MG/DL (ref 8.8–10.2)
CHLORIDE SERPL-SCNC: 104 MMOL/L (ref 98–107)
CHLORIDE SERPL-SCNC: 106 MMOL/L (ref 98–107)
CHLORIDE SERPL-SCNC: 107 MMOL/L (ref 98–107)
CHLORIDE SERPL-SCNC: 114 MMOL/L (ref 98–107)
CHLORIDE SERPL-SCNC: 115 MMOL/L (ref 98–107)
CHLORIDE SERPL-SCNC: 115 MMOL/L (ref 98–107)
CHLORIDE SERPL-SCNC: 117 MMOL/L (ref 98–107)
CREAT SERPL-MCNC: 2.29 MG/DL (ref 0.51–0.95)
CREAT SERPL-MCNC: 2.29 MG/DL (ref 0.51–0.95)
CREAT SERPL-MCNC: 2.3 MG/DL (ref 0.51–0.95)
CREAT SERPL-MCNC: 2.35 MG/DL (ref 0.51–0.95)
CREAT SERPL-MCNC: 2.35 MG/DL (ref 0.51–0.95)
CREAT SERPL-MCNC: 2.37 MG/DL (ref 0.51–0.95)
CREAT SERPL-MCNC: 2.38 MG/DL (ref 0.51–0.95)
DEPRECATED HCO3 PLAS-SCNC: 21 MMOL/L (ref 22–29)
DEPRECATED HCO3 PLAS-SCNC: 23 MMOL/L (ref 22–29)
DEPRECATED HCO3 PLAS-SCNC: 23 MMOL/L (ref 22–29)
DEPRECATED HCO3 PLAS-SCNC: 25 MMOL/L (ref 22–29)
DEPRECATED HCO3 PLAS-SCNC: 26 MMOL/L (ref 22–29)
ERYTHROCYTE [DISTWIDTH] IN BLOOD BY AUTOMATED COUNT: 14.8 % (ref 10–15)
GFR SERPL CREATININE-BSD FRML MDRD: 21 ML/MIN/1.73M2
GFR SERPL CREATININE-BSD FRML MDRD: 22 ML/MIN/1.73M2
GLUCOSE BLDC GLUCOMTR-MCNC: 128 MG/DL (ref 70–99)
GLUCOSE BLDC GLUCOMTR-MCNC: 138 MG/DL (ref 70–99)
GLUCOSE BLDC GLUCOMTR-MCNC: 141 MG/DL (ref 70–99)
GLUCOSE BLDC GLUCOMTR-MCNC: 149 MG/DL (ref 70–99)
GLUCOSE BLDC GLUCOMTR-MCNC: 159 MG/DL (ref 70–99)
GLUCOSE BLDC GLUCOMTR-MCNC: 197 MG/DL (ref 70–99)
GLUCOSE BLDC GLUCOMTR-MCNC: 216 MG/DL (ref 70–99)
GLUCOSE SERPL-MCNC: 127 MG/DL (ref 70–99)
GLUCOSE SERPL-MCNC: 138 MG/DL (ref 70–99)
GLUCOSE SERPL-MCNC: 144 MG/DL (ref 70–99)
GLUCOSE SERPL-MCNC: 147 MG/DL (ref 70–99)
GLUCOSE SERPL-MCNC: 147 MG/DL (ref 70–99)
GLUCOSE SERPL-MCNC: 190 MG/DL (ref 70–99)
GLUCOSE SERPL-MCNC: 222 MG/DL (ref 70–99)
HCT VFR BLD AUTO: 26.3 % (ref 35–47)
HGB BLD-MCNC: 7.9 G/DL (ref 11.7–15.7)
MAGNESIUM SERPL-MCNC: 2.2 MG/DL (ref 1.7–2.3)
MCH RBC QN AUTO: 21.7 PG (ref 26.5–33)
MCHC RBC AUTO-ENTMCNC: 30 G/DL (ref 31.5–36.5)
MCV RBC AUTO: 72 FL (ref 78–100)
PHOSPHATE SERPL-MCNC: 2.2 MG/DL (ref 2.5–4.5)
PLATELET # BLD AUTO: 305 10E3/UL (ref 150–450)
POTASSIUM SERPL-SCNC: 4.6 MMOL/L (ref 3.4–5.3)
POTASSIUM SERPL-SCNC: 4.7 MMOL/L (ref 3.4–5.3)
POTASSIUM SERPL-SCNC: 4.7 MMOL/L (ref 3.4–5.3)
POTASSIUM SERPL-SCNC: 4.8 MMOL/L (ref 3.4–5.3)
POTASSIUM SERPL-SCNC: 4.9 MMOL/L (ref 3.4–5.3)
POTASSIUM SERPL-SCNC: 4.9 MMOL/L (ref 3.4–5.3)
POTASSIUM SERPL-SCNC: 5 MMOL/L (ref 3.4–5.3)
RBC # BLD AUTO: 3.64 10E6/UL (ref 3.8–5.2)
SODIUM SERPL-SCNC: 137 MMOL/L (ref 136–145)
SODIUM SERPL-SCNC: 138 MMOL/L (ref 136–145)
SODIUM SERPL-SCNC: 139 MMOL/L (ref 136–145)
SODIUM SERPL-SCNC: 145 MMOL/L (ref 136–145)
SODIUM SERPL-SCNC: 148 MMOL/L (ref 136–145)
SODIUM SERPL-SCNC: 148 MMOL/L (ref 136–145)
SODIUM SERPL-SCNC: 150 MMOL/L (ref 136–145)
WBC # BLD AUTO: 8.1 10E3/UL (ref 4–11)

## 2023-02-04 PROCEDURE — 99233 SBSQ HOSP IP/OBS HIGH 50: CPT | Performed by: SURGERY

## 2023-02-04 PROCEDURE — 250N000011 HC RX IP 250 OP 636: Performed by: ANESTHESIOLOGY

## 2023-02-04 PROCEDURE — 80069 RENAL FUNCTION PANEL: CPT | Performed by: INTERNAL MEDICINE

## 2023-02-04 PROCEDURE — C9113 INJ PANTOPRAZOLE SODIUM, VIA: HCPCS | Performed by: ANESTHESIOLOGY

## 2023-02-04 PROCEDURE — 258N000003 HC RX IP 258 OP 636: Performed by: STUDENT IN AN ORGANIZED HEALTH CARE EDUCATION/TRAINING PROGRAM

## 2023-02-04 PROCEDURE — 97166 OT EVAL MOD COMPLEX 45 MIN: CPT | Mod: GO | Performed by: OCCUPATIONAL THERAPIST

## 2023-02-04 PROCEDURE — 97530 THERAPEUTIC ACTIVITIES: CPT | Mod: GO | Performed by: OCCUPATIONAL THERAPIST

## 2023-02-04 PROCEDURE — 92610 EVALUATE SWALLOWING FUNCTION: CPT | Mod: GN | Performed by: SPEECH-LANGUAGE PATHOLOGIST

## 2023-02-04 PROCEDURE — 83735 ASSAY OF MAGNESIUM: CPT | Performed by: INTERNAL MEDICINE

## 2023-02-04 PROCEDURE — 80048 BASIC METABOLIC PNL TOTAL CA: CPT | Performed by: INTERNAL MEDICINE

## 2023-02-04 PROCEDURE — 250N000009 HC RX 250: Performed by: STUDENT IN AN ORGANIZED HEALTH CARE EDUCATION/TRAINING PROGRAM

## 2023-02-04 PROCEDURE — 258N000003 HC RX IP 258 OP 636: Performed by: INTERNAL MEDICINE

## 2023-02-04 PROCEDURE — 85027 COMPLETE CBC AUTOMATED: CPT | Performed by: INTERNAL MEDICINE

## 2023-02-04 PROCEDURE — 200N000001 HC R&B ICU

## 2023-02-04 PROCEDURE — 250N000013 HC RX MED GY IP 250 OP 250 PS 637: Performed by: ANESTHESIOLOGY

## 2023-02-04 PROCEDURE — 250N000011 HC RX IP 250 OP 636: Performed by: STUDENT IN AN ORGANIZED HEALTH CARE EDUCATION/TRAINING PROGRAM

## 2023-02-04 RX ADMIN — HEPARIN SODIUM 5000 UNITS: 5000 INJECTION, SOLUTION INTRAVENOUS; SUBCUTANEOUS at 03:32

## 2023-02-04 RX ADMIN — ACETAMINOPHEN 650 MG: 325 SUSPENSION ORAL at 23:55

## 2023-02-04 RX ADMIN — SODIUM PHOSPHATE, MONOBASIC, MONOHYDRATE AND SODIUM PHOSPHATE, DIBASIC, ANHYDROUS 9 MMOL: 142; 276 INJECTION, SOLUTION INTRAVENOUS at 09:01

## 2023-02-04 RX ADMIN — ACETAMINOPHEN 650 MG: 325 SUSPENSION ORAL at 09:33

## 2023-02-04 RX ADMIN — DEXTROSE MONOHYDRATE: 50 INJECTION, SOLUTION INTRAVENOUS at 04:36

## 2023-02-04 RX ADMIN — HEPARIN SODIUM 5000 UNITS: 5000 INJECTION, SOLUTION INTRAVENOUS; SUBCUTANEOUS at 23:55

## 2023-02-04 RX ADMIN — DEXTROSE MONOHYDRATE: 50 INJECTION, SOLUTION INTRAVENOUS at 16:30

## 2023-02-04 RX ADMIN — HEPARIN SODIUM 5000 UNITS: 5000 INJECTION, SOLUTION INTRAVENOUS; SUBCUTANEOUS at 13:00

## 2023-02-04 RX ADMIN — PANTOPRAZOLE SODIUM 40 MG: 40 INJECTION, POWDER, FOR SOLUTION INTRAVENOUS at 09:02

## 2023-02-04 RX ADMIN — DEXTROSE MONOHYDRATE: 50 INJECTION, SOLUTION INTRAVENOUS at 09:45

## 2023-02-04 ASSESSMENT — ACTIVITIES OF DAILY LIVING (ADL)
ADLS_ACUITY_SCORE: 55
ADLS_ACUITY_SCORE: 51
ADLS_ACUITY_SCORE: 51
ADLS_ACUITY_SCORE: 55
ADLS_ACUITY_SCORE: 51
ADLS_ACUITY_SCORE: 51
ADLS_ACUITY_SCORE: 55
ADLS_ACUITY_SCORE: 51
ADLS_ACUITY_SCORE: 51

## 2023-02-04 NOTE — PROGRESS NOTES
Respiratory Care Note      Patient was off BiPAP over night. No significant respiratory event overnight.     RT will continue to monitor and assess.    Marco A Venegas, RT.

## 2023-02-04 NOTE — PROGRESS NOTES
02/04/23 1454   Appointment Info   Signing Clinician's Name / Credentials (OT) Jd Almendarez, Daily, OTR/L   Rehab Comments (OT) Initial evaluation   Living Environment   People in Home alone   Current Living Arrangements apartment  (4th floor, elevator in building)   Home Accessibility no concerns   Transportation Anticipated family or friend will provide   Living Environment Comments pt has a brother in town with whom she says she goes out to eat lunch with.  Does instacart for grocery shopping and cleaning help.   Self-Care   Usual Activity Tolerance moderate   Current Activity Tolerance poor   Equipment Currently Used at Home walker, vishal  (pt indicated a rolling walker, may have some confusion)   Fall history within last six months yes   Number of times patient has fallen within last six months 3   Activity/Exercise/Self-Care Comment Some inconsistency in reporting history   General Information   Onset of Illness/Injury or Date of Surgery 02/03/23   Referring Physician Mauricio Motley   Patient/Family Therapy Goal Statement (OT) not stated   Additional Occupational Profile Info/Pertinent History of Current Problem Pt is a 72 year old femaleadmitted for intentional suicide attempt    through overdose of amlodipine, adderall, haldol, lamital.  PMH includes hx of dysphagia post perforated ulcer, hx suicidal ideations.  Pt also states that she has had left lower arm/hand surgery.  Left lower UE notably atrophied.  Pt also reports a right shoulder rotator cuff injury.  Pt is right handed.   Performance Patterns (Routines, Roles, Habits) pt states she was independent in basic ADLS prior to admission.   Existing Precautions/Restrictions fall   General Observations and Info pt in bed, awake.  Had limited participation during session   Cognitive Status Examination   Orientation Status person  (partial place and date)   Affect/Mental Status (Cognitive) flat/blunted affect   Cognitive Status Comments Will evaluate further to  determine memory and problem solving issues   Visual Perception   Visual Impairment/Limitations corrective lenses full-time   Pain Assessment   Patient Currently in Pain Yes, see Vital Sign flowsheet   Range of Motion Comprehensive   General Range of Motion upper extremity range of motion deficits identified   Comment, General Range of Motion Both arms have limited hand and finger movement and WFL shoulder movement.  Pt had right shoulder pain with flexion close to 80 degrees of flexion.  Atrophy with muscle bellies in left arm.   Strength Comprehensive (MMT)   General Manual Muscle Testing (MMT) Assessment upper extremity strength deficits identified   Comment, General Manual Muscle Testing (MMT) Assessment Pt slow with movement for UEs, very little ability to resist motion with MMT   Coordination   Upper Extremity Coordination Right UE impaired;Left UE impaired   Fine Motor Coordination Pt has some pincer grasp of thumb and index fingers on both hands.  Not able to significantly move thumbs on either hand for thumb/finger opposition   Bed Mobility   Bed Mobility supine-sit   Supine-Sit [place] (Bed Mobility) dependent (less than 25% patient effort);2 person assist   Assistive Device (Bed Mobility) lift device   Clinical Impression   Criteria for Skilled Therapeutic Interventions Met (OT) Yes, treatment indicated   OT Diagnosis decreased independence and safety for ADLS and IADLS   OT Problem List-Impairments impacting ADL problems related to;activity tolerance impaired;range of motion (ROM);strength;pain;coordination   Assessment of Occupational Performance 5 or more Performance Deficits   Identified Performance Deficits decreased independence and safety for dressing, bathing, functional mobility, household chores, medication management   Planned Therapy Interventions (OT) ADL retraining;cognition;strengthening;ROM;progressive activity/exercise   Clinical Decision Making Complexity (OT) moderate complexity    Risk & Benefits of therapy have been explained evaluation/treatment results reviewed;care plan/treatment goals reviewed;patient   Clinical Impression Comments Notes indicate pt will be going to a mental health setting for further treatment once medically stable.   OT Total Evaluation Time   OT Nicoleal, Moderate Complexity Minutes (41010) 15   OT Goals   Therapy Frequency (OT) 5 times/wk   OT Goals Hygiene/Grooming;Upper Body Dressing;Cognition;OT Goal 1   OT: Hygiene/Grooming supervision/stand-by assist;using adaptive equipment;from wheelchair   OT: Upper Body Dressing Minimal assist;using adaptive equipment;including set-up/clothing retrieval;from wheelchair   OT: Cognitive Patient/caregiver will verbalize understanding of cognitive assessment results/recommendations as needed for safe discharge planning   OT: Goal 1 Pt will participate in 10+ minutes UE strength and neuro re-ed activities to increase independence and safety with ADLS   OT Discharge Planning   OT Plan UE facilitation/range, strength.  Grooming tasks as able.  Try cognitive screening task as able.   OT Discharge Recommendation (DC Rec) Transitional Care Facility   OT Rationale for DC Rec Pt demonstrates significantly limited mobility and strength, making her much more dependent than her stated baseline.  May have some confusion about reporting history, will monitor and screen further for cognitive deficits as needed.  Has limited supports and is not able to return home at this time.  Will need skilled therapy here and at TCU to increase endurance and mobility for ADLS and IADLS.  Possible plan to have pt move to a mental health facility when medically cleared.  May need to consider long term setting with 24/7 support for safety   OT Brief overview of current status Limited ability to participate in B UE neuro re-ed/strengthing tasks.  Limited fine motor skills in both hands to participate in ADLs

## 2023-02-04 NOTE — PLAN OF CARE
"  Problem: Plan of Care - These are the overarching goals to be used throughout the patient stay.    Goal: Plan of Care Review  Description: The Plan of Care Review/Shift note should be completed every shift.  The Outcome Evaluation is a brief statement about your assessment that the patient is improving, declining, or no change.  This information will be displayed automatically on your shift note.  Outcome: Progressing  Flowsheets (Taken 2/3/2023 1814)  Plan of Care Reviewed With: patient  Goal: Patient-Specific Goal (Individualized)  Description: You can add care plan individualizations to a care plan. Examples of Individualization might be:  \"Parent requests to be called daily at 9am for status\", \"I have a hard time hearing out of my right ear\", or \"Do not touch me to wake me up as it startles me\".  Outcome: Progressing     Problem: Risk for Delirium  Goal: Optimal Coping  Outcome: Progressing  Intervention: Optimize Psychosocial Adjustment to Delirium  Recent Flowsheet Documentation  Taken 2/3/2023 0800 by Tyson Chopra RN  Family/Support System Care:   involvement promoted   support provided  Goal: Improved Behavioral Control  Outcome: Progressing  Intervention: Minimize Safety Risk  Recent Flowsheet Documentation  Taken 2/3/2023 1600 by Tyson Chopra RN  Enhanced Safety Measures: bed alarm set  Taken 2/3/2023 1200 by Tyson Chopra RN  Enhanced Safety Measures: bed alarm set  Taken 2/3/2023 0800 by Tyson Chopra RN  Enhanced Safety Measures: bed alarm set  Trust Relationship/Rapport:   care explained   reassurance provided  Goal: Improved Attention and Thought Clarity  Outcome: Progressing  Intervention: Maximize Cognitive Function  Recent Flowsheet Documentation  Taken 2/3/2023 1600 by Tyson Chopra RN  Sensory Stimulation Regulation:   care clustered   quiet environment promoted   visual stimulation minimized  Reorientation Measures:   reorientation provided   clock in view  Taken 2/3/2023 1200 " by Nyameino, Tyson, RN  Sensory Stimulation Regulation:   care clustered   quiet environment promoted   visual stimulation minimized  Reorientation Measures:   reorientation provided   clock in view  Taken 2/3/2023 0800 by Tyson Chopra RN  Sensory Stimulation Regulation:   care clustered   quiet environment promoted   visual stimulation minimized  Reorientation Measures:   reorientation provided   clock in view  Goal: Improved Sleep  Outcome: Progressing     Problem: Overdose  Goal: Optimal Coping  Outcome: Progressing  Intervention: Support Psychosocial Response to Acute Illness  Recent Flowsheet Documentation  Taken 2/3/2023 0800 by Tyson Chopra RN  Family/Support System Care:   involvement promoted   support provided  Goal: Stable Heart Rate and Rhythm  Outcome: Progressing  Goal: Fluid Balance  Outcome: Progressing  Goal: Effective Tissue Perfusion  Outcome: Progressing  Goal: Optimal Neurologic Function  Outcome: Progressing  Intervention: Protect and Optimize Cerebral Perfusion  Recent Flowsheet Documentation  Taken 2/3/2023 1600 by Tyson Chopra RN  Sensory Stimulation Regulation:   care clustered   quiet environment promoted   visual stimulation minimized  Taken 2/3/2023 1200 by Tyson Chopra RN  Sensory Stimulation Regulation:   care clustered   quiet environment promoted   visual stimulation minimized  Taken 2/3/2023 0800 by Tyson Chopra RN  Sensory Stimulation Regulation:   care clustered   quiet environment promoted   visual stimulation minimized  Goal: Effective Oxygenation and Ventilation  Outcome: Progressing  Intervention: Optimize Oxygenation and Ventilation  Recent Flowsheet Documentation  Taken 2/3/2023 1600 by Tyson Chopra RN  Cough And Deep Breathing: done with encouragement  Taken 2/3/2023 1200 by Tyson Chopra RN  Cough And Deep Breathing: done with encouragement  Taken 2/3/2023 0800 by Tyson Chopra RN  Cough And Deep Breathing: done with encouragement   Goal  Outcome Evaluation:      Plan of Care Reviewed With: patient

## 2023-02-04 NOTE — PLAN OF CARE
Goal Outcome Evaluation:      Plan of Care Reviewed With: patient           Patient advanced from BIPAP to NC now at 3 lpm with sats of high 90s..  Patient is more awake and can hold a conversation.  Sodium kept trending up, nephro  aware and started her on D5 drip.  Patient has had loose stools during the shift after getting scheduled miralax  In the morning.

## 2023-02-04 NOTE — PLAN OF CARE
Neuro: L&Ox3. RASS -1. KEANE. PERRL.  CV: HTN, within parameters. ST.   Respiratory: LS dim. 2L NC.  GI/: Suresh in place, Polyuric. No BM.  Skin: scattered bruising, Mild excoriation to perianal area.   Activity: BR, lift.  Diet: NPO. Needs swallow study.  Drips: D5.   Other: Suicide precautions.  Plan: Monitor Na+

## 2023-02-04 NOTE — PROGRESS NOTES
ICU PROGRESS NOTE  February 4, 2023     SUBJECTIVE  no acute events overnight, patient on BiPAP last night, now on NC.  Slowly waking up and more interactive.  PT/OT evals completed, psych saw pt on admission.     Pt DI improving,      REVIEW OF SYSTEMS  A 10 point review of systems was completed and was negative except for pt reports some discomfort RUE.    OBJECTIVE  Temp:  [99.2  F (37.3  C)  Pulse:  [] 112  Resp:  [13-28] 28  BP: (115-144)/(72-90) 138/84  SpO2:  [91 %-99 %] 95 %    FiO2 (%): 35 %  Resp: 28      I/O last 3 completed shifts:  In: 5321.67 [I.V.:2306.67; NG/GT:2200]  Out: 8260 [Urine:8260]    Physical Exam  - Gen: NAD, pt responss approriately to questioning   - Lines/Drains: right subclavian CVC, NJ feeding tube, Suresh  - CVS:  NR/RR,  - Pulm: coarse bilaterally, non labored  - Abd:  soft, non-tender, non-distended, no guarding well healed upper midline incision  - Ext: right upper ++ edema, pulses intact, anticub ecchymosis. LUE atrophied with incisions (chronic), bilat lower + edema + puses       LABS  BMP  Recent Labs   Lab 02/04/23  0912 02/04/23  0603 02/04/23  0413 02/04/23  0202 02/04/23  0022 02/03/23  2150 02/03/23  1947 02/03/23  1810 02/03/23  0218 02/03/23  0217 02/02/23  1048 02/02/23  1025 02/02/23  0448 02/02/23  0348 02/01/23  0530 02/01/23  0501   NA  --  148*  148*  --  150*  --  151*  --  152*   < > 145   < > 147*   < > 148*   < > 144   POTASSIUM  --  4.9  4.9  --  4.7  --  4.8  --  5.0   < > 4.0   < > 3.1*   < > 3.3*   < > 4.1   CHLORIDE  --  115*  115*  --  117*  --  118*  --  118*   < > 112*   < > 116*   < > 115*   < > 113*   CO2  --  25  25  --  26  --  26  --  25   < > 25   < > 23   < > 22   < > 20*   BUN  --  36.4*  36.4*  --  36.7*  --  36.7*  --  35.8*   < > 25.3*   < > 27.5*   < > 28.0*   < > 30.8*   CR  --  2.35*  2.35*  --  2.37*  --  2.42*  --  2.49*   < > 2.52*   < > 2.56*   < > 2.62*   < > 2.51*   * 147*  147* 149* 144*   < > 179*   < >  121*   < > 121*   < > 114*   < > 146*   < > 148*   MAG  --  2.2  --   --   --   --   --   --   --  2.3  --   --   --  2.3  --  2.3   PHOS  --  2.2*  --   --   --   --   --   --   --  3.2  --  2.5  --  2.0*   < > 3.2    < > = values in this interval not displayed.     CBC  Recent Labs   Lab 02/04/23  0603 02/03/23  0518 02/02/23  0348 02/01/23  1930   WBC 8.1 9.1 10.8 9.7   HGB 7.9* 7.2* 7.3* 7.1*    270 245 244     INR/PTTNo lab results found in last 7 days.  ABG  Recent Labs   Lab 02/02/23  0352 02/01/23  1930 02/01/23  1003 02/01/23  0743   PH 7.36 7.36 7.23* 7.16*   PCO2 40 40 49* 58*   PO2 145* 140* 149* 99   HCO3 23 23 20* 21     LFT  Recent Labs   Lab 02/04/23  0603 02/01/23  1930 02/01/23  0659 02/01/23  0501 01/31/23  1459 01/31/23  0401 01/30/23  0545 01/29/23  1831 01/29/23  0630   AST  --   --   --   --   --  21 20 16 22   ALT  --   --   --   --   --  <5* 25 28 28   ALKPHOS  --   --   --   --   --  129* 150* 144* 137*   BILITOTAL  --   --   --   --   --  0.5 0.5 0.3 0.3   ALBUMIN 2.6* 2.3* 2.3* 2.4*   < > 2.5* 3.2* 3.3* 3.3*    < > = values in this interval not displayed.     PancreasNo lab results found in last 7 days.    MICROBIOLOGY CULTURES  - Blood: NGTD  1/31, 1/30 NGTD  - Sputum: 1/31 final normal kimberley  - Urine: 1/30 NG     RADIOLOGY  - Chest XR 2/2 pt intubated Rsub clav line, bibisilar infiltrates, small Leffusion  - RU ext US: 2/3/2023 superficial venous thrombosis cephalic vein at anticub phosa, no DVT    SCHEDULED MEDICATIONS    heparin ANTICOAGULANT  5,000 Units Subcutaneous Q8H     pantoprazole  40 mg Intravenous Daily with breakfast     polyethylene glycol  17 g Oral or Feeding Tube BID     senna-docusate  1 tablet Oral or Feeding Tube At Bedtime     sodium chloride (PF)  10-40 mL Intracatheter Q8H     sodium phosphate  9 mmol Intravenous Once     A/P: Diana JACQUELIN Santiago is a 72 year old female admitted 1/28 for HD mngt of Overdose amlodipine, adderall, haldol, and lamictal.      Active  issues DI/deconditioning/pulm toilet     Neurology/Psychiatry:   - Suicide attempt: improved hemodynamics   Intentional polypharmacy overdose  -acute encephalopathy    Will order psych consult tomorrow     Cardiovascular:   - distributive shock causing hypotension    sinus bradycardia w/ junctional, resolved   diastolic ventricular dysfunction, improved   Mitral valve regurgitation, mod/severe     ECHO 1/31 w/ hyperdynamic LV and severe MVR(new). Will monitor and may need repeat ECHO at some point  - off NE, off high dose insulin gtt since 1/31     Pulmonary/Ventilator Management:    - Acute hypoxic and hypoxemic respiratory failure    - pulmonary edema 2/2 LV/MV dysfunction    PT was intubated 1/30 -2/2      intermittent BiPAP doing well on NC   -pulm toilet ordered     GI and Nutrition :     - severe protein-calorie malnutrition    - TFs at goal cont    - diarrhea    - will hold miralax today      Renal/Fluids/Electrolytes:    - REI on CKDIII (baseline ~Cr 1.8)   - lactic acidosis, resolved    - Metabolic acidosis, resolved   - hypervolemia   - nephrogenic DI    Nephrology consulted appreciate recs   Free water flushes at 200 q 2 hr via FT   D5 infusion at 200/hr Na 148 this AM, will keep for 12 more hours then decrease Na/glucose.    Speech to see pt for PO and will let pt drink      Infectious Disease:    - zosyn to stopped given no s/s of infectious process   - cultures, NGTD     Endocrine:    - hypoglycemia   - off high dose insulin gtt protocol   - BS have been good, on TF at goal, getting D5 at 200/hr will reevaluate rate later today      Hematology/Oncology:    -Chronic anemia present on admission   - superficial thrombus of R cephalic vein    - RUE edematous monitor, Has right subclavian CVC     MSK:   -severe deconditioning   - PT/OT seeing pt      LDA:   1. Venous access - R subclavian CVC 1/31 needed for IV access/blood draws, cont  2. Arterial access - Had right radial art line  3. Suresh 1/29 cont d/t  high urine output and DI       ICU Prophylaxis:   1. DVT: heparin, mechanical  2. HOB>30  3. Stress Ulcer: Protonix  4. Restraints: not needed, pt does need suicide prcautions  5. Feeding - TFs at goal  6. Disposition: ICU, expect transfer soon      Code Status to be discussed with family    Time spent 30 min crit care time   -----    Giovanna Casas MD FACS  [phone]

## 2023-02-04 NOTE — PROGRESS NOTES
"   02/04/23 1243   Appointment Info   Signing Clinician's Name / Credentials (SLP) Cristina Hamm MS CCC-SLP   General Information   Onset of Illness/Injury or Date of Surgery 01/29/23   Referring Physician Dr. Gracia   Patient/Family Therapy Goal Statement (SLP) did not state; agreeable to order breakfast after evaluation   Pertinent History of Current Problem \"Diana Santiago is a 72 year old female admitted 1/28 for HD mngt of Overdose amlodipine, adderall, haldol, and lamictal.\" Pt intubated and extubated 2/2.   General Observations Pt kept eyes shut the majority of the session, but followed all commands and conversation. Pt denied hx of dysphagia symptoms. Difficulty with lip seal around the straw initially, however, improved as trials progressed.   Type of Evaluation   Type of Evaluation Swallow Evaluation   Oral Motor   Oral Musculature generally intact;unable to assess due to poor participation/comprehension   Vocal Quality/Secretion Management (Oral Motor)   Vocal Quality (Oral Motor) WNL   General Swallowing Observations   Past History of Dysphagia Hx of dysphagia after perf'ed gastric ulcer which was repaired by Jean Paul patch. By discharge pt upgraded to a regular diet/thin liquids, but reported tightness in her chest. GI consult recommended at that time.   Respiratory Support (General Swallowing Observations) nasal cannula   Current Diet/Method of Nutritional Intake (General Swallowing Observations, NIS) NPO;nasogastric tube (NG)   Swallowing Recommendations   Diet Consistency Recommendations regular diet;thin liquids (level 0)   Supervision Level for Intake patient independent   Swallowing Maneuver Recommendations alternate food and liquid intake   Recommended Feeding/Eating Techniques (Swallow Eval) maintain upright sitting position for eating;maintain upright posture during/after eating for 30 minutes   Medication Administration Recommendations, Swallowing (SLP) per pt preference; NG still in place if needed "   Clinical Impression   Criteria for Skilled Therapeutic Interventions Met (SLP Eval) Evaluation only   Clinical Impression Comments SLP: Pt presents with no appreciable dysphagia on clinical swallow evaluation at this time. Limited trials d/t participation, however, pt tolerated 4oz of thin water by straw, puree solids and one bite of cracker. Good mastication and timing. No overt s/sx of aspiration and no oral residue. Pt denied globus sensation.   SLP Total Evaluation Time   Eval: oral/pharyngeal swallow function, clinical swallow Minutes (47970) 15   SLP Discharge Planning   SLP Plan DC   SLP Discharge Recommendation home  (inpatient psych per notes)   SLP Rationale for DC Rec Evaluation only; no further SLP services at this time   SLP Brief overview of current status  Okay for regular diet and thin liquids with assist as needed; encourage pt to feed herself and be in a chair for meals   Total Session Time   Total Session Time (sum of timed and untimed services) 15

## 2023-02-05 LAB
ANION GAP SERPL CALCULATED.3IONS-SCNC: 8 MMOL/L (ref 7–15)
ANION GAP SERPL CALCULATED.3IONS-SCNC: 9 MMOL/L (ref 7–15)
BUN SERPL-MCNC: 36.9 MG/DL (ref 8–23)
BUN SERPL-MCNC: 42.3 MG/DL (ref 8–23)
CALCIUM SERPL-MCNC: 9.3 MG/DL (ref 8.8–10.2)
CALCIUM SERPL-MCNC: 9.6 MG/DL (ref 8.8–10.2)
CHLORIDE SERPL-SCNC: 106 MMOL/L (ref 98–107)
CHLORIDE SERPL-SCNC: 111 MMOL/L (ref 98–107)
CREAT SERPL-MCNC: 2.17 MG/DL (ref 0.51–0.95)
CREAT SERPL-MCNC: 2.24 MG/DL (ref 0.51–0.95)
DEPRECATED HCO3 PLAS-SCNC: 23 MMOL/L (ref 22–29)
DEPRECATED HCO3 PLAS-SCNC: 24 MMOL/L (ref 22–29)
ERYTHROCYTE [DISTWIDTH] IN BLOOD BY AUTOMATED COUNT: 14.5 % (ref 10–15)
GFR SERPL CREATININE-BSD FRML MDRD: 23 ML/MIN/1.73M2
GFR SERPL CREATININE-BSD FRML MDRD: 24 ML/MIN/1.73M2
GLUCOSE BLDC GLUCOMTR-MCNC: 102 MG/DL (ref 70–99)
GLUCOSE BLDC GLUCOMTR-MCNC: 211 MG/DL (ref 70–99)
GLUCOSE SERPL-MCNC: 131 MG/DL (ref 70–99)
GLUCOSE SERPL-MCNC: 146 MG/DL (ref 70–99)
HCT VFR BLD AUTO: 23.4 % (ref 35–47)
HGB BLD-MCNC: 7.5 G/DL (ref 11.7–15.7)
MAGNESIUM SERPL-MCNC: 2.1 MG/DL (ref 1.7–2.3)
MCH RBC QN AUTO: 21.7 PG (ref 26.5–33)
MCHC RBC AUTO-ENTMCNC: 32.1 G/DL (ref 31.5–36.5)
MCV RBC AUTO: 68 FL (ref 78–100)
PHOSPHATE SERPL-MCNC: 2.5 MG/DL (ref 2.5–4.5)
PLATELET # BLD AUTO: 288 10E3/UL (ref 150–450)
POTASSIUM SERPL-SCNC: 5.1 MMOL/L (ref 3.4–5.3)
POTASSIUM SERPL-SCNC: 5.3 MMOL/L (ref 3.4–5.3)
RBC # BLD AUTO: 3.45 10E6/UL (ref 3.8–5.2)
SODIUM SERPL-SCNC: 134 MMOL/L (ref 136–145)
SODIUM SERPL-SCNC: 136 MMOL/L (ref 136–145)
SODIUM SERPL-SCNC: 138 MMOL/L (ref 136–145)
SODIUM SERPL-SCNC: 143 MMOL/L (ref 136–145)
WBC # BLD AUTO: 10.8 10E3/UL (ref 4–11)

## 2023-02-05 PROCEDURE — 99233 SBSQ HOSP IP/OBS HIGH 50: CPT | Performed by: INTERNAL MEDICINE

## 2023-02-05 PROCEDURE — 85027 COMPLETE CBC AUTOMATED: CPT | Performed by: INTERNAL MEDICINE

## 2023-02-05 PROCEDURE — 83735 ASSAY OF MAGNESIUM: CPT | Performed by: STUDENT IN AN ORGANIZED HEALTH CARE EDUCATION/TRAINING PROGRAM

## 2023-02-05 PROCEDURE — 258N000003 HC RX IP 258 OP 636: Performed by: STUDENT IN AN ORGANIZED HEALTH CARE EDUCATION/TRAINING PROGRAM

## 2023-02-05 PROCEDURE — 250N000009 HC RX 250: Performed by: STUDENT IN AN ORGANIZED HEALTH CARE EDUCATION/TRAINING PROGRAM

## 2023-02-05 PROCEDURE — 84100 ASSAY OF PHOSPHORUS: CPT | Performed by: STUDENT IN AN ORGANIZED HEALTH CARE EDUCATION/TRAINING PROGRAM

## 2023-02-05 PROCEDURE — 84295 ASSAY OF SERUM SODIUM: CPT | Performed by: STUDENT IN AN ORGANIZED HEALTH CARE EDUCATION/TRAINING PROGRAM

## 2023-02-05 PROCEDURE — 36415 COLL VENOUS BLD VENIPUNCTURE: CPT | Performed by: STUDENT IN AN ORGANIZED HEALTH CARE EDUCATION/TRAINING PROGRAM

## 2023-02-05 PROCEDURE — 250N000011 HC RX IP 250 OP 636: Performed by: ANESTHESIOLOGY

## 2023-02-05 PROCEDURE — 99233 SBSQ HOSP IP/OBS HIGH 50: CPT | Performed by: SURGERY

## 2023-02-05 PROCEDURE — 250N000011 HC RX IP 250 OP 636: Performed by: STUDENT IN AN ORGANIZED HEALTH CARE EDUCATION/TRAINING PROGRAM

## 2023-02-05 PROCEDURE — C9113 INJ PANTOPRAZOLE SODIUM, VIA: HCPCS | Performed by: ANESTHESIOLOGY

## 2023-02-05 PROCEDURE — 82310 ASSAY OF CALCIUM: CPT | Performed by: INTERNAL MEDICINE

## 2023-02-05 PROCEDURE — 200N000001 HC R&B ICU

## 2023-02-05 RX ORDER — POLYETHYLENE GLYCOL 3350 17 G/17G
17 POWDER, FOR SOLUTION ORAL 2 TIMES DAILY PRN
Status: DISCONTINUED | OUTPATIENT
Start: 2023-02-05 | End: 2023-02-23 | Stop reason: HOSPADM

## 2023-02-05 RX ORDER — AMOXICILLIN 250 MG
1 CAPSULE ORAL
Status: DISCONTINUED | OUTPATIENT
Start: 2023-02-05 | End: 2023-02-23 | Stop reason: HOSPADM

## 2023-02-05 RX ADMIN — SODIUM PHOSPHATE, MONOBASIC, MONOHYDRATE AND SODIUM PHOSPHATE, DIBASIC, ANHYDROUS 9 MMOL: 142; 276 INJECTION, SOLUTION INTRAVENOUS at 04:11

## 2023-02-05 RX ADMIN — HEPARIN SODIUM 5000 UNITS: 5000 INJECTION, SOLUTION INTRAVENOUS; SUBCUTANEOUS at 09:31

## 2023-02-05 RX ADMIN — HEPARIN SODIUM 5000 UNITS: 5000 INJECTION, SOLUTION INTRAVENOUS; SUBCUTANEOUS at 16:34

## 2023-02-05 RX ADMIN — PANTOPRAZOLE SODIUM 40 MG: 40 INJECTION, POWDER, FOR SOLUTION INTRAVENOUS at 09:31

## 2023-02-05 ASSESSMENT — ACTIVITIES OF DAILY LIVING (ADL)
ADLS_ACUITY_SCORE: 61
DRESSING/BATHING_DIFFICULTY: YES
WEAR_GLASSES_OR_BLIND: YES
ADLS_ACUITY_SCORE: 61
ADLS_ACUITY_SCORE: 52
ADLS_ACUITY_SCORE: 52
CHANGE_IN_FUNCTIONAL_STATUS_SINCE_ONSET_OF_CURRENT_ILLNESS/INJURY: YES
ADLS_ACUITY_SCORE: 57
ADLS_ACUITY_SCORE: 61
TOILETING_ASSISTANCE: TOILETING DIFFICULTY, DEPENDENT
ADLS_ACUITY_SCORE: 52
FALL_HISTORY_WITHIN_LAST_SIX_MONTHS: YES
WALKING_OR_CLIMBING_STAIRS: AMBULATION DIFFICULTY, DEPENDENT;STAIR CLIMBING DIFFICULTY, DEPENDENT;TRANSFERRING DIFFICULTY, DEPENDENT
ADLS_ACUITY_SCORE: 52
DRESSING/BATHING: BATHING DIFFICULTY, DEPENDENT;DRESSING DIFFICULTY, DEPENDENT
ADLS_ACUITY_SCORE: 52
DIFFICULTY_EATING/SWALLOWING: NO
CONCENTRATING,_REMEMBERING_OR_MAKING_DECISIONS_DIFFICULTY: YES
NUMBER_OF_TIMES_PATIENT_HAS_FALLEN_WITHIN_LAST_SIX_MONTHS: 3
WALKING_OR_CLIMBING_STAIRS_DIFFICULTY: YES
ADLS_ACUITY_SCORE: 61
VISION_MANAGEMENT: GLASSES
DOING_ERRANDS_INDEPENDENTLY_DIFFICULTY: YES
ADLS_ACUITY_SCORE: 61
EQUIPMENT_CURRENTLY_USED_AT_HOME: WALKER, HEMI
TOILETING_ISSUES: YES
ADLS_ACUITY_SCORE: 58

## 2023-02-05 NOTE — PLAN OF CARE
Pt more alert today, still closes eyes and is often withdrawn from interaction with care team, however arouses briskly and answers questions appropriately, forgetful, disoriented to time, cleared by ST for regular diet, up to chair with sling / lift, sodium levels trending down, see results, D5W infusion decreased, loose stools continue frequently today, sitter at bedside for suicide precautions which continue

## 2023-02-05 NOTE — PROGRESS NOTES
"[facility] Progress Note       ICU     Visited with Diana per-admission request.    Diana was seated in her bedside chair and noted that she was \"doing well\" today. I introduced myself and  role. Pt denies SH support needs at this time and acknowledged that she knew how to contact if needed.     remains available to support pt and family. Please consult if any additional needs arise.      ------  Brandin Bundy M.Div.  Resident   Pager: [phone]   "

## 2023-02-05 NOTE — PROGRESS NOTES
ICU PROGRESS NOTE  February 5, 2023     BRIEF HOSPITAL COURSE/REASON FOR ICU ADMIT  Pt with multidrug overdose.  Extubated x 48 hours. Has DI    SUBJECTIVE  Pt much more awake today.  Passed swallow on regular diet.  Pt does endorse feeling depressed.  She is on suicide precautions.      Na did come down yesterday.  D5 dropped to 50cc/hr. Pt still getting free water flushes 100cc/hr.  She is thirsty and able to drink.  She reports that she normally urinates a lot at home.      REVIEW OF SYSTEMS  A 10 point review of systems was completed and was negative except for depressed mood.    OBJECTIVE  Temp:  [99.2  F (37.3  C)-100.4  F (38  C)] 99.2  F (37.3  C)  Pulse:  [] 98  Resp:  [16-30] 22  BP: (108-151)/(63-93) 132/93  SpO2:  [94 %-100 %] 98 %    Resp: 22    No oxygen    I/O last 3 completed shifts:  In: 7369.5 [P.O.:1035; I.V.:2949.5; NG/GT:2305]  Out: 6125 [Urine:6125]      Physical Exam  - Gen:  NAD,   - Neuro: alert, oriented  - Lines/Drains: right subclavian, NJ tube, Suresh  - CV:  RRR, no murmur  - Pulm:  Diminished a bit more on the right but better air movement compared to yesterday , non-labored breathing,   - Abd: soft, non-tender, non-distended, no guarding,incision healed  - Ext: 2+ edema, left arm atrophied, right arm with edema.  antecubital ecchymosis improved.       LABS  BMP  Recent Labs   Lab 02/05/23  0619 02/05/23  0216 02/04/23  2218 02/04/23  2215 02/04/23  1753 02/04/23  0912 02/04/23  0603 02/03/23  0218 02/03/23  0217 02/02/23  1048 02/02/23  1025 02/02/23  0448 02/02/23  0348    138  --  137 138   < > 148*  148*   < > 145   < > 147*   < > 148*   POTASSIUM 5.1 5.3  --  5.0 4.7   < > 4.9  4.9   < > 4.0   < > 3.1*   < > 3.3*   CHLORIDE 111* 106  --  104 107   < > 115*  115*   < > 112*   < > 116*   < > 115*   CO2 23 24  --  25 23   < > 25  25   < > 25   < > 23   < > 22   BUN 42.3* 36.9*  --  36.1* 39.0*   < > 36.4*  36.4*   < > 25.3*   < > 27.5*   < > 28.0*   CR 2.17* 2.24*  --   2.29* 2.29*   < > 2.35*  2.35*   < > 2.52*   < > 2.56*   < > 2.62*   * 131* 128* 127* 138*   < > 147*  147*   < > 121*   < > 114*   < > 146*   MAG  --  2.1  --   --   --   --  2.2  --  2.3  --   --   --  2.3   PHOS  --  2.5  --   --   --   --  2.2*  --  3.2  --  2.5  --  2.0*    < > = values in this interval not displayed.     CBC  Recent Labs   Lab 02/05/23  0216 02/04/23  0603 02/03/23  0518 02/02/23  0348   WBC 10.8 8.1 9.1 10.8   HGB 7.5* 7.9* 7.2* 7.3*    305 270 245     INR/PTTNo lab results found in last 7 days.  ABG  Recent Labs   Lab 02/02/23  0352 02/01/23  1930 02/01/23  1003 02/01/23  0743   PH 7.36 7.36 7.23* 7.16*   PCO2 40 40 49* 58*   PO2 145* 140* 149* 99   HCO3 23 23 20* 21     LFT  Recent Labs   Lab 02/04/23  0603 02/01/23  1930 02/01/23  0659 02/01/23  0501 01/31/23  1459 01/31/23  0401 01/30/23  0545 01/29/23  1831   AST  --   --   --   --   --  21 20 16   ALT  --   --   --   --   --  <5* 25 28   ALKPHOS  --   --   --   --   --  129* 150* 144*   BILITOTAL  --   --   --   --   --  0.5 0.5 0.3   ALBUMIN 2.6* 2.3* 2.3* 2.4*   < > 2.5* 3.2* 3.3*    < > = values in this interval not displayed.     PancreasNo lab results found in last 7 days.    MICROBIOLOGY CULTURES  - Blood: NGTD  1/31, 1/30 NGTD  - Sputum: 1/31 final normal kimberley  - Urine: 1/30 NG      RADIOLOGY  - Chest XR 2/2 pt intubated Rsub clav line, bibisilar infiltrates, small Leffusion  - RU ext US: 2/3/2023 superficial venous thrombosis cephalic vein at anticub phosa, no DVT    SCHEDULED MEDICATIONS    heparin ANTICOAGULANT  5,000 Units Subcutaneous Q8H     pantoprazole  40 mg Intravenous Daily with breakfast     sodium chloride (PF)  10-40 mL Intracatheter Q8H     A/P: Diana Santiago is a 72 year old female admitted 1/28 for HD mngt of Overdose amlodipine, adderall, haldol, and lamictal.       Active issues DI/deconditioning/pulm toilet     Neurology/Psychiatry:   - Suicide attempt: improved hemodynamics               Intentional polypharmacy overdose  -acute encephalopathy              psych consult today      Cardiovascular:   - distributive shock causing hypotension from Ca channel blocker overdose - resolved                                       Mitral valve regurgitation, mod/severe  ECHO 1/31 w/ hyperdynamic LV and severe MVR(new). Will monitor and may need repeat ECHO at some point     Pulmonary/Ventilator Management:               - Acute hypoxic and hypoxemic respiratory failure extubated 2/2 doing well               -pulm toilet ongoing     GI and Nutrition :               - severe protein-calorie malnutrition                        - TFs at goal cont, pt now can have PO intake.  As her po increases will decrease TF.              - diarrhea                        - stool softeners PRN     Renal/Fluids/Electrolytes:               - REI on CKDIII (baseline ~Cr 1.8)              - lactic acidosis, resolved              - Metabolic acidosis, resolved  - DI              Nephrology consulted appreciate recs             Free water flushes at 200 q 2 hr via FT             Na starting to normalize.  On 50cc/hr D5 infusion with fairly normal Na.  Will leave free water flushes and IVF rate today.  Will see how patient does with po intake as she will likely self regulate.  She is used to managing DI at home.      Infectious Disease:                       - cultures, NGTD     Endocrine:               - hyperglycemia             - was hypoglycemic on glucose infusion. Now BS in 140s on sliding scale insulin cont.        Hematology/Oncology:               -Chronic anemia present on admission    Gets infusions as outpt. Cont to monitor Hb no signs of bleeding              - superficial thrombus of R cephalic vein                        - RUE edematous monitor, Has right subclavian CVC     MSK:              -severe deconditioning             - PT/OT seeing pt and working on being OOB.      LDA:   1. Venous access - R subclavian CVC 1/31  needed for IV access/blood draws, cont  2. Suresh 1/29 cont d/t high urine output and DI        ICU Prophylaxis:   1. DVT: heparin, mechanical  2. HOB>30  3. Stress Ulcer: Protonix  4. Restraints: not needed, pt does need suicide prcautions  5. Feeding - TFs at goal, will decrease as po intake improves.   6. Disposition: will call hospitalist about transfer today     Time spent 30 min crit care time   -----     Giovanna Casas MD FACS  [phone]

## 2023-02-05 NOTE — PROGRESS NOTES
Nephrology note:  Chart reviewed    Polyuric with 7L output, also 7L inputs yesterday.  Improving creatinine  Sodium normalized    Polyuria likely both some osmotic urinary losses (sodium) but also possible contributor of DI. Not needing free water replacement. As long as patinet having PO intake, can drink to thirst any inappropriate free water losses.     Nephrology will sign off, call with concerns or questions.  Can follow up after discharge with Dr. Cedeno, primary nephrologist at [facility].  Kana Miller,

## 2023-02-05 NOTE — PROGRESS NOTES
[facility]    Medicine Progress Note - Hospitalist Service    Date of Admission:  1/28/2023    Assessment & Plan      Polysubstance Overdose/Suicide Attempt  Distributive shock causing hypotension from Ca channel blocker overdose  Acute encephalopathy caused by overdose  Acute hypoxic resp failure with overdose :  -  Improved, overdose issues progressively resolving.  Patient indicating she is still thinking of harming herself.  She also has recent history of other self harm attempts.  Continue 1:1/Suicide precautions.  Psych consult requested as patient can communicate.  She is willing to stay currently.  If she should suddenly decide she wants to leave against advice would recommend she be reassessed by the provider at that point to decide if hold needed to be considered/placed.  Mentation improving as moves further out from overdose otherwise.  -  Wean O2, continue good pulm toilet.  Doing well off ventilator.    Diabetes Insipidus  REI superimposed on Chronic Kidney Disease  Metabolic/lacitc acidosis associated with overdose - resolved:  -  Nephrology has been following during stay.  Sodium variable but staying in a more reasonable range lately.  She had been on IVF + TF flushes to control.  Now she is taking oral.  I will leave TF in today as we stop scheduled feeds oherwise but continue the flushes.  She is encouraged to drink to satisfy her thirst.  Will see how sodium trends into tomorrow.  If taking good po intake and sodium reasonable consider removing TF in AM.  -  Can try to remove grove today as now more mobile and able to use commode with staff.    Loose stools:  -  No ABD pain. Suspect TF exacerbated.  TF's stopping today, monitor.    Severe protein-calorie malnutrition, recent Tube feeds  Prior hypoglycemia:  -  Prior D10 stoppedb before this AM and now eating better.  Will stop IVF and see how glu trends today with her eating a regular diet.  No recent hypoglycemia.   Encourage po intake.  See above concerning TF's.  Plan is to stop them today but continue free water flushes.  If eating well continues and drinking more can consider removing FT tomorrow.    Superficial thrombus of R cephalic vein:  - RUE edematous monitor, Has right subclavian CVC.  Will try to remove central line today if acceptable functioning peripheral IV can be placed.    Chronic anemia:  -  No overt bleeding at moment, periodically monitor.  No indication for a current transfusion.    Deconditioning:  -  Therapy        Medical Decision Making       60 MINUTES SPENT BY ME on the date of service doing chart review, history, exam, documentation & further activities per the note.         Diet: Regular Diet Adult  Adult Formula Drip Feeding: Continuous Osmolite 1.5; Nasogastric tube; Goal Rate: 45; mL/hr; Change TF product to Osmolite 1.5 at 35 mL/hr; after 12 hrs increase to goal 25 mL/hr    DVT Prophylaxis: Heparin SQ  Suresh Catheter: PRESENT, indication: Strict 1-2 Hour I&O  Lines: PRESENT      CVC Triple Lumen Right Subclavian Non - tunneled-Site Assessment: WDL      Cardiac Monitoring: ACTIVE order. Indication: QTc prolonging medication (48 hours)  Code Status: Full Code                              Disposition Plan     Expected Discharge Date: 02/08/2023             Transfer patient out of ICU today when bed available.       Madan Estrada,   Hospitalist Service  [facility]  Securely message with [employer] (more info)  Text page via [employer] Paging/Directory   ______________________________________________________________________    Interval History   More awake/energetic, eating better by patient and staff report.  Patient still reports thoughts of self harm/depression.  No specific plan at moment.  No other acute complaints.  Hospitalist service asked by ICU team today to assume care as they felt patient could transfer out of ICU.    Physical Exam   Vital Signs: Temp: 99.2  F (37.3   C) Temp src: Axillary BP: 124/84 Pulse: 92   Resp: 18 SpO2: 100 % O2 Device: None (Room air) Oxygen Delivery: 2 LPM  Weight: 125 lbs 10.6 oz    GEN:  Alert, oriented x 3, appears ill but comfortable, no overt distress.  Up in the chair during my visit.  HEENT:  Normocephalic, FT in place, no scleral icterus, no nasal discharge, mouth moist.  CV:  Regular rate and rhythm, no loud murmur or rub.  LUNGS:  Clear to auscultation bilaterally without rales/rhonchi/wheezing/retractions.  Symmetric chest rise on inhalation noted.  ABD:  Active bowel sounds, soft, non-tender/non-distended.  No rebound/guarding/rigidity.  EXT:  Trace to +1 right arm edema, trace elsewhere.  No cyanosis.  No acute joint synovitis noted.  SKIN:  Dry to touch, no exanthems noted in the visualized areas.    Medications     dextrose Stopped (02/03/23 0828)     sodium chloride 10 mL/hr at 02/03/23 0830       heparin ANTICOAGULANT  5,000 Units Subcutaneous Q8H     [START ON 2/6/2023] pantoprazole  40 mg Oral QAM AC     sodium chloride (PF)  10-40 mL Intracatheter Q8H       Data     Labs and Imaging results below reviewed today.  Recent Labs   Lab 02/05/23  0216 02/04/23  0603 02/03/23  0518   WBC 10.8 8.1 9.1   HGB 7.5* 7.9* 7.2*   HCT 23.4* 26.3* 24.5*   MCV 68* 72* 73*    305 270     7-Day Micro Results     Collected Updated Procedure Result Status      01/31/2023 1430 02/02/2023 0930 Respiratory Aerobic Bacterial Culture [[id number]]   Sputum from Endotracheal    Final result Component Value   Culture 1+ Normal kimberley   Gram Stain Result >25 PMNs/low power field    No organisms seen               01/30/2023 1856 01/31/2023 0114 MRSA MSSA PCR, Nasal Swab [[id number]]    Swab from Nose    Final result Component Value   MRSA Target DNA Negative   SA Target DNA Negative            01/30/2023 1707 02/01/2023 0615 Urine Culture [[id number]]   Urine, Suresh Catheter    Final result Component Value   Culture No Growth               01/30/2023  1618 02/04/2023 1831 Blood Culture Hand, Left [[id number]]    Blood from Hand, Left    Final result Component Value   Culture No Growth               01/30/2023 1618 02/04/2023 1831 Blood Culture Hand, Left [[id number]]    Blood from Hand, Left    Final result Component Value   Culture No Growth                   Recent Labs   Lab 02/05/23  0619 02/05/23  0216 02/04/23  2218 02/04/23  2215 02/04/23  0912 02/04/23  0603 02/03/23  0218 02/03/23  0217 02/01/23  2032 02/01/23  1930 02/01/23  0739 02/01/23  0659 01/31/23  0432 01/31/23  0401 01/30/23  0741 01/30/23  0545 01/29/23  1948 01/29/23  1831    138  --  137   < > 148*  148*   < > 145   < > 148*  --  143   < > 141   < > 140   < > 137   POTASSIUM 5.1 5.3  --  5.0   < > 4.9  4.9   < > 4.0   < > 3.2*  --  4.1   < > 4.5   < > 5.3   < > 4.7   CHLORIDE 111* 106  --  104   < > 115*  115*   < > 112*   < > 116*  --  114*   < > 110*   < > 109*   < > 105   CO2 23 24  --  25   < > 25  25   < > 25   < > 22  --  21*   < > 19*   < > 17*   < > 18*   ANIONGAP 9 8  --  8   < > 8  8   < > 8   < > 10  --  8   < > 12   < > 14   < > 14   * 131* 128* 127*   < > 147*  147*   < > 121*   < > 113*   < > 148*  158*   < > 164*   < > 169*   < > 143*   BUN 42.3* 36.9*  --  36.1*   < > 36.4*  36.4*   < > 25.3*   < > 30.0*  --  30.8*   < > 42.7*   < > 49.0*   < > 51.7*   CR 2.17* 2.24*  --  2.29*   < > 2.35*  2.35*   < > 2.52*   < > 2.66*  --  2.52*   < > 2.45*   < > 2.08*   < > 1.85*   GFRESTIMATED 24* 23*  --  22*   < > 21*  21*   < > 20*   < > 18*  --  20*   < > 20*   < > 25*   < > 28*   ISSA 9.6 9.3  --  9.1   < > 9.8  9.8   < > 9.8   < > 9.3  --  8.8   < > 9.2   < > 10.2  10.2   < > 10.9*   MAG  --  2.1  --   --   --  2.2  --  2.3   < >  --   --   --    < > 2.1   < > 2.2  --  2.0   PHOS  --  2.5  --   --   --  2.2*  --  3.2   < > 1.7*  --  3.1   < > 2.2*   < > 4.1  --   --    PROTTOTAL  --   --   --   --   --   --   --   --   --   --   --   --   --  4.4*  --   5.5*  --  5.8*   ALBUMIN  --   --   --   --   --  2.6*  --   --   --  2.3*  --  2.3*   < > 2.5*  --  3.2*  --  3.3*   BILITOTAL  --   --   --   --   --   --   --   --   --   --   --   --   --  0.5  --  0.5  --  0.3   ALKPHOS  --   --   --   --   --   --   --   --   --   --   --   --   --  129*  --  150*  --  144*   AST  --   --   --   --   --   --   --   --   --   --   --   --   --  21  --  20  --  16   ALT  --   --   --   --   --   --   --   --   --   --   --   --   --  <5*  --  25  --  28    < > = values in this interval not displayed.       No results found for this or any previous visit (from the past 24 hour(s)).

## 2023-02-05 NOTE — PLAN OF CARE
Shift Summary  [id number]    No significant change overnight ex low grade fever- tmax 100.2- worked with patient on pulmonary toilet. Somnolent still but easily aroused. Lung sounds coarse in the upper lobes bilaterally, fair cough though weak IS performance. Frequent Loose stools continue, incontinent. Polyuria, sodiums wnl overnight. Phos being replaced.  Bedside attendant present for suicide precautions.   Hemodynamically stable on room air.  Patient has been expressing suicidal intent multiple times 2/4 and overnight - not currently receiving antidepressants/mood stabilizers, psych signed off 1/29.    Plan: Continuing full restorative cares. Anticipate readiness for transfer to floor, rounding teams to see.

## 2023-02-06 ENCOUNTER — APPOINTMENT (OUTPATIENT)
Dept: PHYSICAL THERAPY | Facility: CLINIC | Age: 73
DRG: 917 | End: 2023-02-06
Payer: MEDICARE

## 2023-02-06 ENCOUNTER — APPOINTMENT (OUTPATIENT)
Dept: OCCUPATIONAL THERAPY | Facility: CLINIC | Age: 73
DRG: 917 | End: 2023-02-06
Payer: MEDICARE

## 2023-02-06 LAB
ANION GAP SERPL CALCULATED.3IONS-SCNC: 10 MMOL/L (ref 7–15)
BUN SERPL-MCNC: 42.5 MG/DL (ref 8–23)
CALCIUM SERPL-MCNC: 9.6 MG/DL (ref 8.8–10.2)
CHLORIDE SERPL-SCNC: 109 MMOL/L (ref 98–107)
CREAT SERPL-MCNC: 1.88 MG/DL (ref 0.51–0.95)
DEPRECATED HCO3 PLAS-SCNC: 21 MMOL/L (ref 22–29)
GFR SERPL CREATININE-BSD FRML MDRD: 28 ML/MIN/1.73M2
GLUCOSE BLDC GLUCOMTR-MCNC: 100 MG/DL (ref 70–99)
GLUCOSE BLDC GLUCOMTR-MCNC: 94 MG/DL (ref 70–99)
GLUCOSE SERPL-MCNC: 103 MG/DL (ref 70–99)
HGB BLD-MCNC: 8.9 G/DL (ref 11.7–15.7)
MAGNESIUM SERPL-MCNC: 2.2 MG/DL (ref 1.7–2.3)
PHOSPHATE SERPL-MCNC: 4 MG/DL (ref 2.5–4.5)
POTASSIUM SERPL-SCNC: 5.5 MMOL/L (ref 3.4–5.3)
POTASSIUM SERPL-SCNC: 5.7 MMOL/L (ref 3.4–5.3)
POTASSIUM SERPL-SCNC: 5.8 MMOL/L (ref 3.4–5.3)
SODIUM SERPL-SCNC: 136 MMOL/L (ref 136–145)
SODIUM SERPL-SCNC: 140 MMOL/L (ref 136–145)
SODIUM SERPL-SCNC: 141 MMOL/L (ref 136–145)

## 2023-02-06 PROCEDURE — 97530 THERAPEUTIC ACTIVITIES: CPT | Mod: GP

## 2023-02-06 PROCEDURE — 200N000001 HC R&B ICU

## 2023-02-06 PROCEDURE — 36415 COLL VENOUS BLD VENIPUNCTURE: CPT | Performed by: STUDENT IN AN ORGANIZED HEALTH CARE EDUCATION/TRAINING PROGRAM

## 2023-02-06 PROCEDURE — 85018 HEMOGLOBIN: CPT | Performed by: INTERNAL MEDICINE

## 2023-02-06 PROCEDURE — 84100 ASSAY OF PHOSPHORUS: CPT | Performed by: STUDENT IN AN ORGANIZED HEALTH CARE EDUCATION/TRAINING PROGRAM

## 2023-02-06 PROCEDURE — 250N000013 HC RX MED GY IP 250 OP 250 PS 637: Performed by: INTERNAL MEDICINE

## 2023-02-06 PROCEDURE — 84295 ASSAY OF SERUM SODIUM: CPT | Performed by: STUDENT IN AN ORGANIZED HEALTH CARE EDUCATION/TRAINING PROGRAM

## 2023-02-06 PROCEDURE — 84132 ASSAY OF SERUM POTASSIUM: CPT | Performed by: STUDENT IN AN ORGANIZED HEALTH CARE EDUCATION/TRAINING PROGRAM

## 2023-02-06 PROCEDURE — 97110 THERAPEUTIC EXERCISES: CPT | Mod: GO | Performed by: OCCUPATIONAL THERAPIST

## 2023-02-06 PROCEDURE — 97530 THERAPEUTIC ACTIVITIES: CPT | Mod: GO | Performed by: OCCUPATIONAL THERAPIST

## 2023-02-06 PROCEDURE — 250N000011 HC RX IP 250 OP 636: Performed by: STUDENT IN AN ORGANIZED HEALTH CARE EDUCATION/TRAINING PROGRAM

## 2023-02-06 PROCEDURE — 99222 1ST HOSP IP/OBS MODERATE 55: CPT | Mod: AI | Performed by: PSYCHIATRY & NEUROLOGY

## 2023-02-06 PROCEDURE — 99233 SBSQ HOSP IP/OBS HIGH 50: CPT | Performed by: INTERNAL MEDICINE

## 2023-02-06 PROCEDURE — 84132 ASSAY OF SERUM POTASSIUM: CPT | Performed by: INTERNAL MEDICINE

## 2023-02-06 PROCEDURE — 36415 COLL VENOUS BLD VENIPUNCTURE: CPT | Performed by: INTERNAL MEDICINE

## 2023-02-06 PROCEDURE — 83735 ASSAY OF MAGNESIUM: CPT | Performed by: STUDENT IN AN ORGANIZED HEALTH CARE EDUCATION/TRAINING PROGRAM

## 2023-02-06 RX ORDER — SODIUM BICARBONATE 650 MG/1
650 TABLET ORAL 2 TIMES DAILY
Status: DISCONTINUED | OUTPATIENT
Start: 2023-02-06 | End: 2023-02-13

## 2023-02-06 RX ORDER — ASPIRIN 325 MG
325 TABLET ORAL DAILY
Status: DISCONTINUED | OUTPATIENT
Start: 2023-02-06 | End: 2023-02-06

## 2023-02-06 RX ORDER — SODIUM BICARBONATE 650 MG/1
650 TABLET ORAL 4 TIMES DAILY
Status: DISCONTINUED | OUTPATIENT
Start: 2023-02-06 | End: 2023-02-06

## 2023-02-06 RX ORDER — ASPIRIN 81 MG/1
81 TABLET ORAL DAILY
Status: DISCONTINUED | OUTPATIENT
Start: 2023-02-06 | End: 2023-02-23 | Stop reason: HOSPADM

## 2023-02-06 RX ADMIN — HEPARIN SODIUM 5000 UNITS: 5000 INJECTION, SOLUTION INTRAVENOUS; SUBCUTANEOUS at 00:43

## 2023-02-06 RX ADMIN — Medication 40 MG: at 07:41

## 2023-02-06 RX ADMIN — HEPARIN SODIUM 5000 UNITS: 5000 INJECTION, SOLUTION INTRAVENOUS; SUBCUTANEOUS at 07:41

## 2023-02-06 RX ADMIN — ASPIRIN 81 MG: 81 TABLET, COATED ORAL at 17:21

## 2023-02-06 ASSESSMENT — ACTIVITIES OF DAILY LIVING (ADL)
ADLS_ACUITY_SCORE: 52

## 2023-02-06 NOTE — PROGRESS NOTES
CLINICAL NUTRITION SERVICES - REASSESSMENT NOTE      Recommendations Ordered by Registered Dietitian (RD):   TF Orders - Canceled in Epic as TF on hold.   Future/Additional Recommendations:   Will be available for oral liquid nutritional supplements prn.   Malnutrition:  (1/31)   % Weight Loss:  None noted  % Intake:  </= 50% for >/= 5 days (severe malnutrition) (will meet 2/1)  Subcutaneous Fat Loss:  None observed  Muscle Loss:  None observed  Fluid Retention:  Mild but likely not nutritionally significant      Malnutrition Diagnosis: Patient does not meet two of the above criteria necessary for diagnosing malnutrition       EVALUATION OF PROGRESS TOWARD GOALS   Diet:  Regular    Nutrition Support:    - On 2/3, TF via NG was changed from Vital HP at 35 mL/hr --> Osmolite 1.5 at 35 mL/hr x 12 hrs, then up to 45 mL/hr.  - TF was held on 2/5.  - Free H20 200 mL every 2 hrs    Intake/Tolerance:    Stool Pattern:  2/3:  5  2/4:  x8  2/5:  x4  2/6:  x2  Na 140 (NL), K 5.5 (H), BUN 43 (H), Cr 1.8 (H) - Pt with REI on CKD-3.  BGs acceptable.  I/O: 3810/4650. Wt: 56.2 kg (down 2.3 kg since admission). BMI: 21.3 (103% IBW). Pt with 2+ mild generalized edema.    Pt took 1120 mL oral yesterday.  Today for breakfast pt consumed 75% of scrambled eggs, apple juice, and orange juice.  Today for lunch, she consumed 75% turkey breast, garlic green beans, vanilla pudding, apple juice, and orange juice.    ASSESSED NUTRITION NEEDS PER APPROVED PRACTICE GUIDELINES:      Dosing Weight:  58.5 kg (1/30)  Estimated Energy Needs: 1527-1156 kcals (25-30 Kcal/Kg)  Justification: maintenance  Estimated Protein Needs: 58-76 grams protein (1-1.3 g pro/Kg)  Justification: hypercatabolism with critical illness and CKD      NEW FINDINGS:   2/4:  SLP bedside swallow = no appreciable dysphagia --> Rec Reg diet  2/5:  Hold TF 1200, but continue H20 flushes    The FT may possibly be removed tomorrow.    Previous Goals (2/3):   TF Osmolite 1.5 at 45  mL/hr will meet % estimated needs.  Evaluation: Not met, but diet now advanced to solids with fair oral intake.    Previous Nutrition Diagnosis (2/3):   Inadequate energy intake related to hypocaloric TF running at low rate and dextrose containing IVF currently off as evidenced by TF meeting 57% energy needs.  Evaluation: Improving    CURRENT NUTRITION DIAGNOSIS  Predicted suboptimal nutrient intake related to recent intubation and just starting solids.    INTERVENTIONS  Recommendations / Nutrition Prescription  Will be available for oral liquid nutritional supplements prn.    Implementation  Collaboration and Referral of Nutrition care - Pt was discussed during ICU interdisciplinary rounds this morning.  TF Orders - Canceled in Epic as TF on hold.    Goals  Pt will continue to consume at least 75% meals over the next 3-5 days.    MONITORING AND EVALUATION:  Progress towards goals will be monitored and evaluated per protocol and Practice Guidelines    Kathie Torres, ANASTACIO, LD, [facility]

## 2023-02-06 NOTE — PROGRESS NOTES
[facility]    Hospitalist Progress Note    Date of Service (when I saw the patient): 02/06/2023  Admit date: 1/28/2023    Interval History   Full details of events over last 24 hours outlined below.   Patient is eating but has NG still in place and receiving 200 ml free water q 2 hours.  She is hungry and eating and drinking.   She mentions she does not like the renal diet, but is actually on a regular diet.   She denies any current physical complaints or questions.    Stools less frequent today.   Denies any SOB, CP, abdominal pain, N/V.     Assessment & Plan   Polysubstance Overdose/Suicide Attempt  Distributive shock causing hypotension from Ca channel blocker overdose, RESOLVED  Acute encephalopathy caused by overdose, RESOLVED  Acute hypoxic resp failure with overdose. RESOLVED  * Patient indicating she is still thinking of harming herself.  She also has recent history of other self harm attempts.        Mental Health Consult Appreciated.    Would like to consider lithium as her mental health has severely decompensated since stopping it,    Discussed with nephrology. We can consider once we have DI under control with PO intake only. See below    Continue 1:1/Suicide precautions.    Will need discharge to mental health. She is agreeable to this.        Diabetes Insipidus  REI to 2.56 max on 2/2/23, resolving   CKD baseline Cr ~ 1.5-2.   Metabolic/lacitc acidosis associated with overdose, resolved. Recurring on 02/06/23  Hyperkalemia with acidosis, suggestive of RTA      Nephrology following and signed off on 2/5/23     S/p IVF and continued on TF flushes 200 ml every 2 hours.      Pull NG today and ENCOURAGE FLUIDS    K 5.8 this AM, bicarbonate 21 on 02/06/23     Start NaBicarbonate 650 mg BID on 02/06/23     Stop heparin, can cause RTA    Recheck K tonight. If K > 5.5, start Lokelma (sodium zirconium cyclosilicate)  10 mg TID.     Maintain on telemetry until K consistently < 5.5      Follow up BMP in AM     Loose stools, RESOLVING  * No ABD pain. Suspect TF exacerbated.  TF's stopping today, monitor.     Suspected protein-calorie malnutrition  Prior hypoglycemia:  * TFs stopped on 2/5/23    Currently on regular diet > Change to low K diet given elevated K.     Appreciate nutrition consult, last seen on 2/6      Superficial thrombus of R cephalic vein:  * RUE edematous monitor,     R subclavian CVC removed on 2/5/23      Chronic anemia : Baseline Hgb ~ 10. No evidence of bleeding, periodically monitor.  No indication for a current transfusion.    Follow with initiation of ASA for DVT ppx.     Check iron, B12, and folate. Will also check TSH given above depression.     Recent Labs   Lab 02/06/23  1101 02/05/23  0216 02/04/23  0603 02/03/23  0518 02/02/23  0348 02/01/23  1930   HGB 8.9* 7.5* 7.9* 7.2* 7.3* 7.1*          Deconditioning:    PT ordered     Ambulate with assist QID     Clinically Significant Risk Factors        # Hyperkalemia: Highest K = 5.8 mmol/L in last 2 days, will monitor as appropriate       # Hypoalbuminemia: Lowest albumin = 2.3 g/dL at 2/1/2023  7:30 PM, will monitor as appropriate                     Diet: Orders Placed This Encounter      Regular Diet Adult     IVF: None   Suresh Catheter: Not present     DVT Prophylaxis: Stopped heparin subcutaneous, started ASA 81 mg daily.   Code Status: Full Code     Disposition: Expected discharge when lytes stable on PO regimen to Mental Health.   Communication: Discussed with psychiatry, nephrology and RN on 02/06/23    Anabell Camacho MD  Hospitalist Service  [facility]  Securely message with the [employer] (learn more here)  Text page via [employer] Paging/Directory    Medical Decision Making       60 MINUTES SPENT BY ME on the date of service doing chart review, history, exam, documentation & further activities per the note.   Assumed care and made multiple treatment care decisions and coordination  of care.     -Data reviewed today: I reviewed all new labs and imaging results over the last 24 hours. I personally reviewed no images or EKG's today.    Physical Exam   Temp: 98.2  F (36.8  C) Temp src: Axillary BP: (!) 133/95 Pulse: 73   Resp: 10 SpO2: 100 % O2 Device: None (Room air)    Vitals:    02/04/23 0600 02/05/23 0500 02/06/23 0400   Weight: 56.3 kg (124 lb 1.9 oz) 57 kg (125 lb 10.6 oz) 56.2 kg (123 lb 14.4 oz)     Vital Signs with Ranges  Temp:  [98.2  F (36.8  C)-99  F (37.2  C)] 98.2  F (36.8  C)  Pulse:  [72-94] 73  Resp:  [8-20] 10  BP: (104-153)/() 133/95  SpO2:  [97 %-100 %] 100 %  I/O last 3 completed shifts:  In: 3810 [P.O.:1120; I.V.:250; NG/GT:2215]  Out: 4650 [Urine:4650]    Today's Exam  Constitutional:  NAD,   Neuropsyche: flat affect, alert and oriented, answers questions appropriately.   Respiratory:  Breathing comfortably, good air exchange, no wheezes, no crackles.   Cardiovascular:  Regular rate and rhythm, no edema.  GI:  soft, NT/ND, BS normal  Skin/Integumen:  No acute rash or sign of bleeding.     Medications   All medications reviewed on 02/06/23      dextrose Stopped (02/03/23 0828)     sodium chloride 10 mL/hr at 02/03/23 0830       aspirin  81 mg Oral Daily     pantoprazole  40 mg Oral QAM AC     sodium bicarbonate  650 mg Oral BID     sodium chloride (PF)  10-40 mL Intracatheter Q8H     PRN Meds: acetaminophen **OR** acetaminophen, bisacodyl, dextrose, glucose **OR** dextrose **OR** glucagon, HYDROmorphone, magnesium hydroxide, magnesium sulfate, naloxone **OR** naloxone **OR** naloxone **OR** naloxone, ondansetron **OR** ondansetron, polyethylene glycol, senna-docusate **OR** [DISCONTINUED] senna-docusate, sodium chloride (PF), sodium chloride (PF)    Data   Recent Labs   Lab 02/06/23  0549 02/06/23  0121 02/06/23  0023 02/05/23  1818 02/05/23  1637 02/05/23  1120 02/05/23  0619 02/05/23  0216 02/04/23  2218 02/04/23  2215 02/04/23  0912 02/04/23  0603 02/03/23  0639  02/03/23  0518 02/01/23 2032 02/01/23 1930 01/31/23  0432 01/31/23  0401   WBC  --   --   --   --   --   --   --  10.8  --   --   --  8.1  --  9.1   < > 9.7   < > 7.3   HGB  --   --   --   --   --   --   --  7.5*  --   --   --  7.9*  --  7.2*   < > 7.1*   < > 7.3*   MCV  --   --   --   --   --   --   --  68*  --   --   --  72*  --  73*   < > 73*   < > 68*   PLT  --   --   --   --   --   --   --  288  --   --   --  305  --  270   < > 244   < > 234     --  136 136  --    < > 143 138  --  137   < > 148*  148*   < > 149*   < > 148*   < > 141   POTASSIUM 5.8*  --   --   --   --   --  5.1 5.3  --  5.0   < > 4.9  4.9   < > 4.3   < > 3.2*   < > 4.5   CHLORIDE  --   --   --   --   --   --  111* 106  --  104   < > 115*  115*   < > 117*   < > 116*   < > 110*   CO2  --   --   --   --   --   --  23 24  --  25   < > 25  25   < > 26   < > 22   < > 19*   BUN  --   --   --   --   --   --  42.3* 36.9*  --  36.1*   < > 36.4*  36.4*   < > 26.1*   < > 30.0*   < > 42.7*   CR  --   --   --   --   --   --  2.17* 2.24*  --  2.29*   < > 2.35*  2.35*   < > 2.51*   < > 2.66*   < > 2.45*   ANIONGAP  --   --   --   --   --   --  9 8  --  8   < > 8  8   < > 6*   < > 10   < > 12   ISSA  --   --   --   --   --   --  9.6 9.3  --  9.1   < > 9.8  9.8   < > 9.6   < > 9.3   < > 9.2   GLC 94 100*  --   --  102*   < > 146* 131*   < > 127*   < > 147*  147*   < > 112*   < > 113*   < > 164*   ALBUMIN  --   --   --   --   --   --   --   --   --   --   --  2.6*  --   --   --  2.3*   < > 2.5*   PROTTOTAL  --   --   --   --   --   --   --   --   --   --   --   --   --   --   --   --   --  4.4*   BILITOTAL  --   --   --   --   --   --   --   --   --   --   --   --   --   --   --   --   --  0.5   ALKPHOS  --   --   --   --   --   --   --   --   --   --   --   --   --   --   --   --   --  129*   ALT  --   --   --   --   --   --   --   --   --   --   --   --   --   --   --   --   --  <5*   AST  --   --   --   --   --   --   --   --   --   --   --    --   --   --   --   --   --  21    < > = values in this interval not displayed.       No results found for this or any previous visit (from the past 24 hour(s)).

## 2023-02-06 NOTE — PLAN OF CARE
Goal Outcome Evaluation: progressing. Ready for floor transfer.    Shift Summary  [id number]     No significant change overnight ex low grade fever- tmax 99. Continued working with patient on pulmonary toilet, lots of encouragement. Somnolent still but easily aroused. Lung sounds coarse in the upper lobes bilaterally. Incontinent of bowel and bladder, purwick in place, polyuric. No bowel movement this shift. monitoring sodium q6h for DI/polyuria. Psych reconsulted 2/5    Bedside attendant present for suicide precautions    Plan: Continuing full restorative cares. Transfer orders in place for medical unit. Hoping to remove NG today 2/6 - but overnight did not take in much water orally.

## 2023-02-06 NOTE — PLAN OF CARE
Pt up to chair with all meals today with 1 assist, gait belt, orthotic brace, and walker, well tolerated, remains on suicide precautions, sitter at bedside, TF weaned off to encourage regular diet, free water flushes continue, central line and grove removed, orders received to transfer out of ICU, awaiting general medical bed availability.  Pt's brother updated at bedside

## 2023-02-06 NOTE — CONSULTS
Initial Psychiatric Consult   Consult date: February 6, 2023         Reason for Consult, requesting source:    Suicide attempt/OD  Requesting source: Anabell Camacho    Labs and imaging reviewed. Patient seen and evaluated by Sandra Cox MD          HPI:   This is a 73 yo female who presented on 1/28/23 with a massive, intentional polypharmacy od .  Pt has a history of bipolar illness, and took a combinaton of lamictal, amlodipine, haldol, and adderall.  She had been in the ED 4 days prior for eval of SI.  Pt has had a difficult course related to calcium channel blocker od, and has been in the ICU, vented and on pressors with AHRF, REI on CRF, and encephalopthy.  We are asked to see for assistance with care for her depression, and appropriate disposition when she is ready medically to discharge.    Patient seen today along with her brother.  Both are very good historians.  It sounds like the patient was taken off lithium, her longtime medication for bipolar management, about a year ago.  This was related to worsening kidney function.  She has worked with her psychiatrist to try to find another regimen that was helpful.  She has been on Lamictal, as well as other medications.  None of these medications help with her depression.    Both the patient and her brother are in favor of her going back on lithium if this is a possibility.  I also would be in favor of it as well.  Clearly, the patient's bipolar illness is severe, and potentially lethal.  She was quite well managed on the lithium prior to it being discontinued.            Past Psychiatric History:     Pt had recently been in the ED on 1/24 with thoughts of drowning herself while in a swimming pool.  She has an outpt psychiatrist, and a therapist, and had discharged after denying SI, and wood for safety.   Patient is seen by Dr. Trent Gallo for outpatient psychiatric management.        Substance Use and History:   Negative        Past  Medical History:   PAST MEDICAL HISTORY:   Past Medical History:   Diagnosis Date     Benign essential hypertension 09/2018    added norvasc 9/18     Bipolar affective disorder (H)     hosp 1993, Dr. Aftab Deal     Cancer (H) 1996    DCIS, left breast     Chronic kidney disease, stage 3a (H)     seen by renal Dr. 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    felt due to lithium     Thalassaemia trait      Vitamin D deficiency        PAST SURGICAL HISTORY:   Past Surgical History:   Procedure Laterality Date     BIOPSY  1994    left breast     BREAST SURGERY      lumpectomy x 4     COLONOSCOPY  2021     COLONOSCOPY N/A 6/17/2022    Procedure: COLONOSCOPY, WITH POLYPECTOMY AND BIOPSY;  Surgeon: Panchito Gu MD;  Location:  GI     EYE SURGERY  2018    retina tear     LAPAROTOMY EXPLORATORY N/A 8/24/2022    Procedure: Exploratory laparotomy, REPAIR OF PERFORATED ULCER;  Surgeon: Ron Vidal MD;  Location:  OR     left hand surgery      last 1974             Family History:   FAMILY HISTORY:   Family History   Problem Relation Age of Onset     Cerebrovascular Disease Mother      Hypertension Father      Sleep Apnea Brother      Breast Cancer Maternal Aunt         x 2     Breast Cancer Other         Maternal Aunt     Hyperlipidemia Niece        Family Psychiatric History:         Social History:   SOCIAL HISTORY:   Social History     Tobacco Use     Smoking status: Never     Smokeless tobacco: Never   Substance Use Topics      Alcohol use: No            Physical ROS:   The 10 point Review of Systems is negative other than noted in the HPI or here.           Medications:       heparin ANTICOAGULANT  5,000 Units Subcutaneous Q8H     pantoprazole  40 mg Oral QAM AC     sodium chloride (PF)  10-40 mL Intracatheter Q8H              Allergies:     Allergies   Allergen Reactions     No Known Allergies           Labs:     Recent Results (from the past 48 hour(s))   Glucose by meter    Collection Time: 02/04/23 11:08 AM   Result Value Ref Range    GLUCOSE BY METER POCT 197 (H) 70 - 99 mg/dL   Basic metabolic panel    Collection Time: 02/04/23  2:20 PM   Result Value Ref Range    Sodium 139 136 - 145 mmol/L    Potassium 4.6 3.4 - 5.3 mmol/L    Chloride 106 98 - 107 mmol/L    Carbon Dioxide (CO2) 21 (L) 22 - 29 mmol/L    Anion Gap 12 7 - 15 mmol/L    Urea Nitrogen 36.6 (H) 8.0 - 23.0 mg/dL    Creatinine 2.38 (H) 0.51 - 0.95 mg/dL    Calcium 9.0 8.8 - 10.2 mg/dL    Glucose 222 (H) 70 - 99 mg/dL    GFR Estimate 21 (L) >60 mL/min/1.73m2   Glucose by meter    Collection Time: 02/04/23  2:23 PM   Result Value Ref Range    GLUCOSE BY METER POCT 216 (H) 70 - 99 mg/dL   Glucose by meter    Collection Time: 02/04/23  5:52 PM   Result Value Ref Range    GLUCOSE BY METER POCT 138 (H) 70 - 99 mg/dL   Basic metabolic panel    Collection Time: 02/04/23  5:53 PM   Result Value Ref Range    Sodium 138 136 - 145 mmol/L    Potassium 4.7 3.4 - 5.3 mmol/L    Chloride 107 98 - 107 mmol/L    Carbon Dioxide (CO2) 23 22 - 29 mmol/L    Anion Gap 8 7 - 15 mmol/L    Urea Nitrogen 39.0 (H) 8.0 - 23.0 mg/dL    Creatinine 2.29 (H) 0.51 - 0.95 mg/dL    Calcium 9.2 8.8 - 10.2 mg/dL    Glucose 138 (H) 70 - 99 mg/dL    GFR Estimate 22 (L) >60 mL/min/1.73m2   Basic metabolic panel    Collection Time: 02/04/23 10:15 PM   Result Value Ref Range    Sodium 137 136 - 145 mmol/L    Potassium 5.0 3.4 - 5.3 mmol/L    Chloride 104 98 - 107 mmol/L    Carbon Dioxide (CO2) 25 22 - 29 mmol/L     Anion Gap 8 7 - 15 mmol/L    Urea Nitrogen 36.1 (H) 8.0 - 23.0 mg/dL    Creatinine 2.29 (H) 0.51 - 0.95 mg/dL    Calcium 9.1 8.8 - 10.2 mg/dL    Glucose 127 (H) 70 - 99 mg/dL    GFR Estimate 22 (L) >60 mL/min/1.73m2   Glucose by meter    Collection Time: 02/04/23 10:18 PM   Result Value Ref Range    GLUCOSE BY METER POCT 128 (H) 70 - 99 mg/dL   Basic metabolic panel    Collection Time: 02/05/23  2:16 AM   Result Value Ref Range    Sodium 138 136 - 145 mmol/L    Potassium 5.3 3.4 - 5.3 mmol/L    Chloride 106 98 - 107 mmol/L    Carbon Dioxide (CO2) 24 22 - 29 mmol/L    Anion Gap 8 7 - 15 mmol/L    Urea Nitrogen 36.9 (H) 8.0 - 23.0 mg/dL    Creatinine 2.24 (H) 0.51 - 0.95 mg/dL    Calcium 9.3 8.8 - 10.2 mg/dL    Glucose 131 (H) 70 - 99 mg/dL    GFR Estimate 23 (L) >60 mL/min/1.73m2   CBC with platelets    Collection Time: 02/05/23  2:16 AM   Result Value Ref Range    WBC Count 10.8 4.0 - 11.0 10e3/uL    RBC Count 3.45 (L) 3.80 - 5.20 10e6/uL    Hemoglobin 7.5 (L) 11.7 - 15.7 g/dL    Hematocrit 23.4 (L) 35.0 - 47.0 %    MCV 68 (L) 78 - 100 fL    MCH 21.7 (L) 26.5 - 33.0 pg    MCHC 32.1 31.5 - 36.5 g/dL    RDW 14.5 10.0 - 15.0 %    Platelet Count 288 150 - 450 10e3/uL   Phosphorus    Collection Time: 02/05/23  2:16 AM   Result Value Ref Range    Phosphorus 2.5 2.5 - 4.5 mg/dL   Magnesium    Collection Time: 02/05/23  2:16 AM   Result Value Ref Range    Magnesium 2.1 1.7 - 2.3 mg/dL   Basic metabolic panel    Collection Time: 02/05/23  6:19 AM   Result Value Ref Range    Sodium 143 136 - 145 mmol/L    Potassium 5.1 3.4 - 5.3 mmol/L    Chloride 111 (H) 98 - 107 mmol/L    Carbon Dioxide (CO2) 23 22 - 29 mmol/L    Anion Gap 9 7 - 15 mmol/L    Urea Nitrogen 42.3 (H) 8.0 - 23.0 mg/dL    Creatinine 2.17 (H) 0.51 - 0.95 mg/dL    Calcium 9.6 8.8 - 10.2 mg/dL    Glucose 146 (H) 70 - 99 mg/dL    GFR Estimate 24 (L) >60 mL/min/1.73m2   Sodium    Collection Time: 02/05/23 11:20 AM   Result Value Ref Range    Sodium 134 (L) 136 - 145  mmol/L   Glucose by meter    Collection Time: 02/05/23 11:25 AM   Result Value Ref Range    GLUCOSE BY METER POCT 211 (H) 70 - 99 mg/dL   Glucose by meter    Collection Time: 02/05/23  4:37 PM   Result Value Ref Range    GLUCOSE BY METER POCT 102 (H) 70 - 99 mg/dL   Sodium    Collection Time: 02/05/23  6:18 PM   Result Value Ref Range    Sodium 136 136 - 145 mmol/L   Sodium    Collection Time: 02/06/23 12:23 AM   Result Value Ref Range    Sodium 136 136 - 145 mmol/L   Glucose by meter    Collection Time: 02/06/23  1:21 AM   Result Value Ref Range    GLUCOSE BY METER POCT 100 (H) 70 - 99 mg/dL   Potassium    Collection Time: 02/06/23  5:49 AM   Result Value Ref Range    Potassium 5.8 (H) 3.4 - 5.3 mmol/L   Magnesium    Collection Time: 02/06/23  5:49 AM   Result Value Ref Range    Magnesium 2.2 1.7 - 2.3 mg/dL   Phosphorus    Collection Time: 02/06/23  5:49 AM   Result Value Ref Range    Phosphorus 4.0 2.5 - 4.5 mg/dL   Sodium    Collection Time: 02/06/23  5:49 AM   Result Value Ref Range    Sodium 141 136 - 145 mmol/L   Glucose by meter    Collection Time: 02/06/23  5:49 AM   Result Value Ref Range    GLUCOSE BY METER POCT 94 70 - 99 mg/dL          Physical and Psychiatric Examination:     BP (!) 133/95   Pulse 73   Temp 98.2  F (36.8  C) (Axillary)   Resp 10   Wt 56.2 kg (123 lb 14.4 oz)   SpO2 100%   BMI 21.27 kg/m    Weight is 123 lbs 14.38 oz  Body mass index is 21.27 kg/m .    Physical Exam:  I have reviewed the physical exam as documented by by the medical team and agree with findings and assessment and have no additional findings to add at this time.    Mental Status Exam:    Appearance: awake, alert, appeared as age stated, awake, alert, cooperative and mild distress  Attitude:  cooperative  Eye Contact:  good  Mood:  depressed  Affect:  mood congruent and intensity is flat  Speech:  clear, coherent and decreased prosody  Language: Fluent in english   Psychomotor Behavior:  physical  retardation  Thought Process:  logical, linear and goal oriented  Associations:  no loose associations  Thought Content:  no evidence of psychotic thought, no auditory hallucinations present, visual hallucinations present and Patient is status post serious suicide attempt  Insight:  good  Judgement:  intact  Oriented to:  time, person, and place  Attention Span and Concentration:  intact  Recent and Remote Memory:  intact  Fund of Knowledge: Appropriate   Gait and Station: Supine in bed               DSM-5 Diagnosis:   296.53 Bipolar I Disorder Current or Most Recent Episode Depressed, Severe with psychotic features, toxic metabolic encephalopathy which is resolving, Intentional overdose, subsequent encounter          Assessment:   This is a 72-year-old female with a history of bipolar illness spanning 50 years.  Patient was initially diagnosed with schizophrenia when she first became ill.  Later, this diagnosis was modified to bipolar illness, which both she and her brother feel is accurate.  She does have a history of ECT many years ago.  It is unclear whether this was helpful or not.  What has been helpful has been lithium.  This medication was discontinued a year ago due to worsening kidney function.  The patient has not been able to stabilize since going off this medication, whereas she was stable and functional on lithium.    Patient and her brother, who are both very high functioning, would like her to go back on lithium.  I am in agreement.  We will reach out to the hospitalist to better understand what her options are.  Regardless, she will need to go to an inpatient psychiatric bed for further stabilization after she is medically ready to discharge.          Summary of Recommendations:   1.  Would like to explore restarting lithium for this patient.  We would obviously have to start with a very small dose, and titrate slowly to make sure that her kidneys can tolerate, and that we maintain an appropriate  lithium level.    2.  I do recommend continuing with suicide precautions.  Patient will require an inpatient psychiatric hospitalization when she is medically ready to be discharged.      Sandra Cox MD  Consult/Liaison Psychiatry and Addiction Medicine  [facility]

## 2023-02-07 ENCOUNTER — APPOINTMENT (OUTPATIENT)
Dept: PHYSICAL THERAPY | Facility: CLINIC | Age: 73
DRG: 917 | End: 2023-02-07
Payer: MEDICARE

## 2023-02-07 LAB
ANION GAP SERPL CALCULATED.3IONS-SCNC: 11 MMOL/L (ref 7–15)
BUN SERPL-MCNC: 50.3 MG/DL (ref 8–23)
CALCIUM SERPL-MCNC: 9.8 MG/DL (ref 8.8–10.2)
CHLORIDE SERPL-SCNC: 109 MMOL/L (ref 98–107)
CREAT SERPL-MCNC: 2.16 MG/DL (ref 0.51–0.95)
DEPRECATED HCO3 PLAS-SCNC: 21 MMOL/L (ref 22–29)
FOLATE SERPL-MCNC: 22.8 NG/ML (ref 4.6–34.8)
GFR SERPL CREATININE-BSD FRML MDRD: 24 ML/MIN/1.73M2
GLUCOSE BLDC GLUCOMTR-MCNC: 88 MG/DL (ref 70–99)
GLUCOSE BLDC GLUCOMTR-MCNC: 94 MG/DL (ref 70–99)
GLUCOSE SERPL-MCNC: 99 MG/DL (ref 70–99)
HGB BLD-MCNC: 8.6 G/DL (ref 11.7–15.7)
IRON BINDING CAPACITY (ROCHE): 245 UG/DL (ref 240–430)
IRON SATN MFR SERPL: 28 % (ref 15–46)
IRON SERPL-MCNC: 69 UG/DL (ref 37–145)
MAGNESIUM SERPL-MCNC: 2.4 MG/DL (ref 1.7–2.3)
PHOSPHATE SERPL-MCNC: 4.5 MG/DL (ref 2.5–4.5)
POTASSIUM SERPL-SCNC: 5.7 MMOL/L (ref 3.4–5.3)
SODIUM SERPL-SCNC: 141 MMOL/L (ref 136–145)
TSH SERPL DL<=0.005 MIU/L-ACNC: 2.57 UIU/ML (ref 0.3–4.2)
VIT B12 SERPL-MCNC: 1249 PG/ML (ref 232–1245)

## 2023-02-07 PROCEDURE — 250N000013 HC RX MED GY IP 250 OP 250 PS 637: Performed by: STUDENT IN AN ORGANIZED HEALTH CARE EDUCATION/TRAINING PROGRAM

## 2023-02-07 PROCEDURE — 83550 IRON BINDING TEST: CPT | Performed by: INTERNAL MEDICINE

## 2023-02-07 PROCEDURE — 82607 VITAMIN B-12: CPT | Performed by: INTERNAL MEDICINE

## 2023-02-07 PROCEDURE — 97530 THERAPEUTIC ACTIVITIES: CPT | Mod: GP

## 2023-02-07 PROCEDURE — 97116 GAIT TRAINING THERAPY: CPT | Mod: GP

## 2023-02-07 PROCEDURE — 36415 COLL VENOUS BLD VENIPUNCTURE: CPT | Performed by: INTERNAL MEDICINE

## 2023-02-07 PROCEDURE — 120N000001 HC R&B MED SURG/OB

## 2023-02-07 PROCEDURE — 85018 HEMOGLOBIN: CPT | Performed by: INTERNAL MEDICINE

## 2023-02-07 PROCEDURE — 99232 SBSQ HOSP IP/OBS MODERATE 35: CPT | Performed by: STUDENT IN AN ORGANIZED HEALTH CARE EDUCATION/TRAINING PROGRAM

## 2023-02-07 PROCEDURE — 83735 ASSAY OF MAGNESIUM: CPT | Performed by: INTERNAL MEDICINE

## 2023-02-07 PROCEDURE — 84100 ASSAY OF PHOSPHORUS: CPT | Performed by: INTERNAL MEDICINE

## 2023-02-07 PROCEDURE — 84443 ASSAY THYROID STIM HORMONE: CPT | Performed by: INTERNAL MEDICINE

## 2023-02-07 PROCEDURE — 80048 BASIC METABOLIC PNL TOTAL CA: CPT | Performed by: INTERNAL MEDICINE

## 2023-02-07 PROCEDURE — 99233 SBSQ HOSP IP/OBS HIGH 50: CPT | Performed by: PSYCHIATRY & NEUROLOGY

## 2023-02-07 PROCEDURE — 250N000013 HC RX MED GY IP 250 OP 250 PS 637: Performed by: PSYCHIATRY & NEUROLOGY

## 2023-02-07 PROCEDURE — 99232 SBSQ HOSP IP/OBS MODERATE 35: CPT | Performed by: INTERNAL MEDICINE

## 2023-02-07 PROCEDURE — 82746 ASSAY OF FOLIC ACID SERUM: CPT | Performed by: INTERNAL MEDICINE

## 2023-02-07 PROCEDURE — 250N000013 HC RX MED GY IP 250 OP 250 PS 637: Performed by: INTERNAL MEDICINE

## 2023-02-07 RX ORDER — PANTOPRAZOLE SODIUM 40 MG/1
40 TABLET, DELAYED RELEASE ORAL
Status: DISCONTINUED | OUTPATIENT
Start: 2023-02-07 | End: 2023-02-23 | Stop reason: HOSPADM

## 2023-02-07 RX ORDER — ALPRAZOLAM 0.5 MG/1
.125-.25 TABLET, EXTENDED RELEASE ORAL 2 TIMES DAILY PRN
Status: DISCONTINUED | OUTPATIENT
Start: 2023-02-07 | End: 2023-02-15

## 2023-02-07 RX ORDER — LITHIUM CARBONATE 150 MG/1
150 CAPSULE ORAL
Status: DISCONTINUED | OUTPATIENT
Start: 2023-02-07 | End: 2023-02-10

## 2023-02-07 RX ORDER — DESMOPRESSIN ACETATE 0.1 MG/ML
10 SOLUTION NASAL AT BEDTIME
Status: DISCONTINUED | OUTPATIENT
Start: 2023-02-07 | End: 2023-02-09

## 2023-02-07 RX ADMIN — SODIUM ZIRCONIUM CYCLOSILICATE 10 G: 5 POWDER, FOR SUSPENSION ORAL at 21:55

## 2023-02-07 RX ADMIN — PANTOPRAZOLE SODIUM 40 MG: 40 TABLET, DELAYED RELEASE ORAL at 08:41

## 2023-02-07 RX ADMIN — LITHIUM CARBONATE 150 MG: 150 CAPSULE, GELATIN COATED ORAL at 20:14

## 2023-02-07 RX ADMIN — SODIUM ZIRCONIUM CYCLOSILICATE 10 G: 5 POWDER, FOR SUSPENSION ORAL at 16:21

## 2023-02-07 RX ADMIN — SODIUM BICARBONATE 650 MG TABLET 650 MG: at 20:14

## 2023-02-07 RX ADMIN — SODIUM BICARBONATE 650 MG TABLET 650 MG: at 08:01

## 2023-02-07 RX ADMIN — DESMOPRESSIN ACETATE 10 MCG: 10 SPRAY NASAL at 21:55

## 2023-02-07 RX ADMIN — ASPIRIN 81 MG: 81 TABLET, COATED ORAL at 08:01

## 2023-02-07 ASSESSMENT — ACTIVITIES OF DAILY LIVING (ADL)
ADLS_ACUITY_SCORE: 52

## 2023-02-07 NOTE — PROGRESS NOTES
Renal Medicine Progress Note            Assessment/Plan:     Assessment:  Diana Santiago is a 72-year-old woman with PMH bipolar disorder/depression, CKD 4m chronic partial DI admitted 1/28/2023 with intentional polysubstance overdose, particularly calcium channel blocker overdose in the setting of severe depression.    Partial DI  Earlier in admission had hypernatremia with polyuria due to partial DI and lack of free water access.  Required NG placement for administration of free water.  Currently extubated and tolerating p.o., NG removed and sodium stable, patient drinking to thirst.  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  History of nocturia, polyuria chronically, patient compensates for this by increasing water intake.  When she has access to free water, sodium is typically well controlled.  She does have severe nocturia, awakens 8-10 times per night which severely impairs her sleep and likely contributes to her depression.  We will start nighttime DDAVP to see if we can improve her nocturia.  Amiloride may be an option in the future once restarting lithium, however, would not start at this time given hyperkalemia.  -I/Os   -Daily BMP  -Intranasal DDAVP 10 mcg at bedtime, will assess its effect on nocturia and adjust dose as needed    Intentional polysubstance overdose  Severe depression and suicidality  Bipolar disorder  Mood had been stable on lithium previously, she has tried many medications in the past without desired effect.  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  Has had severe suicidal ideation and attempts off of lithium despite treatment with other medications.  Discussed at length with her and her brother resuming lithium at the risk of worsening renal function.  There is a chance that her CKD could progressed to ESRD requiring dialysis, they are aware of this risk and willing to assume that risk.  Discussed forms of dialysis just for future planning.  Did  discuss transplant briefly, mental health will need to be significantly improved prior to being a transplant candidate.  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  Likely related to heparin use.  Heparin discontinued and bicarb started 2/6.  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  Follows with Dr. Cedeno at [facility] nephrology.  Stopped lithium roughly 1 year ago and creatinine has been stable between 1.8-2.2, eGFR 22-30.   -Avoid nephrotoxins as able  -Renally dose medications    Plan:  -Patient willing to trial lithium at lower doses with frequent monitoring at the risk of her kidneys, this is reasonable given her severe suicidality  -Start Lokelma 10 g 3 times daily  -We will trial DDAVP at bedtime to improve nocturia  -Continue sodium bicarb 650 mg twice daily        Interval History:   -No acute events overnight  -Continues to have severe nocturia and polyuria with roughly 5 L urine output per day  -NG removed yesterday, she has been drinking to thirst and maintaining her sodiums in normal range  -Denies shortness of breath  -Denies nausea  -Appetite good  -Discussed at length with her and her brother Acosta at bedside regarding possible progression to ESRD if lithium resumed.  Questions answered.          Medications and Allergies:       aspirin  81 mg Oral Daily     desmopressin  10 mcg Alternating Nostrils At Bedtime     pantoprazole  40 mg Oral QAM AC     sodium bicarbonate  650 mg Oral BID     sodium chloride (PF)  10-40 mL Intracatheter Q8H     sodium zirconium cyclosilicate  10 g Oral TID        Allergies   Allergen Reactions     No Known Allergies             Physical Exam:   Vitals  were reviewed  /89 (BP Location: Left arm)   Pulse 73   Temp 97.7  F (36.5  C) (Oral)   Resp 14   Wt 56.2 kg (123 lb 14.4 oz)   SpO2 100%   BMI 21.27 kg/m      Wt Readings from Last 3 Encounters:   02/06/23 56.2 kg (123 lb 14.4 oz)   01/24/23 54.4 kg (120 lb)   10/20/22 51.3 kg (113 lb)       Intake/Output Summary (Last 24 hours) at 2/7/2023 1146  Last data filed at 2/7/2023 0800  Gross per 24 hour   Intake 450 ml   Output 1750 ml   Net -1300 ml       GENERAL APPEARANCE: no acute distress, resting comfortably in bed   HEENT: MMM  RESP: clear  CV: RRR, no murmurs  EXTREMITIES/SKIN: no edema  NEURO:  Alert, oriented, normal speech  LINES:  purewick with clear yellow urine           Data:     BMP  Recent Labs   Lab 02/07/23  0543 02/07/23  0512 02/06/23  1802 02/06/23  1101 02/06/23  0549 02/06/23  0121 02/06/23  0023 02/05/23  1120 02/05/23  0619 02/05/23 0216     --   --  140 141  --  136   < > 143 138   POTASSIUM 5.7*  --  5.7* 5.5* 5.8*  --   --   --  5.1 5.3   CHLORIDE 109*  --   --  109*  --   --   --   --  111* 106   ISSA 9.8  --   --  9.6  --   --   --   --  9.6 9.3   CO2 21*  --   --  21*  --   --   --   --  23 24   BUN 50.3*  --   --  42.5*  --   --   --   --  42.3* 36.9*   CR 2.16*  --   --  1.88*  --   --   --   --  2.17* 2.24*   GLC 99 94  --  103* 94   < >  --    < > 146* 131*    < > = values in this interval not displayed.     CBC  Recent Labs   Lab 02/07/23  0543 02/06/23  1101 02/05/23  0216 02/04/23  0603 02/03/23  0518 02/02/23  0348   WBC  --   --  10.8 8.1 9.1 10.8   HGB 8.6* 8.9* 7.5* 7.9* 7.2* 7.3*   HCT  --   --  23.4* 26.3* 24.5* 23.7*   MCV  --   --  68* 72* 73* 71*   PLT  --   --  288 305 270 245     Lab Results   Component Value Date    AST 21 01/31/2023    ALT <5 (L) 01/31/2023    ALKPHOS 129 (H) 01/31/2023    BILITOTAL 0.5 01/31/2023     Lab Results   Component Value Date    INR 0.93 09/12/2022       Attestation:  I have reviewed today's vital signs, notes, medications,  labs and imaging.    Tracy Malik MD  [facility] - Nephrology  Office: [phone]

## 2023-02-07 NOTE — PROGRESS NOTES
Psychiatric Progress Note  Consult date: February 6, 2023         Reason for Consult, requesting source:    Suicide attempt/OD  Requesting source: Anabell Camacho    Labs and imaging reviewed. Patient seen and evaluated by Sandra Cox MD          HPI:   This is a 71 yo female who presented on 1/28/23 with a massive, intentional polypharmacy od .  Pt has a history of bipolar illness, and took a combinaton of lamictal, amlodipine, haldol, and adderall.  She had been in the ED 4 days prior for eval of SI.  Pt has had a difficult course related to calcium channel blocker od, and has been in the ICU, vented and on pressors with AHRF, REI on CRF, and encephalopthy.  We are asked to see for assistance with care for her depression, and appropriate disposition when she is ready medically to discharge.    Patient seen today along with her brother.  Both are very good historians.  It sounds like the patient was taken off lithium, her longtime medication for bipolar management, about a year ago.  This was related to worsening kidney function.  She has worked with her psychiatrist to try to find another regimen that was helpful.  She has been on Lamictal, as well as other medications.  None of these medications help with her depression.    Both the patient and her brother are in favor of her going back on lithium if this is a possibility.  I also would be in favor of it as well.  Clearly, the patient's bipolar illness is severe, and potentially lethal.  She was quite well managed on the lithium prior to it being discontinued.    2/7/23: Patient seen today for follow-up in the ICU.  She is waiting for a bed on the medical floor, and no longer requires a critical level of care.  She was awake, and tell me that she is feeling pretty anxious, which is her baseline when she is not on lithium.  She also said that the kidney doctor has been by, and they discussed going back on lithium.  She and I discussed low-dose Xanax to  help with her anxiety for now.  I discussed risk versus benefits of this medication and indicated to her this is a short-term medication, and that she does not have to take it if she does not care for it or if it does not work.            Past Psychiatric History:     Pt had recently been in the ED on 1/24 with thoughts of drowning herself while in a swimming pool.  She has an outpt psychiatrist, and a therapist, and had discharged after denying SI, and wood for safety.   Patient is seen by Dr. Trent Gallo for outpatient psychiatric management.        Substance Use and History:   Negative        Past Medical History:   PAST MEDICAL HISTORY:   Past Medical History:   Diagnosis Date     Benign essential hypertension 09/2018    added norvasc 9/18     Bipolar affective disorder (H)     hosp 1993, Dr. Aftab Deal     Cancer (H) 1996    DCIS, left breast     Chronic kidney disease, stage 3a (H)     seen by renal Dr. 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    felt due to lithium     Thalassaemia trait      Vitamin D deficiency        PAST SURGICAL HISTORY:   Past Surgical History:   Procedure Laterality Date     BIOPSY  1994    left breast     BREAST SURGERY      lumpectomy x 4     COLONOSCOPY  2021     COLONOSCOPY N/A 6/17/2022    Procedure: COLONOSCOPY, WITH POLYPECTOMY AND BIOPSY;  Surgeon: Panchito Gu MD;  Location:  GI     EYE SURGERY  2018    retina tear     LAPAROTOMY EXPLORATORY N/A 8/24/2022    Procedure: Exploratory  laparotomy, REPAIR OF PERFORATED ULCER;  Surgeon: Ron Vidal MD;  Location: [facility] OR     left hand surgery      last 1974             Family History:   FAMILY HISTORY:   Family History   Problem Relation Age of Onset     Cerebrovascular Disease Mother      Hypertension Father      Sleep Apnea Brother      Breast Cancer Maternal Aunt         x 2     Breast Cancer Other         Maternal Aunt     Hyperlipidemia Niece        Family Psychiatric History:         Social History:   SOCIAL HISTORY:   Social History     Tobacco Use     Smoking status: Never     Smokeless tobacco: Never   Substance Use Topics     Alcohol use: No            Physical ROS:   The 10 point Review of Systems is negative other than noted in the HPI or here.           Medications:       aspirin  81 mg Oral Daily     desmopressin  10 mcg Alternating Nostrils At Bedtime     pantoprazole  40 mg Oral QAM AC     sodium bicarbonate  650 mg Oral BID     sodium chloride (PF)  10-40 mL Intracatheter Q8H     sodium zirconium cyclosilicate  10 g Oral TID              Allergies:     Allergies   Allergen Reactions     No Known Allergies           Labs:     Recent Results (from the past 48 hour(s))   Glucose by meter    Collection Time: 02/05/23  4:37 PM   Result Value Ref Range    GLUCOSE BY METER POCT 102 (H) 70 - 99 mg/dL   Sodium    Collection Time: 02/05/23  6:18 PM   Result Value Ref Range    Sodium 136 136 - 145 mmol/L   Sodium    Collection Time: 02/06/23 12:23 AM   Result Value Ref Range    Sodium 136 136 - 145 mmol/L   Glucose by meter    Collection Time: 02/06/23  1:21 AM   Result Value Ref Range    GLUCOSE BY METER POCT 100 (H) 70 - 99 mg/dL   Potassium    Collection Time: 02/06/23  5:49 AM   Result Value Ref Range    Potassium 5.8 (H) 3.4 - 5.3 mmol/L   Magnesium    Collection Time: 02/06/23  5:49 AM   Result Value Ref Range    Magnesium 2.2 1.7 - 2.3 mg/dL   Phosphorus    Collection Time: 02/06/23  5:49 AM   Result Value Ref Range     Phosphorus 4.0 2.5 - 4.5 mg/dL   Sodium    Collection Time: 02/06/23  5:49 AM   Result Value Ref Range    Sodium 141 136 - 145 mmol/L   Glucose by meter    Collection Time: 02/06/23  5:49 AM   Result Value Ref Range    GLUCOSE BY METER POCT 94 70 - 99 mg/dL   Basic metabolic panel    Collection Time: 02/06/23 11:01 AM   Result Value Ref Range    Sodium 140 136 - 145 mmol/L    Potassium 5.5 (H) 3.4 - 5.3 mmol/L    Chloride 109 (H) 98 - 107 mmol/L    Carbon Dioxide (CO2) 21 (L) 22 - 29 mmol/L    Anion Gap 10 7 - 15 mmol/L    Urea Nitrogen 42.5 (H) 8.0 - 23.0 mg/dL    Creatinine 1.88 (H) 0.51 - 0.95 mg/dL    Calcium 9.6 8.8 - 10.2 mg/dL    Glucose 103 (H) 70 - 99 mg/dL    GFR Estimate 28 (L) >60 mL/min/1.73m2   Hemoglobin    Collection Time: 02/06/23 11:01 AM   Result Value Ref Range    Hemoglobin 8.9 (L) 11.7 - 15.7 g/dL   Potassium    Collection Time: 02/06/23  6:02 PM   Result Value Ref Range    Potassium 5.7 (H) 3.4 - 5.3 mmol/L   Glucose by meter    Collection Time: 02/07/23  5:12 AM   Result Value Ref Range    GLUCOSE BY METER POCT 94 70 - 99 mg/dL   Magnesium    Collection Time: 02/07/23  5:43 AM   Result Value Ref Range    Magnesium 2.4 (H) 1.7 - 2.3 mg/dL   Phosphorus    Collection Time: 02/07/23  5:43 AM   Result Value Ref Range    Phosphorus 4.5 2.5 - 4.5 mg/dL   Basic metabolic panel    Collection Time: 02/07/23  5:43 AM   Result Value Ref Range    Sodium 141 136 - 145 mmol/L    Potassium 5.7 (H) 3.4 - 5.3 mmol/L    Chloride 109 (H) 98 - 107 mmol/L    Carbon Dioxide (CO2) 21 (L) 22 - 29 mmol/L    Anion Gap 11 7 - 15 mmol/L    Urea Nitrogen 50.3 (H) 8.0 - 23.0 mg/dL    Creatinine 2.16 (H) 0.51 - 0.95 mg/dL    Calcium 9.8 8.8 - 10.2 mg/dL    Glucose 99 70 - 99 mg/dL    GFR Estimate 24 (L) >60 mL/min/1.73m2   Hemoglobin    Collection Time: 02/07/23  5:43 AM   Result Value Ref Range    Hemoglobin 8.6 (L) 11.7 - 15.7 g/dL   Iron and iron binding capacity    Collection Time: 02/07/23  5:43 AM   Result Value Ref  Range    Iron 69 37 - 145 ug/dL    Iron Binding Capacity 245 240 - 430 ug/dL    Iron Sat Index 28 15 - 46 %   Vitamin B12    Collection Time: 02/07/23  5:43 AM   Result Value Ref Range    Vitamin B12 1,249 (H) 232 - 1,245 pg/mL   Folate    Collection Time: 02/07/23  5:43 AM   Result Value Ref Range    Folic Acid 22.8 4.6 - 34.8 ng/mL   TSH with free T4 reflex    Collection Time: 02/07/23  5:43 AM   Result Value Ref Range    TSH 2.57 0.30 - 4.20 uIU/mL          Physical and Psychiatric Examination:     /89 (BP Location: Left arm)   Pulse 73   Temp 97.7  F (36.5  C) (Oral)   Resp 14   Wt 56.2 kg (123 lb 14.4 oz)   SpO2 100%   BMI 21.27 kg/m    Weight is 123 lbs 14.38 oz  Body mass index is 21.27 kg/m .    Physical Exam:  I have reviewed the physical exam as documented by by the medical team and agree with findings and assessment and have no additional findings to add at this time.    Mental Status Exam:    Appearance: awake, alert, appeared as age stated, awake, alert, cooperative and mild distress  Attitude:  cooperative  Eye Contact:  good  Mood:  anxious and depressed  Affect:  mood congruent and intensity is flat  Speech:  clear, coherent and decreased prosody  Language: Fluent in english   Psychomotor Behavior:  physical retardation  Thought Process:  logical, linear and goal oriented  Associations:  no loose associations  Thought Content:  no evidence of psychotic thought, no auditory hallucinations present, no visual hallucinations present and Patient is status post serious suicide attempt  Insight:  good  Judgement:  intact  Oriented to:  time, person, and place  Attention Span and Concentration:  intact  Recent and Remote Memory:  intact  Fund of Knowledge: Appropriate   Gait and Station: Supine in bed               DSM-5 Diagnosis:   296.53 Bipolar I Disorder Current or Most Recent Episode Depressed, Severe with psychotic features, toxic metabolic encephalopathy which is resolving, Intentional  overdose, subsequent encounter, anxiety          Assessment:   This is a 72-year-old female with a history of bipolar illness spanning 50 years.  Patient was initially diagnosed with schizophrenia when she first became ill.  Later, this diagnosis was modified to bipolar illness, which both she and her brother feel is accurate.  She does have a history of ECT many years ago.  It is unclear whether this was helpful or not.  What has been helpful has been lithium.  This medication was discontinued a year ago due to worsening kidney function.  The patient has not been able to stabilize since going off this medication, whereas she was stable and functional on lithium.    Patient and her brother, who are both very high functioning, would like her to go back on lithium.  I am in agreement.  We will reach out to the hospitalist to better understand what her options are.  Regardless, she will need to go to an inpatient psychiatric bed for further stabilization after she is medically ready to discharge.    Patient continues to want to restart lithium if it is possible.  She talked with nephrologist this morning.  I will also reach out to nephrology.  She does have some smoldering anxiety, which is a problem since she has been off of the lithium.  We will try low-dose Xanax.  Described risk versus benefits of this medication to her.  Will prescribe as an as needed.          Summary of Recommendations:   1.  Would like to explore restarting lithium for this patient.  We would obviously have to start with a very small dose, and titrate slowly to make sure that her kidneys can tolerate, and that we maintain an appropriate lithium level.  We will reach out to nephrology today.    2.  I do recommend continuing with suicide precautions.  Patient will require an inpatient psychiatric hospitalization when she is medically ready to be discharged.    3.  Will write for low-dose Xanax 0.125 to 0.25 mg p.o. twice daily as needed for  anxiety    4.  I will follow    ADDENDUM:  Spoke with Dr. Malik re restarting lithium.  We will initiate 150 mg po at bedtime, and follow daily lithium trough levels.      Sandra Cox MD  Consult/Liaison Psychiatry and Addiction Medicine  [facility]

## 2023-02-07 NOTE — PROGRESS NOTES
[facility]    Hospitalist Progress Note    Date of Service (when I saw the patient): 02/07/2023  Admit date: 1/28/2023    Interval History   Full details of events over last 24 hours outlined below.   Diana is afebrile hemodynamically stable with normal oxygenation on room air.  Her potassium remains elevated with mildly low bicarbonate.  Her sodium is normal.   She is discussed with the nephrologist and psychiatry reinitiating lithium and she is all for it.  She expresses understanding that her kidney disease could progress to requiring dialysis on lithium.  She would still like to start it.  She felt much better on the lithium.  She still having loose stools but not diarrhea, I note that 1 bowel movement documented yet today, 2 yesterday.  Denies any SOB, CP, abdominal pain, N/V.     Assessment & Plan   Polysubstance Overdose/Suicide Attempt  Distributive shock causing hypotension from Ca channel blocker overdose, RESOLVED  Acute encephalopathy caused by overdose, RESOLVED  Acute hypoxic resp failure with overdose. RESOLVED  * Patient indicating she is still thinking of harming herself.  She also has recent history of other self harm attempts.        Mental Health Consult Appreciated.preciated. Noted mental health has severely decompensated since stopping lithium a year ago.     After discussion among nephrology, patient and family it was decided that the benefits of lithium out likely outweigh the risk.  Family and patient accepts the risk of progressive renal disease on lithium    Continue 1:1/Suicide precautions.    Will need discharge to mental health. She is agreeable to this.      Diabetes Insipidus, partial   Severe nocturia -2/7 reported getting up 8-10 times per night to urinate  REI to 2.56 max on 2/2/23, resolving   CKD baseline Cr ~ 1.5-2.   Metabolic/lacitc acidosis associated with overdose, resolved. Recurring on 02/06/23  Hyperkalemia with acidosis, suggestive of  RTA      Nephrology's input appreciated.     S/p IVF and continued on TF flushes 200 ml every 2 hours.      Removed tube feed on 2/6 and sodium remained stable on 2/7    Encourage fluids.     K remains elevated at 5.7 on 2/7    Continue NaBicarbonate 650 mg BID on 02/06/23     Stopped subcutaneous heparin on 2/6/23, pre nephrology's recommendation, can cause RTA    Nephrology started Lokelma on 02/07/23(sodium zirconium cyclosilicate)  10 mg TID.     Nephrology started DDAVP on 02/07/23 10 mcg at bedtime for nocturia.     Maintain on telemetry until K consistently < 5.5     Follow up BMP in AM    Vitals:    02/01/23 0200 02/02/23 0000 02/04/23 0600 02/05/23 0500   Weight: 63.5 kg (139 lb 15.9 oz) 61.6 kg (135 lb 12.9 oz) 56.3 kg (124 lb 1.9 oz) 57 kg (125 lb 10.6 oz)    02/06/23 0400   Weight: 56.2 kg (123 lb 14.4 oz)     I/O last 3 completed shifts:  In: 375 [P.O.:375]  Out: 1800 [Urine:1800]  Recent Labs   Lab 02/07/23  0543 02/06/23  1802 02/06/23  1101 02/06/23  0549 02/06/23  0023 02/05/23  1818 02/05/23  1120 02/05/23  0619 02/05/23  0216 02/04/23  2215 02/04/23  1753 02/04/23  1420 02/04/23  0943   CO2 21*  --  21*  --   --   --   --  23 24 25 23 21* 23     --  140 141 136 136 134* 143 138 137 138 139 145   POTASSIUM 5.7* 5.7* 5.5* 5.8*  --   --   --  5.1 5.3 5.0 4.7 4.6 4.8   BUN 50.3*  --  42.5*  --   --   --   --  42.3* 36.9* 36.1* 39.0* 36.6* 39.0*   CR 2.16*  --  1.88*  --   --   --   --  2.17* 2.24* 2.29* 2.29* 2.38* 2.30*         DDAVP at nighttime started on 2/7    Loose stools, RESOLVING  * No ABD pain. Suspect TF exacerbated.  TF's stopping today, monitor.     Suspected protein-calorie malnutrition  Prior hypoglycemia:  * TFs stopped on 2/5/23    Currently on regular diet > Change to low K diet given elevated K.     Appreciate nutrition consult, last seen on 2/6      Superficial thrombus of R cephalic vein:  * RUE edematous monitor,     R subclavian CVC removed on 2/5/23      Chronic anemia :  Baseline Hgb ~ 10. No evidence of bleeding, periodically monitor.  No indication for a current transfusion.  H/o perforated PUD   *B12 elevated at 1249, iron panel normal, folate normal on 02/07/23     Follow with initiation of ASA for DVT ppx.     Recent Labs   Lab 02/07/23  0543 02/06/23  1101 02/05/23  0216 02/04/23  0603 02/03/23  0518 02/02/23  0348   HGB 8.6* 8.9* 7.5* 7.9* 7.2* 7.3*     Deconditioned    PT ordered     Ambulate with assist QID, discussed with RN on 02/07/23     Clinically Significant Risk Factors        # Hyperkalemia: Highest K = 5.8 mmol/L in last 2 days, will monitor as appropriate       # Hypoalbuminemia: Lowest albumin = 2.3 g/dL at 2/1/2023  7:30 PM, will monitor as appropriate                     Diet: Orders Placed This Encounter      Combination Diet 2 gm K Diet     IVF: None   Suresh Catheter: Not present     DVT Prophylaxis: Stopped heparin subcutaneous, started ASA 81 mg daily.   Code Status: Full Code     Disposition: Expected discharge when lytes stable on PO regimen to Mental Health.   Communication: Discussed with nephrology, patient, and RN on 02/07/23  Anabell Camacho MD  Hospitalist Service  [facility]  Securely message with the Vocera Web Console (learn more here)  Text page via [employer] Paging/Directory    Medical Decision Making       60 MINUTES SPENT BY ME on the date of service doing chart review, history, exam, documentation & further activities per the note.   Assumed care and made multiple treatment care decisions and coordination of care.     -Data reviewed today: I reviewed all new labs and imaging results over the last 24 hours. I personally reviewed no images or EKG's today.    Physical Exam   Temp: 98.5  F (36.9  C) Temp src: Oral BP: 123/83 Pulse: 82   Resp: 18 SpO2: 100 % O2 Device: None (Room air)    Vitals:    02/04/23 0600 02/05/23 0500 02/06/23 0400   Weight: 56.3 kg (124 lb 1.9 oz) 57 kg (125 lb 10.6 oz) 56.2 kg (123 lb 14.4 oz)      Vital Signs with Ranges  Temp:  [97.3  F (36.3  C)-98.5  F (36.9  C)] 98.5  F (36.9  C)  Pulse:  [68-92] 82  Resp:  [9-25] 18  BP: ()/(72-89) 123/83  SpO2:  [93 %-100 %] 100 %  I/O last 3 completed shifts:  In: 375 [P.O.:375]  Out: 1800 [Urine:1800]    Today's Exam  Constitutional:  NAD,   Neuropsyche: flat affect, alert and oriented, answers questions appropriately.   Respiratory:  Breathing comfortably, good air exchange, no wheezes, no crackles.   Cardiovascular:  Regular rate and rhythm, 1+ edema.  GI:  soft, NT/ND, BS normal  Skin/Integumen:  No acute rash or sign of bleeding.     Medications   All medications reviewed on 02/07/23     dextrose Stopped (02/03/23 0828)     sodium chloride 10 mL/hr at 02/03/23 0830       aspirin  81 mg Oral Daily     desmopressin  10 mcg Alternating Nostrils At Bedtime     lithium  150 mg Oral Daily before supper     pantoprazole  40 mg Oral QAM AC     sodium bicarbonate  650 mg Oral BID     sodium chloride (PF)  10-40 mL Intracatheter Q8H     sodium zirconium cyclosilicate  10 g Oral TID     PRN Meds: acetaminophen **OR** acetaminophen, ALPRAZolam, bisacodyl, dextrose, glucose **OR** dextrose **OR** glucagon, HYDROmorphone, magnesium hydroxide, magnesium sulfate, naloxone **OR** naloxone **OR** naloxone **OR** naloxone, ondansetron **OR** ondansetron, polyethylene glycol, senna-docusate **OR** [DISCONTINUED] senna-docusate, sodium chloride (PF), sodium chloride (PF)    Data   Recent Labs   Lab 02/07/23  0543 02/07/23  0512 02/06/23  1802 02/06/23  1101 02/06/23  0549 02/05/23  1120 02/05/23  0619 02/05/23  0216 02/04/23  0912 02/04/23  0603 02/03/23  0639 02/03/23  0518 02/01/23  2032 02/01/23 1930   WBC  --   --   --   --   --   --   --  10.8  --  8.1  --  9.1   < > 9.7   HGB 8.6*  --   --  8.9*  --   --   --  7.5*  --  7.9*  --  7.2*   < > 7.1*   MCV  --   --   --   --   --   --   --  68*  --  72*  --  73*   < > 73*   PLT  --   --   --   --   --   --   --  288  --   305  --  270   < > 244     --   --  140 141   < > 143 138   < > 148*  148*   < > 149*   < > 148*   POTASSIUM 5.7*  --  5.7* 5.5* 5.8*  --  5.1 5.3   < > 4.9  4.9   < > 4.3   < > 3.2*   CHLORIDE 109*  --   --  109*  --   --  111* 106   < > 115*  115*   < > 117*   < > 116*   CO2 21*  --   --  21*  --   --  23 24   < > 25  25   < > 26   < > 22   BUN 50.3*  --   --  42.5*  --   --  42.3* 36.9*   < > 36.4*  36.4*   < > 26.1*   < > 30.0*   CR 2.16*  --   --  1.88*  --   --  2.17* 2.24*   < > 2.35*  2.35*   < > 2.51*   < > 2.66*   ANIONGAP 11  --   --  10  --   --  9 8   < > 8  8   < > 6*   < > 10   ISSA 9.8  --   --  9.6  --   --  9.6 9.3   < > 9.8  9.8   < > 9.6   < > 9.3   GLC 99 94  --  103* 94   < > 146* 131*   < > 147*  147*   < > 112*   < > 113*   ALBUMIN  --   --   --   --   --   --   --   --   --  2.6*  --   --   --  2.3*    < > = values in this interval not displayed.       No results found for this or any previous visit (from the past 24 hour(s)).

## 2023-02-07 NOTE — CONSULTS
Initial nephrology consult completed 2/2. Please see progress note from today for updated recommendations. Consult order completed.     Tracy Malik MD

## 2023-02-07 NOTE — PLAN OF CARE
Shift Note:    More alert today, was up to chair x2 for meals.    Purewick, large UOP.   Stooling, loose brown.  Tolerating diet.  NG has been removed (and free water discontinued per hospitalist).  1:1 safety sitter at bedside.  Awaiting transfer to [facility] bed.

## 2023-02-08 ENCOUNTER — APPOINTMENT (OUTPATIENT)
Dept: OCCUPATIONAL THERAPY | Facility: CLINIC | Age: 73
DRG: 917 | End: 2023-02-08
Payer: MEDICARE

## 2023-02-08 LAB
ANION GAP SERPL CALCULATED.3IONS-SCNC: 15 MMOL/L (ref 7–15)
BUN SERPL-MCNC: 51 MG/DL (ref 8–23)
CALCIUM SERPL-MCNC: 9.7 MG/DL (ref 8.8–10.2)
CHLORIDE SERPL-SCNC: 101 MMOL/L (ref 98–107)
CREAT SERPL-MCNC: 2.18 MG/DL (ref 0.51–0.95)
DEPRECATED HCO3 PLAS-SCNC: 19 MMOL/L (ref 22–29)
ERYTHROCYTE [DISTWIDTH] IN BLOOD BY AUTOMATED COUNT: 15 % (ref 10–15)
GFR SERPL CREATININE-BSD FRML MDRD: 23 ML/MIN/1.73M2
GLUCOSE BLDC GLUCOMTR-MCNC: 106 MG/DL (ref 70–99)
GLUCOSE BLDC GLUCOMTR-MCNC: 81 MG/DL (ref 70–99)
GLUCOSE BLDC GLUCOMTR-MCNC: 92 MG/DL (ref 70–99)
GLUCOSE BLDC GLUCOMTR-MCNC: 94 MG/DL (ref 70–99)
GLUCOSE BLDC GLUCOMTR-MCNC: 97 MG/DL (ref 70–99)
GLUCOSE SERPL-MCNC: 135 MG/DL (ref 70–99)
HCT VFR BLD AUTO: 28.7 % (ref 35–47)
HGB BLD-MCNC: 8.7 G/DL (ref 11.7–15.7)
LITHIUM SERPL-SCNC: 0.1 MMOL/L (ref 0.6–1.2)
MAGNESIUM SERPL-MCNC: 2.5 MG/DL (ref 1.7–2.3)
MCH RBC QN AUTO: 21.3 PG (ref 26.5–33)
MCHC RBC AUTO-ENTMCNC: 30.3 G/DL (ref 31.5–36.5)
MCV RBC AUTO: 70 FL (ref 78–100)
PHOSPHATE SERPL-MCNC: 4.9 MG/DL (ref 2.5–4.5)
PLATELET # BLD AUTO: 480 10E3/UL (ref 150–450)
POTASSIUM SERPL-SCNC: 4.7 MMOL/L (ref 3.4–5.3)
RBC # BLD AUTO: 4.09 10E6/UL (ref 3.8–5.2)
SODIUM SERPL-SCNC: 135 MMOL/L (ref 136–145)
WBC # BLD AUTO: 9.1 10E3/UL (ref 4–11)

## 2023-02-08 PROCEDURE — 83735 ASSAY OF MAGNESIUM: CPT | Performed by: INTERNAL MEDICINE

## 2023-02-08 PROCEDURE — 250N000013 HC RX MED GY IP 250 OP 250 PS 637: Performed by: PSYCHIATRY & NEUROLOGY

## 2023-02-08 PROCEDURE — 85014 HEMATOCRIT: CPT | Performed by: INTERNAL MEDICINE

## 2023-02-08 PROCEDURE — 97110 THERAPEUTIC EXERCISES: CPT | Mod: GO

## 2023-02-08 PROCEDURE — 250N000013 HC RX MED GY IP 250 OP 250 PS 637: Performed by: STUDENT IN AN ORGANIZED HEALTH CARE EDUCATION/TRAINING PROGRAM

## 2023-02-08 PROCEDURE — 99232 SBSQ HOSP IP/OBS MODERATE 35: CPT | Performed by: PSYCHIATRY & NEUROLOGY

## 2023-02-08 PROCEDURE — 84100 ASSAY OF PHOSPHORUS: CPT | Performed by: INTERNAL MEDICINE

## 2023-02-08 PROCEDURE — 80048 BASIC METABOLIC PNL TOTAL CA: CPT | Performed by: INTERNAL MEDICINE

## 2023-02-08 PROCEDURE — 99232 SBSQ HOSP IP/OBS MODERATE 35: CPT | Performed by: STUDENT IN AN ORGANIZED HEALTH CARE EDUCATION/TRAINING PROGRAM

## 2023-02-08 PROCEDURE — 80178 ASSAY OF LITHIUM: CPT | Performed by: PSYCHIATRY & NEUROLOGY

## 2023-02-08 PROCEDURE — 36415 COLL VENOUS BLD VENIPUNCTURE: CPT | Performed by: INTERNAL MEDICINE

## 2023-02-08 PROCEDURE — 99232 SBSQ HOSP IP/OBS MODERATE 35: CPT | Performed by: INTERNAL MEDICINE

## 2023-02-08 PROCEDURE — 120N000001 HC R&B MED SURG/OB

## 2023-02-08 PROCEDURE — 250N000013 HC RX MED GY IP 250 OP 250 PS 637: Performed by: INTERNAL MEDICINE

## 2023-02-08 RX ADMIN — DESMOPRESSIN ACETATE 10 MCG: 10 SPRAY NASAL at 21:08

## 2023-02-08 RX ADMIN — LITHIUM CARBONATE 150 MG: 150 CAPSULE, GELATIN COATED ORAL at 21:07

## 2023-02-08 RX ADMIN — SODIUM BICARBONATE 650 MG TABLET 650 MG: at 08:51

## 2023-02-08 RX ADMIN — SODIUM ZIRCONIUM CYCLOSILICATE 10 G: 5 POWDER, FOR SUSPENSION ORAL at 23:30

## 2023-02-08 RX ADMIN — SODIUM ZIRCONIUM CYCLOSILICATE 10 G: 5 POWDER, FOR SUSPENSION ORAL at 17:42

## 2023-02-08 RX ADMIN — ASPIRIN 81 MG: 81 TABLET, COATED ORAL at 08:51

## 2023-02-08 RX ADMIN — SODIUM ZIRCONIUM CYCLOSILICATE 10 G: 5 POWDER, FOR SUSPENSION ORAL at 10:05

## 2023-02-08 RX ADMIN — SODIUM BICARBONATE 650 MG TABLET 650 MG: at 21:07

## 2023-02-08 RX ADMIN — PANTOPRAZOLE SODIUM 40 MG: 40 TABLET, DELAYED RELEASE ORAL at 06:38

## 2023-02-08 ASSESSMENT — ACTIVITIES OF DAILY LIVING (ADL)
ADLS_ACUITY_SCORE: 52
ADLS_ACUITY_SCORE: 47
ADLS_ACUITY_SCORE: 49
ADLS_ACUITY_SCORE: 47
ADLS_ACUITY_SCORE: 47
ADLS_ACUITY_SCORE: 46
ADLS_ACUITY_SCORE: 47
ADLS_ACUITY_SCORE: 47
ADLS_ACUITY_SCORE: 49
ADLS_ACUITY_SCORE: 52
ADLS_ACUITY_SCORE: 47
ADLS_ACUITY_SCORE: 46

## 2023-02-08 NOTE — PROGRESS NOTES
Renal Medicine Progress Note            Assessment/Plan:     Assessment:  Diana Santiago is a 72-year-old woman with PMH bipolar disorder/depression, CKD 4m chronic partial DI admitted 1/28/2023 with intentional polysubstance overdose, particularly calcium channel blocker overdose in the setting of severe depression.    Partial DI  Earlier in admission had hypernatremia with polyuria due to partial DI and lack of free water access.  Required NG placement for administration of free water.  Currently extubated and tolerating p.o., NG removed and sodium stable, patient drinking to thirst.  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  History of nocturia, polyuria chronically, patient compensates for this by increasing water intake.  When she has access to free water, sodium is typically well controlled.  She does have severe nocturia, awakens 8-10 times per night which severely impairs her sleep and likely contributes to her depression. Nighttime DDAVP started to see if we can improve her nocturia, does appear to have helped somewhat.  Amiloride may be an option in the future once restarting lithium, however, would not start at this time given hyperkalemia.  -I/Os   -Daily BMP  -Intranasal DDAVP 10 mcg at bedtime, will assess its effect on nocturia and adjust dose as needed    Intentional polysubstance overdose  Severe depression and suicidality  Bipolar disorder  Mood had been stable on lithium previously, she has tried many medications in the past without desired effect.  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  Has had severe suicidal ideation and attempts off of lithium despite treatment with other medications.  Discussed at length with her and her brother resuming lithium at the risk of worsening renal function.  There is a chance that her CKD could progressed to ESRD requiring dialysis, they are aware of this risk and willing to assume that risk.  Discussed forms of dialysis  just for future planning.  Did discuss transplant briefly, mental health will need to be significantly improved prior to being a transplant candidate.  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  Likely related to heparin use.  Heparin discontinued and bicarb started 2/6.  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  Follows with Dr. Cedeno at [facility] nephrology.  Stopped lithium roughly 1 year ago and creatinine has been stable between 1.8-2.2, eGFR 22-30.   -Avoid nephrotoxins as able  -Renally dose medications    Plan:  -Patient willing to trial lithium at lower doses with frequent monitoring at the risk of her kidneys, this is reasonable given her severe suicidality  -Continue Lokelma 10 g 3 times daily, today should be the last day this is needed  -Intranasal DDAVP 10 mcg at bedtime  -Continue sodium bicarb 650 mg twice daily        Interval History:     -No acute events overnight  -Patient well-appearing this morning  -She does think that urine output has decreased somewhat  -Sodium slightly low at 135 this morning suggesting good effect of DDAVP  -Denies shortness of breath, no lower extremity edema  -Appetite good  -K improved  -Mood improved knowing that there is a plan in place to make her feel better          Medications and Allergies:       aspirin  81 mg Oral Daily     desmopressin  10 mcg Alternating Nostrils At Bedtime     lithium  150 mg Oral Daily before supper     pantoprazole  40 mg Oral QAM AC     sodium bicarbonate  650 mg Oral BID     sodium chloride (PF)  10-40 mL Intracatheter Q8H     sodium zirconium cyclosilicate  10 g Oral TID        Allergies    Allergen Reactions     No Known Allergies             Physical Exam:   Vitals were reviewed  /84 (BP Location: Left arm)   Pulse (!) 123   Temp 100  F (37.8  C) (Axillary)   Resp 18   Wt 56.2 kg (123 lb 14.4 oz)   SpO2 97%   BMI 21.27 kg/m      Wt Readings from Last 3 Encounters:   02/06/23 56.2 kg (123 lb 14.4 oz)   01/24/23 54.4 kg (120 lb)   10/20/22 51.3 kg (113 lb)       Intake/Output Summary (Last 24 hours) at 2/7/2023 1146  Last data filed at 2/7/2023 0800  Gross per 24 hour   Intake 450 ml   Output 1750 ml   Net -1300 ml       GENERAL APPEARANCE: no acute distress, resting comfortably in bed   HEENT: MMM  RESP: clear  CV: RRR, no murmurs  EXTREMITIES/SKIN: no edema  NEURO:  Alert, oriented, normal speech           Data:     BMP  Recent Labs   Lab 02/08/23  1053 02/08/23  0852 02/08/23  0737 02/08/23  0543 02/07/23  2151 02/07/23  0543 02/07/23  0512 02/06/23  1802 02/06/23  1101 02/06/23  0549 02/05/23  1120 02/05/23  0619   NA  --  135*  --   --   --  141  --   --  140 141   < > 143   POTASSIUM  --  4.7  --   --   --  5.7*  --  5.7* 5.5* 5.8*  --  5.1   CHLORIDE  --  101  --   --   --  109*  --   --  109*  --   --  111*   ISSA  --  9.7  --   --   --  9.8  --   --  9.6  --   --  9.6   CO2  --  19*  --   --   --  21*  --   --  21*  --   --  23   BUN  --  51.0*  --   --   --  50.3*  --   --  42.5*  --   --  42.3*   CR  --  2.18*  --   --   --  2.16*  --   --  1.88*  --   --  2.17*   GLC 81 135* 106* 97   < > 99   < >  --  103* 94   < > 146*    < > = values in this interval not displayed.     CBC  Recent Labs   Lab 02/08/23  0852 02/07/23  0543 02/06/23  1101 02/05/23  0216 02/04/23  0603 02/03/23  0518   WBC 9.1  --   --  10.8 8.1 9.1   HGB 8.7* 8.6* 8.9* 7.5* 7.9* 7.2*   HCT 28.7*  --   --  23.4* 26.3* 24.5*   MCV 70*  --   --  68* 72* 73*   *  --   --  288 305 270     Lab Results   Component Value Date    AST 21 01/31/2023    ALT <5 (L) 01/31/2023    ALKPHOS 129 (H) 01/31/2023     BILITOTAL 0.5 01/31/2023     Lab Results   Component Value Date    INR 0.93 09/12/2022       Attestation:  I have reviewed today's vital signs, notes, medications, labs and imaging.    Tracy Malik MD  [facility] - Nephrology  Office: [phone]

## 2023-02-08 NOTE — PROGRESS NOTES
Sandra Braga MD, assessed patient at bedside and stated no sitter is needed as patient is stable and not a danger to herself. Charge nurse updated.

## 2023-02-08 NOTE — PROGRESS NOTES
Psychiatric Progress Note  Consult date: February 6, 2023         Reason for Consult, requesting source:    Suicide attempt/OD  Requesting source: Anabell Camacho    Labs and imaging reviewed. Patient seen and evaluated by Sandra Cox MD          HPI:   This is a 71 yo female who presented on 1/28/23 with a massive, intentional polypharmacy od .  Pt has a history of bipolar illness, and took a combinaton of lamictal, amlodipine, haldol, and adderall.  She had been in the ED 4 days prior for eval of SI.  Pt has had a difficult course related to calcium channel blocker od, and has been in the ICU, vented and on pressors with AHRF, REI on CRF, and encephalopthy.  We are asked to see for assistance with care for her depression, and appropriate disposition when she is ready medically to discharge.    Patient seen today along with her brother.  Both are very good historians.  It sounds like the patient was taken off lithium, her longtime medication for bipolar management, about a year ago.  This was related to worsening kidney function.  She has worked with her psychiatrist to try to find another regimen that was helpful.  She has been on Lamictal, as well as other medications.  None of these medications help with her depression.    Both the patient and her brother are in favor of her going back on lithium if this is a possibility.  I also would be in favor of it as well.  Clearly, the patient's bipolar illness is severe, and potentially lethal.  She was quite well managed on the lithium prior to it being discontinued.    2/7/23: Patient seen today for follow-up in the ICU.  She is waiting for a bed on the medical floor, and no longer requires a critical level of care.  She was awake, and tell me that she is feeling pretty anxious, which is her baseline when she is not on lithium.  She also said that the kidney doctor has been by, and they discussed going back on lithium.  She and I discussed low-dose Xanax to  help with her anxiety for now.  I discussed risk versus benefits of this medication and indicated to her this is a short-term medication, and that she does not have to take it if she does not care for it or if it does not work.    2/8/2023: Patient seen today with her brother, and RN at bedside.  Patient was sitting up and having some fruit.  She states she feels much better today both physically and mentally.  We discussed current lithium dosing, and the plan to titrate very slowly.  I also told her I think we should put her on the lowest for a geriatric psychiatry bed in the event that she still needs inpatient admission in 2 to 3 weeks.  She was agreeable to this.  Answered questions and discussed the overall strategy with the lithium.  I also told her that I do not recommend any other psychiatric medications for now.            Past Psychiatric History:     Pt had recently been in the ED on 1/24 with thoughts of drowning herself while in a swimming pool.  She has an outpt psychiatrist, and a therapist, and had discharged after denying SI, and wodo for safety.   Patient is seen by Dr. Trent Gallo for outpatient psychiatric management.        Substance Use and History:   Negative        Past Medical History:   PAST MEDICAL HISTORY:   Past Medical History:   Diagnosis Date     Benign essential hypertension 09/2018    added norvasc 9/18     Bipolar affective disorder (H)     hosp 1993, Dr. Aftab Deal     Cancer (H) 1996    DCIS, left breast     Chronic kidney disease, stage 3a (H)     seen by renal Dr. 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    felt due to lithium     Thalassaemia trait      Vitamin D deficiency        PAST SURGICAL HISTORY:   Past Surgical History:   Procedure Laterality Date     BIOPSY  1994    left breast     BREAST SURGERY      lumpectomy x 4     COLONOSCOPY  2021     COLONOSCOPY N/A 6/17/2022    Procedure: COLONOSCOPY, WITH POLYPECTOMY AND BIOPSY;  Surgeon: Panchito Gu MD;  Location:  GI     EYE SURGERY  2018    retina tear     LAPAROTOMY EXPLORATORY N/A 8/24/2022    Procedure: Exploratory laparotomy, REPAIR OF PERFORATED ULCER;  Surgeon: Ron Vidal MD;  Location:  OR     left hand surgery      last 1974             Family History:   FAMILY HISTORY:   Family History   Problem Relation Age of Onset     Cerebrovascular Disease Mother      Hypertension Father      Sleep Apnea Brother      Breast Cancer Maternal Aunt         x 2     Breast Cancer Other         Maternal Aunt     Hyperlipidemia Niece        Family Psychiatric History:         Social History:   SOCIAL HISTORY:   Social History     Tobacco Use     Smoking status: Never     Smokeless tobacco: Never   Substance Use Topics     Alcohol use: No            Physical ROS:   The 10 point Review of Systems is negative other than noted in the HPI or here.           Medications:       aspirin  81 mg Oral Daily     desmopressin  10 mcg Alternating Nostrils At Bedtime     lithium  150 mg Oral Daily before supper     pantoprazole  40 mg Oral QAM AC     sodium bicarbonate  650 mg Oral BID     sodium chloride (PF)  10-40 mL Intracatheter Q8H     sodium zirconium cyclosilicate  10 g Oral TID              Allergies:     Allergies   Allergen Reactions     No Known Allergies           Labs:     Recent Results (from the past 48 hour(s))   Potassium    Collection Time: 02/06/23  6:02 PM   Result Value Ref Range    Potassium 5.7 (H) 3.4 - 5.3 mmol/L    Glucose by meter    Collection Time: 02/07/23  5:12 AM   Result Value Ref Range    GLUCOSE BY METER POCT 94 70 - 99 mg/dL   Magnesium    Collection Time: 02/07/23  5:43 AM   Result Value Ref Range    Magnesium 2.4 (H) 1.7 - 2.3 mg/dL   Phosphorus    Collection Time: 02/07/23  5:43 AM   Result Value Ref Range    Phosphorus 4.5 2.5 - 4.5 mg/dL   Basic metabolic panel    Collection Time: 02/07/23  5:43 AM   Result Value Ref Range    Sodium 141 136 - 145 mmol/L    Potassium 5.7 (H) 3.4 - 5.3 mmol/L    Chloride 109 (H) 98 - 107 mmol/L    Carbon Dioxide (CO2) 21 (L) 22 - 29 mmol/L    Anion Gap 11 7 - 15 mmol/L    Urea Nitrogen 50.3 (H) 8.0 - 23.0 mg/dL    Creatinine 2.16 (H) 0.51 - 0.95 mg/dL    Calcium 9.8 8.8 - 10.2 mg/dL    Glucose 99 70 - 99 mg/dL    GFR Estimate 24 (L) >60 mL/min/1.73m2   Hemoglobin    Collection Time: 02/07/23  5:43 AM   Result Value Ref Range    Hemoglobin 8.6 (L) 11.7 - 15.7 g/dL   Iron and iron binding capacity    Collection Time: 02/07/23  5:43 AM   Result Value Ref Range    Iron 69 37 - 145 ug/dL    Iron Binding Capacity 245 240 - 430 ug/dL    Iron Sat Index 28 15 - 46 %   Vitamin B12    Collection Time: 02/07/23  5:43 AM   Result Value Ref Range    Vitamin B12 1,249 (H) 232 - 1,245 pg/mL   Folate    Collection Time: 02/07/23  5:43 AM   Result Value Ref Range    Folic Acid 22.8 4.6 - 34.8 ng/mL   TSH with free T4 reflex    Collection Time: 02/07/23  5:43 AM   Result Value Ref Range    TSH 2.57 0.30 - 4.20 uIU/mL   Glucose by meter    Collection Time: 02/07/23  9:51 PM   Result Value Ref Range    GLUCOSE BY METER POCT 88 70 - 99 mg/dL   Glucose by meter    Collection Time: 02/08/23  2:19 AM   Result Value Ref Range    GLUCOSE BY METER POCT 94 70 - 99 mg/dL   Glucose by meter    Collection Time: 02/08/23  5:43 AM   Result Value Ref Range    GLUCOSE BY METER POCT 97 70 - 99 mg/dL   Glucose by meter    Collection Time: 02/08/23  7:37 AM   Result Value Ref Range    GLUCOSE BY METER POCT 106 (H) 70  - 99 mg/dL   Basic metabolic panel    Collection Time: 02/08/23  8:52 AM   Result Value Ref Range    Sodium 135 (L) 136 - 145 mmol/L    Potassium 4.7 3.4 - 5.3 mmol/L    Chloride 101 98 - 107 mmol/L    Carbon Dioxide (CO2) 19 (L) 22 - 29 mmol/L    Anion Gap 15 7 - 15 mmol/L    Urea Nitrogen 51.0 (H) 8.0 - 23.0 mg/dL    Creatinine 2.18 (H) 0.51 - 0.95 mg/dL    Calcium 9.7 8.8 - 10.2 mg/dL    Glucose 135 (H) 70 - 99 mg/dL    GFR Estimate 23 (L) >60 mL/min/1.73m2   Lithium level    Collection Time: 02/08/23  8:52 AM   Result Value Ref Range    Lithium 0.1 (L) 0.6 - 1.2 mmol/L   CBC with platelets    Collection Time: 02/08/23  8:52 AM   Result Value Ref Range    WBC Count 9.1 4.0 - 11.0 10e3/uL    RBC Count 4.09 3.80 - 5.20 10e6/uL    Hemoglobin 8.7 (L) 11.7 - 15.7 g/dL    Hematocrit 28.7 (L) 35.0 - 47.0 %    MCV 70 (L) 78 - 100 fL    MCH 21.3 (L) 26.5 - 33.0 pg    MCHC 30.3 (L) 31.5 - 36.5 g/dL    RDW 15.0 10.0 - 15.0 %    Platelet Count 480 (H) 150 - 450 10e3/uL   Phosphorus    Collection Time: 02/08/23  8:52 AM   Result Value Ref Range    Phosphorus 4.9 (H) 2.5 - 4.5 mg/dL   Magnesium    Collection Time: 02/08/23  8:52 AM   Result Value Ref Range    Magnesium 2.5 (H) 1.7 - 2.3 mg/dL   Glucose by meter    Collection Time: 02/08/23 10:53 AM   Result Value Ref Range    GLUCOSE BY METER POCT 81 70 - 99 mg/dL          Physical and Psychiatric Examination:     /68 (BP Location: Left arm)   Pulse 87   Temp 97.5  F (36.4  C) (Oral)   Resp 18   Wt 56.2 kg (123 lb 14.4 oz)   SpO2 99%   BMI 21.27 kg/m    Weight is 123 lbs 14.38 oz  Body mass index is 21.27 kg/m .    Physical Exam:  I have reviewed the physical exam as documented by by the medical team and agree with findings and assessment and have no additional findings to add at this time.    Mental Status Exam:    Appearance: awake, alert, appeared as age stated, awake, alert and cooperative  Attitude:  cooperative  Eye Contact:  good  Mood:  better  Affect:   mood congruent  Speech:  clear, coherent and decreased prosody  Language: Fluent in english   Psychomotor Behavior:  physical retardation  Thought Process:  logical, linear and goal oriented  Associations:  no loose associations  Thought Content:  no evidence of psychotic thought, no auditory hallucinations present, no visual hallucinations present and Patient is status post serious suicide attempt  Insight:  good  Judgement:  intact  Oriented to:  time, person, and place  Attention Span and Concentration:  intact  Recent and Remote Memory:  intact  Fund of Knowledge: Appropriate   Gait and Station: Supine in bed               DSM-5 Diagnosis:   296.53 Bipolar I Disorder Current or Most Recent Episode Depressed, Severe with psychotic features, toxic metabolic encephalopathy which is resolving, Intentional overdose, subsequent encounter, anxiety          Assessment:   This is a 72-year-old female with a history of bipolar illness spanning 50 years.  Patient was initially diagnosed with schizophrenia when she first became ill.  Later, this diagnosis was modified to bipolar illness, which both she and her brother feel is accurate.  She does have a history of ECT many years ago.  It is unclear whether this was helpful or not.  What has been helpful has been lithium.  This medication was discontinued a year ago due to worsening kidney function.  The patient has not been able to stabilize since going off this medication, whereas she was stable and functional on lithium.    Patient and her brother, who are both very high functioning, would like her to go back on lithium.  I am in agreement.  We will reach out to the hospitalist to better understand what her options are.  Regardless, she will need to go to an inpatient psychiatric bed for further stabilization after she is medically ready to discharge.    Patient is feeling better today now that she is out of the ICU.  Feels better physically, and her mood is improved.  She  denies current suicidal thoughts.  RN and I discussed at bedside, and I do not think she needs a sitter at this point.  The patient did agree that if she begins to have some suicidal thinking, she will let the team know.  We will engage in a very slow titration of lithium and follow the lead of the hospitalist and nephrology in regards to the impact on the patient's kidneys as we move forward.          Summary of Recommendations:   1.  Have reinitiated lithium 150 mg p.o. nightly.    2.  We are getting morning lithium levels and will follow the trend.    3.  Patient does not require a sitter at this point.    4.  Were not sure if she is going to need inpatient psychiatry.  More calm.  [facility] the event [facility] 1 in 2 to 3 weeks when 1 comes available.    5.  We will follow.      Sandra Cox MD  Consult/Liaison Psychiatry and Addiction Medicine  [facility]

## 2023-02-08 NOTE — PLAN OF CARE
Goal Outcome Evaluation:       Summary:  Polysubstance overdose- Suicide Attempt  DATE & TIME: 2/8/23 [id number]    Cognitive Concerns/ Orientation : A&Ox4  BEHAVIOR & AGGRESSION TOOL COLOR: Green  CIWA SCORE: NA   ABNL VS/O2: VSS on RA  MOBILITY: Ax1 GBW  PAIN MANAGMENT: denies  DIET: 2 gm K  BOWEL/BLADDER: Purewick in place, no BM this shift  ABNL LAB/BG: BUN 50.3, Creat 2.16, 24, Hgb 8.6, BG 94,  DRAIN/DEVICES: Purewick, PIV SL  TELEMETRY RHYTHM: NSR  SKIN: blanchable redness to coccyx  TESTS/PROCEDURES: none  D/C DAY/GOALS/PLACE: pending lithium restart and electrolyte balance  OTHER IMPORTANT INFO: pt went off of lithium a year ago d/t kidney disease. That was ineffective for bipolar disease as seen with hx of suicide attempts. Pt and family discussed benefits and risks of lithium per drs. Note. No SI this shift. Eager to get home.

## 2023-02-08 NOTE — PROGRESS NOTES
Patient remained stable in the first half of the shift. AOx4. No c/o pain or discomfort.  Got a bed in 615 and was transferred from ICU. Gave Nurse to Nurse report to ANA Navarro.   Will continue to observe patient on 1:1.

## 2023-02-08 NOTE — PROGRESS NOTES
Alert and oriented x4. Denies pain. Mild anxiety but has declined Xanax, but knows it is available if needed. Restarting lithium this PM.   Up in chair for most of PM. Walked with PT and then RN today for 3 walks total.   Fair appetite, BM x3 - loose. Adequate UOP - requesting purewick when up in chair.   Sitter remains at bedside, brother visited this AM.     Plan: transfer when able. Continue walking & mobilization & advance towards goals of discharge.

## 2023-02-08 NOTE — PROGRESS NOTES
[facility]    Hospitalist Progress Note    Date of Service (when I saw the patient): 02/08/2023  Admit date: 1/28/2023    Interval History   Full details of events over last 24 hours outlined below.   Diana was for the most part afebrile for last 24 hours.  She has had a mild low-grade fever of 100 with a pulse of 123 just noted.  When I visited her around this time she states she is feeling quite well.  She had no localizing symptoms of infection.  All other vitals have been normal.  Nurses offer no concerns.   He states she is feeling much better emotionally more hopeful.  Suicidal precautions were removed by psychiatry  Denies any SOB, CP, abdominal pain, N/V.     Assessment & Plan   Polysubstance Overdose/Suicide Attempt  Distributive shock causing hypotension from Ca channel blocker overdose, RESOLVED  Acute encephalopathy caused by overdose, RESOLVED  Acute hypoxic resp failure with overdose. RESOLVED  * Patient indicating she is still thinking of harming herself.  She also has recent history of other self harm attempts.        Mental Health Consult Appreciated.preciated. Noted mental health has severely decompensated since stopping lithium a year ago.     After discussion among nephrology, patient and family it was decided that the benefits of lithium out likely outweigh the risk.  Family and patient accepts the risk of progressive renal disease on lithium    Started lithium at 150 mg before dinner on 12/7 with daily lithium levels.    Discontinued 1:1/Suicide precautions on 2/8    Given her improvement she may not need inpatient psychiatry.  She is to remain in the hospital at this time as we continue to follow on as we titrate up on lithium.      Diabetes Insipidus, partial   Severe nocturia -2/7 reported getting up 8-10 times per night to urinate  REI to 2.56 max on 2/2/23, resolving   CKD baseline Cr ~ 1.5-2.   Metabolic/lacitc acidosis associated with overdose, resolved.  Recurring on 02/06/23  Hyperkalemia with acidosis, suggestive of RTA  *S/p IVF and continued on TF flushes 200 ml every 2 hours.    * Stopped subcutaneous heparin on 2/6/23, pre nephrology's recommendation, can cause RTA      Nephrology's input appreciated.     Encourage fluids.     Continue NaBicarbonate 650 mg BID on 02/06/23     Nephrology started Lokelma on 02/07/23(sodium zirconium cyclosilicate)  10 mg TID.     Nephrology started DDAVP on 02/07/23 10 mcg at bedtime for nocturia.     Labs as outlined below.  K normalized    Discontinue telemetry on 02/08/23    Follow up BMP in AM    Vitals:    02/01/23 0200 02/02/23 0000 02/04/23 0600 02/05/23 0500   Weight: 63.5 kg (139 lb 15.9 oz) 61.6 kg (135 lb 12.9 oz) 56.3 kg (124 lb 1.9 oz) 57 kg (125 lb 10.6 oz)    02/06/23 0400   Weight: 56.2 kg (123 lb 14.4 oz)     I/O last 3 completed shifts:  In: 845 [P.O.:845]  Out: 1375 [Urine:1375]  Recent Labs   Lab 02/08/23  0852 02/07/23  0543 02/06/23  1802 02/06/23  1101 02/06/23  0549 02/06/23  0023 02/05/23  1818 02/05/23  1120 02/05/23  0619 02/05/23  0216 02/04/23  2215 02/04/23  1753 02/04/23  1420   CO2 19* 21*  --  21*  --   --   --   --  23 24 25 23 21*   * 141  --  140 141 136 136 134* 143 138 137 138 139   POTASSIUM 4.7 5.7* 5.7* 5.5* 5.8*  --   --   --  5.1 5.3 5.0 4.7 4.6   BUN 51.0* 50.3*  --  42.5*  --   --   --   --  42.3* 36.9* 36.1* 39.0* 36.6*   CR 2.18* 2.16*  --  1.88*  --   --   --   --  2.17* 2.24* 2.29* 2.29* 2.38*         DDAVP at nighttime started on 2/7    Loose stools, RESOLVING  * No ABD pain. Suspect TF exacerbated.  TF's stopping today, monitor.     Suspected protein-calorie malnutrition  Prior hypoglycemia:  * TFs stopped on 2/5/23    Currently on regular diet > Change to low K diet given elevated K.     Appreciate nutrition consult, last seen on 2/6      Superficial thrombus of R cephalic vein:  * RUE edematous monitor,     R subclavian CVC removed on 2/5/23      Chronic anemia :  Baseline Hgb ~ 10. No evidence of bleeding, periodically monitor.  No indication for a current transfusion.  H/o perforated PUD   *B12 elevated at 1249, iron panel normal, folate normal on 02/07/23     Follow with initiation of ASA for DVT ppx.     Continue PPI.     Recent Labs   Lab 02/08/23  0852 02/07/23  0543 02/06/23  1101 02/05/23  0216 02/04/23  0603 02/03/23  0518   HGB 8.7* 8.6* 8.9* 7.5* 7.9* 7.2*     Deconditioned    PT ordered     Ambulate with assist QID, discussed with RN on 02/07/23     Clinically Significant Risk Factors        # Hyperkalemia: Highest K = 5.7 mmol/L in last 2 days, will monitor as appropriate       # Hypoalbuminemia: Lowest albumin = 2.3 g/dL at 2/1/2023  7:30 PM, will monitor as appropriate                     Diet: Orders Placed This Encounter      Combination Diet 2 gm K Diet     IVF: None   Suresh Catheter: Not present     DVT Prophylaxis: Stopped heparin subcutaneous, started ASA 81 mg daily.   Code Status: Full Code     Disposition: Expected discharge when lytes stable on PO regimen to Mental Health.   Communication: Discussed with patient and RN on 02/08/23  Anabell Camacho MD  Hospitalist Service  [facility]  Securely message with the Vocera Web Console (learn more here)  Text page via [employer] Paging/Directory    Medical Decision Making          -Data reviewed today: I reviewed all new labs and imaging results over the last 24 hours. I personally reviewed no images or EKG's today.    Physical Exam   Temp: 100  F (37.8  C) Temp src: Axillary BP: 120/84 Pulse: (!) 123   Resp: 18 SpO2: 97 % O2 Device: None (Room air)    Vitals:    02/04/23 0600 02/05/23 0500 02/06/23 0400   Weight: 56.3 kg (124 lb 1.9 oz) 57 kg (125 lb 10.6 oz) 56.2 kg (123 lb 14.4 oz)     Vital Signs with Ranges  Temp:  [97.5  F (36.4  C)-100  F (37.8  C)] 100  F (37.8  C)  Pulse:  [] 123  Resp:  [16-19] 18  BP: ()/(68-84) 120/84  SpO2:  [97 %-100 %] 97 %  I/O last 3  completed shifts:  In: 845 [P.O.:845]  Out: 1375 [Urine:1375]    Today's Exam  Constitutional:  NAD,   Neuropsyche: Brighter affect today, smiling alert and oriented, answers questions appropriately.   Respiratory:  Breathing comfortably, good air exchange, no wheezes, no crackles.   Cardiovascular:  Regular rate and rhythm, 1+ edema.  GI:  soft, NT/ND, BS normal  Skin/Integumen:  No acute rash or sign of bleeding.     Medications   All medications reviewed on 02/08/23    dextrose Stopped (02/03/23 0828)       aspirin  81 mg Oral Daily     desmopressin  10 mcg Alternating Nostrils At Bedtime     lithium  150 mg Oral Daily before supper     pantoprazole  40 mg Oral QAM AC     sodium bicarbonate  650 mg Oral BID     sodium chloride (PF)  10-40 mL Intracatheter Q8H     sodium zirconium cyclosilicate  10 g Oral TID     PRN Meds: acetaminophen **OR** acetaminophen, ALPRAZolam, bisacodyl, dextrose, glucose **OR** dextrose **OR** glucagon, HYDROmorphone, magnesium hydroxide, magnesium sulfate, naloxone **OR** naloxone **OR** naloxone **OR** naloxone, ondansetron **OR** ondansetron, polyethylene glycol, senna-docusate **OR** [DISCONTINUED] senna-docusate, sodium chloride (PF)    Data   Recent Labs   Lab 02/08/23  1053 02/08/23  0852 02/08/23  0737 02/07/23  2151 02/07/23  0543 02/07/23  0512 02/06/23  1802 02/06/23  1101 02/05/23  0619 02/05/23  0216 02/04/23  0912 02/04/23  0603 02/01/23  2032 02/01/23  1930   WBC  --  9.1  --   --   --   --   --   --   --  10.8  --  8.1   < > 9.7   HGB  --  8.7*  --   --  8.6*  --   --  8.9*  --  7.5*  --  7.9*   < > 7.1*   MCV  --  70*  --   --   --   --   --   --   --  68*  --  72*   < > 73*   PLT  --  480*  --   --   --   --   --   --   --  288  --  305   < > 244   NA  --  135*  --   --  141  --   --  140   < > 138   < > 148*  148*   < > 148*   POTASSIUM  --  4.7  --   --  5.7*  --  5.7* 5.5*   < > 5.3   < > 4.9  4.9   < > 3.2*   CHLORIDE  --  101  --   --  109*  --   --  109*   < >  106   < > 115*  115*   < > 116*   CO2  --  19*  --   --  21*  --   --  21*   < > 24   < > 25  25   < > 22   BUN  --  51.0*  --   --  50.3*  --   --  42.5*   < > 36.9*   < > 36.4*  36.4*   < > 30.0*   CR  --  2.18*  --   --  2.16*  --   --  1.88*   < > 2.24*   < > 2.35*  2.35*   < > 2.66*   ANIONGAP  --  15  --   --  11  --   --  10   < > 8   < > 8  8   < > 10   ISSA  --  9.7  --   --  9.8  --   --  9.6   < > 9.3   < > 9.8  9.8   < > 9.3   GLC 81 135* 106*   < > 99   < >  --  103*   < > 131*   < > 147*  147*   < > 113*   ALBUMIN  --   --   --   --   --   --   --   --   --   --   --  2.6*  --  2.3*    < > = values in this interval not displayed.       No results found for this or any previous visit (from the past 24 hour(s)).

## 2023-02-09 ENCOUNTER — APPOINTMENT (OUTPATIENT)
Dept: PHYSICAL THERAPY | Facility: CLINIC | Age: 73
DRG: 917 | End: 2023-02-09
Attending: INTERNAL MEDICINE
Payer: MEDICARE

## 2023-02-09 ENCOUNTER — APPOINTMENT (OUTPATIENT)
Dept: OCCUPATIONAL THERAPY | Facility: CLINIC | Age: 73
DRG: 917 | End: 2023-02-09
Payer: MEDICARE

## 2023-02-09 LAB
ANION GAP SERPL CALCULATED.3IONS-SCNC: 12 MMOL/L (ref 7–15)
BUN SERPL-MCNC: 49.5 MG/DL (ref 8–23)
CALCIUM SERPL-MCNC: 9.5 MG/DL (ref 8.8–10.2)
CHLORIDE SERPL-SCNC: 108 MMOL/L (ref 98–107)
CREAT SERPL-MCNC: 2.14 MG/DL (ref 0.51–0.95)
DEPRECATED HCO3 PLAS-SCNC: 20 MMOL/L (ref 22–29)
GFR SERPL CREATININE-BSD FRML MDRD: 24 ML/MIN/1.73M2
GLUCOSE BLDC GLUCOMTR-MCNC: 106 MG/DL (ref 70–99)
GLUCOSE BLDC GLUCOMTR-MCNC: 160 MG/DL (ref 70–99)
GLUCOSE BLDC GLUCOMTR-MCNC: 94 MG/DL (ref 70–99)
GLUCOSE SERPL-MCNC: 108 MG/DL (ref 70–99)
HGB BLD-MCNC: 8.7 G/DL (ref 11.7–15.7)
LITHIUM SERPL-SCNC: 0.1 MMOL/L (ref 0.6–1.2)
POTASSIUM SERPL-SCNC: 4 MMOL/L (ref 3.4–5.3)
SODIUM SERPL-SCNC: 140 MMOL/L (ref 136–145)

## 2023-02-09 PROCEDURE — 99232 SBSQ HOSP IP/OBS MODERATE 35: CPT | Performed by: PSYCHIATRY & NEUROLOGY

## 2023-02-09 PROCEDURE — 97535 SELF CARE MNGMENT TRAINING: CPT | Mod: GO

## 2023-02-09 PROCEDURE — 80048 BASIC METABOLIC PNL TOTAL CA: CPT | Performed by: INTERNAL MEDICINE

## 2023-02-09 PROCEDURE — 97530 THERAPEUTIC ACTIVITIES: CPT | Mod: GP

## 2023-02-09 PROCEDURE — 36415 COLL VENOUS BLD VENIPUNCTURE: CPT | Performed by: INTERNAL MEDICINE

## 2023-02-09 PROCEDURE — 99232 SBSQ HOSP IP/OBS MODERATE 35: CPT | Performed by: STUDENT IN AN ORGANIZED HEALTH CARE EDUCATION/TRAINING PROGRAM

## 2023-02-09 PROCEDURE — 250N000013 HC RX MED GY IP 250 OP 250 PS 637: Performed by: INTERNAL MEDICINE

## 2023-02-09 PROCEDURE — 250N000013 HC RX MED GY IP 250 OP 250 PS 637: Performed by: STUDENT IN AN ORGANIZED HEALTH CARE EDUCATION/TRAINING PROGRAM

## 2023-02-09 PROCEDURE — 250N000013 HC RX MED GY IP 250 OP 250 PS 637: Performed by: PSYCHIATRY & NEUROLOGY

## 2023-02-09 PROCEDURE — 85018 HEMOGLOBIN: CPT | Performed by: INTERNAL MEDICINE

## 2023-02-09 PROCEDURE — 80178 ASSAY OF LITHIUM: CPT | Performed by: PSYCHIATRY & NEUROLOGY

## 2023-02-09 PROCEDURE — 120N000001 HC R&B MED SURG/OB

## 2023-02-09 PROCEDURE — 97116 GAIT TRAINING THERAPY: CPT | Mod: GP

## 2023-02-09 PROCEDURE — 99232 SBSQ HOSP IP/OBS MODERATE 35: CPT | Performed by: INTERNAL MEDICINE

## 2023-02-09 RX ORDER — DESMOPRESSIN ACETATE 0.1 MG/ML
20 SOLUTION NASAL AT BEDTIME
Status: DISCONTINUED | OUTPATIENT
Start: 2023-02-09 | End: 2023-02-10

## 2023-02-09 RX ADMIN — SODIUM BICARBONATE 650 MG TABLET 650 MG: at 08:10

## 2023-02-09 RX ADMIN — SODIUM BICARBONATE 650 MG TABLET 650 MG: at 22:14

## 2023-02-09 RX ADMIN — PANTOPRAZOLE SODIUM 40 MG: 40 TABLET, DELAYED RELEASE ORAL at 06:44

## 2023-02-09 RX ADMIN — ASPIRIN 81 MG: 81 TABLET, COATED ORAL at 08:10

## 2023-02-09 RX ADMIN — LITHIUM CARBONATE 150 MG: 150 CAPSULE, GELATIN COATED ORAL at 17:45

## 2023-02-09 RX ADMIN — SODIUM ZIRCONIUM CYCLOSILICATE 10 G: 5 POWDER, FOR SUSPENSION ORAL at 09:18

## 2023-02-09 RX ADMIN — DESMOPRESSIN ACETATE 20 MCG: 0.1 SOLUTION NASAL at 17:49

## 2023-02-09 ASSESSMENT — ACTIVITIES OF DAILY LIVING (ADL)
ADLS_ACUITY_SCORE: 50
ADLS_ACUITY_SCORE: 46
ADLS_ACUITY_SCORE: 50
ADLS_ACUITY_SCORE: 50
ADLS_ACUITY_SCORE: 46
ADLS_ACUITY_SCORE: 50
ADLS_ACUITY_SCORE: 46
ADLS_ACUITY_SCORE: 50

## 2023-02-09 NOTE — PROGRESS NOTES
[facility]    Hospitalist Progress Note    Date of Service (when I saw the patient): 02/09/2023  Admit date: 1/28/2023    Interval History   Full details of events over last 24 hours outlined below.   Diana had 1 temperature of 100 with a mildly elevated heart rate of 123.  This was transient and otherwise she has been afebrile with normal vitals.  The last 24 hours.   No acute events reported per nursing notes.  No longer expressing suicidal ideation.  Diana denies any concerns.  She is continues to feel more upbeat, and hopeful.  She is working with the nurses to get up and more mobile and stronger.  Per nursing she slept very well through the night.  She does have a lot of urine output throughout the night.     Assessment & Plan   Polysubstance Overdose/Suicide Attempt  Distributive shock causing hypotension from Ca channel blocker overdose, RESOLVED  Acute encephalopathy caused by overdose, RESOLVED  Acute hypoxic resp failure with overdose. RESOLVED  * Patient indicating she is still thinking of harming herself.  She also has recent history of other self harm attempts.        Mental Health Consult Appreciated.preciated. Noted mental health has severely decompensated since stopping lithium a year ago.     After discussion among nephrology, patient and family it was decided that the benefits of lithium out likely outweigh the risk.  Family and patient accepts the risk of progressive renal disease on lithium    Psychiatry increased lithium to 150 mg twice daily on 02/09/23    Continue daily lithium level    Discontinued 1:1/Suicide precautions on 2/8    Given her improvement she may not need inpatient psychiatry.  She is to remain in the hospital at this time as we continue to follow on as we titrate up on lithium and psychiatry feels she is ready to discharge home.  As it is going to take 2 to 3 weeks likely to get a bed and mental health.     Diabetes Insipidus, partial   Severe  nocturia -2/7 reported getting up 8-10 times per night to urinate  REI to 2.56 max on 2/2/23, resolving   CKD baseline Cr ~ 1.5-2.   Metabolic/lacitc acidosis associated with overdose, resolved. Recurring on 02/06/23  Hyperkalemia with acidosis, suggestive of RTA  *S/p IVF and continued on TF flushes 200 ml every 2 hours.    * Stopped subcutaneous heparin on 2/6/23, pre nephrology's recommendation, can cause RTA      Nephrology's input appreciated.     Encourage fluids.     Continue NaBicarbonate 650 mg BID on 02/06/23     Nephrology started Lokelma on 02/07/23(sodium zirconium cyclosilicate)  10 mg TID.     Continued large urine output overnight.     Nephrology started DDAVP on 02/07/23 10 mcg at bedtime for nocturia, increased to 20 mcg on 2/9    Labs as outlined below.  K normalized    Follow up BMP in AM    Vitals:    02/02/23 0000 02/04/23 0600 02/05/23 0500 02/06/23 0400   Weight: 61.6 kg (135 lb 12.9 oz) 56.3 kg (124 lb 1.9 oz) 57 kg (125 lb 10.6 oz) 56.2 kg (123 lb 14.4 oz)    02/09/23 0544   Weight: 50.6 kg (111 lb 8.8 oz)     I/O last 3 completed shifts:  In: 720 [P.O.:720]  Out: 1000 [Urine:1000]  Recent Labs   Lab 02/09/23  0917 02/08/23  0852 02/07/23  0543 02/06/23  1802 02/06/23  1101 02/06/23  0549 02/06/23  0023 02/05/23  1818 02/05/23  1120 02/05/23  0619 02/05/23  0216 02/04/23  2215 02/04/23  1753   CO2 20* 19* 21*  --  21*  --   --   --   --  23 24 25 23    135* 141  --  140 141 136 136 134* 143 138 137 138   POTASSIUM 4.0 4.7 5.7* 5.7* 5.5* 5.8*  --   --   --  5.1 5.3 5.0 4.7   BUN 49.5* 51.0* 50.3*  --  42.5*  --   --   --   --  42.3* 36.9* 36.1* 39.0*   CR 2.14* 2.18* 2.16*  --  1.88*  --   --   --   --  2.17* 2.24* 2.29* 2.29*         DDAVP at nighttime started on 2/7    Loose stools, RESOLVING  * No ABD pain. Suspect TF exacerbated.  TF's stopping today, monitor.     Suspected protein-calorie malnutrition  Prior hypoglycemia:  * TFs stopped on 2/5/23    Currently on regular diet >  Change to low K diet given elevated K.     Appreciate nutrition consult, last seen on 2/6      Superficial thrombus of R cephalic vein:  * RUE edematous monitor,     R subclavian CVC removed on 2/5/23      Chronic anemia : Baseline Hgb ~ 10. No evidence of bleeding, periodically monitor.  No indication for a current transfusion.  H/o perforated PUD   *B12 elevated at 1249, iron panel normal, folate normal on 02/07/23     Follow with initiation of ASA for DVT ppx.     Continue PPI.     Recent Labs   Lab 02/09/23  1048 02/08/23  0852 02/07/23  0543 02/06/23  1101 02/05/23  0216 02/04/23  0603   HGB 8.7* 8.7* 8.6* 8.9* 7.5* 7.9*     Deconditioned    PT ordered     Ambulate with assist QID, discussed with RN on 02/07/23    Suspected pressure lesion from prior treatment with BiPAP  Crusted ulceration on tip of nose as well as linear skin breakdown over bridge of nose.    Wound care nurse consult placed on 2/9     Clinically Significant Risk Factors              # Hypoalbuminemia: Lowest albumin = 2.3 g/dL at 2/1/2023  7:30 PM, will monitor as appropriate                     Diet: Orders Placed This Encounter      Combination Diet 2 gm K Diet     IVF: None   Suresh Catheter: Not present     DVT Prophylaxis: Stopped heparin subcutaneous, started ASA 81 mg daily.   Code Status: Full Code     Disposition: Expected discharge when lytes stable on PO regimen to Mental Health.   Communication: Discussed with RN and patient on 02/09/23.   Anabell Camacho MD  Hospitalist Service  [facility]  Securely message with the Vocera Web Console (learn more here)  Text page via [employer] Paging/Directory    Medical Decision Making          -Data reviewed today: I reviewed all new labs and imaging results over the last 24 hours. I personally reviewed no images or EKG's today.    Physical Exam   Temp: 98  F (36.7  C) Temp src: Oral BP: 126/78 Pulse: 77   Resp: 18 SpO2: 97 % O2 Device: None (Room air)    Vitals:     02/05/23 0500 02/06/23 0400 02/09/23 0544   Weight: 57 kg (125 lb 10.6 oz) 56.2 kg (123 lb 14.4 oz) 50.6 kg (111 lb 8.8 oz)     Vital Signs with Ranges  Temp:  [97.5  F (36.4  C)-100  F (37.8  C)] 98  F (36.7  C)  Pulse:  [] 77  Resp:  [18] 18  BP: (100-126)/(68-84) 126/78  SpO2:  [97 %-100 %] 97 %  I/O last 3 completed shifts:  In: 720 [P.O.:720]  Out: 1000 [Urine:1000]    Today's Exam  Constitutional:  NAD,   Neuropsyche: Brighter affect today, smiling alert and oriented, answers questions appropriately.   Respiratory:  Breathing comfortably, good air exchange, no wheezes, no crackles.   Cardiovascular:  Regular rate and rhythm, 1+ edema.  GI:  soft, NT/ND, BS normal  Skin/Integumen:  No acute rash or sign of bleeding.     Medications   All medications reviewed on 02/09/23    dextrose Stopped (02/03/23 0828)       aspirin  81 mg Oral Daily     desmopressin  20 mcg Alternating Nostrils At Bedtime     lithium  150 mg Oral Daily before supper     pantoprazole  40 mg Oral QAM AC     sodium bicarbonate  650 mg Oral BID     sodium chloride (PF)  10-40 mL Intracatheter Q8H     PRN Meds: acetaminophen **OR** acetaminophen, ALPRAZolam, bisacodyl, dextrose, glucose **OR** dextrose **OR** glucagon, HYDROmorphone, magnesium hydroxide, magnesium sulfate, naloxone **OR** naloxone **OR** naloxone **OR** naloxone, ondansetron **OR** ondansetron, polyethylene glycol, senna-docusate **OR** [DISCONTINUED] senna-docusate, sodium chloride (PF)    Data   Recent Labs   Lab 02/09/23  0805 02/08/23  2113 02/08/23  1053 02/08/23  0852 02/07/23  2151 02/07/23  0543 02/07/23  0512 02/06/23  1802 02/06/23  1101 02/05/23  0619 02/05/23  0216 02/04/23  0912 02/04/23  0603   WBC  --   --   --  9.1  --   --   --   --   --   --  10.8  --  8.1   HGB  --   --   --  8.7*  --  8.6*  --   --  8.9*  --  7.5*  --  7.9*   MCV  --   --   --  70*  --   --   --   --   --   --  68*  --  72*   PLT  --   --   --  480*  --   --   --   --   --   --  288  --   305   NA  --   --   --  135*  --  141  --   --  140   < > 138   < > 148*  148*   POTASSIUM  --   --   --  4.7  --  5.7*  --  5.7* 5.5*   < > 5.3   < > 4.9  4.9   CHLORIDE  --   --   --  101  --  109*  --   --  109*   < > 106   < > 115*  115*   CO2  --   --   --  19*  --  21*  --   --  21*   < > 24   < > 25  25   BUN  --   --   --  51.0*  --  50.3*  --   --  42.5*   < > 36.9*   < > 36.4*  36.4*   CR  --   --   --  2.18*  --  2.16*  --   --  1.88*   < > 2.24*   < > 2.35*  2.35*   ANIONGAP  --   --   --  15  --  11  --   --  10   < > 8   < > 8  8   ISSA  --   --   --  9.7  --  9.8  --   --  9.6   < > 9.3   < > 9.8  9.8   * 92 81 135*   < > 99   < >  --  103*   < > 131*   < > 147*  147*   ALBUMIN  --   --   --   --   --   --   --   --   --   --   --   --  2.6*    < > = values in this interval not displayed.       No results found for this or any previous visit (from the past 24 hour(s)).

## 2023-02-09 NOTE — PLAN OF CARE
Goal Outcome Evaluation:  Cognitive Concerns/ Orientation: A&Ox4. Calm and cooperative.  BEHAVIOR & AGGRESSION TOOL COLOR: Green  CIWA SCORE: NA   ABNL VS/O2: VSS on room air  MOBILITY: sba with gbw, ambulated in the halls x4  PAIN MANAGMENT: denies  DIET: 2 gm K-fair appetite  BOWEL/BLADDER: continent  ABNL LAB/BG: Na 135. Cr 2.18. Hgb 7.8. K 4.7  DRAIN/DEVICES: PIV  TELEMETRY RHYTHM: NA  SKIN: blanchable redness to coccyx  TESTS/PROCEDURES: None  D/C DAY/GOALS/PLACE: pending lithium restart and electrolyte balance  OTHER IMPORTANT INFO: no suicidal thoughts. Sitter removed. Needs inpatient psych-on waiting list. Lithium restarted.

## 2023-02-09 NOTE — PROGRESS NOTES
Psychiatric Progress Note  Consult date: February 6, 2023         Reason for Consult, requesting source:    Suicide attempt/OD  Requesting source: Anabell Camacho    Labs and imaging reviewed. Patient seen and evaluated by Sandra Cox MD          HPI:   This is a 71 yo female who presented on 1/28/23 with a massive, intentional polypharmacy od .  Pt has a history of bipolar illness, and took a combinaton of lamictal, amlodipine, haldol, and adderall.  She had been in the ED 4 days prior for eval of SI.  Pt has had a difficult course related to calcium channel blocker od, and has been in the ICU, vented and on pressors with AHRF, REI on CRF, and encephalopthy.  We are asked to see for assistance with care for her depression, and appropriate disposition when she is ready medically to discharge.    Patient seen today along with her brother.  Both are very good historians.  It sounds like the patient was taken off lithium, her longtime medication for bipolar management, about a year ago.  This was related to worsening kidney function.  She has worked with her psychiatrist to try to find another regimen that was helpful.  She has been on Lamictal, as well as other medications.  None of these medications help with her depression.    Both the patient and her brother are in favor of her going back on lithium if this is a possibility.  I also would be in favor of it as well.  Clearly, the patient's bipolar illness is severe, and potentially lethal.  She was quite well managed on the lithium prior to it being discontinued.    2/7/23: Patient seen today for follow-up in the ICU.  She is waiting for a bed on the medical floor, and no longer requires a critical level of care.  She was awake, and tell me that she is feeling pretty anxious, which is her baseline when she is not on lithium.  She also said that the kidney doctor has been by, and they discussed going back on lithium.  She and I discussed low-dose Xanax to  help with her anxiety for now.  I discussed risk versus benefits of this medication and indicated to her this is a short-term medication, and that she does not have to take it if she does not care for it or if it does not work.    2/8/2023: Patient seen today with her brother, and RN at bedside.  Patient was sitting up and having some fruit.  She states she feels much better today both physically and mentally.  We discussed current lithium dosing, and the plan to titrate very slowly.  I also told her I think we should put her on the lowest for a geriatric psychiatry bed in the event that she still needs inpatient admission in 2 to 3 weeks.  She was agreeable to this.  Answered questions and discussed the overall strategy with the lithium.  I also told her that I do not recommend any other psychiatric medications for now.    2/9/2023: Patient seen today for follow-up.  Discussed with she and her brother.  Also discussed with Dr. Malik/renal.  Patient is feeling more depressed today, but admits that she did watch the news this morning which made her feel that things were more bleak.  We discussed increasing her lithium dose tomorrow pending okay from renal.  Patient denies any negative side effects that she is noting to the lithium.  We discussed other options besides watching the news or activities that are depressing.  Discussed this with she and her brother today to help bolster her spirits while we wait for the lithium to get to a therapeutic dose.            Past Psychiatric History:     Pt had recently been in the ED on 1/24 with thoughts of drowning herself while in a swimming pool.  She has an outpt psychiatrist, and a therapist, and had discharged after denying SI, and wood for safety.   Patient is seen by Dr. Trent Gallo for outpatient psychiatric management.        Substance Use and History:   Negative        Past Medical History:   PAST MEDICAL HISTORY:   Past Medical History:    Diagnosis Date     Benign essential hypertension 09/2018    added norvasc 9/18     Bipolar affective disorder (H)     hosp 1993, Dr. Aftab Deal     Cancer (H) 1996    DCIS, left breast     Chronic kidney disease, stage 3a (H)     seen by renal Dr. 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    felt due to lithium     Thalassaemia trait      Vitamin D deficiency        PAST SURGICAL HISTORY:   Past Surgical History:   Procedure Laterality Date     BIOPSY  1994    left breast     BREAST SURGERY      lumpectomy x 4     COLONOSCOPY  2021     COLONOSCOPY N/A 6/17/2022    Procedure: COLONOSCOPY, WITH POLYPECTOMY AND BIOPSY;  Surgeon: Panchito Gu MD;  Location:  GI     EYE SURGERY  2018    retina tear     LAPAROTOMY EXPLORATORY N/A 8/24/2022    Procedure: Exploratory laparotomy, REPAIR OF PERFORATED ULCER;  Surgeon: Ron Vidal MD;  Location:  OR     left hand surgery      last 1974             Family History:   FAMILY HISTORY:   Family History   Problem Relation Age of Onset     Cerebrovascular Disease Mother      Hypertension Father      Sleep Apnea Brother      Breast Cancer Maternal Aunt         x 2     Breast Cancer Other         Maternal Aunt     Hyperlipidemia Niece        Family Psychiatric History:         Social History:   SOCIAL HISTORY:   Social History     Tobacco Use     Smoking status: Never     Smokeless tobacco: Never   Substance Use Topics     Alcohol use: No            Physical ROS:   The 10 point Review of  Systems is negative other than noted in the HPI or here.           Medications:       aspirin  81 mg Oral Daily     desmopressin  20 mcg Alternating Nostrils At Bedtime     lithium  150 mg Oral Daily before supper     pantoprazole  40 mg Oral QAM AC     sodium bicarbonate  650 mg Oral BID     sodium chloride (PF)  10-40 mL Intracatheter Q8H              Allergies:     Allergies   Allergen Reactions     No Known Allergies           Labs:     Recent Results (from the past 48 hour(s))   Glucose by meter    Collection Time: 02/07/23  9:51 PM   Result Value Ref Range    GLUCOSE BY METER POCT 88 70 - 99 mg/dL   Glucose by meter    Collection Time: 02/08/23  2:19 AM   Result Value Ref Range    GLUCOSE BY METER POCT 94 70 - 99 mg/dL   Glucose by meter    Collection Time: 02/08/23  5:43 AM   Result Value Ref Range    GLUCOSE BY METER POCT 97 70 - 99 mg/dL   Glucose by meter    Collection Time: 02/08/23  7:37 AM   Result Value Ref Range    GLUCOSE BY METER POCT 106 (H) 70 - 99 mg/dL   Basic metabolic panel    Collection Time: 02/08/23  8:52 AM   Result Value Ref Range    Sodium 135 (L) 136 - 145 mmol/L    Potassium 4.7 3.4 - 5.3 mmol/L    Chloride 101 98 - 107 mmol/L    Carbon Dioxide (CO2) 19 (L) 22 - 29 mmol/L    Anion Gap 15 7 - 15 mmol/L    Urea Nitrogen 51.0 (H) 8.0 - 23.0 mg/dL    Creatinine 2.18 (H) 0.51 - 0.95 mg/dL    Calcium 9.7 8.8 - 10.2 mg/dL    Glucose 135 (H) 70 - 99 mg/dL    GFR Estimate 23 (L) >60 mL/min/1.73m2   Lithium level    Collection Time: 02/08/23  8:52 AM   Result Value Ref Range    Lithium 0.1 (L) 0.6 - 1.2 mmol/L   CBC with platelets    Collection Time: 02/08/23  8:52 AM   Result Value Ref Range    WBC Count 9.1 4.0 - 11.0 10e3/uL    RBC Count 4.09 3.80 - 5.20 10e6/uL    Hemoglobin 8.7 (L) 11.7 - 15.7 g/dL    Hematocrit 28.7 (L) 35.0 - 47.0 %    MCV 70 (L) 78 - 100 fL    MCH 21.3 (L) 26.5 - 33.0 pg    MCHC 30.3 (L) 31.5 - 36.5 g/dL    RDW 15.0 10.0 - 15.0 %    Platelet Count 480 (H) 150 - 450  10e3/uL   Phosphorus    Collection Time: 02/08/23  8:52 AM   Result Value Ref Range    Phosphorus 4.9 (H) 2.5 - 4.5 mg/dL   Magnesium    Collection Time: 02/08/23  8:52 AM   Result Value Ref Range    Magnesium 2.5 (H) 1.7 - 2.3 mg/dL   Glucose by meter    Collection Time: 02/08/23 10:53 AM   Result Value Ref Range    GLUCOSE BY METER POCT 81 70 - 99 mg/dL   Glucose by meter    Collection Time: 02/08/23  9:13 PM   Result Value Ref Range    GLUCOSE BY METER POCT 92 70 - 99 mg/dL   Glucose by meter    Collection Time: 02/09/23  8:05 AM   Result Value Ref Range    GLUCOSE BY METER POCT 106 (H) 70 - 99 mg/dL   Basic metabolic panel    Collection Time: 02/09/23  9:17 AM   Result Value Ref Range    Sodium 140 136 - 145 mmol/L    Potassium 4.0 3.4 - 5.3 mmol/L    Chloride 108 (H) 98 - 107 mmol/L    Carbon Dioxide (CO2) 20 (L) 22 - 29 mmol/L    Anion Gap 12 7 - 15 mmol/L    Urea Nitrogen 49.5 (H) 8.0 - 23.0 mg/dL    Creatinine 2.14 (H) 0.51 - 0.95 mg/dL    Calcium 9.5 8.8 - 10.2 mg/dL    Glucose 108 (H) 70 - 99 mg/dL    GFR Estimate 24 (L) >60 mL/min/1.73m2   Lithium level    Collection Time: 02/09/23  9:17 AM   Result Value Ref Range    Lithium 0.1 (L) 0.6 - 1.2 mmol/L   Hemoglobin    Collection Time: 02/09/23 10:48 AM   Result Value Ref Range    Hemoglobin 8.7 (L) 11.7 - 15.7 g/dL   Glucose by meter    Collection Time: 02/09/23 11:47 AM   Result Value Ref Range    GLUCOSE BY METER POCT 94 70 - 99 mg/dL          Physical and Psychiatric Examination:     /78 (BP Location: Right arm)   Pulse 77   Temp 98  F (36.7  C) (Oral)   Resp 18   Wt 50.6 kg (111 lb 8.8 oz)   SpO2 97%   BMI 19.15 kg/m    Weight is 111 lbs 8.84 oz  Body mass index is 19.15 kg/m .    Physical Exam:  I have reviewed the physical exam as documented by by the medical team and agree with findings and assessment and have no additional findings to add at this time.    Mental Status Exam:    Appearance: awake, alert, appeared as age stated, awake,  alert and cooperative  Attitude:  cooperative  Eye Contact:  good  Mood:  anxious and depressed  Affect:  mood congruent  Speech:  clear, coherent and decreased prosody  Language: Fluent in english   Psychomotor Behavior:  physical retardation  Thought Process:  logical, linear and goal oriented  Associations:  no loose associations  Thought Content:  no evidence of psychotic thought, no auditory hallucinations present, no visual hallucinations present and Patient is status post serious suicide attempt  Insight:  good  Judgement:  intact  Oriented to:  time, person, and place  Attention Span and Concentration:  intact  Recent and Remote Memory:  intact  Fund of Knowledge: Appropriate   Gait and Station: Sitting up in a chair               DSM-5 Diagnosis:   296.53 Bipolar I Disorder Current or Most Recent Episode Depressed, Severe with psychotic features, toxic metabolic encephalopathy which is resolving, Intentional overdose, subsequent encounter, anxiety          Assessment:   This is a 72-year-old female with a history of bipolar illness spanning 50 years.  Patient was initially diagnosed with schizophrenia when she first became ill.  Later, this diagnosis was modified to bipolar illness, which both she and her brother feel is accurate.  She does have a history of ECT many years ago.  It is unclear whether this was helpful or not.  What has been helpful has been lithium.  This medication was discontinued a year ago due to worsening kidney function.  The patient has not been able to stabilize since going off this medication, whereas she was stable and functional on lithium.    Patient and her brother, who are both very high functioning, would like her to go back on lithium.  I am in agreement.  We will reach out to the hospitalist to better understand what her options are.  Regardless, she will need to go to an inpatient psychiatric bed for further stabilization after she is medically ready to  discharge.    Patient is having today.  Discussed with renal, and copy okay to increase the dose to 150 twice daily.  Patient felt good about this, and is looking forward to feeling better than she has been.  Encouraged her to engage in activities that are positive, and to avoid watching the news, or other things that are depressing.    We also discussed the fact that I do not recommend we start any other psychiatric medications right now.  Lithium was her [facility] medication that kept her stable for decades.  That is the medication that we will try to use as a sole agent.  Answer questions for both patient and her brother.          Summary of Recommendations:   1.  We will increase lithium to 150 mg p.o. twice daily  2.  We are getting morning lithium levels and will follow the trend.    3.  Patient does not require a sitter at this point.    4.  Have placed patient in the queue for inpatient psychiatry on the geriatric floor.  This will take 2 to 3 weeks to get a bed.  I am hoping that she feels substantially better prior to that, and that we will not need an inpatient psychiatric stay.  If she is needing it, then I want to have her in line so that we are waiting for a bed longer than we need to.    5.  We will follow.      Sandra Cox MD  Consult/Liaison Psychiatry and Addiction Medicine  [facility]

## 2023-02-09 NOTE — PLAN OF CARE
Goal Outcome Evaluation:  Cognitive Concerns/ Orientation: A&Ox4, Denies SI. Calm and cooperative.  BEHAVIOR & AGGRESSION TOOL COLOR: Green  CIWA SCORE: NA   ABNL VS/O2: VSS RA  MOBILITY: SBA  PAIN MANAGMENT: Denies  DIET: 2gm Na.  BOWEL/BLADDER: Continent B&B  ABNL LAB/BG: Na 135 Cr 2.18  Mg 2.5  phos 4.9  DRAIN/DEVICES: PIV  TELEMETRY RHYTHM: NA  SKIN: blanchable redness to coccyx  TESTS/PROCEDURES: None  D/C DAY/GOALS/PLACE: pending lithium restart and electrolyte balance  OTHER IMPORTANT INFO: Denies SI, calm and cooperative this shift. Contracts for safety. RLE foot drop. Med compliant this shift.

## 2023-02-09 NOTE — PROGRESS NOTES
Renal Medicine Progress Note            Assessment/Plan:     Assessment:  Diana Santiago is a 72-year-old woman with PMH bipolar disorder/depression, CKD 4m chronic partial DI admitted 1/28/2023 with intentional polysubstance overdose, particularly calcium channel blocker overdose in the setting of severe depression.    Partial DI  Earlier in admission had hypernatremia with polyuria due to partial DI and lack of free water access.  Required NG placement for administration of free water.  Currently extubated and tolerating p.o., NG removed and sodium stable, patient drinking to thirst.  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  History of nocturia, polyuria chronically, patient compensates for this by increasing water intake.  When she has access to free water, sodium is typically well controlled.  She does have severe nocturia, awakens 8-10 times per night which severely impairs her sleep and likely contributes to her depression. Nighttime DDAVP started to see if we can improve her nocturia, does appear to have helped somewhat, but still has robust output.  Amiloride may be an option in the future once restarting lithium, however, would not start at this time given hyperkalemia.  -I/Os   -Daily BMP  -Intranasal DDAVP 10 mcg at bedtime, will increase to 20 mcg 2/9    Intentional polysubstance overdose  Severe depression and suicidality  Bipolar disorder  Mood had been stable on lithium previously, she has tried many medications in the past without desired effect.  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  Has had severe suicidal ideation and attempts off of lithium despite treatment with other medications.  Discussed at length with her and her brother resuming lithium at the risk of worsening renal function.  There is a chance that her CKD could progressed to ESRD requiring dialysis, they are aware of this risk and willing to assume that risk.  Discussed forms of dialysis just  for future planning.  Did discuss transplant briefly, mental health will need to be significantly improved prior to being a transplant candidate.  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  Likely related to heparin use.  Heparin discontinued and bicarb started 2/6.  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  Follows with Dr. Cedeno at [facility] nephrology.  Stopped lithium roughly 1 year ago and creatinine has been stable between 1.8-2.2, eGFR 22-30.   -Avoid nephrotoxins as able  -Renally dose medications    Plan:  -Patient willing to trial lithium at lower doses with frequent monitoring at the risk of her kidneys, this is reasonable given her severe suicidality  -discontinue lokelma   -increase DDAVP to 20 mcg at bedtime  -Continue sodium bicarb 650 mg twice daily        Interval History:     - no acute events overnight   - still had 1.5L UOP overnight  - denies SOB   - feels a bit overwhelmed by all the changes, but overall doing better with no complaints          Medications and Allergies:       aspirin  81 mg Oral Daily     desmopressin  10 mcg Alternating Nostrils At Bedtime     lithium  150 mg Oral Daily before supper     pantoprazole  40 mg Oral QAM AC     sodium bicarbonate  650 mg Oral BID     sodium chloride (PF)  10-40 mL Intracatheter Q8H        Allergies   Allergen Reactions     No Known Allergies             Physical Exam:   Vitals were reviewed  /78 (BP Location: Right arm)   Pulse 77   Temp 98  F (36.7  C) (Oral)   Resp 18   Wt 50.6 kg (111 lb 8.8 oz)   SpO2 97%   BMI 19.15 kg/m      Wt Readings from Last 3 Encounters:   02/09/23 50.6 kg (111 lb 8.8 oz)   01/24/23 54.4  kg (120 lb)   10/20/22 51.3 kg (113 lb)       Intake/Output Summary (Last 24 hours) at 2/7/2023 1146  Last data filed at 2/7/2023 0800  Gross per 24 hour   Intake 450 ml   Output 1750 ml   Net -1300 ml       GENERAL APPEARANCE: no acute distress, resting comfortably in bed   HEENT: MMM  RESP: clear  CV: RRR, no murmurs  EXTREMITIES/SKIN: no edema  NEURO:  Alert, oriented, normal speech           Data:     BMP  Recent Labs   Lab 02/09/23  1147 02/09/23  0917 02/09/23  0805 02/08/23  2113 02/08/23  1053 02/08/23  0852 02/07/23  2151 02/07/23  0543 02/07/23  0512 02/06/23  1802 02/06/23  1101   NA  --  140  --   --   --  135*  --  141  --   --  140   POTASSIUM  --  4.0  --   --   --  4.7  --  5.7*  --  5.7* 5.5*   CHLORIDE  --  108*  --   --   --  101  --  109*  --   --  109*   ISSA  --  9.5  --   --   --  9.7  --  9.8  --   --  9.6   CO2  --  20*  --   --   --  19*  --  21*  --   --  21*   BUN  --  49.5*  --   --   --  51.0*  --  50.3*  --   --  42.5*   CR  --  2.14*  --   --   --  2.18*  --  2.16*  --   --  1.88*   GLC 94 108* 106* 92   < > 135*   < > 99   < >  --  103*    < > = values in this interval not displayed.     CBC  Recent Labs   Lab 02/09/23  1048 02/08/23  0852 02/07/23  0543 02/06/23  1101 02/05/23  0216 02/04/23  0603 02/03/23  0518   WBC  --  9.1  --   --  10.8 8.1 9.1   HGB 8.7* 8.7* 8.6* 8.9* 7.5* 7.9* 7.2*   HCT  --  28.7*  --   --  23.4* 26.3* 24.5*   MCV  --  70*  --   --  68* 72* 73*   PLT  --  480*  --   --  288 305 270     Lab Results   Component Value Date    AST 21 01/31/2023    ALT <5 (L) 01/31/2023    ALKPHOS 129 (H) 01/31/2023    BILITOTAL 0.5 01/31/2023     Lab Results   Component Value Date    INR 0.93 09/12/2022       Attestation:  I have reviewed today's vital signs, notes, medications, labs and imaging.    Tracy Malik MD  [facility] - Nephrology  Office: [phone]

## 2023-02-10 ENCOUNTER — APPOINTMENT (OUTPATIENT)
Dept: OCCUPATIONAL THERAPY | Facility: CLINIC | Age: 73
DRG: 917 | End: 2023-02-10
Payer: MEDICARE

## 2023-02-10 ENCOUNTER — APPOINTMENT (OUTPATIENT)
Dept: PHYSICAL THERAPY | Facility: CLINIC | Age: 73
DRG: 917 | End: 2023-02-10
Payer: MEDICARE

## 2023-02-10 LAB
ALBUMIN SERPL BCG-MCNC: 3.3 G/DL (ref 3.5–5.2)
ANION GAP SERPL CALCULATED.3IONS-SCNC: 12 MMOL/L (ref 7–15)
BUN SERPL-MCNC: 45.3 MG/DL (ref 8–23)
CALCIUM SERPL-MCNC: 9.6 MG/DL (ref 8.8–10.2)
CHLORIDE SERPL-SCNC: 109 MMOL/L (ref 98–107)
CREAT SERPL-MCNC: 2 MG/DL (ref 0.51–0.95)
DEPRECATED HCO3 PLAS-SCNC: 18 MMOL/L (ref 22–29)
GFR SERPL CREATININE-BSD FRML MDRD: 26 ML/MIN/1.73M2
GLUCOSE BLDC GLUCOMTR-MCNC: 100 MG/DL (ref 70–99)
GLUCOSE BLDC GLUCOMTR-MCNC: 101 MG/DL (ref 70–99)
GLUCOSE BLDC GLUCOMTR-MCNC: 104 MG/DL (ref 70–99)
GLUCOSE SERPL-MCNC: 104 MG/DL (ref 70–99)
LITHIUM SERPL-SCNC: 0.2 MMOL/L (ref 0.6–1.2)
MAGNESIUM SERPL-MCNC: 2.5 MG/DL (ref 1.7–2.3)
OSMOLALITY UR: 277 MMOL/KG (ref 100–1200)
PHOSPHATE SERPL-MCNC: 4.3 MG/DL (ref 2.5–4.5)
POTASSIUM SERPL-SCNC: 4 MMOL/L (ref 3.4–5.3)
SODIUM SERPL-SCNC: 139 MMOL/L (ref 136–145)
SODIUM UR-SCNC: 38 MMOL/L

## 2023-02-10 PROCEDURE — 99232 SBSQ HOSP IP/OBS MODERATE 35: CPT | Performed by: PSYCHIATRY & NEUROLOGY

## 2023-02-10 PROCEDURE — 97535 SELF CARE MNGMENT TRAINING: CPT | Mod: GO

## 2023-02-10 PROCEDURE — 120N000001 HC R&B MED SURG/OB

## 2023-02-10 PROCEDURE — 97530 THERAPEUTIC ACTIVITIES: CPT | Mod: GO

## 2023-02-10 PROCEDURE — 99232 SBSQ HOSP IP/OBS MODERATE 35: CPT | Performed by: STUDENT IN AN ORGANIZED HEALTH CARE EDUCATION/TRAINING PROGRAM

## 2023-02-10 PROCEDURE — 36415 COLL VENOUS BLD VENIPUNCTURE: CPT | Performed by: PSYCHIATRY & NEUROLOGY

## 2023-02-10 PROCEDURE — 83735 ASSAY OF MAGNESIUM: CPT | Performed by: INTERNAL MEDICINE

## 2023-02-10 PROCEDURE — 80178 ASSAY OF LITHIUM: CPT | Performed by: PSYCHIATRY & NEUROLOGY

## 2023-02-10 PROCEDURE — 84300 ASSAY OF URINE SODIUM: CPT | Performed by: STUDENT IN AN ORGANIZED HEALTH CARE EDUCATION/TRAINING PROGRAM

## 2023-02-10 PROCEDURE — 99232 SBSQ HOSP IP/OBS MODERATE 35: CPT | Performed by: INTERNAL MEDICINE

## 2023-02-10 PROCEDURE — 97116 GAIT TRAINING THERAPY: CPT | Mod: GP

## 2023-02-10 PROCEDURE — 250N000013 HC RX MED GY IP 250 OP 250 PS 637: Performed by: INTERNAL MEDICINE

## 2023-02-10 PROCEDURE — 84100 ASSAY OF PHOSPHORUS: CPT | Performed by: INTERNAL MEDICINE

## 2023-02-10 PROCEDURE — 250N000013 HC RX MED GY IP 250 OP 250 PS 637: Performed by: STUDENT IN AN ORGANIZED HEALTH CARE EDUCATION/TRAINING PROGRAM

## 2023-02-10 PROCEDURE — 80048 BASIC METABOLIC PNL TOTAL CA: CPT | Performed by: INTERNAL MEDICINE

## 2023-02-10 PROCEDURE — 83935 ASSAY OF URINE OSMOLALITY: CPT | Performed by: STUDENT IN AN ORGANIZED HEALTH CARE EDUCATION/TRAINING PROGRAM

## 2023-02-10 PROCEDURE — 80069 RENAL FUNCTION PANEL: CPT | Performed by: INTERNAL MEDICINE

## 2023-02-10 PROCEDURE — G0463 HOSPITAL OUTPT CLINIC VISIT: HCPCS

## 2023-02-10 PROCEDURE — 97530 THERAPEUTIC ACTIVITIES: CPT | Mod: GP

## 2023-02-10 PROCEDURE — 250N000013 HC RX MED GY IP 250 OP 250 PS 637: Performed by: PSYCHIATRY & NEUROLOGY

## 2023-02-10 RX ORDER — LITHIUM CARBONATE 150 MG/1
150 CAPSULE ORAL 2 TIMES DAILY WITH MEALS
Status: DISCONTINUED | OUTPATIENT
Start: 2023-02-11 | End: 2023-02-13

## 2023-02-10 RX ORDER — DESMOPRESSIN ACETATE 0.1 MG/1
0.4 TABLET ORAL AT BEDTIME
Status: DISCONTINUED | OUTPATIENT
Start: 2023-02-10 | End: 2023-02-11

## 2023-02-10 RX ORDER — LITHIUM CARBONATE 150 MG/1
150 CAPSULE ORAL
Status: COMPLETED | OUTPATIENT
Start: 2023-02-10 | End: 2023-02-10

## 2023-02-10 RX ORDER — DESMOPRESSIN ACETATE 0.1 MG/1
0.2 TABLET ORAL AT BEDTIME
Status: DISCONTINUED | OUTPATIENT
Start: 2023-02-10 | End: 2023-02-10

## 2023-02-10 RX ADMIN — LITHIUM CARBONATE 150 MG: 150 CAPSULE, GELATIN COATED ORAL at 18:11

## 2023-02-10 RX ADMIN — SODIUM BICARBONATE 650 MG TABLET 650 MG: at 08:55

## 2023-02-10 RX ADMIN — DESMOPRESSIN ACETATE 0.4 MG: 0.1 TABLET ORAL at 21:18

## 2023-02-10 RX ADMIN — LITHIUM CARBONATE 150 MG: 150 CAPSULE, GELATIN COATED ORAL at 11:12

## 2023-02-10 RX ADMIN — SODIUM BICARBONATE 650 MG TABLET 650 MG: at 18:11

## 2023-02-10 RX ADMIN — ASPIRIN 81 MG: 81 TABLET, COATED ORAL at 08:55

## 2023-02-10 RX ADMIN — PANTOPRAZOLE SODIUM 40 MG: 40 TABLET, DELAYED RELEASE ORAL at 06:37

## 2023-02-10 ASSESSMENT — ACTIVITIES OF DAILY LIVING (ADL)
ADLS_ACUITY_SCORE: 54
ADLS_ACUITY_SCORE: 54
ADLS_ACUITY_SCORE: 50
ADLS_ACUITY_SCORE: 50
ADLS_ACUITY_SCORE: 54
ADLS_ACUITY_SCORE: 54
ADLS_ACUITY_SCORE: 50
ADLS_ACUITY_SCORE: 54
ADLS_ACUITY_SCORE: 54
ADLS_ACUITY_SCORE: 50
DEPENDENT_IADLS:: INDEPENDENT;MEDICATION MANAGEMENT
ADLS_ACUITY_SCORE: 50
ADLS_ACUITY_SCORE: 54

## 2023-02-10 NOTE — PLAN OF CARE
Goal Outcome Evaluation:  Cognitive Concerns/ Orientation: A&Ox4. Calm and cooperative.  BEHAVIOR & AGGRESSION TOOL COLOR: Green  CIWA SCORE: NA   ABNL VS/O2: VSS on room air  MOBILITY: sba with gbw, ambulated frequently in the halls   PAIN MANAGMENT: denies  DIET: 2 gm K-fair appetite  BOWEL/BLADDER: continent  ABNL LAB/BG: Cr 2.14. Hgb 7.8. K 4.0  DRAIN/DEVICES: PIV  TELEMETRY RHYTHM: NA  SKIN: scabbing  TESTS/PROCEDURES: None  D/C DAY/GOALS/PLACE: pending lithium restart and electrolyte balance  OTHER IMPORTANT INFO: no suicidal thoughts. Sitter removed. Needs inpatient psych-on waiting list. Lithium continued. Large amounts of urine output. Went to bed at 6 pm, this is her home routine, bedtime meds given at that time per pt request

## 2023-02-10 NOTE — PROGRESS NOTES
Renal Medicine Progress Note            Assessment/Plan:     Assessment:  Diana Santiago is a 72-year-old woman with PMH bipolar disorder/depression, CKD 4m chronic partial DI admitted 1/28/2023 with intentional polysubstance overdose, particularly calcium channel blocker overdose in the setting of severe depression.    Partial DI  Earlier in admission had hypernatremia with polyuria due to partial DI and lack of free water access. Historically maintains normonatremia with adequate water intake as long as she has access to water. Does have nocturia, contributing to poor sleep and likely negatively affecting mood. Trial of DDAVP intranasal has not yet been successful. Will try tablet form tonight. Additionally, will repeat urine studies, perhaps she no longer has partial DI and it is purely nephrogenic now. If that is the case, DDAVP will not work regardless of dose. Amiloride could be an option in that case now that hyperkalemia has resolved.    -I/Os   -Daily BMP  -switching intranasal DDAVP to tablet form 0.2 mcg at bedtime  - repeat urine Na, urine Osm     Intentional polysubstance overdose  Severe depression and suicidality  Bipolar disorder  Mood had been stable on lithium previously, she has tried many medications in the past without desired effect.  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  Has had severe suicidal ideation and attempts off of lithium despite treatment with other medications.  Discussed at length with her and her brother resuming lithium at the risk of worsening renal function.  There is a chance that her CKD could progressed to ESRD requiring dialysis, they are aware of this risk and willing to assume that risk.  Discussed forms of dialysis just for future planning.  Did discuss transplant briefly, mental health will need to be significantly improved prior to being a transplant candidate.  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  Likely related to heparin use.  Heparin discontinued and bicarb started 2/6.  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  Follows with Dr. Cedeno at [facility] nephrology.  Stopped lithium roughly 1 year ago and creatinine has been stable between 1.8-2.2, eGFR 22-30.   -Avoid nephrotoxins as able  -Renally dose medications    Plan:  -Patient willing to trial lithium at lower doses with frequent monitoring at the risk of her kidneys, this is reasonable given her severe suicidality.  -switch to  Tablet form of DDAVP   - repeat Urine Osm and Na  -Continue sodium bicarb 650 mg twice daily        Interval History:     - no acute events overnight   - doing well   - working on her strenght   - still had 1.5L UOP overnight despite increased dose   - denies other complaints           Medications and Allergies:       aspirin  81 mg Oral Daily     desmopressin  0.2 mg Oral At Bedtime     lithium  150 mg Oral TID w/meals     [START ON 2/11/2023] lithium  150 mg Oral BID w/meals     pantoprazole  40 mg Oral QAM AC     sodium bicarbonate  650 mg Oral BID     sodium chloride (PF)  10-40 mL Intracatheter Q8H        Allergies   Allergen Reactions     No Known Allergies             Physical Exam:   Vitals were reviewed  BP (!) 140/84 (BP Location: Right arm)   Pulse 69   Temp 97.9  F (36.6  C) (Oral)   Resp 16   Wt 50.6 kg (111 lb 8.8 oz)   SpO2 100%   BMI 19.15 kg/m      Wt Readings from Last 3 Encounters:   02/09/23 50.6 kg (111 lb 8.8 oz)   01/24/23 54.4 kg (120 lb)   10/20/22 51.3 kg (113 lb)       Intake/Output Summary (Last 24 hours) at 2/7/2023 1146  Last data filed at  2/7/2023 0800  Gross per 24 hour   Intake 450 ml   Output 1750 ml   Net -1300 ml       GENERAL APPEARANCE: no acute distress, resting comfortably in chair  HEENT: MMM  RESP: clear  CV: RRR, no murmurs  EXTREMITIES/SKIN: no edema  NEURO:  Alert, oriented, normal speech  PSYCH: flat affect           Data:     BMP  Recent Labs   Lab 02/10/23  0812 02/10/23  0554 02/10/23  0148 02/09/23  1602 02/09/23  1147 02/09/23  0917 02/08/23  1053 02/08/23  0852 02/07/23  2151 02/07/23  0543     --   --   --   --  140  --  135*  --  141   POTASSIUM 4.0  --   --   --   --  4.0  --  4.7  --  5.7*   CHLORIDE 109*  --   --   --   --  108*  --  101  --  109*   ISSA 9.6  --   --   --   --  9.5  --  9.7  --  9.8   CO2 18*  --   --   --   --  20*  --  19*  --  21*   BUN 45.3*  --   --   --   --  49.5*  --  51.0*  --  50.3*   CR 2.00*  --   --   --   --  2.14*  --  2.18*  --  2.16*   * 100* 101* 160*   < > 108*   < > 135*   < > 99    < > = values in this interval not displayed.     CBC  Recent Labs   Lab 02/09/23  1048 02/08/23  0852 02/07/23  0543 02/06/23  1101 02/05/23  0216 02/04/23  0603   WBC  --  9.1  --   --  10.8 8.1   HGB 8.7* 8.7* 8.6* 8.9* 7.5* 7.9*   HCT  --  28.7*  --   --  23.4* 26.3*   MCV  --  70*  --   --  68* 72*   PLT  --  480*  --   --  288 305     Lab Results   Component Value Date    AST 21 01/31/2023    ALT <5 (L) 01/31/2023    ALKPHOS 129 (H) 01/31/2023    BILITOTAL 0.5 01/31/2023     Lab Results   Component Value Date    INR 0.93 09/12/2022       Attestation:  I have reviewed today's vital signs, notes, medications, labs and imaging.    Tracy Malik MD  [facility] - Nephrology  Office: [phone]

## 2023-02-10 NOTE — CONSULTS
[facility] Nurse Inpatient Assessment     Consulted for:  'pressure wound on nose suspect secondary to prior BiPAP'     Summary:  Small dry red wound noted to tip of nose.  Appears to be a Stage 2 device-related HAPI, exact etiology unclear but is likely related to BiPAP vs other respiratory mask.  This would be an atypical location for a PI from a BiPAP mask.  Possible friction component.  Pt no longer requiring any respiratory devices.     Patient History (according to provider note(s):      Polysubstance Overdose/Suicide Attempt  Distributive shock causing hypotension from Ca channel blocker overdose, RESOLVED  Acute encephalopathy caused by overdose, RESOLVED  Acute hypoxic resp failure with overdose. RESOLVED  * Patient indicating she is still thinking of harming herself.  She also has recent history of other self harm attempts.      Areas Assessed:      Areas visualized during today's visit: nose    Wound location:  Tip of nose    Last photo: 2-10-23      2-10-23 nose w/ flash      Wound due to: Pressure Injury vs friction  Wound history/plan of care: per chart review pt was previously in ICU, was initially intubated then on BiPAP and oxy mask at times  Wound base: dry red scabbing     Palpation of the wound bed: normal and firm, non fluctuant     Drainage: none     Measurements (length x width x depth, in cm): approx 0.7 x 1.2 x 0cm        Tunneling: N/A     Undermining: N/A  Periwound skin: Intact      Color: normal and consistent with surrounding tissue      Temperature: normal   Odor: none  Pain: denies     Pain interventions prior to dressing change: no significant pain present   Treatment goal: Heal   STATUS: initial assessment and stable  Supplies ordered: none needed       Treatment Plan:     Nose wound(s): Daily    Cleanse gently as needed with mild cleanser.  Leave open to air.  Small amount Aquaphor or Sween cream ok if desired.      Orders: Written    RECOMMEND  PRIMARY TEAM ORDER: None, at this time  Education provided: plan of care and wound progress  Discussed plan of care with: Patient and Nurse Mariia, in person  WOC nurse follow-up plan: weekly and prn  Notify WOC if wound(s) deteriorate.  Nursing to notify the Provider(s) and re-consult the WOC Nurse if new skin concern.    DATA:     Current support surface: Standard  Atmos Air mattress  Containment of urine/stool: not assessed  BMI: Body mass index is 19.15 kg/m .   Active diet order: Orders Placed This Encounter      Combination Diet 2 gm K Diet     Output: I/O last 3 completed shifts:  In: 980 [P.O.:980]  Out: 3000 [Urine:3000]     Labs: Recent Labs   Lab 02/09/23  1048 02/08/23  0852 02/05/23  0216 02/04/23  0603   ALBUMIN  --   --   --  2.6*   HGB 8.7* 8.7*   < > 7.9*   WBC  --  9.1   < > 8.1    < > = values in this interval not displayed.     Pressure injury risk assessment:   Sensory Perception: 3-->slightly limited  Moisture: 3-->occasionally moist  Activity: 3-->walks occasionally  Mobility: 3-->slightly limited  Nutrition: 3-->adequate  Friction and Shear: 3-->no apparent problem  Binu Score: 18    Ronit Elizabeth RN CWOCN  -Securely message with [employer] (more info) - can reach individually by name or search 'WOC Nurse' (Linsey) to reach all current WOCs on duty.  -WOC Office Phone: [phone]

## 2023-02-10 NOTE — PROGRESS NOTES
CLINICAL NUTRITION SERVICES - REASSESSMENT NOTE    Recommendations Ordered by Registered Dietitian (RD):   - Start Nepro daily @ 10 am    Malnutrition: 1/31  % Weight Loss:  None noted  % Intake:  </= 75% for >/= 7 days (moderate malnutrition)   Subcutaneous Fat Loss:  None observed  Muscle Loss:  None observed  Fluid Retention:  Mild but likely not nutritionally significant      Malnutrition Diagnosis: Patient does not meet two of the above criteria necessary for diagnosing malnutrition     EVALUATION OF PROGRESS TOWARD GOALS   Diet: 2g K diet     Nutrition Support: Off since 2/5.     Intake/Tolerance:  Pt reports good appetite. She is eating 100%. Meals ordered are on the smaller side. She is interested in trying Nepro renal supplement.   - This morning ordered an omelet and diet lemon-lime soda.   - Three meals ordered yesterday but only 636 kcal and 45 g protein received.   - 2/8: 900 kcal, 48 g protein ordered  - 2/7: 1050 kcal, 38 g protein ordered  -- Less than 75% est energy needs and protein needs ordered on average.     - Labs: Cr 2, BUN 45.3 (H)- CKD  - Stooling: Stooling daily. 1-3x daily for thomas past few days.   - Weight: 50.6 kg today. Consistent with weights reviewed in Oct 2022.     - Meds:   Sodium bicarbonate BID    ASSESSED NUTRITION NEEDS:  Dosing Weight 58.5 kg   Estimated Energy Needs: 8573-1272 kcals (25-30 Kcal/Kg)  Justification: maintenance  Estimated Protein Needs: 59-70 grams protein (1-1.2 g pro/Kg)  Justification: preservation of LBM with consideration to CKD  Estimated Fluid Needs: 6385-0824 mL (1 mL/Kcal)  Justification: maintenance    Previous Goals:   Pt will continue to consume at least 75% meals over the next 3-5 days.  Evaluation: Met    Previous Nutrition Diagnosis:   Predicted suboptimal nutrient intake related to recent intubation and just starting solids.  Evaluation: declining - updated below    CURRENT NUTRITION DIAGNOSIS  Inadequate oral intake related to small meals  ordered (pt reported good appetite) as evidenced by the past three days pt ordering <75% estimated needs on average.     INTERVENTIONS  Recommendations / Nutrition Prescription  2g K diet  Nepro daily @ 10 am     Implementation  Medical Food Supplement: as above  Nutrition Education: Discussed importance of adequate PO    Goals  Intake of at least 75% of adequate meals TID.     MONITORING AND EVALUATION:  Progress towards goals will be monitored and evaluated per protocol and Practice Guidelines    Yissel Cook RD, LD  [facility], Ortho, [facility]  Pager: [phone]  Weekend Pager: [phone]

## 2023-02-10 NOTE — PROGRESS NOTES
Patient is A&Ox4. VSS on RA. Continent of bladder, purewick in place. Hx of diabetes insipidus. Scabbing on nose. +2 edema on R arm.

## 2023-02-10 NOTE — PROGRESS NOTES
Psychiatric Progress Note  Consult date: February 6, 2023         Reason for Consult, requesting source:    Suicide attempt/OD  Requesting source: Anabell Camacho    Labs and imaging reviewed. Patient seen and evaluated by Sandra Cox MD          HPI:   This is a 73 yo female who presented on 1/28/23 with a massive, intentional polypharmacy od .  Pt has a history of bipolar illness, and took a combinaton of lamictal, amlodipine, haldol, and adderall.  She had been in the ED 4 days prior for eval of SI.  Pt has had a difficult course related to calcium channel blocker od, and has been in the ICU, vented and on pressors with AHRF, REI on CRF, and encephalopthy.  We are asked to see for assistance with care for her depression, and appropriate disposition when she is ready medically to discharge.    Patient seen today along with her brother.  Both are very good historians.  It sounds like the patient was taken off lithium, her longtime medication for bipolar management, about a year ago.  This was related to worsening kidney function.  She has worked with her psychiatrist to try to find another regimen that was helpful.  She has been on Lamictal, as well as other medications.  None of these medications help with her depression.    Both the patient and her brother are in favor of her going back on lithium if this is a possibility.  I also would be in favor of it as well.  Clearly, the patient's bipolar illness is severe, and potentially lethal.  She was quite well managed on the lithium prior to it being discontinued.    2/7/23: Patient seen today for follow-up in the ICU.  She is waiting for a bed on the medical floor, and no longer requires a critical level of care.  She was awake, and tell me that she is feeling pretty anxious, which is her baseline when she is not on lithium.  She also said that the kidney doctor has been by, and they discussed going back on lithium.  She and I discussed low-dose Xanax to  help with her anxiety for now.  I discussed risk versus benefits of this medication and indicated to her this is a short-term medication, and that she does not have to take it if she does not care for it or if it does not work.    2/8/2023: Patient seen today with her brother, and RN at bedside.  Patient was sitting up and having some fruit.  She states she feels much better today both physically and mentally.  We discussed current lithium dosing, and the plan to titrate very slowly.  I also told her I think we should put her on the lowest for a geriatric psychiatry bed in the event that she still needs inpatient admission in 2 to 3 weeks.  She was agreeable to this.  Answered questions and discussed the overall strategy with the lithium.  I also told her that I do not recommend any other psychiatric medications for now.    2/9/2023: Patient seen today for follow-up.  Discussed with she and her brother.  Also discussed with Dr. Malik/renal.  Patient is feeling more depressed today, but admits that she did watch the news this morning which made her feel that things were more bleak.  We discussed increasing her lithium dose tomorrow pending okay from renal.  Patient denies any negative side effects that she is noting to the lithium.  We discussed other options besides watching the news or activities that are depressing.  Discussed this with she and her brother today to help bolster her spirits while we wait for the lithium to get to a therapeutic dose.     2/10/2023: Patient seen today for follow-up along with her brother.  Chart reviewed.  Patient indicates she still feeling depressed this morning.  I scheduled her lithium for lunchtime today, because I wanted to see her morning labs.  We discussed that this will switch to a.m. and q. evening meal starting tomorrow.  We also discussed that we may have the option of further titration on Monday.  Patient did tell me, interestingly, which her brother confirmed,  that she knows that she is getting depressed because she begins to have blurred vision.  This is been the same since she first became ill with severe depression at age 18.  Blurred vision or decreased visual acuity, bilateral, is the canary in the coal mine that she is becoming depressed.  When the visual acuity returns to normal, the depression goes away.    The patient did say that she does have a history of a very severe head injury when she tried to kill herself by jumping off a bridge when she was younger.  She broke her neck, and undoubtedly sustained a closed head injury as well.  The visual acuity changes began occurring before this.  Patient denied other associated symptoms such as ataxia or other neurologic symptoms that she is aware of aside from the changes in visual acuity.               Past Psychiatric History:     Pt had recently been in the ED on 1/24 with thoughts of drowning herself while in a swimming pool.  She has an outpt psychiatrist, and a therapist, and had discharged after denying SI, and wood for safety.   Patient is seen by Dr. Trent Gallo for outpatient psychiatric management.        Substance Use and History:   Negative        Past Medical History:   PAST MEDICAL HISTORY:   Past Medical History:   Diagnosis Date     Benign essential hypertension 09/2018    added norvasc 9/18     Bipolar affective disorder (H)     hosp 1993, Dr. Aftab Deal     Cancer (H) 1996    DCIS, left breast     Chronic kidney disease, stage 3a (H)     seen by renal Dr. 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    felt due to lithium     Thalassaemia trait      Vitamin D deficiency        PAST SURGICAL HISTORY:   Past Surgical History:   Procedure Laterality Date     BIOPSY  1994    left breast     BREAST SURGERY      lumpectomy x 4     COLONOSCOPY  2021     COLONOSCOPY N/A 6/17/2022    Procedure: COLONOSCOPY, WITH POLYPECTOMY AND BIOPSY;  Surgeon: Panchito Gu MD;  Location:  GI     EYE SURGERY  2018    retina tear     LAPAROTOMY EXPLORATORY N/A 8/24/2022    Procedure: Exploratory laparotomy, REPAIR OF PERFORATED ULCER;  Surgeon: Ron Vidal MD;  Location:  OR     left hand surgery      last 1974             Family History:   FAMILY HISTORY:   Family History   Problem Relation Age of Onset     Cerebrovascular Disease Mother      Hypertension Father      Sleep Apnea Brother      Breast Cancer Maternal Aunt         x 2     Breast Cancer Other         Maternal Aunt     Hyperlipidemia Niece        Family Psychiatric History:         Social History:   SOCIAL HISTORY:   Social History     Tobacco Use     Smoking status: Never     Smokeless tobacco: Never   Substance Use Topics     Alcohol use: No            Physical ROS:   The 10 point Review of Systems is negative other than noted in the HPI or here.           Medications:       aspirin  81 mg Oral Daily     desmopressin  0.4 mg Oral At Bedtime     lithium  150 mg Oral TID w/meals     [START ON 2/11/2023] lithium  150 mg Oral BID w/meals     pantoprazole  40 mg Oral QAM AC     sodium bicarbonate  650 mg Oral BID     sodium chloride (PF)  10-40 mL Intracatheter Q8H              Allergies:     Allergies   Allergen Reactions     No Known Allergies           Labs:     Recent Results (from the past 48 hour(s))   Glucose by meter    Collection Time: 02/08/23  9:13 PM   Result Value Ref Range    GLUCOSE BY METER POCT 92 70 - 99 mg/dL   Glucose by meter    Collection Time:  02/09/23  8:05 AM   Result Value Ref Range    GLUCOSE BY METER POCT 106 (H) 70 - 99 mg/dL   Basic metabolic panel    Collection Time: 02/09/23  9:17 AM   Result Value Ref Range    Sodium 140 136 - 145 mmol/L    Potassium 4.0 3.4 - 5.3 mmol/L    Chloride 108 (H) 98 - 107 mmol/L    Carbon Dioxide (CO2) 20 (L) 22 - 29 mmol/L    Anion Gap 12 7 - 15 mmol/L    Urea Nitrogen 49.5 (H) 8.0 - 23.0 mg/dL    Creatinine 2.14 (H) 0.51 - 0.95 mg/dL    Calcium 9.5 8.8 - 10.2 mg/dL    Glucose 108 (H) 70 - 99 mg/dL    GFR Estimate 24 (L) >60 mL/min/1.73m2   Lithium level    Collection Time: 02/09/23  9:17 AM   Result Value Ref Range    Lithium 0.1 (L) 0.6 - 1.2 mmol/L   Hemoglobin    Collection Time: 02/09/23 10:48 AM   Result Value Ref Range    Hemoglobin 8.7 (L) 11.7 - 15.7 g/dL   Glucose by meter    Collection Time: 02/09/23 11:47 AM   Result Value Ref Range    GLUCOSE BY METER POCT 94 70 - 99 mg/dL   Glucose by meter    Collection Time: 02/09/23  4:02 PM   Result Value Ref Range    GLUCOSE BY METER POCT 160 (H) 70 - 99 mg/dL   Glucose by meter    Collection Time: 02/10/23  1:48 AM   Result Value Ref Range    GLUCOSE BY METER POCT 101 (H) 70 - 99 mg/dL   Glucose by meter    Collection Time: 02/10/23  5:54 AM   Result Value Ref Range    GLUCOSE BY METER POCT 100 (H) 70 - 99 mg/dL   Glucose by meter    Collection Time: 02/10/23  7:41 AM   Result Value Ref Range    GLUCOSE BY METER POCT 104 (H) 70 - 99 mg/dL   Basic metabolic panel    Collection Time: 02/10/23  8:12 AM   Result Value Ref Range    Sodium 139 136 - 145 mmol/L    Potassium 4.0 3.4 - 5.3 mmol/L    Chloride 109 (H) 98 - 107 mmol/L    Carbon Dioxide (CO2) 18 (L) 22 - 29 mmol/L    Anion Gap 12 7 - 15 mmol/L    Urea Nitrogen 45.3 (H) 8.0 - 23.0 mg/dL    Creatinine 2.00 (H) 0.51 - 0.95 mg/dL    Calcium 9.6 8.8 - 10.2 mg/dL    Glucose 104 (H) 70 - 99 mg/dL    GFR Estimate 26 (L) >60 mL/min/1.73m2   Lithium level    Collection Time: 02/10/23  8:12 AM   Result Value Ref Range     Lithium 0.2 (L) 0.6 - 1.2 mmol/L   Magnesium    Collection Time: 02/10/23  8:12 AM   Result Value Ref Range    Magnesium 2.5 (H) 1.7 - 2.3 mg/dL   Phosphorus    Collection Time: 02/10/23  8:12 AM   Result Value Ref Range    Phosphorus 4.3 2.5 - 4.5 mg/dL   Albumin level    Collection Time: 02/10/23  8:12 AM   Result Value Ref Range    Albumin 3.3 (L) 3.5 - 5.2 g/dL   Osmolality urine    Collection Time: 02/10/23 12:40 PM   Result Value Ref Range    Osmolality Urine 277 100 - 1,200 mmol/kg   Sodium random urine    Collection Time: 02/10/23 12:40 PM   Result Value Ref Range    Sodium Urine mmol/L 38 mmol/L          Physical and Psychiatric Examination:     /72 (BP Location: Right arm)   Pulse 76   Temp 97.7  F (36.5  C) (Oral)   Resp 16   Wt 50.6 kg (111 lb 8.8 oz)   SpO2 99%   BMI 19.15 kg/m    Weight is 111 lbs 8.84 oz  Body mass index is 19.15 kg/m .    Physical Exam:  I have reviewed the physical exam as documented by by the medical team and agree with findings and assessment and have no additional findings to add at this time.    Mental Status Exam:    Appearance: awake, alert, appeared as age stated, awake, alert and cooperative  Attitude:  cooperative  Eye Contact:  good  Mood:  anxious and depressed  Affect:  mood congruent  Speech:  clear, coherent and decreased prosody  Language: Fluent in english   Psychomotor Behavior:  physical retardation  Thought Process:  logical, linear and goal oriented  Associations:  no loose associations  Thought Content:  no evidence of psychotic thought, no auditory hallucinations present, no visual hallucinations present and Patient is status post serious suicide attempt  Insight:  good  Judgement:  intact  Oriented to:  time, person, and place  Attention Span and Concentration:  intact  Recent and Remote Memory:  intact  Fund of Knowledge: Appropriate   Gait and Station: Sitting up in a chair               DSM-5 Diagnosis:   296.53 Bipolar I Disorder Current or  Most Recent Episode Depressed, Severe with psychotic features, toxic metabolic encephalopathy which is resolving, Intentional overdose, subsequent encounter, anxiety          Assessment:   This is a 72-year-old female with a history of bipolar illness spanning 50 years.  Patient was initially diagnosed with schizophrenia when she first became ill.  Later, this diagnosis was modified to bipolar illness, which both she and her brother feel is accurate.  She does have a history of ECT many years ago.  It is unclear whether this was helpful or not.  What has been helpful has been lithium.  This medication was discontinued a year ago due to worsening kidney function.  The patient has not been able to stabilize since going off this medication, whereas she was stable and functional on lithium.    Patient and her brother, who are both very high functioning, would like her to go back on lithium.  I am in agreement.  We will reach out to the hospitalist to better understand what her options are.  Regardless, she will need to go to an inpatient psychiatric bed for further stabilization after she is medically ready to discharge.    Patient is still feeling depressed today.  She denies suicidality.  I have increased her dose to 150 mg p.o. twice daily.  We will continue with this through the weekend.  I will see her again on Monday and we may titrate the medication further.    We also discussed the fact that I do not recommend we start any other psychiatric medications right now.  Lithium was her [employer] medication that kept her stable for decades.  That is the medication that we will try to use as a sole agent.    In regards to our discussion today about visual acuity changes heralding the onset of severe depression for her, I am not sure what to make of this, but will discuss with colleagues at the [employer].          Summary of Recommendations:   1.  Have increased lithium to 150 mg p.o. twice daily    2.  We are getting  morning lithium levels and will follow the trend.  Very much appreciate the assistance of nephrology.    3.  Patient does not require a sitter at this point.    4.  Have placed patient in the queue for inpatient psychiatry on the geriatric floor.  This will take 2 to 3 weeks to get a bed.  I am hoping that she feels substantially better prior to that, and that we will not need an inpatient psychiatric stay.  If she is needing it, then I want to have her in line so that we are waiting for a bed longer than we need to.    5.  We will follow.      Sandra Cox MD  Consult/Liaison Psychiatry and Addiction Medicine  [facility]

## 2023-02-10 NOTE — PROGRESS NOTES
[facility]    Hospitalist Progress Note    Date of Service (when I saw the patient): 02/10/2023  Admit date: 1/28/2023    Interval History   Full details of events over last 24 hours outlined below.   Diana remains afebrile hemodynamically stable 100% on room air.  Note she had 1500 during the night shift.  Afternoon shift urine output not recorded.   No suicidal ideation reported in review of the nursing notes.    When asked she offers no complaints.  States she is feeling well.   She has been up and walking.  Denies any shortness of breath, abdominal pain or nausea vomiting.    After I saw her today I note and psychiatry's note that she was complaining of feeling more depressed and starting to have blurry vision which is a recurring symptom for her before for prior bouts of depression.  No vision changes expressed to me during my visit.      Assessment & Plan   Polysubstance Overdose/Suicide Attempt  Distributive shock causing hypotension from Ca channel blocker overdose, RESOLVED  Acute encephalopathy caused by overdose, RESOLVED  Acute hypoxic resp failure with overdose. RESOLVED  * Patient indicating she is still thinking of harming herself.  She also has recent history of other self harm attempts.        Mental Health Consult Appreciated.preciated. Noted mental health has severely decompensated since stopping lithium a year ago.     After discussion among nephrology, patient and family it was decided that the benefits of lithium out likely outweigh the risk.  Family and patient accepts the risk of progressive renal disease on lithium    Psychiatry increased lithium to 150 mg twice daily on 02/10/23    Continue daily lithium level    Discontinued 1:1/Suicide precautions on 2/8    She is to remain in the hospital at this time as we continue to follow on as we titrate up on lithium and psychiatry feels she is ready to discharge home or wait until bed available in geriatric psychiatry  which will be approximately 2 to 3 weeks wait (earliest 2/21)      Diabetes Insipidus, partial   Severe nocturia -2/7 reported getting up 8-10 times per night to urinate  REI to 2.56 max on 2/2/23, resolving   CKD baseline Cr ~ 1.5-2.   Metabolic/lacitc acidosis associated with overdose, resolved. Recurring on 02/06/23  Hyperkalemia with acidosis, suggestive of RTA  *S/p IVF and continued on TF flushes 200 ml every 2 hours.    * Stopped subcutaneous heparin on 2/6/23, pre nephrology's recommendation, can cause RTA      Nephrology's input appreciated.     Encourage fluids.     Continue NaBicarbonate 650 mg BID on 02/06/23, likely will need this chronically for acidosis of CKD    Nephrology started Lokelma on 02/07/23 10 mg TID > stopped on 2/9/23     Nephrology started DDAVP on 02/07/23 > increased to 20 mcg on 2/9    Continued large urine output overnight on 2/10    Nephrology switched to oral formulation of DDAVP and ordered repeat urine Osm and Na on 02/10/23.     Follow up BMP in AM    On low K diet    Vitals:    02/02/23 0000 02/04/23 0600 02/05/23 0500 02/06/23 0400   Weight: 61.6 kg (135 lb 12.9 oz) 56.3 kg (124 lb 1.9 oz) 57 kg (125 lb 10.6 oz) 56.2 kg (123 lb 14.4 oz)    02/09/23 0544   Weight: 50.6 kg (111 lb 8.8 oz)     I/O last 3 completed shifts:  In: 980 [P.O.:980]  Out: 3000 [Urine:3000]  Recent Labs   Lab 02/10/23  0812 02/09/23  0917 02/08/23  0852 02/07/23  0543 02/06/23  1802 02/06/23  1101 02/06/23  0549 02/06/23  0023 02/05/23  1818 02/05/23  1120 02/05/23  0619 02/05/23  0216 02/04/23  2215   CO2 18* 20* 19* 21*  --  21*  --   --   --   --  23 24 25    140 135* 141  --  140 141 136 136   < > 143 138 137   POTASSIUM 4.0 4.0 4.7 5.7* 5.7* 5.5* 5.8*  --   --   --  5.1 5.3 5.0   BUN 45.3* 49.5* 51.0* 50.3*  --  42.5*  --   --   --   --  42.3* 36.9* 36.1*   CR 2.00* 2.14* 2.18* 2.16*  --  1.88*  --   --   --   --  2.17* 2.24* 2.29*    < > = values in this interval not displayed.       Loose  stools, RESOLVING  * No ABD pain. Suspect TF exacerbated.  TF's stopping today, monitor.     Suspected protein-calorie malnutrition  Prior hypoglycemia:  * TFs stopped on 2/5/23    On low K diet      Appreciate nutrition consult, last seen on 02/10/23     Started Nepro daily on 2/9/23     Superficial thrombus of R cephalic vein:  * RUE edematous monitor,     R subclavian CVC removed on 2/5/23      Chronic anemia : Baseline Hgb ~ 10. No evidence of bleeding, periodically monitor.  No indication for a current transfusion.  H/o perforated PUD   *B12 elevated at 1249, iron panel normal, folate normal on 02/07/23     Follow with initiation of ASA for DVT ppx.     Continue PPI.     Recent Labs   Lab 02/09/23  1048 02/08/23  0852 02/07/23  0543 02/06/23  1101 02/05/23  0216 02/04/23  0603   HGB 8.7* 8.7* 8.6* 8.9* 7.5* 7.9*     Deconditioned    PT ordered     Ambulate with assist QID    Suspected pressure lesion from prior treatment with BiPAP  Crusted ulceration on tip of nose as well as linear skin breakdown over bridge of nose.    Wound care nurse consult on 02/10/23 appreciated     Clinically Significant Risk Factors              # Hypoalbuminemia: Lowest albumin = 2.3 g/dL at 2/1/2023  7:30 PM, will monitor as appropriate                     Diet: Orders Placed This Encounter      Combination Diet 2 gm K Diet     IVF: None   Suresh Catheter: Not present     DVT Prophylaxis: Stopped heparin subcutaneous, started ASA 81 mg daily.   Code Status: Full Code     Disposition: Expected discharge when lytes stable on PO regimen to Mental Health.   Communication: Discussed with patient on 02/10/23     Anabell Camacho MD  Hospitalist Service  [facility]  Securely message with the Vocera Web Console (learn more here)  Text page via [employer] Paging/Better Walk    Medical Decision Making          -Data reviewed today: I reviewed all new labs and imaging results over the last 24 hours. I personally reviewed no  images or EKG's today.    Physical Exam   Temp: 97.9  F (36.6  C) Temp src: Oral BP: (!) 140/84 Pulse: 69   Resp: 16 SpO2: 100 % O2 Device: None (Room air)    Vitals:    02/05/23 0500 02/06/23 0400 02/09/23 0544   Weight: 57 kg (125 lb 10.6 oz) 56.2 kg (123 lb 14.4 oz) 50.6 kg (111 lb 8.8 oz)     Vital Signs with Ranges  Temp:  [97.9  F (36.6  C)-98.2  F (36.8  C)] 97.9  F (36.6  C)  Pulse:  [69-79] 69  Resp:  [16-18] 16  BP: (111-140)/(70-84) 140/84  SpO2:  [100 %] 100 %  I/O last 3 completed shifts:  In: 980 [P.O.:980]  Out: 3000 [Urine:3000]    Today's Exam  Constitutional:  NAD,   Neuropsyche: Brighter affect today, smiling alert and oriented, answers questions appropriately.   Respiratory:  Breathing comfortably, good air exchange, no wheezes, no crackles.   Cardiovascular:  Regular rate and rhythm, 1+ edema.  GI:  soft, NT/ND, BS normal  Skin/Integumen:  No acute rash or sign of bleeding.     Medications   All medications reviewed on 02/10/23       aspirin  81 mg Oral Daily     desmopressin  20 mcg Alternating Nostrils At Bedtime     lithium  150 mg Oral Daily before supper     pantoprazole  40 mg Oral QAM AC     sodium bicarbonate  650 mg Oral BID     sodium chloride (PF)  10-40 mL Intracatheter Q8H     PRN Meds: acetaminophen **OR** acetaminophen, ALPRAZolam, bisacodyl, HYDROmorphone, magnesium hydroxide, magnesium sulfate, naloxone **OR** naloxone **OR** naloxone **OR** naloxone, ondansetron **OR** ondansetron, polyethylene glycol, senna-docusate **OR** [DISCONTINUED] senna-docusate, sodium chloride (PF)    Data   Recent Labs   Lab 02/10/23  0554 02/10/23  0148 02/09/23  1602 02/09/23  1147 02/09/23  1048 02/09/23  0917 02/08/23  1053 02/08/23  0852 02/07/23  2151 02/07/23  0543 02/05/23  0619 02/05/23  0216 02/04/23  0912 02/04/23  0603   WBC  --   --   --   --   --   --   --  9.1  --   --   --  10.8  --  8.1   HGB  --   --   --   --  8.7*  --   --  8.7*  --  8.6*   < > 7.5*  --  7.9*   MCV  --   --   --    --   --   --   --  70*  --   --   --  68*  --  72*   PLT  --   --   --   --   --   --   --  480*  --   --   --  288  --  305   NA  --   --   --   --   --  140  --  135*  --  141   < > 138   < > 148*  148*   POTASSIUM  --   --   --   --   --  4.0  --  4.7  --  5.7*   < > 5.3   < > 4.9  4.9   CHLORIDE  --   --   --   --   --  108*  --  101  --  109*   < > 106   < > 115*  115*   CO2  --   --   --   --   --  20*  --  19*  --  21*   < > 24   < > 25  25   BUN  --   --   --   --   --  49.5*  --  51.0*  --  50.3*   < > 36.9*   < > 36.4*  36.4*   CR  --   --   --   --   --  2.14*  --  2.18*  --  2.16*   < > 2.24*   < > 2.35*  2.35*   ANIONGAP  --   --   --   --   --  12  --  15  --  11   < > 8   < > 8  8   ISSA  --   --   --   --   --  9.5  --  9.7  --  9.8   < > 9.3   < > 9.8  9.8   * 101* 160*   < >  --  108*   < > 135*   < > 99   < > 131*   < > 147*  147*   ALBUMIN  --   --   --   --   --   --   --   --   --   --   --   --   --  2.6*    < > = values in this interval not displayed.       No results found for this or any previous visit (from the past 24 hour(s)).

## 2023-02-10 NOTE — PLAN OF CARE
Goal Outcome Evaluation:       Summary:  Polysubstance overdose- Suicide Attempt  DATE & TIME: 2/9-2/10 [id number]  Cognitive Concerns/ Orientation: A&Ox4. Calm and cooperative.  BEHAVIOR & AGGRESSION TOOL COLOR: Green  CIWA SCORE: NA   ABNL VS/O2: VSS on RA  MOBILITY: sba with gbw,   PAIN MANAGMENT: denies  DIET: 2 gm K  BOWEL/BLADDER: continent  ABNL LAB/BG: Cr 2.14. Hgb 7.8. K 4.0  DRAIN/DEVICES: PIV SL  TELEMETRY RHYTHM: NA  SKIN: scabbing  TESTS/PROCEDURES: None  D/C DAY/GOALS/PLACE: pending lithium  and electrolyte balance  OTHER IMPORTANT INFO: no suicidal thoughts. Sitter removed. Needs inpatient psych-on waiting list. Lithium continued. Large amounts of urine output

## 2023-02-10 NOTE — CONSULTS
Care Management Initial Consult  Met with patient in room, introduced self and role in discharge planning.    Spoke at length regarding disposition. Patient stating she was independent at baseline but receives assistance with bathing from a friend 2 x/week.  Discussed therapy recommendations for TCU. Patient hesitant at this time but will think about it..  Patient stated she has a brother who will be able to assist if needed and probable stay with patient for couple weeks. Patient is unsure at this time but will need to speak with her brother before making a final decision.  Noted it might be difficult finding TCU for patient due to her mental health history. Writer spoke with bedside RN. Will need to mobilize patient as much as possible while hospitalized so she could transition to her home with assistance from brother.      General Information  Assessment completed with: Patient,    Type of CM/SW Visit: Initial Assessment    Primary Care Provider verified and updated as needed: Yes   Readmission within the last 30 days: no previous admission in last 30 days      Reason for Consult: discharge planning  Advance Care Planning: Advance Care Planning Reviewed: present on chart          Communication Assessment  Patient's communication style: spoken language (English or Bilingual)    Hearing Difficulty or Deaf: no   Wear Glasses or Blind: yes    Cognitive  Cognitive/Neuro/Behavioral: WDL  Level of Consciousness: alert  Arousal Level: opens eyes spontaneously  Orientation: oriented x 4  Mood/Behavior: calm, cooperative  Best Language: 0 - No aphasia  Speech: clear, spontaneous    Living Environment:   People in home: alone     Current living Arrangements: apartment      Able to return to prior arrangements: no       Family/Social Support:  Care provided by: self, friend  Provides care for: no one  Marital Status: Single  Sibling(s)          Description of Support System: Supportive    Support Assessment: Adequate family  and caregiver support    Current Resources:   Patient receiving home care services: No     Community Resources:    Equipment currently used at home: raised toilet seat, shower chair, walker, vishal  Supplies currently used at home:      Employment/Financial:  Employment Status: retired        Financial Concerns: No concerns identified           Lifestyle & Psychosocial Needs:  Social Determinants of Health     Tobacco Use: Low Risk      Smoking Tobacco Use: Never     Smokeless Tobacco Use: Never     Passive Exposure: Not on file   Alcohol Use: Not on file   Financial Resource Strain: Not on file   Food Insecurity: Not on file   Transportation Needs: Not on file   Physical Activity: Not on file   Stress: Not on file   Social Connections: Not on file   Intimate Partner Violence: Not on file   Depression: Not at risk     PHQ-2 Score: 0   Housing Stability: Not on file       Functional Status:  Prior to admission patient needed assistance:   Dependent ADLs:: Bathing  Dependent IADLs:: Independent, Medication Management  Assesssment of Functional Status: Not at baseline with mobility    Mental Health Status:  Mental Health Status: Current Concern  Mental Health Management: Psychiatrist    Chemical Dependency Status:  Chemical Dependency Status: No Current Concerns             Values/Beliefs:  Spiritual, Cultural Beliefs, Cheondoism Practices, Values that affect care: no                   Maria Antonia Chaparro RN CC  [phone]

## 2023-02-10 NOTE — PLAN OF CARE
Goal Outcome Evaluation:      Plan of Care Reviewed With: patient    Overall Patient Progress: improvingOverall Patient Progress: improving    Outcome Evaluation: Eating 100% of meals with reprotedly good appetite, review of meals indicate meeting <75% estimated needs on average x3 days. Added Nepro daily to boost nutritent density.

## 2023-02-11 LAB
ANION GAP SERPL CALCULATED.3IONS-SCNC: 15 MMOL/L (ref 7–15)
BUN SERPL-MCNC: 45.5 MG/DL (ref 8–23)
CALCIUM SERPL-MCNC: 10.1 MG/DL (ref 8.8–10.2)
CHLORIDE SERPL-SCNC: 104 MMOL/L (ref 98–107)
CREAT SERPL-MCNC: 1.79 MG/DL (ref 0.51–0.95)
DEPRECATED HCO3 PLAS-SCNC: 19 MMOL/L (ref 22–29)
GFR SERPL CREATININE-BSD FRML MDRD: 30 ML/MIN/1.73M2
GLUCOSE SERPL-MCNC: 117 MG/DL (ref 70–99)
LITHIUM SERPL-SCNC: 0.4 MMOL/L (ref 0.6–1.2)
MAGNESIUM SERPL-MCNC: 2.6 MG/DL (ref 1.7–2.3)
PHOSPHATE SERPL-MCNC: 3.8 MG/DL (ref 2.5–4.5)
POTASSIUM SERPL-SCNC: 4.4 MMOL/L (ref 3.4–5.3)
SODIUM SERPL-SCNC: 138 MMOL/L (ref 136–145)

## 2023-02-11 PROCEDURE — 99232 SBSQ HOSP IP/OBS MODERATE 35: CPT | Performed by: STUDENT IN AN ORGANIZED HEALTH CARE EDUCATION/TRAINING PROGRAM

## 2023-02-11 PROCEDURE — 36415 COLL VENOUS BLD VENIPUNCTURE: CPT | Performed by: PSYCHIATRY & NEUROLOGY

## 2023-02-11 PROCEDURE — 99233 SBSQ HOSP IP/OBS HIGH 50: CPT | Performed by: INTERNAL MEDICINE

## 2023-02-11 PROCEDURE — 250N000013 HC RX MED GY IP 250 OP 250 PS 637: Performed by: INTERNAL MEDICINE

## 2023-02-11 PROCEDURE — 250N000013 HC RX MED GY IP 250 OP 250 PS 637: Performed by: STUDENT IN AN ORGANIZED HEALTH CARE EDUCATION/TRAINING PROGRAM

## 2023-02-11 PROCEDURE — 80048 BASIC METABOLIC PNL TOTAL CA: CPT | Performed by: INTERNAL MEDICINE

## 2023-02-11 PROCEDURE — 250N000013 HC RX MED GY IP 250 OP 250 PS 637: Performed by: PSYCHIATRY & NEUROLOGY

## 2023-02-11 PROCEDURE — 84100 ASSAY OF PHOSPHORUS: CPT | Performed by: INTERNAL MEDICINE

## 2023-02-11 PROCEDURE — 83735 ASSAY OF MAGNESIUM: CPT | Performed by: INTERNAL MEDICINE

## 2023-02-11 PROCEDURE — 120N000001 HC R&B MED SURG/OB

## 2023-02-11 PROCEDURE — 80178 ASSAY OF LITHIUM: CPT | Performed by: PSYCHIATRY & NEUROLOGY

## 2023-02-11 RX ORDER — AMILORIDE HYDROCHLORIDE 5 MG/1
5 TABLET ORAL DAILY
Status: DISCONTINUED | OUTPATIENT
Start: 2023-02-11 | End: 2023-02-16

## 2023-02-11 RX ADMIN — ASPIRIN 81 MG: 81 TABLET, COATED ORAL at 08:19

## 2023-02-11 RX ADMIN — LITHIUM CARBONATE 150 MG: 150 CAPSULE, GELATIN COATED ORAL at 17:30

## 2023-02-11 RX ADMIN — SODIUM BICARBONATE 650 MG TABLET 650 MG: at 17:30

## 2023-02-11 RX ADMIN — SODIUM BICARBONATE 650 MG TABLET 650 MG: at 08:19

## 2023-02-11 RX ADMIN — PANTOPRAZOLE SODIUM 40 MG: 40 TABLET, DELAYED RELEASE ORAL at 07:03

## 2023-02-11 RX ADMIN — AMILORIDE HYDROCLORIDE 5 MG: 5 TABLET ORAL at 14:33

## 2023-02-11 RX ADMIN — LITHIUM CARBONATE 150 MG: 150 CAPSULE, GELATIN COATED ORAL at 08:19

## 2023-02-11 ASSESSMENT — ACTIVITIES OF DAILY LIVING (ADL)
ADLS_ACUITY_SCORE: 54
ADLS_ACUITY_SCORE: 54
ADLS_ACUITY_SCORE: 51
ADLS_ACUITY_SCORE: 55
ADLS_ACUITY_SCORE: 54
ADLS_ACUITY_SCORE: 54
ADLS_ACUITY_SCORE: 51
ADLS_ACUITY_SCORE: 54
ADLS_ACUITY_SCORE: 55
ADLS_ACUITY_SCORE: 55

## 2023-02-11 NOTE — PLAN OF CARE
Goal Outcome Evaluation:  Summary:  Polysubstance overdose- Suicide Attempt  DATE & TIME: 2/10 3524-8702  Cognitive Concerns/ Orientation: A&Ox4. Calm and cooperative.  BEHAVIOR & AGGRESSION TOOL COLOR: Green  CIWA SCORE: NA   ABNL VS/O2: VSS on RA  MOBILITY: sba with gbw, brace on R LE when up  PAIN MANAGMENT: denies  DIET: 2 gm K, +boost - tolerating  BOWEL/BLADDER: inc of bladder at times, large UOP.   ABNL LAB/BG: Cr 2- improving  DRAIN/DEVICES: PIV SL  TELEMETRY RHYTHM: NA  SKIN: scattered scabbing, WOC saw for pressure injury? To nose, keep ULISSES  TESTS/PROCEDURES: None  D/C DAY/GOALS/PLACE: pending lithium  and electrolyte balance  OTHER IMPORTANT INFO: no suicidal thoughts.  Lithium continued. Large amounts of urine output

## 2023-02-11 NOTE — PLAN OF CARE
DATE & TIME: 2/10/23, 1900 - 0730   Cognitive Concerns/ Orientation : A&O x 4   BEHAVIOR & AGGRESSION TOOL COLOR: Green   ABNL VS/O2: VSS on room air  MOBILITY: SBA with GB and walker  PAIN MANAGMENT: Denied  DIET: 2 gram K  BOWEL/BLADDER: Incontinent of bladder at times. Purewick in place with large amounts of urine passed  ABNL LAB/BG: NA  DRAIN/DEVICES: PIV SL  TELEMETRY RHYTHM: NA  SKIN: Scattered scabbing. Scabbing wound to nose ULISSES  TESTS/PROCEDURES: AM labs pending  D/C DATE: Pending Lithium and electrolyte balance  OTHER IMPORTANT INFO: No Suicidal thoughts overnight. WOC, Nephrology following

## 2023-02-11 NOTE — PROGRESS NOTES
[facility]    Hospitalist Progress Note    Date of Service (when I saw the patient): 02/11/2023  Admit date: 1/28/2023    Interval History    Patient is new to me today.  Chart reviewed    Patient is sitting comfortably in chair.  No acute events.  Patient is in the queue for inpatient psychiatry on the geriatric floor as as per psychiatry.            Assessment & Plan   Polysubstance Overdose/Suicide Attempt  Distributive shock causing hypotension from Ca channel blocker overdose, RESOLVED  Acute encephalopathy caused by overdose, RESOLVED  Acute hypoxic resp failure with overdose. RESOLVED  * Patient indicating she is still thinking of harming herself.  She also has recent history of other self harm attempts.        Mental Health Consult Appreciated.preciated. Noted mental health has severely decompensated since stopping lithium a year ago.     After discussion among nephrology, patient and family it was decided that the benefits of lithium out likely outweigh the risk.  Family and patient accepts the risk of progressive renal disease on lithium    Psychiatry increased lithium to 150 mg twice daily on 02/10/23    Continue daily lithium level    Discontinued 1:1/Suicide precautions on 2/8    She is to remain in the hospital at this time as we continue to follow on as we titrate up on lithium and psychiatry feels she is ready to discharge home or wait until bed available in geriatric psychiatry which will be approximately 2 to 3 weeks wait (earliest 2/21)      Diabetes Insipidus, partial   Severe nocturia -2/7 reported getting up 8-10 times per night to urinate  REI to 2.56 max on 2/2/23, resolving   CKD baseline Cr ~ 1.5-2.   Metabolic/lacitc acidosis associated with overdose, resolved. Recurring on 02/06/23  Hyperkalemia with acidosis, suggestive of RTA  *S/p IVF and continued on TF flushes 200 ml every 2 hours.    * Stopped subcutaneous heparin on 2/6/23, pre nephrology's recommendation,  can cause RTA      Nephrology's input appreciated.     Encourage fluids.     Continue NaBicarbonate 650 mg BID on 02/06/23, likely will need this chronically for acidosis of CKD    Nephrology started Lokelma on 02/07/23 10 mg TID > stopped on 2/9/23     Nephrology started DDAVP on 02/07/23 > increased to 20 mcg on 2/9    Continued large urine output overnight on 2/10  Nephrology switched to oral formulation of DDAVP 0.4 mg at bedtime      On low K diet    Vitals:    02/04/23 0600 02/05/23 0500 02/06/23 0400 02/09/23 0544   Weight: 56.3 kg (124 lb 1.9 oz) 57 kg (125 lb 10.6 oz) 56.2 kg (123 lb 14.4 oz) 50.6 kg (111 lb 8.8 oz)    02/11/23 0619   Weight: 49.1 kg (108 lb 3.9 oz)     I/O last 3 completed shifts:  In: 720 [P.O.:720]  Out: 2300 [Urine:2300]  Recent Labs   Lab 02/11/23  0909 02/10/23  0812 02/09/23  0917 02/08/23  0852 02/07/23  0543 02/06/23  1802 02/06/23  1101 02/06/23  0549 02/06/23  0023 02/05/23  1120 02/05/23  0619 02/05/23  0216   CO2 19* 18* 20* 19* 21*  --  21*  --   --   --  23 24    139 140 135* 141  --  140 141 136   < > 143 138   POTASSIUM 4.4 4.0 4.0 4.7 5.7* 5.7* 5.5* 5.8*  --   --  5.1 5.3   BUN 45.5* 45.3* 49.5* 51.0* 50.3*  --  42.5*  --   --   --  42.3* 36.9*   CR 1.79* 2.00* 2.14* 2.18* 2.16*  --  1.88*  --   --   --  2.17* 2.24*    < > = values in this interval not displayed.       Loose stools, RESOLVING  * No ABD pain. Suspect TF exacerbated.  TF's stopped now., monitor.     Suspected protein-calorie malnutrition  Prior hypoglycemia:  * TFs stopped on 2/5/23    On low K diet      Appreciate nutrition consult, last seen on 02/10/23     Started Nepro daily on 2/9/23     Superficial thrombus of R cephalic vein:  * RUE edematous monitor,     R subclavian CVC removed on 2/5/23      Chronic anemia : Baseline Hgb ~ 10. No evidence of bleeding, periodically monitor.  No indication for a current transfusion.  H/o perforated PUD   *B12 elevated at 1249, iron panel normal, folate normal  on 02/07/23     Follow with initiation of ASA for DVT ppx.     Continue PPI.     Recent Labs   Lab 02/09/23  1048 02/08/23  0852 02/07/23  0543 02/06/23  1101 02/05/23  0216   HGB 8.7* 8.7* 8.6* 8.9* 7.5*     Deconditioned    PT ordered     Ambulate with assist QID    Suspected pressure lesion from prior treatment with BiPAP  Crusted ulceration on tip of nose as well as linear skin breakdown over bridge of nose.    Wound care nurse consult on 02/10/23 appreciated     Clinically Significant Risk Factors           # Hypercalcemia: corrected calcium is >10.1, will monitor as appropriate    # Hypoalbuminemia: Lowest albumin = 2.3 g/dL at 2/1/2023  7:30 PM, will monitor as appropriate                     Diet: Orders Placed This Encounter      Combination Diet 2 gm K Diet     IVF: None   Suresh Catheter: Not present     DVT Prophylaxis: Stopped heparin subcutaneous, started ASA 81 mg daily.   Code Status: Full Code     Disposition: Expected discharge when lytes stable on PO regimen to Mental Health.   Communication: Discussed with patient on 02/11/23     Sonia Chua MD  Hospitalist Service  [facility]  Securely message with the Vocera Web Console (learn more here)  Text page via [employer] Paging/Directory    Medical Decision Making          -Data reviewed today: I reviewed all new labs and imaging results over the last 24 hours. I personally reviewed no images or EKG's today.    Physical Exam   Temp: 97.9  F (36.6  C) Temp src: Oral BP: (!) 142/85 Pulse: 65   Resp: 16 SpO2: 100 % O2 Device: None (Room air)    Vitals:    02/06/23 0400 02/09/23 0544 02/11/23 0619   Weight: 56.2 kg (123 lb 14.4 oz) 50.6 kg (111 lb 8.8 oz) 49.1 kg (108 lb 3.9 oz)     Vital Signs with Ranges  Temp:  [97.7  F (36.5  C)-98.4  F (36.9  C)] 97.9  F (36.6  C)  Pulse:  [65-79] 65  Resp:  [16] 16  BP: (122-142)/(72-85) 142/85  SpO2:  [99 %-100 %] 100 %  I/O last 3 completed shifts:  In: 720 [P.O.:720]  Out: 0229  [Urine:2300]    Today's Exam  Constitutional:  NAD,   Neuropsyche: alert and oriented, answers questions appropriately.   Respiratory:  Breathing comfortably, good air exchange, no wheezes, no crackles.   Cardiovascular:  Regular rate and rhythm, 1+ edema.  GI:  soft, NT/ND, BS normal  Skin/Integumen:  No acute rash or sign of bleeding.     Medications   All medications reviewed on 02/10/23       aspirin  81 mg Oral Daily     desmopressin  0.4 mg Oral At Bedtime     lithium  150 mg Oral BID w/meals     pantoprazole  40 mg Oral QAM AC     sodium bicarbonate  650 mg Oral BID     sodium chloride (PF)  10-40 mL Intracatheter Q8H     PRN Meds: acetaminophen **OR** acetaminophen, ALPRAZolam, bisacodyl, HYDROmorphone, magnesium hydroxide, magnesium sulfate, naloxone **OR** naloxone **OR** naloxone **OR** naloxone, ondansetron **OR** ondansetron, polyethylene glycol, senna-docusate **OR** [DISCONTINUED] senna-docusate, sodium chloride (PF)    Data   Recent Labs   Lab 02/11/23  0909 02/10/23  0812 02/10/23  0741 02/09/23  1147 02/09/23  1048 02/09/23  0917 02/08/23  1053 02/08/23  0852 02/07/23  2151 02/07/23  0543 02/05/23  0619 02/05/23  0216   WBC  --   --   --   --   --   --   --  9.1  --   --   --  10.8   HGB  --   --   --   --  8.7*  --   --  8.7*  --  8.6*   < > 7.5*   MCV  --   --   --   --   --   --   --  70*  --   --   --  68*   PLT  --   --   --   --   --   --   --  480*  --   --   --  288    139  --   --   --  140  --  135*  --  141   < > 138   POTASSIUM 4.4 4.0  --   --   --  4.0  --  4.7  --  5.7*   < > 5.3   CHLORIDE 104 109*  --   --   --  108*  --  101  --  109*   < > 106   CO2 19* 18*  --   --   --  20*  --  19*  --  21*   < > 24   BUN 45.5* 45.3*  --   --   --  49.5*  --  51.0*  --  50.3*   < > 36.9*   CR 1.79* 2.00*  --   --   --  2.14*  --  2.18*  --  2.16*   < > 2.24*   ANIONGAP 15 12  --   --   --  12  --  15  --  11   < > 8   ISSA 10.1 9.6  --   --   --  9.5  --  9.7  --  9.8   < > 9.3   *  104* 104*   < >  --  108*   < > 135*   < > 99   < > 131*   ALBUMIN  --  3.3*  --   --   --   --   --   --   --   --   --   --     < > = values in this interval not displayed.       No results found for this or any previous visit (from the past 24 hour(s)).

## 2023-02-11 NOTE — PLAN OF CARE
Summary:  Polysubstance overdose- Suicide Attempt  DATE & TIME: 2/11/23 1076-7639  Cognitive Concerns/ Orientation: A&Ox4. Calm and cooperative.  BEHAVIOR & AGGRESSION TOOL COLOR: Green  CIWA SCORE: NA   ABNL VS/O2: VS stable on room air  MOBILITY: sba with gbw, brace on R LE when up. Up to chair for meals. Ambulated in halls  PAIN MANAGMENT: Denies  DIET: Regular-good appetite  BOWEL/BLADDER: inc of bladder at times. Purewick removed while up in chair.  ABNL LAB/BG: Cr 1.79-improving. Lithium level 0.4-improving. Phos/K/Mg all WNL-ordered recheck for next AM  DRAIN/DEVICES: PIV SL  TELEMETRY RHYTHM: NA  SKIN: scattered scabbing, scab to nose-ULISSES.  TESTS/PROCEDURES: None  D/C DAY/GOALS/PLACE: pending lithium level and placement to inpatient Geripsych.   OTHER IMPORTANT INFO: patient denies suicidal thoughts or ideations. Calm and pleasant. Denies chest pain and SOB.  Nephrology and Psych following.

## 2023-02-11 NOTE — PROGRESS NOTES
Renal Medicine Progress Note            Assessment/Plan:     Assessment:  Diana Santiago is a 72-year-old woman with PMH bipolar disorder/depression, CKD 4m chronic partial DI admitted 1/28/2023 with intentional polysubstance overdose, particularly calcium channel blocker overdose in the setting of severe depression.    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 Historically maintains normonatremia with adequate water intake as long as she has access to water. Does have nocturia, contributing to poor sleep and likely negatively affecting mood. Trial of DDAVP intranasal has not yet been successful. Will try tablet form tonight. Additionally, will repeat urine studies, perhaps she no longer has partial DI and it is purely nephrogenic now. DDAVP not effective even at higher doses. Will trial amiloride.   -I/Os   -Daily BMP  -discontinue ddavp  - start amiloride 5mg daily (dose reduced due to renal function). Monitor for hyperkalemia and increased lithium levels       Intentional polysubstance overdose  Severe depression and suicidality  Bipolar disorder  Mood had been stable on lithium previously, she has tried many medications in the past without desired effect.  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  Has had severe suicidal ideation and attempts off of lithium despite treatment with other medications.  Discussed at length with her and her brother resuming lithium at the risk of worsening renal function.  There is a chance that her CKD could progressed to ESRD requiring dialysis, they are aware of this risk and willing to assume that risk.  Discussed forms of dialysis just for future planning.  Did discuss transplant briefly, mental health will need to be significantly improved prior to being a transplant candidate.  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  Likely related to heparin use.  Heparin discontinued and bicarb started 2/6.  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  Follows with Dr. Cedeno at [facility] nephrology.  Stopped lithium roughly 1 year ago and creatinine has been stable between 1.8-2.2, eGFR 22-30.   -Avoid nephrotoxins as able  -Renally dose medications    Plan:  -Patient willing to trial lithium at lower doses with frequent monitoring at the risk of her kidneys, this is reasonable given her severe suicidality.  - discontinue DDAVP  - Start amiloride 5mg daily   - daily lithium levels, particularly with addition of amiloride which can increase lithium levels   - daily BMP   -Continue sodium bicarb 650 mg twice daily  - changed diet to regular        Interval History:     - no acute events overnight   - continues large volume UOP despite increased DDAVP dose suggesting more nephrogenic DI picture   - willing to trial amiloride   - asking for regular diet if safe   - denies other complaints, reports doing well           Medications and Allergies:       aMILoride  5 mg Oral Daily     aspirin  81 mg Oral Daily     lithium  150 mg Oral BID w/meals     pantoprazole  40 mg Oral QAM AC     sodium bicarbonate  650 mg Oral BID     sodium chloride (PF)  10-40 mL Intracatheter Q8H        Allergies   Allergen Reactions     No Known Allergies             Physical Exam:   Vitals were reviewed  BP (!) 142/85 (BP Location: Right arm)   Pulse 65   Temp 97.9  F (36.6  C) (Oral)   Resp 16   Wt 49.1 kg (108 lb 3.9 oz)   SpO2 100%   BMI  18.58 kg/m      Wt Readings from Last 3 Encounters:   02/11/23 49.1 kg (108 lb 3.9 oz)   01/24/23 54.4 kg (120 lb)   10/20/22 51.3 kg (113 lb)       Intake/Output Summary (Last 24 hours) at 2/7/2023 1146  Last data filed at 2/7/2023 0800  Gross per 24 hour   Intake 450 ml   Output 1750 ml   Net -1300 ml       GENERAL APPEARANCE: no acute distress, resting comfortably on commode  HEENT: MMM  RESP: clear  CV: RRR, no murmurs  EXTREMITIES/SKIN: no edema  NEURO:  Alert, oriented, normal speech  PSYCH: flat affect           Data:     BMP  Recent Labs   Lab 02/11/23  0909 02/10/23  0812 02/10/23  0741 02/10/23  0554 02/09/23  1147 02/09/23  0917 02/08/23  1053 02/08/23  0852    139  --   --   --  140  --  135*   POTASSIUM 4.4 4.0  --   --   --  4.0  --  4.7   CHLORIDE 104 109*  --   --   --  108*  --  101   ISSA 10.1 9.6  --   --   --  9.5  --  9.7   CO2 19* 18*  --   --   --  20*  --  19*   BUN 45.5* 45.3*  --   --   --  49.5*  --  51.0*   CR 1.79* 2.00*  --   --   --  2.14*  --  2.18*   * 104* 104* 100*   < > 108*   < > 135*    < > = values in this interval not displayed.     CBC  Recent Labs   Lab 02/09/23  1048 02/08/23  0852 02/07/23  0543 02/06/23  1101 02/05/23  0216   WBC  --  9.1  --   --  10.8   HGB 8.7* 8.7* 8.6* 8.9* 7.5*   HCT  --  28.7*  --   --  23.4*   MCV  --  70*  --   --  68*   PLT  --  480*  --   --  288     Lab Results   Component Value Date    AST 21 01/31/2023    ALT <5 (L) 01/31/2023    ALKPHOS 129 (H) 01/31/2023    BILITOTAL 0.5 01/31/2023     Lab Results   Component Value Date    INR 0.93 09/12/2022       Attestation:  I have reviewed today's vital signs, notes, medications, labs and imaging.    Tracy Malik MD  [facility] - Nephrology  Office: [phone]

## 2023-02-12 LAB
ANION GAP SERPL CALCULATED.3IONS-SCNC: 13 MMOL/L (ref 7–15)
BUN SERPL-MCNC: 44.1 MG/DL (ref 8–23)
CALCIUM SERPL-MCNC: 10.2 MG/DL (ref 8.8–10.2)
CHLORIDE SERPL-SCNC: 105 MMOL/L (ref 98–107)
CREAT SERPL-MCNC: 1.84 MG/DL (ref 0.51–0.95)
CREAT SERPL-MCNC: 1.84 MG/DL (ref 0.51–0.95)
DEPRECATED HCO3 PLAS-SCNC: 19 MMOL/L (ref 22–29)
GFR SERPL CREATININE-BSD FRML MDRD: 29 ML/MIN/1.73M2
GFR SERPL CREATININE-BSD FRML MDRD: 29 ML/MIN/1.73M2
GLUCOSE SERPL-MCNC: 206 MG/DL (ref 70–99)
LITHIUM SERPL-SCNC: 0.5 MMOL/L (ref 0.6–1.2)
MAGNESIUM SERPL-MCNC: 2.3 MG/DL (ref 1.7–2.3)
PHOSPHATE SERPL-MCNC: 3.1 MG/DL (ref 2.5–4.5)
POTASSIUM SERPL-SCNC: 4.2 MMOL/L (ref 3.4–5.3)
POTASSIUM SERPL-SCNC: 4.4 MMOL/L (ref 3.4–5.3)
SODIUM SERPL-SCNC: 137 MMOL/L (ref 136–145)

## 2023-02-12 PROCEDURE — 84132 ASSAY OF SERUM POTASSIUM: CPT | Performed by: INTERNAL MEDICINE

## 2023-02-12 PROCEDURE — 84100 ASSAY OF PHOSPHORUS: CPT | Performed by: INTERNAL MEDICINE

## 2023-02-12 PROCEDURE — 80048 BASIC METABOLIC PNL TOTAL CA: CPT | Performed by: INTERNAL MEDICINE

## 2023-02-12 PROCEDURE — 82565 ASSAY OF CREATININE: CPT | Performed by: INTERNAL MEDICINE

## 2023-02-12 PROCEDURE — 99232 SBSQ HOSP IP/OBS MODERATE 35: CPT | Performed by: INTERNAL MEDICINE

## 2023-02-12 PROCEDURE — 80178 ASSAY OF LITHIUM: CPT | Performed by: PSYCHIATRY & NEUROLOGY

## 2023-02-12 PROCEDURE — 250N000013 HC RX MED GY IP 250 OP 250 PS 637: Performed by: INTERNAL MEDICINE

## 2023-02-12 PROCEDURE — 36415 COLL VENOUS BLD VENIPUNCTURE: CPT | Performed by: INTERNAL MEDICINE

## 2023-02-12 PROCEDURE — 250N000013 HC RX MED GY IP 250 OP 250 PS 637: Performed by: PSYCHIATRY & NEUROLOGY

## 2023-02-12 PROCEDURE — 120N000001 HC R&B MED SURG/OB

## 2023-02-12 PROCEDURE — 83735 ASSAY OF MAGNESIUM: CPT | Performed by: INTERNAL MEDICINE

## 2023-02-12 PROCEDURE — 250N000013 HC RX MED GY IP 250 OP 250 PS 637: Performed by: STUDENT IN AN ORGANIZED HEALTH CARE EDUCATION/TRAINING PROGRAM

## 2023-02-12 PROCEDURE — 99232 SBSQ HOSP IP/OBS MODERATE 35: CPT | Performed by: STUDENT IN AN ORGANIZED HEALTH CARE EDUCATION/TRAINING PROGRAM

## 2023-02-12 RX ADMIN — LITHIUM CARBONATE 150 MG: 150 CAPSULE, GELATIN COATED ORAL at 16:55

## 2023-02-12 RX ADMIN — ASPIRIN 81 MG: 81 TABLET, COATED ORAL at 08:13

## 2023-02-12 RX ADMIN — SODIUM BICARBONATE 650 MG TABLET 650 MG: at 16:55

## 2023-02-12 RX ADMIN — LITHIUM CARBONATE 150 MG: 150 CAPSULE, GELATIN COATED ORAL at 08:13

## 2023-02-12 RX ADMIN — SODIUM BICARBONATE 650 MG TABLET 650 MG: at 08:13

## 2023-02-12 RX ADMIN — AMILORIDE HYDROCLORIDE 5 MG: 5 TABLET ORAL at 08:13

## 2023-02-12 RX ADMIN — PANTOPRAZOLE SODIUM 40 MG: 40 TABLET, DELAYED RELEASE ORAL at 06:56

## 2023-02-12 ASSESSMENT — ACTIVITIES OF DAILY LIVING (ADL)
ADLS_ACUITY_SCORE: 54
ADLS_ACUITY_SCORE: 50
ADLS_ACUITY_SCORE: 50
ADLS_ACUITY_SCORE: 51
ADLS_ACUITY_SCORE: 51
ADLS_ACUITY_SCORE: 50
ADLS_ACUITY_SCORE: 51
ADLS_ACUITY_SCORE: 51

## 2023-02-12 NOTE — PROGRESS NOTES
[facility]    Hospitalist Progress Note    Date of Service (when I saw the patient): 02/12/2023  Admit date: 1/28/2023    Interval History        Patient is sitting comfortably in chair.  No acute events.  Patient is in the queue for inpatient psychiatry on the geriatric floor as as per psychiatry.no new complaints or events             Assessment & Plan   Polysubstance Overdose/Suicide Attempt  Distributive shock causing hypotension from Ca channel blocker overdose, RESOLVED  Acute encephalopathy caused by overdose, RESOLVED  Acute hypoxic resp failure with overdose. RESOLVED  * Patient indicating she is still thinking of harming herself.  She also has recent history of other self harm attempts.        Mental Health Consult Appreciated.preciated. Noted mental health has severely decompensated since stopping lithium a year ago.     After discussion among nephrology, patient and family it was decided that the benefits of lithium out likely outweigh the risk.  Family and patient accepts the risk of progressive renal disease on lithium    Psychiatry increased lithium to 150 mg twice daily on 02/10/23    Continue daily lithium level    Discontinued 1:1/Suicide precautions on 2/8    She is to remain in the hospital at this time as we continue to follow on as we titrate up on lithium and psychiatry feels she is ready to discharge home or wait until bed available in geriatric psychiatry which will be approximately 2 to 3 weeks wait (earliest 2/21)      Diabetes Insipidus, partial   Severe nocturia -2/7 reported getting up 8-10 times per night to urinate  REI to 2.56 max on 2/2/23, resolving   CKD baseline Cr ~ 1.5-2.   Metabolic/lacitc acidosis associated with overdose, resolved. Recurring on 02/06/23  Hyperkalemia with acidosis, suggestive of RTA  *S/p IVF and continued on TF flushes 200 ml every 2 hours.    * Stopped subcutaneous heparin on 2/6/23, pre nephrology's recommendation, can cause  RTA      Nephrology's input appreciated.     Encourage fluids.     Continue NaBicarbonate 650 mg BID on 02/06/23, likely will need this chronically for acidosis of CKD    Nephrology started Lokelma on 02/07/23 10 mg TID > stopped on 2/9/23     Nephrology started DDAVP on 02/07/23 > increased to 20 mcg on 2/9    Continued large urine output overnight on 2/10  Nephrology switched oral DDAVP to Amiloride 5mg po daily       On low K diet    Vitals:    02/05/23 0500 02/06/23 0400 02/09/23 0544 02/11/23 0619   Weight: 57 kg (125 lb 10.6 oz) 56.2 kg (123 lb 14.4 oz) 50.6 kg (111 lb 8.8 oz) 49.1 kg (108 lb 3.9 oz)    02/12/23 0628   Weight: 50.3 kg (110 lb 14.3 oz)     I/O last 3 completed shifts:  In: 840 [P.O.:840]  Out: 3750 [Urine:3750]  Recent Labs   Lab 02/12/23  0946 02/11/23  0909 02/10/23  0812 02/09/23  0917 02/08/23  0852 02/07/23  0543 02/06/23  1802 02/06/23  1101 02/06/23  0549   CO2 19* 19* 18* 20* 19* 21*  --  21*  --     138 139 140 135* 141  --  140 141   POTASSIUM 4.4  4.2 4.4 4.0 4.0 4.7 5.7* 5.7* 5.5* 5.8*   BUN 44.1* 45.5* 45.3* 49.5* 51.0* 50.3*  --  42.5*  --    CR 1.84*  1.84* 1.79* 2.00* 2.14* 2.18* 2.16*  --  1.88*  --        Loose stools, RESOLVING  * No ABD pain. Suspect TF exacerbated.  TF's stopped now., monitor.     Suspected protein-calorie malnutrition  Prior hypoglycemia:  * TFs stopped on 2/5/23    On low K diet      Appreciate nutrition consult, last seen on 02/10/23     Started Nepro daily on 2/9/23     Superficial thrombus of R cephalic vein:  * RUE edematous monitor,     R subclavian CVC removed on 2/5/23      Chronic anemia : Baseline Hgb ~ 10. No evidence of bleeding, periodically monitor.  No indication for a current transfusion.  H/o perforated PUD   *B12 elevated at 1249, iron panel normal, folate normal on 02/07/23     Follow with initiation of ASA for DVT ppx.     Continue PPI.     Recent Labs   Lab 02/09/23  1048 02/08/23  0852 02/07/23  0543 02/06/23  1101   HGB 8.7*  8.7* 8.6* 8.9*     Deconditioned    PT ordered     Ambulate with assist QID    Suspected pressure lesion from prior treatment with BiPAP  Crusted ulceration on tip of nose as well as linear skin breakdown over bridge of nose.    Wound care nurse consult on 02/10/23 appreciated     Clinically Significant Risk Factors           # Hypercalcemia: Highest Ca = 10.2 mg/dL in last 2 days, will monitor as appropriate    # Hypoalbuminemia: Lowest albumin = 2.3 g/dL at 2/1/2023  7:30 PM, will monitor as appropriate                     Diet: Orders Placed This Encounter      Regular Diet Adult     IVF: None   Suresh Catheter: Not present     DVT Prophylaxis: Stopped heparin subcutaneous, started ASA 81 mg daily.   Code Status: Full Code     Disposition: Expected discharge when lytes stable on PO regimen to Mental Health.   Communication: Discussed with patient on 02/12/23     Sonia Chua MD  Hospitalist Service  [facility]  Securely message with the Vocera Web Console (learn more here)  Text page via Kite Paging/[employer]Forthy    Medical Decision Making          -Data reviewed today: I reviewed all new labs and imaging results over the last 24 hours. I personally reviewed no images or EKG's today.    Physical Exam   Temp: 98.1  F (36.7  C) Temp src: Oral BP: (!) 147/94 Pulse: 72   Resp: 16 SpO2: 100 % O2 Device: None (Room air)    Vitals:    02/09/23 0544 02/11/23 0619 02/12/23 0628   Weight: 50.6 kg (111 lb 8.8 oz) 49.1 kg (108 lb 3.9 oz) 50.3 kg (110 lb 14.3 oz)     Vital Signs with Ranges  Temp:  [97.9  F (36.6  C)-98.1  F (36.7  C)] 98.1  F (36.7  C)  Pulse:  [72-83] 72  Resp:  [16-18] 16  BP: (122-147)/() 147/94  SpO2:  [98 %-100 %] 100 %  I/O last 3 completed shifts:  In: 840 [P.O.:840]  Out: 3750 [Urine:3750]    Today's Exam  Constitutional:  NAD,   Neuropsyche: alert and oriented, answers questions appropriately.   Respiratory:  Breathing comfortably, good air exchange, no wheezes, no  crackles.   Cardiovascular:  Regular rate and rhythm, 1+ edema.  GI:  soft, NT/ND, BS normal  Skin/Integumen:  No acute rash or sign of bleeding.     Medications   All medications reviewed on 02/10/23       aMILoride  5 mg Oral Daily     aspirin  81 mg Oral Daily     lithium  150 mg Oral BID w/meals     pantoprazole  40 mg Oral QAM AC     sodium bicarbonate  650 mg Oral BID     sodium chloride (PF)  10-40 mL Intracatheter Q8H     sodium chloride (PF)  3 mL Intracatheter Q8H     PRN Meds: acetaminophen **OR** acetaminophen, ALPRAZolam, bisacodyl, HYDROmorphone, magnesium hydroxide, magnesium sulfate, naloxone **OR** naloxone **OR** naloxone **OR** naloxone, ondansetron **OR** ondansetron, polyethylene glycol, senna-docusate **OR** [DISCONTINUED] senna-docusate, sodium chloride (PF), sodium chloride (PF)    Data   Recent Labs   Lab 02/12/23  0946 02/11/23  0909 02/10/23  0812 02/09/23  1147 02/09/23  1048 02/08/23  1053 02/08/23  0852 02/07/23  2151 02/07/23  0543   WBC  --   --   --   --   --   --  9.1  --   --    HGB  --   --   --   --  8.7*  --  8.7*  --  8.6*   MCV  --   --   --   --   --   --  70*  --   --    PLT  --   --   --   --   --   --  480*  --   --     138 139  --   --    < > 135*  --  141   POTASSIUM 4.4  4.2 4.4 4.0  --   --    < > 4.7  --  5.7*   CHLORIDE 105 104 109*  --   --    < > 101  --  109*   CO2 19* 19* 18*  --   --    < > 19*  --  21*   BUN 44.1* 45.5* 45.3*  --   --    < > 51.0*  --  50.3*   CR 1.84*  1.84* 1.79* 2.00*  --   --    < > 2.18*  --  2.16*   ANIONGAP 13 15 12  --   --    < > 15  --  11   ISSA 10.2 10.1 9.6  --   --    < > 9.7  --  9.8   * 117* 104*   < >  --    < > 135*   < > 99   ALBUMIN  --   --  3.3*  --   --   --   --   --   --     < > = values in this interval not displayed.       No results found for this or any previous visit (from the past 24 hour(s)).

## 2023-02-12 NOTE — PLAN OF CARE
Goal Outcome Evaluation:               Summary:  Polysubstance overdose- Suicide Attempt  DATE & TIME: 2/1/23, 5037-6139            Cognitive Concerns/ Orientation : A&O x 4   BEHAVIOR & AGGRESSION TOOL COLOR: Green  ABNL VS/O2: VSS on room air  MOBILITY: SBA with GB and walker. Ambulated in room and hallway.  PAIN MANAGMENT: Denied  DIET: Regular  BOWEL/BLADDER: Incontinent of urine at times. Uses purewick at overnight  ABNL LAB/BG: Lithium 0.5, Creatinine 1.79  DRAIN/DEVICES: PIV SL  TELEMETRY RHYTHM: NA  SKIN: Scattered scabbing. Scabbing wound to nose ULISSES  TESTS/PROCEDURES:   D/C DATE: Pending Lithium and electrolyte balance  OTHER IMPORTANT INFO: No suicidal thoughts this shift. WOC, Nephrology and Psych following

## 2023-02-12 NOTE — PROGRESS NOTES
Renal Medicine Progress Note            Assessment/Plan:     Assessment:  Diana Santiago is a 72-year-old woman with PMH bipolar disorder/depression, CKD 4m chronic partial DI admitted 1/28/2023 with intentional polysubstance overdose, particularly calcium channel blocker overdose in the setting of severe depression.    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 Historically maintains normonatremia with adequate water intake as long as she has access to water. Does have nocturia, contributing to poor sleep and likely negatively affecting mood. Trial of DDAVP intranasal has not yet been successful, tried higher dose tablet form, but not effective. Now DI seems more nephrogenic. DDAVP discontinued, Will trial amiloride (started 2/11), if ineffective in reducing polyuria, then will discontinue.   -I/Os (currently pt placed on purewick overnight, output recorded is from overnight only)  -Daily BMP  -discontinue ddavp  - start amiloride 5mg daily (dose reduced due to renal function). Monitor for hyperkalemia and increased lithium levels       Intentional polysubstance overdose  Severe depression and suicidality  Bipolar disorder  Mood had been stable on lithium previously, she has tried many medications in the past without desired effect.  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  Has had severe suicidal ideation and attempts off of lithium despite treatment with other medications.  Discussed at length with her and her brother resuming lithium at the risk of worsening renal function.  There is a chance that her CKD could progressed to ESRD requiring dialysis, they are aware of this risk and willing to assume that risk.  Discussed forms of dialysis just for future planning.  Did discuss transplant briefly, mental health will need to be significantly improved prior to being a transplant  candidate.  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  Likely related to heparin use.  Heparin discontinued and bicarb started 2/6.  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  Follows with Dr. Cedeno at [facility] nephrology.  Stopped lithium roughly 1 year ago and creatinine has been stable between 1.8-2.2, eGFR 22-30.   -Avoid nephrotoxins as able  -Renally dose medications    Plan:  -Patient willing to trial lithium at lower doses with frequent monitoring at the risk of her kidneys, this is reasonable given her severe suicidality.  - amiloride 5mg daily (if not effective at improving polyuria in next few days, then will discontinue)  - daily lithium levels, particularly with addition of amiloride which can increase lithium levels   - daily BMP   -Continue sodium bicarb 650 mg twice daily  - changed diet to regular        Interval History:     - no acute events overnight   - still has polyuria overnight, will need more doses of amiloride to reach therapeutic  - reports ok mood, improved overall   - good appetite   - working hard at gaining her strength   - brother Acosta at bedside, updated           Medications and Allergies:       aMILoride  5 mg Oral Daily     aspirin  81 mg Oral Daily     lithium  150 mg Oral BID w/meals     pantoprazole  40 mg Oral QAM AC     sodium bicarbonate  650 mg Oral BID     sodium chloride (PF)  10-40 mL Intracatheter Q8H     sodium chloride (PF)  3 mL Intracatheter Q8H        Allergies   Allergen Reactions     No Known Allergies              Physical Exam:   Vitals were reviewed  /68 (BP Location: Right arm)   Pulse 79   Temp 98.6  F (37  C) (Oral)   Resp 18   Wt 50.3 kg (110 lb 14.3 oz)   SpO2 98%   BMI 19.03 kg/m      Wt Readings from Last 3 Encounters:   02/12/23 50.3 kg (110 lb 14.3 oz)   01/24/23 54.4 kg (120 lb)   10/20/22 51.3 kg (113 lb)       Intake/Output Summary (Last 24 hours) at 2/7/2023 1146  Last data filed at 2/7/2023 0800  Gross per 24 hour   Intake 450 ml   Output 1750 ml   Net -1300 ml       GENERAL APPEARANCE: no acute distress, resting comfortably on commode  HEENT: MMM  RESP: clear  CV: RRR, no murmurs  EXTREMITIES/SKIN: no edema  NEURO:  Alert, oriented, normal speech  PSYCH: flat affect           Data:     BMP  Recent Labs   Lab 02/12/23  0946 02/11/23  0909 02/10/23  0812 02/10/23  0741 02/09/23  1147 02/09/23  0917    138 139  --   --  140   POTASSIUM 4.4  4.2 4.4 4.0  --   --  4.0   CHLORIDE 105 104 109*  --   --  108*   ISSA 10.2 10.1 9.6  --   --  9.5   CO2 19* 19* 18*  --   --  20*   BUN 44.1* 45.5* 45.3*  --   --  49.5*   CR 1.84*  1.84* 1.79* 2.00*  --   --  2.14*   * 117* 104* 104*   < > 108*    < > = values in this interval not displayed.     CBC  Recent Labs   Lab 02/09/23  1048 02/08/23  0852 02/07/23  0543 02/06/23  1101   WBC  --  9.1  --   --    HGB 8.7* 8.7* 8.6* 8.9*   HCT  --  28.7*  --   --    MCV  --  70*  --   --    PLT  --  480*  --   --      Lab Results   Component Value Date    AST 21 01/31/2023    ALT <5 (L) 01/31/2023    ALKPHOS 129 (H) 01/31/2023    BILITOTAL 0.5 01/31/2023     Lab Results   Component Value Date    INR 0.93 09/12/2022       Attestation:  I have reviewed today's vital signs, notes, medications, labs and imaging.    Tracy Malik MD  [facility] - Nephrology  Office: [phone]

## 2023-02-12 NOTE — PLAN OF CARE
DATE & TIME: 2/11/23, 1900 - 0730   Cognitive Concerns/ Orientation : A&O x 4   BEHAVIOR & AGGRESSION TOOL COLOR: Green  ABNL VS/O2: VSS on room air  MOBILITY: SBA with GB and walker  PAIN MANAGMENT: Denied  DIET: Regular  BOWEL/BLADDER: Incontinent of urine at times. Used purewick overnight  ABNL LAB/BG: NA  DRAIN/DEVICES: PIV SL  TELEMETRY RHYTHM: NA  SKIN: Scattered scabbing. Scabbing wound to nose ULISSES  TESTS/PROCEDURES: AM labs pending  D/C DATE: Pending Lithium and electrolyte balance  OTHER IMPORTANT INFO: No suicidal thoughts this shift. WOC, Nephrology and Psych following

## 2023-02-12 NOTE — PLAN OF CARE
Goal Outcome Evaluation:       Summary:  Polysubstance overdose- Suicide Attempt    DATE & TIME: 2/10/23, 3463 - 3976            Cognitive Concerns/ Orientation : A&O x 4   BEHAVIOR & AGGRESSION TOOL COLOR: Green     ABNL VS/O2: VSS on room air  MOBILITY: SBA with GB and walker  PAIN MANAGMENT: Denied  DIET: regular, tolerating  BOWEL/BLADDER: Incontinent of bladder at times. Wears purewick @ HS  ABNL LAB/BG: NA  DRAIN/DEVICES: PIV SL  TELEMETRY RHYTHM: NA  SKIN: Scattered scabbing. Scabbing wound to nose ULISSES  TESTS/PROCEDURES: AM labs pending  D/C DATE: Pending Lithium and electrolyte balance  OTHER IMPORTANT INFO: No Suicidal thoughts today. WOC, Nephrology, psych following

## 2023-02-13 ENCOUNTER — APPOINTMENT (OUTPATIENT)
Dept: PHYSICAL THERAPY | Facility: CLINIC | Age: 73
DRG: 917 | End: 2023-02-13
Payer: MEDICARE

## 2023-02-13 ENCOUNTER — TELEPHONE (OUTPATIENT)
Dept: BEHAVIORAL HEALTH | Facility: CLINIC | Age: 73
End: 2023-02-13
Payer: MEDICARE

## 2023-02-13 ENCOUNTER — APPOINTMENT (OUTPATIENT)
Dept: OCCUPATIONAL THERAPY | Facility: CLINIC | Age: 73
DRG: 917 | End: 2023-02-13
Payer: MEDICARE

## 2023-02-13 LAB
ANION GAP SERPL CALCULATED.3IONS-SCNC: 12 MMOL/L (ref 7–15)
BUN SERPL-MCNC: 43.7 MG/DL (ref 8–23)
CALCIUM SERPL-MCNC: 10.3 MG/DL (ref 8.8–10.2)
CHLORIDE SERPL-SCNC: 107 MMOL/L (ref 98–107)
CREAT SERPL-MCNC: 1.88 MG/DL (ref 0.51–0.95)
DEPRECATED HCO3 PLAS-SCNC: 18 MMOL/L (ref 22–29)
GFR SERPL CREATININE-BSD FRML MDRD: 28 ML/MIN/1.73M2
GLUCOSE SERPL-MCNC: 137 MG/DL (ref 70–99)
LITHIUM SERPL-SCNC: 0.4 MMOL/L (ref 0.6–1.2)
POTASSIUM SERPL-SCNC: 5.1 MMOL/L (ref 3.4–5.3)
SODIUM SERPL-SCNC: 137 MMOL/L (ref 136–145)

## 2023-02-13 PROCEDURE — 120N000001 HC R&B MED SURG/OB

## 2023-02-13 PROCEDURE — 250N000013 HC RX MED GY IP 250 OP 250 PS 637: Performed by: STUDENT IN AN ORGANIZED HEALTH CARE EDUCATION/TRAINING PROGRAM

## 2023-02-13 PROCEDURE — 250N000013 HC RX MED GY IP 250 OP 250 PS 637: Performed by: INTERNAL MEDICINE

## 2023-02-13 PROCEDURE — 80178 ASSAY OF LITHIUM: CPT | Performed by: PSYCHIATRY & NEUROLOGY

## 2023-02-13 PROCEDURE — 99232 SBSQ HOSP IP/OBS MODERATE 35: CPT | Performed by: PSYCHIATRY & NEUROLOGY

## 2023-02-13 PROCEDURE — 250N000011 HC RX IP 250 OP 636: Performed by: INTERNAL MEDICINE

## 2023-02-13 PROCEDURE — 82310 ASSAY OF CALCIUM: CPT | Performed by: INTERNAL MEDICINE

## 2023-02-13 PROCEDURE — 99233 SBSQ HOSP IP/OBS HIGH 50: CPT | Performed by: INTERNAL MEDICINE

## 2023-02-13 PROCEDURE — 97530 THERAPEUTIC ACTIVITIES: CPT | Mod: GP

## 2023-02-13 PROCEDURE — 99232 SBSQ HOSP IP/OBS MODERATE 35: CPT | Performed by: INTERNAL MEDICINE

## 2023-02-13 PROCEDURE — 97535 SELF CARE MNGMENT TRAINING: CPT | Mod: GO | Performed by: OCCUPATIONAL THERAPIST

## 2023-02-13 PROCEDURE — 250N000013 HC RX MED GY IP 250 OP 250 PS 637: Performed by: PSYCHIATRY & NEUROLOGY

## 2023-02-13 PROCEDURE — 97116 GAIT TRAINING THERAPY: CPT | Mod: GP

## 2023-02-13 PROCEDURE — 36415 COLL VENOUS BLD VENIPUNCTURE: CPT | Performed by: INTERNAL MEDICINE

## 2023-02-13 RX ORDER — SODIUM BICARBONATE 84 MG/ML
50 INJECTION, SOLUTION INTRAVENOUS ONCE
Status: COMPLETED | OUTPATIENT
Start: 2023-02-13 | End: 2023-02-13

## 2023-02-13 RX ORDER — LITHIUM CARBONATE 150 MG/1
300 CAPSULE ORAL
Status: COMPLETED | OUTPATIENT
Start: 2023-02-13 | End: 2023-02-13

## 2023-02-13 RX ORDER — SODIUM BICARBONATE 650 MG/1
1300 TABLET ORAL 2 TIMES DAILY
Status: DISCONTINUED | OUTPATIENT
Start: 2023-02-13 | End: 2023-02-23 | Stop reason: HOSPADM

## 2023-02-13 RX ORDER — LITHIUM CARBONATE 150 MG/1
150 CAPSULE ORAL
Status: DISCONTINUED | OUTPATIENT
Start: 2023-02-14 | End: 2023-02-23 | Stop reason: HOSPADM

## 2023-02-13 RX ADMIN — AMILORIDE HYDROCLORIDE 5 MG: 5 TABLET ORAL at 09:00

## 2023-02-13 RX ADMIN — LITHIUM CARBONATE 300 MG: 150 CAPSULE, GELATIN COATED ORAL at 17:18

## 2023-02-13 RX ADMIN — ASPIRIN 81 MG: 81 TABLET, COATED ORAL at 09:00

## 2023-02-13 RX ADMIN — PANTOPRAZOLE SODIUM 40 MG: 40 TABLET, DELAYED RELEASE ORAL at 07:07

## 2023-02-13 RX ADMIN — SODIUM BICARBONATE 650 MG TABLET 650 MG: at 09:00

## 2023-02-13 RX ADMIN — LITHIUM CARBONATE 150 MG: 150 CAPSULE, GELATIN COATED ORAL at 09:00

## 2023-02-13 RX ADMIN — SODIUM BICARBONATE 50 MEQ: 84 INJECTION, SOLUTION INTRAVENOUS at 14:59

## 2023-02-13 RX ADMIN — SODIUM BICARBONATE 650 MG TABLET 1300 MG: at 17:18

## 2023-02-13 ASSESSMENT — ACTIVITIES OF DAILY LIVING (ADL)
ADLS_ACUITY_SCORE: 50

## 2023-02-13 NOTE — PLAN OF CARE
DATE & TIME: 2/12/23, 1900 - 0730    Cognitive Concerns/ Orientation : A&O x 4   BEHAVIOR & AGGRESSION TOOL COLOR: Green   ABNL VS/O2: VSS on room air  MOBILITY: SBA, GB and walker  PAIN MANAGMENT: Denied  DIET: Regular  BOWEL/BLADDER: Incontinent of urine at times. Purewick in place overnight  ABNL LAB/BG: NA  DRAIN/DEVICES: PIV SL  TELEMETRY RHYTHM: NA  SKIN: Scattered scabbing. Scabbing wound to nose  TESTS/PROCEDURES: AM labs pending  D/C DATE: Pending Lithium and electrolyte balance  OTHER IMPORTANT INFO: No suicidal thoughts this shift. WOC, Nephrology and Psych following

## 2023-02-13 NOTE — PLAN OF CARE
Goal Outcome Evaluation:  Summary:  Polysubstance overdose- Suicide Attempt  DATE & TIME: 2/12/23, 1500 -1900           Cognitive Concerns/ Orientation : A&O x 4   BEHAVIOR & AGGRESSION TOOL COLOR: Green  ABNL VS/O2: VSS on room air  MOBILITY: SBA with GB and walker. Ambulated in room and hallway.  PAIN MANAGMENT: Denied  DIET: Regular, tolerating  BOWEL/BLADDER: Incontinent of urine at times. Uses purewick  overnight  ABNL LAB/BG: Lithium 0.5, Creatinine 1.84  DRAIN/DEVICES: PIV SL  TELEMETRY RHYTHM: NA  SKIN: Scattered scabbing. Scabbing wound to nose ULISSES  TESTS/PROCEDURES:   D/C DATE: Pending Lithium and electrolyte balance  OTHER IMPORTANT INFO: No suicidal thoughts this shift. WOC, Nephrology and Psych following

## 2023-02-13 NOTE — PROGRESS NOTES
[facility]    Hospitalist Progress Note    Date of Service (when I saw the patient): 02/13/2023  Admit date: 1/28/2023    Interval History        Patient is sitting comfortably in chair.  No acute events.  Patient with no acute suicidal ideation currently.  Psych reconsulted today.  No new complaints or events             Assessment & Plan   Polysubstance Overdose/Suicide Attempt  Distributive shock causing hypotension from Ca channel blocker overdose, RESOLVED  Acute encephalopathy caused by overdose, RESOLVED  Acute hypoxic resp failure with overdose. RESOLVED  * Patient indicating she is still thinking of harming herself.  She also has recent history of other self harm attempts.        Mental Health Consult Appreciated.preciated. Noted mental health has severely decompensated since stopping lithium a year ago.     After discussion among nephrology, patient and family it was decided that the benefits of lithium out likely outweigh the risk.  Family and patient accepts the risk of progressive renal disease on lithium    Psychiatry increased lithium to 150 mg twice daily on 02/10/23    Continue daily lithium level    Discontinued 1:1/Suicide precautions on 2/8    She is to remain in the hospital at this time as we continue to follow on as we titrate up on lithium and psychiatry feels she is ready to discharge home or wait until bed available in geriatric psychiatry which will be approximately 2 to 3 weeks wait (earliest 2/21)        Psychiatry reconsulted on 2/13/2023 to assess her again to see if she needs to go to [facility] or whether she can discharge home with her brother     Diabetes Insipidus, partial   Severe nocturia -2/7 reported getting up 8-10 times per night to urinate  ERI to 2.56 max on 2/2/23, resolving   CKD baseline Cr ~ 1.5-2.   Metabolic/lacitc acidosis associated with overdose, resolved. Recurring on 02/06/23  Hyperkalemia with acidosis, suggestive of RTA  *S/p IVF  and continued on TF flushes 200 ml every 2 hours.    * Stopped subcutaneous heparin on 2/6/23, pre nephrology's recommendation, can cause RTA      Nephrology's input appreciated.     Encourage fluids.     Continue NaBicarbonate 650 mg BID on 02/06/23, likely will need this chronically for acidosis of CKD    Nephrology started Lokelma on 02/07/23 10 mg TID > stopped on 2/9/23     Nephrology started DDAVP on 02/07/23 > increased to 20 mcg on 2/9    Patient continues to have large urine output at 3.4 L on 2/12/2023  Nephrology switched oral DDAVP to Amiloride 5mg po daily potassium slightly trended up with a myelo ride to 5.2 today  Management per nephrology      On low K diet    Vitals:    02/05/23 0500 02/06/23 0400 02/09/23 0544 02/11/23 0619   Weight: 57 kg (125 lb 10.6 oz) 56.2 kg (123 lb 14.4 oz) 50.6 kg (111 lb 8.8 oz) 49.1 kg (108 lb 3.9 oz)    02/12/23 0628   Weight: 50.3 kg (110 lb 14.3 oz)     I/O last 3 completed shifts:  In: 720 [P.O.:720]  Out: 3425 [Urine:3425]  Recent Labs   Lab 02/13/23  0846 02/12/23  0946 02/11/23  0909 02/10/23  0812 02/09/23  0917 02/08/23  0852 02/07/23  0543 02/06/23  1802   CO2 18* 19* 19* 18* 20* 19* 21*  --     137 138 139 140 135* 141  --    POTASSIUM 5.1 4.4  4.2 4.4 4.0 4.0 4.7 5.7* 5.7*   BUN 43.7* 44.1* 45.5* 45.3* 49.5* 51.0* 50.3*  --    CR 1.88* 1.84*  1.84* 1.79* 2.00* 2.14* 2.18* 2.16*  --        Loose stools, RESOLVING  * No ABD pain. Suspect TF exacerbated.  TF's stopped now., monitor.     Suspected protein-calorie malnutrition  Prior hypoglycemia:  * TFs stopped on 2/5/23    On low K diet      Appreciate nutrition consult, last seen on 02/10/23     Started Nepro daily on 2/9/23     Superficial thrombus of R cephalic vein:  * RUE edematous monitor,     R subclavian CVC removed on 2/5/23      Chronic anemia : Baseline Hgb ~ 10. No evidence of bleeding, periodically monitor.  No indication for a current transfusion.  H/o perforated PUD   *B12 elevated at  1249, iron panel normal, folate normal on 02/07/23     Follow with initiation of ASA for DVT ppx.     Continue PPI.     Recent Labs   Lab 02/09/23  1048 02/08/23  0852 02/07/23  0543   HGB 8.7* 8.7* 8.6*     Deconditioned    PT ordered     Ambulate with assist QID    Suspected pressure lesion from prior treatment with BiPAP  Crusted ulceration on tip of nose as well as linear skin breakdown over bridge of nose.    Wound care nurse consult on 02/10/23 appreciated     Clinically Significant Risk Factors           # Hypercalcemia: Highest Ca = 10.3 mg/dL in last 2 days, will monitor as appropriate    # Hypoalbuminemia: Lowest albumin = 2.3 g/dL at 2/1/2023  7:30 PM, will monitor as appropriate                     Diet: Orders Placed This Encounter      Regular Diet Adult     IVF: None   Suresh Catheter: Not present     DVT Prophylaxis: Stopped heparin subcutaneous, started ASA 81 mg daily.   Code Status: Full Code     Disposition: Expected discharge when lytes stable on PO regimen to Mental Health.   Communication: Discussed with patient, bedside RN and charge nurse on 02/13/23     Sonia Chua MD  Hospitalist Service  [facility]  Securely message with the Vocera Web Console (learn more here)  Text page via FanChatter Paging/Directory    Medical Decision Making          -Data reviewed today: I reviewed all new labs and imaging results over the last 24 hours. I personally reviewed no images or EKG's today.    Physical Exam   Temp: 97.7  F (36.5  C) Temp src: Oral BP: 136/83 Pulse: 73   Resp: 18 SpO2: 100 % O2 Device: None (Room air)    Vitals:    02/09/23 0544 02/11/23 0619 02/12/23 0628   Weight: 50.6 kg (111 lb 8.8 oz) 49.1 kg (108 lb 3.9 oz) 50.3 kg (110 lb 14.3 oz)     Vital Signs with Ranges  Temp:  [97.7  F (36.5  C)-98.6  F (37  C)] 97.7  F (36.5  C)  Pulse:  [71-81] 73  Resp:  [18] 18  BP: (113-136)/(68-83) 136/83  SpO2:  [98 %-100 %] 100 %  I/O last 3 completed shifts:  In: 720  [P.O.:720]  Out: 3425 [Urine:3425]    Today's Exam  Constitutional:  NAD,   Neuropsyche: alert and oriented, answers questions appropriately.   Respiratory:  Breathing comfortably, good air exchange, no wheezes, no crackles.   Cardiovascular:  Regular rate and rhythm, 1+ edema.  GI:  soft, NT/ND, BS normal  Skin/Integumen:  No acute rash or sign of bleeding.     Medications   All medications reviewed on 02/10/23       aMILoride  5 mg Oral Daily     aspirin  81 mg Oral Daily     lithium  150 mg Oral BID w/meals     pantoprazole  40 mg Oral QAM AC     sodium bicarbonate  650 mg Oral BID     sodium chloride (PF)  10-40 mL Intracatheter Q8H     sodium chloride (PF)  3 mL Intracatheter Q8H     PRN Meds: acetaminophen **OR** acetaminophen, ALPRAZolam, bisacodyl, HYDROmorphone, magnesium hydroxide, magnesium sulfate, naloxone **OR** naloxone **OR** naloxone **OR** naloxone, ondansetron **OR** ondansetron, polyethylene glycol, senna-docusate **OR** [DISCONTINUED] senna-docusate, sodium chloride (PF), sodium chloride (PF)    Data   Recent Labs   Lab 02/13/23  0846 02/12/23  0946 02/11/23  0909 02/10/23  0812 02/09/23  1147 02/09/23  1048 02/08/23  1053 02/08/23  0852 02/07/23  2151 02/07/23  0543   WBC  --   --   --   --   --   --   --  9.1  --   --    HGB  --   --   --   --   --  8.7*  --  8.7*  --  8.6*   MCV  --   --   --   --   --   --   --  70*  --   --    PLT  --   --   --   --   --   --   --  480*  --   --     137 138 139  --   --    < > 135*  --  141   POTASSIUM 5.1 4.4  4.2 4.4 4.0  --   --    < > 4.7  --  5.7*   CHLORIDE 107 105 104 109*  --   --    < > 101  --  109*   CO2 18* 19* 19* 18*  --   --    < > 19*  --  21*   BUN 43.7* 44.1* 45.5* 45.3*  --   --    < > 51.0*  --  50.3*   CR 1.88* 1.84*  1.84* 1.79* 2.00*  --   --    < > 2.18*  --  2.16*   ANIONGAP 12 13 15 12  --   --    < > 15  --  11   ISSA 10.3* 10.2 10.1 9.6  --   --    < > 9.7  --  9.8   * 206* 117* 104*   < >  --    < > 135*   < > 99    ALBUMIN  --   --   --  3.3*  --   --   --   --   --   --     < > = values in this interval not displayed.       No results found for this or any previous visit (from the past 24 hour(s)).

## 2023-02-13 NOTE — PROGRESS NOTES
Psychiatric Progress Note  Consult date: February 6, 2023         Reason for Consult, requesting source:    Suicide attempt/OD  Requesting source: Anabell Camacho    Labs and imaging reviewed. Patient seen and evaluated by Sandra Cox MD          HPI:   This is a 71 yo female who presented on 1/28/23 with a massive, intentional polypharmacy od .  Pt has a history of bipolar illness, and took a combinaton of lamictal, amlodipine, haldol, and adderall.  She had been in the ED 4 days prior for eval of SI.  Pt has had a difficult course related to calcium channel blocker od, and has been in the ICU, vented and on pressors with AHRF, REI on CRF, and encephalopthy.  We are asked to see for assistance with care for her depression, and appropriate disposition when she is ready medically to discharge.    Patient seen today along with her brother.  Both are very good historians.  It sounds like the patient was taken off lithium, her longtime medication for bipolar management, about a year ago.  This was related to worsening kidney function.  She has worked with her psychiatrist to try to find another regimen that was helpful.  She has been on Lamictal, as well as other medications.  None of these medications help with her depression.    Both the patient and her brother are in favor of her going back on lithium if this is a possibility.  I also would be in favor of it as well.  Clearly, the patient's bipolar illness is severe, and potentially lethal.  She was quite well managed on the lithium prior to it being discontinued.    2/7/23: Patient seen today for follow-up in the ICU.  She is waiting for a bed on the medical floor, and no longer requires a critical level of care.  She was awake, and tell me that she is feeling pretty anxious, which is her baseline when she is not on lithium.  She also said that the kidney doctor has been by, and they discussed going back on lithium.  She and I discussed low-dose Xanax to  help with her anxiety for now.  I discussed risk versus benefits of this medication and indicated to her this is a short-term medication, and that she does not have to take it if she does not care for it or if it does not work.    2/8/2023: Patient seen today with her brother, and RN at bedside.  Patient was sitting up and having some fruit.  She states she feels much better today both physically and mentally.  We discussed current lithium dosing, and the plan to titrate very slowly.  I also told her I think we should put her on the lowest for a geriatric psychiatry bed in the event that she still needs inpatient admission in 2 to 3 weeks.  She was agreeable to this.  Answered questions and discussed the overall strategy with the lithium.  I also told her that I do not recommend any other psychiatric medications for now.    2/9/2023: Patient seen today for follow-up.  Discussed with she and her brother.  Also discussed with Dr. Malik/renal.  Patient is feeling more depressed today, but admits that she did watch the news this morning which made her feel that things were more bleak.  We discussed increasing her lithium dose tomorrow pending okay from renal.  Patient denies any negative side effects that she is noting to the lithium.  We discussed other options besides watching the news or activities that are depressing.  Discussed this with she and her brother today to help bolster her spirits while we wait for the lithium to get to a therapeutic dose.     2/10/2023: Patient seen today for follow-up along with her brother.  Chart reviewed.  Patient indicates she still feeling depressed this morning.  I scheduled her lithium for lunchtime today, because I wanted to see her morning labs.  We discussed that this will switch to a.m. and q. evening meal starting tomorrow.  We also discussed that we may have the option of further titration on Monday.  Patient did tell me, interestingly, which her brother confirmed,  that she knows that she is getting depressed because she begins to have blurred vision.  This is been the same since she first became ill with severe depression at age 18.  Blurred vision or decreased visual acuity, bilateral, is the canary in the coal mine that she is becoming depressed.  When the visual acuity returns to normal, the depression goes away.    The patient did say that she does have a history of a very severe head injury when she tried to kill herself by jumping off a bridge when she was younger.  She broke her neck, and undoubtedly sustained a closed head injury as well.  The visual acuity changes began occurring before this.  Patient denied other associated symptoms such as ataxia or other neurologic symptoms that she is aware of aside from the changes in visual acuity.    2/13/2023: Patient seen today for follow-up.  Chart reviewed including nephrology note.  Patient states that she is ready to go home today.  She is still feeling depressed, and we discussed the fact that she is not safe to go home, and that we need to continue to titrate her medications as she tolerates it.  We agreed to continue to go up on the lithium today.  Patient's lithium level was 0.5 this morning.          Past Psychiatric History:     Pt had recently been in the ED on 1/24 with thoughts of drowning herself while in a swimming pool.  She has an outpt psychiatrist, and a therapist, and had discharged after denying SI, and wood for safety.   Patient is seen by Dr. Trent Gallo for outpatient psychiatric management.        Substance Use and History:   Negative        Past Medical History:   PAST MEDICAL HISTORY:   Past Medical History:   Diagnosis Date     Benign essential hypertension 09/2018    added norvasc 9/18     Bipolar affective disorder (H)     hosp 1993, Dr. Aftab Deal     Cancer (H) 1996    DCIS, left breast     Chronic kidney disease, stage 3a (H)     seen by renal Dr. 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    felt due to lithium     Thalassaemia trait      Vitamin D deficiency        PAST SURGICAL HISTORY:   Past Surgical History:   Procedure Laterality Date     BIOPSY  1994    left breast     BREAST SURGERY      lumpectomy x 4     COLONOSCOPY  2021     COLONOSCOPY N/A 6/17/2022    Procedure: COLONOSCOPY, WITH POLYPECTOMY AND BIOPSY;  Surgeon: Panchito Gu MD;  Location:  GI     EYE SURGERY  2018    retina tear     LAPAROTOMY EXPLORATORY N/A 8/24/2022    Procedure: Exploratory laparotomy, REPAIR OF PERFORATED ULCER;  Surgeon: Ron Vidal MD;  Location:  OR     left hand surgery      last 1974             Family History:   FAMILY HISTORY:   Family History   Problem Relation Age of Onset     Cerebrovascular Disease Mother      Hypertension Father      Sleep Apnea Brother      Breast Cancer Maternal Aunt         x 2     Breast Cancer Other         Maternal Aunt     Hyperlipidemia Niece        Family Psychiatric History:         Social History:   SOCIAL HISTORY:   Social History     Tobacco Use     Smoking status: Never     Smokeless tobacco: Never   Substance Use Topics     Alcohol use: No            Physical ROS:   The 10 point Review of Systems is negative other than noted in the HPI or here.           Medications:       aMILoride  5 mg Oral Daily     aspirin  81 mg Oral Daily     [START ON 2/14/2023] lithium  150 mg Oral TID w/meals     lithium  300 mg Oral Daily before supper     pantoprazole  40 mg Oral QAM  AC     sodium bicarbonate  50 mEq Intravenous Once     sodium bicarbonate  1,300 mg Oral BID     sodium chloride (PF)  10-40 mL Intracatheter Q8H     sodium chloride (PF)  3 mL Intracatheter Q8H              Allergies:     Allergies   Allergen Reactions     No Known Allergies           Labs:     Recent Results (from the past 48 hour(s))   Lithium level    Collection Time: 02/12/23  9:46 AM   Result Value Ref Range    Lithium 0.5 (L) 0.6 - 1.2 mmol/L   Potassium    Collection Time: 02/12/23  9:46 AM   Result Value Ref Range    Potassium 4.2 3.4 - 5.3 mmol/L   Magnesium    Collection Time: 02/12/23  9:46 AM   Result Value Ref Range    Magnesium 2.3 1.7 - 2.3 mg/dL   Phosphorus    Collection Time: 02/12/23  9:46 AM   Result Value Ref Range    Phosphorus 3.1 2.5 - 4.5 mg/dL   Creatinine    Collection Time: 02/12/23  9:46 AM   Result Value Ref Range    Creatinine 1.84 (H) 0.51 - 0.95 mg/dL    GFR Estimate 29 (L) >60 mL/min/1.73m2   Basic metabolic panel    Collection Time: 02/12/23  9:46 AM   Result Value Ref Range    Sodium 137 136 - 145 mmol/L    Potassium 4.4 3.4 - 5.3 mmol/L    Chloride 105 98 - 107 mmol/L    Carbon Dioxide (CO2) 19 (L) 22 - 29 mmol/L    Anion Gap 13 7 - 15 mmol/L    Urea Nitrogen 44.1 (H) 8.0 - 23.0 mg/dL    Creatinine 1.84 (H) 0.51 - 0.95 mg/dL    Calcium 10.2 8.8 - 10.2 mg/dL    Glucose 206 (H) 70 - 99 mg/dL    GFR Estimate 29 (L) >60 mL/min/1.73m2   Lithium level    Collection Time: 02/13/23  8:46 AM   Result Value Ref Range    Lithium 0.4 (L) 0.6 - 1.2 mmol/L   Basic metabolic panel    Collection Time: 02/13/23  8:46 AM   Result Value Ref Range    Sodium 137 136 - 145 mmol/L    Potassium 5.1 3.4 - 5.3 mmol/L    Chloride 107 98 - 107 mmol/L    Carbon Dioxide (CO2) 18 (L) 22 - 29 mmol/L    Anion Gap 12 7 - 15 mmol/L    Urea Nitrogen 43.7 (H) 8.0 - 23.0 mg/dL    Creatinine 1.88 (H) 0.51 - 0.95 mg/dL    Calcium 10.3 (H) 8.8 - 10.2 mg/dL    Glucose 137 (H) 70 - 99 mg/dL    GFR Estimate 28 (L) >60  mL/min/1.73m2          Physical and Psychiatric Examination:     /83 (BP Location: Right arm)   Pulse 73   Temp 97.7  F (36.5  C) (Oral)   Resp 18   Wt 50.3 kg (110 lb 14.3 oz)   SpO2 100%   BMI 19.03 kg/m    Weight is 110 lbs 14.26 oz  Body mass index is 19.03 kg/m .    Physical Exam:  I have reviewed the physical exam as documented by by the medical team and agree with findings and assessment and have no additional findings to add at this time.    Mental Status Exam:    Appearance: awake, alert, appeared as age stated, awake, alert and cooperative  Attitude:  cooperative  Eye Contact:  good  Mood:  anxious and depressed  Affect:  mood congruent  Speech:  clear, coherent and decreased prosody  Language: Fluent in english   Psychomotor Behavior:  physical retardation  Thought Process:  logical, linear and goal oriented  Associations:  no loose associations  Thought Content:  no evidence of psychotic thought, no auditory hallucinations present, no visual hallucinations present and Patient is status post serious suicide attempt  Insight:  good  Judgement:  intact  Oriented to:  time, person, and place  Attention Span and Concentration:  intact  Recent and Remote Memory:  intact  Fund of Knowledge: Appropriate   Gait and Station: Sitting up in a chair               DSM-5 Diagnosis:   296.53 Bipolar I Disorder Current or Most Recent Episode Depressed, Severe with psychotic features, toxic metabolic encephalopathy which is resolving, Intentional overdose, subsequent encounter, anxiety.  Chronic kidney disease for, diabetes insipidus              Assessment:   This is a 72-year-old female with a history of bipolar illness spanning 50 years.  Patient was initially diagnosed with schizophrenia when she first became ill.  Later, this diagnosis was modified to bipolar illness, which both she and her brother feel is accurate.  She does have a history of ECT many years ago.  It is unclear whether this was helpful or  not.  What has been helpful has been lithium.  This medication was discontinued a year ago due to worsening kidney function.  The patient has not been able to stabilize since going off this medication, whereas she was stable and functional on lithium.    Patient and her brother, who are both very high functioning, would like her to go back on lithium.  I am in agreement.  We will reach out to the hospitalist to better understand what her options are.  Regardless, she will need to go to an inpatient psychiatric bed for further stabilization after she is medically ready to discharge.    Patient continues to feel depressed, though she looks a little better today.  She is not having current suicidality.  She is very anxious, and I encouraged her to use as needed Xanax to see how it does for her.  In the meantime, we will titrate the lithium dose up to 150 mg p.o. 3 times daily.  I will add an extra 150 mg at dinner this evening, and then the dose will be 3 times daily tomorrow.          Summary of Recommendations:   1.  I have increased lithium to 450 total today, and will initiate 150 mg p.o. 3 times daily tomorrow.    2.  We are getting morning lithium levels and will follow the trend.  Very much appreciate the assistance of nephrology.    3.  Patient does not require a sitter at this point.    4.  Have placed patient in the queue for inpatient psychiatry on the geriatric floor.  This will take 2 to 3 weeks to get a bed.  I am hoping that she feels substantially better prior to that, and that we will not need an inpatient psychiatric stay.  If she is needing it, then I want to have her in line so that we are waiting for a bed longer than we need to.    5.  We will follow.      Sandra Cox MD  Consult/Liaison Psychiatry and Addiction Medicine  [facility]

## 2023-02-13 NOTE — PROGRESS NOTES
[facility]     Renal Progress Note       SHORTHAND KEY FOR MY NOTES:  c = with, s = without, p = after, a = before, x = except, asx = asymptomatic, tx = transplant or treatment, sx = symptoms or symptomatic, cx = canceled or culture, rxn = reaction, yday = yesterday, nl = normal, abx = antibiotics, fxn = function, dx = diagnosis, dz = disease, m/h = melena/hematochezia, c/d/l/ha = cramping/dizziness/lightheadedness/headache, d/c = discharge or diarrhea/constipation, f/c/n/v = fevers/chills/nausea/vomiting, cp/sob = chest pain/shortness of breath, tbv = total body volume, rxn = reaction, tdc = tunneled dialysis catheter, pta = prior to admission, hd = hemodialysis, pd = peritoneal dialysis, hhd = home hemodialysis, edw = estimated dry wt         Assessment/Plan:     1.  CKD IV. Pt's baseline cr is in the 1.8-2.2 range.  She is non-oliguric and TBV is fine.  No uremic sx.  She may eventually need dialysis due to Li renal toxicity and she is willing to do it when the time comes.  At this point, she is most likely going to be benefit from HD.  PD may be difficult since she lives alone.  Her brother may be able to help, though.  A.  Follow labs daily while here.  B.  Will d/w pt/brother tmrw re HD options so we can plan whether to pursue BUE vein mapping.      2.  Bipolar disorder c suicidal ideation.  Pt did not do well s the Li and became suicidal.  Previously, the issue was multiple episodes of Li toxicity due to not drinking enough water to prevent dehydration.  We will have to find a balance point that works for her bc it's clear she needs the Li.    A.  Continue Li.  B.  Current plan is for a transfer to psych at some point, though she'd prefer to go to her brother's home instead, if possible.    3.  NDI.  Pt is now on amiloride.  DDAVP didn't help much.  Explained that she will always need to drink a lot of water.  For the incontinence, she will need Depends.  A.  Continue amiloride.  B.   Ensure pt has free access to water at all times.  C.  If she is unable to eat/drink by mouth, then hypotonic IVF should be started ASAP.  D.  Follow Na while here.  E.  Depends prn.    4.  RTA 4.  Pt remains acidotic despite oral bicarb.  A.  Increase oral bicarb to 1300 mg bid.  B.  Give 1 amp of IV bicarb.    5.  FEN.  Electrolytes are ok now, but K is at risk of rising due to the amiloride and acidosis.  A.  Change to low K diet.        Interval History:     Pt is still feeling depressed, but it's a bit better.  She resumed Li a couple of days ago.      She notices that she urinates a lot and has incontinence bc she is drinking a lot of water.  No uremic sx.          Medications and Allergies:       aMILoride  5 mg Oral Daily     aspirin  81 mg Oral Daily     lithium  150 mg Oral BID w/meals     pantoprazole  40 mg Oral QAM AC     sodium bicarbonate  50 mEq Intravenous Once     sodium bicarbonate  1,300 mg Oral BID     sodium chloride (PF)  10-40 mL Intracatheter Q8H     sodium chloride (PF)  3 mL Intracatheter Q8H     Allergies   Allergen Reactions     No Known Allergies           Physical Exam:     Vitals were reviewed     , Blood pressure 136/83, pulse 73, temperature 97.7  F (36.5  C), temperature source Oral, resp. rate 18, weight 50.3 kg (110 lb 14.3 oz), SpO2 100 %, not currently breastfeeding.  Wt Readings from Last 3 Encounters:   02/12/23 50.3 kg (110 lb 14.3 oz)   01/24/23 54.4 kg (120 lb)   10/20/22 51.3 kg (113 lb)     Intake/Output Summary (Last 24 hours) at 2/13/2023 1441  Last data filed at 2/13/2023 1140  Gross per 24 hour   Intake 870 ml   Output 3425 ml   Net -2555 ml     GENERAL APPEARANCE: pleasant, NAD, alert, mood is still low, flat affect  HEENT:  eyes/ears/nose/neck grossly nl  RESP: CTA B c good efforts  CV: RRR, nl S1/S2   ABDOMEN: s/nt/nd  EXTREMITIES/SKIN: no ble edema; L hand abnormality         Data:     CBC RESULTS:     Recent Labs   Lab 02/09/23  1048 02/08/23  0852 02/07/23  0543    WBC  --  9.1  --    RBC  --  4.09  --    HGB 8.7* 8.7* 8.6*   HCT  --  28.7*  --    PLT  --  480*  --      Basic Metabolic Panel:  Recent Labs   Lab 02/13/23  0846 02/12/23  0946 02/11/23  0909 02/10/23  0812 02/10/23  0741 02/10/23  0554 02/09/23  1147 02/09/23  0917 02/08/23  1053 02/08/23  0852    137 138 139  --   --   --  140  --  135*   POTASSIUM 5.1 4.4  4.2 4.4 4.0  --   --   --  4.0  --  4.7   CHLORIDE 107 105 104 109*  --   --   --  108*  --  101   CO2 18* 19* 19* 18*  --   --   --  20*  --  19*   BUN 43.7* 44.1* 45.5* 45.3*  --   --   --  49.5*  --  51.0*   CR 1.88* 1.84*  1.84* 1.79* 2.00*  --   --   --  2.14*  --  2.18*   * 206* 117* 104* 104* 100*   < > 108*   < > 135*   ISSA 10.3* 10.2 10.1 9.6  --   --   --  9.5  --  9.7    < > = values in this interval not displayed.     INRNo lab results found in last 7 days.   Attestation:   I have reviewed today's relevant vital signs, notes, medications, labs and imaging.    Reza Cedeno MD  [facility]ph[facility]  [phone]   individual instruction

## 2023-02-13 NOTE — TELEPHONE ENCOUNTER
S:  [facility] MED SPEC Unit, Unit 66 RN, Roger calling at 5:22PM  about a 72 year old/Female presenting with increase SI and attempted suicide via overdose.    [phone], Dr Chua is assigned hospitalist for the evening.         B: Pt arrived via EMS. Presenting problem, stressors: Pt came in on 1/28/23 with a massive intentional polypharmacy overdose.  Pt has a hx of Bipolar illness, depression, anxiety and took a combination of lamictal, amlodipine, haldol, and adderall.  Pt has been in the ED 4 days prior for eval of SI.      Pt was seen by Dr. Sandra Cox for psych consult today.  She is appropriate for  inpt.      Pt has a hx of HTN, HLD, CKD Stage lll.    Pt affect in ED: Flat, calm, cooperative, withdrawn.    Pt Dx: Bipolar Disorder  Previous [facility] hx? No-unsure    Pt endorses SI, no plan   Hx of suicide attempt? No/unknown  Pt denies SIB  Pt denies HI   Pt denies hallucinations .     Hx of aggression/violence, sexual offences, legal concerns, or Epic care plan? describe: No.    Current concerns for aggression this visit? No  Does pt have a history of Civil Commitment? No  Is Pt their own guardian? Yes    Pt is prescribed medication. Is patient medication compliant? Yes  Pt unknown  CD concerns: None  Acute or chronic medical concerns: Nothing at this time.  Possible that Pt may need dialysis.   Does Pt present with specific needs, assistive devices, or exclusionary criteria? None      Pt is ambulatory with a walker. Needs a standby assist.  Has a right knee brace.  Previous injury where she jumped off a bridge.  She has been working with PT and OT.   Pt is able to perform ADLs independently      A: Pt to be reviewed for [facility] admission. Pt is Voluntary  Preferred placement: [facility] ONLY/ only    COVID:Not yet ordered, Intake to request lab   Utox: Positive for amphetamines   CMP: Abnormalities: GFR Estimate 28  CBC: In process  HCG: N/A    R: Patient cleared and ready for behavioral bed placement: No  -RN  was unsure.   Pt placed on [facility] worklist? Yes    Unit RN reports that he is unsure if Pt is medically cleared.  At this time Pt does not have any medical issues that is a concern. He also mentioned that Pt may need to do dialysis.  It is unsure when that would happen.  He will check w/ provider and contact Intake in the AM.      6pm- Intake requested for COVID to be ordered and collected for [facility] inpt.

## 2023-02-14 ENCOUNTER — TELEPHONE (OUTPATIENT)
Dept: BEHAVIORAL HEALTH | Facility: CLINIC | Age: 73
End: 2023-02-14
Payer: MEDICARE

## 2023-02-14 LAB
ANION GAP SERPL CALCULATED.3IONS-SCNC: 11 MMOL/L (ref 7–15)
BUN SERPL-MCNC: 48.4 MG/DL (ref 8–23)
CALCIUM SERPL-MCNC: 10.7 MG/DL (ref 8.8–10.2)
CHLORIDE SERPL-SCNC: 103 MMOL/L (ref 98–107)
CREAT SERPL-MCNC: 2.03 MG/DL (ref 0.51–0.95)
DEPRECATED HCO3 PLAS-SCNC: 21 MMOL/L (ref 22–29)
ERYTHROCYTE [DISTWIDTH] IN BLOOD BY AUTOMATED COUNT: 16.6 % (ref 10–15)
GFR SERPL CREATININE-BSD FRML MDRD: 25 ML/MIN/1.73M2
GLUCOSE SERPL-MCNC: 100 MG/DL (ref 70–99)
HCT VFR BLD AUTO: 29.7 % (ref 35–47)
HGB BLD-MCNC: 9.4 G/DL (ref 11.7–15.7)
LITHIUM SERPL-SCNC: 0.6 MMOL/L (ref 0.6–1.2)
MCH RBC QN AUTO: 22 PG (ref 26.5–33)
MCHC RBC AUTO-ENTMCNC: 31.6 G/DL (ref 31.5–36.5)
MCV RBC AUTO: 69 FL (ref 78–100)
PLATELET # BLD AUTO: 617 10E3/UL (ref 150–450)
POTASSIUM SERPL-SCNC: 5.1 MMOL/L (ref 3.4–5.3)
RBC # BLD AUTO: 4.28 10E6/UL (ref 3.8–5.2)
SARS-COV-2 RNA RESP QL NAA+PROBE: NEGATIVE
SODIUM SERPL-SCNC: 135 MMOL/L (ref 136–145)
WBC # BLD AUTO: 8.2 10E3/UL (ref 4–11)

## 2023-02-14 PROCEDURE — 85014 HEMATOCRIT: CPT | Performed by: INTERNAL MEDICINE

## 2023-02-14 PROCEDURE — 80048 BASIC METABOLIC PNL TOTAL CA: CPT | Performed by: INTERNAL MEDICINE

## 2023-02-14 PROCEDURE — 250N000013 HC RX MED GY IP 250 OP 250 PS 637: Performed by: PSYCHIATRY & NEUROLOGY

## 2023-02-14 PROCEDURE — 99232 SBSQ HOSP IP/OBS MODERATE 35: CPT | Performed by: INTERNAL MEDICINE

## 2023-02-14 PROCEDURE — 99232 SBSQ HOSP IP/OBS MODERATE 35: CPT | Performed by: PSYCHIATRY & NEUROLOGY

## 2023-02-14 PROCEDURE — 99231 SBSQ HOSP IP/OBS SF/LOW 25: CPT | Performed by: INTERNAL MEDICINE

## 2023-02-14 PROCEDURE — 36415 COLL VENOUS BLD VENIPUNCTURE: CPT | Performed by: INTERNAL MEDICINE

## 2023-02-14 PROCEDURE — 80178 ASSAY OF LITHIUM: CPT | Performed by: PSYCHIATRY & NEUROLOGY

## 2023-02-14 PROCEDURE — 120N000001 HC R&B MED SURG/OB

## 2023-02-14 PROCEDURE — 250N000013 HC RX MED GY IP 250 OP 250 PS 637: Performed by: INTERNAL MEDICINE

## 2023-02-14 PROCEDURE — 250N000013 HC RX MED GY IP 250 OP 250 PS 637: Performed by: STUDENT IN AN ORGANIZED HEALTH CARE EDUCATION/TRAINING PROGRAM

## 2023-02-14 PROCEDURE — U0003 INFECTIOUS AGENT DETECTION BY NUCLEIC ACID (DNA OR RNA); SEVERE ACUTE RESPIRATORY SYNDROME CORONAVIRUS 2 (SARS-COV-2) (CORONAVIRUS DISEASE [COVID-19]), AMPLIFIED PROBE TECHNIQUE, MAKING USE OF HIGH THROUGHPUT TECHNOLOGIES AS DESCRIBED BY CMS-2020-01-R: HCPCS | Performed by: INTERNAL MEDICINE

## 2023-02-14 PROCEDURE — G0463 HOSPITAL OUTPT CLINIC VISIT: HCPCS

## 2023-02-14 RX ADMIN — ALPRAZOLAM 0.12 MG: 0.25 TABLET ORAL at 10:56

## 2023-02-14 RX ADMIN — SODIUM BICARBONATE 650 MG TABLET 1300 MG: at 18:11

## 2023-02-14 RX ADMIN — SODIUM BICARBONATE 650 MG TABLET 1300 MG: at 08:03

## 2023-02-14 RX ADMIN — LITHIUM CARBONATE 150 MG: 150 CAPSULE, GELATIN COATED ORAL at 18:10

## 2023-02-14 RX ADMIN — LITHIUM CARBONATE 150 MG: 150 CAPSULE, GELATIN COATED ORAL at 08:04

## 2023-02-14 RX ADMIN — ALPRAZOLAM 0.12 MG: 0.25 TABLET ORAL at 18:10

## 2023-02-14 RX ADMIN — AMILORIDE HYDROCLORIDE 5 MG: 5 TABLET ORAL at 08:04

## 2023-02-14 RX ADMIN — PANTOPRAZOLE SODIUM 40 MG: 40 TABLET, DELAYED RELEASE ORAL at 06:35

## 2023-02-14 RX ADMIN — LITHIUM CARBONATE 150 MG: 150 CAPSULE, GELATIN COATED ORAL at 13:19

## 2023-02-14 RX ADMIN — ASPIRIN 81 MG: 81 TABLET, COATED ORAL at 08:04

## 2023-02-14 ASSESSMENT — ACTIVITIES OF DAILY LIVING (ADL)
ADLS_ACUITY_SCORE: 55
ADLS_ACUITY_SCORE: 54
ADLS_ACUITY_SCORE: 50
ADLS_ACUITY_SCORE: 49
ADLS_ACUITY_SCORE: 55
ADLS_ACUITY_SCORE: 54
ADLS_ACUITY_SCORE: 50
ADLS_ACUITY_SCORE: 50
ADLS_ACUITY_SCORE: 54
ADLS_ACUITY_SCORE: 50

## 2023-02-14 NOTE — PLAN OF CARE
"Goal Outcome Evaluation:       Orientation: A&Ox4    Vitals: VSS on RA, denies pain    IV Access/drains: L PIV saline lock    Diet: Combination 3 gm k    Mobility: SBA with GB and walker.    GI/: Continent, use bathroom    Consults; psychiatry titrating lithium dose    Discharge Plan: [facility] wait list, discharge pending    Other important info\" No suicide ideation, and agreement for safety.                 "

## 2023-02-14 NOTE — PROGRESS NOTES
[facility]    Hospitalist Progress Note    Date of Service (when I saw the patient): 02/14/2023  Admit date: 1/28/2023    Interval History        Patient is sitting comfortably in chair.  No acute events.  Patient still feels depressed.  No active suicidal ideation currently.  She was seen by psychiatry yesterday lithium doses was increased to 150 mg 3 times a day.  No new complaints or events     Assessment & Plan   Polysubstance Overdose/Suicide Attempt  Distributive shock causing hypotension from Ca channel blocker overdose, RESOLVED  Acute encephalopathy caused by overdose, RESOLVED  Acute hypoxic resp failure with overdose. RESOLVED  * Patient indicating she is still thinking of harming herself.  She also has recent history of other self harm attempts.        Mental Health Consult Appreciated.preciated. Noted mental health has severely decompensated since stopping lithium a year ago.     After discussion among nephrology, patient and family it was decided that the benefits of lithium out likely outweigh the risk.  Family and patient accepts the risk of progressive renal disease on lithium    Psychiatry increased lithium to 150 mg 3 times daily daily on 02/13/23    Continue daily lithium level    Discontinued 1:1/Suicide precautions on 2/8    She is to remain in the hospital at this time as we continue to follow on as we titrate up on lithium and psychiatry feels she is ready to discharge home or wait until bed available in geriatric psychiatry which will be approximately 2 to 3 weeks wait (earliest 2/21)      Psychiatry reconsulted on 2/13/2023 and increased lithium dosage to 150 mg p.o. 3 times a day   Continue to recommend [facility] unit discharge    Diabetes Insipidus, partial   Severe nocturia -2/7 reported getting up 8-10 times per night to urinate  REI to 2.56 max on 2/2/23, resolving   CKD baseline Cr ~ 1.5-2.   Metabolic/lacitc acidosis associated with overdose, resolved.  Recurring on 02/06/23  Hyperkalemia with acidosis, suggestive of RTA  *S/p IVF and continued on TF flushes 200 ml every 2 hours.    * Stopped subcutaneous heparin on 2/6/23, pre nephrology's recommendation, can cause RTA      Nephrology's input appreciated.     Encourage fluids.     Continue NaBicarbonate  1300 mg BID     Nephrology started Lokelma on 02/07/23 10 mg TID > stopped on 2/9/23     Nephrology started DDAVP on 02/07/23 > increased to 20 mcg on 2/9  Nephrology switched oral DDAVP to Amiloride 5mg po daily potassium slightly trended up with a myelo ride to 5.2 today  Management per nephrology      On low K diet    Vitals:    02/05/23 0500 02/06/23 0400 02/09/23 0544 02/11/23 0619   Weight: 57 kg (125 lb 10.6 oz) 56.2 kg (123 lb 14.4 oz) 50.6 kg (111 lb 8.8 oz) 49.1 kg (108 lb 3.9 oz)    02/12/23 0628   Weight: 50.3 kg (110 lb 14.3 oz)     I/O last 3 completed shifts:  In: 1590 [P.O.:1590]  Out: 2900 [Urine:2900]  Recent Labs   Lab 02/14/23  0824 02/13/23  0846 02/12/23  0946 02/11/23  0909 02/10/23  0812 02/09/23  0917 02/08/23  0852   CO2 21* 18* 19* 19* 18* 20* 19*   * 137 137 138 139 140 135*   POTASSIUM 5.1 5.1 4.4  4.2 4.4 4.0 4.0 4.7   BUN 48.4* 43.7* 44.1* 45.5* 45.3* 49.5* 51.0*   CR 2.03* 1.88* 1.84*  1.84* 1.79* 2.00* 2.14* 2.18*       Loose stools, RESOLVING  Improved now     Suspected protein-calorie malnutrition  Prior hypoglycemia:  * TFs stopped on 2/5/23    On low K diet      Appreciate nutrition consult, last seen on 02/10/23     Started Nepro daily on 2/9/23     Superficial thrombus of R cephalic vein:  * RUE edematous monitor,     R subclavian CVC removed on 2/5/23      Chronic anemia : Baseline Hgb ~ 10. No evidence of bleeding, periodically monitor.  No indication for a current transfusion.  H/o perforated PUD   *B12 elevated at 1249, iron panel normal, folate normal on 02/07/23     Follow with initiation of ASA for DVT ppx.     Continue PPI.     Recent Labs   Lab 02/14/23  0824  02/09/23  1048 02/08/23  0852   HGB 9.4* 8.7* 8.7*     Deconditioned    PT ordered     Ambulate with assist QID    Suspected pressure lesion from prior treatment with BiPAP  Crusted ulceration on tip of nose as well as linear skin breakdown over bridge of nose.    Wound care nurse consult on 02/10/23 appreciated     Clinically Significant Risk Factors           # Hypercalcemia: Highest Ca = 10.7 mg/dL in last 2 days, will monitor as appropriate    # Hypoalbuminemia: Lowest albumin = 2.3 g/dL at 2/1/2023  7:30 PM, will monitor as appropriate                     Diet: Orders Placed This Encounter      Combination Diet 3 gm K Diet     IVF: None   Suresh Catheter: Not present     DVT Prophylaxis: Stopped heparin subcutaneous, started ASA 81 mg daily.   Code Status: Full Code     Disposition: Expected discharge when lytes stable on PO regimen to Mental Health.   Communication: Discussed with patient, bedside RN and charge nurse on 02/14/23     Sonia Chua MD  Hospitalist Service  [facility]  Securely message with the Vocera Web Console (learn more here)  Text page via [employer] Paging/[employer]    Medical Decision Making          -Data reviewed today: I reviewed all new labs and imaging results over the last 24 hours. I personally reviewed no images or EKG's today.    Physical Exam   Temp: 98  F (36.7  C) Temp src: Oral BP: 113/76 Pulse: 81   Resp: 17 SpO2: 100 % O2 Device: None (Room air)    Vitals:    02/09/23 0544 02/11/23 0619 02/12/23 0628   Weight: 50.6 kg (111 lb 8.8 oz) 49.1 kg (108 lb 3.9 oz) 50.3 kg (110 lb 14.3 oz)     Vital Signs with Ranges  Temp:  [98  F (36.7  C)-98.1  F (36.7  C)] 98  F (36.7  C)  Pulse:  [81-87] 81  Resp:  [17-18] 17  BP: (102-113)/(65-78) 113/76  SpO2:  [99 %-100 %] 100 %  I/O last 3 completed shifts:  In: 1590 [P.O.:1590]  Out: 2900 [Urine:2900]    Today's Exam  Constitutional:  NAD, alert oriented x3, sitting comfortably in chair  Neuropsyche: alert and  oriented, answers questions appropriately.   Respiratory:  Breathing comfortably, good air exchange, no wheezes, no crackles.   Cardiovascular:  Regular rate and rhythm, 1+ edema.  GI:  soft, NT/ND, BS normal  Skin/Integumen:  No acute rash or sign of bleeding.     Medications   All medications reviewed on 02/10/23       aMILoride  5 mg Oral Daily     aspirin  81 mg Oral Daily     lithium  150 mg Oral TID w/meals     pantoprazole  40 mg Oral QAM AC     sodium bicarbonate  1,300 mg Oral BID     sodium chloride (PF)  3 mL Intracatheter Q8H     PRN Meds: acetaminophen **OR** acetaminophen, ALPRAZolam, bisacodyl, HYDROmorphone, magnesium hydroxide, magnesium sulfate, naloxone **OR** naloxone **OR** naloxone **OR** naloxone, ondansetron **OR** ondansetron, polyethylene glycol, senna-docusate **OR** [DISCONTINUED] senna-docusate, sodium chloride (PF), sodium chloride (PF)    Data   Recent Labs   Lab 02/14/23  0824 02/13/23  0846 02/12/23  0946 02/11/23  0909 02/10/23  0812 02/09/23  1147 02/09/23  1048 02/08/23  1053 02/08/23  0852   WBC 8.2  --   --   --   --   --   --   --  9.1   HGB 9.4*  --   --   --   --   --  8.7*  --  8.7*   MCV 69*  --   --   --   --   --   --   --  70*   *  --   --   --   --   --   --   --  480*   * 137 137   < > 139  --   --    < > 135*   POTASSIUM 5.1 5.1 4.4  4.2   < > 4.0  --   --    < > 4.7   CHLORIDE 103 107 105   < > 109*  --   --    < > 101   CO2 21* 18* 19*   < > 18*  --   --    < > 19*   BUN 48.4* 43.7* 44.1*   < > 45.3*  --   --    < > 51.0*   CR 2.03* 1.88* 1.84*  1.84*   < > 2.00*  --   --    < > 2.18*   ANIONGAP 11 12 13   < > 12  --   --    < > 15   ISSA 10.7* 10.3* 10.2   < > 9.6  --   --    < > 9.7   * 137* 206*   < > 104*   < >  --    < > 135*   ALBUMIN  --   --   --   --  3.3*  --   --   --   --     < > = values in this interval not displayed.       No results found for this or any previous visit (from the past 24 hour(s)).

## 2023-02-14 NOTE — PROGRESS NOTES
Renal Medicine Progress Note            Assessment/Plan:     1.  CKD IV. Pt's baseline cr is in the 1.8-2.2 range.  She is non-oliguric and TBV is fine.  No uremic sx.  She may eventually need dialysis due to Li renal toxicity and she is willing to do it when the time comes.  At this point, she is most likely going to be benefit from HD.  PD may be difficult since she lives alone.  Her brother may be able to help, though.  A.  Follow labs daily while here.  B.  Will d/w pt/brother tmrw re HD options so we can plan whether to pursue BUE vein mapping.       2.  Bipolar disorder c suicidal ideation.  Pt did not do well s the Li and became suicidal.  Previously, the issue was multiple episodes of Li toxicity due to not drinking enough water to prevent dehydration.  We will have to find a balance point that works for her bc it's clear she needs the Li.    A.  Continue Li.  B.  Current plan is for a transfer to psych at some point, though she'd prefer to go to her brother's home instead, if possible.     3.  NDI.  Pt is now on amiloride.  DDAVP didn't help much.  Explained that she will always need to drink a lot of water.  For the incontinence, she will need Depends.  A.  Continue amiloride.  B.  Ensure pt has free access to water at all times.  C.  If she is unable to eat/drink by mouth, then hypotonic IVF should be started ASAP.  D.  Follow Na while here.  E.  Depends prn.     4.  RTA 4.  Pt remains acidotic despite oral bicarb.  A.  Oral bicarb to 1300 mg bid.     5.  FEN.  Electrolytes are ok now, but K is at risk of rising due to the amiloride and acidosis.  A.  Change to low K diet.    Plan:   # No new recommendations from our team. Continue to encourage water intake due to risk of hypernatremia from NDI. Monitor K level. Monitor lithium level when amiloride dose is increased.         Interval History:     Patient does not have questions.   She is laying in bed and looks comfortable.           Medications and  Allergies:       aMILoride  5 mg Oral Daily     aspirin  81 mg Oral Daily     lithium  150 mg Oral TID w/meals     pantoprazole  40 mg Oral QAM AC     sodium bicarbonate  1,300 mg Oral BID     sodium chloride (PF)  3 mL Intracatheter Q8H        Allergies   Allergen Reactions     No Known Allergies             Physical Exam:   Vitals were reviewed   , Blood pressure 120/65, pulse 81, temperature 98.9  F (37.2  C), temperature source Oral, resp. rate 17, weight 50.3 kg (110 lb 14.3 oz), SpO2 99 %, not currently breastfeeding.    Wt Readings from Last 3 Encounters:   02/12/23 50.3 kg (110 lb 14.3 oz)   01/24/23 54.4 kg (120 lb)   10/20/22 51.3 kg (113 lb)       Intake/Output Summary (Last 24 hours) at 2/14/2023 1525  Last data filed at 2/14/2023 1300  Gross per 24 hour   Intake 720 ml   Output 3100 ml   Net -2380 ml       GENERAL APPEARANCE: pleasant, NAD, alert  HEENT:  Eyes/ears/nose/neck grossly normal  RESP: BS is not labored.   ABDOMEN: Soft, ND.  EXTREMITIES/SKIN: no rashes/lesions on observed skin; no edema         Data:     CBC RESULTS:     Recent Labs   Lab 02/14/23  0824 02/09/23  1048 02/08/23  0852   WBC 8.2  --  9.1   RBC 4.28  --  4.09   HGB 9.4* 8.7* 8.7*   HCT 29.7*  --  28.7*   *  --  480*       Basic Metabolic Panel:  Recent Labs   Lab 02/14/23  0824 02/13/23  0846 02/12/23  0946 02/11/23  0909 02/10/23  0812 02/10/23  0741 02/09/23  1147 02/09/23  0917   * 137 137 138 139  --   --  140   POTASSIUM 5.1 5.1 4.4  4.2 4.4 4.0  --   --  4.0   CHLORIDE 103 107 105 104 109*  --   --  108*   CO2 21* 18* 19* 19* 18*  --   --  20*   BUN 48.4* 43.7* 44.1* 45.5* 45.3*  --   --  49.5*   CR 2.03* 1.88* 1.84*  1.84* 1.79* 2.00*  --   --  2.14*   * 137* 206* 117* 104* 104*   < > 108*   ISSA 10.7* 10.3* 10.2 10.1 9.6  --   --  9.5    < > = values in this interval not displayed.       INRNo lab results found in last 7 days.   Attestation:   I have reviewed today's relevant vital signs, notes,  medications, labs and imaging.    Barney Izquierdo MD  [facility] - Nephrology  Office phone :[phone]  Pager: [phone]

## 2023-02-14 NOTE — PROGRESS NOTES
"[facility]  WOC Nurse Inpatient Assessment     Consulted for:  'pressure wound on nose suspect secondary to prior BiPAP'       Areas Assessed:      Areas visualized during today's visit: nose    Wound location:  Tip of nose  Last photo: 2-14-23    Wound due to: Pressure Injury vs friction  Wound history/plan of care: per prior woc charting \"per chart review pt was previously in ICU, was initially intubated then on BiPAP and oxy mask at times\"   Wound base: dry red scabbing     Palpation of the wound bed: mild firm, non fluctuant     Drainage: none     Measurements (length x width x depth, in cm): approx 0.6 x 1.3 x 0cm        Tunneling: N/A     Undermining: N/A  Periwound skin: Intact      Color: normal and consistent with surrounding tissue      Temperature: normal   Odor: none  Pain: denies     Pain interventions prior to dressing change: no significant pain present   Treatment goal: Heal   STATUS: stable  Supplies ordered: stored on unit        Treatment Plan:      Wound care  Start:  02/15/23 0600,   DAILY,   Routine        Comments: Location: Nose   Care: provided daily by primary RN   1. Cleanse daily with brandon spray cleanser, wipe with dry gauze.   2. Apply pea size amount of Sween 24 lotion to area, leave open to air      Modified from: Wound care - DAILY Start: 02/11/23 0600, DAILY, Routine          Orders: Reviewed and Updated    RECOMMEND PRIMARY TEAM ORDER: None, at this time  Education provided: plan of care and wound progress  Discussed plan of care with: Patient and Nurse   WOC nurse follow-up plan: twice weekly  Notify WOC if wound(s) deteriorate.  Nursing to notify the Provider(s) and re-consult the WOC Nurse if new skin concern.    DATA:     Current support surface: Standard  Atmos Air mattress  Containment of urine/stool: not assessed with consult   BMI: Body mass index is 19.03 kg/m .   Active diet order: Orders Placed This Encounter      Combination Diet 3 gm K Diet   " "  Output: I/O last 3 completed shifts:  In: 1590 [P.O.:1590]  Out: 2900 [Urine:2900]     Labs:   Recent Labs   Lab 02/14/23  0824 02/10/23  0812   ALBUMIN  --  3.3*   HGB 9.4*  --    WBC 8.2  --      Pressure injury risk assessment:   Sensory Perception: 3-->slightly limited  Moisture: 3-->occasionally moist  Activity: 3-->walks occasionally  Mobility: 3-->slightly limited  Nutrition: 3-->adequate  Friction and Shear: 3-->no apparent problem  Binu Score: 18    Shawna CWOCN   1st: [employer], call \"Shawna Newton\" or call Group \"[facility] Nurse\"   (2nd option: leave a voicemail at Dept. Office Number: [phone]. Messages checked occasionally M-F)      "

## 2023-02-14 NOTE — PLAN OF CARE
Summary:  Polysubstance overdose- Suicide Attempt  DATE & TIME: 2/13/23 Evenings   Cognitive Concerns/ Orientation : A&Ox4. Flat affect, quiet and withdrawn. Cooperative with cares and calling as needed.   BEHAVIOR & AGGRESSION TOOL COLOR: Green     ABNL VS/O2: VSS on room air  MOBILITY: SBA with GB and walker. PT and OT evaluated pt today. Leg brace on RLE when ambulating.   PAIN MANAGMENT: Denied  DIET: Regular. Good appetite and fluid intake.   BOWEL/BLADDER: Continent. Using bathroom. 1 episode of incontinence this evening. Pt was asking for purewick, but encouraged to ambulate.   ABNL LAB/BG: creat 1.88  DRAIN/DEVICES: PIV LFA, SL.   TELEMETRY RHYTHM: NA  SKIN: Scabbing on bridge of nose. Scattered bruising.   TESTS/PROCEDURES: Psychiatry consulted and adjusting lithium dose. [facility] recommended for pt safety and medication adjustments when medically stable. Inpt admissions called this evening to start assessment of pt. No bed available, will need pt to be medically cleared, and covid test needed to put pt on waiting list. Sticky note placed for MD.   D/C DATE: Pending psyche rec; inpatient mental health unit vs TCU/home  OTHER IMPORTANT INFO: No suicide ideation; brother expressed concern that she was more depressed today and anxious with talk of potential discharge in a few days; Nephrology following. Possible need for fistula placement for future dialysis.

## 2023-02-14 NOTE — TELEPHONE ENCOUNTER
0052 Bed Search Update:    Awaiting availability at [facility].  Remains on wait list.

## 2023-02-14 NOTE — TELEPHONE ENCOUNTER
2/24@ 12:26 called [facility] [address] to seek clarification on whether patient has been medically cleared for IP MH placement.  Per Sadie, attending RN, they are waiting for nephrology this afternoon prior to being cleared.  Patient will be removed from wait list for IP MH placement per policy, pending medical clearance. [facility] notified via iY, the unit HUC.

## 2023-02-14 NOTE — PROGRESS NOTES
Psychiatric Progress Note  Consult date: February 6, 2023         Reason for Consult, requesting source:    Suicide attempt/OD  Requesting source: Anabell Camacho    Labs and imaging reviewed. Patient seen and evaluated by Sandra Cox MD          HPI:   This is a 71 yo female who presented on 1/28/23 with a massive, intentional polypharmacy od .  Pt has a history of bipolar illness, and took a combinaton of lamictal, amlodipine, haldol, and adderall.  She had been in the ED 4 days prior for eval of SI.  Pt has had a difficult course related to calcium channel blocker od, and has been in the ICU, vented and on pressors with AHRF, REI on CRF, and encephalopthy.  We are asked to see for assistance with care for her depression, and appropriate disposition when she is ready medically to discharge.    Patient seen today along with her brother.  Both are very good historians.  It sounds like the patient was taken off lithium, her longtime medication for bipolar management, about a year ago.  This was related to worsening kidney function.  She has worked with her psychiatrist to try to find another regimen that was helpful.  She has been on Lamictal, as well as other medications.  None of these medications help with her depression.    Both the patient and her brother are in favor of her going back on lithium if this is a possibility.  I also would be in favor of it as well.  Clearly, the patient's bipolar illness is severe, and potentially lethal.  She was quite well managed on the lithium prior to it being discontinued.    2/7/23: Patient seen today for follow-up in the ICU.  She is waiting for a bed on the medical floor, and no longer requires a critical level of care.  She was awake, and tell me that she is feeling pretty anxious, which is her baseline when she is not on lithium.  She also said that the kidney doctor has been by, and they discussed going back on lithium.  She and I discussed low-dose Xanax to  help with her anxiety for now.  I discussed risk versus benefits of this medication and indicated to her this is a short-term medication, and that she does not have to take it if she does not care for it or if it does not work.    2/8/2023: Patient seen today with her brother, and RN at bedside.  Patient was sitting up and having some fruit.  She states she feels much better today both physically and mentally.  We discussed current lithium dosing, and the plan to titrate very slowly.  I also told her I think we should put her on the lowest for a geriatric psychiatry bed in the event that she still needs inpatient admission in 2 to 3 weeks.  She was agreeable to this.  Answered questions and discussed the overall strategy with the lithium.  I also told her that I do not recommend any other psychiatric medications for now.    2/9/2023: Patient seen today for follow-up.  Discussed with she and her brother.  Also discussed with Dr. Malik/renal.  Patient is feeling more depressed today, but admits that she did watch the news this morning which made her feel that things were more bleak.  We discussed increasing her lithium dose tomorrow pending okay from renal.  Patient denies any negative side effects that she is noting to the lithium.  We discussed other options besides watching the news or activities that are depressing.  Discussed this with she and her brother today to help bolster her spirits while we wait for the lithium to get to a therapeutic dose.     2/10/2023: Patient seen today for follow-up along with her brother.  Chart reviewed.  Patient indicates she still feeling depressed this morning.  I scheduled her lithium for lunchtime today, because I wanted to see her morning labs.  We discussed that this will switch to a.m. and q. evening meal starting tomorrow.  We also discussed that we may have the option of further titration on Monday.  Patient did tell me, interestingly, which her brother confirmed,  that she knows that she is getting depressed because she begins to have blurred vision.  This is been the same since she first became ill with severe depression at age 18.  Blurred vision or decreased visual acuity, bilateral, is the canary in the coal mine that she is becoming depressed.  When the visual acuity returns to normal, the depression goes away.    The patient did say that she does have a history of a very severe head injury when she tried to kill herself by jumping off a bridge when she was younger.  She broke her neck, and undoubtedly sustained a closed head injury as well.  The visual acuity changes began occurring before this.  Patient denied other associated symptoms such as ataxia or other neurologic symptoms that she is aware of aside from the changes in visual acuity.    2/13/2023: Patient seen today for follow-up.  Chart reviewed including nephrology note.  Patient states that she is ready to go home today.  She is still feeling depressed, and we discussed the fact that she is not safe to go home, and that we need to continue to titrate her medications as she tolerates it.  We agreed to continue to go up on the lithium today.  Patient's lithium level was 0.5 this morning.    2/14/2023: Patient seen today for follow-up along with her brother.  Discussed with RN, who was present in the room while we talked.  Also met with social work services.  Patient talked about wanting to go home so that she can do her taxes.  She states her mood is somewhat better today, but is still not good.  She denies any side effects from the lithium thus far.  Lithium level this morning was 0.6.  We discussed that the patient is in the queue for inpatient geriatric psychiatry bed.  Patient is somewhat ambivalent about this, but understands that I want her feeling well prior to going home due to her very recent, quite severe suicide attempt.  Brother is supportive of the plan, and states that he is trying to get her into  assisted living.          Past Psychiatric History:     Pt had recently been in the ED on 1/24 with thoughts of drowning herself while in a swimming pool.  She has an outpt psychiatrist, and a therapist, and had discharged after denying SI, and wood for safety.   Patient is seen by Dr. Trent Gallo for outpatient psychiatric management.        Substance Use and History:   Negative        Past Medical History:   PAST MEDICAL HISTORY:   Past Medical History:   Diagnosis Date     Benign essential hypertension 09/2018    added norvasc 9/18     Bipolar affective disorder (H)     hosp 1993, Dr. Aftab Deal     Cancer (H) 1996    DCIS, left breast     Chronic kidney disease, stage 3a (H)     seen by renal Dr. 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    felt due to lithium     Thalassaemia trait      Vitamin D deficiency        PAST SURGICAL HISTORY:   Past Surgical History:   Procedure Laterality Date     BIOPSY  1994    left breast     BREAST SURGERY      lumpectomy x 4     COLONOSCOPY  2021     COLONOSCOPY N/A 6/17/2022    Procedure: COLONOSCOPY, WITH POLYPECTOMY AND BIOPSY;  Surgeon: Panchito Gu MD;  Location:  GI     EYE SURGERY  2018    retina tear     LAPAROTOMY EXPLORATORY N/A 8/24/2022    Procedure: Exploratory laparotomy, REPAIR OF PERFORATED ULCER;  Surgeon: Ron Vidal MD;  Location:  OR     left hand surgery      last 1974             Family History:   FAMILY HISTORY:   Family History   Problem Relation  Age of Onset     Cerebrovascular Disease Mother      Hypertension Father      Sleep Apnea Brother      Breast Cancer Maternal Aunt         x 2     Breast Cancer Other         Maternal Aunt     Hyperlipidemia Niece        Family Psychiatric History:         Social History:   SOCIAL HISTORY:   Social History     Tobacco Use     Smoking status: Never     Smokeless tobacco: Never   Substance Use Topics     Alcohol use: No            Physical ROS:   The 10 point Review of Systems is negative other than noted in the HPI or here.           Medications:       aMILoride  5 mg Oral Daily     aspirin  81 mg Oral Daily     lithium  150 mg Oral TID w/meals     pantoprazole  40 mg Oral QAM AC     sodium bicarbonate  1,300 mg Oral BID     sodium chloride (PF)  3 mL Intracatheter Q8H              Allergies:     Allergies   Allergen Reactions     No Known Allergies           Labs:     Recent Results (from the past 48 hour(s))   Lithium level    Collection Time: 02/13/23  8:46 AM   Result Value Ref Range    Lithium 0.4 (L) 0.6 - 1.2 mmol/L   Basic metabolic panel    Collection Time: 02/13/23  8:46 AM   Result Value Ref Range    Sodium 137 136 - 145 mmol/L    Potassium 5.1 3.4 - 5.3 mmol/L    Chloride 107 98 - 107 mmol/L    Carbon Dioxide (CO2) 18 (L) 22 - 29 mmol/L    Anion Gap 12 7 - 15 mmol/L    Urea Nitrogen 43.7 (H) 8.0 - 23.0 mg/dL    Creatinine 1.88 (H) 0.51 - 0.95 mg/dL    Calcium 10.3 (H) 8.8 - 10.2 mg/dL    Glucose 137 (H) 70 - 99 mg/dL    GFR Estimate 28 (L) >60 mL/min/1.73m2   Lithium level    Collection Time: 02/14/23  8:24 AM   Result Value Ref Range    Lithium 0.6 0.6 - 1.2 mmol/L   Basic metabolic panel    Collection Time: 02/14/23  8:24 AM   Result Value Ref Range    Sodium 135 (L) 136 - 145 mmol/L    Potassium 5.1 3.4 - 5.3 mmol/L    Chloride 103 98 - 107 mmol/L    Carbon Dioxide (CO2) 21 (L) 22 - 29 mmol/L    Anion Gap 11 7 - 15 mmol/L    Urea Nitrogen 48.4 (H) 8.0 - 23.0 mg/dL    Creatinine 2.03 (H) 0.51 - 0.95  mg/dL    Calcium 10.7 (H) 8.8 - 10.2 mg/dL    Glucose 100 (H) 70 - 99 mg/dL    GFR Estimate 25 (L) >60 mL/min/1.73m2   CBC with platelets    Collection Time: 02/14/23  8:24 AM   Result Value Ref Range    WBC Count 8.2 4.0 - 11.0 10e3/uL    RBC Count 4.28 3.80 - 5.20 10e6/uL    Hemoglobin 9.4 (L) 11.7 - 15.7 g/dL    Hematocrit 29.7 (L) 35.0 - 47.0 %    MCV 69 (L) 78 - 100 fL    MCH 22.0 (L) 26.5 - 33.0 pg    MCHC 31.6 31.5 - 36.5 g/dL    RDW 16.6 (H) 10.0 - 15.0 %    Platelet Count 617 (H) 150 - 450 10e3/uL          Physical and Psychiatric Examination:     /76 (BP Location: Right arm)   Pulse 81   Temp 98  F (36.7  C) (Oral)   Resp 17   Wt 50.3 kg (110 lb 14.3 oz)   SpO2 100%   BMI 19.03 kg/m    Weight is 110 lbs 14.26 oz  Body mass index is 19.03 kg/m .    Physical Exam:  I have reviewed the physical exam as documented by by the medical team and agree with findings and assessment and have no additional findings to add at this time.    Mental Status Exam:    Appearance: awake, alert, appeared as age stated, awake, alert and cooperative  Attitude:  cooperative  Eye Contact:  good  Mood:  anxious and depressed  Affect:  mood congruent  Speech:  clear, coherent and decreased prosody  Language: Fluent in english   Psychomotor Behavior:  physical retardation  Thought Process:  logical, linear and goal oriented  Associations:  no loose associations  Thought Content:  no evidence of psychotic thought, no auditory hallucinations present, no visual hallucinations present and Patient is status post serious suicide attempt  Insight:  good  Judgement:  intact  Oriented to:  time, person, and place  Attention Span and Concentration:  intact  Recent and Remote Memory:  intact  Fund of Knowledge: Appropriate   Gait and Station: Sitting up in a chair               DSM-5 Diagnosis:   296.53 Bipolar I Disorder Current or Most Recent Episode Depressed, Severe with psychotic features, toxic metabolic encephalopathy which is  resolving, Intentional overdose, subsequent encounter, anxiety.  Chronic kidney disease for, diabetes insipidus              Assessment:   This is a 72-year-old female with a history of bipolar illness spanning 50 years.  Patient was initially diagnosed with schizophrenia when she first became ill.  Later, this diagnosis was modified to bipolar illness, which both she and her brother feel is accurate.  She does have a history of ECT many years ago.  It is unclear whether this was helpful or not.  What has been helpful has been lithium.  This medication was discontinued a year ago due to worsening kidney function.  The patient has not been able to stabilize since going off this medication, whereas she was stable and functional on lithium.    Patient and her brother, who are both very high functioning, would like her to go back on lithium.  I am in agreement.  We will reach out to the hospitalist to better understand what her options are.  Regardless, she will need to go to an inpatient psychiatric bed for further stabilization after she is medically ready to discharge.    Patient continues to feel depressed, though she looks a little better today.  She is not having current suicidality.  She is very anxious, and I encouraged her to use as needed Xanax to see how it does for her.  The RN did bring her a dose of Xanax this morning, we will see how this does.  We are very limited on medications that we can prescribe at this point.  Some of this is related to the fact that the patient simply has not done well on other medications in the past.  There is also the issue of her bipolar illness, and the contraindication for her in general of utilizing antidepressants which could's for a manic episode.          Summary of Recommendations:   1.  I have increased lithium to 450 total yesterday, and will initiated 150 mg p.o. 3 times daily today.    2.  We are getting morning lithium levels and will follow the trend.  Very much  appreciate the assistance of nephrology.    3.  Patient does not require a sitter at this point.    4.  Have placed patient in the queue for inpatient psychiatry on the geriatric floor.  This will take 2 to 3 weeks to get a bed.  I am hoping that she feels substantially better prior to that, and that we will not need an inpatient psychiatric stay.  If she is needing it, then I want to have her in line so that we are waiting for a bed longer than we need to.    5.  We will follow, but patient is medically ready for discharge to inpatient psychiatry per treatment team.  We are simply awaiting a geriatric psychiatry bed.      Sandra Cox MD  Consult/Liaison Psychiatry and Addiction Medicine  [facility]

## 2023-02-14 NOTE — PLAN OF CARE
Goal Outcome Evaluation:    Summary:  Polysubstance overdose- Suicide Attempt  DATE & TIME: 2/14/23 8327-4288  Cognitive Concerns/ Orientation : A&Ox4. BEHAVIOR & AGGRESSION TOOL COLOR: Green; denies suicidal ideation    ABNL VS/O2: VSS on room air  MOBILITY: SBA with GB and walker.  Leg brace on RLE when ambulating. Ambulated blake and up in chair this shift  PAIN MANAGMENT: reports pain to R shoulder, declined any intervention  DIET: Regular. Good appetite and fluid intake.  BOWEL/BLADDER: Continent.   ABNL LAB/BG: creat 2.03, BUN 48.4, , CO2 21, Hgb 9.4, platelet 617  DRAIN/DEVICES: PIV LFA, SL.   TELEMETRY RHYTHM: NA  SKIN: PI with scabbing on bridge of nose, WOC nurse followed up and encouraged patient to use Sween cream to area.  Scattered small bruises.   TESTS/PROCEDURES:   D/C DATE: Pending psyche rec and PT/OT level; inpatient mental health unit vs home  OTHER IMPORTANT INFO: Xanax given, she felt that it simply made her drowsy, declined additional dose; Nephrology following and awaiting their opinion on whether she is medically stable for transfer to inpatient psyche.

## 2023-02-14 NOTE — TELEPHONE ENCOUNTER
Per chart review it is unclear if the pt is actually medically cleared or not   1101am - intake spoke to RN who is working w the pt today on medical, who reports the pt does need a stand by assist w her walker. Pt has not yet been seen by OT and PT. Unclear if pt is medically cleared for transfer or not.   1108am - intake sent email to Malina (Supervisor) Eriberto [facility] and Erica ([facility] Manager) for guidance and awareness on how to proceed with placement for this pt     Updated Bed Search @ 1100am  Per chart review, intake can look @[facility] only for [facility]     [facility] has 0 appropriate beds available. Phone: [phone]     Pt remains on work list pending appropriate bed placement.      1245pm - ANN Wei supervisor investigated case further and reports to intake that pt is not medically cleared.  Pt is removed from the work list at this time. When pt is medically cleared and ready for transfer to [facility], the unit should call intake ([phone]) and get the pt placed back on the work list     Intake no longer following

## 2023-02-15 ENCOUNTER — TELEPHONE (OUTPATIENT)
Dept: BEHAVIORAL HEALTH | Facility: CLINIC | Age: 73
End: 2023-02-15
Payer: MEDICARE

## 2023-02-15 ENCOUNTER — APPOINTMENT (OUTPATIENT)
Dept: OCCUPATIONAL THERAPY | Facility: CLINIC | Age: 73
DRG: 917 | End: 2023-02-15
Payer: MEDICARE

## 2023-02-15 ENCOUNTER — APPOINTMENT (OUTPATIENT)
Dept: PHYSICAL THERAPY | Facility: CLINIC | Age: 73
DRG: 917 | End: 2023-02-15
Payer: MEDICARE

## 2023-02-15 LAB
ANION GAP SERPL CALCULATED.3IONS-SCNC: 10 MMOL/L (ref 7–15)
BUN SERPL-MCNC: 51.4 MG/DL (ref 8–23)
CALCIUM SERPL-MCNC: 10.7 MG/DL (ref 8.8–10.2)
CHLORIDE SERPL-SCNC: 103 MMOL/L (ref 98–107)
CREAT SERPL-MCNC: 2 MG/DL (ref 0.51–0.95)
DEPRECATED HCO3 PLAS-SCNC: 22 MMOL/L (ref 22–29)
GFR SERPL CREATININE-BSD FRML MDRD: 26 ML/MIN/1.73M2
GLUCOSE SERPL-MCNC: 101 MG/DL (ref 70–99)
LITHIUM SERPL-SCNC: 0.7 MMOL/L (ref 0.6–1.2)
POTASSIUM SERPL-SCNC: 5.1 MMOL/L (ref 3.4–5.3)
SODIUM SERPL-SCNC: 135 MMOL/L (ref 136–145)

## 2023-02-15 PROCEDURE — 120N000001 HC R&B MED SURG/OB

## 2023-02-15 PROCEDURE — 80178 ASSAY OF LITHIUM: CPT | Performed by: PSYCHIATRY & NEUROLOGY

## 2023-02-15 PROCEDURE — 250N000013 HC RX MED GY IP 250 OP 250 PS 637: Performed by: PSYCHIATRY & NEUROLOGY

## 2023-02-15 PROCEDURE — 97110 THERAPEUTIC EXERCISES: CPT | Mod: GP

## 2023-02-15 PROCEDURE — 250N000013 HC RX MED GY IP 250 OP 250 PS 637: Performed by: INTERNAL MEDICINE

## 2023-02-15 PROCEDURE — 99232 SBSQ HOSP IP/OBS MODERATE 35: CPT | Performed by: PSYCHIATRY & NEUROLOGY

## 2023-02-15 PROCEDURE — 99232 SBSQ HOSP IP/OBS MODERATE 35: CPT | Performed by: INTERNAL MEDICINE

## 2023-02-15 PROCEDURE — 99231 SBSQ HOSP IP/OBS SF/LOW 25: CPT | Performed by: INTERNAL MEDICINE

## 2023-02-15 PROCEDURE — 97116 GAIT TRAINING THERAPY: CPT | Mod: GP

## 2023-02-15 PROCEDURE — 250N000013 HC RX MED GY IP 250 OP 250 PS 637: Performed by: STUDENT IN AN ORGANIZED HEALTH CARE EDUCATION/TRAINING PROGRAM

## 2023-02-15 PROCEDURE — 80048 BASIC METABOLIC PNL TOTAL CA: CPT | Performed by: INTERNAL MEDICINE

## 2023-02-15 PROCEDURE — 36415 COLL VENOUS BLD VENIPUNCTURE: CPT | Performed by: INTERNAL MEDICINE

## 2023-02-15 PROCEDURE — 97535 SELF CARE MNGMENT TRAINING: CPT | Mod: GO

## 2023-02-15 RX ORDER — AMILORIDE HYDROCHLORIDE 5 MG/1
5 TABLET ORAL DAILY
Qty: 90 TABLET | Refills: 0 | DISCHARGE
Start: 2023-02-16 | End: 2023-02-17

## 2023-02-15 RX ORDER — SODIUM BICARBONATE 650 MG/1
1300 TABLET ORAL 2 TIMES DAILY
Qty: 90 TABLET | Refills: 0 | DISCHARGE
Start: 2023-02-15 | End: 2023-03-23

## 2023-02-15 RX ORDER — LITHIUM CARBONATE 150 MG/1
150 CAPSULE ORAL
Qty: 90 CAPSULE | Refills: 0 | DISCHARGE
Start: 2023-02-15 | End: 2023-03-23

## 2023-02-15 RX ORDER — ALPRAZOLAM 0.5 MG/1
0.25 TABLET, EXTENDED RELEASE ORAL
Status: DISCONTINUED | OUTPATIENT
Start: 2023-02-15 | End: 2023-02-16

## 2023-02-15 RX ADMIN — LITHIUM CARBONATE 150 MG: 150 CAPSULE, GELATIN COATED ORAL at 09:34

## 2023-02-15 RX ADMIN — AMILORIDE HYDROCLORIDE 5 MG: 5 TABLET ORAL at 08:09

## 2023-02-15 RX ADMIN — LITHIUM CARBONATE 150 MG: 150 CAPSULE, GELATIN COATED ORAL at 17:59

## 2023-02-15 RX ADMIN — SODIUM BICARBONATE 650 MG TABLET 1300 MG: at 17:59

## 2023-02-15 RX ADMIN — LITHIUM CARBONATE 150 MG: 150 CAPSULE, GELATIN COATED ORAL at 13:31

## 2023-02-15 RX ADMIN — SODIUM BICARBONATE 650 MG TABLET 1300 MG: at 08:09

## 2023-02-15 RX ADMIN — ASPIRIN 81 MG: 81 TABLET, COATED ORAL at 08:09

## 2023-02-15 RX ADMIN — ALPRAZOLAM 0.25 MG: 0.25 TABLET ORAL at 16:29

## 2023-02-15 RX ADMIN — PANTOPRAZOLE SODIUM 40 MG: 40 TABLET, DELAYED RELEASE ORAL at 06:21

## 2023-02-15 ASSESSMENT — ACTIVITIES OF DAILY LIVING (ADL)
ADLS_ACUITY_SCORE: 51
ADLS_ACUITY_SCORE: 55
ADLS_ACUITY_SCORE: 47
ADLS_ACUITY_SCORE: 47
ADLS_ACUITY_SCORE: 51
ADLS_ACUITY_SCORE: 55
ADLS_ACUITY_SCORE: 54
ADLS_ACUITY_SCORE: 55
ADLS_ACUITY_SCORE: 55
ADLS_ACUITY_SCORE: 51

## 2023-02-15 NOTE — PLAN OF CARE
Goal Outcome Evaluation:       Orientation: A&Ox4  Behavior & aggression tool color: Green, No suicide ideation, and agreement for safety.    Vitals: VSS on RA, denies pain  TELE: N/A    IV Access/drains: L PIV saline lock    Diet: Combination 3 gm k    Mobility: SBA with GB and walker.    GI/: Continent, use bathroom  Skin:Scabbing on bridge of nose.    Discharge Plan: [facility] wait list, discharge pending

## 2023-02-15 NOTE — PROGRESS NOTES
CLINICAL NUTRITION SERVICES - REASSESSMENT NOTE    Recommendations Ordered by Registered Dietitian (RD):   - Nepro daily @ 10 am   Malnutrition:   1/31  % Weight Loss:  None noted  % Intake:  </= 75% for >/= 7 days (moderate malnutrition)   Subcutaneous Fat Loss:  None observed  Muscle Loss:  None observed  Fluid Retention:  Mild but likely not nutritionally significant      Malnutrition Diagnosis: Patient does not meet two of the above criteria necessary for diagnosing malnutrition     EVALUATION OF PROGRESS TOWARD GOALS   Diet: 3g K  Nepro @ 10 am   Intake/Tolerance:   - Pt is eating 100% of meals consistently. She likes the Nepro & drinks it daily, but had a question about why the label mentions dialysis (explained K content of supplements). Meeting at least 75% est needs.     Ordering the following - >  2/14 - 1320 kcal, 67 g protein  2/13 - 1300 kcal, 74 g protein  2/12 - 1170 kcal, 64 g protein   2/11 - 1150 kcal, 63 g protein     - Labs: Na 135 (L). BUN 51.4, Cr 2 (H)  K 5.1 - trending up  - Stooling: BM x1 on 2/14, x2 on 2/13    ASSESSED NUTRITION NEEDS:  Dosing Weight 58.5 kg   Estimated Energy Needs: 5683-0078 kcals (25-30 Kcal/Kg)  Justification: maintenance  Estimated Protein Needs: 59-70 grams protein (1-1.2 g pro/Kg)  Justification: preservation of LBM with consideration to CKD  Estimated Fluid Needs: 2396-1893 mL (1 mL/Kcal)  Justification: maintenance    NEW FINDINGS:   - Nephrology following   - Psych saw yesterday  - 2/14 WOCN  Tip of nose - pressure vs friction - stable     Previous Goals:   Intake of at least 75% of adequate meals TID.   Evaluation: Met    Previous Nutrition Diagnosis:   Inadequate oral intake related to small meals ordered (pt reported good appetite) as evidenced by the past three days pt ordering <75% estimated needs on average.   Evaluation: Completed    CURRENT NUTRITION DIAGNOSIS  Predicted inadequate nutrient intake related to ordering small meals, meeting needs w/  supplement    INTERVENTIONS  Recommendations / Nutrition Prescription  3g K diet  Nepro daily @ breakfast     Implementation  None new. Encouragement provided. explained supplement rationale.     Goals  Intake of at least 75% of adequate meals TID + 1 supplement.     MONITORING AND EVALUATION:  Progress towards goals will be monitored and evaluated per protocol and Practice Guidelines    Yissel Cook RD, LD  [facility] [place], [facility], Ortho, [facility]  Pager: [phone]  Weekend Pager: [phone]

## 2023-02-15 NOTE — PROGRESS NOTES
Psychiatric Progress Note  Consult date: February 6, 2023         Reason for Consult, requesting source:    Suicide attempt/OD  Requesting source: Anabell Camacho    Labs and imaging reviewed. Patient seen and evaluated by Sandra Cox MD          HPI:   This is a 71 yo female who presented on 1/28/23 with a massive, intentional polypharmacy od .  Pt has a history of bipolar illness, and took a combinaton of lamictal, amlodipine, haldol, and adderall.  She had been in the ED 4 days prior for eval of SI.  Pt has had a difficult course related to calcium channel blocker od, and has been in the ICU, vented and on pressors with AHRF, REI on CRF, and encephalopthy.  We are asked to see for assistance with care for her depression, and appropriate disposition when she is ready medically to discharge.    Patient seen today along with her brother.  Both are very good historians.  It sounds like the patient was taken off lithium, her longtime medication for bipolar management, about a year ago.  This was related to worsening kidney function.  She has worked with her psychiatrist to try to find another regimen that was helpful.  She has been on Lamictal, as well as other medications.  None of these medications help with her depression.    Both the patient and her brother are in favor of her going back on lithium if this is a possibility.  I also would be in favor of it as well.  Clearly, the patient's bipolar illness is severe, and potentially lethal.  She was quite well managed on the lithium prior to it being discontinued.    2/7/23: Patient seen today for follow-up in the ICU.  She is waiting for a bed on the medical floor, and no longer requires a critical level of care.  She was awake, and tell me that she is feeling pretty anxious, which is her baseline when she is not on lithium.  She also said that the kidney doctor has been by, and they discussed going back on lithium.  She and I discussed low-dose Xanax to  help with her anxiety for now.  I discussed risk versus benefits of this medication and indicated to her this is a short-term medication, and that she does not have to take it if she does not care for it or if it does not work.    2/8/2023: Patient seen today with her brother, and RN at bedside.  Patient was sitting up and having some fruit.  She states she feels much better today both physically and mentally.  We discussed current lithium dosing, and the plan to titrate very slowly.  I also told her I think we should put her on the lowest for a geriatric psychiatry bed in the event that she still needs inpatient admission in 2 to 3 weeks.  She was agreeable to this.  Answered questions and discussed the overall strategy with the lithium.  I also told her that I do not recommend any other psychiatric medications for now.    2/9/2023: Patient seen today for follow-up.  Discussed with she and her brother.  Also discussed with Dr. Malik/renal.  Patient is feeling more depressed today, but admits that she did watch the news this morning which made her feel that things were more bleak.  We discussed increasing her lithium dose tomorrow pending okay from renal.  Patient denies any negative side effects that she is noting to the lithium.  We discussed other options besides watching the news or activities that are depressing.  Discussed this with she and her brother today to help bolster her spirits while we wait for the lithium to get to a therapeutic dose.     2/10/2023: Patient seen today for follow-up along with her brother.  Chart reviewed.  Patient indicates she still feeling depressed this morning.  I scheduled her lithium for lunchtime today, because I wanted to see her morning labs.  We discussed that this will switch to a.m. and q. evening meal starting tomorrow.  We also discussed that we may have the option of further titration on Monday.  Patient did tell me, interestingly, which her brother confirmed,  that she knows that she is getting depressed because she begins to have blurred vision.  This is been the same since she first became ill with severe depression at age 18.  Blurred vision or decreased visual acuity, bilateral, is the canary in the coal mine that she is becoming depressed.  When the visual acuity returns to normal, the depression goes away.    The patient did say that she does have a history of a very severe head injury when she tried to kill herself by jumping off a bridge when she was younger.  She broke her neck, and undoubtedly sustained a closed head injury as well.  The visual acuity changes began occurring before this.  Patient denied other associated symptoms such as ataxia or other neurologic symptoms that she is aware of aside from the changes in visual acuity.    2/13/2023: Patient seen today for follow-up.  Chart reviewed including nephrology note.  Patient states that she is ready to go home today.  She is still feeling depressed, and we discussed the fact that she is not safe to go home, and that we need to continue to titrate her medications as she tolerates it.  We agreed to continue to go up on the lithium today.  Patient's lithium level was 0.5 this morning.    2/14/2023: Patient seen today for follow-up along with her brother.  Discussed with RN, who was present in the room while we talked.  Also met with social work services.  Patient talked about wanting to go home so that she can do her taxes.  She states her mood is somewhat better today, but is still not good.  She denies any side effects from the lithium thus far.  Lithium level this morning was 0.6.  We discussed that the patient is in the queue for inpatient geriatric psychiatry bed.  Patient is somewhat ambivalent about this, but understands that I want her feeling well prior to going home due to her very recent, quite severe suicide attempt.  Brother is supportive of the plan, and states that he is trying to get her into  "assisted living.    2/15/2023:  Pt seen today for f/u.  Chart reviewed.  Patient reports xanax was helpful for anxiety last night.  She slept well.  She denies side effects with the Xanax.  The patient had questions about what it would be like on the inpatient psychiatry, and expressed that she is concerned about it.  We discussed the fact that she is not safe currently.  She indicated herself that she is concerned that if she goes home, that \"it may happen again\", referring to try to kill herself.  We discussed medication strategies, and talked about her current lithium level.          Past Psychiatric History:     Pt had recently been in the ED on 1/24 with thoughts of drowning herself while in a swimming pool.  She has an outpt psychiatrist, and a therapist, and had discharged after denying SI, and wood for safety.   Patient is seen by Dr. Trent Gallo for outpatient psychiatric management.        Substance Use and History:   Negative        Past Medical History:   PAST MEDICAL HISTORY:   Past Medical History:   Diagnosis Date     Benign essential hypertension 09/2018    added norvasc 9/18     Bipolar affective disorder (H)     hosp 1993, Dr. Aftab Deal     Cancer (H) 1996    DCIS, left breast     Chronic kidney disease, stage 3a (H)     seen by renal Dr. 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    felt due to lithium     Thalassaemia trait      Vitamin D deficiency        PAST SURGICAL HISTORY:   Past Surgical History: "   Procedure Laterality Date     BIOPSY  1994    left breast     BREAST SURGERY      lumpectomy x 4     COLONOSCOPY  2021     COLONOSCOPY N/A 6/17/2022    Procedure: COLONOSCOPY, WITH POLYPECTOMY AND BIOPSY;  Surgeon: Panchito Gu MD;  Location:  GI     EYE SURGERY  2018    retina tear     LAPAROTOMY EXPLORATORY N/A 8/24/2022    Procedure: Exploratory laparotomy, REPAIR OF PERFORATED ULCER;  Surgeon: Ron Vidal MD;  Location:  OR     left hand surgery      last 1974             Family History:   FAMILY HISTORY:   Family History   Problem Relation Age of Onset     Cerebrovascular Disease Mother      Hypertension Father      Sleep Apnea Brother      Breast Cancer Maternal Aunt         x 2     Breast Cancer Other         Maternal Aunt     Hyperlipidemia Niece        Family Psychiatric History:         Social History:   SOCIAL HISTORY:   Social History     Tobacco Use     Smoking status: Never     Smokeless tobacco: Never   Substance Use Topics     Alcohol use: No            Physical ROS:   The 10 point Review of Systems is negative other than noted in the HPI or here.           Medications:       ALPRAZolam  0.25 mg Oral BID AC     aMILoride  5 mg Oral Daily     aspirin  81 mg Oral Daily     lithium  150 mg Oral TID w/meals     pantoprazole  40 mg Oral QAM AC     sodium bicarbonate  1,300 mg Oral BID     sodium chloride (PF)  3 mL Intracatheter Q8H              Allergies:     Allergies   Allergen Reactions     No Known Allergies           Labs:     Recent Results (from the past 48 hour(s))   Lithium level    Collection Time: 02/14/23  8:24 AM   Result Value Ref Range    Lithium 0.6 0.6 - 1.2 mmol/L   Basic metabolic panel    Collection Time: 02/14/23  8:24 AM   Result Value Ref Range    Sodium 135 (L) 136 - 145 mmol/L    Potassium 5.1 3.4 - 5.3 mmol/L    Chloride 103 98 - 107 mmol/L    Carbon Dioxide (CO2) 21 (L) 22 - 29 mmol/L    Anion Gap 11 7 - 15 mmol/L    Urea Nitrogen 48.4 (H) 8.0 - 23.0 mg/dL     Creatinine 2.03 (H) 0.51 - 0.95 mg/dL    Calcium 10.7 (H) 8.8 - 10.2 mg/dL    Glucose 100 (H) 70 - 99 mg/dL    GFR Estimate 25 (L) >60 mL/min/1.73m2   CBC with platelets    Collection Time: 02/14/23  8:24 AM   Result Value Ref Range    WBC Count 8.2 4.0 - 11.0 10e3/uL    RBC Count 4.28 3.80 - 5.20 10e6/uL    Hemoglobin 9.4 (L) 11.7 - 15.7 g/dL    Hematocrit 29.7 (L) 35.0 - 47.0 %    MCV 69 (L) 78 - 100 fL    MCH 22.0 (L) 26.5 - 33.0 pg    MCHC 31.6 31.5 - 36.5 g/dL    RDW 16.6 (H) 10.0 - 15.0 %    Platelet Count 617 (H) 150 - 450 10e3/uL   Asymptomatic COVID-19 Virus (Coronavirus) by PCR Nasopharyngeal    Collection Time: 02/14/23  1:25 PM    Specimen: Nasopharyngeal; Swab   Result Value Ref Range    SARS CoV2 PCR Negative Negative   Lithium level    Collection Time: 02/15/23  8:52 AM   Result Value Ref Range    Lithium 0.7 0.6 - 1.2 mmol/L   Basic metabolic panel    Collection Time: 02/15/23  8:52 AM   Result Value Ref Range    Sodium 135 (L) 136 - 145 mmol/L    Potassium 5.1 3.4 - 5.3 mmol/L    Chloride 103 98 - 107 mmol/L    Carbon Dioxide (CO2) 22 22 - 29 mmol/L    Anion Gap 10 7 - 15 mmol/L    Urea Nitrogen 51.4 (H) 8.0 - 23.0 mg/dL    Creatinine 2.00 (H) 0.51 - 0.95 mg/dL    Calcium 10.7 (H) 8.8 - 10.2 mg/dL    Glucose 101 (H) 70 - 99 mg/dL    GFR Estimate 26 (L) >60 mL/min/1.73m2          Physical and Psychiatric Examination:     /71 (BP Location: Right arm)   Pulse 71   Temp 98  F (36.7  C) (Oral)   Resp 16   Wt 50.3 kg (110 lb 14.3 oz)   SpO2 99%   BMI 19.03 kg/m    Weight is 110 lbs 14.26 oz  Body mass index is 19.03 kg/m .    Physical Exam:  I have reviewed the physical exam as documented by by the medical team and agree with findings and assessment and have no additional findings to add at this time.    Mental Status Exam:    Appearance: awake, alert, appeared as age stated, awake, alert and cooperative  Attitude:  cooperative  Eye Contact:  good  Mood:  anxious and depressed  Affect:   mood congruent  Speech:  clear, coherent and decreased prosody  Language: Fluent in english   Psychomotor Behavior:  physical retardation  Thought Process:  logical, linear and goal oriented  Associations:  no loose associations  Thought Content:  no evidence of psychotic thought, no auditory hallucinations present, no visual hallucinations present and Patient is status post serious suicide attempt  Insight:  good  Judgement:  intact  Oriented to:  time, person, and place  Attention Span and Concentration:  intact  Recent and Remote Memory:  intact  Fund of Knowledge: Appropriate   Gait and Station: Sitting up in a chair               DSM-5 Diagnosis:   296.53 Bipolar I Disorder Current or Most Recent Episode Depressed, Severe with psychotic features, toxic metabolic encephalopathy which is resolving, Intentional overdose, subsequent encounter, anxiety.  Chronic kidney disease for, diabetes insipidus              Assessment:   This is a 72-year-old female with a history of bipolar illness spanning 50 years.  Patient was initially diagnosed with schizophrenia when she first became ill.  Later, this diagnosis was modified to bipolar illness, which both she and her brother feel is accurate.  She does have a history of ECT many years ago.  It is unclear whether this was helpful or not.  What has been helpful has been lithium.  This medication was discontinued a year ago due to worsening kidney function.  The patient has not been able to stabilize since going off this medication, whereas she was stable and functional on lithium.    Patient and her brother, who are both very high functioning, would like her to go back on lithium.  I am in agreement.  We will reach out to the hospitalist to better understand what her options are.  Regardless, she will need to go to an inpatient psychiatric bed for further stabilization after she is medically ready to discharge.    Patient continues to feel depressed, but is doing better  than she was prior to going back on lithium.  She has had uncomfortable anxiety as well, which was helped by Xanax.  She prefers that it is scheduled twice a day.  We discussed mechanism of action of Xanax, and how it works differently than lithium or other medications.  Patient's current lithium level this morning was 0.7.          Summary of Recommendations:   1.  I have increased lithium to 450 total on 12/13/2023.  Recommend continue this same dose.  We can discuss further titration tomorrow.    2.  We are getting morning lithium levels and will follow the trend.  Very much appreciate the assistance of nephrology.    3.  Patient does not require a sitter at this point.    4.  Have placed patient in the queue for inpatient psychiatry on the geriatric floor.      5.  We will follow, but patient is medically ready for discharge to inpatient psychiatry per treatment team.  We are simply awaiting a geriatric psychiatry bed.      Sandra Cox MD  Consult/Liaison Psychiatry and Addiction Medicine  [facility]

## 2023-02-15 NOTE — PROGRESS NOTES
[facility]    Hospitalist Progress Note    Date of Service (when I saw the patient): 02/15/2023  Admit date: 1/28/2023    Interval History        Patient is sitting comfortably in chair.  No acute events.  Patient still feels depressed.  No active suicidal ideation currently.  She was seen by psychiatry yesterday lithium doses was increased to 150 mg 3 times a day.  No new complaints or events     Assessment & Plan   Polysubstance Overdose/Suicide Attempt  Distributive shock causing hypotension from Ca channel blocker overdose, RESOLVED  Acute encephalopathy caused by overdose, RESOLVED  Acute hypoxic resp failure with overdose. RESOLVED  * Patient indicating she is still thinking of harming herself.  She also has recent history of other self harm attempts.        Mental Health Consult Appreciated.preciated. Noted mental health has severely decompensated since stopping lithium a year ago.     After discussion among nephrology, patient and family it was decided that the benefits of lithium out likely outweigh the risk.  Family and patient accepts the risk of progressive renal disease on lithium    Psychiatry increased lithium to 150 mg 3 times daily daily on 02/13/23    Continue daily lithium level    Discontinued 1:1/Suicide precautions on 2/8      Psychiatry reconsulted on 2/13/2023 and increased lithium dosage to 150 mg p.o. 3 times a day   Continue to recommend [facility] unit discharge    Patient's sodium and creatinine stayed stable on lithium.  Patient should have access to free water at all times especially with the polyuria and polydipsia in the setting of lithium usage.    Patient is medically stable for discharge to [facility] when bed is available    Diabetes Insipidus, partial   Severe nocturia -2/7 reported getting up 8-10 times per night to urinate  REI to 2.56 max on 2/2/23, resolving   CKD baseline Cr ~ 1.5-2.   Metabolic/lacitc acidosis associated with overdose,  resolved. Recurring on 02/06/23  Hyperkalemia with acidosis, suggestive of RTA  *S/p IVF and continued on TF flushes 200 ml every 2 hours.    * Stopped subcutaneous heparin on 2/6/23, pre nephrology's recommendation, can cause RTA      Nephrology's input appreciated.     Encourage fluids.     Continue NaBicarbonate  1300 mg BID     Nephrology started Lokelma on 02/07/23 10 mg TID > stopped on 2/9/23     Nephrology switched oral DDAVP to Amiloride 5mg po daily    Creatinine, potassium and sodium level stayed stable on amiloride so continue the same dosages now      Management per nephrology      On low K diet    Vitals:    02/05/23 0500 02/06/23 0400 02/09/23 0544 02/11/23 0619   Weight: 57 kg (125 lb 10.6 oz) 56.2 kg (123 lb 14.4 oz) 50.6 kg (111 lb 8.8 oz) 49.1 kg (108 lb 3.9 oz)    02/12/23 0628   Weight: 50.3 kg (110 lb 14.3 oz)     I/O last 3 completed shifts:  In: 840 [P.O.:840]  Out: 3000 [Urine:3000]  Recent Labs   Lab 02/15/23  0852 02/14/23  0824 02/13/23  0846 02/12/23  0946 02/11/23  0909 02/10/23  0812 02/09/23  0917   CO2 22 21* 18* 19* 19* 18* 20*   * 135* 137 137 138 139 140   POTASSIUM 5.1 5.1 5.1 4.4  4.2 4.4 4.0 4.0   BUN 51.4* 48.4* 43.7* 44.1* 45.5* 45.3* 49.5*   CR 2.00* 2.03* 1.88* 1.84*  1.84* 1.79* 2.00* 2.14*       Loose stools, RESOLVING  Improved now     Suspected protein-calorie malnutrition  Prior hypoglycemia:  * TFs stopped on 2/5/23    On low K diet      Appreciate nutrition consult, last seen on 02/10/23     Started Nepro daily on 2/9/23     Superficial thrombus of R cephalic vein:  * RUE edematous monitor,     R subclavian CVC removed on 2/5/23      Chronic anemia : Baseline Hgb ~ 10. No evidence of bleeding, periodically monitor.  No indication for a current transfusion.  H/o perforated PUD   *B12 elevated at 1249, iron panel normal, folate normal on 02/07/23     Follow with initiation of ASA for DVT ppx.     Continue PPI.     Recent Labs   Lab 02/14/23  0824 02/09/23  1048    HGB 9.4* 8.7*     Deconditioned    PT ordered     Ambulate with assist QID    Suspected pressure lesion from prior treatment with BiPAP  Crusted ulceration on tip of nose as well as linear skin breakdown over bridge of nose.    Wound care nurse consult on 02/10/23 appreciated     Clinically Significant Risk Factors           # Hypercalcemia: Highest Ca = 10.7 mg/dL in last 2 days, will monitor as appropriate    # Hypoalbuminemia: Lowest albumin = 2.3 g/dL at 2/1/2023  7:30 PM, will monitor as appropriate                     Diet: Orders Placed This Encounter      Combination Diet 3 gm K Diet     IVF: None   Suresh Catheter: Not present     DVT Prophylaxis: Stopped heparin subcutaneous, started ASA 81 mg daily.   Code Status: Full Code     Disposition: Expected discharge when lytes stable on PO regimen to Mental Health.   Communication: Discussed with patient, bedside RN and charge nurse on 02/14/23     Sonia Chua MD  Hospitalist Service  [facility]  Securely message with the Vocera Web Console (learn more here)  Text page via [employer]    Medical Decision Making          -Data reviewed today: I reviewed all new labs and imaging results over the last 24 hours. I personally reviewed no images or EKG's today.    Physical Exam   Temp: 97.9  F (36.6  C) Temp src: Oral BP: 115/76 Pulse: 75   Resp: 18 SpO2: 98 % O2 Device: None (Room air)    Vitals:    02/09/23 0544 02/11/23 0619 02/12/23 0628   Weight: 50.6 kg (111 lb 8.8 oz) 49.1 kg (108 lb 3.9 oz) 50.3 kg (110 lb 14.3 oz)     Vital Signs with Ranges  Temp:  [97.9  F (36.6  C)-98.9  F (37.2  C)] 97.9  F (36.6  C)  Pulse:  [75-81] 75  Resp:  [17-18] 18  BP: (109-120)/(65-76) 115/76  SpO2:  [98 %-99 %] 98 %  I/O last 3 completed shifts:  In: 840 [P.O.:840]  Out: 3000 [Urine:3000]    Today's Exam  Constitutional:  NAD, alert oriented x3, sitting comfortably in chair  Neuropsyche: alert and oriented, answers questions appropriately.    Respiratory:  Breathing comfortably, good air exchange, no wheezes, no crackles.   Cardiovascular:  Regular rate and rhythm, 1+ edema.  GI:  soft, NT/ND, BS normal  Skin/Integumen:  No acute rash or sign of bleeding.     Medications   All medications reviewed on 02/10/23       aMILoride  5 mg Oral Daily     aspirin  81 mg Oral Daily     lithium  150 mg Oral TID w/meals     pantoprazole  40 mg Oral QAM AC     sodium bicarbonate  1,300 mg Oral BID     sodium chloride (PF)  3 mL Intracatheter Q8H     PRN Meds: acetaminophen **OR** acetaminophen, ALPRAZolam, bisacodyl, HYDROmorphone, magnesium hydroxide, magnesium sulfate, naloxone **OR** naloxone **OR** naloxone **OR** naloxone, ondansetron **OR** ondansetron, polyethylene glycol, senna-docusate **OR** [DISCONTINUED] senna-docusate, sodium chloride (PF), sodium chloride (PF)    Data   Recent Labs   Lab 02/15/23  0852 02/14/23  0824 02/13/23  0846 02/11/23  0909 02/10/23  0812 02/09/23  1147 02/09/23  1048   WBC  --  8.2  --   --   --   --   --    HGB  --  9.4*  --   --   --   --  8.7*   MCV  --  69*  --   --   --   --   --    PLT  --  617*  --   --   --   --   --    * 135* 137   < > 139  --   --    POTASSIUM 5.1 5.1 5.1   < > 4.0  --   --    CHLORIDE 103 103 107   < > 109*  --   --    CO2 22 21* 18*   < > 18*  --   --    BUN 51.4* 48.4* 43.7*   < > 45.3*  --   --    CR 2.00* 2.03* 1.88*   < > 2.00*  --   --    ANIONGAP 10 11 12   < > 12  --   --    ISSA 10.7* 10.7* 10.3*   < > 9.6  --   --    * 100* 137*   < > 104*   < >  --    ALBUMIN  --   --   --   --  3.3*  --   --     < > = values in this interval not displayed.       No results found for this or any previous visit (from the past 24 hour(s)).

## 2023-02-15 NOTE — PLAN OF CARE
2/15/23 [id number]  Reason for Admission: Intentional overdose    Cognitive/Mentation: A/Ox 4  VS: Stable.  GI: BS +, + flatus, last BM today. Continent.  : Voiding adequately. Continent.  Pulmonary: LS clear.  Pain: Denies.     IVs: PIV SL  Drains: None  Skin: Dry scab/pressure injury to nose, ULISSES  Activity: SBA with GBW.  Diet: Regular with thin liquids. Takes pills whole.     Aggression Stoplight Score: Green  Discharge: Pending placement    End of shift summary: Patient calm and cooperative, denies suicidal ideation. Medically stable per MD, awaiting placement on [facility] unit.

## 2023-02-15 NOTE — TELEPHONE ENCOUNTER
11:35 AM Intake received call from Linsey  RN that patient is medically cleared per hospitalist and nephrology and patient is awaiting placement for 3[facility] only.     R: The patient is currently in the [place] [id number] unit awaiting placement. Patient is voluntary for [facility] placement only    Intake morning Bed Search (Done at 11:49 AM)     [facility] 3B psych at capacity. The patient remains on the waitlist. Intake continues to identify appropriate bed placement.

## 2023-02-15 NOTE — PROGRESS NOTES
Renal Medicine Progress Note                                Diana Santiago MRN# [id number]   Age: 72 year old YOB: 1950   Date of Admission: 1/28/2023 Hospital LOS: 17                  Assessment/Plan:     1.  CKD IV. Pt's baseline cr is in the 1.8-2.2 range.  She is non-oliguric and TBV is fine.  No uremic sx.  She may eventually need dialysis due to Li renal toxicity and she is willing to do it when the time comes.  At this point, she is most likely going to be benefit from HD.  PD may be difficult since she lives alone.  Her brother may be able to help, though.  A.  Follow labs daily while here.  B.  Will d/w pt/brother tmrw re HD options so we can plan whether to pursue BUE vein mapping.       2.  Bipolar disorder c suicidal ideation.  Pt did not do well s the Li and became suicidal.  Previously, the issue was multiple episodes of Li toxicity due to not drinking enough water to prevent dehydration.  We will have to find a balance point that works for her bc it's clear she needs the Li.    A.  Continue Li.  B.  Current plan is for a transfer to psych at some point, though she'd prefer to go to her brother's home instead, if possible.     3.  NDI.  Pt is now on amiloride.  DDAVP didn't help much.  Explained that she will always need to drink a lot of water.  For the incontinence, she will need Depends.  A.  Continue amiloride.  B.  Ensure pt has free access to water at all times.  C.  If she is unable to eat/drink by mouth, then hypotonic IVF should be started ASAP.  D.  Follow Na while here.  E.  Depends prn.     4.  RTA 4.  Pt remains acidotic despite oral bicarb.  A.  Oral bicarb to 1300 mg bid.     5.  FEN.  Electrolytes are ok now, but K is at risk of rising due to the amiloride and acidosis.  A.  Change to low K diet.      Labs appear stable   Na 135  K 5.1   HCO3- increasing on oral replacement   (may need to adjust if increases >25)  Creatinine stable range     Awaiting [place] [facility]  bed        Interval History:     Up in bed  Working a word find  Water on table    Follows  IO reviewed       ROS:     GENERAL: NAD, No fever,chills  R: NEGATIVE for significant cough or SOB  CV: NEGATIVE for chest pain, palpitations  EXT: no change edema  ROS otherwise negative    Medications and Allergies:     Reviewed    Physical Exam:     Vitals were reviewed  No data found.    I/O last 3 completed shifts:  In: 840 [P.O.:840]  Out: 3000 [Urine:3000]    Vitals:    02/05/23 0500 02/06/23 0400 02/09/23 0544 02/11/23 0619   Weight: 57 kg (125 lb 10.6 oz) 56.2 kg (123 lb 14.4 oz) 50.6 kg (111 lb 8.8 oz) 49.1 kg (108 lb 3.9 oz)    02/12/23 0628   Weight: 50.3 kg (110 lb 14.3 oz)         GENERAL: awake, alert, follows  HEENT: NC/AT, PERRLA, EOMI, non icteric, pharynx moist without lesion  RESP:  clear anteriorly  CV: RRR, normal S1 S2  ABDOMEN: soft  NEURO: speech normal and cranial nerves 2-12 intact  PSYCH: affect flat  EXT: warm, no edema    Data:     Recent Labs   Lab 02/15/23  0852 02/14/23  0824 02/13/23  0846 02/12/23  0946   * 135* 137 137   POTASSIUM 5.1 5.1 5.1 4.4  4.2   CHLORIDE 103 103 107 105   CO2 22 21* 18* 19*   ANIONGAP 10 11 12 13   * 100* 137* 206*   BUN 51.4* 48.4* 43.7* 44.1*   CR 2.00* 2.03* 1.88* 1.84*  1.84*   GFRESTIMATED 26* 25* 28* 29*  29*   ISSA 10.7* 10.7* 10.3* 10.2         Recent Labs   Lab 02/15/23  0852 02/14/23  0824 02/13/23  0846 02/12/23  0946 02/11/23  0909 02/10/23  0812 02/09/23  0917   CR 2.00* 2.03* 1.88* 1.84*  1.84* 1.79* 2.00* 2.14*     Recent Labs   Lab 02/10/23  0812   ALBUMIN 3.3*     Recent Labs   Lab 02/15/23  0852 02/14/23  0824 02/13/23  0846 02/12/23  0946 02/11/23  0909 02/10/23  0812 02/09/23  0917   LITHIUM 0.7 0.6 0.4* 0.5* 0.4* 0.2* 0.1*     Recent Labs   Lab 02/14/23  0824 02/12/23  0946 02/11/23  0909 02/10/23  0812 02/09/23  1048   PHOS  --  3.1 3.8 4.3  --    HGB 9.4*  --   --   --  8.7*         G Jonathan Payton MD    [facility]  - Nephrology  [phone]

## 2023-02-15 NOTE — PLAN OF CARE
Goal Outcome Evaluation:       Summary:  Polysubstance overdose- Suicide Attempt  DATE & TIME: 2/14/23 3917-9680  Cognitive Concerns/ Orientation : A&Ox4. BEHAVIOR & AGGRESSION TOOL COLOR: Green; denies suicidal ideation    ABNL VS/O2: VSS on room air  MOBILITY: SBA with GB and walker.  Leg brace on RLE when ambulating.  Up in chair this evening and ambulated to bathroom.    PAIN MANAGMENT: Denies   DIET: Regular. Good appetite and fluid intake.  BOWEL/BLADDER: Continent, BM x1 soft brown.     ABNL LAB/BG: creat 2.03, BUN 48.4, , CO2 21, Hgb 9.4  DRAIN/DEVICES: PIV LFA, SL.   TELEMETRY RHYTHM: NA  SKIN: PI with scabbing on bridge of nose, WOC nurse followed up and encouraged patient to use Sween cream to area.  Scattered small bruises.   TESTS/PROCEDURES: None   D/C DATE: Pending psyche rec and PT/OT level; inpatient mental health unit vs home  OTHER IMPORTANT INFO: Xanax given x1 this evening. Nephrology following and no further recommendations at this time.

## 2023-02-16 ENCOUNTER — APPOINTMENT (OUTPATIENT)
Dept: OCCUPATIONAL THERAPY | Facility: CLINIC | Age: 73
DRG: 917 | End: 2023-02-16
Payer: MEDICARE

## 2023-02-16 ENCOUNTER — TELEPHONE (OUTPATIENT)
Dept: BEHAVIORAL HEALTH | Facility: CLINIC | Age: 73
End: 2023-02-16
Payer: MEDICARE

## 2023-02-16 LAB
ANION GAP SERPL CALCULATED.3IONS-SCNC: 8 MMOL/L (ref 7–15)
BUN SERPL-MCNC: 51.7 MG/DL (ref 8–23)
CALCIUM SERPL-MCNC: 10.9 MG/DL (ref 8.8–10.2)
CHLORIDE SERPL-SCNC: 104 MMOL/L (ref 98–107)
CREAT SERPL-MCNC: 2.02 MG/DL (ref 0.51–0.95)
DEPRECATED HCO3 PLAS-SCNC: 22 MMOL/L (ref 22–29)
GFR SERPL CREATININE-BSD FRML MDRD: 26 ML/MIN/1.73M2
GLUCOSE SERPL-MCNC: 106 MG/DL (ref 70–99)
LITHIUM SERPL-SCNC: 1 MMOL/L (ref 0.6–1.2)
POTASSIUM SERPL-SCNC: 5.1 MMOL/L (ref 3.4–5.3)
SODIUM SERPL-SCNC: 134 MMOL/L (ref 136–145)

## 2023-02-16 PROCEDURE — 250N000013 HC RX MED GY IP 250 OP 250 PS 637: Performed by: INTERNAL MEDICINE

## 2023-02-16 PROCEDURE — 80178 ASSAY OF LITHIUM: CPT | Performed by: PSYCHIATRY & NEUROLOGY

## 2023-02-16 PROCEDURE — 99231 SBSQ HOSP IP/OBS SF/LOW 25: CPT | Performed by: HOSPITALIST

## 2023-02-16 PROCEDURE — 82310 ASSAY OF CALCIUM: CPT | Performed by: INTERNAL MEDICINE

## 2023-02-16 PROCEDURE — 82947 ASSAY GLUCOSE BLOOD QUANT: CPT | Performed by: INTERNAL MEDICINE

## 2023-02-16 PROCEDURE — 99232 SBSQ HOSP IP/OBS MODERATE 35: CPT | Performed by: PSYCHIATRY & NEUROLOGY

## 2023-02-16 PROCEDURE — 82374 ASSAY BLOOD CARBON DIOXIDE: CPT | Performed by: INTERNAL MEDICINE

## 2023-02-16 PROCEDURE — 250N000013 HC RX MED GY IP 250 OP 250 PS 637: Performed by: PSYCHIATRY & NEUROLOGY

## 2023-02-16 PROCEDURE — 120N000001 HC R&B MED SURG/OB

## 2023-02-16 PROCEDURE — 97535 SELF CARE MNGMENT TRAINING: CPT | Mod: GO

## 2023-02-16 PROCEDURE — 250N000013 HC RX MED GY IP 250 OP 250 PS 637: Performed by: STUDENT IN AN ORGANIZED HEALTH CARE EDUCATION/TRAINING PROGRAM

## 2023-02-16 PROCEDURE — 97530 THERAPEUTIC ACTIVITIES: CPT | Mod: GO

## 2023-02-16 PROCEDURE — 36415 COLL VENOUS BLD VENIPUNCTURE: CPT | Performed by: INTERNAL MEDICINE

## 2023-02-16 RX ORDER — DIAZEPAM 2 MG
2 TABLET ORAL
Status: DISCONTINUED | OUTPATIENT
Start: 2023-02-16 | End: 2023-02-17

## 2023-02-16 RX ADMIN — SODIUM BICARBONATE 650 MG TABLET 1300 MG: at 07:58

## 2023-02-16 RX ADMIN — ASPIRIN 81 MG: 81 TABLET, COATED ORAL at 07:58

## 2023-02-16 RX ADMIN — ALPRAZOLAM 0.25 MG: 0.25 TABLET ORAL at 07:58

## 2023-02-16 RX ADMIN — DIAZEPAM 2 MG: 2 TABLET ORAL at 17:06

## 2023-02-16 RX ADMIN — LITHIUM CARBONATE 150 MG: 150 CAPSULE, GELATIN COATED ORAL at 07:58

## 2023-02-16 RX ADMIN — SODIUM BICARBONATE 650 MG TABLET 1300 MG: at 17:07

## 2023-02-16 RX ADMIN — LITHIUM CARBONATE 150 MG: 150 CAPSULE, GELATIN COATED ORAL at 17:06

## 2023-02-16 RX ADMIN — PANTOPRAZOLE SODIUM 40 MG: 40 TABLET, DELAYED RELEASE ORAL at 06:17

## 2023-02-16 RX ADMIN — LITHIUM CARBONATE 150 MG: 150 CAPSULE, GELATIN COATED ORAL at 12:50

## 2023-02-16 RX ADMIN — AMILORIDE HYDROCLORIDE 5 MG: 5 TABLET ORAL at 07:58

## 2023-02-16 ASSESSMENT — ACTIVITIES OF DAILY LIVING (ADL)
ADLS_ACUITY_SCORE: 45
ADLS_ACUITY_SCORE: 47
ADLS_ACUITY_SCORE: 45
ADLS_ACUITY_SCORE: 45
ADLS_ACUITY_SCORE: 47
ADLS_ACUITY_SCORE: 45
ADLS_ACUITY_SCORE: 47
ADLS_ACUITY_SCORE: 45

## 2023-02-16 NOTE — TELEPHONE ENCOUNTER
R:  8:15 AM No appropriate [facility] beds are currently available.  Pt will remain on [facility] worklist until an appropriate bed is available.

## 2023-02-16 NOTE — TELEPHONE ENCOUNTER
R:  No appropriate beds within [place].  Bed Search update within [facility] 10:00PM    [facility]: @ cap per website  [facility]: @ cap per website - Per [place], at [facility]: @ cap per website-Per [place], at [facility]: @ cap per website - Per [place], at [facility]: @ cap per website  [facility]: @ cap per website.-Per [place], at [facility]  [facility]: @ cap per website  [facility]:  @ cap per website - Per [place], at [facility]       Pt remains on intake work list awaiting appropriate placement.

## 2023-02-16 NOTE — TELEPHONE ENCOUNTER
0112 Bed Search Update:    Awaiting availability at [facility].  Remains on wait list.

## 2023-02-16 NOTE — PLAN OF CARE
Goal Outcome Evaluation:       Summary:  Polysubstance overdose- Suicide Attempt  DATE & TIME: 2/15/23 8600-6587  Cognitive Concerns/ Orientation : A&Ox4.   BEHAVIOR & AGGRESSION TOOL COLOR: Green; denies suicidal ideation    ABNL VS/O2: VSS on room air  MOBILITY: SBA with GB and walker.  Leg brace on RLE when ambulating.  Up in chair this evening and ambulated to bathroom.    PAIN MANAGMENT: Denies   DIET: Regular. Good appetite and fluid intake.  BOWEL/BLADDER: Continent.  Uses purewick at night.   ABNL LAB/BG: creat 2.  DRAIN/DEVICES: PIV LFA, SL.   TELEMETRY RHYTHM: NA  SKIN: PI with scabbing on bridge of nose, WOC nurse followed up and encouraged patient to use Sween cream to area.  Scattered small bruises.   TESTS/PROCEDURES: None   D/C DATE: Pending psyche rec and PT/OT level; inpatient mental health unit vs home  OTHER IMPORTANT INFO: scheduled Xanax started, tolerated. Nephrology following and no further recommendations at this time.

## 2023-02-16 NOTE — PLAN OF CARE
Goal Outcome Evaluation:  Summary:  Polysubstance overdose- Suicide Attempt  DATE & TIME: 2/15/23 7126-2783  Cognitive Concerns/ Orientation : A&Ox4.   BEHAVIOR & AGGRESSION TOOL COLOR: Green; denies suicidal ideation    ABNL VS/O2: VSS on room air  MOBILITY: SBA with GB and walker.  Leg brace on RLE when ambulating.    PAIN MANAGMENT: Denies   DIET: Regular.   BOWEL/BLADDER: Continent. Purewick in place overnight  ABNL LAB/BG: creat 2.  DRAIN/DEVICES: PIV, SL.   TELEMETRY RHYTHM: NA  SKIN: PI with scabbing on bridge of nose, WOC nurse followed up and encouraged patient to use Sween cream to area.  Scattered small bruises.   TESTS/PROCEDURES: None   D/C DATE: Pending psych rec and PT/OT level; inpatient mental health unit vs home  OTHER IMPORTANT INFO: on scheduled Xanax during the day. Nephrology following and no further recommendations at this time. Pt slept well overnight

## 2023-02-16 NOTE — PROGRESS NOTES
[facility]    Medicine Progress Note - Hospitalist Service    Date of Admission:  1/28/2023    Assessment & Plan   Patient is sitting comfortably in chair.  No acute events.  Patient still feels depressed.  No active suicidal ideation currently.  She was seen by psychiatry yesterday lithium doses was increased to 150 mg 3 times a day.  No new complaints or events         Polysubstance Overdose/Suicide Attempt  Distributive shock causing hypotension from Ca channel blocker overdose, RESOLVED  Acute encephalopathy caused by overdose, RESOLVED  Acute hypoxic resp failure with overdose. RESOLVED  * Patient indicating she is still thinking of harming herself.  She also has recent history of other self harm attempts.         Mental Health Consult Appreciated.preciated. Noted mental health has severely decompensated since stopping lithium a year ago.   ? After discussion among nephrology, patient and family it was decided that the benefits of lithium out likely outweigh the risk.  Family and patient accepts the risk of progressive renal disease on lithium  ? Continue daily lithium level    Discontinued 1:1/Suicide precautions on 2/8    Psychiatry reconsulted on 2/13/2023 and increased lithium dosage to 150 mg p.o. 3 times a day      Patient's sodium and creatinine stayed stable on lithium.  Patient should have access to free water at all times especially with the polyuria and polydipsia in the setting of lithium usage.   Patient is medically stable for discharge to [facility] psych unit when bed is available     Diabetes Insipidus, partial   Severe nocturia -2/7 reported getting up 8-10 times per night to urinate  REI to 2.56 max on 2/2/23, resolving   CKD baseline Cr ~ 1.5-2.   Metabolic/lacitc acidosis associated with overdose, resolved. Recurring on 02/06/23  Hyperkalemia with acidosis, suggestive of RTA  *S/p IVF and continued on TF flushes 200 ml every 2 hours.    * Stopped subcutaneous heparin on  2/6/23, pre nephrology's recommendation, can cause RTA       Nephrology's input appreciated.     Encourage fluids.     Continue NaBicarbonate  1300 mg BID     Nephrology started Lokelma on 02/07/23 10 mg TID > stopped on 2/9/23     Nephrology switched oral DDAVP to Amiloride 5mg po daily (stopped 2/16)    Management per nephrology    On low K diet     Loose stools, RESOLVING  Improved now     Suspected protein-calorie malnutrition  Prior hypoglycemia:  * TFs stopped on 2/5/23    On low K diet      Appreciate nutrition consult, last seen on 02/10/23     Started Nepro daily on 2/9/23     Superficial thrombus of R cephalic vein:  * RUE edematous monitor,     R subclavian CVC removed on 2/5/23      Chronic anemia : Baseline Hgb ~ 10. No evidence of bleeding, periodically monitor.  No indication for a current transfusion.  H/o perforated PUD   *B12 elevated at 1249, iron panel normal, folate normal on 02/07/23     Follow with initiation of ASA for DVT ppx.     Continue PPI.      Deconditioned    PT ordered     Ambulate with assist QID     Suspected pressure lesion from prior treatment with BiPAP  Crusted ulceration on tip of nose as well as linear skin breakdown over bridge of nose.    Wound care nurse consult on 02/10/23 appreciated          Diet: Combination Diet 3 gm K Diet  Diet  Snacks/Supplements Adult: Nepro Oral Supplement; Between Meals    DVT Prophylaxis: Pneumatic Compression Devices  Suresh Catheter: Not present  Lines: None     Cardiac Monitoring: None  Code Status: Full Code      Clinically Significant Risk Factors           # Hypercalcemia: Highest Ca = 10.9 mg/dL in last 2 days, will monitor as appropriate    # Hypoalbuminemia: Lowest albumin = 2.3 g/dL at 2/1/2023  7:30 PM, will monitor as appropriate                   Disposition Plan      Expected Discharge Date: 02/20/2023    Discharge Delays: Placement - TCU  Destination: inpatient rehabilitation facility  Discharge Comments: awaiting for [facility] psych  bed; medically stable to discharge.          Gerald Charles MD  Hospitalist Service  [facility]  Securely message with [employer] (more info)  Text page via [employer] Paging/Directory   ______________________________________________________________________    Interval History   Care assumed for today.  Patient seen and examined this afternoon.  She is up in chair feeling well overall.  Denies any pain, fever/chills, or shortness of breath.    Physical Exam   Vital Signs: Temp: 98  F (36.7  C) Temp src: Oral BP: (!) 133/90 Pulse: 74   Resp: 16 SpO2: 100 % O2 Device: None (Room air)    Weight: 110 lbs 14.26 oz    Gen: NAD, pleasant, up in chair  HEENT: EOMI, MMM  Resp: no focal crackles,  no wheezes, no increased work of resp  CV: S1S2 heard, reg rhythm, reg rate  Abdo: soft, nontender, nondistended, bowel sounds present  Ext: calves nontender, well perfused  Neuro: aa, conversant, CN grossly intact, no facial asymmetry      Medical Decision Making       28 MINUTES SPENT BY ME on the date of service doing chart review, history, exam, documentation & further activities per the note.      Data     I have personally reviewed the following data over the past 24 hrs:    N/A  \   N/A   / N/A     134 (L) 104 51.7 (H) /  106 (H)   5.1 22 2.02 (H) \       Imaging results reviewed over the past 24 hrs:   No results found for this or any previous visit (from the past 24 hour(s)).

## 2023-02-16 NOTE — PROGRESS NOTES
Psychiatric Progress Note  Consult date: February 6, 2023         Reason for Consult, requesting source:    Suicide attempt/OD  Requesting source: Anabell Camacho    Labs and imaging reviewed. Patient seen and evaluated by Sandra Cox MD          HPI:   This is a 71 yo female who presented on 1/28/23 with a massive, intentional polypharmacy od .  Pt has a history of bipolar illness, and took a combinaton of lamictal, amlodipine, haldol, and adderall.  She had been in the ED 4 days prior for eval of SI.  Pt has had a difficult course related to calcium channel blocker od, and has been in the ICU, vented and on pressors with AHRF, REI on CRF, and encephalopthy.  We are asked to see for assistance with care for her depression, and appropriate disposition when she is ready medically to discharge.    Patient seen today along with her brother.  Both are very good historians.  It sounds like the patient was taken off lithium, her longtime medication for bipolar management, about a year ago.  This was related to worsening kidney function.  She has worked with her psychiatrist to try to find another regimen that was helpful.  She has been on Lamictal, as well as other medications.  None of these medications help with her depression.    Both the patient and her brother are in favor of her going back on lithium if this is a possibility.  I also would be in favor of it as well.  Clearly, the patient's bipolar illness is severe, and potentially lethal.  She was quite well managed on the lithium prior to it being discontinued.    2/7/23: Patient seen today for follow-up in the ICU.  She is waiting for a bed on the medical floor, and no longer requires a critical level of care.  She was awake, and tell me that she is feeling pretty anxious, which is her baseline when she is not on lithium.  She also said that the kidney doctor has been by, and they discussed going back on lithium.  She and I discussed low-dose Xanax to  help with her anxiety for now.  I discussed risk versus benefits of this medication and indicated to her this is a short-term medication, and that she does not have to take it if she does not care for it or if it does not work.    2/8/2023: Patient seen today with her brother, and RN at bedside.  Patient was sitting up and having some fruit.  She states she feels much better today both physically and mentally.  We discussed current lithium dosing, and the plan to titrate very slowly.  I also told her I think we should put her on the lowest for a geriatric psychiatry bed in the event that she still needs inpatient admission in 2 to 3 weeks.  She was agreeable to this.  Answered questions and discussed the overall strategy with the lithium.  I also told her that I do not recommend any other psychiatric medications for now.    2/9/2023: Patient seen today for follow-up.  Discussed with she and her brother.  Also discussed with Dr. Malik/renal.  Patient is feeling more depressed today, but admits that she did watch the news this morning which made her feel that things were more bleak.  We discussed increasing her lithium dose tomorrow pending okay from renal.  Patient denies any negative side effects that she is noting to the lithium.  We discussed other options besides watching the news or activities that are depressing.  Discussed this with she and her brother today to help bolster her spirits while we wait for the lithium to get to a therapeutic dose.     2/10/2023: Patient seen today for follow-up along with her brother.  Chart reviewed.  Patient indicates she still feeling depressed this morning.  I scheduled her lithium for lunchtime today, because I wanted to see her morning labs.  We discussed that this will switch to a.m. and q. evening meal starting tomorrow.  We also discussed that we may have the option of further titration on Monday.  Patient did tell me, interestingly, which her brother confirmed,  that she knows that she is getting depressed because she begins to have blurred vision.  This is been the same since she first became ill with severe depression at age 18.  Blurred vision or decreased visual acuity, bilateral, is the canary in the coal mine that she is becoming depressed.  When the visual acuity returns to normal, the depression goes away.    The patient did say that she does have a history of a very severe head injury when she tried to kill herself by jumping off a bridge when she was younger.  She broke her neck, and undoubtedly sustained a closed head injury as well.  The visual acuity changes began occurring before this.  Patient denied other associated symptoms such as ataxia or other neurologic symptoms that she is aware of aside from the changes in visual acuity.    2/13/2023: Patient seen today for follow-up.  Chart reviewed including nephrology note.  Patient states that she is ready to go home today.  She is still feeling depressed, and we discussed the fact that she is not safe to go home, and that we need to continue to titrate her medications as she tolerates it.  We agreed to continue to go up on the lithium today.  Patient's lithium level was 0.5 this morning.    2/14/2023: Patient seen today for follow-up along with her brother.  Discussed with RN, who was present in the room while we talked.  Also met with social work services.  Patient talked about wanting to go home so that she can do her taxes.  She states her mood is somewhat better today, but is still not good.  She denies any side effects from the lithium thus far.  Lithium level this morning was 0.6.  We discussed that the patient is in the queue for inpatient geriatric psychiatry bed.  Patient is somewhat ambivalent about this, but understands that I want her feeling well prior to going home due to her very recent, quite severe suicide attempt.  Brother is supportive of the plan, and states that he is trying to get her into  "assisted living.    2/15/2023:  Pt seen today for f/u.  Chart reviewed.  Patient reports xanax was helpful for anxiety last night.  She slept well.  She denies side effects with the Xanax.  The patient had questions about what it would be like on the inpatient psychiatry, and expressed that she is concerned about it.  We discussed the fact that she is not safe currently.  She indicated herself that she is concerned that if she goes home, that \"it may happen again\", referring to try to kill herself.  We discussed medication strategies, and talked about her current lithium level.    2/16/2023: Patient seen today for follow-up.  She states that she is feeling a little bit better today and feels Xanax is helping.  It is making her sleepy.  Also, the benefit from each dose lasts only for a couple of hours.  I asked her about whether or not she still feeling suicidal, and she states that talking about suicide, and everyone asking her about suicide has given her intrusive thoughts.  She then wonders if she is actually feeling suicidal, or for just reintroducing the thought of death.  She denies any feelings of trauma related to her suicide attempts, though I do wonder if this is the etiology of the intrusive thinking.    Patient would like to stay at her current lithium dose.  I am in agreement.  We discussed utilizing a longer acting benzodiazepine, Valium, and dosing it at night.  She would like to try this.  We are still awaiting an inpatient bed.          Past Psychiatric History:     Pt had recently been in the ED on 1/24 with thoughts of drowning herself while in a swimming pool.  She has an outpt psychiatrist, and a therapist, and had discharged after denying SI, and wood for safety.   Patient is seen by Dr. Trent Gallo for outpatient psychiatric management.        Substance Use and History:   Negative        Past Medical History:   PAST MEDICAL HISTORY:   Past Medical History:   Diagnosis Date     " Benign essential hypertension 09/2018    added norvasc 9/18     Bipolar affective disorder (H)     hosp 1993, Dr. Aftab Deal     Cancer (H) 1996    DCIS, left breast     Chronic kidney disease, stage 3a (H)     seen by renal Dr. 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    felt due to lithium     Thalassaemia trait      Vitamin D deficiency        PAST SURGICAL HISTORY:   Past Surgical History:   Procedure Laterality Date     BIOPSY  1994    left breast     BREAST SURGERY      lumpectomy x 4     COLONOSCOPY  2021     COLONOSCOPY N/A 6/17/2022    Procedure: COLONOSCOPY, WITH POLYPECTOMY AND BIOPSY;  Surgeon: Panchito Gu MD;  Location:  GI     EYE SURGERY  2018    retina tear     LAPAROTOMY EXPLORATORY N/A 8/24/2022    Procedure: Exploratory laparotomy, REPAIR OF PERFORATED ULCER;  Surgeon: Ron Vidal MD;  Location:  OR     left hand surgery      last 1974             Family History:   FAMILY HISTORY:   Family History   Problem Relation Age of Onset     Cerebrovascular Disease Mother      Hypertension Father      Sleep Apnea Brother      Breast Cancer Maternal Aunt         x 2     Breast Cancer Other         Maternal Aunt     Hyperlipidemia Niece        Family Psychiatric History:         Social History:   SOCIAL HISTORY:   Social History     Tobacco Use     Smoking status: Never     Smokeless tobacco: Never   Substance Use Topics     Alcohol use: No            Physical ROS:   The 10 point Review of Systems is negative  other than noted in the HPI or here.           Medications:       ALPRAZolam  0.25 mg Oral BID AC     aspirin  81 mg Oral Daily     lithium  150 mg Oral TID w/meals     pantoprazole  40 mg Oral QAM AC     sodium bicarbonate  1,300 mg Oral BID     sodium chloride (PF)  3 mL Intracatheter Q8H              Allergies:     Allergies   Allergen Reactions     No Known Allergies           Labs:     Recent Results (from the past 48 hour(s))   Lithium level    Collection Time: 02/15/23  8:52 AM   Result Value Ref Range    Lithium 0.7 0.6 - 1.2 mmol/L   Basic metabolic panel    Collection Time: 02/15/23  8:52 AM   Result Value Ref Range    Sodium 135 (L) 136 - 145 mmol/L    Potassium 5.1 3.4 - 5.3 mmol/L    Chloride 103 98 - 107 mmol/L    Carbon Dioxide (CO2) 22 22 - 29 mmol/L    Anion Gap 10 7 - 15 mmol/L    Urea Nitrogen 51.4 (H) 8.0 - 23.0 mg/dL    Creatinine 2.00 (H) 0.51 - 0.95 mg/dL    Calcium 10.7 (H) 8.8 - 10.2 mg/dL    Glucose 101 (H) 70 - 99 mg/dL    GFR Estimate 26 (L) >60 mL/min/1.73m2   Lithium level    Collection Time: 02/16/23  9:11 AM   Result Value Ref Range    Lithium 1.0 0.6 - 1.2 mmol/L   Basic metabolic panel    Collection Time: 02/16/23  9:11 AM   Result Value Ref Range    Sodium 134 (L) 136 - 145 mmol/L    Potassium 5.1 3.4 - 5.3 mmol/L    Chloride 104 98 - 107 mmol/L    Carbon Dioxide (CO2) 22 22 - 29 mmol/L    Anion Gap 8 7 - 15 mmol/L    Urea Nitrogen 51.7 (H) 8.0 - 23.0 mg/dL    Creatinine 2.02 (H) 0.51 - 0.95 mg/dL    Calcium 10.9 (H) 8.8 - 10.2 mg/dL    Glucose 106 (H) 70 - 99 mg/dL    GFR Estimate 26 (L) >60 mL/min/1.73m2          Physical and Psychiatric Examination:     BP (!) 133/90 (BP Location: Right arm)   Pulse 74   Temp 98  F (36.7  C) (Oral)   Resp 16   Wt 50.3 kg (110 lb 14.3 oz)   SpO2 100%   BMI 19.03 kg/m    Weight is 110 lbs 14.26 oz  Body mass index is 19.03 kg/m .    Physical Exam:  I have reviewed the physical exam as documented by by the medical team and agree with findings  and assessment and have no additional findings to add at this time.    Mental Status Exam:    Appearance: awake, alert, appeared as age stated, awake, alert and cooperative  Attitude:  cooperative  Eye Contact:  good  Mood:  anxious and depressed  Affect:  mood congruent  Speech:  clear, coherent and decreased prosody  Language: Fluent in english   Psychomotor Behavior:  physical retardation  Thought Process:  logical, linear and goal oriented  Associations:  no loose associations  Thought Content:  no evidence of psychotic thought, no auditory hallucinations present, no visual hallucinations present and Patient is status post serious suicide attempt  Insight:  good  Judgement:  intact  Oriented to:  time, person, and place  Attention Span and Concentration:  intact  Recent and Remote Memory:  intact  Fund of Knowledge: Appropriate   Gait and Station: Sitting up in a chair               DSM-5 Diagnosis:   296.53 Bipolar I Disorder Current or Most Recent Episode Depressed, Severe with psychotic features, toxic metabolic encephalopathy which is resolving, Intentional overdose, subsequent encounter, anxiety.  Chronic kidney disease for, diabetes insipidus              Assessment:   This is a 72-year-old female with a history of bipolar illness spanning 50 years.  Patient was initially diagnosed with schizophrenia when she first became ill.  Later, this diagnosis was modified to bipolar illness, which both she and her brother feel is accurate.  She does have a history of ECT many years ago.  It is unclear whether this was helpful or not.  What has been helpful has been lithium.  This medication was discontinued a year ago due to worsening kidney function.  The patient has not been able to stabilize since going off this medication, whereas she was stable and functional on lithium.    Patient and her brother, who are both very high functioning, would like her to go back on lithium.  I am in agreement.  We will reach out  to the hospitalist to better understand what her options are.  Regardless, she will need to go to an inpatient psychiatric bed for further stabilization after she is medically ready to discharge.    Patient continues to feel depressed, but is doing better than she was prior to going back on lithium.  Xanax is helping with the anxiety, but only last for a couple of hours.  We will switch to at bedtime Valium to see if this is more beneficial for her without making her feel sleepy with a.m. dosing.  We are still awaiting a psychiatric bed.    The last 2 lithium levels have been collected after a.m. dosing.  This does not represent a trough level.  Will write for a time and lithium level tomorrow morning.          Summary of Recommendations:   1.  I have increased lithium to 450 total on 12/13/2023.  Recommend continue this same dose.  We can discuss further titration tomorrow.    2.  We are getting morning lithium levels and will follow the trend.  Very much appreciate the assistance of nephrology.  Will write for time dosing tomorrow morning to get an actual trough lithium level.    3.  Patient does not require a sitter at this point.    4.  Have placed patient in the queue for inpatient psychiatry on the geriatric floor.      5.  We will follow, but patient is medically ready for discharge to inpatient psychiatry per treatment team.  We are simply awaiting a geriatric psychiatry bed.      Sandra Cox MD  Consult/Liaison Psychiatry and Addiction Medicine  [facility]

## 2023-02-16 NOTE — PROGRESS NOTES
Nephrology chart check note:  Labs reviewed:   Stable gfr  Bicarb unchanged from yesterday but improved on current supplementation.  Potassium 5.1  Sodium 134, mild hyponatremia.    Will stop the amiloride for now given the mild low sodium and higher range potassium. She should be able to drink to her thirst and handle any underlying polyuria form underlying partial DI.    Nephrology will sign off, follow up with Dr. Cedeno as outpatient.  Kana Miller DO

## 2023-02-17 ENCOUNTER — APPOINTMENT (OUTPATIENT)
Dept: OCCUPATIONAL THERAPY | Facility: CLINIC | Age: 73
DRG: 917 | End: 2023-02-17
Payer: MEDICARE

## 2023-02-17 ENCOUNTER — TELEPHONE (OUTPATIENT)
Dept: BEHAVIORAL HEALTH | Facility: CLINIC | Age: 73
End: 2023-02-17
Payer: MEDICARE

## 2023-02-17 ENCOUNTER — APPOINTMENT (OUTPATIENT)
Dept: PHYSICAL THERAPY | Facility: CLINIC | Age: 73
DRG: 917 | End: 2023-02-17
Payer: MEDICARE

## 2023-02-17 LAB
ANION GAP SERPL CALCULATED.3IONS-SCNC: 11 MMOL/L (ref 7–15)
BUN SERPL-MCNC: 61 MG/DL (ref 8–23)
CALCIUM SERPL-MCNC: 10.1 MG/DL (ref 8.8–10.2)
CHLORIDE SERPL-SCNC: 101 MMOL/L (ref 98–107)
CREAT SERPL-MCNC: 2.03 MG/DL (ref 0.51–0.95)
DEPRECATED HCO3 PLAS-SCNC: 19 MMOL/L (ref 22–29)
GFR SERPL CREATININE-BSD FRML MDRD: 25 ML/MIN/1.73M2
GLUCOSE SERPL-MCNC: 117 MG/DL (ref 70–99)
LITHIUM SERPL-SCNC: 1.1 MMOL/L (ref 0.6–1.2)
LITHIUM SERPL-SCNC: 1.1 MMOL/L (ref 0.6–1.2)
POTASSIUM SERPL-SCNC: 5.3 MMOL/L (ref 3.4–5.3)
SODIUM SERPL-SCNC: 131 MMOL/L (ref 136–145)

## 2023-02-17 PROCEDURE — 250N000013 HC RX MED GY IP 250 OP 250 PS 637: Performed by: PSYCHIATRY & NEUROLOGY

## 2023-02-17 PROCEDURE — 36415 COLL VENOUS BLD VENIPUNCTURE: CPT | Performed by: PSYCHIATRY & NEUROLOGY

## 2023-02-17 PROCEDURE — 97110 THERAPEUTIC EXERCISES: CPT | Mod: GP

## 2023-02-17 PROCEDURE — G0463 HOSPITAL OUTPT CLINIC VISIT: HCPCS

## 2023-02-17 PROCEDURE — 97116 GAIT TRAINING THERAPY: CPT | Mod: GP

## 2023-02-17 PROCEDURE — 97110 THERAPEUTIC EXERCISES: CPT | Mod: GO

## 2023-02-17 PROCEDURE — 120N000001 HC R&B MED SURG/OB

## 2023-02-17 PROCEDURE — 80178 ASSAY OF LITHIUM: CPT | Performed by: PSYCHIATRY & NEUROLOGY

## 2023-02-17 PROCEDURE — 250N000013 HC RX MED GY IP 250 OP 250 PS 637: Performed by: INTERNAL MEDICINE

## 2023-02-17 PROCEDURE — 97535 SELF CARE MNGMENT TRAINING: CPT | Mod: GO

## 2023-02-17 PROCEDURE — 99232 SBSQ HOSP IP/OBS MODERATE 35: CPT | Performed by: INTERNAL MEDICINE

## 2023-02-17 PROCEDURE — 99232 SBSQ HOSP IP/OBS MODERATE 35: CPT | Performed by: PSYCHIATRY & NEUROLOGY

## 2023-02-17 PROCEDURE — 82310 ASSAY OF CALCIUM: CPT | Performed by: PSYCHIATRY & NEUROLOGY

## 2023-02-17 RX ADMIN — PANTOPRAZOLE SODIUM 40 MG: 40 TABLET, DELAYED RELEASE ORAL at 06:42

## 2023-02-17 RX ADMIN — SODIUM BICARBONATE 650 MG TABLET 1300 MG: at 08:19

## 2023-02-17 RX ADMIN — LITHIUM CARBONATE 150 MG: 150 CAPSULE, GELATIN COATED ORAL at 12:00

## 2023-02-17 RX ADMIN — LITHIUM CARBONATE 150 MG: 150 CAPSULE, GELATIN COATED ORAL at 08:19

## 2023-02-17 RX ADMIN — DIAZEPAM 1 MG: 2 TABLET ORAL at 18:11

## 2023-02-17 RX ADMIN — LITHIUM CARBONATE 150 MG: 150 CAPSULE, GELATIN COATED ORAL at 18:08

## 2023-02-17 RX ADMIN — SODIUM BICARBONATE 650 MG TABLET 1300 MG: at 18:08

## 2023-02-17 RX ADMIN — ASPIRIN 81 MG: 81 TABLET, COATED ORAL at 08:19

## 2023-02-17 ASSESSMENT — ACTIVITIES OF DAILY LIVING (ADL)
ADLS_ACUITY_SCORE: 45

## 2023-02-17 NOTE — PROGRESS NOTES
Psychiatric Progress Note  Consult date: February 6, 2023         Reason for Consult, requesting source:    Suicide attempt/OD  Requesting source: Anabell Camacho    Labs and imaging reviewed. Patient seen and evaluated by Sandra oCx MD          HPI:   This is a 71 yo female who presented on 1/28/23 with a massive, intentional polypharmacy od .  Pt has a history of bipolar illness, and took a combinaton of lamictal, amlodipine, haldol, and adderall.  She had been in the ED 4 days prior for eval of SI.  Pt has had a difficult course related to calcium channel blocker od, and has been in the ICU, vented and on pressors with AHRF, REI on CRF, and encephalopthy.  We are asked to see for assistance with care for her depression, and appropriate disposition when she is ready medically to discharge.    Patient seen today along with her brother.  Both are very good historians.  It sounds like the patient was taken off lithium, her longtime medication for bipolar management, about a year ago.  This was related to worsening kidney function.  She has worked with her psychiatrist to try to find another regimen that was helpful.  She has been on Lamictal, as well as other medications.  None of these medications help with her depression.    Both the patient and her brother are in favor of her going back on lithium if this is a possibility.  I also would be in favor of it as well.  Clearly, the patient's bipolar illness is severe, and potentially lethal.  She was quite well managed on the lithium prior to it being discontinued.    2/7/23: Patient seen today for follow-up in the ICU.  She is waiting for a bed on the medical floor, and no longer requires a critical level of care.  She was awake, and tell me that she is feeling pretty anxious, which is her baseline when she is not on lithium.  She also said that the kidney doctor has been by, and they discussed going back on lithium.  She and I discussed low-dose Xanax to  help with her anxiety for now.  I discussed risk versus benefits of this medication and indicated to her this is a short-term medication, and that she does not have to take it if she does not care for it or if it does not work.    2/8/2023: Patient seen today with her brother, and RN at bedside.  Patient was sitting up and having some fruit.  She states she feels much better today both physically and mentally.  We discussed current lithium dosing, and the plan to titrate very slowly.  I also told her I think we should put her on the lowest for a geriatric psychiatry bed in the event that she still needs inpatient admission in 2 to 3 weeks.  She was agreeable to this.  Answered questions and discussed the overall strategy with the lithium.  I also told her that I do not recommend any other psychiatric medications for now.    2/9/2023: Patient seen today for follow-up.  Discussed with she and her brother.  Also discussed with Dr. Malik/renal.  Patient is feeling more depressed today, but admits that she did watch the news this morning which made her feel that things were more bleak.  We discussed increasing her lithium dose tomorrow pending okay from renal.  Patient denies any negative side effects that she is noting to the lithium.  We discussed other options besides watching the news or activities that are depressing.  Discussed this with she and her brother today to help bolster her spirits while we wait for the lithium to get to a therapeutic dose.     2/10/2023: Patient seen today for follow-up along with her brother.  Chart reviewed.  Patient indicates she still feeling depressed this morning.  I scheduled her lithium for lunchtime today, because I wanted to see her morning labs.  We discussed that this will switch to a.m. and q. evening meal starting tomorrow.  We also discussed that we may have the option of further titration on Monday.  Patient did tell me, interestingly, which her brother confirmed,  that she knows that she is getting depressed because she begins to have blurred vision.  This is been the same since she first became ill with severe depression at age 18.  Blurred vision or decreased visual acuity, bilateral, is the canary in the coal mine that she is becoming depressed.  When the visual acuity returns to normal, the depression goes away.    The patient did say that she does have a history of a very severe head injury when she tried to kill herself by jumping off a bridge when she was younger.  She broke her neck, and undoubtedly sustained a closed head injury as well.  The visual acuity changes began occurring before this.  Patient denied other associated symptoms such as ataxia or other neurologic symptoms that she is aware of aside from the changes in visual acuity.    2/13/2023: Patient seen today for follow-up.  Chart reviewed including nephrology note.  Patient states that she is ready to go home today.  She is still feeling depressed, and we discussed the fact that she is not safe to go home, and that we need to continue to titrate her medications as she tolerates it.  We agreed to continue to go up on the lithium today.  Patient's lithium level was 0.5 this morning.    2/14/2023: Patient seen today for follow-up along with her brother.  Discussed with RN, who was present in the room while we talked.  Also met with social work services.  Patient talked about wanting to go home so that she can do her taxes.  She states her mood is somewhat better today, but is still not good.  She denies any side effects from the lithium thus far.  Lithium level this morning was 0.6.  We discussed that the patient is in the queue for inpatient geriatric psychiatry bed.  Patient is somewhat ambivalent about this, but understands that I want her feeling well prior to going home due to her very recent, quite severe suicide attempt.  Brother is supportive of the plan, and states that he is trying to get her into  "assisted living.    2/15/2023:  Pt seen today for f/u.  Chart reviewed.  Patient reports xanax was helpful for anxiety last night.  She slept well.  She denies side effects with the Xanax.  The patient had questions about what it would be like on the inpatient psychiatry, and expressed that she is concerned about it.  We discussed the fact that she is not safe currently.  She indicated herself that she is concerned that if she goes home, that \"it may happen again\", referring to try to kill herself.  We discussed medication strategies, and talked about her current lithium level.    2/16/2023: Patient seen today for follow-up.  She states that she is feeling a little bit better today and feels Xanax is helping.  It is making her sleepy.  Also, the benefit from each dose lasts only for a couple of hours.  I asked her about whether or not she still feeling suicidal, and she states that talking about suicide, and everyone asking her about suicide has given her intrusive thoughts.  She then wonders if she is actually feeling suicidal, or for just reintroducing the thought of death.  She denies any feelings of trauma related to her suicide attempts, though I do wonder if this is the etiology of the intrusive thinking.    Patient would like to stay at her current lithium dose.  I am in agreement.  We discussed utilizing a longer acting benzodiazepine, Valium, and dosing it at night.  She would like to try this.  We are still awaiting an inpatient bed.    2/17/2023: Patient seen today for follow-up.  She reports that she is sleepy, but that Valium was helpful for her anxiety.  She is very worried about going to the psychiatric unit, and that \"I will never be released\".  She states that she was in the psychiatric unit years ago to work on her lithium, and that it was a good stay.  She really does not know what to expect at this point.    I did asked the patient how she would feel in terms of mood and her anxiety if her " ultimate goal was to go to the assisted living, and possibly not go to inpatient psychiatry.  She stated that that would be a relief.          Past Psychiatric History:     Pt had recently been in the ED on 1/24 with thoughts of drowning herself while in a swimming pool.  She has an outpt psychiatrist, and a therapist, and had discharged after denying SI, and wood for safety.   Patient is seen by Dr. Trent Gallo for outpatient psychiatric management.        Substance Use and History:   Negative        Past Medical History:   PAST MEDICAL HISTORY:   Past Medical History:   Diagnosis Date     Benign essential hypertension 09/2018    added norvasc 9/18     Bipolar affective disorder (H)     hosp 1993, Dr. Aftab Deal     Cancer (H) 1996    DCIS, left breast     Chronic kidney disease, stage 3a (H)     seen by renal Dr. 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    felt due to lithium     Thalassaemia trait      Vitamin D deficiency        PAST SURGICAL HISTORY:   Past Surgical History:   Procedure Laterality Date     BIOPSY  1994    left breast     BREAST SURGERY      lumpectomy x 4     COLONOSCOPY  2021     COLONOSCOPY N/A 6/17/2022    Procedure: COLONOSCOPY, WITH POLYPECTOMY AND BIOPSY;  Surgeon: Panchito Gu MD;  Location:  GI     EYE SURGERY  2018    retina tear     LAPAROTOMY EXPLORATORY N/A 8/24/2022    Procedure: Exploratory laparotomy, REPAIR OF PERFORATED ULCER;  Surgeon: Ron Vidal MD;  Location:  OR      left hand surgery      last 1974             Family History:   FAMILY HISTORY:   Family History   Problem Relation Age of Onset     Cerebrovascular Disease Mother      Hypertension Father      Sleep Apnea Brother      Breast Cancer Maternal Aunt         x 2     Breast Cancer Other         Maternal Aunt     Hyperlipidemia Niece        Family Psychiatric History:         Social History:   SOCIAL HISTORY:   Social History     Tobacco Use     Smoking status: Never     Smokeless tobacco: Never   Substance Use Topics     Alcohol use: No            Physical ROS:   The 10 point Review of Systems is negative other than noted in the HPI or here.           Medications:       aspirin  81 mg Oral Daily     diazepam  1 mg Oral Daily before supper     lithium  150 mg Oral TID w/meals     pantoprazole  40 mg Oral QAM AC     sodium bicarbonate  1,300 mg Oral BID     sodium chloride (PF)  3 mL Intracatheter Q8H              Allergies:     Allergies   Allergen Reactions     No Known Allergies           Labs:     Recent Results (from the past 48 hour(s))   Lithium level    Collection Time: 02/16/23  9:11 AM   Result Value Ref Range    Lithium 1.0 0.6 - 1.2 mmol/L   Basic metabolic panel    Collection Time: 02/16/23  9:11 AM   Result Value Ref Range    Sodium 134 (L) 136 - 145 mmol/L    Potassium 5.1 3.4 - 5.3 mmol/L    Chloride 104 98 - 107 mmol/L    Carbon Dioxide (CO2) 22 22 - 29 mmol/L    Anion Gap 8 7 - 15 mmol/L    Urea Nitrogen 51.7 (H) 8.0 - 23.0 mg/dL    Creatinine 2.02 (H) 0.51 - 0.95 mg/dL    Calcium 10.9 (H) 8.8 - 10.2 mg/dL    Glucose 106 (H) 70 - 99 mg/dL    GFR Estimate 26 (L) >60 mL/min/1.73m2   Lithium level    Collection Time: 02/17/23  2:39 PM   Result Value Ref Range    Lithium 1.1 0.6 - 1.2 mmol/L   Basic metabolic panel    Collection Time: 02/17/23  2:39 PM   Result Value Ref Range    Sodium 131 (L) 136 - 145 mmol/L    Potassium 5.3 3.4 - 5.3 mmol/L    Chloride 101 98 - 107 mmol/L    Carbon Dioxide (CO2) 19 (L) 22  - 29 mmol/L    Anion Gap 11 7 - 15 mmol/L    Urea Nitrogen 61.0 (H) 8.0 - 23.0 mg/dL    Creatinine 2.03 (H) 0.51 - 0.95 mg/dL    Calcium 10.1 8.8 - 10.2 mg/dL    Glucose 117 (H) 70 - 99 mg/dL    GFR Estimate 25 (L) >60 mL/min/1.73m2          Physical and Psychiatric Examination:     /77 (BP Location: Right arm)   Pulse 80   Temp 97.7  F (36.5  C) (Oral)   Resp 16   Wt 50.3 kg (110 lb 14.3 oz)   SpO2 97%   BMI 19.03 kg/m    Weight is 110 lbs 14.26 oz  Body mass index is 19.03 kg/m .    Physical Exam:  I have reviewed the physical exam as documented by by the medical team and agree with findings and assessment and have no additional findings to add at this time.    Mental Status Exam:    Appearance: awake, alert, appeared as age stated, awake, alert and cooperative  Attitude:  cooperative  Eye Contact:  good  Mood:  depressed and better  Affect:  mood congruent  Speech:  clear, coherent and decreased prosody  Language: Fluent in english   Psychomotor Behavior:  physical retardation  Thought Process:  logical, linear and goal oriented  Associations:  no loose associations  Thought Content:  no evidence of psychotic thought, no auditory hallucinations present, no visual hallucinations present and Patient is status post serious suicide attempt  Insight:  good  Judgement:  intact  Oriented to:  time, person, and place  Attention Span and Concentration:  intact  Recent and Remote Memory:  intact  Fund of Knowledge: Appropriate   Gait and Station: Sitting up in a chair               DSM-5 Diagnosis:   296.53 Bipolar I Disorder Current or Most Recent Episode Depressed, Severe with psychotic features, toxic metabolic encephalopathy which is resolving, Intentional overdose, subsequent encounter, anxiety.  Chronic kidney disease for, diabetes insipidus              Assessment:   This is a 72-year-old female with a history of bipolar illness spanning 50 years.  Patient was initially diagnosed with schizophrenia when  she first became ill.  Later, this diagnosis was modified to bipolar illness, which both she and her brother feel is accurate.  She does have a history of ECT many years ago.  It is unclear whether this was helpful or not.  What has been helpful has been lithium.  This medication was discontinued a year ago due to worsening kidney function.  The patient has not been able to stabilize since going off this medication, whereas she was stable and functional on lithium.    Patient and her brother, who are both very high functioning, would like her to go back on lithium.  I am in agreement.  We will reach out to the hospitalist to better understand what her options are.  Regardless, she will need to go to an inpatient psychiatric bed for further stabilization after she is medically ready to discharge.    Patient's depression may be improving now that I have added low-dose benzodiazepine.  Lithium I do also believe is beginning to take effect.  The Valium at 2 mg at at bedtime is causing some mild somnolence during the day.  We will cut the dose in half, which I discussed with the patient.  She is denying any feelings of gait difficulties or dizziness.  It looks like the time lithium level was not drawn this morning, because the patient states they could not get a vascular access.  Have reordered for this afternoon.          Summary of Recommendations:   1.  I have increased lithium to 450 total on 12/13/2023.  We will continue with the same dose at patient's request.    2.  We are getting morning lithium levels and will follow the trend.  Very much appreciate the assistance of nephrology.  Have written for a redraw of the lithium level today at 1630.    3.  Patient does not require a sitter at this point.    4.  Have placed patient in the queue for inpatient psychiatry on the geriatric floor.      5.  We will follow, but patient is medically ready for discharge to inpatient psychiatry per treatment team.  We are simply  awaiting a geriatric psychiatry bed.      Sandra Cox MD  Consult/Liaison Psychiatry and Addiction Medicine  [facility]

## 2023-02-17 NOTE — PLAN OF CARE
"Goal Outcome Evaluation:       Summary:  Polysubstance overdose- Suicide Attempt  DATE & TIME: 2/17/23 6429-1032  Cognitive Concerns/ Orientation : A&Ox4.   BEHAVIOR & AGGRESSION TOOL COLOR: Green; denies suicidal ideation    ABNL VS/O2: VSS on room air  MOBILITY: SBA with GB and walker.  Leg brace on RLE when ambulating. Not OOB this shift  PAIN MANAGMENT: Denies   DIET: Regular.   BOWEL/BLADDER: Continent. Purewick in place. No BM this shift  ABNL LAB/BG: creat 2.02   DRAIN/DEVICES: PIV, SL.   TELEMETRY RHYTHM: NA  SKIN: PI with scabbing on bridge of nose, WOC following, Scattered small bruises. Refused full skin assessment  TESTS/PROCEDURES: None   D/C DATE: Per psych note, \"pending bed availability for inpatient psych\".   OTHER IMPORTANT INFO: on scheduled Xanax during the day. Nephrology signed off.                 "

## 2023-02-17 NOTE — PROGRESS NOTES
[facility]    Medicine Progress Note - Hospitalist Service    Date of Admission:  1/28/2023    Assessment & Plan   Patient is sitting comfortably in chair.  No acute events.  Patient still feels depressed.  No active suicidal ideation currently.  She was seen by psychiatry yesterday lithium doses was increased to 150 mg 3 times a day.  No new complaints or events         Polysubstance Overdose/Suicide Attempt  Distributive shock causing hypotension from Ca channel blocker overdose, RESOLVED  Acute encephalopathy caused by overdose, RESOLVED  Acute hypoxic resp failure with overdose. RESOLVED  * Patient indicating she is still thinking of harming herself.  She also has recent history of other self harm attempts.         Mental Health Consult Appreciated.preciated. Noted mental health has severely decompensated since stopping lithium a year ago.   ? After discussion among nephrology, patient and family it was decided that the benefits of lithium out likely outweigh the risk.  Family and patient accepts the risk of progressive renal disease on lithium  ? Continue daily lithium level    Discontinued 1:1/Suicide precautions on 2/8    Psychiatry reconsulted on 2/13/2023 and increased lithium dosage to 150 mg p.o. 3 times a day      Patient's sodium and creatinine stayed stable on lithium.  Patient should have access to free water at all times especially with the polyuria and polydipsia in the setting of lithium usage.   Patient is medically stable for discharge to [facility] unit when bed is available     Diabetes Insipidus, partial   Severe nocturia -2/7 reported getting up 8-10 times per night to urinate  REI to 2.56 max on 2/2/23, resolving   CKD baseline Cr ~ 1.5-2.   Metabolic/lacitc acidosis associated with overdose, resolved. Recurring on 02/06/23  Hyperkalemia with acidosis, suggestive of RTA  *S/p IVF and continued on TF flushes 200 ml every 2 hours.    * Stopped subcutaneous heparin on  2/6/23, pre nephrology's recommendation, can cause RTA       Nephrology's input appreciated.     Encourage fluids.     Continue NaBicarbonate  1300 mg BID     Nephrology started Lokelma on 02/07/23 10 mg TID > stopped on 2/9/23     Nephrology switched oral DDAVP to Amiloride 5mg po daily (stopped 2/16) per nephrology due to mild hyperkalemia at 5.2    Management per nephrology    On low K diet     Loose stools, RESOLVING  Improved now     Suspected protein-calorie malnutrition  Prior hypoglycemia:  * TFs stopped on 2/5/23    On low K diet      Appreciate nutrition consult, last seen on 02/10/23     Started Nepro daily on 2/9/23     Superficial thrombus of R cephalic vein:  * RUE edematous monitor,     R subclavian CVC removed on 2/5/23      Chronic anemia : Baseline Hgb ~ 10. No evidence of bleeding, periodically monitor.  No indication for a current transfusion.  H/o perforated PUD   *B12 elevated at 1249, iron panel normal, folate normal on 02/07/23     Follow with initiation of ASA for DVT ppx.     Continue PPI.      Deconditioned    PT ordered     Ambulate with assist QID     Suspected pressure lesion from prior treatment with BiPAP  Crusted ulceration on tip of nose as well as linear skin breakdown over bridge of nose.    Wound care nurse consult on 02/10/23 appreciated          Diet: Combination Diet 3 gm K Diet  Diet  Snacks/Supplements Adult: Nepro Oral Supplement; Between Meals    DVT Prophylaxis: Pneumatic Compression Devices  Suresh Catheter: Not present  Lines: None     Cardiac Monitoring: None  Code Status: Full Code      Clinically Significant Risk Factors           # Hypercalcemia: Highest Ca = 10.9 mg/dL in last 2 days, will monitor as appropriate    # Hypoalbuminemia: Lowest albumin = 2.3 g/dL at 2/1/2023  7:30 PM, will monitor as appropriate                   Disposition Plan      Expected Discharge Date: 02/20/2023, 12:00 PM  Discharge Delays: Placement - Mental  Health/Addiction/Geripsych  Destination: inpatient rehabilitation facility  Discharge Comments: awaiting for [facility] psych bed; medically stable to discharge.          Sonia Chua MD  Hospitalist Service  [facility]  Securely message with [employer] (more info)  Text page via [employer] Paging/Directory   ______________________________________________________________________    Interval History   Care assumed for today.  Patient seen and examined this afternoon.  She is up in chair feeling well overall.  Denies any pain, fever/chills, or shortness of breath.  No acute events in the last 24 hours.  [facility] psych bed placement pending    Physical Exam   Vital Signs: Temp: 98  F (36.7  C) Temp src: Oral BP: 108/75 Pulse: 82   Resp: 16 SpO2: 98 % O2 Device: None (Room air)    Weight: 110 lbs 14.26 oz    Gen: NAD, pleasant, up in chair  HEENT: EOMI, MMM  Resp: no focal crackles,  no wheezes, no increased work of resp  CV: S1S2 heard, reg rhythm, reg rate  Abdo: soft, nontender, nondistended, bowel sounds present  Ext: calves nontender, well perfused  Neuro: aa, conversant, CN grossly intact, no facial asymmetry      Medical Decision Making       28 MINUTES SPENT BY ME on the date of service doing chart review, history, exam, documentation & further activities per the note.      Data         Imaging results reviewed over the past 24 hrs:   No results found for this or any previous visit (from the past 24 hour(s)).

## 2023-02-17 NOTE — TELEPHONE ENCOUNTER
0040 Bed Search Update:    Awaiting availability at [facility].  Remains on wait list.

## 2023-02-17 NOTE — PLAN OF CARE
"Goal Outcome Evaluation:       Summary:  Polysubstance overdose- Suicide Attempt  DATE & TIME: 2/16/23-2/17/23 0606-0408  Cognitive Concerns/ Orientation : A&Ox4.   BEHAVIOR & AGGRESSION TOOL COLOR: Green; denies suicidal ideation    ABNL VS/O2: VSS on room air  MOBILITY: SBA with GB and walker.  Leg brace on RLE when ambulating. Not OOB this shift  PAIN MANAGMENT: Denies   DIET: Regular.   BOWEL/BLADDER: Continent. Purewick in place. No BM this shift  ABNL LAB/BG: creat 2.02 BUN 51.4  DRAIN/DEVICES: PIV, SL.   TELEMETRY RHYTHM: NA  SKIN: PI with scabbing on bridge of nose, WOC following, Scattered small bruises. Refused full skin assessment  TESTS/PROCEDURES: None   D/C DATE: Per psych note, \"pending bed availability for inpatient [facility]-psych\".   OTHER IMPORTANT INFO: on scheduled Xanax during the day. Nephrology signed off.                 "

## 2023-02-17 NOTE — PROGRESS NOTES
[facility]  WO Nurse Inpatient Assessment     Consulted for:  'pressure wound on nose suspect secondary to prior BiPAP'     Areas Assessed:      Areas visualized during today's visit: nose    Wound location:  Tip of nose  Last photo: 2-17-23        2-10-23      Wound due to: Pressure Injury vs friction  Wound history/plan of care: per chart review pt was previously in ICU, was initially intubated then on BiPAP and oxy mask at times and eventually nasal cannula.  Has not needed any respiratory devices since leaving ICU.    Wound base: dry red scabbing, starting to lift at edges     Palpation of the wound bed: mild firm, non fluctuant     Drainage: none     Measurements (length x width x depth, in cm): approx 0.6 x 1 x 0cm        Tunneling: N/A     Undermining: N/A  Periwound skin: Intact      Color: normal and consistent with surrounding tissue      Temperature: normal   Odor: none  Pain: denies     Pain interventions prior to dressing change: no significant pain present   Treatment goal: Heal   STATUS: improving and stable  Supplies ordered: stored on unit        Treatment Plan:      Wound care  Start:  02/15/23 0600,   DAILY,   Routine        Comments: Location: Nose   Care: provided daily by primary RN   1. Cleanse daily with brandon spray cleanser, wipe with dry gauze.   2. Apply pea size amount of Sween 24 lotion to area, leave open to air      Modified from: Wound care - DAILY Start: 02/11/23 0600, DAILY, Routine          Orders: Reviewed    RECOMMEND PRIMARY TEAM ORDER: None, at this time  Education provided: plan of care and wound progress  Discussed plan of care with: Patient and Nurse Mariia FELICIANO  [facility] nurse follow-up plan: weekly  Notify [facility] if wound(s) deteriorate.  Nursing to notify the Provider(s) and re-consult the [facility] Nurse if new skin concern.    DATA:     Current support surface: Standard  Atmos Air mattress  Containment of urine/stool: not assessed with consult   BMI: Body mass  index is 19.03 kg/m .   Active diet order: Orders Placed This Encounter      Combination Diet 3 gm K Diet      Diet     Output: I/O last 3 completed shifts:  In: 960 [P.O.:960]  Out: 3500 [Urine:3500]     Labs:   Recent Labs   Lab 02/14/23  0824   HGB 9.4*   WBC 8.2     Pressure injury risk assessment:   Sensory Perception: 3-->slightly limited  Moisture: 4-->rarely moist  Activity: 3-->walks occasionally  Mobility: 3-->slightly limited  Nutrition: 3-->adequate  Friction and Shear: 3-->no apparent problem  Binu Score: 19    Ronit Elizabeth RN CWOCN  -Securely message with [employer] (more info) - can reach individually by name or search 'WOC Nurse' (Linsey) to reach all current WOCs on duty.  -WOC Office Phone: [phone]

## 2023-02-17 NOTE — TELEPHONE ENCOUNTER
R: The patient is currently in the [address] unit awaiting placement. Patient is voluntary for [facility] only.     Intake morning Bed Search (Done at 10:35 AM)    [facility] and [address] at capacity. The patient remains on the waitlist. Intake continues to identify appropriate bed placement.

## 2023-02-17 NOTE — PLAN OF CARE
Goal Outcome Evaluation:  Summary:  Polysubstance overdose- Suicide Attempt  DATE & TIME: 2/16/23  [id number]  Cognitive Concerns/ Orientation : A&Ox4.   BEHAVIOR & AGGRESSION TOOL COLOR: Green; denies suicidal ideation    ABNL VS/O2: VSS on room air  MOBILITY: SBA with GB and walker.  Leg brace on RLE when ambulating.  Ambulated hallways X2 this shift.  PAIN MANAGMENT: Denies   DIET: Regular.   BOWEL/BLADDER: Continent. Purewick in place overnight  ABNL LAB/BG: creat 2.02 BUN 51.4  DRAIN/DEVICES: PIV, SL.   TELEMETRY RHYTHM: NA  SKIN: PI with scabbing on bridge of nose, WOC nurse followed up and encouraged patient to use Sween cream to area.  Scattered small bruises.   TESTS/PROCEDURES: None   D/C DATE: Pending psych rec and PT/OT level; inpatient mental health unit vs home  OTHER IMPORTANT INFO: on scheduled Xanax during the day. Nephrology following and no further recommendations at this time.

## 2023-02-18 LAB — LITHIUM SERPL-SCNC: 1 MMOL/L (ref 0.6–1.2)

## 2023-02-18 PROCEDURE — 250N000013 HC RX MED GY IP 250 OP 250 PS 637: Performed by: INTERNAL MEDICINE

## 2023-02-18 PROCEDURE — 250N000013 HC RX MED GY IP 250 OP 250 PS 637: Performed by: PSYCHIATRY & NEUROLOGY

## 2023-02-18 PROCEDURE — 99232 SBSQ HOSP IP/OBS MODERATE 35: CPT | Performed by: HOSPITALIST

## 2023-02-18 PROCEDURE — 80178 ASSAY OF LITHIUM: CPT | Performed by: HOSPITALIST

## 2023-02-18 PROCEDURE — 120N000001 HC R&B MED SURG/OB

## 2023-02-18 PROCEDURE — 36415 COLL VENOUS BLD VENIPUNCTURE: CPT | Performed by: HOSPITALIST

## 2023-02-18 RX ADMIN — LITHIUM CARBONATE 150 MG: 150 CAPSULE, GELATIN COATED ORAL at 19:57

## 2023-02-18 RX ADMIN — SODIUM BICARBONATE 650 MG TABLET 1300 MG: at 17:53

## 2023-02-18 RX ADMIN — DIAZEPAM 1 MG: 2 TABLET ORAL at 16:26

## 2023-02-18 RX ADMIN — PANTOPRAZOLE SODIUM 40 MG: 40 TABLET, DELAYED RELEASE ORAL at 06:55

## 2023-02-18 RX ADMIN — SODIUM BICARBONATE 650 MG TABLET 1300 MG: at 09:39

## 2023-02-18 RX ADMIN — LITHIUM CARBONATE 150 MG: 150 CAPSULE, GELATIN COATED ORAL at 14:33

## 2023-02-18 RX ADMIN — LITHIUM CARBONATE 150 MG: 150 CAPSULE, GELATIN COATED ORAL at 09:40

## 2023-02-18 RX ADMIN — ASPIRIN 81 MG: 81 TABLET, COATED ORAL at 09:40

## 2023-02-18 ASSESSMENT — ACTIVITIES OF DAILY LIVING (ADL)
ADLS_ACUITY_SCORE: 45
ADLS_ACUITY_SCORE: 46
ADLS_ACUITY_SCORE: 46
ADLS_ACUITY_SCORE: 45
ADLS_ACUITY_SCORE: 46

## 2023-02-18 NOTE — PROGRESS NOTES
Care Management Follow Up    Length of Stay (days): 20    Expected Discharge Date: 02/20/2023     Concerns to be Addressed: adjustment to diagnosis/illness, discharge planning     Patient plan of care discussed at interdisciplinary rounds: Yes    Anticipated Discharge Disposition: Inpatient psych     Anticipated Discharge Services:    Anticipated Discharge DME:      Patient/family educated on Medicare website which has current facility and service quality ratings:    Education Provided on the Discharge Plan:    Patient/Family in Agreement with the Plan: yes    Referrals Placed by CM/SW:    Private pay costs discussed: Not applicable    Additional Information:  Writer talked to Acosta (patient's brother) as he said that [facility] is going to do an assessment on 2/24. He said that he told them that patient is waiting for [facility] bed before moving into [facility] and they are aware of this.     Addendum 1430: Writer called [facility] ([phone]) and asked if they had an available bed. She said that she doesn't have any available beds today. Writer called [facility] ([phone]) and they are at capacity until 2/19 at 1000. Writer will call tomorrow.    Will continue to follow.    LUCERO Irving

## 2023-02-18 NOTE — PROGRESS NOTES
[facility]    Medicine Progress Note - Hospitalist Service    Date of Admission:  1/28/2023    Assessment & Plan   Patient is sitting comfortably in chair.  No acute events.  Patient still feels depressed.  No active suicidal ideation currently.  She was seen by psychiatry yesterday lithium doses was increased to 150 mg 3 times a day.  No new complaints or events         Polysubstance Overdose/Suicide Attempt  Distributive shock causing hypotension from Ca channel blocker overdose, RESOLVED  Acute encephalopathy caused by overdose, RESOLVED  Acute hypoxic resp failure with overdose. RESOLVED  * Patient continues to be at risk for suicide    Patient's mental health has severely decompensated since stopping lithium a year ago.   ? After discussion among nephrology, patient and family it was decided to resume lithium   ? Continue daily lithium level    Psychiatry reconsulted on 2/13/2023 and increased lithium dosage to 150 mg p.o. 3 times a day      Patient's sodium and creatinine stayed stable on lithium.  Patient should have access to free water at all times especially with the polyuria and polydipsia in the setting of lithium usage.   Patient is medically stable for discharge to [facility] when bed is available     Diabetes Insipidus, partial   Severe nocturia -2/7 reported getting up 8-10 times per night to urinate  REI to 2.56 max on 2/2/23, resolving   CKD baseline Cr ~ 1.5-2.   Metabolic/lacitc acidosis associated with overdose, resolved. Recurring on 02/06/23  Hyperkalemia with acidosis, suggestive of RTA  *S/p IVF and continued on TF flushes 200 ml every 2 hours.    * Stopped subcutaneous heparin on 2/6/23, pre nephrology's recommendation, can cause RTA       Nephrology's input appreciated.     Encourage fluids.     Continue NaBicarbonate  1300 mg BID     Nephrology started Lokelma on 02/07/23 10 mg TID > stopped on 2/9/23     Nephrology switched oral DDAVP to Amiloride 5mg po  daily (stopped 2/16) per nephrology due to mild hyperkalemia at 5.2    Management per nephrology    On low K diet     Loose stools, RESOLVING  Improved now     Suspected protein-calorie malnutrition  Prior hypoglycemia:  * TFs stopped on 2/5/23    On low K diet      Appreciate nutrition consult, last seen on 02/10/23     Started Nepro daily on 2/9/23     Superficial thrombus of R cephalic vein:  * RUE edematous monitor,     R subclavian CVC removed on 2/5/23      Chronic anemia : Baseline Hgb ~ 10. No evidence of bleeding, periodically monitor.  No indication for a current transfusion.  H/o perforated PUD   *B12 elevated at 1249, iron panel normal, folate normal on 02/07/23     Follow with initiation of ASA for DVT ppx.     Continue PPI.      Deconditioned    PT ordered     Ambulate with assist QID     Suspected pressure lesion from prior treatment with BiPAP  Crusted ulceration on tip of nose as well as linear skin breakdown over bridge of nose.    Wound care nurse consult on 02/10/23 appreciated          Diet: Combination Diet 3 gm K Diet  Diet  Snacks/Supplements Adult: Nepro Oral Supplement; Between Meals    DVT Prophylaxis: Pneumatic Compression Devices  Suresh Catheter: Not present  Lines: None     Cardiac Monitoring: None  Code Status: Full Code      Clinically Significant Risk Factors              # Hypoalbuminemia: Lowest albumin = 2.3 g/dL at 2/1/2023  7:30 PM, will monitor as appropriate                   Disposition Plan      Expected Discharge Date: 02/20/2023, 12:00 PM  Discharge Delays: Placement - Mental Health/Addiction/Geripsych  Destination: inpatient rehabilitation facility  Discharge Comments: awaiting for [facility] bed (called 2/17 at capacity with no discharges); medically stable to discharge.          Fausto Roberto MD  Hospitalist Service  [facility]  Securely message with [employer] (more info)  Text page via [employer] Paging/Directory    ______________________________________________________________________    Interval History   Feels same, she is sitting up in a chair reading a book. She had no questions for me, and she had no complaints    No acute events in the last 24 hours.  [facility] bed placement pending    Physical Exam   Vital Signs: Temp: 97.5  F (36.4  C) Temp src: Oral BP: (!) 137/91 Pulse: 71   Resp: 17 SpO2: 97 % O2 Device: None (Room air)    Weight: 110 lbs 14.26 oz    Gen: NAD, pleasant, up in chair  HEENT:  MMM  Resp: no focal crackles,  no wheezes, no increased work of resp  CV: S1S2 heard, reg rhythm, reg rate  Ext: calves nontender, well perfused        Medical Decision Making       28 MINUTES SPENT BY ME on the date of service doing chart review, history, exam, documentation & further activities per the note.      Data     I have personally reviewed the following data over the past 24 hrs:    N/A  \   N/A   / N/A     131 (L) 101 61.0 (H) /  117 (H)   5.3 19 (L) 2.03 (H) \       Imaging results reviewed over the past 24 hrs:   No results found for this or any previous visit (from the past 24 hour(s)).

## 2023-02-18 NOTE — TELEPHONE ENCOUNTER
[facility] Access Inpatient Bed Call Log 2/18/23 7:35 AM    Facilities that have not updated websites in the last 12 hours have been called.     Adults:    [facility] is posting 0 beds.    [facility] is posting 0 beds. Negative covid.     [facility] is posting 0 beds. 7:36am Per Tawnya, they are at cap.    [facility] is posting 0 beds    [facility] are posting 0 beds.     [facility] is posting 0 beds. Negative COVID.    [facility] has 0 beds. Extensive wait List for committed patients.    [facility] is posting 0 bed.         [facility] is posting 0 beds. Mixed unit 12+. Low acuity only. Negative covid.  7:38am Per Jacob, they are reviewing but case-by-case, very low acuity.    [facility] has 0 bed. No aggression.  Negative Covid.    [facility] is posting 0 beds.  Negative covid.    [facility] is posting 0 bed. Low acuity only.  Negative covid. - 7:39am Per Mariana, they are capped.    [facility] is posting 0 beds. Negative covid. Low acuity only.     [facility] has 0 beds. Low acuity. Negative covid. - 7:39am Per Mariana, they are capped.   [facility] is posting 1 bed. Low acuity. 72 HH hold preferred. - 7:42am Per Mabel, they are reiewing low acuity pt s.    [facility] is posting 3 beds. Low acuity. Negative covid.  - 7:39am Per Mariana, potential low acuity beds available.    [facility] has 4 beds. Negative covid. Vol only, No history of aggression, violence, or assault. No sexual offenders. No 72 HH holds.    [facility] is posting 8 beds. Negative covid. (Must have the cognitive ability to do programming. No aggressive or violent behavior or recent HX in the last 2 yrs. MH must be primary.) Always low acuity.    [facility] has 0 beds. Negative Covid. Low acuity only. Violence and aggression capped.    [facility] is posting possible 0 beds. Low acuity, Negative Covid.  - 7:44am Per Madie, they are on  divert.   [facility] is posting 0 beds. Covid Negative. Vol only. Combined adolescent and adult unit. No aggressive or violent behavior. No registered sex offenders.  - 7:45am Per Ebony, they have 1 bed available.    [facility] posting 0 beds. Negative covid.    is posting 2 beds. No covid test required. - 7:48am Per Zaira they are open to reviewing with 2 beds available.    [facility] posting 1 bed. Negative covid. (No lines, drains, or tubes, oxygen, CPAP, IV, etc.).       7:50am There are no appropriate beds available at this time.

## 2023-02-18 NOTE — PLAN OF CARE
"Summary:  Polysubstance overdose- Suicide Attempt  DATE & TIME: 2/17/23 4250-0198  Cognitive Concerns/ Orientation : A&Ox4.   BEHAVIOR & AGGRESSION TOOL COLOR: Green; denies suicidal ideation    ABNL VS/O2: Pt refused vitals overnight  MOBILITY: SBA with GB and walker.  Leg brace on RLE when ambulating. Not OOB this shift  PAIN MANAGMENT: Denies   DIET: Regular.   BOWEL/BLADDER: Continent. Purewick in place. No BM this shift  ABNL LAB/BG: creat 2.02   DRAIN/DEVICES: PIV, SL.   TELEMETRY RHYTHM: NA  SKIN: PI with scabbing on bridge of nose, WOC following, Scattered small bruises. Refused full skin assessment  TESTS/PROCEDURES: None   D/C DATE: Per psych note, \"pending bed availability for inpatient psych\".   OTHER IMPORTANT INFO: Nephrology signed off.  "

## 2023-02-18 NOTE — PROGRESS NOTES
Shift Summary [id number]    Admitting Diagnosis: Overdose, intentional self-harm, initial encounter (H) [T50.932A]   Vitals NL.   Pain denies pain.    A&Ox4  Voiding WNL.   Mobility : must wear AFO on RLE when OOB and ambulation. Ax1, walker GB.   Tele NA.   CMS numbness on LUE.   Lung Sounds clear on room air.    GI : WNL.   Dressing : wound on nose CDI. ULISSES.     Orders Placed This Encounter      Combination Diet 3 gm K Diet      Diet       Plan:   Awaiting placement to a [facility] (bed availability).

## 2023-02-18 NOTE — TELEPHONE ENCOUNTER
Inpatient Bed Call Log 2.16.23 7:45 pm    Adults:    [facility] is posting 0 bed.      [facility] is posting 0 beds.     [facility] is posting 0 beds.     [facility] is posting 0 beds.     [facility] is posting 0 beds.     [facility] is posting 0 beds.     [facility] is posting 0 beds.     [facility] is posting 0 beds.

## 2023-02-19 ENCOUNTER — TELEPHONE (OUTPATIENT)
Dept: BEHAVIORAL HEALTH | Facility: CLINIC | Age: 73
End: 2023-02-19
Payer: MEDICARE

## 2023-02-19 ENCOUNTER — APPOINTMENT (OUTPATIENT)
Dept: PHYSICAL THERAPY | Facility: CLINIC | Age: 73
DRG: 917 | End: 2023-02-19
Payer: MEDICARE

## 2023-02-19 LAB — LITHIUM SERPL-SCNC: 1.1 MMOL/L (ref 0.6–1.2)

## 2023-02-19 PROCEDURE — 250N000013 HC RX MED GY IP 250 OP 250 PS 637: Performed by: PSYCHIATRY & NEUROLOGY

## 2023-02-19 PROCEDURE — 80048 BASIC METABOLIC PNL TOTAL CA: CPT | Performed by: HOSPITALIST

## 2023-02-19 PROCEDURE — 99232 SBSQ HOSP IP/OBS MODERATE 35: CPT | Performed by: HOSPITALIST

## 2023-02-19 PROCEDURE — 250N000013 HC RX MED GY IP 250 OP 250 PS 637: Performed by: INTERNAL MEDICINE

## 2023-02-19 PROCEDURE — 120N000001 HC R&B MED SURG/OB

## 2023-02-19 PROCEDURE — 36415 COLL VENOUS BLD VENIPUNCTURE: CPT | Performed by: HOSPITALIST

## 2023-02-19 PROCEDURE — 80178 ASSAY OF LITHIUM: CPT | Performed by: HOSPITALIST

## 2023-02-19 PROCEDURE — 97116 GAIT TRAINING THERAPY: CPT | Mod: GP

## 2023-02-19 RX ADMIN — SODIUM BICARBONATE 650 MG TABLET 1300 MG: at 08:31

## 2023-02-19 RX ADMIN — PANTOPRAZOLE SODIUM 40 MG: 40 TABLET, DELAYED RELEASE ORAL at 06:38

## 2023-02-19 RX ADMIN — LITHIUM CARBONATE 150 MG: 150 CAPSULE, GELATIN COATED ORAL at 08:31

## 2023-02-19 RX ADMIN — ASPIRIN 81 MG: 81 TABLET, COATED ORAL at 08:31

## 2023-02-19 RX ADMIN — DIAZEPAM 1 MG: 2 TABLET ORAL at 16:43

## 2023-02-19 RX ADMIN — LITHIUM CARBONATE 150 MG: 150 CAPSULE, GELATIN COATED ORAL at 17:52

## 2023-02-19 RX ADMIN — LITHIUM CARBONATE 150 MG: 150 CAPSULE, GELATIN COATED ORAL at 12:38

## 2023-02-19 RX ADMIN — SODIUM BICARBONATE 650 MG TABLET 1300 MG: at 17:52

## 2023-02-19 ASSESSMENT — ACTIVITIES OF DAILY LIVING (ADL)
ADLS_ACUITY_SCORE: 51
ADLS_ACUITY_SCORE: 47
ADLS_ACUITY_SCORE: 45
ADLS_ACUITY_SCORE: 45
ADLS_ACUITY_SCORE: 47
ADLS_ACUITY_SCORE: 45
ADLS_ACUITY_SCORE: 51
ADLS_ACUITY_SCORE: 51
ADLS_ACUITY_SCORE: 45
ADLS_ACUITY_SCORE: 47

## 2023-02-19 NOTE — TELEPHONE ENCOUNTER
7:20 Bed Search Update:     Awaiting availability at [facility].  Remains on wait list.

## 2023-02-19 NOTE — PROGRESS NOTES
[facility]    Medicine Progress Note - Hospitalist Service    Date of Admission:  1/28/2023    Assessment & Plan   Patient is sitting comfortably in chair.  No acute events.  Patient still feels depressed.  No active suicidal ideation currently.  She was seen by psychiatry yesterday lithium doses was increased to 150 mg 3 times a day.  No new complaints or events       Polysubstance Overdose/Suicide Attempt  Distributive shock causing hypotension from Ca channel blocker overdose, RESOLVED  Acute encephalopathy caused by overdose, RESOLVED  Acute hypoxic resp failure with overdose. RESOLVED  * Patient continues to be at risk for suicide    Patient's mental health has severely decompensated since stopping lithium a year ago.   ? resumed lithium after a  discusion with Nephrology and family    Psychiatry reconsulted on 2/13/2023 and increased lithium dosage to 150 mg p.o. 3 times a day      Patient's sodium and creatinine stayed stable on lithium.  Patient should have access to free water at all times especially with the polyuria and polydipsia in the setting of lithium usage.   Patient is medically stable for discharge to [facility] when bed is available     Diabetes Insipidus, partial   Severe nocturia -2/7 reported getting up 8-10 times per night to urinate  REI to 2.56 max on 2/2/23, resolving   CKD baseline Cr ~ 1.5-2.   Metabolic/lacitc acidosis associated with overdose, resolved. Recurring on 02/06/23  Hyperkalemia with acidosis, suggestive of RTA  *S/p IVF and continued on TF flushes 200 ml every 2 hours.    * Stopped subcutaneous heparin on 2/6/23, pre nephrology's recommendation, can cause RTA       Nephrology's input appreciated.     Encourage fluids.     Continue sodium-bicarb 1300 mg BID     Nephrology switched oral DDAVP to Amiloride 5mg po daily (stopped 2/16) per nephrology due to mild hyperkalemia at 5.2    Management per nephrology    On low K diet     Loose stools,  RESOLVING  Improved now     Suspected protein-calorie malnutrition  Prior hypoglycemia:  * TFs stopped on 2/5/23    On low K diet      Appreciate nutrition consult, last seen on 02/10/23     Started Nepro daily on 2/9/23     Superficial thrombus of R cephalic vein:  * RUE edematous monitor,     R subclavian CVC removed on 2/5/23      Chronic anemia : Baseline Hgb ~ 10. No evidence of bleeding, periodically monitor.  No indication for a current transfusion.  H/o perforated PUD   *B12 elevated at 1249, iron panel normal, folate normal on 02/07/23     Follow with initiation of ASA for DVT ppx.     Continue PPI.      Deconditioned    PT ordered     Ambulate with assist QID     Suspected pressure lesion from prior treatment with BiPAP  Crusted ulceration on tip of nose as well as linear skin breakdown over bridge of nose.    Wound care nurse consult on 02/10/23 appreciated          Diet: Combination Diet 3 gm K Diet  Diet  Snacks/Supplements Adult: Nepro Oral Supplement; Between Meals    DVT Prophylaxis: Pneumatic Compression Devices  Suresh Catheter: Not present  Lines: None     Cardiac Monitoring: None  Code Status: Full Code      Clinically Significant Risk Factors              # Hypoalbuminemia: Lowest albumin = 2.3 g/dL at 2/1/2023  7:30 PM, will monitor as appropriate                   Disposition Plan      Expected Discharge Date: 02/20/2023,  3:00 PM  Discharge Delays: Placement - Mental Health/Addiction/Geripsych  Destination: inpatient rehabilitation facility  Discharge Comments: awaiting for [facility] bed (called 2/17 at capacity with no discharges); medically stable to discharge.          Fausto Roberto MD  Hospitalist Service  [facility]  Securely message with [employer] (more info)  Text page via [phone] Paging/Directory   ______________________________________________________________________    Interval History   Feels same, she is sitting up in bed. She had no questions for  me, and she had no complaints    No acute events in the last 24 hours.  [facility] bed placement pending    Physical Exam   Vital Signs: Temp: 97.7  F (36.5  C) Temp src: Oral BP: (!) 144/88 Pulse: 74   Resp: 17 SpO2: 100 % O2 Device: None (Room air)    Weight: 109 lbs 0 oz    Gen: NAD, pleasant, up in chair  HEENT:  MMM  Resp: no focal crackles,  no wheezes, no increased work of resp  CV: S1S2 heard, reg rhythm, reg rate  Ext: calves nontender, well perfused        Medical Decision Making       28 MINUTES SPENT BY ME on the date of service doing chart review, history, exam, documentation & further activities per the note.      Data         Imaging results reviewed over the past 24 hrs:   No results found for this or any previous visit (from the past 24 hour(s)).

## 2023-02-19 NOTE — PROGRESS NOTES
Care Management Follow Up    Length of Stay (days): 21    Expected Discharge Date: 02/20/2023     Concerns to be Addressed: adjustment to diagnosis/illness, discharge planning     Patient plan of care discussed at interdisciplinary rounds: Yes    Anticipated Discharge Disposition: Inpatient geripsych     Anticipated Discharge Services:  Inpatient geripsych  Anticipated Discharge DME:      Patient/family educated on Medicare website which has current facility and service quality ratings:    Education Provided on the Discharge Plan:    Patient/Family in Agreement with the Plan: yes    Referrals Placed by CM/SW:    Private pay costs discussed: Not applicable    Additional Information:  [facility] called [facility] ([phone]) and they are at capacity. She said to call on 2/20 at 1000.    Will continue to follow.    LUCERO Irving

## 2023-02-19 NOTE — PLAN OF CARE
"Goal Outcome Evaluation:         Summary: Polysubstance overdose- Suicide Attempt  DATE & TIME: 2/18/23-2/19/23 4482-4878  Cognitive Concerns/ Orientation : A&Ox4.   BEHAVIOR & AGGRESSION TOOL COLOR: Green; denies suicidal ideation    ABNL VS/O2: VSS on RA  MOBILITY: SBA with GB and walker.   PAIN MANAGMENT: Denies   DIET: Regular.   BOWEL/BLADDER: Continent. Purewick in place. No BM this shift  ABNL LAB/BG: none this shift  DRAIN/DEVICES: PIV, SL.   TELEMETRY RHYTHM: NA  SKIN: PI with scabbing on bridge of nose, WOC following, Scattered small bruises. Refused full skin assessment  TESTS/PROCEDURES: None   D/C DATE: Per psych note, \"pending bed availability for inpatient psych\".   OTHER IMPORTANT INFO: Nephrology signed off.               "

## 2023-02-20 ENCOUNTER — TELEPHONE (OUTPATIENT)
Dept: BEHAVIORAL HEALTH | Facility: CLINIC | Age: 73
End: 2023-02-20
Payer: MEDICARE

## 2023-02-20 ENCOUNTER — APPOINTMENT (OUTPATIENT)
Dept: OCCUPATIONAL THERAPY | Facility: CLINIC | Age: 73
DRG: 917 | End: 2023-02-20
Payer: MEDICARE

## 2023-02-20 LAB
ANION GAP SERPL CALCULATED.3IONS-SCNC: 10 MMOL/L (ref 7–15)
BUN SERPL-MCNC: 66.1 MG/DL (ref 8–23)
CALCIUM SERPL-MCNC: 10.2 MG/DL (ref 8.8–10.2)
CHLORIDE SERPL-SCNC: 99 MMOL/L (ref 98–107)
CREAT SERPL-MCNC: 2.36 MG/DL (ref 0.51–0.95)
DEPRECATED HCO3 PLAS-SCNC: 21 MMOL/L (ref 22–29)
GFR SERPL CREATININE-BSD FRML MDRD: 21 ML/MIN/1.73M2
GLUCOSE SERPL-MCNC: 93 MG/DL (ref 70–99)
LITHIUM SERPL-SCNC: 1.1 MMOL/L (ref 0.6–1.2)
POTASSIUM SERPL-SCNC: 4.9 MMOL/L (ref 3.4–5.3)
SODIUM SERPL-SCNC: 130 MMOL/L (ref 136–145)

## 2023-02-20 PROCEDURE — 250N000013 HC RX MED GY IP 250 OP 250 PS 637: Performed by: PSYCHIATRY & NEUROLOGY

## 2023-02-20 PROCEDURE — 97535 SELF CARE MNGMENT TRAINING: CPT | Mod: GO

## 2023-02-20 PROCEDURE — 120N000001 HC R&B MED SURG/OB

## 2023-02-20 PROCEDURE — 99232 SBSQ HOSP IP/OBS MODERATE 35: CPT | Performed by: PSYCHIATRY & NEUROLOGY

## 2023-02-20 PROCEDURE — 250N000013 HC RX MED GY IP 250 OP 250 PS 637: Performed by: INTERNAL MEDICINE

## 2023-02-20 PROCEDURE — 99232 SBSQ HOSP IP/OBS MODERATE 35: CPT | Performed by: HOSPITALIST

## 2023-02-20 PROCEDURE — 36415 COLL VENOUS BLD VENIPUNCTURE: CPT | Performed by: HOSPITALIST

## 2023-02-20 PROCEDURE — 80178 ASSAY OF LITHIUM: CPT | Performed by: HOSPITALIST

## 2023-02-20 RX ADMIN — SODIUM BICARBONATE 650 MG TABLET 1300 MG: at 17:09

## 2023-02-20 RX ADMIN — LITHIUM CARBONATE 150 MG: 150 CAPSULE, GELATIN COATED ORAL at 12:27

## 2023-02-20 RX ADMIN — PANTOPRAZOLE SODIUM 40 MG: 40 TABLET, DELAYED RELEASE ORAL at 06:40

## 2023-02-20 RX ADMIN — LITHIUM CARBONATE 150 MG: 150 CAPSULE, GELATIN COATED ORAL at 17:09

## 2023-02-20 RX ADMIN — SODIUM BICARBONATE 650 MG TABLET 1300 MG: at 07:52

## 2023-02-20 RX ADMIN — LITHIUM CARBONATE 150 MG: 150 CAPSULE, GELATIN COATED ORAL at 07:52

## 2023-02-20 RX ADMIN — ASPIRIN 81 MG: 81 TABLET, COATED ORAL at 07:52

## 2023-02-20 ASSESSMENT — ACTIVITIES OF DAILY LIVING (ADL)
ADLS_ACUITY_SCORE: 51

## 2023-02-20 NOTE — TELEPHONE ENCOUNTER
R: The patient is currently in the [place] [address] awaiting placement. Patient is voluntary for [facility] only placement.     Intake morning Bed Search (Done at 11:22 AM)    [facility] at [facility]. The patient remains on the waitlist. Intake continues to identify appropriate bed placement.

## 2023-02-20 NOTE — PLAN OF CARE
"Goal Outcome Evaluation:     Summary: Polysubstance overdose- Suicide Attempt  DATE & TIME: 2/20/2023 5598-0295  Cognitive Concerns/ Orientation : A&Ox4.   BEHAVIOR & AGGRESSION TOOL COLOR: Green; denies suicidal thoughts/ideations.    ABNL VS/O2: VS stable on room air  MOBILITY: SBA with GB and walker. Up to chair for meals. Ambulated in halls  PAIN MANAGMENT: Denies  DIET: Regular. Good appetite. Good oral fluid intake  BOWEL/BLADDER: Continent of both. Voiding to B/R  ABNL LAB/BG: Lithium level WNL. No other new labs today  DRAIN/DEVICES: PIV, SL.   TELEMETRY RHYTHM: NA  SKIN: PI with scabbing on bridge of nose, WOC following, Scattered small bruises. Slight blanchable redness to coccyx  TESTS/PROCEDURES: None   D/C DATE: Per psych note, \"pending bed availability for inpatient psych\".   OTHER IMPORTANT INFO: Nephrology signed off. Denies chest pain and SOB. CMS intact.   " Reason for Call:  Form, our goal is to have forms completed with 72 hours, however, some forms may require a visit or additional information.    Type of letter, form or note:  [facility]    Who is the form from?: Patient    Where did the form come from: Patient or family brought in       What clinic location was the form placed at?: [place]    Where the form was placed: [facility]'s box    What number is listed as a contact on the form?: [phone]       Additional comments: Please fax to [phone] when completed    Call taken on 6/22/2018 at 3:06 PM by Kim Campo

## 2023-02-20 NOTE — PROGRESS NOTES
Psychiatric Progress Note  Consult date: February 6, 2023         Reason for Consult, requesting source:    Suicide attempt/OD  Requesting source: Anabell Camacho    Labs and imaging reviewed. Patient seen and evaluated by Sandra Cox MD          HPI:   This is a 73 yo female who presented on 1/28/23 with a massive, intentional polypharmacy od .  Pt has a history of bipolar illness, and took a combinaton of lamictal, amlodipine, haldol, and adderall.  She had been in the ED 4 days prior for eval of SI.  Pt has had a difficult course related to calcium channel blocker od, and has been in the ICU, vented and on pressors with AHRF, REI on CRF, and encephalopthy.  We are asked to see for assistance with care for her depression, and appropriate disposition when she is ready medically to discharge.    Patient seen today along with her brother.  Both are very good historians.  It sounds like the patient was taken off lithium, her longtime medication for bipolar management, about a year ago.  This was related to worsening kidney function.  She has worked with her psychiatrist to try to find another regimen that was helpful.  She has been on Lamictal, as well as other medications.  None of these medications help with her depression.    Both the patient and her brother are in favor of her going back on lithium if this is a possibility.  I also would be in favor of it as well.  Clearly, the patient's bipolar illness is severe, and potentially lethal.  She was quite well managed on the lithium prior to it being discontinued.    2/7/23: Patient seen today for follow-up in the ICU.  She is waiting for a bed on the medical floor, and no longer requires a critical level of care.  She was awake, and tell me that she is feeling pretty anxious, which is her baseline when she is not on lithium.  She also said that the kidney doctor has been by, and they discussed going back on lithium.  She and I discussed low-dose Xanax to  help with her anxiety for now.  I discussed risk versus benefits of this medication and indicated to her this is a short-term medication, and that she does not have to take it if she does not care for it or if it does not work.    2/8/2023: Patient seen today with her brother, and RN at bedside.  Patient was sitting up and having some fruit.  She states she feels much better today both physically and mentally.  We discussed current lithium dosing, and the plan to titrate very slowly.  I also told her I think we should put her on the lowest for a geriatric psychiatry bed in the event that she still needs inpatient admission in 2 to 3 weeks.  She was agreeable to this.  Answered questions and discussed the overall strategy with the lithium.  I also told her that I do not recommend any other psychiatric medications for now.    2/9/2023: Patient seen today for follow-up.  Discussed with she and her brother.  Also discussed with Dr. Malik/renal.  Patient is feeling more depressed today, but admits that she did watch the news this morning which made her feel that things were more bleak.  We discussed increasing her lithium dose tomorrow pending okay from renal.  Patient denies any negative side effects that she is noting to the lithium.  We discussed other options besides watching the news or activities that are depressing.  Discussed this with she and her brother today to help bolster her spirits while we wait for the lithium to get to a therapeutic dose.     2/10/2023: Patient seen today for follow-up along with her brother.  Chart reviewed.  Patient indicates she still feeling depressed this morning.  I scheduled her lithium for lunchtime today, because I wanted to see her morning labs.  We discussed that this will switch to a.m. and q. evening meal starting tomorrow.  We also discussed that we may have the option of further titration on Monday.  Patient did tell me, interestingly, which her brother confirmed,  that she knows that she is getting depressed because she begins to have blurred vision.  This is been the same since she first became ill with severe depression at age 18.  Blurred vision or decreased visual acuity, bilateral, is the canary in the coal mine that she is becoming depressed.  When the visual acuity returns to normal, the depression goes away.    The patient did say that she does have a history of a very severe head injury when she tried to kill herself by jumping off a bridge when she was younger.  She broke her neck, and undoubtedly sustained a closed head injury as well.  The visual acuity changes began occurring before this.  Patient denied other associated symptoms such as ataxia or other neurologic symptoms that she is aware of aside from the changes in visual acuity.    2/13/2023: Patient seen today for follow-up.  Chart reviewed including nephrology note.  Patient states that she is ready to go home today.  She is still feeling depressed, and we discussed the fact that she is not safe to go home, and that we need to continue to titrate her medications as she tolerates it.  We agreed to continue to go up on the lithium today.  Patient's lithium level was 0.5 this morning.    2/14/2023: Patient seen today for follow-up along with her brother.  Discussed with RN, who was present in the room while we talked.  Also met with social work services.  Patient talked about wanting to go home so that she can do her taxes.  She states her mood is somewhat better today, but is still not good.  She denies any side effects from the lithium thus far.  Lithium level this morning was 0.6.  We discussed that the patient is in the queue for inpatient geriatric psychiatry bed.  Patient is somewhat ambivalent about this, but understands that I want her feeling well prior to going home due to her very recent, quite severe suicide attempt.  Brother is supportive of the plan, and states that he is trying to get her into  "assisted living.    2/15/2023:  Pt seen today for f/u.  Chart reviewed.  Patient reports xanax was helpful for anxiety last night.  She slept well.  She denies side effects with the Xanax.  The patient had questions about what it would be like on the inpatient psychiatry, and expressed that she is concerned about it.  We discussed the fact that she is not safe currently.  She indicated herself that she is concerned that if she goes home, that \"it may happen again\", referring to try to kill herself.  We discussed medication strategies, and talked about her current lithium level.    2/16/2023: Patient seen today for follow-up.  She states that she is feeling a little bit better today and feels Xanax is helping.  It is making her sleepy.  Also, the benefit from each dose lasts only for a couple of hours.  I asked her about whether or not she still feeling suicidal, and she states that talking about suicide, and everyone asking her about suicide has given her intrusive thoughts.  She then wonders if she is actually feeling suicidal, or for just reintroducing the thought of death.  She denies any feelings of trauma related to her suicide attempts, though I do wonder if this is the etiology of the intrusive thinking.    Patient would like to stay at her current lithium dose.  I am in agreement.  We discussed utilizing a longer acting benzodiazepine, Valium, and dosing it at night.  She would like to try this.  We are still awaiting an inpatient bed.    2/17/2023: Patient seen today for follow-up.  She reports that she is sleepy, but that Valium was helpful for her anxiety.  She is very worried about going to the psychiatric unit, and that \"I will never be released\".  She states that she was in the psychiatric unit years ago to work on her lithium, and that it was a good stay.  She really does not know what to expect at this point.    I did asked the patient how she would feel in terms of mood and her anxiety if her " ultimate goal was to go to the assisted living, and possibly not go to inpatient psychiatry.  She stated that that would be a relief.    2/20/2023: Patient seen today for follow-up.  She reports that she is feeling better today.  Mood is somewhat improved.  She does think that some of this is related to Valium.  The Valium is making her sedated during the day, so we will move to every other day dosing.  Nonetheless, and has had benefit for her.    She does not feel suicidal, is denying intrusive thoughts, or feeling unsafe if she is to discharge.  I do not think she needs to go to inpatient psychiatry any longer, and her brother is already looking for a spot for her at an assisted living facility.  She would like to continue with her outpatient psychiatrist, but is not sure if she wants to continue to see a therapist.        Past Psychiatric History:     Pt had recently been in the ED on 1/24 with thoughts of drowning herself while in a swimming pool.  She has an outpt psychiatrist, and a therapist, and had discharged after denying SI, and wood for safety.   Patient is seen by Dr. Trent Gallo for outpatient psychiatric management.        Substance Use and History:   Negative        Past Medical History:   PAST MEDICAL HISTORY:   Past Medical History:   Diagnosis Date     Benign essential hypertension 09/2018    added norvasc 9/18     Bipolar affective disorder (H)     hosp 1993, Dr. Aftab Deal     Cancer (H) 1996    DCIS, left breast     Chronic kidney disease, stage 3a (H)     seen by renal Dr. 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    felt due to lithium     Thalassaemia trait      Vitamin D deficiency        PAST SURGICAL HISTORY:   Past Surgical History:   Procedure Laterality Date     BIOPSY  1994    left breast     BREAST SURGERY      lumpectomy x 4     COLONOSCOPY  2021     COLONOSCOPY N/A 6/17/2022    Procedure: COLONOSCOPY, WITH POLYPECTOMY AND BIOPSY;  Surgeon: Panchito Gu MD;  Location:  GI     EYE SURGERY  2018    retina tear     LAPAROTOMY EXPLORATORY N/A 8/24/2022    Procedure: Exploratory laparotomy, REPAIR OF PERFORATED ULCER;  Surgeon: Ron Vidal MD;  Location:  OR     left hand surgery      last 1974             Family History:   FAMILY HISTORY:   Family History   Problem Relation Age of Onset     Cerebrovascular Disease Mother      Hypertension Father      Sleep Apnea Brother      Breast Cancer Maternal Aunt         x 2     Breast Cancer Other         Maternal Aunt     Hyperlipidemia Niece        Family Psychiatric History:         Social History:   SOCIAL HISTORY:   Social History     Tobacco Use     Smoking status: Never     Smokeless tobacco: Never   Substance Use Topics     Alcohol use: No            Physical ROS:   The 10 point Review of Systems is negative other than noted in the HPI or here.           Medications:       aspirin  81 mg Oral Daily     [START ON 2/21/2023] diazepam  1 mg Oral Every Other Day     lithium  150 mg Oral TID w/meals     pantoprazole  40 mg Oral QAM AC     sodium bicarbonate  1,300 mg Oral BID     sodium chloride (PF)  3 mL Intracatheter Q8H              Allergies:     Allergies   Allergen Reactions     No Known Allergies           Labs:     Recent Results (from the past 48 hour(s))   Lithium level    Collection Time: 02/19/23  3:23 PM   Result Value Ref Range    Lithium 1.1 0.6 - 1.2 mmol/L   Basic metabolic panel    Collection Time: 02/19/23 11:44 PM    Result Value Ref Range    Sodium 130 (L) 136 - 145 mmol/L    Potassium 4.9 3.4 - 5.3 mmol/L    Chloride 99 98 - 107 mmol/L    Carbon Dioxide (CO2) 21 (L) 22 - 29 mmol/L    Anion Gap 10 7 - 15 mmol/L    Urea Nitrogen 66.1 (H) 8.0 - 23.0 mg/dL    Creatinine 2.36 (H) 0.51 - 0.95 mg/dL    Calcium 10.2 8.8 - 10.2 mg/dL    Glucose 93 70 - 99 mg/dL    GFR Estimate 21 (L) >60 mL/min/1.73m2   Lithium level    Collection Time: 02/20/23 10:20 AM   Result Value Ref Range    Lithium 1.1 0.6 - 1.2 mmol/L          Physical and Psychiatric Examination:     /68 (BP Location: Right arm, Patient Position: Chair)   Pulse 82   Temp 98.4  F (36.9  C) (Oral)   Resp 16   Wt 49.4 kg (109 lb)   SpO2 98%   BMI 18.71 kg/m    Weight is 109 lbs 0 oz  Body mass index is 18.71 kg/m .    Physical Exam:  I have reviewed the physical exam as documented by by the medical team and agree with findings and assessment and have no additional findings to add at this time.    Mental Status Exam:    Appearance: awake, alert, appeared as age stated, awake, alert and cooperative  Attitude:  cooperative  Eye Contact:  good  Mood:  depressed and better  Affect:  mood congruent  Speech:  clear, coherent and decreased prosody  Language: Fluent in english   Psychomotor Behavior:  no evidence of tardive dyskinesia, dystonia, or tics and physical retardation  Thought Process:  logical, linear and goal oriented  Associations:  no loose associations  Thought Content:  no evidence of suicidal ideation or homicidal ideation, no evidence of psychotic thought, no auditory hallucinations present and no visual hallucinations present  Insight:  good  Judgement:  intact  Oriented to:  time, person, and place  Attention Span and Concentration:  intact  Recent and Remote Memory:  intact  Fund of Knowledge: Appropriate   Gait and Station: Sitting up in a chair               DSM-5 Diagnosis:   296.53 Bipolar I Disorder Current or Most Recent Episode Depressed, Severe  with psychotic features, toxic metabolic encephalopathy which is resolving, Intentional overdose, subsequent encounter, anxiety.  Chronic kidney disease for, diabetes insipidus              Assessment:   This is a 72-year-old female with a history of bipolar illness spanning 50 years.  Patient was initially diagnosed with schizophrenia when she first became ill.  Later, this diagnosis was modified to bipolar illness, which both she and her brother feel is accurate.  She does have a history of ECT many years ago.  It is unclear whether this was helpful or not.  What has been helpful has been lithium.  This medication was discontinued a year ago due to worsening kidney function.  The patient has not been able to stabilize since going off this medication, whereas she was stable and functional on lithium.    Patient and her brother, who are both very high functioning, would like her to go back on lithium.  I am in agreement.  We will reach out to the hospitalist to better understand what her options are.  Regardless, she will need to go to an inpatient psychiatric bed for further stabilization after she is medically ready to discharge.    Patient's depression may be improving now that I have added low-dose benzodiazepine.  Lithium I do also believe is beginning to take effect.  The Valium is continuing to cause some daytime somnolence at 1 mg at at bedtime.  We will spread that dosing out to every other evening.  We may go to every third evening.  Valium does have a long enough half-life that a very small dose is going a long way for this patient, and has been very beneficial for her.    I think it is appropriate for the patient to go to assisted living, rather than inpatient psychiatry.  She no longer meets criteria, and I do not think it would be therapeutic.    I do note that patient's creatinine is trending upwards.  Do we need to reconsult renal?            Summary of Recommendations:   1.  Adjusted patient's  Valium dose to 1 mg every other night.    2.  We still do not have an appropriate lithium trough level.  Her lithium level looks high because it is being drawn a couple of hours or an hour after she has gotten a dose.  We will try to order a lithium level tomorrow morning again.    3.  Patient does not require a sitter at this point.    4.  Patient no longer requires inpatient psychiatric hospitalization.    Sandra Cox MD  Consult/Liaison Psychiatry and Addiction Medicine  [facility]

## 2023-02-20 NOTE — PLAN OF CARE
"Goal Outcome Evaluation:       Summary: Polysubstance overdose- Suicide Attempt  DATE & TIME: 2/19-20/23 6952-6429  Cognitive Concerns/ Orientation : A&Ox4.   BEHAVIOR & AGGRESSION TOOL COLOR: Green; denies suicidal ideation    ABNL VS/O2: VSS on RA  MOBILITY: SBA with GB and walker.   PAIN MANAGMENT: Denies   DIET: 3gm K diet   BOWEL/BLADDER: Continent. Purewick in place. No BM this shift  ABNL LAB/BG: none this shift  DRAIN/DEVICES: PIV, SL.   TELEMETRY RHYTHM: NA  SKIN: PI with scabbing on bridge of nose, WOC following, Scattered small bruises.   TESTS/PROCEDURES: None   D/C DATE: Per psych note, \"pending bed availability for inpatient psych\".   OTHER IMPORTANT INFO: Nephrology signed off.                 "

## 2023-02-20 NOTE — PROGRESS NOTES
[facility]    Medicine Progress Note - Hospitalist Service    Date of Admission:  1/28/2023    Assessment & Plan   Patient is sitting comfortably in chair.  No acute events.  Patient still feels depressed.  No active suicidal ideation currently.  She was seen by psychiatry yesterday lithium doses was increased to 150 mg 3 times a day.  No new complaints or events       Polysubstance Overdose/Suicide Attempt  Distributive shock causing hypotension from Ca channel blocker overdose, RESOLVED  Acute encephalopathy caused by overdose, RESOLVED  Acute hypoxic resp failure with overdose. RESOLVED  * Patient continues to be at risk for suicide    Patient's mental health has severely decompensated since stopping lithium a year ago.   ? resumed lithium after a  discusion with Nephrology and family    Psychiatry reconsulted on 2/13/2023 and increased lithium dosage to 150 mg p.o. 3 times a day      Patient's sodium and creatinine stayed stable on lithium.  Patient should have access to free water at all times especially with the polyuria and polydipsia in the setting of lithium usage.   Patient is medically stable for discharge to [facility] unit when bed is available     Diabetes Insipidus, partial   Severe nocturia -2/7 reported getting up 8-10 times per night to urinate  REI to 2.56 max on 2/2/23, resolving   CKD baseline Cr ~ 1.5-2.   Metabolic/lacitc acidosis associated with overdose, resolved. Recurring on 02/06/23  Hyperkalemia with acidosis, suggestive of RTA  *S/p IVF and continued on TF flushes 200 ml every 2 hours.    * Stopped subcutaneous heparin on 2/6/23, pre nephrology's recommendation, can cause RTA       Nephrology's input appreciated.     Encourage fluids.     Continue sodium-bicarb 1300 mg BID     Nephrology switched oral DDAVP to Amiloride 5mg po daily (stopped 2/16) per nephrology due to mild hyperkalemia at 5.2    Management per nephrology    On low K diet     Loose stools,  RESOLVING  Improved now     Suspected protein-calorie malnutrition  Prior hypoglycemia:  * TFs stopped on 2/5/23    On low K diet      Appreciate nutrition consult, last seen on 02/10/23     Started Nepro daily on 2/9/23     Superficial thrombus of R cephalic vein:  * RUE edematous monitor,     R subclavian CVC removed on 2/5/23      Chronic anemia : Baseline Hgb ~ 10. No evidence of bleeding, periodically monitor.  No indication for a current transfusion.  H/o perforated PUD   *B12 elevated at 1249, iron panel normal, folate normal on 02/07/23     Follow with initiation of ASA for DVT ppx.     Continue PPI.      Deconditioned    PT ordered     Ambulate with assist QID     Suspected pressure lesion from prior treatment with BiPAP  Crusted ulceration on tip of nose as well as linear skin breakdown over bridge of nose.    Wound care nurse consult on 02/10/23 appreciated          Diet: Combination Diet 3 gm K Diet  Diet  Snacks/Supplements Adult: Nepro Oral Supplement; Between Meals    DVT Prophylaxis: Pneumatic Compression Devices  Suresh Catheter: Not present  Lines: None     Cardiac Monitoring: None  Code Status: Full Code      Clinically Significant Risk Factors           # Hypercalcemia: Highest Ca = 10.2 mg/dL in last 2 days, will monitor as appropriate    # Hypoalbuminemia: Lowest albumin = 2.3 g/dL at 2/1/2023  7:30 PM, will monitor as appropriate                   Disposition Plan     Expected Discharge Date: 02/20/2023,  3:00 PM  Discharge Delays: Placement - Mental Health/Addiction/Geripsych  Destination: inpatient rehabilitation facility  Discharge Comments: awaiting for [facility] bed (called 2/17 at capacity with no discharges); medically stable to discharge.          Fausto Roberto MD  Hospitalist Service  [facility]  Securely message with [employer] (more info)  Text page via [employer] Paging/Directory    ______________________________________________________________________    Interval History   Feels same, she is sitting up in bed. She had no questions for me, and she had no complaints    No acute events in the last 24 hours.  [facility] bed placement pending    Physical Exam   Vital Signs: Temp: 97.4  F (36.3  C) Temp src: Oral BP: 121/77 Pulse: 79   Resp: 16 SpO2: 100 % O2 Device: None (Room air)    Weight: 109 lbs 0 oz    Gen: NAD, pleasant, up in chair  HEENT:  MMM  Resp: no focal crackles,  no wheezes, no increased work of resp  CV: S1S2 heard, reg rhythm, reg rate  Ext: calves nontender, well perfused        Medical Decision Making       28 MINUTES SPENT BY ME on the date of service doing chart review, history, exam, documentation & further activities per the note.      Data     I have personally reviewed the following data over the past 24 hrs:    N/A  \   N/A   / N/A     130 (L) 99 66.1 (H) /  93   4.9 21 (L) 2.36 (H) \       Imaging results reviewed over the past 24 hrs:   No results found for this or any previous visit (from the past 24 hour(s)).

## 2023-02-20 NOTE — TELEPHONE ENCOUNTER
0120 Bed Search Update:    Awaiting availability at [facility].  Remains on wait list.

## 2023-02-20 NOTE — PROGRESS NOTES
Pt is A&Ox4. Pt is on renal diet. Up with stand-by, GB, and walker. VSS on RA. Repositions independently in bed. Redness on coccyx. Baseline numbness in LUE. Pt reports pain with movement of R shoulder. Continent of bowel and bladder. Purewick in place. PIV on L arm, saline locked. Denies suicidal ideation.

## 2023-02-21 ENCOUNTER — APPOINTMENT (OUTPATIENT)
Dept: PHYSICAL THERAPY | Facility: CLINIC | Age: 73
DRG: 917 | End: 2023-02-21
Payer: MEDICARE

## 2023-02-21 ENCOUNTER — APPOINTMENT (OUTPATIENT)
Dept: OCCUPATIONAL THERAPY | Facility: CLINIC | Age: 73
DRG: 917 | End: 2023-02-21
Payer: MEDICARE

## 2023-02-21 LAB
ANION GAP SERPL CALCULATED.3IONS-SCNC: 10 MMOL/L (ref 7–15)
BUN SERPL-MCNC: 59.5 MG/DL (ref 8–23)
CALCIUM SERPL-MCNC: 11.1 MG/DL (ref 8.8–10.2)
CHLORIDE SERPL-SCNC: 107 MMOL/L (ref 98–107)
CREAT SERPL-MCNC: 2.13 MG/DL (ref 0.51–0.95)
DEPRECATED HCO3 PLAS-SCNC: 22 MMOL/L (ref 22–29)
GFR SERPL CREATININE-BSD FRML MDRD: 24 ML/MIN/1.73M2
GLUCOSE SERPL-MCNC: 98 MG/DL (ref 70–99)
LITHIUM SERPL-SCNC: 0.9 MMOL/L (ref 0.6–1.2)
POTASSIUM SERPL-SCNC: 4.9 MMOL/L (ref 3.4–5.3)
SODIUM SERPL-SCNC: 139 MMOL/L (ref 136–145)

## 2023-02-21 PROCEDURE — 99233 SBSQ HOSP IP/OBS HIGH 50: CPT | Performed by: INTERNAL MEDICINE

## 2023-02-21 PROCEDURE — 80048 BASIC METABOLIC PNL TOTAL CA: CPT | Performed by: PSYCHIATRY & NEUROLOGY

## 2023-02-21 PROCEDURE — 97535 SELF CARE MNGMENT TRAINING: CPT | Mod: GO

## 2023-02-21 PROCEDURE — 97116 GAIT TRAINING THERAPY: CPT | Mod: GP

## 2023-02-21 PROCEDURE — 97530 THERAPEUTIC ACTIVITIES: CPT | Mod: GO

## 2023-02-21 PROCEDURE — 120N000001 HC R&B MED SURG/OB

## 2023-02-21 PROCEDURE — 80178 ASSAY OF LITHIUM: CPT | Performed by: PSYCHIATRY & NEUROLOGY

## 2023-02-21 PROCEDURE — 250N000013 HC RX MED GY IP 250 OP 250 PS 637: Performed by: PSYCHIATRY & NEUROLOGY

## 2023-02-21 PROCEDURE — 99232 SBSQ HOSP IP/OBS MODERATE 35: CPT | Performed by: PSYCHIATRY & NEUROLOGY

## 2023-02-21 PROCEDURE — 36415 COLL VENOUS BLD VENIPUNCTURE: CPT | Performed by: PSYCHIATRY & NEUROLOGY

## 2023-02-21 PROCEDURE — 97530 THERAPEUTIC ACTIVITIES: CPT | Mod: GP

## 2023-02-21 PROCEDURE — 250N000013 HC RX MED GY IP 250 OP 250 PS 637: Performed by: INTERNAL MEDICINE

## 2023-02-21 RX ADMIN — LITHIUM CARBONATE 150 MG: 150 CAPSULE, GELATIN COATED ORAL at 08:52

## 2023-02-21 RX ADMIN — LITHIUM CARBONATE 150 MG: 150 CAPSULE, GELATIN COATED ORAL at 17:41

## 2023-02-21 RX ADMIN — SODIUM BICARBONATE 650 MG TABLET 1300 MG: at 08:52

## 2023-02-21 RX ADMIN — PANTOPRAZOLE SODIUM 40 MG: 40 TABLET, DELAYED RELEASE ORAL at 06:30

## 2023-02-21 RX ADMIN — DIAZEPAM 1 MG: 2 TABLET ORAL at 17:41

## 2023-02-21 RX ADMIN — ASPIRIN 81 MG: 81 TABLET, COATED ORAL at 08:52

## 2023-02-21 RX ADMIN — LITHIUM CARBONATE 150 MG: 150 CAPSULE, GELATIN COATED ORAL at 14:15

## 2023-02-21 RX ADMIN — SODIUM BICARBONATE 650 MG TABLET 1300 MG: at 17:41

## 2023-02-21 ASSESSMENT — ACTIVITIES OF DAILY LIVING (ADL)
ADLS_ACUITY_SCORE: 47
ADLS_ACUITY_SCORE: 51
ADLS_ACUITY_SCORE: 47
ADLS_ACUITY_SCORE: 51
ADLS_ACUITY_SCORE: 47
ADLS_ACUITY_SCORE: 47

## 2023-02-21 NOTE — PROGRESS NOTES
[facility]    Hospitalist Progress Note    Assessment & Plan   Diana Santiago is a 72 year old female with PMHx significant for bipolar disorder, anxiety/depression, prior suicidal ideation, HTN, HLD, CKD stage III, admitted on 1/28/2023 with suicide attempt by intentional polysubstance overdose.     Shortly after admission she became hypotensive and required transfer to the ICU for pressors and ultimately required intubation. Her condition improved. She was extubated on 2/2 and ultimately transferred out of the ICU to the hospitalist service on 2/5/23.       Polysubstance Overdose/Suicide Attempt  Distributive shock causing hypotension from Ca channel blocker overdose: Resolved  Acute encephalopathy caused by overdose: Resolved  Acute hypoxic resp failure with overdose: Resolved  * Report was that patient's mental health had been severely decompensated since stopping lithium a year ago. She had been seen in the ED/EMPATH unit at [facility] on 1/24.  * Presented to the ED after intentional ingestion of approximately 100 tablets of her prescribed medications in attempt at suicide. She reported taking a combination off lamotrigine, amlodipine, haloperidol and dextroamphetamine. She is unclear about how much of each medication she took. Salicylate and acetaminophen levels were negative. In the ED she is hemodynamically stable, though with borderline low blood pressures  * As above, shortly after admission she became hypotensive and required transfer to the ICU for pressors and ultimately required intubation. Her condition improved. She was extubated and ultimately transferred out of the ICU to the hospitalist service on 2/5/23.   * Psychiatry following this stay. Has ultimately been resumed on Lithium 150mg TID and Valium 1mg every other day.  * Initially felt to need inpatient geripsych stay but per psych reassessment on 2/20, no longer felt to need inpatient stay. No longer needing bedside sitter as  of 2/20  -- cont lithium 150mg TID -- monitor trough levels  -- cont Valium 1mg every other day    Partial diabetes insipidus  Severe nocturia  REI on stage III CKD: REI resolved  Metabolic/lactic acidosis associated with overdose: Resolved  Hyperkalemia with acidosis, suggestive of RTA: Resolved  * Baseline Cr is 1.5-2.0. Developed REI this stay. Cr peaked at 2.66 on 2/1.   * Seen by nephrology this stay. On IVFs earlier this stay.   * Also with severe nocturia this stay, had been getting up 8-10 times/night to urinate.  * Nephrology switched oral DDAVP to amiloride 5mg po daily, ultimately stopped 2/16 dt mild hyperkalemia with K 5.2  * Renal function and lytes normalized. Advised that patient should have access to free water at all times especially with the polyuria and polydipsia in the setting of lithium usage.  -- labs generally stable  -- cont oral bicarb 1300mg BID  -- encourage po intake     Severe protein-calorie malnutrition  Hypoglycemia: Resolved  Loose stools: Resolved  * Had been on TFs earlier this stay. Those were stopped on 2/5.   -- conts on low K diet, supplements     Superficial thrombus of R cephalic vein  * Supportive cares. R subclavian CVC removed on 2/5/23.     Chronic anemia.  Hx of perforated PUD   * Baseline hgb is around 10. No sliding scale/x of bleeding this stay.   * B12 elevated at 1249, iron panel normal, folate normal on 2/7   * Chronic and stable on PPI.     Physical deconditioning  Suspected pressure injury dt prior treatment with BiPAP  * Crusted ulceration on tip of nose as well as linear skin breakdown over bridge of nose.  * WOC RN and PT following    FEN: no IVFs, lytes stable, low K diet   DVT Prophylaxis:  Low dose ASA, PCDs, stopped subQ heparin on 2/6 pre nephrology rec dt concern it could cause RTA  Code Status: Full Code    Disposition: Per psych assessment on 2/20, no longer needs inpatient psych stay. Therapy recommending TCU stay. SW following.    Triny Heredia  Diane DO    Medical Decision Making       -------------------------- BILLING ON TIME ------------------------------------------------------------------------------------------------------------  50 MINUTES SPENT BY ME on the date of service doing chart review, history, exam, documentation & further activities per the note.       Interval History   Seen this afternoon. Feeing tired but otherwise okay. No specific complaints. Denies cp/sob/cough, abd pain/n/v. Taking po. Using Purewick catheter    -Data reviewed today: I reviewed all new labs and imaging results over the last 24 hours. I personally reviewed no images or EKG's today.    Physical Exam   Temp: 97.7  F (36.5  C) Temp src: Oral BP: 121/80 Pulse: 82   Resp: 16 SpO2: 95 % O2 Device: None (Room air)    Vitals:    02/11/23 0619 02/12/23 0628 02/19/23 0002   Weight: 49.1 kg (108 lb 3.9 oz) 50.3 kg (110 lb 14.3 oz) 49.4 kg (109 lb)     Vital Signs with Ranges  Temp:  [97.7  F (36.5  C)-98.4  F (36.9  C)] 97.7  F (36.5  C)  Pulse:  [76-82] 82  Resp:  [16] 16  BP: (106-121)/(66-80) 121/80  SpO2:  [95 %-98 %] 95 %  I/O last 3 completed shifts:  In: 640 [P.O.:640]  Out: 1300 [Urine:1300]    Constitutional: Resting comfortably, alert and answering questions appropriately, NAD  Respiratory: CTAB, no wheeze/rales/rhonchi, no increased work of breathing  Cardiovascular: HRRR, no MGR, no LE edema  GI: S, NT, ND, +BS  Skin/Integumen: warm/dry  Other:      Medications       aspirin  81 mg Oral Daily     diazepam  1 mg Oral Every Other Day     lithium  150 mg Oral TID w/meals     pantoprazole  40 mg Oral QAM AC     sodium bicarbonate  1,300 mg Oral BID     sodium chloride (PF)  3 mL Intracatheter Q8H       Data   Recent Labs   Lab 02/21/23  0649 02/19/23  2344 02/17/23  1439    130* 131*   POTASSIUM 4.9 4.9 5.3   CHLORIDE 107 99 101   CO2 22 21* 19*   BUN 59.5* 66.1* 61.0*   CR 2.13* 2.36* 2.03*   ANIONGAP 10 10 11   ISSA 11.1* 10.2 10.1   GLC 98 93 117*       No  results found for this or any previous visit (from the past 24 hour(s)).

## 2023-02-21 NOTE — PLAN OF CARE
[id number]    Orientation: Alert and oriented X 4, flat affect.  Cardiac: WDL  Respiratory: Clear, diminished. VSS on RA.  GI: Tolerating diet, no nausea. BM this AM.  : Voiding adequately, continent.   Pain: Denies.  Activity: Up with A1 with GB/WW. Ambulated in halls X 2.  Skin: Healing pressure ulcer on bridge of nose. Scabbing, ULISSES.  IV Access: PIV LUE  Plan: Plan to discharge to yared-psych when bed available.   Other: Scheduled valium changed to every other day.

## 2023-02-21 NOTE — PLAN OF CARE
Occupational Therapy Discharge Summary    Reason for therapy discharge:    All goals and outcomes met, no further needs identified.    Progress towards therapy goal(s). See goals on Care Plan in [facility] electronic health record for goal details.  Goals met    Therapy recommendation(s):    Continued therapy is recommended.  Rationale/Recommendations:  Pt made good progress with self-cares and actvity tolerance. Pt has limited supports at home, however, pt currently mobilizing w/ SBA and completing self cares with supervision. Pt met all IP OT goals. Pt may progress for safe d/c home with [facility] OT/PT/RN, daily phone call check ins from brother/continued lunch outings for safety - pending overall mental health/well being. If unable to get this level of assist, pt may benefit from skilled TCU. Per chart, possible plan to have pt move to a mental health facility when medically cleared.

## 2023-02-21 NOTE — PLAN OF CARE
Goal Outcome Evaluation:       Summary: Polysubstance overdose- Suicide Attempt  DATE & TIME: 2/20/23-2/21/23 [id number]  Cognitive Concerns/ Orientation : A&Ox4. With flat affect. Denies SI  BEHAVIOR & AGGRESSION TOOL COLOR: Green  ABNL VS/O2: VSS on RA  MOBILITY: SBA with GB and walker.   PAIN MANAGMENT: Denies   DIET: 3gm K diet   BOWEL/BLADDER: Continent. Purewick in place. No BM this shift  ABNL LAB/BG: none this shift  DRAIN/DEVICES: PIV, SL.   TELEMETRY RHYTHM: NA  SKIN: PI with scab on bridge of nose, WOC following, Scattered bruises.   TESTS/PROCEDURES: None   D/C DATE: Pending inpatient psych   OTHER IMPORTANT INFO:

## 2023-02-21 NOTE — PROGRESS NOTES
Psychiatric Progress Note  Consult date: February 6, 2023         Reason for Consult, requesting source:    Suicide attempt/OD  Requesting source: Anabell Camacho    Labs and imaging reviewed. Patient seen and evaluated by Sandra Cox MD          HPI:   This is a 71 yo female who presented on 1/28/23 with a massive, intentional polypharmacy od .  Pt has a history of bipolar illness, and took a combinaton of lamictal, amlodipine, haldol, and adderall.  She had been in the ED 4 days prior for eval of SI.  Pt has had a difficult course related to calcium channel blocker od, and has been in the ICU, vented and on pressors with AHRF, REI on CRF, and encephalopthy.  We are asked to see for assistance with care for her depression, and appropriate disposition when she is ready medically to discharge.    Patient seen today along with her brother.  Both are very good historians.  It sounds like the patient was taken off lithium, her longtime medication for bipolar management, about a year ago.  This was related to worsening kidney function.  She has worked with her psychiatrist to try to find another regimen that was helpful.  She has been on Lamictal, as well as other medications.  None of these medications help with her depression.    Both the patient and her brother are in favor of her going back on lithium if this is a possibility.  I also would be in favor of it as well.  Clearly, the patient's bipolar illness is severe, and potentially lethal.  She was quite well managed on the lithium prior to it being discontinued.    2/7/23: Patient seen today for follow-up in the ICU.  She is waiting for a bed on the medical floor, and no longer requires a critical level of care.  She was awake, and tell me that she is feeling pretty anxious, which is her baseline when she is not on lithium.  She also said that the kidney doctor has been by, and they discussed going back on lithium.  She and I discussed low-dose Xanax to  help with her anxiety for now.  I discussed risk versus benefits of this medication and indicated to her this is a short-term medication, and that she does not have to take it if she does not care for it or if it does not work.    2/8/2023: Patient seen today with her brother, and RN at bedside.  Patient was sitting up and having some fruit.  She states she feels much better today both physically and mentally.  We discussed current lithium dosing, and the plan to titrate very slowly.  I also told her I think we should put her on the lowest for a geriatric psychiatry bed in the event that she still needs inpatient admission in 2 to 3 weeks.  She was agreeable to this.  Answered questions and discussed the overall strategy with the lithium.  I also told her that I do not recommend any other psychiatric medications for now.    2/9/2023: Patient seen today for follow-up.  Discussed with she and her brother.  Also discussed with Dr. Malik/renal.  Patient is feeling more depressed today, but admits that she did watch the news this morning which made her feel that things were more bleak.  We discussed increasing her lithium dose tomorrow pending okay from renal.  Patient denies any negative side effects that she is noting to the lithium.  We discussed other options besides watching the news or activities that are depressing.  Discussed this with she and her brother today to help bolster her spirits while we wait for the lithium to get to a therapeutic dose.     2/10/2023: Patient seen today for follow-up along with her brother.  Chart reviewed.  Patient indicates she still feeling depressed this morning.  I scheduled her lithium for lunchtime today, because I wanted to see her morning labs.  We discussed that this will switch to a.m. and q. evening meal starting tomorrow.  We also discussed that we may have the option of further titration on Monday.  Patient did tell me, interestingly, which her brother confirmed,  that she knows that she is getting depressed because she begins to have blurred vision.  This is been the same since she first became ill with severe depression at age 18.  Blurred vision or decreased visual acuity, bilateral, is the canary in the coal mine that she is becoming depressed.  When the visual acuity returns to normal, the depression goes away.    The patient did say that she does have a history of a very severe head injury when she tried to kill herself by jumping off a bridge when she was younger.  She broke her neck, and undoubtedly sustained a closed head injury as well.  The visual acuity changes began occurring before this.  Patient denied other associated symptoms such as ataxia or other neurologic symptoms that she is aware of aside from the changes in visual acuity.    2/13/2023: Patient seen today for follow-up.  Chart reviewed including nephrology note.  Patient states that she is ready to go home today.  She is still feeling depressed, and we discussed the fact that she is not safe to go home, and that we need to continue to titrate her medications as she tolerates it.  We agreed to continue to go up on the lithium today.  Patient's lithium level was 0.5 this morning.    2/14/2023: Patient seen today for follow-up along with her brother.  Discussed with RN, who was present in the room while we talked.  Also met with social work services.  Patient talked about wanting to go home so that she can do her taxes.  She states her mood is somewhat better today, but is still not good.  She denies any side effects from the lithium thus far.  Lithium level this morning was 0.6.  We discussed that the patient is in the queue for inpatient geriatric psychiatry bed.  Patient is somewhat ambivalent about this, but understands that I want her feeling well prior to going home due to her very recent, quite severe suicide attempt.  Brother is supportive of the plan, and states that he is trying to get her into  "assisted living.    2/15/2023:  Pt seen today for f/u.  Chart reviewed.  Patient reports xanax was helpful for anxiety last night.  She slept well.  She denies side effects with the Xanax.  The patient had questions about what it would be like on the inpatient psychiatry, and expressed that she is concerned about it.  We discussed the fact that she is not safe currently.  She indicated herself that she is concerned that if she goes home, that \"it may happen again\", referring to try to kill herself.  We discussed medication strategies, and talked about her current lithium level.    2/16/2023: Patient seen today for follow-up.  She states that she is feeling a little bit better today and feels Xanax is helping.  It is making her sleepy.  Also, the benefit from each dose lasts only for a couple of hours.  I asked her about whether or not she still feeling suicidal, and she states that talking about suicide, and everyone asking her about suicide has given her intrusive thoughts.  She then wonders if she is actually feeling suicidal, or for just reintroducing the thought of death.  She denies any feelings of trauma related to her suicide attempts, though I do wonder if this is the etiology of the intrusive thinking.    Patient would like to stay at her current lithium dose.  I am in agreement.  We discussed utilizing a longer acting benzodiazepine, Valium, and dosing it at night.  She would like to try this.  We are still awaiting an inpatient bed.    2/17/2023: Patient seen today for follow-up.  She reports that she is sleepy, but that Valium was helpful for her anxiety.  She is very worried about going to the psychiatric unit, and that \"I will never be released\".  She states that she was in the psychiatric unit years ago to work on her lithium, and that it was a good stay.  She really does not know what to expect at this point.    I did asked the patient how she would feel in terms of mood and her anxiety if her " "ultimate goal was to go to the assisted living, and possibly not go to inpatient psychiatry.  She stated that that would be a relief.    2/20/2023: Patient seen today for follow-up.  She reports that she is feeling better today.  Mood is somewhat improved.  She does think that some of this is related to Valium.  The Valium is making her sedated during the day, so we will move to every other day dosing.  Nonetheless, and has had benefit for her.    She does not feel suicidal, is denying intrusive thoughts, or feeling unsafe if she is to discharge.  I do not think she needs to go to inpatient psychiatry any longer, and her brother is already looking for a spot for her at an assisted living facility.  She would like to continue with her outpatient psychiatrist, but is not sure if she wants to continue to see a therapist.    2/21/2023: Patient seen today for follow-up.  She reports she is feeling \"good\".  Mood is still somewhat depressed, but tolerable to her.  A friend was visiting.  We discussed her comfort level with going to an assisted living versus going to inpatient psychiatry, and she states that she is comfortable with going to the assisted living facility, and does not think she needs inpatient psychiatry.  She denies intrusive thoughts, or fears that she might try to harm herself or that history might repeat itself.        Past Psychiatric History:     Pt had recently been in the ED on 1/24 with thoughts of drowning herself while in a swimming pool.  She has an outpt psychiatrist, and a therapist, and had discharged after denying SI, and wood for safety.   Patient is seen by Dr. Trent Gallo for outpatient psychiatric management.        Substance Use and History:   Negative        Past Medical History:   PAST MEDICAL HISTORY:   Past Medical History:   Diagnosis Date     Benign essential hypertension 09/2018    added norvasc 9/18     Bipolar affective disorder (H)     hosp 1993, Dr. Aftab Deal "     Cancer (H) 1996    DCIS, left breast     Chronic kidney disease, stage 3a (H)     seen by renal Dr. 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    felt due to lithium     Thalassaemia trait      Vitamin D deficiency        PAST SURGICAL HISTORY:   Past Surgical History:   Procedure Laterality Date     BIOPSY  1994    left breast     BREAST SURGERY      lumpectomy x 4     COLONOSCOPY  2021     COLONOSCOPY N/A 6/17/2022    Procedure: COLONOSCOPY, WITH POLYPECTOMY AND BIOPSY;  Surgeon: Panchito Gu MD;  Location:  GI     EYE SURGERY  2018    retina tear     LAPAROTOMY EXPLORATORY N/A 8/24/2022    Procedure: Exploratory laparotomy, REPAIR OF PERFORATED ULCER;  Surgeon: Ron Vidal MD;  Location:  OR     left hand surgery      last 1974             Family History:   FAMILY HISTORY:   Family History   Problem Relation Age of Onset     Cerebrovascular Disease Mother      Hypertension Father      Sleep Apnea Brother      Breast Cancer Maternal Aunt         x 2     Breast Cancer Other         Maternal Aunt     Hyperlipidemia Niece        Family Psychiatric History:         Social History:   SOCIAL HISTORY:   Social History     Tobacco Use     Smoking status: Never     Smokeless tobacco: Never   Substance Use Topics     Alcohol use: No            Physical ROS:   The 10 point Review of Systems is negative other than noted in the HPI or here.           Medications:       aspirin  81 mg Oral Daily     diazepam  1 mg Oral Every Other Day      lithium  150 mg Oral TID w/meals     pantoprazole  40 mg Oral QAM AC     sodium bicarbonate  1,300 mg Oral BID     sodium chloride (PF)  3 mL Intracatheter Q8H              Allergies:     Allergies   Allergen Reactions     No Known Allergies           Labs:     Recent Results (from the past 48 hour(s))   Basic metabolic panel    Collection Time: 02/19/23 11:44 PM   Result Value Ref Range    Sodium 130 (L) 136 - 145 mmol/L    Potassium 4.9 3.4 - 5.3 mmol/L    Chloride 99 98 - 107 mmol/L    Carbon Dioxide (CO2) 21 (L) 22 - 29 mmol/L    Anion Gap 10 7 - 15 mmol/L    Urea Nitrogen 66.1 (H) 8.0 - 23.0 mg/dL    Creatinine 2.36 (H) 0.51 - 0.95 mg/dL    Calcium 10.2 8.8 - 10.2 mg/dL    Glucose 93 70 - 99 mg/dL    GFR Estimate 21 (L) >60 mL/min/1.73m2   Lithium level    Collection Time: 02/20/23 10:20 AM   Result Value Ref Range    Lithium 1.1 0.6 - 1.2 mmol/L   Basic metabolic panel    Collection Time: 02/21/23  6:49 AM   Result Value Ref Range    Sodium 139 136 - 145 mmol/L    Potassium 4.9 3.4 - 5.3 mmol/L    Chloride 107 98 - 107 mmol/L    Carbon Dioxide (CO2) 22 22 - 29 mmol/L    Anion Gap 10 7 - 15 mmol/L    Urea Nitrogen 59.5 (H) 8.0 - 23.0 mg/dL    Creatinine 2.13 (H) 0.51 - 0.95 mg/dL    Calcium 11.1 (H) 8.8 - 10.2 mg/dL    Glucose 98 70 - 99 mg/dL    GFR Estimate 24 (L) >60 mL/min/1.73m2   Lithium level    Collection Time: 02/21/23  8:40 AM   Result Value Ref Range    Lithium 0.9 0.6 - 1.2 mmol/L          Physical and Psychiatric Examination:     /71 (BP Location: Right arm)   Pulse 80   Temp 98.2  F (36.8  C) (Oral)   Resp 16   Wt 49.4 kg (109 lb)   SpO2 99%   BMI 18.71 kg/m    Weight is 109 lbs 0 oz  Body mass index is 18.71 kg/m .    Physical Exam:  I have reviewed the physical exam as documented by by the medical team and agree with findings and assessment and have no additional findings to add at this time.    Mental Status Exam:    Appearance: awake, alert, appeared as age stated, awake, alert  and cooperative  Attitude:  cooperative  Eye Contact:  good  Mood:  depressed and better  Affect:  mood congruent  Speech:  clear, coherent and decreased prosody  Language: Fluent in english   Psychomotor Behavior:  no evidence of tardive dyskinesia, dystonia, or tics and physical retardation  Thought Process:  logical, linear and goal oriented  Associations:  no loose associations  Thought Content:  no evidence of suicidal ideation or homicidal ideation, no evidence of psychotic thought, no auditory hallucinations present and no visual hallucinations present  Insight:  good  Judgement:  intact  Oriented to:  time, person, and place  Attention Span and Concentration:  intact  Recent and Remote Memory:  intact  Fund of Knowledge: Appropriate   Gait and Station: Sitting up in a chair               DSM-5 Diagnosis:   296.53 Bipolar I Disorder Current or Most Recent Episode Depressed, Severe with psychotic features, toxic metabolic encephalopathy which is resolving, Intentional overdose, subsequent encounter, anxiety.  Chronic kidney disease for, diabetes insipidus              Assessment:   This is a 72-year-old female with a history of bipolar illness spanning 50 years.  Patient was initially diagnosed with schizophrenia when she first became ill.  Later, this diagnosis was modified to bipolar illness, which both she and her brother feel is accurate.  She does have a history of ECT many years ago.  It is unclear whether this was helpful or not.  What has been helpful has been lithium.  This medication was discontinued a year ago due to worsening kidney function.  The patient has not been able to stabilize since going off this medication, whereas she was stable and functional on lithium.    Patient and her brother, who are both very high functioning, would like her to go back on lithium.  I am in agreement.  We will reach out to the hospitalist to better understand what her options are.  Regardless, she will need to go  to an inpatient psychiatric bed for further stabilization after she is medically ready to discharge.    Patient's depression may be improving now that I have added low-dose benzodiazepine.  The Valium, at 1 mg every other day is still somewhat sedating.  The equivalent dose of Ativan to Valium at this dose is 0.1 mg Ativan to 1 mg Valium.  This is very little medication, and I am dosing it every other day.  The patient does not want to change the Valium, and finds that it is helpful even if it does cause some sedation.  She denies that it is causing any cognitive side effects or gait unsteadiness.      I do not think the patient needs to go to inpatient psychiatry, and she does not meet criteria any longer.  If she wanted to go, I would push the issue, but she does not.    I am concerned about follow-up laboratory evaluations, as her lithium level is close to 1.0 with 1.2 mg being the top of the therapeutic index.   It would not take much for this patient to become lithium toxic, and I am concerned that she will need very tight follow-up.  Discussed in detail with Dr. Contreras, who will loop in renal again.          Summary of Recommendations:   1.  Adjusted patient's Valium dose to 1 mg every other night.  Patient wants to continue this dose.    2.  Patient no longer requires inpatient psychiatric hospitalization.    Sandra Cox MD  Consult/Liaison Psychiatry and Addiction Medicine  [facility]

## 2023-02-21 NOTE — TELEPHONE ENCOUNTER
013 Bed Search Update:     Awaiting availability at [facility].  Remains on wait list.

## 2023-02-21 NOTE — TELEPHONE ENCOUNTER
[facility] Access Inpatient Bed Call Log 2/20/23 5 PM    Facilities that have not updated websites in the last 12 hours have been called.     Adults:    [facility] is posting 0 beds.    [facility] is posting 0 beds. Negative covid.     [facility] is posting 0 beds. - 4:54 pm Per Monica they are capped.    [facility] is posting 0 beds    [facility] are posting 0 beds.     [facility] is posting 0 beds. Negative COVID.    [facility] has 0 beds. Extensive wait List for committed patients.    [facility] is posting 0 bed.         [facility] is posting 0 beds. Mixed unit 12+. Low acuity only. Negative covid.     [facility] has 0 bed. No aggression.  Negative Covid.    [facility] is posting 0 beds.  Negative covid.     [facility] is posting 0 bed. Low acuity only.  Negative covid. -    [facility] is posting 0 beds. Negative covid. Low acuity only.     [facility] has 0 beds. Low acuity. Negative covid. -    [facility] is posting 0 bed. Low acuity. 72 HH hold preferred. - [facility] is posting 0 beds. Low acuity. Negative covid.  -    [facility] has 1 beds. Negative covid. Vol only, No history of aggression, violence, or assault. No sexual offenders. No 72 HH holds.       [facility] is posting 10 beds. Negative covid. (Must have the cognitive ability to do programming. No aggressive or violent behavior or recent HX in the last 2 yrs. MH must be primary.) Always low acuity.    [facility] has 0 beds. Negative Covid. Low acuity only. Violence and aggression capped.     [facility] is posting possible 0 beds. Low acuity, Negative Covid.  - 4:55pm Per Tien they are on divert.   [facility] is posting 0 beds. Covid Negative. Vol only. Combined adolescent and adult unit. No aggressive or violent behavior. No registered sex offenders.     [facility] posting 0 beds. Negative covid.    [facility] is  posting 2 beds. No covid test required.    [facility] posting 3 bed. Negative covid. (No lines, drains, or tubes, oxygen, CPAP, IV, etc.).  - 4:57pm Per Kandy, call back in an hour.

## 2023-02-22 ENCOUNTER — APPOINTMENT (OUTPATIENT)
Dept: PHYSICAL THERAPY | Facility: CLINIC | Age: 73
DRG: 917 | End: 2023-02-22
Payer: MEDICARE

## 2023-02-22 ENCOUNTER — TELEPHONE (OUTPATIENT)
Dept: BEHAVIORAL HEALTH | Facility: CLINIC | Age: 73
End: 2023-02-22
Payer: MEDICARE

## 2023-02-22 LAB
ANION GAP SERPL CALCULATED.3IONS-SCNC: 10 MMOL/L (ref 7–15)
BUN SERPL-MCNC: 53.6 MG/DL (ref 8–23)
CALCIUM SERPL-MCNC: 10.6 MG/DL (ref 8.8–10.2)
CHLORIDE SERPL-SCNC: 104 MMOL/L (ref 98–107)
CREAT SERPL-MCNC: 1.91 MG/DL (ref 0.51–0.95)
DEPRECATED HCO3 PLAS-SCNC: 22 MMOL/L (ref 22–29)
GFR SERPL CREATININE-BSD FRML MDRD: 27 ML/MIN/1.73M2
GLUCOSE SERPL-MCNC: 121 MG/DL (ref 70–99)
POTASSIUM SERPL-SCNC: 4.8 MMOL/L (ref 3.4–5.3)
SODIUM SERPL-SCNC: 136 MMOL/L (ref 136–145)

## 2023-02-22 PROCEDURE — 120N000001 HC R&B MED SURG/OB

## 2023-02-22 PROCEDURE — 99233 SBSQ HOSP IP/OBS HIGH 50: CPT | Performed by: INTERNAL MEDICINE

## 2023-02-22 PROCEDURE — 36415 COLL VENOUS BLD VENIPUNCTURE: CPT | Performed by: INTERNAL MEDICINE

## 2023-02-22 PROCEDURE — 97116 GAIT TRAINING THERAPY: CPT | Mod: GP

## 2023-02-22 PROCEDURE — 250N000013 HC RX MED GY IP 250 OP 250 PS 637: Performed by: INTERNAL MEDICINE

## 2023-02-22 PROCEDURE — 99231 SBSQ HOSP IP/OBS SF/LOW 25: CPT | Performed by: PSYCHIATRY & NEUROLOGY

## 2023-02-22 PROCEDURE — 80048 BASIC METABOLIC PNL TOTAL CA: CPT | Performed by: INTERNAL MEDICINE

## 2023-02-22 PROCEDURE — 250N000013 HC RX MED GY IP 250 OP 250 PS 637: Performed by: PSYCHIATRY & NEUROLOGY

## 2023-02-22 RX ORDER — DIAZEPAM 2 MG
1 TABLET ORAL EVERY OTHER DAY
Qty: 3 TABLET | Refills: 0 | Status: SHIPPED | OUTPATIENT
Start: 2023-02-23 | End: 2023-02-28

## 2023-02-22 RX ADMIN — SODIUM BICARBONATE 650 MG TABLET 1300 MG: at 08:49

## 2023-02-22 RX ADMIN — LITHIUM CARBONATE 150 MG: 150 CAPSULE, GELATIN COATED ORAL at 08:49

## 2023-02-22 RX ADMIN — ASPIRIN 81 MG: 81 TABLET, COATED ORAL at 08:49

## 2023-02-22 RX ADMIN — PANTOPRAZOLE SODIUM 40 MG: 40 TABLET, DELAYED RELEASE ORAL at 06:32

## 2023-02-22 RX ADMIN — SODIUM BICARBONATE 650 MG TABLET 1300 MG: at 17:58

## 2023-02-22 RX ADMIN — LITHIUM CARBONATE 150 MG: 150 CAPSULE, GELATIN COATED ORAL at 17:58

## 2023-02-22 RX ADMIN — LITHIUM CARBONATE 150 MG: 150 CAPSULE, GELATIN COATED ORAL at 12:14

## 2023-02-22 ASSESSMENT — ACTIVITIES OF DAILY LIVING (ADL)
ADLS_ACUITY_SCORE: 47
ADLS_ACUITY_SCORE: 45
ADLS_ACUITY_SCORE: 47
ADLS_ACUITY_SCORE: 47
ADLS_ACUITY_SCORE: 45
ADLS_ACUITY_SCORE: 47
ADLS_ACUITY_SCORE: 45
ADLS_ACUITY_SCORE: 47

## 2023-02-22 NOTE — TELEPHONE ENCOUNTER
R: 4:30PM PPS spoke Anastasiia on [facility], who informed us pt was seen by psych and no longer needs [facility].  Pt will be going to [facility].  Pt removed from PPS work list.

## 2023-02-22 NOTE — PROGRESS NOTES
Psychiatric Progress Note  Consult date: February 6, 2023         Reason for Consult, requesting source:    Suicide attempt/OD  Requesting source: Anabell Camacho    Labs and imaging reviewed. Patient seen and evaluated by Sandra Cox MD          HPI:   This is a 71 yo female who presented on 1/28/23 with a massive, intentional polypharmacy od .  Pt has a history of bipolar illness, and took a combinaton of lamictal, amlodipine, haldol, and adderall.  She had been in the ED 4 days prior for eval of SI.  Pt has had a difficult course related to calcium channel blocker od, and has been in the ICU, vented and on pressors with AHRF, REI on CRF, and encephalopthy.  We are asked to see for assistance with care for her depression, and appropriate disposition when she is ready medically to discharge.    Patient seen today along with her brother.  Both are very good historians.  It sounds like the patient was taken off lithium, her longtime medication for bipolar management, about a year ago.  This was related to worsening kidney function.  She has worked with her psychiatrist to try to find another regimen that was helpful.  She has been on Lamictal, as well as other medications.  None of these medications help with her depression.    Both the patient and her brother are in favor of her going back on lithium if this is a possibility.  I also would be in favor of it as well.  Clearly, the patient's bipolar illness is severe, and potentially lethal.  She was quite well managed on the lithium prior to it being discontinued.    2/7/23: Patient seen today for follow-up in the ICU.  She is waiting for a bed on the medical floor, and no longer requires a critical level of care.  She was awake, and tell me that she is feeling pretty anxious, which is her baseline when she is not on lithium.  She also said that the kidney doctor has been by, and they discussed going back on lithium.  She and I discussed low-dose Xanax to  help with her anxiety for now.  I discussed risk versus benefits of this medication and indicated to her this is a short-term medication, and that she does not have to take it if she does not care for it or if it does not work.    2/8/2023: Patient seen today with her brother, and RN at bedside.  Patient was sitting up and having some fruit.  She states she feels much better today both physically and mentally.  We discussed current lithium dosing, and the plan to titrate very slowly.  I also told her I think we should put her on the lowest for a geriatric psychiatry bed in the event that she still needs inpatient admission in 2 to 3 weeks.  She was agreeable to this.  Answered questions and discussed the overall strategy with the lithium.  I also told her that I do not recommend any other psychiatric medications for now.    2/9/2023: Patient seen today for follow-up.  Discussed with she and her brother.  Also discussed with Dr. Malik/renal.  Patient is feeling more depressed today, but admits that she did watch the news this morning which made her feel that things were more bleak.  We discussed increasing her lithium dose tomorrow pending okay from renal.  Patient denies any negative side effects that she is noting to the lithium.  We discussed other options besides watching the news or activities that are depressing.  Discussed this with she and her brother today to help bolster her spirits while we wait for the lithium to get to a therapeutic dose.     2/10/2023: Patient seen today for follow-up along with her brother.  Chart reviewed.  Patient indicates she still feeling depressed this morning.  I scheduled her lithium for lunchtime today, because I wanted to see her morning labs.  We discussed that this will switch to a.m. and q. evening meal starting tomorrow.  We also discussed that we may have the option of further titration on Monday.  Patient did tell me, interestingly, which her brother confirmed,  that she knows that she is getting depressed because she begins to have blurred vision.  This is been the same since she first became ill with severe depression at age 18.  Blurred vision or decreased visual acuity, bilateral, is the canary in the coal mine that she is becoming depressed.  When the visual acuity returns to normal, the depression goes away.    The patient did say that she does have a history of a very severe head injury when she tried to kill herself by jumping off a bridge when she was younger.  She broke her neck, and undoubtedly sustained a closed head injury as well.  The visual acuity changes began occurring before this.  Patient denied other associated symptoms such as ataxia or other neurologic symptoms that she is aware of aside from the changes in visual acuity.    2/13/2023: Patient seen today for follow-up.  Chart reviewed including nephrology note.  Patient states that she is ready to go home today.  She is still feeling depressed, and we discussed the fact that she is not safe to go home, and that we need to continue to titrate her medications as she tolerates it.  We agreed to continue to go up on the lithium today.  Patient's lithium level was 0.5 this morning.    2/14/2023: Patient seen today for follow-up along with her brother.  Discussed with RN, who was present in the room while we talked.  Also met with social work services.  Patient talked about wanting to go home so that she can do her taxes.  She states her mood is somewhat better today, but is still not good.  She denies any side effects from the lithium thus far.  Lithium level this morning was 0.6.  We discussed that the patient is in the queue for inpatient geriatric psychiatry bed.  Patient is somewhat ambivalent about this, but understands that I want her feeling well prior to going home due to her very recent, quite severe suicide attempt.  Brother is supportive of the plan, and states that he is trying to get her into  "assisted living.    2/15/2023:  Pt seen today for f/u.  Chart reviewed.  Patient reports xanax was helpful for anxiety last night.  She slept well.  She denies side effects with the Xanax.  The patient had questions about what it would be like on the inpatient psychiatry, and expressed that she is concerned about it.  We discussed the fact that she is not safe currently.  She indicated herself that she is concerned that if she goes home, that \"it may happen again\", referring to try to kill herself.  We discussed medication strategies, and talked about her current lithium level.    2/16/2023: Patient seen today for follow-up.  She states that she is feeling a little bit better today and feels Xanax is helping.  It is making her sleepy.  Also, the benefit from each dose lasts only for a couple of hours.  I asked her about whether or not she still feeling suicidal, and she states that talking about suicide, and everyone asking her about suicide has given her intrusive thoughts.  She then wonders if she is actually feeling suicidal, or for just reintroducing the thought of death.  She denies any feelings of trauma related to her suicide attempts, though I do wonder if this is the etiology of the intrusive thinking.    Patient would like to stay at her current lithium dose.  I am in agreement.  We discussed utilizing a longer acting benzodiazepine, Valium, and dosing it at night.  She would like to try this.  We are still awaiting an inpatient bed.    2/17/2023: Patient seen today for follow-up.  She reports that she is sleepy, but that Valium was helpful for her anxiety.  She is very worried about going to the psychiatric unit, and that \"I will never be released\".  She states that she was in the psychiatric unit years ago to work on her lithium, and that it was a good stay.  She really does not know what to expect at this point.    I did asked the patient how she would feel in terms of mood and her anxiety if her " "ultimate goal was to go to the assisted living, and possibly not go to inpatient psychiatry.  She stated that that would be a relief.    2/20/2023: Patient seen today for follow-up.  She reports that she is feeling better today.  Mood is somewhat improved.  She does think that some of this is related to Valium.  The Valium is making her sedated during the day, so we will move to every other day dosing.  Nonetheless, and has had benefit for her.    She does not feel suicidal, is denying intrusive thoughts, or feeling unsafe if she is to discharge.  I do not think she needs to go to inpatient psychiatry any longer, and her brother is already looking for a spot for her at an assisted living facility.  She would like to continue with her outpatient psychiatrist, but is not sure if she wants to continue to see a therapist.    2/21/2023: Patient seen today for follow-up.  She reports she is feeling \"good\".  Mood is still somewhat depressed, but tolerable to her.  A friend was visiting.  We discussed her comfort level with going to an assisted living versus going to inpatient psychiatry, and she states that she is comfortable with going to the assisted living facility, and does not think she needs inpatient psychiatry.  She denies intrusive thoughts, or fears that she might try to harm herself or that history might repeat itself.    2/22/23:  Pt seen today for f/u.  Discussed with SWS.  Educated patient regarding lithium ie, no NSAIDS, holding lithium if at risk for dehydration, etc.   Pt feeling good today.  Mood is improved. She denies SI.       Past Psychiatric History:     Pt had recently been in the ED on 1/24 with thoughts of drowning herself while in a swimming pool.  She has an outpt psychiatrist, and a therapist, and had discharged after denying SI, and wood for safety.   Patient is seen by Dr. Trent Gallo for outpatient psychiatric management.        Substance Use and History:   Negative        Past " Medical History:   PAST MEDICAL HISTORY:   Past Medical History:   Diagnosis Date     Benign essential hypertension 09/2018    added norvasc 9/18     Bipolar affective disorder (H)     hosp 1993, Dr. Aftab Deal     Cancer (H) 1996    DCIS, left breast     Chronic kidney disease, stage 3a (H)     seen by renal Dr. 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    felt due to lithium     Thalassaemia trait      Vitamin D deficiency        PAST SURGICAL HISTORY:   Past Surgical History:   Procedure Laterality Date     BIOPSY  1994    left breast     BREAST SURGERY      lumpectomy x 4     COLONOSCOPY  2021     COLONOSCOPY N/A 6/17/2022    Procedure: COLONOSCOPY, WITH POLYPECTOMY AND BIOPSY;  Surgeon: Panchito Gu MD;  Location:  GI     EYE SURGERY  2018    retina tear     LAPAROTOMY EXPLORATORY N/A 8/24/2022    Procedure: Exploratory laparotomy, REPAIR OF PERFORATED ULCER;  Surgeon: Ron Vidal MD;  Location:  OR     left hand surgery      last 1974             Family History:   FAMILY HISTORY:   Family History   Problem Relation Age of Onset     Cerebrovascular Disease Mother      Hypertension Father      Sleep Apnea Brother      Breast Cancer Maternal Aunt         x 2     Breast Cancer Other         Maternal Aunt     Hyperlipidemia Niece        Family Psychiatric History:         Social History:   SOCIAL HISTORY:   Social History     Tobacco Use     Smoking status: Never     Smokeless tobacco: Never   Substance Use Topics      Alcohol use: No            Physical ROS:   The 10 point Review of Systems is negative other than noted in the HPI or here.           Medications:       aspirin  81 mg Oral Daily     diazepam  1 mg Oral Every Other Day     lithium  150 mg Oral TID w/meals     pantoprazole  40 mg Oral QAM AC     sodium bicarbonate  1,300 mg Oral BID     sodium chloride (PF)  3 mL Intracatheter Q8H              Allergies:     Allergies   Allergen Reactions     No Known Allergies           Labs:     Recent Results (from the past 48 hour(s))   Basic metabolic panel    Collection Time: 02/21/23  6:49 AM   Result Value Ref Range    Sodium 139 136 - 145 mmol/L    Potassium 4.9 3.4 - 5.3 mmol/L    Chloride 107 98 - 107 mmol/L    Carbon Dioxide (CO2) 22 22 - 29 mmol/L    Anion Gap 10 7 - 15 mmol/L    Urea Nitrogen 59.5 (H) 8.0 - 23.0 mg/dL    Creatinine 2.13 (H) 0.51 - 0.95 mg/dL    Calcium 11.1 (H) 8.8 - 10.2 mg/dL    Glucose 98 70 - 99 mg/dL    GFR Estimate 24 (L) >60 mL/min/1.73m2   Lithium level    Collection Time: 02/21/23  8:40 AM   Result Value Ref Range    Lithium 0.9 0.6 - 1.2 mmol/L   Basic metabolic panel    Collection Time: 02/22/23  9:14 AM   Result Value Ref Range    Sodium 136 136 - 145 mmol/L    Potassium 4.8 3.4 - 5.3 mmol/L    Chloride 104 98 - 107 mmol/L    Carbon Dioxide (CO2) 22 22 - 29 mmol/L    Anion Gap 10 7 - 15 mmol/L    Urea Nitrogen 53.6 (H) 8.0 - 23.0 mg/dL    Creatinine 1.91 (H) 0.51 - 0.95 mg/dL    Calcium 10.6 (H) 8.8 - 10.2 mg/dL    Glucose 121 (H) 70 - 99 mg/dL    GFR Estimate 27 (L) >60 mL/min/1.73m2          Physical and Psychiatric Examination:     /73 (BP Location: Right arm)   Pulse 82   Temp 98.2  F (36.8  C) (Oral)   Resp 18   Wt 49.4 kg (108 lb 13.9 oz)   SpO2 98%   BMI 18.69 kg/m    Weight is 108 lbs 13.88 oz  Body mass index is 18.69 kg/m .    Physical Exam:  I have reviewed the physical exam as documented by by the medical team and agree with findings and assessment and have no  additional findings to add at this time.    Mental Status Exam:    Appearance: awake, alert, appeared as age stated, awake, alert and cooperative  Attitude:  cooperative  Eye Contact:  good  Mood:  depressed and better  Affect:  mood congruent  Speech:  clear, coherent and decreased prosody  Language: Fluent in english   Psychomotor Behavior:  no evidence of tardive dyskinesia, dystonia, or tics and physical retardation  Thought Process:  logical, linear and goal oriented  Associations:  no loose associations  Thought Content:  no evidence of suicidal ideation or homicidal ideation, no evidence of psychotic thought, no auditory hallucinations present and no visual hallucinations present  Insight:  good  Judgement:  intact  Oriented to:  time, person, and place  Attention Span and Concentration:  intact  Recent and Remote Memory:  intact  Fund of Knowledge: Appropriate   Gait and Station: Sitting up in a chair               DSM-5 Diagnosis:   296.53 Bipolar I Disorder Current or Most Recent Episode Depressed, Severe with psychotic features, toxic metabolic encephalopathy which is resolving, Intentional overdose, subsequent encounter, anxiety.  Chronic kidney disease for, diabetes insipidus              Assessment:   This is a 72-year-old female with a history of bipolar illness spanning 50 years.  Patient was initially diagnosed with schizophrenia when she first became ill.  Later, this diagnosis was modified to bipolar illness, which both she and her brother feel is accurate.  She does have a history of ECT many years ago.  It is unclear whether this was helpful or not.  What has been helpful has been lithium.  This medication was discontinued a year ago due to worsening kidney function.  The patient has not been able to stabilize since going off this medication, whereas she was stable and functional on lithium.    Patient and her brother, who are both very high functioning, would like her to go back on lithium.  I  am in agreement.  We will reach out to the hospitalist to better understand what her options are.  Regardless, she will need to go to an inpatient psychiatric bed for further stabilization after she is medically ready to discharge.    Patient's depression may be improving now that I have added low-dose benzodiazepine.  The Valium, at 1 mg every other day is still somewhat sedating.  The equivalent dose of Ativan to Valium at this dose is 0.1 mg Ativan to 1 mg Valium.  This is very little medication, and I am dosing it every other day.  The patient does not want to change the Valium, and finds that it is helpful even if it does cause some sedation.  She denies that it is causing any cognitive side effects or gait unsteadiness.      I do not think the patient needs to go to inpatient psychiatry, and she does not meet criteria any longer.  If she wanted to go, I would push the issue, but she does not.    I am concerned about follow-up laboratory evaluations, as her lithium level is close to 1.0 with 1.2 mg being the top of the therapeutic index.   It would not take much for this patient to become lithium toxic, and I am concerned that she will need very tight follow-up.  Discussed in detail with Dr. Contreras, who will loop in renal again.          Summary of Recommendations:   1.  Adjusted patient's Valium dose to 1 mg every other night.  Patient wants to continue this dose.    2.  Patient no longer requires inpatient psychiatric hospitalization.    Sandra Cox MD  Consult/Liaison Psychiatry and Addiction Medicine  [facility]

## 2023-02-22 NOTE — PROGRESS NOTES
"CLINICAL NUTRITION SERVICES - REASSESSMENT NOTE    Malnutrition:   1/31  % Weight Loss:  None noted  % Intake:  </= 75% for >/= 7 days (moderate malnutrition)   Subcutaneous Fat Loss:  None observed  Muscle Loss:  None observed  Fluid Retention:  Mild but likely not nutritionally significant      Malnutrition Diagnosis: Patient does not meet two of the above criteria necessary for diagnosing malnutrition     EVALUATION OF PROGRESS TOWARD GOALS   Diet: 3g K diet  Nepro Oral supplement @ 10 am   Intake/Tolerance:   - Eating 100% of meals. Often orders meals TID + the supplement.     2/21 - 1500 kcal, 80 g protein ordered  2/20 - 1475 kcal, 80 g protein ordered  2/19 - 1120 kcal, 61 g protein ordered  Meeting >75% est needs. Pt seen in room. She continues to feel her appetite is good, and she is consistently drinking the nepro. Today states \"It's a little thick\" but agreed that it's best to continue it for now, in order to optimally meet nutrition needs.     - Labs: BUN 60 (H), Cr 2.13 (H)  - Stooling: BM x1 daily   - Weight: 49.4 kg (108 lb 13.9 oz) - consistent w/ weight measured on 2/11, both lowest of admission.     ASSESSED NUTRITION NEEDS:  Dosing Weight 58.5 kg   Estimated Energy Needs: 2979-5764 kcals (25-30 Kcal/Kg)  Justification: maintenance  Estimated Protein Needs: 59-70 grams protein (1-1.2 g pro/Kg)  Justification: preservation of LBM with consideration to CKD  Estimated Fluid Needs: 9386-3200 mL (1 mL/Kcal)  Justification: maintenance    NEW FINDINGS:   - discharge to ?TCU when placement found     Previous Goals:   Intake of at least 75% of adequate meals TID + 1 supplement.   Evaluation: Met    Previous Nutrition Diagnosis:   Predicted inadequate nutrient intake related to ordering small meals, meeting needs w/ supplement  Evaluation: Completed    CURRENT NUTRITION DIAGNOSIS  No nutrition diagnosis identified at this time    INTERVENTIONS  Recommendations / Nutrition Prescription  3g K diet  Nepro @ 10 " am     Implementation  No changes     Goals  Intake of at least 75% of adequate meals TID + 1 supplement.     MONITORING AND EVALUATION:  Progress towards goals will be monitored and evaluated per protocol and Practice Guidelines    Yissel Cook RD, LD  [facility], [facility], [facility]  Pager: [phone]  Weekend Pager: [phone]

## 2023-02-22 NOTE — PROGRESS NOTES
Care Management Follow Up    Length of Stay (days): 24    Expected Discharge Date: 02/23/2023     Concerns to be Addressed: adjustment to diagnosis/illness, discharge planning     Patient plan of care discussed at interdisciplinary rounds: Yes    Anticipated Discharge Disposition: TCU and eventual move into [facility]     Anticipated Discharge Services:    Anticipated Discharge DME:      Patient/family educated on Medicare website which has current facility and service quality ratings:    Education Provided on the Discharge Plan:    Patient/Family in Agreement with the Plan: yes    Referrals Placed by CM/SW:    Private pay costs discussed:     Additional Information:  As of 2/21, Dr Cox no longer recommends patient transfer to geropsychiatry for medical management of depression. She indicates in her 2/21 note that patient is in agreement.    Patient's brother, Acosta, report he is working with Geraldine Jose at [facility] admissions. He, with patient's involvement, will be signing a lease agreement on Friday 2/24. Move in date to be determined.   The [facility] staff will need to clinically assess patient and prepare for patient's move in date.   As of 2/21, PT does not feel patient is ready to ambulate independently yet as she will need to do within the walls of her [facility].   In the interim, [facility]    Call placed to Ms Jose and Osmani at [facility].     Update at 1000 [facility] has accepted patient for today.  Writer will need to complete a PAS and because patient had made a suicide attempt, the PAS will trigger the need for a OBRA level 2 before discharge.   Will update Dr Contreras, patient and brother Acosta.    Update:  Writer updated Dr Cox also regarding discharge plan and need for OBRA Level 2 before discharge.  PA approved.  Effective date: 2/22/23  PA reference #:[id number]  Pt. notified: pt/brother      Afia Oleary, [facility]

## 2023-02-22 NOTE — PLAN OF CARE
Goal Outcome Evaluation:      Plan of Care Reviewed With: patient    Overall Patient Progress: no changeOverall Patient Progress: no change    Outcome Evaluation: Continue to eat well, drinking the nepro daily. Encouraged ongoing adequate intake, will continue Nepro for now to optimally meet nutrition needs.

## 2023-02-22 NOTE — PLAN OF CARE
Goal Outcome Evaluation:       Summary: Polysubstance overdose- Suicide Attempt  DATE & TIME: 2/22/23 3121-8663  Cognitive Concerns/ Orientation: A&Ox4. With flat affect. Denies SI  BEHAVIOR & AGGRESSION TOOL COLOR: Green  ABNL VS/O2: VSS on RA  MOBILITY: SBA w/ GB & walker, up in chair throughout day  PAIN MANAGMENT: denies   DIET: 3gm K diet-good appetite   BOWEL/BLADDER: BM x1, continent, using bathroom during day, purewick at night.   ABNL LAB/BG: Creatinine 1.91 - baseline range for pt.   DRAIN/DEVICES: None  TELEMETRY RHYTHM: NA  SKIN: PI scab on tip of nose improved   TESTS/PROCEDURES: None   D/C DATE: No longer needing inpatient psych, OT recommending TCU, SW following.  Plan for discharge to [place] [facility] probably Friday.  OTHER IMPORTANT INFO: Brace on RLE and LUE when awake.

## 2023-02-22 NOTE — PROGRESS NOTES
[facility]    Hospitalist Progress Note    Assessment & Plan   Diana Santiago is a 72 year old female with PMHx significant for bipolar disorder, anxiety/depression, prior suicidal ideation, HTN, HLD, CKD stage III, admitted on 1/28/2023 with suicide attempt by intentional polysubstance overdose.     Shortly after admission she became hypotensive and required transfer to the ICU for pressors and ultimately required intubation. Her condition improved. She was extubated on 2/2 and ultimately transferred out of the ICU to the hospitalist service on 2/5/23.       Polysubstance Overdose/Suicide Attempt  Distributive shock causing hypotension from Ca channel blocker overdose: Resolved  Acute encephalopathy caused by overdose: Resolved  Acute hypoxic resp failure with overdose: Resolved  * Report was that patient's mental health had been severely decompensated since stopping lithium a year ago. She had been seen in the ED/EMPATH unit at [facility] on 1/24.  * Presented to the ED after intentional ingestion of approximately 100 tablets of her prescribed medications in attempt at suicide. She reported taking a combination off lamotrigine, amlodipine, haloperidol and dextroamphetamine. She is unclear about how much of each medication she took. Salicylate and acetaminophen levels were negative. In the ED she is hemodynamically stable, though with borderline low blood pressures  * As above, shortly after admission she became hypotensive and required transfer to the ICU for pressors and ultimately required intubation. Her condition improved. She was extubated and ultimately transferred out of the ICU to the hospitalist service on 2/5/23.   * Psychiatry following this stay. Has ultimately been resumed on Lithium 150mg TID and Valium 1mg every other day.  * Initially felt to need inpatient geripsych stay but per psych reassessment on 2/20, no longer felt to need inpatient stay. No longer needing bedside sitter as  of 2/20  -- cont lithium 150mg TID -- monitor lithium levels periodically  -- cont Valium 1mg every other day    Partial diabetes insipidus  Severe nocturia  REI on stage III CKD: REI resolved  Metabolic/lactic acidosis associated with overdose: Resolved  Hyperkalemia with acidosis, suggestive of RTA: Resolved  * Baseline Cr is 1.5-2.0. Developed REI this stay. Cr peaked at 2.66 on 2/1.   * Seen by nephrology this stay. On IVFs earlier this stay.   * Also with severe nocturia this stay, had been getting up 8-10 times/night to urinate.  * Nephrology switched oral DDAVP to amiloride 5mg po daily, ultimately stopped 2/16 dt mild hyperkalemia with K 5.2  * Renal function and lytes normalized. Advised that patient should have access to free water at all times (especially with polyuria and polydipsia in the setting of lithium usage). Goal is to avoid dehydration and maintain stable renal function to reduce risk of developing lithium toxicity.   -- labs now stabilizing -- monitor daily BMP while hospitalized; discussed with nephrology on 2/22, will plan to monitor BMP and lithium level weekly at discharge if labs remain stable this stay  -- cont oral bicarb 1300mg BID  -- encourage po intake -- benefits from frequent reminders/cues to push fluid intake     Severe protein-calorie malnutrition  Hypoglycemia: Resolved  Loose stools: Resolved  * Had been on TFs earlier this stay. Those were stopped on 2/5.   -- conts on low K diet, supplements     Superficial thrombus of R cephalic vein  * Supportive cares. R subclavian CVC removed on 2/5/23.     Chronic anemia.  Hx of perforated PUD   * Baseline hgb is around 10. No sliding scale/x of bleeding this stay.   * B12 elevated at 1249, iron panel normal, folate normal on 2/7   * Chronic and stable on PPI.     Physical deconditioning  Suspected pressure injury dt prior treatment with BiPAP  * Crusted ulceration on tip of nose as well as linear skin breakdown over bridge of nose.  *  WOC RN and PT following    FEN: no IVFs, lytes stable, low K diet   DVT Prophylaxis:  low dose ASA and PCDs while hospitalized, stopped subQ heparin on 2/6 per nephrology rec dt concern it could cause RTA  Code Status: Full Code    Disposition: Per psych assessment on 2/20, no longer needs inpatient psych stay. Therapy recommending TCU stay. Has been accepted to TCU and is medically stable for discharge, but needs level 2 screen completed. SW following. Discharge once arrangements made    Triny Contreras, DO    Medical Decision Making       -------------------------- BILLING ON TIME ------------------------------------------------------------------------------------------------------------  50 MINUTES SPENT BY ME on the date of service doing chart review, history, exam, documentation & further activities per the note.       Interval History    Seen this morning. Feeling good. No specific complaints. Denies cp/sob/cough, abd pain/n/v. Taking po.     -Data reviewed today: I reviewed all new labs and imaging results over the last 24 hours. I personally reviewed no images or EKG's today.    Physical Exam   Temp: 97.9  F (36.6  C) Temp src: Oral BP: 132/83 Pulse: 78   Resp: 18 SpO2: 100 % O2 Device: None (Room air)    Vitals:    02/12/23 0628 02/19/23 0002 02/22/23 0700   Weight: 50.3 kg (110 lb 14.3 oz) 49.4 kg (109 lb) 49.4 kg (108 lb 13.9 oz)     Vital Signs with Ranges  Temp:  [97.5  F (36.4  C)-98.2  F (36.8  C)] 97.9  F (36.6  C)  Pulse:  [78-80] 78  Resp:  [16-18] 18  BP: (106-132)/(71-83) 132/83  SpO2:  [98 %-100 %] 100 %  I/O last 3 completed shifts:  In: 1443 [P.O.:1440; I.V.:3]  Out: 2000 [Urine:2000]    Constitutional: Resting comfortably, alert and answering questions appropriately, NAD  Respiratory: CTAB, no wheeze/rales/rhonchi, no increased work of breathing  Cardiovascular: HRRR, no MGR, no LE edema  GI: S, NT, ND, +BS  Skin/Integumen: warm/dry  Other:      Medications       aspirin  81 mg Oral  Daily     diazepam  1 mg Oral Every Other Day     lithium  150 mg Oral TID w/meals     pantoprazole  40 mg Oral QAM AC     sodium bicarbonate  1,300 mg Oral BID     sodium chloride (PF)  3 mL Intracatheter Q8H       Data   Recent Labs   Lab 02/22/23  0914 02/21/23  0649 02/19/23  2344    139 130*   POTASSIUM 4.8 4.9 4.9   CHLORIDE 104 107 99   CO2 22 22 21*   BUN 53.6* 59.5* 66.1*   CR 1.91* 2.13* 2.36*   ANIONGAP 10 10 10   ISSA 10.6* 11.1* 10.2   * 98 93       No results found for this or any previous visit (from the past 24 hour(s)).

## 2023-02-22 NOTE — TELEPHONE ENCOUNTER
R Bed Search Update [facility] Only    No appropriate beds available.  Pt remains on intake worklist awaiting appropriate placement.

## 2023-02-22 NOTE — PLAN OF CARE
Summary: Polysubstance overdose- Suicide Attempt  DATE & TIME: 0700 - 1930  Cognitive Concerns/ Orientation: A&Ox4. With flat affect. Denies SI/SIB  BEHAVIOR & AGGRESSION TOOL COLOR: Green  ABNL VS/O2: VSS on RA, BP soft  MOBILITY: SBA w/ GB & walker  PAIN MANAGMENT: Denies pain  DIET: 3gm K diet, good appetite  BOWEL/BLADDER: Dribbles at times with voiding. Continent of BM this shift  ABNL LAB/BG: Creatinine 2.13 - baseline range for pt. Lithium level within therapeutic range.  DRAIN/DEVICES: PIV SL  TELEMETRY RHYTHM: NA  SKIN: Suspected pressure injury on nose. Wound care completed per order. WOC following.  TESTS/PROCEDURES: None   D/C DATE: No longer needing inpatient psych, OT recommending TCU, SW following  OTHER IMPORTANT INFO: Brace on RLE and LUE numbness due to previous SA

## 2023-02-22 NOTE — PLAN OF CARE
Goal Outcome Evaluation:       Summary: Polysubstance overdose- Suicide Attempt  DATE & TIME: 2/21/23-2/22/23 7058-5004  Cognitive Concerns/ Orientation: A&Ox4. With flat affect. Denies SI/SIB  BEHAVIOR & AGGRESSION TOOL COLOR: Green  ABNL VS/O2: VSS on RA  MOBILITY: SBA w/ GB & walker  PAIN MANAGMENT: patient had a quick sharp pain in L hand, but declined intervention  DIET: 3gm K diet  BOWEL/BLADDER: No BM this shift, purewick in place   ABNL LAB/BG: Creatinine 2.13 - baseline range for pt.   DRAIN/DEVICES: PIV SL  TELEMETRY RHYTHM: NA  SKIN: PI scab on tip of nose. WOC following.  TESTS/PROCEDURES: None   D/C DATE: No longer needing inpatient psych, OT recommending TCU, SW following  OTHER IMPORTANT INFO: Brace on R

## 2023-02-22 NOTE — TELEPHONE ENCOUNTER
01:10 Bed Search Update:     Awaiting availability at [facility].  Remains on wait list

## 2023-02-23 ENCOUNTER — PATIENT OUTREACH (OUTPATIENT)
Dept: CARE COORDINATION | Facility: CLINIC | Age: 73
End: 2023-02-23
Payer: MEDICARE

## 2023-02-23 VITALS
HEART RATE: 78 BPM | TEMPERATURE: 98 F | RESPIRATION RATE: 16 BRPM | SYSTOLIC BLOOD PRESSURE: 134 MMHG | DIASTOLIC BLOOD PRESSURE: 84 MMHG | OXYGEN SATURATION: 98 %

## 2023-02-23 VITALS
RESPIRATION RATE: 18 BRPM | HEART RATE: 72 BPM | TEMPERATURE: 97.9 F | WEIGHT: 108 LBS | OXYGEN SATURATION: 99 % | BODY MASS INDEX: 18.54 KG/M2 | DIASTOLIC BLOOD PRESSURE: 86 MMHG | SYSTOLIC BLOOD PRESSURE: 131 MMHG

## 2023-02-23 LAB
ANION GAP SERPL CALCULATED.3IONS-SCNC: 10 MMOL/L (ref 7–15)
BUN SERPL-MCNC: 52 MG/DL (ref 8–23)
CALCIUM SERPL-MCNC: 10.5 MG/DL (ref 8.8–10.2)
CHLORIDE SERPL-SCNC: 100 MMOL/L (ref 98–107)
CREAT SERPL-MCNC: 1.83 MG/DL (ref 0.51–0.95)
DEPRECATED HCO3 PLAS-SCNC: 23 MMOL/L (ref 22–29)
GFR SERPL CREATININE-BSD FRML MDRD: 29 ML/MIN/1.73M2
GLUCOSE SERPL-MCNC: 157 MG/DL (ref 70–99)
POTASSIUM SERPL-SCNC: 4.3 MMOL/L (ref 3.4–5.3)
SODIUM SERPL-SCNC: 133 MMOL/L (ref 136–145)

## 2023-02-23 PROCEDURE — 80048 BASIC METABOLIC PNL TOTAL CA: CPT | Performed by: INTERNAL MEDICINE

## 2023-02-23 PROCEDURE — 36415 COLL VENOUS BLD VENIPUNCTURE: CPT | Performed by: INTERNAL MEDICINE

## 2023-02-23 PROCEDURE — 250N000013 HC RX MED GY IP 250 OP 250 PS 637: Performed by: PSYCHIATRY & NEUROLOGY

## 2023-02-23 PROCEDURE — 250N000013 HC RX MED GY IP 250 OP 250 PS 637: Performed by: INTERNAL MEDICINE

## 2023-02-23 PROCEDURE — 99239 HOSP IP/OBS DSCHRG MGMT >30: CPT | Performed by: INTERNAL MEDICINE

## 2023-02-23 RX ADMIN — LITHIUM CARBONATE 150 MG: 150 CAPSULE, GELATIN COATED ORAL at 12:39

## 2023-02-23 RX ADMIN — PANTOPRAZOLE SODIUM 40 MG: 40 TABLET, DELAYED RELEASE ORAL at 06:42

## 2023-02-23 RX ADMIN — SODIUM BICARBONATE 650 MG TABLET 1300 MG: at 08:48

## 2023-02-23 RX ADMIN — ASPIRIN 81 MG: 81 TABLET, COATED ORAL at 08:48

## 2023-02-23 RX ADMIN — LITHIUM CARBONATE 150 MG: 150 CAPSULE, GELATIN COATED ORAL at 08:48

## 2023-02-23 ASSESSMENT — ACTIVITIES OF DAILY LIVING (ADL)
ADLS_ACUITY_SCORE: 48
ADLS_ACUITY_SCORE: 45

## 2023-02-23 NOTE — PROGRESS NOTES
[facility]    Hospitalist Progress Note    Assessment & Plan   Diana Santiago is a 72 year old female with PMHx significant for bipolar disorder, anxiety/depression, prior suicidal ideation, HTN, HLD, CKD stage III, admitted on 1/28/2023 with suicide attempt by intentional polysubstance overdose.     Shortly after admission she became hypotensive and required transfer to the ICU for pressors and ultimately required intubation. Her condition improved. She was extubated on 2/2 and ultimately transferred out of the ICU to the hospitalist service on 2/5/23.       Polysubstance Overdose/Suicide Attempt  Distributive shock causing hypotension from Ca channel blocker overdose: Resolved  Acute encephalopathy caused by overdose: Resolved  Acute hypoxic resp failure with overdose: Resolved  * Report was that patient's mental health had been severely decompensated since stopping lithium a year ago. She had been seen in the ED/EMPATH unit at [facility] on 1/24.  * Presented to the ED after intentional ingestion of approximately 100 tablets of her prescribed medications in attempt at suicide. She reported taking a combination off lamotrigine, amlodipine, haloperidol and dextroamphetamine. She is unclear about how much of each medication she took. Salicylate and acetaminophen levels were negative. In the ED she is hemodynamically stable, though with borderline low blood pressures  * As above, shortly after admission she became hypotensive and required transfer to the ICU for pressors and ultimately required intubation. Her condition improved. She was extubated and ultimately transferred out of the ICU to the hospitalist service on 2/5/23.   * Psychiatry following this stay. Has ultimately been resumed on Lithium 150mg TID and Valium 1mg every other day.  * Initially felt to need inpatient geripsych stay but per psych reassessment on 2/20, no longer felt to need inpatient stay. No longer needing bedside sitter as  of 2/20  -- cont lithium 150mg TID -- monitor lithium levels periodically  -- cont Valium 1mg every other day    Partial diabetes insipidus  Severe nocturia  REI on stage III CKD: REI resolved  Metabolic/lactic acidosis associated with overdose: Resolved  Hyperkalemia with acidosis, suggestive of RTA: Resolved  * Baseline Cr is 1.5-2.0. Developed REI this stay. Cr peaked at 2.66 on 2/1.   * Seen by nephrology this stay. On IVFs earlier this stay.   * Also with severe nocturia this stay, had been getting up 8-10 times/night to urinate.  * Nephrology switched oral DDAVP to amiloride 5mg po daily, ultimately stopped 2/16 dt mild hyperkalemia with K 5.2  * Renal function and lytes normalized. Advised that patient should have access to free water at all times (especially with polyuria and polydipsia in the setting of lithium usage). Goal is to avoid dehydration and maintain stable renal function to reduce risk of developing lithium toxicity.   -- labs now stabilizing -- monitor daily BMP while hospitalized; discussed with nephrology on 2/22, will plan to monitor BMP and lithium level weekly at discharge if labs remain stable this stay  -- todays BMP still pending collection  -- cont oral bicarb 1300mg BID  -- encourage po intake -- benefits from frequent reminders/cues to push fluid intake     Severe protein-calorie malnutrition  Hypoglycemia: Resolved  Loose stools: Resolved  * Had been on TFs earlier this stay. Those were stopped on 2/5.   -- conts on low K diet, supplements     Superficial thrombus of R cephalic vein  * Supportive cares. R subclavian CVC removed on 2/5/23.     Chronic anemia.  Hx of perforated PUD   * Baseline hgb is around 10. No sliding scale/x of bleeding this stay.   * B12 elevated at 1249, iron panel normal, folate normal on 2/7   * Chronic and stable on PPI.     Physical deconditioning  Suspected pressure injury dt prior treatment with BiPAP  * Crusted ulceration on tip of nose as well as linear  skin breakdown over bridge of nose.  * WOC RN and PT following    FEN: no IVFs, lytes stable, low K diet   DVT Prophylaxis:  low dose ASA and PCDs while hospitalized, stopped subQ heparin on 2/6 per nephrology rec dt concern it could cause RTA  Code Status: Full Code    Disposition: Per psych assessment on 2/20, no longer needs inpatient psych stay. Therapy recommending TCU stay. Has been accepted to TCU and is medically stable for discharge, but needs level 2 screen completed. SW following. Discharge once arrangements made    Triny Contreras, DO    Medical Decision Making       -------------------------- BILLING ON TIME ------------------------------------------------------------------------------------------------------------  45 MINUTES SPENT BY ME on the date of service doing chart review, history, exam, documentation & further activities per the note.       Interval History    Seen this morning. Up chair. Feeling good after getting a change to shower this morning. Bright affect. No cp/sob/cough, abd pain/n/v. Taking po.    -Data reviewed today: I reviewed all new labs and imaging results over the last 24 hours. I personally reviewed no images or EKG's today.    Physical Exam   Temp: 97.9  F (36.6  C) Temp src: Oral BP: 131/86 Pulse: 72   Resp: 18 SpO2: 99 % O2 Device: None (Room air)    Vitals:    02/19/23 0002 02/22/23 0700 02/23/23 0011   Weight: 49.4 kg (109 lb) 49.4 kg (108 lb 13.9 oz) 49 kg (108 lb)     Vital Signs with Ranges  Temp:  [97.9  F (36.6  C)-98.2  F (36.8  C)] 97.9  F (36.6  C)  Pulse:  [72-82] 72  Resp:  [18] 18  BP: (114-131)/(73-86) 131/86  SpO2:  [98 %-99 %] 99 %  I/O last 3 completed shifts:  In: 1440 [P.O.:1440]  Out: 1700 [Urine:1700]    Constitutional: Resting comfortably, alert and answering questions appropriately, NAD  Respiratory: CTAB, no wheeze/rales/rhonchi, no increased work of breathing  Cardiovascular: HRRR, no MGR, no LE edema  GI: S, NT, ND, +BS  Skin/Integumen:  warm/dry  Other:      Medications       aspirin  81 mg Oral Daily     diazepam  1 mg Oral Every Other Day     lithium  150 mg Oral TID w/meals     pantoprazole  40 mg Oral QAM AC     sodium bicarbonate  1,300 mg Oral BID     sodium chloride (PF)  3 mL Intracatheter Q8H       Data   Recent Labs   Lab 02/22/23  0914 02/21/23  0649 02/19/23  2344    139 130*   POTASSIUM 4.8 4.9 4.9   CHLORIDE 104 107 99   CO2 22 22 21*   BUN 53.6* 59.5* 66.1*   CR 1.91* 2.13* 2.36*   ANIONGAP 10 10 10   ISSA 10.6* 11.1* 10.2   * 98 93       No results found for this or any previous visit (from the past 24 hour(s)).

## 2023-02-23 NOTE — PLAN OF CARE
Summary: Polysubstance overdose- Suicide Attempt  DATE & TIME: 2/22/23 9322-6062  Cognitive Concerns/ Orientation: A&Ox4. With flat affect. Denies SI  BEHAVIOR & AGGRESSION TOOL COLOR: Green  ABNL VS/O2: VSS on RA  MOBILITY: SBA w/ GB & walker, up in chair throughout day  PAIN MANAGMENT: denies   DIET: 3gm K diet-good appetite   BOWEL/BLADDER: BM x1, continent, using bathroom during day, purewick at night.   ABNL LAB/BG: Creatinine 1.91 - baseline range for pt.   DRAIN/DEVICES: None  TELEMETRY RHYTHM: NA  SKIN: PI scab on tip of nose improved   TESTS/PROCEDURES: None   D/C DATE: No longer needing inpatient psych, OT recommending TCU, SW following.  Plan for discharge to [facility] probably Friday.  OTHER IMPORTANT INFO: Brace on RLE and LUE when awake.

## 2023-02-23 NOTE — PROGRESS NOTES
Care Management Follow Up    Length of Stay (days): 25    Expected Discharge Date: 02/24/2023     Concerns to be Addressed: adjustment to diagnosis/illness, discharge planning     Patient plan of care discussed at interdisciplinary rounds: Yes    Anticipated Discharge Disposition: [facility] and then move into [facility]     Anticipated Discharge Services:    Anticipated Discharge DME:      Patient/family educated on Medicare website which has current facility and service quality ratings:    Education Provided on the Discharge Plan:    Patient/Family in Agreement with the Plan: yes    Referrals Placed by CM/SW:    Private pay costs discussed: Not applicable    Additional Information:  [facility]  called requesting H&P and psychiatry consult which she will use to complete the OBRA Level 2 screen. Once completed, [facility] can accept patient. The screening should be completed today.  Writer has updated [facility] admissions.   Plan:  Once writer receives notice that the assessment is completed, transportation will be coordinated with [facility] admission.      DENZEL LedezmaSW

## 2023-02-23 NOTE — PLAN OF CARE
Goal Outcome Evaluation:         Goal Outcome Evaluation:     Summary: Polysubstance overdose- Suicide Attempt  DATE & TIME: 2/23/23 3802-2724  Cognitive Concerns/ Orientation: A&Ox4. With flat affect. Denies SI  BEHAVIOR & AGGRESSION TOOL COLOR: Green  ABNL VS/O2: VSS   MOBILITY: SBA w/ GB & walker, up in chair   PAIN MANAGMENT: denies   DIET: 3gm K diet-good appetite   BOWEL/BLADDER: Continent, had BM x1  ABNL LAB/BG: Creatinine 1.83,   DRAIN/DEVICES: None  TELEMETRY RHYTHM: NA  SKIN: PI scab on tip of nose improved   TESTS/PROCEDURES: None   D/C DATE: No longer needing inpatient psych, OT recommending TCU, SW following.  Plan for discharge to [facility] today 2/23 at 1300.  OTHER IMPORTANT INFO: Brace on RLE and LUE when awake.

## 2023-02-23 NOTE — PROGRESS NOTES
Clinic Care Coordination Contact  Care Coordination Transition Communication    Referral Source: IP Handoff    Clinical Data: Patient was hospitalized at [facility] from 1/28/2023 to 2/23/2023 with diagnosis of  Polysubstance overdose/suicide attempt, REI on stage III CKD, superficial thrombus of right cephalic vein, chronic anemia.     Transition to Facility:              Facility Name: [facility]              SW phone number/fax: [phone]    Plan: RN/SW Care Coordinator will await notification from facility staff informing RN/SW Care Coordinator of patient's discharge plans/needs. RN/SW Care Coordinator will review chart and outreach to facility staff every 4 weeks and as needed.      Roula Keys RN, BSN, PHN  Primary Care / Care Coordinator   [facility]  E-mail [email or web address]   [phone]

## 2023-02-23 NOTE — DISCHARGE SUMMARY
[facility]    Discharge Summary  Hospitalist    Date of Admission:  1/28/2023  Date of Discharge:  2/23/2023  Discharging Provider: Triny Contreras,     Discharge Diagnoses   Polysubstance Overdose/Suicide Attempt  Distributive shock causing hypotension from Ca channel blocker overdose: Resolved  Acute encephalopathy caused by overdose: Resolved  Acute hypoxic resp failure with overdose: Resolved  Partial diabetes insipidus  Severe nocturia  REI on stage III CKD: REI resolved  Metabolic/lactic acidosis associated with overdose: Resolved  Hyperkalemia with acidosis, suggestive of RTA: Resolved  Severe protein-calorie malnutrition  Hypoglycemia: Resolved  Loose stools: Resolved  Superficial thrombus of R cephalic vein  Chronic anemia.  Hx of perforated PUD   Physical deconditioning  Suspected pressure injury dt prior treatment with BiPAP    History of Present Illness   Diana Santiago is a 72 year old female with PMHx significant for bipolar disorder, anxiety/depression, prior suicidal ideation, HTN, HLD, CKD stage III, admitted on 1/28/2023 with suicide attempt by intentional polysubstance overdose.      Shortly after admission she became hypotensive and required transfer to the ICU for pressors and ultimately required intubation. Her condition improved. She was extubated on 2/2 and ultimately transferred out of the ICU to the hospitalist service on 2/5/23.     Hospital Course   Diana Santiago was admitted on 1/28/2023.  The following problems were addressed during her hospitalization:    Polysubstance Overdose/Suicide Attempt  Distributive shock causing hypotension from Ca channel blocker overdose: Resolved  Acute encephalopathy caused by overdose: Resolved  Acute hypoxic resp failure with overdose: Resolved  * Report was that patient's mental health had been severely decompensated since stopping lithium a year ago. She had been seen in the ED/EMPATH unit at [facility] on 1/24.  * Presented  to the ED after intentional ingestion of approximately 100 tablets of her prescribed medications in attempt at suicide. She reported taking a combination off lamotrigine, amlodipine, haloperidol and dextroamphetamine. She is unclear about how much of each medication she took. Salicylate and acetaminophen levels were negative. In the ED she is hemodynamically stable, though with borderline low blood pressures  * As above, shortly after admission she became hypotensive and required transfer to the ICU for pressors and ultimately required intubation. Her condition improved. She was extubated and ultimately transferred out of the ICU to the hospitalist service on 2/5/23.   * Psychiatry following this stay. Has ultimately been resumed on Lithium 150mg TID and Valium 1mg every other day.  * Initially felt to need inpatient geripsych stay but per psych reassessment on 2/20, no longer felt to need inpatient stay. No longer needing bedside sitter as of 2/20  * At discharge, will cont lithium 150mg TID (next lithium level check will be 2/27/23) and Valium 1mg every other day.      Partial diabetes insipidus  Severe nocturia  REI on stage III CKD: REI resolved  Metabolic/lactic acidosis associated with overdose: Resolved  Hyperkalemia with acidosis, suggestive of RTA: Resolved  * Baseline Cr is 1.5-2.0. Developed REI this stay. Cr peaked at 2.66 on 2/1.   * Seen by nephrology this stay. On IVFs earlier this stay.   * Also with severe nocturia this stay, had been getting up 8-10 times/night to urinate.  * Nephrology switched oral DDAVP to amiloride 5mg po daily, ultimately stopped 2/16 dt mild hyperkalemia with K 5.2  * Renal function and lytes normalized. Advised that patient should have access to free water at all times (especially with polyuria and polydipsia in the setting of lithium usage). Goal is to avoid dehydration and maintain stable renal function to reduce risk of developing lithium toxicity.   * Labs stabilized --  discussed with nephrology on 2/22, will plan to monitor BMP and lithium level weekly at discharge if labs remain stable this stay  * Cont oral bicarb 1300mg BID  * Encourage po intake -- benefits from frequent reminders/cues to push fluid intake     Severe protein-calorie malnutrition  Hypoglycemia: Resolved  Loose stools: Resolved  * Had been on TFs earlier this stay. Those were stopped on 2/5.   * Conts on low K diet, supplements     Superficial thrombus of R cephalic vein  * Supportive cares. R subclavian CVC removed on 2/5/23.     Chronic anemia.  Hx of perforated PUD   * Baseline hgb is around 10. No sliding scale/x of bleeding this stay.   * B12 elevated at 1249, iron panel normal, folate normal on 2/7   * Chronic and stable on PPI.      Physical deconditioning  Suspected pressure injury dt prior treatment with BiPAP  * Crusted ulceration on tip of nose as well as linear skin breakdown over bridge of nose.  * WOC RN and PT following. Discharged to TCU for ongoing therapy.    Triny Contreras, DO    Code Status   Full Code       Primary Care Physician   Shannon Craft    Physical Exam   Temp: 97.9  F (36.6  C) Temp src: Oral BP: 131/86 Pulse: 72   Resp: 18 SpO2: 99 % O2 Device: None (Room air)    Vitals:    02/19/23 0002 02/22/23 0700 02/23/23 0011   Weight: 49.4 kg (109 lb) 49.4 kg (108 lb 13.9 oz) 49 kg (108 lb)     Vital Signs with Ranges  Temp:  [97.9  F (36.6  C)-98.2  F (36.8  C)] 97.9  F (36.6  C)  Pulse:  [72-82] 72  Resp:  [18] 18  BP: (114-131)/(73-86) 131/86  SpO2:  [98 %-99 %] 99 %  I/O last 3 completed shifts:  In: 1440 [P.O.:1440]  Out: 1700 [Urine:1700]    General: Resting comfortably, alert, conversive, NAD  CVS: HRRR, no MGR, no LE edema  Respiratory: CTAB, no wheeze/rales/rhonchi, no increased work of breathing  GI: S, NT, ND, +BS  Skin: Warm/dry  Neuro: CNs 2-12 intact, no focal motor/sensory deficits    Discharge Disposition   Discharged to TCU  Condition at discharge:  Stable    Consultations This Hospital Stay   PSYCHIATRY IP CONSULT  INTENSIVIST IP CONSULT  NEPHROLOGY IP CONSULT  ------------------------  PHYSICAL THERAPY ADULT IP CONSULT  OCCUPATIONAL THERAPY ADULT IP CONSULT  SPEECH LANGUAGE PATH ADULT IP CONSULT  VASCULAR ACCESS ADULT IP CONSULT  NUTRITION SERVICES ADULT IP CONSULT  PHARMACY IP CONSULT  CARE MANAGEMENT / SOCIAL WORK IP CONSULT  WOUND OSTOMY CONTINENCE NURSE  IP CONSULT    Time Spent on this Encounter   I, Triny Contreras DO, personally saw the patient today and spent greater than 30 minutes discharging this patient.    Discharge Orders      Reason for your hospital stay    Depression with suicide attempt     Activity - Up ad mirta     Follow Up and recommended labs and tests    1. Follow up with facility physician. Needs BMP and lithium level checked on 2/27/22. Results to be faxed to Dr. Flavio Cedeno of [facility] (fax # [phone]).  2. If labs stable, will need to monitor BMP(Cr, Na) and lithium level weekly.     General info for SNF    Length of Stay Estimate: Short Term Care: Estimated # of Days <30  Condition at Discharge: Improving  Level of care:skilled   Rehabilitation Potential: Excellent  Admission H&P remains valid and up-to-date: Yes  Recent Chemotherapy: N/A  Use Nursing Home Standing Orders: Yes     Mantoux instructions    Give two-step Mantoux (PPD) Per Facility Policy Yes     Encourage PO fluids    Do not restrict patient's access to fluids, free water. Needs to drink at least 10 glasses of water/day and will need to be instructed/reminded of this. Increased nocturia (urinating overnight) is expected.     Additional Discharge Instructions    1. Do not restrict patient's access to fluids, free water. Needs to drink at least 10 glasses of water/day and will need to be instructed/reminded of this. Increased nocturia (urinating overnight) is expected.   2. Note follow up instructions -- patient will need labs drawn on  2/27/23 and faxed to nephrology.     Full Code     Physical Therapy Adult Consult    Evaluate and treat as clinically indicated.    Reason:  Physical deconditioning     Occupational Therapy Adult Consult    Evaluate and treat as clinically indicated.    Reason:  Physical deconditioning     Diet    Follow this diet upon discharge: Orders Placed This Encounter      Combination Diet 3 gm K Diet     Discharge Medications   Current Discharge Medication List      START taking these medications    Details   diazepam (VALIUM) 2 MG tablet Take 0.5 tablets (1 mg) by mouth every other day  Qty: 3 tablet, Refills: 0    Associated Diagnoses: Bipolar affective disorder, remission status unspecified (H)      lithium (ESKALITH) 150 MG capsule Take 1 capsule (150 mg) by mouth 3 times daily (with meals)  Qty: 90 capsule, Refills: 0    Associated Diagnoses: Bipolar affective disorder, remission status unspecified (H)      sodium bicarbonate 650 MG tablet Take 2 tablets (1,300 mg) by mouth 2 times daily  Qty: 90 tablet, Refills: 0    Associated Diagnoses: Diabetes insipidus (H)         CONTINUE these medications which have NOT CHANGED    Details   fexofenadine (ALLEGRA) 180 MG tablet Take 1 tablet by mouth daily.  Qty: 90 tablet, Refills: 0      pantoprazole (PROTONIX) 40 MG EC tablet TAKE 1 TABLET BY MOUTH EVERY DAY  Qty: 90 tablet, Refills: 1    Associated Diagnoses: Acute gastric ulcer with perforation (H)      simvastatin (ZOCOR) 40 MG tablet TAKE 1 TABLET AT BEDTIME  Qty: 90 tablet, Refills: 3    Associated Diagnoses: Hypertriglyceridemia      ACE/ARB/ARNI NOT PRESCRIBED (INTENTIONAL) Please choose reason not prescribed from choices below.      polyethylene glycol (MIRALAX) 17 GM/Dose powder Take 17 g (1 capful) by mouth daily  Qty: 578 g, Refills: 3    Associated Diagnoses: Constipation, unspecified constipation type         STOP taking these medications       amLODIPine (NORVASC) 5 MG tablet Comments:   Reason for Stopping:          dextroamphetamine (DEXTROSTAT) 5 MG tablet Comments:   Reason for Stopping:         haloperidol (HALDOL) 2 MG tablet Comments:   Reason for Stopping:         lamoTRIgine (LAMICTAL) 100 MG tablet Comments:   Reason for Stopping:         sodium citrate-citric acid (BICITRA) 500-334 MG/5ML solution Comments:   Reason for Stopping:             Allergies   Allergies   Allergen Reactions     No Known Allergies      Data   Most Recent 3 CBC's:Recent Labs   Lab Test 02/14/23  0824 02/09/23  1048 02/08/23  0852 02/06/23  1101 02/05/23  0216   WBC 8.2  --  9.1  --  10.8   HGB 9.4* 8.7* 8.7*   < > 7.5*   MCV 69*  --  70*  --  68*   *  --  480*  --  288    < > = values in this interval not displayed.      Most Recent 3 BMP's:  Recent Labs   Lab Test 02/22/23  0914 02/21/23  0649 02/19/23  2344    139 130*   POTASSIUM 4.8 4.9 4.9   CHLORIDE 104 107 99   CO2 22 22 21*   BUN 53.6* 59.5* 66.1*   CR 1.91* 2.13* 2.36*   ANIONGAP 10 10 10   ISSA 10.6* 11.1* 10.2   * 98 93     Most Recent 2 LFT's:  Recent Labs   Lab Test 01/31/23  0401 01/30/23  0545   AST 21 20   ALT <5* 25   ALKPHOS 129* 150*   BILITOTAL 0.5 0.5     Most Recent TSH, T4 and A1c Labs:  Recent Labs   Lab Test 02/07/23  0543 11/08/21  1036 12/21/20  1436   TSH 2.57   < > 1.53   A1C  --   --  6.1*    < > = values in this interval not displayed.     Results for orders placed or performed during the hospital encounter of 01/28/23   XR Chest Port 1 View    Narrative    EXAM: XR CHEST PORT 1 VIEW  LOCATION: [facility]  DATE/TIME: 1/30/2023 12:08 AM    INDICATION: SOB  COMPARISON: 09/03/2022      Impression    IMPRESSION: Mild bibasilar atelectasis. No focal consolidation, effusion, or pneumothorax. Surgical clips project over the right chest. Stable cardiomediastinal silhouette.   US Lower Extremity Venous Duplex Bilateral    Narrative    VENOUS ULTRASOUND BILATERAL LOWER EXTREMITIES  January 30, 2023 7:10  AM     HISTORY:  History of deep vein thrombosis, and now immobilized in the  ICU.    COMPARISON: March 17, 2022.    TECHNIQUE: Color Doppler and spectral waveform analysis obtained  throughout the deep veins of both lower extremities.    FINDINGS: Both common femoral, proximal greater saphenous, femoral,  and popliteal veins demonstrate normal blood flow, compression, and  augmentation. Posterior tibial and peroneal veins are compressible  bilaterally.      Impression    IMPRESSION: Negative for deep vein thrombosis throughout both lower  extremities.    LEONARDO WILLS MD         SYSTEM ID:  [id number]   XR Chest Port 1 View    Narrative    XR PORTABLE CHEST ONE VIEW   1/30/2023 8:30 AM     HISTORY: Hypoxia    COMPARISON: AP portable upright chest x-ray from approximately 8 hours  earlier.    FINDINGS:  Diffuse increased density bilateral lungs, worse on the  right, is again noted and has slightly worsened since the prior study  8 hours earlier. There is a probable left pleural fluid collection and  possible right pleural fluid collection. The cardiac silhouette is  grossly within normal limits for size. No pneumothorax is identified.  No acute fracture. Surgical clips again project over the right chest  wall and left breast. There are thoracic aortic calcifications.      Impression    IMPRESSION: Diffuse pulmonary infiltrates bilaterally (worse on the  right) versus pulmonary edema appear slightly worsened since the prior  study approximately 8 hours earlier.    MOJGAN MEADOWS MD         SYSTEM ID:  [id number]   XR Chest Port 1 View    Narrative    CHEST ONE VIEW  1/30/2023 3:09 PM     HISTORY: Intubation    COMPARISON: January 30, 2023      Impression    IMPRESSION: Endotracheal tube tip 4.9 cm from the bonilla. Increased  groundglass and interstitial infiltrates bilaterally since comparison.  Increased retrocardiac atelectasis and/or consolidation.    QUE RENEE MD         SYSTEM ID:  [id number]   XR Abdomen Port 1 View    Narrative     ABDOMEN ONE VIEW PORTABLE  1/30/2023 3:09 PM     HISTORY: OGT placement.    COMPARISON: August 24, 2022       Impression    IMPRESSION: Enteric tube tip in left upper quadrant in the expected  location of the stomach.     QUE RENEE MD         SYSTEM ID:  [id number]   XR Chest Port 1 View    Narrative    XR CHEST PORT 1 VIEW 1/31/2023 12:16 PM    HISTORY: subclavian central line    COMPARISON: 1/30/2023      Impression    IMPRESSION: Right subclavian central venous catheter has been placed  with tip at the SVC/right atrial junction. No pneumothorax.  Endotracheal tube tip remains about 4 7 m above the bonilla. NG tube  coursing into the stomach, tip outside the field of view. Slight  increase in right basilar atelectasis/consolidation. Stable small  bilateral pleural effusions and mild perihilar opacities, either due  to edema or pneumonia.    TAM PRESTON MD         SYSTEM ID:  [id number]   XR Abdomen Port 1 View    Narrative    ABDOMEN ONE VIEW PORTABLE  1/31/2023 2:23 PM     HISTORY: NJ placement.    COMPARISON: January 30, 2023       Impression    IMPRESSION: Feeding tube coiled in the left abdomen presumably in the  stomach. Nasogastric tube tip in the left abdomen in similar position.    QUE RENEE MD         SYSTEM ID:  [id number]   XR Chest Port 1 View    Narrative    EXAM: XR CHEST PORTABLE 1 VIEW  LOCATION: [facility]  DATE/TIME: 02/01/2023, 6:19 AM    INDICATION: Follow-up respiratory failure.  COMPARISON: 01/31/2023.      Impression    IMPRESSION: Endotracheal tube tip 3 cm above the bonilla. Enteric tubes with tips below the diaphragm. Right-sided central venous catheter tip over the low SVC. Hazy diffuse bilateral pulmonary infiltrates in the mid and lower lungs have increased since   the prior study. Heart size is mildly enlarged. No significant bony abnormalities.       XR Chest Port 1 View    Narrative    CHEST ONE VIEW PORTABLE  2/2/2023 10:47 AM       INDICATION:  Interval evaluation of pulm edema, ETT.    COMPARISON: 2/1/2023       Impression    IMPRESSION: Endotracheal tube remains in good position above the  bonilla. Feeding tube courses below the diaphragm. NG tube seen  previously has been removed. A right subclavian central venous  catheter tip is at the superior atriocaval junction. Bibasilar  infiltrates have improved. Upper lungs are now clear. Probable small  left pleural effusion.    MOJGAN HUFFMAN MD         SYSTEM ID:  [id number]   US Upper Extremity Venous Duplex Right    Narrative    EXAM: US UPPER EXTREMITY VENOUS DUPLEX RIGHT  LOCATION: [facility]  DATE/TIME: 2/3/2023 6:13 AM    INDICATION: Arm swelling with redness.  COMPARISON: None.  TECHNIQUE: Venous Duplex ultrasound of the right upper extremity with (when possible) and without compression, augmentation, and duplex. Color flow and spectral Doppler with waveform analysis performed.    FINDINGS: Ultrasound includes evaluation of the internal jugular vein, innominate vein, subclavian vein, axillary vein, and brachial vein. The superficial cephalic and basilic veins were also evaluated where seen.     RIGHT: Small amount of thrombus in the right cephalic vein in the antecubital fossa. Otherwise the visualized right upper extremity veins are patent without evidence of thrombus.    Soft tissue edema in the right antecubital fossa.     Question small amount of thrombus in the left proximal subclavian vein, though may be artifactual given the filling with color Doppler.      Impression    IMPRESSION:  1.  Small superficial venous thrombosis in the right cephalic vein at the antecubital fossa.  2.  No evidence of deep venous thrombosis in the right upper extremity.  3.  Questionable small thrombus in the proximal left subclavian vein, felt more likely to represent artifact.   Echo Limited     Value    LVEF  55-60%    Narrative    [id number]  [id number]  [id number]  [id number]^RALPH^DENIS      [facility]  Echocardiography Laboratory  [address] [place] [address]     Name: JUANITO KAN  MRN: [id number]  : 1950  Study Date: 2023 09:42 AM  Age: 72 yrs  Gender: Female  Patient Location: [facility]  Reason For Study: Dizziness  Ordering Physician: DENIS ROSAS  Referring Physician: DENIS ROSAS  Performed By: Mary Ellen Batista     HR: 45  BP: 88/57 mmHg  ______________________________________________________________________________  Procedure  Limited Portable Echo Adult. Definity (NDC #07291-057) given intravenously.     ______________________________________________________________________________  Interpretation Summary     Left ventricular systolic function is normal.  The visual ejection fraction is 55-60%.  The left ventricle is normal in size.  The right ventricle is normal in structure, function and size.  The left atrium is mildly dilated.  There is mild (1+) mitral regurgitation.  The rhythm was sinus bradycardia.  No old studies available for comparison  ______________________________________________________________________________  Left Ventricle  The left ventricle is normal in size. There is normal left ventricular wall  thickness. Left ventricular systolic function is normal. The visual ejection  fraction is 55-60%. Left ventricular diastolic function is indeterminate. No  regional wall motion abnormalities noted. There is no thrombus seen in the  left ventricle.     Right Ventricle  The right ventricle is normal in structure, function and size. There is no  mass or thrombus in the right ventricle.     Atria  The left atrium is mildly dilated. Right atrial size is normal. There is no  atrial shunt seen. The left atrial appendage is not well visualized.     Mitral Valve  The mitral valve leaflets appear normal. There is no evidence of stenosis,  fluttering, or prolapse. There is mild (1+) mitral regurgitation. There is no  mitral valve stenosis.      Tricuspid Valve  Normal tricuspid valve. There is mild (1+) tricuspid regurgitation. Right  ventricular systolic pressure could not be approximated due to inadequate  tricuspid regurgitation. There is no tricuspid stenosis.     Aortic Valve  The aortic valve is trileaflet. No aortic regurgitation is present. No aortic  stenosis is present.     Pulmonic Valve  Normal pulmonic valve. There is mild (1+) pulmonic valvular regurgitation.  There is no pulmonic valvular stenosis.     Vessels  Borderline aortic root dilatation. Normal size ascending aorta. The inferior  vena cava is normal. The pulmonary artery is normal size.     Pericardium  The pericardium appears normal. There is no pleural effusion.     Rhythm  The rhythm was sinus bradycardia.     ______________________________________________________________________________  Report approved by: Dr. Ron Kramer 2023 10:24 AM     ______________________________________________________________________________      Echocardiogram Complete     Value    LVEF  >70%    [facility]    [id number]  [id number]  [id number]  [id number]^RADHA^NALLELY^JAYLON     [facility]  Echocardiography Laboratory  [address] [place] [address]     Name: JUANITO KAN  MRN: [id number]  : 1950  Study Date: 2023 11:39 AM  Age: 72 yrs  Gender: Female  Patient Location: [facility]  Reason For Study: CHF  Ordering Physician: NALLELY GARCIA  Referring Physician: NALLELY GARCIA  Performed By: MAYLIN Vaz     BSA: 1.7 m2  Height: 64 in  Weight: 137 lb  HR: 90  BP: 118/62 mmHg  ______________________________________________________________________________  Procedure  Complete Portable Echo Adult.  ______________________________________________________________________________  Interpretation Summary     Hyperdynamic left ventricular function.  The visual ejection fraction is >70%.  No regional wall motion abnormalities.  The right ventricle is  hyperdynamic.  There is systolic anterior motion of the mitral valve.  Moderately severe mitral valve regurgitation (mid to late systolic).  Mechanism is dynamic left ventricular outflow obstruction.  Normal left atrial size.     Compared to the recent study dated 1/29/2023, biventricular function is  hyperdynamic and new moderately severe dynamic MR is present.     ______________________________________________________________________________  Left Ventricle  The left ventricle is normal in size. There is normal left ventricular wall  thickness. Hyperdynamic left ventricular function. The visual ejection  fraction is >70%. Left ventricular diastolic function is indeterminate. No  regional wall motion abnormalities noted.     Right Ventricle  The right ventricle is normal size. The right ventricle is hyperdynamic.     Atria  Normal left atrial size. Right atrial size is normal.     Mitral Valve  The mitral valve leaflets appear normal. There is no evidence of stenosis,  fluttering, or prolapse. There is systolic anterior motion of the mitral  valve. There is moderately severe (3+) mitral regurgitation. There is no  mitral valve stenosis.     Tricuspid Valve  Normal tricuspid valve. There is trace tricuspid regurgitation. The right  ventricular systolic pressure is approximated at 22.1 mmHg plus the right  atrial pressure.     Aortic Valve  The aortic valve is trileaflet. No aortic regurgitation is present. No  hemodynamically significant valvular aortic stenosis. The mean AoV pressure  gradient is 10.1 mmHg.     Pulmonic Valve  The pulmonic valve is not well visualized. There is trace pulmonic valvular  regurgitation.     Vessels  The aortic root is normal size. Normal size ascending aorta. The inferior vena  cava was normal in size with preserved respiratory variability.     Pericardium  There is no pericardial effusion.     Rhythm  Sinus rhythm was  noted.  ______________________________________________________________________________  MMode/2D Measurements & Calculations  IVSd: 1.0 cm     LVIDd: 4.1 cm  LVIDs: 1.9 cm  LVPWd: 0.99 cm  FS: 52.3 %  LV mass(C)d: 130.8 grams  LV mass(C)dI: 78.5 grams/m2  Ao root diam: 3.7 cm  asc Aorta Diam: 3.6 cm  LVOT diam: 2.0 cm  LVOT area: 3.1 cm2  LA Volume (BP): 40.8 ml  LA Volume Index (BP): 24.4 ml/m2  RWT: 0.48     Doppler Measurements & Calculations  MV E max lewis: 102.0 cm/sec  MV A max lewis: 94.7 cm/sec  MV E/A: 1.1  MV dec time: 0.18 sec  Ao V2 max: 221.7 cm/sec  Ao max P.0 mmHg  Ao V2 mean: 150.7 cm/sec  Ao mean PG: 10.1 mmHg  Ao V2 VTI: 37.0 cm  TANVIR(I,D): 2.4 cm2  TANVIR(V,D): 2.7 cm2  LV V1 max PG: 15.1 mmHg  LV V1 max: 194.4 cm/sec  LV V1 VTI: 28.1 cm  SV(LVOT): 87.9 ml  SI(LVOT): 52.8 ml/m2  PA acc time: 0.09 sec  TR max lewis: 234.8 cm/sec  TR max P.1 mmHg  AV Lewis Ratio (DI): 0.88  TANVIR Index (cm2/m2): 1.4  E/E' avg: 10.6  Lateral E/e': 8.8  Medial E/e': 12.4     ______________________________________________________________________________  Report approved by: Dr Wild Venegas 2023 01:00 PM

## 2023-02-23 NOTE — PLAN OF CARE
Goal Outcome Evaluation:         Summary: Polysubstance overdose- Suicide Attempt  DATE & TIME: 2/22/23-2/23/23 [id number]  Cognitive Concerns/ Orientation: A&Ox4. With flat affect. Denies SI  BEHAVIOR & AGGRESSION TOOL COLOR: Green  ABNL VS/O2: VSS on RA- refused vitals overnight   MOBILITY: SBA w/ GB & walker  PAIN MANAGMENT: denies   DIET: 3gm K diet  BOWEL/BLADDER: Purewick in place overnight   ABNL LAB/BG: Creatinine 1.91 - baseline range for pt.   DRAIN/DEVICES: None  TELEMETRY RHYTHM: NA  SKIN: PI scab on tip of nose improved   TESTS/PROCEDURES: None   D/C DATE: No longer needing inpatient psych, OT recommending TCU, SW following.  Plan for discharge to [facility] probably Friday (2/24/23).  OTHER IMPORTANT INFO: Brace on RLE and LUE when awake.

## 2023-02-23 NOTE — PROGRESS NOTES
Care Management Discharge Note    Discharge Date: 02/23/2023       Discharge Disposition:  ([facility])    Discharge Services:      Discharge DME:      Discharge Transportation:  ([facility] Staff at 1300)    Private pay costs discussed:     PAS Confirmation Code:  [id number])  Patient/family educated on Medicare website which has current facility and service quality ratings:      Education Provided on the Discharge Plan:  yes  Persons Notified of Discharge Plans: yes  Patient/Family in Agreement with the Plan: yes    Handoff Referral Completed: No    Additional Information:  The OBRA 2 screen has been completed by Carolina at [facility] and received by Osmani at [facility] staff will be here at 1300 to transport patient.   Patient and brother updated.  Orders have been sent in In Basket and script sent.      DENZEL LedezmaSW

## 2023-02-23 NOTE — PLAN OF CARE
Physical Therapy Discharge Summary    Reason for therapy discharge:    Discharged to transitional care facility.    Progress towards therapy goal(s). See goals on Care Plan in [facility] electronic health record for goal details.  Goals partially met.  Barriers to achieving goals:   discharge from facility.    Therapy recommendation(s):    Continued therapy is recommended.  Rationale/Recommendations:  To further increase independence with mobility.

## 2023-02-24 ENCOUNTER — TRANSITIONAL CARE UNIT VISIT (OUTPATIENT)
Dept: GERIATRICS | Facility: CLINIC | Age: 73
End: 2023-02-24
Payer: MEDICARE

## 2023-02-24 DIAGNOSIS — E23.2 DIABETES INSIPIDUS (H): ICD-10-CM

## 2023-02-24 DIAGNOSIS — F31.9 BIPOLAR 1 DISORDER (H): ICD-10-CM

## 2023-02-24 DIAGNOSIS — N18.9 ACUTE KIDNEY INJURY SUPERIMPOSED ON CKD (H): ICD-10-CM

## 2023-02-24 DIAGNOSIS — D63.8 ANEMIA IN OTHER CHRONIC DISEASES CLASSIFIED ELSEWHERE: ICD-10-CM

## 2023-02-24 DIAGNOSIS — E78.5 HYPERLIPIDEMIA LDL GOAL <100: ICD-10-CM

## 2023-02-24 DIAGNOSIS — I10 BENIGN ESSENTIAL HYPERTENSION: ICD-10-CM

## 2023-02-24 DIAGNOSIS — T14.91XA SUICIDE ATTEMPT (H): Primary | ICD-10-CM

## 2023-02-24 DIAGNOSIS — N17.9 ACUTE KIDNEY INJURY SUPERIMPOSED ON CKD (H): ICD-10-CM

## 2023-02-24 PROCEDURE — 99309 SBSQ NF CARE MODERATE MDM 30: CPT | Performed by: NURSE PRACTITIONER

## 2023-02-24 NOTE — PROGRESS NOTES
[facility] GERIATRICS    PRIMARY CARE PROVIDER AND CLINIC:  Shannon Craft MD, [address] [place] [address]  / [place] [address]  Chief Complaint   Patient presents with     Hospital F/U      [place] Medical Record Number:  [id number]  Place of Service where encounter took place:  [facility] (TCU) [[id number]]    Diana Santiago  is a 72 year old  (1950), admitted to the above facility from  [facility]. Hospital stay 1/28/23 through 2/23/23..   HPI:    72 year old female with PMHx bipolar disorder, anxiety/depression, prior suicidal ideation, HTN, HLD, CKD stage III, hospitalized with suicide attempt by intentional polysubstance overdose. Hypotensive at admission. Overdose with combination of lamotrigine, amlodipine, haloperidol and dextroamphetamine. Psychiatry resumed Lithium 150mg TID and Valium 1mg every other day. REI on CKD3 Cr 1.5-2 baseline, peaked at 2.66 now normalized. Partial diabetes insipidus, nocturia: ok for free water, continue bicarb. Anemia, hx PUD: baseline Hgb 10, stable, on PPI due to hx perforated PUD. To TCU for rehab.     Seen for initial TCU visit. Reports doing well. Agreeable to psychologist consult. Denies suicidal ideation. BP range 111-134/75-84 and sats 98% room air. Asks to be DNR/DNI    CODE STATUS/ADVANCE DIRECTIVES DISCUSSION:  No CPR- Do NOT Intubate  CPR/Full code   ALLERGIES:   Allergies   Allergen Reactions     No Known Allergies       PAST MEDICAL HISTORY:   Past Medical History:   Diagnosis Date     Benign essential hypertension 09/2018    added norvasc 9/18     Bipolar affective disorder (H)     hosp 1993, Dr. Aftab Deal     Cancer (H) 1996    DCIS, left breast     Chronic kidney disease, stage 3a (H)     seen by renal Dr. 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  FAMILY HISTORY: family history includes Breast Cancer in her maternal aunt and another family member; Cerebrovascular Disease in her mother; Hyperlipidemia in her niece; Hypertension in her father; Sleep Apnea in her brother.  SOCIAL HISTORY:   reports that she has never smoked. She has never used smokeless tobacco. She reports that she does not drink alcohol and does not use drugs.  Patient's living condition: lives alone    Post Discharge Medication Reconciliation Status:   MED REC REQUIRED  Post Medication Reconciliation Status:  Discharge medications reconciled, continue medications without change         Current Outpatient Medications   Medication Sig     fexofenadine (ALLEGRA) 180 MG tablet Take 1 tablet by mouth daily.     lithium (ESKALITH) 150 MG capsule Take 1 capsule (150 mg) by mouth 3 times daily (with meals)     pantoprazole (PROTONIX) 40 MG EC tablet TAKE 1 TABLET BY MOUTH EVERY DAY     polyethylene glycol (MIRALAX) 17 GM/Dose powder Take 17 g (1 capful) by mouth daily     simvastatin (ZOCOR) 40 MG tablet TAKE 1 TABLET AT BEDTIME     sodium bicarbonate 650 MG tablet Take 2 tablets (1,300 mg) by mouth 2 times daily     ACE/ARB/ARNI NOT PRESCRIBED (INTENTIONAL) Please choose reason not prescribed from choices below. (Patient not taking: Reported on 2/24/2023)     amLODIPine (NORVASC) 5 MG  tablet Take 5 mg by mouth daily Hold for SBP less than 100     diazepam (VALIUM) 2 MG tablet Take 0.5 tablets (1 mg) by mouth every other day     No current facility-administered medications for this visit.       ROS:  10 point ROS of systems including Constitutional, Eyes, Respiratory, Cardiovascular, Gastroenterology, Genitourinary, Integumentary, Musculoskeletal, Psychiatric were all negative except for pertinent positives noted in my HPI.    Vitals:  /84   Pulse 78   Temp 98  F (36.7  C)   Resp 16   SpO2 98%   Exam:  GENERAL APPEARANCE:  Alert, in no distress, pleasant, cooperative, oriented x 4  EYES:  EOM, lids, pupils and irises normal, sclera clear and conjunctiva normal, no discharge or mattering on lids or lashes noted  ENT:  Mouth normal, moist mucous membranes, nose normal without drainage or crusting, external ears without lesions, hearing acuity intact  NECK: supple, symmetrical, trachea midline  RESP:  respiratory effort normal, no chest wall tenderness, no respiratory distress, Lung sounds clear, patient is on room air  CV:  Auscultation of heart done, rate and rhythm controlled and regular, no murmur, no rub or gallop. Edema none bilateral lower extremities  ABDOMEN:  normal bowel sounds, soft, nontender, no palpable masses.  M/S:   Gait and station walks with assist , no tenderness or swelling of the joints; able to move all extremities, digits normal  NEURO: cranial nerves 2-12 grossly intact, no facial asymmetry, no speech deficits and able to follow directions, moves all extremities symmetrically  PSYCH:  insight and judgement and memory appear at baseline, affect and mood flat    Lab/Diagnostic data:  Most Recent 3 CBC's:  Recent Labs   Lab Test 02/27/23  0600 02/14/23  0824 02/09/23  1048 02/08/23  0852   WBC 10.5 8.2  --  9.1   HGB 8.4* 9.4* 8.7* 8.7*   MCV 75* 69*  --  70*    617*  --  480*     Most Recent 3 BMP's:  Recent Labs   Lab Test 02/27/23  0600 02/23/23  1056  02/22/23  0914   * 133* 136   POTASSIUM 4.3 4.3 4.8   CHLORIDE 103 100 104   CO2 20* 23 22   BUN 45.0* 52.0* 53.6*   CR 1.86* 1.83* 1.91*   ANIONGAP 12 10 10   ISSA 9.9 10.5* 10.6*   * 157* 121*     Most Recent 2 LFT's:  Recent Labs   Lab Test 02/27/23  0600 01/31/23  0401   AST 18 21   ALT 25 <5*   ALKPHOS 150* 129*   BILITOTAL 0.3 0.5     Most Recent TSH and T4:  Recent Labs   Lab Test 02/07/23  0543   TSH 2.57     Most Recent Hemoglobin A1c:  Recent Labs   Lab Test 12/21/20  1436   A1C 6.1*       ASSESSMENT/PLAN  Suicide attempt with polysubstance overdose  Bipolar 1 disorder  Acute on chronic. Continue lithium 150 mg 3 times daily with meals and diazepam 1 mg every other day. Refer to ACP. Follow-up with psychiatry as directed     Essential hypertension  HLD  Currently not on meds, Norvasc stopped due to hypotension. Continue statin. Monitor vs.      REI  CKD stage IIIb-IV  Baseline creatinine 1.5-2. Peak creatinine of 2.66. Continue sodium bicarbonate 1300 mg twice daily. CMP check 2/27.      Chronic anemia  Baseline hemoglobin 9-11. CBC check 2/27. Transfuse PRN for hemoglobin less than 7     Nephrogenic diabetes insipidus  Due to chronic lithium use. Monitor sodium level periodically, allow free water.      Physical deconditioning  Continue PT/OT evaluation and therapy    Advance directive counseling  Asks for DNR/DNI code status, completed POLST.     Orders:  1. DNR/DNI  2. ACP consult diagnosis suicide attempt  3. CMP and CBC 2/27 anemia, REI      Electronically signed by:  KRUNAL Jarquin CNP

## 2023-02-26 ENCOUNTER — LAB REQUISITION (OUTPATIENT)
Dept: LAB | Facility: CLINIC | Age: 73
End: 2023-02-26

## 2023-02-26 DIAGNOSIS — N18.9 CHRONIC KIDNEY DISEASE, UNSPECIFIED: ICD-10-CM

## 2023-02-26 DIAGNOSIS — D64.9 ANEMIA, UNSPECIFIED: ICD-10-CM

## 2023-02-27 LAB
ALBUMIN SERPL BCG-MCNC: 3.4 G/DL (ref 3.5–5.2)
ALP SERPL-CCNC: 150 U/L (ref 35–104)
ALT SERPL W P-5'-P-CCNC: 25 U/L (ref 10–35)
ANION GAP SERPL CALCULATED.3IONS-SCNC: 12 MMOL/L (ref 7–15)
AST SERPL W P-5'-P-CCNC: 18 U/L (ref 10–35)
BILIRUB SERPL-MCNC: 0.3 MG/DL
BUN SERPL-MCNC: 45 MG/DL (ref 8–23)
CALCIUM SERPL-MCNC: 9.9 MG/DL (ref 8.8–10.2)
CHLORIDE SERPL-SCNC: 103 MMOL/L (ref 98–107)
CREAT SERPL-MCNC: 1.86 MG/DL (ref 0.51–0.95)
DEPRECATED HCO3 PLAS-SCNC: 20 MMOL/L (ref 22–29)
ERYTHROCYTE [DISTWIDTH] IN BLOOD BY AUTOMATED COUNT: 17.7 % (ref 10–15)
GFR SERPL CREATININE-BSD FRML MDRD: 28 ML/MIN/1.73M2
GLUCOSE SERPL-MCNC: 130 MG/DL (ref 70–99)
HCT VFR BLD AUTO: 29.1 % (ref 35–47)
HGB BLD-MCNC: 8.4 G/DL (ref 11.7–15.7)
LITHIUM SERPL-SCNC: 1 MMOL/L (ref 0.6–1.2)
MCH RBC QN AUTO: 21.6 PG (ref 26.5–33)
MCHC RBC AUTO-ENTMCNC: 28.9 G/DL (ref 31.5–36.5)
MCV RBC AUTO: 75 FL (ref 78–100)
PLATELET # BLD AUTO: 320 10E3/UL (ref 150–450)
POTASSIUM SERPL-SCNC: 4.3 MMOL/L (ref 3.4–5.3)
PROT SERPL-MCNC: 6.4 G/DL (ref 6.4–8.3)
RBC # BLD AUTO: 3.88 10E6/UL (ref 3.8–5.2)
SODIUM SERPL-SCNC: 135 MMOL/L (ref 136–145)
WBC # BLD AUTO: 10.5 10E3/UL (ref 4–11)

## 2023-02-27 PROCEDURE — 36415 COLL VENOUS BLD VENIPUNCTURE: CPT | Performed by: NURSE PRACTITIONER

## 2023-02-27 PROCEDURE — 80178 ASSAY OF LITHIUM: CPT | Performed by: NURSE PRACTITIONER

## 2023-02-27 PROCEDURE — 85014 HEMATOCRIT: CPT | Performed by: NURSE PRACTITIONER

## 2023-02-27 PROCEDURE — 80053 COMPREHEN METABOLIC PANEL: CPT | Performed by: NURSE PRACTITIONER

## 2023-02-27 NOTE — PROGRESS NOTES
[facility]  INITIAL VISIT NOTE  February 28, 2023    PRIMARY CARE PROVIDER AND CLINIC:  Shannon Craft [address] [place] [address]  / [place] [id number]    CHIEF COMPLAINT:  Hospital follow-up/Initial visit    HPI:    Diana Santiago is a 72 year old  (1950) female who was seen at [place] [facility] on February 28, 2023 for an initial visit.     Medical history is notable for hypertension, dyslipidemia, CKD, anemia, prediabetes, perforated gastric ulcer, nephrogenic diabetes insipidus, UTI, allergies, and bipolar 1 disorder.    Summary of hospital course:  Patient was hospitalized at [facility] from January 28 through February 23, 2023 for suicide attempt with intentional polysubstance overdose, distributive shock and hypotension due to calcium channel blocker overdose, encephalopathy, hypoxic respiratory failure, metabolic acidosis, hyperkalemia, and REI.  She originally presented after intentional ingestion of approximately 100 tablets of her prescribed medications in a suicide attempt, including lamotrigine, amlodipine, haloperidol, and dextroamphetamine.  Shortly after admission she became hypotensive and required transfer to the ICU for pressor support.  She ultimately required intubation.  Chest x-ray showed no focal consolidation, effusion, or pneumothorax.  Her condition improved and she was extubated on February 2. She was transferred out of the ICU on February 5, 2023.  She was evaluated by psychiatry service and ultimately placed on lithium 150 mg 3 times daily and diazepam 1 mg every other day.  She was hydrated with IV fluids and treated with DDAVP and eventually amiloride for partial diabetes insipidus and severe nocturia.  Amoride was stopped on February 16 due to development of mild hyperkalemia.  Initially it was felt that patient might need Geripsych stay but her psych reassessment on February 20 indicated the patient would need inpatient stay and  no need for bedside sitter as of February 20.  TCU was recommended per therapies.    Patient is admitted to this facility for medical management, nursing care, and rehab.     Of note, history was obtained from patient, facility RN, and extensive review of the chart.    Today's visit:  Patient was seen in her room, while sitting in a recliner.  She is comfortable.  She feels much less depressed and reports no suicidal ideations.  Her blood pressure is running mild-moderately high.  She had a bowel movement 2 days ago.  She denies fever, chills, chest pain, palpitation, dyspnea, nausea, vomiting, abdominal pain, or urinary symptoms.      CODE STATUS:   DNR / DNI    PAST MEDICAL HISTORY:   Suicide attempt with polysubstance overdose in January 2023  Hypertension  Dyslipidemia  CKD stage IIIb-IV, baseline creatinine 1.5-2  Chronic anemia, baseline hemoglobin 9-11  Superficial right cephalic vein thrombus in February 2023, due to central venous catheter  Prediabetes  Perforated gastric ulcer, s/p exporter laparotomy, repair of perforation, and peritoneal washout in August 2022  Nephrogenic diabetes insipidus, due to chronic lithium use  UTI  Allergies  Bipolar 1 disorder  Severe protein-calorie malnutrition      Past Medical History:   Diagnosis Date     Benign essential hypertension 09/2018    added norvasc 9/18     Bipolar affective disorder (H)     hosp 1993, Dr. Aftab Deal     Cancer (H) 1996    DCIS, left breast     Chronic kidney disease, stage 3a (H)     seen by renal Dr. 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    felt due to lithium     Thalassaemia trait      Vitamin D deficiency        PAST SURGICAL HISTORY:   Past Surgical History:   Procedure Laterality Date     BIOPSY  1994    left breast     BREAST SURGERY      lumpectomy x 4     COLONOSCOPY  2021     COLONOSCOPY N/A 6/17/2022    Procedure: COLONOSCOPY, WITH POLYPECTOMY AND BIOPSY;  Surgeon: Panchito Gu MD;  Location:  GI     EYE SURGERY  2018    retina tear     LAPAROTOMY EXPLORATORY N/A 8/24/2022    Procedure: Exploratory laparotomy, REPAIR OF PERFORATED ULCER;  Surgeon: Ron Vidal MD;  Location:  OR     left hand surgery      last 1974       FAMILY HISTORY:   Family History   Problem Relation Age of Onset     Cerebrovascular Disease Mother      Hypertension Father      Sleep Apnea Brother      Breast Cancer Maternal Aunt         x 2     Breast Cancer Other         Maternal Aunt     Hyperlipidemia Niece        SOCIAL HISTORY:  Social History     Tobacco Use     Smoking status: Never     Smokeless tobacco: Never   Substance Use Topics     Alcohol use: No       MEDICATIONS:  Current Outpatient Medications   Medication Sig Dispense Refill     ACE/ARB/ARNI NOT PRESCRIBED (INTENTIONAL) Please choose reason not prescribed from choices below. (Patient not taking: Reported on 2/24/2023)       diazepam (VALIUM) 2 MG tablet Take 0.5 tablets (1 mg) by mouth every other day 3 tablet 0     fexofenadine (ALLEGRA) 180 MG tablet Take 1 tablet by mouth daily. 90 tablet 0     lithium (ESKALITH) 150 MG capsule Take 1 capsule (150 mg) by mouth 3 times daily (with meals) 90 capsule 0     pantoprazole (PROTONIX) 40 MG EC tablet TAKE 1 TABLET BY MOUTH EVERY DAY 90 tablet 1     polyethylene glycol (MIRALAX) 17 GM/Dose powder Take 17 g (1 capful) by mouth daily 578 g 3     simvastatin (ZOCOR) 40 MG tablet TAKE 1 TABLET AT BEDTIME 90 tablet 3     sodium bicarbonate 650 MG tablet Take 2 tablets  "(1,300 mg) by mouth 2 times daily 90 tablet 0       MED REC REQUIRED  Post Medication Reconciliation Status: discharge medications reconciled and changed, per note/orders        ALLERGIES:  Allergies   Allergen Reactions     No Known Allergies        ROS:  10 point ROS were negative other than the symptoms noted above in the HPI.    PHYSICAL EXAM:  Vital signs were reviewed in the chart.  Vital Signs: BP (!) 153/84   Pulse 85   Temp 98.1  F (36.7  C)   Resp 16   Ht 1.626 m (5' 4\")   Wt 51.3 kg (113 lb)   SpO2 97%   BMI 19.40 kg/m    General: Comfortable and in no acute distress  HEENT: Conjunctival pallor, no scleral icterus or injection, moist oral mucosa  Cardiovascular: Normal S1, S2, RRR  Respiratory: Lungs clear to auscultation bilaterally  GI: Abdomen soft, non-tender, non-distended, +BS  Extremities: No LE edema  Neuro: CX II-XII grossly intact; ROM in all four extremities grossly intact  Psych: Alert and oriented x3; normal affect  Skin: No acute rash    LABORATORY/IMAGING DATA:  All relevant labs and imaging data in [facility] and/or [facility] Everywhere were personally reviewed today.      Most Recent 3 CBC's:  Recent Labs   Lab Test 02/27/23  0600 02/14/23  0824 02/09/23  1048 02/08/23  0852   WBC 10.5 8.2  --  9.1   HGB 8.4* 9.4* 8.7* 8.7*   MCV 75* 69*  --  70*    617*  --  480*     Most Recent 3 BMP's:  Recent Labs   Lab Test 02/27/23  0600 02/23/23  1056 02/22/23  0914   * 133* 136   POTASSIUM 4.3 4.3 4.8   CHLORIDE 103 100 104   CO2 20* 23 22   BUN 45.0* 52.0* 53.6*   CR 1.86* 1.83* 1.91*   ANIONGAP 12 10 10   ISSA 9.9 10.5* 10.6*   * 157* 121*     Most Recent 2 LFT's:  Recent Labs   Lab Test 02/27/23  0600 01/31/23  0401   AST 18 21   ALT 25 <5*   ALKPHOS 150* 129*   BILITOTAL 0.3 0.5         ASSESSMENT/PLAN:  Suicide attempt with polysubstance overdose,  Bipolar 1 disorder  Required intubation and pressor support.  Currently stable.  Last lithium level of 1 on February " 27.  Plan:  Continue lithium 150 mg 3 times daily with meals  Continue diazepam 1 mg every other day  Monitor lithium level periodically  Monitor psychiatric symptoms  ACP referral was placed on February 24  Follow-up with psychiatry as directed    Essential hypertension.  PTA amlodipine (5 mg daily) was discontinued in the hospital due to hypotension in the setting of polysubstance overdose.  Blood pressure is now mild-moderately elevated.  Plan:  Resume amlodipine 5 mg daily  Monitor blood pressure and further adjust BP regimen as indicated for goal SBP less than 130    Dyslipidemia.  Plan:  Continue simvastatin 40 mg daily    REI,  CKD stage IIIb-IV.  Baseline creatinine 1.5-2.  Peak creatinine of 2.66 on February 1.  Renal function improved with IV hydration.  Last creatinine of 1.86 on February 27.  Plan:  Continue sodium bicarbonate 1300 mg twice daily  Avoid NSAIDs and nephrotoxins  Monitor renal function and electrolytes periodically  Follow-up with nephrology as directed    Chronic anemia.  Baseline hemoglobin 9-11.  Last hemoglobin was 8.4 on February 27.  Plan:  Monitor hemoglobin periodically  Transfuse PRN for hemoglobin less than 7    Superficial right cephalic vein thrombus.  Due to central venous catheter.  CVC was removed on February 5.  Plan:  Continue supportive care    Prediabetes.  Last hemoglobin A1c was 6.1% in December 2020.  Plan:  Monitor blood glucose PRN  Follow-up as outpatient    History of perforated gastric ulcer, s/p exporter laparotomy, repair of perforation, and peritoneal washout in August 2022.  Plan:  Continue pantoprazole 40 mg daily    Nephrogenic diabetes insipidus,.  Due to chronic lithium use.  Plan:  Access to free water at all times  Monitor sodium level periodically    Slow transit constipation.  Plan:  Continue the bowel regimen    Severe protein-calorie malnutrition.  Per assessment in the hospital.  Plan:  Continue dietary supplement  Physical dietitian is following  the patient    Physical deconditioning.  Plan:  Continue PT/OT evaluation and therapy        Orders written by provider at facility:  Amlodipine 5 mg p.o. daily, DX: Hypertension        Disclaimer: This note may contain text created using speech-recognition software and may contain unintended word substitutions.      Electronically signed by:  Nyasia Brooks MD

## 2023-02-28 ENCOUNTER — TRANSITIONAL CARE UNIT VISIT (OUTPATIENT)
Dept: GERIATRICS | Facility: CLINIC | Age: 73
End: 2023-02-28
Payer: MEDICARE

## 2023-02-28 VITALS
RESPIRATION RATE: 16 BRPM | HEART RATE: 85 BPM | OXYGEN SATURATION: 97 % | BODY MASS INDEX: 19.29 KG/M2 | TEMPERATURE: 98.1 F | SYSTOLIC BLOOD PRESSURE: 153 MMHG | DIASTOLIC BLOOD PRESSURE: 84 MMHG | WEIGHT: 113 LBS | HEIGHT: 64 IN

## 2023-02-28 DIAGNOSIS — N18.4 STAGE 4 CHRONIC KIDNEY DISEASE (H): ICD-10-CM

## 2023-02-28 DIAGNOSIS — N17.9 ACUTE KIDNEY INJURY (H): ICD-10-CM

## 2023-02-28 DIAGNOSIS — Z87.11 HISTORY OF GASTRIC ULCER: ICD-10-CM

## 2023-02-28 DIAGNOSIS — T50.902D INTENTIONAL OVERDOSE, SUBSEQUENT ENCOUNTER: ICD-10-CM

## 2023-02-28 DIAGNOSIS — F31.9 BIPOLAR AFFECTIVE DISORDER, REMISSION STATUS UNSPECIFIED (H): ICD-10-CM

## 2023-02-28 DIAGNOSIS — I10 ESSENTIAL HYPERTENSION: ICD-10-CM

## 2023-02-28 DIAGNOSIS — K59.01 SLOW TRANSIT CONSTIPATION: ICD-10-CM

## 2023-02-28 DIAGNOSIS — R53.81 PHYSICAL DECONDITIONING: ICD-10-CM

## 2023-02-28 DIAGNOSIS — E78.5 DYSLIPIDEMIA: ICD-10-CM

## 2023-02-28 DIAGNOSIS — T14.91XA SUICIDE ATTEMPT (H): Primary | ICD-10-CM

## 2023-02-28 DIAGNOSIS — N25.1 NEPHROGENIC DIABETES INSIPIDUS (H): ICD-10-CM

## 2023-02-28 DIAGNOSIS — E43 SEVERE PROTEIN-CALORIE MALNUTRITION (H): ICD-10-CM

## 2023-02-28 DIAGNOSIS — D64.9 CHRONIC ANEMIA: ICD-10-CM

## 2023-02-28 PROCEDURE — 99305 1ST NF CARE MODERATE MDM 35: CPT | Performed by: INTERNAL MEDICINE

## 2023-02-28 RX ORDER — DIAZEPAM 2 MG
1 TABLET ORAL EVERY OTHER DAY
Qty: 7 TABLET | Refills: 0 | Status: SHIPPED | OUTPATIENT
Start: 2023-02-28 | End: 2023-03-20

## 2023-02-28 RX ORDER — AMLODIPINE BESYLATE 5 MG/1
5 TABLET ORAL DAILY
COMMUNITY
End: 2023-04-18

## 2023-02-28 RX ORDER — DIAZEPAM 2 MG
1 TABLET ORAL EVERY OTHER DAY
Qty: 7 TABLET | Refills: 0 | Status: SHIPPED | OUTPATIENT
Start: 2023-02-28 | End: 2023-02-28

## 2023-02-28 NOTE — LETTER
2/28/2023        RE: Diana Santiago  [address]  [place] [address]        [facility]  INITIAL VISIT NOTE  February 28, 2023    PRIMARY CARE PROVIDER AND CLINIC:  Shannon Craft [address] [place] [address]  / [place] [id number]    CHIEF COMPLAINT:  Hospital follow-up/Initial visit    HPI:    Diana Santiago is a 72 year old  (1950) female who was seen at [place] [facility] on February 28, 2023 for an initial visit.     Medical history is notable for hypertension, dyslipidemia, CKD, anemia, prediabetes, perforated gastric ulcer, nephrogenic diabetes insipidus, UTI, allergies, and bipolar 1 disorder.    Summary of hospital course:  Patient was hospitalized at [facility] from January 28 through February 23, 2023 for suicide attempt with intentional polysubstance overdose, distributive shock and hypotension due to calcium channel blocker overdose, encephalopathy, hypoxic respiratory failure, metabolic acidosis, hyperkalemia, and REI.  She originally presented after intentional ingestion of approximately 100 tablets of her prescribed medications in a suicide attempt, including lamotrigine, amlodipine, haloperidol, and dextroamphetamine.  Shortly after admission she became hypotensive and required transfer to the ICU for pressor support.  She ultimately required intubation.  Chest x-ray showed no focal consolidation, effusion, or pneumothorax.  Her condition improved and she was extubated on February 2. She was transferred out of the ICU on February 5, 2023.  She was evaluated by psychiatry service and ultimately placed on lithium 150 mg 3 times daily and diazepam 1 mg every other day.  She was hydrated with IV fluids and treated with DDAVP and eventually amiloride for partial diabetes insipidus and severe nocturia.  Amoride was stopped on February 16 due to development of mild hyperkalemia.  Initially it was felt that patient might need Geripsych stay but  her psych reassessment on February 20 indicated the patient would need inpatient stay and no need for bedside sitter as of February 20.  TCU was recommended per therapies.    Patient is admitted to this facility for medical management, nursing care, and rehab.     Of note, history was obtained from patient, facility RN, and extensive review of the chart.    Today's visit:  Patient was seen in her room, while sitting in a recliner.  She is comfortable.  She feels much less depressed and reports no suicidal ideations.  Her blood pressure is running mild-moderately high.  She had a bowel movement 2 days ago.  She denies fever, chills, chest pain, palpitation, dyspnea, nausea, vomiting, abdominal pain, or urinary symptoms.      CODE STATUS:   DNR / DNI    PAST MEDICAL HISTORY:   Suicide attempt with polysubstance overdose in January 2023  Hypertension  Dyslipidemia  CKD stage IIIb-IV, baseline creatinine 1.5-2  Chronic anemia, baseline hemoglobin 9-11  Superficial right cephalic vein thrombus in February 2023, due to central venous catheter  Prediabetes  Perforated gastric ulcer, s/p exporter laparotomy, repair of perforation, and peritoneal washout in August 2022  Nephrogenic diabetes insipidus, due to chronic lithium use  UTI  Allergies  Bipolar 1 disorder  Severe protein-calorie malnutrition      Past Medical History:   Diagnosis Date     Benign essential hypertension 09/2018    added norvasc 9/18     Bipolar affective disorder (H)     hosp 1993, Dr. Aftab Deal     Cancer (H) 1996    DCIS, left breast     Chronic kidney disease, stage 3a (H)     seen by renal Dr. Cedeno in 2021, renal us cysts     DCIS (ductal carcinoma in situ) 1996    xrt and lumpectomy x 4     Depressive disorder 1968     Diabetes (H) 2022    Diabetes insipidus     Diabetes insipidus (H) 09/2018    elev sodium, likely due to lithium     Elevated blood sugar      Fractured femoral neck (H) 1992     Hx of colonoscopy 2010    tics and hem      Hypercalcemia 08/2017     Hypercholesteremia      Hyperparathyroidism (H) 08/2017     Lithium toxicity 11/2021    hosp [facility]sd     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    felt due to lithium     Thalassaemia trait      Vitamin D deficiency        PAST SURGICAL HISTORY:   Past Surgical History:   Procedure Laterality Date     BIOPSY  1994    left breast     BREAST SURGERY      lumpectomy x 4     COLONOSCOPY  2021     COLONOSCOPY N/A 6/17/2022    Procedure: COLONOSCOPY, WITH POLYPECTOMY AND BIOPSY;  Surgeon: Panchito Gu MD;  Location:  GI     EYE SURGERY  2018    retina tear     LAPAROTOMY EXPLORATORY N/A 8/24/2022    Procedure: Exploratory laparotomy, REPAIR OF PERFORATED ULCER;  Surgeon: Ron Vidal MD;  Location: [facility] OR     left hand surgery      last 1974       FAMILY HISTORY:   Family History   Problem Relation Age of Onset     Cerebrovascular Disease Mother      Hypertension Father      Sleep Apnea Brother      Breast Cancer Maternal Aunt         x 2     Breast Cancer Other         Maternal Aunt     Hyperlipidemia Niece        SOCIAL HISTORY:  Social History     Tobacco Use     Smoking status: Never     Smokeless tobacco: Never   Substance Use Topics     Alcohol use: No       MEDICATIONS:  Current Outpatient Medications   Medication Sig Dispense Refill     ACE/ARB/ARNI NOT PRESCRIBED (INTENTIONAL) Please choose reason not prescribed from choices below. (Patient not taking: Reported on 2/24/2023)       diazepam (VALIUM) 2 MG tablet Take 0.5 tablets (1 mg) by mouth every other day 3 tablet 0     fexofenadine (ALLEGRA) 180 MG tablet Take 1 tablet by mouth daily. 90 tablet 0     lithium (ESKALITH) 150 MG capsule Take 1 capsule (150 mg) by mouth 3 times daily (with meals) 90 capsule 0     pantoprazole (PROTONIX) 40 MG EC tablet TAKE 1 TABLET BY MOUTH EVERY DAY 90 tablet 1     polyethylene glycol (MIRALAX) 17 GM/Dose powder Take 17 g (1 capful) by mouth daily 578 g 3     simvastatin (ZOCOR) 40 MG  "tablet TAKE 1 TABLET AT BEDTIME 90 tablet 3     sodium bicarbonate 650 MG tablet Take 2 tablets (1,300 mg) by mouth 2 times daily 90 tablet 0       MED REC REQUIRED  Post Medication Reconciliation Status: discharge medications reconciled and changed, per note/orders        ALLERGIES:  Allergies   Allergen Reactions     No Known Allergies        ROS:  10 point ROS were negative other than the symptoms noted above in the HPI.    PHYSICAL EXAM:  Vital signs were reviewed in the chart.  Vital Signs: BP (!) 153/84   Pulse 85   Temp 98.1  F (36.7  C)   Resp 16   Ht 1.626 m (5' 4\")   Wt 51.3 kg (113 lb)   SpO2 97%   BMI 19.40 kg/m    General: Comfortable and in no acute distress  HEENT: Conjunctival pallor, no scleral icterus or injection, moist oral mucosa  Cardiovascular: Normal S1, S2, RRR  Respiratory: Lungs clear to auscultation bilaterally  GI: Abdomen soft, non-tender, non-distended, +BS  Extremities: No LE edema  Neuro: CX II-XII grossly intact; ROM in all four extremities grossly intact  Psych: Alert and oriented x3; normal affect  Skin: No acute rash    LABORATORY/IMAGING DATA:  All relevant labs and imaging data in [facility] and/or [facility] Everywhere were personally reviewed today.      Most Recent 3 CBC's:  Recent Labs   Lab Test 02/27/23  0600 02/14/23  0824 02/09/23  1048 02/08/23  0852   WBC 10.5 8.2  --  9.1   HGB 8.4* 9.4* 8.7* 8.7*   MCV 75* 69*  --  70*    617*  --  480*     Most Recent 3 BMP's:  Recent Labs   Lab Test 02/27/23  0600 02/23/23  1056 02/22/23  0914   * 133* 136   POTASSIUM 4.3 4.3 4.8   CHLORIDE 103 100 104   CO2 20* 23 22   BUN 45.0* 52.0* 53.6*   CR 1.86* 1.83* 1.91*   ANIONGAP 12 10 10   ISSA 9.9 10.5* 10.6*   * 157* 121*     Most Recent 2 LFT's:  Recent Labs   Lab Test 02/27/23  0600 01/31/23  0401   AST 18 21   ALT 25 <5*   ALKPHOS 150* 129*   BILITOTAL 0.3 0.5         ASSESSMENT/PLAN:  Suicide attempt with polysubstance overdose,  Bipolar 1 disorder  Required " intubation and pressor support.  Currently stable.  Last lithium level of 1 on February 27.  Plan:  Continue lithium 150 mg 3 times daily with meals  Continue diazepam 1 mg every other day  Monitor lithium level periodically  Monitor psychiatric symptoms  ACP referral was placed on February 24  Follow-up with psychiatry as directed    Essential hypertension.  PTA amlodipine (5 mg daily) was discontinued in the hospital due to hypotension in the setting of polysubstance overdose.  Blood pressure is now mild-moderately elevated.  Plan:  Resume amlodipine 5 mg daily  Monitor blood pressure and further adjust BP regimen as indicated for goal SBP less than 130    Dyslipidemia.  Plan:  Continue simvastatin 40 mg daily    REI,  CKD stage IIIb-IV.  Baseline creatinine 1.5-2.  Peak creatinine of 2.66 on February 1.  Renal function improved with IV hydration.  Last creatinine of 1.86 on February 27.  Plan:  Continue sodium bicarbonate 1300 mg twice daily  Avoid NSAIDs and nephrotoxins  Monitor renal function and electrolytes periodically  Follow-up with nephrology as directed    Chronic anemia.  Baseline hemoglobin 9-11.  Last hemoglobin was 8.4 on February 27.  Plan:  Monitor hemoglobin periodically  Transfuse PRN for hemoglobin less than 7    Superficial right cephalic vein thrombus.  Due to central venous catheter.  CVC was removed on February 5.  Plan:  Continue supportive care    Prediabetes.  Last hemoglobin A1c was 6.1% in December 2020.  Plan:  Monitor blood glucose PRN  Follow-up as outpatient    History of perforated gastric ulcer, s/p exporter laparotomy, repair of perforation, and peritoneal washout in August 2022.  Plan:  Continue pantoprazole 40 mg daily    Nephrogenic diabetes insipidus,.  Due to chronic lithium use.  Plan:  Access to free water at all times  Monitor sodium level periodically    Slow transit constipation.  Plan:  Continue the bowel regimen    Severe protein-calorie malnutrition.  Per assessment  in the hospital.  Plan:  Continue dietary supplement  Physical dietitian is following the patient    Physical deconditioning.  Plan:  Continue PT/OT evaluation and therapy        Orders written by provider at facility:  Amlodipine 5 mg p.o. daily, DX: Hypertension        Disclaimer: This note may contain text created using speech-recognition software and may contain unintended word substitutions.      Electronically signed by:  Nyasia Brooks MD                          Sincerely,        Nyasia Brooks MD

## 2023-03-02 VITALS
OXYGEN SATURATION: 100 % | DIASTOLIC BLOOD PRESSURE: 98 MMHG | SYSTOLIC BLOOD PRESSURE: 140 MMHG | WEIGHT: 110.8 LBS | TEMPERATURE: 46.2 F | RESPIRATION RATE: 16 BRPM | BODY MASS INDEX: 18.92 KG/M2 | HEART RATE: 77 BPM | HEIGHT: 64 IN

## 2023-03-03 ENCOUNTER — DISCHARGE SUMMARY NURSING HOME (OUTPATIENT)
Dept: GERIATRICS | Facility: CLINIC | Age: 73
End: 2023-03-03
Payer: MEDICARE

## 2023-03-03 DIAGNOSIS — N25.1 NEPHROGENIC DIABETES INSIPIDUS (H): Primary | ICD-10-CM

## 2023-03-03 DIAGNOSIS — D64.9 CHRONIC ANEMIA: ICD-10-CM

## 2023-03-03 DIAGNOSIS — T56.891A LITHIUM TOXICITY, ACCIDENTAL OR UNINTENTIONAL, INITIAL ENCOUNTER: ICD-10-CM

## 2023-03-03 DIAGNOSIS — F31.9 BIPOLAR AFFECTIVE DISORDER, REMISSION STATUS UNSPECIFIED (H): ICD-10-CM

## 2023-03-03 DIAGNOSIS — N18.32 STAGE 3B CHRONIC KIDNEY DISEASE (H): ICD-10-CM

## 2023-03-03 DIAGNOSIS — I10 BENIGN ESSENTIAL HYPERTENSION: ICD-10-CM

## 2023-03-03 PROCEDURE — 99315 NF DSCHRG MGMT 30 MIN/LESS: CPT | Performed by: NURSE PRACTITIONER

## 2023-03-03 NOTE — PROGRESS NOTES
[facility] GERIATRICS DISCHARGE SUMMARY  PATIENT'S NAME: Diana Santiago  YOB: 1950  MEDICAL RECORD NUMBER:  [id number]  Place of Service where encounter took place:  [facility] (TCU) [[id number]]    PRIMARY CARE PROVIDER AND CLINIC RESPONSIBLE AFTER TRANSFER:   Shannon Craft MD, [address] [place] [address]  / [place] [id number]    [facility] [place]     Transferring providers: Ron Sherwood NP, Dr. Cecilia MD  Recent Hospitalization/ED:  Hospital  [facility] stay 1/28/23 to 2/23/23.  Date of SNF Admission: 2/23/23  Date of SNF (anticipated) Discharge: March 09, 2023  Discharged to: [facility]  Cognitive Scores: SLUMS: 24/30  Physical Function: Ambulating >500 ft with FWW  DME: No new DME needed    CODE STATUS/ADVANCE DIRECTIVES DISCUSSION:  No CPR- Do NOT Intubate   ALLERGIES: No known allergies    HPI  This is a 71 yo female with PMHx for Htn, bipolarism, HLD who was hospitalized at [facility] for suicide attempt with intentional polysubstance overdose, presented with distributive shock and hypotension due to calcium channel blocker overdose, encephalopathy, hypoxic respiratory failure, metabolic acidosis, hyperkalemia, and REI.  Approximately 100 tablets of her prescribed medications in a suicide attempt, including lamotrigine, amlodipine, haloperidol, and dextroamphetamine.  Shortly after admission she became hypotensive and required transfer to the ICU for pressor support.  She ultimately required intubation. Chest x-ray showed no focal consolidation, effusion, or pneumothorax.  Her condition improved and she was extubated on February 2. She was transferred out of the ICU on February 5, 2023.  She was evaluated by psychiatry service and ultimately placed on lithium 150 mg 3 times daily and diazepam 1 mg every other day.  She was given IVF and treated with DDAVP and eventually amiloride for partial diabetes insipidus and severe nocturia.   Amoride was stopped on February 16 due to development of mild hyperkalemia. Initially it was felt that patient might need Geripsych stay but her psych reassessment on February 20 indicated the patient would need inpatient stay and no need for bedside sitter as of February 20. [facility].    Summary of nursing facility stay:     Suicide attempt with polysubstance overdose,  Bipolar 1 disorder  Last lithium level of 1 on February 27.  Continue lithium 150 mg 3 times daily with meals  Continue diazepam 1 mg every other day  Monitor lithium level periodically  Monitor psychiatric symptoms  ACP referral was placed on February 24  Follow-up with psychiatry as directed     Essential hypertension  PTA amlodipine (5 mg daily) was discontinued in the hospital due to hypotension in the setting of polysubstance overdose.  Now resumed on amlodipine 5 mg daily. -120s, HR <80s     Dyslipidemia  Continue simvastatin 40 mg daily     REI,  CKD stage IIIb-IV  Baseline creatinine 1.5-2.  Peak creatinine of 2.66 on February 1  Continue sodium bicarbonate 1300 mg twice daily  Avoid NSAIDs and nephrotoxins  Last Cr 1.86 with GFR 28 on 2/27, HCO3 20     Chronic anemia  Baseline hemoglobin 9-11  Last Hgb 8.4 on 2/27    Superficial right cephalic vein thrombus  Due to central venous catheter.  CVC was removed on February 5  Improving     Prediabetes  Last hemoglobin A1c was 6.1% in December 2020  Diet controlled  Follow-up as outpatient     History of perforated gastric ulcer, s/p exporter laparotomy, repair of perforation, and peritoneal washout in August 2022  Continue pantoprazole 40 mg daily     Nephrogenic diabetes insipidus  Due to chronic lithium use.  Encourage fluids     Slow transit constipation  Continue miralax daily     Severe protein-calorie malnutrition  Continue dietary supplement  Physical dietitian is following the patient    GERD  Continue protonix 40mg daily    Discharge  Medications:    Current Outpatient Medications   Medication Sig Dispense Refill     ACE/ARB/ARNI NOT PRESCRIBED (INTENTIONAL) Please choose reason not prescribed from choices below. (Patient not taking: Reported on 2/24/2023)       amLODIPine (NORVASC) 5 MG tablet Take 5 mg by mouth daily Hold for SBP less than 100       diazepam (VALIUM) 2 MG tablet Take 0.5 tablets (1 mg) by mouth every other day 7 tablet 0     fexofenadine (ALLEGRA) 180 MG tablet Take 1 tablet by mouth daily. 90 tablet 0     lithium (ESKALITH) 150 MG capsule Take 1 capsule (150 mg) by mouth 3 times daily (with meals) 90 capsule 0     pantoprazole (PROTONIX) 40 MG EC tablet TAKE 1 TABLET BY MOUTH EVERY DAY 90 tablet 1     polyethylene glycol (MIRALAX) 17 GM/Dose powder Take 17 g (1 capful) by mouth daily 578 g 3     simvastatin (ZOCOR) 40 MG tablet TAKE 1 TABLET AT BEDTIME 90 tablet 3     sodium bicarbonate 650 MG tablet Take 2 tablets (1,300 mg) by mouth 2 times daily 90 tablet 0       Controlled medications:   not applicable/none     Past Medical History:   Past Medical History:   Diagnosis Date     Benign essential hypertension 09/2018    added norvasc 9/18     Bipolar affective disorder (H)     hosp 1993, Dr. Aftab Deal     Cancer (H) 1996    DCIS, left breast     Chronic kidney disease, stage 3a (H)     seen by renal Dr. 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    felt due to lithium     Thalassaemia trait      Vitamin D deficiency      Physical Exam:   Vitals: BP  "(!) 140/98   Pulse 77   Temp (!) 46.2  F (7.9  C)   Resp 16   Ht 1.626 m (5' 4\")   Wt 50.3 kg (110 lb 12.8 oz)   SpO2 100%   BMI 19.02 kg/m    BMI: Body mass index is 19.02 kg/m .  General: Comfortable and in no acute distress, denies pain  Cardiovascular: Normal S1, S2, RRR, no edema  Respiratory: Lungs clear to auscultation bilaterally,RA  Psych: Alert and oriented x3; normal affect    SNF labs:   Most Recent 3 CBC's:Recent Labs   Lab Test 02/27/23  0600 02/14/23  0824 02/09/23  1048 02/08/23  0852   WBC 10.5 8.2  --  9.1   HGB 8.4* 9.4* 8.7* 8.7*   MCV 75* 69*  --  70*    617*  --  480*     Most Recent 3 BMP's:Recent Labs   Lab Test 02/27/23  0600 02/23/23  1056 02/22/23  0914   * 133* 136   POTASSIUM 4.3 4.3 4.8   CHLORIDE 103 100 104   CO2 20* 23 22   BUN 45.0* 52.0* 53.6*   CR 1.86* 1.83* 1.91*   ANIONGAP 12 10 10   ISSA 9.9 10.5* 10.6*   * 157* 121*       DISCHARGE PLAN:    Follow up labs: No labs orders/due    Medical Follow Up:      Follow up with primary care provider in 1-2 weeks    Current [place] scheduled appointments:       Discharge Services: Home Care:  Occupational Therapy, Physical Therapy, Registered Nurse and Home Health Aide    Discharge Instructions Verbalized to Patient at Discharge:     None    TOTAL DISCHARGE TIME:   Less than or equal to 30 minutes  Electronically signed by:  Ron Sherwood NP     Home care Face to Face documentation done in [facility] attached to Home care orders for [facility].             "

## 2023-03-13 ENCOUNTER — TELEPHONE (OUTPATIENT)
Dept: FAMILY MEDICINE | Facility: CLINIC | Age: 73
End: 2023-03-13

## 2023-03-13 ENCOUNTER — LAB (OUTPATIENT)
Dept: INFUSION THERAPY | Facility: CLINIC | Age: 73
End: 2023-03-13
Attending: INTERNAL MEDICINE
Payer: MEDICARE

## 2023-03-13 VITALS
SYSTOLIC BLOOD PRESSURE: 97 MMHG | RESPIRATION RATE: 16 BRPM | TEMPERATURE: 97.8 F | DIASTOLIC BLOOD PRESSURE: 68 MMHG | HEART RATE: 77 BPM | OXYGEN SATURATION: 100 %

## 2023-03-13 DIAGNOSIS — N18.32 STAGE 3B CHRONIC KIDNEY DISEASE (H): ICD-10-CM

## 2023-03-13 DIAGNOSIS — N18.9 ANEMIA IN CKD (CHRONIC KIDNEY DISEASE): Primary | ICD-10-CM

## 2023-03-13 DIAGNOSIS — D63.1 ANEMIA IN CKD (CHRONIC KIDNEY DISEASE): Primary | ICD-10-CM

## 2023-03-13 LAB
FERRITIN SERPL-MCNC: 220 NG/ML (ref 11–328)
HGB BLD-MCNC: 8.6 G/DL (ref 11.7–15.7)
IRON BINDING CAPACITY (ROCHE): 302 UG/DL (ref 240–430)
IRON SATN MFR SERPL: 25 % (ref 15–46)
IRON SERPL-MCNC: 74 UG/DL (ref 37–145)

## 2023-03-13 PROCEDURE — 82728 ASSAY OF FERRITIN: CPT | Performed by: PHYSICIAN ASSISTANT

## 2023-03-13 PROCEDURE — 36415 COLL VENOUS BLD VENIPUNCTURE: CPT | Performed by: PHYSICIAN ASSISTANT

## 2023-03-13 PROCEDURE — 83550 IRON BINDING TEST: CPT | Performed by: PHYSICIAN ASSISTANT

## 2023-03-13 PROCEDURE — 85018 HEMOGLOBIN: CPT | Performed by: PHYSICIAN ASSISTANT

## 2023-03-13 PROCEDURE — 250N000011 HC RX IP 250 OP 636: Performed by: PHYSICIAN ASSISTANT

## 2023-03-13 PROCEDURE — 96372 THER/PROPH/DIAG INJ SC/IM: CPT | Performed by: PHYSICIAN ASSISTANT

## 2023-03-13 RX ORDER — ALBUTEROL SULFATE 90 UG/1
1-2 AEROSOL, METERED RESPIRATORY (INHALATION)
Status: CANCELLED
Start: 2023-04-12 | Stop reason: HOSPADM

## 2023-03-13 RX ORDER — DIPHENHYDRAMINE HYDROCHLORIDE 50 MG/ML
50 INJECTION INTRAMUSCULAR; INTRAVENOUS
Status: CANCELLED
Start: 2023-04-12 | Stop reason: HOSPADM

## 2023-03-13 RX ORDER — ALBUTEROL SULFATE 0.83 MG/ML
2.5 SOLUTION RESPIRATORY (INHALATION)
Status: CANCELLED | OUTPATIENT
Start: 2023-03-19

## 2023-03-13 RX ORDER — MEPERIDINE HYDROCHLORIDE 25 MG/ML
25 INJECTION INTRAMUSCULAR; INTRAVENOUS; SUBCUTANEOUS EVERY 30 MIN PRN
Status: CANCELLED | OUTPATIENT
Start: 2023-03-19

## 2023-03-13 RX ORDER — DIPHENHYDRAMINE HYDROCHLORIDE 50 MG/ML
50 INJECTION INTRAMUSCULAR; INTRAVENOUS
Status: CANCELLED
Start: 2023-03-19

## 2023-03-13 RX ORDER — EPINEPHRINE 1 MG/ML
0.3 INJECTION, SOLUTION INTRAMUSCULAR; SUBCUTANEOUS EVERY 5 MIN PRN
Status: CANCELLED | OUTPATIENT
Start: 2023-04-12 | Stop reason: HOSPADM

## 2023-03-13 RX ORDER — ALBUTEROL SULFATE 90 UG/1
1-2 AEROSOL, METERED RESPIRATORY (INHALATION)
Status: CANCELLED
Start: 2023-03-19

## 2023-03-13 RX ORDER — NALOXONE HYDROCHLORIDE 0.4 MG/ML
0.2 INJECTION, SOLUTION INTRAMUSCULAR; INTRAVENOUS; SUBCUTANEOUS
Status: CANCELLED | OUTPATIENT
Start: 2023-03-19

## 2023-03-13 RX ORDER — METHYLPREDNISOLONE SODIUM SUCCINATE 125 MG/2ML
125 INJECTION, POWDER, LYOPHILIZED, FOR SOLUTION INTRAMUSCULAR; INTRAVENOUS
Status: CANCELLED
Start: 2023-04-12 | Stop reason: HOSPADM

## 2023-03-13 RX ORDER — METHYLPREDNISOLONE SODIUM SUCCINATE 125 MG/2ML
125 INJECTION, POWDER, LYOPHILIZED, FOR SOLUTION INTRAMUSCULAR; INTRAVENOUS
Status: CANCELLED
Start: 2023-03-19

## 2023-03-13 RX ORDER — NALOXONE HYDROCHLORIDE 0.4 MG/ML
0.2 INJECTION, SOLUTION INTRAMUSCULAR; INTRAVENOUS; SUBCUTANEOUS
Status: CANCELLED | OUTPATIENT
Start: 2023-04-12 | Stop reason: HOSPADM

## 2023-03-13 RX ORDER — MEPERIDINE HYDROCHLORIDE 25 MG/ML
25 INJECTION INTRAMUSCULAR; INTRAVENOUS; SUBCUTANEOUS EVERY 30 MIN PRN
Status: CANCELLED | OUTPATIENT
Start: 2023-04-12 | Stop reason: HOSPADM

## 2023-03-13 RX ORDER — EPINEPHRINE 1 MG/ML
0.3 INJECTION, SOLUTION INTRAMUSCULAR; SUBCUTANEOUS EVERY 5 MIN PRN
Status: CANCELLED | OUTPATIENT
Start: 2023-03-19

## 2023-03-13 RX ORDER — ALBUTEROL SULFATE 0.83 MG/ML
2.5 SOLUTION RESPIRATORY (INHALATION)
Status: CANCELLED | OUTPATIENT
Start: 2023-04-12 | Stop reason: HOSPADM

## 2023-03-13 RX ADMIN — DARBEPOETIN ALFA 100 MCG: 100 INJECTION, SOLUTION INTRAVENOUS; SUBCUTANEOUS at 14:12

## 2023-03-13 NOTE — PROGRESS NOTES
Nursing Note:  Diana JACQUELIN Santiago presents today for Aranesp.    Patient seen by provider today: No   present during visit today: Not Applicable.    Note: Patient tolerated one Aranesp injection to right arm without issue.  Patient reminded to call the clinic to set up future injection appointments if needed.    Intravenous Access:  No Intravenous access/labs at this visit.    Discharge Plan:   Patient was sent to home in stable condition with her family.    Abiola Oseguera RN

## 2023-03-13 NOTE — PROGRESS NOTES
Medical Assistant Note:  Diana Santiago presents today for lab draw.    Patient seen by provider today: No.   present during visit today: Not Applicable.    Concerns: No Concerns.    Procedure:  Lab draw site: RHD, Needle type: BF, Gauge: 21.gauze and coban applied    Post Assessment:  Labs drawn without difficulty: Yes.    Discharge Plan:  Departure Mode: Ambulatory.    Face to Face Time: 5.    Marietta De Luna CMA

## 2023-03-13 NOTE — TELEPHONE ENCOUNTER
"ANA Mercado from [facility] calling for orders:    \"Ok for SN start of care on 03/16/23. PT/OT eval and treat.\"    Please advise if Ok for above order.     Verito Valencia RN on 3/13/2023 at 4:40 PM    "

## 2023-03-14 NOTE — TELEPHONE ENCOUNTER
Please send to PCP (  I am listed as PCP but seeing another provider) please update chart with PCP as well

## 2023-03-16 ENCOUNTER — MEDICAL CORRESPONDENCE (OUTPATIENT)
Dept: HEALTH INFORMATION MANAGEMENT | Facility: CLINIC | Age: 73
End: 2023-03-16

## 2023-03-16 NOTE — TELEPHONE ENCOUNTER
Sofie RN from [facility] called requesting skilled nursing visits-     1x/wk for 1 wk   1x every 2 wks for 2 wks   1x every 3 wks for 6 wks   3 PRN visits   For disease management     Verbal ok given per policy.     Sofie also is requesting-   [facility]HA- to assist with showering.   PT & OT eval & treat- even though it was requested previously, Sofie is requesting it.     Routing to PCP to review and advise.     Please call Sofie back with PCPs recommendations.     Sofie #- [phone] (can leave a detailed message)

## 2023-03-16 NOTE — TELEPHONE ENCOUNTER
Patient Contact    Attempt # 1    Was call answered? No.    Left message for patient to call triage back.    Verito Rodriguez RN

## 2023-03-16 NOTE — TELEPHONE ENCOUNTER
Gayle Hernandez MD  You; Cs Triage Im 16 minutes ago (1:24 PM)     X  Please help pt schedule video virtual visit with me next Monday or later next week for home healthcare referral, required by medicare.

## 2023-03-17 ENCOUNTER — TELEPHONE (OUTPATIENT)
Dept: FAMILY MEDICINE | Facility: CLINIC | Age: 73
End: 2023-03-17
Payer: MEDICARE

## 2023-03-17 NOTE — TELEPHONE ENCOUNTER
Patient Contact    Attempt # 1    Was call answered?  No.  Left message on voicemail with information to call triage back.    On callback - please schedule patient for VV per Dr. Mary Mathis, RN  [facility]

## 2023-03-17 NOTE — TELEPHONE ENCOUNTER
Home care called for orders     [facility] orders request - Verbal okay given but advised hospital follow up is needed with PCP     Sylvia VIERA, Triage RN  [facility]

## 2023-03-19 DIAGNOSIS — F31.9 BIPOLAR AFFECTIVE DISORDER, REMISSION STATUS UNSPECIFIED (H): ICD-10-CM

## 2023-03-19 DIAGNOSIS — E23.2 DIABETES INSIPIDUS (H): ICD-10-CM

## 2023-03-20 ENCOUNTER — DOCUMENTATION ONLY (OUTPATIENT)
Dept: OTHER | Facility: CLINIC | Age: 73
End: 2023-03-20
Payer: MEDICARE

## 2023-03-20 DIAGNOSIS — F31.9 BIPOLAR AFFECTIVE DISORDER, REMISSION STATUS UNSPECIFIED (H): ICD-10-CM

## 2023-03-20 NOTE — TELEPHONE ENCOUNTER
ANA Winslow called requesting Valium refill.    Routing refill request to provider for review/approval because:  Drug not on the [facility] refill protocol

## 2023-03-21 NOTE — TELEPHONE ENCOUNTER
See other encounter, Sofie had called back     She was going to advise family to schedule visit with PCP     Nothing has been scheduled at this time     Patient Contact    Attempt # 2 - Called Diana     Was call answered?  No.  Left message on voicemail with information to call back.    Sylvia VIERA, Triage RN  [facility]

## 2023-03-22 ENCOUNTER — VIRTUAL VISIT (OUTPATIENT)
Dept: FAMILY MEDICINE | Facility: CLINIC | Age: 73
End: 2023-03-22
Payer: MEDICARE

## 2023-03-22 ENCOUNTER — PATIENT OUTREACH (OUTPATIENT)
Dept: CARE COORDINATION | Facility: CLINIC | Age: 73
End: 2023-03-22

## 2023-03-22 DIAGNOSIS — F31.9 BIPOLAR AFFECTIVE DISORDER, REMISSION STATUS UNSPECIFIED (H): Primary | ICD-10-CM

## 2023-03-22 DIAGNOSIS — I10 ESSENTIAL HYPERTENSION: ICD-10-CM

## 2023-03-22 DIAGNOSIS — I10 BENIGN ESSENTIAL HYPERTENSION: ICD-10-CM

## 2023-03-22 DIAGNOSIS — J30.2 SEASONAL ALLERGIC RHINITIS, UNSPECIFIED TRIGGER: ICD-10-CM

## 2023-03-22 DIAGNOSIS — Z76.0 ENCOUNTER FOR MEDICATION REFILL: ICD-10-CM

## 2023-03-22 PROBLEM — N17.9 ACUTE KIDNEY FAILURE (H): Status: ACTIVE | Noted: 2021-11-19

## 2023-03-22 PROBLEM — R79.89: Status: ACTIVE | Noted: 2023-03-07

## 2023-03-22 PROBLEM — D32.9 MENINGIOMA (H): Status: ACTIVE | Noted: 2022-07-22

## 2023-03-22 PROBLEM — M25.571 RIGHT ANKLE PAIN, UNSPECIFIED CHRONICITY: Status: ACTIVE | Noted: 2022-07-18

## 2023-03-22 PROBLEM — S99.919A: Status: ACTIVE | Noted: 2022-07-18

## 2023-03-22 PROBLEM — M21.371 RIGHT FOOT DROP: Status: ACTIVE | Noted: 2022-07-22

## 2023-03-22 PROBLEM — M25.511 RIGHT SHOULDER PAIN, UNSPECIFIED CHRONICITY: Status: ACTIVE | Noted: 2022-07-22

## 2023-03-22 PROBLEM — I12.9 MALIGNANT HYPERTENSIVE KIDNEY DISEASE WITH CHRONIC KIDNEY DISEASE STAGE I THROUGH STAGE IV, OR UNSPECIFIED(403.00): Status: ACTIVE | Noted: 2021-11-19

## 2023-03-22 PROBLEM — E87.0 HYPERNATREMIA: Status: ACTIVE | Noted: 2021-11-19

## 2023-03-22 PROCEDURE — 99214 OFFICE O/P EST MOD 30 MIN: CPT | Mod: 95 | Performed by: INTERNAL MEDICINE

## 2023-03-22 RX ORDER — DIAZEPAM 2 MG
1 TABLET ORAL EVERY OTHER DAY
Qty: 7 TABLET | Refills: 2 | Status: SHIPPED | OUTPATIENT
Start: 2023-03-22 | End: 2023-06-05

## 2023-03-22 NOTE — PROGRESS NOTES
Clinic Care Coordination Contact    Situation: Patient chart reviewed by care coordinator.    Background:   Patient was admitted to [facility] from 1/28/2023 to 2/23/2023 with a diagnoses of Polysubstance overdose/suicide attempt, REI on stage III CKD, superficial thrombus of right cephalic vein, chronic anemia and discharged to [place] [facility].    Assessment:   RNCC was not notified of TCU discharge and has not made outreach to patient.  However, patient has completed PCP/TCU follow up visit, has transferred to [place] [facility] and is following plan of care as outlined by care team.    Plan/Recommendations:   No further care coordination outreaches at this time.     Roula Keys RN, BSN, PHN  Primary Care / Care Coordinator   [facility]  E-mail [email or web address]   [phone]

## 2023-03-22 NOTE — PROGRESS NOTES
Diana is a 72 year old who is being evaluated via a billable telephone visit.      What phone number would you like to be contacted at? [phone]  How would you like to obtain your AVS? [place]  Distant Location (provider location):  On-site      Subjective   Diana is a 72 year old, presenting for the following health issues:    HPI       Chief Complaint:     Medication refills    HPI:   Patient Diana Santiago is a very pleasant 72 year old female with history of bipolar affective disorder who presents to Internal Medicine clinic today via telephone visit for follow up of multiple concerns including medication refills.         Current Medications:     Current Outpatient Medications   Medication Sig Dispense Refill     ACE/ARB/ARNI NOT PRESCRIBED (INTENTIONAL) Please choose reason not prescribed from choices below.       amLODIPine (NORVASC) 5 MG tablet Take 5 mg by mouth daily Hold for SBP less than 100       diazepam (VALIUM) 2 MG tablet Take 0.5 tablets (1 mg) by mouth every other day 7 tablet 2     fexofenadine (ALLEGRA) 180 MG tablet Take 1 tablet by mouth daily. 90 tablet 0     lithium (ESKALITH) 150 MG capsule Take 1 capsule (150 mg) by mouth 3 times daily (with meals) 90 capsule 0     pantoprazole (PROTONIX) 40 MG EC tablet TAKE 1 TABLET BY MOUTH EVERY DAY 90 tablet 1     polyethylene glycol (MIRALAX) 17 GM/Dose powder Take 17 g (1 capful) by mouth daily 578 g 3     simvastatin (ZOCOR) 40 MG tablet TAKE 1 TABLET AT BEDTIME 90 tablet 3     sodium bicarbonate 650 MG tablet Take 2 tablets (1,300 mg) by mouth 2 times daily 90 tablet 0         Allergies:      Allergies   Allergen Reactions     Ace Inhibitors      Other reaction(s): renal failure  She can't be on an ACEI or ARB while on Lithium.     No Known Allergies             Past Medical History:     Past Medical History:   Diagnosis Date     Benign essential hypertension 09/2018    added norvasc 9/18     Bipolar affective disorder (H)     hosp 1993,   Aftab Schweiters     Cancer (H) 1996    DCIS, left breast     Chronic kidney disease, stage 3a (H)     seen by renal Dr. 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    felt due to lithium     Thalassaemia trait      Vitamin D deficiency          Past Surgical History:     Past Surgical History:   Procedure Laterality Date     BIOPSY  1994    left breast     BREAST SURGERY      lumpectomy x 4     COLONOSCOPY  2021     COLONOSCOPY N/A 6/17/2022    Procedure: COLONOSCOPY, WITH POLYPECTOMY AND BIOPSY;  Surgeon: Panchito Gu MD;  Location:  GI     EYE SURGERY  2018    retina tear     LAPAROTOMY EXPLORATORY N/A 8/24/2022    Procedure: Exploratory laparotomy, REPAIR OF PERFORATED ULCER;  Surgeon: Ron Vidal MD;  Location:  OR     left hand surgery      last 1974         Family Medical History:     Family History   Problem Relation Age of Onset     Cerebrovascular Disease Mother      Hypertension Father      Sleep Apnea Brother      Breast Cancer Maternal Aunt         x 2     Breast Cancer Other         Maternal Aunt     Hyperlipidemia Niece          Social History:     Social History     Socioeconomic History     Marital status: Single     Spouse name: Not on file     Number of children: 0     Years of education: Not on file     Highest education level: Not on file   Occupational History     Occupation: , retired   Tobacco Use     Smoking status: Never     Smokeless tobacco: Never   Substance and Sexual Activity      Alcohol use: No     Drug use: No     Sexual activity: Not Currently     Partners: Male     Birth control/protection: Post-menopausal, None   Other Topics Concern     Parent/sibling w/ CABG, MI or angioplasty before 65F 55M? No   Social History Narrative     Not on file     Social Determinants of Health     Financial Resource Strain: Not on file   Food Insecurity: Not on file   Transportation Needs: Not on file   Physical Activity: Not on file   Stress: Not on file   Social Connections: Not on file   Intimate Partner Violence: Not on file   Housing Stability: Not on file           Review of System:     Constitutional: Negative for fever or chills  Skin: Negative for rashes  Ears/Nose/Throat: Negative for nasal congestion, sore throat  Respiratory: No shortness of breath, dyspnea on exertion, cough, or hemoptysis  Cardiovascular: Negative for chest pain  Gastrointestinal: Negative for nausea, vomiting  Genitourinary: Negative for dysuria, hematuria  Musculoskeletal: Negative for myalgias  Neurologic: Negative for headaches  Psychiatric: positive for bipolar affective disorder  Hematologic/Lymphatic/Immunologic: Negative  Endocrine: Negative  Behavioral: Negative for tobacco use       Physical Exam:   LMP  (LMP Unknown)   Breastfeeding No     RESP: no cough over the phone   NEURO: Alert & Oriented x 3.   PSYCH: mentation appears normal, affect normal        Diagnostic Test Results:     Diagnostic Test Results:  Labs reviewed in Epic    ASSESSMENT/PLAN:       Diana was seen today for refill request.    Diagnoses and all orders for this visit:    Encounter for medication refill  Bipolar affective disorder, remission status unspecified (H)  - stable  - Diazepam medication refilled today    Benign essential hypertension  - stable, continue current therapy    Seasonal allergic rhinitis, unspecified trigger  - stable, continue current therapy      Follow Up Plan:     Patient is instructed to return to Internal Medicine  clinic for follow-up visit in 3 months.        Gayle Hernandez MD  Internal Medicine  [facility]      telephone visit duration including chart review, medication management: 30 minutes

## 2023-03-23 RX ORDER — LITHIUM CARBONATE 150 MG/1
CAPSULE ORAL
Qty: 270 CAPSULE | Refills: 97 | OUTPATIENT
Start: 2023-03-23

## 2023-03-23 RX ORDER — SODIUM BICARBONATE 650 MG/1
1300 TABLET ORAL 2 TIMES DAILY
Qty: 90 TABLET | Refills: 0 | Status: SHIPPED | OUTPATIENT
Start: 2023-03-23 | End: 2023-05-09

## 2023-03-23 RX ORDER — SODIUM BICARBONATE 650 MG/1
TABLET ORAL
Qty: 360 TABLET | Refills: 97 | OUTPATIENT
Start: 2023-03-23

## 2023-03-23 RX ORDER — LITHIUM CARBONATE 150 MG/1
150 CAPSULE ORAL
Qty: 90 CAPSULE | Refills: 0 | Status: SHIPPED | OUTPATIENT
Start: 2023-03-23 | End: 2023-04-18

## 2023-03-23 NOTE — TELEPHONE ENCOUNTER
Dr. Hernandez, please see the refill request. Patient had phone visit yesterday.     Gerda Rodriguez RN  [facility]

## 2023-03-28 ENCOUNTER — TELEPHONE (OUTPATIENT)
Dept: FAMILY MEDICINE | Facility: CLINIC | Age: 73
End: 2023-03-28
Payer: MEDICARE

## 2023-03-28 NOTE — TELEPHONE ENCOUNTER
Home care called for orders:     OT - every other week for 8 weeks     Verbal given     Sylvia VIERA, Triage RN  [facility]

## 2023-03-30 ASSESSMENT — PATIENT HEALTH QUESTIONNAIRE - PHQ9
10. IF YOU CHECKED OFF ANY PROBLEMS, HOW DIFFICULT HAVE THESE PROBLEMS MADE IT FOR YOU TO DO YOUR WORK, TAKE CARE OF THINGS AT HOME, OR GET ALONG WITH OTHER PEOPLE: SOMEWHAT DIFFICULT
SUM OF ALL RESPONSES TO PHQ QUESTIONS 1-9: 9
SUM OF ALL RESPONSES TO PHQ QUESTIONS 1-9: 9

## 2023-04-03 ENCOUNTER — VIRTUAL VISIT (OUTPATIENT)
Dept: FAMILY MEDICINE | Facility: CLINIC | Age: 73
End: 2023-04-03
Payer: MEDICARE

## 2023-04-03 ENCOUNTER — TELEPHONE (OUTPATIENT)
Dept: FAMILY MEDICINE | Facility: CLINIC | Age: 73
End: 2023-04-03

## 2023-04-03 DIAGNOSIS — J30.2 SEASONAL ALLERGIC RHINITIS, UNSPECIFIED TRIGGER: ICD-10-CM

## 2023-04-03 DIAGNOSIS — N18.31 CHRONIC KIDNEY DISEASE, STAGE 3A (H): ICD-10-CM

## 2023-04-03 DIAGNOSIS — E78.5 HYPERLIPIDEMIA LDL GOAL <130: ICD-10-CM

## 2023-04-03 DIAGNOSIS — F31.9 BIPOLAR AFFECTIVE DISORDER, REMISSION STATUS UNSPECIFIED (H): Primary | ICD-10-CM

## 2023-04-03 DIAGNOSIS — I10 ESSENTIAL HYPERTENSION: ICD-10-CM

## 2023-04-03 PROCEDURE — 99214 OFFICE O/P EST MOD 30 MIN: CPT | Mod: VID | Performed by: INTERNAL MEDICINE

## 2023-04-03 RX ORDER — DIPHENHYDRAMINE HYDROCHLORIDE 50 MG/ML
50 INJECTION INTRAMUSCULAR; INTRAVENOUS
Status: CANCELLED
Start: 2023-04-12

## 2023-04-03 RX ORDER — ALBUTEROL SULFATE 0.83 MG/ML
2.5 SOLUTION RESPIRATORY (INHALATION)
Status: CANCELLED | OUTPATIENT
Start: 2023-04-12

## 2023-04-03 RX ORDER — METHYLPREDNISOLONE SODIUM SUCCINATE 125 MG/2ML
125 INJECTION, POWDER, LYOPHILIZED, FOR SOLUTION INTRAMUSCULAR; INTRAVENOUS
Status: CANCELLED
Start: 2023-04-12

## 2023-04-03 RX ORDER — EPINEPHRINE 1 MG/ML
0.3 INJECTION, SOLUTION INTRAMUSCULAR; SUBCUTANEOUS EVERY 5 MIN PRN
Status: CANCELLED | OUTPATIENT
Start: 2023-04-12

## 2023-04-03 RX ORDER — MEPERIDINE HYDROCHLORIDE 25 MG/ML
25 INJECTION INTRAMUSCULAR; INTRAVENOUS; SUBCUTANEOUS EVERY 30 MIN PRN
Status: CANCELLED | OUTPATIENT
Start: 2023-04-12

## 2023-04-03 RX ORDER — ALBUTEROL SULFATE 90 UG/1
1-2 AEROSOL, METERED RESPIRATORY (INHALATION)
Status: CANCELLED
Start: 2023-04-12

## 2023-04-03 RX ORDER — NALOXONE HYDROCHLORIDE 0.4 MG/ML
0.2 INJECTION, SOLUTION INTRAMUSCULAR; INTRAVENOUS; SUBCUTANEOUS
Status: CANCELLED | OUTPATIENT
Start: 2023-04-12

## 2023-04-03 ASSESSMENT — PATIENT HEALTH QUESTIONNAIRE - PHQ9
SUM OF ALL RESPONSES TO PHQ QUESTIONS 1-9: 9
10. IF YOU CHECKED OFF ANY PROBLEMS, HOW DIFFICULT HAVE THESE PROBLEMS MADE IT FOR YOU TO DO YOUR WORK, TAKE CARE OF THINGS AT HOME, OR GET ALONG WITH OTHER PEOPLE: SOMEWHAT DIFFICULT

## 2023-04-03 NOTE — TELEPHONE ENCOUNTER
"Indigo, PT with [facility] calling wanting orders:    \"Ok for continuation of PT every other week for four weeks, then once a week for one week.\"    Writer approved order per  Protocol.    Verito Valencia RN on 4/3/2023 at 4:20 PM    "

## 2023-04-03 NOTE — PROGRESS NOTES
Diana is a 72 year old who is being evaluated via a billable video visit.      How would you like to obtain your AVS? MyChart  If the video visit is dropped, the invitation should be resent by: Text to cell phone: [phone]  Will anyone else be joining your video visit? No    Subjective   Diana is a 72 year old, presenting for the following health issues:  Recheck Medication      History of Present Illness       CKD: She is not using over the counter pain medicine.     Mental Health Follow-up:  Patient presents to follow-up on Depression.Patient's depression since last visit has been:  Better  The patient is not having other symptoms associated with depression.      Any significant life events: No  Patient is not feeling anxious or having panic attacks.  Patient has no concerns about alcohol or drug use.    Hyperlipidemia:  She presents for follow up of hyperlipidemia.  She is taking medication to lower cholesterol. She is not having myalgia or other side effects to statin medications.    Hypertension: She presents for follow up of hypertension.  She does not check blood pressure  regularly outside of the clinic. Outside blood pressures have been over 140/90. She does not follow a low salt diet.     She eats 4 or more servings of fruits and vegetables daily.She consumes 0 sweetened beverage(s) daily.She exercises with enough effort to increase her heart rate 10 to 19 minutes per day.  She exercises with enough effort to increase her heart rate 5 days per week.     Today's PHQ-9         PHQ-9 Total Score: 9    PHQ-9 Q9 Thoughts of better off dead/self-harm past 2 weeks :   Not at all    How difficult have these problems made it for you to do your work, take care of things at home, or get along with other people: Somewhat difficult         Current Medications:     Current Outpatient Medications   Medication Sig Dispense Refill     amLODIPine (NORVASC) 5 MG tablet Take 5 mg by mouth daily Hold for SBP less than 100        diazepam (VALIUM) 2 MG tablet Take 0.5 tablets (1 mg) by mouth every other day 7 tablet 2     fexofenadine (ALLEGRA) 180 MG tablet Take 1 tablet by mouth daily. 90 tablet 0     lithium (ESKALITH) 150 MG capsule Take 1 capsule (150 mg) by mouth 3 times daily (with meals) 90 capsule 0     pantoprazole (PROTONIX) 40 MG EC tablet TAKE 1 TABLET BY MOUTH EVERY DAY 90 tablet 1     polyethylene glycol (MIRALAX) 17 GM/Dose powder Take 17 g (1 capful) by mouth daily 578 g 3     simvastatin (ZOCOR) 40 MG tablet TAKE 1 TABLET AT BEDTIME 90 tablet 3     sodium bicarbonate 650 MG tablet Take 2 tablets (1,300 mg) by mouth 2 times daily 90 tablet 0     ACE/ARB/ARNI NOT PRESCRIBED (INTENTIONAL) Please choose reason not prescribed from choices below. (Patient not taking: Reported on 4/3/2023)           Allergies:      Allergies   Allergen Reactions     Ace Inhibitors      Other reaction(s): renal failure  She can't be on an ACEI or ARB while on Lithium.     No Known Allergies             Past Medical History:     Past Medical History:   Diagnosis Date     Benign essential hypertension 09/2018    added norvasc 9/18     Bipolar affective disorder (H)     hosp 1993, Dr. Aftab Deal     Cancer (H) 1996    DCIS, left breast     Chronic kidney disease, stage 3a (H)     seen by renal Dr. 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    felt due to lithium     Thalassaemia trait      Vitamin D deficiency          Past Surgical History:     Past Surgical History:    Procedure Laterality Date     BIOPSY  1994    left breast     BREAST SURGERY      lumpectomy x 4     COLONOSCOPY  2021     COLONOSCOPY N/A 6/17/2022    Procedure: COLONOSCOPY, WITH POLYPECTOMY AND BIOPSY;  Surgeon: Panchito Gu MD;  Location:  GI     EYE SURGERY  2018    retina tear     LAPAROTOMY EXPLORATORY N/A 8/24/2022    Procedure: Exploratory laparotomy, REPAIR OF PERFORATED ULCER;  Surgeon: Ron Vidal MD;  Location:  OR     left hand surgery      last 1974         Family Medical History:     Family History   Problem Relation Age of Onset     Cerebrovascular Disease Mother      Hypertension Father      Sleep Apnea Brother      Breast Cancer Maternal Aunt         x 2     Breast Cancer Other         Maternal Aunt     Hyperlipidemia Niece          Social History:     Social History     Socioeconomic History     Marital status: Single     Spouse name: Not on file     Number of children: 0     Years of education: Not on file     Highest education level: Not on file   Occupational History     Occupation: , retired   Tobacco Use     Smoking status: Never     Smokeless tobacco: Never   Vaping Use     Vaping status: Never Used   Substance and Sexual Activity     Alcohol use: No     Drug use: No     Sexual activity: Not Currently     Partners: Male     Birth control/protection: Post-menopausal, None   Other Topics Concern     Parent/sibling w/ CABG, MI or angioplasty before 65F 55M? No   Social History Narrative     Not on file     Social Determinants of Health     Financial Resource Strain: Not on file   Food Insecurity: Not on file   Transportation Needs: Not on file   Physical Activity: Not on file   Stress: Not on file   Social Connections: Not on file   Intimate Partner Violence: Not on file   Housing Stability: Not on file           Review of System:     Constitutional: Negative for fever or chills  Skin: Negative for rashes  Ears/Nose/Throat: Negative for nasal congestion,  sore throat  Respiratory: No shortness of breath, dyspnea on exertion, cough, or hemoptysis  Cardiovascular: Negative for chest pain  Gastrointestinal: Negative for nausea, vomiting  Genitourinary: Negative for dysuria, hematuria  Musculoskeletal: Negative for myalgias  Neurologic: Negative for headaches  Psychiatric: positive for bipolar affective disorder  Hematologic/Lymphatic/Immunologic: Negative  Endocrine: Negative  Behavioral: Negative for tobacco use       Physical Exam:   LMP  (LMP Unknown)       GENERAL: alert and no distress  EYES: eyes grossly normal to inspection, and conjunctivae and sclerae normal  HENT: Normocephalic atraumatic. Nose and mouth without ulcers or lesions  NECK: supple  RESP: no cough present   CV: hemodynamically stable  SKIN: no visible suspicious lesions or rashes  NEURO: Alert & Oriented x 3.   PSYCH: flat affect        Diagnostic Test Results:     Diagnostic Test Results:  Labs reviewed in Epic    ASSESSMENT/PLAN:       Diana was seen today for refill request.    Diagnoses and all orders for this visit:    Bipolar affective disorder, remission status unspecified (H)  - stable  - continue current therapy    Benign essential hypertension  - stable, continue current therapy    Seasonal allergic rhinitis, unspecified trigger  - stable, continue current therapy    Hyperlipidemia  - stable, continue current therapy    CKD, stage 3  - stable, continue current therapy      Follow Up Plan:     Patient is instructed to return to Internal Medicine clinic for follow-up visit in 3 to 6 months.        Gayle Hernandez MD  Internal Medicine  [facility]      Video-Visit Details    Type of service:  Video Visit   Video Start Time: Joined the call at 4/3/2023, 9:20:31 am.  Video End Time: Left the call at 4/3/2023, 9:26:14 am.    Originating Location (pt. Location): Home  Distant Location (provider location):  Off-site  Platform used for Video Visit: Isauro

## 2023-04-03 NOTE — TELEPHONE ENCOUNTER
Radha PEREZ following up for refills of lithium and sodium bicarbonate. Informed refills sent 3/23 to [facility]. Teri Looney RN

## 2023-04-05 ENCOUNTER — TELEPHONE (OUTPATIENT)
Dept: FAMILY MEDICINE | Facility: CLINIC | Age: 73
End: 2023-04-05
Payer: MEDICARE

## 2023-04-05 NOTE — TELEPHONE ENCOUNTER
Home care Called Requesting orders:     Behavioral health consult/psych consult     They have a psych nurse who could see pt - asking for verbal okay for home care visit with her     Has history of depression/attempted suicide     Doing well, recently released from hospital. Recent attempted suicide, hospitalized     Okay for requested home care orders?     Sylvia VIERA, Triage RN  [facility]

## 2023-04-06 NOTE — TELEPHONE ENCOUNTER
Returned call. Left detailed message OKing requested orders on confidential line.   Zaira Le RN

## 2023-04-17 ENCOUNTER — LAB (OUTPATIENT)
Dept: INFUSION THERAPY | Facility: CLINIC | Age: 73
End: 2023-04-17
Attending: INTERNAL MEDICINE
Payer: MEDICARE

## 2023-04-17 VITALS
RESPIRATION RATE: 20 BRPM | TEMPERATURE: 98.5 F | SYSTOLIC BLOOD PRESSURE: 103 MMHG | HEART RATE: 69 BPM | DIASTOLIC BLOOD PRESSURE: 67 MMHG | OXYGEN SATURATION: 98 %

## 2023-04-17 DIAGNOSIS — N18.32 STAGE 3B CHRONIC KIDNEY DISEASE (H): ICD-10-CM

## 2023-04-17 DIAGNOSIS — D63.1 ANEMIA IN CHRONIC KIDNEY DISEASE, UNSPECIFIED CKD STAGE: Primary | ICD-10-CM

## 2023-04-17 DIAGNOSIS — E78.1 HYPERTRIGLYCERIDEMIA: ICD-10-CM

## 2023-04-17 DIAGNOSIS — F31.9 BIPOLAR AFFECTIVE DISORDER, REMISSION STATUS UNSPECIFIED (H): ICD-10-CM

## 2023-04-17 DIAGNOSIS — J30.2 SEASONAL ALLERGIC RHINITIS, UNSPECIFIED TRIGGER: ICD-10-CM

## 2023-04-17 DIAGNOSIS — K25.1 ACUTE GASTRIC ULCER WITH PERFORATION (H): ICD-10-CM

## 2023-04-17 DIAGNOSIS — I10 ESSENTIAL HYPERTENSION: Primary | ICD-10-CM

## 2023-04-17 DIAGNOSIS — N18.9 ANEMIA IN CHRONIC KIDNEY DISEASE, UNSPECIFIED CKD STAGE: Primary | ICD-10-CM

## 2023-04-17 LAB
FERRITIN SERPL-MCNC: 2 NG/ML (ref 11–328)
HGB BLD-MCNC: 9.9 G/DL (ref 11.7–15.7)
IRON BINDING CAPACITY (ROCHE): 294 UG/DL (ref 240–430)
IRON SATN MFR SERPL: 23 % (ref 15–46)
IRON SERPL-MCNC: 67 UG/DL (ref 37–145)

## 2023-04-17 PROCEDURE — 36415 COLL VENOUS BLD VENIPUNCTURE: CPT | Performed by: PHYSICIAN ASSISTANT

## 2023-04-17 PROCEDURE — 250N000011 HC RX IP 250 OP 636: Mod: EC | Performed by: PHYSICIAN ASSISTANT

## 2023-04-17 PROCEDURE — 82728 ASSAY OF FERRITIN: CPT | Performed by: PHYSICIAN ASSISTANT

## 2023-04-17 PROCEDURE — 83550 IRON BINDING TEST: CPT | Performed by: PHYSICIAN ASSISTANT

## 2023-04-17 PROCEDURE — 85018 HEMOGLOBIN: CPT | Performed by: PHYSICIAN ASSISTANT

## 2023-04-17 PROCEDURE — 96372 THER/PROPH/DIAG INJ SC/IM: CPT | Performed by: PHYSICIAN ASSISTANT

## 2023-04-17 RX ORDER — MEPERIDINE HYDROCHLORIDE 25 MG/ML
25 INJECTION INTRAMUSCULAR; INTRAVENOUS; SUBCUTANEOUS EVERY 30 MIN PRN
Status: CANCELLED | OUTPATIENT
Start: 2023-05-17

## 2023-04-17 RX ORDER — EPINEPHRINE 1 MG/ML
0.3 INJECTION, SOLUTION INTRAMUSCULAR; SUBCUTANEOUS EVERY 5 MIN PRN
Status: CANCELLED | OUTPATIENT
Start: 2023-05-12

## 2023-04-17 RX ORDER — ALBUTEROL SULFATE 0.83 MG/ML
2.5 SOLUTION RESPIRATORY (INHALATION)
Status: CANCELLED | OUTPATIENT
Start: 2023-05-17

## 2023-04-17 RX ORDER — DIPHENHYDRAMINE HYDROCHLORIDE 50 MG/ML
50 INJECTION INTRAMUSCULAR; INTRAVENOUS
Status: CANCELLED
Start: 2023-05-17

## 2023-04-17 RX ORDER — NALOXONE HYDROCHLORIDE 0.4 MG/ML
0.2 INJECTION, SOLUTION INTRAMUSCULAR; INTRAVENOUS; SUBCUTANEOUS
Status: CANCELLED | OUTPATIENT
Start: 2023-05-17

## 2023-04-17 RX ORDER — ALBUTEROL SULFATE 0.83 MG/ML
2.5 SOLUTION RESPIRATORY (INHALATION)
Status: CANCELLED | OUTPATIENT
Start: 2023-05-12

## 2023-04-17 RX ORDER — DIPHENHYDRAMINE HYDROCHLORIDE 50 MG/ML
50 INJECTION INTRAMUSCULAR; INTRAVENOUS
Status: CANCELLED
Start: 2023-05-12

## 2023-04-17 RX ORDER — METHYLPREDNISOLONE SODIUM SUCCINATE 125 MG/2ML
125 INJECTION, POWDER, LYOPHILIZED, FOR SOLUTION INTRAMUSCULAR; INTRAVENOUS
Status: CANCELLED
Start: 2023-05-12

## 2023-04-17 RX ORDER — PANTOPRAZOLE SODIUM 40 MG/1
TABLET, DELAYED RELEASE ORAL
Qty: 90 TABLET | Refills: 0 | Status: SHIPPED | OUTPATIENT
Start: 2023-04-17 | End: 2023-10-09

## 2023-04-17 RX ORDER — ALBUTEROL SULFATE 90 UG/1
1-2 AEROSOL, METERED RESPIRATORY (INHALATION)
Status: CANCELLED
Start: 2023-05-17

## 2023-04-17 RX ORDER — METHYLPREDNISOLONE SODIUM SUCCINATE 125 MG/2ML
125 INJECTION, POWDER, LYOPHILIZED, FOR SOLUTION INTRAMUSCULAR; INTRAVENOUS
Status: CANCELLED
Start: 2023-05-17

## 2023-04-17 RX ORDER — MEPERIDINE HYDROCHLORIDE 25 MG/ML
25 INJECTION INTRAMUSCULAR; INTRAVENOUS; SUBCUTANEOUS EVERY 30 MIN PRN
Status: CANCELLED | OUTPATIENT
Start: 2023-05-12

## 2023-04-17 RX ORDER — EPINEPHRINE 1 MG/ML
0.3 INJECTION, SOLUTION INTRAMUSCULAR; SUBCUTANEOUS EVERY 5 MIN PRN
Status: CANCELLED | OUTPATIENT
Start: 2023-05-17

## 2023-04-17 RX ORDER — NALOXONE HYDROCHLORIDE 0.4 MG/ML
0.2 INJECTION, SOLUTION INTRAMUSCULAR; INTRAVENOUS; SUBCUTANEOUS
Status: CANCELLED | OUTPATIENT
Start: 2023-05-12

## 2023-04-17 RX ORDER — ALBUTEROL SULFATE 90 UG/1
1-2 AEROSOL, METERED RESPIRATORY (INHALATION)
Status: CANCELLED
Start: 2023-05-12

## 2023-04-17 RX ADMIN — DARBEPOETIN ALFA 100 MCG: 100 INJECTION, SOLUTION INTRAVENOUS; SUBCUTANEOUS at 13:47

## 2023-04-17 NOTE — PROGRESS NOTES
Infusion Nursing Note:  Diana Santiago presents today for Aranesp.    Patient seen by provider today: No   present during visit today: Not Applicable.    Note: N/A.      Intravenous Access:  No Intravenous access/labs at this visit.    Treatment Conditions:  HGB 9.9.      Post Infusion Assessment:  Patient tolerated injection in upper right arm without incident.       Discharge Plan:   Patient declined prescription refills.  Discharge instructions reviewed with: Patient.  Patient verbalized understanding of discharge instructions and all questions answered.  AVS to patient via [employer].  Patient will return 5/26/23 for next appointment.   Patient discharged in stable condition accompanied by: self.  Departure Mode: Ambulatory.      Madhavi Raphael RN

## 2023-04-18 RX ORDER — FERROUS SULFATE 325(65) MG
TABLET ORAL
Qty: 42 TABLET | Refills: 97 | Status: SHIPPED | OUTPATIENT
Start: 2023-04-18 | End: 2024-06-17

## 2023-04-18 RX ORDER — AMLODIPINE BESYLATE 5 MG/1
TABLET ORAL
Qty: 90 TABLET | Refills: 97 | Status: SHIPPED | OUTPATIENT
Start: 2023-04-18 | End: 2024-04-29

## 2023-04-18 RX ORDER — FEXOFENADINE HCL 180 MG/1
TABLET ORAL
Qty: 90 TABLET | Refills: 97 | Status: SHIPPED | OUTPATIENT
Start: 2023-04-18 | End: 2024-04-29

## 2023-04-18 RX ORDER — LITHIUM CARBONATE 150 MG/1
CAPSULE ORAL
Qty: 270 CAPSULE | Refills: 97 | Status: SHIPPED | OUTPATIENT
Start: 2023-04-18 | End: 2024-04-29

## 2023-04-18 RX ORDER — SIMVASTATIN 40 MG
TABLET ORAL
Qty: 90 TABLET | Refills: 97 | Status: SHIPPED | OUTPATIENT
Start: 2023-04-18 | End: 2024-04-29

## 2023-04-22 ENCOUNTER — HEALTH MAINTENANCE LETTER (OUTPATIENT)
Age: 73
End: 2023-04-22

## 2023-04-23 ENCOUNTER — NURSE TRIAGE (OUTPATIENT)
Dept: NURSING | Facility: CLINIC | Age: 73
End: 2023-04-23
Payer: MEDICARE

## 2023-04-23 NOTE — TELEPHONE ENCOUNTER
Eileen, RN from assisted living, [phone] was the caller.  Med error   Takes Lithium three times/day  8am 12pm and 7pm.  Both 8am and noon doses were given at the same time this  a.m.  Caller only wanted to report this for pcp's information    Patient stating she feels fine  Vitals stable    I told caller that we often use poison control when there are questions/concerns about medication overdoses. Caller acknowledged this and said she'll probably call Poison control for her own peace of mind.  I invited her to call back with further concerns.  I will route to pcp.    Angie LAW RN [facility]

## 2023-04-24 ENCOUNTER — TRANSCRIBE ORDERS (OUTPATIENT)
Dept: PHARMACY | Facility: CLINIC | Age: 73
End: 2023-04-24
Payer: MEDICARE

## 2023-04-24 DIAGNOSIS — N18.4 CHRONIC RENAL DISEASE, STAGE IV (H): ICD-10-CM

## 2023-05-09 DIAGNOSIS — E23.2 DIABETES INSIPIDUS (H): ICD-10-CM

## 2023-05-09 RX ORDER — SODIUM BICARBONATE 650 MG/1
TABLET ORAL
Qty: 360 TABLET | Refills: 97 | Status: SHIPPED | OUTPATIENT
Start: 2023-05-09 | End: 2023-12-06

## 2023-05-12 DIAGNOSIS — K59.00 CONSTIPATION, UNSPECIFIED CONSTIPATION TYPE: ICD-10-CM

## 2023-05-12 RX ORDER — POLYETHYLENE GLYCOL 3350 17 G/17G
POWDER, FOR SOLUTION ORAL
Qty: 510 G | Refills: 0 | Status: SHIPPED | OUTPATIENT
Start: 2023-05-12 | End: 2024-09-26

## 2023-05-26 ENCOUNTER — LAB (OUTPATIENT)
Dept: INFUSION THERAPY | Facility: CLINIC | Age: 73
End: 2023-05-26
Attending: INTERNAL MEDICINE
Payer: MEDICARE

## 2023-05-26 DIAGNOSIS — D63.1 ANEMIA IN CHRONIC KIDNEY DISEASE, UNSPECIFIED CKD STAGE: ICD-10-CM

## 2023-05-26 DIAGNOSIS — N18.9 ANEMIA IN CHRONIC KIDNEY DISEASE, UNSPECIFIED CKD STAGE: ICD-10-CM

## 2023-05-26 DIAGNOSIS — N18.4 CHRONIC RENAL DISEASE, STAGE IV (H): Primary | ICD-10-CM

## 2023-05-26 LAB
FERRITIN SERPL-MCNC: 203 NG/ML (ref 11–328)
HGB BLD-MCNC: 10 G/DL (ref 11.7–15.7)
IRON BINDING CAPACITY (ROCHE): 304 UG/DL (ref 240–430)
IRON SATN MFR SERPL: 33 % (ref 15–46)
IRON SERPL-MCNC: 100 UG/DL (ref 37–145)

## 2023-05-26 PROCEDURE — 82728 ASSAY OF FERRITIN: CPT | Performed by: INTERNAL MEDICINE

## 2023-05-26 PROCEDURE — 36415 COLL VENOUS BLD VENIPUNCTURE: CPT | Performed by: PHYSICIAN ASSISTANT

## 2023-05-26 PROCEDURE — 83550 IRON BINDING TEST: CPT | Performed by: INTERNAL MEDICINE

## 2023-05-26 PROCEDURE — 85018 HEMOGLOBIN: CPT | Performed by: PHYSICIAN ASSISTANT

## 2023-05-26 RX ORDER — EPINEPHRINE 1 MG/ML
0.3 INJECTION, SOLUTION INTRAMUSCULAR; SUBCUTANEOUS EVERY 5 MIN PRN
Status: CANCELLED | OUTPATIENT
Start: 2023-06-16

## 2023-05-26 RX ORDER — DIPHENHYDRAMINE HYDROCHLORIDE 50 MG/ML
50 INJECTION INTRAMUSCULAR; INTRAVENOUS
Status: CANCELLED
Start: 2023-06-16

## 2023-05-26 RX ORDER — ALBUTEROL SULFATE 0.83 MG/ML
2.5 SOLUTION RESPIRATORY (INHALATION)
Status: CANCELLED | OUTPATIENT
Start: 2023-06-16

## 2023-05-26 RX ORDER — NALOXONE HYDROCHLORIDE 0.4 MG/ML
0.2 INJECTION, SOLUTION INTRAMUSCULAR; INTRAVENOUS; SUBCUTANEOUS
Status: CANCELLED | OUTPATIENT
Start: 2023-06-16

## 2023-05-26 RX ORDER — MEPERIDINE HYDROCHLORIDE 25 MG/ML
25 INJECTION INTRAMUSCULAR; INTRAVENOUS; SUBCUTANEOUS EVERY 30 MIN PRN
Status: CANCELLED | OUTPATIENT
Start: 2023-06-16

## 2023-05-26 RX ORDER — METHYLPREDNISOLONE SODIUM SUCCINATE 125 MG/2ML
125 INJECTION, POWDER, LYOPHILIZED, FOR SOLUTION INTRAMUSCULAR; INTRAVENOUS
Status: CANCELLED
Start: 2023-06-16

## 2023-05-26 RX ORDER — ALBUTEROL SULFATE 90 UG/1
1-2 AEROSOL, METERED RESPIRATORY (INHALATION)
Status: CANCELLED
Start: 2023-06-16

## 2023-05-26 NOTE — PROGRESS NOTES
Labs reviewed by this RN and it was relayed to patient that she does not qualify for Aranesp today because her HGB is 10. Fast track appointment cancelled. Patient discharged to home.  Madhavi Raphael RN

## 2023-05-30 ASSESSMENT — ANXIETY QUESTIONNAIRES
6. BECOMING EASILY ANNOYED OR IRRITABLE: NOT AT ALL
GAD7 TOTAL SCORE: 4
3. WORRYING TOO MUCH ABOUT DIFFERENT THINGS: SEVERAL DAYS
7. FEELING AFRAID AS IF SOMETHING AWFUL MIGHT HAPPEN: SEVERAL DAYS
IF YOU CHECKED OFF ANY PROBLEMS ON THIS QUESTIONNAIRE, HOW DIFFICULT HAVE THESE PROBLEMS MADE IT FOR YOU TO DO YOUR WORK, TAKE CARE OF THINGS AT HOME, OR GET ALONG WITH OTHER PEOPLE: SOMEWHAT DIFFICULT
4. TROUBLE RELAXING: SEVERAL DAYS
7. FEELING AFRAID AS IF SOMETHING AWFUL MIGHT HAPPEN: SEVERAL DAYS
GAD7 TOTAL SCORE: 4
8. IF YOU CHECKED OFF ANY PROBLEMS, HOW DIFFICULT HAVE THESE MADE IT FOR YOU TO DO YOUR WORK, TAKE CARE OF THINGS AT HOME, OR GET ALONG WITH OTHER PEOPLE?: SOMEWHAT DIFFICULT
2. NOT BEING ABLE TO STOP OR CONTROL WORRYING: SEVERAL DAYS
5. BEING SO RESTLESS THAT IT IS HARD TO SIT STILL: NOT AT ALL
1. FEELING NERVOUS, ANXIOUS, OR ON EDGE: NOT AT ALL

## 2023-06-05 ENCOUNTER — VIRTUAL VISIT (OUTPATIENT)
Dept: FAMILY MEDICINE | Facility: CLINIC | Age: 73
End: 2023-06-05
Payer: MEDICARE

## 2023-06-05 DIAGNOSIS — N18.31 CHRONIC KIDNEY DISEASE, STAGE 3A (H): ICD-10-CM

## 2023-06-05 DIAGNOSIS — J30.2 SEASONAL ALLERGIC RHINITIS, UNSPECIFIED TRIGGER: ICD-10-CM

## 2023-06-05 DIAGNOSIS — Z76.0 ENCOUNTER FOR MEDICATION REFILL: Primary | ICD-10-CM

## 2023-06-05 DIAGNOSIS — E78.5 HYPERLIPIDEMIA LDL GOAL <130: ICD-10-CM

## 2023-06-05 DIAGNOSIS — F31.9 BIPOLAR AFFECTIVE DISORDER, REMISSION STATUS UNSPECIFIED (H): ICD-10-CM

## 2023-06-05 DIAGNOSIS — I10 ESSENTIAL HYPERTENSION: ICD-10-CM

## 2023-06-05 PROCEDURE — 99214 OFFICE O/P EST MOD 30 MIN: CPT | Mod: VID | Performed by: INTERNAL MEDICINE

## 2023-06-05 RX ORDER — DIAZEPAM 2 MG
1 TABLET ORAL EVERY OTHER DAY
Qty: 7 TABLET | Refills: 2 | Status: SHIPPED | OUTPATIENT
Start: 2023-06-05 | End: 2023-09-06

## 2023-06-05 ASSESSMENT — PATIENT HEALTH QUESTIONNAIRE - PHQ9: SUM OF ALL RESPONSES TO PHQ QUESTIONS 1-9: 5

## 2023-06-05 ASSESSMENT — ANXIETY QUESTIONNAIRES: GAD7 TOTAL SCORE: 4

## 2023-06-05 NOTE — PROGRESS NOTES
Diana is a 72 year old who is being evaluated via a billable video visit.      How would you like to obtain your AVS? MyChart  If the video visit is dropped, the invitation should be resent by: Text to cell phone: [phone]  Will anyone else be joining your video visit? No    Subjective   Diana is a 72 year old, presenting for the following health issues:  Recheck Medication      History of Present Illness       CKD: She is not using over the counter pain medicine.     Mental Health Follow-up:  Patient presents to follow-up on Depression & Anxiety.Patient's depression since last visit has been:  Medium  The patient is not having other symptoms associated with depression.  Patient's anxiety since last visit has been:  Medium  The patient is not having other symptoms associated with anxiety.  Any significant life events: No  Patient is not feeling anxious or having panic attacks.  Patient has no concerns about alcohol or drug use.    Hyperlipidemia:  She presents for follow up of hyperlipidemia.  She is taking medication to lower cholesterol. She is not having myalgia or other side effects to statin medications.    Hypertension: She presents for follow up of hypertension.  She does check blood pressure  regularly outside of the clinic. Outside blood pressures have been over 140/90. She follows a low salt diet.     She eats 4 or more servings of fruits and vegetables daily.She consumes 0 sweetened beverage(s) daily.She exercises with enough effort to increase her heart rate 10 to 19 minutes per day.  She exercises with enough effort to increase her heart rate 5 days per week.   She is taking medications regularly.  Today's NELDA-7 Score: 4       Current Medications:     Current Outpatient Medications   Medication Sig Dispense Refill     amLODIPine (NORVASC) 5 MG tablet TAKE 1 TABLET BY MOUTH ONCE DAILY (HOLD FOR SBP < 100) 90 tablet 97     diazepam (VALIUM) 2 MG tablet Take 0.5 tablets (1 mg) by mouth every other day  7 tablet 2     FEROSUL 325 (65 Fe) MG tablet TAKE ONE TABLET BY MOUTH EVERY OTHER DAY 42 tablet 97     fexofenadine (ALLEGRA) 180 MG tablet TAKE 1 TABLET BY MOUTH ONCE DAILY 90 tablet 97     lithium (ESKALITH) 150 MG capsule TAKE 1 CAPSULE BY MOUTH THREE TIMES DAILY WITH MEALS 270 capsule 97     pantoprazole (PROTONIX) 40 MG EC tablet TAKE 1 TABLET BY MOUTH ONCE DAILY 90 tablet 0     polyethylene glycol (MIRALAX) 17 GM/Dose powder MIX 17GM OF POWDER IN 8OZ OF WATER UNTIL COMPLETELY DISSOLVED. DRINK SOLUTION BY MOUTH ONCE DAILY. 510 g 0     simvastatin (ZOCOR) 40 MG tablet TAKE 1 TABLET BY MOUTH AT BEDTIME 90 tablet 97     sodium bicarbonate 650 MG tablet TAKE TWO TABLETS (1300 MG) BY MOUTH TWICE DAILY 360 tablet 97     ACE/ARB/ARNI NOT PRESCRIBED (INTENTIONAL) Please choose reason not prescribed from choices below. (Patient not taking: Reported on 6/5/2023)           Allergies:      Allergies   Allergen Reactions     Ace Inhibitors      Other reaction(s): renal failure  She can't be on an ACEI or ARB while on Lithium.     No Known Allergies             Past Medical History:     Past Medical History:   Diagnosis Date     Benign essential hypertension 09/2018    added norvasc 9/18     Bipolar affective disorder (H)     hosp 1993, Dr. Aftab Deal     Cancer (H) 1996    DCIS, left breast     Chronic kidney disease, stage 3a (H)     seen by renal Dr. 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    felt due to lithium     Thalassaemia trait       Vitamin D deficiency          Past Surgical History:     Past Surgical History:   Procedure Laterality Date     BIOPSY  1994    left breast     BREAST SURGERY      lumpectomy x 4     COLONOSCOPY  2021     COLONOSCOPY N/A 6/17/2022    Procedure: COLONOSCOPY, WITH POLYPECTOMY AND BIOPSY;  Surgeon: Panchito Gu MD;  Location:  GI     EYE SURGERY  2018    retina tear     LAPAROTOMY EXPLORATORY N/A 8/24/2022    Procedure: Exploratory laparotomy, REPAIR OF PERFORATED ULCER;  Surgeon: Ron Vidal MD;  Location:  OR     left hand surgery      last 1974         Family Medical History:     Family History   Problem Relation Age of Onset     Cerebrovascular Disease Mother      Hypertension Father      Sleep Apnea Brother      Breast Cancer Maternal Aunt         x 2     Breast Cancer Other         Maternal Aunt     Hyperlipidemia Niece          Social History:     Social History     Socioeconomic History     Marital status: Single     Spouse name: Not on file     Number of children: 0     Years of education: Not on file     Highest education level: Not on file   Occupational History     Occupation: , retired   Tobacco Use     Smoking status: Never     Smokeless tobacco: Never   Vaping Use     Vaping status: Never Used   Substance and Sexual Activity     Alcohol use: No     Drug use: No     Sexual activity: Not Currently     Partners: Male     Birth control/protection: Post-menopausal, None   Other Topics Concern     Parent/sibling w/ CABG, MI or angioplasty before 65F 55M? No   Social History Narrative     Not on file     Social Determinants of Health     Financial Resource Strain: Not on file   Food Insecurity: Not on file   Transportation Needs: Not on file   Physical Activity: Not on file   Stress: Not on file   Social Connections: Not on file   Intimate Partner Violence: Not on file   Housing Stability: Not on file           Review of System:     Constitutional: Negative for fever or  chills  Skin: Negative for rashes  Ears/Nose/Throat: Negative for nasal congestion, sore throat  Respiratory: No shortness of breath, dyspnea on exertion, cough, or hemoptysis  Cardiovascular: Negative for chest pain  Gastrointestinal: Negative for nausea, vomiting  Genitourinary: Negative for dysuria, hematuria  Musculoskeletal: Negative for myalgias  Neurologic: Negative for headaches  Psychiatric: positive for bipolar affective disorder  Hematologic/Lymphatic/Immunologic: Negative  Endocrine: Negative  Behavioral: Negative for tobacco use       Physical Exam:   LMP  (LMP Unknown)       GENERAL: alert and no distress  EYES: eyes grossly normal to inspection, and conjunctivae and sclerae normal  HENT: Normocephalic atraumatic. Nose and mouth without ulcers or lesions  NECK: supple  RESP: no cough present   CV: hemodynamically stable  SKIN: no visible suspicious lesions or rashes  NEURO: Alert & Oriented x 3.   PSYCH: flat affect        Diagnostic Test Results:     Diagnostic Test Results:  Labs reviewed in Epic    ASSESSMENT/PLAN:       Diana was seen today for refill request.    Diagnoses and all orders for this visit:    Bipolar affective disorder, remission status unspecified (H)  -     diazepam (VALIUM) 2 MG tablet; Take 0.5 tablets (1 mg) by mouth every other day    Benign essential hypertension  - stable, continue current therapy    Seasonal allergic rhinitis, unspecified trigger  - stable, continue current therapy    Hyperlipidemia  - stable, continue current therapy    CKD, stage 3  - stable, continue current therapy      Follow Up Plan:     Patient is instructed to return to Internal Medicine clinic for follow-up visit in 3 to 6 months.        Gayle Hernandez MD  Internal Medicine  [facility]      Video-Visit Details    Type of service:  Video Visit   Video Start Time: Joined the call at 6/5/2023, 10:13:10 am.  Video End Time: Left the call at 6/5/2023, 10:19:23 am.     Originating Location (pt.  Location): Home  Distant Location (provider location):  Off-site  Platform used for Video Visit: Isauro

## 2023-06-23 ENCOUNTER — LAB (OUTPATIENT)
Dept: INFUSION THERAPY | Facility: CLINIC | Age: 73
End: 2023-06-23
Attending: INTERNAL MEDICINE
Payer: MEDICARE

## 2023-06-23 VITALS
TEMPERATURE: 98 F | OXYGEN SATURATION: 98 % | HEART RATE: 78 BPM | SYSTOLIC BLOOD PRESSURE: 101 MMHG | RESPIRATION RATE: 22 BRPM | DIASTOLIC BLOOD PRESSURE: 68 MMHG

## 2023-06-23 DIAGNOSIS — N18.9 ANEMIA IN CHRONIC KIDNEY DISEASE, UNSPECIFIED CKD STAGE: ICD-10-CM

## 2023-06-23 DIAGNOSIS — D63.1 ANEMIA IN CHRONIC KIDNEY DISEASE, UNSPECIFIED CKD STAGE: ICD-10-CM

## 2023-06-23 DIAGNOSIS — N18.4 CHRONIC RENAL DISEASE, STAGE IV (H): Primary | ICD-10-CM

## 2023-06-23 LAB
FERRITIN SERPL-MCNC: 242 NG/ML (ref 11–328)
HGB BLD-MCNC: 9.7 G/DL (ref 11.7–15.7)
IRON BINDING CAPACITY (ROCHE): 287 UG/DL (ref 240–430)
IRON SATN MFR SERPL: 32 % (ref 15–46)
IRON SERPL-MCNC: 91 UG/DL (ref 37–145)

## 2023-06-23 PROCEDURE — 96372 THER/PROPH/DIAG INJ SC/IM: CPT | Performed by: INTERNAL MEDICINE

## 2023-06-23 PROCEDURE — 82728 ASSAY OF FERRITIN: CPT | Performed by: INTERNAL MEDICINE

## 2023-06-23 PROCEDURE — 83550 IRON BINDING TEST: CPT | Performed by: INTERNAL MEDICINE

## 2023-06-23 PROCEDURE — 250N000011 HC RX IP 250 OP 636: Mod: EC,JZ | Performed by: INTERNAL MEDICINE

## 2023-06-23 PROCEDURE — 85018 HEMOGLOBIN: CPT | Performed by: PHYSICIAN ASSISTANT

## 2023-06-23 PROCEDURE — 36415 COLL VENOUS BLD VENIPUNCTURE: CPT

## 2023-06-23 RX ORDER — DIPHENHYDRAMINE HYDROCHLORIDE 50 MG/ML
50 INJECTION INTRAMUSCULAR; INTRAVENOUS
Status: CANCELLED
Start: 2023-07-25

## 2023-06-23 RX ORDER — NALOXONE HYDROCHLORIDE 0.4 MG/ML
0.2 INJECTION, SOLUTION INTRAMUSCULAR; INTRAVENOUS; SUBCUTANEOUS
Status: CANCELLED | OUTPATIENT
Start: 2023-06-25

## 2023-06-23 RX ORDER — EPINEPHRINE 1 MG/ML
0.3 INJECTION, SOLUTION INTRAMUSCULAR; SUBCUTANEOUS EVERY 5 MIN PRN
Status: CANCELLED | OUTPATIENT
Start: 2023-07-25

## 2023-06-23 RX ORDER — MEPERIDINE HYDROCHLORIDE 25 MG/ML
25 INJECTION INTRAMUSCULAR; INTRAVENOUS; SUBCUTANEOUS EVERY 30 MIN PRN
Status: CANCELLED | OUTPATIENT
Start: 2023-07-25

## 2023-06-23 RX ORDER — ALBUTEROL SULFATE 90 UG/1
1-2 AEROSOL, METERED RESPIRATORY (INHALATION)
Status: CANCELLED
Start: 2023-07-25

## 2023-06-23 RX ORDER — ALBUTEROL SULFATE 0.83 MG/ML
2.5 SOLUTION RESPIRATORY (INHALATION)
Status: CANCELLED | OUTPATIENT
Start: 2023-07-25

## 2023-06-23 RX ORDER — METHYLPREDNISOLONE SODIUM SUCCINATE 125 MG/2ML
125 INJECTION, POWDER, LYOPHILIZED, FOR SOLUTION INTRAMUSCULAR; INTRAVENOUS
Status: CANCELLED
Start: 2023-06-25

## 2023-06-23 RX ORDER — MEPERIDINE HYDROCHLORIDE 25 MG/ML
25 INJECTION INTRAMUSCULAR; INTRAVENOUS; SUBCUTANEOUS EVERY 30 MIN PRN
Status: CANCELLED | OUTPATIENT
Start: 2023-06-25

## 2023-06-23 RX ORDER — ALBUTEROL SULFATE 0.83 MG/ML
2.5 SOLUTION RESPIRATORY (INHALATION)
Status: CANCELLED | OUTPATIENT
Start: 2023-06-25

## 2023-06-23 RX ORDER — DIPHENHYDRAMINE HYDROCHLORIDE 50 MG/ML
50 INJECTION INTRAMUSCULAR; INTRAVENOUS
Status: CANCELLED
Start: 2023-06-25

## 2023-06-23 RX ORDER — ALBUTEROL SULFATE 90 UG/1
1-2 AEROSOL, METERED RESPIRATORY (INHALATION)
Status: CANCELLED
Start: 2023-06-25

## 2023-06-23 RX ORDER — METHYLPREDNISOLONE SODIUM SUCCINATE 125 MG/2ML
125 INJECTION, POWDER, LYOPHILIZED, FOR SOLUTION INTRAMUSCULAR; INTRAVENOUS
Status: CANCELLED
Start: 2023-07-25

## 2023-06-23 RX ORDER — EPINEPHRINE 1 MG/ML
0.3 INJECTION, SOLUTION INTRAMUSCULAR; SUBCUTANEOUS EVERY 5 MIN PRN
Status: CANCELLED | OUTPATIENT
Start: 2023-06-25

## 2023-06-23 RX ORDER — NALOXONE HYDROCHLORIDE 0.4 MG/ML
0.2 INJECTION, SOLUTION INTRAMUSCULAR; INTRAVENOUS; SUBCUTANEOUS
Status: CANCELLED | OUTPATIENT
Start: 2023-07-25

## 2023-06-23 RX ADMIN — DARBEPOETIN ALFA 100 MCG: 100 INJECTION, SOLUTION INTRAVENOUS; SUBCUTANEOUS at 14:16

## 2023-06-23 NOTE — PROGRESS NOTES
Infusion Nursing Note:  Diana Santiago presents today for Aranesp.    Patient seen by provider today: No   present during visit today: Not Applicable.    Note: N/A.      Intravenous Access:  No Intravenous access/labs at this visit.    Treatment Conditions:  Lab Results   Component Value Date    HGB 9.7 (L) 06/23/2023    WBC 10.5 02/27/2023    ANEU 9.6 (H) 09/09/2022    ANEUTAUTO 7.7 01/28/2023     02/27/2023      Results reviewed, labs MET treatment parameters, ok to proceed with treatment.      Post Infusion Assessment:  Patient tolerated injection without incident.  Site patent and intact, free from redness, edema or discomfort.  No evidence of extravasations.       Discharge Plan:   Patient declined prescription refills.  Discharge instructions reviewed with: Patient.  Patient and/or family verbalized understanding of discharge instructions and all questions answered.  AVS to patient via [email or web address].  Patient will return when scheduled for next appointment.   Patient discharged in stable condition accompanied by: self.  Departure Mode: Ambulatory.      Rafi Nichole RN

## 2023-06-24 ASSESSMENT — ENCOUNTER SYMPTOMS
SHORTNESS OF BREATH: 0
HEMATURIA: 0
FEVER: 0
COUGH: 0
CHILLS: 0
DIZZINESS: 0
CONSTIPATION: 0
HEARTBURN: 0
HEADACHES: 0
DYSURIA: 0
ABDOMINAL PAIN: 0
MYALGIAS: 0
HEMATOCHEZIA: 0
NAUSEA: 0
JOINT SWELLING: 0
PARESTHESIAS: 0
PALPITATIONS: 0
BREAST MASS: 0
SORE THROAT: 0
NERVOUS/ANXIOUS: 0
ARTHRALGIAS: 1
DIARRHEA: 0
WEAKNESS: 0
EYE PAIN: 0
FREQUENCY: 1

## 2023-06-24 ASSESSMENT — ACTIVITIES OF DAILY LIVING (ADL)
CURRENT_FUNCTION: MEDICATION ADMINISTRATION REQUIRES ASSISTANCE
CURRENT_FUNCTION: TRANSPORTATION REQUIRES ASSISTANCE
CURRENT_FUNCTION: HOUSEWORK REQUIRES ASSISTANCE
CURRENT_FUNCTION: BATHING REQUIRES ASSISTANCE
CURRENT_FUNCTION: LAUNDRY REQUIRES ASSISTANCE

## 2023-06-30 ENCOUNTER — OFFICE VISIT (OUTPATIENT)
Dept: FAMILY MEDICINE | Facility: CLINIC | Age: 73
End: 2023-06-30
Payer: MEDICARE

## 2023-06-30 VITALS
SYSTOLIC BLOOD PRESSURE: 101 MMHG | HEART RATE: 66 BPM | BODY MASS INDEX: 20.32 KG/M2 | DIASTOLIC BLOOD PRESSURE: 70 MMHG | HEIGHT: 64 IN | WEIGHT: 119 LBS | TEMPERATURE: 97.7 F | OXYGEN SATURATION: 99 % | RESPIRATION RATE: 16 BRPM

## 2023-06-30 DIAGNOSIS — N18.4 CHRONIC RENAL DISEASE, STAGE IV (H): ICD-10-CM

## 2023-06-30 DIAGNOSIS — F31.9 BIPOLAR AFFECTIVE DISORDER, REMISSION STATUS UNSPECIFIED (H): ICD-10-CM

## 2023-06-30 DIAGNOSIS — E78.5 HYPERLIPIDEMIA LDL GOAL <130: ICD-10-CM

## 2023-06-30 DIAGNOSIS — Z00.00 ENCOUNTER FOR MEDICARE ANNUAL WELLNESS EXAM: Primary | ICD-10-CM

## 2023-06-30 PROCEDURE — G0439 PPPS, SUBSEQ VISIT: HCPCS | Performed by: INTERNAL MEDICINE

## 2023-06-30 ASSESSMENT — PATIENT HEALTH QUESTIONNAIRE - PHQ9
10. IF YOU CHECKED OFF ANY PROBLEMS, HOW DIFFICULT HAVE THESE PROBLEMS MADE IT FOR YOU TO DO YOUR WORK, TAKE CARE OF THINGS AT HOME, OR GET ALONG WITH OTHER PEOPLE: VERY DIFFICULT
SUM OF ALL RESPONSES TO PHQ QUESTIONS 1-9: 15
SUM OF ALL RESPONSES TO PHQ QUESTIONS 1-9: 15

## 2023-06-30 ASSESSMENT — ACTIVITIES OF DAILY LIVING (ADL)
CURRENT_FUNCTION: BATHING REQUIRES ASSISTANCE
CURRENT_FUNCTION: LAUNDRY REQUIRES ASSISTANCE
CURRENT_FUNCTION: TRANSPORTATION REQUIRES ASSISTANCE
CURRENT_FUNCTION: MEDICATION ADMINISTRATION REQUIRES ASSISTANCE
CURRENT_FUNCTION: HOUSEWORK REQUIRES ASSISTANCE

## 2023-06-30 ASSESSMENT — PAIN SCALES - GENERAL: PAINLEVEL: NO PAIN (0)

## 2023-06-30 NOTE — PROGRESS NOTES
The patient was provided with suggestions to help her develop a healthy physical lifestyle.  The patient reports that she has difficulty with activities of daily living. I have asked that the patient make a follow up appointment in 56 weeks where this issue will be further evaluated and addressed.  The patient was provided with written information regarding signs of hearing loss.  Information on urinary incontinence and treatment options given to patient.  The patient was provided with suggestions to help her develop a healthy emotional lifestyle.  The patient s PHQ-9 score is consistent with moderate depression. She was provided with information regarding depression and was advised to schedule a follow up appointment in 56 weeks to further address this issue.

## 2023-06-30 NOTE — PATIENT INSTRUCTIONS
Patient Education   Personalized Prevention Plan  You are due for the preventive services outlined below.  Your care team is available to assist you in scheduling these services.  If you have already completed any of these items, please share that information with your care team to update in your medical record.  Health Maintenance Due   Topic Date Due     Kidney Microalbumin Urine Test  Never done     Annual Wellness Visit  12/21/2021     Cholesterol Lab  12/21/2021     COVID-19 Vaccine (7 - Pfizer series) 02/06/2023     Basic Metabolic Panel  05/27/2023     Your Health Risk Assessment indicates you feel you are not in good health    A healthy lifestyle helps keep the body fit and the mind alert. It helps protect you from disease, helps you fight disease, and helps prevent chronic disease (disease that doesn't go away) from getting worse. This is important as you get older and begin to notice twinges in muscles and joints and a decline in the strength and stamina you once took for granted. A healthy lifestyle includes good healthcare, good nutrition, weight control, recreation, and regular exercise. Avoid harmful substances and do what you can to keep safe. Another part of a healthy lifestyle is stay mentally active and socially involved.    Good healthcare     Have a wellness visit every year.     If you have new symptoms, let us know right away. Don't wait until the next checkup.     Take medicines exactly as prescribed and keep your medicines in a safe place. Tell us if your medicine causes problems.   Healthy diet and weight control     Eat 3 or 4 small, nutritious, low-fat, high-fiber meals a day. Include a variety of fruits, vegetables, and whole-grain foods.     Make sure you get enough calcium in your diet. Calcium, vitamin D, and exercise help prevent osteoporosis (bone thinning).     If you live alone, try eating with others when you can. That way you get a good meal and have company while you eat it.      Try to keep a healthy weight. If you eat more calories than your body uses for energy, it will be stored as fat and you will gain weight.     Recreation   Recreation is not limited to sports and team events. It includes any activity that provides relaxation, interest, enjoyment, and exercise. Recreation provides an outlet for physical, mental, and social energy. It can give a sense of worth and achievement. It can help you stay healthy.    Mental Exercise and Social Involvement  Mental and emotional health is as important as physical health. Keep in touch with friends and family. Stay as active as possible. Continue to learn and challenge yourself.   Things you can do to stay mentally active are:    Learn something new, like a foreign language or musical instrument.     Play SCRABBLE or do crossword puzzles. If you cannot find people to play these games with you at home, you can play them with others on your computer through the Internet.     Join a games club--anything from card games to chess or checkers or lawn bowling.     Start a new hobby.     Go back to school.     Volunteer.     Read.   Keep up with world events.  Activities of Daily Living    Your Health Risk Assessment indicates you have difficulties with activities of daily living such as housework, bathing, preparing meals, taking medication, etc. Please make a follow up appointment for us to address this issue in more detail.    Signs of Hearing Loss  Hearing loss is a problem shared by many people. In fact, it's one of the most common health problems, particularly as people age. Most people aged 65 and older have some hearing loss. By age 80, almost everyone does. Hearing loss often occurs slowly over the years. So, you may not realize your hearing has gotten worse.   When sudden hearing loss occurs, it's important to contact your healthcare provider right away. Your provider will do a medical exam and a hearing exam as soon as possible. This is to  help find the cause and type of your sudden hearing loss. Based on your diagnosis, your healthcare provider will discuss possible treatments.      Hearing much better with one ear can be a sign of hearing loss.     Have your hearing checked  Call your healthcare provider if you:     Have to strain to hear normal conversation    Have to watch other people s faces very carefully to follow what they re saying    Need to ask people to repeat what they ve said    Often misunderstand what people are saying    Turn the volume of the television or radio up so high that others complain    Feel that people are mumbling when they re talking to you    Find that the effort to hear leaves you feeling tired and irritated    Notice, when using the phone, that you hear better with one ear than the other  Rebelle last reviewed this educational content on 6/1/2022 2000-2022 [employer]. All rights reserved. This information is not intended as a substitute for professional medical care. Always follow your healthcare professional's instructions.          Urinary Incontinence, Female (Adult)   Urinary incontinence means loss of bladder control. This problem affects many women, especially as they get older. If you have incontinence, you may be embarrassed to ask for help. But know that this problem can be treated.   Types of Incontinence  There are different types of incontinence. Two of the main types are described here. You can have more than one type.     Stress incontinence. With this type, urine leaks when pressure (stress) is put on the bladder. This may happen when you cough, sneeze, or laugh. Stress incontinence most often occurs because the pelvic floor muscles that support the bladder and urethra are weak. This can happen after pregnancy and vaginal childbirth or a hysterectomy. It can also be due to excess body weight or hormone changes.    Urge incontinence (also called overactive bladder). With this type, a  sudden urge to urinate is felt often. This may happen even though there may not be much urine in the bladder. The need to urinate often during the night is common. Urge incontinence most often occurs because of bladder spasms. This may be due to bladder irritation or infection. Damage to bladder nerves or pelvic muscles, constipation, and certain medicines can also lead to urge incontinence.  Treatment depends on the cause. Further evaluation is needed to find the type you have. This will likely include an exam and certain tests. Based on the results, you and your healthcare provider can then plan treatment. Until a diagnosis is made, the home care tips below can help ease symptoms.   Home care    Do pelvic floor muscle exercises, if they are prescribed. The pelvic floor muscles help support the bladder and urethra. Many women find that their symptoms improve when doing special exercises that strengthen these muscles. To do the exercises, contract the muscles you would use to stop your stream of urine. But do this when you re not urinating. Hold for 10 seconds, then relax. Repeat 10 to 20 times in a row, at least 3 times a day. Your healthcare provider may give you other instructions for how to do the exercises and how often.    Keep a bladder diary. This helps track how often and how much you urinate over a set period of time. Bring this diary with you to your next visit with the provider. The information can help your provider learn more about your bladder problem.    Lose weight, if advised to by your provider. Extra weight puts pressure on the bladder. Your provider can help you create a weight-loss plan that s right for you. This may include exercising more and making certain diet changes.    Don't have foods and drinks that may irritate the bladder. These can include alcohol and caffeinated drinks.    Quit smoking. Smoking and other tobacco use can lead to a long-term (chronic) cough that strains the pelvic  floor muscles. Smoking may also damage the bladder and urethra. Talk with your provider about treatments or methods you can use to quit smoking.    If drinking large amounts of fluid makes you have symptoms, you may be advised to limit your fluid intake. You may also be advised to drink most of your fluids during the day and to limit fluids at night.    If you re worried about urine leakage or accidents, you may wear absorbent pads to catch urine. Change the pads often. This helps reduce discomfort. It may also reduce the risk of skin or bladder infections.    Follow-up care  Follow up with your healthcare provider, or as directed. It may take some to find the right treatment for your problem. But healthy lifestyle changes can be made right away. These include such things as exercising on a regular basis, eating a healthy diet, losing weight (if needed), and quitting smoking. Your treatment plan may include special therapies or medicines. Certain procedures or surgery may also be options. Talk about any questions you have with your provider.   When to seek medical advice  Call the healthcare provider right away if any of these occur:    Fever of 100.4 F (38 C) or higher, or as directed by your provider    Bladder pain or fullness    Belly swelling    Nausea or vomiting    Back pain    Weakness, dizziness, or fainting  [employer] last reviewed this educational content on 1/1/2020 2000-2022 [employer]. All rights reserved. This information is not intended as a substitute for professional medical care. Always follow your healthcare professional's instructions.        Your Health Risk Assessment indicates you feel you are not in good emotional health.    Recreation   Recreation is not limited to sports and team events. It includes any activity that provides relaxation, interest, enjoyment, and exercise. Recreation provides an outlet for physical, mental, and social energy. It can give a sense of worth  "and achievement. It can help you stay healthy.    Mental Exercise and Social Involvement  Mental and emotional health is as important as physical health. Keep in touch with friends and family. Stay as active as possible. Continue to learn and challenge yourself.   Things you can do to stay mentally active are:    Learn something new, like a foreign language or musical instrument.     Play SCRABBLE or do crossword puzzles. If you cannot find people to play these games with you at home, you can play them with others on your computer through the Internet.     Join a games club--anything from card games to chess or checkers or lawn bowling.     Start a new hobby.     Go back to school.     Volunteer.     Read.   Keep up with world events.    Depression and Suicide in Older Adults  Nearly 2 million older adults in the [place] have some type of depression. Some of them even take their own lives. Yet depression among older adults is often ignored. Learning about the warning signs of depression may help spare a loved one needless pain. You may also save a life.   What is depression?  Depression is a common and serious illness. It affects the way you think and feel. It is not a normal part of aging. It is not a sign of weakness, a character flaw, or something you can \"snap out of.\" Most people with depression need treatment to get better. The most common symptom is a feeling of deep sadness. People who are depressed also may seem tired and listless. And nothing seems to give them pleasure. It s normal to grieve or be sad sometimes. But sadness lessens or passes with time. Depression rarely goes away or improves on its own. Other symptoms of depression are:     Sleeping more or less than normal    Eating more or less than normal    Having headaches, stomachaches, or other pains that don t go away    Feeling nervous,  empty,  or worthless    Crying a lot    Thinking or talking about suicide or death    Loss of interest in " activities previously enjoyed    Social isolation    Feeling confused or forgetful  What causes it?  The causes of depression aren t fully known. But it is thought to result from a complex blend of these factors:     Biochemistry. Certain chemicals in the brain play a role.    Genes. Depression does run in families.    Life stress. Life stresses can also trigger depression in some people. Older adults often face many stressors. These may include isolation, the death of friends or a spouse, health problems, and financial concerns.    Chronic health conditions. This includes diabetes, heart disease, or cancer. These can cause symptoms of depression. Medicine side effects can cause changes in thoughts and behaviors.  Giving support    Depressed people often may not want to ask for help. When they do, they may be ignored. Or they may get the wrong treatment. You can help by showing parents and older friends love and support. If they seem depressed, don t lecture the person or ignore the symptoms. Don't discount the symptoms as a  normal  part of aging. They are not. Get involved, listen, and show interest and support.   Help them understand that depression is a treatable illness. Tell them you can help them find the right treatment. Offer to go to their healthcare provider's appointment with them for support when the symptoms are discussed. With their approval, contact a local mental health center, social service agency, or hospital about services.   Helping at healthcare visits  You can be an advocate for them at healthcare appointments. Many older adults have chronic illnesses. Many of these can cause symptoms of depression. Medicine side effects can change thoughts and behaviors.   You can help make sure that the healthcare provider looks at all of these factors. They should refer your family member or friend to a mental healthcare provider when needed. In some cases, untreated depression can lead to a misdiagnosis.  A person may be diagnosed with a brain disorder such as dementia. If the healthcare provider does not take the issue of depression seriously, help your family member or friend find another provider.   Asking about self-harm thoughts  If you think an older person you care about could be suicidal, ask,  Have you thought about suicide?  Most people will tell you the truth. If they say yes, they may already have a plan for how and when they will attempt it. Find out as much as you can. The more detailed the plan, and the easier it is to carry out, the more danger the person is in right now. Tell the person you are there for them and you do not want them to get harmed. Don't wait to get help for the person. Call the person's healthcare provider, local hospital, or emergency services.   Call 988 in a crisis   Never leave the person alone. A person who is actively suicidal needs crisis care right away. They need constant supervision. Never leave the person out of sight. Call 988 Tell the crisis counselor you need help for a person who is thinking about suicide. The counselor will help you get the right level of crisis help. You may be advised to take the person to the nearest emergency room.   The National Suicide Prevention Lifeline is available at 988, or 010-747-WYNM (695-146-4494), or www.suicidepreventionlifeline.org. When you call or text 988, you will be connected to trained counselors who are part of the National Suicide Prevention Lifeline network. An online chat option is also available. Lifeline is free and available 24/7.   To learn more    National Suicide Prevention Lifeline at www.suicidepreventionlifeline.org  or 909-890-IVEA (070-026-6882)    National [place] on Mental Illness at www.afshan.org  or 979-868-RTIM (665-026-3482)    Mental Health [facility]atha at www.nmha.org  or 148-092-1314    National [place] of Mental Health at www.nimh.nih.gov  or 877-011-8816    Alicia last reviewed this educational content  on 7/1/2022 2000-2022 [employer]. All rights reserved. This information is not intended as a substitute for professional medical care. Always follow your healthcare professional's instructions.

## 2023-06-30 NOTE — PROGRESS NOTES
"SUBJECTIVE:   Diana is a 72 year old who presents for Preventive Visit.    Are you in the first 12 months of your Medicare coverage?  No    Healthy Habits:     In general, how would you rate your overall health?  Fair    Frequency of exercise:  4-5 days/week    Duration of exercise:  30-45 minutes    Do you usually eat at least 4 servings of fruit and vegetables a day, include whole grains    & fiber and avoid regularly eating high fat or \"junk\" foods?  Yes    Taking medications regularly:  Yes    Medication side effects:  None, No muscle aches and No significant flushing    Ability to successfully perform activities of daily living:  Transportation requires assistance, housework requires assistance, bathing requires assistance, laundry requires assistance and medication administration requires assistance    Home Safety:  No safety concerns identified    Hearing Impairment:  Difficulty following a conversation in a noisy restaurant or crowded room, need to ask people to speak up or repeat themselves and difficulty understanding soft or whispered speech    In the past 6 months, have you been bothered by leaking of urine? Yes    In general, how would you rate your overall mental or emotional health?  Fair        Have you ever done Advance Care Planning? (For example, a Health Directive, POLST, or a discussion with a medical provider or your loved ones about your wishes): Yes, advance care planning is on file.      Fall risk  Fallen 2 or more times in the past year?: No  Any fall with injury in the past year?: No    Cognitive Screening   1) Repeat 3 items (Leader, Season, Table)    2) Clock draw: NORMAL  3) 3 item recall: Recalls 3 objects  Results: 3 items recalled: COGNITIVE IMPAIRMENT LESS LIKELY    Mini-CogTM Copyright JUDD Menjivar. Licensed by the author for use in [facility]; reprinted with permission (nela@.[facility]). All rights reserved.      Do you have sleep apnea, excessive snoring or daytime " drowsiness?: no    Reviewed and updated as needed this visit by clinical staff                  Reviewed and updated as needed this visit by Provider                 Social History     Tobacco Use     Smoking status: Never     Smokeless tobacco: Never   Substance Use Topics     Alcohol use: No           6/24/2023    11:04 AM   Alcohol Use   Prescreen: >3 drinks/day or >7 drinks/week? Not Applicable     Do you have a current opioid prescription? No  Do you use any other controlled substances or medications that are not prescribed by a provider? None      Current providers sharing in care for this patient include:   Patient Care Team:  Gayle Hernandez MD as PCP - General (Internal Medicine)  Trinidad Cross MD as MD (Nephrology)  Gayle Hernandez MD as Assigned PCP  Merlin Mar RPH as Pharmacist (Pharmacist)  Merlin Mar RPH as Assigned [facility] Pharmacist  ([facility]), [facility]    The following health maintenance items are reviewed in Epic and correct as of today:  Health Maintenance   Topic Date Due     MICROALBUMIN  Never done     MEDICARE ANNUAL WELLNESS VISIT  12/21/2021     LIPID  12/21/2021     COVID-19 Vaccine (7 - Pfizer series) 02/06/2023     BMP  05/27/2023     HEMOGLOBIN  12/23/2023     ANNUAL REVIEW OF HM ORDERS  04/03/2024     FALL RISK ASSESSMENT  06/30/2024     MAMMO SCREENING  11/07/2024     DTAP/TDAP/TD IMMUNIZATION (5 - Td or Tdap) 08/08/2027     ADVANCE CARE PLANNING  03/20/2028     COLORECTAL CANCER SCREENING  06/17/2029     DEXA  12/12/2033     PARATHYROID  Completed     PHOSPHORUS  Completed     HEPATITIS C SCREENING  Completed     PHQ-2 (once per calendar year)  Completed     INFLUENZA VACCINE  Completed     Pneumococcal Vaccine: 65+ Years  Completed     URINALYSIS  Completed     ALK PHOS  Completed     ZOSTER IMMUNIZATION  Completed     IPV IMMUNIZATION  Aged Out     MENINGITIS IMMUNIZATION  Aged Out     Labs reviewed in EPIC    Review of  "Systems  Constitutional, HEENT, cardiovascular, pulmonary, GI, , musculoskeletal, neuro, skin, endocrine and psych systems are negative, except as otherwise noted.    OBJECTIVE:   /70 (BP Location: Left arm, Patient Position: Sitting, Cuff Size: Adult Regular)   Pulse 66   Temp 97.7  F (36.5  C) (Temporal)   Resp 16   Ht 1.626 m (5' 4\")   Wt 54 kg (119 lb)   LMP  (LMP Unknown)   SpO2 99%   BMI 20.43 kg/m   Estimated body mass index is 19.02 kg/m  as calculated from the following:    Height as of 3/2/23: 1.626 m (5' 4\").    Weight as of 3/2/23: 50.3 kg (110 lb 12.8 oz).  Physical Exam  GENERAL: alert and no distress  EYES: Eyes grossly normal to inspection, conjunctivae and sclerae normal  HENT: normocephalic atraumatic  NECK: no asymmetry  RESP: lungs clear to auscultation   CV: regular rate and rhythm  ABDOMEN: soft, nontender, bowel sounds normal  MS: no gross musculoskeletal defects noted  SKIN: no suspicious lesions or rashes  NEURO: mentation intact and speech normal  PSYCH: flat affect    Diagnostic Test Results:  Labs reviewed in Epic    ASSESSMENT / PLAN:   Diana was seen today for physical.    Diagnoses and all orders for this visit:    Encounter for Medicare annual wellness exam  -     PRIMARY CARE FOLLOW-UP SCHEDULING; Future    Chronic renal disease, stage IV (H)  - stable, continue nephrology clinic follow up going forward    Bipolar affective disorder, remission status unspecified (H)  - stable, continue psychiatry clinic follow up going forward        Patient has been advised of split billing requirements and indicates understanding: Yes      COUNSELING:  Reviewed preventive health counseling, as reflected in patient instructions  Special attention given to:       Regular exercise       Healthy diet/nutrition        She reports that she has never smoked. She has never used smokeless tobacco.      Appropriate preventive services were discussed with this patient, including applicable " screening as appropriate for cardiovascular disease, diabetes, osteopenia/osteoporosis, and glaucoma.  As appropriate for age/gender, discussed screening for colorectal cancer, prostate cancer, breast cancer, and cervical cancer. Checklist reviewing preventive services available has been given to the patient.    Reviewed patients plan of care and provided an AVS. The Basic Care Plan (routine screening as documented in Health Maintenance) for Diana meets the Care Plan requirement. This Care Plan has been established and reviewed with the Patient and brother.      Gayle Hernandez MD  [facility]    Identified Health Risks:    I have reviewed Opioid Use Disorder and Substance Use Disorder risk factors and made any needed referrals.

## 2023-07-28 ENCOUNTER — ALLIED HEALTH/NURSE VISIT (OUTPATIENT)
Dept: INFUSION THERAPY | Facility: CLINIC | Age: 73
End: 2023-07-28
Attending: INTERNAL MEDICINE
Payer: MEDICARE

## 2023-07-28 VITALS
SYSTOLIC BLOOD PRESSURE: 107 MMHG | RESPIRATION RATE: 18 BRPM | DIASTOLIC BLOOD PRESSURE: 74 MMHG | OXYGEN SATURATION: 95 % | HEART RATE: 80 BPM | TEMPERATURE: 98.2 F

## 2023-07-28 DIAGNOSIS — N18.9 ANEMIA IN CHRONIC KIDNEY DISEASE, UNSPECIFIED CKD STAGE: Primary | ICD-10-CM

## 2023-07-28 DIAGNOSIS — D63.1 ANEMIA IN CHRONIC KIDNEY DISEASE, UNSPECIFIED CKD STAGE: Primary | ICD-10-CM

## 2023-07-28 DIAGNOSIS — N18.4 CHRONIC RENAL DISEASE, STAGE IV (H): ICD-10-CM

## 2023-07-28 LAB — HGB BLD-MCNC: 9.4 G/DL (ref 11.7–15.7)

## 2023-07-28 PROCEDURE — 96372 THER/PROPH/DIAG INJ SC/IM: CPT | Performed by: INTERNAL MEDICINE

## 2023-07-28 PROCEDURE — 250N000011 HC RX IP 250 OP 636: Mod: JZ,EC | Performed by: INTERNAL MEDICINE

## 2023-07-28 PROCEDURE — 85018 HEMOGLOBIN: CPT | Performed by: PHYSICIAN ASSISTANT

## 2023-07-28 PROCEDURE — 36415 COLL VENOUS BLD VENIPUNCTURE: CPT | Performed by: PHYSICIAN ASSISTANT

## 2023-07-28 RX ORDER — DIPHENHYDRAMINE HYDROCHLORIDE 50 MG/ML
50 INJECTION INTRAMUSCULAR; INTRAVENOUS
Status: CANCELLED
Start: 2023-08-27

## 2023-07-28 RX ORDER — EPINEPHRINE 1 MG/ML
0.3 INJECTION, SOLUTION INTRAMUSCULAR; SUBCUTANEOUS EVERY 5 MIN PRN
Status: CANCELLED | OUTPATIENT
Start: 2023-08-27

## 2023-07-28 RX ORDER — EPINEPHRINE 1 MG/ML
0.3 INJECTION, SOLUTION INTRAMUSCULAR; SUBCUTANEOUS EVERY 5 MIN PRN
Status: CANCELLED | OUTPATIENT
Start: 2023-08-24

## 2023-07-28 RX ORDER — MEPERIDINE HYDROCHLORIDE 25 MG/ML
25 INJECTION INTRAMUSCULAR; INTRAVENOUS; SUBCUTANEOUS EVERY 30 MIN PRN
Status: CANCELLED | OUTPATIENT
Start: 2023-08-24

## 2023-07-28 RX ORDER — DIPHENHYDRAMINE HYDROCHLORIDE 50 MG/ML
50 INJECTION INTRAMUSCULAR; INTRAVENOUS
Status: CANCELLED
Start: 2023-08-24

## 2023-07-28 RX ORDER — NALOXONE HYDROCHLORIDE 0.4 MG/ML
0.2 INJECTION, SOLUTION INTRAMUSCULAR; INTRAVENOUS; SUBCUTANEOUS
Status: CANCELLED | OUTPATIENT
Start: 2023-08-27

## 2023-07-28 RX ORDER — ALBUTEROL SULFATE 90 UG/1
1-2 AEROSOL, METERED RESPIRATORY (INHALATION)
Status: CANCELLED
Start: 2023-08-24

## 2023-07-28 RX ORDER — ALBUTEROL SULFATE 0.83 MG/ML
2.5 SOLUTION RESPIRATORY (INHALATION)
Status: CANCELLED | OUTPATIENT
Start: 2023-08-24

## 2023-07-28 RX ORDER — MEPERIDINE HYDROCHLORIDE 25 MG/ML
25 INJECTION INTRAMUSCULAR; INTRAVENOUS; SUBCUTANEOUS EVERY 30 MIN PRN
Status: CANCELLED | OUTPATIENT
Start: 2023-08-27

## 2023-07-28 RX ORDER — METHYLPREDNISOLONE SODIUM SUCCINATE 125 MG/2ML
125 INJECTION, POWDER, LYOPHILIZED, FOR SOLUTION INTRAMUSCULAR; INTRAVENOUS
Status: CANCELLED
Start: 2023-08-24

## 2023-07-28 RX ORDER — ALBUTEROL SULFATE 90 UG/1
1-2 AEROSOL, METERED RESPIRATORY (INHALATION)
Status: CANCELLED
Start: 2023-08-27

## 2023-07-28 RX ORDER — METHYLPREDNISOLONE SODIUM SUCCINATE 125 MG/2ML
125 INJECTION, POWDER, LYOPHILIZED, FOR SOLUTION INTRAMUSCULAR; INTRAVENOUS
Status: CANCELLED
Start: 2023-08-27

## 2023-07-28 RX ORDER — ALBUTEROL SULFATE 0.83 MG/ML
2.5 SOLUTION RESPIRATORY (INHALATION)
Status: CANCELLED | OUTPATIENT
Start: 2023-08-27

## 2023-07-28 RX ORDER — NALOXONE HYDROCHLORIDE 0.4 MG/ML
0.2 INJECTION, SOLUTION INTRAMUSCULAR; INTRAVENOUS; SUBCUTANEOUS
Status: CANCELLED | OUTPATIENT
Start: 2023-08-24

## 2023-07-28 RX ADMIN — DARBEPOETIN ALFA 100 MCG: 100 INJECTION, SOLUTION INTRAVENOUS; SUBCUTANEOUS at 14:17

## 2023-07-28 ASSESSMENT — PAIN SCALES - GENERAL: PAINLEVEL: MODERATE PAIN (4)

## 2023-07-28 NOTE — PROGRESS NOTES
Infusion Nursing Note:  Diana Santiago presents today for aranesp.    Patient seen by provider today: No   present during visit today: Not Applicable.    Note: N/A.      Intravenous Access:  No Intravenous access/labs at this visit.    Treatment Conditions:  Lab Results   Component Value Date    HGB 9.4 (L) 07/28/2023    WBC 10.5 02/27/2023    ANEU 9.6 (H) 09/09/2022    ANEUTAUTO 7.7 01/28/2023     02/27/2023        Results reviewed, labs MET treatment parameters, ok to proceed with treatment.      Post Infusion Assessment:  Patient tolerated injection without incident.  Site patent and intact, free from redness, edema or discomfort.       Discharge Plan:   AVS to patient via MYCHART.  Patient will return as prev elena for next appointment.   Patient discharged in stable condition accompanied by: self.  Departure Mode: Ambulatory with walker.      Zion Agarwal RN

## 2023-08-02 ENCOUNTER — LAB REQUISITION (OUTPATIENT)
Dept: LAB | Facility: CLINIC | Age: 73
End: 2023-08-02
Payer: MEDICARE

## 2023-08-02 DIAGNOSIS — N18.4 CHRONIC KIDNEY DISEASE, STAGE 4 (SEVERE) (H): ICD-10-CM

## 2023-08-02 DIAGNOSIS — N18.30 CHRONIC KIDNEY DISEASE, STAGE 3 UNSPECIFIED (H): ICD-10-CM

## 2023-08-03 LAB
ANION GAP SERPL CALCULATED.3IONS-SCNC: 10 MMOL/L (ref 7–15)
BUN SERPL-MCNC: 44 MG/DL (ref 8–23)
CALCIUM SERPL-MCNC: 11 MG/DL (ref 8.8–10.2)
CHLORIDE SERPL-SCNC: 111 MMOL/L (ref 98–107)
CREAT SERPL-MCNC: 2.21 MG/DL (ref 0.51–0.95)
DEPRECATED HCO3 PLAS-SCNC: 24 MMOL/L (ref 22–29)
GFR SERPL CREATININE-BSD FRML MDRD: 23 ML/MIN/1.73M2
GLUCOSE SERPL-MCNC: 104 MG/DL (ref 70–99)
POTASSIUM SERPL-SCNC: 4.7 MMOL/L (ref 3.4–5.3)
SODIUM SERPL-SCNC: 145 MMOL/L (ref 136–145)

## 2023-08-03 PROCEDURE — P9604 ONE-WAY ALLOW PRORATED TRIP: HCPCS | Mod: ORL | Performed by: INTERNAL MEDICINE

## 2023-08-03 PROCEDURE — 36415 COLL VENOUS BLD VENIPUNCTURE: CPT | Mod: ORL

## 2023-08-03 PROCEDURE — 80048 BASIC METABOLIC PNL TOTAL CA: CPT | Mod: ORL | Performed by: INTERNAL MEDICINE

## 2023-08-03 PROCEDURE — 80048 BASIC METABOLIC PNL TOTAL CA: CPT | Mod: ORL

## 2023-08-03 PROCEDURE — P9604 ONE-WAY ALLOW PRORATED TRIP: HCPCS | Mod: ORL

## 2023-08-03 PROCEDURE — 36415 COLL VENOUS BLD VENIPUNCTURE: CPT | Mod: ORL | Performed by: INTERNAL MEDICINE

## 2023-08-08 ENCOUNTER — TELEPHONE (OUTPATIENT)
Dept: FAMILY MEDICINE | Facility: CLINIC | Age: 73
End: 2023-08-08

## 2023-08-08 DIAGNOSIS — M25.552 BILATERAL HIP PAIN: Primary | ICD-10-CM

## 2023-08-08 DIAGNOSIS — M25.551 BILATERAL HIP PAIN: Primary | ICD-10-CM

## 2023-08-08 NOTE — TELEPHONE ENCOUNTER
Eileen nurse from [facility] on [address] called stating pt has been complaining of right shoulder and bilateral hip pain more lately and rates it at 6/10. Pain is worse at night, no injuries or falls. Pt has been doing more group exercises and PT and maybe this is why she has more pain. Eileen is requesting to add a scheduled 1000 mg Tylenol/acetaminophen three times daily to medlist and send Rx to   [facility] - [place], [place] - [address] [place] [address]

## 2023-08-09 ENCOUNTER — LAB REQUISITION (OUTPATIENT)
Dept: LAB | Facility: CLINIC | Age: 73
End: 2023-08-09
Payer: MEDICARE

## 2023-08-09 DIAGNOSIS — N18.4 CHRONIC KIDNEY DISEASE, STAGE 4 (SEVERE) (H): ICD-10-CM

## 2023-08-10 LAB
ANION GAP SERPL CALCULATED.3IONS-SCNC: 13 MMOL/L (ref 7–15)
BUN SERPL-MCNC: 56.2 MG/DL (ref 8–23)
CALCIUM SERPL-MCNC: 10.1 MG/DL (ref 8.8–10.2)
CHLORIDE SERPL-SCNC: 105 MMOL/L (ref 98–107)
CREAT SERPL-MCNC: 2.11 MG/DL (ref 0.51–0.95)
DEPRECATED HCO3 PLAS-SCNC: 17 MMOL/L (ref 22–29)
GFR SERPL CREATININE-BSD FRML MDRD: 24 ML/MIN/1.73M2
GLUCOSE SERPL-MCNC: 107 MG/DL (ref 70–99)
POTASSIUM SERPL-SCNC: 5.1 MMOL/L (ref 3.4–5.3)
SODIUM SERPL-SCNC: 135 MMOL/L (ref 136–145)

## 2023-08-10 PROCEDURE — 36415 COLL VENOUS BLD VENIPUNCTURE: CPT | Mod: ORL | Performed by: INTERNAL MEDICINE

## 2023-08-10 PROCEDURE — 80048 BASIC METABOLIC PNL TOTAL CA: CPT | Mod: ORL | Performed by: INTERNAL MEDICINE

## 2023-08-10 PROCEDURE — P9604 ONE-WAY ALLOW PRORATED TRIP: HCPCS | Mod: ORL | Performed by: INTERNAL MEDICINE

## 2023-08-10 RX ORDER — AMILORIDE HYDROCHLORIDE 5 MG/1
5 TABLET ORAL DAILY
Qty: 90 TABLET | Refills: 97 | OUTPATIENT
Start: 2023-08-10

## 2023-08-11 ENCOUNTER — VIRTUAL VISIT (OUTPATIENT)
Dept: FAMILY MEDICINE | Facility: CLINIC | Age: 73
End: 2023-08-11
Payer: MEDICARE

## 2023-08-11 DIAGNOSIS — Z76.0 ENCOUNTER FOR MEDICATION REFILL: Primary | ICD-10-CM

## 2023-08-11 DIAGNOSIS — M25.551 BILATERAL HIP PAIN: ICD-10-CM

## 2023-08-11 DIAGNOSIS — I10 ESSENTIAL HYPERTENSION: ICD-10-CM

## 2023-08-11 DIAGNOSIS — J30.2 SEASONAL ALLERGIC RHINITIS, UNSPECIFIED TRIGGER: ICD-10-CM

## 2023-08-11 DIAGNOSIS — F31.9 BIPOLAR AFFECTIVE DISORDER, REMISSION STATUS UNSPECIFIED (H): ICD-10-CM

## 2023-08-11 DIAGNOSIS — M25.552 BILATERAL HIP PAIN: ICD-10-CM

## 2023-08-11 PROCEDURE — 99214 OFFICE O/P EST MOD 30 MIN: CPT | Mod: 95 | Performed by: INTERNAL MEDICINE

## 2023-08-11 RX ORDER — AMILORIDE HYDROCHLORIDE 5 MG/1
5 TABLET ORAL DAILY
COMMUNITY
End: 2024-07-08

## 2023-08-11 RX ORDER — ACETAMINOPHEN 500 MG
500-1000 TABLET ORAL
Qty: 180 TABLET | Refills: 1 | Status: SHIPPED | OUTPATIENT
Start: 2023-08-11 | End: 2023-08-11

## 2023-08-11 RX ORDER — ACETAMINOPHEN 500 MG
1000 TABLET ORAL AT BEDTIME
Qty: 180 TABLET | Refills: 1 | Status: SHIPPED | OUTPATIENT
Start: 2023-08-11 | End: 2023-08-16

## 2023-08-11 NOTE — PROGRESS NOTES
Diana is a 72 year old who is being evaluated via a billable telephone visit.      What phone number would you like to be contacted at? [phone]  How would you like to obtain your AVS? [place]  Distant Location (provider location):  On-site      Subjective   Diana is a 72 year old, presenting for the following health issues:    HPI       Chief Complaint:     Medication refills    HPI:   Patient Diana Santiago is a very pleasant 72 year old female with history of bipolar affective disorder who presents to Internal Medicine clinic today via telephone visit for follow up of multiple concerns including medication refills.         Current Medications:     Current Outpatient Medications   Medication Sig Dispense Refill     ACE/ARB/ARNI NOT PRESCRIBED (INTENTIONAL) Please choose reason not prescribed from choices below.       aMILoride (MIDAMOR) 5 MG tablet Take 5 mg by mouth       amLODIPine (NORVASC) 5 MG tablet TAKE 1 TABLET BY MOUTH ONCE DAILY (HOLD FOR SBP < 100) 90 tablet 97     diazepam (VALIUM) 2 MG tablet Take 0.5 tablets (1 mg) by mouth every other day 7 tablet 2     FEROSUL 325 (65 Fe) MG tablet TAKE ONE TABLET BY MOUTH EVERY OTHER DAY 42 tablet 97     fexofenadine (ALLEGRA) 180 MG tablet TAKE 1 TABLET BY MOUTH ONCE DAILY 90 tablet 97     lithium (ESKALITH) 150 MG capsule TAKE 1 CAPSULE BY MOUTH THREE TIMES DAILY WITH MEALS 270 capsule 97     pantoprazole (PROTONIX) 40 MG EC tablet TAKE 1 TABLET BY MOUTH ONCE DAILY 90 tablet 0     polyethylene glycol (MIRALAX) 17 GM/Dose powder MIX 17GM OF POWDER IN 8OZ OF WATER UNTIL COMPLETELY DISSOLVED. DRINK SOLUTION BY MOUTH ONCE DAILY. 510 g 0     simvastatin (ZOCOR) 40 MG tablet TAKE 1 TABLET BY MOUTH AT BEDTIME 90 tablet 97     sodium bicarbonate 650 MG tablet TAKE TWO TABLETS (1300 MG) BY MOUTH TWICE DAILY 360 tablet 97     acetaminophen (TYLENOL) 500 MG tablet Take 2 tablets (1,000 mg) by mouth At Bedtime 180 tablet 1         Allergies:      Allergies   Allergen  Reactions     Ace Inhibitors      Other reaction(s): renal failure  She can't be on an ACEI or ARB while on Lithium.            Past Medical History:     Past Medical History:   Diagnosis Date     Benign essential hypertension 09/2018    added norvasc 9/18     Bipolar affective disorder (H)     hosp 1993, Dr. Aftab Deal     Cancer (H) 1996    DCIS, left breast     Chronic kidney disease, stage 3a (H)     seen by renal Dr. 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    felt due to lithium     Thalassaemia trait      Vitamin D deficiency          Past Surgical History:     Past Surgical History:   Procedure Laterality Date     BIOPSY  1994    left breast     BREAST SURGERY      lumpectomy x 4     COLONOSCOPY  2021     COLONOSCOPY N/A 6/17/2022    Procedure: COLONOSCOPY, WITH POLYPECTOMY AND BIOPSY;  Surgeon: Panchito Gu MD;  Location:  GI     EYE SURGERY  2018    retina tear     LAPAROTOMY EXPLORATORY N/A 8/24/2022    Procedure: Exploratory laparotomy, REPAIR OF PERFORATED ULCER;  Surgeon: Ron Vidal MD;  Location:  OR     left hand surgery      last 1974         Family Medical History:     Family History   Problem Relation Age of Onset     Cerebrovascular Disease Mother      Hypertension Father      Sleep Apnea Brother      Breast Cancer Maternal Aunt         x 2     Breast Cancer Other         Maternal Aunt     Hyperlipidemia Niece          Social History:     Social History     Socioeconomic History     Marital status:  Single     Spouse name: Not on file     Number of children: 0     Years of education: Not on file     Highest education level: Not on file   Occupational History     Occupation: , retired   Tobacco Use     Smoking status: Never     Smokeless tobacco: Never   Vaping Use     Vaping Use: Never used   Substance and Sexual Activity     Alcohol use: No     Drug use: No     Sexual activity: Not Currently     Partners: Male     Birth control/protection: Post-menopausal, None   Other Topics Concern     Parent/sibling w/ CABG, MI or angioplasty before 65F 55M? No   Social History Narrative     Not on file     Social Determinants of Health     Financial Resource Strain: Not on file   Food Insecurity: Not on file   Transportation Needs: Not on file   Physical Activity: Not on file   Stress: Not on file   Social Connections: Not on file   Intimate Partner Violence: Not on file   Housing Stability: Not on file           Review of System:     Constitutional: Negative for fever or chills  Skin: Negative for rashes  Ears/Nose/Throat: Negative for nasal congestion, sore throat  Respiratory: No shortness of breath, dyspnea on exertion, cough, or hemoptysis  Cardiovascular: Negative for chest pain  Gastrointestinal: Negative for nausea, vomiting  Genitourinary: Negative for dysuria, hematuria  Musculoskeletal: positive for mechanical chronic bilateral hip pains  Neurologic: Negative for headaches  Psychiatric: positive for bipolar affective disorder  Hematologic/Lymphatic/Immunologic: Negative  Endocrine: Negative  Behavioral: Negative for tobacco use       Physical Exam:   LMP  (LMP Unknown)     RESP: no cough over the phone   NEURO: Alert & Oriented x 3.   PSYCH: mentation appears normal, affect normal        Diagnostic Test Results:     Diagnostic Test Results:  Labs reviewed in Epic    ASSESSMENT/PLAN:       Diana was seen today for refill request.    Diagnoses and all orders for this visit:    Encounter for  medication refill  Bilateral hip pain  - stable, tylenol medication refilled today    Bipolar affective disorder, remission status unspecified (H)  - stable  - continue current medication    Benign essential hypertension  - stable, continue current therapy    Seasonal allergic rhinitis, unspecified trigger  - stable, continue current therapy      Follow Up Plan:     Patient is instructed to return to Internal Medicine clinic for follow-up visit in 3 months.        Gayle Hernandez MD  Internal Medicine  [facility]      telephone visit duration including chart review, medication management: 30 minutes    Review of Systems  Physical Exam

## 2023-08-16 ENCOUNTER — LAB REQUISITION (OUTPATIENT)
Dept: LAB | Facility: CLINIC | Age: 73
End: 2023-08-16
Payer: MEDICARE

## 2023-08-16 DIAGNOSIS — N18.4 CHRONIC KIDNEY DISEASE, STAGE 4 (SEVERE) (H): ICD-10-CM

## 2023-08-16 RX ORDER — ACETAMINOPHEN 500 MG
1000 TABLET ORAL AT BEDTIME
Qty: 180 TABLET | Refills: 1 | Status: SHIPPED | OUTPATIENT
Start: 2023-08-16 | End: 2023-12-29

## 2023-08-17 LAB
ANION GAP SERPL CALCULATED.3IONS-SCNC: 12 MMOL/L (ref 7–15)
BUN SERPL-MCNC: 49.4 MG/DL (ref 8–23)
CALCIUM SERPL-MCNC: 11 MG/DL (ref 8.8–10.2)
CHLORIDE SERPL-SCNC: 110 MMOL/L (ref 98–107)
CREAT SERPL-MCNC: 2.28 MG/DL (ref 0.51–0.95)
DEPRECATED HCO3 PLAS-SCNC: 21 MMOL/L (ref 22–29)
GFR SERPL CREATININE-BSD FRML MDRD: 22 ML/MIN/1.73M2
GLUCOSE SERPL-MCNC: 85 MG/DL (ref 70–99)
POTASSIUM SERPL-SCNC: 5 MMOL/L (ref 3.4–5.3)
SODIUM SERPL-SCNC: 143 MMOL/L (ref 136–145)

## 2023-08-17 PROCEDURE — 80048 BASIC METABOLIC PNL TOTAL CA: CPT | Mod: ORL

## 2023-08-17 PROCEDURE — 36415 COLL VENOUS BLD VENIPUNCTURE: CPT | Mod: ORL

## 2023-08-17 PROCEDURE — P9603 ONE-WAY ALLOW PRORATED MILES: HCPCS | Mod: ORL

## 2023-08-22 ENCOUNTER — MEDICAL CORRESPONDENCE (OUTPATIENT)
Dept: HEALTH INFORMATION MANAGEMENT | Facility: CLINIC | Age: 73
End: 2023-08-22

## 2023-08-23 ENCOUNTER — LAB REQUISITION (OUTPATIENT)
Dept: LAB | Facility: CLINIC | Age: 73
End: 2023-08-23
Payer: MEDICARE

## 2023-08-23 DIAGNOSIS — N18.4 CHRONIC KIDNEY DISEASE, STAGE 4 (SEVERE) (H): ICD-10-CM

## 2023-08-24 LAB
ANION GAP SERPL CALCULATED.3IONS-SCNC: 12 MMOL/L (ref 7–15)
BUN SERPL-MCNC: 49.6 MG/DL (ref 8–23)
CALCIUM SERPL-MCNC: 10.7 MG/DL (ref 8.8–10.2)
CHLORIDE SERPL-SCNC: 110 MMOL/L (ref 98–107)
CREAT SERPL-MCNC: 2.31 MG/DL (ref 0.51–0.95)
DEPRECATED HCO3 PLAS-SCNC: 18 MMOL/L (ref 22–29)
GFR SERPL CREATININE-BSD FRML MDRD: 22 ML/MIN/1.73M2
GLUCOSE SERPL-MCNC: 105 MG/DL (ref 70–99)
POTASSIUM SERPL-SCNC: 5.8 MMOL/L (ref 3.4–5.3)
SODIUM SERPL-SCNC: 140 MMOL/L (ref 136–145)

## 2023-08-24 PROCEDURE — 80048 BASIC METABOLIC PNL TOTAL CA: CPT | Mod: ORL | Performed by: INTERNAL MEDICINE

## 2023-08-24 PROCEDURE — P9604 ONE-WAY ALLOW PRORATED TRIP: HCPCS | Mod: ORL

## 2023-08-24 PROCEDURE — 36415 COLL VENOUS BLD VENIPUNCTURE: CPT | Mod: ORL

## 2023-08-24 PROCEDURE — 36415 COLL VENOUS BLD VENIPUNCTURE: CPT | Mod: ORL | Performed by: INTERNAL MEDICINE

## 2023-08-24 PROCEDURE — 80048 BASIC METABOLIC PNL TOTAL CA: CPT | Mod: ORL

## 2023-08-24 PROCEDURE — P9604 ONE-WAY ALLOW PRORATED TRIP: HCPCS | Mod: ORL | Performed by: INTERNAL MEDICINE

## 2023-08-25 ENCOUNTER — LAB (OUTPATIENT)
Dept: INFUSION THERAPY | Facility: CLINIC | Age: 73
End: 2023-08-25
Attending: INTERNAL MEDICINE
Payer: MEDICARE

## 2023-08-25 DIAGNOSIS — D63.1 ANEMIA IN CHRONIC KIDNEY DISEASE, UNSPECIFIED CKD STAGE: Primary | ICD-10-CM

## 2023-08-25 DIAGNOSIS — N18.9 ANEMIA IN CHRONIC KIDNEY DISEASE, UNSPECIFIED CKD STAGE: Primary | ICD-10-CM

## 2023-08-25 DIAGNOSIS — N18.4 CHRONIC RENAL DISEASE, STAGE IV (H): ICD-10-CM

## 2023-08-25 LAB
BASOPHILS # BLD AUTO: 0 10E3/UL (ref 0–0.2)
BASOPHILS NFR BLD AUTO: 0 %
EOSINOPHIL # BLD AUTO: 0.2 10E3/UL (ref 0–0.7)
EOSINOPHIL NFR BLD AUTO: 2 %
ERYTHROCYTE [DISTWIDTH] IN BLOOD BY AUTOMATED COUNT: 16.6 % (ref 10–15)
HCT VFR BLD AUTO: 36.7 % (ref 35–47)
HGB BLD-MCNC: 10.6 G/DL (ref 11.7–15.7)
HOLD SPECIMEN: NORMAL
IMM GRANULOCYTES # BLD: 0 10E3/UL
IMM GRANULOCYTES NFR BLD: 0 %
LYMPHOCYTES # BLD AUTO: 1 10E3/UL (ref 0.8–5.3)
LYMPHOCYTES NFR BLD AUTO: 12 %
MCH RBC QN AUTO: 21.8 PG (ref 26.5–33)
MCHC RBC AUTO-ENTMCNC: 28.9 G/DL (ref 31.5–36.5)
MCV RBC AUTO: 76 FL (ref 78–100)
MONOCYTES # BLD AUTO: 0.7 10E3/UL (ref 0–1.3)
MONOCYTES NFR BLD AUTO: 8 %
NEUTROPHILS # BLD AUTO: 6.4 10E3/UL (ref 1.6–8.3)
NEUTROPHILS NFR BLD AUTO: 78 %
NRBC # BLD AUTO: 0 10E3/UL
NRBC BLD AUTO-RTO: 0 /100
PLATELET # BLD AUTO: 362 10E3/UL (ref 150–450)
RBC # BLD AUTO: 4.86 10E6/UL (ref 3.8–5.2)
WBC # BLD AUTO: 8.3 10E3/UL (ref 4–11)

## 2023-08-25 PROCEDURE — 36415 COLL VENOUS BLD VENIPUNCTURE: CPT

## 2023-08-25 PROCEDURE — 85025 COMPLETE CBC W/AUTO DIFF WBC: CPT | Performed by: INTERNAL MEDICINE

## 2023-08-25 RX ORDER — MEPERIDINE HYDROCHLORIDE 25 MG/ML
25 INJECTION INTRAMUSCULAR; INTRAVENOUS; SUBCUTANEOUS EVERY 30 MIN PRN
Status: CANCELLED | OUTPATIENT
Start: 2023-08-27

## 2023-08-25 RX ORDER — METHYLPREDNISOLONE SODIUM SUCCINATE 125 MG/2ML
125 INJECTION, POWDER, LYOPHILIZED, FOR SOLUTION INTRAMUSCULAR; INTRAVENOUS
Status: CANCELLED
Start: 2023-08-27

## 2023-08-25 RX ORDER — EPINEPHRINE 1 MG/ML
0.3 INJECTION, SOLUTION INTRAMUSCULAR; SUBCUTANEOUS EVERY 5 MIN PRN
Status: CANCELLED | OUTPATIENT
Start: 2023-08-27

## 2023-08-25 RX ORDER — ALBUTEROL SULFATE 90 UG/1
1-2 AEROSOL, METERED RESPIRATORY (INHALATION)
Status: CANCELLED
Start: 2023-08-27

## 2023-08-25 RX ORDER — ALBUTEROL SULFATE 0.83 MG/ML
2.5 SOLUTION RESPIRATORY (INHALATION)
Status: CANCELLED | OUTPATIENT
Start: 2023-08-27

## 2023-08-25 RX ORDER — DIPHENHYDRAMINE HYDROCHLORIDE 50 MG/ML
50 INJECTION INTRAMUSCULAR; INTRAVENOUS
Status: CANCELLED
Start: 2023-08-27

## 2023-08-25 RX ORDER — NALOXONE HYDROCHLORIDE 0.4 MG/ML
0.2 INJECTION, SOLUTION INTRAMUSCULAR; INTRAVENOUS; SUBCUTANEOUS
Status: CANCELLED | OUTPATIENT
Start: 2023-08-27

## 2023-08-25 NOTE — PROGRESS NOTES
Infusion Nursing Note:  Diana Santiago presents today for possible Aranesp injection.    Patient seen by provider today: No   present during visit today: Not Applicable.    Note: pt notified of lab results and that she does not need Aranesp injection today.      Intravenous Access:  No Intravenous access/labs at this visit.    Treatment Conditions:  Lab Results   Component Value Date    HGB 10.6 (L) 08/25/2023    WBC 8.3 08/25/2023    ANEU 9.6 (H) 09/09/2022    ANEUTAUTO 6.4 08/25/2023     08/25/2023        Results reviewed, labs did NOT meet treatment parameters: Hgb 10.6.      Post Infusion Assessment:  NA.       Discharge Plan:   Patient discharged in stable condition accompanied by: family.      Saadia Cunningham RN

## 2023-08-30 ENCOUNTER — LAB REQUISITION (OUTPATIENT)
Dept: LAB | Facility: CLINIC | Age: 73
End: 2023-08-30
Payer: MEDICARE

## 2023-08-30 DIAGNOSIS — N18.4 CHRONIC KIDNEY DISEASE, STAGE 4 (SEVERE) (H): ICD-10-CM

## 2023-08-31 LAB
ANION GAP SERPL CALCULATED.3IONS-SCNC: 12 MMOL/L (ref 7–15)
BUN SERPL-MCNC: 54.2 MG/DL (ref 8–23)
CALCIUM SERPL-MCNC: 10.3 MG/DL (ref 8.8–10.2)
CHLORIDE SERPL-SCNC: 107 MMOL/L (ref 98–107)
CREAT SERPL-MCNC: 2.28 MG/DL (ref 0.51–0.95)
DEPRECATED HCO3 PLAS-SCNC: 20 MMOL/L (ref 22–29)
GFR SERPL CREATININE-BSD FRML MDRD: 22 ML/MIN/1.73M2
GLUCOSE SERPL-MCNC: 118 MG/DL (ref 70–99)
POTASSIUM SERPL-SCNC: 4.8 MMOL/L (ref 3.4–5.3)
SODIUM SERPL-SCNC: 139 MMOL/L (ref 136–145)

## 2023-08-31 PROCEDURE — P9604 ONE-WAY ALLOW PRORATED TRIP: HCPCS | Mod: ORL

## 2023-08-31 PROCEDURE — 36415 COLL VENOUS BLD VENIPUNCTURE: CPT | Mod: ORL

## 2023-08-31 PROCEDURE — 80048 BASIC METABOLIC PNL TOTAL CA: CPT | Mod: ORL

## 2023-09-05 DIAGNOSIS — F31.9 BIPOLAR AFFECTIVE DISORDER, REMISSION STATUS UNSPECIFIED (H): ICD-10-CM

## 2023-09-06 ENCOUNTER — LAB REQUISITION (OUTPATIENT)
Dept: LAB | Facility: CLINIC | Age: 73
End: 2023-09-06
Payer: MEDICARE

## 2023-09-06 DIAGNOSIS — N18.4 CHRONIC KIDNEY DISEASE, STAGE 4 (SEVERE) (H): ICD-10-CM

## 2023-09-06 RX ORDER — DIAZEPAM 2 MG
TABLET ORAL
Qty: 7 TABLET | Refills: 4 | Status: SHIPPED | OUTPATIENT
Start: 2023-09-06 | End: 2024-02-12

## 2023-09-07 LAB
ANION GAP SERPL CALCULATED.3IONS-SCNC: 12 MMOL/L (ref 7–15)
BUN SERPL-MCNC: 51.3 MG/DL (ref 8–23)
CALCIUM SERPL-MCNC: 10.7 MG/DL (ref 8.8–10.2)
CHLORIDE SERPL-SCNC: 110 MMOL/L (ref 98–107)
CREAT SERPL-MCNC: 2.29 MG/DL (ref 0.51–0.95)
DEPRECATED HCO3 PLAS-SCNC: 19 MMOL/L (ref 22–29)
EGFRCR SERPLBLD CKD-EPI 2021: 22 ML/MIN/1.73M2
GLUCOSE SERPL-MCNC: 99 MG/DL (ref 70–99)
POTASSIUM SERPL-SCNC: 5.1 MMOL/L (ref 3.4–5.3)
SODIUM SERPL-SCNC: 141 MMOL/L (ref 136–145)

## 2023-09-07 PROCEDURE — P9604 ONE-WAY ALLOW PRORATED TRIP: HCPCS | Mod: ORL

## 2023-09-07 PROCEDURE — 80048 BASIC METABOLIC PNL TOTAL CA: CPT | Mod: ORL

## 2023-09-07 PROCEDURE — 36415 COLL VENOUS BLD VENIPUNCTURE: CPT | Mod: ORL

## 2023-09-13 ENCOUNTER — LAB REQUISITION (OUTPATIENT)
Dept: LAB | Facility: CLINIC | Age: 73
End: 2023-09-13
Payer: MEDICARE

## 2023-09-13 DIAGNOSIS — N18.30 CHRONIC KIDNEY DISEASE, STAGE 3 UNSPECIFIED (H): ICD-10-CM

## 2023-09-14 LAB
ANION GAP SERPL CALCULATED.3IONS-SCNC: 14 MMOL/L (ref 7–15)
BUN SERPL-MCNC: 58.7 MG/DL (ref 8–23)
CALCIUM SERPL-MCNC: 11 MG/DL (ref 8.8–10.2)
CHLORIDE SERPL-SCNC: 112 MMOL/L (ref 98–107)
CREAT SERPL-MCNC: 2.45 MG/DL (ref 0.51–0.95)
DEPRECATED HCO3 PLAS-SCNC: 19 MMOL/L (ref 22–29)
EGFRCR SERPLBLD CKD-EPI 2021: 20 ML/MIN/1.73M2
GLUCOSE SERPL-MCNC: 79 MG/DL (ref 70–99)
POTASSIUM SERPL-SCNC: 5.3 MMOL/L (ref 3.4–5.3)
SODIUM SERPL-SCNC: 145 MMOL/L (ref 136–145)

## 2023-09-14 PROCEDURE — 36415 COLL VENOUS BLD VENIPUNCTURE: CPT | Mod: ORL

## 2023-09-14 PROCEDURE — P9604 ONE-WAY ALLOW PRORATED TRIP: HCPCS | Mod: ORL

## 2023-09-14 PROCEDURE — 80048 BASIC METABOLIC PNL TOTAL CA: CPT | Mod: ORL

## 2023-09-18 NOTE — ED NOTES
Helped patient dress for discharge. Discharge instructions reviewed with patient including follow-up care plan. Educated on medication regime and advised not to stop prescribed medication without consulting their physician. Reviewed safety plan and outpatient resources.Denies SI. All belongings which where brought into the hospital have been returned to patient. Escorted off the unit at 1315 in the wheelchair accompanied by staff. Discharged to home with her brother.     Pt called and stated she needs a letter for her sister who will be caring for her when she has surgery. Pt stated her sisters employer is requiring letter.  Number to be reached at    CELL: [phone]

## 2023-09-20 ENCOUNTER — LAB REQUISITION (OUTPATIENT)
Dept: LAB | Facility: CLINIC | Age: 73
End: 2023-09-20
Payer: MEDICARE

## 2023-09-20 DIAGNOSIS — N18.4 CHRONIC KIDNEY DISEASE, STAGE 4 (SEVERE) (H): ICD-10-CM

## 2023-09-21 LAB
ANION GAP SERPL CALCULATED.3IONS-SCNC: 11 MMOL/L (ref 7–15)
BUN SERPL-MCNC: 64 MG/DL (ref 8–23)
CALCIUM SERPL-MCNC: 11 MG/DL (ref 8.8–10.2)
CHLORIDE SERPL-SCNC: 112 MMOL/L (ref 98–107)
CREAT SERPL-MCNC: 2.5 MG/DL (ref 0.51–0.95)
DEPRECATED HCO3 PLAS-SCNC: 20 MMOL/L (ref 22–29)
EGFRCR SERPLBLD CKD-EPI 2021: 20 ML/MIN/1.73M2
GLUCOSE SERPL-MCNC: 88 MG/DL (ref 70–99)
POTASSIUM SERPL-SCNC: 5.3 MMOL/L (ref 3.4–5.3)
SODIUM SERPL-SCNC: 143 MMOL/L (ref 136–145)

## 2023-09-21 PROCEDURE — 80048 BASIC METABOLIC PNL TOTAL CA: CPT | Mod: ORL | Performed by: PHYSICIAN ASSISTANT

## 2023-09-21 PROCEDURE — 80048 BASIC METABOLIC PNL TOTAL CA: CPT | Mod: ORL

## 2023-09-21 PROCEDURE — 36415 COLL VENOUS BLD VENIPUNCTURE: CPT | Mod: ORL | Performed by: PHYSICIAN ASSISTANT

## 2023-09-21 PROCEDURE — P9604 ONE-WAY ALLOW PRORATED TRIP: HCPCS | Mod: ORL

## 2023-09-21 PROCEDURE — 36415 COLL VENOUS BLD VENIPUNCTURE: CPT | Mod: ORL

## 2023-09-21 PROCEDURE — P9604 ONE-WAY ALLOW PRORATED TRIP: HCPCS | Mod: ORL | Performed by: PHYSICIAN ASSISTANT

## 2023-09-22 ENCOUNTER — LAB (OUTPATIENT)
Dept: INFUSION THERAPY | Facility: CLINIC | Age: 73
End: 2023-09-22
Attending: INTERNAL MEDICINE
Payer: MEDICARE

## 2023-09-22 DIAGNOSIS — N18.9 ANEMIA IN CHRONIC KIDNEY DISEASE, UNSPECIFIED CKD STAGE: Primary | ICD-10-CM

## 2023-09-22 DIAGNOSIS — D63.1 ANEMIA IN CHRONIC KIDNEY DISEASE, UNSPECIFIED CKD STAGE: Primary | ICD-10-CM

## 2023-09-22 DIAGNOSIS — N18.4 CHRONIC RENAL DISEASE, STAGE IV (H): ICD-10-CM

## 2023-09-22 LAB — HGB BLD-MCNC: 10.2 G/DL (ref 11.7–15.7)

## 2023-09-22 PROCEDURE — 85018 HEMOGLOBIN: CPT | Performed by: PHYSICIAN ASSISTANT

## 2023-09-22 PROCEDURE — 36415 COLL VENOUS BLD VENIPUNCTURE: CPT | Performed by: PHYSICIAN ASSISTANT

## 2023-09-22 RX ORDER — MEPERIDINE HYDROCHLORIDE 25 MG/ML
25 INJECTION INTRAMUSCULAR; INTRAVENOUS; SUBCUTANEOUS EVERY 30 MIN PRN
Status: CANCELLED | OUTPATIENT
Start: 2023-09-26

## 2023-09-22 RX ORDER — ALBUTEROL SULFATE 90 UG/1
1-2 AEROSOL, METERED RESPIRATORY (INHALATION)
Status: CANCELLED
Start: 2023-09-26

## 2023-09-22 RX ORDER — NALOXONE HYDROCHLORIDE 0.4 MG/ML
0.2 INJECTION, SOLUTION INTRAMUSCULAR; INTRAVENOUS; SUBCUTANEOUS
Status: CANCELLED | OUTPATIENT
Start: 2023-09-26

## 2023-09-22 RX ORDER — ALBUTEROL SULFATE 0.83 MG/ML
2.5 SOLUTION RESPIRATORY (INHALATION)
Status: CANCELLED | OUTPATIENT
Start: 2023-09-26

## 2023-09-22 RX ORDER — METHYLPREDNISOLONE SODIUM SUCCINATE 125 MG/2ML
125 INJECTION, POWDER, LYOPHILIZED, FOR SOLUTION INTRAMUSCULAR; INTRAVENOUS
Status: CANCELLED
Start: 2023-09-26

## 2023-09-22 RX ORDER — EPINEPHRINE 1 MG/ML
0.3 INJECTION, SOLUTION INTRAMUSCULAR; SUBCUTANEOUS EVERY 5 MIN PRN
Status: CANCELLED | OUTPATIENT
Start: 2023-09-26

## 2023-09-22 RX ORDER — DIPHENHYDRAMINE HYDROCHLORIDE 50 MG/ML
50 INJECTION INTRAMUSCULAR; INTRAVENOUS
Status: CANCELLED
Start: 2023-09-26

## 2023-09-23 ENCOUNTER — HEALTH MAINTENANCE LETTER (OUTPATIENT)
Age: 73
End: 2023-09-23

## 2023-09-27 ENCOUNTER — LAB REQUISITION (OUTPATIENT)
Dept: LAB | Facility: CLINIC | Age: 73
End: 2023-09-27
Payer: MEDICARE

## 2023-09-27 DIAGNOSIS — N18.30 CHRONIC KIDNEY DISEASE, STAGE 3 UNSPECIFIED (H): ICD-10-CM

## 2023-09-28 LAB
ANION GAP SERPL CALCULATED.3IONS-SCNC: 11 MMOL/L (ref 7–15)
BUN SERPL-MCNC: 59.4 MG/DL (ref 8–23)
CALCIUM SERPL-MCNC: 11 MG/DL (ref 8.8–10.2)
CHLORIDE SERPL-SCNC: 111 MMOL/L (ref 98–107)
CREAT SERPL-MCNC: 2.46 MG/DL (ref 0.51–0.95)
EGFRCR SERPLBLD CKD-EPI 2021: 20 ML/MIN/1.73M2
GLUCOSE SERPL-MCNC: 99 MG/DL (ref 70–99)
HCO3 SERPL-SCNC: 17 MMOL/L (ref 22–29)
POTASSIUM SERPL-SCNC: 5.8 MMOL/L (ref 3.4–5.3)
SODIUM SERPL-SCNC: 139 MMOL/L (ref 135–145)

## 2023-09-28 PROCEDURE — 80048 BASIC METABOLIC PNL TOTAL CA: CPT | Mod: ORL

## 2023-09-28 PROCEDURE — 36415 COLL VENOUS BLD VENIPUNCTURE: CPT | Mod: ORL

## 2023-09-28 PROCEDURE — P9604 ONE-WAY ALLOW PRORATED TRIP: HCPCS | Mod: ORL

## 2023-10-04 ENCOUNTER — LAB REQUISITION (OUTPATIENT)
Dept: LAB | Facility: CLINIC | Age: 73
End: 2023-10-04
Payer: MEDICARE

## 2023-10-04 DIAGNOSIS — N18.4 CHRONIC KIDNEY DISEASE, STAGE 4 (SEVERE) (H): ICD-10-CM

## 2023-10-05 LAB
ANION GAP SERPL CALCULATED.3IONS-SCNC: 10 MMOL/L (ref 7–15)
BUN SERPL-MCNC: 51.6 MG/DL (ref 8–23)
CALCIUM SERPL-MCNC: 10.5 MG/DL (ref 8.8–10.2)
CHLORIDE SERPL-SCNC: 115 MMOL/L (ref 98–107)
CREAT SERPL-MCNC: 2.43 MG/DL (ref 0.51–0.95)
DEPRECATED HCO3 PLAS-SCNC: 17 MMOL/L (ref 22–29)
EGFRCR SERPLBLD CKD-EPI 2021: 21 ML/MIN/1.73M2
GLUCOSE SERPL-MCNC: 103 MG/DL (ref 70–99)
POTASSIUM SERPL-SCNC: 5.3 MMOL/L (ref 3.4–5.3)
SODIUM SERPL-SCNC: 142 MMOL/L (ref 135–145)

## 2023-10-05 PROCEDURE — 36415 COLL VENOUS BLD VENIPUNCTURE: CPT | Mod: ORL | Performed by: PHYSICIAN ASSISTANT

## 2023-10-05 PROCEDURE — P9604 ONE-WAY ALLOW PRORATED TRIP: HCPCS | Mod: ORL | Performed by: PHYSICIAN ASSISTANT

## 2023-10-05 PROCEDURE — 80048 BASIC METABOLIC PNL TOTAL CA: CPT | Mod: ORL | Performed by: PHYSICIAN ASSISTANT

## 2023-10-09 DIAGNOSIS — K25.1 ACUTE GASTRIC ULCER WITH PERFORATION (H): ICD-10-CM

## 2023-10-09 RX ORDER — PANTOPRAZOLE SODIUM 40 MG/1
TABLET, DELAYED RELEASE ORAL
Qty: 90 TABLET | Refills: 2 | Status: SHIPPED | OUTPATIENT
Start: 2023-10-09 | End: 2024-09-03

## 2023-10-11 ENCOUNTER — LAB REQUISITION (OUTPATIENT)
Dept: LAB | Facility: CLINIC | Age: 73
End: 2023-10-11
Payer: MEDICARE

## 2023-10-11 DIAGNOSIS — N18.30 CHRONIC KIDNEY DISEASE, STAGE 3 UNSPECIFIED (H): ICD-10-CM

## 2023-10-12 LAB
ANION GAP SERPL CALCULATED.3IONS-SCNC: 12 MMOL/L (ref 7–15)
BUN SERPL-MCNC: 58 MG/DL (ref 8–23)
CALCIUM SERPL-MCNC: 10.8 MG/DL (ref 8.8–10.2)
CHLORIDE SERPL-SCNC: 111 MMOL/L (ref 98–107)
CREAT SERPL-MCNC: 2.36 MG/DL (ref 0.51–0.95)
DEPRECATED HCO3 PLAS-SCNC: 19 MMOL/L (ref 22–29)
EGFRCR SERPLBLD CKD-EPI 2021: 21 ML/MIN/1.73M2
GLUCOSE SERPL-MCNC: 94 MG/DL (ref 70–99)
POTASSIUM SERPL-SCNC: 5.6 MMOL/L (ref 3.4–5.3)
SODIUM SERPL-SCNC: 142 MMOL/L (ref 135–145)

## 2023-10-12 PROCEDURE — P9604 ONE-WAY ALLOW PRORATED TRIP: HCPCS | Mod: ORL | Performed by: PHYSICIAN ASSISTANT

## 2023-10-12 PROCEDURE — 80048 BASIC METABOLIC PNL TOTAL CA: CPT | Mod: ORL | Performed by: PHYSICIAN ASSISTANT

## 2023-10-12 PROCEDURE — 36415 COLL VENOUS BLD VENIPUNCTURE: CPT | Mod: ORL | Performed by: PHYSICIAN ASSISTANT

## 2023-10-18 ENCOUNTER — LAB REQUISITION (OUTPATIENT)
Dept: LAB | Facility: CLINIC | Age: 73
End: 2023-10-18
Payer: MEDICARE

## 2023-10-18 DIAGNOSIS — N18.4 CHRONIC KIDNEY DISEASE, STAGE 4 (SEVERE) (H): ICD-10-CM

## 2023-10-19 LAB
ANION GAP SERPL CALCULATED.3IONS-SCNC: 10 MMOL/L (ref 7–15)
BUN SERPL-MCNC: 61.4 MG/DL (ref 8–23)
CALCIUM SERPL-MCNC: 10.8 MG/DL (ref 8.8–10.2)
CHLORIDE SERPL-SCNC: 111 MMOL/L (ref 98–107)
CREAT SERPL-MCNC: 2.49 MG/DL (ref 0.51–0.95)
DEPRECATED HCO3 PLAS-SCNC: 21 MMOL/L (ref 22–29)
EGFRCR SERPLBLD CKD-EPI 2021: 20 ML/MIN/1.73M2
GLUCOSE SERPL-MCNC: 103 MG/DL (ref 70–99)
POTASSIUM SERPL-SCNC: 5.2 MMOL/L (ref 3.4–5.3)
SODIUM SERPL-SCNC: 142 MMOL/L (ref 135–145)

## 2023-10-19 PROCEDURE — 36415 COLL VENOUS BLD VENIPUNCTURE: CPT | Mod: ORL

## 2023-10-19 PROCEDURE — P9604 ONE-WAY ALLOW PRORATED TRIP: HCPCS | Mod: ORL

## 2023-10-19 PROCEDURE — 80048 BASIC METABOLIC PNL TOTAL CA: CPT | Mod: ORL

## 2023-10-20 ENCOUNTER — LAB (OUTPATIENT)
Dept: INFUSION THERAPY | Facility: CLINIC | Age: 73
End: 2023-10-20
Attending: INTERNAL MEDICINE
Payer: MEDICARE

## 2023-10-20 DIAGNOSIS — D63.1 ANEMIA IN CHRONIC KIDNEY DISEASE, UNSPECIFIED CKD STAGE: Primary | ICD-10-CM

## 2023-10-20 DIAGNOSIS — D64.9 CHRONIC ANEMIA: Primary | ICD-10-CM

## 2023-10-20 DIAGNOSIS — N18.4 CHRONIC RENAL DISEASE, STAGE IV (H): ICD-10-CM

## 2023-10-20 DIAGNOSIS — N18.4 CHRONIC KIDNEY DISEASE, STAGE 4 (SEVERE) (H): ICD-10-CM

## 2023-10-20 DIAGNOSIS — N18.9 ANEMIA IN CHRONIC KIDNEY DISEASE, UNSPECIFIED CKD STAGE: Primary | ICD-10-CM

## 2023-10-20 LAB
FERRITIN SERPL-MCNC: 363 NG/ML (ref 11–328)
HGB BLD-MCNC: 10.4 G/DL (ref 11.7–15.7)
IRON BINDING CAPACITY (ROCHE): 313 UG/DL (ref 240–430)
IRON SATN MFR SERPL: 38 % (ref 15–46)
IRON SERPL-MCNC: 118 UG/DL (ref 37–145)

## 2023-10-20 PROCEDURE — 83550 IRON BINDING TEST: CPT | Performed by: INTERNAL MEDICINE

## 2023-10-20 PROCEDURE — 36415 COLL VENOUS BLD VENIPUNCTURE: CPT | Performed by: INTERNAL MEDICINE

## 2023-10-20 PROCEDURE — 82728 ASSAY OF FERRITIN: CPT | Performed by: INTERNAL MEDICINE

## 2023-10-20 PROCEDURE — 85018 HEMOGLOBIN: CPT | Performed by: PHYSICIAN ASSISTANT

## 2023-10-20 RX ORDER — ALBUTEROL SULFATE 90 UG/1
1-2 AEROSOL, METERED RESPIRATORY (INHALATION)
Status: CANCELLED
Start: 2023-10-22

## 2023-10-20 RX ORDER — DIPHENHYDRAMINE HYDROCHLORIDE 50 MG/ML
50 INJECTION INTRAMUSCULAR; INTRAVENOUS
Status: CANCELLED
Start: 2023-10-22

## 2023-10-20 RX ORDER — NALOXONE HYDROCHLORIDE 0.4 MG/ML
0.2 INJECTION, SOLUTION INTRAMUSCULAR; INTRAVENOUS; SUBCUTANEOUS
Status: CANCELLED | OUTPATIENT
Start: 2023-10-22

## 2023-10-20 RX ORDER — MEPERIDINE HYDROCHLORIDE 25 MG/ML
25 INJECTION INTRAMUSCULAR; INTRAVENOUS; SUBCUTANEOUS EVERY 30 MIN PRN
Status: CANCELLED | OUTPATIENT
Start: 2023-10-22

## 2023-10-20 RX ORDER — EPINEPHRINE 1 MG/ML
0.3 INJECTION, SOLUTION INTRAMUSCULAR; SUBCUTANEOUS EVERY 5 MIN PRN
Status: CANCELLED | OUTPATIENT
Start: 2023-10-22

## 2023-10-20 RX ORDER — ALBUTEROL SULFATE 0.83 MG/ML
2.5 SOLUTION RESPIRATORY (INHALATION)
Status: CANCELLED | OUTPATIENT
Start: 2023-10-22

## 2023-10-20 RX ORDER — METHYLPREDNISOLONE SODIUM SUCCINATE 125 MG/2ML
125 INJECTION, POWDER, LYOPHILIZED, FOR SOLUTION INTRAMUSCULAR; INTRAVENOUS
Status: CANCELLED
Start: 2023-10-22

## 2023-10-20 NOTE — PROGRESS NOTES
Patient informed her hgb was 10.4 and she did not qualify for Aranesp. Patient is scheduled 11/17/23 for labs/possible aranesp. Patient verbalized understanding to the above.  Ruthann WHALEY RN

## 2023-10-26 ENCOUNTER — LAB REQUISITION (OUTPATIENT)
Dept: LAB | Facility: CLINIC | Age: 73
End: 2023-10-26
Payer: MEDICARE

## 2023-10-26 DIAGNOSIS — N18.30 CHRONIC KIDNEY DISEASE, STAGE 3 UNSPECIFIED (H): ICD-10-CM

## 2023-10-26 DIAGNOSIS — N18.4 CHRONIC KIDNEY DISEASE, STAGE 4 (SEVERE) (H): ICD-10-CM

## 2023-10-26 LAB
ANION GAP SERPL CALCULATED.3IONS-SCNC: 9 MMOL/L (ref 7–15)
BUN SERPL-MCNC: 48.8 MG/DL (ref 8–23)
CALCIUM SERPL-MCNC: 10.1 MG/DL (ref 8.8–10.2)
CHLORIDE SERPL-SCNC: 108 MMOL/L (ref 98–107)
CREAT SERPL-MCNC: 2.27 MG/DL (ref 0.51–0.95)
DEPRECATED HCO3 PLAS-SCNC: 19 MMOL/L (ref 22–29)
EGFRCR SERPLBLD CKD-EPI 2021: 22 ML/MIN/1.73M2
GLUCOSE SERPL-MCNC: 194 MG/DL (ref 70–99)
POTASSIUM SERPL-SCNC: 5.5 MMOL/L (ref 3.4–5.3)
SODIUM SERPL-SCNC: 136 MMOL/L (ref 135–145)

## 2023-10-26 PROCEDURE — 80048 BASIC METABOLIC PNL TOTAL CA: CPT | Mod: ORL

## 2023-11-01 ENCOUNTER — LAB REQUISITION (OUTPATIENT)
Dept: LAB | Facility: CLINIC | Age: 73
End: 2023-11-01
Payer: MEDICARE

## 2023-11-01 DIAGNOSIS — N18.4 CHRONIC KIDNEY DISEASE, STAGE 4 (SEVERE) (H): ICD-10-CM

## 2023-11-02 LAB
ANION GAP SERPL CALCULATED.3IONS-SCNC: 9 MMOL/L (ref 7–15)
BUN SERPL-MCNC: 48 MG/DL (ref 8–23)
CALCIUM SERPL-MCNC: 10.4 MG/DL (ref 8.8–10.2)
CHLORIDE SERPL-SCNC: 107 MMOL/L (ref 98–107)
CREAT SERPL-MCNC: 2.17 MG/DL (ref 0.51–0.95)
DEPRECATED HCO3 PLAS-SCNC: 19 MMOL/L (ref 22–29)
EGFRCR SERPLBLD CKD-EPI 2021: 24 ML/MIN/1.73M2
GLUCOSE SERPL-MCNC: 61 MG/DL (ref 70–99)
POTASSIUM SERPL-SCNC: 6.4 MMOL/L (ref 3.4–5.3)
SODIUM SERPL-SCNC: 135 MMOL/L (ref 135–145)

## 2023-11-02 PROCEDURE — 80048 BASIC METABOLIC PNL TOTAL CA: CPT | Mod: ORL

## 2023-11-02 PROCEDURE — P9604 ONE-WAY ALLOW PRORATED TRIP: HCPCS | Mod: ORL

## 2023-11-02 PROCEDURE — 36415 COLL VENOUS BLD VENIPUNCTURE: CPT | Mod: ORL

## 2023-11-03 ENCOUNTER — LAB (OUTPATIENT)
Dept: LAB | Facility: CLINIC | Age: 73
End: 2023-11-03
Payer: MEDICARE

## 2023-11-03 DIAGNOSIS — E87.5 HYPERPOTASSEMIA: ICD-10-CM

## 2023-11-03 LAB
ANION GAP SERPL CALCULATED.3IONS-SCNC: 11 MMOL/L (ref 7–15)
BUN SERPL-MCNC: 48.8 MG/DL (ref 8–23)
CALCIUM SERPL-MCNC: 10.3 MG/DL (ref 8.8–10.2)
CHLORIDE SERPL-SCNC: 108 MMOL/L (ref 98–107)
CREAT SERPL-MCNC: 2.35 MG/DL (ref 0.51–0.95)
DEPRECATED HCO3 PLAS-SCNC: 18 MMOL/L (ref 22–29)
EGFRCR SERPLBLD CKD-EPI 2021: 21 ML/MIN/1.73M2
GLUCOSE SERPL-MCNC: 122 MG/DL (ref 70–99)
POTASSIUM SERPL-SCNC: 5.1 MMOL/L (ref 3.4–5.3)
SODIUM SERPL-SCNC: 137 MMOL/L (ref 135–145)

## 2023-11-03 PROCEDURE — 80048 BASIC METABOLIC PNL TOTAL CA: CPT

## 2023-11-03 PROCEDURE — 36415 COLL VENOUS BLD VENIPUNCTURE: CPT

## 2023-11-08 ENCOUNTER — LAB REQUISITION (OUTPATIENT)
Dept: LAB | Facility: CLINIC | Age: 73
End: 2023-11-08
Payer: MEDICARE

## 2023-11-08 DIAGNOSIS — N18.30 CHRONIC KIDNEY DISEASE, STAGE 3 UNSPECIFIED (H): ICD-10-CM

## 2023-11-09 ENCOUNTER — MEDICAL CORRESPONDENCE (OUTPATIENT)
Dept: HEALTH INFORMATION MANAGEMENT | Facility: CLINIC | Age: 73
End: 2023-11-09

## 2023-11-09 DIAGNOSIS — E87.5 HYPERPOTASSEMIA: Primary | ICD-10-CM

## 2023-11-09 LAB
ANION GAP SERPL CALCULATED.3IONS-SCNC: 13 MMOL/L (ref 7–15)
BUN SERPL-MCNC: 51.3 MG/DL (ref 8–23)
CALCIUM SERPL-MCNC: 10.4 MG/DL (ref 8.8–10.2)
CHLORIDE SERPL-SCNC: 112 MMOL/L (ref 98–107)
CREAT SERPL-MCNC: 2.24 MG/DL (ref 0.51–0.95)
DEPRECATED HCO3 PLAS-SCNC: 18 MMOL/L (ref 22–29)
EGFRCR SERPLBLD CKD-EPI 2021: 23 ML/MIN/1.73M2
GLUCOSE SERPL-MCNC: 89 MG/DL (ref 70–99)
POTASSIUM SERPL-SCNC: 6.4 MMOL/L (ref 3.4–5.3)
SODIUM SERPL-SCNC: 143 MMOL/L (ref 135–145)

## 2023-11-09 PROCEDURE — 80048 BASIC METABOLIC PNL TOTAL CA: CPT | Mod: ORL

## 2023-11-09 PROCEDURE — 36415 COLL VENOUS BLD VENIPUNCTURE: CPT | Mod: ORL

## 2023-11-09 PROCEDURE — P9604 ONE-WAY ALLOW PRORATED TRIP: HCPCS | Mod: ORL

## 2023-11-10 ENCOUNTER — LAB (OUTPATIENT)
Dept: LAB | Facility: CLINIC | Age: 73
End: 2023-11-10
Payer: MEDICARE

## 2023-11-10 DIAGNOSIS — E87.5 HYPERPOTASSEMIA: ICD-10-CM

## 2023-11-10 LAB
ANION GAP SERPL CALCULATED.3IONS-SCNC: 12 MMOL/L (ref 7–15)
BUN SERPL-MCNC: 51.5 MG/DL (ref 8–23)
CALCIUM SERPL-MCNC: 10.1 MG/DL (ref 8.8–10.2)
CHLORIDE SERPL-SCNC: 108 MMOL/L (ref 98–107)
CREAT SERPL-MCNC: 2.3 MG/DL (ref 0.51–0.95)
DEPRECATED HCO3 PLAS-SCNC: 18 MMOL/L (ref 22–29)
EGFRCR SERPLBLD CKD-EPI 2021: 22 ML/MIN/1.73M2
GLUCOSE SERPL-MCNC: 123 MG/DL (ref 70–99)
POTASSIUM SERPL-SCNC: 4.8 MMOL/L (ref 3.4–5.3)
SODIUM SERPL-SCNC: 138 MMOL/L (ref 135–145)

## 2023-11-10 PROCEDURE — 80048 BASIC METABOLIC PNL TOTAL CA: CPT

## 2023-11-10 PROCEDURE — 36415 COLL VENOUS BLD VENIPUNCTURE: CPT

## 2023-11-15 ENCOUNTER — LAB REQUISITION (OUTPATIENT)
Dept: LAB | Facility: CLINIC | Age: 73
End: 2023-11-15
Payer: MEDICARE

## 2023-11-15 DIAGNOSIS — N18.4 CHRONIC KIDNEY DISEASE, STAGE 4 (SEVERE) (H): ICD-10-CM

## 2023-11-16 LAB
ANION GAP SERPL CALCULATED.3IONS-SCNC: 9 MMOL/L (ref 7–15)
BUN SERPL-MCNC: 48.5 MG/DL (ref 8–23)
CALCIUM SERPL-MCNC: 10.7 MG/DL (ref 8.8–10.2)
CHLORIDE SERPL-SCNC: 114 MMOL/L (ref 98–107)
CREAT SERPL-MCNC: 2.43 MG/DL (ref 0.51–0.95)
DEPRECATED HCO3 PLAS-SCNC: 21 MMOL/L (ref 22–29)
EGFRCR SERPLBLD CKD-EPI 2021: 21 ML/MIN/1.73M2
GLUCOSE SERPL-MCNC: 92 MG/DL (ref 70–99)
POTASSIUM SERPL-SCNC: 5.4 MMOL/L (ref 3.4–5.3)
SODIUM SERPL-SCNC: 144 MMOL/L (ref 135–145)

## 2023-11-16 PROCEDURE — 80048 BASIC METABOLIC PNL TOTAL CA: CPT | Mod: ORL

## 2023-11-16 PROCEDURE — P9604 ONE-WAY ALLOW PRORATED TRIP: HCPCS | Mod: ORL

## 2023-11-16 PROCEDURE — 36415 COLL VENOUS BLD VENIPUNCTURE: CPT | Mod: ORL

## 2023-11-17 ENCOUNTER — LAB (OUTPATIENT)
Dept: INFUSION THERAPY | Facility: CLINIC | Age: 73
End: 2023-11-17
Attending: INTERNAL MEDICINE
Payer: MEDICARE

## 2023-11-17 VITALS
RESPIRATION RATE: 18 BRPM | HEART RATE: 76 BPM | SYSTOLIC BLOOD PRESSURE: 113 MMHG | DIASTOLIC BLOOD PRESSURE: 77 MMHG | TEMPERATURE: 98.4 F

## 2023-11-17 DIAGNOSIS — D63.1 ANEMIA IN CHRONIC KIDNEY DISEASE, UNSPECIFIED CKD STAGE: Primary | ICD-10-CM

## 2023-11-17 DIAGNOSIS — N18.9 ANEMIA IN CHRONIC KIDNEY DISEASE, UNSPECIFIED CKD STAGE: Primary | ICD-10-CM

## 2023-11-17 DIAGNOSIS — N18.4 CHRONIC RENAL DISEASE, STAGE IV (H): ICD-10-CM

## 2023-11-17 LAB
BASOPHILS # BLD AUTO: 0.1 10E3/UL (ref 0–0.2)
BASOPHILS NFR BLD AUTO: 1 %
EOSINOPHIL # BLD AUTO: 0.2 10E3/UL (ref 0–0.7)
EOSINOPHIL NFR BLD AUTO: 2 %
ERYTHROCYTE [DISTWIDTH] IN BLOOD BY AUTOMATED COUNT: 16.3 % (ref 10–15)
HCT VFR BLD AUTO: 32.2 % (ref 35–47)
HGB BLD-MCNC: 9.3 G/DL (ref 11.7–15.7)
IMM GRANULOCYTES # BLD: 0 10E3/UL
IMM GRANULOCYTES NFR BLD: 0 %
LYMPHOCYTES # BLD AUTO: 1.1 10E3/UL (ref 0.8–5.3)
LYMPHOCYTES NFR BLD AUTO: 14 %
MCH RBC QN AUTO: 21.4 PG (ref 26.5–33)
MCHC RBC AUTO-ENTMCNC: 28.9 G/DL (ref 31.5–36.5)
MCV RBC AUTO: 74 FL (ref 78–100)
MONOCYTES # BLD AUTO: 0.6 10E3/UL (ref 0–1.3)
MONOCYTES NFR BLD AUTO: 8 %
NEUTROPHILS # BLD AUTO: 6 10E3/UL (ref 1.6–8.3)
NEUTROPHILS NFR BLD AUTO: 75 %
NRBC # BLD AUTO: 0 10E3/UL
NRBC BLD AUTO-RTO: 0 /100
PLATELET # BLD AUTO: 362 10E3/UL (ref 150–450)
RBC # BLD AUTO: 4.34 10E6/UL (ref 3.8–5.2)
WBC # BLD AUTO: 7.9 10E3/UL (ref 4–11)

## 2023-11-17 PROCEDURE — 85025 COMPLETE CBC W/AUTO DIFF WBC: CPT | Performed by: INTERNAL MEDICINE

## 2023-11-17 PROCEDURE — 36415 COLL VENOUS BLD VENIPUNCTURE: CPT

## 2023-11-17 PROCEDURE — 250N000011 HC RX IP 250 OP 636: Mod: JZ | Performed by: INTERNAL MEDICINE

## 2023-11-17 PROCEDURE — 96372 THER/PROPH/DIAG INJ SC/IM: CPT | Performed by: INTERNAL MEDICINE

## 2023-11-17 RX ORDER — METHYLPREDNISOLONE SODIUM SUCCINATE 125 MG/2ML
125 INJECTION, POWDER, LYOPHILIZED, FOR SOLUTION INTRAMUSCULAR; INTRAVENOUS
Status: CANCELLED
Start: 2023-11-21

## 2023-11-17 RX ORDER — ALBUTEROL SULFATE 0.83 MG/ML
2.5 SOLUTION RESPIRATORY (INHALATION)
Status: CANCELLED | OUTPATIENT
Start: 2023-11-21

## 2023-11-17 RX ORDER — ALBUTEROL SULFATE 90 UG/1
1-2 AEROSOL, METERED RESPIRATORY (INHALATION)
Status: CANCELLED
Start: 2023-11-21

## 2023-11-17 RX ORDER — EPINEPHRINE 1 MG/ML
0.3 INJECTION, SOLUTION INTRAMUSCULAR; SUBCUTANEOUS EVERY 5 MIN PRN
Status: CANCELLED | OUTPATIENT
Start: 2023-11-21

## 2023-11-17 RX ORDER — NALOXONE HYDROCHLORIDE 0.4 MG/ML
0.2 INJECTION, SOLUTION INTRAMUSCULAR; INTRAVENOUS; SUBCUTANEOUS
Status: CANCELLED | OUTPATIENT
Start: 2023-11-21

## 2023-11-17 RX ORDER — MEPERIDINE HYDROCHLORIDE 25 MG/ML
25 INJECTION INTRAMUSCULAR; INTRAVENOUS; SUBCUTANEOUS EVERY 30 MIN PRN
Status: CANCELLED | OUTPATIENT
Start: 2023-11-21

## 2023-11-17 RX ORDER — DIPHENHYDRAMINE HYDROCHLORIDE 50 MG/ML
50 INJECTION INTRAMUSCULAR; INTRAVENOUS
Status: CANCELLED
Start: 2023-11-21

## 2023-11-17 RX ADMIN — DARBEPOETIN ALFA 100 MCG: 100 INJECTION, SOLUTION INTRAVENOUS; SUBCUTANEOUS at 11:03

## 2023-11-17 NOTE — PROGRESS NOTES
[id number] JACQUELIN [id number] Nursing Note:  Diana Santiago presents today for aranesp.    Patient seen by provider today: No   present during visit today: Not Applicable.    Note: N/A.      Intravenous Access:  No Intravenous access/labs at this visit.    Treatment Conditions:  Lab Results   Component Value Date    HGB 9.3 (L) 11/17/2023    WBC 7.9 11/17/2023    ANEU 9.6 (H) 09/09/2022    ANEUTAUTO 6.0 11/17/2023     11/17/2023        Results reviewed, labs MET treatment parameters, ok to proceed with treatment.      Post Infusion Assessment:  Patient tolerated injection without incident.       Discharge Plan:   Discharge instructions reviewed with: Patient.  Patient and/or family verbalized understanding of discharge instructions and all questions answered.  Patient discharged in stable condition accompanied by: self.  Departure Mode: Ambulatory.      Mabel Florze RN

## 2023-11-23 ENCOUNTER — LAB REQUISITION (OUTPATIENT)
Dept: LAB | Facility: CLINIC | Age: 73
End: 2023-11-23
Payer: MEDICARE

## 2023-11-23 DIAGNOSIS — N18.30 CHRONIC KIDNEY DISEASE, STAGE 3 UNSPECIFIED (H): ICD-10-CM

## 2023-11-24 ENCOUNTER — LAB REQUISITION (OUTPATIENT)
Dept: LAB | Facility: CLINIC | Age: 73
End: 2023-11-24
Payer: MEDICARE

## 2023-11-24 DIAGNOSIS — N18.4 CHRONIC KIDNEY DISEASE, STAGE 4 (SEVERE) (H): ICD-10-CM

## 2023-11-25 LAB
ANION GAP SERPL CALCULATED.3IONS-SCNC: 9 MMOL/L (ref 7–15)
BUN SERPL-MCNC: 37.6 MG/DL (ref 8–23)
CALCIUM SERPL-MCNC: 10.2 MG/DL (ref 8.8–10.2)
CHLORIDE SERPL-SCNC: 108 MMOL/L (ref 98–107)
CREAT SERPL-MCNC: 2.05 MG/DL (ref 0.51–0.95)
DEPRECATED HCO3 PLAS-SCNC: 24 MMOL/L (ref 22–29)
EGFRCR SERPLBLD CKD-EPI 2021: 25 ML/MIN/1.73M2
GLUCOSE SERPL-MCNC: 82 MG/DL (ref 70–99)
POTASSIUM SERPL-SCNC: 4.4 MMOL/L (ref 3.4–5.3)
SODIUM SERPL-SCNC: 141 MMOL/L (ref 135–145)

## 2023-11-25 PROCEDURE — 80048 BASIC METABOLIC PNL TOTAL CA: CPT | Mod: ORL

## 2023-11-29 ENCOUNTER — LAB REQUISITION (OUTPATIENT)
Dept: LAB | Facility: CLINIC | Age: 73
End: 2023-11-29
Payer: MEDICARE

## 2023-11-29 DIAGNOSIS — N18.4 CHRONIC KIDNEY DISEASE, STAGE 4 (SEVERE) (H): ICD-10-CM

## 2023-11-30 DIAGNOSIS — E23.2 DIABETES INSIPIDUS (H): ICD-10-CM

## 2023-11-30 LAB
ANION GAP SERPL CALCULATED.3IONS-SCNC: 10 MMOL/L (ref 7–15)
BUN SERPL-MCNC: 41.7 MG/DL (ref 8–23)
CALCIUM SERPL-MCNC: 10.6 MG/DL (ref 8.8–10.2)
CHLORIDE SERPL-SCNC: 113 MMOL/L (ref 98–107)
CREAT SERPL-MCNC: 2.23 MG/DL (ref 0.51–0.95)
DEPRECATED HCO3 PLAS-SCNC: 21 MMOL/L (ref 22–29)
EGFRCR SERPLBLD CKD-EPI 2021: 23 ML/MIN/1.73M2
GLUCOSE SERPL-MCNC: 86 MG/DL (ref 70–99)
POTASSIUM SERPL-SCNC: 5.2 MMOL/L (ref 3.4–5.3)
SODIUM SERPL-SCNC: 144 MMOL/L (ref 135–145)

## 2023-11-30 PROCEDURE — 80048 BASIC METABOLIC PNL TOTAL CA: CPT | Mod: ORL

## 2023-11-30 PROCEDURE — P9604 ONE-WAY ALLOW PRORATED TRIP: HCPCS | Mod: ORL

## 2023-11-30 PROCEDURE — P9604 ONE-WAY ALLOW PRORATED TRIP: HCPCS | Mod: ORL | Performed by: INTERNAL MEDICINE

## 2023-11-30 PROCEDURE — 82310 ASSAY OF CALCIUM: CPT | Mod: ORL | Performed by: INTERNAL MEDICINE

## 2023-11-30 PROCEDURE — 36415 COLL VENOUS BLD VENIPUNCTURE: CPT | Mod: ORL | Performed by: INTERNAL MEDICINE

## 2023-11-30 PROCEDURE — 36415 COLL VENOUS BLD VENIPUNCTURE: CPT | Mod: ORL

## 2023-12-06 ENCOUNTER — LAB REQUISITION (OUTPATIENT)
Dept: LAB | Facility: CLINIC | Age: 73
End: 2023-12-06
Payer: MEDICARE

## 2023-12-06 DIAGNOSIS — N18.30 CHRONIC KIDNEY DISEASE, STAGE 3 UNSPECIFIED (H): ICD-10-CM

## 2023-12-06 RX ORDER — SODIUM BICARBONATE 650 MG/1
TABLET ORAL
Qty: 540 TABLET | Refills: 1 | Status: SHIPPED | OUTPATIENT
Start: 2023-12-06 | End: 2024-06-10

## 2023-12-07 LAB
ANION GAP SERPL CALCULATED.3IONS-SCNC: 9 MMOL/L (ref 7–15)
BUN SERPL-MCNC: 51 MG/DL (ref 8–23)
CALCIUM SERPL-MCNC: 10.3 MG/DL (ref 8.8–10.2)
CHLORIDE SERPL-SCNC: 108 MMOL/L (ref 98–107)
CREAT SERPL-MCNC: 2.36 MG/DL (ref 0.51–0.95)
DEPRECATED HCO3 PLAS-SCNC: 21 MMOL/L (ref 22–29)
EGFRCR SERPLBLD CKD-EPI 2021: 21 ML/MIN/1.73M2
GLUCOSE SERPL-MCNC: 102 MG/DL (ref 70–99)
POTASSIUM SERPL-SCNC: 5.4 MMOL/L (ref 3.4–5.3)
SODIUM SERPL-SCNC: 138 MMOL/L (ref 135–145)

## 2023-12-07 PROCEDURE — 80048 BASIC METABOLIC PNL TOTAL CA: CPT | Mod: ORL

## 2023-12-07 PROCEDURE — 82565 ASSAY OF CREATININE: CPT | Mod: ORL | Performed by: INTERNAL MEDICINE

## 2023-12-07 PROCEDURE — P9604 ONE-WAY ALLOW PRORATED TRIP: HCPCS | Mod: ORL | Performed by: INTERNAL MEDICINE

## 2023-12-07 PROCEDURE — 36415 COLL VENOUS BLD VENIPUNCTURE: CPT | Mod: ORL | Performed by: INTERNAL MEDICINE

## 2023-12-07 PROCEDURE — 36415 COLL VENOUS BLD VENIPUNCTURE: CPT | Mod: ORL

## 2023-12-07 PROCEDURE — P9604 ONE-WAY ALLOW PRORATED TRIP: HCPCS | Mod: ORL

## 2023-12-13 ENCOUNTER — LAB REQUISITION (OUTPATIENT)
Dept: LAB | Facility: CLINIC | Age: 73
End: 2023-12-13
Payer: MEDICARE

## 2023-12-13 DIAGNOSIS — N18.4 CHRONIC KIDNEY DISEASE, STAGE 4 (SEVERE) (H): ICD-10-CM

## 2023-12-13 NOTE — PROGRESS NOTES
Group Topic: BH RYNE Process Group    Date: 12/13/2023  Start Time: 1000  End Time: 1100  Facilitators: Marj Vazquez MSW; Henrry Brody APSW    Focus: Check In/ Process  Number in attendance: 13      Goals: {Goals:851026}  Handouts: {Handouts:021815}  Method: {Method:378269}  Attendance: {Attendance:985332}  Mood/Affect: {Mood/Affect:251850}  Behavior/Socialization: {Mood/Socialization:456953}  Participation: {Participation:515725}  Overall Patient Response to Group: {Overall Patient Response to Group:151227}  Individual Response to Group: ***  Ability to Apply Content to Group: {Ability to Apply Content to Group:901374}         Medical Assistant Note:  Diana Santiago presents today for BLOOD DRAW.    Patient seen by provider today: No.   present during visit today: Not Applicable.    Concerns: No Concerns.    Procedure:  Lab draw site: RIGHT HAND, Needle type: BF, Gauge: 23.    Post Assessment:  Labs drawn without difficulty: Yes.    Discharge Plan:  Departure Mode: Ambulatory.    Face to Face Time: 5 MIN.    Shayy Hoang, CMA           APSW

## 2023-12-14 LAB
ANION GAP SERPL CALCULATED.3IONS-SCNC: 9 MMOL/L (ref 7–15)
BUN SERPL-MCNC: 47.9 MG/DL (ref 8–23)
CALCIUM SERPL-MCNC: 10.5 MG/DL (ref 8.8–10.2)
CHLORIDE SERPL-SCNC: 111 MMOL/L (ref 98–107)
CREAT SERPL-MCNC: 2.31 MG/DL (ref 0.51–0.95)
DEPRECATED HCO3 PLAS-SCNC: 21 MMOL/L (ref 22–29)
EGFRCR SERPLBLD CKD-EPI 2021: 22 ML/MIN/1.73M2
GLUCOSE SERPL-MCNC: 94 MG/DL (ref 70–99)
POTASSIUM SERPL-SCNC: 5.5 MMOL/L (ref 3.4–5.3)
SODIUM SERPL-SCNC: 141 MMOL/L (ref 135–145)

## 2023-12-14 PROCEDURE — P9604 ONE-WAY ALLOW PRORATED TRIP: HCPCS | Mod: ORL

## 2023-12-14 PROCEDURE — 80048 BASIC METABOLIC PNL TOTAL CA: CPT | Mod: ORL

## 2023-12-14 PROCEDURE — 36415 COLL VENOUS BLD VENIPUNCTURE: CPT | Mod: ORL | Performed by: INTERNAL MEDICINE

## 2023-12-14 PROCEDURE — 82374 ASSAY BLOOD CARBON DIOXIDE: CPT | Mod: ORL | Performed by: INTERNAL MEDICINE

## 2023-12-14 PROCEDURE — 36415 COLL VENOUS BLD VENIPUNCTURE: CPT | Mod: ORL

## 2023-12-14 PROCEDURE — P9604 ONE-WAY ALLOW PRORATED TRIP: HCPCS | Mod: ORL | Performed by: INTERNAL MEDICINE

## 2023-12-14 PROCEDURE — 82565 ASSAY OF CREATININE: CPT | Mod: ORL | Performed by: INTERNAL MEDICINE

## 2023-12-15 ENCOUNTER — LAB (OUTPATIENT)
Dept: INFUSION THERAPY | Facility: CLINIC | Age: 73
End: 2023-12-15
Attending: INTERNAL MEDICINE
Payer: MEDICARE

## 2023-12-15 DIAGNOSIS — N18.4 CHRONIC RENAL DISEASE, STAGE IV (H): ICD-10-CM

## 2023-12-15 DIAGNOSIS — D63.1 ANEMIA IN CHRONIC KIDNEY DISEASE, UNSPECIFIED CKD STAGE: Primary | ICD-10-CM

## 2023-12-15 DIAGNOSIS — N18.9 ANEMIA IN CHRONIC KIDNEY DISEASE, UNSPECIFIED CKD STAGE: Primary | ICD-10-CM

## 2023-12-15 LAB
FERRITIN SERPL-MCNC: 307 NG/ML (ref 11–328)
HGB BLD-MCNC: 10 G/DL (ref 11.7–15.7)
IRON BINDING CAPACITY (ROCHE): 300 UG/DL (ref 240–430)
IRON SATN MFR SERPL: 33 % (ref 15–46)
IRON SERPL-MCNC: 100 UG/DL (ref 37–145)

## 2023-12-15 PROCEDURE — 85018 HEMOGLOBIN: CPT | Performed by: PHYSICIAN ASSISTANT

## 2023-12-15 PROCEDURE — 36415 COLL VENOUS BLD VENIPUNCTURE: CPT

## 2023-12-15 PROCEDURE — 83550 IRON BINDING TEST: CPT | Performed by: INTERNAL MEDICINE

## 2023-12-15 PROCEDURE — 82728 ASSAY OF FERRITIN: CPT | Performed by: INTERNAL MEDICINE

## 2023-12-15 RX ORDER — METHYLPREDNISOLONE SODIUM SUCCINATE 125 MG/2ML
125 INJECTION, POWDER, LYOPHILIZED, FOR SOLUTION INTRAMUSCULAR; INTRAVENOUS
Status: CANCELLED
Start: 2023-12-21

## 2023-12-15 RX ORDER — ALBUTEROL SULFATE 90 UG/1
1-2 AEROSOL, METERED RESPIRATORY (INHALATION)
Status: CANCELLED
Start: 2023-12-21

## 2023-12-15 RX ORDER — ALBUTEROL SULFATE 0.83 MG/ML
2.5 SOLUTION RESPIRATORY (INHALATION)
Status: CANCELLED | OUTPATIENT
Start: 2023-12-21

## 2023-12-15 RX ORDER — DIPHENHYDRAMINE HYDROCHLORIDE 50 MG/ML
50 INJECTION INTRAMUSCULAR; INTRAVENOUS
Status: CANCELLED
Start: 2023-12-21

## 2023-12-15 RX ORDER — EPINEPHRINE 1 MG/ML
0.3 INJECTION, SOLUTION INTRAMUSCULAR; SUBCUTANEOUS EVERY 5 MIN PRN
Status: CANCELLED | OUTPATIENT
Start: 2023-12-21

## 2023-12-15 RX ORDER — MEPERIDINE HYDROCHLORIDE 25 MG/ML
25 INJECTION INTRAMUSCULAR; INTRAVENOUS; SUBCUTANEOUS EVERY 30 MIN PRN
Status: CANCELLED | OUTPATIENT
Start: 2023-12-21

## 2023-12-15 RX ORDER — NALOXONE HYDROCHLORIDE 0.4 MG/ML
0.2 INJECTION, SOLUTION INTRAMUSCULAR; INTRAVENOUS; SUBCUTANEOUS
Status: CANCELLED | OUTPATIENT
Start: 2023-12-21

## 2023-12-19 ENCOUNTER — HOSPITAL ENCOUNTER (OUTPATIENT)
Dept: MAMMOGRAPHY | Facility: CLINIC | Age: 73
Discharge: HOME | End: 2023-12-19
Attending: INTERNAL MEDICINE | Admitting: INTERNAL MEDICINE
Payer: MEDICARE

## 2023-12-19 DIAGNOSIS — Z12.31 VISIT FOR SCREENING MAMMOGRAM: ICD-10-CM

## 2023-12-19 PROCEDURE — 77067 SCR MAMMO BI INCL CAD: CPT

## 2023-12-20 ENCOUNTER — LAB REQUISITION (OUTPATIENT)
Dept: LAB | Facility: CLINIC | Age: 73
End: 2023-12-20
Payer: MEDICARE

## 2023-12-20 DIAGNOSIS — N18.30 CHRONIC KIDNEY DISEASE, STAGE 3 UNSPECIFIED (H): ICD-10-CM

## 2023-12-20 DIAGNOSIS — N18.4 CHRONIC KIDNEY DISEASE, STAGE 4 (SEVERE) (H): ICD-10-CM

## 2023-12-20 DIAGNOSIS — E55.9 VITAMIN D DEFICIENCY, UNSPECIFIED: ICD-10-CM

## 2023-12-20 DIAGNOSIS — E03.9 HYPOTHYROIDISM, UNSPECIFIED: ICD-10-CM

## 2023-12-20 LAB
ALBUMIN MFR UR ELPH: 10.2 MG/DL
CREAT UR-MCNC: 46.5 MG/DL
PROT/CREAT 24H UR: 0.22 MG/MG CR (ref 0–0.2)

## 2023-12-20 PROCEDURE — 84156 ASSAY OF PROTEIN URINE: CPT | Mod: ORL

## 2023-12-21 ENCOUNTER — MEDICAL CORRESPONDENCE (OUTPATIENT)
Dept: HEALTH INFORMATION MANAGEMENT | Facility: CLINIC | Age: 73
End: 2023-12-21

## 2023-12-21 ENCOUNTER — TRANSFERRED RECORDS (OUTPATIENT)
Dept: HEALTH INFORMATION MANAGEMENT | Facility: CLINIC | Age: 73
End: 2023-12-21

## 2023-12-21 LAB
ANION GAP SERPL CALCULATED.3IONS-SCNC: 8 MMOL/L (ref 7–15)
BUN SERPL-MCNC: 57 MG/DL (ref 8–23)
CALCIUM SERPL-MCNC: 11 MG/DL (ref 8.8–10.2)
CHLORIDE SERPL-SCNC: 110 MMOL/L (ref 98–107)
CREAT SERPL-MCNC: 2.32 MG/DL (ref 0.51–0.95)
DEPRECATED HCO3 PLAS-SCNC: 24 MMOL/L (ref 22–29)
EGFRCR SERPLBLD CKD-EPI 2021: 22 ML/MIN/1.73M2
GLUCOSE SERPL-MCNC: 98 MG/DL (ref 70–99)
POTASSIUM SERPL-SCNC: 5.1 MMOL/L (ref 3.4–5.3)
PTH-INTACT SERPL-MCNC: 247 PG/ML (ref 15–65)
SODIUM SERPL-SCNC: 142 MMOL/L (ref 135–145)
VIT D+METAB SERPL-MCNC: 35 NG/ML (ref 20–50)

## 2023-12-21 PROCEDURE — P9604 ONE-WAY ALLOW PRORATED TRIP: HCPCS | Mod: ORL

## 2023-12-21 PROCEDURE — 83970 ASSAY OF PARATHORMONE: CPT | Mod: ORL

## 2023-12-21 PROCEDURE — 36415 COLL VENOUS BLD VENIPUNCTURE: CPT | Mod: ORL

## 2023-12-21 PROCEDURE — 80048 BASIC METABOLIC PNL TOTAL CA: CPT | Mod: ORL

## 2023-12-21 PROCEDURE — 82306 VITAMIN D 25 HYDROXY: CPT | Mod: ORL

## 2023-12-27 ENCOUNTER — TELEPHONE (OUTPATIENT)
Dept: OTHER | Facility: CLINIC | Age: 73
End: 2023-12-27

## 2023-12-27 ENCOUNTER — LAB REQUISITION (OUTPATIENT)
Dept: LAB | Facility: CLINIC | Age: 73
End: 2023-12-27
Payer: MEDICARE

## 2023-12-27 DIAGNOSIS — N18.4 CHRONIC KIDNEY DISEASE, STAGE 4 (SEVERE) (H): ICD-10-CM

## 2023-12-27 DIAGNOSIS — N18.4 CKD (CHRONIC KIDNEY DISEASE) STAGE 4, GFR 15-29 ML/MIN (H): ICD-10-CM

## 2023-12-27 DIAGNOSIS — Z01.818 PREOP TESTING: Primary | ICD-10-CM

## 2023-12-27 NOTE — TELEPHONE ENCOUNTER
Pt referred to [facility] by Dr. Cedeno for fistula creation.    Pt needs to be scheduled for bilateral upper extremity venous ultrasound and NEW VASCULAR PATIENT consult with Vascular Surgery (except Dr. Topete).  Will route to scheduling to coordinate an appointment at next available.    Appt note: Ref by Dr. Cedeno for fistula creation; history of CKD stage IV, not yet on dialysis.    Erica Simon, SUSYN, RN, -[facility] [place]

## 2023-12-28 ENCOUNTER — MEDICAL CORRESPONDENCE (OUTPATIENT)
Dept: HEALTH INFORMATION MANAGEMENT | Facility: CLINIC | Age: 73
End: 2023-12-28
Payer: MEDICARE

## 2023-12-28 DIAGNOSIS — I12.9 RENAL HYPERTENSION: ICD-10-CM

## 2023-12-28 DIAGNOSIS — N18.4 CHRONIC KIDNEY DISEASE, STAGE IV (SEVERE) (H): Primary | ICD-10-CM

## 2023-12-28 DIAGNOSIS — E87.0 HYPERNATREMIA: ICD-10-CM

## 2023-12-28 NOTE — TELEPHONE ENCOUNTER
Future Appointments   Date Time Provider Department Center   1/11/2024  9:45 AM [facility]   1/11/2024 10:45 AM Kevin Royal MD [facility]   1/19/2024 11:00 AM  PIV LAB [facility]   1/19/2024 12:15 PM  FAST TRACK LAB [facility]

## 2023-12-29 ENCOUNTER — LAB REQUISITION (OUTPATIENT)
Dept: LAB | Facility: CLINIC | Age: 73
End: 2023-12-29
Payer: MEDICARE

## 2023-12-29 DIAGNOSIS — E87.5 HYPERKALEMIA: ICD-10-CM

## 2023-12-29 DIAGNOSIS — M25.552 BILATERAL HIP PAIN: ICD-10-CM

## 2023-12-29 DIAGNOSIS — M25.551 BILATERAL HIP PAIN: ICD-10-CM

## 2023-12-29 LAB
ANION GAP SERPL CALCULATED.3IONS-SCNC: 8 MMOL/L (ref 7–15)
BUN SERPL-MCNC: 57.1 MG/DL (ref 8–23)
CALCIUM SERPL-MCNC: 10.7 MG/DL (ref 8.8–10.2)
CHLORIDE SERPL-SCNC: 111 MMOL/L (ref 98–107)
CREAT SERPL-MCNC: 2.26 MG/DL (ref 0.51–0.95)
DEPRECATED HCO3 PLAS-SCNC: 20 MMOL/L (ref 22–29)
EGFRCR SERPLBLD CKD-EPI 2021: 22 ML/MIN/1.73M2
GLUCOSE SERPL-MCNC: 99 MG/DL (ref 70–99)
POTASSIUM SERPL-SCNC: 6.4 MMOL/L (ref 3.4–5.3)
SODIUM SERPL-SCNC: 139 MMOL/L (ref 135–145)

## 2023-12-29 PROCEDURE — 80048 BASIC METABOLIC PNL TOTAL CA: CPT | Mod: ORL | Performed by: INTERNAL MEDICINE

## 2023-12-29 PROCEDURE — 36415 COLL VENOUS BLD VENIPUNCTURE: CPT | Mod: ORL | Performed by: INTERNAL MEDICINE

## 2023-12-29 PROCEDURE — P9604 ONE-WAY ALLOW PRORATED TRIP: HCPCS | Mod: ORL | Performed by: INTERNAL MEDICINE

## 2023-12-29 RX ORDER — PSEUDOEPHED/ACETAMINOPH/DIPHEN 30MG-500MG
TABLET ORAL
Qty: 56 TABLET | Refills: 97 | Status: SHIPPED | OUTPATIENT
Start: 2023-12-29

## 2024-01-01 ENCOUNTER — LAB (OUTPATIENT)
Dept: LAB | Facility: CLINIC | Age: 74
End: 2024-01-01
Payer: MEDICARE

## 2024-01-01 DIAGNOSIS — I12.9 RENAL HYPERTENSION: ICD-10-CM

## 2024-01-01 DIAGNOSIS — N18.4 CHRONIC KIDNEY DISEASE, STAGE IV (SEVERE) (H): ICD-10-CM

## 2024-01-01 DIAGNOSIS — E87.0 HYPERNATREMIA: ICD-10-CM

## 2024-01-01 LAB
ANION GAP SERPL CALCULATED.3IONS-SCNC: 8 MMOL/L (ref 7–15)
BUN SERPL-MCNC: 54.3 MG/DL (ref 8–23)
CALCIUM SERPL-MCNC: 10.2 MG/DL (ref 8.8–10.2)
CHLORIDE SERPL-SCNC: 108 MMOL/L (ref 98–107)
CREAT SERPL-MCNC: 2.52 MG/DL (ref 0.51–0.95)
DEPRECATED HCO3 PLAS-SCNC: 24 MMOL/L (ref 22–29)
EGFRCR SERPLBLD CKD-EPI 2021: 20 ML/MIN/1.73M2
GLUCOSE SERPL-MCNC: 109 MG/DL (ref 70–99)
POTASSIUM SERPL-SCNC: 4.7 MMOL/L (ref 3.4–5.3)
SODIUM SERPL-SCNC: 140 MMOL/L (ref 135–145)

## 2024-01-01 PROCEDURE — 36415 COLL VENOUS BLD VENIPUNCTURE: CPT

## 2024-01-01 PROCEDURE — 80048 BASIC METABOLIC PNL TOTAL CA: CPT

## 2024-01-03 ENCOUNTER — LAB REQUISITION (OUTPATIENT)
Dept: LAB | Facility: CLINIC | Age: 74
End: 2024-01-03
Payer: MEDICARE

## 2024-01-03 DIAGNOSIS — N18.4 CHRONIC KIDNEY DISEASE, STAGE 4 (SEVERE) (H): ICD-10-CM

## 2024-01-04 LAB
ANION GAP SERPL CALCULATED.3IONS-SCNC: 7 MMOL/L (ref 7–15)
BUN SERPL-MCNC: 49.3 MG/DL (ref 8–23)
CALCIUM SERPL-MCNC: 11.1 MG/DL (ref 8.8–10.2)
CHLORIDE SERPL-SCNC: 113 MMOL/L (ref 98–107)
CREAT SERPL-MCNC: 2.24 MG/DL (ref 0.51–0.95)
DEPRECATED HCO3 PLAS-SCNC: 25 MMOL/L (ref 22–29)
EGFRCR SERPLBLD CKD-EPI 2021: 22 ML/MIN/1.73M2
GLUCOSE SERPL-MCNC: 98 MG/DL (ref 70–99)
POTASSIUM SERPL-SCNC: 4.7 MMOL/L (ref 3.4–5.3)
SODIUM SERPL-SCNC: 145 MMOL/L (ref 135–145)

## 2024-01-04 PROCEDURE — 36415 COLL VENOUS BLD VENIPUNCTURE: CPT | Mod: ORL | Performed by: INTERNAL MEDICINE

## 2024-01-04 PROCEDURE — P9604 ONE-WAY ALLOW PRORATED TRIP: HCPCS | Mod: ORL

## 2024-01-04 PROCEDURE — P9604 ONE-WAY ALLOW PRORATED TRIP: HCPCS | Mod: ORL | Performed by: INTERNAL MEDICINE

## 2024-01-04 PROCEDURE — 80048 BASIC METABOLIC PNL TOTAL CA: CPT | Mod: ORL | Performed by: INTERNAL MEDICINE

## 2024-01-04 PROCEDURE — 36415 COLL VENOUS BLD VENIPUNCTURE: CPT | Mod: ORL

## 2024-01-04 PROCEDURE — 80048 BASIC METABOLIC PNL TOTAL CA: CPT | Mod: ORL

## 2024-01-08 NOTE — PROGRESS NOTES
[facility]    Diana Santiago is a 73-year-old independent woman who has end-stage renal disease-stage IV followed by Dr. Reza Cedeno not yet on hemodialysis.  Comes to evaluate for a fistula.    Renal failure felt due to chronic lithium use.  Patient is right-handed.    PMH: Medications: Norvasc, Midamor,lithium, Valium, Protonix, Zocor     Medical: Hypertension    Hyperlipidemia on statin    GERD    History of meningioma    History nephrogenic diabetes insipidus    Right foot drop    Heterozygous thalassemia    Bipolar disorder    History of breast cancer-DCIS      1/11/2024 SCr= 2.60 with GFR= 19    Hgb= 10.0    Non-smoker.  Lives independently.    Exam: Alert and appropriate.  Petite.  Frail.  Here with her brother.   Blood pressure 106/70 right and 113/74 left.  Pulse 66 regular   Weight= 54 kg   Chest= clear   Cardiovascular= regular rate   Extremities= petite.  No visible forearm or upper arm veins on either arm    Palpable left antecubital brachial/basilic vein--patent by bedside Doppler.    +3 left ulnar pulse with no palpable radial.    Bedside Doppler with multiphasic signals in the right brachial artery and ulnar artery at the wrist.  Minimal radial Doppler flow.  Palmar arch supplied by the ulnar artery.        Vein mapping today reveals very small upper arm left cephalic and basilic vein with no formed veins visualized of any size.  On the right the cephalic vein again is small and not feasible for use.  Antecubital and upper arm basilic vein is bigger measuring between 2.3 and 4 mm throughout most of its length.        IMPRESSION:  Patient be a candidate for a arteriovenous fistula.  Due to the size of her veins I would recommend this to be performed on the right arm.  Due to her dominant ulnar artery flow to her hand I would recommend a first stage transposition of the brachial/basilic vein to the proximal radial artery via a small antecubital incision under local anesthetic with  sedation as an outpatient.    We would then see her 3 to 4 weeks later to see if the upper arm brachial/basilic vein has developed enough for a second stage transposition which should be performed as an outpatient under general anesthetic.    She and her brother know that there is no guarantee that the pain would dilate the appropriate size which needs to be at least 6 mm for appropriate dialysis and this would realistically take several months before able to be used for dialysis.  If for some reason the pain does not develop she would potentially be a candidate for a upper arm radial to axillary graft placement.    35 minutes with patient and son today including chart review.  Will schedule her for the first stage fistula placement.    Kevin Royal MD   This note was created using Dragon voice recognition software which may result in transcription errors.

## 2024-01-10 ENCOUNTER — LAB REQUISITION (OUTPATIENT)
Dept: LAB | Facility: CLINIC | Age: 74
End: 2024-01-10
Payer: MEDICARE

## 2024-01-10 DIAGNOSIS — N18.4 CHRONIC KIDNEY DISEASE, STAGE 4 (SEVERE) (H): ICD-10-CM

## 2024-01-11 ENCOUNTER — HOSPITAL ENCOUNTER (OUTPATIENT)
Dept: ULTRASOUND IMAGING | Facility: CLINIC | Age: 74
Discharge: HOME | End: 2024-01-11
Attending: SURGERY
Payer: MEDICARE

## 2024-01-11 ENCOUNTER — TELEPHONE (OUTPATIENT)
Dept: OTHER | Facility: CLINIC | Age: 74
End: 2024-01-11

## 2024-01-11 ENCOUNTER — OFFICE VISIT (OUTPATIENT)
Dept: OTHER | Facility: CLINIC | Age: 74
End: 2024-01-11
Attending: SURGERY
Payer: MEDICARE

## 2024-01-11 VITALS — DIASTOLIC BLOOD PRESSURE: 74 MMHG | SYSTOLIC BLOOD PRESSURE: 113 MMHG | HEART RATE: 67 BPM

## 2024-01-11 DIAGNOSIS — N18.4 CKD (CHRONIC KIDNEY DISEASE) STAGE 4, GFR 15-29 ML/MIN (H): Primary | ICD-10-CM

## 2024-01-11 DIAGNOSIS — Z01.818 PREOP TESTING: ICD-10-CM

## 2024-01-11 DIAGNOSIS — N18.4 CKD (CHRONIC KIDNEY DISEASE) STAGE 4, GFR 15-29 ML/MIN (H): ICD-10-CM

## 2024-01-11 LAB
ANION GAP SERPL CALCULATED.3IONS-SCNC: 7 MMOL/L (ref 7–15)
BUN SERPL-MCNC: 49.7 MG/DL (ref 8–23)
CALCIUM SERPL-MCNC: 10.9 MG/DL (ref 8.8–10.2)
CHLORIDE SERPL-SCNC: 112 MMOL/L (ref 98–107)
CREAT SERPL-MCNC: 2.6 MG/DL (ref 0.51–0.95)
DEPRECATED HCO3 PLAS-SCNC: 24 MMOL/L (ref 22–29)
EGFRCR SERPLBLD CKD-EPI 2021: 19 ML/MIN/1.73M2
GLUCOSE SERPL-MCNC: 100 MG/DL (ref 70–99)
POTASSIUM SERPL-SCNC: 5.4 MMOL/L (ref 3.4–5.3)
SODIUM SERPL-SCNC: 143 MMOL/L (ref 135–145)

## 2024-01-11 PROCEDURE — 36415 COLL VENOUS BLD VENIPUNCTURE: CPT | Mod: ORL | Performed by: INTERNAL MEDICINE

## 2024-01-11 PROCEDURE — 93970 EXTREMITY STUDY: CPT

## 2024-01-11 PROCEDURE — P9604 ONE-WAY ALLOW PRORATED TRIP: HCPCS | Mod: ORL | Performed by: INTERNAL MEDICINE

## 2024-01-11 PROCEDURE — G0463 HOSPITAL OUTPT CLINIC VISIT: HCPCS | Mod: 25 | Performed by: SURGERY

## 2024-01-11 PROCEDURE — 99213 OFFICE O/P EST LOW 20 MIN: CPT | Performed by: SURGERY

## 2024-01-11 PROCEDURE — 80048 BASIC METABOLIC PNL TOTAL CA: CPT | Mod: ORL | Performed by: INTERNAL MEDICINE

## 2024-01-11 NOTE — TELEPHONE ENCOUNTER
Reviewed surgery date/time with patient. Patient is aware pre-op is needed and will coordinate with PCP. Patient scheduled for post-op with DAPHNE on 02/12/24 at [facility].

## 2024-01-11 NOTE — NURSING NOTE
Patient Education    Procedure: Creation of first stage right upper arm radial to basilic AVF  Diagnosis: CKD Stage 4  Anticoagulation Instruction: n/a  GLP-1 Agonists Instruction: n/a  Pre-Operative Physical Exam: You need to have a pre-op physical exam within 30 days of your procedure. Your procedure may be cancelled if you do not have a current History and Physical. Call your PCP's office to schedule.  Allergies:  Updated in Epic  Bowel Prep: n/a  NPO for solid 8 hours prior to arrival time.   NPO for clear liquids 2 hours prior to arrival time.   Post Procedure Education: [facility] post-procedure fact sheet reviewed with patient.    Showering instructions reviewed: Yes    Learner(s):patient  Method: Listening, Reading  Barriers to Learning:No Barrier  Outcome: Patient did verbalize understanding of above education.    Erica Simon, SUSYN, RN, -[facility] [place]

## 2024-01-11 NOTE — TELEPHONE ENCOUNTER
CASE RECEIVED ON 01/11/24 FOR: CREATION OF FIRST STAGE RIGHT UPPER ARM RADIAL TO BASILIC ARTERIOVENOUS FISTULA    CASE ID:[id number]    Spoke to patient states there is nothing to avoid.

## 2024-01-11 NOTE — PROGRESS NOTES
[facility]        Patient is here for a consult to discuss AV fistula.     Pt is currently taking Statin.    /74 (BP Location: Left arm, Patient Position: Chair, Cuff Size: Adult Regular)   Pulse 67   LMP  (LMP Unknown)     The provider has been notified that the patient has no concerns.     Questions patient would like addressed today are: N/A.    Refills are needed: N/A    Has homecare services and agency name:  Kusum Freeman MA

## 2024-01-17 ENCOUNTER — LAB REQUISITION (OUTPATIENT)
Dept: LAB | Facility: CLINIC | Age: 74
End: 2024-01-17
Payer: MEDICARE

## 2024-01-17 DIAGNOSIS — N18.30 CHRONIC KIDNEY DISEASE, STAGE 3 UNSPECIFIED (H): ICD-10-CM

## 2024-01-18 LAB
ANION GAP SERPL CALCULATED.3IONS-SCNC: 10 MMOL/L (ref 7–15)
BUN SERPL-MCNC: 51.1 MG/DL (ref 8–23)
CALCIUM SERPL-MCNC: 10.6 MG/DL (ref 8.8–10.2)
CHLORIDE SERPL-SCNC: 109 MMOL/L (ref 98–107)
CREAT SERPL-MCNC: 2.3 MG/DL (ref 0.51–0.95)
DEPRECATED HCO3 PLAS-SCNC: 20 MMOL/L (ref 22–29)
EGFRCR SERPLBLD CKD-EPI 2021: 22 ML/MIN/1.73M2
GLUCOSE SERPL-MCNC: 118 MG/DL (ref 70–99)
POTASSIUM SERPL-SCNC: 5.3 MMOL/L (ref 3.4–5.3)
SODIUM SERPL-SCNC: 139 MMOL/L (ref 135–145)

## 2024-01-18 PROCEDURE — 80048 BASIC METABOLIC PNL TOTAL CA: CPT | Mod: ORL | Performed by: INTERNAL MEDICINE

## 2024-01-19 ENCOUNTER — ALLIED HEALTH/NURSE VISIT (OUTPATIENT)
Dept: INFUSION THERAPY | Facility: CLINIC | Age: 74
End: 2024-01-19
Attending: INTERNAL MEDICINE
Payer: MEDICARE

## 2024-01-19 VITALS
DIASTOLIC BLOOD PRESSURE: 89 MMHG | OXYGEN SATURATION: 100 % | RESPIRATION RATE: 16 BRPM | HEART RATE: 64 BPM | SYSTOLIC BLOOD PRESSURE: 145 MMHG | TEMPERATURE: 98 F

## 2024-01-19 DIAGNOSIS — N18.4 CHRONIC RENAL DISEASE, STAGE IV (H): ICD-10-CM

## 2024-01-19 DIAGNOSIS — D63.1 ANEMIA IN CHRONIC KIDNEY DISEASE, UNSPECIFIED CKD STAGE: Primary | ICD-10-CM

## 2024-01-19 DIAGNOSIS — N18.9 ANEMIA IN CHRONIC KIDNEY DISEASE, UNSPECIFIED CKD STAGE: Primary | ICD-10-CM

## 2024-01-19 LAB
HGB BLD-MCNC: 9.6 G/DL (ref 11.7–15.7)
IRON BINDING CAPACITY (ROCHE): 294 UG/DL (ref 240–430)
IRON SATN MFR SERPL: 36 % (ref 15–46)
IRON SERPL-MCNC: 107 UG/DL (ref 37–145)

## 2024-01-19 PROCEDURE — 250N000011 HC RX IP 250 OP 636: Mod: JZ,EC | Performed by: INTERNAL MEDICINE

## 2024-01-19 PROCEDURE — 36415 COLL VENOUS BLD VENIPUNCTURE: CPT | Performed by: PHYSICIAN ASSISTANT

## 2024-01-19 PROCEDURE — 85018 HEMOGLOBIN: CPT | Performed by: PHYSICIAN ASSISTANT

## 2024-01-19 PROCEDURE — 83550 IRON BINDING TEST: CPT | Performed by: INTERNAL MEDICINE

## 2024-01-19 PROCEDURE — 82728 ASSAY OF FERRITIN: CPT | Performed by: INTERNAL MEDICINE

## 2024-01-19 PROCEDURE — 96372 THER/PROPH/DIAG INJ SC/IM: CPT | Performed by: INTERNAL MEDICINE

## 2024-01-19 RX ORDER — DIPHENHYDRAMINE HYDROCHLORIDE 50 MG/ML
50 INJECTION INTRAMUSCULAR; INTRAVENOUS
Status: CANCELLED
Start: 2024-02-13

## 2024-01-19 RX ORDER — NALOXONE HYDROCHLORIDE 0.4 MG/ML
0.2 INJECTION, SOLUTION INTRAMUSCULAR; INTRAVENOUS; SUBCUTANEOUS
Status: CANCELLED | OUTPATIENT
Start: 2024-02-18

## 2024-01-19 RX ORDER — NALOXONE HYDROCHLORIDE 0.4 MG/ML
0.2 INJECTION, SOLUTION INTRAMUSCULAR; INTRAVENOUS; SUBCUTANEOUS
Status: CANCELLED | OUTPATIENT
Start: 2024-02-13

## 2024-01-19 RX ORDER — MEPERIDINE HYDROCHLORIDE 25 MG/ML
25 INJECTION INTRAMUSCULAR; INTRAVENOUS; SUBCUTANEOUS EVERY 30 MIN PRN
Status: CANCELLED | OUTPATIENT
Start: 2024-02-13

## 2024-01-19 RX ORDER — DIPHENHYDRAMINE HYDROCHLORIDE 50 MG/ML
50 INJECTION INTRAMUSCULAR; INTRAVENOUS
Status: CANCELLED
Start: 2024-02-18

## 2024-01-19 RX ORDER — ALBUTEROL SULFATE 90 UG/1
1-2 AEROSOL, METERED RESPIRATORY (INHALATION)
Status: CANCELLED
Start: 2024-02-18

## 2024-01-19 RX ORDER — METHYLPREDNISOLONE SODIUM SUCCINATE 125 MG/2ML
125 INJECTION, POWDER, LYOPHILIZED, FOR SOLUTION INTRAMUSCULAR; INTRAVENOUS
Status: CANCELLED
Start: 2024-02-18

## 2024-01-19 RX ORDER — ALBUTEROL SULFATE 90 UG/1
1-2 AEROSOL, METERED RESPIRATORY (INHALATION)
Status: CANCELLED
Start: 2024-02-13

## 2024-01-19 RX ORDER — METHYLPREDNISOLONE SODIUM SUCCINATE 125 MG/2ML
125 INJECTION, POWDER, LYOPHILIZED, FOR SOLUTION INTRAMUSCULAR; INTRAVENOUS
Status: CANCELLED
Start: 2024-02-13

## 2024-01-19 RX ORDER — EPINEPHRINE 1 MG/ML
0.3 INJECTION, SOLUTION INTRAMUSCULAR; SUBCUTANEOUS EVERY 5 MIN PRN
Status: CANCELLED | OUTPATIENT
Start: 2024-02-13

## 2024-01-19 RX ORDER — EPINEPHRINE 1 MG/ML
0.3 INJECTION, SOLUTION INTRAMUSCULAR; SUBCUTANEOUS EVERY 5 MIN PRN
Status: CANCELLED | OUTPATIENT
Start: 2024-02-18

## 2024-01-19 RX ORDER — ALBUTEROL SULFATE 0.83 MG/ML
2.5 SOLUTION RESPIRATORY (INHALATION)
Status: CANCELLED | OUTPATIENT
Start: 2024-02-18

## 2024-01-19 RX ORDER — ALBUTEROL SULFATE 0.83 MG/ML
2.5 SOLUTION RESPIRATORY (INHALATION)
Status: CANCELLED | OUTPATIENT
Start: 2024-02-13

## 2024-01-19 RX ORDER — MEPERIDINE HYDROCHLORIDE 25 MG/ML
25 INJECTION INTRAMUSCULAR; INTRAVENOUS; SUBCUTANEOUS EVERY 30 MIN PRN
Status: CANCELLED | OUTPATIENT
Start: 2024-02-18

## 2024-01-19 RX ADMIN — DARBEPOETIN ALFA 100 MCG: 100 INJECTION, SOLUTION INTRAVENOUS; SUBCUTANEOUS at 12:17

## 2024-01-19 ASSESSMENT — PAIN SCALES - GENERAL: PAINLEVEL: MILD PAIN (3)

## 2024-01-19 NOTE — PROGRESS NOTES
Medical Assistant Note:  Diana Santiago presents today for blood draw.    Patient seen by provider today: No.   present during visit today: Not Applicable.    Concerns: No Concerns.    Procedure:  Lab draw site: lac, Needle type: bf, Gauge: 23.    Post Assessment:  Labs drawn without difficulty: Yes.    Discharge Plan:  Departure Mode: Ambulatory.    Face to Face Time: 5 min.    Shayy Hoang, CMA

## 2024-01-19 NOTE — PROGRESS NOTES
Infusion Nursing Note:  Diana C Santiago presents today for Aranesp.    Patient seen by provider today: No   present during visit today: Not Applicable.    Note: N/A.      Intravenous Access:  No Intravenous access/labs at this visit.    Treatment Conditions:  Hgb 9.6-Results reviewed, labs MET treatment parameters, ok to proceed with treatment.      Post Infusion Assessment:  Patient tolerated injection without incident.  Site patent and intact, free from redness, edema or discomfort.       Discharge Plan:   Discharge instructions reviewed with: Patient.  Patient and/or family verbalized understanding of discharge instructions and all questions answered.  AVS to patient via [employer].  Patient will return 2/16/24 for next appointment.   Patient discharged in stable condition accompanied by: self.  Departure Mode: Ambulatory with walker.      Faby Guerrero RN

## 2024-01-20 LAB — FERRITIN SERPL-MCNC: 264 NG/ML (ref 11–328)

## 2024-01-24 ENCOUNTER — LAB REQUISITION (OUTPATIENT)
Dept: LAB | Facility: CLINIC | Age: 74
End: 2024-01-24
Payer: MEDICARE

## 2024-01-24 ENCOUNTER — OFFICE VISIT (OUTPATIENT)
Dept: FAMILY MEDICINE | Facility: CLINIC | Age: 74
End: 2024-01-24
Payer: MEDICARE

## 2024-01-24 VITALS
SYSTOLIC BLOOD PRESSURE: 99 MMHG | RESPIRATION RATE: 18 BRPM | OXYGEN SATURATION: 99 % | BODY MASS INDEX: 20.32 KG/M2 | WEIGHT: 119 LBS | HEART RATE: 66 BPM | TEMPERATURE: 96.9 F | DIASTOLIC BLOOD PRESSURE: 65 MMHG | HEIGHT: 64 IN

## 2024-01-24 DIAGNOSIS — F31.9 BIPOLAR AFFECTIVE DISORDER, REMISSION STATUS UNSPECIFIED (H): ICD-10-CM

## 2024-01-24 DIAGNOSIS — R04.0 EPISTAXIS: ICD-10-CM

## 2024-01-24 DIAGNOSIS — N25.1 NEPHROGENIC DIABETES INSIPIDUS (H): ICD-10-CM

## 2024-01-24 DIAGNOSIS — N18.4 CHRONIC RENAL DISEASE, STAGE IV (H): Primary | ICD-10-CM

## 2024-01-24 DIAGNOSIS — N18.4 CHRONIC KIDNEY DISEASE, STAGE 4 (SEVERE) (H): ICD-10-CM

## 2024-01-24 DIAGNOSIS — Z01.818 PREOP GENERAL PHYSICAL EXAM: ICD-10-CM

## 2024-01-24 DIAGNOSIS — I10 BENIGN ESSENTIAL HYPERTENSION: ICD-10-CM

## 2024-01-24 PROCEDURE — 99214 OFFICE O/P EST MOD 30 MIN: CPT | Mod: 25 | Performed by: PHYSICIAN ASSISTANT

## 2024-01-24 PROCEDURE — 93000 ELECTROCARDIOGRAM COMPLETE: CPT | Performed by: PHYSICIAN ASSISTANT

## 2024-01-24 ASSESSMENT — PAIN SCALES - GENERAL: PAINLEVEL: NO PAIN (0)

## 2024-01-24 NOTE — PROGRESS NOTES
Preoperative Evaluation  [address] [facility], [address]  [facility] [address]  Phone: [phone]  Primary Provider: Gayle Hernandez  Pre-op Performing Provider: HILARIO SALMERON  Jan 24, 2024       Diana is a 73 year old, presenting for the following:  Pre-Op Exam      Surgical Information  Surgery/Procedure: CREATION OF FIRST STAGE RIGHT UPPER ARM RADIAL TO BASILIC ARTERIOVENOUS FISTULA   Surgery Location:  OR  Surgeon: Kevin Royal  Surgery Date: 01/30/2024  Time of Surgery: 1:05 PM  Where patient plans to recover: At [facility]  Fax number for surgical facility: Note does not need to be faxed, will be available electronically in Epic.    Assessment & Plan     The proposed surgical procedure is considered INTERMEDIATE risk.    Chronic renal disease, stage IV (H)  Pt cleared for fistula    Preop general physical exam  Pt not on any blood thinners    Bipolar affective disorder, remission status unspecified (H)  Stable on lithium    Nephrogenic diabetes insipidus (H24)      Benign essential hypertension      Epistaxis  -Recd pt put vasoline or Ayr gel in Right nostril, avoid picking/blowing and using a bedside humidifier.   -if nosebleeds persist, see ENT, referral placed    Risks and Recommendations  The patient has the following additional risks and recommendations for perioperative complications:  Anemia/Bleeding/Clotting:    - Anemia and does not require treatment prior to surgery. Monitor hemoglobin postoperatively    Antiplatelet or Anticoagulation Medication Instructions   - Patient is on no antiplatelet or anticoagulation medications.    Additional Medication Instructions  Patient is to take all scheduled medications on the day of surgery    Recommendation  APPROVAL GIVEN to proceed with proposed procedure, without further diagnostic evaluation.    Subjective       HPI related to upcoming procedure: ESRD        1/17/2024    10:31 AM   Preop Questions   1. Have you  ever had a heart attack or stroke? No   2. Have you ever had surgery on your heart or blood vessels, such as a stent placement, a coronary artery bypass, or surgery on an artery in your head, neck, heart, or legs? No   3. Do you have chest pain with activity? No   4. Do you have a history of  heart failure? No   5. Do you currently have a cold, bronchitis or symptoms of other infection? No   6. Do you have a cough, shortness of breath, or wheezing? No   7. Do you or anyone in your family have previous history of blood clots? YES    8. Do you or does anyone in your family have a serious bleeding problem such as prolonged bleeding following surgeries or cuts? YES    9. Have you ever had problems with anemia or been told to take iron pills? YES    10. Have you had any abnormal blood loss such as black, tarry or bloody stools, or abnormal vaginal bleeding? No   11. Have you ever had a blood transfusion? YES    11a. Have you ever had a transfusion reaction? No   12. Are you willing to have a blood transfusion if it is medically needed before, during, or after your surgery? Yes   13. Have you or any of your relatives ever had problems with anesthesia? No   14. Do you have sleep apnea, excessive snoring or daytime drowsiness? YES   14a. Do you have a CPAP machine? No   15. Do you have any artifical heart valves or other implanted medical devices like a pacemaker, defibrillator, or continuous glucose monitor? No   16. Do you have artificial joints? YES    17. Are you allergic to latex? No       Health Care Directive  Patient has a Health Care Directive on file      Preoperative Review of    reviewed - controlled substances reflected in medication list.          Patient Active Problem List    Diagnosis Date Noted    Chronic renal disease, stage IV (H) 04/24/2023     Priority: Medium    High lithium level 03/07/2023     Priority: Medium    Overdose, intentional self-harm, initial encounter (H) 01/29/2023     Priority:  Medium    Perforated viscus 08/24/2022     Priority: Medium    Septic shock (H) 08/24/2022     Priority: Medium    Meningioma (H) 07/22/2022     Priority: Medium    Right foot drop 07/22/2022     Priority: Medium    Right shoulder pain, unspecified chronicity 07/22/2022     Priority: Medium    Ankle injury, unspecified laterality, initial encounter 07/18/2022     Priority: Medium    Right ankle pain, unspecified chronicity 07/18/2022     Priority: Medium    Chronic kidney disease, stage 4 (severe) (H) 03/02/2022     Priority: Medium    Anemia in CKD (chronic kidney disease) 12/17/2021     Priority: Medium    Stage 3b chronic kidney disease (H) 12/17/2021     Priority: Medium    Acute kidney failure (H24) 11/19/2021     Priority: Medium    Hypernatremia 11/19/2021     Priority: Medium    Malignant hypertensive kidney disease with chronic kidney disease stage I through stage IV, or unspecified(403.00) 11/19/2021     Priority: Medium    Chronic anemia 11/08/2021     Priority: Medium    Acute kidney injury (H24) 11/08/2021     Priority: Medium    Lithium toxicity, accidental or unintentional, initial encounter 11/08/2021     Priority: Medium    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     Priority: Medium     felt due to lithium      Chronic kidney disease, stage 3a 10/18/2019     Priority: Medium    Benign essential hypertension 09/01/2018     Priority: Medium     added norvasc 9/18      Hypercalcemia 08/01/2017     Priority: Medium    Hyperparathyroidism (H24) 08/01/2017     Priority: Medium    Vitamin D deficiency      Priority: Medium    Hyperlipidemia LDL goal <130 07/16/2013     Priority: Medium    Elevated blood sugar      Priority: Medium    DCIS (ductal carcinoma in situ)      Priority: Medium     xrt and lumpectomy x 4      Heterozygous thalassemia      Priority: Medium     Diagnosis updated by automated process. Provider to review and confirm.      Bipolar affective disorder (H)      Priority: Medium      hosp 1993      Advance Care Planning 06/20/2012     Priority: Medium     Advance Care Planning 7/31/2015: Receipt of ACP document:  Received: Health Care Directive which was witnessed or notarized on 10-.  Document not previously scanned.  Validation form completed and sent with document to be scanned.  Code Status needs to be updated to reflect choices in most recent ACP document. Notification sent to Dr. Miller for followup.  Confirmed/documented designated decision maker(s).  Added by Geraldine Mak              Past Medical History:   Diagnosis Date    Benign essential hypertension 09/2018    added norvasc 9/18    Bipolar affective disorder (H)     hosp 1993, Dr. Aftab Deal    Cancer (H) 1996    DCIS, left breast    Chronic kidney disease, stage 3a (H)     seen by renal Dr. Cedeno in 2021, renal us cysts    DCIS (ductal carcinoma in situ) 1996    xrt and lumpectomy x 4    Depressive disorder 1968    Diabetes (H) 2022    Diabetes insipidus    Diabetes insipidus (H24) 09/2018    elev sodium, likely due to lithium    Elevated blood sugar     Fractured femoral neck (H) 1992    Hx of colonoscopy 2010    tics and hem    Hypercalcemia 08/2017    Hypercholesteremia     Hyperparathyroidism (H24) 08/2017    Lithium toxicity 11/2021    hosp fsd    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    felt due to lithium    Thalassaemia trait     Vitamin D deficiency      Past Surgical History:   Procedure Laterality Date    BIOPSY  1994    left breast    BREAST SURGERY      lumpectomy x 4    COLONOSCOPY  2021    COLONOSCOPY N/A 6/17/2022    Procedure: COLONOSCOPY, WITH POLYPECTOMY AND BIOPSY;  Surgeon: Panchito Gu MD;  Location:  GI    EYE SURGERY  2018    retina tear    LAPAROTOMY EXPLORATORY N/A 8/24/2022    Procedure: Exploratory laparotomy, REPAIR OF PERFORATED ULCER;  Surgeon: Ron Vidal MD;  Location:  OR    left hand surgery      last 1974     Current Outpatient Medications   Medication  Sig Dispense Refill    acetaminophen (TYLENOL) 500 MG tablet TAKE TWO TABLETS (1000MG) BY MOUTH AT BEDTIME 56 tablet 97    aMILoride (MIDAMOR) 5 MG tablet Take 5 mg by mouth      amLODIPine (NORVASC) 5 MG tablet TAKE 1 TABLET BY MOUTH ONCE DAILY (HOLD FOR SBP < 100) 90 tablet 97    diazepam (VALIUM) 2 MG tablet TAKE ONE-HALF TABLET (1MG) BY MOUTH EVERY OTHER DAY **CONTROLLED MED-DOUBLE LOCK STORAGE* 7 tablet 4    FEROSUL 325 (65 Fe) MG tablet TAKE ONE TABLET BY MOUTH EVERY OTHER DAY 42 tablet 97    fexofenadine (ALLEGRA) 180 MG tablet TAKE 1 TABLET BY MOUTH ONCE DAILY 90 tablet 97    lithium (ESKALITH) 150 MG capsule TAKE 1 CAPSULE BY MOUTH THREE TIMES DAILY WITH MEALS 270 capsule 97    pantoprazole (PROTONIX) 40 MG EC tablet TAKE 1 TABLET BY MOUTH ONCE DAILY 90 tablet 2    polyethylene glycol (MIRALAX) 17 GM/Dose powder MIX 17GM OF POWDER IN 8OZ OF WATER UNTIL COMPLETELY DISSOLVED. DRINK SOLUTION BY MOUTH ONCE DAILY. 510 g 0    simvastatin (ZOCOR) 40 MG tablet TAKE 1 TABLET BY MOUTH AT BEDTIME 90 tablet 97    sodium bicarbonate 650 MG tablet TAKE TWO TABLETS (1300MG) BY MOUTH THREE TIMES DAILY 540 tablet 1    ACE/ARB/ARNI NOT PRESCRIBED (INTENTIONAL) Please choose reason not prescribed from choices below. (Patient not taking: Reported on 1/24/2024)         Allergies   Allergen Reactions    Ace Inhibitors      Other reaction(s): renal failure  She can't be on an ACEI or ARB while on Lithium.        Social History     Tobacco Use    Smoking status: Never    Smokeless tobacco: Never   Substance Use Topics    Alcohol use: No     Family History   Problem Relation Age of Onset    Cerebrovascular Disease Mother     Hypertension Father     Sleep Apnea Brother     Breast Cancer Maternal Aunt         x 2    Breast Cancer Other         Maternal Aunt    Hyperlipidemia Niece      History   Drug Use No         Review of Systems    Review of Systems  Constitutional, neuro, ENT, endocrine, pulmonary, cardiac, gastrointestinal,  "genitourinary, musculoskeletal, integument and psychiatric systems are negative, except as otherwise noted.  Objective    BP 99/65 (BP Location: Right arm, Patient Position: Sitting, Cuff Size: Adult Regular)   Pulse 66   Temp 96.9  F (36.1  C) (Temporal)   Resp 18   Ht 1.626 m (5' 4\")   Wt 54 kg (119 lb)   LMP  (LMP Unknown)   SpO2 99%   BMI 20.43 kg/m     Estimated body mass index is 20.43 kg/m  as calculated from the following:    Height as of this encounter: 1.626 m (5' 4\").    Weight as of this encounter: 54 kg (119 lb).  Physical Exam  GENERAL: frail female using walker for ambulation and no distress  EYES: Eyes grossly normal to inspection, conjunctivae and sclerae normal  HENT: ear canals and TM's normal, nose with scant dried blood R nostril and mouth without ulcers or lesions  NECK: no adenopathy, no asymmetry, masses, or scars  RESP: lungs clear to auscultation - no rales, rhonchi or wheezes  CV: regular rate and rhythm, normal S1 S2, no S3 or S4, no murmur, click or rub, no peripheral edema  ABDOMEN: soft, nontender, no hepatosplenomegaly, no masses and bowel sounds normal  MS: no gross musculoskeletal defects noted, no edema  SKIN: no suspicious lesions or rashes  PSYCH: mentation appears normal, affect normal  LYMPH: no cervical, supraclavicular, axillary, or inguinal adenopathy         Diagnostics  No labs were ordered during this visit.   Pt had hgb of 9.6 on 1/19/24  Cr was 2.30 on 1/18/24    Lab Results   Component Value Date    A1C 6.1 12/21/2020       EKG: appears normal, NSR, normal axis, normal intervals, no acute ST/T changes c/w ischemia, no LVH by voltage criteria    Revised Cardiac Risk Index (RCRI)  The patient has the following serious cardiovascular risks for perioperative complications:   - No serious cardiac risks = 0 points     RCRI Interpretation: 0 points: Class I (very low risk - 0.4% complication rate)         Signed Electronically by: Miladys Stephens PA-C  Copy of this " evaluation report is provided to requesting physician.

## 2024-01-25 LAB
ANION GAP SERPL CALCULATED.3IONS-SCNC: 9 MMOL/L (ref 7–15)
BUN SERPL-MCNC: 49.2 MG/DL (ref 8–23)
CALCIUM SERPL-MCNC: 10.5 MG/DL (ref 8.8–10.2)
CHLORIDE SERPL-SCNC: 115 MMOL/L (ref 98–107)
CREAT SERPL-MCNC: 2.32 MG/DL (ref 0.51–0.95)
DEPRECATED HCO3 PLAS-SCNC: 20 MMOL/L (ref 22–29)
EGFRCR SERPLBLD CKD-EPI 2021: 22 ML/MIN/1.73M2
GLUCOSE SERPL-MCNC: 101 MG/DL (ref 70–99)
POTASSIUM SERPL-SCNC: 5.4 MMOL/L (ref 3.4–5.3)
SODIUM SERPL-SCNC: 144 MMOL/L (ref 135–145)

## 2024-01-25 PROCEDURE — 36415 COLL VENOUS BLD VENIPUNCTURE: CPT | Mod: ORL

## 2024-01-25 PROCEDURE — P9604 ONE-WAY ALLOW PRORATED TRIP: HCPCS | Mod: ORL | Performed by: SURGERY

## 2024-01-25 PROCEDURE — P9604 ONE-WAY ALLOW PRORATED TRIP: HCPCS | Mod: ORL

## 2024-01-25 PROCEDURE — 80048 BASIC METABOLIC PNL TOTAL CA: CPT | Mod: ORL

## 2024-01-25 PROCEDURE — 36415 COLL VENOUS BLD VENIPUNCTURE: CPT | Mod: ORL | Performed by: SURGERY

## 2024-01-25 PROCEDURE — 80048 BASIC METABOLIC PNL TOTAL CA: CPT | Mod: ORL | Performed by: SURGERY

## 2024-01-29 ENCOUNTER — TELEPHONE (OUTPATIENT)
Dept: OTHER | Facility: CLINIC | Age: 74
End: 2024-01-29
Payer: MEDICARE

## 2024-01-29 ENCOUNTER — ANESTHESIA EVENT (OUTPATIENT)
Dept: SURGERY | Facility: CLINIC | Age: 74
End: 2024-01-29
Payer: MEDICARE

## 2024-01-29 NOTE — TELEPHONE ENCOUNTER
[facility]    Who is the name of the provider?:  JALYN TRAN   What is the location you see this provider at/preferred location?: [place]  Person calling / Facility: Diana Santiago  Phone number:  [phone] (home)  Nurse call back needed:  YES     Reason for call:  Patient requesting pre op fasting advice. Procedure is tomorrow with Lele.     Pharmacy location:     Car reviews  FOR [facility] - [place], [place] - [address] [place] [address] [id number] - [address] AT [facility]  [id number] - [place], [place] - [address] [place] [address] [address]VE [address]  Car reviews [facility] DEL[facility] - [place], [place] - [address] [place] [address] [facility] - [address]  Outside Imaging: n/a   Can we leave a detailed message on this number?  YES     1/29/2024, 10:47 AM

## 2024-01-29 NOTE — ANESTHESIA PREPROCEDURE EVALUATION
Anesthesia Pre-Procedure Evaluation    Patient: Diana Santiago   MRN: [id number] : 1950        Procedure : Procedure(s):  CREATION OF FIRST STAGE RIGHT UPPER ARM RADIAL TO BASILIC ARTERIOVENOUS FISTULA          Past Medical History:   Diagnosis Date    Benign essential hypertension 2018    added norvasc     Bipolar affective disorder (H)     hosp 1993, Dr. Aftab Deal    Cancer (H)     DCIS, left breast    Chronic kidney disease, stage 3a (H)     seen by renal Dr. Cedeno in , renal us cysts    DCIS (ductal carcinoma in situ)     xrt and lumpectomy x 4    Depressive disorder     Diabetes insipidus (H24) 2018    elev sodium, likely due to lithium    Elevated blood sugar     Fractured femoral neck (H)     Hx of colonoscopy     tics and hem    Hypercalcemia 2017    Hypercholesteremia     Hyperparathyroidism (H24) 2017    Lithium toxicity 2021    hosp fsd    Nephrogenic diabetes insipidus (H24) 2021    felt due to lithium    Thalassaemia trait     Vitamin D deficiency       Past Surgical History:   Procedure Laterality Date    BIOPSY      left breast    BREAST SURGERY      lumpectomy x 4    COLONOSCOPY      COLONOSCOPY N/A 2022    Procedure: COLONOSCOPY, WITH POLYPECTOMY AND BIOPSY;  Surgeon: Panchito Gu MD;  Location:  GI    EYE SURGERY      retina tear    LAPAROTOMY EXPLORATORY N/A 2022    Procedure: Exploratory laparotomy, REPAIR OF PERFORATED ULCER;  Surgeon: Ron Vidal MD;  Location:  OR    left hand surgery      last       Allergies   Allergen Reactions    Ace Inhibitors      Other reaction(s): renal failure  She can't be on an ACEI or ARB while on Lithium.      Social History     Tobacco Use    Smoking status: Never    Smokeless tobacco: Never   Substance Use Topics    Alcohol use: No      Wt Readings from Last 1 Encounters:   24 54 kg (119 lb)        Anesthesia Evaluation   Pt has had prior anesthetic.      No history of anesthetic complications       ROS/MED HX  ENT/Pulmonary:    (-) sleep apnea   Neurologic: Comment: Meningioma    (+)    peripheral neuropathy, - R foot drop.                           Cardiovascular: Comment: She denies shortness of breath concerns    (+)  hypertension- -   -  - -                           valvular problems/murmurs  moderate-severe MR with DONNA secondary to hyperdynamic ventricle.    Previous cardiac testing   Echo: Date: 1/31/2023 Results:  Interpretation Summary     Hyperdynamic left ventricular function.  The visual ejection fraction is >70%.  No regional wall motion abnormalities.  The right ventricle is hyperdynamic.  There is systolic anterior motion of the mitral valve.  Moderately severe mitral valve regurgitation (mid to late systolic).  Mechanism is dynamic left ventricular outflow obstruction.  Normal left atrial size.     Compared to the recent study dated 1/29/2023, biventricular function is  hyperdynamic and new moderately severe dynamic MR is present.    Stress Test:  Date: Results:    ECG Reviewed:  Date: Results:    Cath:  Date: Results:      METS/Exercise Tolerance:     Hematologic:       Musculoskeletal:       GI/Hepatic:    (-) GERD   Renal/Genitourinary:     (+) renal disease (she has not started dialysis yet), type: ESRD,            Endo:       Psychiatric/Substance Use:     (+) psychiatric history bipolar       Infectious Disease:       Malignancy:       Other:            Physical Exam    Airway        Mallampati: III   TM distance: < 3 FB   Neck ROM: full   Mouth opening: > 3 cm    Respiratory Devices and Support         Dental       (+) Minor Abnormalities - some fillings, tiny chips      Cardiovascular   cardiovascular exam normal          Pulmonary   pulmonary exam normal                OUTSIDE LABS:  CBC:   Lab Results   Component Value Date    WBC 7.9 11/17/2023    WBC 8.3 08/25/2023    HGB 9.6 (L) 01/19/2024    HGB 10.0 (L) 12/15/2023    HCT 32.2 (L)  "11/17/2023    HCT 36.7 08/25/2023     11/17/2023     08/25/2023     BMP:   Lab Results   Component Value Date     01/25/2024     01/18/2024    POTASSIUM 5.4 (H) 01/25/2024    POTASSIUM 5.3 01/18/2024    CHLORIDE 115 (H) 01/25/2024    CHLORIDE 109 (H) 01/18/2024    CO2 20 (L) 01/25/2024    CO2 20 (L) 01/18/2024    BUN 49.2 (H) 01/25/2024    BUN 51.1 (H) 01/18/2024    CR 2.32 (H) 01/25/2024    CR 2.30 (H) 01/18/2024     (H) 01/25/2024     (H) 01/18/2024     COAGS:   Lab Results   Component Value Date    INR 0.93 09/12/2022     POC: No results found for: \"BGM\", \"HCG\", \"HCGS\"  HEPATIC:   Lab Results   Component Value Date    ALBUMIN 3.4 (L) 02/27/2023    PROTTOTAL 6.4 02/27/2023    ALT 25 02/27/2023    AST 18 02/27/2023    ALKPHOS 150 (H) 02/27/2023    BILITOTAL 0.3 02/27/2023     OTHER:   Lab Results   Component Value Date    PH 7.36 02/02/2023    LACT 2.0 01/31/2023    A1C 6.1 (H) 12/21/2020    ISSA 10.5 (H) 01/25/2024    PHOS 3.1 02/12/2023    MAG 2.3 02/12/2023    LIPASE 511 (H) 08/24/2022    TSH 2.57 02/07/2023       Anesthesia Plan    ASA Status:  4    NPO Status:  NPO Appropriate    Anesthesia Type: MAC.     - Reason for MAC: chronic cardiopulmonary disease, straight local not clinically adequate              Consents    Anesthesia Plan(s) and associated risks, benefits, and realistic alternatives discussed. Questions answered and patient/representative(s) expressed understanding.     - Discussed:     - Discussed with:  Patient            Postoperative Care    Pain management: IV analgesics.        Comments:    Other Comments: Preop BMP           Frederick Moreno MD    I have reviewed the pertinent notes and labs in the chart from the past 30 days and (re)examined the patient.  Any updates or changes from those notes are reflected in this note.    # Hyperkalemia: Highest K = 5.4 mmol/L in last 30 days, will monitor as appropriate   # Hypercalcemia: Highest Ca = 11.1 mg/dL " in last 30 days, will monitor as appropriate

## 2024-01-29 NOTE — TELEPHONE ENCOUNTER
[place] [facility]    I called Diana Santiago about her first stage right upper arm proximal radial to basilic arteriovenous fistula.  This will be performed under local anesthetic with sedation.  I left a message for her on her cell phone we will try to reach her later today to see if she has any further questions.      Kevin Royal MD       I was able to speak with the patient the phone this afternoon.  She had no questions about the upcoming surgery.  We have given her a fistula tourniquet to use postprocedure and she is having some difficulty putting this on but this can be addressed postprocedure.       Kevin Royal MD

## 2024-01-30 ENCOUNTER — HOSPITAL ENCOUNTER (OUTPATIENT)
Facility: CLINIC | Age: 74
Discharge: HOME | End: 2024-01-30
Attending: SURGERY | Admitting: SURGERY
Payer: MEDICARE

## 2024-01-30 ENCOUNTER — APPOINTMENT (OUTPATIENT)
Dept: SURGERY | Facility: PHYSICIAN GROUP | Age: 74
End: 2024-01-30
Payer: MEDICARE

## 2024-01-30 ENCOUNTER — ANESTHESIA (OUTPATIENT)
Dept: SURGERY | Facility: CLINIC | Age: 74
End: 2024-01-30
Payer: MEDICARE

## 2024-01-30 VITALS
DIASTOLIC BLOOD PRESSURE: 63 MMHG | RESPIRATION RATE: 16 BRPM | SYSTOLIC BLOOD PRESSURE: 104 MMHG | HEART RATE: 65 BPM | OXYGEN SATURATION: 96 % | WEIGHT: 117.8 LBS | HEIGHT: 63 IN | BODY MASS INDEX: 20.87 KG/M2 | TEMPERATURE: 97.4 F

## 2024-01-30 DIAGNOSIS — N18.4 CHRONIC RENAL DISEASE, STAGE IV (H): Primary | ICD-10-CM

## 2024-01-30 LAB
ANION GAP SERPL CALCULATED.3IONS-SCNC: 7 MMOL/L (ref 7–15)
BUN SERPL-MCNC: 52.4 MG/DL (ref 8–23)
CALCIUM SERPL-MCNC: 10.3 MG/DL (ref 8.8–10.2)
CHLORIDE SERPL-SCNC: 110 MMOL/L (ref 98–107)
CREAT SERPL-MCNC: 2.26 MG/DL (ref 0.51–0.95)
DEPRECATED HCO3 PLAS-SCNC: 22 MMOL/L (ref 22–29)
EGFRCR SERPLBLD CKD-EPI 2021: 22 ML/MIN/1.73M2
GLUCOSE SERPL-MCNC: 100 MG/DL (ref 70–99)
POTASSIUM SERPL-SCNC: 5.2 MMOL/L (ref 3.4–5.3)
SODIUM SERPL-SCNC: 139 MMOL/L (ref 135–145)

## 2024-01-30 PROCEDURE — 710N000009 HC RECOVERY PHASE 1, LEVEL 1, PER MIN: Performed by: SURGERY

## 2024-01-30 PROCEDURE — 258N000003 HC RX IP 258 OP 636: Performed by: NURSE ANESTHETIST, CERTIFIED REGISTERED

## 2024-01-30 PROCEDURE — 258N000003 HC RX IP 258 OP 636: Performed by: SURGERY

## 2024-01-30 PROCEDURE — 36821 AV FUSION DIRECT ANY SITE: CPT | Mod: RT | Performed by: SURGERY

## 2024-01-30 PROCEDURE — 250N000011 HC RX IP 250 OP 636: Performed by: SURGERY

## 2024-01-30 PROCEDURE — 272N000001 HC OR GENERAL SUPPLY STERILE: Performed by: SURGERY

## 2024-01-30 PROCEDURE — 250N000011 HC RX IP 250 OP 636: Performed by: NURSE ANESTHETIST, CERTIFIED REGISTERED

## 2024-01-30 PROCEDURE — 360N000076 HC SURGERY LEVEL 3, PER MIN: Performed by: SURGERY

## 2024-01-30 PROCEDURE — 710N000012 HC RECOVERY PHASE 2, PER MINUTE: Performed by: SURGERY

## 2024-01-30 PROCEDURE — 370N000017 HC ANESTHESIA TECHNICAL FEE, PER MIN: Performed by: SURGERY

## 2024-01-30 PROCEDURE — 250N000009 HC RX 250: Performed by: SURGERY

## 2024-01-30 PROCEDURE — 36415 COLL VENOUS BLD VENIPUNCTURE: CPT | Performed by: SURGERY

## 2024-01-30 PROCEDURE — 999N000141 HC STATISTIC PRE-PROCEDURE NURSING ASSESSMENT: Performed by: SURGERY

## 2024-01-30 PROCEDURE — 80048 BASIC METABOLIC PNL TOTAL CA: CPT | Performed by: SURGERY

## 2024-01-30 RX ORDER — HYDROMORPHONE HCL IN WATER/PF 6 MG/30 ML
0.2 PATIENT CONTROLLED ANALGESIA SYRINGE INTRAVENOUS EVERY 5 MIN PRN
Status: DISCONTINUED | OUTPATIENT
Start: 2024-01-30 | End: 2024-01-30 | Stop reason: HOSPADM

## 2024-01-30 RX ORDER — CEFAZOLIN SODIUM/WATER 2 G/20 ML
2 SYRINGE (ML) INTRAVENOUS SEE ADMIN INSTRUCTIONS
Status: DISCONTINUED | OUTPATIENT
Start: 2024-01-30 | End: 2024-01-30 | Stop reason: HOSPADM

## 2024-01-30 RX ORDER — BUPIVACAINE HYDROCHLORIDE 5 MG/ML
INJECTION, SOLUTION PERINEURAL PRN
Status: DISCONTINUED | OUTPATIENT
Start: 2024-01-30 | End: 2024-01-30 | Stop reason: HOSPADM

## 2024-01-30 RX ORDER — ONDANSETRON 4 MG/1
4 TABLET, ORALLY DISINTEGRATING ORAL EVERY 30 MIN PRN
Status: DISCONTINUED | OUTPATIENT
Start: 2024-01-30 | End: 2024-01-30 | Stop reason: HOSPADM

## 2024-01-30 RX ORDER — MAGNESIUM HYDROXIDE 1200 MG/15ML
LIQUID ORAL PRN
Status: DISCONTINUED | OUTPATIENT
Start: 2024-01-30 | End: 2024-01-30 | Stop reason: HOSPADM

## 2024-01-30 RX ORDER — CEFAZOLIN SODIUM/WATER 2 G/20 ML
2 SYRINGE (ML) INTRAVENOUS
Status: COMPLETED | OUTPATIENT
Start: 2024-01-30 | End: 2024-01-30

## 2024-01-30 RX ORDER — FENTANYL CITRATE 50 UG/ML
50 INJECTION, SOLUTION INTRAMUSCULAR; INTRAVENOUS EVERY 5 MIN PRN
Status: DISCONTINUED | OUTPATIENT
Start: 2024-01-30 | End: 2024-01-30 | Stop reason: HOSPADM

## 2024-01-30 RX ORDER — SODIUM CHLORIDE 9 MG/ML
INJECTION, SOLUTION INTRAVENOUS CONTINUOUS PRN
Status: DISCONTINUED | OUTPATIENT
Start: 2024-01-30 | End: 2024-01-30

## 2024-01-30 RX ORDER — FENTANYL CITRATE 50 UG/ML
25 INJECTION, SOLUTION INTRAMUSCULAR; INTRAVENOUS EVERY 5 MIN PRN
Status: DISCONTINUED | OUTPATIENT
Start: 2024-01-30 | End: 2024-01-30 | Stop reason: HOSPADM

## 2024-01-30 RX ORDER — ONDANSETRON 2 MG/ML
4 INJECTION INTRAMUSCULAR; INTRAVENOUS EVERY 30 MIN PRN
Status: DISCONTINUED | OUTPATIENT
Start: 2024-01-30 | End: 2024-01-30 | Stop reason: HOSPADM

## 2024-01-30 RX ORDER — HEPARIN SODIUM 1000 [USP'U]/ML
INJECTION, SOLUTION INTRAVENOUS; SUBCUTANEOUS PRN
Status: DISCONTINUED | OUTPATIENT
Start: 2024-01-30 | End: 2024-01-30 | Stop reason: HOSPADM

## 2024-01-30 RX ORDER — SODIUM CHLORIDE, SODIUM LACTATE, POTASSIUM CHLORIDE, CALCIUM CHLORIDE 600; 310; 30; 20 MG/100ML; MG/100ML; MG/100ML; MG/100ML
INJECTION, SOLUTION INTRAVENOUS CONTINUOUS
Status: DISCONTINUED | OUTPATIENT
Start: 2024-01-30 | End: 2024-01-30 | Stop reason: HOSPADM

## 2024-01-30 RX ORDER — PROPOFOL 10 MG/ML
INJECTION, EMULSION INTRAVENOUS CONTINUOUS PRN
Status: DISCONTINUED | OUTPATIENT
Start: 2024-01-30 | End: 2024-01-30

## 2024-01-30 RX ORDER — ASPIRIN 81 MG/1
81 TABLET, CHEWABLE ORAL DAILY
Qty: 14 TABLET | Refills: 0 | Status: SHIPPED | OUTPATIENT
Start: 2024-01-30 | End: 2024-03-27

## 2024-01-30 RX ORDER — HYDROMORPHONE HCL IN WATER/PF 6 MG/30 ML
0.4 PATIENT CONTROLLED ANALGESIA SYRINGE INTRAVENOUS EVERY 5 MIN PRN
Status: DISCONTINUED | OUTPATIENT
Start: 2024-01-30 | End: 2024-01-30 | Stop reason: HOSPADM

## 2024-01-30 RX ORDER — HEPARIN SODIUM 1000 [USP'U]/ML
INJECTION, SOLUTION INTRAVENOUS; SUBCUTANEOUS PRN
Status: DISCONTINUED | OUTPATIENT
Start: 2024-01-30 | End: 2024-01-30

## 2024-01-30 RX ADMIN — SODIUM CHLORIDE: 9 INJECTION, SOLUTION INTRAVENOUS at 12:59

## 2024-01-30 RX ADMIN — Medication 2 G: at 12:53

## 2024-01-30 RX ADMIN — HEPARIN SODIUM 3000 UNITS: 1000 INJECTION, SOLUTION INTRAVENOUS; SUBCUTANEOUS at 13:33

## 2024-01-30 RX ADMIN — PROPOFOL 100 MCG/KG/MIN: 10 INJECTION, EMULSION INTRAVENOUS at 13:01

## 2024-01-30 ASSESSMENT — ACTIVITIES OF DAILY LIVING (ADL)
ADLS_ACUITY_SCORE: 35
ADLS_ACUITY_SCORE: 35

## 2024-01-30 NOTE — ANESTHESIA POSTPROCEDURE EVALUATION
Patient: Diana Santiago    Procedure: Procedure(s):  CREATION OF FIRST STAGE RIGHT UPPER ARM ULNAR TO BASILIC ARTERIOVENOUS FISTULA       Anesthesia Type:  MAC    Note:     Postop Pain Control: Uneventful            Sign Out: Well controlled pain   PONV: No   Neuro/Psych: Uneventful            Sign Out: Acceptable/Baseline neuro status   Airway/Respiratory: Uneventful            Sign Out: Acceptable/Baseline resp. status   CV/Hemodynamics: Uneventful            Sign Out: Acceptable CV status; No obvious hypovolemia; No obvious fluid overload   Other NRE: NONE   DID A NON-ROUTINE EVENT OCCUR? No           Last vitals:  Vitals Value Taken Time   /79 01/30/24 1500   Temp     Pulse 61 01/30/24 1506   Resp 19 01/30/24 1506   SpO2 100 % 01/30/24 1506   Vitals shown include unfiled device data.    Electronically Signed By: Frederick Moreno MD  January 30, 2024  3:35 PM

## 2024-01-30 NOTE — OR NURSING
Patient alert and oriented x 4.  VSS. Patient voided prior to discharge.  Discharge instructions read through with Patient and Family member (Acosta-brother).  Medications given to family member.  IV removed.  Patient escorted to door in a wheelchair with staff.

## 2024-01-30 NOTE — OP NOTE
OPERATIVE NOTE    PROCEDURE DATE: 1/30/2024      PRE-OP DIAGNOSIS: End-stage renal disease in need of permanent hemodialysis access      POST-OP DIAGNOSES: Same      PROCEDURE PERFORMED: First stage right upper arm proximal ulnar to brachial transposition arteriovenous fistula creation      SURGEON:  Kevin Royal M.D.      ASSISTANT: Holli Fuentes MD (Vascular Fellow)      ANESTHESIA:  Local with MAC      PRE-OP MEDICATIONS: Ancef 2 g IV      INDICATIONS FOR PROCEDURE: 73-year-old patient has end-stage renal disease.  She has not yet initiated on hemodialysis.  She had a small but adequate right upper arm and antecubital brachial vein.  Chelsea that she would benefit from a two-stage upper arm fistula creation and comes for the first stage today under informed consent.      DESCRIPTION OF PROCEDURE: Brought to the operating room and placed supine with a pillow under knees.  Calf pneumatic compression boots were used.  Duplex ultrasound was used to nupur the brachial artery and brachial vein which appeared to be disease-free for both.    Right arm was then prepped and draped.  Timeout was called and the sites were identified.    1% lidocaine was injected field block fashion.  An antecubital vertical incision was made over the brachial artery.  Skin was very thin on this patient.  Dissection was carried down to identify the brachial vein lying anterior and medial to the brachial artery.  This was dissected free.  Multiple branches were divided between 4-0 silk sutures.  Distally we did save a sidebranch for the heel of our anastomosis.  Vein appeared to be disease-free.  We then dissected out the distal brachial artery and the radial-ulnar arteries which appeared to have very minimal disease.  They are all encircled with Silastic vessels.    Proximal ulnar to brachial fistula: 3000 units IV heparin given.  Distal end of the vein was transected and ligated with a 3-0 silk suture.  We mobilized the rest of the vein to  the antecubital.  This was irrigated with heparinized saline solution gently dilated with 1% lidocaine.  There was 1 bleeding sidebranch that was repaired with a mattress and simple 7-0 Prolene suture.  Vein easily excepted a 3 mm dilator.  This was controlled with a microvascular bulldog.  The vein was spatulated to one of the safe side branches distally.  Vesseloops were tightened around the distal brachial artery-ulnar-radial arteries.  On the ulnar artery a 0.7 cm longitudinal arteriotomy was made in the disease-free vessel.  A end to side anastomosis was created with running 7-0 Prolene suture and loupe magnification.  Release of the Vesseloops we had a strong pulse within the fistula and excellent Doppler flow.  Good ulnar and radial signals were noted in the antecubital.  Patient has strong biphasic ulnar signal at the level of the wrist with a weaker radial signal as noted preoperatively.    Subcutaneous tissue approximated interrupted 3-0 Vicryl after infiltrating with 0.25% Marcaine for postop analgesia.  Skin was closed with a running simple and mattress 5-0 Monocryl due to the very thin nature.  This was followed by surgical adhesive and Tegaderm dressing.    Patient Toller procedure well and returned to recovery room.  Needle and sponge counts correct.      EBL: Less than 5      COMPLICATIONS: None      OPERATIVE FINDINGS: Small but very adequate disease-free brachial vein.  This will be followed in the clinic in several weeks to see when she is ready for the second stage transposition portion.      Kevin Royal MD

## 2024-01-30 NOTE — ANESTHESIA CARE TRANSFER NOTE
Patient: Diana Santiago    Procedure: Procedure(s):  CREATION OF FIRST STAGE RIGHT UPPER ARM ULNAR TO BASILIC ARTERIOVENOUS FISTULA       Diagnosis: CKD (chronic kidney disease) stage 4, GFR 15-29 ml/min (H) [N18.4]  Diagnosis Additional Information: No value filed.    Anesthesia Type:   MAC     Note:    Oropharynx: oropharynx clear of all foreign objects and spontaneously breathing  Level of Consciousness: drowsy  Oxygen Supplementation: face mask  Level of Supplemental Oxygen (L/min / FiO2): 4  Independent Airway: airway patency satisfactory and stable  Dentition: dentition unchanged  Vital Signs Stable: post-procedure vital signs reviewed and stable  Report to RN Given: handoff report given  Patient transferred to: PACU  Comments: At end of procedure, spontaneous respirations, patient alert to voice, able to follow commands. Oxygen via facemask at 4 liters per minute to PACU. Oxygen tubing connected to wall O2 in PACU, SpO2, NiBP, and EKG monitors and alarms on and functioning, Dianne Hugger warmer connected to patient gown, report on patient's clinical status given to PACU RN, RN questions answered.      Handoff Report: Identifed the Patient, Identified the Reponsible Provider, Reviewed the pertinent medical history, Discussed the surgical course, Reviewed Intra-OP anesthesia mangement and issues during anesthesia, Set expectations for post-procedure period and Allowed opportunity for questions and acknowledgement of understanding      Vitals:  Vitals Value Taken Time   BP     Temp     Pulse     Resp     SpO2         Electronically Signed By: KRUNAL Diaz CRNA  January 30, 2024  2:32 PM

## 2024-01-30 NOTE — DISCHARGE INSTRUCTIONS
Same Day Surgery Discharge Instructions for  Sedation and General Anesthesia     It's not unusual to feel dizzy, light-headed or faint for up to 24 hours after surgery or while taking pain medication.  If you have these symptoms: sit for a few minutes before standing and have someone assist you when you get up to walk or use the bathroom.    You should rest and relax for the next 24 hours. We recommend you make arrangements to have an adult stay with you for at least 24 hours after your discharge.  Avoid hazardous and strenuous activity.    DO NOT DRIVE any vehicle or operate mechanical equipment for 24 hours following the end of your surgery.  Even though you may feel normal, your reactions may be affected by the medication you have received.    Do not drink alcoholic beverages for 24 hours following surgery.     Slowly progress to your regular diet as you feel able. It's not unusual to feel nauseated and/or vomit after receiving anesthesia.  If you develop these symptoms, drink clear liquids (apple juice, ginger ale, broth, 7-up, etc. ) until you feel better.  If your nausea and vomiting persists for 24 hours, please notify your surgeon.      All narcotic pain medications, along with inactivity and anesthesia, can cause constipation. Drinking plenty of liquids and increasing fiber intake will help.    For any questions of a medical nature, call your surgeon.    Do not make important decisions for 24 hours.    If you had general anesthesia, you may have a sore throat for a couple of days related to the breathing tube used during surgery.  You may use Cepacol lozenges to help with this discomfort.  If it worsens or if you develop a fever, contact your surgeon.     If you feel your pain is not well managed with the pain medications prescribed by your surgeon, please contact your surgeon's office to let them know so they can address your concerns.     **If you have questions or concerns about your procedure  call   Kevin Royal at [phone]**

## 2024-01-30 NOTE — PROGRESS NOTES
PTA medications completed by Medication Scribe day of surgery     Medication history sources: H&P and MAR (Nayla on Evi and Nurse, Dinorah [phone])  In the past week, patient estimated taking medication this percent of the time: Greater than 90%      Significant changes made to the medication list:  None      Additional medication history information:   None    Medication reconciliation completed by provider prior to medication history? No    Time spent in this activity: 35 minutes    The information provided in this note is only as accurate as the sources available at the time of update(s)      Prior to Admission medications    Medication Sig Last Dose Taking? Auth Provider Long Term End Date   acetaminophen (TYLENOL) 500 MG tablet TAKE TWO TABLETS (1000MG) BY MOUTH AT BEDTIME 1/29/2024 at pm Yes Gayle Hernandez MD     aMILoride (MIDAMOR) 5 MG tablet Take 5 mg by mouth daily 1/30/2024 at am Yes Reported, Patient No    amLODIPine (NORVASC) 5 MG tablet TAKE 1 TABLET BY MOUTH ONCE DAILY (HOLD FOR SBP < 100) 1/30/2024 at am Yes Gayle Hernandez MD Yes    diazepam (VALIUM) 2 MG tablet TAKE ONE-HALF TABLET (1MG) BY MOUTH EVERY OTHER DAY **CONTROLLED MED-DOUBLE LOCK STORAGE* 1/28/2024 at pm Yes Gayle Hernandez MD     FEROSUL 325 (65 Fe) MG tablet TAKE ONE TABLET BY MOUTH EVERY OTHER DAY 1/29/2024 at am Yes Gayle Hernandez MD     fexofenadine (ALLEGRA) 180 MG tablet TAKE 1 TABLET BY MOUTH ONCE DAILY 1/30/2024 at am Yes Gayle Hernandez MD     lithium (ESKALITH) 150 MG capsule TAKE 1 CAPSULE BY MOUTH THREE TIMES DAILY WITH MEALS 1/30/2024 at am Yes Gayle Hernandez MD Yes    pantoprazole (PROTONIX) 40 MG EC tablet TAKE 1 TABLET BY MOUTH ONCE DAILY 1/30/2024 at am Yes Gayle Hernandez MD     polyethylene glycol (MIRALAX) 17 GM/Dose powder MIX 17GM OF POWDER IN 8OZ OF WATER UNTIL COMPLETELY DISSOLVED. DRINK SOLUTION BY MOUTH ONCE DAILY. Unknown at prn Yes Gayle Hernandez MD     simvastatin  (ZOCOR) 40 MG tablet TAKE 1 TABLET BY MOUTH AT BEDTIME 1/29/2024 at pm Yes Gayle Hernandez MD Yes    sodium bicarbonate 650 MG tablet TAKE TWO TABLETS (1300MG) BY MOUTH THREE TIMES DAILY 1/30/2024 at am Yes Gayle Hernandez MD     ACE/ARB/ARNI NOT PRESCRIBED (INTENTIONAL) Please choose reason not prescribed from choices below.  Patient not taking: Reported on 1/24/2024   Gayle Hernandez MD Yes        Medication history completed by: Amara Zepeda LPN

## 2024-01-31 ENCOUNTER — LAB REQUISITION (OUTPATIENT)
Dept: LAB | Facility: CLINIC | Age: 74
End: 2024-01-31
Payer: MEDICARE

## 2024-01-31 DIAGNOSIS — N18.4 CHRONIC KIDNEY DISEASE, STAGE 4 (SEVERE) (H): ICD-10-CM

## 2024-01-31 NOTE — TELEPHONE ENCOUNTER
Patient called stating she has to get a blood draw on 02/01/24 and would like to clarify if it should be done in the left arm due to surgery on right arm 01/30/24.

## 2024-01-31 NOTE — TELEPHONE ENCOUNTER
Returned call to patient.  Advised her any future lab draws or IVs will need to be placed in the LEFT arm.  Her right arm is to only be used for fistula access.    Patient verbalized understanding and had no further questions.    Erica Simon, SUSYN, RN, [facility] [place]

## 2024-01-31 NOTE — TELEPHONE ENCOUNTER
Patient called stating she had a missed call. Informed patient there is no documentation of a recent call. Patient will check voicemail.

## 2024-02-02 LAB
ANION GAP SERPL CALCULATED.3IONS-SCNC: 8 MMOL/L (ref 7–15)
BUN SERPL-MCNC: 61.8 MG/DL (ref 8–23)
CALCIUM SERPL-MCNC: 10.1 MG/DL (ref 8.8–10.2)
CHLORIDE SERPL-SCNC: 114 MMOL/L (ref 98–107)
CREAT SERPL-MCNC: 2.31 MG/DL (ref 0.51–0.95)
DEPRECATED HCO3 PLAS-SCNC: 20 MMOL/L (ref 22–29)
EGFRCR SERPLBLD CKD-EPI 2021: 22 ML/MIN/1.73M2
GLUCOSE SERPL-MCNC: 106 MG/DL (ref 70–99)
POTASSIUM SERPL-SCNC: 4.9 MMOL/L (ref 3.4–5.3)
SODIUM SERPL-SCNC: 142 MMOL/L (ref 135–145)

## 2024-02-02 PROCEDURE — 80048 BASIC METABOLIC PNL TOTAL CA: CPT | Mod: ORL

## 2024-02-02 PROCEDURE — 36415 COLL VENOUS BLD VENIPUNCTURE: CPT | Mod: ORL

## 2024-02-02 PROCEDURE — 36415 COLL VENOUS BLD VENIPUNCTURE: CPT | Mod: ORL | Performed by: NURSE PRACTITIONER

## 2024-02-02 PROCEDURE — 80048 BASIC METABOLIC PNL TOTAL CA: CPT | Mod: ORL | Performed by: NURSE PRACTITIONER

## 2024-02-04 ENCOUNTER — TELEPHONE (OUTPATIENT)
Dept: OTHER | Facility: CLINIC | Age: 74
End: 2024-02-04
Payer: MEDICARE

## 2024-02-04 NOTE — TELEPHONE ENCOUNTER
[place] [facility]    I called Diana Santiago about her AVF surgery.  She is doing well.  Tegaderm in place and may remove at any time.    Unable to use tourniquet to help mature the AVF--Nurses @ [facility] also will not help.  Will discuss on Clinic FOLLOW-UP.       Kevin Royal MD

## 2024-02-07 ENCOUNTER — LAB REQUISITION (OUTPATIENT)
Dept: LAB | Facility: CLINIC | Age: 74
End: 2024-02-07
Payer: MEDICARE

## 2024-02-07 DIAGNOSIS — N18.4 CHRONIC KIDNEY DISEASE, STAGE 4 (SEVERE) (H): ICD-10-CM

## 2024-02-08 PROCEDURE — P9604 ONE-WAY ALLOW PRORATED TRIP: HCPCS | Mod: ORL | Performed by: NURSE PRACTITIONER

## 2024-02-08 PROCEDURE — 36415 COLL VENOUS BLD VENIPUNCTURE: CPT | Mod: ORL | Performed by: NURSE PRACTITIONER

## 2024-02-08 PROCEDURE — 80048 BASIC METABOLIC PNL TOTAL CA: CPT | Mod: ORL | Performed by: NURSE PRACTITIONER

## 2024-02-09 ENCOUNTER — HOSPITAL ENCOUNTER (EMERGENCY)
Facility: CLINIC | Age: 74
Discharge: HOME | End: 2024-02-09
Attending: EMERGENCY MEDICINE | Admitting: EMERGENCY MEDICINE
Payer: MEDICARE

## 2024-02-09 ENCOUNTER — LAB REQUISITION (OUTPATIENT)
Dept: LAB | Facility: CLINIC | Age: 74
End: 2024-02-09
Payer: MEDICARE

## 2024-02-09 VITALS
RESPIRATION RATE: 20 BRPM | OXYGEN SATURATION: 99 % | WEIGHT: 120 LBS | HEART RATE: 71 BPM | BODY MASS INDEX: 21.26 KG/M2 | SYSTOLIC BLOOD PRESSURE: 112 MMHG | DIASTOLIC BLOOD PRESSURE: 72 MMHG | TEMPERATURE: 99 F

## 2024-02-09 DIAGNOSIS — N18.4 CKD (CHRONIC KIDNEY DISEASE) STAGE 4, GFR 15-29 ML/MIN (H): ICD-10-CM

## 2024-02-09 DIAGNOSIS — D63.1 ANEMIA DUE TO STAGE 4 CHRONIC KIDNEY DISEASE (H): ICD-10-CM

## 2024-02-09 DIAGNOSIS — N18.4 ANEMIA DUE TO STAGE 4 CHRONIC KIDNEY DISEASE (H): ICD-10-CM

## 2024-02-09 DIAGNOSIS — F31.9 BIPOLAR AFFECTIVE DISORDER, REMISSION STATUS UNSPECIFIED (H): ICD-10-CM

## 2024-02-09 DIAGNOSIS — N18.30 CHRONIC KIDNEY DISEASE, STAGE 3 UNSPECIFIED (H): ICD-10-CM

## 2024-02-09 LAB
ANION GAP SERPL CALCULATED.3IONS-SCNC: 10 MMOL/L (ref 7–15)
ANION GAP SERPL CALCULATED.3IONS-SCNC: 27 MMOL/L (ref 7–15)
ATRIAL RATE - MUSE: 74 BPM
BASOPHILS # BLD AUTO: 0 10E3/UL (ref 0–0.2)
BASOPHILS NFR BLD AUTO: 1 %
BUN SERPL-MCNC: 47.6 MG/DL (ref 8–23)
BUN SERPL-MCNC: 50.7 MG/DL (ref 8–23)
CALCIUM SERPL-MCNC: 9.6 MG/DL (ref 8.8–10.2)
CALCIUM SERPL-MCNC: 9.9 MG/DL (ref 8.8–10.2)
CHLORIDE SERPL-SCNC: 106 MMOL/L (ref 98–107)
CHLORIDE SERPL-SCNC: 106 MMOL/L (ref 98–107)
CREAT SERPL-MCNC: 2.43 MG/DL (ref 0.51–0.95)
CREAT SERPL-MCNC: 2.66 MG/DL (ref 0.51–0.95)
DEPRECATED HCO3 PLAS-SCNC: 20 MMOL/L (ref 22–29)
DEPRECATED HCO3 PLAS-SCNC: 7 MMOL/L (ref 22–29)
DIASTOLIC BLOOD PRESSURE - MUSE: NORMAL MMHG
EGFRCR SERPLBLD CKD-EPI 2021: 18 ML/MIN/1.73M2
EGFRCR SERPLBLD CKD-EPI 2021: 20 ML/MIN/1.73M2
EOSINOPHIL # BLD AUTO: 0.2 10E3/UL (ref 0–0.7)
EOSINOPHIL NFR BLD AUTO: 3 %
ERYTHROCYTE [DISTWIDTH] IN BLOOD BY AUTOMATED COUNT: 16.6 % (ref 10–15)
GLUCOSE SERPL-MCNC: 101 MG/DL (ref 70–99)
GLUCOSE SERPL-MCNC: 195 MG/DL (ref 70–99)
HCT VFR BLD AUTO: 28.6 % (ref 35–47)
HGB BLD-MCNC: 8.5 G/DL (ref 11.7–15.7)
IMM GRANULOCYTES # BLD: 0 10E3/UL
IMM GRANULOCYTES NFR BLD: 0 %
INTERPRETATION ECG - MUSE: NORMAL
LYMPHOCYTES # BLD AUTO: 1.4 10E3/UL (ref 0.8–5.3)
LYMPHOCYTES NFR BLD AUTO: 18 %
MCH RBC QN AUTO: 22.5 PG (ref 26.5–33)
MCHC RBC AUTO-ENTMCNC: 29.7 G/DL (ref 31.5–36.5)
MCV RBC AUTO: 76 FL (ref 78–100)
MONOCYTES # BLD AUTO: 0.8 10E3/UL (ref 0–1.3)
MONOCYTES NFR BLD AUTO: 10 %
NEUTROPHILS # BLD AUTO: 5.5 10E3/UL (ref 1.6–8.3)
NEUTROPHILS NFR BLD AUTO: 68 %
NRBC # BLD AUTO: 0 10E3/UL
NRBC BLD AUTO-RTO: 0 /100
P AXIS - MUSE: 44 DEGREES
PLATELET # BLD AUTO: 352 10E3/UL (ref 150–450)
POTASSIUM SERPL-SCNC: 5 MMOL/L (ref 3.4–5.3)
POTASSIUM SERPL-SCNC: 6.2 MMOL/L (ref 3.4–5.3)
PR INTERVAL - MUSE: 228 MS
QRS DURATION - MUSE: 92 MS
QT - MUSE: 398 MS
QTC - MUSE: 441 MS
R AXIS - MUSE: 14 DEGREES
RBC # BLD AUTO: 3.78 10E6/UL (ref 3.8–5.2)
SODIUM SERPL-SCNC: 136 MMOL/L (ref 135–145)
SODIUM SERPL-SCNC: 140 MMOL/L (ref 135–145)
SYSTOLIC BLOOD PRESSURE - MUSE: NORMAL MMHG
T AXIS - MUSE: 31 DEGREES
VENTRICULAR RATE- MUSE: 74 BPM
WBC # BLD AUTO: 8.1 10E3/UL (ref 4–11)

## 2024-02-09 PROCEDURE — 80048 BASIC METABOLIC PNL TOTAL CA: CPT | Performed by: EMERGENCY MEDICINE

## 2024-02-09 PROCEDURE — 36415 COLL VENOUS BLD VENIPUNCTURE: CPT | Performed by: EMERGENCY MEDICINE

## 2024-02-09 PROCEDURE — 85025 COMPLETE CBC W/AUTO DIFF WBC: CPT | Performed by: EMERGENCY MEDICINE

## 2024-02-09 PROCEDURE — 99284 EMERGENCY DEPT VISIT MOD MDM: CPT

## 2024-02-09 PROCEDURE — 93005 ELECTROCARDIOGRAM TRACING: CPT

## 2024-02-10 NOTE — ED PROVIDER NOTES
History     Chief Complaint:  Abnormal Labs       HPI   Diana Santiago is a 73 year old female with a history of chronic kidney disease, she had screening laboratory studies performed yesterday and received a call from the nurse stating that she should seek care in the emergency department for her potassium of 6.2.  The patient denies any symptoms and states that she has had abnormal potassium value secondary to hemolyzed specimens in the past.  She does relate fatigue which is chronic.  She has not initiated dialysis yet.      Independent Historian:    None    Review of External Notes:  Nursing note and laboratory values from yesterday were reviewed demonstrating a potassium of 6.2.    Medications:    ACE/ARB/ARNI NOT PRESCRIBED (INTENTIONAL)  acetaminophen (TYLENOL) 500 MG tablet  aMILoride (MIDAMOR) 5 MG tablet  amLODIPine (NORVASC) 5 MG tablet  aspirin (ASA) 81 MG chewable tablet  diazepam (VALIUM) 2 MG tablet  FEROSUL 325 (65 Fe) MG tablet  fexofenadine (ALLEGRA) 180 MG tablet  lithium (ESKALITH) 150 MG capsule  pantoprazole (PROTONIX) 40 MG EC tablet  polyethylene glycol (MIRALAX) 17 GM/Dose powder  simvastatin (ZOCOR) 40 MG tablet  sodium bicarbonate 650 MG tablet        Past Medical History:    Past Medical History:   Diagnosis Date    Benign essential hypertension 09/2018    Bipolar affective disorder (H)     Cancer (H) 1996    Chronic kidney disease, stage 3a (H)     DCIS (ductal carcinoma in situ) 1996    Depressive disorder 1968    Diabetes insipidus (H24) 09/2018    Elevated blood sugar     Fractured femoral neck (H) 1992    Hx of colonoscopy 2010    Hypercalcemia 08/2017    Hypercholesteremia     Hyperparathyroidism (H24) 08/2017    Lithium toxicity 11/2021    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    Thalassaemia trait     Vitamin D deficiency        Past Surgical History:    Past Surgical History:   Procedure Laterality Date    BIOPSY  1994    left breast    BREAST SURGERY      lumpectomy x 4     COLONOSCOPY  2021    COLONOSCOPY N/A 6/17/2022    Procedure: COLONOSCOPY, WITH POLYPECTOMY AND BIOPSY;  Surgeon: Panchito Gu MD;  Location:  GI    CREATE FISTULA ARTERIOVENOUS UPPER EXTREMITY Right 1/30/2024    Procedure: CREATION OF FIRST STAGE RIGHT UPPER ARM ULNAR TO BASILIC ARTERIOVENOUS FISTULA;  Surgeon: Kevin Royal MD;  Location:  OR    EYE SURGERY  2018    retina tear    LAPAROTOMY EXPLORATORY N/A 8/24/2022    Procedure: Exploratory laparotomy, REPAIR OF PERFORATED ULCER;  Surgeon: Ron Vidal MD;  Location:  OR    left hand surgery      last 1974          Physical Exam   Patient Vitals for the past 24 hrs:   BP Temp Temp src Pulse Resp SpO2 Weight   02/09/24 1746 108/70 99  F (37.2  C) Temporal 72 18 99 % 54.4 kg (120 lb)        Physical Exam  General: Alert, interactive   Head:  Scalp is atraumatic  Eyes:  The pupils are equal, round, and reactive to light    EOM's intact    No scleral icterus  ENT:      Nose:  The external nose is normal  Ears:  External ears are normal      Neck:  Normal range of motion.      There is no rigidity.    Trachea is in the midline         CV:  Regular rate and rhythm    No murmur   Resp:  Breath sounds are clear bilaterally    Non-labored, no retractions or accessory muscle use      MS:  Normal strength in all 4 extremities  Psych: Awake. Alert.  Normal affect.      Appropriate interactions.    Emergency Department Course   ECG  ECG taken at 1721, ECG read at 1923  Sinus rhythm with 1st degree AV block   When compared with prior ECG, dated 1/30/23, no changes noted.  Rate 74 bpm. LA interval 228 ms. QRS duration 92 ms. QT/QTc 398/441 ms. P-R-T axes 44 14 31.    Laboratory:  Labs Ordered and Resulted from Time of ED Arrival to Time of ED Departure   BASIC METABOLIC PANEL - Abnormal       Result Value    Sodium 136      Potassium 5.0      Chloride 106      Carbon Dioxide (CO2) 20 (*)     Anion Gap 10      Urea Nitrogen 47.6 (*)      Creatinine 2.66 (*)     GFR Estimate 18 (*)     Calcium 9.6      Glucose 101 (*)    CBC WITH PLATELETS AND DIFFERENTIAL - Abnormal    WBC Count 8.1      RBC Count 3.78 (*)     Hemoglobin 8.5 (*)     Hematocrit 28.6 (*)     MCV 76 (*)     MCH 22.5 (*)     MCHC 29.7 (*)     RDW 16.6 (*)     Platelet Count 352      % Neutrophils 68      % Lymphocytes 18      % Monocytes 10      % Eosinophils 3      % Basophils 1      % Immature Granulocytes 0      NRBCs per 100 WBC 0      Absolute Neutrophils 5.5      Absolute Lymphocytes 1.4      Absolute Monocytes 0.8      Absolute Eosinophils 0.2      Absolute Basophils 0.0      Absolute Immature Granulocytes 0.0      Absolute NRBCs 0.0          Procedures   None    Emergency Department Course & Assessments:    Interventions:  Medications - No data to display     Assessments:  Given ED crowding the patient was evaluated in the lobby    Independent Interpretation (X-rays, CTs, rhythm strip):  None    Consultations/Discussion of Management or Tests:  None       Social Determinants of Health affecting care:  None     Disposition:  The patient was discharged to home.     Impression & Plan    Medical Decision Making:  Patient presents with concerns for an abnormal potassium value on a lab check from yesterday.  Upon recheck here there is no signs of hyperkalemia.  Kidney function is at its baseline, hemoglobin is near its baseline.  I believe this is likely lab error from yesterday.  EKG is unremarkable.  She feels comfortable with discharge to home, she will follow-up with her primary care provider and return if new symptoms develop.    Diagnosis:    ICD-10-CM    1. CKD (chronic kidney disease) stage 4, GFR 15-29 ml/min (H)  N18.4       2. Anemia due to stage 4 chronic kidney disease (H)  N18.4     D63.1              Mauricio Hernandez MD  2/9/2024   Mauricio Hernandez,*              Mauricio Hernandez MD  02/09/24 1934

## 2024-02-12 ENCOUNTER — VIRTUAL VISIT (OUTPATIENT)
Dept: FAMILY MEDICINE | Facility: CLINIC | Age: 74
End: 2024-02-12
Payer: MEDICARE

## 2024-02-12 ENCOUNTER — OFFICE VISIT (OUTPATIENT)
Dept: OTHER | Facility: CLINIC | Age: 74
End: 2024-02-12
Payer: MEDICARE

## 2024-02-12 VITALS — SYSTOLIC BLOOD PRESSURE: 114 MMHG | HEART RATE: 71 BPM | DIASTOLIC BLOOD PRESSURE: 76 MMHG

## 2024-02-12 DIAGNOSIS — I10 ESSENTIAL HYPERTENSION: ICD-10-CM

## 2024-02-12 DIAGNOSIS — T82.898A OTHER SPECIFIED COMPLICATION OF VASCULAR PROSTHETIC DEVICES, IMPLANTS AND GRAFTS, INITIAL ENCOUNTER (H): ICD-10-CM

## 2024-02-12 DIAGNOSIS — F41.9 ANXIETY: Primary | ICD-10-CM

## 2024-02-12 DIAGNOSIS — F31.9 BIPOLAR AFFECTIVE DISORDER, REMISSION STATUS UNSPECIFIED (H): ICD-10-CM

## 2024-02-12 DIAGNOSIS — J30.2 SEASONAL ALLERGIC RHINITIS, UNSPECIFIED TRIGGER: ICD-10-CM

## 2024-02-12 DIAGNOSIS — Z99.2 ESRD (END STAGE RENAL DISEASE) ON DIALYSIS (H): Primary | ICD-10-CM

## 2024-02-12 DIAGNOSIS — N18.6 ESRD (END STAGE RENAL DISEASE) ON DIALYSIS (H): Primary | ICD-10-CM

## 2024-02-12 PROCEDURE — 99443 PR PHYSICIAN TELEPHONE EVALUATION 21-30 MIN: CPT | Mod: 24 | Performed by: INTERNAL MEDICINE

## 2024-02-12 PROCEDURE — 99024 POSTOP FOLLOW-UP VISIT: CPT

## 2024-02-12 PROCEDURE — G0463 HOSPITAL OUTPT CLINIC VISIT: HCPCS

## 2024-02-12 RX ORDER — DIAZEPAM 2 MG
TABLET ORAL
Qty: 7 TABLET | Refills: 4 | Status: SHIPPED | OUTPATIENT
Start: 2024-02-12 | End: 2024-08-26

## 2024-02-12 NOTE — PROGRESS NOTES
[facility]        Patient is here for a  follow up.    Pt is currently taking Aspirin.    /76 (BP Location: Left arm, Patient Position: Chair, Cuff Size: Adult Regular)   Pulse 71   LMP  (LMP Unknown)     The provider has been notified that the patient has no concerns.     Questions patient would like addressed today are: N/A.    Refills are needed: N/A    Has homecare services and agency name:  Kusum Freeman MA

## 2024-02-12 NOTE — PROGRESS NOTES
Diana is a 73 year old who is being evaluated via a billable telephone visit.      What phone number would you like to be contacted at? [phone]  How would you like to obtain your AVS? [place]  Distant Location (provider location):  Off-site      Subjective   Diana is a 73 year old, presenting for the following health issues:    HPI       Chief Complaint:     Medication refills    HPI:   Patient Diana Santiago is a very pleasant 73 year old female with history of bipolar affective disorder who presents to Internal Medicine clinic today via telephone visit for follow up of multiple concerns including medication refills.         Current Medications:     Current Outpatient Medications   Medication Sig Dispense Refill     ACE/ARB/ARNI NOT PRESCRIBED (INTENTIONAL) Please choose reason not prescribed from choices below.       aMILoride (MIDAMOR) 5 MG tablet Take 5 mg by mouth       amLODIPine (NORVASC) 5 MG tablet TAKE 1 TABLET BY MOUTH ONCE DAILY (HOLD FOR SBP < 100) 90 tablet 97     diazepam (VALIUM) 2 MG tablet Take 0.5 tablets (1 mg) by mouth every other day 7 tablet 2     FEROSUL 325 (65 Fe) MG tablet TAKE ONE TABLET BY MOUTH EVERY OTHER DAY 42 tablet 97     fexofenadine (ALLEGRA) 180 MG tablet TAKE 1 TABLET BY MOUTH ONCE DAILY 90 tablet 97     lithium (ESKALITH) 150 MG capsule TAKE 1 CAPSULE BY MOUTH THREE TIMES DAILY WITH MEALS 270 capsule 97     pantoprazole (PROTONIX) 40 MG EC tablet TAKE 1 TABLET BY MOUTH ONCE DAILY 90 tablet 0     polyethylene glycol (MIRALAX) 17 GM/Dose powder MIX 17GM OF POWDER IN 8OZ OF WATER UNTIL COMPLETELY DISSOLVED. DRINK SOLUTION BY MOUTH ONCE DAILY. 510 g 0     simvastatin (ZOCOR) 40 MG tablet TAKE 1 TABLET BY MOUTH AT BEDTIME 90 tablet 97     sodium bicarbonate 650 MG tablet TAKE TWO TABLETS (1300 MG) BY MOUTH TWICE DAILY 360 tablet 97     acetaminophen (TYLENOL) 500 MG tablet Take 2 tablets (1,000 mg) by mouth At Bedtime 180 tablet 1         Allergies:      Allergies   Allergen  Reactions     Ace Inhibitors      Other reaction(s): renal failure  She can't be on an ACEI or ARB while on Lithium.            Past Medical History:     Past Medical History:   Diagnosis Date     Benign essential hypertension 09/2018    added norvasc 9/18     Bipolar affective disorder (H)     hosp 1993, Dr. Aftab Deal     Cancer (H) 1996    DCIS, left breast     Chronic kidney disease, stage 3a (H)     seen by renal Dr. 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    felt due to lithium     Thalassaemia trait      Vitamin D deficiency          Past Surgical History:     Past Surgical History:   Procedure Laterality Date     BIOPSY  1994    left breast     BREAST SURGERY      lumpectomy x 4     COLONOSCOPY  2021     COLONOSCOPY N/A 6/17/2022    Procedure: COLONOSCOPY, WITH POLYPECTOMY AND BIOPSY;  Surgeon: Panchito Gu MD;  Location:  GI     EYE SURGERY  2018    retina tear     LAPAROTOMY EXPLORATORY N/A 8/24/2022    Procedure: Exploratory laparotomy, REPAIR OF PERFORATED ULCER;  Surgeon: Ron Vidal MD;  Location:  OR     left hand surgery      last 1974         Family Medical History:     Family History   Problem Relation Age of Onset     Cerebrovascular Disease Mother      Hypertension Father      Sleep Apnea Brother      Breast Cancer Maternal Aunt         x 2     Breast Cancer Other         Maternal Aunt     Hyperlipidemia Niece          Social History:     Social History     Socioeconomic History     Marital status:  Single     Spouse name: Not on file     Number of children: 0     Years of education: Not on file     Highest education level: Not on file   Occupational History     Occupation: , retired   Tobacco Use     Smoking status: Never     Smokeless tobacco: Never   Vaping Use     Vaping Use: Never used   Substance and Sexual Activity     Alcohol use: No     Drug use: No     Sexual activity: Not Currently     Partners: Male     Birth control/protection: Post-menopausal, None   Other Topics Concern     Parent/sibling w/ CABG, MI or angioplasty before 65F 55M? No   Social History Narrative     Not on file     Social Determinants of Health     Financial Resource Strain: Not on file   Food Insecurity: Not on file   Transportation Needs: Not on file   Physical Activity: Not on file   Stress: Not on file   Social Connections: Not on file   Intimate Partner Violence: Not on file   Housing Stability: Not on file           Review of System:     Constitutional: Negative for fever or chills  Skin: Negative for rashes  Ears/Nose/Throat: Negative for nasal congestion, sore throat  Respiratory: No shortness of breath, dyspnea on exertion, cough, or hemoptysis  Cardiovascular: Negative for chest pain  Gastrointestinal: Negative for nausea, vomiting  Genitourinary: Negative for dysuria, hematuria  Musculoskeletal: positive for mechanical chronic bilateral hip pains  Neurologic: Negative for headaches  Psychiatric: positive for bipolar affective disorder, anxiety  Hematologic/Lymphatic/Immunologic: Negative  Endocrine: Negative  Behavioral: Negative for tobacco use       Physical Exam:   LMP  (LMP Unknown)     RESP: no cough over the phone   NEURO: Alert & Oriented x 3.   PSYCH: mentation appears normal, affect normal        Diagnostic Test Results:     Diagnostic Test Results:  Labs reviewed in Epic    ASSESSMENT/PLAN:       Diana was seen today for refill request.    Diagnoses and all orders for this visit:    Bipolar  affective disorder, remission status unspecified (H)  - stable  - continue current medication    Anxiety  - stable  - Valium medication refilled today    Benign essential hypertension  - stable, continue current therapy    Seasonal allergic rhinitis, unspecified trigger  - stable, continue current therapy      Follow Up Plan:     Patient is instructed to return to Internal Medicine clinic for follow-up visit in 3 months.        Gayle Hernandez MD  Internal Medicine  [facility]      telephone visit duration including chart review, medication management: 31 minutes

## 2024-02-13 NOTE — PROGRESS NOTES
VASCULAR SURGERY PROGRESS NOTE    [facility]    Diana Santiago  Medical Record #: [id number]  YOB: 1950  Age: 73 year old     Date of Service: 2/12/2024    PRIMARY CARE PROVIDER: Gayle Hernandez    Reason for visit:  post-operative appointment    Diana Santiago is a 73 year old female who underwent creation of first stage right upper arm ulnar to basilic arteriovenous fistula on 1/30/2024 with Dr. Royal for ESRD. She comes into clinic today for post-operative examination.     On exam today Diana is alert and oriented x4, no acute distress  /76 P 71  Incision site is fully healed, excellent thrill noted  2+ radial pulses, Her lower arm and hand has 1+ edema, she has not been elevating  Denies numbness, tingling or pain at incisional site/forearm/hand, 5/5  strength      RECOMMENDATION/RISKS: Wound recommend AVF US in 4 weeks and follow up with Dr. Royal. Continue all medications. Discussed vein building exercises. Diana is in agreement of plan.    REVIEW OF SYSTEMS:    A 12 point ROS was reviewed and is negative except for what is listed above in HPI.    PHH:    Past Medical History:   Diagnosis Date    Benign essential hypertension 09/2018    added norvasc 9/18    Bipolar affective disorder (H)     hosp 1993, Dr. Aftab Deal    Cancer (H) 1996    DCIS, left breast    Chronic kidney disease, stage 3a (H)     seen by renal Dr. 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    felt due to lithium    Thalassaemia trait     Vitamin D deficiency            Past Surgical History:   Procedure Laterality Date    BIOPSY  1994    left breast    BREAST SURGERY      lumpectomy x 4    COLONOSCOPY  2021    COLONOSCOPY N/A 6/17/2022    Procedure: COLONOSCOPY, WITH POLYPECTOMY AND BIOPSY;  Surgeon: Panchito Gu MD;  Location:  GI    CREATE FISTULA ARTERIOVENOUS UPPER EXTREMITY Right 1/30/2024    Procedure: CREATION OF FIRST STAGE RIGHT UPPER ARM ULNAR TO BASILIC ARTERIOVENOUS FISTULA;  Surgeon: Kevin Royal MD;  Location:  OR    EYE SURGERY  2018    retina tear    LAPAROTOMY EXPLORATORY N/A 8/24/2022    Procedure: Exploratory laparotomy, REPAIR OF PERFORATED ULCER;  Surgeon: Ron Vidal MD;  Location:  OR    left hand surgery      last 1974       ALLERGIES:  Ace inhibitors    MEDS:    Current Outpatient Medications:     ACE/ARB/ARNI NOT PRESCRIBED (INTENTIONAL), Please choose reason not prescribed from choices below., Disp: , Rfl:     acetaminophen (TYLENOL) 500 MG tablet, TAKE TWO TABLETS (1000MG) BY MOUTH AT BEDTIME, Disp: 56 tablet, Rfl: 97    aMILoride (MIDAMOR) 5 MG tablet, Take 5 mg by mouth daily, Disp: , Rfl:     amLODIPine (NORVASC) 5 MG tablet, TAKE 1 TABLET BY MOUTH ONCE DAILY (HOLD FOR SBP < 100), Disp: 90 tablet, Rfl: 97    aspirin (ASA) 81 MG chewable tablet, Take 1 tablet (81 mg) by mouth daily (Patient taking differently: Take 81 mg by mouth daily Take 1/2 until last day 2/13/24), Disp: 14 tablet, Rfl: 0    diazepam (VALIUM) 2 MG tablet, TAKE ONE-HALF TABLET (1MG) BY MOUTH EVERY OTHER DAY **CONTROLLED SUBSTANCE-DOUBLE LOCK STORAGE**, Disp: 7 tablet, Rfl: 4    FEROSUL 325 (65 Fe) MG tablet, TAKE ONE TABLET BY MOUTH EVERY OTHER DAY, Disp: 42 tablet, Rfl: 97    fexofenadine (ALLEGRA) 180 MG tablet, TAKE 1 TABLET BY MOUTH ONCE DAILY, Disp: 90 tablet, Rfl: 97    lithium (ESKALITH) 150 MG capsule, TAKE 1 CAPSULE BY MOUTH THREE TIMES DAILY WITH MEALS, Disp: 270 capsule, Rfl: 97    pantoprazole (PROTONIX) 40 MG EC tablet, TAKE 1  TABLET BY MOUTH ONCE DAILY, Disp: 90 tablet, Rfl: 2    polyethylene glycol (MIRALAX) 17 GM/Dose powder, MIX 17GM OF POWDER IN 8OZ OF WATER UNTIL COMPLETELY DISSOLVED. DRINK SOLUTION BY MOUTH ONCE DAILY., Disp: 510 g, Rfl: 0    simvastatin (ZOCOR) 40 MG tablet, TAKE 1 TABLET BY MOUTH AT BEDTIME, Disp: 90 tablet, Rfl: 97    sodium bicarbonate 650 MG tablet, TAKE TWO TABLETS (1300MG) BY MOUTH THREE TIMES DAILY, Disp: 540 tablet, Rfl: 1    SOCIAL HABITS:    History   Smoking Status    Never   Smokeless Tobacco    Never     Social History    Substance and Sexual Activity      Alcohol use: No      History   Drug Use No       FAMILY HISTORY:    Family History   Problem Relation Age of Onset    Cerebrovascular Disease Mother     Hypertension Father     Sleep Apnea Brother     Breast Cancer Maternal Aunt         x 2    Breast Cancer Other         Maternal Aunt    Hyperlipidemia Niece        PE:  /76 (BP Location: Left arm, Patient Position: Chair, Cuff Size: Adult Regular)   Pulse 71   LMP  (LMP Unknown)   Wt Readings from Last 1 Encounters:   02/09/24 120 lb (54.4 kg)     There is no height or weight on file to calculate BMI.    EXAM:  GENERAL: well-developed 73 year old female who appears her stated age  CARDIAC: normal   CHEST/LUNG: normal respiratory effort   MUSCULOSKELETAL: grossly normal and both lower extremities are intact, no lower extremity edema  NEUROLOGIC: focally intact, alert and oriented x 3  PSYCH: appropriate affect  VASCULAR:     DIAGNOSTIC STUDIES:     Images:  No results found.    LABS:      Sodium   Date Value Ref Range Status   02/09/2024 136 135 - 145 mmol/L Final     Comment:     Reference intervals for this test were updated on 09/26/2023 to more accurately reflect our healthy population. There may be differences in the flagging of prior results with similar values performed with this method. Interpretation of those prior results can be made in the context of the updated reference  intervals.    02/08/2024 140 135 - 145 mmol/L Final     Comment:     Reference intervals for this test were updated on 09/26/2023 to more accurately reflect our healthy population. There may be differences in the flagging of prior results with similar values performed with this method. Interpretation of those prior results can be made in the context of the updated reference intervals.    02/02/2024 142 135 - 145 mmol/L Final     Comment:     Reference intervals for this test were updated on 09/26/2023 to more accurately reflect our healthy population. There may be differences in the flagging of prior results with similar values performed with this method. Interpretation of those prior results can be made in the context of the updated reference intervals.    12/21/2020 140 133 - 144 mmol/L Final   10/15/2019 142 133 - 144 mmol/L Final   10/23/2018 141 133 - 144 mmol/L Final     Urea Nitrogen   Date Value Ref Range Status   02/09/2024 47.6 (H) 8.0 - 23.0 mg/dL Final   02/08/2024 50.7 (H) 8.0 - 23.0 mg/dL Final   02/02/2024 61.8 (H) 8.0 - 23.0 mg/dL Final   09/14/2022 51 (H) 7 - 30 mg/dL Final   09/13/2022 55 (H) 7 - 30 mg/dL Final   09/12/2022 61 (H) 7 - 30 mg/dL Final   12/21/2020 34 (H) 7 - 30 mg/dL Final   10/15/2019 31 (H) 7 - 30 mg/dL Final   10/23/2018 29 7 - 30 mg/dL Final     Hemoglobin   Date Value Ref Range Status   02/09/2024 8.5 (L) 11.7 - 15.7 g/dL Final   01/19/2024 9.6 (L) 11.7 - 15.7 g/dL Final   12/15/2023 10.0 (L) 11.7 - 15.7 g/dL Final   01/05/2021 10.4 (L) 11.7 - 15.7 g/dL Final   12/21/2020 9.7 (L) 11.7 - 15.7 g/dL Final   10/18/2019 10.3 (L) 11.7 - 15.7 g/dL Final     Platelet Count   Date Value Ref Range Status   02/09/2024 352 150 - 450 10e3/uL Final   11/17/2023 362 150 - 450 10e3/uL Final   08/25/2023 362 150 - 450 10e3/uL Final   12/21/2020 308 150 - 450 10e9/L Final   10/15/2019 312 150 - 450 10e9/L Final   08/03/2018 298 150 - 450 10e9/L Final     INR   Date Value Ref Range Status    09/12/2022 0.93 0.85 - 1.15 Final   09/05/2022 1.06 0.85 - 1.15 Final   09/01/2022 1.21 (H) 0.85 - 1.15 Final       20 minutes spent on the day of encounter doing chart review, history and exam, documentation, and further activities as noted.     Olivia Morton NP  VASCULAR SURGERY

## 2024-02-14 ENCOUNTER — TELEPHONE (OUTPATIENT)
Dept: OTHER | Facility: CLINIC | Age: 74
End: 2024-02-14
Payer: MEDICARE

## 2024-02-14 NOTE — TELEPHONE ENCOUNTER
Routing to scheduling to coordinate the following:  AVF US  In- person follow up with Dr. Royal  Please schedule this in 1 month      Appt note:  History of AVF creation on 1/30/2024, 4 week follow up to 2/12/24 OV with Olivia Morton NP. AVF US prior    Samreen DEL CID, RN    [facility]  [facility]  Office: [phone]  Fax: [phone]

## 2024-02-15 ENCOUNTER — LAB (OUTPATIENT)
Dept: LAB | Facility: CLINIC | Age: 74
End: 2024-02-15
Payer: MEDICARE

## 2024-02-15 DIAGNOSIS — N18.32 STAGE 3B CHRONIC KIDNEY DISEASE (H): ICD-10-CM

## 2024-02-15 DIAGNOSIS — N18.32 STAGE 3B CHRONIC KIDNEY DISEASE (H): Primary | ICD-10-CM

## 2024-02-15 LAB
ALBUMIN SERPL BCG-MCNC: 4.1 G/DL (ref 3.5–5.2)
ANION GAP SERPL CALCULATED.3IONS-SCNC: 10 MMOL/L (ref 7–15)
BUN SERPL-MCNC: 46 MG/DL (ref 8–23)
CALCIUM SERPL-MCNC: 9.6 MG/DL (ref 8.8–10.2)
CHLORIDE SERPL-SCNC: 109 MMOL/L (ref 98–107)
CREAT SERPL-MCNC: 2.49 MG/DL (ref 0.51–0.95)
DEPRECATED HCO3 PLAS-SCNC: 21 MMOL/L (ref 22–29)
EGFRCR SERPLBLD CKD-EPI 2021: 20 ML/MIN/1.73M2
GLUCOSE SERPL-MCNC: 102 MG/DL (ref 70–99)
PHOSPHATE SERPL-MCNC: 3.9 MG/DL (ref 2.5–4.5)
POTASSIUM SERPL-SCNC: 5.4 MMOL/L (ref 3.4–5.3)
SODIUM SERPL-SCNC: 140 MMOL/L (ref 135–145)

## 2024-02-15 PROCEDURE — 80069 RENAL FUNCTION PANEL: CPT

## 2024-02-15 PROCEDURE — 36415 COLL VENOUS BLD VENIPUNCTURE: CPT

## 2024-02-15 NOTE — TELEPHONE ENCOUNTER
Left voicemail with instructions for patient to call back to schedule their appointment(s)    February 15, 2024 , 9:18 AM

## 2024-02-16 ENCOUNTER — LAB (OUTPATIENT)
Dept: INFUSION THERAPY | Facility: CLINIC | Age: 74
End: 2024-02-16
Payer: MEDICARE

## 2024-02-16 ENCOUNTER — ALLIED HEALTH/NURSE VISIT (OUTPATIENT)
Dept: INFUSION THERAPY | Facility: CLINIC | Age: 74
End: 2024-02-16
Payer: MEDICARE

## 2024-02-16 VITALS
DIASTOLIC BLOOD PRESSURE: 79 MMHG | SYSTOLIC BLOOD PRESSURE: 120 MMHG | RESPIRATION RATE: 20 BRPM | OXYGEN SATURATION: 98 % | HEART RATE: 71 BPM

## 2024-02-16 DIAGNOSIS — N18.4 CHRONIC RENAL DISEASE, STAGE IV (H): ICD-10-CM

## 2024-02-16 DIAGNOSIS — D63.1 ANEMIA IN CHRONIC KIDNEY DISEASE, UNSPECIFIED CKD STAGE: Primary | ICD-10-CM

## 2024-02-16 DIAGNOSIS — N18.32 STAGE 3B CHRONIC KIDNEY DISEASE (H): ICD-10-CM

## 2024-02-16 DIAGNOSIS — N18.9 ANEMIA IN CHRONIC KIDNEY DISEASE, UNSPECIFIED CKD STAGE: Primary | ICD-10-CM

## 2024-02-16 LAB
BASOPHILS # BLD AUTO: 0 10E3/UL (ref 0–0.2)
BASOPHILS NFR BLD AUTO: 0 %
EOSINOPHIL # BLD AUTO: 0.2 10E3/UL (ref 0–0.7)
EOSINOPHIL NFR BLD AUTO: 2 %
ERYTHROCYTE [DISTWIDTH] IN BLOOD BY AUTOMATED COUNT: 16.3 % (ref 10–15)
FERRITIN SERPL-MCNC: 259 NG/ML (ref 11–328)
HCT VFR BLD AUTO: 25.4 % (ref 35–47)
HGB BLD-MCNC: 7.3 G/DL (ref 11.7–15.7)
HGB BLD-MCNC: 7.3 G/DL (ref 11.7–15.7)
IMM GRANULOCYTES # BLD: 0 10E3/UL
IMM GRANULOCYTES NFR BLD: 0 %
IRON BINDING CAPACITY (ROCHE): 281 UG/DL (ref 240–430)
IRON SATN MFR SERPL: 22 % (ref 15–46)
IRON SERPL-MCNC: 61 UG/DL (ref 37–145)
LYMPHOCYTES # BLD AUTO: 1 10E3/UL (ref 0.8–5.3)
LYMPHOCYTES NFR BLD AUTO: 11 %
MCH RBC QN AUTO: 22.3 PG (ref 26.5–33)
MCHC RBC AUTO-ENTMCNC: 28.7 G/DL (ref 31.5–36.5)
MCV RBC AUTO: 77 FL (ref 78–100)
MONOCYTES # BLD AUTO: 0.9 10E3/UL (ref 0–1.3)
MONOCYTES NFR BLD AUTO: 9 %
NEUTROPHILS # BLD AUTO: 7 10E3/UL (ref 1.6–8.3)
NEUTROPHILS NFR BLD AUTO: 78 %
NRBC # BLD AUTO: 0 10E3/UL
NRBC BLD AUTO-RTO: 0 /100
PLATELET # BLD AUTO: 387 10E3/UL (ref 150–450)
RBC # BLD AUTO: 3.32 10E6/UL (ref 3.8–5.2)
WBC # BLD AUTO: 9.1 10E3/UL (ref 4–11)

## 2024-02-16 PROCEDURE — 96372 THER/PROPH/DIAG INJ SC/IM: CPT | Performed by: INTERNAL MEDICINE

## 2024-02-16 PROCEDURE — 85018 HEMOGLOBIN: CPT | Performed by: PHYSICIAN ASSISTANT

## 2024-02-16 PROCEDURE — 250N000011 HC RX IP 250 OP 636: Mod: JZ | Performed by: INTERNAL MEDICINE

## 2024-02-16 PROCEDURE — 83550 IRON BINDING TEST: CPT | Performed by: INTERNAL MEDICINE

## 2024-02-16 PROCEDURE — 36415 COLL VENOUS BLD VENIPUNCTURE: CPT | Performed by: INTERNAL MEDICINE

## 2024-02-16 PROCEDURE — 82728 ASSAY OF FERRITIN: CPT | Performed by: INTERNAL MEDICINE

## 2024-02-16 PROCEDURE — 83540 ASSAY OF IRON: CPT | Performed by: INTERNAL MEDICINE

## 2024-02-16 PROCEDURE — 85025 COMPLETE CBC W/AUTO DIFF WBC: CPT | Performed by: PHYSICIAN ASSISTANT

## 2024-02-16 RX ORDER — ALBUTEROL SULFATE 0.83 MG/ML
2.5 SOLUTION RESPIRATORY (INHALATION)
Status: CANCELLED | OUTPATIENT
Start: 2024-03-19

## 2024-02-16 RX ORDER — NALOXONE HYDROCHLORIDE 0.4 MG/ML
0.2 INJECTION, SOLUTION INTRAMUSCULAR; INTRAVENOUS; SUBCUTANEOUS
Status: CANCELLED | OUTPATIENT
Start: 2024-03-19

## 2024-02-16 RX ORDER — DIPHENHYDRAMINE HYDROCHLORIDE 50 MG/ML
50 INJECTION INTRAMUSCULAR; INTRAVENOUS
Status: CANCELLED
Start: 2024-03-19

## 2024-02-16 RX ORDER — ALBUTEROL SULFATE 90 UG/1
1-2 AEROSOL, METERED RESPIRATORY (INHALATION)
Status: CANCELLED
Start: 2024-03-19

## 2024-02-16 RX ORDER — METHYLPREDNISOLONE SODIUM SUCCINATE 125 MG/2ML
125 INJECTION, POWDER, LYOPHILIZED, FOR SOLUTION INTRAMUSCULAR; INTRAVENOUS
Status: CANCELLED
Start: 2024-02-18

## 2024-02-16 RX ORDER — ALBUTEROL SULFATE 0.83 MG/ML
2.5 SOLUTION RESPIRATORY (INHALATION)
Status: CANCELLED | OUTPATIENT
Start: 2024-02-18

## 2024-02-16 RX ORDER — EPINEPHRINE 1 MG/ML
0.3 INJECTION, SOLUTION INTRAMUSCULAR; SUBCUTANEOUS EVERY 5 MIN PRN
Status: CANCELLED | OUTPATIENT
Start: 2024-02-18

## 2024-02-16 RX ORDER — METHYLPREDNISOLONE SODIUM SUCCINATE 125 MG/2ML
125 INJECTION, POWDER, LYOPHILIZED, FOR SOLUTION INTRAMUSCULAR; INTRAVENOUS
Status: CANCELLED
Start: 2024-03-19

## 2024-02-16 RX ORDER — DIPHENHYDRAMINE HYDROCHLORIDE 50 MG/ML
50 INJECTION INTRAMUSCULAR; INTRAVENOUS
Status: CANCELLED
Start: 2024-02-18

## 2024-02-16 RX ORDER — MEPERIDINE HYDROCHLORIDE 25 MG/ML
25 INJECTION INTRAMUSCULAR; INTRAVENOUS; SUBCUTANEOUS EVERY 30 MIN PRN
Status: CANCELLED | OUTPATIENT
Start: 2024-02-18

## 2024-02-16 RX ORDER — MEPERIDINE HYDROCHLORIDE 25 MG/ML
25 INJECTION INTRAMUSCULAR; INTRAVENOUS; SUBCUTANEOUS EVERY 30 MIN PRN
Status: CANCELLED | OUTPATIENT
Start: 2024-03-19

## 2024-02-16 RX ORDER — NALOXONE HYDROCHLORIDE 0.4 MG/ML
0.2 INJECTION, SOLUTION INTRAMUSCULAR; INTRAVENOUS; SUBCUTANEOUS
Status: CANCELLED | OUTPATIENT
Start: 2024-02-18

## 2024-02-16 RX ORDER — EPINEPHRINE 1 MG/ML
0.3 INJECTION, SOLUTION INTRAMUSCULAR; SUBCUTANEOUS EVERY 5 MIN PRN
Status: CANCELLED | OUTPATIENT
Start: 2024-03-19

## 2024-02-16 RX ORDER — ALBUTEROL SULFATE 90 UG/1
1-2 AEROSOL, METERED RESPIRATORY (INHALATION)
Status: CANCELLED
Start: 2024-02-18

## 2024-02-16 RX ADMIN — DARBEPOETIN ALFA 100 MCG: 100 INJECTION, SOLUTION INTRAVENOUS; SUBCUTANEOUS at 14:20

## 2024-02-16 NOTE — PROGRESS NOTES
Medical Assistant Note:  Diana Santiago presents today for blood draw.     present during visit today: Not Applicable.    Concerns: No Concerns.    Procedure:  Lab draw site: RAC, Needle type: BF.    Post Assessment:  Labs drawn without difficulty: Yes.    Discharge Plan:  Departure Mode: Ambulatory.    Face to Face Time: 5.    Rachana Montanez, CMA

## 2024-02-16 NOTE — PROGRESS NOTES
Infusion Nursing Note:  Diana Santiago presents today for Aranesp.    Patient seen by provider today: No   present during visit today: Not Applicable.    Note: N/A.      Intravenous Access:  No Intravenous access/labs at this visit.    Treatment Conditions:  Lab Results   Component Value Date    HGB 7.3 (L) 02/16/2024    WBC 8.1 02/09/2024    ANEU 9.6 (H) 09/09/2022    ANEUTAUTO 5.5 02/09/2024     02/09/2024        Results reviewed, labs MET treatment parameters, ok to proceed with treatment.      Post Infusion Assessment:  Patient tolerated injection without incident.  Site patent and intact, free from redness, edema or discomfort.  No evidence of extravasations.       Discharge Plan:   Patient declined prescription refills.  Discharge instructions reviewed with: Patient.  Patient and/or family verbalized understanding of discharge instructions and all questions answered.  AVS to patient via too.meT.  Patient will return 3/15 for next appointment.   Patient discharged in stable condition accompanied by: self.  Departure Mode: Ambulatory.      Rafi Nichole RN

## 2024-02-19 DIAGNOSIS — N18.32 STAGE 3B CHRONIC KIDNEY DISEASE (H): Primary | ICD-10-CM

## 2024-02-21 ENCOUNTER — MEDICAL CORRESPONDENCE (OUTPATIENT)
Dept: HEALTH INFORMATION MANAGEMENT | Facility: CLINIC | Age: 74
End: 2024-02-21
Payer: MEDICARE

## 2024-02-21 DIAGNOSIS — D63.1 ANEMIA IN CHRONIC KIDNEY DISEASE, UNSPECIFIED CKD STAGE: ICD-10-CM

## 2024-02-21 DIAGNOSIS — N18.9 ANEMIA IN CHRONIC KIDNEY DISEASE, UNSPECIFIED CKD STAGE: ICD-10-CM

## 2024-02-21 DIAGNOSIS — N18.32 STAGE 3B CHRONIC KIDNEY DISEASE (H): Primary | ICD-10-CM

## 2024-02-22 ENCOUNTER — LAB (OUTPATIENT)
Dept: LAB | Facility: CLINIC | Age: 74
End: 2024-02-22
Payer: MEDICARE

## 2024-02-22 DIAGNOSIS — D63.1 ANEMIA IN CHRONIC KIDNEY DISEASE, UNSPECIFIED CKD STAGE: ICD-10-CM

## 2024-02-22 DIAGNOSIS — N18.32 STAGE 3B CHRONIC KIDNEY DISEASE (H): ICD-10-CM

## 2024-02-22 DIAGNOSIS — N18.9 ANEMIA IN CHRONIC KIDNEY DISEASE, UNSPECIFIED CKD STAGE: ICD-10-CM

## 2024-02-22 LAB
ANION GAP SERPL CALCULATED.3IONS-SCNC: 8 MMOL/L (ref 7–15)
BASOPHILS # BLD AUTO: 0 10E3/UL (ref 0–0.2)
BASOPHILS NFR BLD AUTO: 0 %
BUN SERPL-MCNC: 40.9 MG/DL (ref 8–23)
CALCIUM SERPL-MCNC: 9.6 MG/DL (ref 8.8–10.2)
CHLORIDE SERPL-SCNC: 110 MMOL/L (ref 98–107)
CREAT SERPL-MCNC: 2.36 MG/DL (ref 0.51–0.95)
DEPRECATED HCO3 PLAS-SCNC: 21 MMOL/L (ref 22–29)
EGFRCR SERPLBLD CKD-EPI 2021: 21 ML/MIN/1.73M2
EOSINOPHIL # BLD AUTO: 0.2 10E3/UL (ref 0–0.7)
EOSINOPHIL NFR BLD AUTO: 2 %
ERYTHROCYTE [DISTWIDTH] IN BLOOD BY AUTOMATED COUNT: 16.7 % (ref 10–15)
GLUCOSE SERPL-MCNC: 108 MG/DL (ref 70–99)
HCT VFR BLD AUTO: 27.1 % (ref 35–47)
HGB BLD-MCNC: 7.9 G/DL (ref 11.7–15.7)
IMM GRANULOCYTES # BLD: 0 10E3/UL
IMM GRANULOCYTES NFR BLD: 0 %
LYMPHOCYTES # BLD AUTO: 1 10E3/UL (ref 0.8–5.3)
LYMPHOCYTES NFR BLD AUTO: 13 %
MCH RBC QN AUTO: 22.3 PG (ref 26.5–33)
MCHC RBC AUTO-ENTMCNC: 29.2 G/DL (ref 31.5–36.5)
MCV RBC AUTO: 76 FL (ref 78–100)
MONOCYTES # BLD AUTO: 0.7 10E3/UL (ref 0–1.3)
MONOCYTES NFR BLD AUTO: 9 %
NEUTROPHILS # BLD AUTO: 6.1 10E3/UL (ref 1.6–8.3)
NEUTROPHILS NFR BLD AUTO: 76 %
NRBC # BLD AUTO: 0 10E3/UL
NRBC BLD AUTO-RTO: 1 /100
PLATELET # BLD AUTO: 406 10E3/UL (ref 150–450)
POTASSIUM SERPL-SCNC: 5.1 MMOL/L (ref 3.4–5.3)
RBC # BLD AUTO: 3.55 10E6/UL (ref 3.8–5.2)
SODIUM SERPL-SCNC: 139 MMOL/L (ref 135–145)
WBC # BLD AUTO: 8.1 10E3/UL (ref 4–11)

## 2024-02-22 PROCEDURE — 85025 COMPLETE CBC W/AUTO DIFF WBC: CPT

## 2024-02-22 PROCEDURE — 36415 COLL VENOUS BLD VENIPUNCTURE: CPT

## 2024-02-22 PROCEDURE — 80048 BASIC METABOLIC PNL TOTAL CA: CPT

## 2024-02-28 DIAGNOSIS — N18.31 CHRONIC KIDNEY DISEASE, STAGE 3A (H): Primary | ICD-10-CM

## 2024-02-29 ENCOUNTER — LAB (OUTPATIENT)
Dept: LAB | Facility: CLINIC | Age: 74
End: 2024-02-29
Payer: MEDICARE

## 2024-02-29 DIAGNOSIS — N18.31 CHRONIC KIDNEY DISEASE, STAGE 3A (H): ICD-10-CM

## 2024-02-29 LAB
ANION GAP SERPL CALCULATED.3IONS-SCNC: 10 MMOL/L (ref 7–15)
BUN SERPL-MCNC: 50 MG/DL (ref 8–23)
CALCIUM SERPL-MCNC: 9.9 MG/DL (ref 8.8–10.2)
CHLORIDE SERPL-SCNC: 109 MMOL/L (ref 98–107)
CREAT SERPL-MCNC: 2.27 MG/DL (ref 0.51–0.95)
DEPRECATED HCO3 PLAS-SCNC: 18 MMOL/L (ref 22–29)
EGFRCR SERPLBLD CKD-EPI 2021: 22 ML/MIN/1.73M2
GLUCOSE SERPL-MCNC: 102 MG/DL (ref 70–99)
POTASSIUM SERPL-SCNC: 5.3 MMOL/L (ref 3.4–5.3)
SODIUM SERPL-SCNC: 137 MMOL/L (ref 135–145)

## 2024-02-29 PROCEDURE — 80048 BASIC METABOLIC PNL TOTAL CA: CPT

## 2024-02-29 PROCEDURE — 36415 COLL VENOUS BLD VENIPUNCTURE: CPT

## 2024-03-06 ENCOUNTER — LAB (OUTPATIENT)
Dept: LAB | Facility: CLINIC | Age: 74
End: 2024-03-06
Payer: MEDICARE

## 2024-03-06 DIAGNOSIS — N18.31 CHRONIC KIDNEY DISEASE, STAGE 3A (H): ICD-10-CM

## 2024-03-06 LAB
ANION GAP SERPL CALCULATED.3IONS-SCNC: 14 MMOL/L (ref 7–15)
BUN SERPL-MCNC: 50.6 MG/DL (ref 8–23)
CALCIUM SERPL-MCNC: 10.1 MG/DL (ref 8.8–10.2)
CHLORIDE SERPL-SCNC: 109 MMOL/L (ref 98–107)
CREAT SERPL-MCNC: 2.77 MG/DL (ref 0.51–0.95)
DEPRECATED HCO3 PLAS-SCNC: 16 MMOL/L (ref 22–29)
EGFRCR SERPLBLD CKD-EPI 2021: 17 ML/MIN/1.73M2
GLUCOSE SERPL-MCNC: 92 MG/DL (ref 70–99)
POTASSIUM SERPL-SCNC: 5.6 MMOL/L (ref 3.4–5.3)
SODIUM SERPL-SCNC: 139 MMOL/L (ref 135–145)

## 2024-03-06 PROCEDURE — 36415 COLL VENOUS BLD VENIPUNCTURE: CPT

## 2024-03-06 PROCEDURE — 80048 BASIC METABOLIC PNL TOTAL CA: CPT

## 2024-03-14 ENCOUNTER — LAB (OUTPATIENT)
Dept: LAB | Facility: CLINIC | Age: 74
End: 2024-03-14
Payer: MEDICARE

## 2024-03-14 DIAGNOSIS — N18.31 CHRONIC KIDNEY DISEASE, STAGE 3A (H): ICD-10-CM

## 2024-03-14 LAB
ANION GAP SERPL CALCULATED.3IONS-SCNC: 8 MMOL/L (ref 7–15)
BUN SERPL-MCNC: 54.9 MG/DL (ref 8–23)
CALCIUM SERPL-MCNC: 10.3 MG/DL (ref 8.8–10.2)
CHLORIDE SERPL-SCNC: 111 MMOL/L (ref 98–107)
CREAT SERPL-MCNC: 2.6 MG/DL (ref 0.51–0.95)
DEPRECATED HCO3 PLAS-SCNC: 21 MMOL/L (ref 22–29)
EGFRCR SERPLBLD CKD-EPI 2021: 19 ML/MIN/1.73M2
GLUCOSE SERPL-MCNC: 103 MG/DL (ref 70–99)
POTASSIUM SERPL-SCNC: 5.5 MMOL/L (ref 3.4–5.3)
SODIUM SERPL-SCNC: 140 MMOL/L (ref 135–145)

## 2024-03-14 PROCEDURE — 36415 COLL VENOUS BLD VENIPUNCTURE: CPT

## 2024-03-14 PROCEDURE — 80048 BASIC METABOLIC PNL TOTAL CA: CPT

## 2024-03-15 ENCOUNTER — MEDICAL CORRESPONDENCE (OUTPATIENT)
Dept: HEALTH INFORMATION MANAGEMENT | Facility: CLINIC | Age: 74
End: 2024-03-15

## 2024-03-15 ENCOUNTER — LAB (OUTPATIENT)
Dept: INFUSION THERAPY | Facility: CLINIC | Age: 74
End: 2024-03-15
Attending: INTERNAL MEDICINE
Payer: MEDICARE

## 2024-03-15 VITALS
HEART RATE: 74 BPM | DIASTOLIC BLOOD PRESSURE: 82 MMHG | OXYGEN SATURATION: 98 % | TEMPERATURE: 98.2 F | SYSTOLIC BLOOD PRESSURE: 139 MMHG

## 2024-03-15 DIAGNOSIS — D63.1 ANEMIA IN CHRONIC KIDNEY DISEASE, UNSPECIFIED CKD STAGE: Primary | ICD-10-CM

## 2024-03-15 DIAGNOSIS — N18.4 CHRONIC RENAL DISEASE, STAGE IV (H): ICD-10-CM

## 2024-03-15 DIAGNOSIS — N18.9 ANEMIA IN CHRONIC KIDNEY DISEASE, UNSPECIFIED CKD STAGE: Primary | ICD-10-CM

## 2024-03-15 LAB — HGB BLD-MCNC: 9.1 G/DL (ref 11.7–15.7)

## 2024-03-15 PROCEDURE — 96372 THER/PROPH/DIAG INJ SC/IM: CPT | Performed by: PHYSICIAN ASSISTANT

## 2024-03-15 PROCEDURE — 85018 HEMOGLOBIN: CPT | Performed by: PHYSICIAN ASSISTANT

## 2024-03-15 PROCEDURE — 36415 COLL VENOUS BLD VENIPUNCTURE: CPT | Performed by: PHYSICIAN ASSISTANT

## 2024-03-15 PROCEDURE — 250N000011 HC RX IP 250 OP 636: Mod: JZ,EC | Performed by: PHYSICIAN ASSISTANT

## 2024-03-15 RX ORDER — EPINEPHRINE 1 MG/ML
0.3 INJECTION, SOLUTION INTRAMUSCULAR; SUBCUTANEOUS EVERY 5 MIN PRN
Status: CANCELLED | OUTPATIENT
Start: 2024-03-29

## 2024-03-15 RX ORDER — ALBUTEROL SULFATE 0.83 MG/ML
2.5 SOLUTION RESPIRATORY (INHALATION)
Status: CANCELLED | OUTPATIENT
Start: 2024-03-29

## 2024-03-15 RX ORDER — METHYLPREDNISOLONE SODIUM SUCCINATE 125 MG/2ML
125 INJECTION, POWDER, LYOPHILIZED, FOR SOLUTION INTRAMUSCULAR; INTRAVENOUS
Status: CANCELLED
Start: 2024-03-29

## 2024-03-15 RX ORDER — NALOXONE HYDROCHLORIDE 0.4 MG/ML
0.2 INJECTION, SOLUTION INTRAMUSCULAR; INTRAVENOUS; SUBCUTANEOUS
Status: CANCELLED | OUTPATIENT
Start: 2024-03-29

## 2024-03-15 RX ORDER — MEPERIDINE HYDROCHLORIDE 25 MG/ML
25 INJECTION INTRAMUSCULAR; INTRAVENOUS; SUBCUTANEOUS EVERY 30 MIN PRN
Status: CANCELLED | OUTPATIENT
Start: 2024-03-29

## 2024-03-15 RX ORDER — DIPHENHYDRAMINE HYDROCHLORIDE 50 MG/ML
50 INJECTION INTRAMUSCULAR; INTRAVENOUS
Status: CANCELLED
Start: 2024-03-29

## 2024-03-15 RX ORDER — ALBUTEROL SULFATE 90 UG/1
1-2 AEROSOL, METERED RESPIRATORY (INHALATION)
Status: CANCELLED
Start: 2024-03-29

## 2024-03-15 RX ADMIN — DARBEPOETIN ALFA 25 MCG: 25 INJECTION, SOLUTION INTRAVENOUS; SUBCUTANEOUS at 13:56

## 2024-03-15 RX ADMIN — DARBEPOETIN ALFA 40 MCG: 40 INJECTION, SOLUTION INTRAVENOUS; SUBCUTANEOUS at 13:56

## 2024-03-15 NOTE — PROGRESS NOTES
Infusion Nursing Note:  Diana Santiago presents today for aranesp.    Patient seen by provider today: No   present during visit today: Not Applicable.    Note: N/A.      Intravenous Access:  No Intravenous access/labs at this visit.    Treatment Conditions:  Lab Results   Component Value Date    HGB 9.1 (L) 03/15/2024    WBC 8.1 02/22/2024    ANEU 9.6 (H) 09/09/2022    ANEUTAUTO 6.1 02/22/2024     02/22/2024        Results reviewed, labs MET treatment parameters, ok to proceed with treatment.      Post Infusion Assessment:  Patient tolerated injection without incident.  Site patent and intact, free from redness, edema or discomfort.       Discharge Plan:   AVS to patient via MYCHART.  Patient will return as [facility] for next appointment.   Patient discharged in stable condition accompanied by: self.  Departure Mode: Ambulatory with walker.      Zion Agarwal RN

## 2024-03-18 NOTE — PROGRESS NOTES
[facility]    Diana Santiago returns for follow-up.  History of end-stage renal disease not yet on hemodialysis.  1/30/2024 creation of first stage right upper arm proximal ulnar to brachial transposition arteriovenous fistula.  Brachial vein was small but very adequate.  Weaker radial signal noted upon completion of the fistula with a biphasic ulnar signal consistent with preoperative evaluation.    Doing well on telephone follow-up 2/4/2024.  Issues with use of the tourniquet postprocedure since nurses at [facility] were not able to apply this.    She has been squeezing fall unable to use the tourniquet due to the issues mentioned above.  Denies any pain within her hand.  Did have some postoperative swelling that is resolved.      Exam: Alert and appropriate.  Very frail.  Uses a walker.   Blood pressure 124/78 left arm.  Pulse 67   Chest= clear   Cardiovascular= regular rate   Well-healed right antecubital incision.  Right hand warm and pink with normal  Motor and sensory.      Excellent thrill and bruit within the fistula    Intact Doppler signals to radial greater than ulnar artery    Duplex today reveals a widely patent fistula.  Diameter is approximately 6 mm and 10 mm in the axilla.  Slightly narrowed area she will the distal eschar at 3 mm increased to 3.5 mL with tourniquet.  Excellent outflow volume at 2061 cm/s however.      IMPRESSION:   Excellent result of her stage upper arm fistula.  Will schedule her for a general anesthetic second stage procedure, to mobilize the well developed Basilic vein.    Kevin Royal MD   This note was created using Dragon voice recognition software which may result in transcription errors.

## 2024-03-19 ENCOUNTER — HOSPITAL ENCOUNTER (OUTPATIENT)
Dept: ULTRASOUND IMAGING | Facility: CLINIC | Age: 74
Discharge: HOME | End: 2024-03-19
Payer: MEDICARE

## 2024-03-19 ENCOUNTER — OFFICE VISIT (OUTPATIENT)
Dept: OTHER | Facility: CLINIC | Age: 74
End: 2024-03-19
Payer: MEDICARE

## 2024-03-19 ENCOUNTER — TELEPHONE (OUTPATIENT)
Dept: OTHER | Facility: CLINIC | Age: 74
End: 2024-03-19

## 2024-03-19 VITALS — HEART RATE: 67 BPM | OXYGEN SATURATION: 100 % | SYSTOLIC BLOOD PRESSURE: 124 MMHG | DIASTOLIC BLOOD PRESSURE: 78 MMHG

## 2024-03-19 DIAGNOSIS — N18.4 CKD (CHRONIC KIDNEY DISEASE) STAGE 4, GFR 15-29 ML/MIN (H): ICD-10-CM

## 2024-03-19 DIAGNOSIS — Z99.2 ESRD (END STAGE RENAL DISEASE) ON DIALYSIS (H): ICD-10-CM

## 2024-03-19 DIAGNOSIS — N18.6 END STAGE RENAL DISEASE (H): ICD-10-CM

## 2024-03-19 DIAGNOSIS — N18.6 ESRD (END STAGE RENAL DISEASE) ON DIALYSIS (H): ICD-10-CM

## 2024-03-19 DIAGNOSIS — T82.898A OTHER SPECIFIED COMPLICATION OF VASCULAR PROSTHETIC DEVICES, IMPLANTS AND GRAFTS, INITIAL ENCOUNTER (H): ICD-10-CM

## 2024-03-19 DIAGNOSIS — I77.0 ARTERIOVENOUS FISTULA (H): Primary | ICD-10-CM

## 2024-03-19 PROCEDURE — G0463 HOSPITAL OUTPT CLINIC VISIT: HCPCS | Performed by: SURGERY

## 2024-03-19 PROCEDURE — 99024 POSTOP FOLLOW-UP VISIT: CPT | Performed by: SURGERY

## 2024-03-19 PROCEDURE — 93990 DOPPLER FLOW TESTING: CPT

## 2024-03-19 NOTE — TELEPHONE ENCOUNTER
Reviewed surgery date/time with patient. Informed patient that a pre-op is needed she will coordinate with PCP clinic. Patient instructed to continue ASA but states she no longer is taking ASA. Patient scheduled for post-op with Dr Royal 04/16/24.

## 2024-03-19 NOTE — NURSING NOTE
Patient education completed with patient in clinic on 3/19/24.    Procedure: Second stage transposition of right upper arm ulnar to basilic AVF  Diagnosis: CKD stage 4  Anticoagulation Instruction: n/a, continue ASA 81mg daily  GLP-1 Agonists Instruction: n/a  Pre-Operative Physical Exam: Patient instructed they will need to have a pre-op physical exam within 30 days of your procedure.   Allergies:  Updated in Epic  Bowel Prep: NPO for solid 8 hours prior to arrival time and NPO for clear liquids 2 hours prior to arrival time   Post Procedure Education: [facility] patient post-procedure fact sheet reviewed with patient.  Showering instructions provided: Yes  Learner(s): patient  Method: Listening, Reading  Barriers to Learning: No Barrier  Outcome: Patient did verbalize understanding of above education.    Erica Simon, SUSYN, RN, -[facility] [place]

## 2024-03-19 NOTE — TELEPHONE ENCOUNTER
CASE RECEIVED ON 03/19/24 FOR:SECOND STAGE TRANSPOSITION OF RIGHT UPPER ARM ULNAR TO BASILIC ARTERIOVENOUS FISTULA     CASE ID:[id number]    Spoke to patient states Tuesdays are best and Wednesdays are not good at all to schedule surgery.

## 2024-03-19 NOTE — PROGRESS NOTES
Patient is here to discuss follow up    /78 (BP Location: Left arm, Patient Position: Chair, Cuff Size: Adult Regular)   Pulse 67   LMP  (LMP Unknown)   SpO2 100%     Questions patient would like addressed today are: N/A.    Refills are needed: No    Has homecare services and agency name:  [facility]

## 2024-03-21 ENCOUNTER — LAB (OUTPATIENT)
Dept: LAB | Facility: CLINIC | Age: 74
End: 2024-03-21
Payer: MEDICARE

## 2024-03-21 ENCOUNTER — TELEPHONE (OUTPATIENT)
Dept: FAMILY MEDICINE | Facility: CLINIC | Age: 74
End: 2024-03-21

## 2024-03-21 DIAGNOSIS — N18.31 CHRONIC KIDNEY DISEASE, STAGE 3A (H): ICD-10-CM

## 2024-03-21 LAB
ANION GAP SERPL CALCULATED.3IONS-SCNC: 10 MMOL/L (ref 7–15)
BUN SERPL-MCNC: 46 MG/DL (ref 8–23)
CALCIUM SERPL-MCNC: 10 MG/DL (ref 8.8–10.2)
CHLORIDE SERPL-SCNC: 107 MMOL/L (ref 98–107)
CREAT SERPL-MCNC: 2.45 MG/DL (ref 0.51–0.95)
DEPRECATED HCO3 PLAS-SCNC: 21 MMOL/L (ref 22–29)
EGFRCR SERPLBLD CKD-EPI 2021: 20 ML/MIN/1.73M2
GLUCOSE SERPL-MCNC: 105 MG/DL (ref 70–99)
POTASSIUM SERPL-SCNC: 5.1 MMOL/L (ref 3.4–5.3)
SODIUM SERPL-SCNC: 138 MMOL/L (ref 135–145)

## 2024-03-21 PROCEDURE — 80048 BASIC METABOLIC PNL TOTAL CA: CPT

## 2024-03-21 PROCEDURE — 36415 COLL VENOUS BLD VENIPUNCTURE: CPT

## 2024-03-27 ENCOUNTER — TELEPHONE (OUTPATIENT)
Dept: FAMILY MEDICINE | Facility: CLINIC | Age: 74
End: 2024-03-27

## 2024-03-27 ENCOUNTER — OFFICE VISIT (OUTPATIENT)
Dept: FAMILY MEDICINE | Facility: CLINIC | Age: 74
End: 2024-03-27
Payer: MEDICARE

## 2024-03-27 VITALS
RESPIRATION RATE: 20 BRPM | WEIGHT: 119.6 LBS | SYSTOLIC BLOOD PRESSURE: 96 MMHG | OXYGEN SATURATION: 96 % | DIASTOLIC BLOOD PRESSURE: 60 MMHG | HEIGHT: 63 IN | TEMPERATURE: 98.1 F | HEART RATE: 67 BPM | BODY MASS INDEX: 21.19 KG/M2

## 2024-03-27 DIAGNOSIS — Z01.818 PREOP GENERAL PHYSICAL EXAM: Primary | ICD-10-CM

## 2024-03-27 DIAGNOSIS — I10 BENIGN ESSENTIAL HYPERTENSION: ICD-10-CM

## 2024-03-27 DIAGNOSIS — F31.9 BIPOLAR AFFECTIVE DISORDER, REMISSION STATUS UNSPECIFIED (H): ICD-10-CM

## 2024-03-27 DIAGNOSIS — N18.4 CHRONIC RENAL DISEASE, STAGE IV (H): ICD-10-CM

## 2024-03-27 DIAGNOSIS — D64.9 CHRONIC ANEMIA: ICD-10-CM

## 2024-03-27 PROCEDURE — 99214 OFFICE O/P EST MOD 30 MIN: CPT | Mod: 24 | Performed by: INTERNAL MEDICINE

## 2024-03-27 ASSESSMENT — PAIN SCALES - GENERAL: PAINLEVEL: NO PAIN (0)

## 2024-03-27 NOTE — PROGRESS NOTES
Preoperative Evaluation  [facility]  [address] [facility], [address]  [facility] [address]  Phone: [phone]  Primary Provider: Gayle Hernandez  Pre-op Performing Provider: WILY CARVER  Mar 27, 2024       Diana is a 73 year old, presenting for the following:  Pre-Op Exam (Patient is here f or a Pre-op  exam for Fistula.  DOS 4/2/2024 at [facility].  )      Surgical Information  Surgery/Procedure: SECOND STAGE TRANSPOSITION OF RIGHT UPPER ARM ULNAR TO BASILIC ARTERIOVENOUS FISTULA   Surgery Location: [facility]  Surgeon: Kevin Royal MD   Surgery Date: 4/2/2024  Time of Surgery: 1:30 PM  Where patient plans to recover: Other: Assisted living Nurse to check up on patient every 3 hours  Fax number for surgical facility: Note does not need to be faxed, will be available electronically in Epic.    Assessment & Plan     The proposed surgical procedure is considered INTERMEDIATE risk.    Preop general physical exam  Suitable candidate for planned fistula surgery    Chronic renal disease, stage IV (H)      Bipolar affective disorder, remission status unspecified (H)  Seems stable although it has been sometime since she has had a lithium level checked.  I realize this after she left the clinic.  I will have my staff reach out to her to see if she can have a lithium level drawn prior to surgery.    Benign essential hypertension  Blood pressures under good control, she was advised to hold her amiloride prior to surgery    Chronic anemia  This seems stable and I think it is okay for her to proceed with this surgery at her current hemoglobin level           - No identified additional risk factors other than previously addressed    Antiplatelet or Anticoagulation Medication Instructions   - Patient is on no antiplatelet or anticoagulation medications.    Additional Medication Instructions  Patient is to take all scheduled medications on the day of surgery  EXCEPT for modifications listed below:   - Diuretics: HOLD on the day of surgery.   - Statins: Continue taking on the day of surgery.    - lithium: Check lithium level. Continue without modification.     Recommendation  APPROVAL GIVEN to proceed with proposed procedure, without further diagnostic evaluation.      No LOS data to display   Time spent by me doing chart review, history and exam, documentation and further activities per the note    Subjective       HPI related to upcoming procedure: She is here for preop in anticipation of a surgery to prepare an arteriovenous fistula for anticipated hemodialysis.  She participates in the 30 minute exercise class at [facility] where she lives without any chest pains or dyspnea.  She has no new complaints today other than she finds herself waking at night to urinate frequently.  She has discussed this with her other physicians and the symptoms have been present for several months.  The symptoms did not seem to improve when she had stopped taking amiloride in the past.  She denies dysuria, fevers or chills.  She denies hematuria.        3/26/2024     3:57 PM   Preop Questions   1. Have you ever had a heart attack or stroke? No   2. Have you ever had surgery on your heart or blood vessels, such as a stent placement, a coronary artery bypass, or surgery on an artery in your head, neck, heart, or legs? No   3. Do you have chest pain with activity? No   4. Do you have a history of  heart failure? No   5. Do you currently have a cold, bronchitis or symptoms of other infection? No   6. Do you have a cough, shortness of breath, or wheezing? No   7. Do you or anyone in your family have previous history of blood clots? YES -    8. Do you or does anyone in your family have a serious bleeding problem such as prolonged bleeding following surgeries or cuts? No   9. Have you ever had problems with anemia or been told to take iron pills? YES -    10. Have you had any abnormal blood loss such  as black, tarry or bloody stools, or abnormal vaginal bleeding? YES -    11. Have you ever had a blood transfusion? YES -    11a. Have you ever had a transfusion reaction? No   12. Are you willing to have a blood transfusion if it is medically needed before, during, or after your surgery? Yes   13. Have you or any of your relatives ever had problems with anesthesia? No   14. Do you have sleep apnea, excessive snoring or daytime drowsiness? YES -    14a. Do you have a CPAP machine? No   15. Do you have any artifical heart valves or other implanted medical devices like a pacemaker, defibrillator, or continuous glucose monitor? No   16. Do you have artificial joints? No   17. Are you allergic to latex? No       Health Care Directive  Patient has a Health Care Directive on file        Patient Active Problem List    Diagnosis Date Noted    Chronic renal disease, stage IV (H) 04/24/2023     Priority: Medium    High lithium level 03/07/2023     Priority: Medium    Overdose, intentional self-harm, initial encounter (H) 01/29/2023     Priority: Medium    Perforated viscus 08/24/2022     Priority: Medium    Septic shock (H) 08/24/2022     Priority: Medium    Meningioma (H) 07/22/2022     Priority: Medium    Right foot drop 07/22/2022     Priority: Medium    Right shoulder pain, unspecified chronicity 07/22/2022     Priority: Medium    Ankle injury, unspecified laterality, initial encounter 07/18/2022     Priority: Medium    Right ankle pain, unspecified chronicity 07/18/2022     Priority: Medium    Chronic kidney disease, stage 4 (severe) (H) 03/02/2022     Priority: Medium    Anemia in CKD (chronic kidney disease) 12/17/2021     Priority: Medium    Stage 3b chronic kidney disease (H) 12/17/2021     Priority: Medium    Acute kidney failure (H24) 11/19/2021     Priority: Medium    Hypernatremia 11/19/2021     Priority: Medium    Malignant hypertensive kidney disease with chronic kidney disease stage I through stage IV, or  unspecified(403.00) 11/19/2021     Priority: Medium    Chronic anemia 11/08/2021     Priority: Medium    Acute kidney injury (H24) 11/08/2021     Priority: Medium    Lithium toxicity, accidental or unintentional, initial encounter 11/08/2021     Priority: Medium    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     Priority: Medium     felt due to lithium      Chronic kidney disease, stage 3a 10/18/2019     Priority: Medium    Benign essential hypertension 09/01/2018     Priority: Medium     added norvasc 9/18      Hypercalcemia 08/01/2017     Priority: Medium    Hyperparathyroidism (H24) 08/01/2017     Priority: Medium    Vitamin D deficiency      Priority: Medium    Hyperlipidemia LDL goal <130 07/16/2013     Priority: Medium    Elevated blood sugar      Priority: Medium    DCIS (ductal carcinoma in situ)      Priority: Medium     xrt and lumpectomy x 4      Heterozygous thalassemia      Priority: Medium     Diagnosis updated by automated process. Provider to review and confirm.      Bipolar affective disorder (H)      Priority: Medium     hosp 1993      Advance Care Planning 06/20/2012     Priority: Medium     Advance Care Planning 7/31/2015: Receipt of ACP document:  Received: Health Care Directive which was witnessed or notarized on 10-.  Document not previously scanned.  Validation form completed and sent with document to be scanned.  Code Status needs to be updated to reflect choices in most recent ACP document. Notification sent to Dr. Miller for followup.  Confirmed/documented designated decision maker(s).  Added by Geraldine Mak              Past Medical History:   Diagnosis Date    Benign essential hypertension 09/2018    added norvasc 9/18    Bipolar affective disorder (H)     hosp 1993, Dr. Aftab Deal    Cancer (H) 1996    DCIS, left breast    Chronic kidney disease, stage 3a (H)     seen by renal Dr. 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    felt due to lithium    Thalassaemia trait     Vitamin D deficiency      Past Surgical History:   Procedure Laterality Date    BIOPSY  1994    left breast    BREAST SURGERY      lumpectomy x 4    COLONOSCOPY  2021    COLONOSCOPY N/A 6/17/2022    Procedure: COLONOSCOPY, WITH POLYPECTOMY AND BIOPSY;  Surgeon: Panchito Gu MD;  Location:  GI    CREATE FISTULA ARTERIOVENOUS UPPER EXTREMITY Right 1/30/2024    Procedure: CREATION OF FIRST STAGE RIGHT UPPER ARM ULNAR TO BASILIC ARTERIOVENOUS FISTULA;  Surgeon: Kevin Royal MD;  Location:  OR    EYE SURGERY  2018    retina tear    LAPAROTOMY EXPLORATORY N/A 8/24/2022    Procedure: Exploratory laparotomy, REPAIR OF PERFORATED ULCER;  Surgeon: Ron Vidal MD;  Location:  OR    left hand surgery      last 1974     Current Outpatient Medications   Medication Sig Dispense Refill    ACE/ARB/ARNI NOT PRESCRIBED (INTENTIONAL) Please choose reason not prescribed from choices below.      acetaminophen (TYLENOL) 500 MG tablet TAKE TWO TABLETS (1000MG) BY MOUTH AT BEDTIME 56 tablet 97    aMILoride (MIDAMOR) 5 MG tablet Take 5 mg by mouth daily      amLODIPine (NORVASC) 5 MG tablet TAKE 1 TABLET BY MOUTH ONCE DAILY (HOLD FOR SBP < 100) 90 tablet 97    aspirin (ASA) 81 MG chewable tablet Take 1 tablet (81 mg) by mouth daily (Patient taking differently: Take 81 mg by mouth daily Take 1/2 until last day 2/13/24) 14 tablet 0    diazepam (VALIUM) 2 MG tablet TAKE ONE-HALF TABLET (1MG) BY MOUTH EVERY OTHER DAY **CONTROLLED SUBSTANCE-DOUBLE LOCK STORAGE** 7 tablet 4     "FEROSUL 325 (65 Fe) MG tablet TAKE ONE TABLET BY MOUTH EVERY OTHER DAY 42 tablet 97    fexofenadine (ALLEGRA) 180 MG tablet TAKE 1 TABLET BY MOUTH ONCE DAILY 90 tablet 97    lithium (ESKALITH) 150 MG capsule TAKE 1 CAPSULE BY MOUTH THREE TIMES DAILY WITH MEALS 270 capsule 97    pantoprazole (PROTONIX) 40 MG EC tablet TAKE 1 TABLET BY MOUTH ONCE DAILY 90 tablet 2    polyethylene glycol (MIRALAX) 17 GM/Dose powder MIX 17GM OF POWDER IN 8OZ OF WATER UNTIL COMPLETELY DISSOLVED. DRINK SOLUTION BY MOUTH ONCE DAILY. 510 g 0    simvastatin (ZOCOR) 40 MG tablet TAKE 1 TABLET BY MOUTH AT BEDTIME 90 tablet 97    sodium bicarbonate 650 MG tablet TAKE TWO TABLETS (1300MG) BY MOUTH THREE TIMES DAILY 540 tablet 1       Allergies   Allergen Reactions    Ace Inhibitors      Other reaction(s): renal failure  She can't be on an ACEI or ARB while on Lithium.        Social History     Tobacco Use    Smoking status: Never    Smokeless tobacco: Never   Substance Use Topics    Alcohol use: No     Family History   Problem Relation Age of Onset    Cerebrovascular Disease Mother     Hypertension Father     Sleep Apnea Brother     Breast Cancer Maternal Aunt         x 2    Breast Cancer Other         Maternal Aunt    Hyperlipidemia Niece      History   Drug Use No         Review of Systems    Review of Systems  Constitutional, neuro, ENT, endocrine, pulmonary, cardiac, gastrointestinal, genitourinary, musculoskeletal, integument and psychiatric systems are negative, except as otherwise noted.    Objective    LMP  (LMP Unknown)    Estimated body mass index is 21.26 kg/m  as calculated from the following:    Height as of 1/30/24: 1.6 m (5' 3\").    Weight as of 2/9/24: 54.4 kg (120 lb).  Physical Exam  General: This is a frail, thin female in no acute distress.  She speaks in full sentences and does not appear toxic.  HEENT: The bilateral tympanic membranes appear normal, the nasal exam is normal, the oropharynx is normal, the neck is supple " without adenopathy and there is a midline neck scar from a previous tracheotomy.  Cardiovascular: The heart has a regular rate and rhythm without appreciable murmur gallop or rub.  Pulmonary: The lungs are clear to auscultation bilaterally, breathing does not appear to be labored.  Abdomen: Soft, not distended, not tender, bowel sounds present, no masses, no organomegaly.  Extremities: Well-perfused and without edema, there is a right distal lower extremity AFO brace.  Mental state: She has a mildly flat affect, well-groomed, normal speech.  Neurological: Alert and oriented to person place and time, cranial nerves II to XII appear grossly intact, she has a slow gait and uses a walker.    Recent Labs   Lab Test 03/21/24  1025 03/15/24  1305 03/14/24  0954 02/29/24  1029 02/22/24  1020 02/16/24  1336 09/13/22  0716 09/12/22  0625 09/06/22  0533 09/05/22  0543   HGB  --  9.1*  --   --  7.9* 7.3*  7.3*   < >  --    < > 9.3*   PLT  --   --   --   --  406 387   < > 482*   < > 547*   INR  --   --   --   --   --   --   --  0.93  --  1.06     --  140   < > 139  --    < > 140   < > 143   POTASSIUM 5.1  --  5.5*   < > 5.1  --    < > 5.1   < > 4.2   CR 2.45*  --  2.60*   < > 2.36*  --    < > 1.50*   < > 1.80*    < > = values in this interval not displayed.        Diagnostics  Labs were checked last week and showed a hemoglobin that was stable at around 9.1 her potassium was noted to be 5.1 renal function was stable with a creatinine of 2.45.      She also had an EKG last month showing sinus rhythm with a rate of 74 bpm without acute ST segment changes.    Revised Cardiac Risk Index (RCRI)  The patient has the following serious cardiovascular risks for perioperative complications:   - Serum Creatinine >2.0 mg/dl = 1 point     RCRI Interpretation: 1 point: Class II (low risk - 0.9% complication rate)         Signed Electronically by: Danielito Roth MD  Copy of this evaluation report is provided to requesting  physician.

## 2024-03-27 NOTE — PATIENT INSTRUCTIONS
Preparing for Your Surgery  Getting started  A nurse will call you to review your health history and instructions. They will give you an arrival time based on your scheduled surgery time. Please be ready to share:  Your doctor's clinic name and phone number  Your medical, surgical, and anesthesia history  A list of allergies and sensitivities  A list of medicines, including herbal treatments and over-the-counter drugs  Whether the patient has a legal guardian (ask how to send us the papers in advance)  Please tell us if you're pregnant--or if there's any chance you might be pregnant. Some surgeries may injure a fetus (unborn baby), so they require a pregnancy test. Surgeries that are safe for a fetus don't always need a test, and you can choose whether to have one.   If you have a child who's having surgery, please ask for a copy of Preparing for Your Child's Surgery.    Preparing for surgery  Within 10 to 30 days of surgery: Have a pre-op exam (sometimes called an H&P, or History and Physical). This can be done at a clinic or pre-operative center.  If you're having a , you may not need this exam. Talk to your care team.  At your pre-op exam, talk to your care team about all medicines you take. If you need to stop any medicines before surgery, ask when to start taking them again.  We do this for your safety. Many medicines can make you bleed too much during surgery. Some change how well surgery (anesthesia) drugs work.  Call your insurance company to let them know you're having surgery. (If you don't have insurance, call [phone].)  Call your clinic if there's any change in your health. This includes signs of a cold or flu (sore throat, runny nose, cough, rash, fever). It also includes a scrape or scratch near the surgery site.  If you have questions on the day of surgery, call your hospital or surgery center.  Eating and drinking guidelines  For your safety: Unless your surgeon tells you otherwise,  follow the guidelines below.  Eat and drink as usual until 8 hours before you arrive for surgery. After that, no food or milk.  Drink clear liquids until 2 hours before you arrive. These are liquids you can see through, like water, Gatorade, and Propel Water. They also include plain black coffee and tea (no cream or milk), candy, and breath mints. You can spit out gum when you arrive.  If you drink alcohol: Stop drinking it the night before surgery.  If your care team tells you to take medicine on the morning of surgery, it's okay to take it with a sip of water.  Preventing infection  Shower or bathe the night before and morning of your surgery. Follow the instructions your clinic gave you. (If no instructions, use regular soap.)  Don't shave or clip hair near your surgery site. We'll remove the hair if needed.  Don't smoke or vape the morning of surgery. You may chew nicotine gum up to 2 hours before surgery. A nicotine patch is okay.  Note: Some surgeries require you to completely quit smoking and nicotine. Check with your surgeon.  Your care team will make every effort to keep you safe from infection. We will:  Clean our hands often with soap and water (or an alcohol-based hand rub).  Clean the skin at your surgery site with a special soap that kills germs.  Give you a special gown to keep you warm. (Cold raises the risk of infection.)  Wear special hair covers, masks, gowns and gloves during surgery.  Give antibiotic medicine, if prescribed. Not all surgeries need antibiotics.  What to bring on the day of surgery  Photo ID and insurance card  Copy of your health care directive, if you have one  Glasses and hearing aids (bring cases)  You can't wear contacts during surgery  Inhaler and eye drops, if you use them (tell us about these when you arrive)  CPAP machine or breathing device, if you use them  A few personal items, if spending the night  If you have . . .  A pacemaker, ICD (cardiac defibrillator) or other  implant: Bring the ID card.  An implanted stimulator: Bring the remote control.  A legal guardian: Bring a copy of the certified (court-stamped) guardianship papers.  Please remove any jewelry, including body piercings. Leave jewelry and other valuables at home.  If you're going home the day of surgery  You must have a responsible adult drive you home. They should stay with you overnight as well.  If you don't have someone to stay with you, and you aren't safe to go home alone, we may keep you overnight. Insurance often won't pay for this.  After surgery  If it's hard to control your pain or you need more pain medicine, please call your surgeon's office.  Questions?   If you have any questions for your care team, list them here: _________________________________________________________________________________________________________________________________________________________________________ ____________________________________ ____________________________________ ____________________________________  For informational purposes only. Not to replace the advice of your health care provider. Copyright   2003, 2019 [employer]. All rights reserved. Clinically reviewed by Livier Mckeon MD. [employer] [id number] - REV 12/22.    How to Take Your Medication Before Surgery  - HOLD (do not take) your amiloride on the morning of surgery.

## 2024-03-27 NOTE — Clinical Note
Can you please call Diana and inform her that I realized after she left the clinic that it has been over a year since she has had her blood lithium level checked.  As such, I would advise that she have a lithium level checked prior to her planned surgery.  Can you help her schedule a lab appointment this week to have that checked?  She only lives across the street so hopefully this is not too much of a burden.

## 2024-03-27 NOTE — TELEPHONE ENCOUNTER
Patient Contact    Attempt # 1    Was call answered?  No.  Left message on voicemail with information to call triage back.          Danielito Roth MD  P Cs Triage Im  Can you please call Diana and inform her that I realized after she left the clinic that it has been over a year since she has had her blood lithium level checked.  As such, I would advise that she have a lithium level checked prior to her planned surgery.  Can you help her schedule a lab appointment this week to have that checked?  She only lives across the street so hopefully this is not too much of a burden.

## 2024-03-28 ENCOUNTER — LAB (OUTPATIENT)
Dept: LAB | Facility: CLINIC | Age: 74
End: 2024-03-28
Payer: MEDICARE

## 2024-03-28 DIAGNOSIS — F31.9 BIPOLAR AFFECTIVE DISORDER, REMISSION STATUS UNSPECIFIED (H): ICD-10-CM

## 2024-03-28 DIAGNOSIS — N18.31 CHRONIC KIDNEY DISEASE, STAGE 3A (H): ICD-10-CM

## 2024-03-28 LAB
ANION GAP SERPL CALCULATED.3IONS-SCNC: 12 MMOL/L (ref 7–15)
BUN SERPL-MCNC: 50.2 MG/DL (ref 8–23)
CALCIUM SERPL-MCNC: 10.3 MG/DL (ref 8.8–10.2)
CHLORIDE SERPL-SCNC: 108 MMOL/L (ref 98–107)
CREAT SERPL-MCNC: 2.6 MG/DL (ref 0.51–0.95)
DEPRECATED HCO3 PLAS-SCNC: 19 MMOL/L (ref 22–29)
EGFRCR SERPLBLD CKD-EPI 2021: 19 ML/MIN/1.73M2
GLUCOSE SERPL-MCNC: 100 MG/DL (ref 70–99)
HOLD SPECIMEN: NORMAL
LITHIUM SERPL-SCNC: 1.49 MMOL/L (ref 0.6–1.2)
POTASSIUM SERPL-SCNC: 5.3 MMOL/L (ref 3.4–5.3)
SODIUM SERPL-SCNC: 139 MMOL/L (ref 135–145)

## 2024-03-28 PROCEDURE — 36415 COLL VENOUS BLD VENIPUNCTURE: CPT

## 2024-03-28 PROCEDURE — 80048 BASIC METABOLIC PNL TOTAL CA: CPT

## 2024-03-28 PROCEDURE — 80178 ASSAY OF LITHIUM: CPT

## 2024-03-28 NOTE — RESULT ENCOUNTER NOTE
The following letter pertains to your most recent diagnostic tests:    The lithium level is actually mildly elevated.  However, it does not appear to be in the toxic range.  I do not think that the mildly elevated lithium level will have any adverse effects on you upcoming surgery.  Therefore, I feel you can proceed with your surgery as previously planned.  However, I think you should contact the doctor who is prescribing your lithium to see if the dose might need to be adjusted.  I am not certain whether the physician prescribing your lithium is a psychiatry specialist or your primary care doctor.  I did forward this result to your primary care doctor for his review.      Sincerely,    Dr. Roth

## 2024-03-28 NOTE — LETTER
March 28, 2024      Diana Santiago  [address]  [facility] [address]        Dear ,    We are writing to inform you of your test results.    The lithium level is actually mildly elevated.  However, it does not appear to be in the toxic range.  I do not think that the mildly elevated lithium level will have any adverse effects on you upcoming surgery.  Therefore, I feel you can proceed with your surgery as previously planned.  However, I think you should contact the doctor who is prescribing your lithium to see if the dose might need to be adjusted.  I am not certain whether the physician prescribing your lithium is a psychiatry specialist or your primary care doctor.  I did forward this result to your primary care doctor for his review.       Resulted Orders   Lithium level   Result Value Ref Range    Lithium 1.49 (H) 0.60 - 1.20 mmol/L      Comment:      Therapeutic: 0.60 - 1.20 mmol/L;   Toxic: >2.00 mmol/L       If you have any questions or concerns, please call the clinic at the number listed above.       Sincerely,      Danielito Roth MD

## 2024-03-28 NOTE — TELEPHONE ENCOUNTER
Patient was contacted and was provided results of lithium testing that was completed today, 3/28/2024.  Patient expressed understanding.

## 2024-03-29 NOTE — TELEPHONE ENCOUNTER
Pt has missed called from clinic. Has a VM to call clinic back. Triage informed pt call was re: lithium lab check. Pt had lab appt 03/28. Pt has read results. No further action is needed.

## 2024-04-01 ENCOUNTER — TELEPHONE (OUTPATIENT)
Dept: OTHER | Facility: CLINIC | Age: 74
End: 2024-04-01
Payer: MEDICARE

## 2024-04-01 NOTE — OR NURSING
Diana resides at [facility]. [phone]. I left a voicemail with preop instructions for Diana. I spoke with the nurse about her medications and NPO status.

## 2024-04-01 NOTE — TELEPHONE ENCOUNTER
[facility]    I called Diana Santiago about her second stage mobilization right upper arm fistula.  Her vein dilated very nicely following the first stage.        This procedure will be performed under general anesthetic.  [facility] and after the last procedure which was a local anesthetic with a small antecubital incision she was able to have someone check on her during the night which may not be feasible after this somewhat bigger surgery.  We will check on this tomorrow and if necessary she will feel a short stay unit overnight and she understands this.    Otherwise she had no questions about the procedure.       Kevin Royal MD

## 2024-04-02 ENCOUNTER — APPOINTMENT (OUTPATIENT)
Dept: SURGERY | Facility: PHYSICIAN GROUP | Age: 74
End: 2024-04-02
Payer: MEDICARE

## 2024-04-02 ENCOUNTER — ANESTHESIA EVENT (OUTPATIENT)
Dept: SURGERY | Facility: CLINIC | Age: 74
End: 2024-04-02
Payer: MEDICARE

## 2024-04-02 ENCOUNTER — ANESTHESIA (OUTPATIENT)
Dept: SURGERY | Facility: CLINIC | Age: 74
End: 2024-04-02
Payer: MEDICARE

## 2024-04-02 ENCOUNTER — HOSPITAL ENCOUNTER (OUTPATIENT)
Facility: CLINIC | Age: 74
Discharge: HOME | End: 2024-04-02
Attending: SURGERY | Admitting: SURGERY
Payer: MEDICARE

## 2024-04-02 VITALS
WEIGHT: 117.2 LBS | RESPIRATION RATE: 16 BRPM | DIASTOLIC BLOOD PRESSURE: 89 MMHG | OXYGEN SATURATION: 96 % | BODY MASS INDEX: 20.77 KG/M2 | HEART RATE: 74 BPM | SYSTOLIC BLOOD PRESSURE: 130 MMHG | HEIGHT: 63 IN | TEMPERATURE: 97.8 F

## 2024-04-02 DIAGNOSIS — N18.4 CHRONIC RENAL DISEASE, STAGE IV (H): Primary | ICD-10-CM

## 2024-04-02 DIAGNOSIS — I77.0 ARTERIOVENOUS FISTULA (H): ICD-10-CM

## 2024-04-02 LAB
CREAT SERPL-MCNC: 2.47 MG/DL (ref 0.51–0.95)
EGFRCR SERPLBLD CKD-EPI 2021: 20 ML/MIN/1.73M2
POTASSIUM SERPL-SCNC: 5.7 MMOL/L (ref 3.4–5.3)

## 2024-04-02 PROCEDURE — 82565 ASSAY OF CREATININE: CPT | Performed by: ANESTHESIOLOGY

## 2024-04-02 PROCEDURE — 370N000017 HC ANESTHESIA TECHNICAL FEE, PER MIN: Performed by: SURGERY

## 2024-04-02 PROCEDURE — 250N000011 HC RX IP 250 OP 636: Performed by: ANESTHESIOLOGY

## 2024-04-02 PROCEDURE — 999N000141 HC STATISTIC PRE-PROCEDURE NURSING ASSESSMENT: Performed by: SURGERY

## 2024-04-02 PROCEDURE — 36832 AV FISTULA REVISION OPEN: CPT

## 2024-04-02 PROCEDURE — 250N000011 HC RX IP 250 OP 636: Performed by: SURGERY

## 2024-04-02 PROCEDURE — 250N000009 HC RX 250: Performed by: ANESTHESIOLOGY

## 2024-04-02 PROCEDURE — 250N000009 HC RX 250: Performed by: SURGERY

## 2024-04-02 PROCEDURE — 272N000001 HC OR GENERAL SUPPLY STERILE: Performed by: SURGERY

## 2024-04-02 PROCEDURE — 99100 ANES PT EXTEME AGE<1 YR&>70: CPT

## 2024-04-02 PROCEDURE — 258N000003 HC RX IP 258 OP 636: Performed by: ANESTHESIOLOGY

## 2024-04-02 PROCEDURE — 36832 AV FISTULA REVISION OPEN: CPT | Performed by: ANESTHESIOLOGY

## 2024-04-02 PROCEDURE — 710N000012 HC RECOVERY PHASE 2, PER MINUTE: Performed by: SURGERY

## 2024-04-02 PROCEDURE — 710N000009 HC RECOVERY PHASE 1, LEVEL 1, PER MIN: Performed by: SURGERY

## 2024-04-02 PROCEDURE — 36832 AV FISTULA REVISION OPEN: CPT | Mod: 58 | Performed by: SURGERY

## 2024-04-02 PROCEDURE — 84132 ASSAY OF SERUM POTASSIUM: CPT | Performed by: ANESTHESIOLOGY

## 2024-04-02 PROCEDURE — 360N000076 HC SURGERY LEVEL 3, PER MIN: Performed by: SURGERY

## 2024-04-02 RX ORDER — HYDROCODONE BITARTRATE AND ACETAMINOPHEN 5; 325 MG/1; MG/1
1 TABLET ORAL EVERY 6 HOURS PRN
Qty: 10 TABLET | Refills: 0 | Status: SHIPPED | OUTPATIENT
Start: 2024-04-02 | End: 2024-04-05

## 2024-04-02 RX ORDER — FENTANYL CITRATE 50 UG/ML
INJECTION, SOLUTION INTRAMUSCULAR; INTRAVENOUS PRN
Status: DISCONTINUED | OUTPATIENT
Start: 2024-04-02 | End: 2024-04-02

## 2024-04-02 RX ORDER — SODIUM CHLORIDE 9 MG/ML
INJECTION, SOLUTION INTRAVENOUS CONTINUOUS
Status: DISCONTINUED | OUTPATIENT
Start: 2024-04-02 | End: 2024-04-02 | Stop reason: HOSPADM

## 2024-04-02 RX ORDER — HYDROMORPHONE HCL IN WATER/PF 6 MG/30 ML
0.2 PATIENT CONTROLLED ANALGESIA SYRINGE INTRAVENOUS EVERY 5 MIN PRN
Status: DISCONTINUED | OUTPATIENT
Start: 2024-04-02 | End: 2024-04-02 | Stop reason: HOSPADM

## 2024-04-02 RX ORDER — MAGNESIUM HYDROXIDE 1200 MG/15ML
LIQUID ORAL PRN
Status: DISCONTINUED | OUTPATIENT
Start: 2024-04-02 | End: 2024-04-02 | Stop reason: HOSPADM

## 2024-04-02 RX ORDER — CEFAZOLIN SODIUM/WATER 2 G/20 ML
2 SYRINGE (ML) INTRAVENOUS
Status: COMPLETED | OUTPATIENT
Start: 2024-04-02 | End: 2024-04-02

## 2024-04-02 RX ORDER — FENTANYL CITRATE 50 UG/ML
50 INJECTION, SOLUTION INTRAMUSCULAR; INTRAVENOUS EVERY 5 MIN PRN
Status: DISCONTINUED | OUTPATIENT
Start: 2024-04-02 | End: 2024-04-02 | Stop reason: HOSPADM

## 2024-04-02 RX ORDER — SODIUM CHLORIDE, SODIUM LACTATE, POTASSIUM CHLORIDE, CALCIUM CHLORIDE 600; 310; 30; 20 MG/100ML; MG/100ML; MG/100ML; MG/100ML
INJECTION, SOLUTION INTRAVENOUS CONTINUOUS
Status: DISCONTINUED | OUTPATIENT
Start: 2024-04-02 | End: 2024-04-02 | Stop reason: HOSPADM

## 2024-04-02 RX ORDER — ONDANSETRON 2 MG/ML
INJECTION INTRAMUSCULAR; INTRAVENOUS PRN
Status: DISCONTINUED | OUTPATIENT
Start: 2024-04-02 | End: 2024-04-02

## 2024-04-02 RX ORDER — BUPIVACAINE HYDROCHLORIDE 2.5 MG/ML
INJECTION, SOLUTION INFILTRATION; PERINEURAL PRN
Status: DISCONTINUED | OUTPATIENT
Start: 2024-04-02 | End: 2024-04-02 | Stop reason: HOSPADM

## 2024-04-02 RX ORDER — NALOXONE HYDROCHLORIDE 0.4 MG/ML
0.1 INJECTION, SOLUTION INTRAMUSCULAR; INTRAVENOUS; SUBCUTANEOUS
Status: DISCONTINUED | OUTPATIENT
Start: 2024-04-02 | End: 2024-04-02 | Stop reason: HOSPADM

## 2024-04-02 RX ORDER — ONDANSETRON 2 MG/ML
4 INJECTION INTRAMUSCULAR; INTRAVENOUS EVERY 30 MIN PRN
Status: DISCONTINUED | OUTPATIENT
Start: 2024-04-02 | End: 2024-04-02 | Stop reason: HOSPADM

## 2024-04-02 RX ORDER — ONDANSETRON 4 MG/1
4 TABLET, ORALLY DISINTEGRATING ORAL EVERY 30 MIN PRN
Status: DISCONTINUED | OUTPATIENT
Start: 2024-04-02 | End: 2024-04-02 | Stop reason: HOSPADM

## 2024-04-02 RX ORDER — EPHEDRINE SULFATE 50 MG/ML
INJECTION, SOLUTION INTRAMUSCULAR; INTRAVENOUS; SUBCUTANEOUS PRN
Status: DISCONTINUED | OUTPATIENT
Start: 2024-04-02 | End: 2024-04-02

## 2024-04-02 RX ORDER — FENTANYL CITRATE 50 UG/ML
25 INJECTION, SOLUTION INTRAMUSCULAR; INTRAVENOUS EVERY 5 MIN PRN
Status: DISCONTINUED | OUTPATIENT
Start: 2024-04-02 | End: 2024-04-02 | Stop reason: HOSPADM

## 2024-04-02 RX ORDER — HYDROMORPHONE HCL IN WATER/PF 6 MG/30 ML
0.4 PATIENT CONTROLLED ANALGESIA SYRINGE INTRAVENOUS EVERY 5 MIN PRN
Status: DISCONTINUED | OUTPATIENT
Start: 2024-04-02 | End: 2024-04-02 | Stop reason: HOSPADM

## 2024-04-02 RX ORDER — PROPOFOL 10 MG/ML
INJECTION, EMULSION INTRAVENOUS CONTINUOUS PRN
Status: DISCONTINUED | OUTPATIENT
Start: 2024-04-02 | End: 2024-04-02

## 2024-04-02 RX ORDER — LIDOCAINE HYDROCHLORIDE 20 MG/ML
INJECTION, SOLUTION INFILTRATION; PERINEURAL PRN
Status: DISCONTINUED | OUTPATIENT
Start: 2024-04-02 | End: 2024-04-02

## 2024-04-02 RX ORDER — PROPOFOL 10 MG/ML
INJECTION, EMULSION INTRAVENOUS PRN
Status: DISCONTINUED | OUTPATIENT
Start: 2024-04-02 | End: 2024-04-02

## 2024-04-02 RX ADMIN — SODIUM CHLORIDE: 9 INJECTION, SOLUTION INTRAVENOUS at 12:10

## 2024-04-02 RX ADMIN — Medication 10 MG: at 14:20

## 2024-04-02 RX ADMIN — LIDOCAINE HYDROCHLORIDE 60 MG: 20 INJECTION, SOLUTION INFILTRATION; PERINEURAL at 14:14

## 2024-04-02 RX ADMIN — ONDANSETRON 4 MG: 2 INJECTION INTRAMUSCULAR; INTRAVENOUS at 14:20

## 2024-04-02 RX ADMIN — FENTANYL CITRATE 50 MCG: 50 INJECTION INTRAMUSCULAR; INTRAVENOUS at 15:10

## 2024-04-02 RX ADMIN — Medication 10 MG: at 14:40

## 2024-04-02 RX ADMIN — PROPOFOL 150 MCG/KG/MIN: 10 INJECTION, EMULSION INTRAVENOUS at 14:14

## 2024-04-02 RX ADMIN — Medication 2 G: at 14:12

## 2024-04-02 RX ADMIN — FENTANYL CITRATE 50 MCG: 50 INJECTION INTRAMUSCULAR; INTRAVENOUS at 14:24

## 2024-04-02 RX ADMIN — PROPOFOL 150 MG: 10 INJECTION, EMULSION INTRAVENOUS at 14:14

## 2024-04-02 ASSESSMENT — ACTIVITIES OF DAILY LIVING (ADL)
ADLS_ACUITY_SCORE: 38
ADLS_ACUITY_SCORE: 37
ADLS_ACUITY_SCORE: 37
ADLS_ACUITY_SCORE: 38
ADLS_ACUITY_SCORE: 37
ADLS_ACUITY_SCORE: 37

## 2024-04-02 ASSESSMENT — LIFESTYLE VARIABLES: TOBACCO_USE: 0

## 2024-04-02 NOTE — OR NURSING
[facility] faxed script of Laurel.  Confirmed receipt of script by [facility].  Hard copy of script will be sent with brother and given to staff.  Spoke to Wendy and agrees with this plan.

## 2024-04-02 NOTE — ANESTHESIA POSTPROCEDURE EVALUATION
Patient: Diana Santiago    Procedure: Procedure(s):  SECOND STAGE TRANSPOSITION OF RIGHT UPPER ARM ULNAR TO BASILIC ARTERIOVENOUS FISTULA       Anesthesia Type:  General    Note:  Disposition: Outpatient   Postop Pain Control: Uneventful            Sign Out: Well controlled pain   PONV: No   Neuro/Psych: Uneventful            Sign Out: Acceptable/Baseline neuro status   Airway/Respiratory: Uneventful            Sign Out: Acceptable/Baseline resp. status   CV/Hemodynamics: Uneventful            Sign Out: Acceptable CV status; No obvious hypovolemia; No obvious fluid overload   Other NRE: NONE   DID A NON-ROUTINE EVENT OCCUR?            Last vitals:  Vitals Value Taken Time   /89 04/02/24 1631   Temp 36.6  C (97.8  F) 04/02/24 1630   Pulse 74 04/02/24 1636   Resp 14 04/02/24 1636   SpO2 96 % 04/02/24 1637   Vitals shown include unfiled device data.    Electronically Signed By: Xavi Ugalde MD  April 2, 2024  5:39 PM

## 2024-04-02 NOTE — PROGRESS NOTES
PTA medications completed by Medication Scribe day of surgery     Medication history sources: CAROLEE Hinojosa&P, MAR (Nayla on Evi BRIGITTE), and (RN @ [facility]isted [facility] facility [phone])  In the past week, patient estimated taking medication this percent of the time: Greater than 90%      Significant changes made to the medication list:  None      Additional medication history information:   None    Medication reconciliation completed by provider prior to medication history? No    Time spent in this activity: 30 minutes    The information provided in this note is only as accurate as the sources available at the time of update(s)      Prior to Admission medications    Medication Sig Last Dose Taking? Auth Provider Long Term End Date   acetaminophen (TYLENOL) 500 MG tablet TAKE TWO TABLETS (1000MG) BY MOUTH AT BEDTIME 4/1/2024 at PM Yes Gayle Hernandez MD     aMILoride (MIDAMOR) 5 MG tablet Take 5 mg by mouth daily 4/1/2024 at am Yes Reported, Patient No    amLODIPine (NORVASC) 5 MG tablet TAKE 1 TABLET BY MOUTH ONCE DAILY (HOLD FOR SBP < 100) 4/1/2024 at am Yes Gayle Hernandez MD Yes    diazepam (VALIUM) 2 MG tablet TAKE ONE-HALF TABLET (1MG) BY MOUTH EVERY OTHER DAY **CONTROLLED SUBSTANCE-DOUBLE LOCK STORAGE** 4/1/2024 at pm Yes Gayle Hernandez MD     FEROSUL 325 (65 Fe) MG tablet TAKE ONE TABLET BY MOUTH EVERY OTHER DAY 3/28/2024 at am Yes Gayle Hernandez MD     fexofenadine (ALLEGRA) 180 MG tablet TAKE 1 TABLET BY MOUTH ONCE DAILY 4/2/2024 at am Yes Gayle Hernandez MD     lithium (ESKALITH) 150 MG capsule TAKE 1 CAPSULE BY MOUTH THREE TIMES DAILY WITH MEALS 4/2/2024 at am Yes Gayle Hernandez MD Yes    pantoprazole (PROTONIX) 40 MG EC tablet TAKE 1 TABLET BY MOUTH ONCE DAILY 4/2/2024 at am Yes Gayle Hernandez MD     polyethylene glycol (MIRALAX) 17 GM/Dose powder MIX 17GM OF POWDER IN 8OZ OF WATER UNTIL COMPLETELY DISSOLVED. DRINK SOLUTION BY MOUTH ONCE DAILY. Unknown at prn Yes Mary  Gayle Iglesias MD     simvastatin (ZOCOR) 40 MG tablet TAKE 1 TABLET BY MOUTH AT BEDTIME 4/1/2024 at pm Yes Gayle Hernandez MD Yes    sodium bicarbonate 650 MG tablet TAKE TWO TABLETS (1300MG) BY MOUTH THREE TIMES DAILY 4/1/2024 at pm Yes Gayle Hernandez MD     ACE/ARB/ARNI NOT PRESCRIBED (INTENTIONAL) Please choose reason not prescribed from choices below.   Gayle Hernandez MD Yes        Medication history completed by: Amara Zepeda LPN

## 2024-04-02 NOTE — ANESTHESIA PROCEDURE NOTES
Airway       Patient location during procedure: OR  Staff -        CRNA: Rashard Ruiz APRN CRNA       Performed By: CRNA  Consent for Airway        Urgency: elective  Indications and Patient Condition       Indications for airway management: gina-procedural       Induction type:intravenous       Mask difficulty assessment: 0 - not attempted    Final Airway Details       Final airway type: supraglottic airway    Supraglottic Airway Details        Type: LMA       Brand: I-Gel       LMA size: 4    Post intubation assessment        Placement verified by: capnometry, equal breath sounds and chest rise        Number of attempts at approach: 1       Number of other approaches attempted: 0       Secured with: tape       Ease of procedure: easy       Dentition: Intact and Unchanged

## 2024-04-02 NOTE — ANESTHESIA PREPROCEDURE EVALUATION
Prior to Admission medications    Medication Sig Start Date End Date Taking? Authorizing Provider   acetaminophen (TYLENOL) 500 MG tablet TAKE TWO TABLETS (1000MG) BY MOUTH AT BEDTIME 12/29/23  Yes Gayle Hernandez MD   aMILoride (MIDAMOR) 5 MG tablet Take 5 mg by mouth daily   Yes Reported, Patient   amLODIPine (NORVASC) 5 MG tablet TAKE 1 TABLET BY MOUTH ONCE DAILY (HOLD FOR SBP < 100) 4/18/23  Yes Gayle Hernandez MD   diazepam (VALIUM) 2 MG tablet TAKE ONE-HALF TABLET (1MG) BY MOUTH EVERY OTHER DAY **CONTROLLED SUBSTANCE-DOUBLE LOCK STORAGE** 2/12/24  Yes Gayle Hernandez MD   FEROSUL 325 (65 Fe) MG tablet TAKE ONE TABLET BY MOUTH EVERY OTHER DAY 4/18/23  Yes Gayle Hernandez MD   fexofenadine (ALLEGRA) 180 MG tablet TAKE 1 TABLET BY MOUTH ONCE DAILY 4/18/23  Yes Gayle Hernandez MD   lithium (ESKALITH) 150 MG capsule TAKE 1 CAPSULE BY MOUTH THREE TIMES DAILY WITH MEALS 4/18/23  Yes Gayle Hernandez MD   pantoprazole (PROTONIX) 40 MG EC tablet TAKE 1 TABLET BY MOUTH ONCE DAILY 10/9/23  Yes Gayle Hernandez MD   polyethylene glycol (MIRALAX) 17 GM/Dose powder MIX 17GM OF POWDER IN 8OZ OF WATER UNTIL COMPLETELY DISSOLVED. DRINK SOLUTION BY MOUTH ONCE DAILY. 5/12/23  Yes Gayle Hernandez MD   simvastatin (ZOCOR) 40 MG tablet TAKE 1 TABLET BY MOUTH AT BEDTIME 4/18/23  Yes Gayle Hernandez MD   sodium bicarbonate 650 MG tablet TAKE TWO TABLETS (1300MG) BY MOUTH THREE TIMES DAILY 12/6/23  Yes Gayle Hernandez MD   ACE/ARB/ARNI NOT PRESCRIBED (INTENTIONAL) Please choose reason not prescribed from choices below. 3/22/22   Gayle Hernandez MD     Current Facility-Administered Medications   Medication Dose Route Frequency Provider Last Rate Last Admin    ceFAZolin Sodium (ANCEF) injection 2 g  2 g Intravenous Pre-Op/Pre-procedure x 1 dose Kevin Royal MD        sodium chloride 0.9 % infusion   Intravenous Continuous Xavi Ugalde MD 10 mL/hr at 04/02/24 1210 New Bag at 04/02/24  "1210     Current Facility-Administered Medications   Medication Dose Route Frequency Provider Last Rate Last Admin    sodium chloride 0.9 % infusion   Intravenous Continuous Xavi Ugalde MD 10 mL/hr at 24 1210 New Bag at 24 1210     No results for input(s): \"ABO\", \"RH\" in the last 89044 hours.  No results for input(s): \"HCG\" in the last 01991 hours.  Recent Results (from the past 744 hour(s))   US Ext Arterial Venous Dialys Acs Graft    Narrative    ULTRASOUND EXTREMITY ARTERIAL VENOUS DIALYSIS ACCESS GRAFT  2024 9:12 AM     HISTORY: Patient is status post a right proximal ulnar to basilic vein  dialysis fistula first stage.    COMPARISON: None.    FINDINGS: Color Doppler and spectral waveform analysis performed.    The right ulnar to basilic vein dialysis fistula is patent. The  outflow volume is measured to be 2061 mL/m.    Diameters of the outflow vein in millimeters without/with tourniquet  are as follows:  Passed arteriovenous anastomosis: 5.5/5.6.  Distal arm: 3.0/3.5.  Mid arm: 6.1/5.8.  Proximal/mid arm: 7.5/8.0.  Proximal arm: 9.7.      Impression    IMPRESSION: Patent right proximal ulnar artery to basilic vein  dialysis fistula with diameters as above. Mild stenosis in the outflow  vein at the distal arm.    MONA QUINONES MD         SYSTEM ID:  [id number]    Anesthesia Pre-Procedure Evaluation    Patient: Diana Santiago   MRN: [id number] : 1950        Procedure : Procedure(s):  SECOND STAGE TRANSPOSITION OF RIGHT UPPER ARM ULNAR TO BASILIC ARTERIOVENOUS FISTULA          Past Medical History:   Diagnosis Date    Benign essential hypertension 2018    added norvasc     Bipolar affective disorder (H)     hosp 1993, Dr. Aftab Deal    Cancer (H)     DCIS, left breast    Chronic kidney disease, stage 3a (H)     seen by renal Dr. 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    felt due to lithium    Thalassaemia trait     Vitamin D deficiency       Past Surgical History:   Procedure Laterality Date    BIOPSY  1994    left breast    BREAST SURGERY      lumpectomy x 4    COLONOSCOPY  2021    COLONOSCOPY N/A 6/17/2022    Procedure: COLONOSCOPY, WITH POLYPECTOMY AND BIOPSY;  Surgeon: Panchito Gu MD;  Location:  GI    CREATE FISTULA ARTERIOVENOUS UPPER EXTREMITY Right 1/30/2024    Procedure: CREATION OF FIRST STAGE RIGHT UPPER ARM ULNAR TO BASILIC ARTERIOVENOUS FISTULA;  Surgeon: Kevin Royal MD;  Location:  OR    EYE SURGERY  2018    retina tear    LAPAROTOMY EXPLORATORY N/A 8/24/2022    Procedure: Exploratory laparotomy, REPAIR OF PERFORATED ULCER;  Surgeon: Ron Vidal MD;  Location:  OR    left hand surgery      last 1974      Allergies   Allergen Reactions    Ace Inhibitors      Other reaction(s): renal failure  She can't be on an ACEI or ARB while on Lithium.      Social History     Tobacco Use    Smoking status: Never    Smokeless tobacco: Never   Substance Use Topics    Alcohol use: No      Wt Readings from Last 1 Encounters:   04/02/24 53.2 kg (117 lb 3.2 oz)        Anesthesia Evaluation   Pt has had prior anesthetic.     No history of anesthetic complications       ROS/MED HX  ENT/Pulmonary:    (-) tobacco use and sleep apnea   Neurologic:       Cardiovascular:     (+)  hypertension- -   -  - -                                      METS/Exercise Tolerance:     Hematologic:       Musculoskeletal:       GI/Hepatic:    (-) GERD   Renal/Genitourinary: Comment: Preparing fistula    (+) renal disease, type: ESRD, Pt does not  "require dialysis,           Endo:       Psychiatric/Substance Use:       Infectious Disease:       Malignancy:       Other:            Physical Exam    Airway        Mallampati: I    Neck ROM: full     Respiratory Devices and Support         Dental       (+) Minor Abnormalities - some fillings, tiny chips      Cardiovascular   cardiovascular exam normal          Pulmonary   pulmonary exam normal                OUTSIDE LABS:  CBC:   Lab Results   Component Value Date    WBC 8.1 02/22/2024    WBC 9.1 02/16/2024    HGB 9.1 (L) 03/15/2024    HGB 7.9 (L) 02/22/2024    HCT 27.1 (L) 02/22/2024    HCT 25.4 (L) 02/16/2024     02/22/2024     02/16/2024     BMP:   Lab Results   Component Value Date     03/28/2024     03/21/2024    POTASSIUM 5.7 (H) 04/02/2024    POTASSIUM 5.3 03/28/2024    CHLORIDE 108 (H) 03/28/2024    CHLORIDE 107 03/21/2024    CO2 19 (L) 03/28/2024    CO2 21 (L) 03/21/2024    BUN 50.2 (H) 03/28/2024    BUN 46.0 (H) 03/21/2024    CR 2.47 (H) 04/02/2024    CR 2.60 (H) 03/28/2024     (H) 03/28/2024     (H) 03/21/2024     COAGS:   Lab Results   Component Value Date    INR 0.93 09/12/2022     POC: No results found for: \"BGM\", \"HCG\", \"HCGS\"  HEPATIC:   Lab Results   Component Value Date    ALBUMIN 4.1 02/15/2024    PROTTOTAL 6.4 02/27/2023    ALT 25 02/27/2023    AST 18 02/27/2023    ALKPHOS 150 (H) 02/27/2023    BILITOTAL 0.3 02/27/2023     OTHER:   Lab Results   Component Value Date    PH 7.36 02/02/2023    LACT 2.0 01/31/2023    A1C 6.1 (H) 12/21/2020    ISSA 10.3 (H) 03/28/2024    PHOS 3.9 02/15/2024    MAG 2.3 02/12/2023    LIPASE 511 (H) 08/24/2022    TSH 2.57 02/07/2023       Anesthesia Plan    ASA Status:  2    NPO Status:  NPO Appropriate    Anesthesia Type: General.     - Airway: LMA   Induction: Intravenous.   Maintenance: Balanced.        Consents    Anesthesia Plan(s) and associated risks, benefits, and realistic alternatives discussed. Questions answered and " patient/representative(s) expressed understanding.     - Discussed:     - Discussed with:  Patient            Postoperative Care    Pain management: IV analgesics.   PONV prophylaxis: Ondansetron (or other 5HT-3)     Comments:               Xavi Ugalde MD    I have reviewed the pertinent notes and labs in the chart from the past 30 days and (re)examined the patient.  Any updates or changes from those notes are reflected in this note.    # Hyperkalemia: Highest K = 5.7 mmol/L in last 2 days, will monitor as appropriate   # Hypercalcemia: Highest Ca = 10.3 mg/dL in last 30 days, will monitor as appropriate

## 2024-04-02 NOTE — OP NOTE
OPERATIVE NOTE    PROCEDURE DATE: 4/2/2024       PRE-OP DIAGNOSIS: End-stage renal disease in need of permanent hemodialysis access       POST-OP DIAGNOSES: Same      PROCEDURE PERFORMED: Second stage transposition right upper arm proximal ulnar to brachial arteriovenous fistula      SURGEON:  Kevin Royal M.D.      ASSISTANT: Holli Fuentes DO (Vascular Fellow)      ANESTHESIA:  General-LMA      PRE-OP MEDICATIONS: Ancef 2 g IV      INDICATIONS FOR PROCEDURE: 73-year-old patient with end-stage renal disease not yet on dialysis underwent a first stage right upper arm proximal ulnar brachial transposition fistula on 1/30/2024.  Vein is dilated very nicely she comes for second stage immobilization procedure under informed consent.  Excellent palpable thrill is noted over the fistula on this very thin abdomen.      DESCRIPTION OF PROCEDURE: Brought the op room placed supine.  Induced under general esthesia LMA was placed.  Pillow under her knees.  Entire right arm and axilla was prepped and draped.  Timeout was called and the sites were identified.    Incision was made from the axilla down to the antecubital incision with a 15 blade scalpel.  Dissection was carried down through subcutaneous tissue to the fascia which was then opened.  She was very thin and the anterior brachial vein and dilated very nicely was almost 5 to 6 mm in diameter throughout its entire length.  Artery had also dilated over 5 mm diameter.  We then mobilized the veins entirety ligating multiple sidebranches between 4-0 silk suture.  This was mobilized from the antecubital incision up to the axilla.  In the axilla there were 2 large branches that were preserved.  With mobilization we did have some very nice redundancy of the vein.    The fascia was approximated with a running 3-0 Vicryl suture.  We cut an opening near the axillary area and the fascia where the vein came up and a very gentle excellent ankle which will help prevent any scar  tissue from developing.  We created a subcutaneous tunnel over the biceps muscle and the vein was placed in this with several tacking 3-0 Vicryl sutures to the subcutaneous tissue to prevent this from going medially.  There was an excellent gentle curve noted with excellent pulse and thrill.    Subcutaneous tissue was approximated interrupted 3-0 Vicryl.  Skin was closed with a running simple and mattress 4-0 Monocryl due to her very thin skin.  Wounds were infiltrated with 0.5% Marcaine for postop analgesia.  Gauze Merly dressings were applied.    Patient tolerated procedure well.  Returned to recovery room where she will be discharged to home with her family.  Needle and sponge counts correct x 2      EBL: Less than 15 mL      COMPLICATIONS: None      OPERATIVE FINDINGS: Excellent dilated brachial vein noted.      Kevin Royal MD

## 2024-04-02 NOTE — ANESTHESIA CARE TRANSFER NOTE
Patient: Diana Santiago    Procedure: Procedure(s):  SECOND STAGE TRANSPOSITION OF RIGHT UPPER ARM ULNAR TO BASILIC ARTERIOVENOUS FISTULA       Diagnosis: CKD (chronic kidney disease) stage 4, GFR 15-29 ml/min (H) [N18.4]  Diagnosis Additional Information: No value filed.    Anesthesia Type:   General     Note:    Oropharynx: oropharynx clear of all foreign objects and spontaneously breathing  Level of Consciousness: awake  Oxygen Supplementation: face mask  Level of Supplemental Oxygen (L/min / FiO2): 6  Independent Airway: airway patency satisfactory and stable  Dentition: dentition unchanged  Vital Signs Stable: post-procedure vital signs reviewed and stable  Report to RN Given: handoff report given  Patient transferred to: PACU    Handoff Report: Identifed the Patient, Identified the Reponsible Provider, Reviewed the pertinent medical history, Discussed the surgical course, Reviewed Intra-OP anesthesia mangement and issues during anesthesia, Set expectations for post-procedure period and Allowed opportunity for questions and acknowledgement of understanding      Vitals:  Vitals Value Taken Time   /89 04/02/24 1556   Temp 98.2 F    Pulse 77 04/02/24 1558   Resp 9 04/02/24 1558   SpO2 99 % 04/02/24 1558   Vitals shown include unfiled device data.    Electronically Signed By: KRUNAL Harrison CRNA  April 2, 2024  3:59 PM

## 2024-04-02 NOTE — OR NURSING
Discharge instructions reviewed with RN at [facility] by Dariana Perez RN. Brother Acosta notified and transport back to [facility] being arranged.

## 2024-04-02 NOTE — DISCHARGE INSTRUCTIONS
Same Day Surgery Discharge Instructions for  Sedation and General Anesthesia     It's not unusual to feel dizzy, light-headed or faint for up to 24 hours after surgery or while taking pain medication.  If you have these symptoms: sit for a few minutes before standing and have someone assist you when you get up to walk or use the bathroom.    You should rest and relax for the next 24 hours. We recommend you make arrangements to have an adult stay with you for at least 24 hours after your discharge.  Avoid hazardous and strenuous activity.    DO NOT DRIVE any vehicle or operate mechanical equipment for 24 hours following the end of your surgery.  Even though you may feel normal, your reactions may be affected by the medication you have received.    Do not drink alcoholic beverages for 24 hours following surgery.     Slowly progress to your regular diet as you feel able. It's not unusual to feel nauseated and/or vomit after receiving anesthesia.  If you develop these symptoms, drink clear liquids (apple juice, ginger ale, broth, 7-up, etc. ) until you feel better.  If your nausea and vomiting persists for 24 hours, please notify your surgeon.      All narcotic pain medications, along with inactivity and anesthesia, can cause constipation. Drinking plenty of liquids and increasing fiber intake will help.    For any questions of a medical nature, call your surgeon.    Do not make important decisions for 24 hours.    If you had general anesthesia, you may have a sore throat for a couple of days related to the breathing tube used during surgery.  You may use Cepacol lozenges to help with this discomfort.  If it worsens or if you develop a fever, contact your surgeon.     If you feel your pain is not well managed with the pain medications prescribed by your surgeon, please contact your surgeon's office to let them know so they can address your concerns.        ARTERIAL VENOUS FISTULA  Discharge Instructions    Dr. Brown  [place]  [phone]      In 48 hours, remove dressing.  Leave steri-strips in place until they fall off by themselves--usually 7-10 days.    Ok to shower once dressing is removed.  No bathing or soaking for 4 weeks    Follow up with Dr. Royal in 2 weeks.     Call Dr Royal's office for problems:  Signs of infection--redness, swelling, drainage, temperature.  Fever over 101   Persistent nausea and vomiting  Any questions or concerns

## 2024-04-04 ENCOUNTER — LAB (OUTPATIENT)
Dept: LAB | Facility: CLINIC | Age: 74
End: 2024-04-04
Payer: MEDICARE

## 2024-04-04 DIAGNOSIS — N18.31 CHRONIC KIDNEY DISEASE, STAGE 3A (H): ICD-10-CM

## 2024-04-04 LAB
ANION GAP SERPL CALCULATED.3IONS-SCNC: 11 MMOL/L (ref 7–15)
BUN SERPL-MCNC: 43 MG/DL (ref 8–23)
CALCIUM SERPL-MCNC: 9.9 MG/DL (ref 8.8–10.2)
CHLORIDE SERPL-SCNC: 108 MMOL/L (ref 98–107)
CREAT SERPL-MCNC: 2.78 MG/DL (ref 0.51–0.95)
DEPRECATED HCO3 PLAS-SCNC: 20 MMOL/L (ref 22–29)
EGFRCR SERPLBLD CKD-EPI 2021: 17 ML/MIN/1.73M2
GLUCOSE SERPL-MCNC: 101 MG/DL (ref 70–99)
POTASSIUM SERPL-SCNC: 5.7 MMOL/L (ref 3.4–5.3)
SODIUM SERPL-SCNC: 139 MMOL/L (ref 135–145)

## 2024-04-04 PROCEDURE — 36415 COLL VENOUS BLD VENIPUNCTURE: CPT

## 2024-04-04 PROCEDURE — 80048 BASIC METABOLIC PNL TOTAL CA: CPT

## 2024-04-05 ENCOUNTER — TELEPHONE (OUTPATIENT)
Dept: OTHER | Facility: CLINIC | Age: 74
End: 2024-04-05
Payer: MEDICARE

## 2024-04-08 DIAGNOSIS — D63.1 ANEMIA IN CKD (CHRONIC KIDNEY DISEASE): ICD-10-CM

## 2024-04-08 DIAGNOSIS — D64.9 CHRONIC ANEMIA: ICD-10-CM

## 2024-04-08 DIAGNOSIS — N18.9 ANEMIA IN CKD (CHRONIC KIDNEY DISEASE): ICD-10-CM

## 2024-04-08 DIAGNOSIS — N18.4 CHRONIC KIDNEY DISEASE, STAGE 4 (SEVERE) (H): Primary | ICD-10-CM

## 2024-04-09 ENCOUNTER — TRANSCRIBE ORDERS (OUTPATIENT)
Dept: ONCOLOGY | Facility: CLINIC | Age: 74
End: 2024-04-09
Payer: MEDICARE

## 2024-04-09 RX ORDER — EPINEPHRINE 1 MG/ML
0.3 INJECTION, SOLUTION INTRAMUSCULAR; SUBCUTANEOUS EVERY 5 MIN PRN
Status: CANCELLED | OUTPATIENT
Start: 2024-04-09

## 2024-04-09 RX ORDER — METHYLPREDNISOLONE SODIUM SUCCINATE 125 MG/2ML
125 INJECTION, POWDER, LYOPHILIZED, FOR SOLUTION INTRAMUSCULAR; INTRAVENOUS
Status: CANCELLED
Start: 2024-04-09

## 2024-04-09 RX ORDER — ALBUTEROL SULFATE 90 UG/1
1-2 AEROSOL, METERED RESPIRATORY (INHALATION)
Status: CANCELLED
Start: 2024-04-09

## 2024-04-09 RX ORDER — DIPHENHYDRAMINE HYDROCHLORIDE 50 MG/ML
50 INJECTION INTRAMUSCULAR; INTRAVENOUS
Status: CANCELLED
Start: 2024-04-09

## 2024-04-09 RX ORDER — MEPERIDINE HYDROCHLORIDE 25 MG/ML
25 INJECTION INTRAMUSCULAR; INTRAVENOUS; SUBCUTANEOUS EVERY 30 MIN PRN
Status: CANCELLED | OUTPATIENT
Start: 2024-04-09

## 2024-04-09 RX ORDER — ALBUTEROL SULFATE 0.83 MG/ML
2.5 SOLUTION RESPIRATORY (INHALATION)
Status: CANCELLED | OUTPATIENT
Start: 2024-04-09

## 2024-04-09 RX ORDER — NALOXONE HYDROCHLORIDE 0.4 MG/ML
0.2 INJECTION, SOLUTION INTRAMUSCULAR; INTRAVENOUS; SUBCUTANEOUS
Status: CANCELLED | OUTPATIENT
Start: 2024-04-09

## 2024-04-11 ENCOUNTER — LAB (OUTPATIENT)
Dept: LAB | Facility: CLINIC | Age: 74
End: 2024-04-11
Payer: MEDICARE

## 2024-04-11 DIAGNOSIS — N18.31 CHRONIC KIDNEY DISEASE, STAGE 3A (H): ICD-10-CM

## 2024-04-11 DIAGNOSIS — N18.4 CHRONIC KIDNEY DISEASE, STAGE 4 (SEVERE) (H): ICD-10-CM

## 2024-04-11 DIAGNOSIS — N18.9 ANEMIA IN CKD (CHRONIC KIDNEY DISEASE): ICD-10-CM

## 2024-04-11 DIAGNOSIS — D63.1 ANEMIA IN CKD (CHRONIC KIDNEY DISEASE): ICD-10-CM

## 2024-04-11 DIAGNOSIS — D64.9 CHRONIC ANEMIA: ICD-10-CM

## 2024-04-11 LAB
ANION GAP SERPL CALCULATED.3IONS-SCNC: 10 MMOL/L (ref 7–15)
BUN SERPL-MCNC: 52.5 MG/DL (ref 8–23)
CALCIUM SERPL-MCNC: 10.2 MG/DL (ref 8.8–10.2)
CHLORIDE SERPL-SCNC: 106 MMOL/L (ref 98–107)
CREAT SERPL-MCNC: 2.47 MG/DL (ref 0.51–0.95)
DEPRECATED HCO3 PLAS-SCNC: 24 MMOL/L (ref 22–29)
EGFRCR SERPLBLD CKD-EPI 2021: 20 ML/MIN/1.73M2
FERRITIN SERPL-MCNC: 152 NG/ML (ref 11–328)
GLUCOSE SERPL-MCNC: 101 MG/DL (ref 70–99)
HGB BLD-MCNC: 9.7 G/DL (ref 11.7–15.7)
IRON BINDING CAPACITY (ROCHE): 301 UG/DL (ref 240–430)
IRON SATN MFR SERPL: 24 % (ref 15–46)
IRON SERPL-MCNC: 71 UG/DL (ref 37–145)
POTASSIUM SERPL-SCNC: 4.7 MMOL/L (ref 3.4–5.3)
SODIUM SERPL-SCNC: 140 MMOL/L (ref 135–145)

## 2024-04-11 PROCEDURE — 82728 ASSAY OF FERRITIN: CPT

## 2024-04-11 PROCEDURE — 80048 BASIC METABOLIC PNL TOTAL CA: CPT

## 2024-04-11 PROCEDURE — 85018 HEMOGLOBIN: CPT

## 2024-04-11 PROCEDURE — 83550 IRON BINDING TEST: CPT

## 2024-04-11 PROCEDURE — 36415 COLL VENOUS BLD VENIPUNCTURE: CPT

## 2024-04-16 ENCOUNTER — OFFICE VISIT (OUTPATIENT)
Dept: OTHER | Facility: CLINIC | Age: 74
End: 2024-04-16
Attending: SURGERY
Payer: MEDICARE

## 2024-04-16 VITALS — SYSTOLIC BLOOD PRESSURE: 104 MMHG | HEART RATE: 73 BPM | DIASTOLIC BLOOD PRESSURE: 67 MMHG

## 2024-04-16 DIAGNOSIS — I77.0 ARTERIOVENOUS FISTULA (H): Primary | ICD-10-CM

## 2024-04-16 DIAGNOSIS — N18.6 END STAGE RENAL DISEASE (H): ICD-10-CM

## 2024-04-16 DIAGNOSIS — T82.898A OTHER SPECIFIED COMPLICATION OF VASCULAR PROSTHETIC DEVICES, IMPLANTS AND GRAFTS, INITIAL ENCOUNTER (H): ICD-10-CM

## 2024-04-16 PROCEDURE — G0463 HOSPITAL OUTPT CLINIC VISIT: HCPCS | Performed by: SURGERY

## 2024-04-16 PROCEDURE — 99024 POSTOP FOLLOW-UP VISIT: CPT | Performed by: SURGERY

## 2024-04-16 NOTE — PROGRESS NOTES
[facility]        Patient is here for a  follow up.    Pt is currently taking Statin.    /67 (BP Location: Left arm, Patient Position: Chair, Cuff Size: Adult Regular)   Pulse 73   LMP  (LMP Unknown)     The provider has been notified that the patient has no concerns.     Questions patient would like addressed today are: N/A.    Refills are needed: N/A    Has homecare services and agency name:  Kusum Freeman MA

## 2024-04-17 ENCOUNTER — TELEPHONE (OUTPATIENT)
Dept: OTHER | Facility: CLINIC | Age: 74
End: 2024-04-17
Payer: MEDICARE

## 2024-04-17 NOTE — TELEPHONE ENCOUNTER
Per 4/16/24 visit with Dr. Royal, pt needs the following:    AVF US  In clinic visit with Dr. Royal  Please schedule this in approximately 4 weeks    Routing to scheduling to contact patient to coordinate above.    Appt note in order comments.    Erica Simon, BSN, RN, [facility] [place]

## 2024-04-18 ENCOUNTER — LAB (OUTPATIENT)
Dept: LAB | Facility: CLINIC | Age: 74
End: 2024-04-18
Payer: MEDICARE

## 2024-04-18 DIAGNOSIS — N18.31 CHRONIC KIDNEY DISEASE, STAGE 3A (H): ICD-10-CM

## 2024-04-18 LAB
ANION GAP SERPL CALCULATED.3IONS-SCNC: 10 MMOL/L (ref 7–15)
BUN SERPL-MCNC: 52.5 MG/DL (ref 8–23)
CALCIUM SERPL-MCNC: 10.1 MG/DL (ref 8.8–10.2)
CHLORIDE SERPL-SCNC: 108 MMOL/L (ref 98–107)
CREAT SERPL-MCNC: 2.73 MG/DL (ref 0.51–0.95)
DEPRECATED HCO3 PLAS-SCNC: 22 MMOL/L (ref 22–29)
EGFRCR SERPLBLD CKD-EPI 2021: 18 ML/MIN/1.73M2
GLUCOSE SERPL-MCNC: 101 MG/DL (ref 70–99)
POTASSIUM SERPL-SCNC: 5.2 MMOL/L (ref 3.4–5.3)
SODIUM SERPL-SCNC: 140 MMOL/L (ref 135–145)

## 2024-04-18 PROCEDURE — 36415 COLL VENOUS BLD VENIPUNCTURE: CPT

## 2024-04-18 PROCEDURE — 80048 BASIC METABOLIC PNL TOTAL CA: CPT

## 2024-04-23 DIAGNOSIS — J30.2 SEASONAL ALLERGIC RHINITIS, UNSPECIFIED TRIGGER: ICD-10-CM

## 2024-04-23 DIAGNOSIS — I10 ESSENTIAL HYPERTENSION: ICD-10-CM

## 2024-04-23 DIAGNOSIS — E78.1 HYPERTRIGLYCERIDEMIA: ICD-10-CM

## 2024-04-23 DIAGNOSIS — F31.9 BIPOLAR AFFECTIVE DISORDER, REMISSION STATUS UNSPECIFIED (H): ICD-10-CM

## 2024-04-25 ENCOUNTER — LAB (OUTPATIENT)
Dept: LAB | Facility: CLINIC | Age: 74
End: 2024-04-25
Payer: MEDICARE

## 2024-04-25 DIAGNOSIS — N18.31 CHRONIC KIDNEY DISEASE, STAGE 3A (H): ICD-10-CM

## 2024-04-25 DIAGNOSIS — N18.9 ANEMIA IN CHRONIC KIDNEY DISEASE, UNSPECIFIED CKD STAGE: ICD-10-CM

## 2024-04-25 DIAGNOSIS — N18.9 ANEMIA IN CHRONIC KIDNEY DISEASE, UNSPECIFIED CKD STAGE: Primary | ICD-10-CM

## 2024-04-25 DIAGNOSIS — D63.1 ANEMIA IN CHRONIC KIDNEY DISEASE, UNSPECIFIED CKD STAGE: ICD-10-CM

## 2024-04-25 DIAGNOSIS — D63.1 ANEMIA IN CHRONIC KIDNEY DISEASE, UNSPECIFIED CKD STAGE: Primary | ICD-10-CM

## 2024-04-25 LAB
ANION GAP SERPL CALCULATED.3IONS-SCNC: 12 MMOL/L (ref 7–15)
BUN SERPL-MCNC: 57.6 MG/DL (ref 8–23)
CALCIUM SERPL-MCNC: 10.2 MG/DL (ref 8.8–10.2)
CHLORIDE SERPL-SCNC: 109 MMOL/L (ref 98–107)
CREAT SERPL-MCNC: 2.7 MG/DL (ref 0.51–0.95)
DEPRECATED HCO3 PLAS-SCNC: 19 MMOL/L (ref 22–29)
EGFRCR SERPLBLD CKD-EPI 2021: 18 ML/MIN/1.73M2
GLUCOSE SERPL-MCNC: 91 MG/DL (ref 70–99)
HGB BLD-MCNC: 9.5 G/DL (ref 11.7–15.7)
IRON BINDING CAPACITY (ROCHE): 327 UG/DL (ref 240–430)
IRON SATN MFR SERPL: 26 % (ref 15–46)
IRON SERPL-MCNC: 85 UG/DL (ref 37–145)
POTASSIUM SERPL-SCNC: 5.5 MMOL/L (ref 3.4–5.3)
SODIUM SERPL-SCNC: 140 MMOL/L (ref 135–145)

## 2024-04-25 PROCEDURE — 82728 ASSAY OF FERRITIN: CPT

## 2024-04-25 PROCEDURE — 80048 BASIC METABOLIC PNL TOTAL CA: CPT

## 2024-04-25 PROCEDURE — 83540 ASSAY OF IRON: CPT

## 2024-04-25 PROCEDURE — 36415 COLL VENOUS BLD VENIPUNCTURE: CPT

## 2024-04-25 PROCEDURE — 83550 IRON BINDING TEST: CPT

## 2024-04-25 PROCEDURE — 85018 HEMOGLOBIN: CPT

## 2024-04-26 ENCOUNTER — INFUSION THERAPY VISIT (OUTPATIENT)
Dept: INFUSION THERAPY | Facility: CLINIC | Age: 74
End: 2024-04-26
Attending: INTERNAL MEDICINE
Payer: MEDICARE

## 2024-04-26 VITALS
TEMPERATURE: 97.5 F | HEART RATE: 63 BPM | DIASTOLIC BLOOD PRESSURE: 67 MMHG | OXYGEN SATURATION: 100 % | SYSTOLIC BLOOD PRESSURE: 105 MMHG | RESPIRATION RATE: 20 BRPM

## 2024-04-26 DIAGNOSIS — N18.4 CHRONIC RENAL DISEASE, STAGE IV (H): ICD-10-CM

## 2024-04-26 DIAGNOSIS — N18.9 ANEMIA IN CHRONIC KIDNEY DISEASE, UNSPECIFIED CKD STAGE: Primary | ICD-10-CM

## 2024-04-26 DIAGNOSIS — D63.1 ANEMIA IN CHRONIC KIDNEY DISEASE, UNSPECIFIED CKD STAGE: Primary | ICD-10-CM

## 2024-04-26 LAB — FERRITIN SERPL-MCNC: 150 NG/ML (ref 11–328)

## 2024-04-26 PROCEDURE — 250N000011 HC RX IP 250 OP 636: Mod: JZ,EC | Performed by: PHYSICIAN ASSISTANT

## 2024-04-26 PROCEDURE — 96372 THER/PROPH/DIAG INJ SC/IM: CPT | Performed by: PHYSICIAN ASSISTANT

## 2024-04-26 RX ORDER — MEPERIDINE HYDROCHLORIDE 25 MG/ML
25 INJECTION INTRAMUSCULAR; INTRAVENOUS; SUBCUTANEOUS EVERY 30 MIN PRN
Status: CANCELLED | OUTPATIENT
Start: 2024-05-10

## 2024-04-26 RX ORDER — ALBUTEROL SULFATE 90 UG/1
1-2 AEROSOL, METERED RESPIRATORY (INHALATION)
Status: CANCELLED
Start: 2024-05-10

## 2024-04-26 RX ORDER — NALOXONE HYDROCHLORIDE 0.4 MG/ML
0.2 INJECTION, SOLUTION INTRAMUSCULAR; INTRAVENOUS; SUBCUTANEOUS
Status: CANCELLED | OUTPATIENT
Start: 2024-05-10

## 2024-04-26 RX ORDER — ALBUTEROL SULFATE 0.83 MG/ML
2.5 SOLUTION RESPIRATORY (INHALATION)
Status: CANCELLED | OUTPATIENT
Start: 2024-05-10

## 2024-04-26 RX ORDER — DIPHENHYDRAMINE HYDROCHLORIDE 50 MG/ML
50 INJECTION INTRAMUSCULAR; INTRAVENOUS
Status: CANCELLED
Start: 2024-05-10

## 2024-04-26 RX ORDER — EPINEPHRINE 1 MG/ML
0.3 INJECTION, SOLUTION INTRAMUSCULAR; SUBCUTANEOUS EVERY 5 MIN PRN
Status: CANCELLED | OUTPATIENT
Start: 2024-05-10

## 2024-04-26 RX ORDER — METHYLPREDNISOLONE SODIUM SUCCINATE 125 MG/2ML
125 INJECTION, POWDER, LYOPHILIZED, FOR SOLUTION INTRAMUSCULAR; INTRAVENOUS
Status: CANCELLED
Start: 2024-05-10

## 2024-04-26 RX ADMIN — DARBEPOETIN ALFA 100 MCG: 100 INJECTION, SOLUTION INTRAVENOUS; SUBCUTANEOUS at 14:50

## 2024-04-26 RX ADMIN — DARBEPOETIN ALFA 25 MCG: 25 INJECTION, SOLUTION INTRAVENOUS; SUBCUTANEOUS at 14:50

## 2024-04-26 ASSESSMENT — PAIN SCALES - GENERAL: PAINLEVEL: MODERATE PAIN (5)

## 2024-04-26 NOTE — PROGRESS NOTES
Infusion Nursing Note:  Diana Santiago presents today for Aranesp.    Patient seen by provider today: No   present during visit today: Not Applicable.    Note: N/A.      Intravenous Access:  No Intravenous access/labs at this visit.    Treatment Conditions:  HGB 9.5.      Post Infusion Assessment:  Patient tolerated 2 Aranesp injections in upper left arm without incident.       Discharge Plan:   Patient declined prescription refills.  Discharge instructions reviewed with: Patient.  Patient verbalized understanding of discharge instructions and all questions answered.  AVS to patient via [employer].  Patient will return 5/24/24 for next appointment.   Patient discharged in stable condition accompanied by: self.  Departure Mode: Ambulatory.      Madhavi Hu RN

## 2024-04-29 ENCOUNTER — VIRTUAL VISIT (OUTPATIENT)
Dept: FAMILY MEDICINE | Facility: CLINIC | Age: 74
End: 2024-04-29
Payer: MEDICARE

## 2024-04-29 DIAGNOSIS — F31.9 BIPOLAR AFFECTIVE DISORDER, REMISSION STATUS UNSPECIFIED (H): Primary | ICD-10-CM

## 2024-04-29 DIAGNOSIS — J30.2 SEASONAL ALLERGIC RHINITIS, UNSPECIFIED TRIGGER: ICD-10-CM

## 2024-04-29 DIAGNOSIS — I10 ESSENTIAL HYPERTENSION: ICD-10-CM

## 2024-04-29 DIAGNOSIS — F41.9 ANXIETY: ICD-10-CM

## 2024-04-29 PROCEDURE — 99443 PR PHYSICIAN TELEPHONE EVALUATION 21-30 MIN: CPT | Mod: 93 | Performed by: INTERNAL MEDICINE

## 2024-04-29 RX ORDER — LITHIUM CARBONATE 150 MG/1
CAPSULE ORAL
Qty: 270 CAPSULE | Refills: 1 | Status: SHIPPED | OUTPATIENT
Start: 2024-04-29

## 2024-04-29 RX ORDER — FEXOFENADINE HCL 180 MG/1
TABLET ORAL
Qty: 90 TABLET | Refills: 1 | Status: SHIPPED | OUTPATIENT
Start: 2024-04-29

## 2024-04-29 RX ORDER — AMLODIPINE BESYLATE 5 MG/1
TABLET ORAL
Qty: 90 TABLET | Refills: 1 | Status: SHIPPED | OUTPATIENT
Start: 2024-04-29

## 2024-04-29 RX ORDER — SIMVASTATIN 40 MG
TABLET ORAL
Qty: 90 TABLET | Refills: 1 | Status: SHIPPED | OUTPATIENT
Start: 2024-04-29

## 2024-04-29 NOTE — PROGRESS NOTES
Diana is a 73 year old who is being evaluated via a billable telephone visit.    What phone number would you like to be contacted at? [phone]   How would you like to obtain your AVS? Batool    Originating Location (pt. Location): Home  Distant Location (provider location):  Off-site      Subjective   Diana is a 73 year old, presenting for the following health issues:    HPI       Chief Complaint:     Medication refills    HPI:   Patient Diana Santiago is a very pleasant 73 year old female with history of bipolar affective disorder who presents to Internal Medicine clinic today via telephone visit for follow up of multiple concerns including medication refills.         Current Medications:     Current Outpatient Medications   Medication Sig Dispense Refill    ACE/ARB/ARNI NOT PRESCRIBED (INTENTIONAL) Please choose reason not prescribed from choices below.      aMILoride (MIDAMOR) 5 MG tablet Take 5 mg by mouth      amLODIPine (NORVASC) 5 MG tablet TAKE 1 TABLET BY MOUTH ONCE DAILY (HOLD FOR SBP < 100) 90 tablet 97    diazepam (VALIUM) 2 MG tablet Take 0.5 tablets (1 mg) by mouth every other day 7 tablet 2    FEROSUL 325 (65 Fe) MG tablet TAKE ONE TABLET BY MOUTH EVERY OTHER DAY 42 tablet 97    fexofenadine (ALLEGRA) 180 MG tablet TAKE 1 TABLET BY MOUTH ONCE DAILY 90 tablet 97    lithium (ESKALITH) 150 MG capsule TAKE 1 CAPSULE BY MOUTH THREE TIMES DAILY WITH MEALS 270 capsule 97    pantoprazole (PROTONIX) 40 MG EC tablet TAKE 1 TABLET BY MOUTH ONCE DAILY 90 tablet 0    polyethylene glycol (MIRALAX) 17 GM/Dose powder MIX 17GM OF POWDER IN 8OZ OF WATER UNTIL COMPLETELY DISSOLVED. DRINK SOLUTION BY MOUTH ONCE DAILY. 510 g 0    simvastatin (ZOCOR) 40 MG tablet TAKE 1 TABLET BY MOUTH AT BEDTIME 90 tablet 97    sodium bicarbonate 650 MG tablet TAKE TWO TABLETS (1300 MG) BY MOUTH TWICE DAILY 360 tablet 97    acetaminophen (TYLENOL) 500 MG tablet Take 2 tablets (1,000 mg) by mouth At Bedtime 180 tablet 1          Allergies:      Allergies   Allergen Reactions    Ace Inhibitors      Other reaction(s): renal failure  She can't be on an ACEI or ARB while on Lithium.            Past Medical History:     Past Medical History:   Diagnosis Date    Benign essential hypertension 09/2018    added norvasc 9/18    Bipolar affective disorder (H)     hosp 1993, Dr. Aftab Deal    Cancer (H) 1996    DCIS, left breast    Chronic kidney disease, stage 3a (H)     seen by renal Dr. 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    felt due to lithium    Thalassaemia trait     Vitamin D deficiency          Past Surgical History:     Past Surgical History:   Procedure Laterality Date    BIOPSY  1994    left breast    BREAST SURGERY      lumpectomy x 4    COLONOSCOPY  2021    COLONOSCOPY N/A 6/17/2022    Procedure: COLONOSCOPY, WITH POLYPECTOMY AND BIOPSY;  Surgeon: Panchito Gu MD;  Location:  GI    EYE SURGERY  2018    retina tear    LAPAROTOMY EXPLORATORY N/A 8/24/2022    Procedure: Exploratory laparotomy, REPAIR OF PERFORATED ULCER;  Surgeon: Ron Vidal MD;  Location:  OR    left hand surgery      last 1974         Family Medical History:     Family History   Problem Relation Age of Onset    Cerebrovascular Disease Mother     Hypertension Father     Sleep Apnea Brother     Breast Cancer Maternal Aunt         x 2    Breast Cancer Other         Maternal Aunt    Hyperlipidemia Niece          Social History:     Social History     Socioeconomic History     Marital status: Single     Spouse name: Not on file    Number of children: 0    Years of education: Not on file    Highest education level: Not on file   Occupational History    Occupation: , retired   Tobacco Use    Smoking status: Never    Smokeless tobacco: Never   Vaping Use    Vaping Use: Never used   Substance and Sexual Activity    Alcohol use: No    Drug use: No    Sexual activity: Not Currently     Partners: Male     Birth control/protection: Post-menopausal, None   Other Topics Concern    Parent/sibling w/ CABG, MI or angioplasty before 65F 55M? No   Social History Narrative    Not on file     Social Determinants of Health     Financial Resource Strain: Not on file   Food Insecurity: Not on file   Transportation Needs: Not on file   Physical Activity: Not on file   Stress: Not on file   Social Connections: Not on file   Intimate Partner Violence: Not on file   Housing Stability: Not on file           Review of System:     Constitutional: Negative for fever or chills  Skin: Negative for rashes  Ears/Nose/Throat: Negative for nasal congestion, sore throat  Respiratory: No shortness of breath, dyspnea on exertion, cough, or hemoptysis  Cardiovascular: Negative for chest pain  Gastrointestinal: Negative for nausea, vomiting  Genitourinary: Negative for dysuria, hematuria  Musculoskeletal: positive for mechanical chronic bilateral hip pains  Neurologic: Negative for headaches  Psychiatric: positive for bipolar affective disorder, anxiety  Hematologic/Lymphatic/Immunologic: Negative  Endocrine: Negative  Behavioral: Negative for tobacco use       Physical Exam:   LMP  (LMP Unknown)     RESP: no cough over the phone   NEURO: Alert & Oriented x 3.   PSYCH: mentation appears normal, affect normal        Diagnostic Test Results:     Diagnostic Test Results:  Labs reviewed in Epic    ASSESSMENT/PLAN:       Diana was seen today for refill request.    Diagnoses and all orders for this  visit:    Bipolar affective disorder, remission status unspecified (H)  - stable  - continue current medication  - Lithium medication refilled today    Anxiety  - stable  - continue current medication    Benign essential hypertension  - stable, continue current therapy  - amlodipine BP medication refilled today    Seasonal allergic rhinitis, unspecified trigger  - stable, continue current therapy      Follow Up Plan:     Patient is instructed to return to Internal Medicine clinic for follow-up visit in 3 months.        Gayle Hernandez MD  Internal Medicine  [facility]      telephone visit duration including chart review, medication management: 23 minutes

## 2024-05-02 ENCOUNTER — LAB (OUTPATIENT)
Dept: LAB | Facility: CLINIC | Age: 74
End: 2024-05-02
Payer: MEDICARE

## 2024-05-02 DIAGNOSIS — N18.31 CHRONIC KIDNEY DISEASE, STAGE 3A (H): ICD-10-CM

## 2024-05-02 LAB
ANION GAP SERPL CALCULATED.3IONS-SCNC: 15 MMOL/L (ref 7–15)
BUN SERPL-MCNC: 53 MG/DL (ref 8–23)
CALCIUM SERPL-MCNC: 10.1 MG/DL (ref 8.8–10.2)
CHLORIDE SERPL-SCNC: 108 MMOL/L (ref 98–107)
CREAT SERPL-MCNC: 3 MG/DL (ref 0.51–0.95)
DEPRECATED HCO3 PLAS-SCNC: 19 MMOL/L (ref 22–29)
EGFRCR SERPLBLD CKD-EPI 2021: 16 ML/MIN/1.73M2
GLUCOSE SERPL-MCNC: 112 MG/DL (ref 70–99)
POTASSIUM SERPL-SCNC: 5.1 MMOL/L (ref 3.4–5.3)
SODIUM SERPL-SCNC: 142 MMOL/L (ref 135–145)

## 2024-05-02 PROCEDURE — 36415 COLL VENOUS BLD VENIPUNCTURE: CPT

## 2024-05-02 PROCEDURE — 80048 BASIC METABOLIC PNL TOTAL CA: CPT

## 2024-05-08 ENCOUNTER — LAB REQUISITION (OUTPATIENT)
Dept: LAB | Facility: CLINIC | Age: 74
End: 2024-05-08
Payer: MEDICARE

## 2024-05-08 DIAGNOSIS — N18.4 CHRONIC KIDNEY DISEASE, STAGE 4 (SEVERE) (H): ICD-10-CM

## 2024-05-09 ENCOUNTER — LAB (OUTPATIENT)
Dept: LAB | Facility: CLINIC | Age: 74
End: 2024-05-09
Payer: MEDICARE

## 2024-05-09 DIAGNOSIS — N18.31 CHRONIC KIDNEY DISEASE, STAGE 3A (H): ICD-10-CM

## 2024-05-09 LAB
ANION GAP SERPL CALCULATED.3IONS-SCNC: 11 MMOL/L (ref 7–15)
BUN SERPL-MCNC: 54 MG/DL (ref 8–23)
CALCIUM SERPL-MCNC: 10 MG/DL (ref 8.8–10.2)
CHLORIDE SERPL-SCNC: 108 MMOL/L (ref 98–107)
CREAT SERPL-MCNC: 2.62 MG/DL (ref 0.51–0.95)
DEPRECATED HCO3 PLAS-SCNC: 21 MMOL/L (ref 22–29)
EGFRCR SERPLBLD CKD-EPI 2021: 19 ML/MIN/1.73M2
GLUCOSE SERPL-MCNC: 118 MG/DL (ref 70–99)
POTASSIUM SERPL-SCNC: 5.1 MMOL/L (ref 3.4–5.3)
SODIUM SERPL-SCNC: 140 MMOL/L (ref 135–145)

## 2024-05-09 PROCEDURE — 36415 COLL VENOUS BLD VENIPUNCTURE: CPT

## 2024-05-09 PROCEDURE — 80048 BASIC METABOLIC PNL TOTAL CA: CPT

## 2024-05-16 ENCOUNTER — LAB (OUTPATIENT)
Dept: LAB | Facility: CLINIC | Age: 74
End: 2024-05-16
Payer: MEDICARE

## 2024-05-16 DIAGNOSIS — N18.31 CHRONIC KIDNEY DISEASE, STAGE 3A (H): ICD-10-CM

## 2024-05-16 LAB
ANION GAP SERPL CALCULATED.3IONS-SCNC: 11 MMOL/L (ref 7–15)
BUN SERPL-MCNC: 46.5 MG/DL (ref 8–23)
CALCIUM SERPL-MCNC: 10.2 MG/DL (ref 8.8–10.2)
CHLORIDE SERPL-SCNC: 109 MMOL/L (ref 98–107)
CREAT SERPL-MCNC: 2.62 MG/DL (ref 0.51–0.95)
DEPRECATED HCO3 PLAS-SCNC: 21 MMOL/L (ref 22–29)
EGFRCR SERPLBLD CKD-EPI 2021: 19 ML/MIN/1.73M2
GLUCOSE SERPL-MCNC: 95 MG/DL (ref 70–99)
POTASSIUM SERPL-SCNC: 5.3 MMOL/L (ref 3.4–5.3)
SODIUM SERPL-SCNC: 141 MMOL/L (ref 135–145)

## 2024-05-16 PROCEDURE — 36415 COLL VENOUS BLD VENIPUNCTURE: CPT

## 2024-05-16 PROCEDURE — 82565 ASSAY OF CREATININE: CPT

## 2024-05-16 PROCEDURE — 82374 ASSAY BLOOD CARBON DIOXIDE: CPT

## 2024-05-19 DIAGNOSIS — K25.1 ACUTE GASTRIC ULCER WITH PERFORATION (H): ICD-10-CM

## 2024-05-19 DIAGNOSIS — E23.2 DIABETES INSIPIDUS (H): ICD-10-CM

## 2024-05-20 RX ORDER — SODIUM BICARBONATE 650 MG/1
TABLET ORAL
Refills: 97 | OUTPATIENT
Start: 2024-05-20

## 2024-05-20 RX ORDER — FERROUS SULFATE 325(65) MG
TABLET ORAL
Qty: 84 TABLET | Refills: 97 | OUTPATIENT
Start: 2024-05-20

## 2024-05-21 ENCOUNTER — OFFICE VISIT (OUTPATIENT)
Dept: OTHER | Facility: CLINIC | Age: 74
End: 2024-05-21
Attending: SURGERY
Payer: MEDICARE

## 2024-05-21 ENCOUNTER — HOSPITAL ENCOUNTER (OUTPATIENT)
Dept: ULTRASOUND IMAGING | Facility: CLINIC | Age: 74
Discharge: HOME | End: 2024-05-21
Attending: SURGERY
Payer: MEDICARE

## 2024-05-21 VITALS — DIASTOLIC BLOOD PRESSURE: 81 MMHG | SYSTOLIC BLOOD PRESSURE: 123 MMHG | HEART RATE: 71 BPM

## 2024-05-21 DIAGNOSIS — T82.898A OTHER SPECIFIED COMPLICATION OF VASCULAR PROSTHETIC DEVICES, IMPLANTS AND GRAFTS, INITIAL ENCOUNTER (H): ICD-10-CM

## 2024-05-21 DIAGNOSIS — I77.0 ARTERIOVENOUS FISTULA (H): Primary | ICD-10-CM

## 2024-05-21 DIAGNOSIS — I77.0 ARTERIOVENOUS FISTULA (H): ICD-10-CM

## 2024-05-21 DIAGNOSIS — N18.6 END STAGE RENAL DISEASE (H): ICD-10-CM

## 2024-05-21 PROCEDURE — 99024 POSTOP FOLLOW-UP VISIT: CPT | Performed by: SURGERY

## 2024-05-21 PROCEDURE — 93990 DOPPLER FLOW TESTING: CPT

## 2024-05-21 PROCEDURE — G0463 HOSPITAL OUTPT CLINIC VISIT: HCPCS | Performed by: SURGERY

## 2024-05-21 NOTE — PROGRESS NOTES
[facility]        Patient is here for a  follow up.    Pt is currently taking Statin.    /81 (BP Location: Left arm, Patient Position: Chair, Cuff Size: Adult Regular)   Pulse 71   LMP  (LMP Unknown)     The provider has been notified that the patient has no concerns.     Questions patient would like addressed today are: N/A.    Refills are needed: N/A    Has homecare services and agency name:  Kusum Freeman MA

## 2024-05-23 ENCOUNTER — LAB (OUTPATIENT)
Dept: LAB | Facility: CLINIC | Age: 74
End: 2024-05-23
Payer: MEDICARE

## 2024-05-23 DIAGNOSIS — K25.1 ACUTE GASTRIC ULCER WITH PERFORATION (H): ICD-10-CM

## 2024-05-23 DIAGNOSIS — E23.2 DIABETES INSIPIDUS (H): ICD-10-CM

## 2024-05-23 DIAGNOSIS — N18.32 STAGE 3B CHRONIC KIDNEY DISEASE (H): ICD-10-CM

## 2024-05-23 DIAGNOSIS — N18.32 STAGE 3B CHRONIC KIDNEY DISEASE (H): Primary | ICD-10-CM

## 2024-05-23 LAB
ANION GAP SERPL CALCULATED.3IONS-SCNC: 10 MMOL/L (ref 7–15)
BUN SERPL-MCNC: 57.6 MG/DL (ref 8–23)
CALCIUM SERPL-MCNC: 9.8 MG/DL (ref 8.8–10.2)
CHLORIDE SERPL-SCNC: 107 MMOL/L (ref 98–107)
CREAT SERPL-MCNC: 2.72 MG/DL (ref 0.51–0.95)
DEPRECATED HCO3 PLAS-SCNC: 21 MMOL/L (ref 22–29)
EGFRCR SERPLBLD CKD-EPI 2021: 18 ML/MIN/1.73M2
GLUCOSE SERPL-MCNC: 101 MG/DL (ref 70–99)
POTASSIUM SERPL-SCNC: 5.4 MMOL/L (ref 3.4–5.3)
SODIUM SERPL-SCNC: 138 MMOL/L (ref 135–145)

## 2024-05-23 PROCEDURE — 36415 COLL VENOUS BLD VENIPUNCTURE: CPT

## 2024-05-23 PROCEDURE — 80048 BASIC METABOLIC PNL TOTAL CA: CPT

## 2024-05-24 ENCOUNTER — ALLIED HEALTH/NURSE VISIT (OUTPATIENT)
Dept: INFUSION THERAPY | Facility: CLINIC | Age: 74
End: 2024-05-24
Attending: INTERNAL MEDICINE
Payer: MEDICARE

## 2024-05-24 VITALS
RESPIRATION RATE: 18 BRPM | DIASTOLIC BLOOD PRESSURE: 71 MMHG | SYSTOLIC BLOOD PRESSURE: 108 MMHG | TEMPERATURE: 99 F | HEART RATE: 73 BPM

## 2024-05-24 DIAGNOSIS — N18.9 ANEMIA IN CHRONIC KIDNEY DISEASE, UNSPECIFIED CKD STAGE: Primary | ICD-10-CM

## 2024-05-24 DIAGNOSIS — D63.1 ANEMIA IN CHRONIC KIDNEY DISEASE, UNSPECIFIED CKD STAGE: Primary | ICD-10-CM

## 2024-05-24 DIAGNOSIS — N18.4 CHRONIC RENAL DISEASE, STAGE IV (H): ICD-10-CM

## 2024-05-24 LAB
HGB BLD-MCNC: 9.9 G/DL (ref 11.7–15.7)
IRON BINDING CAPACITY (ROCHE): 304 UG/DL (ref 240–430)
IRON SATN MFR SERPL: 32 % (ref 15–46)
IRON SERPL-MCNC: 96 UG/DL (ref 37–145)

## 2024-05-24 PROCEDURE — 96372 THER/PROPH/DIAG INJ SC/IM: CPT | Performed by: PHYSICIAN ASSISTANT

## 2024-05-24 PROCEDURE — 250N000011 HC RX IP 250 OP 636: Mod: JZ,EC | Performed by: PHYSICIAN ASSISTANT

## 2024-05-24 PROCEDURE — 82728 ASSAY OF FERRITIN: CPT | Performed by: PHYSICIAN ASSISTANT

## 2024-05-24 PROCEDURE — 85018 HEMOGLOBIN: CPT | Performed by: PHYSICIAN ASSISTANT

## 2024-05-24 PROCEDURE — 83550 IRON BINDING TEST: CPT | Performed by: PHYSICIAN ASSISTANT

## 2024-05-24 PROCEDURE — 36415 COLL VENOUS BLD VENIPUNCTURE: CPT

## 2024-05-24 RX ORDER — NALOXONE HYDROCHLORIDE 0.4 MG/ML
0.2 INJECTION, SOLUTION INTRAMUSCULAR; INTRAVENOUS; SUBCUTANEOUS
Status: CANCELLED | OUTPATIENT
Start: 2024-06-07

## 2024-05-24 RX ORDER — NALOXONE HYDROCHLORIDE 0.4 MG/ML
0.2 INJECTION, SOLUTION INTRAMUSCULAR; INTRAVENOUS; SUBCUTANEOUS
Status: CANCELLED | OUTPATIENT
Start: 2024-06-21

## 2024-05-24 RX ORDER — ALBUTEROL SULFATE 0.83 MG/ML
2.5 SOLUTION RESPIRATORY (INHALATION)
Status: CANCELLED | OUTPATIENT
Start: 2024-06-07

## 2024-05-24 RX ORDER — MEPERIDINE HYDROCHLORIDE 25 MG/ML
25 INJECTION INTRAMUSCULAR; INTRAVENOUS; SUBCUTANEOUS EVERY 30 MIN PRN
Status: CANCELLED | OUTPATIENT
Start: 2024-06-21

## 2024-05-24 RX ORDER — EPINEPHRINE 1 MG/ML
0.3 INJECTION, SOLUTION INTRAMUSCULAR; SUBCUTANEOUS EVERY 5 MIN PRN
Status: CANCELLED | OUTPATIENT
Start: 2024-06-21

## 2024-05-24 RX ORDER — DIPHENHYDRAMINE HYDROCHLORIDE 50 MG/ML
50 INJECTION INTRAMUSCULAR; INTRAVENOUS
Status: CANCELLED
Start: 2024-06-21

## 2024-05-24 RX ORDER — ALBUTEROL SULFATE 90 UG/1
1-2 AEROSOL, METERED RESPIRATORY (INHALATION)
Status: CANCELLED
Start: 2024-06-07

## 2024-05-24 RX ORDER — ALBUTEROL SULFATE 90 UG/1
1-2 AEROSOL, METERED RESPIRATORY (INHALATION)
Status: CANCELLED
Start: 2024-06-21

## 2024-05-24 RX ORDER — MEPERIDINE HYDROCHLORIDE 25 MG/ML
25 INJECTION INTRAMUSCULAR; INTRAVENOUS; SUBCUTANEOUS EVERY 30 MIN PRN
Status: CANCELLED | OUTPATIENT
Start: 2024-06-07

## 2024-05-24 RX ORDER — EPINEPHRINE 1 MG/ML
0.3 INJECTION, SOLUTION INTRAMUSCULAR; SUBCUTANEOUS EVERY 5 MIN PRN
Status: CANCELLED | OUTPATIENT
Start: 2024-06-07

## 2024-05-24 RX ORDER — METHYLPREDNISOLONE SODIUM SUCCINATE 125 MG/2ML
125 INJECTION, POWDER, LYOPHILIZED, FOR SOLUTION INTRAMUSCULAR; INTRAVENOUS
Status: CANCELLED
Start: 2024-06-21

## 2024-05-24 RX ORDER — DIPHENHYDRAMINE HYDROCHLORIDE 50 MG/ML
50 INJECTION INTRAMUSCULAR; INTRAVENOUS
Status: CANCELLED
Start: 2024-06-07

## 2024-05-24 RX ORDER — METHYLPREDNISOLONE SODIUM SUCCINATE 125 MG/2ML
125 INJECTION, POWDER, LYOPHILIZED, FOR SOLUTION INTRAMUSCULAR; INTRAVENOUS
Status: CANCELLED
Start: 2024-06-07

## 2024-05-24 RX ORDER — ALBUTEROL SULFATE 0.83 MG/ML
2.5 SOLUTION RESPIRATORY (INHALATION)
Status: CANCELLED | OUTPATIENT
Start: 2024-06-21

## 2024-05-24 RX ADMIN — DARBEPOETIN ALFA 100 MCG: 100 INJECTION, SOLUTION INTRAVENOUS; SUBCUTANEOUS at 13:39

## 2024-05-24 RX ADMIN — DARBEPOETIN ALFA 25 MCG: 25 INJECTION, SOLUTION INTRAVENOUS; SUBCUTANEOUS at 13:39

## 2024-05-24 NOTE — PROGRESS NOTES
Infusion Nursing Note:  Diana Santiago presents today for Aranesp.    Patient seen by provider today: No   present during visit today: Not Applicable.    Note: N/A.      Intravenous Access:  No Intravenous access/labs at this visit.    Treatment Conditions:  Lab Results   Component Value Date    HGB 9.9 (L) 05/24/2024    WBC 8.1 02/22/2024    ANEU 9.6 (H) 09/09/2022    ANEUTAUTO 6.1 02/22/2024     02/22/2024        Results reviewed, labs MET treatment parameters, ok to proceed with treatment.      Post Infusion Assessment:  Patient tolerated injection without incident.       Discharge Plan:   Patient discharged in stable condition accompanied by: self.  Departure Mode: Ambulatory.      Sheldon Burton RN

## 2024-05-24 NOTE — PROGRESS NOTES
Medical Assistant Note:  Diana Santiago presents today for blood draw.    Patient seen by provider today: No.   present during visit today: Not Applicable.    Concerns: No Concerns.    Procedure:  Lab draw site: left hand, Needle type: bf, Gauge: 23.    Post Assessment:  Labs drawn without difficulty: Yes.    Discharge Plan:  Departure Mode: Ambulatory.    Face to Face Time: 5min.    Shayy Hoang, CMA

## 2024-05-25 LAB — FERRITIN SERPL-MCNC: 173 NG/ML (ref 11–328)

## 2024-05-30 ENCOUNTER — LAB (OUTPATIENT)
Dept: LAB | Facility: CLINIC | Age: 74
End: 2024-05-30
Payer: MEDICARE

## 2024-05-30 DIAGNOSIS — N18.32 STAGE 3B CHRONIC KIDNEY DISEASE (H): ICD-10-CM

## 2024-05-30 LAB
ANION GAP SERPL CALCULATED.3IONS-SCNC: 11 MMOL/L (ref 7–15)
BUN SERPL-MCNC: 44.5 MG/DL (ref 8–23)
CALCIUM SERPL-MCNC: 10.2 MG/DL (ref 8.8–10.2)
CHLORIDE SERPL-SCNC: 108 MMOL/L (ref 98–107)
CREAT SERPL-MCNC: 2.76 MG/DL (ref 0.51–0.95)
DEPRECATED HCO3 PLAS-SCNC: 22 MMOL/L (ref 22–29)
EGFRCR SERPLBLD CKD-EPI 2021: 18 ML/MIN/1.73M2
GLUCOSE SERPL-MCNC: 116 MG/DL (ref 70–99)
POTASSIUM SERPL-SCNC: 4.5 MMOL/L (ref 3.4–5.3)
SODIUM SERPL-SCNC: 141 MMOL/L (ref 135–145)

## 2024-05-30 PROCEDURE — 80048 BASIC METABOLIC PNL TOTAL CA: CPT

## 2024-05-30 PROCEDURE — 36415 COLL VENOUS BLD VENIPUNCTURE: CPT

## 2024-06-03 NOTE — ED TRIAGE NOTES
Pt presents via EMS. Per report, patient took roughly 100 tablets of 4 of her home medications to intentionally overdose. Vomitted x1 with EMS. Soft blood pressures, 91/55. Pt arouses to repeated stimulation.      Triage Assessment     Row Name 01/28/23 1924       Triage Assessment (Adult)    Airway WDL WDL       Respiratory WDL    Respiratory WDL WDL       Skin Circulation/Temperature WDL    Skin Circulation/Temperature WDL WDL       Cardiac WDL    Cardiac WDL WDL       Peripheral/Neurovascular WDL    Peripheral Neurovascular WDL WDL       Cognitive/Neuro/Behavioral WDL    Cognitive/Neuro/Behavioral WDL X    Level of Consciousness somnolent    Arousal Level arouses to repeated stimulation    Speech slow    Mood/Behavior hypoactive (quiet, withdrawn)       Rita Coma Scale    Best Eye Response 2-->(E2) to pain    Best Motor Response 5-->(M5) localizes pain    Best Verbal Response 5-->(V5) oriented    Rita Coma Scale Score 12              
[FreeTextEntry1] : CARYN WHELAN is a 72 year old female with right knee pain.  I discussed with the patient that their symptoms, signs, and imaging are most consistent with osteoarthritis and possible meniscus tear.  We reviewed the natural history of this condition and treatment options. We agreed on the following plan:  Reviewed XR again with patient today. Encouraged to start home exercises per handout. Start physical therapy.  Recommend 150 min per week of moderate intensity aerobic activity  Medication:  Nabumetone PRN. Prescription provided. Patient can also try Diclofenac gel and continue with Acetaminophen 1 gram TID PRN.  Referral for TENS machine provided today. Imaging: MRI to evaluate meniscus tear. HA injection order placed. Patient will be contacted regarding [employer] program as not yet covered by her current insurance.  Follow up after imaging.

## 2024-06-05 ENCOUNTER — LAB (OUTPATIENT)
Dept: LAB | Facility: CLINIC | Age: 74
End: 2024-06-05
Payer: MEDICARE

## 2024-06-05 DIAGNOSIS — N18.32 STAGE 3B CHRONIC KIDNEY DISEASE (H): ICD-10-CM

## 2024-06-05 LAB
ANION GAP SERPL CALCULATED.3IONS-SCNC: 13 MMOL/L (ref 7–15)
BUN SERPL-MCNC: 53.5 MG/DL (ref 8–23)
CALCIUM SERPL-MCNC: 10.5 MG/DL (ref 8.8–10.2)
CHLORIDE SERPL-SCNC: 108 MMOL/L (ref 98–107)
CREAT SERPL-MCNC: 2.48 MG/DL (ref 0.51–0.95)
DEPRECATED HCO3 PLAS-SCNC: 21 MMOL/L (ref 22–29)
EGFRCR SERPLBLD CKD-EPI 2021: 20 ML/MIN/1.73M2
GLUCOSE SERPL-MCNC: 102 MG/DL (ref 70–99)
POTASSIUM SERPL-SCNC: 5.1 MMOL/L (ref 3.4–5.3)
SODIUM SERPL-SCNC: 142 MMOL/L (ref 135–145)

## 2024-06-05 PROCEDURE — 36415 COLL VENOUS BLD VENIPUNCTURE: CPT

## 2024-06-05 PROCEDURE — 80048 BASIC METABOLIC PNL TOTAL CA: CPT

## 2024-06-10 DIAGNOSIS — E23.2 DIABETES INSIPIDUS (H): ICD-10-CM

## 2024-06-10 RX ORDER — SODIUM BICARBONATE 650 MG/1
1950 TABLET ORAL 3 TIMES DAILY
Qty: 540 TABLET | Refills: 0 | Status: SHIPPED | OUTPATIENT
Start: 2024-06-10

## 2024-06-10 RX ORDER — SODIUM BICARBONATE 650 MG/1
TABLET ORAL
Qty: 540 TABLET | Refills: 0 | Status: SHIPPED | OUTPATIENT
Start: 2024-06-10 | End: 2024-06-10

## 2024-06-10 NOTE — TELEPHONE ENCOUNTER
TO PCP:     Pt's LTC facility called regarding recent sodium bicarbonate script     States this was a decrease in dosage from prior order from 6/6/25. That script was for 650mg tabs, take 3 tablets three times per day    Wondering if PCP intentionally was decreasing the dosage with the recent Rx? If not they are requesting updated Rx for the 3 tabs three times daily dosing, pended      Disp Refills Start End FRANCHESKA   sodium bicarbonate 650 MG tablet 540 tablet 0 6/10/2024 -- No   Sig: TAKE TWO TABLETS (1300MG) BY MOUTH THREE TIMES DAILY   Sent to pharmacy as: Sodium Bicarbonate 650 MG Oral Tablet   Class: E-Prescribe   Order: [id number]   E-Prescribing Status: Receipt confirmed by pharmacy (6/10/2024 10:45 AM CDT)     Printout Tracking    External Result Report     Medication Administration Instructions    TAKE TWO TABLETS (1300MG) BY MOUTH THREE TIMES DAILY     Pharmacy    [facility] - [place], [place] - [address] [place] [address]

## 2024-06-13 ENCOUNTER — LAB (OUTPATIENT)
Dept: LAB | Facility: CLINIC | Age: 74
End: 2024-06-13
Payer: MEDICARE

## 2024-06-13 DIAGNOSIS — N18.32 STAGE 3B CHRONIC KIDNEY DISEASE (H): ICD-10-CM

## 2024-06-13 LAB
ANION GAP SERPL CALCULATED.3IONS-SCNC: 11 MMOL/L (ref 7–15)
BUN SERPL-MCNC: 53.5 MG/DL (ref 8–23)
CALCIUM SERPL-MCNC: 10.4 MG/DL (ref 8.8–10.2)
CHLORIDE SERPL-SCNC: 107 MMOL/L (ref 98–107)
CREAT SERPL-MCNC: 2.7 MG/DL (ref 0.51–0.95)
DEPRECATED HCO3 PLAS-SCNC: 21 MMOL/L (ref 22–29)
EGFRCR SERPLBLD CKD-EPI 2021: 18 ML/MIN/1.73M2
GLUCOSE SERPL-MCNC: 99 MG/DL (ref 70–99)
POTASSIUM SERPL-SCNC: 5.7 MMOL/L (ref 3.4–5.3)
SODIUM SERPL-SCNC: 139 MMOL/L (ref 135–145)

## 2024-06-13 PROCEDURE — 36415 COLL VENOUS BLD VENIPUNCTURE: CPT

## 2024-06-13 PROCEDURE — 80048 BASIC METABOLIC PNL TOTAL CA: CPT

## 2024-06-17 ENCOUNTER — VIRTUAL VISIT (OUTPATIENT)
Dept: FAMILY MEDICINE | Facility: CLINIC | Age: 74
End: 2024-06-17
Payer: MEDICARE

## 2024-06-17 DIAGNOSIS — I10 ESSENTIAL HYPERTENSION: ICD-10-CM

## 2024-06-17 DIAGNOSIS — E61.1 IRON DEFICIENCY: ICD-10-CM

## 2024-06-17 DIAGNOSIS — F31.9 BIPOLAR AFFECTIVE DISORDER, REMISSION STATUS UNSPECIFIED (H): Primary | ICD-10-CM

## 2024-06-17 DIAGNOSIS — J30.2 SEASONAL ALLERGIC RHINITIS, UNSPECIFIED TRIGGER: ICD-10-CM

## 2024-06-17 DIAGNOSIS — F41.9 ANXIETY: ICD-10-CM

## 2024-06-17 PROCEDURE — 99443 PR PHYSICIAN TELEPHONE EVALUATION 21-30 MIN: CPT | Mod: 93 | Performed by: INTERNAL MEDICINE

## 2024-06-17 RX ORDER — SODIUM BICARBONATE 650 MG/1
TABLET ORAL
Qty: 90 TABLET | Refills: 3 | Status: SHIPPED | OUTPATIENT
Start: 2024-06-17

## 2024-06-17 RX ORDER — FERROUS SULFATE 325(65) MG
TABLET ORAL
Qty: 84 TABLET | Refills: 97 | Status: SHIPPED | OUTPATIENT
Start: 2024-06-17

## 2024-06-17 NOTE — PROGRESS NOTES
Diana is a 73 year old who is being evaluated via a billable telephone visit.    What phone number would you like to be contacted at? [phone]  How would you like to obtain your AVS? Batool  Originating Location (pt. Location): Home    Distant Location (provider location):  Off-site      Subjective   Diana is a 73 year old, presenting for the following health issues:  Recheck Medication and Refill Request    HPI         Chief Complaint:     Medication refills    HPI:   Patient Diana Santiago is a very pleasant 73 year old female with history of bipolar affective disorder who presents to Internal Medicine clinic today via telephone visit for follow up of multiple concerns including medication refills.         Current Medications:     Current Outpatient Medications   Medication Sig Dispense Refill    ACE/ARB/ARNI NOT PRESCRIBED (INTENTIONAL) Please choose reason not prescribed from choices below.      aMILoride (MIDAMOR) 5 MG tablet Take 5 mg by mouth      amLODIPine (NORVASC) 5 MG tablet TAKE 1 TABLET BY MOUTH ONCE DAILY (HOLD FOR SBP < 100) 90 tablet 97    diazepam (VALIUM) 2 MG tablet Take 0.5 tablets (1 mg) by mouth every other day 7 tablet 2    FEROSUL 325 (65 Fe) MG tablet TAKE ONE TABLET BY MOUTH EVERY OTHER DAY 42 tablet 97    fexofenadine (ALLEGRA) 180 MG tablet TAKE 1 TABLET BY MOUTH ONCE DAILY 90 tablet 97    lithium (ESKALITH) 150 MG capsule TAKE 1 CAPSULE BY MOUTH THREE TIMES DAILY WITH MEALS 270 capsule 97    pantoprazole (PROTONIX) 40 MG EC tablet TAKE 1 TABLET BY MOUTH ONCE DAILY 90 tablet 0    polyethylene glycol (MIRALAX) 17 GM/Dose powder MIX 17GM OF POWDER IN 8OZ OF WATER UNTIL COMPLETELY DISSOLVED. DRINK SOLUTION BY MOUTH ONCE DAILY. 510 g 0    simvastatin (ZOCOR) 40 MG tablet TAKE 1 TABLET BY MOUTH AT BEDTIME 90 tablet 97    sodium bicarbonate 650 MG tablet TAKE TWO TABLETS (1300 MG) BY MOUTH TWICE DAILY 360 tablet 97    acetaminophen (TYLENOL) 500 MG tablet Take 2 tablets (1,000 mg) by mouth  At Bedtime 180 tablet 1         Allergies:      Allergies   Allergen Reactions    Ace Inhibitors      Other reaction(s): renal failure  She can't be on an ACEI or ARB while on Lithium.            Past Medical History:     Past Medical History:   Diagnosis Date    Benign essential hypertension 09/2018    added norvasc 9/18    Bipolar affective disorder (H)     hosp 1993, Dr. Aftab Deal    Cancer (H) 1996    DCIS, left breast    Chronic kidney disease, stage 3a (H)     seen by renal Dr. 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    felt due to lithium    Thalassaemia trait     Vitamin D deficiency          Past Surgical History:     Past Surgical History:   Procedure Laterality Date    BIOPSY  1994    left breast    BREAST SURGERY      lumpectomy x 4    COLONOSCOPY  2021    COLONOSCOPY N/A 6/17/2022    Procedure: COLONOSCOPY, WITH POLYPECTOMY AND BIOPSY;  Surgeon: Panchito Gu MD;  Location:  GI    EYE SURGERY  2018    retina tear    LAPAROTOMY EXPLORATORY N/A 8/24/2022    Procedure: Exploratory laparotomy, REPAIR OF PERFORATED ULCER;  Surgeon: Ron Vidal MD;  Location:  OR    left hand surgery      last 1974         Family Medical History:     Family History   Problem Relation Age of Onset    Cerebrovascular Disease Mother     Hypertension Father     Sleep Apnea Brother     Breast Cancer Maternal Aunt         x 2    Breast Cancer Other         Maternal Aunt    Hyperlipidemia Niece          Social History:     Social History      Socioeconomic History    Marital status: Single     Spouse name: Not on file    Number of children: 0    Years of education: Not on file    Highest education level: Not on file   Occupational History    Occupation: , retired   Tobacco Use    Smoking status: Never    Smokeless tobacco: Never   Vaping Use    Vaping Use: Never used   Substance and Sexual Activity    Alcohol use: No    Drug use: No    Sexual activity: Not Currently     Partners: Male     Birth control/protection: Post-menopausal, None   Other Topics Concern    Parent/sibling w/ CABG, MI or angioplasty before 65F 55M? No   Social History Narrative    Not on file     Social Determinants of Health     Financial Resource Strain: Not on file   Food Insecurity: Not on file   Transportation Needs: Not on file   Physical Activity: Not on file   Stress: Not on file   Social Connections: Not on file   Intimate Partner Violence: Not on file   Housing Stability: Not on file           Review of System:     Constitutional: Negative for fever or chills  Skin: Negative for rashes  Ears/Nose/Throat: Negative for nasal congestion, sore throat  Respiratory: No shortness of breath, dyspnea on exertion, cough, or hemoptysis  Cardiovascular: Negative for chest pain  Gastrointestinal: Negative for nausea, vomiting  Genitourinary: Negative for dysuria, hematuria  Musculoskeletal: positive for mechanical chronic bilateral hip pains  Neurologic: Negative for headaches  Psychiatric: positive for bipolar affective disorder, anxiety  Hematologic/Lymphatic/Immunologic: Negative  Endocrine: Negative  Behavioral: Negative for tobacco use       Physical Exam:   LMP  (LMP Unknown)     RESP: no cough over the phone   NEURO: Alert & Oriented x 3.   PSYCH: mentation appears normal, affect normal        Diagnostic Test Results:     Diagnostic Test Results:  Labs reviewed in Epic    ASSESSMENT/PLAN:       Diana was seen today for refill request.    Diagnoses and all  orders for this visit:    Bipolar affective disorder, remission status unspecified (H)  - stable  - continue current Lithium medication     Anxiety  - stable  - continue current medication    Benign essential hypertension  - stable, continue current therapy  - amlodipine BP medication refilled today    Seasonal allergic rhinitis, unspecified trigger  - stable, continue current therapy    Iron deficiency  - stable  - Iron sulfate medication refileld today  Follow Up Plan:     Patient is instructed to return to Internal Medicine clinic for follow-up visit in 3 months.        Gayle Hernandez MD  Internal Medicine  [facility]      telephone visit duration including chart review, medication management: 22 minutes

## 2024-06-20 ENCOUNTER — LAB (OUTPATIENT)
Dept: LAB | Facility: CLINIC | Age: 74
End: 2024-06-20
Payer: MEDICARE

## 2024-06-20 DIAGNOSIS — N18.32 STAGE 3B CHRONIC KIDNEY DISEASE (H): ICD-10-CM

## 2024-06-20 DIAGNOSIS — D63.1 ANEMIA IN CHRONIC KIDNEY DISEASE, UNSPECIFIED CKD STAGE: Primary | ICD-10-CM

## 2024-06-20 DIAGNOSIS — D63.1 ANEMIA IN CHRONIC KIDNEY DISEASE, UNSPECIFIED CKD STAGE: ICD-10-CM

## 2024-06-20 DIAGNOSIS — N18.9 ANEMIA IN CHRONIC KIDNEY DISEASE, UNSPECIFIED CKD STAGE: Primary | ICD-10-CM

## 2024-06-20 DIAGNOSIS — N18.9 ANEMIA IN CHRONIC KIDNEY DISEASE, UNSPECIFIED CKD STAGE: ICD-10-CM

## 2024-06-20 LAB
ANION GAP SERPL CALCULATED.3IONS-SCNC: 11 MMOL/L (ref 7–15)
BUN SERPL-MCNC: 50.1 MG/DL (ref 8–23)
CALCIUM SERPL-MCNC: 10.5 MG/DL (ref 8.8–10.2)
CHLORIDE SERPL-SCNC: 107 MMOL/L (ref 98–107)
CREAT SERPL-MCNC: 2.63 MG/DL (ref 0.51–0.95)
DEPRECATED HCO3 PLAS-SCNC: 21 MMOL/L (ref 22–29)
EGFRCR SERPLBLD CKD-EPI 2021: 19 ML/MIN/1.73M2
FERRITIN SERPL-MCNC: 196 NG/ML (ref 11–328)
GLUCOSE SERPL-MCNC: 102 MG/DL (ref 70–99)
HGB BLD-MCNC: 9.6 G/DL (ref 11.7–15.7)
IRON BINDING CAPACITY (ROCHE): 296 UG/DL (ref 240–430)
IRON SATN MFR SERPL: 25 % (ref 15–46)
IRON SERPL-MCNC: 75 UG/DL (ref 37–145)
POTASSIUM SERPL-SCNC: 5.3 MMOL/L (ref 3.4–5.3)
SODIUM SERPL-SCNC: 139 MMOL/L (ref 135–145)

## 2024-06-20 PROCEDURE — 85018 HEMOGLOBIN: CPT

## 2024-06-20 PROCEDURE — 36415 COLL VENOUS BLD VENIPUNCTURE: CPT

## 2024-06-20 PROCEDURE — 82728 ASSAY OF FERRITIN: CPT

## 2024-06-20 PROCEDURE — 82374 ASSAY BLOOD CARBON DIOXIDE: CPT

## 2024-06-20 PROCEDURE — 83540 ASSAY OF IRON: CPT

## 2024-06-20 PROCEDURE — 83550 IRON BINDING TEST: CPT

## 2024-06-21 ENCOUNTER — ALLIED HEALTH/NURSE VISIT (OUTPATIENT)
Dept: INFUSION THERAPY | Facility: CLINIC | Age: 74
End: 2024-06-21
Attending: INTERNAL MEDICINE
Payer: MEDICARE

## 2024-06-21 VITALS
TEMPERATURE: 97.9 F | OXYGEN SATURATION: 100 % | DIASTOLIC BLOOD PRESSURE: 82 MMHG | RESPIRATION RATE: 16 BRPM | HEART RATE: 77 BPM | SYSTOLIC BLOOD PRESSURE: 123 MMHG

## 2024-06-21 DIAGNOSIS — N18.9 ANEMIA IN CHRONIC KIDNEY DISEASE, UNSPECIFIED CKD STAGE: Primary | ICD-10-CM

## 2024-06-21 DIAGNOSIS — N18.4 CHRONIC RENAL DISEASE, STAGE IV (H): ICD-10-CM

## 2024-06-21 DIAGNOSIS — D63.1 ANEMIA IN CHRONIC KIDNEY DISEASE, UNSPECIFIED CKD STAGE: Primary | ICD-10-CM

## 2024-06-21 PROCEDURE — 250N000011 HC RX IP 250 OP 636: Mod: JZ,EC | Performed by: PHYSICIAN ASSISTANT

## 2024-06-21 PROCEDURE — 96372 THER/PROPH/DIAG INJ SC/IM: CPT | Performed by: PHYSICIAN ASSISTANT

## 2024-06-21 RX ORDER — MEPERIDINE HYDROCHLORIDE 25 MG/ML
25 INJECTION INTRAMUSCULAR; INTRAVENOUS; SUBCUTANEOUS EVERY 30 MIN PRN
Status: CANCELLED | OUTPATIENT
Start: 2024-07-19

## 2024-06-21 RX ORDER — DIPHENHYDRAMINE HYDROCHLORIDE 50 MG/ML
50 INJECTION INTRAMUSCULAR; INTRAVENOUS
Status: CANCELLED
Start: 2024-07-19

## 2024-06-21 RX ORDER — NALOXONE HYDROCHLORIDE 0.4 MG/ML
0.2 INJECTION, SOLUTION INTRAMUSCULAR; INTRAVENOUS; SUBCUTANEOUS
Status: CANCELLED | OUTPATIENT
Start: 2024-07-19

## 2024-06-21 RX ORDER — METHYLPREDNISOLONE SODIUM SUCCINATE 125 MG/2ML
125 INJECTION, POWDER, LYOPHILIZED, FOR SOLUTION INTRAMUSCULAR; INTRAVENOUS
Status: CANCELLED
Start: 2024-07-19

## 2024-06-21 RX ORDER — EPINEPHRINE 1 MG/ML
0.3 INJECTION, SOLUTION INTRAMUSCULAR; SUBCUTANEOUS EVERY 5 MIN PRN
Status: CANCELLED | OUTPATIENT
Start: 2024-07-19

## 2024-06-21 RX ORDER — ALBUTEROL SULFATE 0.83 MG/ML
2.5 SOLUTION RESPIRATORY (INHALATION)
Status: CANCELLED | OUTPATIENT
Start: 2024-07-19

## 2024-06-21 RX ORDER — ALBUTEROL SULFATE 90 UG/1
1-2 AEROSOL, METERED RESPIRATORY (INHALATION)
Status: CANCELLED
Start: 2024-07-19

## 2024-06-21 RX ADMIN — DARBEPOETIN ALFA 25 MCG: 25 INJECTION, SOLUTION INTRAVENOUS; SUBCUTANEOUS at 14:22

## 2024-06-21 RX ADMIN — DARBEPOETIN ALFA 100 MCG: 100 INJECTION, SOLUTION INTRAVENOUS; SUBCUTANEOUS at 14:21

## 2024-06-21 ASSESSMENT — PAIN SCALES - GENERAL: PAINLEVEL: MILD PAIN (3)

## 2024-06-21 NOTE — PROGRESS NOTES
Infusion Nursing Note:  Diana Santiago presents today for aranesp.    Patient seen by provider today: No   present during visit today: Not Applicable.    Note: N/A.      Intravenous Access:  No Intravenous access/labs at this visit.    Treatment Conditions:  Lab Results   Component Value Date    HGB 9.6 (L) 06/20/2024    WBC 8.1 02/22/2024    ANEU 9.6 (H) 09/09/2022    ANEUTAUTO 6.1 02/22/2024     02/22/2024        Results reviewed, labs MET treatment parameters, ok to proceed with treatment.      Post Infusion Assessment:  Patient tolerated injection without incident.  Site patent and intact, free from redness, edema or discomfort.       Discharge Plan:   AVS to patient via MYCHART.  Patient will return as [facility] for next appointment.   Patient discharged in stable condition accompanied by: self.  Departure Mode: Ambulatory with walker.      Zion Agarwal RN

## 2024-06-27 ENCOUNTER — LAB (OUTPATIENT)
Dept: LAB | Facility: CLINIC | Age: 74
End: 2024-06-27
Payer: MEDICARE

## 2024-06-27 DIAGNOSIS — N18.32 STAGE 3B CHRONIC KIDNEY DISEASE (H): ICD-10-CM

## 2024-06-27 LAB
ANION GAP SERPL CALCULATED.3IONS-SCNC: 8 MMOL/L (ref 7–15)
BUN SERPL-MCNC: 50 MG/DL (ref 8–23)
CALCIUM SERPL-MCNC: 10.1 MG/DL (ref 8.8–10.2)
CHLORIDE SERPL-SCNC: 104 MMOL/L (ref 98–107)
CREAT SERPL-MCNC: 2.86 MG/DL (ref 0.51–0.95)
DEPRECATED HCO3 PLAS-SCNC: 24 MMOL/L (ref 22–29)
EGFRCR SERPLBLD CKD-EPI 2021: 17 ML/MIN/1.73M2
GLUCOSE SERPL-MCNC: 94 MG/DL (ref 70–99)
POTASSIUM SERPL-SCNC: 5.5 MMOL/L (ref 3.4–5.3)
SODIUM SERPL-SCNC: 136 MMOL/L (ref 135–145)

## 2024-06-27 PROCEDURE — 36415 COLL VENOUS BLD VENIPUNCTURE: CPT

## 2024-06-27 PROCEDURE — 82374 ASSAY BLOOD CARBON DIOXIDE: CPT

## 2024-07-03 DIAGNOSIS — D64.9 CHRONIC ANEMIA: Primary | ICD-10-CM

## 2024-07-05 ENCOUNTER — LAB (OUTPATIENT)
Dept: LAB | Facility: CLINIC | Age: 74
End: 2024-07-05
Payer: MEDICARE

## 2024-07-05 DIAGNOSIS — N18.32 STAGE 3B CHRONIC KIDNEY DISEASE (H): ICD-10-CM

## 2024-07-05 LAB
ANION GAP SERPL CALCULATED.3IONS-SCNC: 10 MMOL/L (ref 7–15)
BUN SERPL-MCNC: 46.3 MG/DL (ref 8–23)
CALCIUM SERPL-MCNC: 10.1 MG/DL (ref 8.8–10.2)
CHLORIDE SERPL-SCNC: 108 MMOL/L (ref 98–107)
CREAT SERPL-MCNC: 2.94 MG/DL (ref 0.51–0.95)
DEPRECATED HCO3 PLAS-SCNC: 24 MMOL/L (ref 22–29)
EGFRCR SERPLBLD CKD-EPI 2021: 16 ML/MIN/1.73M2
GLUCOSE SERPL-MCNC: 95 MG/DL (ref 70–99)
POTASSIUM SERPL-SCNC: 4.8 MMOL/L (ref 3.4–5.3)
SODIUM SERPL-SCNC: 142 MMOL/L (ref 135–145)

## 2024-07-05 PROCEDURE — 36415 COLL VENOUS BLD VENIPUNCTURE: CPT

## 2024-07-05 PROCEDURE — 80048 BASIC METABOLIC PNL TOTAL CA: CPT

## 2024-07-08 DIAGNOSIS — I10 ESSENTIAL HYPERTENSION: Primary | ICD-10-CM

## 2024-07-08 RX ORDER — AMILORIDE HYDROCHLORIDE 5 MG/1
5 TABLET ORAL DAILY
Qty: 90 TABLET | Refills: 3 | Status: SHIPPED | OUTPATIENT
Start: 2024-07-08

## 2024-07-11 ENCOUNTER — LAB (OUTPATIENT)
Dept: LAB | Facility: CLINIC | Age: 74
End: 2024-07-11
Payer: MEDICARE

## 2024-07-11 DIAGNOSIS — N18.32 STAGE 3B CHRONIC KIDNEY DISEASE (H): ICD-10-CM

## 2024-07-11 LAB
ANION GAP SERPL CALCULATED.3IONS-SCNC: 10 MMOL/L (ref 7–15)
BUN SERPL-MCNC: 44.3 MG/DL (ref 8–23)
CALCIUM SERPL-MCNC: 10.4 MG/DL (ref 8.8–10.2)
CHLORIDE SERPL-SCNC: 110 MMOL/L (ref 98–107)
CREAT SERPL-MCNC: 2.8 MG/DL (ref 0.51–0.95)
DEPRECATED HCO3 PLAS-SCNC: 22 MMOL/L (ref 22–29)
EGFRCR SERPLBLD CKD-EPI 2021: 17 ML/MIN/1.73M2
GLUCOSE SERPL-MCNC: 123 MG/DL (ref 70–99)
POTASSIUM SERPL-SCNC: 5.4 MMOL/L (ref 3.4–5.3)
SODIUM SERPL-SCNC: 142 MMOL/L (ref 135–145)

## 2024-07-11 PROCEDURE — 80048 BASIC METABOLIC PNL TOTAL CA: CPT

## 2024-07-11 PROCEDURE — 36415 COLL VENOUS BLD VENIPUNCTURE: CPT

## 2024-07-18 ENCOUNTER — LAB (OUTPATIENT)
Dept: LAB | Facility: CLINIC | Age: 74
End: 2024-07-18
Payer: MEDICARE

## 2024-07-18 DIAGNOSIS — N18.32 STAGE 3B CHRONIC KIDNEY DISEASE (H): ICD-10-CM

## 2024-07-18 DIAGNOSIS — D64.9 CHRONIC ANEMIA: ICD-10-CM

## 2024-07-18 LAB
ANION GAP SERPL CALCULATED.3IONS-SCNC: 9 MMOL/L (ref 7–15)
BUN SERPL-MCNC: 44.5 MG/DL (ref 8–23)
CALCIUM SERPL-MCNC: 10.5 MG/DL (ref 8.8–10.4)
CHLORIDE SERPL-SCNC: 109 MMOL/L (ref 98–107)
CREAT SERPL-MCNC: 2.63 MG/DL (ref 0.51–0.95)
EGFRCR SERPLBLD CKD-EPI 2021: 19 ML/MIN/1.73M2
FERRITIN SERPL-MCNC: 227 NG/ML (ref 11–328)
GLUCOSE SERPL-MCNC: 99 MG/DL (ref 70–99)
HCO3 SERPL-SCNC: 22 MMOL/L (ref 22–29)
HGB BLD-MCNC: 10.5 G/DL (ref 11.7–15.7)
IRON BINDING CAPACITY (ROCHE): 304 UG/DL (ref 240–430)
IRON SATN MFR SERPL: 31 % (ref 15–46)
IRON SERPL-MCNC: 95 UG/DL (ref 37–145)
POTASSIUM SERPL-SCNC: 5.3 MMOL/L (ref 3.4–5.3)
SODIUM SERPL-SCNC: 140 MMOL/L (ref 135–145)

## 2024-07-18 PROCEDURE — 36415 COLL VENOUS BLD VENIPUNCTURE: CPT

## 2024-07-18 PROCEDURE — 82728 ASSAY OF FERRITIN: CPT

## 2024-07-18 PROCEDURE — 83550 IRON BINDING TEST: CPT

## 2024-07-18 PROCEDURE — 85018 HEMOGLOBIN: CPT

## 2024-07-18 PROCEDURE — 80048 BASIC METABOLIC PNL TOTAL CA: CPT

## 2024-07-25 ENCOUNTER — LAB (OUTPATIENT)
Dept: LAB | Facility: CLINIC | Age: 74
End: 2024-07-25
Payer: MEDICARE

## 2024-07-25 DIAGNOSIS — N18.32 STAGE 3B CHRONIC KIDNEY DISEASE (H): ICD-10-CM

## 2024-07-25 LAB
ANION GAP SERPL CALCULATED.3IONS-SCNC: 9 MMOL/L (ref 7–15)
BUN SERPL-MCNC: 57.7 MG/DL (ref 8–23)
CALCIUM SERPL-MCNC: 10.4 MG/DL (ref 8.8–10.4)
CHLORIDE SERPL-SCNC: 106 MMOL/L (ref 98–107)
CREAT SERPL-MCNC: 2.94 MG/DL (ref 0.51–0.95)
EGFRCR SERPLBLD CKD-EPI 2021: 16 ML/MIN/1.73M2
GLUCOSE SERPL-MCNC: 101 MG/DL (ref 70–99)
HCO3 SERPL-SCNC: 22 MMOL/L (ref 22–29)
POTASSIUM SERPL-SCNC: 5.6 MMOL/L (ref 3.4–5.3)
SODIUM SERPL-SCNC: 137 MMOL/L (ref 135–145)

## 2024-07-25 PROCEDURE — 36415 COLL VENOUS BLD VENIPUNCTURE: CPT

## 2024-07-25 PROCEDURE — 80048 BASIC METABOLIC PNL TOTAL CA: CPT

## 2024-08-01 ENCOUNTER — LAB (OUTPATIENT)
Dept: LAB | Facility: CLINIC | Age: 74
End: 2024-08-01
Payer: MEDICARE

## 2024-08-01 DIAGNOSIS — N18.32 STAGE 3B CHRONIC KIDNEY DISEASE (H): ICD-10-CM

## 2024-08-01 LAB
ANION GAP SERPL CALCULATED.3IONS-SCNC: 8 MMOL/L (ref 7–15)
BUN SERPL-MCNC: 48.2 MG/DL (ref 8–23)
CALCIUM SERPL-MCNC: 10.2 MG/DL (ref 8.8–10.4)
CHLORIDE SERPL-SCNC: 107 MMOL/L (ref 98–107)
CREAT SERPL-MCNC: 2.68 MG/DL (ref 0.51–0.95)
EGFRCR SERPLBLD CKD-EPI 2021: 18 ML/MIN/1.73M2
GLUCOSE SERPL-MCNC: 100 MG/DL (ref 70–99)
HCO3 SERPL-SCNC: 24 MMOL/L (ref 22–29)
POTASSIUM SERPL-SCNC: 5.1 MMOL/L (ref 3.4–5.3)
SODIUM SERPL-SCNC: 139 MMOL/L (ref 135–145)

## 2024-08-01 PROCEDURE — 80048 BASIC METABOLIC PNL TOTAL CA: CPT

## 2024-08-01 PROCEDURE — 36415 COLL VENOUS BLD VENIPUNCTURE: CPT

## 2024-08-08 ENCOUNTER — LAB (OUTPATIENT)
Dept: LAB | Facility: CLINIC | Age: 74
End: 2024-08-08
Payer: MEDICARE

## 2024-08-08 DIAGNOSIS — N18.32 STAGE 3B CHRONIC KIDNEY DISEASE (H): ICD-10-CM

## 2024-08-08 LAB
ANION GAP SERPL CALCULATED.3IONS-SCNC: 10 MMOL/L (ref 7–15)
BUN SERPL-MCNC: 47.1 MG/DL (ref 8–23)
CALCIUM SERPL-MCNC: 10.4 MG/DL (ref 8.8–10.4)
CHLORIDE SERPL-SCNC: 107 MMOL/L (ref 98–107)
CREAT SERPL-MCNC: 2.8 MG/DL (ref 0.51–0.95)
EGFRCR SERPLBLD CKD-EPI 2021: 17 ML/MIN/1.73M2
GLUCOSE SERPL-MCNC: 98 MG/DL (ref 70–99)
HCO3 SERPL-SCNC: 21 MMOL/L (ref 22–29)
POTASSIUM SERPL-SCNC: 5.9 MMOL/L (ref 3.4–5.3)
SODIUM SERPL-SCNC: 138 MMOL/L (ref 135–145)

## 2024-08-08 PROCEDURE — 80048 BASIC METABOLIC PNL TOTAL CA: CPT

## 2024-08-08 PROCEDURE — 36415 COLL VENOUS BLD VENIPUNCTURE: CPT

## 2024-08-15 ENCOUNTER — LAB (OUTPATIENT)
Dept: LAB | Facility: CLINIC | Age: 74
End: 2024-08-15
Payer: MEDICARE

## 2024-08-15 DIAGNOSIS — N18.32 STAGE 3B CHRONIC KIDNEY DISEASE (H): ICD-10-CM

## 2024-08-15 DIAGNOSIS — D64.9 CHRONIC ANEMIA: ICD-10-CM

## 2024-08-15 LAB
ANION GAP SERPL CALCULATED.3IONS-SCNC: 10 MMOL/L (ref 7–15)
BUN SERPL-MCNC: 56 MG/DL (ref 8–23)
CALCIUM SERPL-MCNC: 10.3 MG/DL (ref 8.8–10.4)
CHLORIDE SERPL-SCNC: 104 MMOL/L (ref 98–107)
CREAT SERPL-MCNC: 2.87 MG/DL (ref 0.51–0.95)
EGFRCR SERPLBLD CKD-EPI 2021: 17 ML/MIN/1.73M2
FERRITIN SERPL-MCNC: 277 NG/ML (ref 11–328)
GLUCOSE SERPL-MCNC: 115 MG/DL (ref 70–99)
HCO3 SERPL-SCNC: 23 MMOL/L (ref 22–29)
HGB BLD-MCNC: 9.7 G/DL (ref 11.7–15.7)
IRON BINDING CAPACITY (ROCHE): 304 UG/DL (ref 240–430)
IRON SATN MFR SERPL: 28 % (ref 15–46)
IRON SERPL-MCNC: 85 UG/DL (ref 37–145)
POTASSIUM SERPL-SCNC: 5.1 MMOL/L (ref 3.4–5.3)
SODIUM SERPL-SCNC: 137 MMOL/L (ref 135–145)

## 2024-08-15 PROCEDURE — 82728 ASSAY OF FERRITIN: CPT

## 2024-08-15 PROCEDURE — 83540 ASSAY OF IRON: CPT

## 2024-08-15 PROCEDURE — 85018 HEMOGLOBIN: CPT

## 2024-08-15 PROCEDURE — 80048 BASIC METABOLIC PNL TOTAL CA: CPT

## 2024-08-15 PROCEDURE — 83550 IRON BINDING TEST: CPT

## 2024-08-15 PROCEDURE — 36415 COLL VENOUS BLD VENIPUNCTURE: CPT

## 2024-08-16 ENCOUNTER — ALLIED HEALTH/NURSE VISIT (OUTPATIENT)
Dept: INFUSION THERAPY | Facility: CLINIC | Age: 74
End: 2024-08-16
Attending: INTERNAL MEDICINE
Payer: MEDICARE

## 2024-08-16 VITALS
OXYGEN SATURATION: 100 % | HEART RATE: 70 BPM | TEMPERATURE: 99.1 F | DIASTOLIC BLOOD PRESSURE: 70 MMHG | RESPIRATION RATE: 20 BRPM | SYSTOLIC BLOOD PRESSURE: 112 MMHG

## 2024-08-16 DIAGNOSIS — N18.4 CHRONIC RENAL DISEASE, STAGE IV (H): ICD-10-CM

## 2024-08-16 DIAGNOSIS — D63.1 ANEMIA IN CHRONIC KIDNEY DISEASE, UNSPECIFIED CKD STAGE: Primary | ICD-10-CM

## 2024-08-16 DIAGNOSIS — N18.9 ANEMIA IN CHRONIC KIDNEY DISEASE, UNSPECIFIED CKD STAGE: Primary | ICD-10-CM

## 2024-08-16 DIAGNOSIS — F31.9 BIPOLAR AFFECTIVE DISORDER, REMISSION STATUS UNSPECIFIED (H): ICD-10-CM

## 2024-08-16 PROCEDURE — 96372 THER/PROPH/DIAG INJ SC/IM: CPT | Performed by: PHYSICIAN ASSISTANT

## 2024-08-16 PROCEDURE — 250N000011 HC RX IP 250 OP 636: Mod: EC | Performed by: PHYSICIAN ASSISTANT

## 2024-08-16 RX ORDER — NALOXONE HYDROCHLORIDE 0.4 MG/ML
0.2 INJECTION, SOLUTION INTRAMUSCULAR; INTRAVENOUS; SUBCUTANEOUS
OUTPATIENT
Start: 2024-09-13

## 2024-08-16 RX ORDER — METHYLPREDNISOLONE SODIUM SUCCINATE 125 MG/2ML
125 INJECTION, POWDER, LYOPHILIZED, FOR SOLUTION INTRAMUSCULAR; INTRAVENOUS
Start: 2024-09-13

## 2024-08-16 RX ORDER — ALBUTEROL SULFATE 0.83 MG/ML
2.5 SOLUTION RESPIRATORY (INHALATION)
OUTPATIENT
Start: 2024-09-13

## 2024-08-16 RX ORDER — DIPHENHYDRAMINE HYDROCHLORIDE 50 MG/ML
50 INJECTION INTRAMUSCULAR; INTRAVENOUS
Start: 2024-09-13

## 2024-08-16 RX ORDER — MEPERIDINE HYDROCHLORIDE 25 MG/ML
25 INJECTION INTRAMUSCULAR; INTRAVENOUS; SUBCUTANEOUS EVERY 30 MIN PRN
OUTPATIENT
Start: 2024-09-13

## 2024-08-16 RX ORDER — EPINEPHRINE 1 MG/ML
0.3 INJECTION, SOLUTION INTRAMUSCULAR; SUBCUTANEOUS EVERY 5 MIN PRN
OUTPATIENT
Start: 2024-09-13

## 2024-08-16 RX ORDER — ALBUTEROL SULFATE 90 UG/1
1-2 AEROSOL, METERED RESPIRATORY (INHALATION)
Start: 2024-09-13

## 2024-08-16 RX ADMIN — DARBEPOETIN ALFA 25 MCG: 25 INJECTION, SOLUTION INTRAVENOUS; SUBCUTANEOUS at 13:51

## 2024-08-16 RX ADMIN — DARBEPOETIN ALFA 100 MCG: 100 INJECTION, SOLUTION INTRAVENOUS; SUBCUTANEOUS at 13:52

## 2024-08-16 NOTE — PROGRESS NOTES
Nursing Note:  Diana Santiago presents today for Aranesp.    Patient seen by provider today: No   present during visit today: Not Applicable.    Note: HGB 9.7. Pt tolerated 2 Aranesp injections in upper left arm without incident per orders.     Intravenous Access:  No Intravenous access/labs at this visit.    Discharge Plan:   Patient was discharged to home.     Madhavi Hu RN

## 2024-08-22 ENCOUNTER — LAB (OUTPATIENT)
Dept: LAB | Facility: CLINIC | Age: 74
End: 2024-08-22
Payer: MEDICARE

## 2024-08-22 DIAGNOSIS — N18.32 STAGE 3B CHRONIC KIDNEY DISEASE (H): ICD-10-CM

## 2024-08-22 LAB
ANION GAP SERPL CALCULATED.3IONS-SCNC: 11 MMOL/L (ref 7–15)
BUN SERPL-MCNC: 46.3 MG/DL (ref 8–23)
CALCIUM SERPL-MCNC: 10.2 MG/DL (ref 8.8–10.4)
CHLORIDE SERPL-SCNC: 106 MMOL/L (ref 98–107)
CREAT SERPL-MCNC: 3.08 MG/DL (ref 0.51–0.95)
EGFRCR SERPLBLD CKD-EPI 2021: 15 ML/MIN/1.73M2
GLUCOSE SERPL-MCNC: 101 MG/DL (ref 70–99)
HCO3 SERPL-SCNC: 22 MMOL/L (ref 22–29)
POTASSIUM SERPL-SCNC: 5.3 MMOL/L (ref 3.4–5.3)
SODIUM SERPL-SCNC: 139 MMOL/L (ref 135–145)

## 2024-08-22 PROCEDURE — 36415 COLL VENOUS BLD VENIPUNCTURE: CPT

## 2024-08-22 PROCEDURE — 80048 BASIC METABOLIC PNL TOTAL CA: CPT

## 2024-08-26 ENCOUNTER — VIRTUAL VISIT (OUTPATIENT)
Dept: FAMILY MEDICINE | Facility: CLINIC | Age: 74
End: 2024-08-26
Payer: MEDICARE

## 2024-08-26 DIAGNOSIS — F31.9 BIPOLAR AFFECTIVE DISORDER, REMISSION STATUS UNSPECIFIED (H): ICD-10-CM

## 2024-08-26 DIAGNOSIS — J30.2 SEASONAL ALLERGIC RHINITIS, UNSPECIFIED TRIGGER: ICD-10-CM

## 2024-08-26 DIAGNOSIS — F41.9 ANXIETY: Primary | ICD-10-CM

## 2024-08-26 DIAGNOSIS — E61.1 IRON DEFICIENCY: ICD-10-CM

## 2024-08-26 DIAGNOSIS — I10 ESSENTIAL HYPERTENSION: ICD-10-CM

## 2024-08-26 PROCEDURE — 99443 PR PHYSICIAN TELEPHONE EVALUATION 21-30 MIN: CPT | Mod: 93 | Performed by: INTERNAL MEDICINE

## 2024-08-26 RX ORDER — DIAZEPAM 2 MG
TABLET ORAL
Qty: 7 TABLET | Refills: 4 | Status: CANCELLED | OUTPATIENT
Start: 2024-08-26

## 2024-08-26 RX ORDER — DIAZEPAM 2 MG
TABLET ORAL
Qty: 7 TABLET | Refills: 4 | Status: SHIPPED | OUTPATIENT
Start: 2024-08-26 | End: 2024-09-23

## 2024-08-26 NOTE — PROGRESS NOTES
Diana is a 73 year old who is being evaluated via a billable telephone visit.    What phone number would you like to be contacted at? [phone]  How would you like to obtain your AVS? Batool  Originating Location (pt. Location): Home    Distant Location (provider location):  Off-site      Subjective   Diana is a 73 year old, presenting for the following health issues:  Recheck Medication and Refill Request    History of Present Illness       Reason for visit:  Refill   She is taking medications regularly.           Chief Complaint:     Medication refills    HPI:   Patient Diana Santiago is a very pleasant 73 year old female with history of bipolar affective disorder who presents to Internal Medicine clinic today via telephone visit for follow up of multiple concerns including medication refills.         Current Medications:     Current Outpatient Medications   Medication Sig Dispense Refill    ACE/ARB/ARNI NOT PRESCRIBED (INTENTIONAL) Please choose reason not prescribed from choices below.      aMILoride (MIDAMOR) 5 MG tablet Take 5 mg by mouth      amLODIPine (NORVASC) 5 MG tablet TAKE 1 TABLET BY MOUTH ONCE DAILY (HOLD FOR SBP < 100) 90 tablet 97    diazepam (VALIUM) 2 MG tablet Take 0.5 tablets (1 mg) by mouth every other day 7 tablet 2    FEROSUL 325 (65 Fe) MG tablet TAKE ONE TABLET BY MOUTH EVERY OTHER DAY 42 tablet 97    fexofenadine (ALLEGRA) 180 MG tablet TAKE 1 TABLET BY MOUTH ONCE DAILY 90 tablet 97    lithium (ESKALITH) 150 MG capsule TAKE 1 CAPSULE BY MOUTH THREE TIMES DAILY WITH MEALS 270 capsule 97    pantoprazole (PROTONIX) 40 MG EC tablet TAKE 1 TABLET BY MOUTH ONCE DAILY 90 tablet 0    polyethylene glycol (MIRALAX) 17 GM/Dose powder MIX 17GM OF POWDER IN 8OZ OF WATER UNTIL COMPLETELY DISSOLVED. DRINK SOLUTION BY MOUTH ONCE DAILY. 510 g 0    simvastatin (ZOCOR) 40 MG tablet TAKE 1 TABLET BY MOUTH AT BEDTIME 90 tablet 97    sodium bicarbonate 650 MG tablet TAKE TWO TABLETS (1300 MG) BY MOUTH TWICE  DAILY 360 tablet 97    acetaminophen (TYLENOL) 500 MG tablet Take 2 tablets (1,000 mg) by mouth At Bedtime 180 tablet 1         Allergies:      Allergies   Allergen Reactions    Ace Inhibitors      Other reaction(s): renal failure  She can't be on an ACEI or ARB while on Lithium.            Past Medical History:     Past Medical History:   Diagnosis Date    Benign essential hypertension 09/2018    added norvasc 9/18    Bipolar affective disorder (H)     hosp 1993, Dr. Aftab Deal    Cancer (H) 1996    DCIS, left breast    Chronic kidney disease, stage 3a (H)     seen by renal Dr. 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    felt due to lithium    Thalassaemia trait     Vitamin D deficiency          Past Surgical History:     Past Surgical History:   Procedure Laterality Date    BIOPSY  1994    left breast    BREAST SURGERY      lumpectomy x 4    COLONOSCOPY  2021    COLONOSCOPY N/A 6/17/2022    Procedure: COLONOSCOPY, WITH POLYPECTOMY AND BIOPSY;  Surgeon: Panchito Gu MD;  Location:  GI    EYE SURGERY  2018    retina tear    LAPAROTOMY EXPLORATORY N/A 8/24/2022    Procedure: Exploratory laparotomy, REPAIR OF PERFORATED ULCER;  Surgeon: Ron Vidal MD;  Location:  OR    left hand surgery      last 1974         Family Medical History:     Family History   Problem Relation Age of Onset    Cerebrovascular Disease Mother     Hypertension Father     Sleep Apnea Brother     Breast Cancer Maternal Aunt         x 2    Breast Cancer Other          Maternal Aunt    Hyperlipidemia Niece          Social History:     Social History     Socioeconomic History    Marital status: Single     Spouse name: Not on file    Number of children: 0    Years of education: Not on file    Highest education level: Not on file   Occupational History    Occupation: , retired   Tobacco Use    Smoking status: Never    Smokeless tobacco: Never   Vaping Use    Vaping Use: Never used   Substance and Sexual Activity    Alcohol use: No    Drug use: No    Sexual activity: Not Currently     Partners: Male     Birth control/protection: Post-menopausal, None   Other Topics Concern    Parent/sibling w/ CABG, MI or angioplasty before 65F 55M? No   Social History Narrative    Not on file     Social Determinants of Health     Financial Resource Strain: Not on file   Food Insecurity: Not on file   Transportation Needs: Not on file   Physical Activity: Not on file   Stress: Not on file   Social Connections: Not on file   Intimate Partner Violence: Not on file   Housing Stability: Not on file           Review of System:     Constitutional: Negative for fever or chills  Skin: Negative for rashes  Ears/Nose/Throat: Negative for nasal congestion, sore throat  Respiratory: No shortness of breath, dyspnea on exertion, cough, or hemoptysis  Cardiovascular: Negative for chest pain  Gastrointestinal: Negative for nausea, vomiting  Genitourinary: Negative for dysuria, hematuria  Musculoskeletal: positive for mechanical chronic bilateral hip pains  Neurologic: Negative for headaches  Psychiatric: positive for bipolar affective disorder, anxiety  Hematologic/Lymphatic/Immunologic: Negative  Endocrine: Negative  Behavioral: Negative for tobacco use       Physical Exam:   LMP  (LMP Unknown)     RESP: no cough over the phone   NEURO: Alert & Oriented x 3.   PSYCH: mentation appears normal, affect normal        Diagnostic Test Results:     Diagnostic Test Results:  Labs reviewed in  Epic    ASSESSMENT/PLAN:       Diana was seen today for refill request.    Diagnoses and all orders for this visit:    Bipolar affective disorder, remission status unspecified (H)  - stable  - continue current therapy    Anxiety  - stable  - Valium medication refilled today    Benign essential hypertension  - stable, continue current therapy      Seasonal allergic rhinitis, unspecified trigger  - stable, continue current therapy    Iron deficiency  - stable  - continue current therapy    Follow Up Plan:     Patient is instructed to return to Internal Medicine clinic for follow-up visit in 3 months.        Gayle Hernandez MD  Internal Medicine  [facility]      telephone visit duration including chart review, medication management: 22 minutes

## 2024-08-29 ENCOUNTER — LAB (OUTPATIENT)
Dept: LAB | Facility: CLINIC | Age: 74
End: 2024-08-29
Payer: MEDICARE

## 2024-08-29 DIAGNOSIS — N18.32 STAGE 3B CHRONIC KIDNEY DISEASE (H): ICD-10-CM

## 2024-08-29 LAB
ANION GAP SERPL CALCULATED.3IONS-SCNC: 11 MMOL/L (ref 7–15)
BUN SERPL-MCNC: 46.9 MG/DL (ref 8–23)
CALCIUM SERPL-MCNC: 10.2 MG/DL (ref 8.8–10.4)
CHLORIDE SERPL-SCNC: 105 MMOL/L (ref 98–107)
CREAT SERPL-MCNC: 3.1 MG/DL (ref 0.51–0.95)
EGFRCR SERPLBLD CKD-EPI 2021: 15 ML/MIN/1.73M2
GLUCOSE SERPL-MCNC: 102 MG/DL (ref 70–99)
HCO3 SERPL-SCNC: 21 MMOL/L (ref 22–29)
POTASSIUM SERPL-SCNC: 5.4 MMOL/L (ref 3.4–5.3)
SODIUM SERPL-SCNC: 137 MMOL/L (ref 135–145)

## 2024-08-29 PROCEDURE — 36415 COLL VENOUS BLD VENIPUNCTURE: CPT

## 2024-08-29 PROCEDURE — 80048 BASIC METABOLIC PNL TOTAL CA: CPT

## 2024-09-01 DIAGNOSIS — K25.1 ACUTE GASTRIC ULCER WITH PERFORATION (H): ICD-10-CM

## 2024-09-03 RX ORDER — PANTOPRAZOLE SODIUM 40 MG/1
TABLET, DELAYED RELEASE ORAL
Qty: 90 TABLET | Refills: 2 | Status: SHIPPED | OUTPATIENT
Start: 2024-09-03

## 2024-09-05 ENCOUNTER — LAB (OUTPATIENT)
Dept: LAB | Facility: CLINIC | Age: 74
End: 2024-09-05
Payer: MEDICARE

## 2024-09-05 DIAGNOSIS — N18.32 STAGE 3B CHRONIC KIDNEY DISEASE (H): ICD-10-CM

## 2024-09-05 LAB
ANION GAP SERPL CALCULATED.3IONS-SCNC: 11 MMOL/L (ref 7–15)
BUN SERPL-MCNC: 55.1 MG/DL (ref 8–23)
CALCIUM SERPL-MCNC: 10.1 MG/DL (ref 8.8–10.4)
CHLORIDE SERPL-SCNC: 108 MMOL/L (ref 98–107)
CREAT SERPL-MCNC: 3.18 MG/DL (ref 0.51–0.95)
EGFRCR SERPLBLD CKD-EPI 2021: 15 ML/MIN/1.73M2
GLUCOSE SERPL-MCNC: 70 MG/DL (ref 70–99)
HCO3 SERPL-SCNC: 21 MMOL/L (ref 22–29)
POTASSIUM SERPL-SCNC: 5 MMOL/L (ref 3.4–5.3)
SODIUM SERPL-SCNC: 140 MMOL/L (ref 135–145)

## 2024-09-05 PROCEDURE — 36415 COLL VENOUS BLD VENIPUNCTURE: CPT

## 2024-09-05 PROCEDURE — 80048 BASIC METABOLIC PNL TOTAL CA: CPT

## 2024-09-07 ENCOUNTER — HEALTH MAINTENANCE LETTER (OUTPATIENT)
Age: 74
End: 2024-09-07

## 2024-09-12 ENCOUNTER — LAB (OUTPATIENT)
Dept: LAB | Facility: CLINIC | Age: 74
End: 2024-09-12
Payer: MEDICARE

## 2024-09-12 DIAGNOSIS — D64.9 CHRONIC ANEMIA: ICD-10-CM

## 2024-09-12 DIAGNOSIS — N18.32 STAGE 3B CHRONIC KIDNEY DISEASE (H): ICD-10-CM

## 2024-09-12 LAB
ANION GAP SERPL CALCULATED.3IONS-SCNC: 10 MMOL/L (ref 7–15)
BUN SERPL-MCNC: 52.2 MG/DL (ref 8–23)
CALCIUM SERPL-MCNC: 10.2 MG/DL (ref 8.8–10.4)
CHLORIDE SERPL-SCNC: 110 MMOL/L (ref 98–107)
CREAT SERPL-MCNC: 2.83 MG/DL (ref 0.51–0.95)
EGFRCR SERPLBLD CKD-EPI 2021: 17 ML/MIN/1.73M2
FERRITIN SERPL-MCNC: 215 NG/ML (ref 11–328)
GLUCOSE SERPL-MCNC: 102 MG/DL (ref 70–99)
HCO3 SERPL-SCNC: 21 MMOL/L (ref 22–29)
HGB BLD-MCNC: 10.1 G/DL (ref 11.7–15.7)
IRON BINDING CAPACITY (ROCHE): 296 UG/DL (ref 240–430)
IRON SATN MFR SERPL: 25 % (ref 15–46)
IRON SERPL-MCNC: 73 UG/DL (ref 37–145)
POTASSIUM SERPL-SCNC: 5.4 MMOL/L (ref 3.4–5.3)
SODIUM SERPL-SCNC: 141 MMOL/L (ref 135–145)

## 2024-09-12 PROCEDURE — 85018 HEMOGLOBIN: CPT

## 2024-09-12 PROCEDURE — 83550 IRON BINDING TEST: CPT

## 2024-09-12 PROCEDURE — 80048 BASIC METABOLIC PNL TOTAL CA: CPT

## 2024-09-12 PROCEDURE — 36415 COLL VENOUS BLD VENIPUNCTURE: CPT

## 2024-09-12 PROCEDURE — 82728 ASSAY OF FERRITIN: CPT

## 2024-09-17 DIAGNOSIS — N18.4 CHRONIC KIDNEY DISEASE, STAGE 4 (SEVERE) (H): Primary | ICD-10-CM

## 2024-09-17 DIAGNOSIS — E21.3 HYPERPARATHYROIDISM (H): ICD-10-CM

## 2024-09-20 ENCOUNTER — VIRTUAL VISIT (OUTPATIENT)
Dept: FAMILY MEDICINE | Facility: CLINIC | Age: 74
End: 2024-09-20
Payer: MEDICARE

## 2024-09-20 DIAGNOSIS — J30.2 SEASONAL ALLERGIC RHINITIS, UNSPECIFIED TRIGGER: ICD-10-CM

## 2024-09-20 DIAGNOSIS — F41.9 ANXIETY: Primary | ICD-10-CM

## 2024-09-20 DIAGNOSIS — F31.9 BIPOLAR AFFECTIVE DISORDER, REMISSION STATUS UNSPECIFIED (H): ICD-10-CM

## 2024-09-20 DIAGNOSIS — I10 ESSENTIAL HYPERTENSION: ICD-10-CM

## 2024-09-20 PROCEDURE — 99443 PR PHYSICIAN TELEPHONE EVALUATION 21-30 MIN: CPT | Mod: 95 | Performed by: INTERNAL MEDICINE

## 2024-09-20 RX ORDER — DARBEPOETIN ALFA 100 UG/ML
SOLUTION INTRAVENOUS; SUBCUTANEOUS
COMMUNITY

## 2024-09-20 NOTE — PROGRESS NOTES
Diana is a 73 year old who is being evaluated via a billable telephone visit.    What phone number would you like to be contacted at? [phone]  How would you like to obtain your AVS? Batool  Originating Location (pt. Location): Home    Distant Location (provider location):  Off-site      Subjective   Diana is a 73 year old, presenting for the following health issues:  Incontinence (F/U)    History of Present Illness       Reason for visit:  Refill   She is taking medications regularly.           Chief Complaint:     Medication refills    HPI:   Patient Diana Santiago is a very pleasant 73 year old female with history of bipolar affective disorder who presents to Internal Medicine clinic today via telephone visit for follow up of multiple concerns including medication refills.         Current Medications:     Current Outpatient Medications   Medication Sig Dispense Refill    ACE/ARB/ARNI NOT PRESCRIBED (INTENTIONAL) Please choose reason not prescribed from choices below.      aMILoride (MIDAMOR) 5 MG tablet Take 5 mg by mouth      amLODIPine (NORVASC) 5 MG tablet TAKE 1 TABLET BY MOUTH ONCE DAILY (HOLD FOR SBP < 100) 90 tablet 97    diazepam (VALIUM) 2 MG tablet Take 0.5 tablets (1 mg) by mouth every other day 7 tablet 2    FEROSUL 325 (65 Fe) MG tablet TAKE ONE TABLET BY MOUTH EVERY OTHER DAY 42 tablet 97    fexofenadine (ALLEGRA) 180 MG tablet TAKE 1 TABLET BY MOUTH ONCE DAILY 90 tablet 97    lithium (ESKALITH) 150 MG capsule TAKE 1 CAPSULE BY MOUTH THREE TIMES DAILY WITH MEALS 270 capsule 97    pantoprazole (PROTONIX) 40 MG EC tablet TAKE 1 TABLET BY MOUTH ONCE DAILY 90 tablet 0    polyethylene glycol (MIRALAX) 17 GM/Dose powder MIX 17GM OF POWDER IN 8OZ OF WATER UNTIL COMPLETELY DISSOLVED. DRINK SOLUTION BY MOUTH ONCE DAILY. 510 g 0    simvastatin (ZOCOR) 40 MG tablet TAKE 1 TABLET BY MOUTH AT BEDTIME 90 tablet 97    sodium bicarbonate 650 MG tablet TAKE TWO TABLETS (1300 MG) BY MOUTH TWICE DAILY 360 tablet 97     acetaminophen (TYLENOL) 500 MG tablet Take 2 tablets (1,000 mg) by mouth At Bedtime 180 tablet 1         Allergies:      Allergies   Allergen Reactions    Ace Inhibitors      Other reaction(s): renal failure  She can't be on an ACEI or ARB while on Lithium.            Past Medical History:     Past Medical History:   Diagnosis Date    Benign essential hypertension 09/2018    added norvasc 9/18    Bipolar affective disorder (H)     hosp 1993, Dr. Aftab Deal    Cancer (H) 1996    DCIS, left breast    Chronic kidney disease, stage 3a (H)     seen by renal Dr. 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    felt due to lithium    Thalassaemia trait     Vitamin D deficiency          Past Surgical History:     Past Surgical History:   Procedure Laterality Date    BIOPSY  1994    left breast    BREAST SURGERY      lumpectomy x 4    COLONOSCOPY  2021    COLONOSCOPY N/A 6/17/2022    Procedure: COLONOSCOPY, WITH POLYPECTOMY AND BIOPSY;  Surgeon: Panchito Gu MD;  Location:  GI    EYE SURGERY  2018    retina tear    LAPAROTOMY EXPLORATORY N/A 8/24/2022    Procedure: Exploratory laparotomy, REPAIR OF PERFORATED ULCER;  Surgeon: Ron Vidal MD;  Location:  OR    left hand surgery      last 1974         Family Medical History:     Family History   Problem Relation Age of Onset    Cerebrovascular Disease Mother     Hypertension Father     Sleep Apnea Brother     Breast Cancer Maternal Aunt         x 2    Breast Cancer Other         Maternal Aunt     Hyperlipidemia Niece          Social History:     Social History     Socioeconomic History    Marital status: Single     Spouse name: Not on file    Number of children: 0    Years of education: Not on file    Highest education level: Not on file   Occupational History    Occupation: , retired   Tobacco Use    Smoking status: Never    Smokeless tobacco: Never   Vaping Use    Vaping Use: Never used   Substance and Sexual Activity    Alcohol use: No    Drug use: No    Sexual activity: Not Currently     Partners: Male     Birth control/protection: Post-menopausal, None   Other Topics Concern    Parent/sibling w/ CABG, MI or angioplasty before 65F 55M? No   Social History Narrative    Not on file     Social Determinants of Health     Financial Resource Strain: Not on file   Food Insecurity: Not on file   Transportation Needs: Not on file   Physical Activity: Not on file   Stress: Not on file   Social Connections: Not on file   Intimate Partner Violence: Not on file   Housing Stability: Not on file           Review of System:     Constitutional: Negative for fever or chills  Skin: Negative for rashes  Ears/Nose/Throat: Negative for nasal congestion, sore throat  Respiratory: No shortness of breath, dyspnea on exertion, cough, or hemoptysis  Cardiovascular: Negative for chest pain  Gastrointestinal: Negative for nausea, vomiting  Genitourinary: Negative for dysuria, hematuria  Musculoskeletal: positive for mechanical chronic bilateral hip pains  Neurologic: Negative for headaches  Psychiatric: positive for bipolar affective disorder, anxiety  Hematologic/Lymphatic/Immunologic: Negative  Endocrine: Negative  Behavioral: Negative for tobacco use       Physical Exam:   LMP  (LMP Unknown)     RESP: no cough over the phone   NEURO: Alert & Oriented x 3.   PSYCH: mentation appears normal, affect normal        Diagnostic Test Results:     Diagnostic Test Results:  Labs reviewed in Epic    ASSESSMENT/PLAN:        Diana was seen today for refill request.    Diagnoses and all orders for this visit:    Bipolar affective disorder, remission status unspecified (H)  - stable  - continue current therapy    Anxiety  - stable  - Valium medication refilled today    Benign essential hypertension  - stable, continue current therapy      Seasonal allergic rhinitis, unspecified trigger  - stable, continue current therapy        Follow Up Plan:     Patient is instructed to return to Internal Medicine clinic for follow-up visit in 3 months.        Gayle Hernandez MD  Internal Medicine  [facility]      telephone visit duration including chart review, medication management: 23 minutes

## 2024-09-23 PROBLEM — D32.9 MENINGIOMA (H): Status: RESOLVED | Noted: 2022-07-22 | Resolved: 2024-09-23

## 2024-09-23 RX ORDER — DIAZEPAM 2 MG
TABLET ORAL
Qty: 7 TABLET | Refills: 4 | Status: SHIPPED | OUTPATIENT
Start: 2024-09-23

## 2024-09-25 NOTE — PROGRESS NOTES
[place] [facility]    Diana Santiago returns for follow-up.  She has a history of end-stage renal disease.    -- 1/30/2024: For stage right upper arm proximal ulnar to brachial transposition arteriovenous fistula    --4/2/2024: Second stage transposition fistula    Patient has not yet initiated hemodialysis.  She was seen on 5/21/2024 and was created here late last night and a very low early will go very well doing well.  There was a slight stenosis closer to the elbow measuring 3.5 mm.  Beyond this it measured 6 to 8 mm in diameter.  Excellent outflow volume= 1549 mL/min.  We recommended 3-month follow-up of the stenotic area.    9/12/2024 laboratory: K= 5.4 SCr= 2.83 (decreased from 3.18)  Hgb=10.1    ROS: Continues to do well.  No discomfort to her right hand.  Has not needed to initiate dialysis and not planned in the near future.  Followed by Dr. Cedeno.      Uses a wheeled walker    Exam: Alert and appropriate.  Normal affect.  Arrives with her walker.   Blood pressure 126/82 left arm.  Pulse 64.   Chest = clear   Cardiovascular= regular rate   Very well dilated right upper arm fistula.  Just above the elbow crease there is a bend    In the fistula and a palpable narrowing consistent with ultrasound.   Hand is somewhat cool.   Bedside mono to biphasic ulnar flow but slightly augments with occlusion of the fistula  Very weak flow in the radial artery.         Duplex reveals a widely patent fistula.  There is a area of narrowing that is now 3.2 mm at the site noted clinically.  Fistula beyond this is excellent averaging approximately 7 mm in diameter.  Excellent outflow volume of approximately 1600 mL/min.        IMPRESSION:   #1.  Well-functioning upper arm fistula.  This would be ready to use at anytime if she would require hemodialysis and this was discussed with the patient.    #2.  She does have a focal stenosis that is slightly narrower but not affecting the function of the fistula.  I do not feel  this will be in a concern.  I also have some concerns that if this is corrected she may develop a clinical steal to her hand and this was discussed.  Thus, we will continue to follow this with a repeat fistula duplex in approximately 4 months.    20 minutes with patient today.    Kevin Royal MD   This note was created using Dragon voice recognition software which may result in transcription errors.

## 2024-09-26 ENCOUNTER — HOSPITAL ENCOUNTER (OUTPATIENT)
Dept: ULTRASOUND IMAGING | Facility: CLINIC | Age: 74
Discharge: HOME | End: 2024-09-26
Attending: SURGERY
Payer: MEDICARE

## 2024-09-26 ENCOUNTER — LAB (OUTPATIENT)
Dept: LAB | Facility: CLINIC | Age: 74
End: 2024-09-26
Attending: SURGERY
Payer: MEDICARE

## 2024-09-26 ENCOUNTER — OFFICE VISIT (OUTPATIENT)
Dept: OTHER | Facility: CLINIC | Age: 74
End: 2024-09-26
Attending: SURGERY
Payer: MEDICARE

## 2024-09-26 VITALS — HEART RATE: 64 BPM | DIASTOLIC BLOOD PRESSURE: 82 MMHG | SYSTOLIC BLOOD PRESSURE: 126 MMHG

## 2024-09-26 DIAGNOSIS — N18.6 END STAGE RENAL DISEASE (H): Primary | ICD-10-CM

## 2024-09-26 DIAGNOSIS — E21.3 HYPERPARATHYROIDISM (H): ICD-10-CM

## 2024-09-26 DIAGNOSIS — I77.0 ARTERIOVENOUS FISTULA (H): ICD-10-CM

## 2024-09-26 DIAGNOSIS — N18.4 CHRONIC KIDNEY DISEASE, STAGE 4 (SEVERE) (H): ICD-10-CM

## 2024-09-26 DIAGNOSIS — T82.898A OTHER SPECIFIED COMPLICATION OF VASCULAR PROSTHETIC DEVICES, IMPLANTS AND GRAFTS, INITIAL ENCOUNTER (H): ICD-10-CM

## 2024-09-26 DIAGNOSIS — N18.32 STAGE 3B CHRONIC KIDNEY DISEASE (H): ICD-10-CM

## 2024-09-26 DIAGNOSIS — N18.6 END STAGE RENAL DISEASE (H): ICD-10-CM

## 2024-09-26 LAB
ALBUMIN SERPL BCG-MCNC: 4.3 G/DL (ref 3.5–5.2)
ANION GAP SERPL CALCULATED.3IONS-SCNC: 9 MMOL/L (ref 7–15)
BUN SERPL-MCNC: 50.2 MG/DL (ref 8–23)
CALCIUM SERPL-MCNC: 10.4 MG/DL (ref 8.8–10.4)
CHLORIDE SERPL-SCNC: 111 MMOL/L (ref 98–107)
CREAT SERPL-MCNC: 3.01 MG/DL (ref 0.51–0.95)
EGFRCR SERPLBLD CKD-EPI 2021: 16 ML/MIN/1.73M2
GLUCOSE SERPL-MCNC: 100 MG/DL (ref 70–99)
HCO3 SERPL-SCNC: 24 MMOL/L (ref 22–29)
PHOSPHATE SERPL-MCNC: 3.4 MG/DL (ref 2.5–4.5)
POTASSIUM SERPL-SCNC: 5.3 MMOL/L (ref 3.4–5.3)
PTH-INTACT SERPL-MCNC: 287 PG/ML (ref 15–65)
SODIUM SERPL-SCNC: 144 MMOL/L (ref 135–145)
VIT D+METAB SERPL-MCNC: 29 NG/ML (ref 20–50)

## 2024-09-26 PROCEDURE — 82306 VITAMIN D 25 HYDROXY: CPT

## 2024-09-26 PROCEDURE — 83970 ASSAY OF PARATHORMONE: CPT

## 2024-09-26 PROCEDURE — 80069 RENAL FUNCTION PANEL: CPT

## 2024-09-26 PROCEDURE — 36415 COLL VENOUS BLD VENIPUNCTURE: CPT

## 2024-09-26 PROCEDURE — G0463 HOSPITAL OUTPT CLINIC VISIT: HCPCS | Performed by: SURGERY

## 2024-09-26 PROCEDURE — 99213 OFFICE O/P EST LOW 20 MIN: CPT | Performed by: SURGERY

## 2024-09-26 PROCEDURE — 93990 DOPPLER FLOW TESTING: CPT

## 2024-10-02 DIAGNOSIS — I10 ESSENTIAL HYPERTENSION: ICD-10-CM

## 2024-10-02 RX ORDER — AMILORIDE HYDROCHLORIDE 5 MG/1
TABLET ORAL
Qty: 72 TABLET | Refills: 97 | OUTPATIENT
Start: 2024-10-02

## 2024-10-03 ENCOUNTER — LAB (OUTPATIENT)
Dept: LAB | Facility: CLINIC | Age: 74
End: 2024-10-03
Payer: MEDICARE

## 2024-10-03 DIAGNOSIS — N18.32 STAGE 3B CHRONIC KIDNEY DISEASE (H): ICD-10-CM

## 2024-10-03 LAB
ANION GAP SERPL CALCULATED.3IONS-SCNC: 8 MMOL/L (ref 7–15)
BUN SERPL-MCNC: 55 MG/DL (ref 8–23)
CALCIUM SERPL-MCNC: 10.1 MG/DL (ref 8.8–10.4)
CHLORIDE SERPL-SCNC: 105 MMOL/L (ref 98–107)
CREAT SERPL-MCNC: 3.17 MG/DL (ref 0.51–0.95)
EGFRCR SERPLBLD CKD-EPI 2021: 15 ML/MIN/1.73M2
GLUCOSE SERPL-MCNC: 84 MG/DL (ref 70–99)
HCO3 SERPL-SCNC: 22 MMOL/L (ref 22–29)
POTASSIUM SERPL-SCNC: 5.8 MMOL/L (ref 3.4–5.3)
SODIUM SERPL-SCNC: 135 MMOL/L (ref 135–145)

## 2024-10-03 PROCEDURE — 80048 BASIC METABOLIC PNL TOTAL CA: CPT

## 2024-10-03 PROCEDURE — 36415 COLL VENOUS BLD VENIPUNCTURE: CPT

## 2024-10-10 ENCOUNTER — TELEPHONE (OUTPATIENT)
Dept: INFUSION THERAPY | Facility: CLINIC | Age: 74
End: 2024-10-10

## 2024-10-10 ENCOUNTER — LAB (OUTPATIENT)
Dept: LAB | Facility: CLINIC | Age: 74
End: 2024-10-10
Payer: MEDICARE

## 2024-10-10 DIAGNOSIS — N18.32 STAGE 3B CHRONIC KIDNEY DISEASE (H): ICD-10-CM

## 2024-10-10 DIAGNOSIS — D64.9 CHRONIC ANEMIA: ICD-10-CM

## 2024-10-10 LAB
ANION GAP SERPL CALCULATED.3IONS-SCNC: 9 MMOL/L (ref 7–15)
BUN SERPL-MCNC: 51.4 MG/DL (ref 8–23)
CALCIUM SERPL-MCNC: 10 MG/DL (ref 8.8–10.4)
CHLORIDE SERPL-SCNC: 106 MMOL/L (ref 98–107)
CREAT SERPL-MCNC: 3.19 MG/DL (ref 0.51–0.95)
EGFRCR SERPLBLD CKD-EPI 2021: 15 ML/MIN/1.73M2
FERRITIN SERPL-MCNC: 253 NG/ML (ref 11–328)
GLUCOSE SERPL-MCNC: 92 MG/DL (ref 70–99)
HCO3 SERPL-SCNC: 23 MMOL/L (ref 22–29)
HGB BLD-MCNC: 9.3 G/DL (ref 11.7–15.7)
IRON BINDING CAPACITY (ROCHE): 290 UG/DL (ref 240–430)
IRON SATN MFR SERPL: 33 % (ref 15–46)
IRON SERPL-MCNC: 95 UG/DL (ref 37–145)
POTASSIUM SERPL-SCNC: 5.3 MMOL/L (ref 3.4–5.3)
SODIUM SERPL-SCNC: 138 MMOL/L (ref 135–145)

## 2024-10-10 PROCEDURE — 83550 IRON BINDING TEST: CPT

## 2024-10-10 PROCEDURE — 80048 BASIC METABOLIC PNL TOTAL CA: CPT

## 2024-10-10 PROCEDURE — 36415 COLL VENOUS BLD VENIPUNCTURE: CPT

## 2024-10-10 PROCEDURE — 85018 HEMOGLOBIN: CPT

## 2024-10-10 PROCEDURE — 82728 ASSAY OF FERRITIN: CPT

## 2024-10-10 NOTE — PROGRESS NOTES
This RN attempted to call the patient to inform her that her Hgb drawn today is 9.3 and she does meet criteria for her scheduled Aranesp injection scheduled tomorrow 10/11/24 @ 1345.  Patient is scheduled for labs tomorrow at 1300 and will not need those labs redrawn as she has already had the appropriate labs drawn today. Via voice mail, patient updated that tomorrows lab draw has dilia cancelled but to come in for her injection as scheduled @ 1345.

## 2024-10-11 ENCOUNTER — ALLIED HEALTH/NURSE VISIT (OUTPATIENT)
Dept: INFUSION THERAPY | Facility: CLINIC | Age: 74
End: 2024-10-11
Attending: INTERNAL MEDICINE
Payer: MEDICARE

## 2024-10-11 VITALS
TEMPERATURE: 98.9 F | SYSTOLIC BLOOD PRESSURE: 116 MMHG | OXYGEN SATURATION: 100 % | DIASTOLIC BLOOD PRESSURE: 80 MMHG | RESPIRATION RATE: 18 BRPM | HEART RATE: 76 BPM

## 2024-10-11 DIAGNOSIS — D63.1 ANEMIA IN CHRONIC KIDNEY DISEASE, UNSPECIFIED CKD STAGE: Primary | ICD-10-CM

## 2024-10-11 DIAGNOSIS — N18.4 CHRONIC RENAL DISEASE, STAGE IV (H): ICD-10-CM

## 2024-10-11 DIAGNOSIS — N18.9 ANEMIA IN CHRONIC KIDNEY DISEASE, UNSPECIFIED CKD STAGE: Primary | ICD-10-CM

## 2024-10-11 PROCEDURE — 96372 THER/PROPH/DIAG INJ SC/IM: CPT | Performed by: PHYSICIAN ASSISTANT

## 2024-10-11 PROCEDURE — 250N000011 HC RX IP 250 OP 636: Mod: JZ | Performed by: PHYSICIAN ASSISTANT

## 2024-10-11 RX ORDER — METHYLPREDNISOLONE SODIUM SUCCINATE 125 MG/2ML
125 INJECTION INTRAMUSCULAR; INTRAVENOUS
Status: CANCELLED
Start: 2024-11-08

## 2024-10-11 RX ORDER — ALBUTEROL SULFATE 0.83 MG/ML
2.5 SOLUTION RESPIRATORY (INHALATION)
Status: CANCELLED | OUTPATIENT
Start: 2024-11-08

## 2024-10-11 RX ORDER — MEPERIDINE HYDROCHLORIDE 25 MG/ML
25 INJECTION INTRAMUSCULAR; INTRAVENOUS; SUBCUTANEOUS EVERY 30 MIN PRN
Status: CANCELLED | OUTPATIENT
Start: 2024-11-08

## 2024-10-11 RX ORDER — EPINEPHRINE 1 MG/ML
0.3 INJECTION, SOLUTION INTRAMUSCULAR; SUBCUTANEOUS EVERY 5 MIN PRN
Status: CANCELLED | OUTPATIENT
Start: 2024-11-08

## 2024-10-11 RX ORDER — ALBUTEROL SULFATE 90 UG/1
1-2 INHALANT RESPIRATORY (INHALATION)
Status: CANCELLED
Start: 2024-11-08

## 2024-10-11 RX ORDER — DIPHENHYDRAMINE HYDROCHLORIDE 50 MG/ML
50 INJECTION INTRAMUSCULAR; INTRAVENOUS
Status: CANCELLED
Start: 2024-11-08

## 2024-10-11 RX ORDER — NALOXONE HYDROCHLORIDE 0.4 MG/ML
0.2 INJECTION, SOLUTION INTRAMUSCULAR; INTRAVENOUS; SUBCUTANEOUS
Status: CANCELLED | OUTPATIENT
Start: 2024-11-08

## 2024-10-11 RX ADMIN — DARBEPOETIN ALFA 25 MCG: 25 INJECTION, SOLUTION INTRAVENOUS; SUBCUTANEOUS at 13:50

## 2024-10-11 RX ADMIN — DARBEPOETIN ALFA 100 MCG: 100 INJECTION, SOLUTION INTRAVENOUS; SUBCUTANEOUS at 13:49

## 2024-10-11 ASSESSMENT — PAIN SCALES - GENERAL: PAINLEVEL: MODERATE PAIN (5)

## 2024-10-11 NOTE — PROGRESS NOTES
Infusion Nursing Note:  Diana Santiago presents today for aranesp.    Patient seen by provider today: No   present during visit today: Not Applicable.    Note: Patient has no new concerns today, reports she tolerates aranesp injections without issue. Patient confirms she is not receiving dialysis and is not receiving IV iron infusions.      Intravenous Access:  No Intravenous access/labs at this visit.    Treatment Conditions:  10/10/24: Hgb 9.3  BP today: 116/80  Results reviewed, labs MET treatment parameters, ok to proceed with treatment.      Post Infusion Assessment:  Patient tolerated injections without incident. 2 syringes given subcutaneous in left upper arm.  Site patent and intact, free from redness, edema or discomfort.       Discharge Plan:   Patient discharged in stable condition accompanied by: self.  Departure Mode: Ambulatory with walker.      Maria Esther Yeung RN

## 2024-10-17 ENCOUNTER — LAB (OUTPATIENT)
Dept: LAB | Facility: CLINIC | Age: 74
End: 2024-10-17
Payer: MEDICARE

## 2024-10-17 DIAGNOSIS — N18.32 STAGE 3B CHRONIC KIDNEY DISEASE (H): ICD-10-CM

## 2024-10-17 LAB
ANION GAP SERPL CALCULATED.3IONS-SCNC: 11 MMOL/L (ref 7–15)
BUN SERPL-MCNC: 52 MG/DL (ref 8–23)
CALCIUM SERPL-MCNC: 9.9 MG/DL (ref 8.8–10.4)
CHLORIDE SERPL-SCNC: 107 MMOL/L (ref 98–107)
CREAT SERPL-MCNC: 3.5 MG/DL (ref 0.51–0.95)
EGFRCR SERPLBLD CKD-EPI 2021: 13 ML/MIN/1.73M2
GLUCOSE SERPL-MCNC: 103 MG/DL (ref 70–99)
HCO3 SERPL-SCNC: 21 MMOL/L (ref 22–29)
POTASSIUM SERPL-SCNC: 5.8 MMOL/L (ref 3.4–5.3)
SODIUM SERPL-SCNC: 139 MMOL/L (ref 135–145)

## 2024-10-17 PROCEDURE — 36415 COLL VENOUS BLD VENIPUNCTURE: CPT

## 2024-10-17 PROCEDURE — 80048 BASIC METABOLIC PNL TOTAL CA: CPT

## 2024-10-21 DIAGNOSIS — I10 ESSENTIAL HYPERTENSION: ICD-10-CM

## 2024-10-21 RX ORDER — AMILORIDE HYDROCHLORIDE 5 MG/1
TABLET ORAL
Qty: 72 TABLET | Refills: 97 | Status: SHIPPED | OUTPATIENT
Start: 2024-10-21

## 2024-10-21 RX ORDER — AMILORIDE HYDROCHLORIDE 5 MG/1
TABLET ORAL
Qty: 72 TABLET | Refills: 0 | OUTPATIENT
Start: 2024-10-21

## 2024-10-24 ENCOUNTER — LAB (OUTPATIENT)
Dept: LAB | Facility: CLINIC | Age: 74
End: 2024-10-24
Payer: MEDICARE

## 2024-10-24 DIAGNOSIS — N18.32 STAGE 3B CHRONIC KIDNEY DISEASE (H): ICD-10-CM

## 2024-10-24 LAB
ANION GAP SERPL CALCULATED.3IONS-SCNC: 10 MMOL/L (ref 7–15)
BUN SERPL-MCNC: 55.2 MG/DL (ref 8–23)
CALCIUM SERPL-MCNC: 10.2 MG/DL (ref 8.8–10.4)
CHLORIDE SERPL-SCNC: 108 MMOL/L (ref 98–107)
CREAT SERPL-MCNC: 3.36 MG/DL (ref 0.51–0.95)
EGFRCR SERPLBLD CKD-EPI 2021: 14 ML/MIN/1.73M2
GLUCOSE SERPL-MCNC: 135 MG/DL (ref 70–99)
HCO3 SERPL-SCNC: 23 MMOL/L (ref 22–29)
POTASSIUM SERPL-SCNC: 5.2 MMOL/L (ref 3.4–5.3)
SODIUM SERPL-SCNC: 141 MMOL/L (ref 135–145)

## 2024-10-24 PROCEDURE — 36415 COLL VENOUS BLD VENIPUNCTURE: CPT

## 2024-10-24 PROCEDURE — 80048 BASIC METABOLIC PNL TOTAL CA: CPT

## 2024-10-27 RX ORDER — EPINEPHRINE 1 MG/ML
0.3 INJECTION, SOLUTION, CONCENTRATE INTRAVENOUS EVERY 5 MIN PRN
OUTPATIENT
Start: 2024-10-27

## 2024-10-27 RX ORDER — ALBUTEROL SULFATE 0.83 MG/ML
2.5 SOLUTION RESPIRATORY (INHALATION)
OUTPATIENT
Start: 2024-10-27

## 2024-10-27 RX ORDER — DIPHENHYDRAMINE HYDROCHLORIDE 50 MG/ML
25 INJECTION INTRAMUSCULAR; INTRAVENOUS
Start: 2024-10-27

## 2024-10-27 RX ORDER — METHYLPREDNISOLONE SODIUM SUCCINATE 40 MG/ML
40 INJECTION INTRAMUSCULAR; INTRAVENOUS
Start: 2024-10-27

## 2024-10-27 RX ORDER — ALBUTEROL SULFATE 90 UG/1
1-2 INHALANT RESPIRATORY (INHALATION)
Start: 2024-10-27

## 2024-10-27 RX ORDER — DIPHENHYDRAMINE HYDROCHLORIDE 50 MG/ML
50 INJECTION INTRAMUSCULAR; INTRAVENOUS
Start: 2024-10-27

## 2024-10-27 RX ORDER — MEPERIDINE HYDROCHLORIDE 25 MG/ML
25 INJECTION INTRAMUSCULAR; INTRAVENOUS; SUBCUTANEOUS
OUTPATIENT
Start: 2024-10-27

## 2024-10-31 ENCOUNTER — LAB (OUTPATIENT)
Dept: LAB | Facility: CLINIC | Age: 74
End: 2024-10-31
Payer: MEDICARE

## 2024-10-31 DIAGNOSIS — N18.32 STAGE 3B CHRONIC KIDNEY DISEASE (H): ICD-10-CM

## 2024-10-31 LAB
ANION GAP SERPL CALCULATED.3IONS-SCNC: 14 MMOL/L (ref 7–15)
BUN SERPL-MCNC: 63.3 MG/DL (ref 8–23)
CALCIUM SERPL-MCNC: 9.8 MG/DL (ref 8.8–10.4)
CHLORIDE SERPL-SCNC: 103 MMOL/L (ref 98–107)
CREAT SERPL-MCNC: 4.74 MG/DL (ref 0.51–0.95)
EGFRCR SERPLBLD CKD-EPI 2021: 9 ML/MIN/1.73M2
GLUCOSE SERPL-MCNC: 156 MG/DL (ref 70–99)
HCO3 SERPL-SCNC: 22 MMOL/L (ref 22–29)
POTASSIUM SERPL-SCNC: 5.7 MMOL/L (ref 3.4–5.3)
SODIUM SERPL-SCNC: 139 MMOL/L (ref 135–145)

## 2024-10-31 PROCEDURE — 80048 BASIC METABOLIC PNL TOTAL CA: CPT

## 2024-10-31 PROCEDURE — 36415 COLL VENOUS BLD VENIPUNCTURE: CPT

## 2024-11-01 ENCOUNTER — LAB (OUTPATIENT)
Dept: LAB | Facility: CLINIC | Age: 74
End: 2024-11-01
Payer: MEDICARE

## 2024-11-01 DIAGNOSIS — I12.0 UNSPECIFIED HYPERTENSIVE KIDNEY DISEASE WITH CHRONIC KIDNEY DISEASE STAGE V OR END STAGE RENAL DISEASE(403.91) (H): ICD-10-CM

## 2024-11-01 DIAGNOSIS — N18.5 UNSPECIFIED HYPERTENSIVE KIDNEY DISEASE WITH CHRONIC KIDNEY DISEASE STAGE V OR END STAGE RENAL DISEASE(403.91) (H): ICD-10-CM

## 2024-11-01 DIAGNOSIS — E87.5 HYPERPOTASSEMIA: ICD-10-CM

## 2024-11-01 DIAGNOSIS — Z11.1 SCREENING EXAMINATION FOR PULMONARY TUBERCULOSIS: ICD-10-CM

## 2024-11-01 DIAGNOSIS — Z11.59 SCREENING EXAMINATION FOR POLIOMYELITIS: ICD-10-CM

## 2024-11-01 LAB
ANION GAP SERPL CALCULATED.3IONS-SCNC: 16 MMOL/L (ref 7–15)
BUN SERPL-MCNC: 70 MG/DL (ref 8–23)
CALCIUM SERPL-MCNC: 9.7 MG/DL (ref 8.8–10.4)
CHLORIDE SERPL-SCNC: 103 MMOL/L (ref 98–107)
CREAT SERPL-MCNC: 4.94 MG/DL (ref 0.51–0.95)
EGFRCR SERPLBLD CKD-EPI 2021: 9 ML/MIN/1.73M2
GLUCOSE SERPL-MCNC: 143 MG/DL (ref 70–99)
HBV CORE AB SERPL QL IA: NONREACTIVE
HBV SURFACE AG SERPL QL IA: NONREACTIVE
HCO3 SERPL-SCNC: 20 MMOL/L (ref 22–29)
POTASSIUM SERPL-SCNC: 5 MMOL/L (ref 3.4–5.3)
SODIUM SERPL-SCNC: 139 MMOL/L (ref 135–145)

## 2024-11-01 PROCEDURE — 82947 ASSAY GLUCOSE BLOOD QUANT: CPT

## 2024-11-01 PROCEDURE — 86481 TB AG RESPONSE T-CELL SUSP: CPT

## 2024-11-01 PROCEDURE — 86706 HEP B SURFACE ANTIBODY: CPT

## 2024-11-01 PROCEDURE — 36415 COLL VENOUS BLD VENIPUNCTURE: CPT

## 2024-11-01 PROCEDURE — 86704 HEP B CORE ANTIBODY TOTAL: CPT

## 2024-11-01 PROCEDURE — 87340 HEPATITIS B SURFACE AG IA: CPT

## 2024-11-01 PROCEDURE — 80048 BASIC METABOLIC PNL TOTAL CA: CPT

## 2024-11-02 LAB
GAMMA INTERFERON BACKGROUND BLD IA-ACNC: 5.38 IU/ML
HBV SURFACE AB SERPL IA-ACNC: <3.5 M[IU]/ML
HBV SURFACE AB SERPL IA-ACNC: NONREACTIVE M[IU]/ML
M TB IFN-G BLD-IMP: ABNORMAL
M TB IFN-G CD4+ BCKGRND COR BLD-ACNC: -4.84 IU/ML
MITOGEN IGNF BCKGRD COR BLD-ACNC: -5.38 IU/ML
MITOGEN IGNF BCKGRD COR BLD-ACNC: -5.38 IU/ML
QUANTIFERON MITOGEN: 0.54 IU/ML
QUANTIFERON NIL TUBE: 5.38 IU/ML
QUANTIFERON TB1 TUBE: 0 IU/ML
QUANTIFERON TB2 TUBE: 0

## 2024-11-04 ENCOUNTER — OFFICE VISIT (OUTPATIENT)
Dept: FAMILY MEDICINE | Facility: CLINIC | Age: 74
End: 2024-11-04
Payer: MEDICARE

## 2024-11-04 ENCOUNTER — HOSPITAL ENCOUNTER (OUTPATIENT)
Dept: GENERAL RADIOLOGY | Facility: CLINIC | Age: 74
Discharge: HOME | End: 2024-11-04
Attending: PHYSICIAN ASSISTANT | Admitting: PHYSICIAN ASSISTANT
Payer: MEDICARE

## 2024-11-04 VITALS
DIASTOLIC BLOOD PRESSURE: 57 MMHG | WEIGHT: 123.4 LBS | SYSTOLIC BLOOD PRESSURE: 86 MMHG | HEART RATE: 69 BPM | OXYGEN SATURATION: 97 % | HEIGHT: 63 IN | TEMPERATURE: 98 F | BODY MASS INDEX: 21.86 KG/M2 | RESPIRATION RATE: 18 BRPM

## 2024-11-04 DIAGNOSIS — Z11.1 SCREENING EXAMINATION FOR PULMONARY TUBERCULOSIS: ICD-10-CM

## 2024-11-04 DIAGNOSIS — M79.662 PAIN OF LEFT LOWER LEG: ICD-10-CM

## 2024-11-04 DIAGNOSIS — R60.0 LEG EDEMA, LEFT: Primary | ICD-10-CM

## 2024-11-04 PROCEDURE — 71046 X-RAY EXAM CHEST 2 VIEWS: CPT

## 2024-11-04 PROCEDURE — 99214 OFFICE O/P EST MOD 30 MIN: CPT | Performed by: INTERNAL MEDICINE

## 2024-11-04 ASSESSMENT — PAIN SCALES - GENERAL: PAINLEVEL_OUTOF10: MODERATE PAIN (4)

## 2024-11-04 NOTE — PATIENT INSTRUCTIONS
Try to keep the left leg elevated as much as possible.    If your blood pressure is staying below 95 systolic let us know.    If the leg redness worsens call us.    Follow up for an office visit to have the leg checked this coming Tuesday with Alyson Duncan the ultrasound of you leg today or tomorrow.    Suleiman Miller M.D.

## 2024-11-04 NOTE — PROGRESS NOTES
The patient presents with her son for left leg issue.  She fell out of bed 3 weeks ago.  Since then she has had some soreness with leg redness.  She is able to walk on it without difficulty.  No history of blood clots.  She is not ill with no fevers chills or sweats.  Her past medical history as noted and reviewed.  She has chronic kidney disease.    Past Medical History:   Diagnosis Date    Benign essential hypertension 09/2018    added norvasc 9/18    Bipolar affective disorder (H)     hosp 1993, Dr. Aftab Deal    Cancer (H) 1996    DCIS, left breast    Chronic kidney disease, stage 3a (H)     seen by renal Dr. 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    felt due to lithium    Thalassaemia trait     Vitamin D deficiency      Past Surgical History:   Procedure Laterality Date    BIOPSY  1994    left breast    BREAST SURGERY      lumpectomy x 4    COLONOSCOPY  2021    COLONOSCOPY N/A 6/17/2022    Procedure: COLONOSCOPY, WITH POLYPECTOMY AND BIOPSY;  Surgeon: Panchito Gu MD;  Location:  GI    CREATE FISTULA ARTERIOVENOUS UPPER EXTREMITY Right 1/30/2024    Procedure: CREATION OF FIRST STAGE RIGHT UPPER ARM ULNAR TO BASILIC ARTERIOVENOUS FISTULA;  Surgeon: Kevin Royal MD;  Location:  OR    CREATE FISTULA ARTERIOVENOUS UPPER EXTREMITY Right 4/2/2024    Procedure: SECOND STAGE TRANSPOSITION OF RIGHT UPPER ARM ULNAR TO BASILIC ARTERIOVENOUS FISTULA;  Surgeon: Kevin Royal MD;  Location:  OR    EYE SURGERY  2018    retina tear    LAPAROTOMY EXPLORATORY N/A 8/24/2022     Procedure: Exploratory laparotomy, REPAIR OF PERFORATED ULCER;  Surgeon: Ron Vidal MD;  Location: [facility] OR    left hand surgery      last 1974     Social History     Socioeconomic History    Marital status: Single     Spouse name: Not on file    Number of children: 0    Years of education: Not on file    Highest education level: Not on file   Occupational History    Occupation: , retired   Tobacco Use    Smoking status: Never    Smokeless tobacco: Never   Vaping Use    Vaping status: Never Used   Substance and Sexual Activity    Alcohol use: No    Drug use: No    Sexual activity: Not Currently     Partners: Male     Birth control/protection: Post-menopausal, None   Other Topics Concern    Parent/sibling w/ CABG, MI or angioplasty before 65F 55M? No   Social History Narrative    Not on file     Social Drivers of Health     Financial Resource Strain: Low Risk  (1/17/2024)    Financial Resource Strain     Within the past 12 months, have you or your family members you live with been unable to get utilities (heat, electricity) when it was really needed?: No   Food Insecurity: Low Risk  (1/17/2024)    Food Insecurity     Within the past 12 months, did you worry that your food would run out before you got money to buy more?: No     Within the past 12 months, did the food you bought just not last and you didn t have money to get more?: No   Transportation Needs: Low Risk  (1/17/2024)    Transportation Needs     Within the past 12 months, has lack of transportation kept you from medical appointments, getting your medicines, non-medical meetings or appointments, work, or from getting things that you need?: No   Physical Activity: Not on file   Stress: Not on file   Social Connections: Not on file   Interpersonal Safety: Low Risk  (9/20/2024)    Interpersonal Safety     Do you feel physically and emotionally safe where you currently live?: Yes     Within the past 12 months, have you been hit,  slapped, kicked or otherwise physically hurt by someone?: No     Within the past 12 months, have you been humiliated or emotionally abused in other ways by your partner or ex-partner?: No   Housing Stability: Low Risk  (1/17/2024)    Housing Stability     Do you have housing? : Yes     Are you worried about losing your housing?: No     Current Outpatient Medications   Medication Sig Dispense Refill    acetaminophen (TYLENOL) 500 MG tablet TAKE TWO TABLETS (1000MG) BY MOUTH AT BEDTIME 56 tablet 97    aMILoride (MIDAMOR) 5 MG tablet TAKE ONE TABLET BY MOUTH DAILY 6 DAYS/WEEK 72 tablet 97    amLODIPine (NORVASC) 5 MG tablet TAKE 1 TABLET BY MOUTH ONCE DAILY (HOLD FOR SBP LESS THAN 100) 90 tablet 1    darbepoetin mali (ARANESP, ALBUMIN FREE,) 100 MCG/ML injection Inject subcutaneously.      diazepam (VALIUM) 2 MG tablet TAKE ONE-HALF TABLET (1MG) BY MOUTH EVERY OTHER DAY 7 tablet 4    FEROSUL 325 (65 Fe) MG tablet TAKE TWO TABLETS (650MG) BY MOUTH EVERY OTHER DAY 84 tablet 97    fexofenadine (ALLEGRA) 180 MG tablet TAKE 1 TABLET BY MOUTH ONCE DAILY 90 tablet 1    lithium (ESKALITH) 150 MG capsule TAKE 1 CAPSULE BY MOUTH THREE TIMES DAILY WITH MEALS 270 capsule 1    pantoprazole (PROTONIX) 40 MG EC tablet TAKE 1 TABLET BY MOUTH ONCE DAILY 90 tablet 2    simvastatin (ZOCOR) 40 MG tablet TAKE 1 TABLET BY MOUTH AT BEDTIME 90 tablet 1    sodium bicarbonate 650 MG tablet TAKE THREE TABLETS (1950 MG) BY MOUTH FOUR TIMES A DAY 90 tablet 3    sodium bicarbonate 650 MG tablet Take 3 tablets (1,950 mg) by mouth 3 times daily 540 tablet 0    ACE/ARB/ARNI NOT PRESCRIBED (INTENTIONAL) Please choose reason not prescribed from choices below. (Patient not taking: Reported on 11/4/2024)       Allergies   Allergen Reactions    Candesartan Other (See Comments)     ALL ARBs CAUSE RENAL FAILURE WHEN TAKEN WITH LITHIUM Patient requires lithium for Bipolar disorder. Hx of suicide attempt when off Lithium    Irbesartan Other (See Comments)      "ALL ARBs CAUSE RENAL FAILURE WHEN TAKEN WITH LITHIUM Patient requires lithium for Bipolar disorder. Hx of suicide attempt when off Lithium    Losartan Other (See Comments)     ALL ARBs CAUSE RENAL FAILURE WHEN TAKEN WITH LITHIUM Patient requires lithium for Bipolar disorder. Hx of suicide attempt when off Lithium    Valsartan Other (See Comments)     ALL ARBs CAUSE RENAL FAILURE WHEN TAKEN WITH LITHIUM Patient requires lithium for Bipolar disorder. Hx of suicide attempt when off Lithium    Ace Inhibitors      Other reaction(s): renal failure  She can't be on an ACEI or ARB while on Goss.     FAMILY HISTORY NOTED AND REVIEWED    REVIEW OF SYSTEMS: above    PHYSICAL EXAM    BP (!) 86/57 (BP Location: Left arm)   Pulse 69   Temp 98  F (36.7  C) (Temporal)   Resp 18   Ht 1.6 m (5' 3\")   Wt 56 kg (123 lb 6.4 oz)   LMP  (LMP Unknown)   SpO2 97%   BMI 21.86 kg/m      Patient appears non toxic  Please see picture for appearance of left leg.    The leg itself is somewhat tight, a bit warm.  It is hard to feel distal pulses but her foot is warm and has normal capillary refill.  Sensation is intact as well.    stat ultrasound ordered    ASSESSMENT:  Contusion of left leg, some swelling now which I suspect is due to the contusion.  However, I do think she needs immediate evaluation for DVT and I will order that.  I do not think that this represents cellulitis, I think the redness is from the bruising and the tightness of her leg and I would rather not add antibiotics if not necessary.  For now I have asked her to elevate the leg is much as possible.  Get the ultrasound.  Follow-up with Belen in 1 week but call if worsens.    Suleiman Miller M.D.        "

## 2024-11-05 ENCOUNTER — HOSPITAL ENCOUNTER (OUTPATIENT)
Dept: ULTRASOUND IMAGING | Facility: CLINIC | Age: 74
Discharge: HOME | End: 2024-11-05
Attending: INTERNAL MEDICINE | Admitting: INTERNAL MEDICINE
Payer: MEDICARE

## 2024-11-05 DIAGNOSIS — R60.0 LEG EDEMA, LEFT: ICD-10-CM

## 2024-11-05 PROCEDURE — 93971 EXTREMITY STUDY: CPT | Mod: LT

## 2024-11-07 NOTE — RESULT ENCOUNTER NOTE
Your ultrasound shows no signs of a blood clot.  Please keep the leg elevated and follow-up with Belen as we discussed.    Suleiman Miller M.D.

## 2024-11-12 ENCOUNTER — OFFICE VISIT (OUTPATIENT)
Dept: FAMILY MEDICINE | Facility: CLINIC | Age: 74
End: 2024-11-12
Payer: MEDICARE

## 2024-11-12 VITALS
DIASTOLIC BLOOD PRESSURE: 71 MMHG | BODY MASS INDEX: 22.68 KG/M2 | HEART RATE: 69 BPM | SYSTOLIC BLOOD PRESSURE: 110 MMHG | OXYGEN SATURATION: 100 % | WEIGHT: 128 LBS | HEIGHT: 63 IN | TEMPERATURE: 98.7 F | RESPIRATION RATE: 16 BRPM

## 2024-11-12 DIAGNOSIS — R60.0 LEG EDEMA, LEFT: Primary | ICD-10-CM

## 2024-11-12 PROCEDURE — 99213 OFFICE O/P EST LOW 20 MIN: CPT | Performed by: PHYSICIAN ASSISTANT

## 2024-11-12 ASSESSMENT — PAIN SCALES - GENERAL: PAINLEVEL_OUTOF10: NO PAIN (0)

## 2024-11-12 NOTE — PROGRESS NOTES
"Assessment and Plan:     (R60.0) Leg edema, left  (primary encounter diagnosis)  Comment: here for follow-up on left lower leg abrasion/hematoma after sustaining a fall a few weeks ago, US was negative for DVT, she has been icing and elevating with good results, swelling and redness have improved considerably, no fever/chills or drainage  Plan: continue to elevate, ice alt with heat     CORDELIA Randall Same Day Provider     Subjective   Diana is a 73 year old, presenting for the following health issues:  Musculoskeletal Problem    History of Present Illness       Reason for visit:  Leg injury  Symptom onset:  3-7 days ago  Symptoms include:  Redness  Symptom intensity:  Mild  Symptom progression:  Improving  Had these symptoms before:  No  What makes it worse:  Touching  What makes it better:  Not touching   She is taking medications regularly.         From visit with pcp last week:    Contusion of left leg, some swelling now which I suspect is due to the contusion.  However, I do think she needs immediate evaluation for DVT and I will order that.  I do not think that this represents cellulitis, I think the redness is from the bruising and the tightness of her leg and I would rather not add antibiotics if not necessary.  For now I have asked her to elevate the leg is much as possible.  Get the ultrasound.  Follow-up with Belen in 1 week but call if worsens.     Diana and her brother feel her leg has improved significantly  The swelling and redness have both improved  She denies fever/chills, drainage        Objective    /71 (BP Location: Right arm, Patient Position: Left side, Cuff Size: Adult Regular)   Pulse 69   Temp 98.7  F (37.1  C) (Temporal)   Resp 16   Ht 1.6 m (5' 3\")   Wt 58.1 kg (128 lb)   LMP  (LMP Unknown)   SpO2 100%   BMI 22.67 kg/m    Body mass index is 22.67 kg/m .    Physical Exam     GENERAL: healthy, alert and no distress  RESP: lungs clear to auscultation - no " rales, no rhonchi, no wheezes  CV: regular rates and rhythm, normal S1 S2, no S3 or S4 and no murmur, no click or rub   MS: extremities- left lower ext with scabbed abrasion w/hematoma and erythema, see pic below, appears to be improving       Media Information      Document Information    Other: Photograph   Left lower leg   11/12/2024 2:27 PM   Attached To:   Office Visit on 11/12/24 with Citlali Xiao PA-C   Source Information    Citlali Xiao PA-C  [facility]   Document History          Signed Electronically by: Citlali Paulino PA-C

## 2024-11-20 NOTE — PROGRESS NOTES
Assessment and Plan:   CKD-4: follow with Dr. Cedeno. Now REI. She has DI due to chronic Li.     I/O 2496/2805. Wt up 5 kg since adm. BP low normal.   Na 147,K 3.6, HCO3 22, Cr 2.55.     Cont IVF, monitor labs, encourage po hydration.             Interval History:   Perforated ulcer with surgical repair. On zosyn.   Anemia  Hypotension:  Off pressors.       Resp failure: now extubated.                   Review of Systems:   C/O 1 episode of N and V. Taking liquids well. No pain or SOB. Moving the marquez.           Medications:       acetylcysteine  2 mL Nebulization 4x Daily     albuterol  2.5 mg Nebulization 4x Daily     heparin ANTICOAGULANT  5,000 Units Subcutaneous Q12H     insulin aspart  1-12 Units Subcutaneous Q4H     lamoTRIgine  100 mg Oral Daily     pantoprazole  40 mg Intravenous BID AC     piperacillin-tazobactam  2.25 g Intravenous Q6H     sodium chloride (PF)  3 mL Intracatheter Q8H     sodium chloride (PF)  3 mL Intracatheter Q8H       D5W 75 mL/hr at 08/31/22 0938     Current active medications and PTA medications reviewed, see medication list for details.            Physical Exam:   Vitals were reviewed  Patient Vitals for the past 24 hrs:   BP Temp Temp src Pulse Resp SpO2 Weight   08/31/22 0900 110/75 -- -- 85 21 -- --   08/31/22 0800 119/82 -- -- 74 21 91 % --   08/31/22 0700 120/83 -- -- 71 17 -- --   08/31/22 0600 122/78 -- -- 66 13 95 % --   08/31/22 0500 115/77 -- -- 77 23 92 % --   08/31/22 0400 116/81 97.8  F (36.6  C) Axillary 85 23 93 % --   08/31/22 0300 108/70 -- -- 81 13 94 % --   08/31/22 0200 134/86 -- -- 75 20 97 % 59 kg (130 lb 1.1 oz)   08/31/22 0100 -- -- -- -- -- 91 % --   08/31/22 0000 (!) 143/95 97.6  F (36.4  C) Axillary 79 19 96 % --   08/30/22 2300 (!) 144/98 -- -- 76 21 97 % --   08/30/22 2200 112/84 -- -- 92 22 92 % --   08/30/22 2100 132/87 -- -- 87 22 94 % --   08/30/22 2000 122/81 98.5  F (36.9  C) Axillary 86 23 92 % --   08/30/22 1900 (!) 139/94 -- -- 79 22 96 %  --   22 1800 (!) 138/99 -- -- 83 20 -- --   22 1700 (!) 130/91 -- -- 89 25 -- --   22 1601 -- -- -- -- -- 97 % --   22 1600 127/88 98.7  F (37.1  C) Axillary 79 16 97 % --   22 1500 (!) 129/90 -- -- 84 25 95 % --   22 1400 118/88 -- -- 86 28 94 % --   22 1300 111/77 -- -- 85 24 99 % --   22 1200 (!) 147/98 97.8  F (36.6  C) Axillary 84 22 98 % --       Temp:  [97.6  F (36.4  C)-98.7  F (37.1  C)] 97.8  F (36.6  C)  Pulse:  [66-92] 85  Resp:  [13-28] 21  BP: (108-147)/(70-99) 110/75  SpO2:  [91 %-99 %] 91 %    Temperatures:  Current - Temp: 97.8  F (36.6  C); Max - Temp  Av.1  F (36.7  C)  Min: 97.6  F (36.4  C)  Max: 98.7  F (37.1  C)  Respiration range: Resp  Av  Min: 13  Max: 28  Pulse range: Pulse  Av.1  Min: 66  Max: 92  Blood pressure range: Systolic (24hrs), Av , Min:108 , Max:147   ; Diastolic (24hrs), Av, Min:70, Max:99    Pulse oximetry range: SpO2  Av.7 %  Min: 91 %  Max: 99 %    I/O last 3 completed shifts:  In:  [P.O.:880; I.V.:1240]  Out:  [Urine:; Drains:185]      Intake/Output Summary (Last 24 hours) at 2022 1153  Last data filed at 2022 0900  Gross per 24 hour   Intake 1740 ml   Output 2365 ml   Net -625 ml       Alert and responsive  Lungs with clear BS  Cor RRR nl S1 S2 no M no JJVD  LE no edema       Wt Readings from Last 4 Encounters:   22 59 kg (130 lb 1.1 oz)   22 55.3 kg (122 lb)   22 59.9 kg (132 lb)   22 64 kg (141 lb)          Data:          Lab Results   Component Value Date     2022     2022     2022     2020     10/15/2019     10/23/2018    Lab Results   Component Value Date    CHLORIDE 116 2022    CHLORIDE 118 2022    CHLORIDE 116 2022    CHLORIDE 114 2020    CHLORIDE 115 10/15/2019    CHLORIDE 110 10/23/2018    Lab Results   Component Value Date    BUN 47 2022    BUN 53  08/30/2022    BUN 63 08/29/2022    BUN 34 12/21/2020    BUN 31 10/15/2019    BUN 29 10/23/2018      Lab Results   Component Value Date    POTASSIUM 3.6 08/31/2022    POTASSIUM 3.4 08/30/2022    POTASSIUM 4.2 08/29/2022    POTASSIUM 3.9 12/21/2020    POTASSIUM 4.0 10/15/2019    POTASSIUM 3.8 10/23/2018    Lab Results   Component Value Date    CO2 22 08/31/2022    CO2 23 08/30/2022    CO2 24 08/29/2022    CO2 19 12/21/2020    CO2 19 10/15/2019    CO2 24 10/23/2018    Lab Results   Component Value Date    CR 2.55 08/31/2022    CR 2.94 08/30/2022    CR 3.13 08/29/2022    CR 1.41 03/13/2021    CR 1.64 01/05/2021    CR 1.80 12/21/2020        Recent Labs   Lab Test 08/31/22  0536 08/30/22  0422 08/29/22  0411   WBC 9.2 8.8 7.8   HGB 10.8* 10.4* 10.5*   HCT 33.8* 33.1* 33.8*   MCV 72* 73* 73*    231 199     Recent Labs   Lab Test 08/31/22  0536 08/30/22  0422 08/29/22  0411   AST 26 26 59*   * 137* 226*   ALKPHOS 242* 219* 239*   BILITOTAL 0.5 0.7 0.8       Recent Labs   Lab Test 08/31/22  0536 08/30/22  0422 08/29/22  0411   MAG 2.1 2.1 2.4*     Recent Labs   Lab Test 08/31/22  0536 08/30/22  0422 08/29/22  0411   PHOS 4.0 3.8 4.9*     Recent Labs   Lab Test 08/31/22  0536 08/30/22  0422 08/29/22  0411   ISSA 8.6 8.5 8.4*       Lab Results   Component Value Date    ISSA 8.6 08/31/2022     Lab Results   Component Value Date    WBC 9.2 08/31/2022    HGB 10.8 (L) 08/31/2022    HCT 33.8 (L) 08/31/2022    MCV 72 (L) 08/31/2022     08/31/2022     Lab Results   Component Value Date     (H) 08/31/2022    POTASSIUM 3.6 08/31/2022    CHLORIDE 116 (H) 08/31/2022    CO2 22 08/31/2022     (H) 08/31/2022     Lab Results   Component Value Date    BUN 47 (H) 08/31/2022    CR 2.55 (H) 08/31/2022     Lab Results   Component Value Date    MAG 2.1 08/31/2022     Lab Results   Component Value Date    PHOS 4.0 08/31/2022       Creatinine   Date Value Ref Range Status   08/31/2022 2.55 (H) 0.52 - 1.04 mg/dL Final    08/30/2022 2.94 (H) 0.52 - 1.04 mg/dL Final   08/29/2022 3.13 (H) 0.52 - 1.04 mg/dL Final   08/28/2022 3.23 (H) 0.52 - 1.04 mg/dL Final   08/27/2022 3.44 (H) 0.52 - 1.04 mg/dL Final   08/26/2022 3.50 (H) 0.52 - 1.04 mg/dL Final   03/13/2021 1.41 (H) 0.52 - 1.04 mg/dL Final   01/05/2021 1.64 (H) 0.52 - 1.04 mg/dL Final   12/21/2020 1.80 (H) 0.52 - 1.04 mg/dL Final   10/15/2019 1.16 (H) 0.52 - 1.04 mg/dL Final   10/23/2018 1.12 (H) 0.52 - 1.04 mg/dL Final   08/03/2018 1.15 (H) 0.52 - 1.04 mg/dL Final       Attestation:  I have reviewed today's vital signs, notes, medications, labs and imaging.     Cesar Chaudhary MD             Detail Level: Generalized

## 2024-12-02 ENCOUNTER — MEDICAL CORRESPONDENCE (OUTPATIENT)
Dept: HEALTH INFORMATION MANAGEMENT | Facility: CLINIC | Age: 74
End: 2024-12-02
Payer: MEDICARE

## 2024-12-02 NOTE — PROGRESS NOTES
"General Surgery Progress Note    Assessment and Plan:  Diana Santiago is a 71 year old female POD 11 s/p exploratory laparotomy and repair of perforated gastric ulcer. Patient continues to recover well.   - Advance to soft diedt  - Continue trending WBC and electrolytes  - suppository PRN for constipation    Interval Hx:   abdominal pain well controlled, passing gas, denies nausea and did well with meals  Meds reviewed.    Exam:  Vitals: Blood pressure 119/85, pulse 96, temperature 97.7  F (36.5  C), temperature source Oral, resp. rate 18, height 1.626 m (5' 4\"), weight 55.1 kg (121 lb 7.6 oz), SpO2 97 %, not currently breastfeeding.  Temperature Temp  Av.3  F (36.8  C)  Min: 97.8  F (36.6  C)  Max: 98.6  F (37  C)   I/O last 3 completed shifts:  In: 590 [P.O.:590]  Out: 2830 [Urine:2800; Drains:30]    Gen: Awake and in no apparent distress.  Heart: RRR  Lungs: No increased work in breathing.  Abd: soft, appropriately tender, mildly distended, incision well approximated, drain with serous output    Data:    Recent Labs   Lab Test 22  0552 22  0558 22  0549   WBC 12.2* 13.9* 12.8*   HGB 9.7* 10.2* 10.5*   HCT 31.4* 32.7* 33.6*   * 503* 401      Recent Labs   Lab Test 22  0552 22  0558 22  0549    143 145*   POTASSIUM 3.8 3.9 3.9   CHLORIDE 116* 115* 115*   CO2 20 21 21   BUN 49* 43* 45*   CR 1.76* 1.79* 2.10*     Recent Labs   Lab Test 22  0628 22  0536 22  0422 22  0604 22  1307   BILITOTAL 0.3 0.5 0.7   < > 0.9   DBIL 0.2  --   --   --   --    ALT 84* 108* 137*   < > 29   AST 20 26 26   < > 24   ALKPHOS 237* 242* 219*   < > 88   LIPASE  --   --   --   --  511*    < > = values in this interval not displayed.     Lenora Arnold MD      " Imaging Studies/Medications

## 2024-12-28 SDOH — HEALTH STABILITY: PHYSICAL HEALTH: ON AVERAGE, HOW MANY DAYS PER WEEK DO YOU ENGAGE IN MODERATE TO STRENUOUS EXERCISE (LIKE A BRISK WALK)?: 4 DAYS

## 2024-12-28 SDOH — HEALTH STABILITY: PHYSICAL HEALTH: ON AVERAGE, HOW MANY MINUTES DO YOU ENGAGE IN EXERCISE AT THIS LEVEL?: 10 MIN

## 2024-12-28 ASSESSMENT — SOCIAL DETERMINANTS OF HEALTH (SDOH): HOW OFTEN DO YOU GET TOGETHER WITH FRIENDS OR RELATIVES?: TWICE A WEEK

## 2025-01-02 ENCOUNTER — OFFICE VISIT (OUTPATIENT)
Dept: FAMILY MEDICINE | Facility: CLINIC | Age: 75
End: 2025-01-02
Payer: MEDICARE

## 2025-01-02 VITALS
TEMPERATURE: 99.1 F | DIASTOLIC BLOOD PRESSURE: 69 MMHG | WEIGHT: 118 LBS | SYSTOLIC BLOOD PRESSURE: 94 MMHG | HEART RATE: 80 BPM | OXYGEN SATURATION: 98 % | BODY MASS INDEX: 20.91 KG/M2 | RESPIRATION RATE: 20 BRPM | HEIGHT: 63 IN

## 2025-01-02 DIAGNOSIS — Z00.00 ENCOUNTER FOR SUBSEQUENT ANNUAL WELLNESS VISIT (AWV) IN MEDICARE PATIENT: Primary | ICD-10-CM

## 2025-01-02 DIAGNOSIS — Z99.2 ESRD (END STAGE RENAL DISEASE) ON DIALYSIS (H): ICD-10-CM

## 2025-01-02 DIAGNOSIS — F31.9 BIPOLAR AFFECTIVE DISORDER, REMISSION STATUS UNSPECIFIED (H): ICD-10-CM

## 2025-01-02 DIAGNOSIS — E78.5 HYPERLIPIDEMIA LDL GOAL <130: ICD-10-CM

## 2025-01-02 DIAGNOSIS — N18.6 ESRD (END STAGE RENAL DISEASE) ON DIALYSIS (H): ICD-10-CM

## 2025-01-02 PROCEDURE — G2211 COMPLEX E/M VISIT ADD ON: HCPCS | Performed by: INTERNAL MEDICINE

## 2025-01-02 PROCEDURE — G0439 PPPS, SUBSEQ VISIT: HCPCS | Performed by: INTERNAL MEDICINE

## 2025-01-02 NOTE — PROGRESS NOTES
Preventive Care Visit  [facility]  Gayle Hernandez MD, Internal Medicine  Jan 2, 2025      Assessment & Plan     Encounter for subsequent annual wellness visit (AWV) in Medicare patient  - diet, exercise    ESRD (end stage renal disease) on dialysis (H)  - stable, continue current therapy    Bipolar affective disorder, remission status unspecified (H)  - stable, continue current therapy      Patient has been advised of split billing requirements and indicates understanding: Yes        Counseling  Appropriate preventive services were addressed with this patient via screening, questionnaire, or discussion as appropriate for fall prevention, nutrition, physical activity, Tobacco-use cessation, social engagement, weight loss and cognition.  Checklist reviewing preventive services available has been given to the patient.  Reviewed patient's diet, addressing concerns and/or questions.   The patient was instructed to see the dentist every 6 months.   She is at risk for psychosocial distress and has been provided with information to reduce risk.   Discussed possible causes of fatigue. Updated plan of care.  Patient reported difficulty with activities of daily living were addressed today.Addressed any concerns about safety while driving.  The patient was provided with written information regarding signs of hearing loss.   Information on urinary incontinence and treatment options given to patient.       FUTURE APPOINTMENTS:       - Follow-up visit in 1 year    Leigh Ortiz is a 74 year old, presenting for the following:  Physical      HPI  Health Care Directive  Patient has a Health Care Directive on file  Advance care planning document is on file but is outdated.  Patient encouraged to update.      12/28/2024   General Health   How would you rate your overall physical health? (!) FAIR   Feel stress (tense, anxious, or unable to sleep) Very much   (!) STRESS CONCERN      12/28/2024   Nutrition   Diet:  Other   If other, please elaborate: Low potassium diet         12/28/2024   Exercise   Days per week of moderate/strenous exercise 4 days   Average minutes spent exercising at this level 10 min         12/28/2024   Social Factors   Frequency of gathering with friends or relatives Twice a week   Worry food won't last until get money to buy more No   Food not last or not have enough money for food? No   Do you have housing? (Housing is defined as stable permanent housing and does not include staying ouside in a car, in a tent, in an abandoned building, in an overnight shelter, or couch-surfing.) Yes   Are you worried about losing your housing? No   Lack of transportation? No   Unable to get utilities (heat,electricity)? No         1/2/2025   Fall Risk   Fallen 2 or more times in the past year? No   Trouble with walking or balance? No          12/28/2024   Activities of Daily Living- Home Safety   Needs help with the following daily activites Transportation    Shopping    Preparing meals    Housework    Bathing    Laundry    Medication administration    Money management   Safety concerns in the home None of the above       Multiple values from one day are sorted in reverse-chronological order         12/28/2024   Dental   Dentist two times every year? (!) NO         12/28/2024   Hearing Screening   Hearing concerns? (!) I NEED TO ASK PEOPLE TO SPEAK UP OR REPEAT THEMSELVES.    (!) IT'S HARD TO FOLLOW A CONVERSATION IN A NOISY RESTAURANT OR CROWDED ROOM.    (!) TROUBLE UNDERSTANDING SOFT OR WHISPERED SPEECH.       Multiple values from one day are sorted in reverse-chronological order         12/28/2024   Driving Risk Screening   Patient/family members have concerns about driving (!) YES          12/28/2024   General Alertness/Fatigue Screening   Have you been more tired than usual lately? (!) YES         12/28/2024   Urinary Incontinence Screening   Bothered by leaking urine in past 6 months Yes            Today's PHQ-2  Score:       1/2/2025     2:14 PM   PHQ-2 ( 1999 [employer])   Q1: Little interest or pleasure in doing things 1   Q2: Feeling down, depressed or hopeless 1   PHQ-2 Score 2    Q1: Little interest or pleasure in doing things Several days   Q2: Feeling down, depressed or hopeless Several days   PHQ-2 Score 2       Patient-reported           12/28/2024   Substance Use   Alcohol more than 3/day or more than 7/wk Not Applicable   Do you have a current opioid prescription? No   How severe/bad is pain from 1 to 10? 6/10   Do you use any other substances recreationally? No     Social History     Tobacco Use    Smoking status: Never    Smokeless tobacco: Never   Vaping Use    Vaping status: Never Used   Substance Use Topics    Alcohol use: No    Drug use: No           12/19/2023   LAST FHS-7 RESULTS   1st degree relative breast or ovarian cancer No   Any relative bilateral breast cancer No   Any male have breast cancer No   Any ONE woman have BOTH breast AND ovarian cancer No   Any woman with breast cancer before 50yrs No   2 or more relatives with breast AND/OR ovarian cancer No   2 or more relatives with breast AND/OR bowel cancer No       Mammogram Screening - After age 74- determine frequency with patient based on health status, life expectancy and patient goals    ASCVD Risk   The 10-year ASCVD risk score (Slava DK, et al., 2019) is: 8%    Values used to calculate the score:      Age: 74 years      Sex: Female      Is Non- : No      Diabetic: No      Tobacco smoker: No      Systolic Blood Pressure: 94 mmHg      Is BP treated: No      HDL Cholesterol: 56 mg/dL      Total Cholesterol: 173 mg/dL    Reviewed and updated as needed this visit by Provider                    Past Medical History:   Diagnosis Date    Benign essential hypertension 09/2018    added norvasc 9/18    Bipolar affective disorder (H)     hosp 1993, Dr. Aftab Deal    Cancer (H) 1996    DCIS, left breast    Chronic  kidney disease, stage 3a (H)     seen by renal Dr. 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    felt due to lithium    Thalassaemia trait     Vitamin D deficiency      Current providers sharing in care for this patient include:  Patient Care Team:  Gayle Hernandez MD as PCP - General (Internal Medicine)  Trinidad Cross MD as MD (Nephrology)  Gayle Hernandez MD as Assigned PCP  Merlin Mar RPH as Pharmacist (Pharmacist)  ([facility]), [facility] - Kevin Gutierrez MD as Assigned Heart and Vascular Provider    The following health maintenance items are reviewed in Epic and correct as of today:  Health Maintenance   Topic Date Due    MICROALBUMIN  Never done    HEPATITIS B IMMUNIZATION (1 of 3 - Risk Dialysis 4-dose series) Never done    LIPID  12/21/2021    MEDICARE ANNUAL WELLNESS VISIT  06/30/2024    BMP  02/01/2025    HEMOGLOBIN  04/10/2025    ANNUAL REVIEW OF HM ORDERS  09/23/2025    MAMMO SCREENING  12/19/2025    FALL RISK ASSESSMENT  01/02/2026    DTAP/TDAP/TD IMMUNIZATION (5 - Td or Tdap) 08/08/2027    GLUCOSE  11/01/2027    ADVANCE CARE PLANNING  06/30/2028    COLORECTAL CANCER SCREENING  06/17/2029    DEXA  12/12/2033    PARATHYROID  Completed    PHOSPHORUS  Completed    HEPATITIS C SCREENING  Completed    PHQ-2 (once per calendar year)  Completed    INFLUENZA VACCINE  Completed    Pneumococcal Vaccine: 50+ Years  Completed    URINALYSIS  Completed    ALK PHOS  Completed    ZOSTER IMMUNIZATION  Completed    RSV VACCINE  Completed    COVID-19  "Vaccine  Completed    HPV IMMUNIZATION  Aged Out    MENINGITIS IMMUNIZATION  Aged Out    RSV MONOCLONAL ANTIBODY  Aged Out         Review of Systems  Constitutional, HEENT, cardiovascular, pulmonary, GI, , musculoskeletal, neuro, skin, endocrine and psych systems are negative, except as otherwise noted.     Objective    Exam  BP 94/69 (BP Location: Left arm, Patient Position: Sitting, Cuff Size: Adult Regular)   Pulse 80   Temp 99.1  F (37.3  C) (Oral)   Resp 20   Ht 1.6 m (5' 3\")   Wt 53.5 kg (118 lb)   LMP  (LMP Unknown)   SpO2 98%   Breastfeeding No   BMI 20.90 kg/m     Estimated body mass index is 20.9 kg/m  as calculated from the following:    Height as of this encounter: 1.6 m (5' 3\").    Weight as of this encounter: 53.5 kg (118 lb).    Physical Exam  GENERAL: alert and no distress  EYES: Eyes grossly normal to inspection, conjunctivae and sclerae normal  HENT: ear canals and TM's normal, nose and mouth without ulcers or lesions  NECK: no asymmetry  RESP: lungs clear to auscultation - no rales, rhonchi or wheezes  CV: regular rate and rhythm, normal S1 S2  ABDOMEN: soft, nontender, bowel sounds normal  MS: no gross musculoskeletal defects noted, no edema  SKIN: no suspicious lesions or rashes  NEURO: Normal strength and tone, mentation intact and speech normal  PSYCH: flat affect          1/2/2025   Mini Cog   Clock Draw Score 2 Normal   3 Item Recall 3 objects recalled   Mini Cog Total Score 5              Signed Electronically by: Gayle Hernandez MD    "

## 2025-01-08 DIAGNOSIS — E78.1 HYPERTRIGLYCERIDEMIA: ICD-10-CM

## 2025-01-08 DIAGNOSIS — M25.552 BILATERAL HIP PAIN: ICD-10-CM

## 2025-01-08 DIAGNOSIS — M25.551 BILATERAL HIP PAIN: ICD-10-CM

## 2025-01-09 RX ORDER — SIMVASTATIN 40 MG
TABLET ORAL
Qty: 90 TABLET | Refills: 11 | Status: SHIPPED | OUTPATIENT
Start: 2025-01-09

## 2025-01-09 RX ORDER — PSEUDOEPHED/ACETAMINOPH/DIPHEN 30MG-500MG
TABLET ORAL
Qty: 56 TABLET | Refills: 9 | Status: SHIPPED | OUTPATIENT
Start: 2025-01-09

## 2025-01-12 DIAGNOSIS — F31.9 BIPOLAR AFFECTIVE DISORDER, REMISSION STATUS UNSPECIFIED (H): ICD-10-CM

## 2025-01-12 RX ORDER — DIAZEPAM 2 MG/1
TABLET ORAL
Qty: 7 TABLET | Refills: 1 | Status: SHIPPED | OUTPATIENT
Start: 2025-01-12

## 2025-01-12 NOTE — TELEPHONE ENCOUNTER
Js mera from [facility]. The facility has had a change in pharmacy and is asking to have a new prescription for diazepam 2 mg tablet be sent to [facility] in [place].    Informed that PCP approval is needed and will route to provider to address tomorrow.  Marilyn Keating RN   01/12/25 1:01 PM  [facility] Nurse Advisor

## 2025-01-21 ENCOUNTER — HOSPITAL ENCOUNTER (OUTPATIENT)
Dept: MAMMOGRAPHY | Facility: CLINIC | Age: 75
Discharge: HOME | End: 2025-01-21
Attending: INTERNAL MEDICINE
Payer: MEDICARE

## 2025-01-21 DIAGNOSIS — Z12.31 VISIT FOR SCREENING MAMMOGRAM: ICD-10-CM

## 2025-01-21 PROCEDURE — 77063 BREAST TOMOSYNTHESIS BI: CPT

## 2025-01-21 PROCEDURE — 77067 SCR MAMMO BI INCL CAD: CPT

## 2025-01-27 NOTE — PROGRESS NOTES
[facility]    Diana Santiago returns for follow-up of her fistula.  History of end-stage renal disease was now initiated hemodialysis.    -- 1/30/2024: First stage right upper arm proximal ulnar to brachial transposition arteriovenous fistula    -- 4/2/2024: Second stage transposition upper arm fistula    -- 9/26/2024 clinic follow-up: Well-functioning fistula.  Outflow volume 1600 mL/min.  Minimum diameter of 5.3 mm with averaging 8 mm.  Mild stenosis 3.2 mm at the antecubital level not felt to be clinically seen    ROS: No issues at all with her dialysis at the [facility] [facility].  However she did have a hematoma on the more proximal forearm recently.  They have been primarily using the distal one third of the fistula which is very well-developed.  No hand ischemic symptoms.    Exam: Alert and appropriate.  Uses a wheeled walker.   Blood pressure 100/65 left arm.  Pulse 76.   Chest =clear to auscultation  Cardiovascular= regular rate  Well-developed right upper arm fistula.  Overlying skin is normal.  Resolving  Hematoma proximal upper arm.  Clinical area of stenosis in the mid upper arm marked on the skin.    Duplex today reveals a widely patent anastomosis.  Fistula averages a minimum of 6.5 mm in its usable length.  There is a focal stenosis still noted in the mid fistula measuring 3.4 mm which is improved.  Outflow volume= 1040 mL/min.        IMPRESSION:   Well-functioning right upper arm proximal ulnar to basilic transposition fistula.  Area of focal stenosis has increased and not of clinical concern at this time.  Would recommend removing needles for dialysis somewhat closer to the elbow crease to avoid this narrowed area.    Due to the stenosis I have recommended 6-month follow-up duplex or sooner problems should develop which I do feel would be unlikely due to the stability of the stenosis.    20 minutes with patient today including chart review.    Kevin Royal MD   This  note was created using Dragon voice recognition software which may result in transcription errors.    CC: [facility] dialysis unit

## 2025-01-30 ENCOUNTER — OFFICE VISIT (OUTPATIENT)
Dept: OTHER | Facility: CLINIC | Age: 75
End: 2025-01-30
Attending: SURGERY
Payer: MEDICARE

## 2025-01-30 VITALS — HEART RATE: 76 BPM | SYSTOLIC BLOOD PRESSURE: 100 MMHG | DIASTOLIC BLOOD PRESSURE: 65 MMHG

## 2025-01-30 DIAGNOSIS — T82.858D ARTERIOVENOUS FISTULA STENOSIS, SUBSEQUENT ENCOUNTER: ICD-10-CM

## 2025-01-30 DIAGNOSIS — Z99.2 ESRD (END STAGE RENAL DISEASE) ON DIALYSIS (H): Primary | ICD-10-CM

## 2025-01-30 DIAGNOSIS — N18.6 ESRD (END STAGE RENAL DISEASE) ON DIALYSIS (H): Primary | ICD-10-CM

## 2025-01-30 PROCEDURE — G0463 HOSPITAL OUTPT CLINIC VISIT: HCPCS | Performed by: SURGERY

## 2025-01-30 RX ORDER — MIDODRINE HYDROCHLORIDE 2.5 MG/1
TABLET ORAL
COMMUNITY
Start: 2025-01-15

## 2025-01-30 NOTE — PROGRESS NOTES
[facility]        Patient is here for a  follow up.    Pt is currently taking Statin.    /65 (BP Location: Left arm, Patient Position: Chair, Cuff Size: Adult Regular)   Pulse 76   LMP  (LMP Unknown)     The provider has been notified that the patient has no concerns.     Questions patient would like addressed today are: N/A.    Refills are needed: N/A    Has homecare services and agency name:  Kusum Freeman MA

## 2025-02-24 DIAGNOSIS — F31.9 BIPOLAR AFFECTIVE DISORDER, REMISSION STATUS UNSPECIFIED (H): ICD-10-CM

## 2025-02-25 RX ORDER — DIAZEPAM 2 MG/1
TABLET ORAL
Qty: 7 TABLET | Refills: 0 | Status: SHIPPED | OUTPATIENT
Start: 2025-02-25 | End: 2025-02-27

## 2025-02-25 NOTE — TELEPHONE ENCOUNTER
Assisted Living calling requesting for refill on valium. Pt is out of medication.     Lexus Whitman RN

## 2025-02-27 ENCOUNTER — VIRTUAL VISIT (OUTPATIENT)
Dept: FAMILY MEDICINE | Facility: CLINIC | Age: 75
End: 2025-02-27
Payer: MEDICARE

## 2025-02-27 DIAGNOSIS — F31.9 BIPOLAR AFFECTIVE DISORDER, REMISSION STATUS UNSPECIFIED (H): ICD-10-CM

## 2025-02-27 DIAGNOSIS — Z99.2 ESRD (END STAGE RENAL DISEASE) ON DIALYSIS (H): ICD-10-CM

## 2025-02-27 DIAGNOSIS — F41.0 ANXIETY ATTACK: ICD-10-CM

## 2025-02-27 DIAGNOSIS — F25.0 SCHIZOAFFECTIVE DISORDER, BIPOLAR TYPE (H): ICD-10-CM

## 2025-02-27 DIAGNOSIS — E78.5 HYPERLIPIDEMIA LDL GOAL <130: ICD-10-CM

## 2025-02-27 DIAGNOSIS — N18.6 ESRD (END STAGE RENAL DISEASE) ON DIALYSIS (H): ICD-10-CM

## 2025-02-27 PROCEDURE — 98014 SYNCH AUDIO-ONLY EST MOD 30: CPT | Performed by: INTERNAL MEDICINE

## 2025-02-27 RX ORDER — DIAZEPAM 2 MG/1
TABLET ORAL
Qty: 7 TABLET | Refills: 2 | Status: SHIPPED | OUTPATIENT
Start: 2025-02-27

## 2025-02-27 NOTE — PROGRESS NOTES
Diana is a 74 year old who is being evaluated via a billable telephone visit.    What phone number would you like to be contacted at? [phone]    How would you like to obtain your AVS? MyChart  Originating Location (pt. Location): Home  {PROVIDER LOCATION On-site should be selected for visits conducted from your clinic location or adjoining [facility] hospital, academic office, or other nearby [facility] building. Off-site should be selected for all other provider locations, including home:937298}  Distant Location (provider location):  {virtual location provider:766892}  Telephone visit completed due to {audio only reason:963893}    {PROVIDER CHARTING PREFERENCE:766305}    Subjective   Diana is a 74 year old, presenting for the following health issues:  No chief complaint on file.  {(!) Visit Details have not yet been documented.  Please enter Visit Details and then use this list to pull in documentation. (Optional):317723}  History of Present Illness       Reason for visit:  Medication   She is taking medications regularly.       {SUPERLIST (Optional):371229}  {additonal problems for provider to add (Optional):979033}    {ROS Picklists (Optional):516732}      Objective           Vitals:  No vitals were obtained today due to virtual visit.    Physical Exam   General: Alert and no distress //Respiratory: No audible wheeze, cough, or shortness of breath // Psychiatric:  Appropriate affect, tone, and pace of words      {Diagnostic Test Results (Optional):667525}      Phone call duration: *** minutes  Signed Electronically by: Gayle Hernandez MD  {Email feedback regarding this note to [email or web address]   :751795}   simvastatin (ZOCOR) 40 MG tablet TAKE 1 TABLET BY MOUTH AT BEDTIME 90 tablet 11     No current facility-administered medications for this visit.     Past Medical History:   Diagnosis Date    Benign essential hypertension 09/2018    added norvasc 9/18    Bipolar affective disorder (H)     hosp 1993, Dr. Aftab Deal    Cancer (H) 1996    DCIS, left breast    Chronic kidney disease, stage 3a (H)     seen by renal Dr. 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    felt due to lithium    Thalassaemia trait     Vitamin D deficiency      Social History     Socioeconomic History    Marital status: Single     Spouse name: Not on file    Number of children: 0    Years of education: Not on file    Highest education level: Not on file   Occupational History    Occupation: , retired   Tobacco Use    Smoking status: Never    Smokeless tobacco: Never   Vaping Use    Vaping status: Never Used   Substance and Sexual Activity    Alcohol use: No    Drug use: No    Sexual activity: Not Currently     Partners: Male     Birth control/protection: Post-menopausal, None   Other Topics Concern    Parent/sibling w/ CABG, MI or angioplasty before 65F 55M? No   Social History Narrative    Not on file     Social Drivers of Health     Financial Resource Strain: Low Risk  (12/28/2024)    Financial Resource Strain     Within the past 12 months, have you or your family members you live with been unable to get utilities (heat, electricity) when it was really needed?: No   Food Insecurity: Low Risk  (12/28/2024)    Food  Insecurity     Within the past 12 months, did you worry that your food would run out before you got money to buy more?: No     Within the past 12 months, did the food you bought just not last and you didn t have money to get more?: No   Transportation Needs: Low Risk  (12/28/2024)    Transportation Needs     Within the past 12 months, has lack of transportation kept you from medical appointments, getting your medicines, non-medical meetings or appointments, work, or from getting things that you need?: No   Physical Activity: Insufficiently Active (12/28/2024)    Exercise Vital Sign     Days of Exercise per Week: 4 days     Minutes of Exercise per Session: 10 min   Stress: Stress Concern Present (12/28/2024)    [employer] [place] [employer] - Occupational Stress Questionnaire     Feeling of Stress : Very much   Social Connections: Unknown (12/28/2024)    Social Connection and Isolation Panel [NHANES]     Frequency of Communication with Friends and Family: Not on file     Frequency of Social Gatherings with Friends and Family: Twice a week     Attends Cheondoism Services: Not on file     Active Member of Clubs or Organizations: Not on file     Attends Club or Organization Meetings: Not on file     Marital Status: Not on file   Interpersonal Safety: Low Risk  (1/2/2025)    Interpersonal Safety     Do you feel physically and emotionally safe where you currently live?: Yes     Within the past 12 months, have you been hit, slapped, kicked or otherwise physically hurt by someone?: No     Within the past 12 months, have you been humiliated or emotionally abused in other ways by your partner or ex-partner?: No   Housing Stability: Low Risk  (12/28/2024)    Housing Stability     Do you have housing? : Yes     Are you worried about losing your housing?: No             Review of Systems  Constitutional, HEENT, cardiovascular, pulmonary, GI, , musculoskeletal, neuro, skin, endocrine and psych systems are negative,  except as otherwise noted.      Objective           Vitals:  No vitals were obtained today due to virtual visit.    Physical Exam   General: Alert and no distress //Respiratory: No audible wheeze, cough, or shortness of breath // Psychiatric:  Appropriate affect, tone, and pace of words      Labs reviewed in Epic        Telephone visit duration including chart review medication management: 32 minutes  Signed Electronically by: Gayle Hernandez MD

## 2025-04-20 ENCOUNTER — HEALTH MAINTENANCE LETTER (OUTPATIENT)
Age: 75
End: 2025-04-20

## 2025-06-08 DIAGNOSIS — F41.0 ANXIETY ATTACK: Primary | ICD-10-CM

## 2025-06-19 ENCOUNTER — VIRTUAL VISIT (OUTPATIENT)
Dept: FAMILY MEDICINE | Facility: CLINIC | Age: 75
End: 2025-06-19
Payer: MEDICARE

## 2025-06-19 ENCOUNTER — TELEPHONE (OUTPATIENT)
Dept: FAMILY MEDICINE | Facility: CLINIC | Age: 75
End: 2025-06-19

## 2025-06-19 DIAGNOSIS — N18.6 ESRD (END STAGE RENAL DISEASE) ON DIALYSIS (H): ICD-10-CM

## 2025-06-19 DIAGNOSIS — F25.0 SCHIZOAFFECTIVE DISORDER, BIPOLAR TYPE (H): ICD-10-CM

## 2025-06-19 DIAGNOSIS — F41.0 ANXIETY ATTACK: Primary | ICD-10-CM

## 2025-06-19 DIAGNOSIS — Z99.2 ESRD (END STAGE RENAL DISEASE) ON DIALYSIS (H): ICD-10-CM

## 2025-06-19 PROCEDURE — 98014 SYNCH AUDIO-ONLY EST MOD 30: CPT | Performed by: INTERNAL MEDICINE

## 2025-06-19 RX ORDER — DIAZEPAM 2 MG/1
TABLET ORAL
Qty: 7 TABLET | Refills: 2 | Status: SHIPPED | OUTPATIENT
Start: 2025-06-19

## 2025-06-19 NOTE — PROGRESS NOTES
"Diana is a 74 year old who is being evaluated via a billable telephone visit.    What phone number would you like to be contacted at? [phone]  How would you like to obtain your AVS? [facility]  Originating Location (pt. Location): {patient location:011414::\"Home\"}  {PROVIDER LOCATION On-site should be selected for visits conducted from your clinic location or adjoining [facility] hospital, academic office, or other nearby [facility] building. Off-site should be selected for all other provider locations, including home:921520}  Distant Location (provider location):  {virtual location provider:124644}  Telephone visit completed due to {audio only reason:099285}    {PROVIDER CHARTING PREFERENCE:083892}    Subjective   Diana is a 74 year old, presenting for the following health issues:  Recheck Medication    History of Present Illness       Reason for visit:  Medication refills         {SUPERLIST (Optional):142398}  {additonal problems for provider to add (Optional):377211}    {ROS Picklists (Optional):451488}      Objective           Vitals:  No vitals were obtained today due to virtual visit.    Physical Exam   General: Alert and no distress //Respiratory: No audible wheeze, cough, or shortness of breath // Psychiatric:  Appropriate affect, tone, and pace of words      {Diagnostic Test Results (Optional):564561}      Phone call duration: *** minutes  Signed Electronically by: Gayle Hernandez MD  {Email feedback regarding this note to [email or web address]   :736305}  " (VALIUM) 2 MG tablet TAKE 1/2 TABLET (1MG) BY MOUTH EVERY OTHER DAY 7 tablet 2     No current facility-administered medications for this visit.     Past Medical History:   Diagnosis Date    Benign essential hypertension 09/2018    added norvasc 9/18    Bipolar affective disorder (H)     hosp 1993, Dr. Aftab Deal    Cancer (H) 1996    DCIS, left breast    Chronic kidney disease, stage 3a (H)     seen by renal Dr. 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    felt due to lithium    Thalassaemia trait     Vitamin D deficiency      Social History     Socioeconomic History    Marital status: Single     Spouse name: Not on file    Number of children: 0    Years of education: Not on file    Highest education level: Not on file   Occupational History    Occupation: , retired   Tobacco Use    Smoking status: Never    Smokeless tobacco: Never   Vaping Use    Vaping status: Never Used   Substance and Sexual Activity    Alcohol use: No    Drug use: No    Sexual activity: Not Currently     Partners: Male     Birth control/protection: Post-menopausal, None   Other Topics Concern    Parent/sibling w/ CABG, MI or angioplasty before 65F 55M? No   Social History Narrative    Not on file     Social Drivers of Health     Financial Resource Strain: Low Risk  (12/28/2024)    Financial Resource Strain     Within the past 12 months, have you or your family members you live with been unable to get utilities (heat, electricity) when it was really needed?: No   Food Insecurity: Low Risk  (12/28/2024)    Food  Insecurity     Within the past 12 months, did you worry that your food would run out before you got money to buy more?: No     Within the past 12 months, did the food you bought just not last and you didn t have money to get more?: No   Transportation Needs: Low Risk  (12/28/2024)    Transportation Needs     Within the past 12 months, has lack of transportation kept you from medical appointments, getting your medicines, non-medical meetings or appointments, work, or from getting things that you need?: No   Physical Activity: Insufficiently Active (12/28/2024)    Exercise Vital Sign     Days of Exercise per Week: 4 days     Minutes of Exercise per Session: 10 min   Stress: Stress Concern Present (12/28/2024)    [place] [place] [employer] - Occupational Stress Questionnaire     Feeling of Stress : Very much   Social Connections: Unknown (12/28/2024)    Social Connection and Isolation Panel [NHANES]     Frequency of Communication with Friends and Family: Not on file     Frequency of Social Gatherings with Friends and Family: Twice a week     Attends Caodaism Services: Not on file     Active Member of Clubs or Organizations: Not on file     Attends Club or Organization Meetings: Not on file     Marital Status: Not on file   Interpersonal Safety: Low Risk  (1/2/2025)    Interpersonal Safety     Do you feel physically and emotionally safe where you currently live?: Yes     Within the past 12 months, have you been hit, slapped, kicked or otherwise physically hurt by someone?: No     Within the past 12 months, have you been humiliated or emotionally abused in other ways by your partner or ex-partner?: No   Housing Stability: Low Risk  (12/28/2024)    Housing Stability     Do you have housing? : Yes     Are you worried about losing your housing?: No       Family History   Problem Relation Age of Onset    Cerebrovascular Disease Mother     Hypertension Father     Sleep Apnea Brother     Breast Cancer Maternal  Aunt         x 2    Breast Cancer Other         Maternal Aunt    Hyperlipidemia Niece            Review of Systems  Constitutional, HEENT, cardiovascular, pulmonary, GI, , musculoskeletal, neuro, skin, endocrine and psych systems are negative, except as otherwise noted.      Objective           Vitals:  No vitals were obtained today due to virtual visit.    Physical Exam   General: Alert and no distress //Respiratory: No audible wheeze, cough, or shortness of breath // Psychiatric:  Appropriate affect, tone, and pace of words      Labs reviewed in Epic          telephone visit duration including chart review medication management: 32 minutes  Signed Electronically by: Gayle Hernandez MD

## 2025-07-16 NOTE — PROGRESS NOTES
[facility]    Diana Santiago returns for follow-up of her dialysis access.    -- 1/30/2024: First-aid right upper arm proximal ulnar to brachial arteriovenous fistula    -- 4/2/2024: Second stage transposition upper arm fistula.    -- 1/30/2025 office visit: Widely patent fistula.  Outflow volume= 1040 mL/min.  Mild narrowing in the mid upper arm at 3.4 mm being 6.5 mm proximally and 7.2 mm distally.  Very proximal fistula widely patent ulnar anastomosis measuring 4.6 mm.  Measuring  no central venous issues.  Outflow volume 1040 mL/min .  AVF functioning well.  Recommended 6-month follow-up.    PMH: Medications unchanged patient does take midodrine for low blood pressure during dialysis.    ROS: Does have some occasional soreness on the lateral aspect of her arm.  Not adjacent to the fistula.  No hand ischemic symptoms    EXAM:: Alert and appropriate.  Very pleasant.  Wheeled walker.   Blood pressure 103/69 left arm.  Pulse 80 regular   Chest = clear   Cardiovascular= regular rate   Well dilated right upper arm fistula.  Overlying skin is normal.  No swelling.    Strong pulse.  Tortuous and axillary area    Biphasic Doppler to right radial and ulnar arteries at risk-no steal evidence    Fistula duplex today reveals a 3 narrowing of the widely patent anastomosis at 2.6 mm.  Rest of the fistula looks good.  Previous narrowed area in the mid upper arm now measures 4.2 mm.  Outflow volume is decreased 432 mL/min      IMPRESSION:   #1.  Well-functioning fistula.  I do not feel that the narrowing in the antecubital area beyond the anastomosis is clinically significant.   Stenosis in the mid upper arm which was my primary concern is actually improved in diameter and not clinically significant. She has very adequate blood flow to the somewhat lower outflow volume for successful hemodialysis.    #2.  No specific reason for her outer arm discomfort.  No evidence of clinical steal or diminished Doppler  flow to her hand.    No specific vascular follow-up is indicated unless problems should develop on dialysis.    20 minutes with patient today    Kevin Royal MD   This note was created using Dragon voice recognition software which may result in transcription errors.

## 2025-07-17 ENCOUNTER — OFFICE VISIT (OUTPATIENT)
Dept: OTHER | Facility: CLINIC | Age: 75
End: 2025-07-17
Attending: SURGERY
Payer: MEDICARE

## 2025-07-17 VITALS — HEART RATE: 80 BPM | DIASTOLIC BLOOD PRESSURE: 69 MMHG | SYSTOLIC BLOOD PRESSURE: 103 MMHG

## 2025-07-17 DIAGNOSIS — T82.858D ARTERIOVENOUS FISTULA STENOSIS, SUBSEQUENT ENCOUNTER: Primary | ICD-10-CM

## 2025-07-17 DIAGNOSIS — Z99.2 ESRD (END STAGE RENAL DISEASE) ON DIALYSIS (H): ICD-10-CM

## 2025-07-17 DIAGNOSIS — N18.6 ESRD (END STAGE RENAL DISEASE) ON DIALYSIS (H): ICD-10-CM

## 2025-07-17 PROBLEM — Z85.3 PERSONAL HISTORY OF MALIGNANT NEOPLASM OF BREAST: Status: ACTIVE | Noted: 2025-01-22

## 2025-07-17 PROCEDURE — G0463 HOSPITAL OUTPT CLINIC VISIT: HCPCS | Performed by: SURGERY

## 2025-07-17 NOTE — PROGRESS NOTES
[facility]        Patient is here for a  follow up.    Pt is currently taking no meds that would impact our treatment plan.    /69 (BP Location: Left arm, Patient Position: Chair, Cuff Size: Adult Regular)   Pulse 80   LMP  (LMP Unknown)     The provider has been notified that the patient has no concerns.     Questions patient would like addressed today are: N/A.    Refills are needed: N/A    Has homecare services and agency name:  Kusum Freeman MA

## (undated) DEVICE — ESU GROUND PAD UNIVERSAL W/O CORD

## (undated) DEVICE — SU VICRYL 2-0 TIE 12X18" J905T

## (undated) DEVICE — BLADE KNIFE SURG 15 371115

## (undated) DEVICE — DECANTER BAG 2002S

## (undated) DEVICE — VESSEL LOOPS YELLOW MINI 31145660

## (undated) DEVICE — CATH TRAY FOLEY SURESTEP 16FR WDRAIN BAG STLK LATEX A300316A

## (undated) DEVICE — SUCTION TIP POOLE K770

## (undated) DEVICE — GLOVE PROTEXIS W/NEU-THERA 7.5  2D73TE75

## (undated) DEVICE — SU VICRYL 3-0 SH 27" J316H

## (undated) DEVICE — NDL 22GA 1.5"

## (undated) DEVICE — SYR 03ML LL W/O NDL 309657

## (undated) DEVICE — SU SILK 3-0 TIE 24" SA74H

## (undated) DEVICE — PREP CHLORAPREP 26ML TINTED HI-LITE ORANGE 930815

## (undated) DEVICE — GLOVE PROTEXIS BLUE W/NEU-THERA 7.5  2D73EB75

## (undated) DEVICE — SU PDS II 2-0 CT-2 27"  Z333H

## (undated) DEVICE — SU SILK 4-0 TIE 24" SA73H

## (undated) DEVICE — PACK AV FISTULA CUSTOM SCV15AVFS1

## (undated) DEVICE — SU PROLENE 7-0 BV-1DA 24" 8702H

## (undated) DEVICE — SUCTION CANISTER MEDIVAC LINER 3000ML W/LID 65651-530

## (undated) DEVICE — SYR 20ML LL W/O NDL 302830

## (undated) DEVICE — SOL WATER IRRIG 1000ML BOTTLE 2F7114

## (undated) DEVICE — DRAPE SHEET REV FOLD 3/4 9349

## (undated) DEVICE — SYR 10ML SLIP TIP W/O NDL

## (undated) DEVICE — STPL SKIN 35W 6.9MM  PXW35

## (undated) DEVICE — SU VICRYL 3-0 SH CR 8X18" J774

## (undated) DEVICE — SU SILK 2-0 SH CR 8X18" C012D

## (undated) DEVICE — LINEN TOWEL PACK X5 5464

## (undated) DEVICE — GLOVE BIOGEL PI ULTRATOUCH SZ 7.5 41175

## (undated) DEVICE — DRSG STERI STRIP 1/2X4" R1547

## (undated) DEVICE — SOL NACL 0.9% IRRIG 1000ML BOTTLE 2F7124

## (undated) DEVICE — PACK MAJOR SBA15MAFSI

## (undated) DEVICE — SU SILK 4-0 TIE 12X30" A303H

## (undated) DEVICE — SOL NACL 0.9% INJ 250ML BAG 2B1322Q

## (undated) DEVICE — DRAIN JACKSON PRATT 15FR ROUND SU130-1323

## (undated) DEVICE — NDL ANGIOCATH 20GA 1.25" 4056

## (undated) DEVICE — SU VICRYL 3-0 TIE 12X18" J904T

## (undated) DEVICE — NDL 19GA 1.5"

## (undated) DEVICE — SU MONOCRYL 4-0 PS-2 18" UND Y496G

## (undated) DEVICE — SU MONOCRYL 5-0 PS-2 18" UND Y495G

## (undated) DEVICE — DRSG TELFA ISLAND 4X8" 7541

## (undated) DEVICE — DRSG GAUZE 4X4" 3033

## (undated) DEVICE — SOL NACL 0.9% IRRIG 1000ML BOTTLE 07138-09

## (undated) DEVICE — DECANTER VIAL 2006S

## (undated) DEVICE — COVER CLAMP FABRIC RADIOPAQUE 9.5X150MM 072002PBX

## (undated) DEVICE — SPONGE RAY-TEC 4X8" 7318

## (undated) DEVICE — DRAPE LAP W/ARMBOARD 29410

## (undated) DEVICE — BNDG ROLLER GAUZE CONFORM 4"X4YD 41-54

## (undated) DEVICE — ESU LIGASURE IMPACT OPEN SEALER/DVDR CVD LG JAW 36MM LF4318

## (undated) DEVICE — DRAIN JACKSON PRATT RESERVOIR 100ML SU130-1305

## (undated) DEVICE — Device

## (undated) DEVICE — DRAPE IOBAN INCISE 23X17" 6650EZ

## (undated) DEVICE — ESU ELEC BLADE 6" COATED E1450-6

## (undated) DEVICE — SU ETHILON 3-0 FS-1 18" 663G

## (undated) RX ORDER — PROPOFOL 10 MG/ML
INJECTION, EMULSION INTRAVENOUS
Status: DISPENSED
Start: 2024-01-30

## (undated) RX ORDER — PROPOFOL 10 MG/ML
INJECTION, EMULSION INTRAVENOUS
Status: DISPENSED
Start: 2022-08-24

## (undated) RX ORDER — FENTANYL CITRATE 50 UG/ML
INJECTION, SOLUTION INTRAMUSCULAR; INTRAVENOUS
Status: DISPENSED
Start: 2022-08-24

## (undated) RX ORDER — LIDOCAINE HYDROCHLORIDE 10 MG/ML
INJECTION, SOLUTION EPIDURAL; INFILTRATION; INTRACAUDAL; PERINEURAL
Status: DISPENSED
Start: 2024-01-30

## (undated) RX ORDER — PROPOFOL 10 MG/ML
INJECTION, EMULSION INTRAVENOUS
Status: DISPENSED
Start: 2024-04-02

## (undated) RX ORDER — FENTANYL CITRATE 50 UG/ML
INJECTION, SOLUTION INTRAMUSCULAR; INTRAVENOUS
Status: DISPENSED
Start: 2024-01-30

## (undated) RX ORDER — CEFAZOLIN SODIUM 1 G/3ML
INJECTION, POWDER, FOR SOLUTION INTRAMUSCULAR; INTRAVENOUS
Status: DISPENSED
Start: 2024-04-02

## (undated) RX ORDER — HEPARIN SODIUM 1000 [USP'U]/ML
INJECTION, SOLUTION INTRAVENOUS; SUBCUTANEOUS
Status: DISPENSED
Start: 2024-01-30

## (undated) RX ORDER — BUPIVACAINE HYDROCHLORIDE 2.5 MG/ML
INJECTION, SOLUTION EPIDURAL; INFILTRATION; INTRACAUDAL
Status: DISPENSED
Start: 2024-04-02

## (undated) RX ORDER — FENTANYL CITRATE 50 UG/ML
INJECTION, SOLUTION INTRAMUSCULAR; INTRAVENOUS
Status: DISPENSED
Start: 2022-06-17

## (undated) RX ORDER — FENTANYL CITRATE 50 UG/ML
INJECTION, SOLUTION INTRAMUSCULAR; INTRAVENOUS
Status: DISPENSED
Start: 2024-04-02

## (undated) RX ORDER — EPHEDRINE SULFATE 50 MG/ML
INJECTION, SOLUTION INTRAMUSCULAR; INTRAVENOUS; SUBCUTANEOUS
Status: DISPENSED
Start: 2024-04-02

## (undated) RX ORDER — HEPARIN SODIUM 1000 [USP'U]/ML
INJECTION, SOLUTION INTRAVENOUS; SUBCUTANEOUS
Status: DISPENSED
Start: 2024-04-02